# Patient Record
Sex: FEMALE | Race: WHITE | NOT HISPANIC OR LATINO | Employment: OTHER | URBAN - METROPOLITAN AREA
[De-identification: names, ages, dates, MRNs, and addresses within clinical notes are randomized per-mention and may not be internally consistent; named-entity substitution may affect disease eponyms.]

---

## 2017-01-05 PROCEDURE — 99284 EMERGENCY DEPT VISIT MOD MDM: CPT

## 2017-01-05 ASSESSMENT — PAIN SCALES - GENERAL: PAINLEVEL_OUTOF10: 8

## 2017-01-06 ENCOUNTER — HOSPITAL ENCOUNTER (EMERGENCY)
Facility: MEDICAL CENTER | Age: 47
End: 2017-01-06
Attending: EMERGENCY MEDICINE
Payer: MEDICAID

## 2017-01-06 VITALS
DIASTOLIC BLOOD PRESSURE: 75 MMHG | BODY MASS INDEX: 25.61 KG/M2 | HEART RATE: 102 BPM | HEIGHT: 64 IN | TEMPERATURE: 96.6 F | RESPIRATION RATE: 16 BRPM | OXYGEN SATURATION: 93 % | SYSTOLIC BLOOD PRESSURE: 135 MMHG | WEIGHT: 150 LBS

## 2017-01-06 DIAGNOSIS — R10.2 SUPRAPUBIC PAIN: ICD-10-CM

## 2017-01-06 DIAGNOSIS — G89.29 CHRONIC NONINTRACTABLE HEADACHE, UNSPECIFIED HEADACHE TYPE: ICD-10-CM

## 2017-01-06 DIAGNOSIS — R51.9 CHRONIC NONINTRACTABLE HEADACHE, UNSPECIFIED HEADACHE TYPE: ICD-10-CM

## 2017-01-06 LAB
ALBUMIN SERPL BCP-MCNC: 4 G/DL (ref 3.2–4.9)
ALBUMIN/GLOB SERPL: 1.3 G/DL
ALP SERPL-CCNC: 68 U/L (ref 30–99)
ALT SERPL-CCNC: 8 U/L (ref 2–50)
ANION GAP SERPL CALC-SCNC: 10 MMOL/L (ref 0–11.9)
APPEARANCE UR: CLEAR
AST SERPL-CCNC: 18 U/L (ref 12–45)
BASOPHILS # BLD AUTO: 0.5 % (ref 0–1.8)
BASOPHILS # BLD: 0.06 K/UL (ref 0–0.12)
BILIRUB SERPL-MCNC: 0.9 MG/DL (ref 0.1–1.5)
BILIRUB UR QL STRIP.AUTO: NEGATIVE
BUN SERPL-MCNC: 12 MG/DL (ref 8–22)
CALCIUM SERPL-MCNC: 9.6 MG/DL (ref 8.5–10.5)
CHLORIDE SERPL-SCNC: 108 MMOL/L (ref 96–112)
CO2 SERPL-SCNC: 21 MMOL/L (ref 20–33)
COLOR UR: YELLOW
COMMENT 1642: NORMAL
CREAT SERPL-MCNC: 0.61 MG/DL (ref 0.5–1.4)
CULTURE IF INDICATED INDCX: NO UA CULTURE
EOSINOPHIL # BLD AUTO: 0.14 K/UL (ref 0–0.51)
EOSINOPHIL NFR BLD: 1.1 % (ref 0–6.9)
ERYTHROCYTE [DISTWIDTH] IN BLOOD BY AUTOMATED COUNT: 42.3 FL (ref 35.9–50)
GFR SERPL CREATININE-BSD FRML MDRD: >60 ML/MIN/1.73 M 2
GLOBULIN SER CALC-MCNC: 3.1 G/DL (ref 1.9–3.5)
GLUCOSE SERPL-MCNC: 89 MG/DL (ref 65–99)
GLUCOSE UR STRIP.AUTO-MCNC: NEGATIVE MG/DL
HCG UR QL: NEGATIVE
HCT VFR BLD AUTO: 38.7 % (ref 37–47)
HGB BLD-MCNC: 13.2 G/DL (ref 12–16)
IMM GRANULOCYTES # BLD AUTO: 0.03 K/UL (ref 0–0.11)
IMM GRANULOCYTES NFR BLD AUTO: 0.2 % (ref 0–0.9)
KETONES UR STRIP.AUTO-MCNC: NEGATIVE MG/DL
LEUKOCYTE ESTERASE UR QL STRIP.AUTO: NEGATIVE
LIPASE SERPL-CCNC: 22 U/L (ref 11–82)
LYMPHOCYTES # BLD AUTO: 1.67 K/UL (ref 1–4.8)
LYMPHOCYTES NFR BLD: 13.4 % (ref 22–41)
MCH RBC QN AUTO: 31.2 PG (ref 27–33)
MCHC RBC AUTO-ENTMCNC: 34.1 G/DL (ref 33.6–35)
MCV RBC AUTO: 91.5 FL (ref 81.4–97.8)
MICRO URNS: NORMAL
MONOCYTES # BLD AUTO: 0.74 K/UL (ref 0–0.85)
MONOCYTES NFR BLD AUTO: 5.9 % (ref 0–13.4)
MORPHOLOGY BLD-IMP: NORMAL
NEUTROPHILS # BLD AUTO: 9.85 K/UL (ref 2–7.15)
NEUTROPHILS NFR BLD: 78.9 % (ref 44–72)
NITRITE UR QL STRIP.AUTO: NEGATIVE
NRBC # BLD AUTO: 0 K/UL
NRBC BLD AUTO-RTO: 0 /100 WBC
PH UR STRIP.AUTO: 6.5 [PH]
PLATELET # BLD AUTO: 279 K/UL (ref 164–446)
PMV BLD AUTO: 11.6 FL (ref 9–12.9)
POTASSIUM SERPL-SCNC: 3.7 MMOL/L (ref 3.6–5.5)
PROT SERPL-MCNC: 7.1 G/DL (ref 6–8.2)
PROT UR QL STRIP: NEGATIVE MG/DL
RBC # BLD AUTO: 4.23 M/UL (ref 4.2–5.4)
RBC UR QL AUTO: NEGATIVE
SODIUM SERPL-SCNC: 139 MMOL/L (ref 135–145)
SP GR UR REFRACTOMETRY: 1.02
SP GR UR STRIP.AUTO: 1.02
WBC # BLD AUTO: 12.5 K/UL (ref 4.8–10.8)

## 2017-01-06 PROCEDURE — 80053 COMPREHEN METABOLIC PANEL: CPT

## 2017-01-06 PROCEDURE — 96361 HYDRATE IV INFUSION ADD-ON: CPT

## 2017-01-06 PROCEDURE — 36415 COLL VENOUS BLD VENIPUNCTURE: CPT

## 2017-01-06 PROCEDURE — 51701 INSERT BLADDER CATHETER: CPT

## 2017-01-06 PROCEDURE — 81003 URINALYSIS AUTO W/O SCOPE: CPT

## 2017-01-06 PROCEDURE — 700111 HCHG RX REV CODE 636 W/ 250 OVERRIDE (IP): Performed by: EMERGENCY MEDICINE

## 2017-01-06 PROCEDURE — 85025 COMPLETE CBC W/AUTO DIFF WBC: CPT

## 2017-01-06 PROCEDURE — 96375 TX/PRO/DX INJ NEW DRUG ADDON: CPT

## 2017-01-06 PROCEDURE — 83690 ASSAY OF LIPASE: CPT

## 2017-01-06 PROCEDURE — 96374 THER/PROPH/DIAG INJ IV PUSH: CPT

## 2017-01-06 PROCEDURE — 700105 HCHG RX REV CODE 258: Performed by: EMERGENCY MEDICINE

## 2017-01-06 PROCEDURE — 81025 URINE PREGNANCY TEST: CPT

## 2017-01-06 RX ORDER — SODIUM CHLORIDE 9 MG/ML
1000 INJECTION, SOLUTION INTRAVENOUS ONCE
Status: COMPLETED | OUTPATIENT
Start: 2017-01-06 | End: 2017-01-06

## 2017-01-06 RX ORDER — DIPHENHYDRAMINE HYDROCHLORIDE 50 MG/ML
25 INJECTION INTRAMUSCULAR; INTRAVENOUS ONCE
Status: COMPLETED | OUTPATIENT
Start: 2017-01-06 | End: 2017-01-06

## 2017-01-06 RX ADMIN — SODIUM CHLORIDE 1000 ML: 9 INJECTION, SOLUTION INTRAVENOUS at 01:26

## 2017-01-06 RX ADMIN — PROCHLORPERAZINE EDISYLATE 10 MG: 5 INJECTION INTRAMUSCULAR; INTRAVENOUS at 01:27

## 2017-01-06 RX ADMIN — DIPHENHYDRAMINE HYDROCHLORIDE 25 MG: 50 INJECTION INTRAMUSCULAR; INTRAVENOUS at 01:26

## 2017-01-06 NOTE — ED PROVIDER NOTES
CHIEF COMPLAINT  Chief Complaint   Patient presents with   • Headache     Pt BIB EMS from home for HA/nausea/mild confusion. Pt reports noticing symptoms today, Pt reports HA to be dull and always there. PT with recent diagnosis of UTI. pt currently taking Cipro.    • Nausea   • Painful Urination       HPI  Rasheeda Manzano is a 46 y.o. female who presents with suprapubic discomfort for the past 2 or 3 days. Was recently started on ciprofloxacin for this. She is uncertain who prescribed this. No prior visits here within the [facility]. Denies any flank pain. Denies fevers. Denies vomiting. Has some slight nausea.    The patient is also reporting slight left-sided headache. Similar to prior episodes in the past. Has a history of chronic migraines. No neck stiffness or fevers. Multiple visits in the past for recurrent headaches. Does have a history of hydrocephalus without any complications recently.    The patient is reporting a moment of confusion earlier today however denies any confusion at this time. Speaking clearly and able to provide a clear history.    REVIEW OF SYSTEMS  See HPI for further details. All other systems are negative.     PAST MEDICAL HISTORY   has a past medical history of Hydrocephalus; Migraine without aura, without mention of intractable migraine without mention of status migrainosus; Blind; Chronic daily headache; Depression; Psychiatric problem; Hydrocephalus; C. difficile diarrhea (2013); Jaundice; Pain; Other specified symptom associated with female genital organs; and Fall.    SOCIAL HISTORY  Social History     Social History Main Topics   • Smoking status: Current Every Day Smoker -- 1.00 packs/day for 10 years     Types: Cigars     Start date: 05/01/2004   • Smokeless tobacco: Never Used      Comment:  CIGAR/day    • Alcohol Use: No      Comment: previous   • Drug Use: No   • Sexual Activity:     Partners: Male       SURGICAL HISTORY   has past surgical history that includes  "laparoscopy (8/8/08); lysis adhesions general (12/10/2013); cystoscopy (12/10/2013); exploratory laparotomy (12/10/2013); appendectomy (12/10/2013); abdominal hysterectomy total (12/10/2013); aakash by laparoscopy (N/A, 8/13/2015); cholecystectomy (N/A, 8/13/2015); and other.    CURRENT MEDICATIONS  Home Medications     **Home medications have not yet been reviewed for this encounter**          ALLERGIES  Allergies   Allergen Reactions   • Tape Rash     Medical tape. Per patient, paper tape ok.       PHYSICAL EXAM  VITAL SIGNS: /75 mmHg  Pulse 113  Temp(Src) 35.9 °C (96.6 °F)  Resp 16  Ht 1.626 m (5' 4.02\")  Wt 68.04 kg (150 lb)  BMI 25.73 kg/m2  SpO2 97%  LMP 11/27/2013  Pulse ox interpretation: I interpret this pulse ox as normal.  Constitutional: Alert in no apparent distress.  HENT: No signs of trauma, Bilateral external ears normal, Nose normal.   Eyes: Pupils are equal and reactive, Conjunctiva normal, Non-icteric.   Neck: Normal range of motion, No tenderness, Supple, No stridor.   Cardiovascular: Regular rate and rhythm, no murmurs.   Thorax & Lungs: Normal breath sounds, No respiratory distress, No wheezing, No chest tenderness.   Abdomen: Bowel sounds normal, Soft, mild suprapubic tenderness, No masses, No pulsatile masses. No peritoneal signs.  Skin: Warm, Dry, No erythema, No rash.   Back: No bony tenderness, No CVA tenderness.   Extremities: Intact distal pulses, No edema, No tenderness, No cyanosis  Musculoskeletal: Good range of motion in all major joints. No tenderness to palpation or major deformities noted.   Neurologic: Alert , Normal motor function and gait, Normal sensory function, No focal deficits noted.   Psychiatric: Affect normal, Judgment normal, Mood normal.       DIAGNOSTIC STUDIES / PROCEDURES      LABS  Labs Reviewed   CBC WITH DIFFERENTIAL - Abnormal; Notable for the following:     WBC 12.5 (*)     Neutrophils-Polys 78.90 (*)     Lymphocytes 13.40 (*)     Neutrophils " (Absolute) 9.85 (*)     All other components within normal limits   COMP METABOLIC PANEL   URINALYSIS,CULTURE IF INDICATED   HCG QUALITATIVE UR   LIPASE   REFRACTOMETER SG   ESTIMATED GFR   PERIPHERAL SMEAR REVIEW   DIFFERENTIAL COMMENT       RADIOLOGY  No orders to display         COURSE & MEDICAL DECISION MAKING  Pertinent Labs & Imaging studies reviewed. (See chart for details)  46-year-old female with a history of chronic headaches presenting with headache similar to prior headaches in the past. No focal neurologic deficits. No vomiting. No photophobia. Known neck stiffness or signs of meningismus. She was treated with typical migraine therapy including Compazine, Benadryl, IV fluids. Had improvement in her symptoms. The patient is also reporting suprapubic pain. Denies explicit dysuria and denies hematuria. No CVA tenderness or flank pain. No vomiting. Does have some slight nausea.    The patient's headache symptoms along with suprapubic pain may be related to a urinary tract infection. Urinalysis was clear of evidence of infection however. This may be due to partial treatment with ciprofloxacin which the patient is currently taking. She has a few more days left of treatment.    The patient was reevaluated at bedside and she reports feeling improved.    Given the patient's improvement in symptoms, I do not believe it is necessary to undergo further advanced imaging of her head for signs of shunt malfunction. The patient has had several visits here in the emergency department with multiple workups. Laboratory studies today were largely unremarkable except for slight leukocytosis of uncertain soon if can set this time. May be related to a stress response or her urinary tract infection for which she is being treated for.    All results were reviewed with the patient. She reports understanding. She reports feeling improved and is agreeable with discharge.    The patient will return for worsening symptoms or failure  of improvement and is stable at the time of discharge. The patient verbalizes understanding in their own words.    The patient was referred to primary care where they will receive further BP management.      CHANDLER Adams  [address] [place] [address] [address] [address]  [phone]    In 2 days      [facility], Emergency Dept  [address] [facility] [address] [phone]    As needed, If symptoms worsen      FINAL IMPRESSION  1. Suprapubic pain    2. Chronic nonintractable headache, unspecified headache type            Electronically signed by: Tone Cuello, 1/6/2017 12:41 AM

## 2017-01-06 NOTE — DISCHARGE INSTRUCTIONS
Migraine Headache  A migraine headache is an intense, throbbing pain on one or both sides of your head. A migraine can last for 30 minutes to several hours.  CAUSES   The exact cause of a migraine headache is not always known. However, a migraine may be caused when nerves in the brain become irritated and release chemicals that cause inflammation. This causes pain.  Certain things may also trigger migraines, such as:  · Alcohol.  · Smoking.  · Stress.  · Menstruation.  · Aged cheeses.  · Foods or drinks that contain nitrates, glutamate, aspartame, or tyramine.  · Lack of sleep.  · Chocolate.  · Caffeine.  · Hunger.  · Physical exertion.  · Fatigue.  · Medicines used to treat chest pain (nitroglycerine), birth control pills, estrogen, and some blood pressure medicines.  SIGNS AND SYMPTOMS  · Pain on one or both sides of your head.  · Pulsating or throbbing pain.  · Severe pain that prevents daily activities.  · Pain that is aggravated by any physical activity.  · Nausea, vomiting, or both.  · Dizziness.  · Pain with exposure to bright lights, loud noises, or activity.  · General sensitivity to bright lights, loud noises, or smells.  Before you get a migraine, you may get warning signs that a migraine is coming (aura). An aura may include:  · Seeing flashing lights.  · Seeing bright spots, halos, or zigzag lines.  · Having tunnel vision or blurred vision.  · Having feelings of numbness or tingling.  · Having trouble talking.  · Having muscle weakness.  DIAGNOSIS   A migraine headache is often diagnosed based on:  · Symptoms.  · Physical exam.  · A CT scan or MRI of your head. These imaging tests cannot diagnose migraines, but they can help rule out other causes of headaches.  TREATMENT  Medicines may be given for pain and nausea. Medicines can also be given to help prevent recurrent migraines.   HOME CARE INSTRUCTIONS  · Only take over-the-counter or prescription medicines for pain or discomfort as directed by your  health care provider. The use of long-term narcotics is not recommended.  · Lie down in a dark, quiet room when you have a migraine.  · Keep a journal to find out what may trigger your migraine headaches. For example, write down:  ¨ What you eat and drink.  ¨ How much sleep you get.  ¨ Any change to your diet or medicines.  · Limit alcohol consumption.  · Quit smoking if you smoke.  · Get 7-9 hours of sleep, or as recommended by your health care provider.  · Limit stress.  · Keep lights dim if bright lights bother you and make your migraines worse.  SEEK IMMEDIATE MEDICAL CARE IF:   · Your migraine becomes severe.  · You have a fever.  · You have a stiff neck.  · You have vision loss.  · You have muscular weakness or loss of muscle control.  · You start losing your balance or have trouble walking.  · You feel faint or pass out.  · You have severe symptoms that are different from your first symptoms.  MAKE SURE YOU:   · Understand these instructions.  · Will watch your condition.  · Will get help right away if you are not doing well or get worse.     This information is not intended to replace advice given to you by your health care provider. Make sure you discuss any questions you have with your health care provider.     Document Released: 12/18/2006 Document Revised: 01/08/2016 Document Reviewed: 08/25/2014  [employer] Interactive Patient Education ©2016 [employer]

## 2017-01-06 NOTE — ED AVS SNAPSHOT
Firm58 Access Code: [email or web address]  Expires: 1/30/2017 10:51 AM    Firm58  A secure, online tool to manage your health information     [employer]’s Firm58® is a secure, online tool that connects you to your personalized health information from the privacy of your home -- day or night - making it very easy for you to manage your healthcare. Once the activation process is completed, you can even access your medical information using the Firm58 deysi, which is available for free in the Apple Deysi store or Google Play store.     Firm58 provides the following levels of access (as shown below):   My Chart Features   [facility] Primary Care Doctor [facility]  Specialists [facility]  Urgent  Care Non-[facility]  Primary Care  Doctor   Email your healthcare team securely and privately 24/7 X X X X   Manage appointments: schedule your next appointment; view details of past/upcoming appointments X      Request prescription refills. X      View recent personal medical records, including lab and immunizations X X X X   View health record, including health history, allergies, medications X X X X   Read reports about your outpatient visits, procedures, consult and ER notes X X X X   See your discharge summary, which is a recap of your hospital and/or ER visit that includes your diagnosis, lab results, and care plan. X X       How to register for Firm58:  1. Go to  [email or web address].  2. Click on the Sign Up Now box, which takes you to the New Member Sign Up page. You will need to provide the following information:  a. Enter your Firm58 Access Code exactly as it appears at the top of this page. (You will not need to use this code after you’ve completed the sign-up process. If you do not sign up before the expiration date, you must request a new code.)   b. Enter your date of birth.   c. Enter your home email address.   d. Click Submit, and follow the next screen’s instructions.  3. Create a Firm58 ID. This will be your Firm58  login ID and cannot be changed, so think of one that is secure and easy to remember.  4. Create a [employer] password. You can change your password at any time.  5. Enter your Password Reset Question and Answer. This can be used at a later time if you forget your password.   6. Enter your e-mail address. This allows you to receive e-mail notifications when new information is available in [employer].  7. Click Sign Up. You can now view your health information.    For assistance activating your [employer] account, call [phone]

## 2017-01-06 NOTE — ED AVS SNAPSHOT
After Visit Summary                                                                                                                Rasheeda Manzano   MRN: [id number]    Department:  [facility], Emergency Dept   Date of Visit:  1/5/2017            [facility], Emergency Dept    [address] [place] [address]    Phone:  [phone]      You were seen by     Tone Cuello M.D.      Your Diagnosis Was     Suprapubic pain     R10.2       These are the medications you received during your hospitalization from 01/05/2017 1840 to 01/06/2017 0425     Date/Time Order Dose Route Action    01/06/2017 0126 NS infusion 1,000 mL 1,000 mL Intravenous New Bag    01/06/2017 0127 prochlorperazine (COMPAZINE) injection 10 mg 10 mg Intravenous Given    01/06/2017 0126 diphenhydrAMINE (BENADRYL) injection 25 mg 25 mg Intravenous Given      Follow-up Information     1. Follow up with CHANDLER Adams In 2 days.    Specialty:  Family Medicine    Contact information    [address] [facility]  [address] [address] [phone]          2. Follow up with [facility], Emergency Dept.    Specialty:  Emergency Medicine    Why:  As needed, If symptoms worsen    Contact information    [address] [place] [address] [phone]      Medication Information     Review all of your home medications and newly ordered medications with your primary doctor and/or pharmacist as soon as possible. Follow medication instructions as directed by your doctor and/or pharmacist.     Please keep your complete medication list with you and share with your physician. Update the information when medications are discontinued, doses are changed, or new medications (including over-the-counter products) are added; and carry medication information at all times in the event of emergency situations.               Medication List      ASK your doctor about these medications        Instructions    AMBIEN 10 MG Tabs   Generic drug:  zolpidem    Take 10 mg by mouth every bedtime. Indications: Trouble Sleeping   Dose:  10 mg       aripiprazole 2 MG tablet   Commonly known as:  ABILIFY    Take 2 mg by mouth every bedtime.   Dose:  2 mg       divalproex 250 MG Tbec   Commonly known as:  DEPAKOTE    Take 250 mg by mouth every evening.   Dose:  250 mg       ibuprofen 400 MG Tabs   Commonly known as:  MOTRIN    Take 1 Tab by mouth every 8 hours as needed for Moderate Pain.   Dose:  400 mg       lactobacillus granules Pack    Take 1 Packet by mouth 3 times a day, with meals.   Dose:  1 Packet       nicotine 14 MG/24HR Pt24   Commonly known as:  NICODERM    Apply 1 Patch to skin as directed every 24 hours.   Dose:  1 Patch       nicotine polacrilex 2 MG Gum   Commonly known as:  NICORETTE    Take 1 Each by mouth every 1 hour as needed (For nicotine urge).   Dose:  2 mg       ondansetron 4 MG Tbdp   Commonly known as:  ZOFRAN ODT    Take 1 Tab by mouth every 8 hours as needed for Nausea/Vomiting.   Dose:  4 mg       oxycodone immediate-release 5 MG Tabs   Commonly known as:  ROXICODONE    Take 1 Tab by mouth every four hours as needed.   Dose:  5 mg       oxycodone-acetaminophen 5-325 MG Tabs   Commonly known as:  PERCOCET    Take 1-2 Tabs by mouth every four hours as needed (FOR PAIN) for up to 11 doses.   Dose:  1-2 Tab       rizatriptan 10 MG tablet   Commonly known as:  MAXALT    Take 1 Tab by mouth Once PRN for Migraine for up to 1 dose.   Dose:  10 mg       sertraline 100 MG Tabs   Commonly known as:  ZOLOFT    Take 200 mg by mouth every bedtime.   Dose:  200 mg       SUMAtriptan 25 MG Tabs tablet   Commonly known as:  IMITREX    Take 1 Tab by mouth Once PRN for Migraine.   Dose:  25 mg               Procedures and tests performed during your visit     BETA-HCG QUALITATIVE URINE    CBC WITH DIFFERENTIAL    COMP METABOLIC PANEL    DIFFERENTIAL COMMENT    ESTIMATED GFR    IV Saline Lock    LIPASE    PERIPHERAL SMEAR  REVIEW    REFRACTOMETER SG    URINALYSIS CULTURE, IF INDICATED        Discharge Instructions       Migraine Headache  A migraine headache is an intense, throbbing pain on one or both sides of your head. A migraine can last for 30 minutes to several hours.  CAUSES   The exact cause of a migraine headache is not always known. However, a migraine may be caused when nerves in the brain become irritated and release chemicals that cause inflammation. This causes pain.  Certain things may also trigger migraines, such as:  · Alcohol.  · Smoking.  · Stress.  · Menstruation.  · Aged cheeses.  · Foods or drinks that contain nitrates, glutamate, aspartame, or tyramine.  · Lack of sleep.  · Chocolate.  · Caffeine.  · Hunger.  · Physical exertion.  · Fatigue.  · Medicines used to treat chest pain (nitroglycerine), birth control pills, estrogen, and some blood pressure medicines.  SIGNS AND SYMPTOMS  · Pain on one or both sides of your head.  · Pulsating or throbbing pain.  · Severe pain that prevents daily activities.  · Pain that is aggravated by any physical activity.  · Nausea, vomiting, or both.  · Dizziness.  · Pain with exposure to bright lights, loud noises, or activity.  · General sensitivity to bright lights, loud noises, or smells.  Before you get a migraine, you may get warning signs that a migraine is coming (aura). An aura may include:  · Seeing flashing lights.  · Seeing bright spots, halos, or zigzag lines.  · Having tunnel vision or blurred vision.  · Having feelings of numbness or tingling.  · Having trouble talking.  · Having muscle weakness.  DIAGNOSIS   A migraine headache is often diagnosed based on:  · Symptoms.  · Physical exam.  · A CT scan or MRI of your head. These imaging tests cannot diagnose migraines, but they can help rule out other causes of headaches.  TREATMENT  Medicines may be given for pain and nausea. Medicines can also be given to help prevent recurrent migraines.   HOME CARE  INSTRUCTIONS  · Only take over-the-counter or prescription medicines for pain or discomfort as directed by your health care provider. The use of long-term narcotics is not recommended.  · Lie down in a dark, quiet room when you have a migraine.  · Keep a journal to find out what may trigger your migraine headaches. For example, write down:  ¨ What you eat and drink.  ¨ How much sleep you get.  ¨ Any change to your diet or medicines.  · Limit alcohol consumption.  · Quit smoking if you smoke.  · Get 7-9 hours of sleep, or as recommended by your health care provider.  · Limit stress.  · Keep lights dim if bright lights bother you and make your migraines worse.  SEEK IMMEDIATE MEDICAL CARE IF:   · Your migraine becomes severe.  · You have a fever.  · You have a stiff neck.  · You have vision loss.  · You have muscular weakness or loss of muscle control.  · You start losing your balance or have trouble walking.  · You feel faint or pass out.  · You have severe symptoms that are different from your first symptoms.  MAKE SURE YOU:   · Understand these instructions.  · Will watch your condition.  · Will get help right away if you are not doing well or get worse.     This information is not intended to replace advice given to you by your health care provider. Make sure you discuss any questions you have with your health care provider.     Document Released: 12/18/2006 Document Revised: 01/08/2016 Document Reviewed: 08/25/2014  Elsevier Interactive Patient Education ©2016 [employer]            Patient Information     Patient Information    Following emergency treatment: all patient requiring follow-up care must return either to a private physician or a clinic if your condition worsens before you are able to obtain further medical attention, please return to the emergency room.     Billing Information    At [facility], we work to make the billing process streamlined for our patients.  Our Representatives are here to  answer any questions you may have regarding your hospital bill.  If you have insurance coverage and have supplied your insurance information to us, we will submit a claim to your insurer on your behalf.  Should you have any questions regarding your bill, we can be reached online or by phone as follows:  Online: You are able pay your bills online or live chat with our representatives about any billing questions you may have. We are here to help Monday - Friday from 8:00am to 7:30pm and 9:00am - 12:00pm on Saturdays.  Please visit [email or web address]  for more information.   Phone:  [phone] or [phone]    Please note that your emergency physician, surgeon, pathologist, radiologist, anesthesiologist, and other specialists are not employed by [facility] and will therefore bill separately for their services.  Please contact them directly for any questions concerning their bills at the numbers below:     Emergency Physician Services:  [phone]  [facility]:  [phone]  [facility]:  [phone]  [facility]:  [phone]    1. Your final bill may vary from the amount quoted upon discharge if all procedures are not complete at that time, or if your doctor has additional procedures of which we are not aware. You will receive an additional bill if you return to the Emergency Department at [facility] for suture removal regardless of the facility of which the sutures were placed.     2. Please arrange for settlement of this account at the emergency registration.    3. All self-pay accounts are due in full at the time of treatment.  If you are unable to meet this obligation then payment is expected within 4-5 days.     4. If you have had radiology studies (CT, X-ray, Ultrasound, MRI), you have received a preliminary result during your emergency department visit. Please contact the radiology department [phone] to receive  a copy of your final result. Please discuss the Final result with your primary physician or with the follow up physician provided.     Crisis Hotline:  [place] Crisis Hotline:  7-384-PLRHWPJ or 1-260.645.8646  [place] Crisis Hotline:    1-819.682.2237 or 070-348-0100         ED Discharge Follow Up Questions    1. In order to provide you with very good care, we would like to follow up with a phone call in the next few days.  May we have your permission to contact you?     YES /  NO    2. What is the best phone number to call you? (       )_____-__________    3. What is the best time to call you?      Morning  /  Afternoon  /  Evening                   Patient Signature:  ____________________________________________________________    Date:  ____________________________________________________________

## 2017-01-06 NOTE — ED AVS SNAPSHOT
1/6/2017          Rasheeda Manzano  [address]  [facility] [address]    Dear Rasheeda:    [facility] wants to ensure your discharge home is safe and you or your loved ones have had all your questions answered regarding your care after you leave the hospital.    You may receive a telephone call within two days of your discharge.  This call is to make certain you understand your discharge instructions as well as ensure we provided you with the best care possible during your stay with us.     The call will only last approximately 3-5 minutes and will be done by a nurse.    Once again, we want to ensure your discharge home is safe and that you have a clear understanding of any next steps in your care.  If you have any questions or concerns, please do not hesitate to contact us, we are here for you.  Thank you for choosing [facility] for your healthcare needs.    Sincerely,    Harrison Kilgore    [facility]

## 2017-01-09 ENCOUNTER — HOSPITAL ENCOUNTER (EMERGENCY)
Facility: MEDICAL CENTER | Age: 47
End: 2017-01-09
Attending: EMERGENCY MEDICINE
Payer: MEDICAID

## 2017-01-09 VITALS
TEMPERATURE: 98.1 F | DIASTOLIC BLOOD PRESSURE: 80 MMHG | HEART RATE: 105 BPM | RESPIRATION RATE: 16 BRPM | HEIGHT: 64 IN | WEIGHT: 150 LBS | BODY MASS INDEX: 25.61 KG/M2 | SYSTOLIC BLOOD PRESSURE: 150 MMHG | OXYGEN SATURATION: 97 %

## 2017-01-09 DIAGNOSIS — G43.809 OTHER MIGRAINE WITHOUT STATUS MIGRAINOSUS, NOT INTRACTABLE: ICD-10-CM

## 2017-01-09 PROCEDURE — 96376 TX/PRO/DX INJ SAME DRUG ADON: CPT

## 2017-01-09 PROCEDURE — 700105 HCHG RX REV CODE 258: Performed by: EMERGENCY MEDICINE

## 2017-01-09 PROCEDURE — 96374 THER/PROPH/DIAG INJ IV PUSH: CPT

## 2017-01-09 PROCEDURE — 700111 HCHG RX REV CODE 636 W/ 250 OVERRIDE (IP): Performed by: EMERGENCY MEDICINE

## 2017-01-09 PROCEDURE — 700102 HCHG RX REV CODE 250 W/ 637 OVERRIDE(OP): Performed by: GENERAL ACUTE CARE HOSPITAL

## 2017-01-09 PROCEDURE — 96375 TX/PRO/DX INJ NEW DRUG ADDON: CPT

## 2017-01-09 PROCEDURE — A9270 NON-COVERED ITEM OR SERVICE: HCPCS | Performed by: GENERAL ACUTE CARE HOSPITAL

## 2017-01-09 PROCEDURE — 99284 EMERGENCY DEPT VISIT MOD MDM: CPT

## 2017-01-09 PROCEDURE — 96361 HYDRATE IV INFUSION ADD-ON: CPT

## 2017-01-09 PROCEDURE — 700111 HCHG RX REV CODE 636 W/ 250 OVERRIDE (IP): Performed by: GENERAL ACUTE CARE HOSPITAL

## 2017-01-09 RX ORDER — DEXAMETHASONE SODIUM PHOSPHATE 10 MG/ML
10 INJECTION, SOLUTION INTRAMUSCULAR; INTRAVENOUS ONCE
Status: COMPLETED | OUTPATIENT
Start: 2017-01-09 | End: 2017-01-09

## 2017-01-09 RX ORDER — DIPHENHYDRAMINE HYDROCHLORIDE 50 MG/ML
25 INJECTION INTRAMUSCULAR; INTRAVENOUS ONCE
Status: COMPLETED | OUTPATIENT
Start: 2017-01-09 | End: 2017-01-09

## 2017-01-09 RX ORDER — ONDANSETRON 4 MG/1
4 TABLET, ORALLY DISINTEGRATING ORAL ONCE
Status: COMPLETED | OUTPATIENT
Start: 2017-01-09 | End: 2017-01-09

## 2017-01-09 RX ORDER — METOCLOPRAMIDE HYDROCHLORIDE 5 MG/ML
10 INJECTION INTRAMUSCULAR; INTRAVENOUS ONCE
Status: COMPLETED | OUTPATIENT
Start: 2017-01-09 | End: 2017-01-09

## 2017-01-09 RX ORDER — HYDROCODONE BITARTRATE AND ACETAMINOPHEN 5; 325 MG/1; MG/1
1-2 TABLET ORAL ONCE
Status: COMPLETED | OUTPATIENT
Start: 2017-01-09 | End: 2017-01-09

## 2017-01-09 RX ORDER — KETOROLAC TROMETHAMINE 30 MG/ML
30 INJECTION, SOLUTION INTRAMUSCULAR; INTRAVENOUS ONCE
Status: COMPLETED | OUTPATIENT
Start: 2017-01-09 | End: 2017-01-09

## 2017-01-09 RX ORDER — SODIUM CHLORIDE 9 MG/ML
1000 INJECTION, SOLUTION INTRAVENOUS ONCE
Status: COMPLETED | OUTPATIENT
Start: 2017-01-09 | End: 2017-01-09

## 2017-01-09 RX ADMIN — DIPHENHYDRAMINE HYDROCHLORIDE 25 MG: 50 INJECTION INTRAMUSCULAR; INTRAVENOUS at 15:55

## 2017-01-09 RX ADMIN — HYDROCODONE BITARTRATE AND ACETAMINOPHEN 2 TABLET: 5; 325 TABLET ORAL at 20:58

## 2017-01-09 RX ADMIN — SODIUM CHLORIDE 1000 ML: 9 INJECTION, SOLUTION INTRAVENOUS at 15:49

## 2017-01-09 RX ADMIN — HYDROMORPHONE HYDROCHLORIDE 1 MG: 1 INJECTION, SOLUTION INTRAMUSCULAR; INTRAVENOUS; SUBCUTANEOUS at 19:06

## 2017-01-09 RX ADMIN — METOCLOPRAMIDE 10 MG: 5 INJECTION, SOLUTION INTRAMUSCULAR; INTRAVENOUS at 15:53

## 2017-01-09 RX ADMIN — DEXAMETHASONE SODIUM PHOSPHATE 10 MG: 10 INJECTION, SOLUTION INTRAMUSCULAR; INTRAVENOUS at 16:06

## 2017-01-09 RX ADMIN — HYDROMORPHONE HYDROCHLORIDE 1 MG: 1 INJECTION, SOLUTION INTRAMUSCULAR; INTRAVENOUS; SUBCUTANEOUS at 15:56

## 2017-01-09 RX ADMIN — KETOROLAC TROMETHAMINE 30 MG: 30 INJECTION, SOLUTION INTRAMUSCULAR; INTRAVENOUS at 15:51

## 2017-01-09 RX ADMIN — ONDANSETRON 4 MG: 4 TABLET, ORALLY DISINTEGRATING ORAL at 20:58

## 2017-01-09 ASSESSMENT — PAIN SCALES - GENERAL
PAINLEVEL_OUTOF10: 7
PAINLEVEL_OUTOF10: 2
PAINLEVEL_OUTOF10: 8
PAINLEVEL_OUTOF10: 8
PAINLEVEL_OUTOF10: 6
PAINLEVEL_OUTOF10: 8
PAINLEVEL_OUTOF10: 7
PAINLEVEL_OUTOF10: 8
PAINLEVEL_OUTOF10: 7

## 2017-01-09 ASSESSMENT — LIFESTYLE VARIABLES
TOTAL SCORE: 0
EVER HAD A DRINK FIRST THING IN THE MORNING TO STEADY YOUR NERVES TO GET RID OF A HANGOVER: NO
HAVE YOU EVER FELT YOU SHOULD CUT DOWN ON YOUR DRINKING: NO
HAVE PEOPLE ANNOYED YOU BY CRITICIZING YOUR DRINKING: NO
HOW MANY TIMES IN THE PAST YEAR HAVE YOU HAD 5 OR MORE DRINKS IN A DAY: 0
ON A TYPICAL DAY WHEN YOU DRINK ALCOHOL HOW MANY DRINKS DO YOU HAVE: 2
DO YOU DRINK ALCOHOL: YES
AVERAGE NUMBER OF DAYS PER WEEK YOU HAVE A DRINK CONTAINING ALCOHOL: .5
TOTAL SCORE: 0
TOTAL SCORE: 0
CONSUMPTION TOTAL: NEGATIVE
EVER FELT BAD OR GUILTY ABOUT YOUR DRINKING: NO

## 2017-01-09 NOTE — ED AVS SNAPSHOT
1/9/2017          Rasheeda Manzano  [address]  [facility] [address]    Dear Rasheeda:    [facility] wants to ensure your discharge home is safe and you or your loved ones have had all your questions answered regarding your care after you leave the hospital.    You may receive a telephone call within two days of your discharge.  This call is to make certain you understand your discharge instructions as well as ensure we provided you with the best care possible during your stay with us.     The call will only last approximately 3-5 minutes and will be done by a nurse.    Once again, we want to ensure your discharge home is safe and that you have a clear understanding of any next steps in your care.  If you have any questions or concerns, please do not hesitate to contact us, we are here for you.  Thank you for choosing [facility] for your healthcare needs.    Sincerely,    Harrison Kilgore    [facility]

## 2017-01-09 NOTE — ED AVS SNAPSHOT
Tyba Access Code: [email or web address]  Expires: 1/30/2017 10:51 AM    Tyba  A secure, online tool to manage your health information     [employer] Tyba® is a secure, online tool that connects you to your personalized health information from the privacy of your home -- day or night - making it very easy for you to manage your healthcare. Once the activation process is completed, you can even access your medical information using the Tyba deysi, which is available for free in the Apple Deysi store or Google Play store.     Tyba provides the following levels of access (as shown below):   My Chart Features   [facility] Primary Care Doctor [facility]  Specialists [facility]  Urgent  Care Non-[facility]  Primary Care  Doctor   Email your healthcare team securely and privately 24/7 X X X X   Manage appointments: schedule your next appointment; view details of past/upcoming appointments X      Request prescription refills. X      View recent personal medical records, including lab and immunizations X X X X   View health record, including health history, allergies, medications X X X X   Read reports about your outpatient visits, procedures, consult and ER notes X X X X   See your discharge summary, which is a recap of your hospital and/or ER visit that includes your diagnosis, lab results, and care plan. X X       How to register for Tyba:  1. Go to  [email or web address].  2. Click on the Sign Up Now box, which takes you to the New Member Sign Up page. You will need to provide the following information:  a. Enter your Tyba Access Code exactly as it appears at the top of this page. (You will not need to use this code after you’ve completed the sign-up process. If you do not sign up before the expiration date, you must request a new code.)   b. Enter your date of birth.   c. Enter your home email address.   d. Click Submit, and follow the next screen’s instructions.  3. Create a Tyba ID. This will be your Tyba  login ID and cannot be changed, so think of one that is secure and easy to remember.  4. Create a [employer] password. You can change your password at any time.  5. Enter your Password Reset Question and Answer. This can be used at a later time if you forget your password.   6. Enter your e-mail address. This allows you to receive e-mail notifications when new information is available in ActiveGift.  7. Click Sign Up. You can now view your health information.    For assistance activating your ActiveGift account, call [phone]

## 2017-01-09 NOTE — ED NOTES
"Rasheeda Manzano 46 y.o. female bib EMS to ORTHO room for     Chief Complaint   Patient presents with   • Migraine     intermittent x 1 week.  Pt reports light sensitivity and nausea.  Migraine headache is right-sided behind her eye, around the back of her head and down R sided enck   • N/V     pt has had vomiting and diarrhea today   • Diarrhea     Pt is blind.  Pt has history of migraines.  PIV placed by EMS pta and pt was given 4mg zofran and 100mcg fentanyl.  Pt recently placed on Viibryd for depression, unsure if this is related to her migraine.  /106 mmHg  Pulse 103  Temp(Src) 36.7 °C (98.1 °F)  Resp 16  Ht 1.626 m (5' 4.02\")  Wt 68.04 kg (150 lb)  BMI 25.73 kg/m2  SpO2 98%  LMP 11/27/2013    "

## 2017-01-09 NOTE — ED PROVIDER NOTES
ED Provider Note    Scribed for Kory Snow D.O. by Megan Taveras. 1/9/2017  3:16 PM    Primary care provider: CHANDLER Adams  Means of arrival: Arabella  History obtained from: Patient  History limited by: None    CHIEF COMPLAINT  Chief Complaint   Patient presents with   • Migraine     intermittent x 1 week.  Pt reports light sensitivity and nausea.  Migraine headache is right-sided behind her eye, around the back of her head and down R sided enck   • N/V     pt has had vomiting and diarrhea today   • Diarrhea       HPI  Rasheeda Manzano is a 46 y.o. female who was brought into the ED by ambulance for a migraine that she localizes behind her right eye radiating into the back of her head and down her neck. She states this headache has been intermittent and worsening for 1 wk. She describes associated light sensitivity and nausea. Ms. Manzano tells me she has a history of similar symptoms for which she has been seen in the ED. She tells me her headaches are usually treated with Imitrex however it has not been working. She notes that morphine, dilaudid, and migraine cocktails have provided relief in the past. She reports a history of hydrocephalus with a shunt in place. Ms. Manzano reports a recent fever associated with a bladder infection last week. She reports no other pertinent medical information.     REVIEW OF SYSTEMS  Pertinent positives include migraine, light sensitivity, nausea. Pertinent negatives include no fever or chills on exam.  All other systems reviewed and negative.    PAST MEDICAL HISTORY  Past Medical History   Diagnosis Date   • Hydrocephalus    • Migraine without aura, without mention of intractable migraine without mention of status migrainosus    • Blind    • Chronic daily headache    • Depression    • Psychiatric problem      depression   • Hydrocephalus      shunt drains into pleural place of L lung   • C. difficile diarrhea 2013   • Jaundice      at birth   • Pain       gallbladder related   • Other specified symptom associated with female genital organs      excessive bleeding   • Fall        SURGICAL HISTORY  Past Surgical History   Procedure Laterality Date   • Laparoscopy  8/8/08     Performed by FERNANDO HENDERSON at SURGERY [facility]   • Lysis adhesions general  12/10/2013     Performed by Arie Bass M.D. at SURGERY [facility]   • Cystoscopy  12/10/2013     Performed by Joana Yeager M.D. at SURGERY [facility]   • Exploratory laparotomy  12/10/2013     Performed by Arie Bass M.D. at SURGERY [facility]   • Appendectomy  12/10/2013     Performed by Arie Bass M.D. at SURGERY [facility]   • Abdominal hysterectomy total  12/10/2013     Performed by Joana Yeager M.D. at SURGERY [facility]   • Ayala by laparoscopy N/A 8/13/2015     Procedure: AYALA BY LAPAROSCOPY;  Surgeon: Brice Johnson M.D.;  Location: SURGERY SAME DAY [facility];  Service:    • Cholecystectomy N/A 8/13/2015     Procedure: CHOLECYSTECTOMY;  Surgeon: Brice Johnson M.D.;  Location: SURGERY SAME DAY [facility];  Service:    • Other       PLEURAL SHUNT, numerous revisions        SOCIAL HISTORY  Social History   Substance Use Topics   • Smoking status: Current Every Day Smoker -- 1.00 packs/day for 10 years     Types: Cigars     Start date: 05/01/2004   • Smokeless tobacco: Never Used      Comment:  CIGAR/day    • Alcohol Use: No      Comment: socially      History   Drug Use No       FAMILY HISTORY  Family History   Problem Relation Age of Onset   • Hypertension Mother    • Hypertension Father    • Non-contributory Neg Hx      Migraine       CURRENT MEDICATIONS  No current facility-administered medications on file prior to encounter.     Current Outpatient Prescriptions on File Prior to Encounter   Medication Sig Dispense Refill   • oxycodone-acetaminophen (PERCOCET) 5-325 MG Tab Take 1-2 Tabs by mouth every four hours as needed (FOR PAIN) for up to 11 doses. 10  "Tab 0   • SUMAtriptan (IMITREX) 25 MG Tab tablet Take 1 Tab by mouth Once PRN for Migraine. 10 Tab 0   • oxycodone immediate-release (ROXICODONE) 5 MG Tab Take 1 Tab by mouth every four hours as needed. 30 Tab 0   • ibuprofen (MOTRIN) 400 MG Tab Take 1 Tab by mouth every 8 hours as needed for Moderate Pain. 30 Tab 0   • lactobacillus granules (LACTINEX/FLORANEX) Pack Take 1 Packet by mouth 3 times a day, with meals. 90 Packet 3   • nicotine (NICODERM) 14 MG/24HR PATCH 24 HR Apply 1 Patch to skin as directed every 24 hours. 30 Patch 3   • nicotine polacrilex (NICORETTE) 2 MG Gum Take 1 Each by mouth every 1 hour as needed (For nicotine urge). 90 Each 3   • zolpidem (AMBIEN) 10 MG Tab Take 10 mg by mouth every bedtime. Indications: Trouble Sleeping     • ondansetron (ZOFRAN ODT) 4 MG TABLET DISPERSIBLE Take 1 Tab by mouth every 8 hours as needed for Nausea/Vomiting. 10 Tab 0   • rizatriptan (MAXALT) 10 MG tablet Take 1 Tab by mouth Once PRN for Migraine for up to 1 dose. 10 Tab 3   • aripiprazole (ABILIFY) 2 MG tablet Take 2 mg by mouth every bedtime.     • divalproex (DEPAKOTE) 250 MG Tablet Delayed Response Take 250 mg by mouth every evening.     • sertraline (ZOLOFT) 100 MG Tab Take 200 mg by mouth every bedtime.        ALLERGIES  Allergies   Allergen Reactions   • Tape Rash     Medical tape. Per patient, paper tape ok.       PHYSICAL EXAM  VITAL SIGNS: /106 mmHg  Pulse 103  Temp(Src) 36.7 °C (98.1 °F)  Resp 16  Ht 1.626 m (5' 4.02\")  Wt 68.04 kg (150 lb)  BMI 25.73 kg/m2  SpO2 98%  LMP 11/27/2013    Nursing notes and vitals reviewed.  Constitutional: Well developed, Well nourished, No acute distress, Non-toxic appearance.   Eyes: Strabismus and is blind. PERRLA, Conjunctiva normal, No discharge.   Cardiovascular: Normal heart rate, Normal rhythm, No murmurs, No rubs, No gallops.   Thorax & Lungs: No respiratory distress, No rales, No rhonchi, No wheezing, No chest tenderness.   Abdomen: Bowel sounds " normal, Soft, No tenderness, No guarding, No rebound, No masses, No pulsatile masses.   Skin: Warm, Dry, No erythema, No rash.   Musculoskeletal: Intact distal pulses, No edema, No cyanosis, No clubbing. Good range of motion in all major joints. No tenderness to palpation or major deformities noted, no CVA tenderness, no midline back tenderness.   Neurologic:  Alert & oriented to month and age, Normal cognition, Cranial nerves II-XII are intact, No slurred speech, Negative finger to nose bilaterally, No pronator drift bilaterally,   strength 5/5 bilaterally, Leg raise strength 5/5 bilaterally, Plantarflexion strength 5/5 bilaterally, Dorsiflexion strength 5/5 bilaterally, Deep tendon reflexes 2/4 upper and lower extremities bilaterally, Sensation intact throughout.   Psychiatric: Affect normal for clinical presentation.      COURSE & MEDICAL DECISION MAKING  Pertinent Labs & Imaging studies reviewed. (See chart for details)    3:16 PM - Patient seen and examined at bedside. Patient will be treated with 1 l NS infusion, 1 mg dilaudid injection 10 mg morphine injection, 25 mg benadryl injection, 30 mg Toradol injection, and 10 mg Decadron injection. The differential diagnoses include but are not limited to: migraine, hydrocephalus.     This is a charming 46 y.o. female that presents with migraine headache. She has consistent presentation for migraine headaches over multiple years. Here in the emergency department, she states the same type of pain. She received the above medications including 1 mg of Dilaudid at 1850. The patient's headache abates she'll be going home a with strict return precautions. I do not believe she is experiencing significant abnormalities of her hydrocephalus that she states is the same type of pain she's had the past multiple times. For this reason CT scan of the brain was not completed. The patient has strict return precautions as increasing pain to return to the emergency department for  further evaluation and management.  The patient will return for new or worsening symptoms and is stable at the time of discharge.  The patient is asking for narcotic prescriptions Y did not prescribe her narcotics secondary to her multiple presentations for her condition. She is to follow-up with a primary care physician for further evaluation and management  The patient is referred to a primary physician for blood pressure management, diabetic screening, and for all other preventative health concerns.  On reevaluation, the patient states she feels much better, she has no focal neurological deficits that are new.  DISPOSITION:  Patient will be discharged home in stable condition.    FOLLOW UP:  [facility], Emergency Dept  [address] [facility] [id number] [phone]    If symptoms worsen    CHANDLER Adams  [address] [address]  [phone]    Schedule an appointment as soon as possible for a visit        OUTPATIENT MEDICATIONS:  New Prescriptions    No medications on file           FINAL IMPRESSION  1. Other migraine without status migrainosus, not intractable          Megan SHARMA (Dennis), am scribing for, and in the presence of, Kory Snow D.O    Electronically signed by: Megan Taveras (Dennis), 1/9/2017    IKory D.O. personally performed the services described in this documentation, as scribed by Megan Taveras in my presence, and it is both accurate and complete.    The note accurately reflects work and decisions made by me.  Kory Snow  1/9/2017  6:55 PM

## 2017-01-09 NOTE — ED AVS SNAPSHOT
After Visit Summary                                                                                                                Rasheeda Manzano   MRN: [id number]    Department:  [facility], Emergency Dept   Date of Visit:  1/9/2017            [facility], Emergency Dept    [address] [address]    Phone:  [phone]      You were seen by     1. Kory Snow D.O.    2. Mich Henry M.D.      Your Diagnosis Was     Other migraine without status migrainosus, not intractable     G43.809       These are the medications you received during your hospitalization from 01/09/2017 1444 to 01/09/2017 2130     Date/Time Order Dose Route Action    01/09/2017 1549 NS infusion 1,000 mL 1,000 mL Intravenous New Bag    01/09/2017 1556 HYDROmorphone (DILAUDID) injection 1 mg 1 mg Intravenous Given    01/09/2017 1553 metoclopramide (REGLAN) injection 10 mg 10 mg Intravenous Given    01/09/2017 1555 diphenhydrAMINE (BENADRYL) injection 25 mg 25 mg Intravenous Given    01/09/2017 1551 ketorolac (TORADOL) injection 30 mg 30 mg Intravenous Given    01/09/2017 1606 dexamethasone pf (DECADRON) injection 10 mg 10 mg Intravenous Given    01/09/2017 1906 HYDROmorphone (DILAUDID) injection 1 mg 1 mg Intravenous Given    01/09/2017 2058 hydrocodone-acetaminophen (NORCO) 5-325 MG per tablet 1-2 Tab 2 Tab Oral Given    01/09/2017 2058 ondansetron (ZOFRAN ODT) dispertab 4 mg 4 mg Oral Given      Follow-up Information     1. Follow up with [facility], Emergency Dept.    Specialty:  Emergency Medicine    Why:  If symptoms worsen    Contact information    [address] [address] [phone]        2. Schedule an appointment as soon as possible for a visit with CHANDLER Adams.    Specialty:  Family Medicine    Contact information    [address]  [address] [address] [phone]        Medication Information     Review all  of your home medications and newly ordered medications with your primary doctor and/or pharmacist as soon as possible. Follow medication instructions as directed by your doctor and/or pharmacist.     Please keep your complete medication list with you and share with your physician. Update the information when medications are discontinued, doses are changed, or new medications (including over-the-counter products) are added; and carry medication information at all times in the event of emergency situations.               Medication List      ASK your doctor about these medications        Instructions    AMBIEN 10 MG Tabs   Generic drug:  zolpidem    Take 10 mg by mouth every bedtime. Indications: Trouble Sleeping   Dose:  10 mg       aripiprazole 2 MG tablet   Commonly known as:  ABILIFY    Take 2 mg by mouth every bedtime.   Dose:  2 mg       divalproex 250 MG Tbec   Commonly known as:  DEPAKOTE    Take 250 mg by mouth every evening.   Dose:  250 mg       ibuprofen 400 MG Tabs   Commonly known as:  MOTRIN    Take 1 Tab by mouth every 8 hours as needed for Moderate Pain.   Dose:  400 mg       lactobacillus granules Pack    Take 1 Packet by mouth 3 times a day, with meals.   Dose:  1 Packet       nicotine 14 MG/24HR Pt24   Commonly known as:  NICODERM    Apply 1 Patch to skin as directed every 24 hours.   Dose:  1 Patch       nicotine polacrilex 2 MG Gum   Commonly known as:  NICORETTE    Take 1 Each by mouth every 1 hour as needed (For nicotine urge).   Dose:  2 mg       ondansetron 4 MG Tbdp   Commonly known as:  ZOFRAN ODT    Take 1 Tab by mouth every 8 hours as needed for Nausea/Vomiting.   Dose:  4 mg       oxycodone immediate-release 5 MG Tabs   Commonly known as:  ROXICODONE    Take 1 Tab by mouth every four hours as needed.   Dose:  5 mg       oxycodone-acetaminophen 5-325 MG Tabs   Commonly known as:  PERCOCET    Take 1-2 Tabs by mouth every four hours as needed (FOR PAIN) for up to 11 doses.   Dose:  1-2 Tab         rizatriptan 10 MG tablet   Commonly known as:  MAXALT    Take 1 Tab by mouth Once PRN for Migraine for up to 1 dose.   Dose:  10 mg       sertraline 100 MG Tabs   Commonly known as:  ZOLOFT    Take 200 mg by mouth every bedtime.   Dose:  200 mg       SUMAtriptan 25 MG Tabs tablet   Commonly known as:  IMITREX    Take 1 Tab by mouth Once PRN for Migraine.   Dose:  25 mg               Procedures and tests performed during your visit     IV Saline Lock        Discharge Instructions       Migraine Headache  A migraine headache is an intense, throbbing pain on one or both sides of your head. A migraine can last for 30 minutes to several hours.  CAUSES   The exact cause of a migraine headache is not always known. However, a migraine may be caused when nerves in the brain become irritated and release chemicals that cause inflammation. This causes pain.  Certain things may also trigger migraines, such as:  · Alcohol.  · Smoking.  · Stress.  · Menstruation.  · Aged cheeses.  · Foods or drinks that contain nitrates, glutamate, aspartame, or tyramine.  · Lack of sleep.  · Chocolate.  · Caffeine.  · Hunger.  · Physical exertion.  · Fatigue.  · Medicines used to treat chest pain (nitroglycerine), birth control pills, estrogen, and some blood pressure medicines.  SIGNS AND SYMPTOMS  · Pain on one or both sides of your head.  · Pulsating or throbbing pain.  · Severe pain that prevents daily activities.  · Pain that is aggravated by any physical activity.  · Nausea, vomiting, or both.  · Dizziness.  · Pain with exposure to bright lights, loud noises, or activity.  · General sensitivity to bright lights, loud noises, or smells.  Before you get a migraine, you may get warning signs that a migraine is coming (aura). An aura may include:  · Seeing flashing lights.  · Seeing bright spots, halos, or zigzag lines.  · Having tunnel vision or blurred vision.  · Having feelings of numbness or tingling.  · Having trouble  talking.  · Having muscle weakness.  DIAGNOSIS   A migraine headache is often diagnosed based on:  · Symptoms.  · Physical exam.  · A CT scan or MRI of your head. These imaging tests cannot diagnose migraines, but they can help rule out other causes of headaches.  TREATMENT  Medicines may be given for pain and nausea. Medicines can also be given to help prevent recurrent migraines.   HOME CARE INSTRUCTIONS  · Only take over-the-counter or prescription medicines for pain or discomfort as directed by your health care provider. The use of long-term narcotics is not recommended.  · Lie down in a dark, quiet room when you have a migraine.  · Keep a journal to find out what may trigger your migraine headaches. For example, write down:  ¨ What you eat and drink.  ¨ How much sleep you get.  ¨ Any change to your diet or medicines.  · Limit alcohol consumption.  · Quit smoking if you smoke.  · Get 7-9 hours of sleep, or as recommended by your health care provider.  · Limit stress.  · Keep lights dim if bright lights bother you and make your migraines worse.  SEEK IMMEDIATE MEDICAL CARE IF:   · Your migraine becomes severe.  · You have a fever.  · You have a stiff neck.  · You have vision loss.  · You have muscular weakness or loss of muscle control.  · You start losing your balance or have trouble walking.  · You feel faint or pass out.  · You have severe symptoms that are different from your first symptoms.  MAKE SURE YOU:   · Understand these instructions.  · Will watch your condition.  · Will get help right away if you are not doing well or get worse.     This information is not intended to replace advice given to you by your health care provider. Make sure you discuss any questions you have with your health care provider.     Document Released: 12/18/2006 Document Revised: 01/08/2016 Document Reviewed: 08/25/2014  [employer] Interactive Patient Education ©2016 [employer]            Patient Information     Patient  Information    Following emergency treatment: all patient requiring follow-up care must return either to a private physician or a clinic if your condition worsens before you are able to obtain further medical attention, please return to the emergency room.     Billing Information    At [facility], we work to make the billing process streamlined for our patients.  Our Representatives are here to answer any questions you may have regarding your hospital bill.  If you have insurance coverage and have supplied your insurance information to us, we will submit a claim to your insurer on your behalf.  Should you have any questions regarding your bill, we can be reached online or by phone as follows:  Online: You are able pay your bills online or live chat with our representatives about any billing questions you may have. We are here to help Monday - Friday from 8:00am to 7:30pm and 9:00am - 12:00pm on Saturdays.  Please visit [email or web address]  for more information.   Phone:  [phone] or [phone]    Please note that your emergency physician, surgeon, pathologist, radiologist, anesthesiologist, and other specialists are not employed by [facility] and will therefore bill separately for their services.  Please contact them directly for any questions concerning their bills at the numbers below:     Emergency Physician Services:  [phone]  [facility]:  [phone]  [facility]:  [phone]  [facility]:  [phone]    1. Your final bill may vary from the amount quoted upon discharge if all procedures are not complete at that time, or if your doctor has additional procedures of which we are not aware. You will receive an additional bill if you return to the Emergency Department at [facility] for suture removal regardless of the facility of which the sutures were placed.     2. Please arrange for settlement of this account  at the emergency registration.    3. All self-pay accounts are due in full at the time of treatment.  If you are unable to meet this obligation then payment is expected within 4-5 days.     4. If you have had radiology studies (CT, X-ray, Ultrasound, MRI), you have received a preliminary result during your emergency department visit. Please contact the radiology department [phone] to receive a copy of your final result. Please discuss the Final result with your primary physician or with the follow up physician provided.     Crisis Hotline:  [place] Crisis Hotline:  8-736-LBQRNFS or 1-505.996.9091  [place] Crisis Hotline:    1-828.283.2973 or 224-482-0410         ED Discharge Follow Up Questions    1. In order to provide you with very good care, we would like to follow up with a phone call in the next few days.  May we have your permission to contact you?     YES /  NO    2. What is the best phone number to call you? (       )_____-__________    3. What is the best time to call you?      Morning  /  Afternoon  /  Evening                   Patient Signature:  ____________________________________________________________    Date:  ____________________________________________________________

## 2017-01-10 NOTE — ED NOTES
Pt states that she is feeling better,  Worried migraine will get worse at home,  But agrees to discharge.

## 2017-01-10 NOTE — ED NOTES
VSS.  Discharge instructions given- verbalizes understanding.  Ambulatory to lobby with SBA.  Cab called for pt in lobby.

## 2017-01-10 NOTE — ED NOTES
Pt reports HA nearly relieved approx 30-45 minutes ago but is getting back up to when she came back in.

## 2017-01-10 NOTE — ED NOTES
Pt evaluated by ERP.  Pt medicated per MAR.  Pt on all monitoring equipment.  Will cont to monitor.

## 2017-01-10 NOTE — DISCHARGE INSTRUCTIONS
Migraine Headache  A migraine headache is an intense, throbbing pain on one or both sides of your head. A migraine can last for 30 minutes to several hours.  CAUSES   The exact cause of a migraine headache is not always known. However, a migraine may be caused when nerves in the brain become irritated and release chemicals that cause inflammation. This causes pain.  Certain things may also trigger migraines, such as:  · Alcohol.  · Smoking.  · Stress.  · Menstruation.  · Aged cheeses.  · Foods or drinks that contain nitrates, glutamate, aspartame, or tyramine.  · Lack of sleep.  · Chocolate.  · Caffeine.  · Hunger.  · Physical exertion.  · Fatigue.  · Medicines used to treat chest pain (nitroglycerine), birth control pills, estrogen, and some blood pressure medicines.  SIGNS AND SYMPTOMS  · Pain on one or both sides of your head.  · Pulsating or throbbing pain.  · Severe pain that prevents daily activities.  · Pain that is aggravated by any physical activity.  · Nausea, vomiting, or both.  · Dizziness.  · Pain with exposure to bright lights, loud noises, or activity.  · General sensitivity to bright lights, loud noises, or smells.  Before you get a migraine, you may get warning signs that a migraine is coming (aura). An aura may include:  · Seeing flashing lights.  · Seeing bright spots, halos, or zigzag lines.  · Having tunnel vision or blurred vision.  · Having feelings of numbness or tingling.  · Having trouble talking.  · Having muscle weakness.  DIAGNOSIS   A migraine headache is often diagnosed based on:  · Symptoms.  · Physical exam.  · A CT scan or MRI of your head. These imaging tests cannot diagnose migraines, but they can help rule out other causes of headaches.  TREATMENT  Medicines may be given for pain and nausea. Medicines can also be given to help prevent recurrent migraines.   HOME CARE INSTRUCTIONS  · Only take over-the-counter or prescription medicines for pain or discomfort as directed by your  health care provider. The use of long-term narcotics is not recommended.  · Lie down in a dark, quiet room when you have a migraine.  · Keep a journal to find out what may trigger your migraine headaches. For example, write down:  ¨ What you eat and drink.  ¨ How much sleep you get.  ¨ Any change to your diet or medicines.  · Limit alcohol consumption.  · Quit smoking if you smoke.  · Get 7-9 hours of sleep, or as recommended by your health care provider.  · Limit stress.  · Keep lights dim if bright lights bother you and make your migraines worse.  SEEK IMMEDIATE MEDICAL CARE IF:   · Your migraine becomes severe.  · You have a fever.  · You have a stiff neck.  · You have vision loss.  · You have muscular weakness or loss of muscle control.  · You start losing your balance or have trouble walking.  · You feel faint or pass out.  · You have severe symptoms that are different from your first symptoms.  MAKE SURE YOU:   · Understand these instructions.  · Will watch your condition.  · Will get help right away if you are not doing well or get worse.     This information is not intended to replace advice given to you by your health care provider. Make sure you discuss any questions you have with your health care provider.     Document Released: 12/18/2006 Document Revised: 01/08/2016 Document Reviewed: 08/25/2014  [employer] Interactive Patient Education ©2016 [employer]

## 2017-01-12 ENCOUNTER — HOSPITAL ENCOUNTER (EMERGENCY)
Facility: MEDICAL CENTER | Age: 47
End: 2017-01-12
Attending: EMERGENCY MEDICINE
Payer: MEDICAID

## 2017-01-12 VITALS
HEIGHT: 64 IN | TEMPERATURE: 98 F | BODY MASS INDEX: 25.61 KG/M2 | DIASTOLIC BLOOD PRESSURE: 100 MMHG | SYSTOLIC BLOOD PRESSURE: 150 MMHG | WEIGHT: 150 LBS | OXYGEN SATURATION: 98 % | RESPIRATION RATE: 16 BRPM | HEART RATE: 98 BPM

## 2017-01-12 DIAGNOSIS — G43.909 MIGRAINE WITHOUT STATUS MIGRAINOSUS, NOT INTRACTABLE, UNSPECIFIED MIGRAINE TYPE: ICD-10-CM

## 2017-01-12 PROCEDURE — 96361 HYDRATE IV INFUSION ADD-ON: CPT

## 2017-01-12 PROCEDURE — 700111 HCHG RX REV CODE 636 W/ 250 OVERRIDE (IP): Performed by: EMERGENCY MEDICINE

## 2017-01-12 PROCEDURE — 96372 THER/PROPH/DIAG INJ SC/IM: CPT

## 2017-01-12 PROCEDURE — 96374 THER/PROPH/DIAG INJ IV PUSH: CPT

## 2017-01-12 PROCEDURE — 700105 HCHG RX REV CODE 258: Performed by: EMERGENCY MEDICINE

## 2017-01-12 PROCEDURE — 96375 TX/PRO/DX INJ NEW DRUG ADDON: CPT

## 2017-01-12 PROCEDURE — 99284 EMERGENCY DEPT VISIT MOD MDM: CPT

## 2017-01-12 RX ORDER — METOCLOPRAMIDE HYDROCHLORIDE 5 MG/ML
10 INJECTION INTRAMUSCULAR; INTRAVENOUS ONCE
Status: COMPLETED | OUTPATIENT
Start: 2017-01-12 | End: 2017-01-12

## 2017-01-12 RX ORDER — SODIUM CHLORIDE 9 MG/ML
1000 INJECTION, SOLUTION INTRAVENOUS ONCE
Status: COMPLETED | OUTPATIENT
Start: 2017-01-12 | End: 2017-01-12

## 2017-01-12 RX ORDER — SUMATRIPTAN 6 MG/.5ML
6 INJECTION, SOLUTION SUBCUTANEOUS ONCE
Status: COMPLETED | OUTPATIENT
Start: 2017-01-12 | End: 2017-01-12

## 2017-01-12 RX ORDER — BUTALBITAL, ACETAMINOPHEN, CAFFEINE AND CODEINE PHOSPHATE 50; 325; 40; 30 MG/1; MG/1; MG/1; MG/1
1 CAPSULE ORAL EVERY 4 HOURS PRN
Qty: 16 CAP | Refills: 0 | Status: SHIPPED | OUTPATIENT
Start: 2017-01-12 | End: 2017-03-04

## 2017-01-12 RX ADMIN — SODIUM CHLORIDE 1000 ML: 9 INJECTION, SOLUTION INTRAVENOUS at 18:27

## 2017-01-12 RX ADMIN — HYDROMORPHONE HYDROCHLORIDE 1 MG: 1 INJECTION, SOLUTION INTRAMUSCULAR; INTRAVENOUS; SUBCUTANEOUS at 18:26

## 2017-01-12 RX ADMIN — SUMATRIPTAN SUCCINATE 6 MG: 6 INJECTION SUBCUTANEOUS at 18:30

## 2017-01-12 RX ADMIN — METOCLOPRAMIDE 10 MG: 5 INJECTION, SOLUTION INTRAMUSCULAR; INTRAVENOUS at 18:25

## 2017-01-12 ASSESSMENT — PAIN SCALES - GENERAL
PAINLEVEL_OUTOF10: 8
PAINLEVEL_OUTOF10: 2

## 2017-01-12 ASSESSMENT — LIFESTYLE VARIABLES: DO YOU DRINK ALCOHOL: NO

## 2017-01-12 NOTE — ED AVS SNAPSHOT
1/12/2017          Rasheeda Manzano  [address]  [facility] [address]    Dear Rasheeda:    Pending sale to [facility] wants to ensure your discharge home is safe and you or your loved ones have had all your questions answered regarding your care after you leave the hospital.    You may receive a telephone call within two days of your discharge.  This call is to make certain you understand your discharge instructions as well as ensure we provided you with the best care possible during your stay with us.     The call will only last approximately 3-5 minutes and will be done by a nurse.    Once again, we want to ensure your discharge home is safe and that you have a clear understanding of any next steps in your care.  If you have any questions or concerns, please do not hesitate to contact us, we are here for you.  Thank you for choosing [facility] for your healthcare needs.    Sincerely,    Harrison Kilgore    [facility]

## 2017-01-12 NOTE — ED AVS SNAPSHOT
Spotzer Access Code: [email or web address]  Expires: 1/30/2017 10:51 AM    Spotzer  A secure, online tool to manage your health information     [employer] Spotzer® is a secure, online tool that connects you to your personalized health information from the privacy of your home -- day or night - making it very easy for you to manage your healthcare. Once the activation process is completed, you can even access your medical information using the Spotzer deysi, which is available for free in the Apple Deysi store or Google Play store.     Spotzer provides the following levels of access (as shown below):   My Chart Features   [facility] Primary Care Doctor [facility]  Specialists [facility]  Urgent  Care Non-[facility]  Primary Care  Doctor   Email your healthcare team securely and privately 24/7 X X X X   Manage appointments: schedule your next appointment; view details of past/upcoming appointments X      Request prescription refills. X      View recent personal medical records, including lab and immunizations X X X X   View health record, including health history, allergies, medications X X X X   Read reports about your outpatient visits, procedures, consult and ER notes X X X X   See your discharge summary, which is a recap of your hospital and/or ER visit that includes your diagnosis, lab results, and care plan. X X       How to register for Spotzer:  1. Go to  [email or web address].  2. Click on the Sign Up Now box, which takes you to the New Member Sign Up page. You will need to provide the following information:  a. Enter your Spotzer Access Code exactly as it appears at the top of this page. (You will not need to use this code after you’ve completed the sign-up process. If you do not sign up before the expiration date, you must request a new code.)   b. Enter your date of birth.   c. Enter your home email address.   d. Click Submit, and follow the next screen’s instructions.  3. Create a Spotzer ID. This will be your Spotzer  login ID and cannot be changed, so think of one that is secure and easy to remember.  4. Create a [employer] password. You can change your password at any time.  5. Enter your Password Reset Question and Answer. This can be used at a later time if you forget your password.   6. Enter your e-mail address. This allows you to receive e-mail notifications when new information is available in [employer].  7. Click Sign Up. You can now view your health information.    For assistance activating your [employer] account, call [phone]

## 2017-01-12 NOTE — ED AVS SNAPSHOT
After Visit Summary                                                                                                                Rasheeda Manzano   MRN: [id number]    Department:  [facility], Emergency Dept   Date of Visit:  1/12/2017            [facility], Emergency Dept    [address]    [facility] [address]    Phone:  [phone]      You were seen by     Tone Cunningham M.D.      Your Diagnosis Was     Migraine without status migrainosus, not intractable, unspecified migraine type     G43.909       These are the medications you received during your hospitalization from 01/12/2017 1646 to 01/12/2017 1903     Date/Time Order Dose Route Action    01/12/2017 1826 HYDROmorphone (DILAUDID) injection 1 mg 1 mg Intravenous Given    01/12/2017 1825 metoclopramide (REGLAN) injection 10 mg 10 mg Intravenous Given    01/12/2017 1827 NS infusion 1,000 mL 1,000 mL Intravenous New Bag    01/12/2017 1830 SUMAtriptan Succinate (IMITREX) injection 6 mg 6 mg Subcutaneous Given      Follow-up Information     1. Follow up with CHANDLER Adams.    Specialty:  Family Medicine    Contact information    [address] [place] [id number]  [phone]        Medication Information     Review all of your home medications and newly ordered medications with your primary doctor and/or pharmacist as soon as possible. Follow medication instructions as directed by your doctor and/or pharmacist.     Please keep your complete medication list with you and share with your physician. Update the information when medications are discontinued, doses are changed, or new medications (including over-the-counter products) are added; and carry medication information at all times in the event of emergency situations.               Medication List      START taking these medications        Instructions    butalbital-acetaminophen-caffeine-codeine -52-30 MG per capsule   Commonly known as:   FIORICET W/CODEINE    Take 1 Cap by mouth every four hours as needed for Headache.   Dose:  1 Cap         ASK your doctor about these medications        Instructions    ibuprofen 400 MG Tabs   Commonly known as:  MOTRIN    Take 1 Tab by mouth every 8 hours as needed for Moderate Pain.   Dose:  400 mg       LUNESTA PO    Take  by mouth.       ondansetron 4 MG Tbdp   Commonly known as:  ZOFRAN ODT    Take 1 Tab by mouth every 8 hours as needed for Nausea/Vomiting.   Dose:  4 mg       SUMAtriptan 25 MG Tabs tablet   Commonly known as:  IMITREX    Take 1 Tab by mouth Once PRN for Migraine.   Dose:  25 mg                 Discharge Instructions       Migraine Headache  A migraine headache is an intense, throbbing pain on one or both sides of your head. A migraine can last for 30 minutes to several hours.  CAUSES   The exact cause of a migraine headache is not always known. However, a migraine may be caused when nerves in the brain become irritated and release chemicals that cause inflammation. This causes pain.  Certain things may also trigger migraines, such as:  · Alcohol.  · Smoking.  · Stress.  · Menstruation.  · Aged cheeses.  · Foods or drinks that contain nitrates, glutamate, aspartame, or tyramine.  · Lack of sleep.  · Chocolate.  · Caffeine.  · Hunger.  · Physical exertion.  · Fatigue.  · Medicines used to treat chest pain (nitroglycerine), birth control pills, estrogen, and some blood pressure medicines.  SIGNS AND SYMPTOMS  · Pain on one or both sides of your head.  · Pulsating or throbbing pain.  · Severe pain that prevents daily activities.  · Pain that is aggravated by any physical activity.  · Nausea, vomiting, or both.  · Dizziness.  · Pain with exposure to bright lights, loud noises, or activity.  · General sensitivity to bright lights, loud noises, or smells.  Before you get a migraine, you may get warning signs that a migraine is coming (aura). An aura may include:  · Seeing flashing lights.  · Seeing  bright spots, halos, or zigzag lines.  · Having tunnel vision or blurred vision.  · Having feelings of numbness or tingling.  · Having trouble talking.  · Having muscle weakness.  DIAGNOSIS   A migraine headache is often diagnosed based on:  · Symptoms.  · Physical exam.  · A CT scan or MRI of your head. These imaging tests cannot diagnose migraines, but they can help rule out other causes of headaches.  TREATMENT  Medicines may be given for pain and nausea. Medicines can also be given to help prevent recurrent migraines.   HOME CARE INSTRUCTIONS  · Only take over-the-counter or prescription medicines for pain or discomfort as directed by your health care provider. The use of long-term narcotics is not recommended.  · Lie down in a dark, quiet room when you have a migraine.  · Keep a journal to find out what may trigger your migraine headaches. For example, write down:  ¨ What you eat and drink.  ¨ How much sleep you get.  ¨ Any change to your diet or medicines.  · Limit alcohol consumption.  · Quit smoking if you smoke.  · Get 7-9 hours of sleep, or as recommended by your health care provider.  · Limit stress.  · Keep lights dim if bright lights bother you and make your migraines worse.  SEEK IMMEDIATE MEDICAL CARE IF:   · Your migraine becomes severe.  · You have a fever.  · You have a stiff neck.  · You have vision loss.  · You have muscular weakness or loss of muscle control.  · You start losing your balance or have trouble walking.  · You feel faint or pass out.  · You have severe symptoms that are different from your first symptoms.  MAKE SURE YOU:   · Understand these instructions.  · Will watch your condition.  · Will get help right away if you are not doing well or get worse.     This information is not intended to replace advice given to you by your health care provider. Make sure you discuss any questions you have with your health care provider.     Document Released: 12/18/2006 Document Revised:  01/08/2016 Document Reviewed: 08/25/2014  Elsevier Interactive Patient Education ©2016 [employer]            Patient Information     Patient Information    Following emergency treatment: all patient requiring follow-up care must return either to a private physician or a clinic if your condition worsens before you are able to obtain further medical attention, please return to the emergency room.     Billing Information    At [facility], we work to make the billing process streamlined for our patients.  Our Representatives are here to answer any questions you may have regarding your hospital bill.  If you have insurance coverage and have supplied your insurance information to us, we will submit a claim to your insurer on your behalf.  Should you have any questions regarding your bill, we can be reached online or by phone as follows:  Online: You are able pay your bills online or live chat with our representatives about any billing questions you may have. We are here to help Monday - Friday from 8:00am to 7:30pm and 9:00am - 12:00pm on Saturdays.  Please visit [email or web address]  for more information.   Phone:  [phone] or [phone]    Please note that your emergency physician, surgeon, pathologist, radiologist, anesthesiologist, and other specialists are not employed by [facility] and will therefore bill separately for their services.  Please contact them directly for any questions concerning their bills at the numbers below:     Emergency Physician Services:  [phone]  [facility]:  [phone]  [facility]:  [phone]  [facility]:  [phone]    1. Your final bill may vary from the amount quoted upon discharge if all procedures are not complete at that time, or if your doctor has additional procedures of which we are not aware. You will receive an additional bill if you return to the Emergency Department at  [facility] for suture removal regardless of the facility of which the sutures were placed.     2. Please arrange for settlement of this account at the emergency registration.    3. All self-pay accounts are due in full at the time of treatment.  If you are unable to meet this obligation then payment is expected within 4-5 days.     4. If you have had radiology studies (CT, X-ray, Ultrasound, MRI), you have received a preliminary result during your emergency department visit. Please contact the radiology department [phone] to receive a copy of your final result. Please discuss the Final result with your primary physician or with the follow up physician provided.     Crisis Hotline:  [place] Crisis Hotline:  6-865-RNUBQZA or 1-190.479.9937  [place] Crisis Hotline:    1-101.528.5758 or 381-912-8505         ED Discharge Follow Up Questions    1. In order to provide you with very good care, we would like to follow up with a phone call in the next few days.  May we have your permission to contact you?     YES /  NO    2. What is the best phone number to call you? (       )_____-__________    3. What is the best time to call you?      Morning  /  Afternoon  /  Evening                   Patient Signature:  ____________________________________________________________    Date:  ____________________________________________________________

## 2017-01-13 NOTE — DISCHARGE PLANNING
Medical Social Work    MSW called MTM to arrange for cab ride home for pt. MTM stated they contacted [employer] cab for ride and they stated it will be about 30 minutes till a cab can come.

## 2017-01-13 NOTE — ED NOTES
Reviewed discharge instructions, verbalized understanding of instructions and medication. States she will schedule follow-up appointment. No further questions at this time. Pt taken to lobby via w/c by tech. Aware of transportation services to come. Tech at triage notified.

## 2017-01-13 NOTE — ED PROVIDER NOTES
ED Provider Note    CHIEF COMPLAINT  Chief Complaint   Patient presents with   • Migraine     on and off x1 week; worse today   • N/V     since this am; 3 episodes emesis       HPI  Rasheeda Manzano is a 46 y.o. female who presents for the 3rd time this week, stating she has had a migraine over the past 1 week.  She states IV pain medication helps, she is requesting Dilaudid.  She also requested a nausea medication and Imitrex.  She states she has follow-up appointment with both primary doctor and neurologist.  She denies acute numbness or weakness.  Medical history significant for blindness at age 11 secondary to hydrocephalus.  She denies fever or neck stiffness.  She states pain in her head is sore to her migraines, does not feel similar to previous problems with hydrocephalus.  There was no trauma.  No vomiting.  Patient states she sees light and dark only with the left eye, that her right eye is blind.  Nausea but no vomiting.  No other complaints at this time.    REVIEW OF SYSTEMS  Neurologic: Headache  Eyes: Blindness, no acute vision change  Ear nose throat: No facial pain  Gastrointestinal: Nausea  Musculoskeletal: No neck pain or stiffness   Constitutional: No fever         PAST MEDICAL HISTORY  Past Medical History   Diagnosis Date   • Hydrocephalus    • Migraine without aura, without mention of intractable migraine without mention of status migrainosus    • Blind    • Chronic daily headache    • Depression    • Psychiatric problem      depression   • Hydrocephalus      shunt drains into pleural place of L lung   • C. difficile diarrhea 2013   • Jaundice      at birth   • Pain      gallbladder related   • Other specified symptom associated with female genital organs      excessive bleeding   • Fall        FAMILY HISTORY  Family History   Problem Relation Age of Onset   • Hypertension Mother    • Hypertension Father    • Non-contributory Neg Hx      Migraine       SOCIAL HISTORY  Social History      Social History   • Marital Status:      Spouse Name: N/A   • Number of Children: N/A   • Years of Education: N/A     Social History Main Topics   • Smoking status: Current Some Day Smoker -- 1.00 packs/day for 10 years     Types: Cigars     Start date: 05/01/2004   • Smokeless tobacco: Never Used      Comment:  CIGAR/day    • Alcohol Use: No      Comment: socially   • Drug Use: No   • Sexual Activity:     Partners: Male     Other Topics Concern   • None     Social History Narrative       SURGICAL HISTORY  Past Surgical History   Procedure Laterality Date   • Laparoscopy  8/8/08     Performed by FERNANDO HENDERSON at [facility]ERY [facility]   • Lysis adhesions general  12/10/2013     Performed by Arie Bass M.D. at SURGERY [facility]   • Cystoscopy  12/10/2013     Performed by Joana Yeager M.D. at [facility]   • Exploratory laparotomy  12/10/2013     Performed by Arie Bass M.D. at [facility]   • Appendectomy  12/10/2013     Performed by Arie Bass M.D. at SURGERY [facility]   • Abdominal hysterectomy total  12/10/2013     Performed by Joana Yeager M.D. at SUR[facility]ERY [facility]   • Ayala by laparoscopy N/A 8/13/2015     Procedure: AYALA BY LAPAROSCOPY;  Surgeon: Brcie Johnson M.D.;  Location: SURGERY SAME DAY [facility];  Service:    • Cholecystectomy N/A 8/13/2015     Procedure: CHOLECYSTECTOMY;  Surgeon: Brice Johnson M.D.;  Location: SURGERY SAME DAY [facility];  Service:    • Other       PLEURAL SHUNT, numerous revisions       CURRENT MEDICATIONS  No current facility-administered medications on file prior to encounter.     Current Outpatient Prescriptions on File Prior to Encounter   Medication Sig Dispense Refill   • SUMAtriptan (IMITREX) 25 MG Tab tablet Take 1 Tab by mouth Once PRN for Migraine. 10 Tab 0   • ibuprofen (MOTRIN) 400 MG Tab Take 1 Tab by mouth every 8 hours as needed for Moderate Pain. 30 Tab 0   • ondansetron (ZOFRAN ODT)  "4 MG TABLET DISPERSIBLE Take 1 Tab by mouth every 8 hours as needed for Nausea/Vomiting. 10 Tab 0       ALLERGIES  Allergies   Allergen Reactions   • Tape Rash     Medical tape. Per patient, paper tape ok.       PHYSICAL EXAM  VITAL SIGNS: /100 mmHg  Pulse 98  Temp(Src) 36.7 °C (98 °F)  Resp 16  Ht 1.626 m (5' 4.02\")  Wt 68.04 kg (150 lb)  BMI 25.73 kg/m2  SpO2 98%  LMP 11/27/2013  Constitutional:  No acute distress, Non-toxic appearance.   HENT: No facial trauma or swelling  Eyes: Left eye lateral deviation, no conjunctivitis  Musculoskeletal: No cervical neck tenderness, neck is supple.   Lymphatic: No lymphadenopathy.   Cardiovascular: Normal heart rate, Normal rhythm, No murmurs, No rubs, No gallops.   Pulmonary: Normal breath sounds, No respiratory distress, No wheezing  Skin: Warm, Dry, No erythema, No rash.    Neurologic: Sensation strength are intact to the extremities.  Patient has poor vision, blind which she states is chronic.  Psychiatric: Affect normal, Mood normal.     RADIOLOGY/PROCEDURES  None    COURSE & MEDICAL DECISION MAKING  Pertinent Labs & Imaging studies reviewed. (See chart for details)  Patient has recurrent migraine symptoms, differential including recurrent migraine, malingering.  Patient felt improved after Dilaudid, Reglan, Imitrex.  At her request she is prescribed Fioricet.  She is advised to follow up with primary doctor, she states she also has appointment with neurologist praneeth.  Patient denied being addicted to pain medication.  Neurologically appears intact.  She adamantly denied headache was different than her usual migraine stating she has had migraines which have lasted a week in the past as well.  Patient states she does not believe this to be related to her shunt or previous hydrocephalus stating the pain is different, more related to her migraine.  Patient is discharged in improved condition.    FINAL IMPRESSION  1. Migraine without status migrainosus, not " intractable, unspecified migraine type            Electronically signed by: Tone Cunningham, 1/13/2017 12:32 AM

## 2017-01-13 NOTE — DISCHARGE INSTRUCTIONS
Migraine Headache  A migraine headache is an intense, throbbing pain on one or both sides of your head. A migraine can last for 30 minutes to several hours.  CAUSES   The exact cause of a migraine headache is not always known. However, a migraine may be caused when nerves in the brain become irritated and release chemicals that cause inflammation. This causes pain.  Certain things may also trigger migraines, such as:  · Alcohol.  · Smoking.  · Stress.  · Menstruation.  · Aged cheeses.  · Foods or drinks that contain nitrates, glutamate, aspartame, or tyramine.  · Lack of sleep.  · Chocolate.  · Caffeine.  · Hunger.  · Physical exertion.  · Fatigue.  · Medicines used to treat chest pain (nitroglycerine), birth control pills, estrogen, and some blood pressure medicines.  SIGNS AND SYMPTOMS  · Pain on one or both sides of your head.  · Pulsating or throbbing pain.  · Severe pain that prevents daily activities.  · Pain that is aggravated by any physical activity.  · Nausea, vomiting, or both.  · Dizziness.  · Pain with exposure to bright lights, loud noises, or activity.  · General sensitivity to bright lights, loud noises, or smells.  Before you get a migraine, you may get warning signs that a migraine is coming (aura). An aura may include:  · Seeing flashing lights.  · Seeing bright spots, halos, or zigzag lines.  · Having tunnel vision or blurred vision.  · Having feelings of numbness or tingling.  · Having trouble talking.  · Having muscle weakness.  DIAGNOSIS   A migraine headache is often diagnosed based on:  · Symptoms.  · Physical exam.  · A CT scan or MRI of your head. These imaging tests cannot diagnose migraines, but they can help rule out other causes of headaches.  TREATMENT  Medicines may be given for pain and nausea. Medicines can also be given to help prevent recurrent migraines.   HOME CARE INSTRUCTIONS  · Only take over-the-counter or prescription medicines for pain or discomfort as directed by your  health care provider. The use of long-term narcotics is not recommended.  · Lie down in a dark, quiet room when you have a migraine.  · Keep a journal to find out what may trigger your migraine headaches. For example, write down:  ¨ What you eat and drink.  ¨ How much sleep you get.  ¨ Any change to your diet or medicines.  · Limit alcohol consumption.  · Quit smoking if you smoke.  · Get 7-9 hours of sleep, or as recommended by your health care provider.  · Limit stress.  · Keep lights dim if bright lights bother you and make your migraines worse.  SEEK IMMEDIATE MEDICAL CARE IF:   · Your migraine becomes severe.  · You have a fever.  · You have a stiff neck.  · You have vision loss.  · You have muscular weakness or loss of muscle control.  · You start losing your balance or have trouble walking.  · You feel faint or pass out.  · You have severe symptoms that are different from your first symptoms.  MAKE SURE YOU:   · Understand these instructions.  · Will watch your condition.  · Will get help right away if you are not doing well or get worse.     This information is not intended to replace advice given to you by your health care provider. Make sure you discuss any questions you have with your health care provider.     Document Released: 12/18/2006 Document Revised: 01/08/2016 Document Reviewed: 08/25/2014  [employer] Interactive Patient Education ©2016 [employer]

## 2017-01-13 NOTE — ED NOTES
BIBA for   Chief Complaint   Patient presents with   • Migraine     on and off x1 week; worse today   • N/V     since this am; 3 episodes emesis     Pt has has 20 hx of same. Was taking Imitrex; stopped one month ago because it stopped working. Pt started taking Viibrid (antidepressant) 9 days ago.   Pt also has hx of hydrocephalus and is blind in both eyes. Denies fevers, denies diarrhea.

## 2017-01-30 ENCOUNTER — HOSPITAL ENCOUNTER (EMERGENCY)
Facility: MEDICAL CENTER | Age: 47
End: 2017-01-30
Attending: EMERGENCY MEDICINE
Payer: MEDICAID

## 2017-01-30 VITALS
DIASTOLIC BLOOD PRESSURE: 7 MMHG | WEIGHT: 153.66 LBS | BODY MASS INDEX: 26.36 KG/M2 | OXYGEN SATURATION: 99 % | TEMPERATURE: 97.4 F | HEART RATE: 100 BPM | RESPIRATION RATE: 16 BRPM | SYSTOLIC BLOOD PRESSURE: 157 MMHG

## 2017-01-30 DIAGNOSIS — G43.809 OTHER TYPE OF MIGRAINE: ICD-10-CM

## 2017-01-30 PROCEDURE — 700101 HCHG RX REV CODE 250: Performed by: EMERGENCY MEDICINE

## 2017-01-30 PROCEDURE — 96374 THER/PROPH/DIAG INJ IV PUSH: CPT

## 2017-01-30 PROCEDURE — 99284 EMERGENCY DEPT VISIT MOD MDM: CPT

## 2017-01-30 PROCEDURE — 700105 HCHG RX REV CODE 258: Performed by: EMERGENCY MEDICINE

## 2017-01-30 PROCEDURE — 96375 TX/PRO/DX INJ NEW DRUG ADDON: CPT

## 2017-01-30 PROCEDURE — 96361 HYDRATE IV INFUSION ADD-ON: CPT

## 2017-01-30 PROCEDURE — 700111 HCHG RX REV CODE 636 W/ 250 OVERRIDE (IP): Performed by: EMERGENCY MEDICINE

## 2017-01-30 RX ORDER — MORPHINE SULFATE 4 MG/ML
4 INJECTION, SOLUTION INTRAMUSCULAR; INTRAVENOUS ONCE
Status: COMPLETED | OUTPATIENT
Start: 2017-01-30 | End: 2017-01-30

## 2017-01-30 RX ORDER — SODIUM CHLORIDE 9 MG/ML
INJECTION, SOLUTION INTRAVENOUS CONTINUOUS
Status: DISCONTINUED | OUTPATIENT
Start: 2017-01-30 | End: 2017-01-30 | Stop reason: HOSPADM

## 2017-01-30 RX ORDER — HYDROMORPHONE HYDROCHLORIDE 2 MG/1
2-4 TABLET ORAL EVERY 6 HOURS PRN
Qty: 20 TAB | Refills: 0 | Status: SHIPPED | OUTPATIENT
Start: 2017-01-30 | End: 2017-03-04

## 2017-01-30 RX ORDER — HYDROMORPHONE HYDROCHLORIDE 2 MG/1
2-4 TABLET ORAL EVERY 4 HOURS PRN
Qty: 11 TAB | Refills: 0 | Status: SHIPPED | OUTPATIENT
Start: 2017-01-30 | End: 2017-03-04

## 2017-01-30 RX ORDER — DIPHENHYDRAMINE HCL 12.5MG/5ML
12.5 LIQUID (ML) ORAL ONCE
Status: COMPLETED | OUTPATIENT
Start: 2017-01-30 | End: 2017-01-30

## 2017-01-30 RX ADMIN — HYDROMORPHONE HYDROCHLORIDE 1 MG: 1 INJECTION, SOLUTION INTRAMUSCULAR; INTRAVENOUS; SUBCUTANEOUS at 11:54

## 2017-01-30 RX ADMIN — DIPHENHYDRAMINE HYDROCHLORIDE 12.5 MG: 12.5 SOLUTION ORAL at 08:35

## 2017-01-30 RX ADMIN — MORPHINE SULFATE 4 MG: 4 INJECTION INTRAVENOUS at 08:35

## 2017-01-30 RX ADMIN — SODIUM CHLORIDE: 9 INJECTION, SOLUTION INTRAVENOUS at 08:35

## 2017-01-30 ASSESSMENT — PAIN SCALES - GENERAL
PAINLEVEL_OUTOF10: 7
PAINLEVEL_OUTOF10: 8
PAINLEVEL_OUTOF10: 2

## 2017-01-30 NOTE — ED AVS SNAPSHOT
1/30/2017          Rasheeda Manzano  [address]  [facility] [address]    Dear Rasheeda:    [facility] wants to ensure your discharge home is safe and you or your loved ones have had all your questions answered regarding your care after you leave the hospital.    You may receive a telephone call within two days of your discharge.  This call is to make certain you understand your discharge instructions as well as ensure we provided you with the best care possible during your stay with us.     The call will only last approximately 3-5 minutes and will be done by a nurse.    Once again, we want to ensure your discharge home is safe and that you have a clear understanding of any next steps in your care.  If you have any questions or concerns, please do not hesitate to contact us, we are here for you.  Thank you for choosing [facility] for your healthcare needs.    Sincerely,    Harrison Kilgore    [facility]

## 2017-01-30 NOTE — ED NOTES
"Rasheeda Manzano  46 y.o. female  Chief Complaint   Patient presents with   • Migraine     X 24 hours. 8/10 \"dull constant\" pain. Per pt, \"It's mainly in my right eye and vibrates to the right side of my head.   • N/V     Per pt, due to migraine. Pt has had two episodes of vomiting in the past 24 hours.     Pt BIB [facility] for above complaint. Pt has a hx of migraines. Per pt, pt has self medicated with \"4 pills\" of motrin PTA. Pt is A&OX4, speaking in full sentences, follows commands and responds appropriately to questions.   Pt placed in lobby. Pt educated on triage process. Pt encouraged to alert staff for any changes.    "

## 2017-01-30 NOTE — ED AVS SNAPSHOT
After Visit Summary                                                                                                                Rasheeda Manzano   MRN: [id number]    Department:  [facility], Emergency Dept   Date of Visit:  1/30/2017            [facility], Emergency Dept    [address]    [place] [address]    Phone:  [phone]      You were seen by     Antonio Acevedo M.D.      Your Diagnosis Was     Other type of migraine     G43.809       These are the medications you received during your hospitalization from 01/30/2017 0621 to 01/30/2017 1355     Date/Time Order Dose Route Action    01/30/2017 0835 NS infusion   Intravenous New Bag    01/30/2017 0835 morphine (pf) 4 mg/ml injection 4 mg 4 mg Intravenous Given    01/30/2017 0835 diphenhydramine (BENADRYL) 12.5 MG/5ML elixir 12.5 mg 12.5 mg Oral Given    01/30/2017 1153 HYDROmorphone (DILAUDID) injection 1 mg 1 mg Intravenous Given      Follow-up Information     1. Follow up with CHANDLER Adams. Schedule an appointment as soon as possible for a visit today.    Specialty:  Family Medicine    Contact information    [address]  [place] [address]  [phone]        Medication Information     Review all of your home medications and newly ordered medications with your primary doctor and/or pharmacist as soon as possible. Follow medication instructions as directed by your doctor and/or pharmacist.     Please keep your complete medication list with you and share with your physician. Update the information when medications are discontinued, doses are changed, or new medications (including over-the-counter products) are added; and carry medication information at all times in the event of emergency situations.               Medication List      START taking these medications        Instructions    HYDROmorphone 2 MG Tabs   Commonly known as:  DILAUDID    Take 1-2 Tabs by mouth every four hours as  needed (FOR PAIN) for up to 11 doses.   Dose:  2-4 mg         ASK your doctor about these medications        Instructions    butalbital-acetaminophen-caffeine-codeine -06-30 MG per capsule   Commonly known as:  FIORICET W/CODEINE    Take 1 Cap by mouth every four hours as needed for Headache.   Dose:  1 Cap       ibuprofen 400 MG Tabs   Commonly known as:  MOTRIN    Take 1 Tab by mouth every 8 hours as needed for Moderate Pain.   Dose:  400 mg       LUNESTA PO    Take  by mouth.       ondansetron 4 MG Tbdp   Commonly known as:  ZOFRAN ODT    Take 1 Tab by mouth every 8 hours as needed for Nausea/Vomiting.   Dose:  4 mg       SUMAtriptan 25 MG Tabs tablet   Commonly known as:  IMITREX    Take 1 Tab by mouth Once PRN for Migraine.   Dose:  25 mg               Patient Information     Patient Information    Following emergency treatment: all patient requiring follow-up care must return either to a private physician or a clinic if your condition worsens before you are able to obtain further medical attention, please return to the emergency room.     Billing Information    At [facility], we work to make the billing process streamlined for our patients.  Our Representatives are here to answer any questions you may have regarding your hospital bill.  If you have insurance coverage and have supplied your insurance information to us, we will submit a claim to your insurer on your behalf.  Should you have any questions regarding your bill, we can be reached online or by phone as follows:  Online: You are able pay your bills online or live chat with our representatives about any billing questions you may have. We are here to help Monday - Friday from 8:00am to 7:30pm and 9:00am - 12:00pm on Saturdays.  Please visit [email or web address]  for more information.   Phone:  [phone] or [phone]    Please note that your emergency physician, surgeon, pathologist, radiologist,  anesthesiologist, and other specialists are not employed by [facility] and will therefore bill separately for their services.  Please contact them directly for any questions concerning their bills at the numbers below:     Emergency Physician Services:  [phone]  [facility]:  [phone]  [facility]:  [phone]  [facility]:  [phone]    1. Your final bill may vary from the amount quoted upon discharge if all procedures are not complete at that time, or if your doctor has additional procedures of which we are not aware. You will receive an additional bill if you return to the Emergency Department at [facility] for suture removal regardless of the facility of which the sutures were placed.     2. Please arrange for settlement of this account at the emergency registration.    3. All self-pay accounts are due in full at the time of treatment.  If you are unable to meet this obligation then payment is expected within 4-5 days.     4. If you have had radiology studies (CT, X-ray, Ultrasound, MRI), you have received a preliminary result during your emergency department visit. Please contact the radiology department [phone] to receive a copy of your final result. Please discuss the Final result with your primary physician or with the follow up physician provided.     Crisis Hotline:  [place] Crisis Hotline:  9-687-TETTZPU or 1-225.651.6047  [place] Crisis Hotline:    1-579.685.3258 or 988-266-4720         ED Discharge Follow Up Questions    1. In order to provide you with very good care, we would like to follow up with a phone call in the next few days.  May we have your permission to contact you?     YES /  NO    2. What is the best phone number to call you? (       )_____-__________    3. What is the best time to call you?      Morning  /  Afternoon  /  Evening                   Patient Signature:   ____________________________________________________________    Date:  ____________________________________________________________

## 2017-01-30 NOTE — ED AVS SNAPSHOT
Doximity Access Code: [email or web address]  Expires: 3/1/2017  1:55 PM    Doximity  A secure, online tool to manage your health information     [employer]’s Doximity® is a secure, online tool that connects you to your personalized health information from the privacy of your home -- day or night - making it very easy for you to manage your healthcare. Once the activation process is completed, you can even access your medical information using the Doximity deysi, which is available for free in the Apple Deysi store or Google Play store.     Doximity provides the following levels of access (as shown below):   My Chart Features   [facility] Primary Care Doctor [facility]  Specialists [facility]  Urgent  Care Non-[facility]  Primary Care  Doctor   Email your healthcare team securely and privately 24/7 X X X X   Manage appointments: schedule your next appointment; view details of past/upcoming appointments X      Request prescription refills. X      View recent personal medical records, including lab and immunizations X X X X   View health record, including health history, allergies, medications X X X X   Read reports about your outpatient visits, procedures, consult and ER notes X X X X   See your discharge summary, which is a recap of your hospital and/or ER visit that includes your diagnosis, lab results, and care plan. X X       How to register for Doximity:  1. Go to  [email or web address].  2. Click on the Sign Up Now box, which takes you to the New Member Sign Up page. You will need to provide the following information:  a. Enter your Doximity Access Code exactly as it appears at the top of this page. (You will not need to use this code after you’ve completed the sign-up process. If you do not sign up before the expiration date, you must request a new code.)   b. Enter your date of birth.   c. Enter your home email address.   d. Click Submit, and follow the next screen’s instructions.  3. Create a Doximity ID. This will be your Doximity  login ID and cannot be changed, so think of one that is secure and easy to remember.  4. Create a DiObex password. You can change your password at any time.  5. Enter your Password Reset Question and Answer. This can be used at a later time if you forget your password.   6. Enter your e-mail address. This allows you to receive e-mail notifications when new information is available in DiObex.  7. Click Sign Up. You can now view your health information.    For assistance activating your DiObex account, call [phone]

## 2017-01-30 NOTE — ED PROVIDER NOTES
"ED Provider Note    CHIEF COMPLAINT  Chief Complaint   Patient presents with   • Migraine     X 24 hours. 8/10 \"dull constant\" pain. Per pt, \"It's mainly in my right eye and vibrates to the right side of my head.   • N/V     Per pt, due to migraine. Pt has had two episodes of vomiting in the past 24 hours.       HPI  Rasheeda Manzano is a 46 y.o. female who presents with headache, for the last 24 hours, severe headache, gradual onset, mostly behind right eye. Long history of migraine headaches and this headache today is identical to previous migraine headaches. No change, no gait problemsproblems. Does have blindness, however that is chronic. No fever no chills no stiff neck or chest pain no abdominal pain, vomiting began 12 hours ago, last vomiting 5 hours ago. No diarrhea. States she is not pregnant.    REVIEW OF SYSTEMS  See HPI for further details. History of hydrocephalus blindness migraine headaches jaundice Denies other G.I., G.U.. endrocine, cardiovascular, respriatory or neurological problems. All other systems are negative.     PAST MEDICAL HISTORY  Past Medical History   Diagnosis Date   • Hydrocephalus    • Migraine without aura, without mention of intractable migraine without mention of status migrainosus    • Blind    • Chronic daily headache    • Depression    • Psychiatric problem      depression   • Hydrocephalus      shunt drains into pleural place of L lung   • C. difficile diarrhea 2013   • Jaundice      at birth   • Pain      gallbladder related   • Other specified symptom associated with female genital organs      excessive bleeding   • Fall        FAMILY HISTORY  Family History   Problem Relation Age of Onset   • Hypertension Mother    • Hypertension Father    • Non-contributory Neg Hx      Migraine       SOCIAL HISTORY  Social History     Social History   • Marital Status:      Spouse Name: N/A   • Number of Children: N/A   • Years of Education: N/A     Social History Main Topics "   • Smoking status: Current Some Day Smoker -- 1.00 packs/day for 10 years     Types: Cigars     Start date: 05/01/2004   • Smokeless tobacco: Never Used      Comment:  CIGAR/day    • Alcohol Use: No      Comment: socially   • Drug Use: No   • Sexual Activity:     Partners: Male     Other Topics Concern   • Not on file     Social History Narrative       SURGICAL HISTORY  Past Surgical History   Procedure Laterality Date   • Laparoscopy  8/8/08     Performed by FERNANDO HENDERSON at SURGERY [facility]   • Lysis adhesions general  12/10/2013     Performed by Arie Bass M.D. at SURGERY [facility]   • Cystoscopy  12/10/2013     Performed by Joana Yeager M.D. at SURGERY [facility]   • Exploratory laparotomy  12/10/2013     Performed by Arie Bass M.D. at SURGERY [facility]   • Appendectomy  12/10/2013     Performed by Arie Bass M.D. at SURGERY [facility]   • Abdominal hysterectomy total  12/10/2013     Performed by Joana Yeager M.D. at SURGERY [facility]   • Ayala by laparoscopy N/A 8/13/2015     Procedure: AYALA BY LAPAROSCOPY;  Surgeon: Brice Johnson M.D.;  Location: SURGERY SAME DAY [facility];  Service:    • Cholecystectomy N/A 8/13/2015     Procedure: CHOLECYSTECTOMY;  Surgeon: Brice Johnson M.D.;  Location: SURGERY SAME DAY [facility];  Service:    • Other       PLEURAL SHUNT, numerous revisions       CURRENT MEDICATIONS  Home Medications     **Home medications have not yet been reviewed for this encounter**          ALLERGIES  Allergies   Allergen Reactions   • Tape Rash     Medical tape. Per patient, paper tape ok.       PHYSICAL EXAM  VITAL SIGNS: /76 mmHg  Pulse 88  Temp(Src) 36.4 °C (97.6 °F)  Resp 16  Wt 69.7 kg (153 lb 10.6 oz)  SpO2 99%  LMP 11/27/2013  Constitutional: Well developed, Well nourished, No acute distress, Non-toxic appearance.   HENT: Normocephalic, Atraumatic, Bilateral external ears normal, Oropharynx moist, No oral exudates,  Nose normal.   Eyes: Conjunctiva normal, No discharge. Nystagmus in her eyes, she is blind  Neck: Normal range of motion, No tenderness, Supple, No stridor.   Lymphatic: No lymphadenopathy noted.   Cardiovascular: Normal heart rate, Normal rhythm, No murmurs, No rubs, No gallops.   Thorax & Lungs: Normal breath sounds, No respiratory distress, No wheezing, No chest tenderness.   Abdomen:  No tenderness, no guarding no rigidity and the abdomen is soft.  No masses, No pulsatile masses.  Skin: Warm, Dry, No erythema, No rash.   Back: No tenderness, No CVA tenderness.   Extremities: Intact distal pulses, No edema, No tenderness, No cyanosis, No clubbing.   Musculoskeletal: Good range of motion in all major joints. No tenderness to palpation or major deformities noted.   Neurologic: Alert & oriented x 3, Normal motor function, Normal sensory function, No focal deficits noted. Speech is normal gait is normal  Psychiatric: Affect normal, Judgment normal, Mood normal.       RADIOLOGY/PROCEDURES      COURSE & MEDICAL DECISION MAKING  Pertinent Labs & Imaging studies reviewed. (See chart for details)    A long history of migraine headaches, this headache is typical migraine for her, is given IV normal saline, hydromorphone, Benadryl,    She says she does better with hydromorphone and Benadryl and now is feeling much better by 1 PM. We'll send her home with hydromorphone.Discharge instructions are understood. This patient is to return if fever vomiting or no better in 12 hours. Follow up with the [facility] clinic or private physician. Information sheets on migraine headaches.  FINAL IMPRESSION  1.   1. Other type of migraine        2. Vomiting  3.     Disposition  Discharge instructions are understood. This patient is to return if fever vomiting or no better in 12 hours. Follow up with the [facility] clinic or private physician. Information sheets on migraine headache and vomiting  Electronically signed by: Antonio Acevedo, 1/30/2017  7:34 AM

## 2017-01-30 NOTE — ED NOTES
PT ready for discharge.  PT discharge instructions provided, pt verbalizes understanding and signed.  PT ambulated off unit, steady gait.  All possessions with pt upon discharge.

## 2017-02-03 PROCEDURE — 99284 EMERGENCY DEPT VISIT MOD MDM: CPT

## 2017-02-03 ASSESSMENT — PAIN SCALES - GENERAL: PAINLEVEL_OUTOF10: 9

## 2017-02-04 ENCOUNTER — HOSPITAL ENCOUNTER (EMERGENCY)
Facility: MEDICAL CENTER | Age: 47
End: 2017-02-04
Attending: EMERGENCY MEDICINE
Payer: MEDICAID

## 2017-02-04 VITALS
WEIGHT: 154.76 LBS | OXYGEN SATURATION: 98 % | SYSTOLIC BLOOD PRESSURE: 102 MMHG | TEMPERATURE: 97.7 F | HEIGHT: 64 IN | BODY MASS INDEX: 26.42 KG/M2 | HEART RATE: 81 BPM | RESPIRATION RATE: 16 BRPM | DIASTOLIC BLOOD PRESSURE: 64 MMHG

## 2017-02-04 DIAGNOSIS — G89.29 CHRONIC NONINTRACTABLE HEADACHE, UNSPECIFIED HEADACHE TYPE: ICD-10-CM

## 2017-02-04 DIAGNOSIS — R51.9 CHRONIC NONINTRACTABLE HEADACHE, UNSPECIFIED HEADACHE TYPE: ICD-10-CM

## 2017-02-04 PROCEDURE — 700105 HCHG RX REV CODE 258: Performed by: EMERGENCY MEDICINE

## 2017-02-04 PROCEDURE — 96361 HYDRATE IV INFUSION ADD-ON: CPT

## 2017-02-04 PROCEDURE — 96375 TX/PRO/DX INJ NEW DRUG ADDON: CPT

## 2017-02-04 PROCEDURE — 700111 HCHG RX REV CODE 636 W/ 250 OVERRIDE (IP): Performed by: EMERGENCY MEDICINE

## 2017-02-04 PROCEDURE — 96374 THER/PROPH/DIAG INJ IV PUSH: CPT

## 2017-02-04 RX ORDER — DIPHENHYDRAMINE HYDROCHLORIDE 50 MG/ML
25 INJECTION INTRAMUSCULAR; INTRAVENOUS ONCE
Status: COMPLETED | OUTPATIENT
Start: 2017-02-04 | End: 2017-02-04

## 2017-02-04 RX ORDER — ALBUTEROL SULFATE 90 UG/1
2 AEROSOL, METERED RESPIRATORY (INHALATION) ONCE
Status: DISCONTINUED | OUTPATIENT
Start: 2017-02-04 | End: 2017-02-04

## 2017-02-04 RX ORDER — KETOROLAC TROMETHAMINE 30 MG/ML
30 INJECTION, SOLUTION INTRAMUSCULAR; INTRAVENOUS ONCE
Status: COMPLETED | OUTPATIENT
Start: 2017-02-04 | End: 2017-02-04

## 2017-02-04 RX ORDER — SODIUM CHLORIDE 9 MG/ML
1000 INJECTION, SOLUTION INTRAVENOUS ONCE
Status: COMPLETED | OUTPATIENT
Start: 2017-02-04 | End: 2017-02-04

## 2017-02-04 RX ADMIN — KETOROLAC TROMETHAMINE 30 MG: 30 INJECTION, SOLUTION INTRAMUSCULAR; INTRAVENOUS at 01:52

## 2017-02-04 RX ADMIN — DIPHENHYDRAMINE HYDROCHLORIDE 25 MG: 50 INJECTION, SOLUTION INTRAMUSCULAR; INTRAVENOUS at 01:53

## 2017-02-04 RX ADMIN — SODIUM CHLORIDE 1000 ML: 9 INJECTION, SOLUTION INTRAVENOUS at 01:52

## 2017-02-04 RX ADMIN — PROCHLORPERAZINE EDISYLATE 10 MG: 5 INJECTION INTRAMUSCULAR; INTRAVENOUS at 01:53

## 2017-02-04 ASSESSMENT — PAIN SCALES - GENERAL: PAINLEVEL_OUTOF10: 3

## 2017-02-04 NOTE — ED NOTES
Patient states headache since last Thursday, patient states she ran out of pain medication that was filled by another [facility] a few days ago. Patient states her PCP will not fill narcotic pain medicine for her anymore.

## 2017-02-04 NOTE — ED NOTES
".  Chief Complaint   Patient presents with   • Headache     since last Monday     .BP 99/66 mmHg  Pulse 86  Temp(Src) 36.5 °C (97.7 °F)  Resp 18  Ht 1.626 m (5' 4.02\")  Wt 70.2 kg (154 lb 12.2 oz)  BMI 26.55 kg/m2  SpO2 97%  LMP 11/27/2013    BIB EMS from home with above complaints, seen here 4 x last month with same complaint, patient confused, AAOx3, disoriented to time, at baseline per EMS report.  Hx hydrocephalus.  Legally blind.  Charge RN aware of patient.  Educated on triage process, placed in lobby, told to inform staff of any changes in condition.    "

## 2017-02-04 NOTE — ED AVS SNAPSHOT
2/4/2017          Rasheeda Manzano  [address]  [facility] [address]    Dear Rasheeda:    [facility] wants to ensure your discharge home is safe and you or your loved ones have had all your questions answered regarding your care after you leave the hospital.    You may receive a telephone call within two days of your discharge.  This call is to make certain you understand your discharge instructions as well as ensure we provided you with the best care possible during your stay with us.     The call will only last approximately 3-5 minutes and will be done by a nurse.    Once again, we want to ensure your discharge home is safe and that you have a clear understanding of any next steps in your care.  If you have any questions or concerns, please do not hesitate to contact us, we are here for you.  Thank you for choosing [facility] for your healthcare needs.    Sincerely,    Harrison Kilgore    [facility]

## 2017-02-04 NOTE — ED AVS SNAPSHOT
entegra technologies Access Code: [email or web address]  Expires: 3/1/2017  1:55 PM    entegra technologies  A secure, online tool to manage your health information     [employer]’s entegra technologies® is a secure, online tool that connects you to your personalized health information from the privacy of your home -- day or night - making it very easy for you to manage your healthcare. Once the activation process is completed, you can even access your medical information using the entegra technologies deysi, which is available for free in the Apple Deysi store or Google Play store.     entegra technologies provides the following levels of access (as shown below):   My Chart Features   [facility] Primary Care Doctor [facility]  Specialists [facility]  Urgent  Care Non-[facility]  Primary Care  Doctor   Email your healthcare team securely and privately 24/7 X X X X   Manage appointments: schedule your next appointment; view details of past/upcoming appointments X      Request prescription refills. X      View recent personal medical records, including lab and immunizations X X X X   View health record, including health history, allergies, medications X X X X   Read reports about your outpatient visits, procedures, consult and ER notes X X X X   See your discharge summary, which is a recap of your hospital and/or ER visit that includes your diagnosis, lab results, and care plan. X X       How to register for entegra technologies:  1. Go to  [email or web address].  2. Click on the Sign Up Now box, which takes you to the New Member Sign Up page. You will need to provide the following information:  a. Enter your entegra technologies Access Code exactly as it appears at the top of this page. (You will not need to use this code after you’ve completed the sign-up process. If you do not sign up before the expiration date, you must request a new code.)   b. Enter your date of birth.   c. Enter your home email address.   d. Click Submit, and follow the next screen’s instructions.  3. Create a entegra technologies ID. This will be your entegra technologies  login ID and cannot be changed, so think of one that is secure and easy to remember.  4. Create a [employer] password. You can change your password at any time.  5. Enter your Password Reset Question and Answer. This can be used at a later time if you forget your password.   6. Enter your e-mail address. This allows you to receive e-mail notifications when new information is available in [employer].  7. Click Sign Up. You can now view your health information.    For assistance activating your [employer] account, call [phone]

## 2017-02-04 NOTE — ED NOTES
Patient to follow up with PCP. Patient verbalizes understanding of discharge instructions. Patient ambulatory upon discharge. Patient to call for cab in waiting room for ride home.

## 2017-02-04 NOTE — ED AVS SNAPSHOT
After Visit Summary                                                                                                                Rasheeda Manzano   MRN: [id number]    Department:  [facility], Emergency Dept   Date of Visit:  2/3/2017            [facility], Emergency Dept    [address] [place] [id number]    Phone:  [phone]      You were seen by     Tone Cuello M.D.      Your Diagnosis Was     Chronic nonintractable headache, unspecified headache type     R51       These are the medications you received during your hospitalization from 02/03/2017 1836 to 02/04/2017 0315     Date/Time Order Dose Route Action    02/04/2017 0153 prochlorperazine (COMPAZINE) injection 10 mg 10 mg Intravenous Given    02/04/2017 0153 diphenhydrAMINE (BENADRYL) injection 25 mg 25 mg Intravenous Given    02/04/2017 0152 ketorolac (TORADOL) injection 30 mg 30 mg Intravenous Given    02/04/2017 0152 NS infusion 1,000 mL 1,000 mL Intravenous New Bag      Follow-up Information     1. Follow up with CHANDLER Adams In 2 days.    Specialty:  Family Medicine    Contact information    [address] [address]  [phone]          2. Follow up with [facility], Emergency Dept.    Specialty:  Emergency Medicine    Why:  As needed, If symptoms worsen    Contact information    [address] [place] [id number] [phone]      Medication Information     Review all of your home medications and newly ordered medications with your primary doctor and/or pharmacist as soon as possible. Follow medication instructions as directed by your doctor and/or pharmacist.     Please keep your complete medication list with you and share with your physician. Update the information when medications are discontinued, doses are changed, or new medications (including over-the-counter products) are added; and carry medication information at all times in the event of  emergency situations.               Medication List      ASK your doctor about these medications        Instructions    butalbital-acetaminophen-caffeine-codeine -61-30 MG per capsule   Commonly known as:  FIORICET W/CODEINE    Take 1 Cap by mouth every four hours as needed for Headache.   Dose:  1 Cap       * HYDROmorphone 2 MG Tabs   Commonly known as:  DILAUDID    Take 1-2 Tabs by mouth every four hours as needed (FOR PAIN) for up to 11 doses.   Dose:  2-4 mg       * HYDROmorphone 2 MG Tabs   Commonly known as:  DILAUDID    Take 1-2 Tabs by mouth every 6 hours as needed for Severe Pain.   Dose:  2-4 mg       ibuprofen 400 MG Tabs   Commonly known as:  MOTRIN    Take 1 Tab by mouth every 8 hours as needed for Moderate Pain.   Dose:  400 mg       LUNESTA PO    Take  by mouth.       ondansetron 4 MG Tbdp   Commonly known as:  ZOFRAN ODT    Take 1 Tab by mouth every 8 hours as needed for Nausea/Vomiting.   Dose:  4 mg       SUMAtriptan 25 MG Tabs tablet   Commonly known as:  IMITREX    Take 1 Tab by mouth Once PRN for Migraine.   Dose:  25 mg       * Notice:  This list has 2 medication(s) that are the same as other medications prescribed for you. Read the directions carefully, and ask your doctor or other care provider to review them with you.            Procedures and tests performed during your visit     INSERT IV        Discharge Instructions       General Headache Without Cause  A headache is pain or discomfort felt around the head or neck area. The specific cause of a headache may not be found. There are many causes and types of headaches. A few common ones are:  · Tension headaches.  · Migraine headaches.  · Cluster headaches.  · Chronic daily headaches.  HOME CARE INSTRUCTIONS   · Keep all follow-up appointments with your health care provider or any specialist referral.  · Only take over-the-counter or prescription medicines for pain or discomfort as directed by your health care provider.  · Lie down in  a dark, quiet room when you have a headache.  · Keep a headache journal to find out what may trigger your migraine headaches. For example, write down:  ¨ What you eat and drink.  ¨ How much sleep you get.  ¨ Any change to your diet or medicines.  · Try massage or other relaxation techniques.  · Put ice packs or heat on the head and neck. Use these 3 to 4 times per day for 15 to 20 minutes each time, or as needed.  · Limit stress.  · Sit up straight, and do not tense your muscles.  · Quit smoking if you smoke.  · Limit alcohol use.  · Decrease the amount of caffeine you drink, or stop drinking caffeine.  · Eat and sleep on a regular schedule.  · Get 7 to 9 hours of sleep, or as recommended by your health care provider.  · Keep lights dim if bright lights bother you and make your headaches worse.  SEEK MEDICAL CARE IF:   · You have problems with the medicines you were prescribed.  · Your medicines are not working.  · You have a change from the usual headache.  · You have nausea or vomiting.  SEEK IMMEDIATE MEDICAL CARE IF:   · Your headache becomes severe.  · You have a fever.  · You have a stiff neck.  · You have loss of vision.  · You have muscular weakness or loss of muscle control.  · You start losing your balance or have trouble walking.  · You feel faint or pass out.  · You have severe symptoms that are different from your first symptoms.     This information is not intended to replace advice given to you by your health care provider. Make sure you discuss any questions you have with your health care provider.     Document Released: 12/18/2006 Document Revised: 05/03/2016 Document Reviewed: 01/02/2013  Elsevier Interactive Patient Education ©2016 [employer]            Patient Information     Patient Information    Following emergency treatment: all patient requiring follow-up care must return either to a private physician or a clinic if your condition worsens before you are able to obtain further medical  attention, please return to the emergency room.     Billing Information    At [facility], we work to make the billing process streamlined for our patients.  Our Representatives are here to answer any questions you may have regarding your hospital bill.  If you have insurance coverage and have supplied your insurance information to us, we will submit a claim to your insurer on your behalf.  Should you have any questions regarding your bill, we can be reached online or by phone as follows:  Online: You are able pay your bills online or live chat with our representatives about any billing questions you may have. We are here to help Monday - Friday from 8:00am to 7:30pm and 9:00am - 12:00pm on Saturdays.  Please visit [email or web address]  for more information.   Phone:  [phone] or [phone]    Please note that your emergency physician, surgeon, pathologist, radiologist, anesthesiologist, and other specialists are not employed by [facility] and will therefore bill separately for their services.  Please contact them directly for any questions concerning their bills at the numbers below:     Emergency Physician Services:  [phone]  [facility]:  [phone]  [facility]:  [phone]  [facility]:  [phone]    1. Your final bill may vary from the amount quoted upon discharge if all procedures are not complete at that time, or if your doctor has additional procedures of which we are not aware. You will receive an additional bill if you return to the Emergency Department at [facility] for suture removal regardless of the facility of which the sutures were placed.     2. Please arrange for settlement of this account at the emergency registration.    3. All self-pay accounts are due in full at the time of treatment.  If you are unable to meet this obligation then payment is expected within 4-5 days.     4. If you have had  radiology studies (CT, X-ray, Ultrasound, MRI), you have received a preliminary result during your emergency department visit. Please contact the radiology department [phone] to receive a copy of your final result. Please discuss the Final result with your primary physician or with the follow up physician provided.     Crisis Hotline:  [place] Crisis Hotline:  7-214-EHYJCSM or 1-324.248.7467  [place] Crisis Hotline:    1-241.916.6693 or 802-611-9591         ED Discharge Follow Up Questions    1. In order to provide you with very good care, we would like to follow up with a phone call in the next few days.  May we have your permission to contact you?     YES /  NO    2. What is the best phone number to call you? (       )_____-__________    3. What is the best time to call you?      Morning  /  Afternoon  /  Evening                   Patient Signature:  ____________________________________________________________    Date:  ____________________________________________________________

## 2017-02-04 NOTE — DISCHARGE INSTRUCTIONS
General Headache Without Cause  A headache is pain or discomfort felt around the head or neck area. The specific cause of a headache may not be found. There are many causes and types of headaches. A few common ones are:  · Tension headaches.  · Migraine headaches.  · Cluster headaches.  · Chronic daily headaches.  HOME CARE INSTRUCTIONS   · Keep all follow-up appointments with your health care provider or any specialist referral.  · Only take over-the-counter or prescription medicines for pain or discomfort as directed by your health care provider.  · Lie down in a dark, quiet room when you have a headache.  · Keep a headache journal to find out what may trigger your migraine headaches. For example, write down:  ¨ What you eat and drink.  ¨ How much sleep you get.  ¨ Any change to your diet or medicines.  · Try massage or other relaxation techniques.  · Put ice packs or heat on the head and neck. Use these 3 to 4 times per day for 15 to 20 minutes each time, or as needed.  · Limit stress.  · Sit up straight, and do not tense your muscles.  · Quit smoking if you smoke.  · Limit alcohol use.  · Decrease the amount of caffeine you drink, or stop drinking caffeine.  · Eat and sleep on a regular schedule.  · Get 7 to 9 hours of sleep, or as recommended by your health care provider.  · Keep lights dim if bright lights bother you and make your headaches worse.  SEEK MEDICAL CARE IF:   · You have problems with the medicines you were prescribed.  · Your medicines are not working.  · You have a change from the usual headache.  · You have nausea or vomiting.  SEEK IMMEDIATE MEDICAL CARE IF:   · Your headache becomes severe.  · You have a fever.  · You have a stiff neck.  · You have loss of vision.  · You have muscular weakness or loss of muscle control.  · You start losing your balance or have trouble walking.  · You feel faint or pass out.  · You have severe symptoms that are different from your first symptoms.     This  information is not intended to replace advice given to you by your health care provider. Make sure you discuss any questions you have with your health care provider.     Document Released: 12/18/2006 Document Revised: 05/03/2016 Document Reviewed: 01/02/2013  [employer] Interactive Patient Education ©2016 [employer]

## 2017-02-06 ENCOUNTER — HOSPITAL ENCOUNTER (EMERGENCY)
Facility: MEDICAL CENTER | Age: 47
End: 2017-02-06
Attending: EMERGENCY MEDICINE
Payer: MEDICAID

## 2017-02-06 VITALS
SYSTOLIC BLOOD PRESSURE: 93 MMHG | OXYGEN SATURATION: 96 % | DIASTOLIC BLOOD PRESSURE: 68 MMHG | HEIGHT: 67 IN | TEMPERATURE: 97 F | HEART RATE: 83 BPM | RESPIRATION RATE: 16 BRPM | BODY MASS INDEX: 23.54 KG/M2 | WEIGHT: 150 LBS

## 2017-02-06 DIAGNOSIS — R51.9 CHRONIC NONINTRACTABLE HEADACHE, UNSPECIFIED HEADACHE TYPE: ICD-10-CM

## 2017-02-06 DIAGNOSIS — G89.29 CHRONIC NONINTRACTABLE HEADACHE, UNSPECIFIED HEADACHE TYPE: ICD-10-CM

## 2017-02-06 LAB — POC BREATHALIZER: 0.02 PERCENT (ref 0–0.01)

## 2017-02-06 PROCEDURE — 700105 HCHG RX REV CODE 258: Performed by: EMERGENCY MEDICINE

## 2017-02-06 PROCEDURE — 700111 HCHG RX REV CODE 636 W/ 250 OVERRIDE (IP): Performed by: EMERGENCY MEDICINE

## 2017-02-06 PROCEDURE — 96374 THER/PROPH/DIAG INJ IV PUSH: CPT

## 2017-02-06 PROCEDURE — 302970 POC BREATHALIZER

## 2017-02-06 PROCEDURE — 99284 EMERGENCY DEPT VISIT MOD MDM: CPT

## 2017-02-06 PROCEDURE — 96361 HYDRATE IV INFUSION ADD-ON: CPT

## 2017-02-06 RX ORDER — SODIUM CHLORIDE 9 MG/ML
1000 INJECTION, SOLUTION INTRAVENOUS ONCE
Status: COMPLETED | OUTPATIENT
Start: 2017-02-06 | End: 2017-02-06

## 2017-02-06 RX ORDER — METOCLOPRAMIDE HYDROCHLORIDE 5 MG/ML
10 INJECTION INTRAMUSCULAR; INTRAVENOUS ONCE
Status: COMPLETED | OUTPATIENT
Start: 2017-02-06 | End: 2017-02-06

## 2017-02-06 RX ADMIN — SODIUM CHLORIDE 1000 ML: 9 INJECTION, SOLUTION INTRAVENOUS at 08:45

## 2017-02-06 RX ADMIN — METOCLOPRAMIDE 10 MG: 5 INJECTION, SOLUTION INTRAMUSCULAR; INTRAVENOUS at 08:45

## 2017-02-06 ASSESSMENT — LIFESTYLE VARIABLES
EVER HAD A DRINK FIRST THING IN THE MORNING TO STEADY YOUR NERVES TO GET RID OF A HANGOVER: NO
CONSUMPTION TOTAL: NEGATIVE
HOW MANY TIMES IN THE PAST YEAR HAVE YOU HAD 5 OR MORE DRINKS IN A DAY: 0
HAVE PEOPLE ANNOYED YOU BY CRITICIZING YOUR DRINKING: NO
ON A TYPICAL DAY WHEN YOU DRINK ALCOHOL HOW MANY DRINKS DO YOU HAVE: 0
DO YOU DRINK ALCOHOL: YES
HAVE YOU EVER FELT YOU SHOULD CUT DOWN ON YOUR DRINKING: NO
TOTAL SCORE: 0
AVERAGE NUMBER OF DAYS PER WEEK YOU HAVE A DRINK CONTAINING ALCOHOL: 0
TOTAL SCORE: 0
EVER FELT BAD OR GUILTY ABOUT YOUR DRINKING: NO
TOTAL SCORE: 0

## 2017-02-06 ASSESSMENT — ENCOUNTER SYMPTOMS
HEADACHES: 1
NAUSEA: 1
VOMITING: 0

## 2017-02-06 ASSESSMENT — PAIN SCALES - GENERAL
PAINLEVEL_OUTOF10: 4
PAINLEVEL_OUTOF10: 8

## 2017-02-06 NOTE — ED PROVIDER NOTES
ED Provider Note    Scribed for Mg Sam M.D. by Noel Thibodeaxu. 2/6/2017, 8:20 AM.    Primary care provider: CHANDLER Adams  Means of arrival: EMS  History obtained from: patient  History limited by: none    CHIEF COMPLAINT  Chief Complaint   Patient presents with   • Migraine     Per EMS pt c/o Migraine x 48 hours took some motrin and wine; enroute stated she wanted to kill herself because of the pain       HPI  Rasheeda Manzano is a 46 y.o. female with chronic migraines who presents to the Emergency Department with migraine for the past 2 days. Patient describes her pain as sharp, right supraorbital pain that radiates to the back of her head. Patinet has associated nausea. She states that her last migraine was 1 week ago. She reports a history of hydrocephalus and blindness. Patient denies vomiting, cigarettes use, drug use, diabetes, suicidal ideation.     REVIEW OF SYSTEMS  Review of Systems   Gastrointestinal: Positive for nausea. Negative for vomiting.   Neurological: Positive for headaches.   Psychiatric/Behavioral: Negative for suicidal ideas.   Negative drug use, cigarette use.     PAST MEDICAL HISTORY   has a past medical history of Hydrocephalus; Migraine without aura, without mention of intractable migraine without mention of status migrainosus; Blind; Chronic daily headache; Depression; Psychiatric problem; Hydrocephalus; C. difficile diarrhea (2013); Jaundice; Pain; Other specified symptom associated with female genital organs; and Fall.    SURGICAL HISTORY   has past surgical history that includes laparoscopy (8/8/08); lysis adhesions general (12/10/2013); cystoscopy (12/10/2013); exploratory laparotomy (12/10/2013); appendectomy (12/10/2013); abdominal hysterectomy total (12/10/2013); aakash by laparoscopy (N/A, 8/13/2015); cholecystectomy (N/A, 8/13/2015); and other.    SOCIAL HISTORY  Social History   Substance Use Topics   • Smoking status: Current Some Day Smoker -- 1.00  "packs/day for 10 years     Types: Cigars     Start date: 05/01/2004   • Smokeless tobacco: Never Used      Comment:  CIGAR/day    • Alcohol Use: No      Comment: socially      History   Drug Use No       FAMILY HISTORY  Family History   Problem Relation Age of Onset   • Hypertension Mother    • Hypertension Father    • Non-contributory Neg Hx      Migraine       CURRENT MEDICATIONS  Home Medications     Reviewed by Claudia Aquino R.N. (Registered Nurse) on 02/06/17 at 0801  Med List Status: Not Addressed    Medication Last Dose Status    butalbital-acetaminophen-caffeine-codeine (FIORICET W/CODEINE) -75-30 MG per capsule  Active    Eszopiclone (LUNESTA PO) 1/5/2017 Active    HYDROmorphone (DILAUDID) 2 MG Tab  Active    HYDROmorphone (DILAUDID) 2 MG Tab  Active    ibuprofen (MOTRIN) 400 MG Tab  Active    ondansetron (ZOFRAN ODT) 4 MG TABLET DISPERSIBLE >month Active    SUMAtriptan (IMITREX) 25 MG Tab tablet 12/12/2016 Active                ALLERGIES  Allergies   Allergen Reactions   • Tape Rash     Medical tape. Per patient, paper tape ok.       PHYSICAL EXAM  VITAL SIGNS: BP 93/68 mmHg  Pulse 94  Temp(Src) 36.1 °C (97 °F)  Resp 16  Ht 1.702 m (5' 7.01\")  Wt 68.04 kg (150 lb)  BMI 23.49 kg/m2  SpO2 98%  LMP 11/27/2013    Constitutional:  No acute distress  HENT:  Moist mucous membranes, normal motor sensation.   Eyes: Blindness with disconjugate gaze.  No conjunctivitis or icterus  Neck: Normal range of motion. trachea is midline, no palpable thyroid  Lymphatic: No cervical lymphadenopathy  Cardiovascular: Regular rate and rhythm, no murmurs  Thorax & Lungs: Normal breath sounds, no rhonchi  Abdomen: Soft, Non-tender  Skin:. Warm pink, dry. no rash  Back: Non-tender, no CVA tenderness  Extremities:  Normal range of motion. no edema  Vascular: symmetric radial pulse  Neurologic: Normal gross motor    LABS  Labs Reviewed   POC BREATHALIZER - Abnormal; Notable for the following:     POC " Breathalizer 0.023 (*)     All other components within normal limits   All labs reviewed by me.    COURSE & MEDICAL DECISION MAKING  Pertinent Labs & Imaging studies reviewed. (See chart for details)    8:20 AM - Patient seen and examined at bedside. Patient will be treated with NS 1000 ml, Reglan 10 mg. Ordered POC breathalyzer to evaluate her symptoms. The differential diagnoses include but are not limited to: migraine       The patient will return for new or worsening symptoms and is stable at the time of discharge.      DISPOSITION:  Patient will be discharged home in stable condition.    FOLLOW UP:  [address] [address]  [phone]          OUTPATIENT MEDICATIONS:  New Prescriptions    No medications on file           FINAL IMPRESSION  1. Chronic nonintractable headache, unspecified headache type          I, Noel Thibodeaux (Scribe), am scribing for, and in the presence of, Mg Sam M.D..    Electronically signed by: Noel Thibodeaux (Scribe), 2/6/2017    IMg M.D. personally performed the services described in this documentation, as scribed by Noel Thibodeaux in my presence, and it is both accurate and complete.    The note accurately reflects work and decisions made by me.  Mg Sam  2/6/2017  5:01 PM

## 2017-02-06 NOTE — ED NOTES
Pt agrees comfortable with discharge and home plan of care, will follow up [facility], and return ER prn

## 2017-02-06 NOTE — ED AVS SNAPSHOT
2/6/2017          Rasheeda Manzano  [address]  [facility] [address]    Dear Rasheeda:    [facility] wants to ensure your discharge home is safe and you or your loved ones have had all your questions answered regarding your care after you leave the hospital.    You may receive a telephone call within two days of your discharge.  This call is to make certain you understand your discharge instructions as well as ensure we provided you with the best care possible during your stay with us.     The call will only last approximately 3-5 minutes and will be done by a nurse.    Once again, we want to ensure your discharge home is safe and that you have a clear understanding of any next steps in your care.  If you have any questions or concerns, please do not hesitate to contact us, we are here for you.  Thank you for choosing [facility] for your healthcare needs.    Sincerely,    Harrison Kilgore    [facility]

## 2017-02-06 NOTE — ED NOTES
Chief Complaint   Patient presents with   • Migraine     Per EMS pt c/o Migraine x 48 hours took some motrin and wine; laura stated she wanted to kill herself because of the pain     Per pt Migraine for few days had 2 glasses of wine last night with 4 motrins

## 2017-02-06 NOTE — ED NOTES
Patient is resting comfortably and IVF ongoing pt keeps moving hand to occlude PIV cath, re-educated on keeping hand open

## 2017-02-06 NOTE — ED NOTES
Chief Complaint   Patient presents with   • Migraine     Per EMS pt c/o Migraine x 48 hours took some motrin and wine; laura stated she wanted to kill herself because of the pain

## 2017-02-06 NOTE — ED AVS SNAPSHOT
Home Care Instructions                                                                                                                Rasheeda Manzano   MRN: [id number]    Department:  [facility], Emergency Dept   Date of Visit:  2/6/2017            [facility], Emergency Dept    [address] [facility] [address]    Phone:  [phone]      You were seen by     Mg Sam M.D.      Your Diagnosis Was     Chronic nonintractable headache, unspecified headache type     R51       These are the medications you received during your hospitalization from 02/06/2017 0749 to 02/06/2017 1053     Date/Time Order Dose Route Action    02/06/2017 0845 NS infusion 1,000 mL 1,000 mL Intravenous New Bag    02/06/2017 0845 metoclopramide (REGLAN) injection 10 mg 10 mg Intravenous Given      Follow-up Information     1. Follow up with [facility].    Contact information    [address] [place] [id number] [phone]      Medication Information     Review all of your home medications and newly ordered medications with your primary doctor and/or pharmacist as soon as possible. Follow medication instructions as directed by your doctor and/or pharmacist.     Please keep your complete medication list with you and share with your physician. Update the information when medications are discontinued, doses are changed, or new medications (including over-the-counter products) are added; and carry medication information at all times in the event of emergency situations.               Medication List      ASK your doctor about these medications        Instructions    butalbital-acetaminophen-caffeine-codeine -75-30 MG per capsule   Commonly known as:  FIORICET W/CODEINE    Take 1 Cap by mouth every four hours as needed for Headache.   Dose:  1 Cap       * HYDROmorphone 2 MG Tabs   Commonly known as:  DILAUDID    Take 1-2 Tabs by mouth every four hours as needed (FOR  PAIN) for up to 11 doses.   Dose:  2-4 mg       * HYDROmorphone 2 MG Tabs   Commonly known as:  DILAUDID    Take 1-2 Tabs by mouth every 6 hours as needed for Severe Pain.   Dose:  2-4 mg       ibuprofen 400 MG Tabs   Commonly known as:  MOTRIN    Take 1 Tab by mouth every 8 hours as needed for Moderate Pain.   Dose:  400 mg       LUNESTA PO    Take  by mouth.       ondansetron 4 MG Tbdp   Commonly known as:  ZOFRAN ODT    Take 1 Tab by mouth every 8 hours as needed for Nausea/Vomiting.   Dose:  4 mg       SUMAtriptan 25 MG Tabs tablet   Commonly known as:  IMITREX    Take 1 Tab by mouth Once PRN for Migraine.   Dose:  25 mg       * Notice:  This list has 2 medication(s) that are the same as other medications prescribed for you. Read the directions carefully, and ask your doctor or other care provider to review them with you.            Procedures and tests performed during your visit     POC BREATHALIZER        Discharge Instructions       Migraine Headache  A migraine headache is an intense, throbbing pain on one or both sides of your head. A migraine can last for 30 minutes to several hours.  CAUSES   The exact cause of a migraine headache is not always known. However, a migraine may be caused when nerves in the brain become irritated and release chemicals that cause inflammation. This causes pain.  Certain things may also trigger migraines, such as:  · Alcohol.  · Smoking.  · Stress.  · Menstruation.  · Aged cheeses.  · Foods or drinks that contain nitrates, glutamate, aspartame, or tyramine.  · Lack of sleep.  · Chocolate.  · Caffeine.  · Hunger.  · Physical exertion.  · Fatigue.  · Medicines used to treat chest pain (nitroglycerine), birth control pills, estrogen, and some blood pressure medicines.  SIGNS AND SYMPTOMS  · Pain on one or both sides of your head.  · Pulsating or throbbing pain.  · Severe pain that prevents daily activities.  · Pain that is aggravated by any physical activity.  · Nausea, vomiting,  or both.  · Dizziness.  · Pain with exposure to bright lights, loud noises, or activity.  · General sensitivity to bright lights, loud noises, or smells.  Before you get a migraine, you may get warning signs that a migraine is coming (aura). An aura may include:  · Seeing flashing lights.  · Seeing bright spots, halos, or zigzag lines.  · Having tunnel vision or blurred vision.  · Having feelings of numbness or tingling.  · Having trouble talking.  · Having muscle weakness.  DIAGNOSIS   A migraine headache is often diagnosed based on:  · Symptoms.  · Physical exam.  · A CT scan or MRI of your head. These imaging tests cannot diagnose migraines, but they can help rule out other causes of headaches.  TREATMENT  Medicines may be given for pain and nausea. Medicines can also be given to help prevent recurrent migraines.   HOME CARE INSTRUCTIONS  · Only take over-the-counter or prescription medicines for pain or discomfort as directed by your health care provider. The use of long-term narcotics is not recommended.  · Lie down in a dark, quiet room when you have a migraine.  · Keep a journal to find out what may trigger your migraine headaches. For example, write down:  ¨ What you eat and drink.  ¨ How much sleep you get.  ¨ Any change to your diet or medicines.  · Limit alcohol consumption.  · Quit smoking if you smoke.  · Get 7-9 hours of sleep, or as recommended by your health care provider.  · Limit stress.  · Keep lights dim if bright lights bother you and make your migraines worse.  SEEK IMMEDIATE MEDICAL CARE IF:   · Your migraine becomes severe.  · You have a fever.  · You have a stiff neck.  · You have vision loss.  · You have muscular weakness or loss of muscle control.  · You start losing your balance or have trouble walking.  · You feel faint or pass out.  · You have severe symptoms that are different from your first symptoms.  MAKE SURE YOU:   · Understand these instructions.  · Will watch your  condition.  · Will get help right away if you are not doing well or get worse.     This information is not intended to replace advice given to you by your health care provider. Make sure you discuss any questions you have with your health care provider.     Document Released: 12/18/2006 Document Revised: 01/08/2016 Document Reviewed: 08/25/2014  [employer] Interactive Patient Education ©2016 [employer]            Patient Information     Patient Information    Following emergency treatment: all patient requiring follow-up care must return either to a private physician or a clinic if your condition worsens before you are able to obtain further medical attention, please return to the emergency room.     Billing Information    At [facility], we work to make the billing process streamlined for our patients.  Our Representatives are here to answer any questions you may have regarding your hospital bill.  If you have insurance coverage and have supplied your insurance information to us, we will submit a claim to your insurer on your behalf.  Should you have any questions regarding your bill, we can be reached online or by phone as follows:  Online: You are able pay your bills online or live chat with our representatives about any billing questions you may have. We are here to help Monday - Friday from 8:00am to 7:30pm and 9:00am - 12:00pm on Saturdays.  Please visit [email or web address]  for more information.   Phone:  [phone] or [phone]    Please note that your emergency physician, surgeon, pathologist, radiologist, anesthesiologist, and other specialists are not employed by [facility] and will therefore bill separately for their services.  Please contact them directly for any questions concerning their bills at the numbers below:     Emergency Physician Services:  [phone]  [facility]:  [phone]  [facility]:  [phone]  [facility]:  [phone]    1. Your final bill may vary from the amount quoted upon discharge if all procedures are not complete at that time, or if your doctor has additional procedures of which we are not aware. You will receive an additional bill if you return to the Emergency Department at [facility] for suture removal regardless of the facility of which the sutures were placed.     2. Please arrange for settlement of this account at the emergency registration.    3. All self-pay accounts are due in full at the time of treatment.  If you are unable to meet this obligation then payment is expected within 4-5 days.     4. If you have had radiology studies (CT, X-ray, Ultrasound, MRI), you have received a preliminary result during your emergency department visit. Please contact the radiology department [phone] to receive a copy of your final result. Please discuss the Final result with your primary physician or with the follow up physician provided.     Crisis Hotline:  [place] Crisis Hotline:  5-459-ZLXGQBF or 1-861.602.9178  [place] Crisis Hotline:    1-352.854.7871 or 052-461-6833         ED Discharge Follow Up Questions    1. In order to provide you with very good care, we would like to follow up with a phone call in the next few days.  May we have your permission to contact you?     YES /  NO    2. What is the best phone number to call you? (       )_____-__________    3. What is the best time to call you?      Morning  /  Afternoon  /  Evening                   Patient Signature:  ____________________________________________________________    Date:  ____________________________________________________________

## 2017-02-06 NOTE — ED AVS SNAPSHOT
Mommy Nearest Access Code: [email or web address]  Expires: 3/1/2017  1:55 PM    Mommy Nearest  A secure, online tool to manage your health information     [employer]’s Mommy Nearest® is a secure, online tool that connects you to your personalized health information from the privacy of your home -- day or night - making it very easy for you to manage your healthcare. Once the activation process is completed, you can even access your medical information using the Mommy Nearest deysi, which is available for free in the Apple Deysi store or Google Play store.     Mommy Nearest provides the following levels of access (as shown below):   My Chart Features   [facility] Primary Care Doctor [facility]  Specialists [facility]  Urgent  Care Non-[facility]  Primary Care  Doctor   Email your healthcare team securely and privately 24/7 X X X X   Manage appointments: schedule your next appointment; view details of past/upcoming appointments X      Request prescription refills. X      View recent personal medical records, including lab and immunizations X X X X   View health record, including health history, allergies, medications X X X X   Read reports about your outpatient visits, procedures, consult and ER notes X X X X   See your discharge summary, which is a recap of your hospital and/or ER visit that includes your diagnosis, lab results, and care plan. X X       How to register for Mommy Nearest:  1. Go to  [email or web address].  2. Click on the Sign Up Now box, which takes you to the New Member Sign Up page. You will need to provide the following information:  a. Enter your Mommy Nearest Access Code exactly as it appears at the top of this page. (You will not need to use this code after you’ve completed the sign-up process. If you do not sign up before the expiration date, you must request a new code.)   b. Enter your date of birth.   c. Enter your home email address.   d. Click Submit, and follow the next screen’s instructions.  3. Create a Mommy Nearest ID. This will be your Mommy Nearest  login ID and cannot be changed, so think of one that is secure and easy to remember.  4. Create a [employer] password. You can change your password at any time.  5. Enter your Password Reset Question and Answer. This can be used at a later time if you forget your password.   6. Enter your e-mail address. This allows you to receive e-mail notifications when new information is available in [employer].  7. Click Sign Up. You can now view your health information.    For assistance activating your [employer] account, call [phone]

## 2017-02-06 NOTE — DISCHARGE INSTRUCTIONS
Migraine Headache  A migraine headache is an intense, throbbing pain on one or both sides of your head. A migraine can last for 30 minutes to several hours.  CAUSES   The exact cause of a migraine headache is not always known. However, a migraine may be caused when nerves in the brain become irritated and release chemicals that cause inflammation. This causes pain.  Certain things may also trigger migraines, such as:  · Alcohol.  · Smoking.  · Stress.  · Menstruation.  · Aged cheeses.  · Foods or drinks that contain nitrates, glutamate, aspartame, or tyramine.  · Lack of sleep.  · Chocolate.  · Caffeine.  · Hunger.  · Physical exertion.  · Fatigue.  · Medicines used to treat chest pain (nitroglycerine), birth control pills, estrogen, and some blood pressure medicines.  SIGNS AND SYMPTOMS  · Pain on one or both sides of your head.  · Pulsating or throbbing pain.  · Severe pain that prevents daily activities.  · Pain that is aggravated by any physical activity.  · Nausea, vomiting, or both.  · Dizziness.  · Pain with exposure to bright lights, loud noises, or activity.  · General sensitivity to bright lights, loud noises, or smells.  Before you get a migraine, you may get warning signs that a migraine is coming (aura). An aura may include:  · Seeing flashing lights.  · Seeing bright spots, halos, or zigzag lines.  · Having tunnel vision or blurred vision.  · Having feelings of numbness or tingling.  · Having trouble talking.  · Having muscle weakness.  DIAGNOSIS   A migraine headache is often diagnosed based on:  · Symptoms.  · Physical exam.  · A CT scan or MRI of your head. These imaging tests cannot diagnose migraines, but they can help rule out other causes of headaches.  TREATMENT  Medicines may be given for pain and nausea. Medicines can also be given to help prevent recurrent migraines.   HOME CARE INSTRUCTIONS  · Only take over-the-counter or prescription medicines for pain or discomfort as directed by your  health care provider. The use of long-term narcotics is not recommended.  · Lie down in a dark, quiet room when you have a migraine.  · Keep a journal to find out what may trigger your migraine headaches. For example, write down:  ¨ What you eat and drink.  ¨ How much sleep you get.  ¨ Any change to your diet or medicines.  · Limit alcohol consumption.  · Quit smoking if you smoke.  · Get 7-9 hours of sleep, or as recommended by your health care provider.  · Limit stress.  · Keep lights dim if bright lights bother you and make your migraines worse.  SEEK IMMEDIATE MEDICAL CARE IF:   · Your migraine becomes severe.  · You have a fever.  · You have a stiff neck.  · You have vision loss.  · You have muscular weakness or loss of muscle control.  · You start losing your balance or have trouble walking.  · You feel faint or pass out.  · You have severe symptoms that are different from your first symptoms.  MAKE SURE YOU:   · Understand these instructions.  · Will watch your condition.  · Will get help right away if you are not doing well or get worse.     This information is not intended to replace advice given to you by your health care provider. Make sure you discuss any questions you have with your health care provider.     Document Released: 12/18/2006 Document Revised: 01/08/2016 Document Reviewed: 08/25/2014  [employer] Interactive Patient Education ©2016 [employer]

## 2017-02-08 ENCOUNTER — HOSPITAL ENCOUNTER (EMERGENCY)
Facility: MEDICAL CENTER | Age: 47
End: 2017-02-08
Attending: EMERGENCY MEDICINE
Payer: MEDICAID

## 2017-02-08 VITALS
HEART RATE: 81 BPM | OXYGEN SATURATION: 98 % | RESPIRATION RATE: 16 BRPM | WEIGHT: 147.27 LBS | HEIGHT: 67 IN | TEMPERATURE: 96.8 F | SYSTOLIC BLOOD PRESSURE: 158 MMHG | BODY MASS INDEX: 23.11 KG/M2 | DIASTOLIC BLOOD PRESSURE: 79 MMHG

## 2017-02-08 DIAGNOSIS — G43.009 MIGRAINE WITHOUT AURA AND WITHOUT STATUS MIGRAINOSUS, NOT INTRACTABLE: ICD-10-CM

## 2017-02-08 PROCEDURE — 700111 HCHG RX REV CODE 636 W/ 250 OVERRIDE (IP): Performed by: EMERGENCY MEDICINE

## 2017-02-08 PROCEDURE — 96374 THER/PROPH/DIAG INJ IV PUSH: CPT

## 2017-02-08 PROCEDURE — 700105 HCHG RX REV CODE 258: Performed by: EMERGENCY MEDICINE

## 2017-02-08 PROCEDURE — 99284 EMERGENCY DEPT VISIT MOD MDM: CPT

## 2017-02-08 PROCEDURE — 96375 TX/PRO/DX INJ NEW DRUG ADDON: CPT

## 2017-02-08 PROCEDURE — 96361 HYDRATE IV INFUSION ADD-ON: CPT

## 2017-02-08 RX ORDER — SODIUM CHLORIDE 9 MG/ML
1000 INJECTION, SOLUTION INTRAVENOUS ONCE
Status: COMPLETED | OUTPATIENT
Start: 2017-02-08 | End: 2017-02-08

## 2017-02-08 RX ORDER — KETOROLAC TROMETHAMINE 30 MG/ML
30 INJECTION, SOLUTION INTRAMUSCULAR; INTRAVENOUS ONCE
Status: COMPLETED | OUTPATIENT
Start: 2017-02-08 | End: 2017-02-08

## 2017-02-08 RX ORDER — DIPHENHYDRAMINE HYDROCHLORIDE 50 MG/ML
25 INJECTION INTRAMUSCULAR; INTRAVENOUS ONCE
Status: COMPLETED | OUTPATIENT
Start: 2017-02-08 | End: 2017-02-08

## 2017-02-08 RX ORDER — METOCLOPRAMIDE HYDROCHLORIDE 5 MG/ML
10 INJECTION INTRAMUSCULAR; INTRAVENOUS ONCE
Status: COMPLETED | OUTPATIENT
Start: 2017-02-08 | End: 2017-02-08

## 2017-02-08 RX ADMIN — DIPHENHYDRAMINE HYDROCHLORIDE 25 MG: 50 INJECTION INTRAMUSCULAR; INTRAVENOUS at 02:50

## 2017-02-08 RX ADMIN — KETOROLAC TROMETHAMINE 30 MG: 30 INJECTION, SOLUTION INTRAMUSCULAR; INTRAVENOUS at 02:50

## 2017-02-08 RX ADMIN — SODIUM CHLORIDE 1000 ML: 9 INJECTION, SOLUTION INTRAVENOUS at 02:50

## 2017-02-08 RX ADMIN — METOCLOPRAMIDE 10 MG: 5 INJECTION, SOLUTION INTRAMUSCULAR; INTRAVENOUS at 02:50

## 2017-02-08 RX ADMIN — HYDROMORPHONE HYDROCHLORIDE 1 MG: 1 INJECTION, SOLUTION INTRAMUSCULAR; INTRAVENOUS; SUBCUTANEOUS at 03:44

## 2017-02-08 ASSESSMENT — PAIN SCALES - GENERAL: PAINLEVEL_OUTOF10: 0

## 2017-02-08 NOTE — ED PROVIDER NOTES
ER PROVIDER NOTE    Scribed for Tony Gates M.D.  by Dewey Arreaga. 2/8/2017 at 2:30 AM.    Primary Care Provider: CHANDLER Adams  Means of Arrival: Walk In   History obtained from: Patient   History limited by: None     CHIEF COMPLAINT  Chief Complaint   Patient presents with   • Migraine     began 0700.       HPI  Rasheeda Manzano is a 46 y.o. female who presents to the emergency department complaining of migraine. Patient, who has a previous history of migraines, had a gradual onset of migraine about 24 hours ago. States this feels exactly like past migraines Her migraine has been worsening, and has been associated with nausea. Patient medicated her migraine today with Ibuprofen today at 7 PM per nursing note with no relief. The patient rates her current headache as moderate in severity. 'The patient denies any new numbness or weakness. No fevers or chills. No neck pain    REVIEW OF SYSTEMS  Pertinent positives include headache. Pertinent negatives include no numbness, weakness. See HPI for details. All other systems reviewed and are negative.    PAST MEDICAL HISTORY   has a past medical history of Hydrocephalus; Migraine without aura, without mention of intractable migraine without mention of status migrainosus; Blind; Chronic daily headache; Depression; Psychiatric problem; Hydrocephalus; C. difficile diarrhea (2013); Jaundice; Pain; Other specified symptom associated with female genital organs; and Fall.    SURGICAL HISTORY   has past surgical history that includes laparoscopy (8/8/08); lysis adhesions general (12/10/2013); cystoscopy (12/10/2013); exploratory laparotomy (12/10/2013); appendectomy (12/10/2013); abdominal hysterectomy total (12/10/2013); aakash by laparoscopy (N/A, 8/13/2015); cholecystectomy (N/A, 8/13/2015); and other.    FAMILY HISTORY  Family History   Problem Relation Age of Onset   • Hypertension Mother    • Hypertension Father    • Non-contributory Neg Hx      Migraine  "    SOCIAL HISTORY  Social History     Social History   • Marital Status:      Spouse Name: N/A   • Number of Children: N/A   • Years of Education: N/A     Social History Main Topics   • Smoking status: Current Some Day Smoker -- 1.00 packs/day for 10 years     Types: Cigars     Start date: 05/01/2004   • Smokeless tobacco: Never Used      Comment:  CIGAR/day    • Alcohol Use: No      Comment: socially   • Drug Use: No   • Sexual Activity:     Partners: Male     Other Topics Concern   • Not on file     Social History Narrative      History   Drug Use No     CURRENT MEDICATIONS  Home Medications     **Home medications have not yet been reviewed for this encounter**        ALLERGIES  Allergies   Allergen Reactions   • Tape Rash     Medical tape. Per patient, paper tape ok.     PHYSICAL EXAM  VITAL SIGNS: /78 mmHg  Pulse 80  Temp(Src) 36 °C (96.8 °F)  Resp 16  Ht 1.702 m (5' 7.01\")  Wt 66.8 kg (147 lb 4.3 oz)  BMI 23.06 kg/m2  SpO2 99%  LMP 11/27/2013  Pulse ox interpretation: I interpret this pulse ox as normal.     Constitutional: Alert in no apparent distress.  HENT: No signs of trauma, Bilateral external ears normal, Nose normal. Temples and sinuses nontender  Neck: Normal range of motion, No tenderness, Supple, No stridor.   Lymphatic: No lymphadenopathy noted.   Cardiovascular: Regular rate and rhythm, no murmurs.   Thorax & Lungs: Normal breath sounds, No respiratory distress, No wheezing, No chest tenderness.   Abdomen: Bowel sounds normal, Soft, No tenderness, No masses, No pulsatile masses. No peritoneal signs.  Skin: Warm, Dry, No erythema, No rash.   Back: No bony tenderness, No CVA tenderness.   Extremities: Intact distal pulses, No edema, No tenderness, No cyanosis  Musculoskeletal: Good range of motion in all major joints. No tenderness to palpation or major deformities noted.    Neurologic: Alert ,  speech is appropriate or not slurred, upper extremities bilaterally exhibit no " drift, no dysmetria, 5 out of 5 strength with bilateral bicep/tricep/, sensation intact to light touch throughout upper extremities. Lower extremities strength 5 out of 5 thigh extension/flexion/abduction/adduction, knee extension/flexion, dorsiflexion plantar flexion. No clonus.  2+ patella reflexes.  sensation intact to light touch.  No focal deficits noted. A  Psychiatric: Affect normal, Judgment normal, Mood normal.     DIAGNOSTIC STUDIES / PROCEDURES        COURSE & MEDICAL DECISION MAKING  Nursing notes, VS, PMSFHx reviewed in chart.    2:30 AM Patient seen and examined at bedside. Patient will be treated with Reglan, Toradol, IV fluids, Benadryl.     345 AM patient's still with pain, additional medication ordered    4:05 AM  Reevaluated, feeling improved, headache resolved, plan for discharge    Decision Making:  This is a 46 y.o. female presented with headache. Likely migraine given exactly similarity to past as well as the lack of rapid onset and severity of the headache, in addition to the well appearance of the pt makes the dx of subarchnoid hemorrhage less likely. The normal vital signs, lack of a temperature and lack of meningeal signs (supple neck without pain with rom) makes the dx of meningitis less likely.   The patient has no neurologic signs, no fever, and is immunocompetent therefore the time course would be inconsistent with abscess. The absence of neurologic signs and the short duration of sx make neoplasm unlikely. There is no history or physical exam sign of trauma, and a normal neurologic exam making any traumatic head bleed (sdh/sah/iph/edh) unlikely. Toxic and metabolic causes of headache are also unlikely given no other signs or symptoms of such a disorder.  Other diagnoses I also considered but consider unlikely are temporal arteritis (no temple pain, no vision change), glaucoma (no vision change, perrla on exam), sinusitis (no sinus tenderness), pseudotumor cerebri, dural venous  thrombosis, and cluster headache (headache duration and character are inconsistent with this diagnosis).    The patient is improved with normal vitals, a normal neurologic exam, normal gait, and is discharged home with pcp follow-up and return precautions.    Patient is discharged in stable condition    \The patient is referred to a primary physician for blood pressure management, diabetic screening, and for all other preventative health concerns.             FINAL IMPRESSION  1. Migraine without aura and without status migrainosus, not intractable         Dewey SHARMA (Scribe), am scribing for, and in the presence of, Tony Gates M.D..    Electronically signed by: Dewey Arreaga (Carlosibe), 2/8/2017    ITony M.D. personally performed the services described in this documentation, as scribed by Dewey Arreaga in my presence, and it is both accurate and complete.     The note accurately reflects work and decisions made by me.  Tony Gates  2/8/2017  4:09 AM

## 2017-02-08 NOTE — ED AVS SNAPSHOT
2/8/2017          Rasheeda Manzano  [address]  [facility] [address]    Dear Rasheeda:    [facility] wants to ensure your discharge home is safe and you or your loved ones have had all your questions answered regarding your care after you leave the hospital.    You may receive a telephone call within two days of your discharge.  This call is to make certain you understand your discharge instructions as well as ensure we provided you with the best care possible during your stay with us.     The call will only last approximately 3-5 minutes and will be done by a nurse.    Once again, we want to ensure your discharge home is safe and that you have a clear understanding of any next steps in your care.  If you have any questions or concerns, please do not hesitate to contact us, we are here for you.  Thank you for choosing [facility] for your healthcare needs.    Sincerely,    Harrison Kilgore    [facility]

## 2017-02-08 NOTE — ED AVS SNAPSHOT
Aquacue Access Code: [email or web address]  Expires: 3/1/2017  1:55 PM    Aquacue  A secure, online tool to manage your health information     [employer]’s Aquacue® is a secure, online tool that connects you to your personalized health information from the privacy of your home -- day or night - making it very easy for you to manage your healthcare. Once the activation process is completed, you can even access your medical information using the Aquacue deysi, which is available for free in the Apple Deysi store or Google Play store.     Aquacue provides the following levels of access (as shown below):   My Chart Features   [facility] Primary Care Doctor [facility]  Specialists [facility]  Urgent  Care Non-[facility]  Primary Care  Doctor   Email your healthcare team securely and privately 24/7 X X X X   Manage appointments: schedule your next appointment; view details of past/upcoming appointments X      Request prescription refills. X      View recent personal medical records, including lab and immunizations X X X X   View health record, including health history, allergies, medications X X X X   Read reports about your outpatient visits, procedures, consult and ER notes X X X X   See your discharge summary, which is a recap of your hospital and/or ER visit that includes your diagnosis, lab results, and care plan. X X       How to register for Aquacue:  1. Go to  [email or web address].  2. Click on the Sign Up Now box, which takes you to the New Member Sign Up page. You will need to provide the following information:  a. Enter your Aquacue Access Code exactly as it appears at the top of this page. (You will not need to use this code after you’ve completed the sign-up process. If you do not sign up before the expiration date, you must request a new code.)   b. Enter your date of birth.   c. Enter your home email address.   d. Click Submit, and follow the next screen’s instructions.  3. Create a Aquacue ID. This will be your Aquacue  login ID and cannot be changed, so think of one that is secure and easy to remember.  4. Create a Reconnex password. You can change your password at any time.  5. Enter your Password Reset Question and Answer. This can be used at a later time if you forget your password.   6. Enter your e-mail address. This allows you to receive e-mail notifications when new information is available in Reconnex.  7. Click Sign Up. You can now view your health information.    For assistance activating your Reconnex account, call [phone]

## 2017-02-08 NOTE — ED NOTES
Chief Complaint   Patient presents with   • Migraine     began 0700.       Pt took ibuprofen at 1900 this evening with no relief.  Pt appears to be in minimal distress.  Triage process explained to patient.  Pt back to [facility].  Pt instructed to inform RN if any changes or questions arise.

## 2017-02-08 NOTE — ED NOTES
Pt medicated per ERP orders, pt reporting 8/10 HA pain. Call light within reach, pt provided water per ERP okay.

## 2017-02-08 NOTE — DISCHARGE INSTRUCTIONS
Migraine Headache  A migraine headache is very bad, throbbing pain on one or both sides of your head. Talk to your doctor about what things may bring on (trigger) your migraine headaches.  HOME CARE  · Only take medicines as told by your doctor.  · Lie down in a dark, quiet room when you have a migraine.  · Keep a journal to find out if certain things bring on migraine headaches. For example, write down:  ¨ What you eat and drink.  ¨ How much sleep you get.  ¨ Any change to your diet or medicines.  · Lessen how much alcohol you drink.  · Quit smoking if you smoke.  · Get enough sleep.  · Lessen any stress in your life.  · Keep lights dim if bright lights bother you or make your migraines worse.  GET HELP RIGHT AWAY IF:   · Your migraine becomes really bad.  · You have a fever.  · You have a stiff neck.  · You have trouble seeing.  · Your muscles are weak, or you lose muscle control.  · You lose your balance or have trouble walking.  · You feel like you will pass out (faint), or you pass out.  · You have really bad symptoms that are different than your first symptoms.  MAKE SURE YOU:   · Understand these instructions.  · Will watch your condition.  · Will get help right away if you are not doing well or get worse.     This information is not intended to replace advice given to you by your health care provider. Make sure you discuss any questions you have with your health care provider.     Document Released: 09/26/2009 Document Revised: 03/11/2013 Document Reviewed: 08/25/2014  Elsevier Interactive Patient Education ©2016 Elsevier Inc.

## 2017-02-08 NOTE — ED NOTES
Rasheeda Manzano D/C'd.  Discharge instructions including s/s to return to ED, follow up appointments, hydration importance and pain managment  provided to pt.    Pt verbalized understanding with no further questions and concerns.    Copy of discharge provided to pt.  Signed copy in chart.    Pt ambulates out of department; pt in NAD, awake, alert, interactive and age appropriate

## 2017-02-11 ENCOUNTER — HOSPITAL ENCOUNTER (EMERGENCY)
Facility: MEDICAL CENTER | Age: 47
End: 2017-02-11
Attending: EMERGENCY MEDICINE
Payer: MEDICAID

## 2017-02-11 VITALS
DIASTOLIC BLOOD PRESSURE: 84 MMHG | OXYGEN SATURATION: 92 % | BODY MASS INDEX: 24.75 KG/M2 | RESPIRATION RATE: 18 BRPM | SYSTOLIC BLOOD PRESSURE: 145 MMHG | TEMPERATURE: 97.6 F | HEIGHT: 64 IN | HEART RATE: 77 BPM | WEIGHT: 145 LBS

## 2017-02-11 DIAGNOSIS — G43.009 MIGRAINE WITHOUT AURA AND WITHOUT STATUS MIGRAINOSUS, NOT INTRACTABLE: ICD-10-CM

## 2017-02-11 PROCEDURE — 96374 THER/PROPH/DIAG INJ IV PUSH: CPT

## 2017-02-11 PROCEDURE — 99284 EMERGENCY DEPT VISIT MOD MDM: CPT

## 2017-02-11 PROCEDURE — 700105 HCHG RX REV CODE 258: Performed by: EMERGENCY MEDICINE

## 2017-02-11 PROCEDURE — 96361 HYDRATE IV INFUSION ADD-ON: CPT

## 2017-02-11 PROCEDURE — 96375 TX/PRO/DX INJ NEW DRUG ADDON: CPT

## 2017-02-11 PROCEDURE — 700111 HCHG RX REV CODE 636 W/ 250 OVERRIDE (IP): Performed by: EMERGENCY MEDICINE

## 2017-02-11 RX ORDER — DEXAMETHASONE SODIUM PHOSPHATE 4 MG/ML
10 INJECTION, SOLUTION INTRA-ARTICULAR; INTRALESIONAL; INTRAMUSCULAR; INTRAVENOUS; SOFT TISSUE ONCE
Status: COMPLETED | OUTPATIENT
Start: 2017-02-11 | End: 2017-02-11

## 2017-02-11 RX ORDER — DIPHENHYDRAMINE HYDROCHLORIDE 50 MG/ML
25 INJECTION INTRAMUSCULAR; INTRAVENOUS ONCE
Status: COMPLETED | OUTPATIENT
Start: 2017-02-11 | End: 2017-02-11

## 2017-02-11 RX ORDER — KETOROLAC TROMETHAMINE 30 MG/ML
30 INJECTION, SOLUTION INTRAMUSCULAR; INTRAVENOUS ONCE
Status: COMPLETED | OUTPATIENT
Start: 2017-02-11 | End: 2017-02-11

## 2017-02-11 RX ORDER — SODIUM CHLORIDE 9 MG/ML
INJECTION, SOLUTION INTRAVENOUS ONCE
Status: COMPLETED | OUTPATIENT
Start: 2017-02-11 | End: 2017-02-11

## 2017-02-11 RX ADMIN — KETOROLAC TROMETHAMINE 30 MG: 30 INJECTION, SOLUTION INTRAMUSCULAR; INTRAVENOUS at 05:22

## 2017-02-11 RX ADMIN — SODIUM CHLORIDE: 9 INJECTION, SOLUTION INTRAVENOUS at 04:28

## 2017-02-11 RX ADMIN — DEXAMETHASONE SODIUM PHOSPHATE 10 MG: 4 INJECTION, SOLUTION INTRAMUSCULAR; INTRAVENOUS at 04:27

## 2017-02-11 RX ADMIN — DIPHENHYDRAMINE HYDROCHLORIDE 25 MG: 50 INJECTION, SOLUTION INTRAMUSCULAR; INTRAVENOUS at 04:27

## 2017-02-11 RX ADMIN — PROCHLORPERAZINE EDISYLATE 10 MG: 5 INJECTION INTRAMUSCULAR; INTRAVENOUS at 04:27

## 2017-02-11 ASSESSMENT — PAIN SCALES - GENERAL: PAINLEVEL_OUTOF10: 8

## 2017-02-11 NOTE — ED NOTES
.  Chief Complaint   Patient presents with   • Migraine     pt with history of migraines this migraine began at 9pm last night states pain is getting worse, light sensitivity, states in past took imitrex but does not work anymore, took ibuprofen prior to coming in by [facility]   • Nausea     denies vomiting

## 2017-02-11 NOTE — ED NOTES
Pt medicated as ordered, has call light and given ice water to drink, denies any further needs at this time

## 2017-02-11 NOTE — ED NOTES
Pt requesting to notify erp that pain is getting worse, did as requested, erp will order medication

## 2017-02-11 NOTE — ED NOTES
Pt given written and verbal dc instructions no questions or needs voiced, pt to w/c and to lobby to wait for taxi with all belongings

## 2017-02-11 NOTE — ED AVS SNAPSHOT
2/11/2017          Rasheeda Manzano  [address]  [facility] [address]    Dear Rasheeda:    [facility] wants to ensure your discharge home is safe and you or your loved ones have had all your questions answered regarding your care after you leave the hospital.    You may receive a telephone call within two days of your discharge.  This call is to make certain you understand your discharge instructions as well as ensure we provided you with the best care possible during your stay with us.     The call will only last approximately 3-5 minutes and will be done by a nurse.    Once again, we want to ensure your discharge home is safe and that you have a clear understanding of any next steps in your care.  If you have any questions or concerns, please do not hesitate to contact us, we are here for you.  Thank you for choosing [facility] for your healthcare needs.    Sincerely,    Harrison Kilgore    [facility]

## 2017-02-11 NOTE — ED PROVIDER NOTES
"ED Provider Note    Scribed for Ozzy Yeager M.D. by Manjula Wright. 2/11/2017  3:35 AM    Primary care provider: CHANDLER Adams  Means of arrival: Ambulance  History obtained from: Patient  History limited by: None    CHIEF COMPLAINT  Chief Complaint   Patient presents with   • Migraine     pt with history of migraines this migraine began at 9pm last night states pain is getting worse, light sensitivity, states in past took imitrex but does not work anymore, took ibuprofen prior to coming in by [facility]   • Nausea     denies vomiting       HPI  Rasheeda Manzano is a 46 y.o. female with a history of migraines who was brought into the ED by ambulance for a headache that feels similar to her previous migraines. Her headache began around 2100 last night and has been worsening. It is located primarily in her right eye and \"vibrates\" to her right posterior scalp. The patient says she's been following up with her primary care provider and is in the process of establishing herself with a neurologist. Light exacerbates the patient's headache. She feels nauseated, but denies any vomiting, numbness or tingling sensations, or weakness.     REVIEW OF SYSTEMS  Pertinent positives include headache, photophobia, nausea. Pertinent negatives include no vomiting, numbness or tingling sensations, or weakness.  All other systems reviewed and negative.    PAST MEDICAL HISTORY   has a past medical history of Hydrocephalus; Migraine without aura, without mention of intractable migraine without mention of status migrainosus; Blind; Chronic daily headache; Depression; Psychiatric problem; Hydrocephalus; C. difficile diarrhea (2013); Jaundice; Pain; Other specified symptom associated with female genital organs; Fall; and Legally blind.    SURGICAL HISTORY   has past surgical history that includes laparoscopy (8/8/08); lysis adhesions general (12/10/2013); cystoscopy (12/10/2013); exploratory laparotomy (12/10/2013); " "appendectomy (12/10/2013); abdominal hysterectomy total (12/10/2013); aakash by laparoscopy (N/A, 8/13/2015); cholecystectomy (N/A, 8/13/2015); and other.    SOCIAL HISTORY  Social History   Substance Use Topics   • Smoking status: Former Smoker -- 1.00 packs/day for 10 years     Types: Cigars     Start date: 05/01/2004   • Smokeless tobacco: Never Used      Comment:  CIGAR/day    • Alcohol Use: Yes      Comment: socially      History   Drug Use No     FAMILY HISTORY  Family History   Problem Relation Age of Onset   • Hypertension Mother    • Hypertension Father    • Non-contributory Neg Hx      Migraine     CURRENT MEDICATIONS  Home Medications     Reviewed by Michelle Bowles R.N. (Registered Nurse) on 02/11/17 at 0321  Med List Status: Partial    Medication Last Dose Status    butalbital-acetaminophen-caffeine-codeine (FIORICET W/CODEINE) -70-30 MG per capsule  Active    Eszopiclone (LUNESTA PO) 1/5/2017 Active    HYDROmorphone (DILAUDID) 2 MG Tab  Active    HYDROmorphone (DILAUDID) 2 MG Tab  Active    ibuprofen (MOTRIN) 400 MG Tab  Active    ondansetron (ZOFRAN ODT) 4 MG TABLET DISPERSIBLE >month Active    SUMAtriptan (IMITREX) 25 MG Tab tablet 12/12/2016 Active              ALLERGIES  Allergies   Allergen Reactions   • Tape Rash     Medical tape. Per patient, paper tape ok.       PHYSICAL EXAM  VITAL SIGNS: /84 mmHg  Pulse 84  Temp(Src) 36.4 °C (97.6 °F)  Resp 18  Ht 1.626 m (5' 4\")  Wt 65.772 kg (145 lb)  BMI 24.88 kg/m2  LMP 11/27/2013    Constitutional: Well developed, Well nourished, Mild to moderate distress, Non-toxic appearance.   HENT: Normocephalic, Atraumatic, Bilateral external ears normal, Oropharynx moist, No oral exudates.   Eyes: PERRLA, EOMI, Conjunctiva normal, No discharge.   Neck: Tenderness to right paramuscular area, Supple, No stridor.   Lymphatic: No lymphadenopathy noted.   Cardiovascular: Normal heart rate, Normal rhythm.   Thorax & Lungs: Clear to auscultation " bilaterally, No respiratory distress, No wheezing, No crackles.   Abdomen: Soft, No tenderness, No masses, No pulsatile masses.   Skin: Warm, Dry, No erythema, No rash.   Extremities:, No edema No cyanosis.   Musculoskeletal: No tenderness to palpation or major deformities noted.  Intact distal pulses  Neurologic: Awake, alert. Cranial nerves 2-11 intact, muscle strength 5/5 in bilateral upper and lower extremities.   Psychiatric: Affect normal, Judgment normal, Mood normal.     COURSE & MEDICAL DECISION MAKING  Pertinent Labs & Imaging studies reviewed. (See chart for details)    I reviewed the patient's medical records which showed the patient was last seen in the ED 3 days ago. This month she was seen here on 02/04/2017. 02/06/2017, and 02/08/2017 for headaches. She's typically treated with Reglan and Compazine then discharged home.    3:55 AM - Patient seen and examined at bedside. Patient will be treated with IV fluids, Compazine 10 mg IV, Benadryl 25 mg IV, and Decadron 10 mg IV    5:30 - On reassessment, the patient reports feeling improved. She was instructed to follow up with her primary care provider and a neurologist. She is to return to the ED for any worsening symptoms. Patient understands and agrees.    Decision Making:  History of acute migraine headache, recurrent migraine, the patient is here several days this month, treated the patient with Compazine, IV fluids, Toradol, Benadryl, Decadron, patient is feeling improved, we'll discharge the patient home.    The patient is referred to a primary physician for blood pressure management, diabetic screening, and for all other preventative health concerns.    DISPOSITION:  Patient will be discharged home in stable condition.    FOLLOW UP:  [facility], Emergency Dept  [address] [facility] [id number] [phone]    If symptoms worsen      OUTPATIENT MEDICATIONS:  New Prescriptions    No medications on file     FINAL  IMPRESSION  1. Migraine without aura and without status migrainosus, not intractable         I, Manjula Wright (Scribe), am scribing for, and in the presence of, Ozzy Yeager M.D..    Electronically signed by: Manjula Wright (Scribe), 2/11/2017    IOzzy M.D. personally performed the services described in this documentation, as scribed by Manjula Wright in my presence, and it is both accurate and complete.    The note accurately reflects work and decisions made by me.  Ozzy Yeager  2/11/2017  5:27 AM

## 2017-02-11 NOTE — DISCHARGE INSTRUCTIONS
Please follow-up with your primary care provider for blood pressure management.      Recurrent Migraine Headache  A migraine headache is very bad, throbbing pain on one or both sides of your head. Recurrent migraines keep coming back. Talk to your doctor about what things may bring on (trigger) your migraine headaches.  HOME CARE  · Only take medicines as told by your doctor.  · Lie down in a dark, quiet room when you have a migraine.  · Keep a journal to find out if certain things bring on migraine headaches. For example, write down:  ¨ What you eat and drink.  ¨ How much sleep you get.  ¨ Any change to your diet or medicines.  · Lessen how much alcohol you drink.  · Quit smoking if you smoke.  · Get enough sleep.  · Lessen any stress in your life.  · Keep lights dim if bright lights bother you or make your migraines worse.  GET HELP IF:  · Medicine does not help your migraines.  · Your pain keeps coming back.  · You have a fever.  GET HELP RIGHT AWAY IF:   · Your migraine becomes really bad.  · You have a stiff neck.  · You have trouble seeing.  · Your muscles are weak, or you lose muscle control.  · You lose your balance or have trouble walking.  · You feel like you will pass out (faint), or you pass out.  · You have really bad symptoms that are different than your first symptoms.  MAKE SURE YOU:   · Understand these instructions.  · Will watch your condition.  · Will get help right away if you are not doing well or get worse.     This information is not intended to replace advice given to you by your health care provider. Make sure you discuss any questions you have with your health care provider.     Document Released: 09/26/2009 Document Revised: 12/23/2014 Document Reviewed: 08/25/2014  [employer] Interactive Patient Education ©2016 Elsevier Inc.

## 2017-02-11 NOTE — ED AVS SNAPSHOT
Compression Kinetics Access Code: [email or web address]  Expires: 3/1/2017  1:55 PM    Compression Kinetics  A secure, online tool to manage your health information     [employer]’s Compression Kinetics® is a secure, online tool that connects you to your personalized health information from the privacy of your home -- day or night - making it very easy for you to manage your healthcare. Once the activation process is completed, you can even access your medical information using the Compression Kinetics deysi, which is available for free in the Apple Deysi store or Google Play store.     Compression Kinetics provides the following levels of access (as shown below):   My Chart Features   [facility] Primary Care Doctor [facility]  Specialists [facility]  Urgent  Care Non-[facility]  Primary Care  Doctor   Email your healthcare team securely and privately 24/7 X X X X   Manage appointments: schedule your next appointment; view details of past/upcoming appointments X      Request prescription refills. X      View recent personal medical records, including lab and immunizations X X X X   View health record, including health history, allergies, medications X X X X   Read reports about your outpatient visits, procedures, consult and ER notes X X X X   See your discharge summary, which is a recap of your hospital and/or ER visit that includes your diagnosis, lab results, and care plan. X X       How to register for Compression Kinetics:  1. Go to  [email or web address].  2. Click on the Sign Up Now box, which takes you to the New Member Sign Up page. You will need to provide the following information:  a. Enter your Compression Kinetics Access Code exactly as it appears at the top of this page. (You will not need to use this code after you’ve completed the sign-up process. If you do not sign up before the expiration date, you must request a new code.)   b. Enter your date of birth.   c. Enter your home email address.   d. Click Submit, and follow the next screen’s instructions.  3. Create a Compression Kinetics ID. This will be your Compression Kinetics  login ID and cannot be changed, so think of one that is secure and easy to remember.  4. Create a NGI password. You can change your password at any time.  5. Enter your Password Reset Question and Answer. This can be used at a later time if you forget your password.   6. Enter your e-mail address. This allows you to receive e-mail notifications when new information is available in NGI.  7. Click Sign Up. You can now view your health information.    For assistance activating your NGI account, call [phone]

## 2017-02-11 NOTE — ED AVS SNAPSHOT
Home Care Instructions                                                                                                                Rasheeda Manzano   MRN: [id number]    Department:  [facility], Emergency Dept   Date of Visit:  2/11/2017            [facility], Emergency Dept    [address] [place] [address]    Phone:  [phone]      You were seen by     Ozzy Yeager M.D.      Your Diagnosis Was     Migraine without aura and without status migrainosus, not intractable     G43.009       These are the medications you received during your hospitalization from 02/11/2017 0313 to 02/11/2017 0539     Date/Time Order Dose Route Action    02/11/2017 0428 NS infusion   Intravenous New Bag    02/11/2017 0427 prochlorperazine (COMPAZINE) injection 10 mg 10 mg Intravenous Given    02/11/2017 0427 diphenhydrAMINE (BENADRYL) injection 25 mg 25 mg Intravenous Given    02/11/2017 0427 dexamethasone (DECADRON) injection 10 mg 10 mg Intravenous Given    02/11/2017 0522 ketorolac (TORADOL) injection 30 mg 30 mg Intravenous Given      Follow-up Information     1. Follow up with [facility], Emergency Dept.    Specialty:  Emergency Medicine    Why:  If symptoms worsen    Contact information    [address] [place] [id number] [phone]      Medication Information     Review all of your home medications and newly ordered medications with your primary doctor and/or pharmacist as soon as possible. Follow medication instructions as directed by your doctor and/or pharmacist.     Please keep your complete medication list with you and share with your physician. Update the information when medications are discontinued, doses are changed, or new medications (including over-the-counter products) are added; and carry medication information at all times in the event of emergency situations.               Medication List      ASK your doctor about these  medications        Instructions    butalbital-acetaminophen-caffeine-codeine -04-30 MG per capsule   Commonly known as:  FIORICET W/CODEINE    Take 1 Cap by mouth every four hours as needed for Headache.   Dose:  1 Cap       * HYDROmorphone 2 MG Tabs   Commonly known as:  DILAUDID    Take 1-2 Tabs by mouth every four hours as needed (FOR PAIN) for up to 11 doses.   Dose:  2-4 mg       * HYDROmorphone 2 MG Tabs   Commonly known as:  DILAUDID    Take 1-2 Tabs by mouth every 6 hours as needed for Severe Pain.   Dose:  2-4 mg       ibuprofen 400 MG Tabs   Commonly known as:  MOTRIN    Take 1 Tab by mouth every 8 hours as needed for Moderate Pain.   Dose:  400 mg       LUNESTA PO    Take  by mouth.       ondansetron 4 MG Tbdp   Commonly known as:  ZOFRAN ODT    Take 1 Tab by mouth every 8 hours as needed for Nausea/Vomiting.   Dose:  4 mg       SUMAtriptan 25 MG Tabs tablet   Commonly known as:  IMITREX    Take 1 Tab by mouth Once PRN for Migraine.   Dose:  25 mg       * Notice:  This list has 2 medication(s) that are the same as other medications prescribed for you. Read the directions carefully, and ask your doctor or other care provider to review them with you.              Discharge Instructions       Please follow-up with your primary care provider for blood pressure management.      Recurrent Migraine Headache  A migraine headache is very bad, throbbing pain on one or both sides of your head. Recurrent migraines keep coming back. Talk to your doctor about what things may bring on (trigger) your migraine headaches.  HOME CARE  · Only take medicines as told by your doctor.  · Lie down in a dark, quiet room when you have a migraine.  · Keep a journal to find out if certain things bring on migraine headaches. For example, write down:  ¨ What you eat and drink.  ¨ How much sleep you get.  ¨ Any change to your diet or medicines.  · Lessen how much alcohol you drink.  · Quit smoking if you smoke.  · Get enough  sleep.  · Lessen any stress in your life.  · Keep lights dim if bright lights bother you or make your migraines worse.  GET HELP IF:  · Medicine does not help your migraines.  · Your pain keeps coming back.  · You have a fever.  GET HELP RIGHT AWAY IF:   · Your migraine becomes really bad.  · You have a stiff neck.  · You have trouble seeing.  · Your muscles are weak, or you lose muscle control.  · You lose your balance or have trouble walking.  · You feel like you will pass out (faint), or you pass out.  · You have really bad symptoms that are different than your first symptoms.  MAKE SURE YOU:   · Understand these instructions.  · Will watch your condition.  · Will get help right away if you are not doing well or get worse.     This information is not intended to replace advice given to you by your health care provider. Make sure you discuss any questions you have with your health care provider.     Document Released: 09/26/2009 Document Revised: 12/23/2014 Document Reviewed: 08/25/2014  [employer] Interactive Patient Education ©2016 [employer]            Patient Information     Patient Information    Following emergency treatment: all patient requiring follow-up care must return either to a private physician or a clinic if your condition worsens before you are able to obtain further medical attention, please return to the emergency room.     Billing Information    At [facility], we work to make the billing process streamlined for our patients.  Our Representatives are here to answer any questions you may have regarding your hospital bill.  If you have insurance coverage and have supplied your insurance information to us, we will submit a claim to your insurer on your behalf.  Should you have any questions regarding your bill, we can be reached online or by phone as follows:  Online: You are able pay your bills online or live chat with our representatives about any billing questions you may have. We are here  to help Monday - Friday from 8:00am to 7:30pm and 9:00am - 12:00pm on Saturdays.  Please visit [email or web address]  for more information.   Phone:  [phone] or [phone]    Please note that your emergency physician, surgeon, pathologist, radiologist, anesthesiologist, and other specialists are not employed by [facility] and will therefore bill separately for their services.  Please contact them directly for any questions concerning their bills at the numbers below:     Emergency Physician Services:  [phone]  [facility]:  [phone]  [facility]:  [phone]  [facility]:  [phone]    1. Your final bill may vary from the amount quoted upon discharge if all procedures are not complete at that time, or if your doctor has additional procedures of which we are not aware. You will receive an additional bill if you return to the Emergency Department at [facility] for suture removal regardless of the facility of which the sutures were placed.     2. Please arrange for settlement of this account at the emergency registration.    3. All self-pay accounts are due in full at the time of treatment.  If you are unable to meet this obligation then payment is expected within 4-5 days.     4. If you have had radiology studies (CT, X-ray, Ultrasound, MRI), you have received a preliminary result during your emergency department visit. Please contact the radiology department [phone] to receive a copy of your final result. Please discuss the Final result with your primary physician or with the follow up physician provided.     Crisis Hotline:  [place] Crisis Hotline:  1-767-UDJMXHS or 1-921.640.4226  [place] Crisis Hotline:    1-253.980.9999 or 575-876-4494         ED Discharge Follow Up Questions    1. In order to provide you with very good care, we would like to follow up with a phone call in the next few days.  May we have  your permission to contact you?     YES /  NO    2. What is the best phone number to call you? (       )_____-__________    3. What is the best time to call you?      Morning  /  Afternoon  /  Evening                   Patient Signature:  ____________________________________________________________    Date:  ____________________________________________________________

## 2017-03-04 ENCOUNTER — RESOLUTE PROFESSIONAL BILLING HOSPITAL PROF FEE (OUTPATIENT)
Dept: HOSPITALIST | Facility: MEDICAL CENTER | Age: 47
End: 2017-03-04
Payer: MEDICAID

## 2017-03-04 ENCOUNTER — APPOINTMENT (OUTPATIENT)
Dept: RADIOLOGY | Facility: MEDICAL CENTER | Age: 47
DRG: 871 | End: 2017-03-04
Attending: EMERGENCY MEDICINE
Payer: MEDICAID

## 2017-03-04 ENCOUNTER — HOSPITAL ENCOUNTER (INPATIENT)
Facility: MEDICAL CENTER | Age: 47
LOS: 9 days | DRG: 871 | End: 2017-03-13
Attending: EMERGENCY MEDICINE | Admitting: HOSPITALIST
Payer: MEDICAID

## 2017-03-04 DIAGNOSIS — A41.9 SEPSIS, DUE TO UNSPECIFIED ORGANISM: ICD-10-CM

## 2017-03-04 PROBLEM — G93.40 ENCEPHALOPATHY: Status: ACTIVE | Noted: 2017-03-04

## 2017-03-04 LAB
ALBUMIN SERPL BCP-MCNC: 4.4 G/DL (ref 3.2–4.9)
ALBUMIN/GLOB SERPL: 1 G/DL
ALP SERPL-CCNC: 136 U/L (ref 30–99)
ALT SERPL-CCNC: 14 U/L (ref 2–50)
AMPHET UR QL SCN: NEGATIVE
ANION GAP SERPL CALC-SCNC: 13 MMOL/L (ref 0–11.9)
APPEARANCE UR: CLEAR
APTT PPP: 29.3 SEC (ref 24.7–36)
AST SERPL-CCNC: 29 U/L (ref 12–45)
BARBITURATES UR QL SCN: NEGATIVE
BASOPHILS # BLD AUTO: 0.5 % (ref 0–1.8)
BASOPHILS # BLD: 0.07 K/UL (ref 0–0.12)
BENZODIAZ UR QL SCN: NEGATIVE
BILIRUB SERPL-MCNC: 1.2 MG/DL (ref 0.1–1.5)
BILIRUB UR QL STRIP.AUTO: NEGATIVE
BUN SERPL-MCNC: 10 MG/DL (ref 8–22)
BZE UR QL SCN: NEGATIVE
CALCIUM SERPL-MCNC: 10.1 MG/DL (ref 8.5–10.5)
CANNABINOIDS UR QL SCN: NEGATIVE
CHLORIDE SERPL-SCNC: 101 MMOL/L (ref 96–112)
CK MB SERPL-MCNC: 9 NG/ML (ref 0–5)
CO2 SERPL-SCNC: 24 MMOL/L (ref 20–33)
COLOR UR: YELLOW
CREAT SERPL-MCNC: 0.89 MG/DL (ref 0.5–1.4)
CULTURE IF INDICATED INDCX: NO UA CULTURE
EOSINOPHIL # BLD AUTO: 0.01 K/UL (ref 0–0.51)
EOSINOPHIL NFR BLD: 0.1 % (ref 0–6.9)
ERYTHROCYTE [DISTWIDTH] IN BLOOD BY AUTOMATED COUNT: 46.9 FL (ref 35.9–50)
GFR SERPL CREATININE-BSD FRML MDRD: >60 ML/MIN/1.73 M 2
GLOBULIN SER CALC-MCNC: 4.3 G/DL (ref 1.9–3.5)
GLUCOSE SERPL-MCNC: 111 MG/DL (ref 65–99)
GLUCOSE UR STRIP.AUTO-MCNC: NEGATIVE MG/DL
HCG UR QL: NEGATIVE
HCT VFR BLD AUTO: 46.4 % (ref 37–47)
HGB BLD-MCNC: 15.4 G/DL (ref 12–16)
IMM GRANULOCYTES # BLD AUTO: 0.08 K/UL (ref 0–0.11)
IMM GRANULOCYTES NFR BLD AUTO: 0.5 % (ref 0–0.9)
INR PPP: 0.99 (ref 0.87–1.13)
KETONES UR STRIP.AUTO-MCNC: NEGATIVE MG/DL
LACTATE BLD-SCNC: 2.1 MMOL/L (ref 0.5–2)
LEUKOCYTE ESTERASE UR QL STRIP.AUTO: NEGATIVE
LIPASE SERPL-CCNC: 15 U/L (ref 11–82)
LYMPHOCYTES # BLD AUTO: 0.9 K/UL (ref 1–4.8)
LYMPHOCYTES NFR BLD: 5.8 % (ref 22–41)
MCH RBC QN AUTO: 30.1 PG (ref 27–33)
MCHC RBC AUTO-ENTMCNC: 33.2 G/DL (ref 33.6–35)
MCV RBC AUTO: 90.6 FL (ref 81.4–97.8)
MDMA UR QL SCN: NEGATIVE
METHADONE UR QL SCN: NEGATIVE
MICRO URNS: NORMAL
MONOCYTES # BLD AUTO: 0.55 K/UL (ref 0–0.85)
MONOCYTES NFR BLD AUTO: 3.6 % (ref 0–13.4)
NEUTROPHILS # BLD AUTO: 13.84 K/UL (ref 2–7.15)
NEUTROPHILS NFR BLD: 89.5 % (ref 44–72)
NITRITE UR QL STRIP.AUTO: NEGATIVE
NRBC # BLD AUTO: 0 K/UL
NRBC BLD AUTO-RTO: 0 /100 WBC
OPIATES UR QL SCN: NEGATIVE
OXYCODONE UR QL SCN: NEGATIVE
PCP UR QL SCN: NEGATIVE
PH UR STRIP.AUTO: 6.5 [PH]
PLATELET # BLD AUTO: 329 K/UL (ref 164–446)
PMV BLD AUTO: 11 FL (ref 9–12.9)
POTASSIUM SERPL-SCNC: 3.6 MMOL/L (ref 3.6–5.5)
PROPOXYPH UR QL SCN: NEGATIVE
PROT SERPL-MCNC: 8.7 G/DL (ref 6–8.2)
PROT UR QL STRIP: NEGATIVE MG/DL
PROTHROMBIN TIME: 13.4 SEC (ref 12–14.6)
RBC # BLD AUTO: 5.12 M/UL (ref 4.2–5.4)
RBC UR QL AUTO: NEGATIVE
SODIUM SERPL-SCNC: 138 MMOL/L (ref 135–145)
SP GR UR REFRACTOMETRY: 1.01
SP GR UR STRIP.AUTO: 1.01
WBC # BLD AUTO: 15.5 K/UL (ref 4.8–10.8)

## 2017-03-04 PROCEDURE — 85610 PROTHROMBIN TIME: CPT

## 2017-03-04 PROCEDURE — 99285 EMERGENCY DEPT VISIT HI MDM: CPT

## 2017-03-04 PROCEDURE — 700105 HCHG RX REV CODE 258: Performed by: EMERGENCY MEDICINE

## 2017-03-04 PROCEDURE — 87040 BLOOD CULTURE FOR BACTERIA: CPT

## 2017-03-04 PROCEDURE — 81025 URINE PREGNANCY TEST: CPT

## 2017-03-04 PROCEDURE — 83605 ASSAY OF LACTIC ACID: CPT

## 2017-03-04 PROCEDURE — 86777 TOXOPLASMA ANTIBODY: CPT

## 2017-03-04 PROCEDURE — 84484 ASSAY OF TROPONIN QUANT: CPT

## 2017-03-04 PROCEDURE — 81003 URINALYSIS AUTO W/O SCOPE: CPT

## 2017-03-04 PROCEDURE — 86778 TOXOPLASMA ANTIBODY IGM: CPT

## 2017-03-04 PROCEDURE — 85025 COMPLETE CBC W/AUTO DIFF WBC: CPT

## 2017-03-04 PROCEDURE — 93306 TTE W/DOPPLER COMPLETE: CPT | Mod: 26 | Performed by: INTERNAL MEDICINE

## 2017-03-04 PROCEDURE — 80053 COMPREHEN METABOLIC PANEL: CPT

## 2017-03-04 PROCEDURE — 83690 ASSAY OF LIPASE: CPT

## 2017-03-04 PROCEDURE — 85730 THROMBOPLASTIN TIME PARTIAL: CPT

## 2017-03-04 PROCEDURE — 99223 1ST HOSP IP/OBS HIGH 75: CPT | Performed by: HOSPITALIST

## 2017-03-04 PROCEDURE — 700111 HCHG RX REV CODE 636 W/ 250 OVERRIDE (IP): Performed by: EMERGENCY MEDICINE

## 2017-03-04 PROCEDURE — 70450 CT HEAD/BRAIN W/O DYE: CPT

## 2017-03-04 PROCEDURE — 96367 TX/PROPH/DG ADDL SEQ IV INF: CPT

## 2017-03-04 PROCEDURE — 36415 COLL VENOUS BLD VENIPUNCTURE: CPT

## 2017-03-04 PROCEDURE — 71010 DX-CHEST-PORTABLE (1 VIEW): CPT

## 2017-03-04 PROCEDURE — 94760 N-INVAS EAR/PLS OXIMETRY 1: CPT

## 2017-03-04 PROCEDURE — 770020 HCHG ROOM/CARE - TELE (206)

## 2017-03-04 PROCEDURE — 82553 CREATINE MB FRACTION: CPT

## 2017-03-04 PROCEDURE — 80307 DRUG TEST PRSMV CHEM ANLYZR: CPT

## 2017-03-04 PROCEDURE — 302128 INFUSION PUMP: Performed by: EMERGENCY MEDICINE

## 2017-03-04 PROCEDURE — 96361 HYDRATE IV INFUSION ADD-ON: CPT

## 2017-03-04 RX ORDER — PROMETHAZINE HYDROCHLORIDE 25 MG/1
12.5-25 TABLET ORAL EVERY 4 HOURS PRN
Status: DISCONTINUED | OUTPATIENT
Start: 2017-03-04 | End: 2017-03-13 | Stop reason: HOSPADM

## 2017-03-04 RX ORDER — ESZOPICLONE 3 MG/1
3 TABLET, FILM COATED ORAL
Status: DISCONTINUED | OUTPATIENT
Start: 2017-03-05 | End: 2017-03-04

## 2017-03-04 RX ORDER — POLYETHYLENE GLYCOL 3350 17 G/17G
1 POWDER, FOR SOLUTION ORAL
Status: DISCONTINUED | OUTPATIENT
Start: 2017-03-04 | End: 2017-03-05 | Stop reason: SINTOL

## 2017-03-04 RX ORDER — OXYCODONE HYDROCHLORIDE 5 MG/1
2.5 TABLET ORAL
Status: DISCONTINUED | OUTPATIENT
Start: 2017-03-04 | End: 2017-03-13 | Stop reason: HOSPADM

## 2017-03-04 RX ORDER — ONDANSETRON 2 MG/ML
4 INJECTION INTRAMUSCULAR; INTRAVENOUS EVERY 4 HOURS PRN
Status: DISCONTINUED | OUTPATIENT
Start: 2017-03-04 | End: 2017-03-13 | Stop reason: HOSPADM

## 2017-03-04 RX ORDER — ESZOPICLONE 3 MG/1
3 TABLET, FILM COATED ORAL
COMMUNITY
End: 2018-08-17

## 2017-03-04 RX ORDER — SODIUM CHLORIDE 9 MG/ML
1000 INJECTION, SOLUTION INTRAVENOUS ONCE
Status: COMPLETED | OUTPATIENT
Start: 2017-03-04 | End: 2017-03-05

## 2017-03-04 RX ORDER — DEXAMETHASONE SODIUM PHOSPHATE 4 MG/ML
4 INJECTION, SOLUTION INTRA-ARTICULAR; INTRALESIONAL; INTRAMUSCULAR; INTRAVENOUS; SOFT TISSUE EVERY 6 HOURS
Status: DISCONTINUED | OUTPATIENT
Start: 2017-03-05 | End: 2017-03-05

## 2017-03-04 RX ORDER — SODIUM CHLORIDE 9 MG/ML
500 INJECTION, SOLUTION INTRAVENOUS
Status: DISCONTINUED | OUTPATIENT
Start: 2017-03-04 | End: 2017-03-13 | Stop reason: HOSPADM

## 2017-03-04 RX ORDER — VILAZODONE HYDROCHLORIDE 40 MG/1
1 TABLET ORAL EVERY EVENING
COMMUNITY
End: 2018-08-17

## 2017-03-04 RX ORDER — VILAZODONE HYDROCHLORIDE 40 MG/1
1 TABLET ORAL EVERY EVENING
Status: DISCONTINUED | OUTPATIENT
Start: 2017-03-05 | End: 2017-03-10

## 2017-03-04 RX ORDER — ACETAMINOPHEN 325 MG/1
650 TABLET ORAL EVERY 6 HOURS PRN
Status: DISCONTINUED | OUTPATIENT
Start: 2017-03-04 | End: 2017-03-13 | Stop reason: HOSPADM

## 2017-03-04 RX ORDER — SODIUM CHLORIDE 9 MG/ML
INJECTION, SOLUTION INTRAVENOUS CONTINUOUS
Status: DISCONTINUED | OUTPATIENT
Start: 2017-03-05 | End: 2017-03-13 | Stop reason: HOSPADM

## 2017-03-04 RX ORDER — SODIUM CHLORIDE 9 MG/ML
30 INJECTION, SOLUTION INTRAVENOUS
Status: COMPLETED | OUTPATIENT
Start: 2017-03-04 | End: 2017-03-05

## 2017-03-04 RX ORDER — AMOXICILLIN 250 MG
2 CAPSULE ORAL 2 TIMES DAILY
Status: DISCONTINUED | OUTPATIENT
Start: 2017-03-05 | End: 2017-03-05 | Stop reason: SINTOL

## 2017-03-04 RX ORDER — PROMETHAZINE HYDROCHLORIDE 25 MG/1
12.5-25 SUPPOSITORY RECTAL EVERY 4 HOURS PRN
Status: DISCONTINUED | OUTPATIENT
Start: 2017-03-04 | End: 2017-03-13 | Stop reason: HOSPADM

## 2017-03-04 RX ORDER — OXYCODONE HYDROCHLORIDE 5 MG/1
5 TABLET ORAL
Status: DISCONTINUED | OUTPATIENT
Start: 2017-03-04 | End: 2017-03-13 | Stop reason: HOSPADM

## 2017-03-04 RX ORDER — MORPHINE SULFATE 4 MG/ML
2 INJECTION, SOLUTION INTRAMUSCULAR; INTRAVENOUS
Status: DISCONTINUED | OUTPATIENT
Start: 2017-03-04 | End: 2017-03-13 | Stop reason: HOSPADM

## 2017-03-04 RX ORDER — BISACODYL 10 MG
10 SUPPOSITORY, RECTAL RECTAL
Status: DISCONTINUED | OUTPATIENT
Start: 2017-03-04 | End: 2017-03-05 | Stop reason: SINTOL

## 2017-03-04 RX ORDER — FAMOTIDINE 20 MG/1
20 TABLET, FILM COATED ORAL 2 TIMES DAILY
Status: DISCONTINUED | OUTPATIENT
Start: 2017-03-05 | End: 2017-03-05

## 2017-03-04 RX ORDER — ONDANSETRON 4 MG/1
4 TABLET, ORALLY DISINTEGRATING ORAL EVERY 4 HOURS PRN
Status: DISCONTINUED | OUTPATIENT
Start: 2017-03-04 | End: 2017-03-13 | Stop reason: HOSPADM

## 2017-03-04 RX ORDER — ZOLPIDEM TARTRATE 5 MG/1
5 TABLET ORAL
Status: DISCONTINUED | OUTPATIENT
Start: 2017-03-04 | End: 2017-03-13 | Stop reason: HOSPADM

## 2017-03-04 RX ORDER — HEPARIN SODIUM 5000 [USP'U]/ML
5000 INJECTION, SOLUTION INTRAVENOUS; SUBCUTANEOUS EVERY 8 HOURS
Status: DISCONTINUED | OUTPATIENT
Start: 2017-03-05 | End: 2017-03-04

## 2017-03-04 RX ORDER — LABETALOL HYDROCHLORIDE 5 MG/ML
10 INJECTION, SOLUTION INTRAVENOUS EVERY 4 HOURS PRN
Status: DISCONTINUED | OUTPATIENT
Start: 2017-03-04 | End: 2017-03-13 | Stop reason: HOSPADM

## 2017-03-04 RX ADMIN — PIPERACILLIN AND TAZOBACTAM 4.5 G: 4; .5 INJECTION, POWDER, LYOPHILIZED, FOR SOLUTION INTRAVENOUS; PARENTERAL at 22:27

## 2017-03-04 RX ADMIN — SODIUM CHLORIDE 1000 ML: 9 INJECTION, SOLUTION INTRAVENOUS at 21:42

## 2017-03-04 ASSESSMENT — ENCOUNTER SYMPTOMS
SHORTNESS OF BREATH: 0
FEVER: 0
VOMITING: 1
DIARRHEA: 0
COUGH: 1
CHILLS: 0
NAUSEA: 1
HEADACHES: 1

## 2017-03-04 ASSESSMENT — PAIN SCALES - GENERAL: PAINLEVEL_OUTOF10: 3

## 2017-03-04 ASSESSMENT — LIFESTYLE VARIABLES: DO YOU DRINK ALCOHOL: NO

## 2017-03-04 NOTE — IP AVS SNAPSHOT
" Home Care Instructions                                                                                                                  Name:Rasheeda Manzano  Medical Record Number:[id number]  CSN: [id number]    YOB: 1970   Age: 46 y.o.  Sex: female  HT:1.626 m (5' 4\") WT: 77.9 kg (171 lb 11.8 oz)          Admit Date: 3/4/2017     Discharge Date:   Today's Date: 3/13/2017  Attending Doctor:  Brenda Tapia M.D.                  Allergies:  Tape            Discharge Instructions       Discharge Instructions    Discharged to home by car with friend. Discharged via wheelchair, hospital escort: Refused.  Special equipment needed: Not Applicable    Be sure to schedule a follow-up appointment with your primary care doctor or any specialists as instructed.     Discharge Plan:   Influenza Vaccine Indication: Not indicated: Previously immunized this influenza season and > 8 years of age    I understand that a diet low in cholesterol, fat, and sodium is recommended for good health. Unless I have been given specific instructions below for another diet, I accept this instruction as my diet prescription.   Other diet: Reg    Special Instructions: None    · Is patient discharged on Warfarin / Coumadin?   No     · Is patient Post Blood Transfusion?  No  Antibiotic Medication  Antibiotics are among the most frequently prescribed medicines. Antibiotics cure illness by assisting our body to injure or kill the bacteria that cause infection. While antibiotics are useful to treat a wide variety of infections they are useless against viruses. Antibiotics cannot cure colds, flu, or other viral infections.   There are many types of antibiotics available. Your caregiver will decide which antibiotic will be useful for an illness. Never take or give someone else's antibiotics or left over medicine.  Your caregiver may also take into account:  · Allergies.  · The cost of the medicine.  · Dosing schedules.  · Taste.  · Common " side effects when choosing an antibiotic for an infection.  Ask your caregiver if you have questions about why a certain medicine was chosen.  HOME CARE INSTRUCTIONS  Read all instructions and labels on medicine bottles carefully. Some antibiotics should be taken on an empty stomach while others should be taken with food. Taking antibiotics incorrectly may reduce how well they work. Some antibiotics need to be kept in the refrigerator. Others should be kept at room temperature. Ask your caregiver or pharmacist if you do not understand how to give the medicine.  Be sure to give the amount of medicine your caregiver has prescribed. Even if you feel better and your symptoms improve, bacteria may still remain alive in the body. Taking all of the medicine will prevent:  · The infection from returning and becoming harder to treat.  · Complications from partially treated infections.  If there is any medicine left over after you have taken the medicine as your caregiver has instructed, throw the medicine away.  Be sure to tell your caregiver if you:  · Are allergic to any medicines.  · Are pregnant or intend to become pregnant while using this medicine.  · Are breastfeeding.  · Are taking any other prescription, non-prescription medicine, or herbal remedies.  · Have any other medical conditions or problems you have not already discussed.  If you are taking birth control pills, they may not work while you are on antibiotics. To avoid unwanted pregnancy:  · Continue taking your birth control pills as usual.  · Use a second form of birth control (such as condoms) while you are taking antibiotic medicine.  · When you finish taking the antibiotic medicine, continue using the second form of birth control until you are finished with your current 1 month cycle of birth control pills.  Try not to miss any doses of medicine. If you miss a dose, take it as soon as possible. However, if it is almost time for the next dose and the  dosing schedule is:  · 2 doses a day, take the missed dose and the next dose 5 to 6 hours apart.  · 3 or more doses a day, take the missed dose and the next dose 2 to 4 hours apart, then go back to the normal schedule.  · If you are unable to make up a missed dose, take the next scheduled dose on time and complete the missed dose at the end of the prescribed time for your medicine.  SIDE EFFECTS TO TAKING ANTIBIOTICS  Common side effects to antibiotic use include:  · Soft stools or diarrhea.  · Mild stomach upset.  · Sun sensitivity.  SEEK MEDICAL CARE IF:   · If you get worse or do not improve within a few days of starting the medicine.  · Vomiting develops.  · Diaper rash or rash on the genitals appears.  · Vaginal itching occurs.  · White patches appear on the tongue or in the mouth.  · Severe watery diarrhea and abdominal cramps occur.  · Signs of an allergy develop (hives, unknown itchy rash appears). STOP TAKING THE ANTIBIOTIC.  SEEK IMMEDIATE MEDICAL CARE IF:   · Urine turns dark or blood colored.  · Skin turns yellow.  · Easy bruising or bleeding occurs.  · Joint pain or muscle aches occur.  · Fever returns.  · Severe headache occurs.  · Signs of an allergy develop (trouble breathing, wheezing, swelling of the lips, face or tongue, fainting, or blisters on the skin or in the mouth). STOP TAKING THE ANTIBIOTIC.     This information is not intended to replace advice given to you by your health care provider. Make sure you discuss any questions you have with your health care provider.      Depression / Suicide Risk    As you are discharged from this [facility] facility, it is important to learn how to keep safe from harming yourself.    Recognize the warning signs:  · Abrupt changes in personality, positive or negative- including increase in energy   · Giving away possessions  · Change in eating patterns- significant weight changes-  positive or negative  · Change in sleeping patterns- unable to sleep or  sleeping all the time   · Unwillingness or inability to communicate  · Depression  · Unusual sadness, discouragement and loneliness  · Talk of wanting to die  · Neglect of personal appearance   · Rebelliousness- reckless behavior  · Withdrawal from people/activities they love  · Confusion- inability to concentrate     If you or a loved one observes any of these behaviors or has concerns about self-harm, here's what you can do:  · Talk about it- your feelings and reasons for harming yourself  · Remove any means that you might use to hurt yourself (examples: pills, rope, extension cords, firearm)  · Get professional help from the community (Mental Health, Substance Abuse, psychological counseling)  · Do not be alone:Call your Safe Contact- someone whom you trust who will be there for you.  · Call your local CRISIS HOTLINE 600-2765 or 480-386-9364  · Call your local Children's Mobile Crisis Response Team [place] (931) 478-6473 or www.Fast FiBR  · Call the toll free National Suicide Prevention Hotlines   · National Suicide Prevention Lifeline 825-460-FFPF (1487)  · National Hope Line Network 800-SUICIDE (468-5973)        Follow-up Information     1. Follow up with CHANDLER Adams.    Specialty:  Family Medicine    Contact information    [address] [address]  [phone]          2. Follow up with CH[facility] Adams. Schedule an appointment as soon as possible for a visit in 1 week.    Specialty:  Family Medicine    Why:  discuss hospital stay, If symptoms worsen    Contact information    [address] [place] [address]  [phone]           Discharge Medication Instructions:    Below are the medications your physician expects you to take upon discharge:    Review all your home medications and newly ordered medications with your doctor and/or pharmacist. Follow medication instructions as directed by your doctor and/or pharmacist.    Please keep your medication list  with you and share with your physician.               Medication List      START taking these medications        Instructions    amlodipine 5 MG Tabs   Last time this was given:  5 mg on 3/13/2017  8:43 AM   Commonly known as:  NORVASC    Take 1 Tab by mouth every day.   Dose:  5 mg       ciprofloxacin 750 MG Tabs   Last time this was given:  750 mg on 3/13/2017  8:43 AM   Commonly known as:  CIPRO    Take 1 Tab by mouth every 12 hours for 5 days.   Dose:  750 mg       linezolid 600 MG Tabs   Last time this was given:  600 mg on 3/13/2017  8:43 AM   Commonly known as:  ZYVOX    Take 1 Tab by mouth every 12 hours for 5 days.   Dose:  600 mg       vancomycin 50 mg/mL 50 mg/mL Soln   Last time this was given:  125 mg on 3/10/2017  8:05 AM    Take 2.5 mL by mouth 4 times a day for 7 days.   Dose:  125 mg         CONTINUE taking these medications        Instructions    LUNESTA 3 MG Tabs   Generic drug:  Eszopiclone    Take 3 mg by mouth every bedtime.   Dose:  3 mg       VIIBRYD 40 MG Tabs   Generic drug:  Vilazodone HCl    Take 1 Tab by mouth every evening.   Dose:  1 Tab               Instructions           Diet / Nutrition:    Follow any diet instructions given to you by your doctor or the dietician, including how much salt (sodium) you are allowed each day.    If you are overweight, talk to your doctor about a weight reduction plan.    Activity:    Remain physically active following your doctor's instructions about exercise and activity.    Rest often.     Any time you become even a little tired or short of breath, SIT DOWN and rest.    Worsening Symptoms:    Report any of the following signs and symptoms to the doctor's office immediately:    *Pain of jaw, arm, or neck  *Chest pain not relieved by medication                               *Dizziness or loss of consciousness  *Difficulty breathing even when at rest   *More tired than usual                                       *Bleeding drainage or swelling of  surgical site  *Swelling of feet, ankles, legs or stomach                 *Fever (>100ºF)  *Pink or blood tinged sputum  *Weight gain (3lbs/day or 5lbs /week)           *Shock from internal defibrillator (if applicable)  *Palpitations or irregular heartbeats                *Cool and/or numb extremities    Stroke Awareness    Common Risk Factors for Stroke include:    Age  Atrial Fibrillation  Carotid Artery Stenosis  Diabetes Mellitus  Excessive alcohol consumption  High blood pressure  Overweight   Physical inactivity  Smoking    Warning signs and symptoms of a stroke include:    *Sudden numbness or weakness of the face, arm or leg (especially on one side of the body).  *Sudden confusion, trouble speaking or understanding.  *Sudden trouble seeing in one or both eyes.  *Sudden trouble walking, dizziness, loss of balance or coordination.Sudden severe headache with no known cause.    It is very important to get treatment quickly when a stroke occurs. If you experience any of the above warning signs, call 911 immediately.                   Disclaimer         Quit Smoking / Tobacco Use:    I understand the use of any tobacco products increases my chance of suffering from future heart disease or stroke and could cause other illnesses which may shorten my life. Quitting the use of tobacco products is the single most important thing I can do to improve my health. For further information on smoking / tobacco cessation call a Toll Free Quit Line at 1-956.293.6219 (*National Cancer [place]) or 1-667.341.3269 (American Lung Association) or you can access the web based program at www.lungusa.org.    [place] Tobacco Users Help Line:  (387) 423-5377       Toll Free: 1-325.922.9940  [facility] (305)694-8829    Crisis Hotline:    [place] Crisis Hotline:  9-838-JEOCGIH or 1-974.704.2200    [place] Crisis Hotline:    1-424.720.6458 or 285-448-0512    Discharge Survey:   Thank you for  choosing [facility] include 234 beds at the [facility]. We hope we did everything we could to make your hospital stay a pleasant one. You may be receiving a phone survey and we would appreciate your time and participation in answering the questions. Your input is very valuable to us in our efforts to improve our service to our patients and their families.        My signature on this form indicates that:    1. I have reviewed and understand the above information.  2. My questions regarding this information have been answered to my satisfaction.  3. I have formulated a plan with my discharge nurse to obtain my prescribed medications for home.                  Disclaimer         __________________________________                     __________       ________                       Patient Signature                                                 Date                    Time

## 2017-03-04 NOTE — IP AVS SNAPSHOT
" <p align=\"LEFT\"><IMG SRC=\"//[email or web address]$/[email or web address]\" alt=\"Image\" WIDTH=\"50%\" HEIGHT=\"200\" BORDER=\"\"></p>                   Name:Rasheeda Manzano  Medical Record Number:[id number]  CSN: [id number]    YOB: 1970   Age: 46 y.o.  Sex: female  HT:1.626 m (5' 4\") WT: 77.9 kg (171 lb 11.8 oz)          Admit Date: 3/4/2017     Discharge Date:   Today's Date: 3/13/2017  Attending Doctor:  Brenda Tapia M.D.                  Allergies:  Tape          Follow-up Information     1. Follow up with CHANDLER Adams.    Specialty:  Family Medicine    Contact information    [address] [address]  [phone]          2. Follow up with CHANDLER Adams. Schedule an appointment as soon as possible for a visit in 1 week.    Specialty:  Family Medicine    Why:  discuss hospital stay, If symptoms worsen    Contact information    [address] [place] [address]  [phone]           Medication List      Take these Medications        Instructions    amlodipine 5 MG Tabs   Commonly known as:  NORVASC    Take 1 Tab by mouth every day.   Dose:  5 mg       ciprofloxacin 750 MG Tabs   Commonly known as:  CIPRO    Take 1 Tab by mouth every 12 hours for 5 days.   Dose:  750 mg       linezolid 600 MG Tabs   Commonly known as:  ZYVOX    Take 1 Tab by mouth every 12 hours for 5 days.   Dose:  600 mg       LUNESTA 3 MG Tabs   Generic drug:  Eszopiclone    Take 3 mg by mouth every bedtime.   Dose:  3 mg       vancomycin 50 mg/mL 50 mg/mL Soln    Take 2.5 mL by mouth 4 times a day for 7 days.   Dose:  125 mg       VIIBRYD 40 MG Tabs   Generic drug:  Vilazodone HCl    Take 1 Tab by mouth every evening.   Dose:  1 Tab         "

## 2017-03-04 NOTE — IP AVS SNAPSHOT
3/13/2017          Rasheeda Manzano  [address]  [facility] [address]    Dear Rasheeda:    [facility] wants to ensure your discharge home is safe and you or your loved ones have had all your questions answered regarding your care after you leave the hospital.    You may receive a telephone call within two days of your discharge.  This call is to make certain you understand your discharge instructions as well as ensure we provided you with the best care possible during your stay with us.     The call will only last approximately 3-5 minutes and will be done by a nurse.    Once again, we want to ensure your discharge home is safe and that you have a clear understanding of any next steps in your care.  If you have any questions or concerns, please do not hesitate to contact us, we are here for you.  Thank you for choosing [facility] for your healthcare needs.    Sincerely,    Harrison Kilgore    [facility]

## 2017-03-05 ENCOUNTER — APPOINTMENT (OUTPATIENT)
Dept: RADIOLOGY | Facility: MEDICAL CENTER | Age: 47
DRG: 871 | End: 2017-03-05
Attending: HOSPITALIST
Payer: MEDICAID

## 2017-03-05 LAB
BASE EXCESS BLDV CALC-SCNC: -2 MMOL/L
BODY TEMPERATURE: ABNORMAL CENTIGRADE
CORTIS SERPL-MCNC: 12 UG/DL (ref 0–23)
HCO3 BLDV-SCNC: 22 MMOL/L (ref 24–28)
LACTATE BLD-SCNC: 1.2 MMOL/L (ref 0.5–2)
LACTATE BLD-SCNC: 1.2 MMOL/L (ref 0.5–2)
LV EJECT FRACT MOD 2C 99903: 71.39
LV EJECT FRACT MOD 4C 99902: 61.52
LV EJECT FRACT MOD BP 99901: 65.48
PCO2 BLDV: 37.5 MMHG (ref 41–51)
PH BLDV: 7.4 [PH] (ref 7.31–7.45)
PO2 BLDV: 29.8 MMHG (ref 25–40)
SAO2 % BLDV: 55.7 %
TROPONIN I SERPL-MCNC: <0.01 NG/ML (ref 0–0.04)
TROPONIN I SERPL-MCNC: <0.01 NG/ML (ref 0–0.04)
VANCOMYCIN SERPL-MCNC: 12.1 UG/ML

## 2017-03-05 PROCEDURE — 82533 TOTAL CORTISOL: CPT

## 2017-03-05 PROCEDURE — 84484 ASSAY OF TROPONIN QUANT: CPT

## 2017-03-05 PROCEDURE — 700102 HCHG RX REV CODE 250 W/ 637 OVERRIDE(OP): Performed by: HOSPITALIST

## 2017-03-05 PROCEDURE — 700105 HCHG RX REV CODE 258

## 2017-03-05 PROCEDURE — 700102 HCHG RX REV CODE 250 W/ 637 OVERRIDE(OP): Performed by: INTERNAL MEDICINE

## 2017-03-05 PROCEDURE — 700111 HCHG RX REV CODE 636 W/ 250 OVERRIDE (IP)

## 2017-03-05 PROCEDURE — 700111 HCHG RX REV CODE 636 W/ 250 OVERRIDE (IP): Performed by: HOSPITALIST

## 2017-03-05 PROCEDURE — A9270 NON-COVERED ITEM OR SERVICE: HCPCS | Performed by: HOSPITALIST

## 2017-03-05 PROCEDURE — 96361 HYDRATE IV INFUSION ADD-ON: CPT

## 2017-03-05 PROCEDURE — 700105 HCHG RX REV CODE 258: Performed by: HOSPITALIST

## 2017-03-05 PROCEDURE — 96366 THER/PROPH/DIAG IV INF ADDON: CPT

## 2017-03-05 PROCEDURE — 96367 TX/PROPH/DG ADDL SEQ IV INF: CPT

## 2017-03-05 PROCEDURE — 80202 ASSAY OF VANCOMYCIN: CPT

## 2017-03-05 PROCEDURE — 99232 SBSQ HOSP IP/OBS MODERATE 35: CPT | Performed by: INTERNAL MEDICINE

## 2017-03-05 PROCEDURE — 82803 BLOOD GASES ANY COMBINATION: CPT

## 2017-03-05 PROCEDURE — 96365 THER/PROPH/DIAG IV INF INIT: CPT

## 2017-03-05 PROCEDURE — 83605 ASSAY OF LACTIC ACID: CPT

## 2017-03-05 PROCEDURE — 36415 COLL VENOUS BLD VENIPUNCTURE: CPT

## 2017-03-05 PROCEDURE — A9270 NON-COVERED ITEM OR SERVICE: HCPCS | Performed by: INTERNAL MEDICINE

## 2017-03-05 PROCEDURE — 93306 TTE W/DOPPLER COMPLETE: CPT

## 2017-03-05 PROCEDURE — 770020 HCHG ROOM/CARE - TELE (206)

## 2017-03-05 RX ADMIN — VANCOMYCIN HYDROCHLORIDE 1600 MG: 10 INJECTION, POWDER, LYOPHILIZED, FOR SOLUTION INTRAVENOUS at 14:02

## 2017-03-05 RX ADMIN — SODIUM CHLORIDE: 9 INJECTION, SOLUTION INTRAVENOUS at 05:43

## 2017-03-05 RX ADMIN — VANCOMYCIN HYDROCHLORIDE 1600 MG: 10 INJECTION, POWDER, LYOPHILIZED, FOR SOLUTION INTRAVENOUS at 00:17

## 2017-03-05 RX ADMIN — SODIUM CHLORIDE 974 ML: 9 INJECTION, SOLUTION INTRAVENOUS at 02:43

## 2017-03-05 RX ADMIN — SODIUM CHLORIDE: 9 INJECTION, SOLUTION INTRAVENOUS at 14:09

## 2017-03-05 RX ADMIN — VANCOMYCIN HYDROCHLORIDE 125 MG: 10 INJECTION, POWDER, LYOPHILIZED, FOR SOLUTION INTRAVENOUS at 22:14

## 2017-03-05 RX ADMIN — CEFTRIAXONE 2 G: 2 INJECTION, POWDER, FOR SOLUTION INTRAMUSCULAR; INTRAVENOUS at 13:04

## 2017-03-05 RX ADMIN — ZOLPIDEM TARTRATE 5 MG: 5 TABLET, FILM COATED ORAL at 04:37

## 2017-03-05 RX ADMIN — CEFTRIAXONE 2 G: 2 INJECTION, POWDER, FOR SOLUTION INTRAMUSCULAR; INTRAVENOUS at 02:50

## 2017-03-05 RX ADMIN — CEFTRIAXONE 2 G: 2 INJECTION, POWDER, FOR SOLUTION INTRAMUSCULAR; INTRAVENOUS at 22:14

## 2017-03-05 RX ADMIN — VANCOMYCIN HYDROCHLORIDE 125 MG: 10 INJECTION, POWDER, LYOPHILIZED, FOR SOLUTION INTRAVENOUS at 13:04

## 2017-03-05 RX ADMIN — OXYCODONE HYDROCHLORIDE 5 MG: 5 TABLET ORAL at 04:37

## 2017-03-05 ASSESSMENT — ENCOUNTER SYMPTOMS
ABDOMINAL PAIN: 1
FEVER: 0
COUGH: 0
HEADACHES: 0
DIAPHORESIS: 0
NAUSEA: 0
DIARRHEA: 0

## 2017-03-05 ASSESSMENT — PAIN SCALES - GENERAL
PAINLEVEL_OUTOF10: 6
PAINLEVEL_OUTOF10: 2
PAINLEVEL_OUTOF10: 0
PAINLEVEL_OUTOF10: 4
PAINLEVEL_OUTOF10: 0
PAINLEVEL_OUTOF10: 0

## 2017-03-05 ASSESSMENT — LIFESTYLE VARIABLES
EVER_SMOKED: YES
ALCOHOL_USE: NO

## 2017-03-05 NOTE — PROGRESS NOTES
2 RN skin assessment performed by ELIZABETH Stoner and ELIZABETH Jean. Open blister on lateral R big toes. Bandaid applied. Feet dry, calloused bilat. No other skin breakdown present on admission.

## 2017-03-05 NOTE — ED NOTES
Pt. Resting comfortably in [place] at this time. Call light within reach. Pt. Educated on need to push call button before getting out of bed. Pt. States no additional needs at this time. Will continue to monitor.

## 2017-03-05 NOTE — PROGRESS NOTES
Received report from night RN. Assumed care of patient. Pt A&O to self and place. Pt has 20g in Right A/C running NS at 125 mL/hr. Pt on RA. Pt declines pain at this time. Bed locked and in lowest position with call light within reach.

## 2017-03-05 NOTE — ED NOTES
Pt transferred to [id number], Receiving RN aware of pt's pending arrival, all belongings accounted for.

## 2017-03-05 NOTE — PROGRESS NOTES
"Pharmacy Kinetics 46 y.o. female on vancomycin day #1 3/5/2017    Currently on Vancomycin loading dose x1, Vancomycin 1600mg q12 hours    Indication for Treatment: rule out CNS infection    Pertinent history per medical record: Admitted on 3/4/2017 for altered mental status. Patient was found on the bathroom floor by her neighbor with severe headache.  Additionally, patient has a history of  shunt.  Given leukocytosis and lactic acidosis in the ED, antibiotics initiated pending possible further workup of CNS infection.     Other antibiotics: ceftriaxone 2 g IV q12h, po vancomycin for c.diff ppx    Allergies: Tape     List concerns for renal function: lactic acidosis on admission    Pertinent cultures to date:    3/4 PBC x 2: NGTD    Recent Labs      17   2110   WBC  15.5*   NEUTSPOLYS  89.50*     Recent Labs      17   2110   BUN  10   CREATININE  0.89   ALBUMIN  4.4     Recent Labs      17   1332   VANCORANDOM  12.1     Intake/Output Summary (Last 24 hours) at 17 1430  Last data filed at 17 0600   Gross per 24 hour   Intake   1200 ml   Output      0 ml   Net   1200 ml      Blood pressure 145/98, pulse 120, temperature 36.7 °C (98.1 °F), resp. rate 18, height 1.626 m (5' 4\"), weight 82.3 kg (181 lb 7 oz), last menstrual period 2013, SpO2 100 %, not currently breastfeeding. Temp (24hrs), Av.5 °C (97.7 °F), Min:36 °C (96.8 °F), Max:37.1 °C (98.8 °F)      A/P   1. Vancomycin dose change: Decrease dose to 1400 mg q12 hours  2. Next vancomycin level: prior to the 4th total dose tomorrow @1130  3. Goal trough:  18-22 mcg/mL  4. Comments: R/O CNS infection. Pt with  shunt. ID is on the case. Review of chart records in , patient was on 1400 mg q12 hours, weight and renal function ~same as now. Pt had a trough of 21 on this dose. Will change to this dose. BMP tomorrow. Pharmacy will continue to follow. Narrow therapy as able.    (Random trough today was drawn today to r/o dose " preparation error, level indicates that previous dose was made correctly).  Avery Rios, PharmD, BCPS

## 2017-03-05 NOTE — PROGRESS NOTES
Pt arrived on unit via hospital staff escort. Ambulated 2X assist to bed. Tele monitor applied and monitor room notified. ST on monitor. Plan of care discussed with pt. Verbalizes understanding. Bed in lowest position, locked, and alarm activated. Call light within reach.

## 2017-03-05 NOTE — ED NOTES
Pt given warm blanket and call light. Pt remains alert but intermittently confused with flight of ideas.

## 2017-03-05 NOTE — ED NOTES
Pt. Medicated per MAR. Pt. Placed on bedpan. After 20 minutes, pt states she went and bedpan was removed with with no void in it. Upon checking in on the pt., pt. Had wet the bed after bed pan was removed. Pt. Sheets were removed and gurney was cleaned and new sheets placed. More blankets provided for comfort. Pt. States no additional needs at this time. Call light within reach. Will continue to monitor.

## 2017-03-05 NOTE — RESPIRATORY CARE
COPD EDUCATION by COPD CLINICAL EDUCATOR  3/5/2017 at 6:43 AM by Akanksha Contreras     Patient reviewed by COPD education team. Patient does not qualify for COPD program.

## 2017-03-05 NOTE — ED NOTES
"Pt out of bed standing at . Escorted pt back onto [facility]. Pt is A&O x4, states she is \"totally blind.\" Pt reports she lives alone. IV antibiotics infusing. Pt given sips of water. Monitors in place. , other VSS. Updated pt on POC. Awaiting room to be cleaned.   "

## 2017-03-05 NOTE — PROGRESS NOTES
"Pharmacy Kinetics 46 y.o. female on vancomycin day # 1 3/5/2017    Currently on Vancomycin new start    Indication for Treatment: rule out CNS infection    Pertinent history per medical record: Admitted on 3/4/2017 for altered mental status. Patient was found on the bathroom floor by her neighbor with severe headache.  Additionally, patient has a history of  shunt.  Given leukocytosis and lactic acidosis in the ED, antibiotics initiated pending possible further workup of CNS infection.     Other antibiotics: ceftriaxone 2 g IV q12h    Allergies: Tape     List concerns for renal function: lactic acidosis on admission    Pertinent cultures to date:   3/4 PBC x 2: in process    Recent Labs      17   2110   WBC  15.5*   NEUTSPOLYS  89.50*     Recent Labs      17   2110   BUN  10   CREATININE  0.89   ALBUMIN  4.4     Blood pressure 148/98, pulse 129, temperature 37.1 °C (98.8 °F), resp. rate 18, height 1.626 m (5' 4\"), weight 65.772 kg (145 lb), last menstrual period 2013, SpO2 92 %. Temp (24hrs), Av.1 °C (98.8 °F), Min:37.1 °C (98.8 °F), Max:37.1 °C (98.8 °F)      A/P   1. Vancomycin dose change: initiate 20 mg/kg q12h  2. Next vancomycin level: prior to the 4th total dose (not yet ordered)  3. Goal trough: 18-22 mcg/mL  4. Comments: new start vancomycin for rule out CNS infection. Per discussion with MD, no plans to tap CSF at this point given complex medical history.      Pam Donis, PHARMD      "

## 2017-03-05 NOTE — H&P
CHIEF COMPLAINT:  Altered level of consciousness, found next to her bed on the   floor.    PRIMARY CARE PHYSICIAN:  Piter.    [facility] Dr. Acuna.    DIAGNOSIS:  Sepsis of unknown origin.    HISTORY OF CURRENT ILLNESS:  The patient is a 46-year-old female who has a   history of hydrocephalus from birth.  She is legally blind from birth.  The   patient has a  shunt in place and today the patient apparently was found   next to her bed on the floor and the patient also has had a migraine over the   past 3 days.  The patient herself at this point is mentally with it.  She has   no diarrhea, no chills, no chest pain, no shortness of breath.  No abdominal   pain.  No real significant symptoms that would account for a sepsis of her   magnitude with leukocytosis, tachycardia and lactic acidosis.  The patient,   however, does have a  shunt, and it could have a neurological source of   infection.  At this point an LP will be requested through IR given her history   of multiple surgeries.  The patient at this point will need continued IV   antibiotics and will be admitted to the medical floor.    PAST MEDICAL HISTORY:  Includes hydrocephalus, migraines, legally blindness,   depression, C. diff diarrhea, history of jaundice, history of recurrent falls.    PAST SURGICAL HISTORY:  Includes appendectomy, cholecystectomy,  shunt   placement, hysterectomy, exploratory laparotomy, and lysis of adhesions.    ALLERGIES:  She is with ALLERGIES TO TAPE.    MEDICATIONS:  At the time of admission include Fioricet with codeine 1 tablet   every 4 hours p.r.n. for migraines, Lunesta 3 mg at bedtime for insomnia,   Dilaudid 2 mg every 4 hours p.r.n. for pain, ibuprofen 400 mg every 8 hours   p.r.n. for pain, Zofran 4 mg every 8 hours p.r.n. for nausea and vomiting,   Imitrex 25 mg daily p.r.n. for migraines.    SOCIAL HISTORY:  She is a former smoker, smoked 1 pack a day for 10 years,   quit in 2004.  Occasional  alcohol, no drugs.  She has no advanced directive.    She is DNR code status.    FAMILY HISTORY:  Unknown.    REVIEW OF SYSTEMS:  Right now she complains of headache, complains of nausea.    Denies any neck stiffness.  Denies any chest pain.  Denies any shortness of   breath, abdominal pain.  She does say she is blind since the age of 6 weeks.    Denies any leg pain, does have muscle aches all over.  No burning or frequency   of urination.  All other review systems were reviewed and are negative.    PHYSICAL EXAMINATION:  VITAL SIGNS:  Temperature 37.1 degrees Celsius, pulse 129, respirations 18,   blood pressure 148/98.  She is 65 kilograms in weight, 162 cm tall.  GENERAL:  She is currently alert, awake, oriented x3, pleasant, cooperative   female, appears her stated age.  HEENT:  Head is atraumatic.  Eyes at this point are legally blind.  She does   not follow her track.  Nose is midline without discharge.  Ears bilaterally   intact without discharge.  Oral cavity, moist mucous membranes.  No apparent   focus infection in the oral cavity.  NECK:  Soft and supple.  No JVD, carotid bruit, thyromegaly, or   lymphadenopathy appreciated.  CHEST:  Chest wall moves equally with inspiration and expiration.  No   paradoxical motion.  No reproducible chest wall tenderness.  HEART:  S1, S2 tachycardic.  No murmurs or gallops detected.  LUNGS:  Clear to auscultation.  No rales or rhonchi detected.  ABDOMEN:  She has multiple abdominal surgeries with scars and she has some   tenderness from the scars.  Otherwise, good bowel sounds.  No hepatomegaly, no   splenomegaly.  GENITAL:  Deferred.  RECTAL:  Deferred.  EXTREMITIES:  Upper and lower extremities, positive pulses, no edema, good   muscle strength, good muscle tone, positive deep tendon reflexes.  NEUROLOGIC:  Cranial nerves II-XII grossly within normal limits without any   focal deficits appreciated.  SKIN:  Normal turgor.  No rashes or abrasions identified.    LABORATORY  EVALUATION:  WBC count is 15.5, hemoglobin is 15.4, hematocrit   46.4, platelets are 329.  Sodium 138, potassium 3.6, chloride 101, CO2 of 24,   BUN 10, creatinine 0.89, calcium 10.1 with a glucose of 111.  Liver functions   showed alkaline phosphatase elevation 136 and total protein is elevated at   8.7.  Anion gap is 13.    CT of the head, which was done without contrast shows no evidence of acute   intracranial process, left-sided  shunt in place.  No hydrocephalus, stable   posterior parietal areas of encephalomalacia from previous strokes.  Chest   x-ray, which I reviewed myself shows no acute cardiopulmonary process   identified.    IMPRESSION AND PLAN:  At this point:  1.  Sepsis of unknown origin.  It is highly likely that the patient has a   central neurological infection with her  shunt and lack of follow ups.  The   patient's tachycardia as well as leukocytosis and lactic acid will be   monitored and we will at this point continue with vancomycin and Rocephin for   the patient.  2.  The patient has a history of legally blindness.  Continue at this point to   give her full support.  3.  She has chronic pain, continue with pain management, especially for her   migraines.  4.  Leukocytosis, monitor.  This should resolve as the sepsis resolves.  5.  Mild elevation in her blood sugar.  Continue to monitor.  6.  Elevated alkaline phosphatase.  This may be from her previous surgical   manipulations including lysis and she may benefit from monitoring of her   abdominal situation to see if there is an infection there that would be   causing the current situation.  The patient will be on deep venous thrombosis   and gastroesophageal reflux disease prophylaxis.  The patient at this point is   full admission to the telemetry unit.       ____________________________________     MD CORY DIAZ / DAVE    DD:  03/05/2017 00:07:26  DT:  03/05/2017 01:52:36    D#:  [id number]  Job#:  [id number]    cc: Piter ____

## 2017-03-05 NOTE — ED PROVIDER NOTES
ED Provider Note    Scribed for Rosendo Ryan D.O. by Toy Jolly. 3/4/2017  8:14 PM    Primary care provider: CHANDLER Adams  Means of arrival: EMS  History obtained from: Patient  History limited by: Altered Mental Status    CHIEF COMPLAINT  Chief Complaint   Patient presents with   • Altered Mental Status       HPI  Rasheeda Manzano is a 46 y.o. female who presents to the Emergency Department with an altered mental status.  The patient states that she has had a migraine over the past three days and has a history of migraines.  She has been coughing and had one episode of vomiting, but denies any associated fevers.  The patient lives alone at home as she notes that her significant other left her approximately one week ago.  Per nursing notes, the patient was found by her neighbor on the bathroom floor rambling incomprehensible words.  The patient has a history of hydrocephalus. Negative diarrhea, chills, chest pain, or shortness of breath.      Further HPI limited by patient's mental status    REVIEW OF SYSTEMS  Review of Systems   Constitutional: Negative for fever and chills.        + Altered Mental Status   Respiratory: Positive for cough. Negative for shortness of breath.    Cardiovascular: Negative for chest pain.   Gastrointestinal: Positive for nausea and vomiting. Negative for diarrhea.   Neurological: Positive for headaches.   All other systems reviewed and are negative.      Further ROS limited by patient's mental status    PAST MEDICAL HISTORY   has a past medical history of Hydrocephalus; Migraine without aura, without mention of intractable migraine without mention of status migrainosus; Blind; Chronic daily headache; Depression; Psychiatric problem; Hydrocephalus; C. difficile diarrhea (2013); Jaundice; Pain; Other specified symptom associated with female genital organs; Fall; and Legally blind.    SURGICAL HISTORY   has past surgical history that includes laparoscopy  "(8/8/08); lysis adhesions general (12/10/2013); cystoscopy (12/10/2013); exploratory laparotomy (12/10/2013); appendectomy (12/10/2013); abdominal hysterectomy total (12/10/2013); aakash by laparoscopy (N/A, 8/13/2015); cholecystectomy (N/A, 8/13/2015); and other.    SOCIAL HISTORY  Social History   Substance Use Topics   • Smoking status: Former Smoker -- 1.00 packs/day for 10 years     Types: Cigars     Start date: 05/01/2004   • Smokeless tobacco: Never Used      Comment:  CIGAR/day    • Alcohol Use: Yes      Comment: socially      History   Drug Use No       FAMILY HISTORY  Family History   Problem Relation Age of Onset   • Hypertension Mother    • Hypertension Father    • Non-contributory Neg Hx      Migraine       CURRENT MEDICATIONS  Home Medications     Reviewed by Lyssa Billy R.N. (Registered Nurse) on 03/04/17 at 2011  Med List Status: Partial    Medication Last Dose Status    butalbital-acetaminophen-caffeine-codeine (FIORICET W/CODEINE) -20-30 MG per capsule  Active    Eszopiclone (LUNESTA PO) 1/5/2017 Active    HYDROmorphone (DILAUDID) 2 MG Tab  Active    HYDROmorphone (DILAUDID) 2 MG Tab  Active    ibuprofen (MOTRIN) 400 MG Tab  Active    ondansetron (ZOFRAN ODT) 4 MG TABLET DISPERSIBLE >month Active    SUMAtriptan (IMITREX) 25 MG Tab tablet 12/12/2016 Active                ALLERGIES  Allergies   Allergen Reactions   • Tape Rash     Medical tape. Per patient, paper tape ok.       PHYSICAL EXAM  VITAL SIGNS: /98 mmHg  Pulse 132  Temp(Src) 37.1 °C (98.8 °F)  Resp 18  Ht 1.626 m (5' 4\")  Wt 65.772 kg (145 lb)  BMI 24.88 kg/m2  LMP 11/27/2013    Constitutional: Well developed, well nourished. Mild acute distress.  HEENT: atraumatic. Posterior pharynx clear and moist.  Eyes:  Disconjugate gaze, blindness.  Neck: Supple, Full range of motion, nontender.  Chest/Pulmonary: clear to ausculation. Symmetrical expansion.   Cardio: tachycardic rate and rhythm with no murmur.   Abdomen: " Soft, Mild epigastric tenderness. No peritoneal signs. No guarding. No palpable masses.  Back: No CVA tenderness, nontender midline, no step offs.  Musculoskeletal: No deformity, no edema, neurovascular intact.   Neuro: Confused,  cooperative,  maex 4.  Answers questions.   Psych:  Anxious     DIAGNOSTIC STUDIES / PROCEDURES    LABS  Results for orders placed or performed during the hospital encounter of 03/04/17   CBC WITH DIFFERENTIAL   Result Value Ref Range    WBC 15.5 (H) 4.8 - 10.8 K/uL    RBC 5.12 4.20 - 5.40 M/uL    Hemoglobin 15.4 12.0 - 16.0 g/dL    Hematocrit 46.4 37.0 - 47.0 %    MCV 90.6 81.4 - 97.8 fL    MCH 30.1 27.0 - 33.0 pg    MCHC 33.2 (L) 33.6 - 35.0 g/dL    RDW 46.9 35.9 - 50.0 fL    Platelet Count 329 164 - 446 K/uL    MPV 11.0 9.0 - 12.9 fL    Neutrophils-Polys 89.50 (H) 44.00 - 72.00 %    Lymphocytes 5.80 (L) 22.00 - 41.00 %    Monocytes 3.60 0.00 - 13.40 %    Eosinophils 0.10 0.00 - 6.90 %    Basophils 0.50 0.00 - 1.80 %    Immature Granulocytes 0.50 0.00 - 0.90 %    Nucleated RBC 0.00 /100 WBC    Neutrophils (Absolute) 13.84 (H) 2.00 - 7.15 K/uL    Lymphs (Absolute) 0.90 (L) 1.00 - 4.80 K/uL    Monos (Absolute) 0.55 0.00 - 0.85 K/uL    Eos (Absolute) 0.01 0.00 - 0.51 K/uL    Baso (Absolute) 0.07 0.00 - 0.12 K/uL    Immature Granulocytes (abs) 0.08 0.00 - 0.11 K/uL    NRBC (Absolute) 0.00 K/uL   COMP METABOLIC PANEL   Result Value Ref Range    Sodium 138 135 - 145 mmol/L    Potassium 3.6 3.6 - 5.5 mmol/L    Chloride 101 96 - 112 mmol/L    Co2 24 20 - 33 mmol/L    Anion Gap 13.0 (H) 0.0 - 11.9    Glucose 111 (H) 65 - 99 mg/dL    Bun 10 8 - 22 mg/dL    Creatinine 0.89 0.50 - 1.40 mg/dL    Calcium 10.1 8.5 - 10.5 mg/dL    AST(SGOT) 29 12 - 45 U/L    ALT(SGPT) 14 2 - 50 U/L    Alkaline Phosphatase 136 (H) 30 - 99 U/L    Total Bilirubin 1.2 0.1 - 1.5 mg/dL    Albumin 4.4 3.2 - 4.9 g/dL    Total Protein 8.7 (H) 6.0 - 8.2 g/dL    Globulin 4.3 (H) 1.9 - 3.5 g/dL    A-G Ratio 1.0 g/dL   LIPASE    Result Value Ref Range    Lipase 15 11 - 82 U/L   HCG QUALITATIVE UR   Result Value Ref Range    Beta-Hcg Urine Negative Negative   REFRACTOMETER SG   Result Value Ref Range    Specific Gravity 1.010    URINALYSIS,CULTURE IF INDICATED   Result Value Ref Range    Color Yellow     Character Clear     Specific Gravity 1.010 <1.035    Ph 6.5 5.0-8.0    Glucose Negative Negative mg/dL    Ketones Negative Negative mg/dL    Protein Negative Negative mg/dL    Bilirubin Negative Negative    Nitrite Negative Negative    Leukocyte Esterase Negative Negative    Occult Blood Negative Negative    Micro Urine Req see below     Culture Indicated No UA Culture   URINE DRUG SCREEN   Result Value Ref Range    Amphetamines Urine Negative Negative    Barbiturates Negative Negative    Benzodiazepines Negative Negative    Cocaine Metabolite Negative Negative    Methadone Negative Negative    Ecstasy Negative Negative    Opiates Negative Negative    Oxycodone Negative Negative    Phencyclidine -Pcp Negative Negative    Propoxyphene Negative Negative    Cannabinoid Metab Negative Negative   LACTIC ACID   Result Value Ref Range    Lactic Acid 2.1 (H) 0.5 - 2.0 mmol/L   CKMB   Result Value Ref Range    CK-Mb 9.0 (H) 0.0 - 5.0 ng/mL   ESTIMATED GFR   Result Value Ref Range    GFR If African American >60 >60 mL/min/1.73 m 2    GFR If Non African American >60 >60 mL/min/1.73 m 2       All labs reviewed by me.      RADIOLOGY  CT-HEAD W/O   Final Result      1.  No evidence of acute intracranial process.      2.  Left-sided ventricular shunt tube in place. No hydrocephalus.      3.  Stable posterior parietal areas of encephalomalacia.      DX-CHEST-PORTABLE (1 VIEW)   Final Result      No acute cardiac or pulmonary abnormality is noted.        The radiologist's interpretation of all radiological studies have been reviewed by me.    COURSE & MEDICAL DECISION MAKING  Pertinent Labs & Imaging studies reviewed. (See chart for details)    The patient  was seen 2/11/17 for a migraine.    CRITICAL CARE  The very real possibility of a deterioration of this patient's condition required the highest level of my preparedness for sudden, emergent intervention.  I provided critical care services, which included medication orders, frequent reevaluations of the patient's condition and response to treatment, ordering and reviewing test results, and discussing the case with various consultants.  The critical care time associated with the care of the patient was 35 minutes. Review chart for interventions. This time is exclusive of any other billable procedures.       8:14 PM - Patient seen and examined at bedside. Patient will be treated with NS 1000 mL. Ordered a chest xray, Lactic Acid, CT Head w/o, blood cultures, CBC with differential, CMP, Lipase, Urinalysis, Urine Drug Screen, Toxoplasma AB IGG/IGM Serum, CKMB, HCG Qualitative UR to evaluate her symptoms.     8:45 PM- the pts past medical history was reviewed.      9:53 PM - Ordered Zosyn 4.5 g in  mL, Vancomycin 1600 mg in  mL IVPB.    10:52 PM  At this time, the pt will be admitted to Dr. Acuna.      DISPOSITION:  Patient will be admitted to Dr. Acuna in guarded condition      FINAL IMPRESSION  1. Sepsis, due to unspecified organism (CMS-[facility])    2.      Critical care time 35 minutes.       Toy SHARMA (Dennis), am scribing for, and in the presence of, Rosendo Ryan D.O..    Electronically signed by: Toy Jolly (Dennis), 3/4/2017    Rosendo SHARMA D.O. personally performed the services described in this documentation, as scribed by Toy Jolly in my presence, and it is both accurate and complete.    The note accurately reflects work and decisions made by me.  Rosendo Ryan  3/4/2017  10:53 PM

## 2017-03-05 NOTE — CARE PLAN
Problem: Safety  Goal: Will remain free from injury  Intervention: Collaborate with Interdisciplinary Team for safe transfer and mobilization techniques  Discussed with patient the importance of not trying to get out of bed without assistance. Patient understanding of information and will call for assistance. All questions answered at this time.

## 2017-03-05 NOTE — PROGRESS NOTES
Received call from Jazlyn in X-ray, discussed the need for sedation for pt to do Lumbar puncture. Jazlyn stated that since patient needs sedation the procedure would need to be done tomorrow 3/6/17 because no RN available for sedation on the weekends.

## 2017-03-05 NOTE — CARE PLAN
Problem: Communication  Goal: The ability to communicate needs accurately and effectively will improve  Intervention: [place] patient and significant other/support system to call light to alert staff of needs  Discussed with patient that she is at [facility] in [place], [place]. Patient accepting of information. All questions answered at this time.

## 2017-03-05 NOTE — PROGRESS NOTES
Hospital Medicine Progress Note, Adult, Complex               Author: Anne Harris Date & Time created: 3/5/2017  11:18 AM     Interval History:  The patient was admitted with evidence of sepsis and today is afebrile but tachycardic      Review of Systems:  Review of Systems   Constitutional: Negative for fever and diaphoresis.   HENT: Negative for congestion.    Respiratory: Negative for cough.    Cardiovascular: Negative for chest pain.   Gastrointestinal: Positive for abdominal pain. Negative for nausea and diarrhea.        Chronic abdominal pain with eating as she had esophageal surgery as a child   Genitourinary: Negative for dysuria.   Skin: Negative for rash.   Neurological: Negative for headaches.       Physical Exam:  Physical Exam   Constitutional: No distress.   HENT:   Mouth/Throat: Oropharynx is clear and moist.   Eyes: Conjunctivae are normal. No scleral icterus.   Dysconjugate gaze   Neck: Neck supple.   Cardiovascular: Regular rhythm.  Tachycardia present.    No murmur heard.  Pulmonary/Chest: Effort normal and breath sounds normal.   Abdominal: Soft. Bowel sounds are normal.   Musculoskeletal: She exhibits no edema.   Skin: Skin is warm and dry. No rash noted.   Nursing note and vitals reviewed.      Labs:        Invalid input(s): [id number]  Recent Labs      03/04/17 2110 03/04/17   2357   CKMB  9.0*   --    TROPONINI   --   <0.01     Recent Labs      03/04/17 2110   SODIUM  138   POTASSIUM  3.6   CHLORIDE  101   CO2  24   BUN  10   CREATININE  0.89   CALCIUM  10.1     Recent Labs      03/04/17 2110   ALTSGPT  14   ASTSGOT  29   ALKPHOSPHAT  136*   TBILIRUBIN  1.2   LIPASE  15   GLUCOSE  111*     Recent Labs      03/04/17 2110   RBC  5.12   HEMOGLOBIN  15.4   HEMATOCRIT  46.4   PLATELETCT  329   PROTHROMBTM  13.4   APTT  29.3   INR  0.99     Recent Labs      03/04/17 2110   WBC  15.5*   NEUTSPOLYS  89.50*   LYMPHOCYTES  5.80*   MONOCYTES  3.60   EOSINOPHILS  0.10   BASOPHILS  0.50    ASTSGOT  29   ALTSGPT  14   ALKPHOSPHAT  136*   TBILIRUBIN  1.2           Hemodynamics:  Temp (24hrs), Av.4 °C (97.5 °F), Min:36 °C (96.8 °F), Max:37.1 °C (98.8 °F)  Temperature: 36 °C (96.8 °F)  Pulse  Av  Min: 107  Max: 134Heart Rate (Monitored): (!) 124  Blood Pressure: 128/91 mmHg, NIBP: 147/104 mmHg     Respiratory:    Respiration: 18, Pulse Oximetry: 94 %           Fluids:    Intake/Output Summary (Last 24 hours) at 17 1118  Last data filed at 17 0600   Gross per 24 hour   Intake   1200 ml   Output      0 ml   Net   1200 ml     Weight: 82.3 kg (181 lb 7 oz)  GI/Nutrition:  Orders Placed This Encounter   Procedures   • Diet Order     Standing Status: Standing      Number of Occurrences: 1      Standing Expiration Date:      Order Specific Question:  Diet:     Answer:  Regular [1]     Medical Decision Making, by Problem:  Active Hospital Problems    Diagnosis   • Sepsis (CMS-HCC) [A41.9] monitor leukocytosis  Supportive care to continue   shunt in place  Lumbar puncture per radiology  Will consult infectious disease for assistance  CT scan with no hydrocephalus   • Leukocytosis [D72.829] follow trend on labs   • Encephalopathy [G93.40] monitor mentation   DNR    Labs reviewed, Radiology images reviewed and Medications reviewed  Sharp catheter: No Sharp      DVT Prophylaxis: Contraindicated - High bleeding risk    Ulcer prophylaxis: Not indicated  Antibiotics: Treating active infection/contamination beyond 24 hours perioperative coverage  Assessed for rehab: Patient unable to tolerate rehabilitation therapeutic regimen

## 2017-03-05 NOTE — PROGRESS NOTES
Pt's has made 3 unsafe attempts at ambulation. Confused, alert, only orientated to self and place. Lap belt applied and pt has demonstrated 3X how to effectively remove the lap belt. Bed in lowest position, locked, and alarm activated. Call light within reach.

## 2017-03-06 ENCOUNTER — APPOINTMENT (OUTPATIENT)
Dept: RADIOLOGY | Facility: MEDICAL CENTER | Age: 47
DRG: 871 | End: 2017-03-06
Attending: HOSPITALIST
Payer: MEDICAID

## 2017-03-06 ENCOUNTER — APPOINTMENT (OUTPATIENT)
Dept: RADIOLOGY | Facility: MEDICAL CENTER | Age: 47
DRG: 871 | End: 2017-03-06
Attending: INTERNAL MEDICINE
Payer: MEDICAID

## 2017-03-06 LAB
ANION GAP SERPL CALC-SCNC: 11 MMOL/L (ref 0–11.9)
BUN SERPL-MCNC: 6 MG/DL (ref 8–22)
CALCIUM SERPL-MCNC: 9.1 MG/DL (ref 8.5–10.5)
CHLORIDE SERPL-SCNC: 107 MMOL/L (ref 96–112)
CO2 SERPL-SCNC: 22 MMOL/L (ref 20–33)
CREAT SERPL-MCNC: 0.7 MG/DL (ref 0.5–1.4)
ERYTHROCYTE [DISTWIDTH] IN BLOOD BY AUTOMATED COUNT: 49.2 FL (ref 35.9–50)
GFR SERPL CREATININE-BSD FRML MDRD: >60 ML/MIN/1.73 M 2
GLUCOSE SERPL-MCNC: 73 MG/DL (ref 65–99)
HCT VFR BLD AUTO: 38.5 % (ref 37–47)
HGB BLD-MCNC: 12.5 G/DL (ref 12–16)
MCH RBC QN AUTO: 30 PG (ref 27–33)
MCHC RBC AUTO-ENTMCNC: 32.5 G/DL (ref 33.6–35)
MCV RBC AUTO: 92.5 FL (ref 81.4–97.8)
PLATELET # BLD AUTO: 250 K/UL (ref 164–446)
PMV BLD AUTO: 10.7 FL (ref 9–12.9)
POTASSIUM SERPL-SCNC: 3.4 MMOL/L (ref 3.6–5.5)
RBC # BLD AUTO: 4.16 M/UL (ref 4.2–5.4)
SODIUM SERPL-SCNC: 140 MMOL/L (ref 135–145)
T GONDII IGG SER-ACNC: <3 IU/ML
T GONDII IGM SER-ACNC: <3 AU/ML
VANCOMYCIN TROUGH SERPL-MCNC: 24.6 UG/ML (ref 10–20)
WBC # BLD AUTO: 5.1 K/UL (ref 4.8–10.8)

## 2017-03-06 PROCEDURE — 770001 HCHG ROOM/CARE - MED/SURG/GYN PRIV*

## 2017-03-06 PROCEDURE — 80202 ASSAY OF VANCOMYCIN: CPT

## 2017-03-06 PROCEDURE — 700105 HCHG RX REV CODE 258: Performed by: HOSPITALIST

## 2017-03-06 PROCEDURE — 700105 HCHG RX REV CODE 258

## 2017-03-06 PROCEDURE — 700111 HCHG RX REV CODE 636 W/ 250 OVERRIDE (IP)

## 2017-03-06 PROCEDURE — A9270 NON-COVERED ITEM OR SERVICE: HCPCS | Performed by: HOSPITALIST

## 2017-03-06 PROCEDURE — 76937 US GUIDE VASCULAR ACCESS: CPT

## 2017-03-06 PROCEDURE — 700102 HCHG RX REV CODE 250 W/ 637 OVERRIDE(OP): Performed by: HOSPITALIST

## 2017-03-06 PROCEDURE — 700111 HCHG RX REV CODE 636 W/ 250 OVERRIDE (IP): Performed by: HOSPITALIST

## 2017-03-06 PROCEDURE — 99232 SBSQ HOSP IP/OBS MODERATE 35: CPT | Performed by: INTERNAL MEDICINE

## 2017-03-06 PROCEDURE — 80048 BASIC METABOLIC PNL TOTAL CA: CPT

## 2017-03-06 PROCEDURE — 85027 COMPLETE CBC AUTOMATED: CPT

## 2017-03-06 PROCEDURE — A9270 NON-COVERED ITEM OR SERVICE: HCPCS | Performed by: INTERNAL MEDICINE

## 2017-03-06 PROCEDURE — 36415 COLL VENOUS BLD VENIPUNCTURE: CPT

## 2017-03-06 PROCEDURE — 700102 HCHG RX REV CODE 250 W/ 637 OVERRIDE(OP): Performed by: INTERNAL MEDICINE

## 2017-03-06 RX ADMIN — OXYCODONE HYDROCHLORIDE 5 MG: 5 TABLET ORAL at 12:17

## 2017-03-06 RX ADMIN — VANCOMYCIN HYDROCHLORIDE 1100 MG: 10 INJECTION, POWDER, LYOPHILIZED, FOR SOLUTION INTRAVENOUS at 17:05

## 2017-03-06 RX ADMIN — OXYCODONE HYDROCHLORIDE 2.5 MG: 5 TABLET ORAL at 04:58

## 2017-03-06 RX ADMIN — OXYCODONE HYDROCHLORIDE 5 MG: 5 TABLET ORAL at 18:41

## 2017-03-06 RX ADMIN — OXYCODONE HYDROCHLORIDE 5 MG: 5 TABLET ORAL at 15:39

## 2017-03-06 RX ADMIN — VANCOMYCIN HYDROCHLORIDE 125 MG: 10 INJECTION, POWDER, LYOPHILIZED, FOR SOLUTION INTRAVENOUS at 10:15

## 2017-03-06 RX ADMIN — MORPHINE SULFATE 2 MG: 4 INJECTION INTRAVENOUS at 20:38

## 2017-03-06 RX ADMIN — CEFTRIAXONE 2 G: 2 INJECTION, POWDER, FOR SOLUTION INTRAMUSCULAR; INTRAVENOUS at 10:16

## 2017-03-06 RX ADMIN — OXYCODONE HYDROCHLORIDE 5 MG: 5 TABLET ORAL at 22:30

## 2017-03-06 RX ADMIN — VANCOMYCIN HYDROCHLORIDE 125 MG: 10 INJECTION, POWDER, LYOPHILIZED, FOR SOLUTION INTRAVENOUS at 20:37

## 2017-03-06 RX ADMIN — CEFTRIAXONE 2 G: 2 INJECTION, POWDER, FOR SOLUTION INTRAMUSCULAR; INTRAVENOUS at 20:37

## 2017-03-06 RX ADMIN — ZOLPIDEM TARTRATE 5 MG: 5 TABLET, FILM COATED ORAL at 22:50

## 2017-03-06 RX ADMIN — VANCOMYCIN HYDROCHLORIDE 1400 MG: 10 INJECTION, POWDER, LYOPHILIZED, FOR SOLUTION INTRAVENOUS at 02:35

## 2017-03-06 RX ADMIN — SODIUM CHLORIDE: 9 INJECTION, SOLUTION INTRAVENOUS at 22:30

## 2017-03-06 ASSESSMENT — ENCOUNTER SYMPTOMS
PALPITATIONS: 0
NAUSEA: 0
DIZZINESS: 1
HEADACHES: 1
HEADACHES: 0
COUGH: 0
SPEECH CHANGE: 0
ABDOMINAL PAIN: 1
NAUSEA: 1
VOMITING: 0
FEVER: 0
DIAPHORESIS: 0
SHORTNESS OF BREATH: 0
MYALGIAS: 0
VOMITING: 1
DIARRHEA: 0

## 2017-03-06 ASSESSMENT — PAIN SCALES - GENERAL
PAINLEVEL_OUTOF10: 2
PAINLEVEL_OUTOF10: 6
PAINLEVEL_OUTOF10: 5
PAINLEVEL_OUTOF10: 6
PAINLEVEL_OUTOF10: 7
PAINLEVEL_OUTOF10: 4
PAINLEVEL_OUTOF10: 6
PAINLEVEL_OUTOF10: 8
PAINLEVEL_OUTOF10: 7

## 2017-03-06 NOTE — PROGRESS NOTES
Infectious Disease Progress Note    Author: Sheeba Flores M.D.    DOS & Time created: 3/6/2017  2:53 PM        Chief Complaint   Patient presents with   • Altered Mental Status       Interval History:  46 year old female with hydrocephalus and  shunt admitted with ams  3/6- no fevers. Says she has some abdominal pain and felt dizzy  Labs Reviewed, Medications Reviewed and Radiology Reviewed.    Review of Systems:  Review of Systems   Constitutional: Positive for malaise/fatigue. Negative for fever.   Respiratory: Negative for cough.    Cardiovascular: Negative for chest pain.   Gastrointestinal: Positive for nausea, vomiting and abdominal pain.   Genitourinary: Negative for dysuria.   Musculoskeletal: Negative for myalgias.   Neurological: Positive for dizziness. Negative for speech change and headaches.       Hemodynamics:  Temp (24hrs), Av.5 °C (97.7 °F), Min:36.1 °C (96.9 °F), Max:36.9 °C (98.5 °F)  Temperature: 36.2 °C (97.1 °F)  Pulse  Av.8  Min: 99  Max: 134  Blood Pressure: 141/92 mmHg       Physical Exam:  Physical Exam   Constitutional: She is oriented to person, place, and time. No distress.   HENT:   Dysconjugate gaze   Eyes: No scleral icterus.   Neck: Neck supple.   Cardiovascular: Regular rhythm.  Tachycardia present.    No murmur heard.  Pulmonary/Chest: She has no wheezes. She has no rales.   Abdominal: Soft. There is no tenderness. There is no rebound.   Musculoskeletal: She exhibits no edema.   Neurological: She is alert and oriented to person, place, and time. A cranial nerve deficit is present.   Vitals reviewed.      Labs:  Recent Labs      17   2110  17   0251   WBC  15.5*  5.1   RBC  5.12  4.16*   HEMOGLOBIN  15.4  12.5   HEMATOCRIT  46.4  38.5   MCV  90.6  92.5   MCH  30.1  30.0   RDW  46.9  49.2   PLATELETCT  329  250   MPV  11.0  10.7   NEUTSPOLYS  89.50*   --    LYMPHOCYTES  5.80*   --    MONOCYTES  3.60   --    EOSINOPHILS  0.10   --    BASOPHILS  0.50   --       Recent Labs      03/04/17 2110 03/06/17   0251   SODIUM  138  140   POTASSIUM  3.6  3.4*   CHLORIDE  101  107   CO2  24  22   GLUCOSE  111*  73   BUN  10  6*     Recent Labs      03/04/17 2110  03/06/17   0251   ALBUMIN  4.4   --    TBILIRUBIN  1.2   --    ALKPHOSPHAT  136*   --    TOTPROTEIN  8.7*   --    ALTSGPT  14   --    ASTSGOT  29   --    CREATININE  0.89  0.70        Imaging:  Ct-head W/o    3/4/2017  3/4/2017 10:02 PM HISTORY/REASON FOR EXAM:  Altered Mental Status. TECHNIQUE/EXAM DESCRIPTION AND NUMBER OF VIEWS: CT of the head without contrast. Up to date radiation dose reduction adjustments have been utilized to meet ALARA standards for radiation dose reduction. COMPARISON:  10/2/2016 and 4/28/2016 FINDINGS: Left-sided ventricular shunt remains in place. There is decompression of the right lateral ventricle. Encephalomalacia adjacent to the posterior aspect of the left lateral ventricle is unchanged. There is no evidence of mass or mass effect. The ventricles and CSF spaces are normal. There is no periventricular chronic small vessel ischemic change present. There is no intracranial hemorrhage seen. Right and left posterior parietal prosper holes are present. Minimal encephalomalacia in the right posterior parietal region is stable. There is no scalp injury.     3/4/2017  1.  No evidence of acute intracranial process. 2.  Left-sided ventricular shunt tube in place. No hydrocephalus. 3.  Stable posterior parietal areas of encephalomalacia.    Dx-chest-portable (1 View)    3/4/2017  3/4/2017 8:48 PM HISTORY/REASON FOR EXAM:  Cough TECHNIQUE/EXAM DESCRIPTION AND NUMBER OF VIEWS: Single portable view of the chest. COMPARISON:  10/2/2016 FINDINGS: Upper thoracic scoliosis convex left is unchanged. A left ventriculoperitoneal shunt tube is present. The cardiac silhouette is within normal limits. No infiltrates or consolidations are noted. No pleural effusion is noted.     3/4/2017  No acute cardiac or  pulmonary abnormality is noted.    Echocardiogram Comp W/o Cont    3/5/2017  Transthoracic Echo Report Echocardiography Laboratory CONCLUSIONS Left ventricular ejection fraction is visually estimated to be 65%. Difficult to assess diastolic function due to tachycardia. Trace mitral regurgitation. Trace aortic insufficiency. Right ventricular systolic pressure is estimated to be 45 mmHg. Echo from 10/22/15 showed right ventricular systolic pressure of 35 mmHg and grade 1 diastolic dysfunction. NILAY MANN Exam Date:         2017                    16:51 Exam Location:     Inpatient Priority:          Routine Ordering Physician:        ALIYA PETERS Referring Physician:       [id number] Pollard Sonographer:               Hayes Saba RDCS Age:    46     Gender:    F MRN:    [id number] :    1970 BSA:    1.71   Ht (in):    64     Wt (lb):    145 Exam Type:     Complete Indications:     Murmur ICD Codes:       785.2 CPT Codes:       67745 BP:   148    /   98     HR:   122 Technical Quality:       Good MEASUREMENTS  (Male / Female) Normal Values 2D ECHO LV Diastolic Diameter PLAX        4.1 cm                4.2 - 5.9 / 3.9 - 5.3 cm LV Systolic Diameter PLAX         2.9 cm                2.1 - 4.0 cm LV Fractional Shortening PLAX     29.3 %                25 - 46  % IVS Diastolic Thickness           0.92 cm               LVPW Diastolic Thickness          0.82 cm               LVOT Diameter                     1.5 cm                LV Ejection Fraction MOD BP       65.5 %                >= 55  % LV Ejection Fraction MOD 4C       61.5 %                LV Ejection Fraction MOD 2C       71.4 %                M-MODE Aortic Root Diameter MM           3.2 cm                DOPPLER AV Peak Velocity                  1.3 m/s               AV Peak Gradient                  6.3 mmHg              AV Mean Gradient                  3.9 mmHg              LVOT Peak Velocity                 0.81 m/s              AV Area Cont Eq vti               1.1 cm²               Pulmonary Vein Systolic Velocity  0.35 m/s              Pulmonary Vein Diastolic Velocit  0.49 m/s              Pulmonary Vein S/D Ratio          0.71                  Pulmonary Vein A Velocity         0.48 m/s              Pulmonary Vein A Duration         106 ms                TV Peak E Velocity                0.69 m/s              TR Peak Velocity                  320 cm/s              TR Peak Gradient                  41 mmHg               RVOT Peak Velocity                0.59 m/s              * Indicates values subject to auto-interpretation LV EF:        % FINDINGS Left Ventricle Normal left ventricular size, wall thickness, and systolic function. Left ventricular ejection fraction is visually estimated to be 65%. Difficult to assess diastolic function due to tachycardia. Right Ventricle Normal right ventricular size and systolic function. Right Atrium Normal right atrial size. Left Atrium Normal left atrial size. Mitral Valve Structurally normal mitral valve. Trace mitral regurgitation. Aortic Valve Structurally normal aortic valve. Calcified aortic valve leaflets. Trace aortic insufficiency. Tricuspid Valve The tricuspid valve is not well visualized. Moderate tricuspid regurgitation. Right ventricular systolic pressure is estimated to be 45 mmHg. Pulmonic Valve The pulmonic valve is not well visualized. Trace pulmonic insufficiency. Pericardium No pericardial effusion seen. Aorta Normal aortic root for body surface area. Danielle Cole M.D. (Electronically Signed) Final Date:     05 March 2017                 20:57    Medications:  Current Facility-Administered Medications   Medication Dose Frequency Provider Last Rate Last Dose   • vancomycin 1,100 mg in  mL IVPB  1,100 mg Q12HR Avery Rios, PHARMD       • vancomycin 50 mg/mL oral soln 125 mg  125 mg BID Alee Jacobs M.D.   125 mg at 03/06/17 1015   •  Vilazodone HCl TABS 1 Tab  1 Tab Q EVENING Curt Acuna M.D.   Stopped at 03/05/17 2100   • NS infusion   Continuous Curt Acuna M.D. 125 mL/hr at 03/05/17 1409     • NS (BOLUS) infusion 500 mL  500 mL Once PRN Curt Acuna M.D.       • acetaminophen (TYLENOL) tablet 650 mg  650 mg Q6HRS PRN Curt Acuna M.D.       • cefTRIAXone (ROCEPHIN) 2 g in  mL IVPB  2 g BID Curt Acuna M.D. 200 mL/hr at 03/06/17 1016 2 g at 03/06/17 1016   • MD ALERT... vancomycin per pharmacy protocol   PRN Curt Acuna M.D.       • labetalol (NORMODYNE,TRANDATE) injection 10 mg  10 mg Q4HRS PRN Curt Acuna M.D.       • ondansetron (ZOFRAN) syringe/vial injection 4 mg  4 mg Q4HRS PRN Curt Acuna M.D.       • ondansetron (ZOFRAN ODT) dispertab 4 mg  4 mg Q4HRS PRN Curt Acuna M.D.       • promethazine (PHENERGAN) tablet 12.5-25 mg  12.5-25 mg Q4HRS PRN Curt Acuna M.D.       • promethazine (PHENERGAN) suppository 12.5-25 mg  12.5-25 mg Q4HRS PRN Curt Acuna M.D.       • prochlorperazine (COMPAZINE) injection 5-10 mg  5-10 mg Q4HRS PRN Curt Acuna M.D.       • zolpidem (AMBIEN) tablet 5 mg  5 mg HS PRN - MR X 1 Curt Acuna M.D.   5 mg at 03/05/17 0437   • Pharmacy Consult Request ...Pain Management Review   PRN Curt Acuna M.D.        And   • oxycodone immediate-release (ROXICODONE) tablet 2.5 mg  2.5 mg Q3HRS PRN Curt Acuna M.D.   2.5 mg at 03/06/17 0458    And   • oxycodone immediate-release (ROXICODONE) tablet 5 mg  5 mg Q3HRS PRN Curt Acuna M.D.   5 mg at 03/06/17 1217    And   • morphine (pf) 4 mg/ml injection 2 mg  2 mg Q3HRS PRN Curt Acuna M.D.         Last reviewed on 3/5/2017  4:17 AM by Georgiana Stinson R.N.    Micro:  Results     Procedure Component Value Units Date/Time    BLOOD CULTURE x2 [[id number]] Collected:  03/04/17 2226    Order Status:  Completed Specimen Information:  Blood from Peripheral Updated:  03/05/17 0735     Significant Indicator NEG      Source BLD      Site  "PERIPHERAL      Blood Culture --      Result:        No Growth    Note: Blood cultures are incubated for 5 days and  are monitored continuously.Positive blood cultures  are called to the RN and reported as soon as  they are identified.      Narrative:      Per Hospital Policy: Only change Specimen Src: to \"Line\" if  specified by physician order.    BLOOD CULTURE x2 [[id number]] Collected:  03/04/17 2140    Order Status:  Completed Specimen Information:  Blood from Peripheral Updated:  03/05/17 0735     Significant Indicator NEG      Source BLD      Site PERIPHERAL      Blood Culture --      Result:        No Growth    Note: Blood cultures are incubated for 5 days and  are monitored continuously.Positive blood cultures  are called to the RN and reported as soon as  they are identified.      Narrative:      Per Hospital Policy: Only change Specimen Src: to \"Line\" if  specified by physician order.    Urinalysis [[id number]]     Order Status:  Canceled Specimen Information:  Urine from Urine, Clean Catch     Blood Culture [[id number]]     Order Status:  Canceled Specimen Information:  Blood from Peripheral     Blood Culture [[id number]]     Order Status:  Canceled Specimen Information:  Blood from Peripheral     Culture Respiratory W/ GRM STN [[id number]]     Order Status:  Completed Specimen Information:  Respirate from Sputum     URINALYSIS,CULTURE IF INDICATED [[id number]] Collected:  03/04/17 2145    Order Status:  Completed Specimen Information:  Urine Updated:  03/04/17 2200     Color Yellow      Character Clear      Specific Gravity 1.010      Ph 6.5      Glucose Negative mg/dL      Ketones Negative mg/dL      Protein Negative mg/dL      Bilirubin Negative      Nitrite Negative      Leukocyte Esterase Negative      Occult Blood Negative      Micro Urine Req see below      Comment: Microscopic examination not performed when specimen is clear  and chemically negative for protein, blood, leukocyte esterase  and nitrite.   "        Culture Indicated No UA Culture           Assessment:  Active Hospital Problems    Diagnosis   • Sepsis (CMS-HCC) [A41.9]   • Leukocytosis [D72.829]   • Encephalopathy [G93.40]       Plan:  ALOC  Improving  ? Source  shunt- u/a and cxr neg  Continue vanco and rocephin  Check csf    Leucocytosis  Improving  Wbc 5    Prognosis   guarded    Discussed with internal medicine

## 2017-03-06 NOTE — PROGRESS NOTES
Assumed care at 0650 . Report received from night  RN. Patient's chart and MAR reviewed. Assessment complete. Pt is resting. Patient was updated on plan of care. Questions answered and concerns addressed. Reports pain of 0 on a 0-10 scale. Pt denies any additional needs at this time. White board updated. Call light, hospital room phone and personal belongings within reach.

## 2017-03-06 NOTE — CARE PLAN
Problem: Safety  Goal: Will remain free from injury  Outcome: PROGRESSING AS EXPECTED  Pt remains free from injury. Bed in lowest position, locked, and alarm activated. Nonskid footwear in place. Lap belt in place that patient has demonstrated that she can remove. Call light and personal belongings within reach. Hourly rounding.    Problem: Pain Management  Goal: Pain level will decrease to patient’s comfort goal  Outcome: PROGRESSING AS EXPECTED    03/06/17 0000   OTHER   Nurse Pain Scale 0 - 10  2   Comfort Goal Comfort at Rest

## 2017-03-06 NOTE — CONSULTS
DATE OF SERVICE:  2017    REASON FOR CONSULTATION:  Suspected  shunt infection.    CONSULTING PHYSICIAN:  Dr. Harris.    HISTORY OF PRESENT ILLNESS:  This is a very pleasant 46-year-old white female   who was initially admitted to the hospital on 2017 after being found   down next to her bed with complaints of headache for 3 days.  She has a known   history of hydrocephalus as well as blindness since birth.  She has had a    shunt in place for an unclear period of time.  She denies any antecedent   fever, chills, nausea, vomiting or diarrhea.  Obviously, no visual changes as   she is blind, but she was noted on admission to have significant tachycardia.    She started empirically on ceftriaxone and vancomycin with improvement in her   symptoms.  LP has been ordered.  Infectious disease is consulted for   antibiotic recommendations and management.  In addition to her tachycardia,   she had significant leukocytosis.    REVIEW OF SYSTEMS:  Negative except for stated in the HPI.    ALLERGIES:  SHE IS ALLERGIC TO TAPE.    PAST MEDICAL HISTORY:  Hydrocephalus, blindness, migraine, C. diff colitis in   10/2016, multiple falls.    FAMILY HISTORY:  Denies, not sure what her parents  of.    SOCIAL HISTORY:  She is a former smoker.  She quit in , rarely drinks,   does not use drugs.    PHYSICAL EXAMINATION:  GENERAL:  She is pale, but well-nourished appearing female in no acute   distress, pleasant, cooperative.  VITAL SIGNS:  She has been afebrile since admission.  Current temperature is   98.1, blood pressure 145/98, pulse 120, respiratory rate 18, oxygen saturation   100% on room air.  She weighs 82 kilos.  HEENT:  Normocephalic, atraumatic.  She does have a palpable shunt in her   lateral neck.  She has disconjugate gaze.  Oropharynx is clear.  NECK:  Otherwise supple.  CARDIOVASCULAR:  Tachycardic.  Regular rate and rhythm.  CHEST:  Clear to auscultation bilaterally, unlabored.  ABDOMEN:  Soft,  nontender except over her peritoneal area.  EXTREMITIES:  Show no cyanosis, clubbing, or edema.  NEUROLOGIC:  She is awake, answering questions appropriately and following   commands.    LABORATORY DATA:  Current labs, drugs of abuse on admission was negative.    White count 15.5, H and H of 15.4 and 46, platelets of 329.  She does have   significant left shift.  Lactic acid is normal.  Troponins were negative.    Urinalysis was negative.  Pregnancy was negative.  Blood cultures x2 are   pending, so far negative.  CT scan of the head showed the left-sided  shunt,   no hydrocephalus, stable encephalomalacia.  Chest x-ray, no acute disease.    ASSESSMENT AND PLAN:  A 46-year-old white female with known history of   hydrocephalus and  shunt in place, presents after being found down with   altered mental status and severe headache with marked leukocytosis and   tachycardia.  No other infectious source has been identified, so the obvious   concern is for  shunt infection.  She has LP ordered.  Agree with current   broad coverage with vancomycin and ceftriaxone to cover most common pathogens,   pending CSF results.  Continue to follow blood cultures.  Pain management per   primary care team.  Mild elevation in the alkaline phosphatase of unclear   significance.  Because of that, she did have mild elevation of her lactic acid   on admission, creatinine is 0.89.  Further recommendations per culture   results and clinical course.  Discussed with internal medicine.    Thank you and we will follow with you.       ____________________________________     MD SHAYLA JEAN / DAVE    DD:  03/05/2017 14:21:08  DT:  03/05/2017 17:38:15    D#:  [id number]  Job#:  [id number]

## 2017-03-06 NOTE — CARE PLAN
Problem: Safety  Goal: Will remain free from injury  Outcome: PROGRESSING AS EXPECTED  Patient remains free from accidental injury. Fall precautions in place. Bed alarm on.        Problem: Infection  Goal: Will remain free from infection  Outcome: PROGRESSING AS EXPECTED  Standard precautions in place. Clean hands safe hands. Patient and family educated on clean hands safe hands.

## 2017-03-06 NOTE — PROGRESS NOTES
Assumed pt care. Pt sitting up in bed eating meal. Alert, disorientated to event and time. Intermittent expressive aphasia. Plan of care discussed with pt. Does not verbalize understanding. Bed in lowest position, locked, and alarm activated. Call light within reach. Lab belt in place. Pt has demonstrated that she can remove the lap belt. Yellow non-skid footwear in place. IV pole on same side as bathroom. Bedrail closest to the bathroom down. All personal belongings within reach. Sign on door indicating 2X assist and history of falls. Pt close to nursing station. Education provided regarding necessity to call for assistance to get out of bed. ST on monitor.

## 2017-03-06 NOTE — PROGRESS NOTES
Hospital Medicine Progress Note, Adult, Complex               Author: Anne Harris Date & Time created: 3/6/2017  2:36 PM     Interval History:  The patient was admitted with evidence of sepsis and today is afebrile but tachycardic    Today leukocytosis is resolved, she remains afebrile  Her chief complaint is her abdominal pain worse with eating which is chronic  She states she is able to eat all of her meals      Review of Systems:  Review of Systems   Constitutional: Negative for fever and diaphoresis.   HENT: Negative for congestion.         Chronic low grade headache   Respiratory: Negative for shortness of breath.    Cardiovascular: Positive for leg swelling. Negative for chest pain and palpitations.   Gastrointestinal: Positive for abdominal pain. Negative for nausea, vomiting and diarrhea.        Chronic abdominal pain with eating as she had esophageal surgery as a child  Pain is at lower esophagus and chronic in nature   Genitourinary: Negative for dysuria.   Skin: Negative for rash.   Neurological: Positive for headaches.       Physical Exam:  Physical Exam   Constitutional: No distress.   HENT:   Mouth/Throat: Oropharynx is clear and moist.   Eyes: Conjunctivae are normal. No scleral icterus.   Dysconjugate gaze   Neck: Neck supple.   Cardiovascular: Regular rhythm.  Tachycardia present.    No murmur heard.  Pulmonary/Chest: Effort normal and breath sounds normal. She has no wheezes.   Abdominal: Soft. Bowel sounds are normal.   Musculoskeletal: She exhibits edema.   Neurological: She is alert.   Skin: Skin is warm. No rash noted. She is not diaphoretic. No erythema.   Psychiatric: Her behavior is normal.   Nursing note and vitals reviewed.      Labs:        Invalid input(s): [id number]  Recent Labs      03/04/17 2110 03/04/17   2357  03/05/17   1146   CKMB  9.0*   --    --    TROPONINI   --   <0.01  <0.01     Recent Labs      03/04/17 2110 03/06/17   0251   SODIUM  138  140   POTASSIUM  3.6   3.4*   CHLORIDE  101  107   CO2  24  22   BUN  10  6*   CREATININE  0.89  0.70   CALCIUM  10.1  9.1     Recent Labs      17   0251   ALTSGPT  14   --    ASTSGOT  29   --    ALKPHOSPHAT  136*   --    TBILIRUBIN  1.2   --    LIPASE  15   --    GLUCOSE  111*  73     Recent Labs      17   0251   RBC  5.12  4.16*   HEMOGLOBIN  15.4  12.5   HEMATOCRIT  46.4  38.5   PLATELETCT  329  250   PROTHROMBTM  13.4   --    APTT  29.3   --    INR  0.99   --      Recent Labs      17   0251   WBC  15.5*  5.1   NEUTSPOLYS  89.50*   --    LYMPHOCYTES  5.80*   --    MONOCYTES  3.60   --    EOSINOPHILS  0.10   --    BASOPHILS  0.50   --    ASTSGOT  29   --    ALTSGPT  14   --    ALKPHOSPHAT  136*   --    TBILIRUBIN  1.2   --            Hemodynamics:  Temp (24hrs), Av.5 °C (97.7 °F), Min:36.1 °C (96.9 °F), Max:36.9 °C (98.5 °F)  Temperature: 36.2 °C (97.1 °F)  Pulse  Av.8  Min: 99  Max: 134  Blood Pressure: 141/92 mmHg     Respiratory:    Respiration: 18, Pulse Oximetry: 97 %           Fluids:    Intake/Output Summary (Last 24 hours) at 17 1436  Last data filed at 17 0400   Gross per 24 hour   Intake   1080 ml   Output      0 ml   Net   1080 ml     Weight: 76.4 kg (168 lb 6.9 oz)  GI/Nutrition:  Orders Placed This Encounter   Procedures   • Diet Order     Standing Status: Standing      Number of Occurrences: 1      Standing Expiration Date:      Order Specific Question:  Diet:     Answer:  Regular [1]     Medical Decision Making, by Problem:  Active Hospital Problems    Diagnosis   • Sepsis (CMS-HCC) [A41.9] monitor leukocytosis  Supportive care to continue   shunt in place  Lumbar puncture per radiology  Will consult infectious disease for assistance  CT scan with no hydrocephalus  Likely can rule out sepsis if csf with no infection   • Leukocytosis [D72.829] resolved   • Encephalopathy [G93.40] resolved to her baseline  Odynophagia, outpatient  follow up for this chronic issue as she is eating well   DNR    Labs reviewed, Radiology images reviewed and Medications reviewed  Sharp catheter: No Sharp      DVT Prophylaxis: Contraindicated - High bleeding risk    Ulcer prophylaxis: Not indicated  Antibiotics: Treating active infection/contamination beyond 24 hours perioperative coverage  Assessed for rehab: Patient unable to tolerate rehabilitation therapeutic regimen

## 2017-03-06 NOTE — PROGRESS NOTES
"Pharmacy Kinetics 46 y.o. female on vancomycin day # 2 3/6/2017    Currently on Vancomycin 1600 mg q12h x2, then 1400 mg x1  (patient did not receive sufficient first dose load due to weight error)    Indication for Treatment: rule out CNS infection    Pertinent history per medical record: Admitted on 3/4/2017 for altered mental status. Patient was found on the bathroom floor by her neighbor with severe headache.  Additionally, patient has a history of  shunt.  Given leukocytosis and lactic acidosis in the ED, antibiotics initiated pending possible further workup of CNS infection.     Other antibiotics: ceftriaxone 2 g IV q12h, po vancomycin for c.diff ppx    Allergies: Tape     List concerns for renal function    Pertinent cultures to date:   3/4 PBC x 2: NGTD        Recent Labs      17   0251   WBC  15.5*  5.1   NEUTSPOLYS  89.50*   --      Recent Labs      17   21117   0251   BUN  10  6*   CREATININE  0.89  0.70   ALBUMIN  4.4   --      Recent Labs      17   1332  17   1137   VANCOTROUGH   --   24.6*   VANCORANDOM  12.1   --      Intake/Output Summary (Last 24 hours) at 17 1250  Last data filed at 17 0400   Gross per 24 hour   Intake   1080 ml   Output      0 ml   Net   1080 ml      Blood pressure 141/92, pulse 99, temperature 36.2 °C (97.1 °F), resp. rate 18, height 1.626 m (5' 4\"), weight 76.4 kg (168 lb 6.9 oz), last menstrual period 2013, SpO2 97 %, not currently breastfeeding. Temp (24hrs), Av.5 °C (97.7 °F), Min:36.1 °C (96.9 °F), Max:36.9 °C (98.5 °F)      A/P   1. Vancomycin dose change: Decrease dose to 1100 mg q12 hr  2. Next vancomycin level: 1-2 days or sooner if clinically indicated  3. Goal trough: 18-22 mcg/mL  4. Comments: R/O CNS infection. Pt with  shunt. Patient to have LP today. Afebrile, no leukocytosis, no longer tachycardic. Will target lower end of goal. Still confused per RN.  Trough today drawn prior to the 4th " total dose, drawn ~9 hours post dose, true trough is likely ~21. I expect further accumulation on 4th and 5th doses. Renal function is stable. Expect a trough of ~17-18 on new dosing above. ID is on the case. Pharmacy will continue to follow. Narrow therapy as able.  5. Pt is having line issues give next dose of vancomycin when able.    Avery Rios, PharmD, BCPS

## 2017-03-07 ENCOUNTER — APPOINTMENT (OUTPATIENT)
Dept: RADIOLOGY | Facility: MEDICAL CENTER | Age: 47
DRG: 871 | End: 2017-03-07
Attending: HOSPITALIST
Payer: MEDICAID

## 2017-03-07 LAB
ANION GAP SERPL CALC-SCNC: 8 MMOL/L (ref 0–11.9)
BASOPHILS # BLD AUTO: 0.5 % (ref 0–1.8)
BASOPHILS # BLD: 0.03 K/UL (ref 0–0.12)
BUN SERPL-MCNC: 8 MG/DL (ref 8–22)
CALCIUM SERPL-MCNC: 8.5 MG/DL (ref 8.5–10.5)
CHLORIDE SERPL-SCNC: 109 MMOL/L (ref 96–112)
CO2 SERPL-SCNC: 22 MMOL/L (ref 20–33)
CREAT SERPL-MCNC: 0.82 MG/DL (ref 0.5–1.4)
EOSINOPHIL # BLD AUTO: 0.25 K/UL (ref 0–0.51)
EOSINOPHIL NFR BLD: 4 % (ref 0–6.9)
ERYTHROCYTE [DISTWIDTH] IN BLOOD BY AUTOMATED COUNT: 47.4 FL (ref 35.9–50)
GFR SERPL CREATININE-BSD FRML MDRD: >60 ML/MIN/1.73 M 2
GLUCOSE CSF-MCNC: 41 MG/DL (ref 40–80)
GLUCOSE SERPL-MCNC: 85 MG/DL (ref 65–99)
GRAM STN SPEC: NORMAL
HCT VFR BLD AUTO: 37.3 % (ref 37–47)
HGB BLD-MCNC: 12.1 G/DL (ref 12–16)
IMM GRANULOCYTES # BLD AUTO: 0.02 K/UL (ref 0–0.11)
IMM GRANULOCYTES NFR BLD AUTO: 0.3 % (ref 0–0.9)
LYMPHOCYTES # BLD AUTO: 2.03 K/UL (ref 1–4.8)
LYMPHOCYTES NFR BLD: 32.8 % (ref 22–41)
MCH RBC QN AUTO: 29.6 PG (ref 27–33)
MCHC RBC AUTO-ENTMCNC: 32.4 G/DL (ref 33.6–35)
MCV RBC AUTO: 91.2 FL (ref 81.4–97.8)
MONOCYTES # BLD AUTO: 0.52 K/UL (ref 0–0.85)
MONOCYTES NFR BLD AUTO: 8.4 % (ref 0–13.4)
NEUTROPHILS # BLD AUTO: 3.34 K/UL (ref 2–7.15)
NEUTROPHILS NFR BLD: 54 % (ref 44–72)
NRBC # BLD AUTO: 0 K/UL
NRBC BLD AUTO-RTO: 0 /100 WBC
PLATELET # BLD AUTO: 260 K/UL (ref 164–446)
PMV BLD AUTO: 10.6 FL (ref 9–12.9)
POTASSIUM SERPL-SCNC: 3.4 MMOL/L (ref 3.6–5.5)
PROT CSF-MCNC: 695 MG/DL (ref 15–45)
RBC # BLD AUTO: 4.09 M/UL (ref 4.2–5.4)
SIGNIFICANT IND 70042: NORMAL
SITE SITE: NORMAL
SODIUM SERPL-SCNC: 139 MMOL/L (ref 135–145)
SOURCE SOURCE: NORMAL
WBC # BLD AUTO: 6.2 K/UL (ref 4.8–10.8)

## 2017-03-07 PROCEDURE — A9270 NON-COVERED ITEM OR SERVICE: HCPCS | Performed by: HOSPITALIST

## 2017-03-07 PROCEDURE — 85025 COMPLETE CBC W/AUTO DIFF WBC: CPT

## 2017-03-07 PROCEDURE — A9270 NON-COVERED ITEM OR SERVICE: HCPCS | Performed by: INTERNAL MEDICINE

## 2017-03-07 PROCEDURE — 700102 HCHG RX REV CODE 250 W/ 637 OVERRIDE(OP): Performed by: INTERNAL MEDICINE

## 2017-03-07 PROCEDURE — 80048 BASIC METABOLIC PNL TOTAL CA: CPT

## 2017-03-07 PROCEDURE — 700111 HCHG RX REV CODE 636 W/ 250 OVERRIDE (IP)

## 2017-03-07 PROCEDURE — 87070 CULTURE OTHR SPECIMN AEROBIC: CPT

## 2017-03-07 PROCEDURE — 87205 SMEAR GRAM STAIN: CPT

## 2017-03-07 PROCEDURE — 700105 HCHG RX REV CODE 258: Performed by: HOSPITALIST

## 2017-03-07 PROCEDURE — 86694 HERPES SIMPLEX NES ANTBDY: CPT

## 2017-03-07 PROCEDURE — 700102 HCHG RX REV CODE 250 W/ 637 OVERRIDE(OP): Performed by: HOSPITALIST

## 2017-03-07 PROCEDURE — 84157 ASSAY OF PROTEIN OTHER: CPT

## 2017-03-07 PROCEDURE — 700105 HCHG RX REV CODE 258

## 2017-03-07 PROCEDURE — B01B1ZZ FLUOROSCOPY OF SPINAL CORD USING LOW OSMOLAR CONTRAST: ICD-10-PCS | Performed by: RADIOLOGY

## 2017-03-07 PROCEDURE — 82945 GLUCOSE OTHER FLUID: CPT

## 2017-03-07 PROCEDURE — 700111 HCHG RX REV CODE 636 W/ 250 OVERRIDE (IP): Performed by: HOSPITALIST

## 2017-03-07 PROCEDURE — 009U3ZX DRAINAGE OF SPINAL CANAL, PERCUTANEOUS APPROACH, DIAGNOSTIC: ICD-10-PCS | Performed by: RADIOLOGY

## 2017-03-07 PROCEDURE — 36415 COLL VENOUS BLD VENIPUNCTURE: CPT

## 2017-03-07 PROCEDURE — 62270 DX LMBR SPI PNXR: CPT

## 2017-03-07 PROCEDURE — 770006 HCHG ROOM/CARE - MED/SURG/GYN SEMI*

## 2017-03-07 PROCEDURE — 99233 SBSQ HOSP IP/OBS HIGH 50: CPT | Performed by: INTERNAL MEDICINE

## 2017-03-07 RX ADMIN — ZOLPIDEM TARTRATE 5 MG: 5 TABLET, FILM COATED ORAL at 20:33

## 2017-03-07 RX ADMIN — CEFTRIAXONE 2 G: 2 INJECTION, POWDER, FOR SOLUTION INTRAMUSCULAR; INTRAVENOUS at 09:54

## 2017-03-07 RX ADMIN — VANCOMYCIN HYDROCHLORIDE 1100 MG: 10 INJECTION, POWDER, LYOPHILIZED, FOR SOLUTION INTRAVENOUS at 21:26

## 2017-03-07 RX ADMIN — MORPHINE SULFATE 2 MG: 4 INJECTION INTRAVENOUS at 23:56

## 2017-03-07 RX ADMIN — VANCOMYCIN HYDROCHLORIDE 1100 MG: 10 INJECTION, POWDER, LYOPHILIZED, FOR SOLUTION INTRAVENOUS at 03:09

## 2017-03-07 RX ADMIN — MORPHINE SULFATE 2 MG: 4 INJECTION INTRAVENOUS at 20:20

## 2017-03-07 RX ADMIN — MORPHINE SULFATE 2 MG: 4 INJECTION INTRAVENOUS at 15:38

## 2017-03-07 RX ADMIN — MORPHINE SULFATE 2 MG: 4 INJECTION INTRAVENOUS at 03:24

## 2017-03-07 RX ADMIN — OXYCODONE HYDROCHLORIDE 5 MG: 5 TABLET ORAL at 13:20

## 2017-03-07 RX ADMIN — CEFTRIAXONE 2 G: 2 INJECTION, POWDER, FOR SOLUTION INTRAMUSCULAR; INTRAVENOUS at 20:20

## 2017-03-07 RX ADMIN — SODIUM CHLORIDE: 9 INJECTION, SOLUTION INTRAVENOUS at 09:56

## 2017-03-07 RX ADMIN — MORPHINE SULFATE 2 MG: 4 INJECTION INTRAVENOUS at 10:03

## 2017-03-07 RX ADMIN — VANCOMYCIN HYDROCHLORIDE 125 MG: 10 INJECTION, POWDER, LYOPHILIZED, FOR SOLUTION INTRAVENOUS at 09:54

## 2017-03-07 ASSESSMENT — PAIN SCALES - GENERAL
PAINLEVEL_OUTOF10: 8
PAINLEVEL_OUTOF10: 1
PAINLEVEL_OUTOF10: 7
PAINLEVEL_OUTOF10: 3
PAINLEVEL_OUTOF10: 8
PAINLEVEL_OUTOF10: 7
PAINLEVEL_OUTOF10: 6
PAINLEVEL_OUTOF10: 10
PAINLEVEL_OUTOF10: 2
PAINLEVEL_OUTOF10: 4
PAINLEVEL_OUTOF10: 4
PAINLEVEL_OUTOF10: 7

## 2017-03-07 ASSESSMENT — ENCOUNTER SYMPTOMS
HEADACHES: 1
SHORTNESS OF BREATH: 0
MUSCULOSKELETAL NEGATIVE: 1
SPUTUM PRODUCTION: 0
COUGH: 0
FEVER: 0
PALPITATIONS: 0
DIARRHEA: 0
DIAPHORESIS: 0
VOMITING: 0
PSYCHIATRIC NEGATIVE: 1
CHILLS: 0
NEUROLOGICAL NEGATIVE: 1
ABDOMINAL PAIN: 1
DIZZINESS: 1
NAUSEA: 0
MYALGIAS: 0
SPEECH CHANGE: 0

## 2017-03-07 NOTE — PROGRESS NOTES
Assumed care for patient at approximately 1145. Skin assessment performed, assessment as charted. Plan of care discussed with patient. Plan for LP today. Called radiology to get time/details. Call light marked for ease of calling, patient to call with all needs. Bed alarm applied. Will continue to monitor.

## 2017-03-07 NOTE — PROGRESS NOTES
Report received from sEha JOHNSON. Patient lying in bed sleeping at this time. All safety measures in place. No immediate concerns for patient at this time.

## 2017-03-07 NOTE — PROGRESS NOTES
Bedside report received from ELIZABETH Ruiz. POC discussed with pt; all questions answered at this time. White board updated. Appropriate functioning of tele monitor confirmed. All needs met at this time.

## 2017-03-07 NOTE — PROGRESS NOTES
"Pharmacy Kinetics 46 y.o. female on vancomycin day # 3 3/7/2017    Currently on Vancomycin 1100 mg q12hr    Indication for Treatment: rule out CNS infection    Pertinent history per medical record: Admitted on 3/4/2017 for altered mental status. Patient was found on the bathroom floor by her neighbor with severe headache.  Additionally, patient has a history of  shunt.  Given leukocytosis and lactic acidosis in the ED, antibiotics initiated pending possible further workup of CNS infection.     Other antibiotics: ceftriaxone 2 g IV q12h, po vancomycin for c.diff ppx    Allergies: Tape     List concerns for renal function: none    Pertinent cultures to date:   3/4 PBC x 2: NGTD    Recent Labs      17   0251  17   0349   WBC  15.5*  5.1  6.2   NEUTSPOLYS  89.50*   --   54.00     Recent Labs      17   0251  17   0349   BUN  10  6*  8   CREATININE  0.89  0.70  0.82   ALBUMIN  4.4   --    --      Recent Labs      17   1332  17   1137   VANCOTROUGH   --   24.6*   VANCORANDOM  12.1   --    No intake or output data in the 24 hours ending 17 1338   Blood pressure 137/75, pulse 109, temperature 36.6 °C (97.8 °F), resp. rate 16, height 1.626 m (5' 4\"), weight 75.4 kg (166 lb 3.6 oz), last menstrual period 2013, SpO2 98 %, not currently breastfeeding. Temp (24hrs), Av.3 °C (97.3 °F), Min:35.8 °C (96.4 °F), Max:36.6 °C (97.8 °F)      A/P   1. Vancomycin dose change: n/a  2. Next vancomycin level: Tomorrow prior to the 4th total dose while on 1100mg q12 hr @0200  3. Goal trough: 18-22 mcg/mL - Will target lower end of goal  4. Comments: R/O CNS infection. Pt with  shunt. Patient to have LP today? Afebrile, no leukocytosis, tachycardic. Improving per notes. Dose adjusted based off of trough yesterday, vancomycin was given late yesterday due to line issues. Renal function is stable. ID is on the case. Pharmacy will continue to follow. Narrow " therapy as able, follow LP/culture results.    Avery Rios, PharmD, BCPS

## 2017-03-07 NOTE — PROGRESS NOTES
Patients IV infiltrated. US IV attempted by charge. Unable to obtain IV. Order received for EJ. ER has no available nurses today. PICC nurse called for US guided IV. PICC RN said they could come later in the afternoon

## 2017-03-07 NOTE — PROGRESS NOTES
Contacted previous RN to notify that patient is missing a black bag with one strap. Said he will check and notify this RN if found. Also discussed if patient had been seen by hospitalist this AM due to need for clarification of Lumbar Puncture orders needed from radiology. Morgan RN reported he did not believe anyone saw the patient this AM. Discussed with [place] [id number] hospitalist Dr. Tapia. Orders placed per MD for lumbar puncture cultures. Called radiology and notified.

## 2017-03-07 NOTE — PROGRESS NOTES
Report called to RN assuming care of patient, all questions answered. Patient notified of transfer to different room. Verbalized understanding.

## 2017-03-07 NOTE — CARE PLAN
Problem: Safety  Goal: Will remain free from falls  Pt reminded of importance of calling for assistance prior to ambulation. Pt verbalized understanding. Bed alarm on.     Problem: Pain Management  Goal: Pain level will decrease to patient’s comfort goal  Pt assessed for pain regularly and medicated PRN per MAR.

## 2017-03-07 NOTE — PROGRESS NOTES
Hospital Medicine Progress Note, Adult, Complex               Author: Sadi Anna Date & Time created: 3/7/2017  8:24 AM     CC: sepsis    Interval History:  47 y/o F with PMH of hydrocephalus post  shunt, depression, legally blind, recurrent fall: admitted for above.     No acute issue overnight. Answered questions appropriately. Denies n/v/d. To be transferred to medical floor today.      Review of Systems:  Review of Systems   Constitutional: Negative for fever, chills and diaphoresis.   HENT: Negative for congestion, hearing loss and tinnitus.    Eyes:        Legally blind   Respiratory: Negative for cough, sputum production and shortness of breath.    Cardiovascular: Positive for leg swelling. Negative for chest pain and palpitations.   Gastrointestinal: Positive for abdominal pain. Negative for nausea, vomiting and diarrhea.   Genitourinary: Negative for dysuria, urgency and frequency.   Musculoskeletal: Negative.    Skin: Negative for rash.   Neurological: Negative.    Psychiatric/Behavioral: Negative.        Physical Exam:  Physical Exam   Constitutional: No distress.   HENT:   Head: Atraumatic.   Mouth/Throat: Oropharynx is clear and moist.   Eyes: Conjunctivae and EOM are normal. No scleral icterus.   Dysconjugate gaze   Neck: Normal range of motion. Neck supple. No thyromegaly present.   Cardiovascular: Regular rhythm.  Tachycardia present.    No murmur heard.  Pulmonary/Chest: Effort normal and breath sounds normal. She has no wheezes. She has no rales.   Abdominal: Soft. Bowel sounds are normal.   Musculoskeletal: She exhibits edema.   Neurological: She is alert.   Skin: Skin is warm and dry. No rash noted. She is not diaphoretic. No erythema.   Psychiatric: She has a normal mood and affect. Her behavior is normal.   Nursing note and vitals reviewed.      Labs:        Invalid input(s): [id number]  Recent Labs      03/04/17   2110  03/04/17   2357  03/05/17   1146   CKMB  9.0*   --    --    TROPONINI    --   <0.01  <0.01     Recent Labs      17   SODIUM  138  140  139   POTASSIUM  3.6  3.4*  3.4*   CHLORIDE  101  107  109   CO2  24  22  22   BUN  10  6*  8   CREATININE  0.89  0.70  0.82   CALCIUM  10.1  9.1  8.5     Recent Labs      17   ALTSGPT  14   --    --    ASTSGOT  29   --    --    ALKPHOSPHAT  136*   --    --    TBILIRUBIN  1.2   --    --    LIPASE  15   --    --    GLUCOSE  111*  73  85     Recent Labs      17   RBC  5.12  4.16*  4.09*   HEMOGLOBIN  15.4  12.5  12.1   HEMATOCRIT  46.4  38.5  37.3   PLATELETCT  329  250  260   PROTHROMBTM  13.4   --    --    APTT  29.3   --    --    INR  0.99   --    --      Recent Labs      17   WBC  15.5*  5.1  6.2   NEUTSPOLYS  89.50*   --   54.00   LYMPHOCYTES  5.80*   --   32.80   MONOCYTES  3.60   --   8.40   EOSINOPHILS  0.10   --   4.00   BASOPHILS  0.50   --   0.50   ASTSGOT  29   --    --    ALTSGPT  14   --    --    ALKPHOSPHAT  136*   --    --    TBILIRUBIN  1.2   --    --            Hemodynamics:  Temp (24hrs), Av.3 °C (97.4 °F), Min:36.2 °C (97.1 °F), Max:36.6 °C (97.8 °F)  Temperature: 36.6 °C (97.8 °F)  Pulse  Av.1  Min: 99  Max: 134  Blood Pressure: 141/93 mmHg     Respiratory:    Respiration: 18, Pulse Oximetry: 95 %           Fluids:  No intake or output data in the 24 hours ending 17 0824  Weight: 75.4 kg (166 lb 3.6 oz)  GI/Nutrition:  Orders Placed This Encounter   Procedures   • Diet Order     Standing Status: Standing      Number of Occurrences: 1      Standing Expiration Date:      Order Specific Question:  Diet:     Answer:  Regular [1]     Medical Decision Making, by Problem:  Active Hospital Problems    Diagnosis   • Sepsis (CMS-HCC) [A41.9]   - source likely  shunt?  - on IV vancomycin and ceftriaxone  - ID following  - pending LP for CSF  analysis  - culture: negative to date     • Leukocytosis [D72.829] resolved     • Encephalopathy [G93.40] resolved to her baseline         *  Odynophagia, outpatient follow up for this chronic issue as she is eating well      *  Hypokalemia: replete as needed, worsening, follow cmp in am    DNR    Labs reviewed, Radiology images reviewed and Medications reviewed  Sharp catheter: No Sharp      DVT Prophylaxis: Contraindicated - High bleeding risk    Ulcer prophylaxis: Not indicated  Antibiotics: Treating active infection/contamination beyond 24 hours perioperative coverage  Assessed for rehab: Patient unable to tolerate rehabilitation therapeutic regimen

## 2017-03-07 NOTE — PROGRESS NOTES
Infectious Disease Progress Note    Author: Sheeba Flores M.D.    DOS & Time created: 3/7/2017  11:02 AM        Chief Complaint   Patient presents with   • Altered Mental Status       Interval History:  46 year old female with hydrocephalus and  shunt admitted with ams  3/6- no fevers. Says she has some abdominal pain and felt dizzy  3/7- stillhas some abdominal pain. C/o global headache. No fevers. Thinks she is confused.  Labs Reviewed, Medications Reviewed and Radiology Reviewed.    Review of Systems:  Review of Systems   Constitutional: Positive for malaise/fatigue. Negative for fever.   HENT:        Global   Respiratory: Negative for cough.    Cardiovascular: Negative for chest pain.   Gastrointestinal: Positive for abdominal pain. Negative for nausea and vomiting.   Genitourinary: Negative for dysuria.   Musculoskeletal: Negative for myalgias.   Neurological: Positive for dizziness and headaches. Negative for speech change.        Thinks she is confused       Hemodynamics:  Temp (24hrs), Av.2 °C (97.2 °F), Min:35.8 °C (96.4 °F), Max:36.6 °C (97.8 °F)  Temperature: (!) 35.8 °C (96.4 °F)  Pulse  Av.5  Min: 99  Max: 134  Blood Pressure: 122/75 mmHg       Physical Exam:  Physical Exam   Constitutional: She is oriented to person, place, and time. No distress.   HENT:   Dysconjugate gaze   Eyes: No scleral icterus.   Neck: Neck supple.   Cardiovascular: Regular rhythm.  Tachycardia present.    No murmur heard.  Pulmonary/Chest: She has no wheezes. She has no rales.   Abdominal: Soft. There is no tenderness. There is no rebound.   Musculoskeletal: She exhibits no edema.   Neurological: She is alert and oriented to person, place, and time. A cranial nerve deficit is present.   Vitals reviewed.      Labs:  Recent Labs      17   2110  17   0251  17   0349   WBC  15.5*  5.1  6.2   RBC  5.12  4.16*  4.09*   HEMOGLOBIN  15.4  12.5  12.1   HEMATOCRIT  46.4  38.5  37.3   MCV  90.6  92.5  91.2    MCH  30.1  30.0  29.6   RDW  46.9  49.2  47.4   PLATELETCT  329  250  260   MPV  11.0  10.7  10.6   NEUTSPOLYS  89.50*   --   54.00   LYMPHOCYTES  5.80*   --   32.80   MONOCYTES  3.60   --   8.40   EOSINOPHILS  0.10   --   4.00   BASOPHILS  0.50   --   0.50     Recent Labs      03/04/17 2110 03/06/17   0251  03/07/17   0349   SODIUM  138  140  139   POTASSIUM  3.6  3.4*  3.4*   CHLORIDE  101  107  109   CO2  24  22  22   GLUCOSE  111*  73  85   BUN  10  6*  8     Recent Labs      03/04/17 2110 03/06/17 0251  03/07/17   0349   ALBUMIN  4.4   --    --    TBILIRUBIN  1.2   --    --    ALKPHOSPHAT  136*   --    --    TOTPROTEIN  8.7*   --    --    ALTSGPT  14   --    --    ASTSGOT  29   --    --    CREATININE  0.89  0.70  0.82        Imaging:  Ct-head W/o    3/4/2017  3/4/2017 10:02 PM HISTORY/REASON FOR EXAM:  Altered Mental Status. TECHNIQUE/EXAM DESCRIPTION AND NUMBER OF VIEWS: CT of the head without contrast. Up to date radiation dose reduction adjustments have been utilized to meet ALARA standards for radiation dose reduction. COMPARISON:  10/2/2016 and 4/28/2016 FINDINGS: Left-sided ventricular shunt remains in place. There is decompression of the right lateral ventricle. Encephalomalacia adjacent to the posterior aspect of the left lateral ventricle is unchanged. There is no evidence of mass or mass effect. The ventricles and CSF spaces are normal. There is no periventricular chronic small vessel ischemic change present. There is no intracranial hemorrhage seen. Right and left posterior parietal prosper holes are present. Minimal encephalomalacia in the right posterior parietal region is stable. There is no scalp injury.     3/4/2017  1.  No evidence of acute intracranial process. 2.  Left-sided ventricular shunt tube in place. No hydrocephalus. 3.  Stable posterior parietal areas of encephalomalacia.    Dx-chest-portable (1 View)    3/4/2017  3/4/2017 8:48 PM HISTORY/REASON FOR EXAM:  Cough TECHNIQUE/EXAM  DESCRIPTION AND NUMBER OF VIEWS: Single portable view of the chest. COMPARISON:  10/2/2016 FINDINGS: Upper thoracic scoliosis convex left is unchanged. A left ventriculoperitoneal shunt tube is present. The cardiac silhouette is within normal limits. No infiltrates or consolidations are noted. No pleural effusion is noted.     3/4/2017  No acute cardiac or pulmonary abnormality is noted.    Echocardiogram Comp W/o Cont    3/5/2017  Transthoracic Echo Report Echocardiography Laboratory CONCLUSIONS Left ventricular ejection fraction is visually estimated to be 65%. Difficult to assess diastolic function due to tachycardia. Trace mitral regurgitation. Trace aortic insufficiency. Right ventricular systolic pressure is estimated to be 45 mmHg. Echo from 10/22/15 showed right ventricular systolic pressure of 35 mmHg and grade 1 diastolic dysfunction. TOMKENANLAURYIE Exam Date:         2017                    16:51 Exam Location:     Inpatient Priority:          Routine Ordering Physician:        ALIYA PETERS Referring Physician:       LORRAINE Cole Sonographer:               Hayes Saba RDCS Age:    46     Gender:    F MRN:    [id number] :    1970 BSA:    1.71   Ht (in):    64     Wt (lb):    145 Exam Type:     Complete Indications:     Murmur ICD Codes:       785.2 CPT Codes:       29063 BP:   148    /   98     HR:   122 Technical Quality:       Good MEASUREMENTS  (Male / Female) Normal Values 2D ECHO LV Diastolic Diameter PLAX        4.1 cm                4.2 - 5.9 / 3.9 - 5.3 cm LV Systolic Diameter PLAX         2.9 cm                2.1 - 4.0 cm LV Fractional Shortening PLAX     29.3 %                25 - 46  % IVS Diastolic Thickness           0.92 cm               LVPW Diastolic Thickness          0.82 cm               LVOT Diameter                     1.5 cm                LV Ejection Fraction MOD BP       65.5 %                >= 55  % LV Ejection Fraction MOD 4C        61.5 %                LV Ejection Fraction MOD 2C       71.4 %                M-MODE Aortic Root Diameter MM           3.2 cm                DOPPLER AV Peak Velocity                  1.3 m/s               AV Peak Gradient                  6.3 mmHg              AV Mean Gradient                  3.9 mmHg              LVOT Peak Velocity                0.81 m/s              AV Area Cont Eq vti               1.1 cm²               Pulmonary Vein Systolic Velocity  0.35 m/s              Pulmonary Vein Diastolic Velocit  0.49 m/s              Pulmonary Vein S/D Ratio          0.71                  Pulmonary Vein A Velocity         0.48 m/s              Pulmonary Vein A Duration         106 ms                TV Peak E Velocity                0.69 m/s              TR Peak Velocity                  320 cm/s              TR Peak Gradient                  41 mmHg               RVOT Peak Velocity                0.59 m/s              * Indicates values subject to auto-interpretation LV EF:        % FINDINGS Left Ventricle Normal left ventricular size, wall thickness, and systolic function. Left ventricular ejection fraction is visually estimated to be 65%. Difficult to assess diastolic function due to tachycardia. Right Ventricle Normal right ventricular size and systolic function. Right Atrium Normal right atrial size. Left Atrium Normal left atrial size. Mitral Valve Structurally normal mitral valve. Trace mitral regurgitation. Aortic Valve Structurally normal aortic valve. Calcified aortic valve leaflets. Trace aortic insufficiency. Tricuspid Valve The tricuspid valve is not well visualized. Moderate tricuspid regurgitation. Right ventricular systolic pressure is estimated to be 45 mmHg. Pulmonic Valve The pulmonic valve is not well visualized. Trace pulmonic insufficiency. Pericardium No pericardial effusion seen. Aorta Normal aortic root for body surface area. Danielle Cole M.D. (Electronically Signed) Final Date:      05 March 2017                 20:57    Medications:  Current Facility-Administered Medications   Medication Dose Frequency Provider Last Rate Last Dose   • vancomycin 1,100 mg in  mL IVPB  1,100 mg Q12HR Avery Rios, PHARMD   Stopped at 03/07/17 0439   • vancomycin 50 mg/mL oral soln 125 mg  125 mg BID Alee Jacobs M.D.   125 mg at 03/07/17 0954   • Vilazodone HCl TABS 1 Tab  1 Tab Q EVENING Curt Acuna M.D.   Stopped at 03/05/17 2100   • NS infusion   Continuous Curt Acuna M.D. 125 mL/hr at 03/07/17 0956     • NS (BOLUS) infusion 500 mL  500 mL Once PRN Curt Acuna M.D.       • acetaminophen (TYLENOL) tablet 650 mg  650 mg Q6HRS PRN Curt Acuna M.D.       • cefTRIAXone (ROCEPHIN) 2 g in  mL IVPB  2 g BID Curt Acuna M.D.   Stopped at 03/07/17 1024   • MD ALERT... vancomycin per pharmacy protocol   PRN Curt Acuna M.D.       • labetalol (NORMODYNE,TRANDATE) injection 10 mg  10 mg Q4HRS PRN Curt Acuna M.D.       • ondansetron (ZOFRAN) syringe/vial injection 4 mg  4 mg Q4HRS PRN Curt Acuna M.D.       • ondansetron (ZOFRAN ODT) dispertab 4 mg  4 mg Q4HRS PRN Curt Acuna M.D.       • promethazine (PHENERGAN) tablet 12.5-25 mg  12.5-25 mg Q4HRS PRN Curt Acuna M.D.       • promethazine (PHENERGAN) suppository 12.5-25 mg  12.5-25 mg Q4HRS PRN Curt Acuna M.D.       • prochlorperazine (COMPAZINE) injection 5-10 mg  5-10 mg Q4HRS PRN Curt Acuna M.D.       • zolpidem (AMBIEN) tablet 5 mg  5 mg HS PRN - MR X 1 Curt Acuna M.D.   5 mg at 03/06/17 2250   • Pharmacy Consult Request ...Pain Management Review   PRN Curt Acuna M.D.        And   • oxycodone immediate-release (ROXICODONE) tablet 2.5 mg  2.5 mg Q3HRS PRN Curt Acuna M.D.   2.5 mg at 03/06/17 8755    And   • oxycodone immediate-release (ROXICODONE) tablet 5 mg  5 mg Q3HRS PRN Curt Acuna M.D.   5 mg at 03/06/17 3360    And   • morphine (pf) 4 mg/ml injection 2 mg  2 mg Q3HRS PRN Curt  "CLAUDIA Acuna   2 mg at 03/07/17 1003     Last reviewed on 3/5/2017  4:17 AM by Georgiana Stinson R.N.    Micro:  Results     Procedure Component Value Units Date/Time    BLOOD CULTURE x2 [[id number]] Collected:  03/04/17 2226    Order Status:  Completed Specimen Information:  Blood from Peripheral Updated:  03/05/17 0735     Significant Indicator NEG      Source BLD      Site PERIPHERAL      Blood Culture --      Result:        No Growth    Note: Blood cultures are incubated for 5 days and  are monitored continuously.Positive blood cultures  are called to the RN and reported as soon as  they are identified.      Narrative:      Per Hospital Policy: Only change Specimen Src: to \"Line\" if  specified by physician order.    BLOOD CULTURE x2 [[id number]] Collected:  03/04/17 2140    Order Status:  Completed Specimen Information:  Blood from Peripheral Updated:  03/05/17 0735     Significant Indicator NEG      Source BLD      Site PERIPHERAL      Blood Culture --      Result:        No Growth    Note: Blood cultures are incubated for 5 days and  are monitored continuously.Positive blood cultures  are called to the RN and reported as soon as  they are identified.      Narrative:      Per Hospital Policy: Only change Specimen Src: to \"Line\" if  specified by physician order.    Urinalysis [[id number]]     Order Status:  Canceled Specimen Information:  Urine from Urine, Clean Catch     Blood Culture [[id number]]     Order Status:  Canceled Specimen Information:  Blood from Peripheral     Blood Culture [[id number]]     Order Status:  Canceled Specimen Information:  Blood from Peripheral     Culture Respiratory W/ GRM STN [[id number]]     Order Status:  Completed Specimen Information:  Respirate from Sputum     URINALYSIS,CULTURE IF INDICATED [[id number]] Collected:  03/04/17 2145    Order Status:  Completed Specimen Information:  Urine Updated:  03/04/17 2200     Color Yellow      Character Clear      Specific Gravity 1.010      Ph 6.5  "     Glucose Negative mg/dL      Ketones Negative mg/dL      Protein Negative mg/dL      Bilirubin Negative      Nitrite Negative      Leukocyte Esterase Negative      Occult Blood Negative      Micro Urine Req see below      Comment: Microscopic examination not performed when specimen is clear  and chemically negative for protein, blood, leukocyte esterase  and nitrite.          Culture Indicated No UA Culture           Assessment:  Active Hospital Problems    Diagnosis   • Sepsis (CMS-HCC) [A41.9]   • Leukocytosis [D72.829]   • Encephalopathy [G93.40]       Plan:  ALOC  Improving  ? Source  shunt- u/a and cxr neg  Continue vanco and rocephin  For LP today- follow the results    Leucocytosis  Improving  Wbc 5    Prognosis   guarded    Discussed with internal medicine

## 2017-03-07 NOTE — PROGRESS NOTES
[facility] Radiology tech called Rn and notified of patient pain of 10. Requesting IV pain medication. RN administered per documentation in radiology.

## 2017-03-07 NOTE — CARE PLAN
Problem: Venous Thromboembolism (VTW)/Deep Vein Thrombosis (DVT) Prevention:  Goal: Patient will participate in Venous Thrombosis (VTE)/Deep Vein Thrombosis (DVT)Prevention Measures  Outcome: PROGRESSING AS EXPECTED    03/07/17 0945   OTHER   Risk Assessment Score 1   VTE RISK Moderate   Mechanical Prophylaxis SCDs, Sequentials (Intermittent Pneumatic Compression Devices)         Problem: Pain Management  Goal: Pain level will decrease to patient’s comfort goal  Outcome: PROGRESSING AS EXPECTED  Patient educated on pain management, pain medication given. Pain to be reassesed

## 2017-03-07 NOTE — DIETARY
Nutrition: Day 2 of admit.  47 yo female admitted with sepsis.  She has a history of  shunt since birth. She is noted to have had poor po intake PTA without any associated weight change.  She is currently on a regular diet taking from 25 - 75% of meals.  Boost plus supplements have been added to meals to better meet nutritional needs until appetite resumes.  Nutrition representative to see pt daily for meal and snack preferences.      Recommendations:  1) encourage intake of meals and supplements  2) document intake of all meals and supplements as % taken in ADL's to provide interdisciplinary communication across all shifts   3) monitor daily weights

## 2017-03-08 LAB
ALBUMIN SERPL BCP-MCNC: 3.1 G/DL (ref 3.2–4.9)
ALBUMIN/GLOB SERPL: 1.1 G/DL
ALP SERPL-CCNC: 82 U/L (ref 30–99)
ALT SERPL-CCNC: 22 U/L (ref 2–50)
ANION GAP SERPL CALC-SCNC: 7 MMOL/L (ref 0–11.9)
AST SERPL-CCNC: 29 U/L (ref 12–45)
BILIRUB SERPL-MCNC: 0.2 MG/DL (ref 0.1–1.5)
BUN SERPL-MCNC: 8 MG/DL (ref 8–22)
CALCIUM SERPL-MCNC: 8.5 MG/DL (ref 8.5–10.5)
CHLORIDE SERPL-SCNC: 107 MMOL/L (ref 96–112)
CO2 SERPL-SCNC: 23 MMOL/L (ref 20–33)
CREAT SERPL-MCNC: 0.73 MG/DL (ref 0.5–1.4)
GFR SERPL CREATININE-BSD FRML MDRD: >60 ML/MIN/1.73 M 2
GLOBULIN SER CALC-MCNC: 2.8 G/DL (ref 1.9–3.5)
GLUCOSE SERPL-MCNC: 91 MG/DL (ref 65–99)
POTASSIUM SERPL-SCNC: 3.5 MMOL/L (ref 3.6–5.5)
PROT SERPL-MCNC: 5.9 G/DL (ref 6–8.2)
SODIUM SERPL-SCNC: 137 MMOL/L (ref 135–145)
VANCOMYCIN TROUGH SERPL-MCNC: 15.3 UG/ML (ref 10–20)

## 2017-03-08 PROCEDURE — 700102 HCHG RX REV CODE 250 W/ 637 OVERRIDE(OP): Performed by: INTERNAL MEDICINE

## 2017-03-08 PROCEDURE — 36415 COLL VENOUS BLD VENIPUNCTURE: CPT

## 2017-03-08 PROCEDURE — A9270 NON-COVERED ITEM OR SERVICE: HCPCS | Performed by: INTERNAL MEDICINE

## 2017-03-08 PROCEDURE — 700102 HCHG RX REV CODE 250 W/ 637 OVERRIDE(OP): Performed by: HOSPITALIST

## 2017-03-08 PROCEDURE — 770006 HCHG ROOM/CARE - MED/SURG/GYN SEMI*

## 2017-03-08 PROCEDURE — 700105 HCHG RX REV CODE 258

## 2017-03-08 PROCEDURE — 99232 SBSQ HOSP IP/OBS MODERATE 35: CPT | Performed by: HOSPITALIST

## 2017-03-08 PROCEDURE — 700102 HCHG RX REV CODE 250 W/ 637 OVERRIDE(OP): Performed by: NURSE PRACTITIONER

## 2017-03-08 PROCEDURE — 80202 ASSAY OF VANCOMYCIN: CPT

## 2017-03-08 PROCEDURE — A9270 NON-COVERED ITEM OR SERVICE: HCPCS | Performed by: HOSPITALIST

## 2017-03-08 PROCEDURE — 80053 COMPREHEN METABOLIC PANEL: CPT

## 2017-03-08 PROCEDURE — 700111 HCHG RX REV CODE 636 W/ 250 OVERRIDE (IP): Performed by: HOSPITALIST

## 2017-03-08 PROCEDURE — 700111 HCHG RX REV CODE 636 W/ 250 OVERRIDE (IP)

## 2017-03-08 PROCEDURE — A9270 NON-COVERED ITEM OR SERVICE: HCPCS | Performed by: NURSE PRACTITIONER

## 2017-03-08 PROCEDURE — 700105 HCHG RX REV CODE 258: Performed by: HOSPITALIST

## 2017-03-08 RX ORDER — BUTALBITAL, ACETAMINOPHEN AND CAFFEINE 50; 325; 40 MG/1; MG/1; MG/1
1 TABLET ORAL EVERY 6 HOURS PRN
Status: DISCONTINUED | OUTPATIENT
Start: 2017-03-08 | End: 2017-03-13 | Stop reason: HOSPADM

## 2017-03-08 RX ADMIN — OXYCODONE HYDROCHLORIDE 5 MG: 5 TABLET ORAL at 20:17

## 2017-03-08 RX ADMIN — VANCOMYCIN HYDROCHLORIDE 1300 MG: 10 INJECTION, POWDER, LYOPHILIZED, FOR SOLUTION INTRAVENOUS at 13:00

## 2017-03-08 RX ADMIN — OXYCODONE HYDROCHLORIDE 5 MG: 5 TABLET ORAL at 08:49

## 2017-03-08 RX ADMIN — SODIUM CHLORIDE: 9 INJECTION, SOLUTION INTRAVENOUS at 05:03

## 2017-03-08 RX ADMIN — VANCOMYCIN HYDROCHLORIDE 125 MG: 10 INJECTION, POWDER, LYOPHILIZED, FOR SOLUTION INTRAVENOUS at 20:17

## 2017-03-08 RX ADMIN — PROMETHAZINE HYDROCHLORIDE 25 MG: 25 TABLET ORAL at 23:24

## 2017-03-08 RX ADMIN — MORPHINE SULFATE 2 MG: 4 INJECTION INTRAVENOUS at 11:23

## 2017-03-08 RX ADMIN — OXYCODONE HYDROCHLORIDE 5 MG: 5 TABLET ORAL at 15:37

## 2017-03-08 RX ADMIN — ONDANSETRON 4 MG: 4 TABLET, ORALLY DISINTEGRATING ORAL at 23:21

## 2017-03-08 RX ADMIN — VANCOMYCIN HYDROCHLORIDE 125 MG: 10 INJECTION, POWDER, LYOPHILIZED, FOR SOLUTION INTRAVENOUS at 05:13

## 2017-03-08 RX ADMIN — ONDANSETRON 4 MG: 4 TABLET, ORALLY DISINTEGRATING ORAL at 11:14

## 2017-03-08 RX ADMIN — ACETAMINOPHEN 650 MG: 325 TABLET, FILM COATED ORAL at 22:09

## 2017-03-08 RX ADMIN — BUTALBITAL, ACETAMINOPHEN, AND CAFFEINE 1 TABLET: 50; 325; 40 TABLET ORAL at 23:48

## 2017-03-08 RX ADMIN — ACETAMINOPHEN 650 MG: 325 TABLET, FILM COATED ORAL at 11:17

## 2017-03-08 RX ADMIN — CEFTRIAXONE 2 G: 2 INJECTION, POWDER, FOR SOLUTION INTRAMUSCULAR; INTRAVENOUS at 08:50

## 2017-03-08 RX ADMIN — MORPHINE SULFATE 2 MG: 4 INJECTION INTRAVENOUS at 05:12

## 2017-03-08 RX ADMIN — ZOLPIDEM TARTRATE 5 MG: 5 TABLET, FILM COATED ORAL at 20:21

## 2017-03-08 ASSESSMENT — ENCOUNTER SYMPTOMS
VOMITING: 0
TINGLING: 0
CLAUDICATION: 0
COUGH: 0
DIZZINESS: 0
ROS GI COMMENTS: DECREASED
DIARRHEA: 0
NECK PAIN: 0
NEUROLOGICAL NEGATIVE: 1
FEVER: 0
HEMOPTYSIS: 0
SPUTUM PRODUCTION: 0
NAUSEA: 0
CONSTIPATION: 0
ORTHOPNEA: 0
DIAPHORESIS: 0
WEIGHT LOSS: 0
DEPRESSION: 0
CHILLS: 0
NERVOUS/ANXIOUS: 1
SPEECH CHANGE: 0
SENSORY CHANGE: 0
INSOMNIA: 0
HEADACHES: 1
ABDOMINAL PAIN: 1
SHORTNESS OF BREATH: 0
MUSCULOSKELETAL NEGATIVE: 1
HEARTBURN: 0
BLOOD IN STOOL: 0
PALPITATIONS: 0
MYALGIAS: 0
BACK PAIN: 0
MEMORY LOSS: 0
TREMORS: 0

## 2017-03-08 ASSESSMENT — PAIN SCALES - GENERAL
PAINLEVEL_OUTOF10: 8
PAINLEVEL_OUTOF10: 9
PAINLEVEL_OUTOF10: 3
PAINLEVEL_OUTOF10: 7
PAINLEVEL_OUTOF10: 4
PAINLEVEL_OUTOF10: 7
PAINLEVEL_OUTOF10: 7
PAINLEVEL_OUTOF10: 5
PAINLEVEL_OUTOF10: 8

## 2017-03-08 NOTE — PROGRESS NOTES
"Pharmacy Kinetics 46 y.o. female on vancomycin day # 4 3/8/2017    Currently Dose: Vancomycin 1100 mg iv q12hr  Received Load Dose: Yes    Indication for Treatment: CNS infection rule-out  ID Service Following: Yes    Pertinent history per medical record: Admitted on 3/4/2017 for AMS. Patient found down by neighbor per admission H&P chart review. PSH significant for  shunt. Leukocytosis and lactic acidosis noted on admission. ID consulted. Currently admitted to medical floor.    Other antibiotics: ceftriaxone 2 gm iv q12h, vancomycin 125 mg po BID    Allergies: Tape     List concerns for accumulation of vancomycin: Hx  shunt, BMI ~28, Hx CDiff    Pertinent cultures to date:   3/7/17 CSF culture (NTD)  3/4/17 blood culture x2 (NEG)    3/7/17 CSF Tap Report   Ref. Range 3/7/2017 16:00   Glucose CSF Latest Ref Range: 40-80 mg/dL 41   Total Protein, CSF Latest Ref Range: 15-45 mg/dL 695 (H)     Recent Labs      17   0251  17   0349   WBC  5.1  6.2   NEUTSPOLYS   --   54.00     Recent Labs      17   0251  17   0349  17   0201   BUN  6*  8  8   CREATININE  0.70  0.82  0.73   ALBUMIN   --    --   3.1*     Recent Labs      17   1332  17   1137  17   0837   VANCOTROUGH   --   24.6*  15.3   VANCORANDOM  12.1   --    --      Intake/Output Summary (Last 24 hours) at 17 1119  Last data filed at 17 0900   Gross per 24 hour   Intake   1865 ml   Output      0 ml   Net   1865 ml      Blood pressure 133/91, pulse 109, temperature 36.1 °C (97 °F), resp. rate 18, height 1.626 m (5' 4\"), weight 75.4 kg (166 lb 3.6 oz), last menstrual period 2013, SpO2 92 %, not currently breastfeeding. Temp (24hrs), Av.2 °C (97.2 °F), Min:36.1 °C (97 °F), Max:36.6 °C (97.8 °F)    Estimated Creatinine Clearance: 96.8 mL/min (by C-G formula based on Cr of 0.73).    A/P   1. Vancomycin dose change: 1300 mg iv q12h   2. Next vancomycin level: ~2-3 days  3. Goal trough: 18-22 " mcg/mL  4. Comments: VS stable and patient afebrile overnight. Microbiology negative to date. WBC unremarkable. CrCl ~97 mL/min and stable. Factors for accumulation of vancomycin identified. ID consulted. CSF tap performed 3/7/17. Most recent trough below goal and drawn appropriately. Given indication will modestly adjust dose for target trough ~18 mcg/mL. Pharmacy will continue to follow and adjust as appropriate.     Ezra Liz, PHARMD

## 2017-03-08 NOTE — PROGRESS NOTES
Assumed care at 0700. Assessed and plan of care discussed. Plan is for 2 weeks of antibiotics. Patient calls appropriately, call light in reach. Will continue to monitor.

## 2017-03-08 NOTE — OR SURGEON
EXAMINATION:  3/7/2017 2:10 PM    HISTORY/REASON FOR EXAM:  Hydrocephalus  shunt and numerous surgeries.         TECHNIQUE/EXAM DESCRIPTION AND NUMBER OF VIEWS:  Fluoroscopic-guided lumbar puncture.    2 fluoroscopic images obtained.    Fluoroscopy time: 3.9 minutes    PROCEDURE:     Informed consent was obtained. A timeout was taken. With the patient in prone position, the lumbar region was prepped and draped in a sterile manner. Following local anesthesia with 1% lidocaine, a 20-gauge spinal needle was advanced into the subarachnoid space at the L 3 level.  Approximately 5 cc of slightly bloody CSF was collected and the specimens sent to the lab for the studies requested. The patient tolerated the procedure well with no evidence of complication. 5 mL specimen was sent to the laboratory for analysis. No blood loss.    IMPRESSION:  Fluoroscopic-guided lumbar puncture as described above.

## 2017-03-08 NOTE — PROGRESS NOTES
Infectious Disease Progress Note    Author: Alee Jacobs M.D.    DOS & Time created: 3/8/2017  8:25 AM      Chief Complaint   Patient presents with   • Altered Mental Status   FU  shunt infection    Interval History:  46 year old female with hydrocephalus and  shunt admitted with ams  3/6- no fevers. Says she has some abdominal pain and felt dizzy  3/- stillhas some abdominal pain. C/o global headache. No fevers. Thinks she is confused.  3/8 AF WBC 6.2 pain abd decreased. Appetite improved  Labs Reviewed, Medications Reviewed and Radiology Reviewed.    Review of Systems:  Review of Systems   Constitutional: Positive for malaise/fatigue. Negative for fever.   HENT:        Global   Respiratory: Negative for cough.    Cardiovascular: Negative for chest pain.   Gastrointestinal: Positive for abdominal pain. Negative for nausea and vomiting.        Decreased   Genitourinary: Negative for dysuria.   Musculoskeletal: Negative for myalgias.   Neurological: Positive for headaches. Negative for speech change.       Hemodynamics:  Temp (24hrs), Av.2 °C (97.2 °F), Min:36.1 °C (97 °F), Max:36.6 °C (97.8 °F)  Temperature: 36.1 °C (97 °F)  Pulse  Av.7  Min: 99  Max: 134  Blood Pressure: 133/91 mmHg       Physical Exam:  Physical Exam   Constitutional: She is oriented to person, place, and time. No distress.   HENT:   Dysconjugate gaze   Eyes: No scleral icterus.   Neck: Neck supple.   Cardiovascular: Regular rhythm.  Tachycardia present.    No murmur heard.  Pulmonary/Chest: She has no wheezes. She has no rales.   Abdominal: Soft. There is no tenderness. There is no rebound.   Musculoskeletal: She exhibits no edema.   Neurological: She is alert and oriented to person, place, and time. A cranial nerve deficit is present.   Vitals reviewed.      Labs:  Recent Labs      17   0251  17   0349   WBC  5.1  6.2   RBC  4.16*  4.09*   HEMOGLOBIN  12.5  12.1   HEMATOCRIT  38.5  37.3   MCV  92.5  91.2   MCH   30.0  29.6   RDW  49.2  47.4   PLATELETCT  250  260   MPV  10.7  10.6   NEUTSPOLYS   --   54.00   LYMPHOCYTES   --   32.80   MONOCYTES   --   8.40   EOSINOPHILS   --   4.00   BASOPHILS   --   0.50     Recent Labs      03/06/17   0251  03/07/17 0349  03/08/17   0201   SODIUM  140  139  137   POTASSIUM  3.4*  3.4*  3.5*   CHLORIDE  107  109  107   CO2  22  22  23   GLUCOSE  73  85  91   BUN  6*  8  8     Recent Labs      03/06/17   0251  03/07/17 0349 03/08/17   0201   ALBUMIN   --    --   3.1*   TBILIRUBIN   --    --   0.2   ALKPHOSPHAT   --    --   82   TOTPROTEIN   --    --   5.9*   ALTSGPT   --    --   22   ASTSGOT   --    --   29   CREATININE  0.70  0.82  0.73        Imaging:     CT-HEAD W/O (Final result) Result time: 03/04/17 22:24:02     Final result by Clarence Tenorio M.D. (03/04/17 22:24:02)     Impression:     1.  No evidence of acute intracranial process.  2.  Left-sided ventricular shunt tube in place. No hydrocephalus.  3.  Stable posterior parietal areas of encephalomalacia     Final result by Rad Intf (03/05/17 20:57:53)     Narrative:     TransthoracicEcho Report  CONCLUSIONS  Left ventricular ejection fraction is visually estimated to be 65%.   Difficult to assess diastolic function due to tachycardia.  Trace mitral regurgitation.  Trace aortic insufficiency.  Right ventricular systolic pressure is estimated to be 45 mmHg.  Echo from 10/22/15 showed right ventricular systolic pressure of 35   mmHg and grade 1 diastolic dysfunction       Medications:  Current Facility-Administered Medications   Medication Dose Frequency Provider Last Rate Last Dose   • vancomycin 1,100 mg in  mL IVPB  1,100 mg Q12HR Avery Rios, PHARMD   Stopped at 03/07/17 2746   • vancomycin 50 mg/mL oral soln 125 mg  125 mg BID Alee Jacobs M.D.   125 mg at 03/08/17 0513   • Vilazodone HCl TABS 1 Tab  1 Tab Q EVENING Curt Acuna M.D.   Stopped at 03/05/17 2100   • NS infusion   Continuous Levente  CLAUDIA Acuna 125 mL/hr at 03/08/17 0503     • NS (BOLUS) infusion 500 mL  500 mL Once PRN Curt Acuna M.D.       • acetaminophen (TYLENOL) tablet 650 mg  650 mg Q6HRS PRN Curt Acuna M.D.       • cefTRIAXone (ROCEPHIN) 2 g in  mL IVPB  2 g BID Curt Acuna M.D.   Stopped at 03/07/17 2050   • MD ALERT... vancomycin per pharmacy protocol   PRN Curt Acuna M.D.       • labetalol (NORMODYNE,TRANDATE) injection 10 mg  10 mg Q4HRS PRN Curt Acuna M.D.       • ondansetron (ZOFRAN) syringe/vial injection 4 mg  4 mg Q4HRS PRN Curt Acuna M.D.       • ondansetron (ZOFRAN ODT) dispertab 4 mg  4 mg Q4HRS PRN Curt Acuna M.D.       • promethazine (PHENERGAN) tablet 12.5-25 mg  12.5-25 mg Q4HRS PRN Curt Acuna M.D.       • promethazine (PHENERGAN) suppository 12.5-25 mg  12.5-25 mg Q4HRS PRN Curt Acuna M.D.       • prochlorperazine (COMPAZINE) injection 5-10 mg  5-10 mg Q4HRS PRN Curt Acuna M.D.       • zolpidem (AMBIEN) tablet 5 mg  5 mg HS PRN - MR X 1 Curt Acuna M.D.   5 mg at 03/07/17 2033   • Pharmacy Consult Request ...Pain Management Review   PRN Curt Acuna M.D.        And   • oxycodone immediate-release (ROXICODONE) tablet 2.5 mg  2.5 mg Q3HRS PRN Curt Acuna M.D.   2.5 mg at 03/06/17 0458    And   • oxycodone immediate-release (ROXICODONE) tablet 5 mg  5 mg Q3HRS PRN Curt Acuna M.D.   5 mg at 03/07/17 1320    And   • morphine (pf) 4 mg/ml injection 2 mg  2 mg Q3HRS PRN Curt Acuna M.D.   2 mg at 03/08/17 0512     Last reviewed on 3/5/2017  4:17 AM by Georgiana Stinson R.N.    Micro:  Results     Procedure Component Value Units Date/Time    GRAM STAIN [[id number]] Collected:  03/07/17 1600    Order Status:  Completed Specimen Information:  CSF Updated:  03/07/17 1703     Significant Indicator .      Source CSF      Site Tap      Gram Stain Result No organisms seen.     Narrative:      per Dr. Tapia via Umesh Jacobs no cell count    CSF CULTURE [[id number]] Collected:   "03/07/17 1600    Order Status:  Completed Updated:  03/07/17 1622    CSF Culture [[id number]]     Order Status:  No result Specimen Information:  CSF from Tap     BLOOD CULTURE x2 [[id number]] Collected:  03/04/17 2226    Order Status:  Completed Specimen Information:  Blood from Peripheral Updated:  03/05/17 0735     Significant Indicator NEG      Source BLD      Site PERIPHERAL      Blood Culture --      Result:        No Growth    Note: Blood cultures are incubated for 5 days and  are monitored continuously.Positive blood cultures  are called to the RN and reported as soon as  they are identified.      Narrative:      Per Hospital Policy: Only change Specimen Src: to \"Line\" if  specified by physician order.    BLOOD CULTURE x2 [[id number]] Collected:  03/04/17 2140    Order Status:  Completed Specimen Information:  Blood from Peripheral Updated:  03/05/17 0735     Significant Indicator NEG      Source BLD      Site PERIPHERAL      Blood Culture --      Result:        No Growth    Note: Blood cultures are incubated for 5 days and  are monitored continuously.Positive blood cultures  are called to the RN and reported as soon as  they are identified.      Narrative:      Per Hospital Policy: Only change Specimen Src: to \"Line\" if  specified by physician order.    Urinalysis [[id number]]     Order Status:  Canceled Specimen Information:  Urine from Urine, Clean Catch     Blood Culture [[id number]]     Order Status:  Canceled Specimen Information:  Blood from Peripheral     Blood Culture [[id number]]     Order Status:  Canceled Specimen Information:  Blood from Peripheral     URINALYSIS,CULTURE IF INDICATED [[id number]] Collected:  03/04/17 2145    Order Status:  Completed Specimen Information:  Urine Updated:  03/04/17 2200     Color Yellow      Character Clear      Specific Gravity 1.010      Ph 6.5      Glucose Negative mg/dL      Ketones Negative mg/dL      Protein Negative mg/dL      Bilirubin Negative      Nitrite " Negative      Leukocyte Esterase Negative      Occult Blood Negative      Micro Urine Req see below      Comment: Microscopic examination not performed when specimen is clear  and chemically negative for protein, blood, leukocyte esterase  and nitrite.          Culture Indicated No UA Culture     Culture Respiratory W/ GRM STN [[id number]] Collected:  03/04/17 0000    Order Status:  Canceled Specimen Information:  Sputum from Sputum     Narrative:      TEST Respiratory Culture w/ GS WAS CANCELLED, 03/08/17 01:05 Test  autocancelled, not collected or received  Sputum cultures, induced if needed. If not done within the  last 24 hours          Assessment:  Active Hospital Problems    Diagnosis   • Sepsis (CMS-HCC) [A41.9]   • Leukocytosis [D72.829]   • Encephalopathy [G93.40]       Plan:  Concern for  shunt infection  Improving. Encephalopathy improved  LP done-but on abx  CSF with very high protein, neg gram stain (done on abx)  Continue vanco and rocephin  Anticipate at least 2 week course    Leukocytosis  resolved    H/O Cdiff colitis  Continue oral vanco prophylaxis while on abx      Discussed with internal medicine

## 2017-03-08 NOTE — CARE PLAN
Problem: Safety  Goal: Will remain free from injury  Outcome: PROGRESSING AS EXPECTED  Patient alert and oriented x4. Calls with needs. Fall alarm in bed.   Goal: Will remain free from falls  Outcome: PROGRESSING AS EXPECTED    Problem: Pain Management  Goal: Pain level will decrease to patient’s comfort goal  Outcome: PROGRESSING SLOWER THAN EXPECTED  Patient had lumbar puncture this afternoon. Due to position for LP, currently having increased pain in abdomen. Pain medication given per MAR> Will continue to assess.

## 2017-03-08 NOTE — CARE PLAN
Problem: Safety  Goal: Will remain free from injury  Outcome: PROGRESSING AS EXPECTED  Patient calls out appropriately, uses RN or CNA as guide to bathroom.     Problem: Infection  Goal: Will remain free from infection  Outcome: PROGRESSING AS EXPECTED  IV antibiotics given, patient afebrile. Will continue to monitor.

## 2017-03-08 NOTE — PROGRESS NOTES
Loss of current IV access. IV vancomycin infiltrated. Pharmacist called to seek antidote. Dr. Tapia notified. Orders received for antidote if indicated and ultrasound guided IV.

## 2017-03-08 NOTE — CARE PLAN
Problem: Safety  Goal: Will remain free from injury  Intervention: Provide assistance with mobility  Due to her blindness and history of fall, assisted going to the rest room.      Problem: Knowledge Deficit  Goal: Knowledge of disease process/condition, treatment plan, diagnostic tests, and medications will improve  Intervention: Assess knowledge level of disease process/condition, treatment plan, diagnostic tests, and medications  Continued iv and po abx, pt verbalized understanding.

## 2017-03-09 ENCOUNTER — APPOINTMENT (OUTPATIENT)
Dept: RADIOLOGY | Facility: MEDICAL CENTER | Age: 47
DRG: 871 | End: 2017-03-09
Attending: HOSPITALIST
Payer: MEDICAID

## 2017-03-09 LAB
ALBUMIN SERPL BCP-MCNC: 3.7 G/DL (ref 3.2–4.9)
ALBUMIN/GLOB SERPL: 1.1 G/DL
ALP SERPL-CCNC: 98 U/L (ref 30–99)
ALT SERPL-CCNC: 24 U/L (ref 2–50)
ANION GAP SERPL CALC-SCNC: 10 MMOL/L (ref 0–11.9)
AST SERPL-CCNC: 33 U/L (ref 12–45)
BACTERIA BLD CULT: NORMAL
BACTERIA BLD CULT: NORMAL
BASOPHILS # BLD AUTO: 0.5 % (ref 0–1.8)
BASOPHILS # BLD: 0.04 K/UL (ref 0–0.12)
BILIRUB SERPL-MCNC: 0.4 MG/DL (ref 0.1–1.5)
BUN SERPL-MCNC: 5 MG/DL (ref 8–22)
CALCIUM SERPL-MCNC: 9.3 MG/DL (ref 8.5–10.5)
CHLORIDE SERPL-SCNC: 103 MMOL/L (ref 96–112)
CO2 SERPL-SCNC: 24 MMOL/L (ref 20–33)
CREAT SERPL-MCNC: 0.65 MG/DL (ref 0.5–1.4)
EOSINOPHIL # BLD AUTO: 0.34 K/UL (ref 0–0.51)
EOSINOPHIL NFR BLD: 4.1 % (ref 0–6.9)
ERYTHROCYTE [DISTWIDTH] IN BLOOD BY AUTOMATED COUNT: 48 FL (ref 35.9–50)
GFR SERPL CREATININE-BSD FRML MDRD: >60 ML/MIN/1.73 M 2
GLOBULIN SER CALC-MCNC: 3.5 G/DL (ref 1.9–3.5)
GLUCOSE SERPL-MCNC: 112 MG/DL (ref 65–99)
HCT VFR BLD AUTO: 44.3 % (ref 37–47)
HGB BLD-MCNC: 14.5 G/DL (ref 12–16)
IMM GRANULOCYTES # BLD AUTO: 0.03 K/UL (ref 0–0.11)
IMM GRANULOCYTES NFR BLD AUTO: 0.4 % (ref 0–0.9)
LYMPHOCYTES # BLD AUTO: 1.24 K/UL (ref 1–4.8)
LYMPHOCYTES NFR BLD: 15 % (ref 22–41)
MAGNESIUM SERPL-MCNC: 2 MG/DL (ref 1.5–2.5)
MCH RBC QN AUTO: 30.5 PG (ref 27–33)
MCHC RBC AUTO-ENTMCNC: 33.6 G/DL (ref 33.6–35)
MCV RBC AUTO: 91 FL (ref 81.4–97.8)
MONOCYTES # BLD AUTO: 0.51 K/UL (ref 0–0.85)
MONOCYTES NFR BLD AUTO: 6.2 % (ref 0–13.4)
NEUTROPHILS # BLD AUTO: 6.13 K/UL (ref 2–7.15)
NEUTROPHILS NFR BLD: 73.8 % (ref 44–72)
NRBC # BLD AUTO: 0 K/UL
NRBC BLD AUTO-RTO: 0 /100 WBC
PLATELET # BLD AUTO: 301 K/UL (ref 164–446)
PMV BLD AUTO: 11.3 FL (ref 9–12.9)
POTASSIUM SERPL-SCNC: 4 MMOL/L (ref 3.6–5.5)
PROT SERPL-MCNC: 7.2 G/DL (ref 6–8.2)
RBC # BLD AUTO: 4.78 M/UL (ref 4.2–5.4)
SIGNIFICANT IND 70042: NORMAL
SIGNIFICANT IND 70042: NORMAL
SITE SITE: NORMAL
SITE SITE: NORMAL
SODIUM SERPL-SCNC: 137 MMOL/L (ref 135–145)
SOURCE SOURCE: NORMAL
SOURCE SOURCE: NORMAL
WBC # BLD AUTO: 8.3 K/UL (ref 4.8–10.8)

## 2017-03-09 PROCEDURE — 700111 HCHG RX REV CODE 636 W/ 250 OVERRIDE (IP): Performed by: HOSPITALIST

## 2017-03-09 PROCEDURE — 36415 COLL VENOUS BLD VENIPUNCTURE: CPT

## 2017-03-09 PROCEDURE — 700105 HCHG RX REV CODE 258: Performed by: HOSPITALIST

## 2017-03-09 PROCEDURE — 770006 HCHG ROOM/CARE - MED/SURG/GYN SEMI*

## 2017-03-09 PROCEDURE — 700102 HCHG RX REV CODE 250 W/ 637 OVERRIDE(OP): Performed by: INTERNAL MEDICINE

## 2017-03-09 PROCEDURE — 80053 COMPREHEN METABOLIC PANEL: CPT

## 2017-03-09 PROCEDURE — 700102 HCHG RX REV CODE 250 W/ 637 OVERRIDE(OP): Performed by: HOSPITALIST

## 2017-03-09 PROCEDURE — A9270 NON-COVERED ITEM OR SERVICE: HCPCS | Performed by: HOSPITALIST

## 2017-03-09 PROCEDURE — 83735 ASSAY OF MAGNESIUM: CPT

## 2017-03-09 PROCEDURE — 85025 COMPLETE CBC W/AUTO DIFF WBC: CPT

## 2017-03-09 PROCEDURE — 700111 HCHG RX REV CODE 636 W/ 250 OVERRIDE (IP)

## 2017-03-09 PROCEDURE — 99232 SBSQ HOSP IP/OBS MODERATE 35: CPT | Performed by: HOSPITALIST

## 2017-03-09 PROCEDURE — 700105 HCHG RX REV CODE 258

## 2017-03-09 PROCEDURE — A9270 NON-COVERED ITEM OR SERVICE: HCPCS | Performed by: INTERNAL MEDICINE

## 2017-03-09 RX ADMIN — PROMETHAZINE HYDROCHLORIDE 25 MG: 25 TABLET ORAL at 18:01

## 2017-03-09 RX ADMIN — MORPHINE SULFATE 2 MG: 4 INJECTION INTRAVENOUS at 09:23

## 2017-03-09 RX ADMIN — CEFTRIAXONE 2 G: 2 INJECTION, POWDER, FOR SOLUTION INTRAMUSCULAR; INTRAVENOUS at 15:12

## 2017-03-09 RX ADMIN — OXYCODONE HYDROCHLORIDE 5 MG: 5 TABLET ORAL at 18:01

## 2017-03-09 RX ADMIN — SODIUM CHLORIDE: 9 INJECTION, SOLUTION INTRAVENOUS at 15:11

## 2017-03-09 RX ADMIN — MORPHINE SULFATE 2 MG: 4 INJECTION INTRAVENOUS at 03:10

## 2017-03-09 RX ADMIN — OXYCODONE HYDROCHLORIDE 5 MG: 5 TABLET ORAL at 21:11

## 2017-03-09 RX ADMIN — VANCOMYCIN HYDROCHLORIDE 125 MG: 10 INJECTION, POWDER, LYOPHILIZED, FOR SOLUTION INTRAVENOUS at 21:11

## 2017-03-09 RX ADMIN — VANCOMYCIN HYDROCHLORIDE 125 MG: 10 INJECTION, POWDER, LYOPHILIZED, FOR SOLUTION INTRAVENOUS at 09:05

## 2017-03-09 RX ADMIN — VANCOMYCIN HYDROCHLORIDE 1300 MG: 10 INJECTION, POWDER, LYOPHILIZED, FOR SOLUTION INTRAVENOUS at 16:28

## 2017-03-09 RX ADMIN — ONDANSETRON 4 MG: 2 INJECTION, SOLUTION INTRAMUSCULAR; INTRAVENOUS at 09:35

## 2017-03-09 RX ADMIN — ZOLPIDEM TARTRATE 5 MG: 5 TABLET, FILM COATED ORAL at 21:11

## 2017-03-09 RX ADMIN — OXYCODONE HYDROCHLORIDE 5 MG: 5 TABLET ORAL at 06:02

## 2017-03-09 RX ADMIN — PROMETHAZINE HYDROCHLORIDE 25 MG: 25 TABLET ORAL at 13:57

## 2017-03-09 RX ADMIN — ONDANSETRON 4 MG: 4 TABLET, ORALLY DISINTEGRATING ORAL at 16:33

## 2017-03-09 RX ADMIN — CEFTRIAXONE 2 G: 2 INJECTION, POWDER, FOR SOLUTION INTRAMUSCULAR; INTRAVENOUS at 03:10

## 2017-03-09 RX ADMIN — MORPHINE SULFATE 2 MG: 4 INJECTION INTRAVENOUS at 12:50

## 2017-03-09 RX ADMIN — VANCOMYCIN HYDROCHLORIDE 1300 MG: 10 INJECTION, POWDER, LYOPHILIZED, FOR SOLUTION INTRAVENOUS at 04:16

## 2017-03-09 RX ADMIN — MORPHINE SULFATE 2 MG: 4 INJECTION INTRAVENOUS at 16:10

## 2017-03-09 ASSESSMENT — ENCOUNTER SYMPTOMS
BACK PAIN: 0
CLAUDICATION: 0
NERVOUS/ANXIOUS: 1
SENSORY CHANGE: 0
VOMITING: 0
PALPITATIONS: 0
DEPRESSION: 0
SPEECH CHANGE: 0
DIARRHEA: 0
SHORTNESS OF BREATH: 0
TREMORS: 0
DIZZINESS: 0
MUSCULOSKELETAL NEGATIVE: 1
INSOMNIA: 0
ORTHOPNEA: 0
BLOOD IN STOOL: 0
NECK PAIN: 0
ROS GI COMMENTS: DECREASED
NEUROLOGICAL NEGATIVE: 1
FEVER: 0
ABDOMINAL PAIN: 0
MYALGIAS: 0
HEADACHES: 1
TINGLING: 0
WEIGHT LOSS: 0
MEMORY LOSS: 0
ABDOMINAL PAIN: 1
CHILLS: 0
CONSTIPATION: 0
NAUSEA: 0
DIAPHORESIS: 0
HEMOPTYSIS: 0
HEARTBURN: 0
SPUTUM PRODUCTION: 0
COUGH: 0

## 2017-03-09 ASSESSMENT — PAIN SCALES - GENERAL
PAINLEVEL_OUTOF10: 9
PAINLEVEL_OUTOF10: 6
PAINLEVEL_OUTOF10: 7
PAINLEVEL_OUTOF10: 5
PAINLEVEL_OUTOF10: 4
PAINLEVEL_OUTOF10: 8
PAINLEVEL_OUTOF10: 9
PAINLEVEL_OUTOF10: 5
PAINLEVEL_OUTOF10: 9
PAINLEVEL_OUTOF10: 9
PAINLEVEL_OUTOF10: 8

## 2017-03-09 NOTE — PROGRESS NOTES
ER RN unable to place EJ. Hospitalist notified and orders received for IV foot access. ER RN able to place a 20g in right foot. Pharmacist called as both Rocephin and Edwina were missed previously. Pharmacist okay to hang both as soon as possible and he will retime abx appropriately.

## 2017-03-09 NOTE — PROGRESS NOTES
"Pharmacy Kinetics 46 y.o. female on vancomycin day # 5 3/9/2017    Currently on Vancomycin 1300 mg iv q12hr (04, 16)    Indication for Treatment:  shunt infection    Pertinent history per medical record: Admitted on 3/4/2017 for AMS and sepsis. Patient with history of hydrocephalus and  shunt in place. Concern for  shunt infection, thus initiated on empiric antibiotics. ID consulting with plan for at least 2 weeks of antibiotics.    Other antibiotics: ceftriaxone 2 gm IV q12hrs, vanco 125 mg PO BID    Allergies: Tape     No major concerns for renal function identified.     Pertinent cultures to date:   3/7: CSF - No organisms seen.  3/4: peripheral blood cx X2 - NGTD    CSF Results:   Ref. Range 3/7/2017 16:00   Glucose CSF Latest Ref Range: 40-80 mg/dL 41   Total Protein, CSF Latest Ref Range: 15-45 mg/dL 695 (H)     Imaging:  3/4: CT head - No evidence of acute intracranial process. Left-sided ventricular shunt tube in place. No hydrocephalus. Stable posterior parietal areas of encephalomalacia.    Recent Labs      17   0349  17   0253   WBC  6.2  8.3   NEUTSPOLYS  54.00  73.80*     Recent Labs      17   0349  17   0201  17   0252   BUN  8  8  5*   CREATININE  0.82  0.73  0.65   ALBUMIN   --   3.1*  3.7     Recent Labs      17   1137  17   0837   VANCOTROUGH  24.6*  15.3     Intake/Output Summary (Last 24 hours) at 17 0954  Last data filed at 17 0400   Gross per 24 hour   Intake   1585 ml   Output      0 ml   Net   1585 ml      Blood pressure 140/91, pulse 110, temperature 37 °C (98.6 °F), resp. rate 17, height 1.626 m (5' 4\"), weight 75.4 kg (166 lb 3.6 oz), last menstrual period 2013, SpO2 94 %, not currently breastfeeding. Temp (24hrs), Av.6 °C (97.8 °F), Min:36.1 °C (96.9 °F), Max:37 °C (98.6 °F)      A/P   1. Vancomycin dose change: Continue current regimen  2. Next vancomycin level: ~2 days (not currently ordered)  3. Goal trough: 18-22 " mcg/mL  4. Comments: Patient remains stable on antibiotics for suspected  shunt infection, two weeks antibiotics anticipated per ID. Patient's renal indices stable, vanco dose adjusted yesterday for subtherapeutic trough. Will continue current vanco regimen and repeat trough in ~2 days to evaluate and adjust as necessary.     Pharmacy will continue to follow.     Arpita Rodriguez, ChrisD

## 2017-03-09 NOTE — PROGRESS NOTES
Assumed patient care at 0700. Assessment completed. POC discussed with pt, verbalized understanding, all questions answered. Pt A&Ox4. On room air. Pt states pain 8/10 in head and abdomin, medicated per MAR. Pt is blind in both eyes, stand by assist. Pt educated on fall risk, continues to refuse bed alarm.

## 2017-03-09 NOTE — PROGRESS NOTES
Assumed patient care at 1900. POC discussed w/ day nurse and pt, pt in agreement w/ goals. Pt AOx4. Pt states headache and abdominal pain, given oxy 5. Tylenol also given for headache with no result. Hospitalist paged and received orders for Fioricet. Pt given Zofran and phenergan for nausea. Pt up standby assist, steady gait to the bathroom. Blind in both eyes, aware of surroundings. In need of EJ. ER charge called at 1930 and called again at 0100 to see if anyone could come. At change of shift, PICC team was in patient's room and was unsuccessful starting a PIV. Patient needing IV abx.  Patient educated on use of call light, hourly rounding, and pain scale. Personal possession and call light within reach. Bed alarm refused. Patient calls appropriately.

## 2017-03-09 NOTE — PROGRESS NOTES
Hospital Medicine Progress Note, Adult, Complex               Author: Brenda Curlymichael Date & Time created: 3/8/2017  6:39 PM     Interval History:  47 y/o Female with PMHx of Depression, legal blindness, hx of hydrocephalus with  shunt, admitted for altered mentation and sepsis.    No acute issues overnight, patient is AA0x3, no complaints of fevers/chills, chest pain, shortness of breath or nausea/vommiting.    Review of Systems:  Review of Systems   Constitutional: Negative for fever, chills, weight loss, malaise/fatigue and diaphoresis.   HENT: Negative for congestion, hearing loss and tinnitus.    Eyes:        Legally blind   Respiratory: Negative for cough, hemoptysis, sputum production and shortness of breath.    Cardiovascular: Positive for leg swelling (minimal ). Negative for chest pain, palpitations, orthopnea and claudication.   Gastrointestinal: Positive for abdominal pain (improved). Negative for heartburn, nausea, vomiting, diarrhea, constipation and blood in stool.   Genitourinary: Negative for dysuria, urgency and frequency.   Musculoskeletal: Negative.  Negative for myalgias, back pain and neck pain.   Skin: Negative for rash.   Neurological: Negative.  Negative for dizziness, tingling, tremors and sensory change.   Psychiatric/Behavioral: Negative for depression, suicidal ideas and memory loss. The patient is nervous/anxious. The patient does not have insomnia.        Physical Exam:  Physical Exam   Constitutional: She is oriented to person, place, and time. She appears well-developed and well-nourished. No distress.   HENT:   Head: Normocephalic and atraumatic.   Eyes: Conjunctivae are normal. Pupils are equal, round, and reactive to light. Right eye exhibits no discharge. Left eye exhibits no discharge. No scleral icterus.   dyscongugate eye movement    Neck: Normal range of motion. Neck supple. No JVD present. No thyromegaly present.   Cardiovascular: Regular rhythm and intact distal pulses.   Tachycardia present.    No murmur heard.  Pulmonary/Chest: Effort normal and breath sounds normal. No stridor. She has no wheezes. She has no rales.   Abdominal: Soft. Bowel sounds are normal.   Musculoskeletal: She exhibits edema (trace b/l l/e edema).   Lymphadenopathy:     She has no cervical adenopathy.   Neurological: She is alert and oriented to person, place, and time.   Skin: Skin is warm and dry. No rash noted. She is not diaphoretic. No erythema.   Psychiatric: She has a normal mood and affect. Her behavior is normal. Thought content normal.   Nursing note and vitals reviewed.      Labs:        Invalid input(s): [id number]      Recent Labs      17   SODIUM  140  139  137   POTASSIUM  3.4*  3.4*  3.5*   CHLORIDE  107  109  107   CO2  22  22  23   BUN  6*  8  8   CREATININE  0.70  0.82  0.73   CALCIUM  9.1  8.5  8.5     Recent Labs      17   ALTSGPT   --    --   22   ASTSGOT   --    --   29   ALKPHOSPHAT   --    --   82   TBILIRUBIN   --    --   0.2   GLUCOSE  73  85  91     Recent Labs      17   RBC  4.16*  4.09*   HEMOGLOBIN  12.5  12.1   HEMATOCRIT  38.5  37.3   PLATELETCT  250  260     Recent Labs      17   WBC  5.1  6.2   --    NEUTSPOLYS   --   54.00   --    LYMPHOCYTES   --   32.80   --    MONOCYTES   --   8.40   --    EOSINOPHILS   --   4.00   --    BASOPHILS   --   0.50   --    ASTSGOT   --    --   29   ALTSGPT   --    --   22   ALKPHOSPHAT   --    --   82   TBILIRUBIN   --    --   0.2           Hemodynamics:  Temp (24hrs), Av.1 °C (97 °F), Min:36.1 °C (96.9 °F), Max:36.1 °C (97 °F)  Temperature: 36.1 °C (96.9 °F)  Pulse  Av.6  Min: 99  Max: 134  Blood Pressure: 140/83 mmHg     Respiratory:    Respiration: 18, Pulse Oximetry: 90 %           Fluids:    Intake/Output Summary (Last 24 hours) at 17 5660  Last data  filed at 03/08/17 1300   Gross per 24 hour   Intake   3100 ml   Output      0 ml   Net   3100 ml        GI/Nutrition:  Orders Placed This Encounter   Procedures   • Diet Order     Standing Status: Standing      Number of Occurrences: 1      Standing Expiration Date:      Order Specific Question:  Diet:     Answer:  Regular [1]     Medical Decision Making, by Problem:  Active Hospital Problems    Diagnosis   • Sepsis (CMS-HCC) [A41.9]   - at this point we are concerned for a  shunt infection.   - LP done, shows very high protein but neg stain, of note she was placed on antibiotics before LP.  - Continue Vancomycin and Rocephin,   - ID following.       • Leukocytosis [D72.829]   - resolved   • Encephalopathy [G93.40]   - resolved       Hypokalemia  - replenish lytes, check bmp in the am.    Patient plan of care discussed at multidisplinary team rounds and with patient and R.N at beside.      Labs reviewed, Radiology images reviewed and Medications reviewed  Sharp catheter: No Sharp      DVT Prophylaxis: Contraindicated - High bleeding risk    Ulcer prophylaxis: Not indicated  Antibiotics: Treating active infection/contamination beyond 24 hours perioperative coverage  Assessed for rehab: Patient unable to tolerate rehabilitation therapeutic regimen

## 2017-03-10 LAB
BACTERIA CSF CULT: NORMAL
GRAM STN SPEC: NORMAL
SIGNIFICANT IND 70042: NORMAL
SITE SITE: NORMAL
SOURCE SOURCE: NORMAL
VANCOMYCIN TROUGH SERPL-MCNC: 32.8 UG/ML (ref 10–20)

## 2017-03-10 PROCEDURE — 700105 HCHG RX REV CODE 258: Performed by: HOSPITALIST

## 2017-03-10 PROCEDURE — 700111 HCHG RX REV CODE 636 W/ 250 OVERRIDE (IP): Performed by: HOSPITALIST

## 2017-03-10 PROCEDURE — 99232 SBSQ HOSP IP/OBS MODERATE 35: CPT | Performed by: HOSPITALIST

## 2017-03-10 PROCEDURE — A9270 NON-COVERED ITEM OR SERVICE: HCPCS | Performed by: NURSE PRACTITIONER

## 2017-03-10 PROCEDURE — 700102 HCHG RX REV CODE 250 W/ 637 OVERRIDE(OP): Performed by: HOSPITALIST

## 2017-03-10 PROCEDURE — 700102 HCHG RX REV CODE 250 W/ 637 OVERRIDE(OP): Performed by: NURSE PRACTITIONER

## 2017-03-10 PROCEDURE — 36415 COLL VENOUS BLD VENIPUNCTURE: CPT

## 2017-03-10 PROCEDURE — A9270 NON-COVERED ITEM OR SERVICE: HCPCS | Performed by: HOSPITALIST

## 2017-03-10 PROCEDURE — 700102 HCHG RX REV CODE 250 W/ 637 OVERRIDE(OP): Performed by: INTERNAL MEDICINE

## 2017-03-10 PROCEDURE — 700105 HCHG RX REV CODE 258

## 2017-03-10 PROCEDURE — 80202 ASSAY OF VANCOMYCIN: CPT

## 2017-03-10 PROCEDURE — 700101 HCHG RX REV CODE 250: Performed by: HOSPITALIST

## 2017-03-10 PROCEDURE — 700111 HCHG RX REV CODE 636 W/ 250 OVERRIDE (IP)

## 2017-03-10 PROCEDURE — A9270 NON-COVERED ITEM OR SERVICE: HCPCS | Performed by: INTERNAL MEDICINE

## 2017-03-10 PROCEDURE — 770006 HCHG ROOM/CARE - MED/SURG/GYN SEMI*

## 2017-03-10 RX ORDER — LINEZOLID 600 MG/1
600 TABLET, FILM COATED ORAL EVERY 12 HOURS
Status: DISCONTINUED | OUTPATIENT
Start: 2017-03-10 | End: 2017-03-13 | Stop reason: HOSPADM

## 2017-03-10 RX ORDER — CIPROFLOXACIN 500 MG/1
750 TABLET, FILM COATED ORAL EVERY 12 HOURS
Status: DISCONTINUED | OUTPATIENT
Start: 2017-03-10 | End: 2017-03-13 | Stop reason: HOSPADM

## 2017-03-10 RX ORDER — AMLODIPINE BESYLATE 5 MG/1
5 TABLET ORAL
Status: DISCONTINUED | OUTPATIENT
Start: 2017-03-10 | End: 2017-03-13 | Stop reason: HOSPADM

## 2017-03-10 RX ADMIN — VANCOMYCIN HYDROCHLORIDE 1300 MG: 10 INJECTION, POWDER, LYOPHILIZED, FOR SOLUTION INTRAVENOUS at 04:03

## 2017-03-10 RX ADMIN — OXYCODONE HYDROCHLORIDE 5 MG: 5 TABLET ORAL at 00:51

## 2017-03-10 RX ADMIN — BUTALBITAL, ACETAMINOPHEN, AND CAFFEINE 1 TABLET: 50; 325; 40 TABLET ORAL at 06:19

## 2017-03-10 RX ADMIN — ZOLPIDEM TARTRATE 5 MG: 5 TABLET, FILM COATED ORAL at 20:51

## 2017-03-10 RX ADMIN — LABETALOL HYDROCHLORIDE 10 MG: 5 INJECTION, SOLUTION INTRAVENOUS at 08:05

## 2017-03-10 RX ADMIN — ONDANSETRON 4 MG: 4 TABLET, ORALLY DISINTEGRATING ORAL at 15:06

## 2017-03-10 RX ADMIN — CEFTRIAXONE 2 G: 2 INJECTION, POWDER, FOR SOLUTION INTRAMUSCULAR; INTRAVENOUS at 03:00

## 2017-03-10 RX ADMIN — AMLODIPINE BESYLATE 5 MG: 5 TABLET ORAL at 17:42

## 2017-03-10 RX ADMIN — CIPROFLOXACIN HYDROCHLORIDE 750 MG: 500 TABLET, FILM COATED ORAL at 20:51

## 2017-03-10 RX ADMIN — VANCOMYCIN HYDROCHLORIDE 125 MG: 10 INJECTION, POWDER, LYOPHILIZED, FOR SOLUTION INTRAVENOUS at 08:05

## 2017-03-10 RX ADMIN — LINEZOLID 600 MG: 600 TABLET, FILM COATED ORAL at 20:50

## 2017-03-10 RX ADMIN — MORPHINE SULFATE 2 MG: 4 INJECTION INTRAVENOUS at 09:42

## 2017-03-10 RX ADMIN — OXYCODONE HYDROCHLORIDE 5 MG: 5 TABLET ORAL at 04:03

## 2017-03-10 RX ADMIN — MORPHINE SULFATE 2 MG: 4 INJECTION INTRAVENOUS at 15:06

## 2017-03-10 RX ADMIN — SODIUM CHLORIDE: 9 INJECTION, SOLUTION INTRAVENOUS at 18:13

## 2017-03-10 RX ADMIN — OXYCODONE HYDROCHLORIDE 5 MG: 5 TABLET ORAL at 20:51

## 2017-03-10 RX ADMIN — OXYCODONE HYDROCHLORIDE 5 MG: 5 TABLET ORAL at 23:48

## 2017-03-10 RX ADMIN — LABETALOL HYDROCHLORIDE 10 MG: 5 INJECTION, SOLUTION INTRAVENOUS at 16:43

## 2017-03-10 RX ADMIN — OXYCODONE HYDROCHLORIDE 5 MG: 5 TABLET ORAL at 08:36

## 2017-03-10 RX ADMIN — PROMETHAZINE HYDROCHLORIDE 25 MG: 25 TABLET ORAL at 11:29

## 2017-03-10 ASSESSMENT — ENCOUNTER SYMPTOMS
FEVER: 0
ABDOMINAL PAIN: 1
SHORTNESS OF BREATH: 0
SPEECH CHANGE: 0
BLOOD IN STOOL: 0
MEMORY LOSS: 0
BACK PAIN: 0
HEADACHES: 1
CHILLS: 0
SENSORY CHANGE: 0
COUGH: 0
ABDOMINAL PAIN: 0
NERVOUS/ANXIOUS: 1
HEMOPTYSIS: 0
DIZZINESS: 0
MYALGIAS: 0
NEUROLOGICAL NEGATIVE: 1
TREMORS: 0
VOMITING: 0
DIAPHORESIS: 0
CLAUDICATION: 0
ROS GI COMMENTS: DECREASED
NECK PAIN: 0
MUSCULOSKELETAL NEGATIVE: 1
CONSTIPATION: 0
DEPRESSION: 0
NAUSEA: 0
DIARRHEA: 0
SPUTUM PRODUCTION: 0
TINGLING: 0
PALPITATIONS: 0
WEIGHT LOSS: 0
INSOMNIA: 0
HEARTBURN: 0
ORTHOPNEA: 0

## 2017-03-10 ASSESSMENT — PAIN SCALES - GENERAL
PAINLEVEL_OUTOF10: 3
PAINLEVEL_OUTOF10: 6
PAINLEVEL_OUTOF10: 5
PAINLEVEL_OUTOF10: 7
PAINLEVEL_OUTOF10: 6
PAINLEVEL_OUTOF10: 4
PAINLEVEL_OUTOF10: 5
PAINLEVEL_OUTOF10: 7
PAINLEVEL_OUTOF10: 4
PAINLEVEL_OUTOF10: 4
PAINLEVEL_OUTOF10: 5
PAINLEVEL_OUTOF10: 10
PAINLEVEL_OUTOF10: 8
PAINLEVEL_OUTOF10: 8
PAINLEVEL_OUTOF10: 6

## 2017-03-10 NOTE — CARE PLAN
Problem: Communication  Goal: The ability to communicate needs accurately and effectively will improve  Intervention: [place] patient and significant other/support system to call light to alert staff of needs  Pt blind in both eyes. Aware of surroundings, stand by assist. Call light and personal belongings within reach. Hourly rounding in place.       Problem: Pain Management  Goal: Pain level will decrease to patient’s comfort goal  Intervention: Follow pain managment plan developed in collaboration with patient and Interdisciplinary Team  Pain managed with PRN medications and heat packs. Pt at comfort goal. Will continue to assess.

## 2017-03-10 NOTE — PROGRESS NOTES
Hospital Medicine Progress Note, Adult, Complex               Author: Brenda Tapia Date & Time created: 3/9/2017  6:41 PM     Interval History:  47 y/o Female with PMHx of Depression, legal blindness, hx of hydrocephalus with  shunt, admitted for altered mentation and sepsis.      Patient doing well clinically, is AAOx3, except for mild headaches. Patient denies fevers/chills, chest pain, shortness of breath or nausea/vommiting.   ID following,cont vancomycin and rocephin.     Review of Systems:grossly unchanged  Review of Systems   Constitutional: Negative for fever, chills, weight loss, malaise/fatigue and diaphoresis.   HENT: Negative for congestion, hearing loss and tinnitus.    Eyes:        Legally blind   Respiratory: Negative for cough, hemoptysis, sputum production and shortness of breath.    Cardiovascular: Positive for leg swelling (minimal ). Negative for chest pain, palpitations, orthopnea and claudication.   Gastrointestinal: Negative for heartburn, nausea, vomiting, abdominal pain, diarrhea, constipation and blood in stool.   Genitourinary: Negative for dysuria, urgency and frequency.   Musculoskeletal: Negative.  Negative for myalgias, back pain and neck pain.   Skin: Negative for rash.   Neurological: Negative.  Negative for dizziness, tingling, tremors and sensory change.   Psychiatric/Behavioral: Negative for depression, suicidal ideas and memory loss. The patient is nervous/anxious. The patient does not have insomnia.        Physical Exam: grossly unchanged  Physical Exam   Constitutional: She is oriented to person, place, and time. She appears well-developed and well-nourished. No distress.   HENT:   Head: Normocephalic and atraumatic.   Eyes: Conjunctivae are normal. Pupils are equal, round, and reactive to light. Right eye exhibits no discharge. Left eye exhibits no discharge. No scleral icterus.   dyscongugate eye movement    Neck: Normal range of motion. Neck supple. No JVD present. No  thyromegaly present.   Cardiovascular: Regular rhythm and intact distal pulses.  Tachycardia present.    No murmur heard.  Pulmonary/Chest: Effort normal and breath sounds normal. No stridor. She has no wheezes. She has no rales.   Abdominal: Soft. Bowel sounds are normal.   Musculoskeletal: She exhibits edema (trace b/l l/e edema).   Lymphadenopathy:     She has no cervical adenopathy.   Neurological: She is alert and oriented to person, place, and time.   Skin: Skin is warm and dry. No rash noted. She is not diaphoretic. No erythema.   Psychiatric: She has a normal mood and affect. Her behavior is normal. Thought content normal.   Nursing note and vitals reviewed.      Labs:        Invalid input(s): [id number]      Recent Labs      17   SODIUM  139  137  137   POTASSIUM  3.4*  3.5*  4.0   CHLORIDE  109  107  103   CO2  22  23  24   BUN  8  8  5*   CREATININE  0.82  0.73  0.65   MAGNESIUM   --    --   2.0   CALCIUM  8.5  8.5  9.3     Recent Labs      17   025   ALTSGPT   --   22  24   ASTSGOT   --   29  33   ALKPHOSPHAT   --   82  98   TBILIRUBIN   --   0.2  0.4   GLUCOSE  85  91  112*     Recent Labs      17   RBC  4.09*  4.78   HEMOGLOBIN  12.1  14.5   HEMATOCRIT  37.3  44.3   PLATELETCT  260  301     Recent Labs      17   WBC  6.2   --    --   8.3   NEUTSPOLYS  54.00   --    --   73.80*   LYMPHOCYTES  32.80   --    --   15.00*   MONOCYTES  8.40   --    --   6.20   EOSINOPHILS  4.00   --    --   4.10   BASOPHILS  0.50   --    --   0.50   ASTSGOT   --   29  33   --    ALTSGPT   --   22  24   --    ALKPHOSPHAT   --   82  98   --    TBILIRUBIN   --   0.2  0.4   --            Hemodynamics:  Temp (24hrs), Av.7 °C (98 °F), Min:36.4 °C (97.5 °F), Max:37 °C (98.6 °F)  Temperature: 36.7 °C (98 °F)  Pulse  Av.3  Min: 84  Max:  134  Blood Pressure: 142/107 mmHg (RN notified)     Respiratory:    Respiration: 18, Pulse Oximetry: 99 %           Fluids:    Intake/Output Summary (Last 24 hours) at 03/09/17 1841  Last data filed at 03/09/17 1712   Gross per 24 hour   Intake    940 ml   Output      0 ml   Net    940 ml        GI/Nutrition:  Orders Placed This Encounter   Procedures   • Diet Order     Standing Status: Standing      Number of Occurrences: 1      Standing Expiration Date:      Order Specific Question:  Diet:     Answer:  Regular [1]     Medical Decision Making, by Problem:  Active Hospital Problems    Diagnosis   • Sepsis (CMS-HCC) [A41.9]   - at this point we are concerned for a  shunt infection.   - LP done, shows very high protein but neg stain, of note she was placed on antibiotics before LP.  - Continue Vancomycin and Rocephin, so far. No changes  - ID following.       • Leukocytosis [D72.829]   - resolved   • Encephalopathy [G93.40]   - resolved       Hypokalemia  - resolved    Patient plan of care discussed at multidisplinary team rounds and with patient and R.N at [facility].      Labs reviewed, Radiology images reviewed and Medications reviewed  Sharp catheter: No Sharp      DVT Prophylaxis: Contraindicated - High bleeding risk    Ulcer prophylaxis: Not indicated  Antibiotics: Treating active infection/contamination beyond 24 hours perioperative coverage  Assessed for rehab: Patient unable to tolerate rehabilitation therapeutic regimen

## 2017-03-10 NOTE — PROGRESS NOTES
"Pharmacy Kinetics 46 y.o. female on vancomycin day # 6 3/10/2017    Currently on Vancomycin 1300 mg iv q12hr (04, 16)    Indication for Treatment:  shunt infection    Pertinent history per medical record: Admitted on 3/4/2017 for AMS and sepsis. Patient with history of hydrocephalus and  shunt in place. Concern for  shunt infection, thus initiated on empiric antibiotics. ID consulting with plan for at least 2 weeks of antibiotics.    Other antibiotics: ceftriaxone 2 gm IV q12hrs, vanco 125 mg PO BID    Allergies: Tape     No major concerns for renal function identified.     Pertinent cultures to date:   3/7: CSF - No organisms seen.  3/4: peripheral blood cx X2 - NGTD    CSF Results:   Ref. Range 3/7/2017 16:00   Glucose CSF Latest Ref Range: 40-80 mg/dL 41   Total Protein, CSF Latest Ref Range: 15-45 mg/dL 695 (H)     Imaging:  3/4: CT head - No evidence of acute intracranial process. Left-sided ventricular shunt tube in place. No hydrocephalus. Stable posterior parietal areas of encephalomalacia.    Recent Labs      17   0253   WBC  8.3   NEUTSPOLYS  73.80*     Recent Labs      17   0201  17   0252   BUN  8  5*   CREATININE  0.73  0.65   ALBUMIN  3.1*  3.7     Recent Labs      17   0837  03/10/17   0913   VANCOTROUGH  15.3  32.8*     Intake/Output Summary (Last 24 hours) at 03/10/17 1037  Last data filed at 03/10/17 0600   Gross per 24 hour   Intake   2204 ml   Output      0 ml   Net   2204 ml      Blood pressure 155/102, pulse 115, temperature 36.8 °C (98.3 °F), resp. rate 18, height 1.626 m (5' 4\"), weight 75.4 kg (166 lb 3.6 oz), last menstrual period 2013, SpO2 95 %, not currently breastfeeding. Temp (24hrs), Av.5 °C (97.7 °F), Min:36.1 °C (97 °F), Max:36.8 °C (98.3 °F)      A/P   1. Vancomycin dose change: Continue current regimen  2. Next vancomycin level: 3/11 at 0330  3. Goal trough: 18-22 mcg/mL  4. Comments: Patient remains stable on antibiotics for suspected  " shunt infection, two weeks antibiotics anticipated per ID with possible transition to PO therapy soon. No new renal indices for review, previously stable. Vanco level erroneously drawn today ~ 5 hours post-morning vanco dose. Level semi-representative of vanco peak, however, unknown utility d/t timing of level. Will continue current vanco dose and repeat trough in AM to evaluate and adjust as necessary.     Pharmacy will continue to follow.     Arpita Rodriguez, ChrisD

## 2017-03-10 NOTE — PROGRESS NOTES
Assumed care of pt, received report from day shift RN, pt assessed.  Pt complaining of 8/10 head and abdominal pain, pt medicated per MAR.  Pt is A&O x4 and legally blind.  Pt fall risk, wearing treaded socks, bed locked and in lowest position.  Pt refusing bed alarm, pt educated on need but still refusing.  Pt instructed to call for assistance prior to getting OOB, pt verbalized understanding.  Call light, phone, and personal belongings within reach.

## 2017-03-10 NOTE — CARE PLAN
Problem: Communication  Goal: The ability to communicate needs accurately and effectively will improve  Outcome: PROGRESSING AS EXPECTED  Pt capable of verbalizing all needs and utilizes call light system approprietly.    Problem: Safety  Goal: Will remain free from falls  Outcome: PROGRESSING AS EXPECTED  Pt fall risk, pt wearing treaded socks, bed locked and in lowest position.  Pt refusing bed alarm, educated on need but still refusing.  Pt call light and phone within reach.

## 2017-03-10 NOTE — PROGRESS NOTES
Infectious Disease Progress Note    Author: Sheeba Flores M.D.    DOS & Time created: 3/9/2017  5:24 PM      Chief Complaint   Patient presents with   • Altered Mental Status   FU  shunt infection    Interval History:  46 year old female with hydrocephalus and  shunt admitted with ams  3/6- no fevers. Says she has some abdominal pain and felt dizzy  3/- stillhas some abdominal pain. C/o global headache. No fevers. Thinks she is confused.  3/8 AF WBC 6.2 pain abd decreased. Appetite improved  3/9- no fevers. Headaches.   Labs Reviewed, Medications Reviewed and Radiology Reviewed.    Review of Systems:  Review of Systems   Constitutional: Positive for malaise/fatigue. Negative for fever.   HENT:        Global   Respiratory: Negative for cough.    Cardiovascular: Negative for chest pain.   Gastrointestinal: Positive for abdominal pain. Negative for nausea and vomiting.        Decreased   Genitourinary: Negative for dysuria.   Musculoskeletal: Negative for myalgias.   Neurological: Positive for headaches. Negative for speech change.       Hemodynamics:  Temp (24hrs), Av.7 °C (98 °F), Min:36.4 °C (97.5 °F), Max:37 °C (98.6 °F)  Temperature: 36.7 °C (98 °F)  Pulse  Av.3  Min: 84  Max: 134  Blood Pressure: 142/107 mmHg (RN notified)       Physical Exam:  Physical Exam   Constitutional: She is oriented to person, place, and time. No distress.   HENT:   Dysconjugate gaze   Eyes: No scleral icterus.   Neck: Neck supple.   Cardiovascular: Regular rhythm.  Tachycardia present.    No murmur heard.  Pulmonary/Chest: She has no wheezes. She has no rales.   Abdominal: Soft. There is no tenderness. There is no rebound.   Musculoskeletal: She exhibits no edema.   Neurological: She is alert and oriented to person, place, and time. A cranial nerve deficit is present.   Vitals reviewed.      Labs:  Recent Labs      17   0349  17   0253   WBC  6.2  8.3   RBC  4.09*  4.78   HEMOGLOBIN  12.1  14.5   HEMATOCRIT   37.3  44.3   MCV  91.2  91.0   MCH  29.6  30.5   RDW  47.4  48.0   PLATELETCT  260  301   MPV  10.6  11.3   NEUTSPOLYS  54.00  73.80*   LYMPHOCYTES  32.80  15.00*   MONOCYTES  8.40  6.20   EOSINOPHILS  4.00  4.10   BASOPHILS  0.50  0.50     Recent Labs      03/07/17   0349  03/08/17   0201  03/09/17   0252   SODIUM  139  137  137   POTASSIUM  3.4*  3.5*  4.0   CHLORIDE  109  107  103   CO2  22  23  24   GLUCOSE  85  91  112*   BUN  8  8  5*     Recent Labs      03/07/17   0349  03/08/17   0201  03/09/17   0252   ALBUMIN   --   3.1*  3.7   TBILIRUBIN   --   0.2  0.4   ALKPHOSPHAT   --   82  98   TOTPROTEIN   --   5.9*  7.2   ALTSGPT   --   22  24   ASTSGOT   --   29  33   CREATININE  0.82  0.73  0.65        Imaging:     CT-HEAD W/O (Final result) Result time: 03/04/17 22:24:02     Final result by Clarence Tenorio M.D. (03/04/17 22:24:02)     Impression:     1.  No evidence of acute intracranial process.  2.  Left-sided ventricular shunt tube in place. No hydrocephalus.  3.  Stable posterior parietal areas of encephalomalacia     Final result by Rad Intf (03/05/17 20:57:53)     Narrative:     TransthoracicEcho Report  CONCLUSIONS  Left ventricular ejection fraction is visually estimated to be 65%.   Difficult to assess diastolic function due to tachycardia.  Trace mitral regurgitation.  Trace aortic insufficiency.  Right ventricular systolic pressure is estimated to be 45 mmHg.  Echo from 10/22/15 showed right ventricular systolic pressure of 35   mmHg and grade 1 diastolic dysfunction       Medications:  Current Facility-Administered Medications   Medication Dose Frequency Provider Last Rate Last Dose   • cefTRIAXone (ROCEPHIN) 2 g in  mL IVPB  2 g Q12HRS Brenda Tapia M.D.   Stopped at 03/09/17 1542   • vancomycin 1,300 mg in  mL IVPB  17 mg/kg Q12HR Dhiraj Guerrero PHARMD 166.7 mL/hr at 03/09/17 1628 1,300 mg at 03/09/17 1628   • acetaminophen/caffeine/butalbital 325-40-50 mg (FIORICET)  -40 MG per tablet 1 Tab  1 Tab Q6HRS PRN JULIET ReddyP.R.N.   1 Tab at 03/08/17 2348   • vancomycin 50 mg/mL oral soln 125 mg  125 mg BID Alee Jacobs M.D.   125 mg at 03/09/17 0905   • Vilazodone HCl TABS 1 Tab  1 Tab Q EVENING Curt Acuna M.D.   Stopped at 03/05/17 2100   • NS infusion   Continuous Curt Acuna M.D. 125 mL/hr at 03/09/17 1511     • NS (BOLUS) infusion 500 mL  500 mL Once PRN Curt Acuna M.D.       • acetaminophen (TYLENOL) tablet 650 mg  650 mg Q6HRS PRN Curt Acuna M.D.   650 mg at 03/08/17 2209   • MD ALERT... vancomycin per pharmacy protocol   PRN Curt Acuna M.D.       • labetalol (NORMODYNE,TRANDATE) injection 10 mg  10 mg Q4HRS PRN Curt Acuna M.D.       • ondansetron (ZOFRAN) syringe/vial injection 4 mg  4 mg Q4HRS PRN SHAWNA BlancoDSachin   4 mg at 03/09/17 0935   • ondansetron (ZOFRAN ODT) dispertab 4 mg  4 mg Q4HRS PRN SHAWNA BlancoDSachin   4 mg at 03/09/17 1633   • promethazine (PHENERGAN) tablet 12.5-25 mg  12.5-25 mg Q4HRS PRN Curt Acuna M.D.   25 mg at 03/09/17 1357   • promethazine (PHENERGAN) suppository 12.5-25 mg  12.5-25 mg Q4HRS PRN Curt Acuna M.D.       • prochlorperazine (COMPAZINE) injection 5-10 mg  5-10 mg Q4HRS PRN Curt Acuna M.D.       • zolpidem (AMBIEN) tablet 5 mg  5 mg HS PRN - MR X 1 Curt Acuna M.D.   5 mg at 03/08/17 2021   • oxycodone immediate-release (ROXICODONE) tablet 2.5 mg  2.5 mg Q3HRS PRN Curt Acuna M.D.   2.5 mg at 03/06/17 0458    And   • oxycodone immediate-release (ROXICODONE) tablet 5 mg  5 mg Q3HRS PRN Curt Acuna M.D.   5 mg at 03/09/17 0602    And   • morphine (pf) 4 mg/ml injection 2 mg  2 mg Q3HRS PRN Curt Acuna M.D.   2 mg at 03/09/17 1610     Last reviewed on 3/5/2017  4:17 AM by Georgiana Stinson R.N.    Micro:  Results     Procedure Component Value Units Date/Time    CSF CULTURE [[id number]] Collected:  03/07/17 1600    Order Status:  Completed Specimen Information:  CSF Updated:   "03/09/17 1043     Significant Indicator NEG      Source CSF      Site Tap      CSF Culture No growth at 48 hours.      Gram Stain Result No organisms seen.     Narrative:      per Dr. Tapia via Umesh Jacobs no cell count    GRAM STAIN [[id number]] Collected:  03/07/17 1600    Order Status:  Completed Specimen Information:  CSF Updated:  03/07/17 1703     Significant Indicator .      Source CSF      Site Tap      Gram Stain Result No organisms seen.     Narrative:      per Dr. Tapia via Umesh Jacobs no cell count    CSF Culture [[id number]]     Order Status:  No result Specimen Information:  CSF from Tap     BLOOD CULTURE x2 [[id number]] Collected:  03/04/17 2226    Order Status:  Completed Specimen Information:  Blood from Peripheral Updated:  03/05/17 0735     Significant Indicator NEG      Source BLD      Site PERIPHERAL      Blood Culture --      Result:        No Growth    Note: Blood cultures are incubated for 5 days and  are monitored continuously.Positive blood cultures  are called to the RN and reported as soon as  they are identified.      Narrative:      Per Hospital Policy: Only change Specimen Src: to \"Line\" if  specified by physician order.    BLOOD CULTURE x2 [[id number]] Collected:  03/04/17 2140    Order Status:  Completed Specimen Information:  Blood from Peripheral Updated:  03/05/17 0735     Significant Indicator NEG      Source BLD      Site PERIPHERAL      Blood Culture --      Result:        No Growth    Note: Blood cultures are incubated for 5 days and  are monitored continuously.Positive blood cultures  are called to the RN and reported as soon as  they are identified.      Narrative:      Per Hospital Policy: Only change Specimen Src: to \"Line\" if  specified by physician order.    Urinalysis [[id number]]     Order Status:  Canceled Specimen Information:  Urine from Urine, Clean Catch     Blood Culture [[id number]]     Order Status:  Canceled Specimen Information:  Blood from Peripheral     " Blood Culture [[id number]]     Order Status:  Canceled Specimen Information:  Blood from Peripheral     URINALYSIS,CULTURE IF INDICATED [[id number]] Collected:  03/04/17 2145    Order Status:  Completed Specimen Information:  Urine Updated:  03/04/17 2200     Color Yellow      Character Clear      Specific Gravity 1.010      Ph 6.5      Glucose Negative mg/dL      Ketones Negative mg/dL      Protein Negative mg/dL      Bilirubin Negative      Nitrite Negative      Leukocyte Esterase Negative      Occult Blood Negative      Micro Urine Req see below      Comment: Microscopic examination not performed when specimen is clear  and chemically negative for protein, blood, leukocyte esterase  and nitrite.          Culture Indicated No UA Culture     Culture Respiratory W/ GRM STN [[id number]] Collected:  03/04/17 0000    Order Status:  Canceled Specimen Information:  Sputum from Sputum     Narrative:      TEST Respiratory Culture w/ GS WAS CANCELLED, 03/08/17 01:05 Test  autocancelled, not collected or received  Sputum cultures, induced if needed. If not done within the  last 24 hours          Assessment:  Active Hospital Problems    Diagnosis   • Sepsis (CMS-HCC) [A41.9]   • Leukocytosis [D72.829]   • Encephalopathy [G93.40]       Plan:  Concern for  shunt infection  Improving. Encephalopathy improved  LP done-but on abx  CSF with very high protein, neg gram stain (done on abx)  Continue vanco and rocephin  Anticipate at least 2 week course  Will try to change her to oral    Leukocytosis  resolved    H/O Cdiff colitis  Continue oral vanco prophylaxis while on abx      Discussed with internal medicine

## 2017-03-10 NOTE — PROGRESS NOTES
Infectious Disease Progress Note    Author: Sheeba Flores M.D.    DOS & Time created: 3/10/2017  11:20 AM      Chief Complaint   Patient presents with   • Altered Mental Status   FU  shunt infection    Interval History:  46 year old female with hydrocephalus and  shunt admitted with ams  3/- no fevers. Says she has some abdominal pain and felt dizzy  3/7- stillhas some abdominal pain. C/o global headache. No fevers. Thinks she is confused.  3/8 AF WBC 6.2 pain abd decreased. Appetite improved  3/9- no fevers. Headaches.   3/10- no fevers. No new issues  Labs Reviewed, Medications Reviewed and Radiology Reviewed.    Review of Systems:  Review of Systems   Constitutional: Positive for malaise/fatigue. Negative for fever.   HENT:        Global   Respiratory: Negative for cough.    Cardiovascular: Negative for chest pain.   Gastrointestinal: Positive for abdominal pain. Negative for nausea and vomiting.        Decreased   Genitourinary: Negative for dysuria.   Musculoskeletal: Negative for myalgias.   Neurological: Positive for headaches. Negative for speech change.       Hemodynamics:  Temp (24hrs), Av.5 °C (97.7 °F), Min:36.1 °C (97 °F), Max:36.8 °C (98.3 °F)  Temperature: 36.8 °C (98.3 °F)  Pulse  Av.2  Min: 84  Max: 134  Blood Pressure: 155/102 mmHg       Physical Exam:  Physical Exam   Constitutional: She is oriented to person, place, and time. No distress.   HENT:   Dysconjugate gaze   Eyes: No scleral icterus.   Neck: Neck supple.   Cardiovascular: Regular rhythm.  Tachycardia present.    No murmur heard.  Pulmonary/Chest: She has no wheezes. She has no rales.   Abdominal: Soft. There is no tenderness. There is no rebound.   Musculoskeletal: She exhibits no edema.   Neurological: She is alert and oriented to person, place, and time. A cranial nerve deficit is present.   Vitals reviewed.      Labs:  Recent Labs      17   0253   WBC  8.3   RBC  4.78   HEMOGLOBIN  14.5   HEMATOCRIT  44.3    MCV  91.0   MCH  30.5   RDW  48.0   PLATELETCT  301   MPV  11.3   NEUTSPOLYS  73.80*   LYMPHOCYTES  15.00*   MONOCYTES  6.20   EOSINOPHILS  4.10   BASOPHILS  0.50     Recent Labs      03/08/17   0201  03/09/17   0252   SODIUM  137  137   POTASSIUM  3.5*  4.0   CHLORIDE  107  103   CO2  23  24   GLUCOSE  91  112*   BUN  8  5*     Recent Labs      03/08/17   0201  03/09/17   0252   ALBUMIN  3.1*  3.7   TBILIRUBIN  0.2  0.4   ALKPHOSPHAT  82  98   TOTPROTEIN  5.9*  7.2   ALTSGPT  22  24   ASTSGOT  29  33   CREATININE  0.73  0.65        Imaging:     CT-HEAD W/O (Final result) Result time: 03/04/17 22:24:02     Final result by Clarence Tenorio M.D. (03/04/17 22:24:02)     Impression:     1.  No evidence of acute intracranial process.  2.  Left-sided ventricular shunt tube in place. No hydrocephalus.  3.  Stable posterior parietal areas of encephalomalacia     Final result by Rad Intf (03/05/17 20:57:53)     Narrative:     TransthoracicEcho Report  CONCLUSIONS  Left ventricular ejection fraction is visually estimated to be 65%.   Difficult to assess diastolic function due to tachycardia.  Trace mitral regurgitation.  Trace aortic insufficiency.  Right ventricular systolic pressure is estimated to be 45 mmHg.  Echo from 10/22/15 showed right ventricular systolic pressure of 35   mmHg and grade 1 diastolic dysfunction       Medications:  Current Facility-Administered Medications   Medication Dose Frequency Provider Last Rate Last Dose   • cefTRIAXone (ROCEPHIN) 2 g in  mL IVPB  2 g Q12HRS Brenda Tapia M.D.   Stopped at 03/10/17 0330   • vancomycin 1,300 mg in  mL IVPB  17 mg/kg Q12HR Dhiraj Guerrero, RUCHID   Stopped at 03/10/17 3407   • acetaminophen/caffeine/butalbital 325-40-50 mg (FIORICET) -40 MG per tablet 1 Tab  1 Tab Q6HRS PRN JULEIT ReddyP.R.N.   1 Tab at 03/10/17 0619   • vancomycin 50 mg/mL oral soln 125 mg  125 mg BID Alee Jacobs M.D.   125 mg at 03/10/17 0805   •  Vilazodone HCl TABS 1 Tab  1 Tab Q EVENING Curt Acuna M.D.   Stopped at 03/05/17 2100   • NS infusion   Continuous Curt Acuna M.D. 125 mL/hr at 03/09/17 1511     • NS (BOLUS) infusion 500 mL  500 mL Once PRN Curt Acuna M.D.       • acetaminophen (TYLENOL) tablet 650 mg  650 mg Q6HRS PRN Curt Acuna M.D.   650 mg at 03/08/17 2209   • MD ALERT... vancomycin per pharmacy protocol   PRN Curt Acuna M.D.       • labetalol (NORMODYNE,TRANDATE) injection 10 mg  10 mg Q4HRS PRN SHAWNA BlancoDSachin   10 mg at 03/10/17 0805   • ondansetron (ZOFRAN) syringe/vial injection 4 mg  4 mg Q4HRS PRN SHAWNA BlancoDSachin   4 mg at 03/09/17 0935   • ondansetron (ZOFRAN ODT) dispertab 4 mg  4 mg Q4HRS PRN SHAWNA BlancoDSachin   4 mg at 03/09/17 1633   • promethazine (PHENERGAN) tablet 12.5-25 mg  12.5-25 mg Q4HRS PRN Curt Acuna M.D.   25 mg at 03/09/17 1801   • promethazine (PHENERGAN) suppository 12.5-25 mg  12.5-25 mg Q4HRS PRN Curt Acuna M.D.       • prochlorperazine (COMPAZINE) injection 5-10 mg  5-10 mg Q4HRS PRN Curt Acuna M.D.       • zolpidem (AMBIEN) tablet 5 mg  5 mg HS PRN - MR X 1 Curt Acuna M.D.   5 mg at 03/09/17 2111   • oxycodone immediate-release (ROXICODONE) tablet 2.5 mg  2.5 mg Q3HRS PRN Curt Acuna M.D.   2.5 mg at 03/06/17 0458    And   • oxycodone immediate-release (ROXICODONE) tablet 5 mg  5 mg Q3HRS PRN Curt Acuna M.D.   5 mg at 03/10/17 0836    And   • morphine (pf) 4 mg/ml injection 2 mg  2 mg Q3HRS PRN Curt Acuna M.D.   2 mg at 03/10/17 0942     Last reviewed on 3/5/2017  4:17 AM by Georgiana Stinson R.N.    Micro:  Results     Procedure Component Value Units Date/Time    CSF CULTURE [[id number]] Collected:  03/07/17 1600    Order Status:  Completed Specimen Information:  CSF Updated:  03/10/17 0845     Gram Stain Result No organisms seen.      Significant Indicator NEG      Source CSF      Site Tap      CSF Culture No growth at 72 hours.     Narrative:      per   "Regina Jacobs no cell count    BLOOD CULTURE x2 [[id number]] Collected:  03/04/17 2140    Order Status:  Completed Specimen Information:  Blood from Peripheral Updated:  03/09/17 2300     Significant Indicator NEG      Source BLD      Site PERIPHERAL      Blood Culture No growth after 5 days of incubation.     Narrative:      Per Hospital Policy: Only change Specimen Src: to \"Line\" if  specified by physician order.    BLOOD CULTURE x2 [[id number]] Collected:  03/04/17 2226    Order Status:  Completed Specimen Information:  Blood from Peripheral Updated:  03/09/17 2300     Significant Indicator NEG      Source BLD      Site PERIPHERAL      Blood Culture No growth after 5 days of incubation.     Narrative:      Per Hospital Policy: Only change Specimen Src: to \"Line\" if  specified by physician order.    GRAM STAIN [[id number]] Collected:  03/07/17 1600    Order Status:  Completed Specimen Information:  CSF Updated:  03/07/17 1703     Significant Indicator .      Source CSF      Site Tap      Gram Stain Result No organisms seen.     Narrative:      per Dr. Regina Jacobs no cell count    CSF Culture [[id number]]     Order Status:  No result Specimen Information:  CSF from Tap     Urinalysis [[id number]]     Order Status:  Canceled Specimen Information:  Urine from Urine, Clean Catch     Blood Culture [[id number]]     Order Status:  Canceled Specimen Information:  Blood from Peripheral     Blood Culture [[id number]]     Order Status:  Canceled Specimen Information:  Blood from Peripheral     URINALYSIS,CULTURE IF INDICATED [[id number]] Collected:  03/04/17 2145    Order Status:  Completed Specimen Information:  Urine Updated:  03/04/17 2200     Color Yellow      Character Clear      Specific Gravity 1.010      Ph 6.5      Glucose Negative mg/dL      Ketones Negative mg/dL      Protein Negative mg/dL      Bilirubin Negative      Nitrite Negative      Leukocyte Esterase Negative      Occult Blood Negative     "  Micro Urine Req see below      Comment: Microscopic examination not performed when specimen is clear  and chemically negative for protein, blood, leukocyte esterase  and nitrite.          Culture Indicated No UA Culture     Culture Respiratory W/ GRM STN [[id number]] Collected:  03/04/17 0000    Order Status:  Canceled Specimen Information:  Sputum from Sputum     Narrative:      TEST Respiratory Culture w/ GS WAS CANCELLED, 03/08/17 01:05 Test  autocancelled, not collected or received  Sputum cultures, induced if needed. If not done within the  last 24 hours          Assessment:  Active Hospital Problems    Diagnosis   • Sepsis (CMS-HCC) [A41.9]   • Leukocytosis [D72.829]   • Encephalopathy [G93.40]       Plan:  Concern for  shunt infection  Improving. Encephalopathy improved  LP done-but on abx  CSF with very high protein, neg gram stain (done on abx)  Change vanco and rocephin to zyvox and cipro for csf empiric coverage  Anticipate at least 2 week course thro 3/18  Watch for drug interaction with ssri  Reassess after abx done  May need another lp if symptoms recur and ns f/u      Leukocytosis  resolved    H/O Cdiff colitis  Continue oral vanco prophylaxis while on abx      Discussed with internal medicine

## 2017-03-10 NOTE — PROGRESS NOTES
Assumed patient care at 0700. Assessment completed. POC discussed with pt, verbalized understanding, all questions answered. Pt A&Ox4. On room air. Pt denies pain and nausea at this time. Pt is blind in both eyes, stand by assist. Pt educated on fall risk, continues to refuse bed alarm. Personal belongings and call light within reach. Pt calls appropriately.

## 2017-03-10 NOTE — CARE PLAN
Problem: Safety  Goal: Will remain free from injury  Outcome: PROGRESSING AS EXPECTED  Educated patient on fall risk and calling before getting out of bed, verbalized understanding, call light within reach.     Problem: Infection  Goal: Will remain free from infection  Intervention: Give CDC handouts for infection prevention (infection prevention/hand washing, disease specific, and device specific) and document in Education  Standard precautions in place. Hand washing performed before and after patient care. Pt free of signs/symptoms of infection.

## 2017-03-11 PROCEDURE — 700105 HCHG RX REV CODE 258: Performed by: HOSPITALIST

## 2017-03-11 PROCEDURE — 700102 HCHG RX REV CODE 250 W/ 637 OVERRIDE(OP): Performed by: HOSPITALIST

## 2017-03-11 PROCEDURE — 700102 HCHG RX REV CODE 250 W/ 637 OVERRIDE(OP): Performed by: INTERNAL MEDICINE

## 2017-03-11 PROCEDURE — A9270 NON-COVERED ITEM OR SERVICE: HCPCS | Performed by: INTERNAL MEDICINE

## 2017-03-11 PROCEDURE — A9270 NON-COVERED ITEM OR SERVICE: HCPCS | Performed by: HOSPITALIST

## 2017-03-11 PROCEDURE — 700102 HCHG RX REV CODE 250 W/ 637 OVERRIDE(OP): Performed by: NURSE PRACTITIONER

## 2017-03-11 PROCEDURE — A9270 NON-COVERED ITEM OR SERVICE: HCPCS | Performed by: NURSE PRACTITIONER

## 2017-03-11 PROCEDURE — 770006 HCHG ROOM/CARE - MED/SURG/GYN SEMI*

## 2017-03-11 PROCEDURE — 99232 SBSQ HOSP IP/OBS MODERATE 35: CPT | Performed by: HOSPITALIST

## 2017-03-11 PROCEDURE — 700111 HCHG RX REV CODE 636 W/ 250 OVERRIDE (IP): Performed by: HOSPITALIST

## 2017-03-11 RX ADMIN — OXYCODONE HYDROCHLORIDE 5 MG: 5 TABLET ORAL at 21:04

## 2017-03-11 RX ADMIN — AMLODIPINE BESYLATE 5 MG: 5 TABLET ORAL at 08:00

## 2017-03-11 RX ADMIN — OXYCODONE HYDROCHLORIDE 5 MG: 5 TABLET ORAL at 11:27

## 2017-03-11 RX ADMIN — LINEZOLID 600 MG: 600 TABLET, FILM COATED ORAL at 21:04

## 2017-03-11 RX ADMIN — CIPROFLOXACIN HYDROCHLORIDE 750 MG: 500 TABLET, FILM COATED ORAL at 21:04

## 2017-03-11 RX ADMIN — LINEZOLID 600 MG: 600 TABLET, FILM COATED ORAL at 08:01

## 2017-03-11 RX ADMIN — MORPHINE SULFATE 2 MG: 4 INJECTION INTRAVENOUS at 08:08

## 2017-03-11 RX ADMIN — CIPROFLOXACIN HYDROCHLORIDE 750 MG: 500 TABLET, FILM COATED ORAL at 08:00

## 2017-03-11 RX ADMIN — PROMETHAZINE HYDROCHLORIDE 25 MG: 25 TABLET ORAL at 17:57

## 2017-03-11 RX ADMIN — PROMETHAZINE HYDROCHLORIDE 25 MG: 25 TABLET ORAL at 08:58

## 2017-03-11 RX ADMIN — BUTALBITAL, ACETAMINOPHEN, AND CAFFEINE 1 TABLET: 50; 325; 40 TABLET ORAL at 05:55

## 2017-03-11 RX ADMIN — OXYCODONE HYDROCHLORIDE 5 MG: 5 TABLET ORAL at 03:45

## 2017-03-11 RX ADMIN — SODIUM CHLORIDE: 9 INJECTION, SOLUTION INTRAVENOUS at 01:56

## 2017-03-11 RX ADMIN — ZOLPIDEM TARTRATE 5 MG: 5 TABLET, FILM COATED ORAL at 21:04

## 2017-03-11 RX ADMIN — MORPHINE SULFATE 2 MG: 4 INJECTION INTRAVENOUS at 17:05

## 2017-03-11 RX ADMIN — SODIUM CHLORIDE: 9 INJECTION, SOLUTION INTRAVENOUS at 17:57

## 2017-03-11 RX ADMIN — MORPHINE SULFATE 2 MG: 4 INJECTION INTRAVENOUS at 13:49

## 2017-03-11 ASSESSMENT — ENCOUNTER SYMPTOMS
CLAUDICATION: 0
DIARRHEA: 0
SPUTUM PRODUCTION: 0
TINGLING: 0
DIZZINESS: 0
BACK PAIN: 0
COUGH: 0
ORTHOPNEA: 0
MYALGIAS: 0
TREMORS: 0
CONSTIPATION: 0
CHILLS: 0
DIAPHORESIS: 0
HEADACHES: 1
HEMOPTYSIS: 0
NEUROLOGICAL NEGATIVE: 1
WEIGHT LOSS: 0
MUSCULOSKELETAL NEGATIVE: 1
DEPRESSION: 0
HEARTBURN: 0
NECK PAIN: 0
VOMITING: 0
SENSORY CHANGE: 0
SHORTNESS OF BREATH: 0
ABDOMINAL PAIN: 0
PALPITATIONS: 0
FEVER: 0
INSOMNIA: 0
MEMORY LOSS: 0
NERVOUS/ANXIOUS: 1
NAUSEA: 0
BLOOD IN STOOL: 0
SPEECH CHANGE: 0

## 2017-03-11 ASSESSMENT — PAIN SCALES - GENERAL
PAINLEVEL_OUTOF10: 5
PAINLEVEL_OUTOF10: 8
PAINLEVEL_OUTOF10: 9
PAINLEVEL_OUTOF10: 9
PAINLEVEL_OUTOF10: 8
PAINLEVEL_OUTOF10: 9
PAINLEVEL_OUTOF10: 9
PAINLEVEL_OUTOF10: 8
PAINLEVEL_OUTOF10: 5

## 2017-03-11 NOTE — PROGRESS NOTES
Received report from [facility] nurse. Assessment complete. Patient is A&Ox4, states pain 9/10 in head, prn medication administered, up with SBA, calls appropriately, no family present, PIV patent in foot and IVF infusing.Patient is resting now. Call light within reach, bed in lowest position, treaded socks in place.

## 2017-03-11 NOTE — PROGRESS NOTES
Hospital Medicine Progress Note, Adult, Complex               Author: Brenda Tapia Date & Time created: 3/10/2017  4:31 PM     Interval History:  47 y/o Female with PMHx of Depression, legal blindness, hx of hydrocephalus with  shunt, admitted for altered mentation and sepsis.    Patient doing well clinically, is AAOx3, except for mild headaches. Patient denies fevers/chills, chest pain, shortness of breath or nausea/vommiting.   ID following,cont vancomycin and rocephin.     3/10  No issues overnight patient doing well clinically. Patient denies fevers/chills, chest pain, shortness of breath or nausea/vommiting.   Change IV antibiotics to PO Zyvox and cipro. 2 weeks 3.18.    Review of Systems:grossly unchanged  Review of Systems   Constitutional: Negative for fever, chills, weight loss, malaise/fatigue and diaphoresis.   HENT: Negative for congestion, hearing loss and tinnitus.    Eyes:        Legally blind   Respiratory: Negative for cough, hemoptysis, sputum production and shortness of breath.    Cardiovascular: Positive for leg swelling (minimal ). Negative for chest pain, palpitations, orthopnea and claudication.   Gastrointestinal: Negative for heartburn, nausea, vomiting, abdominal pain, diarrhea, constipation and blood in stool.   Genitourinary: Negative for dysuria, urgency and frequency.   Musculoskeletal: Negative.  Negative for myalgias, back pain and neck pain.   Skin: Negative for rash.   Neurological: Negative.  Negative for dizziness, tingling, tremors and sensory change.   Psychiatric/Behavioral: Negative for depression, suicidal ideas and memory loss. The patient is nervous/anxious. The patient does not have insomnia.        Physical Exam: grossly unchanged  Physical Exam   Constitutional: She is oriented to person, place, and time. She appears well-developed and well-nourished. No distress.   HENT:   Head: Normocephalic and atraumatic.   Eyes: Conjunctivae are normal. Pupils are equal,  round, and reactive to light. Right eye exhibits no discharge. Left eye exhibits no discharge. No scleral icterus.   dyscongugate eye movement    Neck: Normal range of motion. Neck supple. No JVD present. No thyromegaly present.   Cardiovascular: Regular rhythm and intact distal pulses.  Tachycardia present.    No murmur heard.  Pulmonary/Chest: Effort normal and breath sounds normal. No stridor. She has no wheezes. She has no rales.   Abdominal: Soft. Bowel sounds are normal.   Musculoskeletal: She exhibits edema (trace b/l l/e edema).   Lymphadenopathy:     She has no cervical adenopathy.   Neurological: She is alert and oriented to person, place, and time.   Skin: Skin is warm and dry. No rash noted. She is not diaphoretic. No erythema.   Psychiatric: She has a normal mood and affect. Her behavior is normal. Thought content normal.   Nursing note and vitals reviewed.      Labs:        Invalid input(s): [id number]      Recent Labs      17   SODIUM  137  137   POTASSIUM  3.5*  4.0   CHLORIDE  107  103   CO2  23  24   BUN  8  5*   CREATININE  0.73  0.65   MAGNESIUM   --   2.0   CALCIUM  8.5  9.3     Recent Labs      17   02017   025   ALTSGPT  22  24   ASTSGOT  29  33   ALKPHOSPHAT  82  98   TBILIRUBIN  0.2  0.4   GLUCOSE  91  112*     Recent Labs      17   RBC  4.78   HEMOGLOBIN  14.5   HEMATOCRIT  44.3   PLATELETCT  301     Recent Labs      17   WBC   --    --   8.3   NEUTSPOLYS   --    --   73.80*   LYMPHOCYTES   --    --   15.00*   MONOCYTES   --    --   6.20   EOSINOPHILS   --    --   4.10   BASOPHILS   --    --   0.50   ASTSGOT  29  33   --    ALTSGPT  22  24   --    ALKPHOSPHAT  82  98   --    TBILIRUBIN  0.2  0.4   --            Hemodynamics:  Temp (24hrs), Av.5 °C (97.7 °F), Min:36.1 °C (97 °F), Max:36.8 °C (98.3 °F)  Temperature: 36.6 °C (97.9 °F)  Pulse  Av.8  Min: 84  Max: 134  Blood  Pressure: 149/104 mmHg     Respiratory:    Respiration: 18, Pulse Oximetry: 95 %           Fluids:    Intake/Output Summary (Last 24 hours) at 03/10/17 1631  Last data filed at 03/10/17 1300   Gross per 24 hour   Intake   2542 ml   Output      0 ml   Net   2542 ml        GI/Nutrition:  Orders Placed This Encounter   Procedures   • Diet Order     Standing Status: Standing      Number of Occurrences: 1      Standing Expiration Date:      Order Specific Question:  Diet:     Answer:  Regular [1]     Medical Decision Making, by Problem:  Active Hospital Problems    Diagnosis   • Sepsis (CMS-HCC) [A41.9]   - at this point we are concerned for a  shunt infection.   - LP done, shows very high protein but neg stain, of note she was placed on antibiotics before LP.  - ID following.  - Change IV antibiotics to PO Zyvox and cipro. 2 weeks Stop Date: 3/18     • Leukocytosis [D72.829]   - resolved   • Encephalopathy [G93.40]   - resolved       Hypokalemia  - resolved    Patient plan of care discussed at multidisplinary team rounds and with patient and R.N at [facility].      Labs reviewed, Radiology images reviewed and Medications reviewed  Sharp catheter: No Sharp      DVT Prophylaxis: Contraindicated - High bleeding risk    Ulcer prophylaxis: Not indicated  Antibiotics: Treating active infection/contamination beyond 24 hours perioperative coverage  Assessed for rehab: Patient unable to tolerate rehabilitation therapeutic regimen

## 2017-03-11 NOTE — CARE PLAN
Problem: Bowel/Gastric:  Goal: Normal bowel function is maintained or improved  Intervention: Collaborate with Interdisciplinary Team for optimal positioning for bowel evacuation  Pt having regular BM.       Problem: Pain Management  Goal: Pain level will decrease to patient’s comfort goal  Intervention: Follow pain managment plan developed in collaboration with patient and Interdisciplinary Team  Prn pain medication administered for pain control.

## 2017-03-11 NOTE — PROGRESS NOTES
Assumed care of pt, received report from day shift RN, pt assessed.  Pt complaining of 7/10 head and abdominal pain, pt medicated per MAR.  Pt is A&O x4 and legally blind.  Pt fall risk, wearing treaded socks, bed locked and in lowest position.  Pt refusing bed alarm, pt educated on need but still refusing.  Pt instructed to call for assistance prior to getting OOB, pt verbalized understanding.  Call light, phone, and personal belongings within reach.

## 2017-03-11 NOTE — CARE PLAN
Problem: Infection  Goal: Will remain free from infection  Outcome: PROGRESSING AS EXPECTED  Pt transitioned to PO abx and is showing no signs of new or worsening infection.    Problem: Pain Management  Goal: Pain level will decrease to patient’s comfort goal  Outcome: PROGRESSING AS EXPECTED  Pt complaining of head and abdominal pain.  Pt has prn PO and IV pain medications ordered.

## 2017-03-11 NOTE — PROGRESS NOTES
Infectious Disease Progress Note    Author: Sheeba Flores M.D.    DOS & Time created: 3/11/2017  3:12 PM      Chief Complaint   Patient presents with   • Altered Mental Status   FU  shunt infection    Interval History:  46 year old female with hydrocephalus and  shunt admitted with ams  3/6- no fevers. Says she has some abdominal pain and felt dizzy  3/7- stillhas some abdominal pain. C/o global headache. No fevers. Thinks she is confused.  3/8 AF WBC 6.2 pain abd decreased. Appetite improved  3/9- no fevers. Headaches.   3/10- no fevers. No new issues. Says has migraine  Labs Reviewed, Medications Reviewed and Radiology Reviewed.    Review of Systems:  Review of Systems   Constitutional: Positive for malaise/fatigue. Negative for fever.   HENT:        Global   Respiratory: Negative for cough.    Cardiovascular: Negative for chest pain.   Gastrointestinal: Negative for nausea, vomiting and abdominal pain.   Genitourinary: Negative for dysuria.   Musculoskeletal: Negative for myalgias.   Neurological: Positive for headaches. Negative for speech change.       Hemodynamics:  Temp (24hrs), Av.4 °C (97.6 °F), Min:36.1 °C (96.9 °F), Max:36.8 °C (98.3 °F)  Temperature: 36.6 °C (97.9 °F)  Pulse  Av.2  Min: 84  Max: 134  Blood Pressure: 143/93 mmHg       Physical Exam:  Physical Exam   Constitutional: She is oriented to person, place, and time. No distress.   HENT:   Dysconjugate gaze   Eyes: No scleral icterus.   Neck: Neck supple.   Cardiovascular: Regular rhythm.  Tachycardia present.    No murmur heard.  Pulmonary/Chest: She has no wheezes. She has no rales.   Abdominal: Soft. There is no tenderness. There is no rebound.   Musculoskeletal: She exhibits no edema.   Neurological: She is alert and oriented to person, place, and time. A cranial nerve deficit is present.   Vitals reviewed.      Labs:  Recent Labs      17   0253   WBC  8.3   RBC  4.78   HEMOGLOBIN  14.5   HEMATOCRIT  44.3   MCV  91.0    MCH  30.5   RDW  48.0   PLATELETCT  301   MPV  11.3   NEUTSPOLYS  73.80*   LYMPHOCYTES  15.00*   MONOCYTES  6.20   EOSINOPHILS  4.10   BASOPHILS  0.50     Recent Labs      03/09/17   0252   SODIUM  137   POTASSIUM  4.0   CHLORIDE  103   CO2  24   GLUCOSE  112*   BUN  5*     Recent Labs      03/09/17   0252   ALBUMIN  3.7   TBILIRUBIN  0.4   ALKPHOSPHAT  98   TOTPROTEIN  7.2   ALTSGPT  24   ASTSGOT  33   CREATININE  0.65        Imaging:     CT-HEAD W/O (Final result) Result time: 03/04/17 22:24:02     Final result by Clarence Tenorio M.D. (03/04/17 22:24:02)     Impression:     1.  No evidence of acute intracranial process.  2.  Left-sided ventricular shunt tube in place. No hydrocephalus.  3.  Stable posterior parietal areas of encephalomalacia     Final result by Rad Intf (03/05/17 20:57:53)     Narrative:     TransthoracicEcho Report  CONCLUSIONS  Left ventricular ejection fraction is visually estimated to be 65%.   Difficult to assess diastolic function due to tachycardia.  Trace mitral regurgitation.  Trace aortic insufficiency.  Right ventricular systolic pressure is estimated to be 45 mmHg.  Echo from 10/22/15 showed right ventricular systolic pressure of 35   mmHg and grade 1 diastolic dysfunction       Medications:  Current Facility-Administered Medications   Medication Dose Frequency Provider Last Rate Last Dose   • linezolid (ZYVOX) tablet 600 mg  600 mg Q12HRS Sheeba Flores M.D.   600 mg at 03/11/17 0801   • ciprofloxacin (CIPRO) tablet 750 mg  750 mg Q12HRS Sheeba Flores M.D.   750 mg at 03/11/17 0800   • amlodipine (NORVASC) tablet 5 mg  5 mg Q DAY Brenda Tapia M.D.   5 mg at 03/11/17 0800   • acetaminophen/caffeine/butalbital 325-40-50 mg (FIORICET) -40 MG per tablet 1 Tab  1 Tab Q6HRS PRN DAINA Reddy.P.R.NSachin   1 Tab at 03/11/17 0555   • NS infusion   Continuous Curt Acuna M.D. 125 mL/hr at 03/11/17 0156     • NS (BOLUS) infusion 500 mL  500 mL Once PRN Curt Acuna M.D.        • acetaminophen (TYLENOL) tablet 650 mg  650 mg Q6HRS PRN Levente Levjúnior, M.D.   650 mg at 03/08/17 2209   • labetalol (NORMODYNE,TRANDATE) injection 10 mg  10 mg Q4HRS PRN Levente Levai, M.D.   10 mg at 03/10/17 1643   • ondansetron (ZOFRAN) syringe/vial injection 4 mg  4 mg Q4HRS PRN Levente Levjúnior, M.D.   4 mg at 03/09/17 0935   • ondansetron (ZOFRAN ODT) dispertab 4 mg  4 mg Q4HRS PRN Levente Levai, M.D.   4 mg at 03/10/17 1506   • promethazine (PHENERGAN) tablet 12.5-25 mg  12.5-25 mg Q4HRS PRN Levosiris Levjúnior, M.D.   25 mg at 03/11/17 0858   • promethazine (PHENERGAN) suppository 12.5-25 mg  12.5-25 mg Q4HRS PRN Levosiris Acuna, M.D.       • prochlorperazine (COMPAZINE) injection 5-10 mg  5-10 mg Q4HRS PRN Levosiris Acuna M.D.       • zolpidem (AMBIEN) tablet 5 mg  5 mg HS PRN - MR X 1 Levosiris Levjúnior, M.D.   5 mg at 03/10/17 2051   • oxycodone immediate-release (ROXICODONE) tablet 2.5 mg  2.5 mg Q3HRS PRN Levosiris Acuna, M.D.   2.5 mg at 03/06/17 0458    And   • oxycodone immediate-release (ROXICODONE) tablet 5 mg  5 mg Q3HRS PRN Levosiris Levjúnior, M.D.   5 mg at 03/11/17 1127    And   • morphine (pf) 4 mg/ml injection 2 mg  2 mg Q3HRS PRN Levosiris Levjúnior, M.D.   2 mg at 03/11/17 1349     Last reviewed on 3/5/2017  4:17 AM by Georgiana Stinson R.N.    Micro:  Results     Procedure Component Value Units Date/Time    CSF CULTURE [[id number]] Collected:  03/07/17 1600    Order Status:  Completed Specimen Information:  CSF Updated:  03/10/17 0845     Gram Stain Result No organisms seen.      Significant Indicator NEG      Source CSF      Site Tap      CSF Culture No growth at 72 hours.     Narrative:      per Dr. Regina Jacobs no cell count    BLOOD CULTURE x2 [[id number]] Collected:  03/04/17 2140    Order Status:  Completed Specimen Information:  Blood from Peripheral Updated:  03/09/17 2300     Significant Indicator NEG      Source BLD      Site PERIPHERAL      Blood Culture No growth after 5 days of incubation.   "   Narrative:      Per Hospital Policy: Only change Specimen Src: to \"Line\" if  specified by physician order.    BLOOD CULTURE x2 [[id number]] Collected:  03/04/17 2226    Order Status:  Completed Specimen Information:  Blood from Peripheral Updated:  03/09/17 2300     Significant Indicator NEG      Source BLD      Site PERIPHERAL      Blood Culture No growth after 5 days of incubation.     Narrative:      Per Hospital Policy: Only change Specimen Src: to \"Line\" if  specified by physician order.    GRAM STAIN [[id number]] Collected:  03/07/17 1600    Order Status:  Completed Specimen Information:  CSF Updated:  03/07/17 1703     Significant Indicator .      Source CSF      Site Tap      Gram Stain Result No organisms seen.     Narrative:      per Dr. Tapia via Umesh Jacobs no cell count    CSF Culture [[id number]]     Order Status:  No result Specimen Information:  CSF from Tap     Urinalysis [[id number]]     Order Status:  Canceled Specimen Information:  Urine from Urine, Clean Catch     Blood Culture [[id number]]     Order Status:  Canceled Specimen Information:  Blood from Peripheral     Blood Culture [[id number]]     Order Status:  Canceled Specimen Information:  Blood from Peripheral     URINALYSIS,CULTURE IF INDICATED [[id number]] Collected:  03/04/17 2145    Order Status:  Completed Specimen Information:  Urine Updated:  03/04/17 2200     Color Yellow      Character Clear      Specific Gravity 1.010      Ph 6.5      Glucose Negative mg/dL      Ketones Negative mg/dL      Protein Negative mg/dL      Bilirubin Negative      Nitrite Negative      Leukocyte Esterase Negative      Occult Blood Negative      Micro Urine Req see below      Comment: Microscopic examination not performed when specimen is clear  and chemically negative for protein, blood, leukocyte esterase  and nitrite.          Culture Indicated No UA Culture           Assessment:  Active Hospital Problems    Diagnosis   • Sepsis (CMS-HCC) [A41.9]   • " Leukocytosis [D72.829]   • Encephalopathy [G93.40]       Plan:  Concern for  shunt infection  Improving. Encephalopathy improved  LP done-but on abx  CSF with very high protein, neg gram stain (done on abx)  Change vanco and rocephin to zyvox and cipro for csf empiric coverage  Anticipate at least 2 week course thro 3/18  Watch for drug interaction with ssri  Reassess after abx done  May need another lp if symptoms recur and ns f/u  Will see her as an outpt      Leukocytosis  resolved    H/O Cdiff colitis  Continue oral vanco prophylaxis while on abx    ID will sign off. Please call as needed.  Discussed with internal medicine

## 2017-03-11 NOTE — PROGRESS NOTES
Hospital Medicine Progress Note, Adult, Complex               Author: Brenda Tapia Date & Time created: 3/11/2017  11:28 AM     Interval History:  47 y/o Female with PMHx of Depression, legal blindness, hx of hydrocephalus with  shunt, admitted for altered mentation and sepsis.    Patient doing well clinically, is AAOx3, except for mild headaches. Patient denies fevers/chills, chest pain, shortness of breath or nausea/vommiting.   ID following,cont vancomycin and rocephin.     3/10  No issues overnight patient doing well clinically. Patient denies fevers/chills, chest pain, shortness of breath or nausea/vommiting.   Change IV antibiotics to PO Zyvox and cipro. 2 weeks 3.18.    3/11  No issues overnight, doing better clinically AAOx3.  Continue PO zyvox and cipro  PT/OT pending      Review of Systems:grossly unchanged  Review of Systems   Constitutional: Negative for fever, chills, weight loss, malaise/fatigue and diaphoresis.   HENT: Negative for congestion, hearing loss and tinnitus.    Eyes:        Legally blind   Respiratory: Negative for cough, hemoptysis, sputum production and shortness of breath.    Cardiovascular: Positive for leg swelling (minimal ). Negative for chest pain, palpitations, orthopnea and claudication.   Gastrointestinal: Negative for heartburn, nausea, vomiting, abdominal pain, diarrhea, constipation and blood in stool.   Genitourinary: Negative for dysuria, urgency and frequency.   Musculoskeletal: Negative.  Negative for myalgias, back pain and neck pain.   Skin: Negative for rash.   Neurological: Negative.  Negative for dizziness, tingling, tremors and sensory change.   Psychiatric/Behavioral: Negative for depression, suicidal ideas and memory loss. The patient is nervous/anxious. The patient does not have insomnia.        Physical Exam: grossly unchanged  Physical Exam   Constitutional: She is oriented to person, place, and time. She appears well-developed and well-nourished. No  distress.   HENT:   Head: Normocephalic and atraumatic.   Eyes: Conjunctivae are normal. Pupils are equal, round, and reactive to light. Right eye exhibits no discharge. Left eye exhibits no discharge. No scleral icterus.   dyscongugate eye movement    Neck: Normal range of motion. Neck supple. No JVD present. No thyromegaly present.   Cardiovascular: Regular rhythm and intact distal pulses.  Tachycardia present.    No murmur heard.  Pulmonary/Chest: Effort normal and breath sounds normal. No stridor. She has no wheezes. She has no rales.   Abdominal: Soft. Bowel sounds are normal.   Musculoskeletal: She exhibits edema (trace b/l l/e edema).   Lymphadenopathy:     She has no cervical adenopathy.   Neurological: She is alert and oriented to person, place, and time.   Skin: Skin is warm and dry. No rash noted. She is not diaphoretic. No erythema.   Psychiatric: She has a normal mood and affect. Her behavior is normal. Thought content normal.   Nursing note and vitals reviewed.      Labs:        Invalid input(s): [id number]      Recent Labs      17   025   SODIUM  137   POTASSIUM  4.0   CHLORIDE  103   CO2  24   BUN  5*   CREATININE  0.65   MAGNESIUM  2.0   CALCIUM  9.3     Recent Labs      17   0252   ALTSGPT  24   ASTSGOT  33   ALKPHOSPHAT  98   TBILIRUBIN  0.4   GLUCOSE  112*     Recent Labs      17   0253   RBC  4.78   HEMOGLOBIN  14.5   HEMATOCRIT  44.3   PLATELETCT  301     Recent Labs      17   0252  17   0253   WBC   --   8.3   NEUTSPOLYS   --   73.80*   LYMPHOCYTES   --   15.00*   MONOCYTES   --   6.20   EOSINOPHILS   --   4.10   BASOPHILS   --   0.50   ASTSGOT  33   --    ALTSGPT  24   --    ALKPHOSPHAT  98   --    TBILIRUBIN  0.4   --            Hemodynamics:  Temp (24hrs), Av.4 °C (97.6 °F), Min:36.1 °C (96.9 °F), Max:36.8 °C (98.3 °F)  Temperature: 36.8 °C (98.3 °F)  Pulse  Av.3  Min: 84  Max: 134  Blood Pressure: 141/97 mmHg     Respiratory:    Respiration: 18,  Pulse Oximetry: 92 %           Fluids:    Intake/Output Summary (Last 24 hours) at 03/11/17 1128  Last data filed at 03/10/17 1700   Gross per 24 hour   Intake    909 ml   Output      0 ml   Net    909 ml     Weight: 77.9 kg (171 lb 11.8 oz)  GI/Nutrition:  Orders Placed This Encounter   Procedures   • Diet Order     Standing Status: Standing      Number of Occurrences: 1      Standing Expiration Date:      Order Specific Question:  Diet:     Answer:  Regular [1]     Medical Decision Making, by Problem:  Active Hospital Problems    Diagnosis   • Sepsis (CMS-HCC) [A41.9]   - at this point we are concerned for a  shunt infection.   - LP done, shows very high protein but neg stain, of note she was placed on antibiotics before LP.  - ID following.  - Change IV antibiotics to PO Zyvox and cipro. 2 weeks Stop Date: 3/18     • Leukocytosis [D72.829]   - resolved   • Encephalopathy [G93.40]   - resolved       Hypokalemia  - resolved    DISPO: PT/OT pending    Patient plan of care discussed at multidisplinary team rounds and with patient and R.N at beside.      Labs reviewed, Radiology images reviewed and Medications reviewed  Sharp catheter: No Sharp      DVT Prophylaxis: Contraindicated - High bleeding risk    Ulcer prophylaxis: Not indicated  Antibiotics: Treating active infection/contamination beyond 24 hours perioperative coverage  Assessed for rehab: Patient unable to tolerate rehabilitation therapeutic regimen

## 2017-03-12 PROCEDURE — 700102 HCHG RX REV CODE 250 W/ 637 OVERRIDE(OP): Performed by: HOSPITALIST

## 2017-03-12 PROCEDURE — 700102 HCHG RX REV CODE 250 W/ 637 OVERRIDE(OP): Performed by: INTERNAL MEDICINE

## 2017-03-12 PROCEDURE — A9270 NON-COVERED ITEM OR SERVICE: HCPCS | Performed by: HOSPITALIST

## 2017-03-12 PROCEDURE — 700111 HCHG RX REV CODE 636 W/ 250 OVERRIDE (IP): Performed by: HOSPITALIST

## 2017-03-12 PROCEDURE — 99232 SBSQ HOSP IP/OBS MODERATE 35: CPT | Performed by: HOSPITALIST

## 2017-03-12 PROCEDURE — A9270 NON-COVERED ITEM OR SERVICE: HCPCS | Performed by: INTERNAL MEDICINE

## 2017-03-12 PROCEDURE — 770006 HCHG ROOM/CARE - MED/SURG/GYN SEMI*

## 2017-03-12 PROCEDURE — 700105 HCHG RX REV CODE 258: Performed by: HOSPITALIST

## 2017-03-12 RX ADMIN — MORPHINE SULFATE 2 MG: 4 INJECTION INTRAVENOUS at 12:35

## 2017-03-12 RX ADMIN — SODIUM CHLORIDE: 9 INJECTION, SOLUTION INTRAVENOUS at 01:42

## 2017-03-12 RX ADMIN — MORPHINE SULFATE 2 MG: 4 INJECTION INTRAVENOUS at 07:50

## 2017-03-12 RX ADMIN — ZOLPIDEM TARTRATE 5 MG: 5 TABLET, FILM COATED ORAL at 22:22

## 2017-03-12 RX ADMIN — CIPROFLOXACIN HYDROCHLORIDE 750 MG: 500 TABLET, FILM COATED ORAL at 07:45

## 2017-03-12 RX ADMIN — LINEZOLID 600 MG: 600 TABLET, FILM COATED ORAL at 20:13

## 2017-03-12 RX ADMIN — ONDANSETRON 4 MG: 2 INJECTION, SOLUTION INTRAMUSCULAR; INTRAVENOUS at 09:28

## 2017-03-12 RX ADMIN — CIPROFLOXACIN HYDROCHLORIDE 750 MG: 500 TABLET, FILM COATED ORAL at 20:13

## 2017-03-12 RX ADMIN — OXYCODONE HYDROCHLORIDE 5 MG: 5 TABLET ORAL at 14:13

## 2017-03-12 RX ADMIN — LINEZOLID 600 MG: 600 TABLET, FILM COATED ORAL at 07:45

## 2017-03-12 RX ADMIN — OXYCODONE HYDROCHLORIDE 5 MG: 5 TABLET ORAL at 04:37

## 2017-03-12 RX ADMIN — AMLODIPINE BESYLATE 5 MG: 5 TABLET ORAL at 07:46

## 2017-03-12 RX ADMIN — OXYCODONE HYDROCHLORIDE 5 MG: 5 TABLET ORAL at 18:40

## 2017-03-12 RX ADMIN — MORPHINE SULFATE 2 MG: 4 INJECTION INTRAVENOUS at 15:48

## 2017-03-12 RX ADMIN — OXYCODONE HYDROCHLORIDE 5 MG: 5 TABLET ORAL at 22:22

## 2017-03-12 ASSESSMENT — ENCOUNTER SYMPTOMS
HEARTBURN: 0
NECK PAIN: 0
SENSORY CHANGE: 0
MYALGIAS: 0
ABDOMINAL PAIN: 0
NERVOUS/ANXIOUS: 1
DIZZINESS: 0
PALPITATIONS: 0
ORTHOPNEA: 0
BLOOD IN STOOL: 0
DIARRHEA: 0
DIAPHORESIS: 0
NAUSEA: 0
DEPRESSION: 0
FEVER: 0
BACK PAIN: 0
TINGLING: 0
TREMORS: 0
CLAUDICATION: 0
MEMORY LOSS: 0
SPUTUM PRODUCTION: 0
NEUROLOGICAL NEGATIVE: 1
HEMOPTYSIS: 0
WEIGHT LOSS: 0
CHILLS: 0
CONSTIPATION: 0
SHORTNESS OF BREATH: 0
INSOMNIA: 0
VOMITING: 0
MUSCULOSKELETAL NEGATIVE: 1
COUGH: 0

## 2017-03-12 ASSESSMENT — PAIN SCALES - GENERAL
PAINLEVEL_OUTOF10: 8
PAINLEVEL_OUTOF10: 7
PAINLEVEL_OUTOF10: 5
PAINLEVEL_OUTOF10: 9
PAINLEVEL_OUTOF10: 6
PAINLEVEL_OUTOF10: 9
PAINLEVEL_OUTOF10: 7
PAINLEVEL_OUTOF10: 5
PAINLEVEL_OUTOF10: 8

## 2017-03-12 NOTE — PROGRESS NOTES
Hospital Medicine Progress Note, Adult, Complex               Author: Brenda Tapia Date & Time created: 3/12/2017  12:09 PM     Interval History:  47 y/o Female with PMHx of Depression, legal blindness, hx of hydrocephalus with  shunt, admitted for altered mentation and sepsis.    Patient doing well clinically, is AAOx3, except for mild headaches. Patient denies fevers/chills, chest pain, shortness of breath or nausea/vommiting.   ID following,cont vancomycin and rocephin.     3/10  No issues overnight patient doing well clinically. Patient denies fevers/chills, chest pain, shortness of breath or nausea/vommiting.   Change IV antibiotics to PO Zyvox and cipro. 2 weeks 3.18.    3/11  No issues overnight, doing better clinically AAOx3.  Continue PO zyvox and cipro  PT/OT pending    3/12  AAOx3, says her headache is getting better, and abdominal discomfort subsiding, she is not ambulating and needs to ambulate for strength  Continue PO zyvox, watch closely on liver function.    Review of Systems:grossly unchanged  Review of Systems   Constitutional: Negative for fever, chills, weight loss, malaise/fatigue and diaphoresis.   HENT: Negative for congestion, hearing loss and tinnitus.    Eyes:        Legally blind   Respiratory: Negative for cough, hemoptysis, sputum production and shortness of breath.    Cardiovascular: Positive for leg swelling (minimal ). Negative for chest pain, palpitations, orthopnea and claudication.   Gastrointestinal: Negative for heartburn, nausea, vomiting, abdominal pain, diarrhea, constipation and blood in stool.   Genitourinary: Negative for dysuria, urgency and frequency.   Musculoskeletal: Negative.  Negative for myalgias, back pain and neck pain.   Skin: Negative for rash.   Neurological: Negative.  Negative for dizziness, tingling, tremors and sensory change.   Psychiatric/Behavioral: Negative for depression, suicidal ideas and memory loss. The patient is nervous/anxious. The  patient does not have insomnia.        Physical Exam: grossly unchanged  Physical Exam   Constitutional: She is oriented to person, place, and time. She appears well-developed and well-nourished. No distress.   HENT:   Head: Normocephalic and atraumatic.   Eyes: Conjunctivae are normal. Pupils are equal, round, and reactive to light. Right eye exhibits no discharge. Left eye exhibits no discharge. No scleral icterus.   dyscongugate eye movement    Neck: Normal range of motion. Neck supple. No JVD present. No thyromegaly present.   Cardiovascular: Regular rhythm and intact distal pulses.  Tachycardia present.    No murmur heard.  Pulmonary/Chest: Effort normal and breath sounds normal. No stridor. She has no wheezes. She has no rales.   Abdominal: Soft. Bowel sounds are normal.   Musculoskeletal: She exhibits edema (trace b/l l/e edema).   Lymphadenopathy:     She has no cervical adenopathy.   Neurological: She is alert and oriented to person, place, and time.   Skin: Skin is warm and dry. No rash noted. She is not diaphoretic. No erythema.   Psychiatric: She has a normal mood and affect. Her behavior is normal. Thought content normal.   Nursing note and vitals reviewed.      Labs:        Invalid input(s): [id number]      No results for input(s): SODIUM, POTASSIUM, CHLORIDE, CO2, BUN, CREATININE, MAGNESIUM, PHOSPHORUS, CALCIUM in the last 72 hours.  No results for input(s): ALTSGPT, ASTSGOT, ALKPHOSPHAT, TBILIRUBIN, DBILIRUBIN, GAMMAGT, AMYLASE, LIPASE, ALB, PREALBUMIN, GLUCOSE in the last 72 hours.  No results for input(s): RBC, HEMOGLOBIN, HEMATOCRIT, PLATELETCT, PROTHROMBTM, APTT, INR, IRON, FERRITIN, TOTIRONBC in the last 72 hours.            Hemodynamics:  Temp (24hrs), Av.4 °C (97.6 °F), Min:36.3 °C (97.3 °F), Max:36.6 °C (97.9 °F)  Temperature: 36.5 °C (97.7 °F)  Pulse  Av.2  Min: 84  Max: 134  Blood Pressure: 129/86 mmHg     Respiratory:    Respiration: 18, Pulse Oximetry: 92 %            Fluids:    Intake/Output Summary (Last 24 hours) at 03/12/17 1209  Last data filed at 03/12/17 0900   Gross per 24 hour   Intake   2136 ml   Output      0 ml   Net   2136 ml        GI/Nutrition:  Orders Placed This Encounter   Procedures   • Diet Order     Standing Status: Standing      Number of Occurrences: 1      Standing Expiration Date:      Order Specific Question:  Diet:     Answer:  Regular [1]     Medical Decision Making, by Problem:  Active Hospital Problems    Diagnosis   • Sepsis (CMS-HCC) [A41.9]   - at this point we are concerned for a  shunt infection.   - LP done, shows very high protein but neg stain, of note she was placed on antibiotics before LP.  - ID following.  - Continue PO Zyvox and cipro. 2 weeks Stop Date: 3/18  - check cmp in the am.     • Leukocytosis [D72.829]   - resolved   • Encephalopathy [G93.40]   - resolved       Hypokalemia  - resolved    DISPO: PT/OT pending    Patient plan of care discussed at multidisplinary team rounds and with patient and R.N at [facility].      Labs reviewed, Radiology images reviewed and Medications reviewed  Sharp catheter: No Sharp      DVT Prophylaxis: Contraindicated - High bleeding risk    Ulcer prophylaxis: Not indicated  Antibiotics: Treating active infection/contamination beyond 24 hours perioperative coverage  Assessed for rehab: Patient unable to tolerate rehabilitation therapeutic regimen

## 2017-03-12 NOTE — PROGRESS NOTES
"Pt states: \" I had lumbar puncture few days ago and now I feel like my muscle is giving up.\" MD notified.   "

## 2017-03-12 NOTE — PROGRESS NOTES
Assumed care of pt @0700. Bedside report received. Pt AOX 4. Pt complains about pain( head and abdomen) and nausea. Morphine and zofran given. Pt legally blind. PIV running NS at 125 ml/hr. Good oral intake. Pt up with standby assist. Fall precaution in place. POC discussed with pt, all questions answered at this time. Pt makes needs known, call light within reach, hourly rounding in place.

## 2017-03-12 NOTE — PROGRESS NOTES
Assumed care of pt, received report from day shift RN, pt assessed.  Pt complaining of 9/10 head and abdominal pain, pt medicated per MAR.  Pt is A&O x4 and legally blind.  Pt fall risk, wearing treaded socks, bed locked and in lowest position.  Pt refusing bed alarm, pt educated on need but still refusing.  Pt instructed to call for assistance prior to getting OOB, pt verbalized understanding.  Call light, phone, and personal belongings within reach.

## 2017-03-12 NOTE — CARE PLAN
Problem: Bowel/Gastric:  Goal: Normal bowel function is maintained or improved  Outcome: PROGRESSING AS EXPECTED  Pt complains about nausea. Zofran given.     Problem: Pain Management  Goal: Pain level will decrease to patient’s comfort goal  Outcome: PROGRESSING AS EXPECTED  Pt complains about pain( head, abdomen). Morphine given.

## 2017-03-13 VITALS
OXYGEN SATURATION: 98 % | TEMPERATURE: 97.9 F | DIASTOLIC BLOOD PRESSURE: 98 MMHG | WEIGHT: 171.74 LBS | BODY MASS INDEX: 29.32 KG/M2 | HEART RATE: 100 BPM | HEIGHT: 64 IN | SYSTOLIC BLOOD PRESSURE: 150 MMHG | RESPIRATION RATE: 17 BRPM

## 2017-03-13 PROBLEM — G93.40 ENCEPHALOPATHY: Status: RESOLVED | Noted: 2017-03-04 | Resolved: 2017-03-13

## 2017-03-13 LAB
ALBUMIN SERPL BCP-MCNC: 3.6 G/DL (ref 3.2–4.9)
ALBUMIN/GLOB SERPL: 1.2 G/DL
ALP SERPL-CCNC: 82 U/L (ref 30–99)
ALT SERPL-CCNC: 12 U/L (ref 2–50)
ANION GAP SERPL CALC-SCNC: 9 MMOL/L (ref 0–11.9)
AST SERPL-CCNC: 13 U/L (ref 12–45)
BASOPHILS # BLD AUTO: 1 % (ref 0–1.8)
BASOPHILS # BLD: 0.07 K/UL (ref 0–0.12)
BILIRUB SERPL-MCNC: 0.3 MG/DL (ref 0.1–1.5)
BUN SERPL-MCNC: 6 MG/DL (ref 8–22)
CALCIUM SERPL-MCNC: 8.9 MG/DL (ref 8.5–10.5)
CHLORIDE SERPL-SCNC: 102 MMOL/L (ref 96–112)
CO2 SERPL-SCNC: 24 MMOL/L (ref 20–33)
CREAT SERPL-MCNC: 0.65 MG/DL (ref 0.5–1.4)
EOSINOPHIL # BLD AUTO: 0.36 K/UL (ref 0–0.51)
EOSINOPHIL NFR BLD: 5 % (ref 0–6.9)
ERYTHROCYTE [DISTWIDTH] IN BLOOD BY AUTOMATED COUNT: 49 FL (ref 35.9–50)
GFR SERPL CREATININE-BSD FRML MDRD: >60 ML/MIN/1.73 M 2
GLOBULIN SER CALC-MCNC: 2.9 G/DL (ref 1.9–3.5)
GLUCOSE SERPL-MCNC: 105 MG/DL (ref 65–99)
HCT VFR BLD AUTO: 42.1 % (ref 37–47)
HGB BLD-MCNC: 13.2 G/DL (ref 12–16)
IMM GRANULOCYTES # BLD AUTO: 0.01 K/UL (ref 0–0.11)
IMM GRANULOCYTES NFR BLD AUTO: 0.1 % (ref 0–0.9)
LYMPHOCYTES # BLD AUTO: 1.71 K/UL (ref 1–4.8)
LYMPHOCYTES NFR BLD: 23.7 % (ref 22–41)
MAGNESIUM SERPL-MCNC: 1.7 MG/DL (ref 1.5–2.5)
MCH RBC QN AUTO: 29.2 PG (ref 27–33)
MCHC RBC AUTO-ENTMCNC: 31.4 G/DL (ref 33.6–35)
MCV RBC AUTO: 93.1 FL (ref 81.4–97.8)
MONOCYTES # BLD AUTO: 0.69 K/UL (ref 0–0.85)
MONOCYTES NFR BLD AUTO: 9.5 % (ref 0–13.4)
NEUTROPHILS # BLD AUTO: 4.39 K/UL (ref 2–7.15)
NEUTROPHILS NFR BLD: 60.7 % (ref 44–72)
NRBC # BLD AUTO: 0 K/UL
NRBC BLD AUTO-RTO: 0 /100 WBC
PLATELET # BLD AUTO: 274 K/UL (ref 164–446)
PMV BLD AUTO: 10.1 FL (ref 9–12.9)
POTASSIUM SERPL-SCNC: 3.4 MMOL/L (ref 3.6–5.5)
PROT SERPL-MCNC: 6.5 G/DL (ref 6–8.2)
RBC # BLD AUTO: 4.52 M/UL (ref 4.2–5.4)
SODIUM SERPL-SCNC: 135 MMOL/L (ref 135–145)
WBC # BLD AUTO: 7.2 K/UL (ref 4.8–10.8)

## 2017-03-13 PROCEDURE — 85025 COMPLETE CBC W/AUTO DIFF WBC: CPT

## 2017-03-13 PROCEDURE — A9270 NON-COVERED ITEM OR SERVICE: HCPCS | Performed by: HOSPITALIST

## 2017-03-13 PROCEDURE — G8978 MOBILITY CURRENT STATUS: HCPCS | Mod: CI

## 2017-03-13 PROCEDURE — G8979 MOBILITY GOAL STATUS: HCPCS | Mod: CI

## 2017-03-13 PROCEDURE — 700111 HCHG RX REV CODE 636 W/ 250 OVERRIDE (IP): Performed by: HOSPITALIST

## 2017-03-13 PROCEDURE — 700102 HCHG RX REV CODE 250 W/ 637 OVERRIDE(OP): Performed by: HOSPITALIST

## 2017-03-13 PROCEDURE — 700102 HCHG RX REV CODE 250 W/ 637 OVERRIDE(OP): Performed by: INTERNAL MEDICINE

## 2017-03-13 PROCEDURE — 97161 PT EVAL LOW COMPLEX 20 MIN: CPT

## 2017-03-13 PROCEDURE — 80053 COMPREHEN METABOLIC PANEL: CPT

## 2017-03-13 PROCEDURE — 99239 HOSP IP/OBS DSCHRG MGMT >30: CPT | Performed by: HOSPITALIST

## 2017-03-13 PROCEDURE — G8980 MOBILITY D/C STATUS: HCPCS | Mod: CI

## 2017-03-13 PROCEDURE — 36415 COLL VENOUS BLD VENIPUNCTURE: CPT

## 2017-03-13 PROCEDURE — A9270 NON-COVERED ITEM OR SERVICE: HCPCS | Performed by: INTERNAL MEDICINE

## 2017-03-13 PROCEDURE — 83735 ASSAY OF MAGNESIUM: CPT

## 2017-03-13 RX ORDER — CIPROFLOXACIN 750 MG/1
750 TABLET, FILM COATED ORAL EVERY 12 HOURS
Qty: 10 TAB | Refills: 0 | Status: SHIPPED | OUTPATIENT
Start: 2017-03-13 | End: 2017-03-18

## 2017-03-13 RX ORDER — AMLODIPINE BESYLATE 5 MG/1
5 TABLET ORAL DAILY
Qty: 30 TAB | Refills: 2 | Status: SHIPPED | OUTPATIENT
Start: 2017-03-13 | End: 2017-07-20

## 2017-03-13 RX ORDER — POTASSIUM CHLORIDE 20 MEQ/1
40 TABLET, EXTENDED RELEASE ORAL ONCE
Status: COMPLETED | OUTPATIENT
Start: 2017-03-13 | End: 2017-03-13

## 2017-03-13 RX ORDER — LINEZOLID 600 MG/1
600 TABLET, FILM COATED ORAL EVERY 12 HOURS
Qty: 10 TAB | Refills: 0 | Status: SHIPPED | OUTPATIENT
Start: 2017-03-13 | End: 2017-03-18

## 2017-03-13 RX ADMIN — LINEZOLID 600 MG: 600 TABLET, FILM COATED ORAL at 08:43

## 2017-03-13 RX ADMIN — CIPROFLOXACIN HYDROCHLORIDE 750 MG: 500 TABLET, FILM COATED ORAL at 08:43

## 2017-03-13 RX ADMIN — POTASSIUM CHLORIDE 40 MEQ: 1500 TABLET, EXTENDED RELEASE ORAL at 08:43

## 2017-03-13 RX ADMIN — AMLODIPINE BESYLATE 5 MG: 5 TABLET ORAL at 08:43

## 2017-03-13 RX ADMIN — MORPHINE SULFATE 2 MG: 4 INJECTION INTRAVENOUS at 08:52

## 2017-03-13 RX ADMIN — MORPHINE SULFATE 2 MG: 4 INJECTION INTRAVENOUS at 01:44

## 2017-03-13 ASSESSMENT — GAIT ASSESSMENTS
GAIT LEVEL OF ASSIST: STAND BY ASSIST
ASSISTIVE DEVICE: OTHER (COMMENTS)
DISTANCE (FEET): 150

## 2017-03-13 ASSESSMENT — PAIN SCALES - GENERAL
PAINLEVEL_OUTOF10: 4
PAINLEVEL_OUTOF10: 3
PAINLEVEL_OUTOF10: 8
PAINLEVEL_OUTOF10: 6

## 2017-03-13 NOTE — CARE PLAN
Problem: Knowledge Deficit  Goal: Knowledge of the prescribed therapeutic regimen will improve  Pt displayed understanding and knowledge about disease process and treatment plan    Problem: Discharge Barriers/Planning  Goal: Patient’s continuum of care needs will be met  D/c instructions including scheduling a f/u appointment was provided and pt demonstrated understanding.

## 2017-03-13 NOTE — PROGRESS NOTES
Pt d/c'd home with friend. Pt given prescriptions and home meds and instructions on antibiotic use. D/c instructions including scheduling a f/u appointment was provided and pt demonstrated understanding.

## 2017-03-13 NOTE — DISCHARGE INSTRUCTIONS
Discharge Instructions    Discharged to home by car with friend. Discharged via wheelchair, hospital escort: Refused.  Special equipment needed: Not Applicable    Be sure to schedule a follow-up appointment with your primary care doctor or any specialists as instructed.     Discharge Plan:   Influenza Vaccine Indication: Not indicated: Previously immunized this influenza season and > 8 years of age    I understand that a diet low in cholesterol, fat, and sodium is recommended for good health. Unless I have been given specific instructions below for another diet, I accept this instruction as my diet prescription.   Other diet: Reg    Special Instructions: None    · Is patient discharged on Warfarin / Coumadin?   No     · Is patient Post Blood Transfusion?  No  Antibiotic Medication  Antibiotics are among the most frequently prescribed medicines. Antibiotics cure illness by assisting our body to injure or kill the bacteria that cause infection. While antibiotics are useful to treat a wide variety of infections they are useless against viruses. Antibiotics cannot cure colds, flu, or other viral infections.   There are many types of antibiotics available. Your caregiver will decide which antibiotic will be useful for an illness. Never take or give someone else's antibiotics or left over medicine.  Your caregiver may also take into account:  · Allergies.  · The cost of the medicine.  · Dosing schedules.  · Taste.  · Common side effects when choosing an antibiotic for an infection.  Ask your caregiver if you have questions about why a certain medicine was chosen.  HOME CARE INSTRUCTIONS  Read all instructions and labels on medicine bottles carefully. Some antibiotics should be taken on an empty stomach while others should be taken with food. Taking antibiotics incorrectly may reduce how well they work. Some antibiotics need to be kept in the refrigerator. Others should be kept at room temperature. Ask your caregiver or  pharmacist if you do not understand how to give the medicine.  Be sure to give the amount of medicine your caregiver has prescribed. Even if you feel better and your symptoms improve, bacteria may still remain alive in the body. Taking all of the medicine will prevent:  · The infection from returning and becoming harder to treat.  · Complications from partially treated infections.  If there is any medicine left over after you have taken the medicine as your caregiver has instructed, throw the medicine away.  Be sure to tell your caregiver if you:  · Are allergic to any medicines.  · Are pregnant or intend to become pregnant while using this medicine.  · Are breastfeeding.  · Are taking any other prescription, non-prescription medicine, or herbal remedies.  · Have any other medical conditions or problems you have not already discussed.  If you are taking birth control pills, they may not work while you are on antibiotics. To avoid unwanted pregnancy:  · Continue taking your birth control pills as usual.  · Use a second form of birth control (such as condoms) while you are taking antibiotic medicine.  · When you finish taking the antibiotic medicine, continue using the second form of birth control until you are finished with your current 1 month cycle of birth control pills.  Try not to miss any doses of medicine. If you miss a dose, take it as soon as possible. However, if it is almost time for the next dose and the dosing schedule is:  · 2 doses a day, take the missed dose and the next dose 5 to 6 hours apart.  · 3 or more doses a day, take the missed dose and the next dose 2 to 4 hours apart, then go back to the normal schedule.  · If you are unable to make up a missed dose, take the next scheduled dose on time and complete the missed dose at the end of the prescribed time for your medicine.  SIDE EFFECTS TO TAKING ANTIBIOTICS  Common side effects to antibiotic use include:  · Soft stools or diarrhea.  · Mild  stomach upset.  · Sun sensitivity.  SEEK MEDICAL CARE IF:   · If you get worse or do not improve within a few days of starting the medicine.  · Vomiting develops.  · Diaper rash or rash on the genitals appears.  · Vaginal itching occurs.  · White patches appear on the tongue or in the mouth.  · Severe watery diarrhea and abdominal cramps occur.  · Signs of an allergy develop (hives, unknown itchy rash appears). STOP TAKING THE ANTIBIOTIC.  SEEK IMMEDIATE MEDICAL CARE IF:   · Urine turns dark or blood colored.  · Skin turns yellow.  · Easy bruising or bleeding occurs.  · Joint pain or muscle aches occur.  · Fever returns.  · Severe headache occurs.  · Signs of an allergy develop (trouble breathing, wheezing, swelling of the lips, face or tongue, fainting, or blisters on the skin or in the mouth). STOP TAKING THE ANTIBIOTIC.     This information is not intended to replace advice given to you by your health care provider. Make sure you discuss any questions you have with your health care provider.      Depression / Suicide Risk    As you are discharged from this [facility] facility, it is important to learn how to keep safe from harming yourself.    Recognize the warning signs:  · Abrupt changes in personality, positive or negative- including increase in energy   · Giving away possessions  · Change in eating patterns- significant weight changes-  positive or negative  · Change in sleeping patterns- unable to sleep or sleeping all the time   · Unwillingness or inability to communicate  · Depression  · Unusual sadness, discouragement and loneliness  · Talk of wanting to die  · Neglect of personal appearance   · Rebelliousness- reckless behavior  · Withdrawal from people/activities they love  · Confusion- inability to concentrate     If you or a loved one observes any of these behaviors or has concerns about self-harm, here's what you can do:  · Talk about it- your feelings and reasons for harming yourself  · Remove  any means that you might use to hurt yourself (examples: pills, rope, extension cords, firearm)  · Get professional help from the community (Mental Health, Substance Abuse, psychological counseling)  · Do not be alone:Call your Safe Contact- someone whom you trust who will be there for you.  · Call your local CRISIS HOTLINE 465-0170 or 132-547-4580  · Call your local Children's Mobile Crisis Response Team [place] (101) 781-8042 or www.Audiolife  · Call the toll free National Suicide Prevention Hotlines   · National Suicide Prevention Lifeline 421-917-FPKE (6638)  · National Hope Line Network 800-SUICIDE (434-9171)

## 2017-03-13 NOTE — CARE PLAN
Problem: Venous Thromboembolism (VTW)/Deep Vein Thrombosis (DVT) Prevention:  Goal: Patient will participate in Venous Thrombosis (VTE)/Deep Vein Thrombosis (DVT)Prevention Measures  Outcome: PROGRESSING AS EXPECTED  Pt ambulates daily and has no need for SCDs while in bed.    Problem: Bowel/Gastric:  Goal: Normal bowel function is maintained or improved  Outcome: PROGRESSING AS EXPECTED  Pt having daily bowel movements with no complaints of loose stools or constipation.

## 2017-03-13 NOTE — PROGRESS NOTES
Assumed care, assessment complete. Pt is A & O x 4. Pt medicated for pain per MAR. Fall precautions and appropriate signs in place. Pt oriented to unit routine, call light/phone system and RN extension number provided. Pt educated regarding fall precautions. Bed alarm in use. Pt denies any additional needs at this time. Call light within reach.

## 2017-03-13 NOTE — DISCHARGE SUMMARY
Hospital Medicine Discharge Note     Admit Date:  3/4/2017       Discharge Date:   3/13/2017  LOS: 9 days     Primary Care Provider:    CHANDLER Adams    Attending Physician:  Brenda Tapia     Discharge Diagnoses:   Altered mentation - resolved  Sepsis- unknown source- resolved  Leukocytosis- resolved  Hypokalemia    Chronic Medical Problems:  Legal blindness  History of CDIFF colitis.  History of recurrent falls.     Consultants:      None    Studies:    Imaging/ Testing:      Echocardiogram Comp W/O Cont   Left ventricular ejection fraction is visually estimated to be 65%.   Difficult to assess diastolic function due to tachycardia.  Trace mitral regurgitation.  Trace aortic insufficiency.  Right ventricular systolic pressure is estimated to be 45 mmHg.  Echo from 10/22/15 showed right ventricular systolic pressure of 35   mmHg and grade 1 diastolic dysfunction.      CT-HEAD W/O   1.  No evidence of acute intracranial process.      2.  Left-sided ventricular shunt tube in place. No hydrocephalus.      3.  Stable posterior parietal areas of encephalomalacia.      DX-CHEST-PORTABLE (1 VIEW)   No acute cardiac or pulmonary abnormality is noted.            Procedures:        Lumbar Puncture     Hospital Summary (Brief Narrative):         In brief, Rasheeda Manzano is a very pleasant 46 y.o. female who was admitted 3/4/2017 for sepsis and altered level of consciousness. Because patient has a history of hydrocephalus with  shunt placement an LP was done to r/p  shunt infection. She was started on vancomycin and ceftriaxone. ID was consulted, after a single day patient mentation improved. At this point we are unsure if indeed she had any CNS infection to begin with but nevertheless, we are taking precautions and will treat patient with continued oral zyvox and cipro till 3.18. At this point patient is doing fairly well clinically. Patient denies fevers/chills, chest pain, shortness of breath or  nausea/vommiting. Denies having headaches.       For H and P on admission, please refer to full H and P dictated by Dr. Armand MULLINS        Disposition:   Discharge home    Condition:  Stable    Activity:   As tolerated     Diet:   Heart Healthy Diet  Discharge Medications:           Medication List      START taking these medications       Instructions    amlodipine 5 MG Tabs   Last time this was given:  5 mg on 3/12/2017  7:46 AM   Commonly known as:  NORVASC    Take 1 Tab by mouth every day.   Dose:  5 mg       ciprofloxacin 750 MG Tabs   Last time this was given:  750 mg on 3/12/2017  8:13 PM   Commonly known as:  CIPRO    Take 1 Tab by mouth every 12 hours for 5 days.   Dose:  750 mg       linezolid 600 MG Tabs   Last time this was given:  600 mg on 3/12/2017  8:13 PM   Commonly known as:  ZYVOX    Take 1 Tab by mouth every 12 hours for 5 days.   Dose:  600 mg       vancomycin 50 mg/mL 50 mg/mL Soln   Last time this was given:  125 mg on 3/10/2017  8:05 AM    Take 2.5 mL by mouth 4 times a day for 7 days.   Dose:  125 mg         CONTINUE taking these medications       Instructions    LUNESTA 3 MG Tabs   Generic drug:  Eszopiclone    Take 3 mg by mouth every bedtime.   Dose:  3 mg       VIIBRYD 40 MG Tabs   Generic drug:  Vilazodone HCl    Take 1 Tab by mouth every evening.   Dose:  1 Tab               Follow up appointment details :      I encouraged her to call his PCP to confirm follow up after discharge.    No future appointments.      Instructions:      The were given instructions to return to the ER if patient's condition worsens      Time Spent on Discharge:     Discharge instructions were discussed with the patient at bedside. Patient  expressed understanding and agreed to comply with all discharge instructions.    35  minutes were spent in the discharge planning and management of this  patient, including more than 50% of the time spent face to face in   Counseling.

## 2017-03-13 NOTE — PROGRESS NOTES
Assumed care of pt, received report from day shift RN, pt assessed.  Pt complaining of 6/10 head and abdominal pain, pt medicated per MAR.  Pt is A&O x4 and legally blind.  Pt fall risk, wearing treaded socks, bed locked and in lowest position.  Pt refusing bed alarm, pt educated on need but still refusing.  Pt instructed to call for assistance prior to getting OOB, pt verbalized understanding.  Call light, phone, and personal belongings within reach.

## 2017-03-20 NOTE — DOCUMENTATION QUERY
DOCUMENTATION QUERY    PROVIDERS: Please select “Cosign w/ note”to reply to query.    To better represent the severity of illness of your patient, please review the following information and exercise your independent professional judgment in responding to this query.     Sepsis and encephalopathy are  documented in the History and Physical and Progress Notes.     Based upon the clinical findings, risk factors, and treatment, can the relationship between these two conditions be further specified?    • Encephalopathy  is related to sepsis  • Encephalopathy is not related to sepsis.   • Other explanation of clinical findings  • Unable to determine      The medical record reflects the following:   Clinical Findings Alternation of mental status/encephalopathy;    Treatment  Close monitoring, lab studies   Risk Factors  Deterioration   Location within medical record  Progress Notes     Thank you,   Soledad York

## 2017-05-01 ENCOUNTER — HOME HEALTH ADMISSION (OUTPATIENT)
Dept: HOME HEALTH SERVICES | Facility: HOME HEALTHCARE | Age: 47
End: 2017-05-01
Payer: MEDICAID

## 2017-05-03 ENCOUNTER — HOME CARE VISIT (OUTPATIENT)
Dept: HOME HEALTH SERVICES | Facility: HOME HEALTHCARE | Age: 47
End: 2017-05-03

## 2017-05-04 ENCOUNTER — HOME CARE VISIT (OUTPATIENT)
Dept: HOME HEALTH SERVICES | Facility: HOME HEALTHCARE | Age: 47
End: 2017-05-04

## 2017-05-08 ENCOUNTER — HOME CARE VISIT (OUTPATIENT)
Dept: HOME HEALTH SERVICES | Facility: HOME HEALTHCARE | Age: 47
End: 2017-05-08
Payer: MEDICAID

## 2017-05-08 PROCEDURE — 665001 SOC-HOME HEALTH

## 2017-05-08 PROCEDURE — G0162 HHC RN E&M PLAN SVS, 15 MIN: HCPCS

## 2017-05-08 ASSESSMENT — ENCOUNTER SYMPTOMS: DEPRESSED MOOD: 1

## 2017-05-09 SDOH — ECONOMIC STABILITY: HOUSING INSECURITY: HOME SAFETY: PATIENT IS BLIND,CARRIES HER PHONE ALL THE TIME

## 2017-05-09 SDOH — ECONOMIC STABILITY: HOUSING INSECURITY: UNSAFE COOKING RANGE AREA: 0

## 2017-05-09 SDOH — ECONOMIC STABILITY: HOUSING INSECURITY: UNSAFE APPLIANCES: 1

## 2017-05-09 ASSESSMENT — ENCOUNTER SYMPTOMS
NAUSEA: DENIES ANY
VOMITING: DENIES ANY

## 2017-05-09 ASSESSMENT — PATIENT HEALTH QUESTIONNAIRE - PHQ9
1. LITTLE INTEREST OR PLEASURE IN DOING THINGS: 00
2. FEELING DOWN, DEPRESSED, IRRITABLE, OR HOPELESS: 01

## 2017-05-09 ASSESSMENT — ACTIVITIES OF DAILY LIVING (ADL): HOME_HEALTH_OASIS: 04

## 2017-05-10 ENCOUNTER — HOME CARE VISIT (OUTPATIENT)
Dept: HOME HEALTH SERVICES | Facility: HOME HEALTHCARE | Age: 47
End: 2017-05-10
Payer: MEDICAID

## 2017-05-10 VITALS
HEIGHT: 63 IN | HEART RATE: 64 BPM | SYSTOLIC BLOOD PRESSURE: 90 MMHG | DIASTOLIC BLOOD PRESSURE: 58 MMHG | RESPIRATION RATE: 18 BRPM | TEMPERATURE: 98.5 F

## 2017-05-11 ENCOUNTER — HOME CARE VISIT (OUTPATIENT)
Dept: HOME HEALTH SERVICES | Facility: HOME HEALTHCARE | Age: 47
End: 2017-05-11
Payer: MEDICAID

## 2017-05-11 ENCOUNTER — HOSPITAL ENCOUNTER (EMERGENCY)
Facility: MEDICAL CENTER | Age: 47
End: 2017-05-11
Attending: EMERGENCY MEDICINE
Payer: MEDICAID

## 2017-05-11 VITALS
SYSTOLIC BLOOD PRESSURE: 102 MMHG | BODY MASS INDEX: 24.95 KG/M2 | WEIGHT: 146.16 LBS | HEART RATE: 104 BPM | DIASTOLIC BLOOD PRESSURE: 56 MMHG | TEMPERATURE: 97.2 F | HEIGHT: 64 IN | OXYGEN SATURATION: 100 % | RESPIRATION RATE: 16 BRPM

## 2017-05-11 VITALS
HEART RATE: 95 BPM | SYSTOLIC BLOOD PRESSURE: 115 MMHG | DIASTOLIC BLOOD PRESSURE: 58 MMHG | TEMPERATURE: 97.5 F | RESPIRATION RATE: 18 BRPM

## 2017-05-11 DIAGNOSIS — G43.009 MIGRAINE WITHOUT AURA AND WITHOUT STATUS MIGRAINOSUS, NOT INTRACTABLE: ICD-10-CM

## 2017-05-11 PROCEDURE — 700105 HCHG RX REV CODE 258: Performed by: EMERGENCY MEDICINE

## 2017-05-11 PROCEDURE — 700111 HCHG RX REV CODE 636 W/ 250 OVERRIDE (IP): Performed by: EMERGENCY MEDICINE

## 2017-05-11 PROCEDURE — G0496 LPN CARE TRAIN/EDU IN HH: HCPCS

## 2017-05-11 PROCEDURE — 96374 THER/PROPH/DIAG INJ IV PUSH: CPT

## 2017-05-11 PROCEDURE — G0151 HHCP-SERV OF PT,EA 15 MIN: HCPCS

## 2017-05-11 PROCEDURE — 99284 EMERGENCY DEPT VISIT MOD MDM: CPT

## 2017-05-11 RX ORDER — METOCLOPRAMIDE HYDROCHLORIDE 5 MG/ML
10 INJECTION INTRAMUSCULAR; INTRAVENOUS ONCE
Status: COMPLETED | OUTPATIENT
Start: 2017-05-11 | End: 2017-05-11

## 2017-05-11 RX ORDER — SODIUM CHLORIDE 9 MG/ML
1000 INJECTION, SOLUTION INTRAVENOUS ONCE
Status: COMPLETED | OUTPATIENT
Start: 2017-05-11 | End: 2017-05-11

## 2017-05-11 RX ORDER — METOCLOPRAMIDE 10 MG/1
10 TABLET ORAL 4 TIMES DAILY PRN
Qty: 20 TAB | Refills: 0 | Status: SHIPPED | OUTPATIENT
Start: 2017-05-11 | End: 2017-07-20

## 2017-05-11 RX ADMIN — METOCLOPRAMIDE 10 MG: 5 INJECTION, SOLUTION INTRAMUSCULAR; INTRAVENOUS at 18:44

## 2017-05-11 RX ADMIN — SODIUM CHLORIDE 1000 ML: 9 INJECTION, SOLUTION INTRAVENOUS at 18:44

## 2017-05-11 ASSESSMENT — ENCOUNTER SYMPTOMS
NAUSEA: 1
VOMITING: 1
HEADACHES: 1
TINGLING: 0

## 2017-05-11 ASSESSMENT — PAIN SCALES - GENERAL: PAINLEVEL_OUTOF10: 8

## 2017-05-11 ASSESSMENT — ACTIVITIES OF DAILY LIVING (ADL)
IADLS_COMMENTS: <!--EPICS-->SEE OT EVALUATION<!--EPICE-->
ADLS_COMMENTS: <!--EPICS-->SEE OT EVALUATION<!--EPICE-->

## 2017-05-11 NOTE — ED AVS SNAPSHOT
Affle Access Code: [email or web address]  Expires: 5/18/2017 12:52 PM    Affle  A secure, online tool to manage your health information     [employer] Affle® is a secure, online tool that connects you to your personalized health information from the privacy of your home -- day or night - making it very easy for you to manage your healthcare. Once the activation process is completed, you can even access your medical information using the Affle deysi, which is available for free in the Apple Deysi store or Google Play store.     Affle provides the following levels of access (as shown below):   My Chart Features   [facility] Primary Care Doctor [facility]  Specialists [facility]  Urgent  Care Non-[facility]  Primary Care  Doctor   Email your healthcare team securely and privately 24/7 X X X X   Manage appointments: schedule your next appointment; view details of past/upcoming appointments X      Request prescription refills. X      View recent personal medical records, including lab and immunizations X X X X   View health record, including health history, allergies, medications X X X X   Read reports about your outpatient visits, procedures, consult and ER notes X X X X   See your discharge summary, which is a recap of your hospital and/or ER visit that includes your diagnosis, lab results, and care plan. X X       How to register for Affle:  1. Go to  [email or web address].  2. Click on the Sign Up Now box, which takes you to the New Member Sign Up page. You will need to provide the following information:  a. Enter your Affle Access Code exactly as it appears at the top of this page. (You will not need to use this code after you’ve completed the sign-up process. If you do not sign up before the expiration date, you must request a new code.)   b. Enter your date of birth.   c. Enter your home email address.   d. Click Submit, and follow the next screen’s instructions.  3. Create a Affle ID. This will be your Affle  login ID and cannot be changed, so think of one that is secure and easy to remember.  4. Create a [employer] password. You can change your password at any time.  5. Enter your Password Reset Question and Answer. This can be used at a later time if you forget your password.   6. Enter your e-mail address. This allows you to receive e-mail notifications when new information is available in [employer].  7. Click Sign Up. You can now view your health information.    For assistance activating your [employer] account, call [phone]

## 2017-05-11 NOTE — ED AVS SNAPSHOT
Home Care Instructions                                                                                                                Rasheeda Manzano   MRN: [id number]    Department:  [facility], Emergency Dept   Date of Visit:  5/11/2017            [facility], Emergency Dept    [address]    [place] [address]    Phone:  [phone]      You were seen by     Mg Sam M.D.      Your Diagnosis Was     Migraine without aura and without status migrainosus, not intractable     G43.009       These are the medications you received during your hospitalization from 05/11/2017 1726 to 05/11/2017 2005     Date/Time Order Dose Route Action    05/11/2017 1844 NS infusion 1,000 mL 1,000 mL Intravenous New Bag    05/11/2017 1844 metoclopramide (REGLAN) injection 10 mg 10 mg Intravenous Given      Follow-up Information     1. Follow up with CHANDLER Adams.    Specialty:  Family Medicine    Contact information    [address] [place] [address]  [place] [address]  [phone]        Medication Information     Review all of your home medications and newly ordered medications with your primary doctor and/or pharmacist as soon as possible. Follow medication instructions as directed by your doctor and/or pharmacist.     Please keep your complete medication list with you and share with your physician. Update the information when medications are discontinued, doses are changed, or new medications (including over-the-counter products) are added; and carry medication information at all times in the event of emergency situations.               Medication List      START taking these medications        Instructions    Morning Afternoon Evening Bedtime    metoclopramide 10 MG Tabs   Commonly known as:  REGLAN        Take 1 Tab by mouth 4 times a day as needed (headache or nausea).   Dose:  10 mg                          ASK your doctor about these medications        Instructions    Morning Afternoon Evening Bedtime    amitriptyline 100 MG Tabs   Commonly known as:  ELAVIL        Take 100 mg by mouth at bedtime as needed for Sleep.   Dose:  100 mg                        amlodipine 5 MG Tabs   Commonly known as:  NORVASC        Take 1 Tab by mouth every day.   Dose:  5 mg                        INDERAL  MG Cp24   Generic drug:  propranolol CR        Take 120 mg by mouth every day.   Dose:  120 mg                        LUNESTA 3 MG Tabs   Generic drug:  Eszopiclone        Take 3 mg by mouth every bedtime.   Dose:  3 mg                        VIIBRYD 40 MG Tabs   Generic drug:  Vilazodone HCl        Take 1 Tab by mouth every evening.   Dose:  1 Tab                             Where to Get Your Medications      These medications were sent to [facility] #[id number] - DEVORAH ERVIN - [address]  [address] [place] [address]     Phone:  [phone]    - metoclopramide 10 MG Tabs            Procedures and tests performed during your visit     IV SALINE LOCK        Discharge Instructions       Recurrent Migraine Headache  A migraine headache is very bad, throbbing pain on one or both sides of your head. Recurrent migraines keep coming back. Talk to your doctor about what things may bring on (trigger) your migraine headaches.  HOME CARE  · Only take medicines as told by your doctor.  · Lie down in a dark, quiet room when you have a migraine.  · Keep a journal to find out if certain things bring on migraine headaches. For example, write down:  ¨ What you eat and drink.  ¨ How much sleep you get.  ¨ Any change to your diet or medicines.  · Lessen how much alcohol you drink.  · Quit smoking if you smoke.  · Get enough sleep.  · Lessen any stress in your life.  · Keep lights dim if bright lights bother you or make your migraines worse.  GET HELP IF:  · Medicine does not help your migraines.  · Your pain keeps coming back.  · You have a fever.  GET HELP RIGHT AWAY IF:   · Your migraine becomes  really bad.  · You have a stiff neck.  · You have trouble seeing.  · Your muscles are weak, or you lose muscle control.  · You lose your balance or have trouble walking.  · You feel like you will pass out (faint), or you pass out.  · You have really bad symptoms that are different than your first symptoms.  MAKE SURE YOU:   · Understand these instructions.  · Will watch your condition.  · Will get help right away if you are not doing well or get worse.     This information is not intended to replace advice given to you by your health care provider. Make sure you discuss any questions you have with your health care provider.     Document Released: 09/26/2009 Document Revised: 12/23/2014 Document Reviewed: 08/25/2014  [employer] Interactive Patient Education ©2016 [employer]            Patient Information     Patient Information    Following emergency treatment: all patient requiring follow-up care must return either to a private physician or a clinic if your condition worsens before you are able to obtain further medical attention, please return to the emergency room.     Billing Information    At [facility], we work to make the billing process streamlined for our patients.  Our Representatives are here to answer any questions you may have regarding your hospital bill.  If you have insurance coverage and have supplied your insurance information to us, we will submit a claim to your insurer on your behalf.  Should you have any questions regarding your bill, we can be reached online or by phone as follows:  Online: You are able pay your bills online or live chat with our representatives about any billing questions you may have. We are here to help Monday - Friday from 8:00am to 7:30pm and 9:00am - 12:00pm on Saturdays.  Please visit [email or web address]  for more information.   Phone:  [phone] or [phone]    Please note that your emergency physician, surgeon, pathologist,  radiologist, anesthesiologist, and other specialists are not employed by [facility] and will therefore bill separately for their services.  Please contact them directly for any questions concerning their bills at the numbers below:     Emergency Physician Services:  [phone]  [facility]:  [phone]  [facility]:  [phone]  [facility]:  [phone]    1. Your final bill may vary from the amount quoted upon discharge if all procedures are not complete at that time, or if your doctor has additional procedures of which we are not aware. You will receive an additional bill if you return to the Emergency Department at [facility] for suture removal regardless of the facility of which the sutures were placed.     2. Please arrange for settlement of this account at the emergency registration.    3. All self-pay accounts are due in full at the time of treatment.  If you are unable to meet this obligation then payment is expected within 4-5 days.     4. If you have had radiology studies (CT, X-ray, Ultrasound, MRI), you have received a preliminary result during your emergency department visit. Please contact the radiology department [phone] to receive a copy of your final result. Please discuss the Final result with your primary physician or with the follow up physician provided.     Crisis Hotline:  [place] Crisis Hotline:  6-256-GAKBPCU or 1-895.502.5178  [place] Crisis Hotline:    1-907.615.5253 or 760-715-5542         ED Discharge Follow Up Questions    1. In order to provide you with very good care, we would like to follow up with a phone call in the next few days.  May we have your permission to contact you?     YES /  NO    2. What is the best phone number to call you? (       )_____-__________    3. What is the best time to call you?      Morning  /  Afternoon  /  Evening                   Patient Signature:   ____________________________________________________________    Date:  ____________________________________________________________      Your appointments     May 12, 2017 To Be Determined   OT-HH-INITIAL EVALUATION with Mikaela Bhatt O.T.   [facility] (--)    [address]  [place] [address]   [phone]            May 15, 2017 To Be Determined   SN-HH-HOME VISIT with Alivia Morel R.N.   [facility] (--)    [address]  [place] [address]   [phone]            May 18, 2017 To Be Determined   SN-HH-HOME VISIT with Alivia Morel R.N.   [facility] (--)    [address]  [place] [address]   [phone]            May 22, 2017 To Be Determined   SN-HH-HOME VISIT with Alivia Morel R.N.   [facility] (--)    [address]  [place] [address]   [phone]            May 29, 2017 To Be Determined   SN-HH-HOME VISIT with Alivia Morel R.N.   [facility] (--)    [address]  [place] [address]   [phone]            Jun 05, 2017 To Be Determined   SN-HH-HOME VISIT with Alivia Morel R.N.   [facility] (--)    [address]  [place] [address]   [phone]            Jun 12, 2017 To Be Determined   SN-HH-HOME VISIT with Alivia Morel R.N.   [facility] (--)    [address]  [facility] [address]   [phone]            Jun 19, 2017 To Be Determined   [facility]    [facility]  [facility] [address]   [phone]            Jun 26, 2017 To Be Determined   SN-HH-HOME VISIT with IAN Ramesh [facility] (--)    [facility].  [facility] [address]   [phone]            Jul 03, 2017 To Be Determined   SN-HH-HOME VISIT with Emily Sage R.N.   [facility] (--)    [facility]  [facility] [address]   [phone]

## 2017-05-11 NOTE — ED AVS SNAPSHOT
5/11/2017    Rasheeda Manzano  [address]  [place] [address]    Dear Rasheeda:    [facility] wants to ensure your discharge home is safe and you or your loved ones have had all of your questions answered regarding your care after you leave the hospital.    Below is a list of resources and contact information should you have any questions regarding your hospital stay, follow-up instructions, or active medical symptoms.    Questions or Concerns Regarding… Contact   Medical Questions Related to Your Discharge  (7 days a week, 8am-5pm) Contact a Nurse Care Coordinator   [phone]   Medical Questions Not Related to Your Discharge  (24 hours a day / 7 days a week)  Contact the Nurse Health Line   [phone]    Medications or Discharge Instructions Refer to your discharge packet   or contact your [facility] Primary Care Provider   [phone]   Follow-up Appointment(s) Schedule your appointment via [employer]   or contact Scheduling [phone]   Billing Review your statement via [employer]  or contact Billing [phone]   Medical Records Review your records via [employer]   or contact Medical Records [phone]     You may receive a telephone call within two days of discharge. This call is to make certain you understand your discharge instructions and have the opportunity to have any questions answered. You can also easily access your medical information, test results and upcoming appointments via the [employer] free online health management tool. You can learn more and sign up at [email or web address]. For assistance setting up your [employer] account, please call [phone].    Once again, we want to ensure your discharge home is safe and that you have a clear understanding of any next steps in your care. If you have any questions or concerns, please do not hesitate to contact us, we are here for you. Thank you for choosing [facility] for your healthcare needs.    Sincerely,    Your [facility] Healthcare Team

## 2017-05-12 VITALS
DIASTOLIC BLOOD PRESSURE: 68 MMHG | RESPIRATION RATE: 16 BRPM | TEMPERATURE: 98.5 F | HEART RATE: 97 BPM | SYSTOLIC BLOOD PRESSURE: 102 MMHG

## 2017-05-12 SDOH — ECONOMIC STABILITY: HOUSING INSECURITY: UNSAFE APPLIANCES: 0

## 2017-05-12 SDOH — ECONOMIC STABILITY: HOUSING INSECURITY: UNSAFE COOKING RANGE AREA: 0

## 2017-05-12 ASSESSMENT — ENCOUNTER SYMPTOMS: RESPIRATORY SYMPTOMS COMMENTS: PT LUNGS CLEAR IN ALL FIELDS, NO ADVANTAGEOUS SOUND NOTED.

## 2017-05-12 NOTE — ED NOTES
Verbalizes understanding of discharge and followup instructions. PIV removed, VSS. Rx sent electronically to patient's pharmacy. Ambulates with steady gait to discharge where she will take cab home.

## 2017-05-12 NOTE — DISCHARGE INSTRUCTIONS
Recurrent Migraine Headache  A migraine headache is very bad, throbbing pain on one or both sides of your head. Recurrent migraines keep coming back. Talk to your doctor about what things may bring on (trigger) your migraine headaches.  HOME CARE  · Only take medicines as told by your doctor.  · Lie down in a dark, quiet room when you have a migraine.  · Keep a journal to find out if certain things bring on migraine headaches. For example, write down:  ¨ What you eat and drink.  ¨ How much sleep you get.  ¨ Any change to your diet or medicines.  · Lessen how much alcohol you drink.  · Quit smoking if you smoke.  · Get enough sleep.  · Lessen any stress in your life.  · Keep lights dim if bright lights bother you or make your migraines worse.  GET HELP IF:  · Medicine does not help your migraines.  · Your pain keeps coming back.  · You have a fever.  GET HELP RIGHT AWAY IF:   · Your migraine becomes really bad.  · You have a stiff neck.  · You have trouble seeing.  · Your muscles are weak, or you lose muscle control.  · You lose your balance or have trouble walking.  · You feel like you will pass out (faint), or you pass out.  · You have really bad symptoms that are different than your first symptoms.  MAKE SURE YOU:   · Understand these instructions.  · Will watch your condition.  · Will get help right away if you are not doing well or get worse.     This information is not intended to replace advice given to you by your health care provider. Make sure you discuss any questions you have with your health care provider.     Document Released: 09/26/2009 Document Revised: 12/23/2014 Document Reviewed: 08/25/2014  [employer] Interactive Patient Education ©2016 [employer]

## 2017-05-12 NOTE — ED NOTES
"Chief Complaint   Patient presents with   • Head Ache     pt reports migraine HA x12 hours. Hx same. + nausea.     Pt reports that she is completely blind. Assisted to stand on scale. Resting in wheelchair.   Blood pressure 102/56, pulse 115, temperature 36.2 °C (97.2 °F), resp. rate 16, height 1.626 m (5' 4\"), weight 66.3 kg (146 lb 2.6 oz), last menstrual period 11/27/2013, SpO2 99 %.  Pt informed of wait times. Educated on triage process.  Asked to return to triage RN for any new or worsening of symptoms. Thanked for patience.        "

## 2017-05-12 NOTE — ED PROVIDER NOTES
ED Provider Note    Scribed for Mg Sam M.D. by Shawn Flowers. 5/11/2017, 6:20 PM.    Primary care provider: CHANDLER Adams  Means of arrival: Walk in  History obtained from: Patient  History limited by: None    CHIEF COMPLAINT  Chief Complaint   Patient presents with   • Head Ache     pt reports migraine HA x12 hours. Hx same. + nausea.       HPI  Rasheeda Manzano is a 46 y.o. female who presents to the Emergency Department complaining of a headache onset at 5 AM this morning. Patient states the headache is behind her right eye and to the right of her head. She notes having a history of headaches but states her current headache is even worse. She reports associated nausea and vomiting. She denies any numbness, tingling or weakness. Patient notes taking migraine medications including amitriptyline and propranolol. She states she has not had a migraine in approximately 6 weeks when taking these medications. She notes being blind.    REVIEW OF SYSTEMS  Review of Systems   Gastrointestinal: Positive for nausea and vomiting.   Neurological: Positive for headaches. Negative for tingling.        Negative numbness or weakness   All other systems reviewed and are negative.  C    PAST MEDICAL HISTORY   has a past medical history of Hydrocephalus; Migraine without aura, without mention of intractable migraine without mention of status migrainosus; Blind; Chronic daily headache; Depression; Psychiatric problem; Hydrocephalus; C. difficile diarrhea (2013); Jaundice; Pain; Other specified symptom associated with female genital organs; Fall; and Legally blind.    SURGICAL HISTORY   has past surgical history that includes laparoscopy (8/8/08); lysis adhesions general (12/10/2013); cystoscopy (12/10/2013); exploratory laparotomy (12/10/2013); appendectomy (12/10/2013); abdominal hysterectomy total (12/10/2013); aakash by laparoscopy (N/A, 8/13/2015); cholecystectomy (N/A, 8/13/2015); and  "other.    SOCIAL HISTORY  Social History   Substance Use Topics   • Smoking status: Current Some Day Smoker -- 1.00 packs/day for 10 years     Types: Cigars     Start date: 05/01/2004   • Smokeless tobacco: Never Used      Comment:  CIGAR/day    • Alcohol Use: Yes      Comment: socially      History   Drug Use No       FAMILY HISTORY  Family History   Problem Relation Age of Onset   • Hypertension Mother    • Hypertension Father    • Non-contributory Neg Hx      Migraine       CURRENT MEDICATIONS  Home Medications     Reviewed by Michelle Bowles R.N. (Registered Nurse) on 05/11/17 at 1809  Med List Status: Partial    Medication Last Dose Status    amitriptyline (ELAVIL) 100 MG Tab  Active    amlodipine (NORVASC) 5 MG Tab  Active    Eszopiclone (LUNESTA) 3 MG Tab 3/3/2017 Active    propranolol CR (INDERAL LA) 120 MG CAPSULE SR 24 HR  Active    Vilazodone HCl (VIIBRYD) 40 MG Tab 3/3/2017 Active                ALLERGIES  Allergies   Allergen Reactions   • Tape Rash     Medical tape. Per patient, paper tape ok.       PHYSICAL EXAM  VITAL SIGNS: /56 mmHg  Pulse 115  Temp(Src) 36.2 °C (97.2 °F)  Resp 16  Ht 1.626 m (5' 4\")  Wt 66.3 kg (146 lb 2.6 oz)  BMI 25.08 kg/m2  SpO2 99%  LMP 11/27/2013  Constitutional:   No acute distress  HENT:  Moist mucous membranes. Disconjugate gaze chronic from blindness.  Eyes:  No conjunctivitis or icterus  Neck:  trachea is midline, no palpable thyroid  Lymphatic:  No cervical lymphadenopathy  Cardiovascular:  Regular rate and rhythm, no murmurs  Thorax & Lungs:  Normal breath sounds, no rhonchi  Abdomen:  Soft, Non-tender  Skin:.  no rash  Back:  Non-tender, no CVA tenderness  Extremities:   no edema  Vascular:  symmetric radial pulse  Neurologic:  Normal gross motor    COURSE & MEDICAL DECISION MAKING  Pertinent Labs & Imaging studies reviewed. (See chart for details)    6:20 PM - Patient seen and examined at bedside. The patient will be resuscitated with 1L NS IV " due to use of Reglan. Patient will be treated with Reglan 10 mg. The differential diagnoses include but are not limited to: migraine     7:29 PM Patient reevaluated at bedside. Patient notes some improvement to her symptoms. The patient will be discharged and should return if symptoms worsen or if new symptoms arise. The patient understands and agrees to plan.  Advised patient to follow up with PCP.     The patient will return for new or worsening symptoms and is stable at the time of discharge.    DISPOSITION:  Patient will be discharged home in stable condition.    FOLLOW UP:  CHANDLER Adams  [address] [place] [address]  [phone]            OUTPATIENT MEDICATIONS:  New Prescriptions    METOCLOPRAMIDE (REGLAN) 10 MG TAB    Take 1 Tab by mouth 4 times a day as needed (headache or nausea).          FINAL IMPRESSION  1. Migraine without aura and without status migrainosus, not intractable          Shawn SHARMA (Scribe), am scribing for, and in the presence of, Mg Sam M.D..    Electronically signed by: Shawn Flowers (Scribe), 5/11/2017    Mg SHARMA M.D. personally performed the services described in this documentation, as scribed by Shawn Flowers in my presence, and it is both accurate and complete.    The note accurately reflects work and decisions made by me.  Mg Sam  5/11/2017  8:51 PM

## 2017-05-15 ENCOUNTER — HOME CARE VISIT (OUTPATIENT)
Dept: HOME HEALTH SERVICES | Facility: HOME HEALTHCARE | Age: 47
End: 2017-05-15
Payer: MEDICAID

## 2017-05-15 PROCEDURE — G0495 RN CARE TRAIN/EDU IN HH: HCPCS

## 2017-05-16 ASSESSMENT — ACTIVITIES OF DAILY LIVING (ADL): OASIS_M1830: 03

## 2017-05-18 ENCOUNTER — HOME CARE VISIT (OUTPATIENT)
Dept: HOME HEALTH SERVICES | Facility: HOME HEALTHCARE | Age: 47
End: 2017-05-18
Payer: MEDICAID

## 2017-05-18 PROCEDURE — G0162 HHC RN E&M PLAN SVS, 15 MIN: HCPCS

## 2017-05-21 VITALS
TEMPERATURE: 96.6 F | SYSTOLIC BLOOD PRESSURE: 90 MMHG | DIASTOLIC BLOOD PRESSURE: 60 MMHG | RESPIRATION RATE: 20 BRPM | HEART RATE: 67 BPM

## 2017-05-21 VITALS
DIASTOLIC BLOOD PRESSURE: 60 MMHG | RESPIRATION RATE: 18 BRPM | HEART RATE: 66 BPM | TEMPERATURE: 98.3 F | SYSTOLIC BLOOD PRESSURE: 98 MMHG

## 2017-05-21 SDOH — ECONOMIC STABILITY: HOUSING INSECURITY: UNSAFE APPLIANCES: 0

## 2017-05-21 SDOH — ECONOMIC STABILITY: HOUSING INSECURITY: UNSAFE COOKING RANGE AREA: 0

## 2017-05-21 SDOH — ECONOMIC STABILITY: HOUSING INSECURITY: UNSAFE COOKING RANGE AREA: 1

## 2017-05-21 ASSESSMENT — ACTIVITIES OF DAILY LIVING (ADL)
HOME_HEALTH_OASIS: 00
OASIS_M1830: 00

## 2017-05-21 ASSESSMENT — ENCOUNTER SYMPTOMS: DEPRESSED MOOD: 1

## 2017-07-20 ENCOUNTER — APPOINTMENT (OUTPATIENT)
Dept: RADIOLOGY | Facility: MEDICAL CENTER | Age: 47
End: 2017-07-20
Attending: EMERGENCY MEDICINE
Payer: MEDICAID

## 2017-07-20 ENCOUNTER — HOSPITAL ENCOUNTER (EMERGENCY)
Facility: MEDICAL CENTER | Age: 47
End: 2017-07-21
Attending: EMERGENCY MEDICINE
Payer: MEDICAID

## 2017-07-20 DIAGNOSIS — T42.6X1A: ICD-10-CM

## 2017-07-20 DIAGNOSIS — R40.4 TRANSIENT ALTERATION OF AWARENESS: ICD-10-CM

## 2017-07-20 LAB
ALBUMIN SERPL BCP-MCNC: 3.7 G/DL (ref 3.2–4.9)
ALBUMIN/GLOB SERPL: 1 G/DL
ALP SERPL-CCNC: 128 U/L (ref 30–99)
ALT SERPL-CCNC: 9 U/L (ref 2–50)
AMPHET UR QL SCN: NEGATIVE
ANION GAP SERPL CALC-SCNC: 8 MMOL/L (ref 0–11.9)
APAP SERPL-MCNC: <10 UG/ML (ref 10–30)
AST SERPL-CCNC: 14 U/L (ref 12–45)
BARBITURATES UR QL SCN: NEGATIVE
BASOPHILS # BLD AUTO: 0.5 % (ref 0–1.8)
BASOPHILS # BLD: 0.05 K/UL (ref 0–0.12)
BENZODIAZ UR QL SCN: NEGATIVE
BILIRUB SERPL-MCNC: 0.5 MG/DL (ref 0.1–1.5)
BUN SERPL-MCNC: 11 MG/DL (ref 8–22)
BZE UR QL SCN: NEGATIVE
CALCIUM SERPL-MCNC: 9.4 MG/DL (ref 8.5–10.5)
CANNABINOIDS UR QL SCN: NEGATIVE
CHLORIDE SERPL-SCNC: 105 MMOL/L (ref 96–112)
CO2 SERPL-SCNC: 25 MMOL/L (ref 20–33)
CREAT SERPL-MCNC: 0.8 MG/DL (ref 0.5–1.4)
EKG IMPRESSION: NORMAL
EOSINOPHIL # BLD AUTO: 0.11 K/UL (ref 0–0.51)
EOSINOPHIL NFR BLD: 1.2 % (ref 0–6.9)
ERYTHROCYTE [DISTWIDTH] IN BLOOD BY AUTOMATED COUNT: 54.6 FL (ref 35.9–50)
ETHANOL BLD-MCNC: 0.01 G/DL
GFR SERPL CREATININE-BSD FRML MDRD: >60 ML/MIN/1.73 M 2
GLOBULIN SER CALC-MCNC: 3.6 G/DL (ref 1.9–3.5)
GLUCOSE SERPL-MCNC: 93 MG/DL (ref 65–99)
HCT VFR BLD AUTO: 41.1 % (ref 37–47)
HGB BLD-MCNC: 13.8 G/DL (ref 12–16)
IMM GRANULOCYTES # BLD AUTO: 0.03 K/UL (ref 0–0.11)
IMM GRANULOCYTES NFR BLD AUTO: 0.3 % (ref 0–0.9)
LYMPHOCYTES # BLD AUTO: 1.33 K/UL (ref 1–4.8)
LYMPHOCYTES NFR BLD: 14.3 % (ref 22–41)
MAGNESIUM SERPL-MCNC: 2 MG/DL (ref 1.5–2.5)
MCH RBC QN AUTO: 31.4 PG (ref 27–33)
MCHC RBC AUTO-ENTMCNC: 33.6 G/DL (ref 33.6–35)
MCV RBC AUTO: 93.4 FL (ref 81.4–97.8)
METHADONE UR QL SCN: NEGATIVE
MONOCYTES # BLD AUTO: 0.42 K/UL (ref 0–0.85)
MONOCYTES NFR BLD AUTO: 4.5 % (ref 0–13.4)
NEUTROPHILS # BLD AUTO: 7.39 K/UL (ref 2–7.15)
NEUTROPHILS NFR BLD: 79.2 % (ref 44–72)
NRBC # BLD AUTO: 0 K/UL
NRBC BLD AUTO-RTO: 0 /100 WBC
OPIATES UR QL SCN: NEGATIVE
OXYCODONE UR QL SCN: NEGATIVE
PCP UR QL SCN: NEGATIVE
PLATELET # BLD AUTO: 312 K/UL (ref 164–446)
PMV BLD AUTO: 10.3 FL (ref 9–12.9)
POC BREATHALIZER: 0 PERCENT (ref 0–0.01)
POTASSIUM SERPL-SCNC: 4.1 MMOL/L (ref 3.6–5.5)
PROPOXYPH UR QL SCN: NEGATIVE
PROT SERPL-MCNC: 7.3 G/DL (ref 6–8.2)
RBC # BLD AUTO: 4.4 M/UL (ref 4.2–5.4)
SALICYLATES SERPL-MCNC: 0 MG/DL (ref 15–25)
SODIUM SERPL-SCNC: 138 MMOL/L (ref 135–145)
WBC # BLD AUTO: 9.3 K/UL (ref 4.8–10.8)

## 2017-07-20 PROCEDURE — 85025 COMPLETE CBC W/AUTO DIFF WBC: CPT

## 2017-07-20 PROCEDURE — 80307 DRUG TEST PRSMV CHEM ANLYZR: CPT

## 2017-07-20 PROCEDURE — 700105 HCHG RX REV CODE 258: Performed by: EMERGENCY MEDICINE

## 2017-07-20 PROCEDURE — 72170 X-RAY EXAM OF PELVIS: CPT

## 2017-07-20 PROCEDURE — 302970 POC BREATHALIZER

## 2017-07-20 PROCEDURE — 80053 COMPREHEN METABOLIC PANEL: CPT

## 2017-07-20 PROCEDURE — 99285 EMERGENCY DEPT VISIT HI MDM: CPT

## 2017-07-20 PROCEDURE — 93005 ELECTROCARDIOGRAM TRACING: CPT

## 2017-07-20 PROCEDURE — 83735 ASSAY OF MAGNESIUM: CPT

## 2017-07-20 RX ORDER — ZOLPIDEM TARTRATE 5 MG/1
5 TABLET ORAL NIGHTLY PRN
COMMUNITY
End: 2018-08-17

## 2017-07-20 RX ORDER — SODIUM CHLORIDE 9 MG/ML
1000 INJECTION, SOLUTION INTRAVENOUS ONCE
Status: COMPLETED | OUTPATIENT
Start: 2017-07-20 | End: 2017-07-21

## 2017-07-20 RX ORDER — PROPRANOLOL HYDROCHLORIDE 80 MG/1
80 CAPSULE, EXTENDED RELEASE ORAL EVERY EVENING
COMMUNITY
End: 2018-09-01

## 2017-07-20 RX ORDER — AMLODIPINE BESYLATE 5 MG/1
5 TABLET ORAL EVERY EVENING
COMMUNITY
End: 2018-09-01

## 2017-07-20 RX ORDER — AMITRIPTYLINE HYDROCHLORIDE 75 MG/1
75 TABLET ORAL NIGHTLY
COMMUNITY
End: 2018-09-01

## 2017-07-20 RX ADMIN — SODIUM CHLORIDE 1000 ML: 9 INJECTION, SOLUTION INTRAVENOUS at 21:06

## 2017-07-20 ASSESSMENT — LIFESTYLE VARIABLES: DO YOU DRINK ALCOHOL: NO

## 2017-07-20 ASSESSMENT — PAIN SCALES - GENERAL: PAINLEVEL_OUTOF10: 0

## 2017-07-20 NOTE — ED AVS SNAPSHOT
Home Care Instructions                                                                                                                Rasheeda Manzano   MRN: [id number]    Department:  [facility], Emergency Dept   Date of Visit:  7/20/2017            [facility], Emergency Dept    [address]    [place] [address]    Phone:  [phone]      You were seen by     1. Silvino Saucedo M.D.    2. Harrison Tran M.D.      Your Diagnosis Was     Transient alteration of awareness     R40.4       These are the medications you received during your hospitalization from 07/20/2017 1817 to 07/21/2017 0824     Date/Time Order Dose Route Action    07/20/2017 2106 NS infusion 1,000 mL 1,000 mL Intravenous New Bag    07/21/2017 0059 ketorolac (TORADOL) injection 30 mg 30 mg Intravenous Given      Follow-up Information     1. Schedule an appointment as soon as possible for a visit with CHANDLER Adams.    Specialty:  Family Medicine    Why:  Friday or Monday if needed    Contact information    [address] [place] [address]  [place] [address]  [phone]        Medication Information     Review all of your home medications and newly ordered medications with your primary doctor and/or pharmacist as soon as possible. Follow medication instructions as directed by your doctor and/or pharmacist.     Please keep your complete medication list with you and share with your physician. Update the information when medications are discontinued, doses are changed, or new medications (including over-the-counter products) are added; and carry medication information at all times in the event of emergency situations.               Medication List      ASK your doctor about these medications        Instructions    Morning Afternoon Evening Bedtime    amitriptyline 75 MG Tabs   Commonly known as:  ELAVIL        Take 75 mg by mouth every evening. Last filled 7/18/17 #30   Dose:  75 mg                        amlodipine 5 MG Tabs   Commonly known as:  NORVASC        Take 5 mg by mouth every day. Lat filled 6/5/17 #30   Dose:  5 mg                        LUNESTA 3 MG Tabs   Generic drug:  Eszopiclone        Take 3 mg by mouth every bedtime. Last filled 6/3/17 #30   Dose:  3 mg                        propranolol CR 80 MG Cp24   Commonly known as:  INDERAL LA        Take 80 mg by mouth every day. Last filled 7/18/17 #30   Dose:  80 mg                        VIIBRYD 40 MG Tabs   Generic drug:  Vilazodone HCl        Take 1 Tab by mouth every evening. Last filled 6/27/17 #30   Dose:  1 Tab                        zolpidem 5 MG Tabs   Commonly known as:  AMBIEN        Take 5 mg by mouth at bedtime as needed for Sleep. Last filled 7/18/17 #30   Dose:  5 mg                                Procedures and tests performed during your visit     ACETAMINOPHEN    CBC WITH DIFFERENTIAL    COMP METABOLIC PANEL    DIAGNOSTIC ALCOHOL    DX-PELVIS-1 OR 2 VIEWS    ED CONSULT TO BEHAVIORAL HEALTH    EKG (ER)    ESTIMATED GFR    IP CONSULT TO     [employer] consult (when BA is below .08)    MAGNESIUM    NOTIFY ADMITTING  OF SUICIDE RISK    POC BREATHALIZER    SALICYLATE    URINE DRUG SCREEN (TRIAGE)        Discharge Instructions       Accidental Overdose  Organized your daily medications in a pill organizer so that you do not accidentally overdosed on sedating medications like Ambien. Return for worsening headache, repeated vomiting, new confusion, seizure or other potential signs of head injury.    A drug overdose occurs when a chemical substance (drug or medication) is used in amounts large enough to overcome a person. This may result in severe illness or death. This is a type of poisoning. Accidental overdoses of medications or other substances come from a variety of reasons. When this happens accidentally, it is often because the person taking the substance does not know enough about what they have taken. Drugs which  commonly cause overdose deaths are alcohol, psychotropic medications (medications which affect the mind), pain medications, illegal drugs (street drugs) such as cocaine and heroin, and multiple drugs taken at the same time. It may result from careless behavior (such as over-indulging at a party). Other causes of overdose may include multiple drug use, a lapse in memory, or drug use after a period of no drug use.   Sometimes overdosing occurs because a person cannot remember if they have taken their medication.   A common unintentional overdose in young children involves multi-vitamins containing iron. Iron is a part of the hemoglobin molecule in blood. It is used to transport oxygen to living cells. When taken in small amounts, iron allows the body to restock hemoglobin. In large amounts, it causes problems in the body. If this overdose is not treated, it can lead to death.  Never take medicines that show signs of tampering or do not seem quite right. Never take medicines in the dark or in poor lighting. Read the label and check each dose of medicine before you take it. When adults are poisoned, it happens most often through carelessness or lack of information. Taking medicines in the dark or taking medicine prescribed for someone else to treat the same type of problem is a dangerous practice.  SYMPTOMS   Symptoms of overdose depend on the medication and amount taken. They can vary from over-activity with stimulant over-dosage, to sleepiness from depressants such as alcohol, narcotics and tranquilizers. Confusion, dizziness, nausea and vomiting may be present. If problems are severe enough coma and death may result.  DIAGNOSIS   Diagnosis and management are generally straightforward if the drug is known. Otherwise it is more difficult. At times, certain symptoms and signs exhibited by the patient, or blood tests, can reveal the drug in question.   TREATMENT   In an emergency department, most patients can be treated  "with supportive measures. Antidotes may be available if there has been an overdose of opioids or benzodiazepines. A rapid improvement will often occur if this is the cause of overdose.  At home or away from medical care:  · There may be no immediate problems or warning signs in children.  · Not everything works well in all cases of poisoning.  · Take immediate action. Poisons may act quickly.  · If you think someone has swallowed medicine or a household product, and the person is unconscious, having seizures (convulsions), or is not breathing, immediately call for an ambulance.  IF a person is conscious and appears to be doing OK but has swallowed a poison:  · Do not wait to see what effect the poison will have. Immediately call a poison control center (listed in the white pages of your telephone book under \"Poison Control\" or inside the front cover with other emergency numbers). Some poison control centers have TTY capability for the deaf. Check with your local center if you or someone in your family requires this service.  · Keep the container so you can read the label on the product for ingredients.  · Describe what, when, and how much was taken and the age and condition of the person poisoned. Inform them if the person is vomiting, choking, drowsy, shows a change in color or temperature of skin, is conscious or unconscious, or is convulsing.  · Do not cause vomiting unless instructed by medical personnel. Do not induce vomiting or force liquids into a person who is convulsing, unconscious, or very drowsy.  Stay calm and in control.   · Activated charcoal also is sometimes used in certain types of poisoning and you may wish to add a supply to your emergency medicines. It is available without a prescription. Call a poison control center before using this medication.  PREVENTION   Thousands of children die every year from unintentional poisoning. This may be from household chemicals, poisoning from carbon monoxide " in a car, taking their parent's medications, or simply taking a few iron pills or vitamins with iron. Poisoning comes from unexpected sources.  · Store medicines out of the sight and reach of children, preferably in a locked cabinet. Do not keep medications in a food cabinet. Always store your medicines in a secure place. Get rid of  medications.  · If you have children living with you or have them as occasional guests, you should have child-resistant caps on your medicine containers. Keep everything out of reach. Child proof your home.  · If you are called to the telephone or to answer the door while you are taking a medicine, take the container with you or put the medicine out of the reach of small children.  · Do not take your medication in front of children. Do not tell your child how good a medication is and how good it is for them. They may get the idea it is more of a treat.  · If you are an adult and have accidentally taken an overdose, you need to consider how this happened and what can be done to prevent it from happening again. If this was from a street drug or alcohol, determine if there is a problem that needs addressing. If you are not sure a problems exists, it is easy to talk to a professional and ask them if they think you have a problem. It is better to handle this problem in this way before it happens again and has a much worse consequence.     This information is not intended to replace advice given to you by your health care provider. Make sure you discuss any questions you have with your health care provider.     Document Released: 2006 Document Revised: 2016 Document Reviewed: 2010  Elsevier Interactive Patient Education ©6 Elsevier Inc[employer]            Patient Information     Patient Information    Following emergency treatment: all patient requiring follow-up care must return either to a private physician or a clinic if your condition worsens before you are able to  obtain further medical attention, please return to the emergency room.     Billing Information    At [facility], we work to make the billing process streamlined for our patients.  Our Representatives are here to answer any questions you may have regarding your hospital bill.  If you have insurance coverage and have supplied your insurance information to us, we will submit a claim to your insurer on your behalf.  Should you have any questions regarding your bill, we can be reached online or by phone as follows:  Online: You are able pay your bills online or live chat with our representatives about any billing questions you may have. We are here to help Monday - Friday from 8:00am to 7:30pm and 9:00am - 12:00pm on Saturdays.  Please visit [email or web address]  for more information.   Phone:  [phone] or [phone]    Please note that your emergency physician, surgeon, pathologist, radiologist, anesthesiologist, and other specialists are not employed by [facility] and will therefore bill separately for their services.  Please contact them directly for any questions concerning their bills at the numbers below:     Emergency Physician Services:  [phone]  [facility]:  [phone]  [facility]:  [phone]  [facility]:  [phone]    1. Your final bill may vary from the amount quoted upon discharge if all procedures are not complete at that time, or if your doctor has additional procedures of which we are not aware. You will receive an additional bill if you return to the Emergency Department at [facility] for suture removal regardless of the facility of which the sutures were placed.     2. Please arrange for settlement of this account at the emergency registration.    3. All self-pay accounts are due in full at the time of treatment.  If you are unable to meet this obligation then payment is expected within 4-5 days.      4. If you have had radiology studies (CT, X-ray, Ultrasound, MRI), you have received a preliminary result during your emergency department visit. Please contact the radiology department [phone] to receive a copy of your final result. Please discuss the Final result with your primary physician or with the follow up physician provided.     Crisis Hotline:  [facility] Hotline:  4-831-SZGUDII or 1-377.561.6446  [place] Crisis Hotline:    1-569.227.2323 or 150-916-6411         ED Discharge Follow Up Questions    1. In order to provide you with very good care, we would like to follow up with a phone call in the next few days.  May we have your permission to contact you?     YES /  NO    2. What is the best phone number to call you? (       )_____-__________    3. What is the best time to call you?      Morning  /  Afternoon  /  Evening                   Patient Signature:  ____________________________________________________________    Date:  ____________________________________________________________

## 2017-07-20 NOTE — ED AVS SNAPSHOT
7/21/2017    Rasheeda Manzano  [address]  [place] [address]    Dear Rasheeda:    [facility] wants to ensure your discharge home is safe and you or your loved ones have had all of your questions answered regarding your care after you leave the hospital.    Below is a list of resources and contact information should you have any questions regarding your hospital stay, follow-up instructions, or active medical symptoms.    Questions or Concerns Regarding… Contact   Medical Questions Related to Your Discharge  (7 days a week, 8am-5pm) Contact a Nurse Care Coordinator   [phone]   Medical Questions Not Related to Your Discharge  (24 hours a day / 7 days a week)  Contact the Nurse Health Line   [phone]    Medications or Discharge Instructions Refer to your discharge packet   or contact your [facility] Primary Care Provider   [phone]   Follow-up Appointment(s) Schedule your appointment via [employer]   or contact Scheduling [phone]   Billing Review your statement via [employer]  or contact Billing [phone]   Medical Records Review your records via [employer]   or contact Medical Records [phone]     You may receive a telephone call within two days of discharge. This call is to make certain you understand your discharge instructions and have the opportunity to have any questions answered. You can also easily access your medical information, test results and upcoming appointments via the [employer] free online health management tool. You can learn more and sign up at [email or web address]. For assistance setting up your [employer] account, please call [phone].    Once again, we want to ensure your discharge home is safe and that you have a clear understanding of any next steps in your care. If you have any questions or concerns, please do not hesitate to contact us, we are here for you. Thank you for choosing [facility] for your healthcare needs.    Sincerely,    Your [facility] Healthcare Team

## 2017-07-20 NOTE — ED AVS SNAPSHOT
Photodigm Access Code: [email or web address]  Expires: 8/20/2017  8:23 AM    Photodigm  A secure, online tool to manage your health information     [employer]’s Photodigm® is a secure, online tool that connects you to your personalized health information from the privacy of your home -- day or night - making it very easy for you to manage your healthcare. Once the activation process is completed, you can even access your medical information using the Photodigm deysi, which is available for free in the Apple Deysi store or Google Play store.     Photodigm provides the following levels of access (as shown below):   My Chart Features   [facility] Primary Care Doctor [facility]  Specialists [facility]  Urgent  Care Non-[facility]  Primary Care  Doctor   Email your healthcare team securely and privately 24/7 X X X X   Manage appointments: schedule your next appointment; view details of past/upcoming appointments X      Request prescription refills. X      View recent personal medical records, including lab and immunizations X X X X   View health record, including health history, allergies, medications X X X X   Read reports about your outpatient visits, procedures, consult and ER notes X X X X   See your discharge summary, which is a recap of your hospital and/or ER visit that includes your diagnosis, lab results, and care plan. X X       How to register for Photodigm:  1. Go to  [email or web address].  2. Click on the Sign Up Now box, which takes you to the New Member Sign Up page. You will need to provide the following information:  a. Enter your Photodigm Access Code exactly as it appears at the top of this page. (You will not need to use this code after you’ve completed the sign-up process. If you do not sign up before the expiration date, you must request a new code.)   b. Enter your date of birth.   c. Enter your home email address.   d. Click Submit, and follow the next screen’s instructions.  3. Create a Photodigm ID. This will be your Photodigm  login ID and cannot be changed, so think of one that is secure and easy to remember.  4. Create a [employer] password. You can change your password at any time.  5. Enter your Password Reset Question and Answer. This can be used at a later time if you forget your password.   6. Enter your e-mail address. This allows you to receive e-mail notifications when new information is available in [employer].  7. Click Sign Up. You can now view your health information.    For assistance activating your [employer] account, call [phone]

## 2017-07-21 VITALS
BODY MASS INDEX: 26.66 KG/M2 | TEMPERATURE: 97.7 F | HEIGHT: 65 IN | OXYGEN SATURATION: 89 % | WEIGHT: 160 LBS | DIASTOLIC BLOOD PRESSURE: 74 MMHG | SYSTOLIC BLOOD PRESSURE: 113 MMHG | RESPIRATION RATE: 23 BRPM | HEART RATE: 72 BPM

## 2017-07-21 PROCEDURE — 90791 PSYCH DIAGNOSTIC EVALUATION: CPT

## 2017-07-21 PROCEDURE — 96374 THER/PROPH/DIAG INJ IV PUSH: CPT

## 2017-07-21 PROCEDURE — 700111 HCHG RX REV CODE 636 W/ 250 OVERRIDE (IP): Performed by: EMERGENCY MEDICINE

## 2017-07-21 RX ORDER — KETOROLAC TROMETHAMINE 30 MG/ML
30 INJECTION, SOLUTION INTRAMUSCULAR; INTRAVENOUS ONCE
Status: COMPLETED | OUTPATIENT
Start: 2017-07-21 | End: 2017-07-21

## 2017-07-21 RX ORDER — KETOROLAC TROMETHAMINE 30 MG/ML
60 INJECTION, SOLUTION INTRAMUSCULAR; INTRAVENOUS ONCE
Status: DISCONTINUED | OUTPATIENT
Start: 2017-07-21 | End: 2017-07-21 | Stop reason: HOSPADM

## 2017-07-21 RX ADMIN — KETOROLAC TROMETHAMINE 30 MG: 30 INJECTION, SOLUTION INTRAMUSCULAR at 00:59

## 2017-07-21 NOTE — ED NOTES
Paramedic from [facility], called concerned because he misplaced pt's amlodipine. Candido stated that he was going to contact adult mental health services because he did not believe that the pt was capable of living alone. He state that the pt was found by her neighbor and she called EMS. He does not know the neighbors name, but provided her phone number incase of necessity. Phone number for neighbor is [phone]

## 2017-07-21 NOTE — DISCHARGE INSTRUCTIONS
Accidental Overdose  Organized your daily medications in a pill organizer so that you do not accidentally overdosed on sedating medications like Ambien. Return for worsening headache, repeated vomiting, new confusion, seizure or other potential signs of head injury.    A drug overdose occurs when a chemical substance (drug or medication) is used in amounts large enough to overcome a person. This may result in severe illness or death. This is a type of poisoning. Accidental overdoses of medications or other substances come from a variety of reasons. When this happens accidentally, it is often because the person taking the substance does not know enough about what they have taken. Drugs which commonly cause overdose deaths are alcohol, psychotropic medications (medications which affect the mind), pain medications, illegal drugs (street drugs) such as cocaine and heroin, and multiple drugs taken at the same time. It may result from careless behavior (such as over-indulging at a party). Other causes of overdose may include multiple drug use, a lapse in memory, or drug use after a period of no drug use.   Sometimes overdosing occurs because a person cannot remember if they have taken their medication.   A common unintentional overdose in young children involves multi-vitamins containing iron. Iron is a part of the hemoglobin molecule in blood. It is used to transport oxygen to living cells. When taken in small amounts, iron allows the body to restock hemoglobin. In large amounts, it causes problems in the body. If this overdose is not treated, it can lead to death.  Never take medicines that show signs of tampering or do not seem quite right. Never take medicines in the dark or in poor lighting. Read the label and check each dose of medicine before you take it. When adults are poisoned, it happens most often through carelessness or lack of information. Taking medicines in the dark or taking medicine prescribed for  "someone else to treat the same type of problem is a dangerous practice.  SYMPTOMS   Symptoms of overdose depend on the medication and amount taken. They can vary from over-activity with stimulant over-dosage, to sleepiness from depressants such as alcohol, narcotics and tranquilizers. Confusion, dizziness, nausea and vomiting may be present. If problems are severe enough coma and death may result.  DIAGNOSIS   Diagnosis and management are generally straightforward if the drug is known. Otherwise it is more difficult. At times, certain symptoms and signs exhibited by the patient, or blood tests, can reveal the drug in question.   TREATMENT   In an emergency department, most patients can be treated with supportive measures. Antidotes may be available if there has been an overdose of opioids or benzodiazepines. A rapid improvement will often occur if this is the cause of overdose.  At home or away from medical care:  · There may be no immediate problems or warning signs in children.  · Not everything works well in all cases of poisoning.  · Take immediate action. Poisons may act quickly.  · If you think someone has swallowed medicine or a household product, and the person is unconscious, having seizures (convulsions), or is not breathing, immediately call for an ambulance.  IF a person is conscious and appears to be doing OK but has swallowed a poison:  · Do not wait to see what effect the poison will have. Immediately call a poison control center (listed in the white pages of your telephone book under \"Poison Control\" or inside the front cover with other emergency numbers). Some poison control centers have TTY capability for the deaf. Check with your local center if you or someone in your family requires this service.  · Keep the container so you can read the label on the product for ingredients.  · Describe what, when, and how much was taken and the age and condition of the person poisoned. Inform them if the person " is vomiting, choking, drowsy, shows a change in color or temperature of skin, is conscious or unconscious, or is convulsing.  · Do not cause vomiting unless instructed by medical personnel. Do not induce vomiting or force liquids into a person who is convulsing, unconscious, or very drowsy.  Stay calm and in control.   · Activated charcoal also is sometimes used in certain types of poisoning and you may wish to add a supply to your emergency medicines. It is available without a prescription. Call a poison control center before using this medication.  PREVENTION   Thousands of children die every year from unintentional poisoning. This may be from household chemicals, poisoning from carbon monoxide in a car, taking their parent's medications, or simply taking a few iron pills or vitamins with iron. Poisoning comes from unexpected sources.  · Store medicines out of the sight and reach of children, preferably in a locked cabinet. Do not keep medications in a food cabinet. Always store your medicines in a secure place. Get rid of  medications.  · If you have children living with you or have them as occasional guests, you should have child-resistant caps on your medicine containers. Keep everything out of reach. Child proof your home.  · If you are called to the telephone or to answer the door while you are taking a medicine, take the container with you or put the medicine out of the reach of small children.  · Do not take your medication in front of children. Do not tell your child how good a medication is and how good it is for them. They may get the idea it is more of a treat.  · If you are an adult and have accidentally taken an overdose, you need to consider how this happened and what can be done to prevent it from happening again. If this was from a street drug or alcohol, determine if there is a problem that needs addressing. If you are not sure a problems exists, it is easy to talk to a professional and ask  them if they think you have a problem. It is better to handle this problem in this way before it happens again and has a much worse consequence.     This information is not intended to replace advice given to you by your health care provider. Make sure you discuss any questions you have with your health care provider.     Document Released: 03/03/2006 Document Revised: 01/08/2016 Document Reviewed: 08/09/2010  Elsevier Interactive Patient Education ©2016 [employer]

## 2017-07-21 NOTE — ED NOTES
Pt sleeping comfortably.  Monitors in place, VSS, call bell within reach will continue to monitor.

## 2017-07-21 NOTE — ED NOTES
Pt resting comfortably in bed. Monitors in place VSS. No s/s of distress noted. Call bell within reach, will continue to monitor.

## 2017-07-21 NOTE — ED NOTES
Pt resting comfortably in bed, pt denies any needs at this time, call bell within reach, will continue to monitor.

## 2017-07-21 NOTE — ED NOTES
Pt sleeping comfortably in bed, no s/s of distress noted. Call bell within reach, will continue to monitor

## 2017-07-21 NOTE — ED PROVIDER NOTES
ED Provider Note    Scribed for Silvino Saucedo M.D. by Idalia Berman. 7/20/2017  6:50 PM    Primary care provider: CHANDLER Adams  Means of arrival: Ambulance   History obtained from: Patient, EMS  History limited by: Poor historian    CHIEF COMPLAINT  Chief Complaint   Patient presents with   • Suicidal Ideation   • Drug Overdose     Ambien       HPI  Rasheeda Manzano is a 46 y.o. female who presents for possible overdose on Ambien and amlodipine in a suicide attmept. Per report, the patient had 30, 5 mg Ambien filled 2 days ago. EMS found empty Ambien and amlodipine bottles on the ground. The patient denies taking any pills or drinking alcohol today. She denies suicidal ideations. She states that she is here for frequent falls. She does not know why she has been falling. Per patient, she has right hip pain currently. Patient denies fevers, chills, nausea, vomiting, diarrhea, abdominal pain, neck pain, back pain, headache, chest pain, shortness of breath.     Further details of the HPI are limited secondary to the patient being a poor historian.     REVIEW OF SYSTEMS  Pertinent positives include: possible drug ingestion, possible suicide attempt, falls, hip pain.  Pertinent negatives include: fevers, chills, nausea, vomiting, diarrhea, abdominal pain, neck pain, back pain, headache, chest pain, shortness of breath.    C    Further details of the ROS are limited secondary to the patient being a poor historian.     PAST MEDICAL HISTORY  Past Medical History   Diagnosis Date   • Hydrocephalus    • Migraine without aura, without mention of intractable migraine without mention of status migrainosus    • Blind    • Chronic daily headache    • Depression    • Psychiatric problem      depression   • Hydrocephalus      shunt drains into pleural place of L lung   • C. difficile diarrhea 2013   • Jaundice      at birth   • Pain      gallbladder related   • Other specified symptom associated with female  genital organs      excessive bleeding   • Fall    • Legally blind        FAMILY HISTORY  Family History   Problem Relation Age of Onset   • Hypertension Mother    • Hypertension Father    • Non-contributory Neg Hx      Migraine       SOCIAL HISTORY  Social History   Substance Use Topics   • Smoking status: Current Some Day Smoker -- 1.00 packs/day for 10 years     Types: Cigars     Start date: 05/01/2004   • Smokeless tobacco: Never Used      Comment:  CIGAR/day    • Alcohol Use: Yes      Comment: socially     History   Drug Use No       SURGICAL HISTORY  Past Surgical History   Procedure Laterality Date   • Laparoscopy  8/8/08     Performed by FERNANDO HENDERSON at SURGERY [facility] [place]   • Lysis adhesions general  12/10/2013     Performed by Arie Bass M.D. at [facility]   • Cystoscopy  12/10/2013     Performed by Joana Yeager M.D. at [facility]   • Exploratory laparotomy  12/10/2013     Performed by Arie Bass M.D. at [facility]   • Appendectomy  12/10/2013     Performed by Arie Bass M.D. at SURGERY [facility] [place]   • Abdominal hysterectomy total  12/10/2013     Performed by Joana Yeager M.D. at [facility]   • Ayala by laparoscopy N/A 8/13/2015     Procedure: AYALA BY LAPAROSCOPY;  Surgeon: Brice Johnson M.D.;  Location: SURGERY SAME DAY [facility];  Service:    • Cholecystectomy N/A 8/13/2015     Procedure: CHOLECYSTECTOMY;  Surgeon: Brice Johnson M.D.;  Location: SURGERY SAME DAY [facility];  Service:    • Other       PLEURAL SHUNT, numerous revisions       CURRENT MEDICATIONS  Current Facility-Administered Medications   Medication Dose Route Frequency Provider Last Rate Last Dose   • ketorolac (TORADOL) injection 60 mg  60 mg Intramuscular Once Silvino Saucedo M.D.         Current Outpatient Prescriptions   Medication Sig Dispense Refill   • amitriptyline (ELAVIL) 75 MG Tab Take 75 mg by mouth every evening. Last filled 7/18/17 #30   "   • propranolol CR (INDERAL LA) 80 MG CAPSULE SR 24 HR Take 80 mg by mouth every day. Last filled 7/18/17 #30     • amlodipine (NORVASC) 5 MG Tab Take 5 mg by mouth every day. Lat filled 6/5/17 #30     • zolpidem (AMBIEN) 5 MG Tab Take 5 mg by mouth at bedtime as needed for Sleep. Last filled 7/18/17 #30     • Eszopiclone (LUNESTA) 3 MG Tab Take 3 mg by mouth every bedtime. Last filled 6/3/17 #30     • Vilazodone HCl (VIIBRYD) 40 MG Tab Take 1 Tab by mouth every evening. Last filled 6/27/17 #30         ALLERGIES  Allergies   Allergen Reactions   • Tape Rash     Medical tape. Per patient, paper tape ok.       PHYSICAL EXAM  VITAL SIGNS: /74 mmHg  Pulse 92  Temp(Src) 36.5 °C (97.7 °F)  Resp 18  Ht 1.651 m (5' 5\")  Wt 72.576 kg (160 lb)  BMI 26.63 kg/m2  SpO2 97%  LMP 11/27/2013  Reviewed and normal.     Constitutional: Well developed, Well nourished, Appears tired.   HENT: Normocephalic, atraumatic, bilateral external ears normal, oropharynx moist but dry lips, No exudates or erythema.   Eyes: PERRLA at 5 mm, conjunctiva pink, no scleral icterus. Dysconjugate gaze.   Cardiovascular: Regular rate and rhythm. No murmurs, rubs or gallops.   Respiratory: Lungs clear to auscultation bilaterally. No wheezes, rales, or rhonchi.   Gastrointestinal:  Abdomen soft, non-tender, non distended.   Skin: No erythema, no rash. Several bruises to the right lateral thigh.   Genitourinary: No costovertebral angle tenderness.   Neurologic: Alert & oriented, cranial nerves 2-12 intact by passive exam.  No focal deficit noted.  Psychiatric: Affect normal, Judgment normal, Mood normal.     DIFFERENTIAL DIAGNOSIS:  Drug overdose, suicide attempt, alcohol intoxication, depression.    EKG  EKG Interpretation:  Interpreted by me    Rhythm: Normal sinus rhythm   Rate: 86  Axis: 90  Ectopy: none  Conduction: normal  ST Segments: no acute change  T Waves: no acute change  Q Waves: none  Clinical Impression: Normal EKG. "     RADIOLOGY/PROCEDURES  DX-PELVIS-1 OR 2 VIEWS   Final Result      1.  Unremarkable pelvis.      The radiologist's interpretations of all radiological studies have been reviewed by me.          LABORATORY:  Results for orders placed or performed during the hospital encounter of 07/20/17   URINE DRUG SCREEN (TRIAGE)   Result Value Ref Range    Amphetamines Urine Negative Negative    Barbiturates Negative Negative    Benzodiazepines Negative Negative    Cocaine Metabolite Negative Negative    Methadone Negative Negative    Opiates Negative Negative    Oxycodone Negative Negative    Phencyclidine -Pcp Negative Negative    Propoxyphene Negative Negative    Cannabinoid Metab Negative Negative   CBC WITH DIFFERENTIAL   Result Value Ref Range    WBC 9.3 4.8 - 10.8 K/uL    RBC 4.40 4.20 - 5.40 M/uL    Hemoglobin 13.8 12.0 - 16.0 g/dL    Hematocrit 41.1 37.0 - 47.0 %    MCV 93.4 81.4 - 97.8 fL    MCH 31.4 27.0 - 33.0 pg    MCHC 33.6 33.6 - 35.0 g/dL    RDW 54.6 (H) 35.9 - 50.0 fL    Platelet Count 312 164 - 446 K/uL    MPV 10.3 9.0 - 12.9 fL    Neutrophils-Polys 79.20 (H) 44.00 - 72.00 %    Lymphocytes 14.30 (L) 22.00 - 41.00 %    Monocytes 4.50 0.00 - 13.40 %    Eosinophils 1.20 0.00 - 6.90 %    Basophils 0.50 0.00 - 1.80 %    Immature Granulocytes 0.30 0.00 - 0.90 %    Nucleated RBC 0.00 /100 WBC    Neutrophils (Absolute) 7.39 (H) 2.00 - 7.15 K/uL    Lymphs (Absolute) 1.33 1.00 - 4.80 K/uL    Monos (Absolute) 0.42 0.00 - 0.85 K/uL    Eos (Absolute) 0.11 0.00 - 0.51 K/uL    Baso (Absolute) 0.05 0.00 - 0.12 K/uL    Immature Granulocytes (abs) 0.03 0.00 - 0.11 K/uL    NRBC (Absolute) 0.00 K/uL   COMP METABOLIC PANEL   Result Value Ref Range    Sodium 138 135 - 145 mmol/L    Potassium 4.1 3.6 - 5.5 mmol/L    Chloride 105 96 - 112 mmol/L    Co2 25 20 - 33 mmol/L    Anion Gap 8.0 0.0 - 11.9    Glucose 93 65 - 99 mg/dL    Bun 11 8 - 22 mg/dL    Creatinine 0.80 0.50 - 1.40 mg/dL    Calcium 9.4 8.5 - 10.5 mg/dL    AST(SGOT) 14 12  - 45 U/L    ALT(SGPT) 9 2 - 50 U/L    Alkaline Phosphatase 128 (H) 30 - 99 U/L    Total Bilirubin 0.5 0.1 - 1.5 mg/dL    Albumin 3.7 3.2 - 4.9 g/dL    Total Protein 7.3 6.0 - 8.2 g/dL    Globulin 3.6 (H) 1.9 - 3.5 g/dL    A-G Ratio 1.0 g/dL   ACETAMINOPHEN   Result Value Ref Range    Acetaminophen -Tylenol <10 10 - 30 ug/mL   SALICYLATE   Result Value Ref Range    Salicylates, Quant. 0 (L) 15 - 25 mg/dL   DIAGNOSTIC ALCOHOL   Result Value Ref Range    Diagnostic Alcohol 0.01 (H) 0.00 g/dL   MAGNESIUM   Result Value Ref Range    Magnesium 2.0 1.5 - 2.5 mg/dL   POC BREATHALIZER   Result Value Ref Range    POC Breathalizer 0.00 0.00 - 0.01 Percent   All labs reviewed by me.     INTERVENTIONS:  Medications   ketorolac (TORADOL) injection 60 mg (not administered)   NS infusion 1,000 mL (1,000 mL Intravenous New Bag 7/20/17 2181)     Response: Headache improved.    COURSE & MEDICAL DECISION MAKING    6:50 PM - Patient seen and examined at bedside. Patient will be treated with 1L NS IV for her symptoms. Ordered for a pelvis XR, EKG, and labs to evaluate.    10:13 PM Patient reevaluated at bedside. She is much more awake. She reports continued right hip pain. I informed the patient that her hip is npt broken. She also reports a headache following her fall today. This is not her typical headache. Per patient, she only fell once today because she fainted. The patient did hit her head on the bathtub when she fell. She denies nausea or vomiting. She states that she took more Ambien than she was supposed to yesterday but not because she was trying to hurt herself, she was just trying to sleep. The patient reports that she used to drink a lot of alcohol but has decreased her alcohol intake in the past 3 weeks.    Review of past medical records shows the patient had a head CT on 3/4/17 that is unchanged.     10:23 PM I attempted to contact the patient's neighbor/family at this time to get more information on why she came to the ED  today. I was unable to hold of any of the patient's family/friends.     10:36 PM Spoke with [facility]. They will evaluate the patient.     11:45 PM Spoke with Silvino haynes who has evaluated the patient. He reports that the patient is adamant about not being suicidal. She is very confused. The patient has a good support system at home.     12:03 AM Patient reevaluated at bedside. She reports a continued headache. The patient will be treated with 60 mg Toradol IM. After discussion of risks and benefits, she states that she does not want a head CT as she has had many in the past. The patient wants to go home. I recommended the patient get a pill counter so she does not accidentally take the wrong amounts of medicine. She states that her family should be able to help her with this.  Discussed plan for discharge; I advised the patient to return to the [facility] ED with any new or worsening symptoms. Patient was given the opportunity for questions. I addressed all questions or concerns at this time and they verbalize agreement to the plan of care.    This patient presents with an accidental overdose of Ambien. There is no evidence of overdose of antihypertensive. There is no evidence of other overdoses. Patient repeatedly denies any depression or suicide attempt. She has a headache after a fall in the bathtub today but no external signs of head injury, he has improving mental status, no nausea or vomiting, and she's had multiple CT scans, more than 30, in the past. Risk and benefit of CT imaging discussed with the patient and she prefers no imaging today. She has a history of hydrocephalus and a  shunt but no clinical findings suggestive of acute hydrocephalus..    PLAN:  Accidental overdose handout given  Pill organizer to limit risk of accidental overdose  Well care referral    CHANDLER Adams  [address] [place] [address] [place] [address]  [phone]    Schedule an appointment as soon as possible for a  visit  Friday or Monday if needed    CONDITION: Good.    FINAL IMPRESSION  1. Transient alteration of awareness    2. Ambien accidental overdose, initial encounter      I, Idalia Berman (Scribe), am scribing for, and in the presence of, Silvino Saucedo M.D..    Electronically signed by: Idalia Berman (Scribe), 7/20/2017    ISilvino M.D. personally performed the services described in this documentation, as scribed by Idalia Berman in my presence, and it is both accurate and complete.     The note accurately reflects work and decisions made by me.  Silvino Saucedo  7/21/2017  1:41 AM

## 2017-07-21 NOTE — ED NOTES
Med rec updated per [facility] (central search) @ [phone]  Pt unable to interview at this time  Pt last filled at pharmacy on 7/18/17:  Ambien 5mg #30  Propranolol ER 80mg #30  Amitriptyline 75mg #30  Also filled Viibryd 40mg #30 on 6/27/17,  Lunesta 3mg #30 on 6/3/17 and amlodipine 5mg #30 on 6/5/17

## 2017-07-21 NOTE — ED NOTES
Pt bib EMS with reports of SI and drug overdose with Ambien. Pt had 30 ambien filled on 7/18. EMS found empty ambien and amlodipine bottle at scene. Pt states it was an accident. Pt is legally blind with previous attempts. Pre arrival VS: 90/72, 87, 16, 96%RA, BG-97.

## 2017-07-21 NOTE — DISCHARGE PLANNING
[facility]  Crisis/Safety Plan    Name:  Rasheeda Manzano  MRN:  [id number]  Date:  2017    Warning signs that a crisis may be developing for me or I may be at risk:  1) increasing depression  2) feeling much more anxious  3)thoughts of harming yourself    Coping strategies I can use on my own (relaxation, physical activity, etc):  1) exercise everyday  2) listen to music  3) play with your cat  Ways I can make my environment safe:  1)no weapons in the house  2)use a [employer] pill box to load pills needed for that day, once pills are gone, don't take any more. This way you will not forget you took them, particularly with the ambien  3) keep sharps secure and have you neighbor or well care staff help with medication administration.  Things I want to tell myself when I feel a crisis developin) try to stay calm  2) remember there is help out there  3) seek help before a crisis develops    People I can contact for support or distraction (and their phone numbers):  1) Nathalia [phone]  2) Mona [phone]  3) use numbers below  If I’m not able to reach my support people, or the above strategies don’t help, I can contact the following professionals, agencies, or hotlines:  1) Crisis Call Center ():  1-186.688.5898 -OR- (106) 569-9981  2) Crisis Text Line ():  Text START to 945094  3)   4)     Silvino Willson R.N.

## 2017-07-21 NOTE — ED NOTES
Received report from Alec Calvo. Pt care responsibilities assumed. Pt resting in bed, Monitors in place, VSS, will continue to monitor.
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
## 2017-07-21 NOTE — ED NOTES
Pt resting comfortable in bed, denies any needs at this time, Pt has call bell within reach and she demonstrated that she was able to press the call button if necessary. Will continue to monitor.

## 2017-07-21 NOTE — ED NOTES
Pt was assisted to get dressed, Pt needed the assistance two tech. Pt in bed, monitors in place, VSS, call bell within reach, will continue to monitor.

## 2017-07-21 NOTE — ED NOTES
Pt lying in bed on her side, monitors in place, vss, call bell within reach, will continue to monitor.

## 2017-08-23 ENCOUNTER — APPOINTMENT (OUTPATIENT)
Dept: RADIOLOGY | Facility: MEDICAL CENTER | Age: 47
DRG: 377 | End: 2017-08-23
Attending: EMERGENCY MEDICINE
Payer: MEDICAID

## 2017-08-23 ENCOUNTER — APPOINTMENT (OUTPATIENT)
Dept: RADIOLOGY | Facility: MEDICAL CENTER | Age: 47
DRG: 377 | End: 2017-08-23
Attending: HOSPITALIST
Payer: MEDICAID

## 2017-08-23 ENCOUNTER — RESOLUTE PROFESSIONAL BILLING HOSPITAL PROF FEE (OUTPATIENT)
Dept: HOSPITALIST | Facility: MEDICAL CENTER | Age: 47
End: 2017-08-23
Payer: MEDICAID

## 2017-08-23 ENCOUNTER — HOSPITAL ENCOUNTER (INPATIENT)
Facility: MEDICAL CENTER | Age: 47
LOS: 2 days | DRG: 377 | End: 2017-08-25
Attending: EMERGENCY MEDICINE | Admitting: HOSPITALIST
Payer: MEDICAID

## 2017-08-23 PROBLEM — K92.2 GIB (GASTROINTESTINAL BLEEDING): Status: ACTIVE | Noted: 2017-08-23

## 2017-08-23 PROBLEM — R57.9 SHOCK (HCC): Status: ACTIVE | Noted: 2017-08-23

## 2017-08-23 LAB
ABO GROUP BLD: NORMAL
ALBUMIN SERPL BCP-MCNC: 3 G/DL (ref 3.2–4.9)
ALBUMIN/GLOB SERPL: 1 G/DL
ALP SERPL-CCNC: 103 U/L (ref 30–99)
ALT SERPL-CCNC: 9 U/L (ref 2–50)
AMPHET UR QL SCN: NEGATIVE
ANION GAP SERPL CALC-SCNC: 17 MMOL/L (ref 0–11.9)
APAP SERPL-MCNC: <10 UG/ML (ref 10–30)
APTT PPP: 24.3 SEC (ref 24.7–36)
AST SERPL-CCNC: 19 U/L (ref 12–45)
B-OH-BUTYR SERPL-MCNC: 0.24 MMOL/L (ref 0.02–0.27)
BARBITURATES UR QL SCN: NEGATIVE
BARCODED ABORH UBTYP: 6200
BARCODED PRD CODE UBPRD: NORMAL
BARCODED UNIT NUM UBUNT: NORMAL
BASOPHILS # BLD AUTO: 0.6 % (ref 0–1.8)
BASOPHILS # BLD: 0.08 K/UL (ref 0–0.12)
BENZODIAZ UR QL SCN: NEGATIVE
BILIRUB SERPL-MCNC: 0.7 MG/DL (ref 0.1–1.5)
BLD GP AB SCN SERPL QL: NORMAL
BUN SERPL-MCNC: 39 MG/DL (ref 8–22)
BZE UR QL SCN: NEGATIVE
CALCIUM SERPL-MCNC: 8.4 MG/DL (ref 8.5–10.5)
CANNABINOIDS UR QL SCN: NEGATIVE
CFT BLD TEG: 4.3 MIN (ref 5–10)
CHLORIDE SERPL-SCNC: 107 MMOL/L (ref 96–112)
CLOT ANGLE BLD TEG: 73.8 DEGREES (ref 53–72)
CLOT LYSIS 30M P MA LENFR BLD TEG: 0 % (ref 0–8)
CO2 SERPL-SCNC: 14 MMOL/L (ref 20–33)
COMPONENT R 8504R: NORMAL
CREAT SERPL-MCNC: 1.34 MG/DL (ref 0.5–1.4)
CT.EXTRINSIC BLD ROTEM: 1.1 MIN (ref 1–3)
EOSINOPHIL # BLD AUTO: 0.05 K/UL (ref 0–0.51)
EOSINOPHIL NFR BLD: 0.4 % (ref 0–6.9)
ERYTHROCYTE [DISTWIDTH] IN BLOOD BY AUTOMATED COUNT: 50.9 FL (ref 35.9–50)
ERYTHROCYTE [DISTWIDTH] IN BLOOD BY AUTOMATED COUNT: 53.4 FL (ref 35.9–50)
EST. AVERAGE GLUCOSE BLD GHB EST-MCNC: 105 MG/DL
ETHANOL BLD-MCNC: 0 G/DL
GFR SERPL CREATININE-BSD FRML MDRD: 43 ML/MIN/1.73 M 2
GLOBULIN SER CALC-MCNC: 2.9 G/DL (ref 1.9–3.5)
GLUCOSE BLD-MCNC: 125 MG/DL (ref 65–99)
GLUCOSE BLD-MCNC: 94 MG/DL (ref 65–99)
GLUCOSE SERPL-MCNC: 219 MG/DL (ref 65–99)
HBA1C MFR BLD: 5.3 % (ref 0–5.6)
HCT VFR BLD AUTO: 25.1 % (ref 37–47)
HCT VFR BLD AUTO: 28.9 % (ref 37–47)
HGB BLD-MCNC: 7 G/DL (ref 12–16)
HGB BLD-MCNC: 7.5 G/DL (ref 12–16)
HGB BLD-MCNC: 8.4 G/DL (ref 12–16)
HGB BLD-MCNC: 9.4 G/DL (ref 12–16)
IMM GRANULOCYTES # BLD AUTO: 0.21 K/UL (ref 0–0.11)
IMM GRANULOCYTES NFR BLD AUTO: 1.5 % (ref 0–0.9)
INR PPP: 1.15 (ref 0.87–1.13)
LACTATE BLD-SCNC: 2.1 MMOL/L (ref 0.5–2)
LIPASE SERPL-CCNC: 13 U/L (ref 11–82)
LYMPHOCYTES # BLD AUTO: 2.67 K/UL (ref 1–4.8)
LYMPHOCYTES NFR BLD: 19 % (ref 22–41)
MCF BLD TEG: 69.9 MM (ref 50–70)
MCH RBC QN AUTO: 32.1 PG (ref 27–33)
MCH RBC QN AUTO: 32.9 PG (ref 27–33)
MCHC RBC AUTO-ENTMCNC: 32.5 G/DL (ref 33.6–35)
MCHC RBC AUTO-ENTMCNC: 33.5 G/DL (ref 33.6–35)
MCV RBC AUTO: 101 FL (ref 81.4–97.8)
MCV RBC AUTO: 95.8 FL (ref 81.4–97.8)
METHADONE UR QL SCN: NEGATIVE
MONOCYTES # BLD AUTO: 0.67 K/UL (ref 0–0.85)
MONOCYTES NFR BLD AUTO: 4.8 % (ref 0–13.4)
NEUTROPHILS # BLD AUTO: 10.35 K/UL (ref 2–7.15)
NEUTROPHILS NFR BLD: 73.7 % (ref 44–72)
NRBC # BLD AUTO: 0.07 K/UL
NRBC BLD AUTO-RTO: 0.5 /100 WBC
OPIATES UR QL SCN: POSITIVE
OXYCODONE UR QL SCN: NEGATIVE
PA AA BLD-ACNC: 30.8 %
PA ADP BLD-ACNC: 18.5 %
PCP UR QL SCN: NEGATIVE
PLATELET # BLD AUTO: 249 K/UL (ref 164–446)
PLATELET # BLD AUTO: 358 K/UL (ref 164–446)
PMV BLD AUTO: 10.3 FL (ref 9–12.9)
PMV BLD AUTO: 10.5 FL (ref 9–12.9)
POTASSIUM SERPL-SCNC: 4.1 MMOL/L (ref 3.6–5.5)
PRODUCT TYPE UPROD: NORMAL
PROPOXYPH UR QL SCN: NEGATIVE
PROT SERPL-MCNC: 5.9 G/DL (ref 6–8.2)
PROTHROMBIN TIME: 15.1 SEC (ref 12–14.6)
RBC # BLD AUTO: 2.62 M/UL (ref 4.2–5.4)
RBC # BLD AUTO: 2.86 M/UL (ref 4.2–5.4)
RH BLD: NORMAL
SALICYLATES SERPL-MCNC: 0 MG/DL (ref 15–25)
SODIUM SERPL-SCNC: 138 MMOL/L (ref 135–145)
TEG ALGORITHM TGALG: ABNORMAL
UNIT STATUS USTAT: NORMAL
WBC # BLD AUTO: 14 K/UL (ref 4.8–10.8)
WBC # BLD AUTO: 14.1 K/UL (ref 4.8–10.8)

## 2017-08-23 PROCEDURE — 85025 COMPLETE CBC W/AUTO DIFF WBC: CPT

## 2017-08-23 PROCEDURE — 700102 HCHG RX REV CODE 250 W/ 637 OVERRIDE(OP): Performed by: HOSPITALIST

## 2017-08-23 PROCEDURE — P9016 RBC LEUKOCYTES REDUCED: HCPCS

## 2017-08-23 PROCEDURE — 99291 CRITICAL CARE FIRST HOUR: CPT | Performed by: HOSPITALIST

## 2017-08-23 PROCEDURE — 86850 RBC ANTIBODY SCREEN: CPT

## 2017-08-23 PROCEDURE — 71010 DX-CHEST-PORTABLE (1 VIEW): CPT

## 2017-08-23 PROCEDURE — 83036 HEMOGLOBIN GLYCOSYLATED A1C: CPT

## 2017-08-23 PROCEDURE — 700111 HCHG RX REV CODE 636 W/ 250 OVERRIDE (IP): Performed by: HOSPITALIST

## 2017-08-23 PROCEDURE — 99291 CRITICAL CARE FIRST HOUR: CPT

## 2017-08-23 PROCEDURE — 36415 COLL VENOUS BLD VENIPUNCTURE: CPT

## 2017-08-23 PROCEDURE — 82962 GLUCOSE BLOOD TEST: CPT

## 2017-08-23 PROCEDURE — 30233N1 TRANSFUSION OF NONAUTOLOGOUS RED BLOOD CELLS INTO PERIPHERAL VEIN, PERCUTANEOUS APPROACH: ICD-10-PCS | Performed by: EMERGENCY MEDICINE

## 2017-08-23 PROCEDURE — 51702 INSERT TEMP BLADDER CATH: CPT

## 2017-08-23 PROCEDURE — 82010 KETONE BODYS QUAN: CPT

## 2017-08-23 PROCEDURE — 85018 HEMOGLOBIN: CPT

## 2017-08-23 PROCEDURE — 83690 ASSAY OF LIPASE: CPT

## 2017-08-23 PROCEDURE — 96365 THER/PROPH/DIAG IV INF INIT: CPT

## 2017-08-23 PROCEDURE — 700101 HCHG RX REV CODE 250: Performed by: HOSPITALIST

## 2017-08-23 PROCEDURE — 74000 DX-ABDOMEN-1 VIEW: CPT

## 2017-08-23 PROCEDURE — 700105 HCHG RX REV CODE 258

## 2017-08-23 PROCEDURE — 96361 HYDRATE IV INFUSION ADD-ON: CPT

## 2017-08-23 PROCEDURE — 36556 INSERT NON-TUNNEL CV CATH: CPT

## 2017-08-23 PROCEDURE — 85384 FIBRINOGEN ACTIVITY: CPT

## 2017-08-23 PROCEDURE — 304538 HCHG NG TUBE

## 2017-08-23 PROCEDURE — 85610 PROTHROMBIN TIME: CPT

## 2017-08-23 PROCEDURE — 86901 BLOOD TYPING SEROLOGIC RH(D): CPT

## 2017-08-23 PROCEDURE — 80053 COMPREHEN METABOLIC PANEL: CPT

## 2017-08-23 PROCEDURE — 51798 US URINE CAPACITY MEASURE: CPT

## 2017-08-23 PROCEDURE — 02HV33Z INSERTION OF INFUSION DEVICE INTO SUPERIOR VENA CAVA, PERCUTANEOUS APPROACH: ICD-10-PCS | Performed by: EMERGENCY MEDICINE

## 2017-08-23 PROCEDURE — 85347 COAGULATION TIME ACTIVATED: CPT

## 2017-08-23 PROCEDURE — 86923 COMPATIBILITY TEST ELECTRIC: CPT

## 2017-08-23 PROCEDURE — A9270 NON-COVERED ITEM OR SERVICE: HCPCS | Performed by: HOSPITALIST

## 2017-08-23 PROCEDURE — 700105 HCHG RX REV CODE 258: Performed by: HOSPITALIST

## 2017-08-23 PROCEDURE — 83605 ASSAY OF LACTIC ACID: CPT

## 2017-08-23 PROCEDURE — 85027 COMPLETE CBC AUTOMATED: CPT

## 2017-08-23 PROCEDURE — 85576 BLOOD PLATELET AGGREGATION: CPT | Mod: 91

## 2017-08-23 PROCEDURE — 86900 BLOOD TYPING SEROLOGIC ABO: CPT

## 2017-08-23 PROCEDURE — C1751 CATH, INF, PER/CENT/MIDLINE: HCPCS

## 2017-08-23 PROCEDURE — C9113 INJ PANTOPRAZOLE SODIUM, VIA: HCPCS | Performed by: HOSPITALIST

## 2017-08-23 PROCEDURE — 303105 HCHG CATHETER EXTRA

## 2017-08-23 PROCEDURE — 770022 HCHG ROOM/CARE - ICU (200)

## 2017-08-23 PROCEDURE — 80307 DRUG TEST PRSMV CHEM ANLYZR: CPT

## 2017-08-23 PROCEDURE — 85730 THROMBOPLASTIN TIME PARTIAL: CPT

## 2017-08-23 PROCEDURE — 700111 HCHG RX REV CODE 636 W/ 250 OVERRIDE (IP): Performed by: EMERGENCY MEDICINE

## 2017-08-23 PROCEDURE — 700105 HCHG RX REV CODE 258: Performed by: EMERGENCY MEDICINE

## 2017-08-23 PROCEDURE — 96375 TX/PRO/DX INJ NEW DRUG ADDON: CPT

## 2017-08-23 PROCEDURE — 36430 TRANSFUSION BLD/BLD COMPNT: CPT

## 2017-08-23 RX ORDER — MORPHINE SULFATE 4 MG/ML
4 INJECTION, SOLUTION INTRAMUSCULAR; INTRAVENOUS ONCE
Status: COMPLETED | OUTPATIENT
Start: 2017-08-23 | End: 2017-08-23

## 2017-08-23 RX ORDER — DEXTROSE MONOHYDRATE 25 G/50ML
25 INJECTION, SOLUTION INTRAVENOUS
Status: DISCONTINUED | OUTPATIENT
Start: 2017-08-23 | End: 2017-08-25 | Stop reason: HOSPADM

## 2017-08-23 RX ORDER — ZOLPIDEM TARTRATE 5 MG/1
5 TABLET ORAL NIGHTLY PRN
Status: DISCONTINUED | OUTPATIENT
Start: 2017-08-23 | End: 2017-08-25 | Stop reason: HOSPADM

## 2017-08-23 RX ORDER — ONDANSETRON 2 MG/ML
4 INJECTION INTRAMUSCULAR; INTRAVENOUS ONCE
Status: COMPLETED | OUTPATIENT
Start: 2017-08-23 | End: 2017-08-23

## 2017-08-23 RX ORDER — LABETALOL HYDROCHLORIDE 5 MG/ML
10 INJECTION, SOLUTION INTRAVENOUS ONCE
Status: COMPLETED | OUTPATIENT
Start: 2017-08-24 | End: 2017-08-23

## 2017-08-23 RX ORDER — SODIUM CHLORIDE 9 MG/ML
1000 INJECTION, SOLUTION INTRAVENOUS ONCE
Status: COMPLETED | OUTPATIENT
Start: 2017-08-23 | End: 2017-08-23

## 2017-08-23 RX ORDER — OXYCODONE HYDROCHLORIDE 5 MG/1
5 TABLET ORAL EVERY 6 HOURS PRN
Status: DISCONTINUED | OUTPATIENT
Start: 2017-08-23 | End: 2017-08-25 | Stop reason: HOSPADM

## 2017-08-23 RX ORDER — SODIUM CHLORIDE 9 MG/ML
2000 INJECTION, SOLUTION INTRAVENOUS ONCE
Status: COMPLETED | OUTPATIENT
Start: 2017-08-23 | End: 2017-08-23

## 2017-08-23 RX ORDER — ONDANSETRON 2 MG/ML
4 INJECTION INTRAMUSCULAR; INTRAVENOUS EVERY 4 HOURS PRN
Status: DISCONTINUED | OUTPATIENT
Start: 2017-08-23 | End: 2017-08-25 | Stop reason: HOSPADM

## 2017-08-23 RX ORDER — SODIUM CHLORIDE 9 MG/ML
INJECTION, SOLUTION INTRAVENOUS
Status: ACTIVE
Start: 2017-08-23 | End: 2017-08-24

## 2017-08-23 RX ORDER — ONDANSETRON 4 MG/1
4 TABLET, ORALLY DISINTEGRATING ORAL EVERY 4 HOURS PRN
Status: DISCONTINUED | OUTPATIENT
Start: 2017-08-23 | End: 2017-08-25 | Stop reason: HOSPADM

## 2017-08-23 RX ORDER — PROMETHAZINE HYDROCHLORIDE 25 MG/1
12.5-25 SUPPOSITORY RECTAL EVERY 4 HOURS PRN
Status: DISCONTINUED | OUTPATIENT
Start: 2017-08-23 | End: 2017-08-25 | Stop reason: HOSPADM

## 2017-08-23 RX ORDER — PROMETHAZINE HYDROCHLORIDE 25 MG/1
12.5-25 TABLET ORAL EVERY 4 HOURS PRN
Status: DISCONTINUED | OUTPATIENT
Start: 2017-08-23 | End: 2017-08-25 | Stop reason: HOSPADM

## 2017-08-23 RX ORDER — SODIUM CHLORIDE 9 MG/ML
INJECTION, SOLUTION INTRAVENOUS
Status: COMPLETED
Start: 2017-08-23 | End: 2017-08-23

## 2017-08-23 RX ORDER — SODIUM CHLORIDE 9 MG/ML
INJECTION, SOLUTION INTRAVENOUS CONTINUOUS
Status: DISCONTINUED | OUTPATIENT
Start: 2017-08-23 | End: 2017-08-24

## 2017-08-23 RX ADMIN — ONDANSETRON 4 MG: 2 INJECTION INTRAMUSCULAR; INTRAVENOUS at 08:23

## 2017-08-23 RX ADMIN — OXYCODONE HYDROCHLORIDE 5 MG: 5 TABLET ORAL at 18:39

## 2017-08-23 RX ADMIN — LABETALOL HYDROCHLORIDE 10 MG: 5 INJECTION INTRAVENOUS at 23:53

## 2017-08-23 RX ADMIN — SODIUM CHLORIDE 500 ML: 9 INJECTION, SOLUTION INTRAVENOUS at 13:15

## 2017-08-23 RX ADMIN — SODIUM CHLORIDE 1000 ML: 9 INJECTION, SOLUTION INTRAVENOUS at 11:47

## 2017-08-23 RX ADMIN — SODIUM CHLORIDE 8 MG/HR: 9 INJECTION, SOLUTION INTRAVENOUS at 22:01

## 2017-08-23 RX ADMIN — SODIUM CHLORIDE 2000 ML: 9 INJECTION, SOLUTION INTRAVENOUS at 08:00

## 2017-08-23 RX ADMIN — MORPHINE SULFATE 4 MG: 4 INJECTION INTRAVENOUS at 08:22

## 2017-08-23 RX ADMIN — SODIUM CHLORIDE 8 MG/HR: 9 INJECTION, SOLUTION INTRAVENOUS at 11:06

## 2017-08-23 RX ADMIN — SODIUM CHLORIDE 80 MG: 9 INJECTION, SOLUTION INTRAVENOUS at 11:48

## 2017-08-23 RX ADMIN — ZOLPIDEM TARTRATE 5 MG: 5 TABLET, FILM COATED ORAL at 19:30

## 2017-08-23 RX ADMIN — SODIUM CHLORIDE: 9 INJECTION, SOLUTION INTRAVENOUS at 20:32

## 2017-08-23 RX ADMIN — SODIUM CHLORIDE: 9 INJECTION, SOLUTION INTRAVENOUS at 11:57

## 2017-08-23 RX ADMIN — SODIUM CHLORIDE 1000 ML: 9 INJECTION, SOLUTION INTRAVENOUS at 13:14

## 2017-08-23 ASSESSMENT — LIFESTYLE VARIABLES
DO YOU DRINK ALCOHOL: YES
HAVE PEOPLE ANNOYED YOU BY CRITICIZING YOUR DRINKING: NO
TOTAL SCORE: 0
HAVE YOU EVER FELT YOU SHOULD CUT DOWN ON YOUR DRINKING: NO
TOTAL SCORE: 0
EVER HAD A DRINK FIRST THING IN THE MORNING TO STEADY YOUR NERVES TO GET RID OF A HANGOVER: NO
TOTAL SCORE: 0
CONSUMPTION TOTAL: INCOMPLETE
EVER FELT BAD OR GUILTY ABOUT YOUR DRINKING: NO

## 2017-08-23 ASSESSMENT — PAIN SCALES - GENERAL
PAINLEVEL_OUTOF10: 8
PAINLEVEL_OUTOF10: 8
PAINLEVEL_OUTOF10: 10
PAINLEVEL_OUTOF10: 10
PAINLEVEL_OUTOF10: 8

## 2017-08-23 NOTE — PROGRESS NOTES
Late Entry:  MD made aware of patient on floor.  Review of VS and status.  Order received for additional 1L bolus

## 2017-08-23 NOTE — PROGRESS NOTES
Notified MD of lab results, urine output, and overall status.  Verbal orders received for recheck HgB in 2 hours (1700), if < 7 transfuse 1 unit PRBC.  Continue to monitor patient closely.

## 2017-08-23 NOTE — IP AVS SNAPSHOT
" Home Care Instructions                                                                                                                  Name:Rasheeda Manzano  Medical Record Number:[id number]  CSN: [id number]    YOB: 1970   Age: 46 y.o.  Sex: female  HT:1.626 m (5' 4\") WT: 73.1 kg (161 lb 2.5 oz)          Admit Date: 8/23/2017     Discharge Date:   Today's Date: 8/25/2017  Attending Doctor:  João Aleman M.D.                  Allergies:  Tape            Discharge Instructions       Discharge Instructions    Discharged to home by taxi with self. Discharged via wheelchair, hospital escort: Yes.  Special equipment needed: Not Applicable    Be sure to schedule a follow-up appointment with your primary care doctor or any specialists as instructed.     Discharge Plan:   Diet Plan: Discussed  Activity Level: Discussed  Confirmed Follow up Appointment: Patient to Call and Schedule Appointment  Confirmed Symptoms Management: Discussed  Medication Reconciliation Updated: Yes  Influenza Vaccine Indication: Patient Refuses    I understand that a diet low in cholesterol, fat, and sodium is recommended for good health. Unless I have been given specific instructions below for another diet, I accept this instruction as my diet prescription.   Other diet: GI soft, no coffee, no alcohol, no NSAIDS    Special Instructions: None    · Is patient discharged on Warfarin / Coumadin?   No     · Is patient Post Blood Transfusion?  Yes  POST BLOOD TRANSFUSION INFORMATION (ADULT)    The purpose of blood transfusion is to correct anemia, low levels of blood clotting factors or to correct acute blood loss.   Blood transfusion is very safe but occasionally unexpected adverse reactions do occur. Most adverse reactions occur during transfusion, within one to two days following transfusion or one to two weeks following transfusion. Some adverse reactions can occur one to six months after transfusion and some even years later.  "             If any of the symptoms listed below happen to you during your transfusion,                                 please notify your nurse immediately.   · Fever or Chills  · Flushing of the Face  · Hives, rash or itching  · Difficulty in breathing or shortness of breath  · Pain or oozing of blood from the IV needle site  · Low back pain  · Nausea or vomiting  · Weakness or fainting  · Chest pain  · Blood in the urine  · Decreased frequency of urination    The above symptoms may also occur within 24 to 48 after transfusion; if so, notify your physician.     · Yellowing of the skin    This can happen one to six months after transfusion; if so, notify your physician    Depression / Suicide Risk    As you are discharged from this [facility] facility, it is important to learn how to keep safe from harming yourself.    Recognize the warning signs:  · Abrupt changes in personality, positive or negative- including increase in energy   · Giving away possessions  · Change in eating patterns- significant weight changes-  positive or negative  · Change in sleeping patterns- unable to sleep or sleeping all the time   · Unwillingness or inability to communicate  · Depression  · Unusual sadness, discouragement and loneliness  · Talk of wanting to die  · Neglect of personal appearance   · Rebelliousness- reckless behavior  · Withdrawal from people/activities they love  · Confusion- inability to concentrate     If you or a loved one observes any of these behaviors or has concerns about self-harm, here's what you can do:  · Talk about it- your feelings and reasons for harming yourself  · Remove any means that you might use to hurt yourself (examples: pills, rope, extension cords, firearm)  · Get professional help from the community (Mental Health, Substance Abuse, psychological counseling)  · Do not be alone:Call your Safe Contact- someone whom you trust who will be there for you.  · Call your local CRISIS HOTLINE 691-3691  or 501-816-8179  · Call your local Children's Mobile Crisis Response Team [place] (531) 477-5677 or www.[employer]ave[employer]  · Call the toll free National Suicide Prevention Hotlines   · National Suicide Prevention Lifeline 798-442-RXHK (6149)  · National Hope Line Network 800-SUICIDE (757-5823)        Follow-up Information     1. Follow up with AUREA Estrada. Go on 9/5/2017.    Why:  PLEASE ARRIVE AT 11:10AM FOR YOUR APPOINTMENT. THANK YOU    Contact information    [address] ([address]ND FLOOR)  [place] [address]  [phone]        2. Schedule an appointment as soon as possible for a visit with Matias Fernando M.D..    Specialty:  Gastroenterology    Why:  [facility]  LEFT A MESSAGE WITH YOUR PROVIDER REGARDING NEED FOR FOLLOW UP APPOINTMENT. PLEASE CONTACT THEIR OFFICE DIRECTLY IF YOU DO NOT HEAR FROM THEM WITH IN 1 DAY. THANK YOU     Contact information    [address] [place] [address]  [place] [address]  [phone]           Discharge Medication Instructions:    Below are the medications your physician expects you to take upon discharge:    Review all your home medications and newly ordered medications with your doctor and/or pharmacist. Follow medication instructions as directed by your doctor and/or pharmacist.    Please keep your medication list with you and share with your physician.               Medication List      START taking these medications        Instructions    Morning Afternoon Evening Bedtime    esomeprazole 20 MG capsule   Commonly known as:  NEXIUM 24HR        Take 1 Cap by mouth 2 Times a Day.   Dose:  20 mg                        ondansetron 4 MG Tbdp   Commonly known as:  ZOFRAN ODT        Take 1 Tab by mouth every 8 hours as needed for Nausea/Vomiting.   Dose:  4 mg                          CONTINUE taking these medications        Instructions    Morning Afternoon Evening Bedtime    amitriptyline 75 MG Tabs   Last time this was given:  75 mg on 8/24/2017  9:24 PM   Commonly known as:   ELAVIL        Take 75 mg by mouth every evening. Last filled 7/18/17 #30   Dose:  75 mg                        amlodipine 5 MG Tabs   Commonly known as:  NORVASC        Take 5 mg by mouth every evening. Lat filled 6/5/17 #30   Dose:  5 mg                        LUNESTA 3 MG Tabs   Generic drug:  Eszopiclone        Take 3 mg by mouth every bedtime. Last filled 6/3/17 #30   Dose:  3 mg                        propranolol CR 80 MG Cp24   Commonly known as:  INDERAL LA        Take 80 mg by mouth every evening. Last filled 7/18/17 #30   Dose:  80 mg                        VIIBRYD 40 MG Tabs   Generic drug:  Vilazodone HCl        Take 1 Tab by mouth every evening. Last filled 6/27/17 #30   Dose:  1 Tab                        zolpidem 5 MG Tabs   Last time this was given:  5 mg on 8/24/2017  9:25 PM   Commonly known as:  AMBIEN        Take 5 mg by mouth at bedtime as needed for Sleep. Last filled 7/18/17 #30   Dose:  5 mg                             Where to Get Your Medications      These medications were sent to [id number] - [place] - [address]  [address] [place] [address]     Phone:  [phone]    - esomeprazole 20 MG capsule  - ondansetron 4 MG Tbdp            Instructions           Diet / Nutrition:    Follow any diet instructions given to you by your doctor or the dietician, including how much salt (sodium) you are allowed each day.    If you are overweight, talk to your doctor about a weight reduction plan.    Activity:    Remain physically active following your doctor's instructions about exercise and activity.    Rest often.     Any time you become even a little tired or short of breath, SIT DOWN and rest.    Worsening Symptoms:    Report any of the following signs and symptoms to the doctor's office immediately:    *Pain of jaw, arm, or neck  *Chest pain not relieved by medication                               *Dizziness or loss of consciousness  *Difficulty breathing even when at rest    *More tired than usual                                       *Bleeding drainage or swelling of surgical site  *Swelling of feet, ankles, legs or stomach                 *Fever (>100ºF)  *Pink or blood tinged sputum  *Weight gain (3lbs/day or 5lbs /week)           *Shock from internal defibrillator (if applicable)  *Palpitations or irregular heartbeats                *Cool and/or numb extremities    Stroke Awareness    Common Risk Factors for Stroke include:    Age  Atrial Fibrillation  Carotid Artery Stenosis  Diabetes Mellitus  Excessive alcohol consumption  High blood pressure  Overweight   Physical inactivity  Smoking    Warning signs and symptoms of a stroke include:    *Sudden numbness or weakness of the face, arm or leg (especially on one side of the body).  *Sudden confusion, trouble speaking or understanding.  *Sudden trouble seeing in one or both eyes.  *Sudden trouble walking, dizziness, loss of balance or coordination.Sudden severe headache with no known cause.    It is very important to get treatment quickly when a stroke occurs. If you experience any of the above warning signs, call 911 immediately.                   Disclaimer         Quit Smoking / Tobacco Use:    I understand the use of any tobacco products increases my chance of suffering from future heart disease or stroke and could cause other illnesses which may shorten my life. Quitting the use of tobacco products is the single most important thing I can do to improve my health. For further information on smoking / tobacco cessation call a Toll Free Quit Line at 1-503.506.9858 (*National Cancer [place]) or 1-843.814.3294 (American Lung Association) or you can access the web based program at www.lungusa.org.    [place] Tobacco Users Help Line:  (153) 653-4897       Toll Free: 1-243.687.6603  Quit Tobacco Program [facility] (058)145-7080    Crisis Hotline:    [place] Crisis Hotline:  6-430-IVYOALW or  1-167.556.5306    [place] Crisis Hotline:    1-195.113.9394 or 344-029-7278    Discharge Survey:   Thank you for choosing [facility]. We hope we did everything we could to make your hospital stay a pleasant one. You may be receiving a phone survey and we would appreciate your time and participation in answering the questions. Your input is very valuable to us in our efforts to improve our service to our patients and their families.        My signature on this form indicates that:    1. I have reviewed and understand the above information.  2. My questions regarding this information have been answered to my satisfaction.  3. I have formulated a plan with my discharge nurse to obtain my prescribed medications for home.                  Disclaimer         __________________________________                     __________       ________                       Patient Signature                                                 Date                    Time

## 2017-08-23 NOTE — ED PROVIDER NOTES
ED Provider Note    Scribed for Kory Snow D.O. by Leighann Simpson. 8/23/2017  7:39 AM    Primary care provider: CHANDLER Adams  Means of arrival: EMS  History obtained from: Patient   History limited by: acute medical condition     CHIEF COMPLAINT  Chief Complaint   Patient presents with   • Lower GI Bleed   • Hypotension       HPI  Rasheeda Manzano is a 46 y.o. female who presents to the Emergency Department for blood in stool and vomit, just prior to arrival in the ED. Per EMS, the patient has history of migraines and takes Elavil. She gave her neighbor her medications to prevent her from overdosing, but she took two this morning. Upon arrival of EMS she complained of generalized weakness. She now complains of shortness of breath and diffuse pain. Upon arrival of EMS her blood pressure was 60/80 and after oxygen supplementation was 80/40. Patient denies history of GI bleed. She took Tylenol yesterday morning and denies any alcohol consumption. Patient has no allergies to medications. She has history of multiple abdominal surgeries.     Further history of present illness cannot be obtained due to the patient's acute medical condition      REVIEW OF SYSTEMS  Pertinent positives include shortness of breath, weakness, blood in stool, hematemesis, diffuse pain.   See HPI for further details. Further review of systems is unobtainable as noted above.  C.    PAST MEDICAL HISTORY  Past Medical History   Diagnosis Date   • Hydrocephalus    • Migraine without aura, without mention of intractable migraine without mention of status migrainosus    • Blind    • Chronic daily headache    • Depression    • Psychiatric problem      depression   • Hydrocephalus      shunt drains into pleural place of L lung   • C. difficile diarrhea 2013   • Jaundice      at birth   • Pain      gallbladder related   • Other specified symptom associated with female genital organs      excessive bleeding   • Fall    •  Legally blind        SURGICAL HISTORY  Past Surgical History   Procedure Laterality Date   • Laparoscopy  8/8/08     Performed by FERNANDO HENDERSON at SURGERY [facility]   • Lysis adhesions general  12/10/2013     Performed by Arie Bass M.D. at SURGERY [facility]   • Cystoscopy  12/10/2013     Performed by Joana Yeager M.D. at SURGERY [facility]   • Exploratory laparotomy  12/10/2013     Performed by Arie Bass M.D. at SURGERY [facility]   • Appendectomy  12/10/2013     Performed by Arie Bass M.D. at SURGERY [facility]   • Abdominal hysterectomy total  12/10/2013     Performed by Joana Yeager M.D. at SURGERY [facility]   • Ayala by laparoscopy N/A 8/13/2015     Procedure: AYALA BY LAPAROSCOPY;  Surgeon: Brice Johnson M.D.;  Location: SURGERY SAME DAY [facility];  Service:    • Cholecystectomy N/A 8/13/2015     Procedure: CHOLECYSTECTOMY;  Surgeon: Brice Johnson M.D.;  Location: SURGERY SAME DAY [facility];  Service:    • Other       PLEURAL SHUNT, numerous revisions        SOCIAL HISTORY  Social History   Substance Use Topics   • Smoking status: Current Some Day Smoker -- 1.00 packs/day for 10 years     Types: Cigars     Start date: 05/01/2004   • Smokeless tobacco: Never Used      Comment:  CIGAR/day    • Alcohol Use: Yes      Comment: socially      History   Drug Use No       FAMILY HISTORY  Family History   Problem Relation Age of Onset   • Hypertension Mother    • Hypertension Father    • Non-contributory Neg Hx      Migraine       CURRENT MEDICATIONS  No current facility-administered medications on file prior to encounter.     Current Outpatient Prescriptions on File Prior to Encounter   Medication Sig Dispense Refill   • amitriptyline (ELAVIL) 75 MG Tab Take 75 mg by mouth every evening. Last filled 7/18/17 #30     • propranolol CR (INDERAL LA) 80 MG CAPSULE SR 24 HR Take 80 mg by mouth every evening. Last filled 7/18/17 #30     • amlodipine (NORVASC) 5  "MG Tab Take 5 mg by mouth every evening. Lat filled 6/5/17 #30     • zolpidem (AMBIEN) 5 MG Tab Take 5 mg by mouth at bedtime as needed for Sleep. Last filled 7/18/17 #30     • Eszopiclone (LUNESTA) 3 MG Tab Take 3 mg by mouth every bedtime. Last filled 6/3/17 #30     • Vilazodone HCl (VIIBRYD) 40 MG Tab Take 1 Tab by mouth every evening. Last filled 6/27/17 #30         ALLERGIES  Allergies   Allergen Reactions   • Tape Rash     Medical tape. Per patient, paper tape ok.       PHYSICAL EXAM  VITAL SIGNS: BP 60/38 mmHg  Temp(Src) 36.4 °C (97.6 °F)  Ht 1.626 m (5' 4.02\")  Wt 68.04 kg (150 lb)  BMI 25.73 kg/m2  LMP 11/27/2013    Nursing notes and vitals reviewed.  Constitutional: Well developed, Well nourished,Severe distress, writhing on the program.  Eyes: Unable to see, Conjunctiva pallor No discharge.   Cardiovascular: Tachycardic Normal rhythm, No murmurs, No rubs, No gallops.   Thorax & Lungs: No respiratory distress, No rales, No rhonchi, No wheezing, No chest tenderness.   Abdomen: Multiple abdominal scars, diffuse abdominal tenderness, guarding, rebound  Skin: Warm, pallor  Musculoskeletal: Intact distal pulses, No edema, No cyanosis, No clubbing. Good range of motion in all major joints. No tenderness to palpation or major deformities noted, no CVA tenderness, no midline back tenderness.   Neurologic: Alert & oriented x 3, Normal motor function, Normal sensory function, No focal deficits noted.  Psychiatric: Anxious affect    DIAGNOSTIC STUDIES/PROCEDURES    LABS  Results for orders placed or performed during the hospital encounter of 08/23/17   COD (ADULT)   Result Value Ref Range    ABO Grouping Only A     Rh Grouping Only POS     Antibody Screen-Cod NEG     Component R       R3                  Red Blood Cells3    [id number]   issued       08/23/17   12:42      Product Type Red Blood Cells LR Pheresis     Dispense Status Issued     Unit Number (Barcoded) [id number]     Product Code (Barcoded) " [id number]     Blood Type (Barcoded) 4620    CBC WITH DIFFERENTIAL   Result Value Ref Range    WBC 14.0 (H) 4.8 - 10.8 K/uL    RBC 2.86 (L) 4.20 - 5.40 M/uL    Hemoglobin 9.4 (L) 12.0 - 16.0 g/dL    Hematocrit 28.9 (L) 37.0 - 47.0 %    .0 (H) 81.4 - 97.8 fL    MCH 32.9 27.0 - 33.0 pg    MCHC 32.5 (L) 33.6 - 35.0 g/dL    RDW 53.4 (H) 35.9 - 50.0 fL    Platelet Count 358 164 - 446 K/uL    MPV 10.5 9.0 - 12.9 fL    Neutrophils-Polys 73.70 (H) 44.00 - 72.00 %    Lymphocytes 19.00 (L) 22.00 - 41.00 %    Monocytes 4.80 0.00 - 13.40 %    Eosinophils 0.40 0.00 - 6.90 %    Basophils 0.60 0.00 - 1.80 %    Immature Granulocytes 1.50 (H) 0.00 - 0.90 %    Nucleated RBC 0.50 /100 WBC    Neutrophils (Absolute) 10.35 (H) 2.00 - 7.15 K/uL    Lymphs (Absolute) 2.67 1.00 - 4.80 K/uL    Monos (Absolute) 0.67 0.00 - 0.85 K/uL    Eos (Absolute) 0.05 0.00 - 0.51 K/uL    Baso (Absolute) 0.08 0.00 - 0.12 K/uL    Immature Granulocytes (abs) 0.21 (H) 0.00 - 0.11 K/uL    NRBC (Absolute) 0.07 K/uL   COMP METABOLIC PANEL   Result Value Ref Range    Sodium 138 135 - 145 mmol/L    Potassium 4.1 3.6 - 5.5 mmol/L    Chloride 107 96 - 112 mmol/L    Co2 14 (L) 20 - 33 mmol/L    Anion Gap 17.0 (H) 0.0 - 11.9    Glucose 219 (H) 65 - 99 mg/dL    Bun 39 (H) 8 - 22 mg/dL    Creatinine 1.34 0.50 - 1.40 mg/dL    Calcium 8.4 (L) 8.5 - 10.5 mg/dL    AST(SGOT) 19 12 - 45 U/L    ALT(SGPT) 9 2 - 50 U/L    Alkaline Phosphatase 103 (H) 30 - 99 U/L    Total Bilirubin 0.7 0.1 - 1.5 mg/dL    Albumin 3.0 (L) 3.2 - 4.9 g/dL    Total Protein 5.9 (L) 6.0 - 8.2 g/dL    Globulin 2.9 1.9 - 3.5 g/dL    A-G Ratio 1.0 g/dL   LIPASE   Result Value Ref Range    Lipase 13 11 - 82 U/L   PROTHROMBIN TIME   Result Value Ref Range    PT 15.1 (H) 12.0 - 14.6 sec    INR 1.15 (H) 0.87 - 1.13   APTT   Result Value Ref Range    APTT 24.3 (L) 24.7 - 36.0 sec   ACETAMINOPHEN   Result Value Ref Range    Acetaminophen -Tylenol <10 10 - 30 ug/mL   SALICYLATE   Result Value Ref Range     Salicylates, Quant. 0 (L) 15 - 25 mg/dL   ESTIMATED GFR   Result Value Ref Range    GFR If  52 (A) >60 mL/min/1.73 m 2    GFR If Non  43 (A) >60 mL/min/1.73 m 2   HGB   Result Value Ref Range    Hemoglobin 7.0 (L) 12.0 - 16.0 g/dL   LACTIC ACID   Result Value Ref Range    Lactic Acid 2.1 (H) 0.5 - 2.0 mmol/L   DIAGNOSTIC ALCOHOL   Result Value Ref Range    Diagnostic Alcohol 0.00 0.00 g/dL   HEMOGLOBIN A1C   Result Value Ref Range    Glycohemoglobin 5.3 0.0 - 5.6 %    Est Avg Glucose 105 mg/dL   BETA-HYDROXYBUTYRIC ACID   Result Value Ref Range    beta-Hydroxybutyric Acid 0.24 0.02 - 0.27 mmol/L   PLATELET MAPPING WITH BASIC TEG   Result Value Ref Range    Reaction Time Initial-R 4.3 (L) 5.0 - 10.0 min    Clot Kinetics-K 1.1 1.0 - 3.0 min    Clot Angle-Angle 73.8 (H) 53.0 - 72.0 degrees    Maximum Clot Strength-MA 69.9 50.0 - 70.0 mm    Lysis 30 minutes-LY30 0.0 0.0 - 8.0 %    % Inhibition ADP 18.5 %    % Inhibition AA 30.8 %    TEG Algorithm Link Algorithm    ACCU-CHEK GLUCOSE   Result Value Ref Range    Glucose - Accu-Ck 125 (H) 65 - 99 mg/dL       All labs reviewed by me.      RADIOLOGY  DX-CHEST-PORTABLE (1 VIEW)   Final Result      1.  Interval placement of a right IJ central line without evidence of complication.      2.  Stable borderline cardiomegaly.      KD-EOGAVUZ-2 VIEW   Final Result      No acute findings. Pneumoperitoneum is not definitively identified on single AP image.      DX-CHEST-PORTABLE (1 VIEW)   Final Result      No acute cardiopulmonary findings.        The radiologist's interpretation of all radiological studies have been reviewed by me.    Central Line Placement Procedure Note  Indication: severe bleeding    Consent: The patient provided verbal consent for this procedure.    Procedure: The patient was positioned appropriately and the skin over the right internal jugular vein was prepped with betadine and draped in a sterile fashion. Local anesthesia was  obtained by infiltration using 1% Lidocaine without epinephrine.  A large bore needle was used to identify the vein.  A guide wire was then inserted into the vein through the needle. A triple lumen catheter was then inserted into the vessel over the guide wire using the Seldinger technique.  All ports showed good, free flowing blood return and were flushed with saline solution.  The catheter was then securely fastened to the skin with sutures and covered with a sterile dressing.  A post procedure X-ray was ordered and showed good line position.    The patient tolerated the procedure well.    Complications: None      COURSE & MEDICAL DECISION MAKING  Pertinent Labs & Imaging studies reviewed. (See chart for details)    7:39 AM - Patient seen and examined at bedside. Ordered DX_chest, DX-abdomen, salicylate, urine drug screen, acetaminophen, COD, CBC with differential, CMP, Lipase, Prothrombin Time, APTT to evaluate her symptoms.     8:14 AM Recheck: Patient re-evaluated at beside. Patient's blood pressure is now 126 systolic, she is stable for intubation at this time.     8:16 AM - Treated with IV fluids hypotension, 4mg morphine, and 4mg zofran.     8:36 AM - I discussed the patient's case and the above findings with Dr. Tsai (Hospitalist) who agrees to admit the patient.     8:58 AM Paged GI.    9:02 AM - I discussed the patient's case and the above findings with Dr. Hardin (GI) who agrees to consult.      CRITICAL CARE  The very real possibility of a deterioration of this patient's condition required the highest level of my preparedness for sudden, emergent intervention.  I provided critical care services, which included medication orders, frequent reevaluations of the patient's condition and response to treatment, ordering and reviewing test results, and discussing the case with various consultants.  The critical care time associated with the care of the patient was 35 minutes. Review chart for interventions.  This time is exclusive of any other billable procedures.     This is a [facility] 46 y.o. female that presents with significant GI bleed. The patient initially presents with hypotension, profound hematochezia and melena. IV access ×2 was established, received 2 L normal saline IV bolus. Following this, central venous access is obtained. The patient an x-ray of the abdomen upright chest reveals no evidence of pneumo peritoneum. The patient's initial hemoglobin was 9.4 and regular 2 L of fluid. The fluid in the near future. The patient was discussed with GI as well as with our hospitalist for admission to the hospital. The patient initially presents critically ill but after adequate fluid resuscitation her blood pressure increased and stabilized. She is complaining of shortness of breath throughout her stay here and required all to mask the x-rays negative for pneumonia. She is too unstable to get a CT scan at this point her cardiac activity is too great. I will not give the patient anticoagulation secondary to profound GI bleed..    DISPOSITION:  Patient will be admitted to Dr. Tsai (Hospitalist) in critcal condition.      FINAL IMPRESSION   GI bleed  Hypotension  Anemia  Central venous access  Critical care time 35 minutes   Leighann SHARMA (Scribe), am scribing for, and in the presence of, Kory Snow D.O    Electronically signed by: Leighann Simpson (Scribe), 8/23/2017    Kory SHARMA D.O. personally performed the services described in this documentation, as scribed by Leighann Simpson in my presence, and it is both accurate and complete.    The note accurately reflects work and decisions made by me.  Kory Snow  8/23/2017  3:20 PM

## 2017-08-23 NOTE — IP AVS SNAPSHOT
Mailpile Access Code: [email or web address]  Expires: 9/24/2017  1:04 PM    Mailpile  A secure, online tool to manage your health information     [employer]’s Mailpile® is a secure, online tool that connects you to your personalized health information from the privacy of your home -- day or night - making it very easy for you to manage your healthcare. Once the activation process is completed, you can even access your medical information using the Mailpile deysi, which is available for free in the Apple Deysi store or Google Play store.     Mailpile provides the following levels of access (as shown below):   My Chart Features   [facility] Primary Care Doctor [facility]  Specialists [facility]  Urgent  Care Non-[facility]  Primary Care  Doctor   Email your healthcare team securely and privately 24/7 X X X X   Manage appointments: schedule your next appointment; view details of past/upcoming appointments X      Request prescription refills. X      View recent personal medical records, including lab and immunizations X X X X   View health record, including health history, allergies, medications X X X X   Read reports about your outpatient visits, procedures, consult and ER notes X X X X   See your discharge summary, which is a recap of your hospital and/or ER visit that includes your diagnosis, lab results, and care plan. X X       How to register for Mailpile:  1. Go to  [email or web address].  2. Click on the Sign Up Now box, which takes you to the New Member Sign Up page. You will need to provide the following information:  a. Enter your Mailpile Access Code exactly as it appears at the top of this page. (You will not need to use this code after you’ve completed the sign-up process. If you do not sign up before the expiration date, you must request a new code.)   b. Enter your date of birth.   c. Enter your home email address.   d. Click Submit, and follow the next screen’s instructions.  3. Create a Mailpile ID. This will be your Mailpile  login ID and cannot be changed, so think of one that is secure and easy to remember.  4. Create a [employer] password. You can change your password at any time.  5. Enter your Password Reset Question and Answer. This can be used at a later time if you forget your password.   6. Enter your e-mail address. This allows you to receive e-mail notifications when new information is available in Lingu[employer]at.  7. Click Sign Up. You can now view your health information.    For assistance activating your [employer] account, call [phone]

## 2017-08-23 NOTE — H&P
PRIMARY CARE PHYSICIAN:  FELI Adams    CHIEF COMPLAINT:  Fatigue, transferred to [facility] with bloody stools,   hypotension.    HISTORY OF PRESENT ILLNESS:  Patient is a 46-year-old female, poor historian,   history of hydrocephalus, chronic headache, migraines, blind, transferred to   [facility] after she reportedly had somehow called 911.  She reports feeling   fatigued and generalized weakness of late.  She took a sleeping pill and   slipped to the floor.  She reports nausea without vomiting.  When paramedics   arrived, she was noted to have bloody stool.  She has had diarrhea over the   past day without abdominal pain, chest pain or shortness of breath.  Does have   headaches, she reports chronic.  She uses ibuprofen daily for her headaches.    She has chronic debility, unsteady, uses a cane.    In ER, patient finding systolic blood pressure of 60/38 to 86/57 and   tachycardic in the 130s.  With findings of acute blood loss anemia and GI   bleed, patient will be given fluid resuscitation, started on IV Protonix drip,   transfused packed RBCs.  Dr. Raza, GI, who I have discussed case with,   plan endoscopy when hemodynamically stabilized.  Patient to be admitted acute   to ICU for continued resuscitation.    REVIEW OF SYSTEMS:  As above, otherwise negative according to AMA and CMS   criteria.    PAST MEDICAL HISTORY:  Includes history of hydrocephalus with   ventriculoperitoneal shunt, chronic headaches, history of migraines, blind,   per records clostridium difficile, history of depression, recurrent falls,   history of jaundice.    PAST SURGICAL HISTORY:  Includes ventriculoperitoneal shunt placement,   exploratory laparotomy with lysis of adhesions per records, hysterectomy,   appendectomy, and cholecystectomy.    HOME MEDICATIONS:  Include Elavil 75 q. evening, Norvasc 5 daily, Lunesta 3 mg   at bedtime, propranolol CR 80 daily, Viibryd 40 mg evening, Ambien 5 mg at   bedtime.    ALLERGIES:   TAPE.    SOCIAL HISTORY:  Reports occasional cigar, recently none.  Social amounts of   alcohol.  She lives independently.  No illicit drugs.    FAMILY HISTORY:  Hypertension.    PHYSICAL EXAMINATION:  CURRENT VITAL SIGNS:  Temperature of 36.4, pulse 130s, blood pressure 60/38 to   86/57, respiratory rate 20s-30s, 68 kilograms.  GENERAL:  Patient is chronically ill-appearing, disheveled, oriented x3,   difficulty recalling events, cooperative.  She is blind.  Increased   respiratory rate.  HEENT:  Anicteric, pale sclerae, disconjugate gaze, left eye deviated.  There   is dry residual brown blood in mouth.  Mucous members were dry.  NECK:  No cervical or supraclavicular adenopathy.  Trachea midline.  CARDIOVASCULAR:  Tachycardic, regular, without murmurs.  LUNGS:  Clear to auscultation bilaterally, increased respiratory rate.  No   chest wall tenderness.  ABDOMEN:  Bowel sounds present, soft, nontender, nondistended.  No   hepatosplenomegaly.  No pulsatile masses.  BACK:  No CVA or paraspinal tenderness.  EXTREMITIES:  Her left shin with small ulceration.  There is no lower   extremity edema.  Negative Homans sign.  Generalized 4/5 strength, 2+   symmetric radial pulses.  No peripheral cyanosis.  Capillary refill normal.  SKIN:  Pale, warm.  PSYCHIATRIC:  Fairly calm without depressed affect.    LABORATORY DATA:  White count 14, hemoglobin 9.4, platelet count 358,000, MCV   of 101.    BUN and creatinine of 39/1.38, blood sugar of 219, anion gap 17, bicarbonate   14.  Tylenol less than 10, salicylate 0.  Lipase 13.    INR 1.15, PTT 24.    IMAGING:  An abdominal x-ray revealed no acute findings.  Chest x-ray per my   interpretation revealed no acute cardiopulmonary process.    IMPRESSION AND PLAN:  1.  Hemorrhagic shock.  Patient will be admitted acute to ICU.  We will   provide IV fluid bolus, continue fluid resuscitation, institution IV Protonix   drip for suspected upper gastrointestinal bleed.  Follow serial  hemoglobins.    We planned packed RBC transfusions in the emergency room.  A central line will   be placed.  2.  Acute gastrointestinal bleed, suspect upper with findings of azotemia.    She also has findings of small amount dry residual blood in mouth, routine   non-steroidal antiinflammatory drugs use, possible peptic ulcer disease,   bleeding vessel or gastritis associated.  We will give IV Protonix bolus   followed by drip.  Discussed with Dr. Raza.  He planned hemodynamic   stabilization before he can proceed with EGD.  We will also check TEG scan   with platelet mapping with history of non-steroidal antiinflammatory drugs   use.  3.  Acute blood loss anemia attributed to above.  Plan packed RBC transfusions   in the ER, follow serial hemoglobin levels.  May need additional transfusions   if hemoglobin less than 7 or signs of active bleed, hemodynamic instability.  4.  Anion gap metabolic acidosis attributed to hypoperfusion, shock.  Check   lactic acid level.  She does have elevated blood sugar.  Differential includes   diabetic ketoacidosis.  Check serum acetone.  Provide fluid resuscitation,   stabilize blood pressure.  We will monitor blood sugars with serial   Accu-Cheks, may need sliding scale insulin coverage if persistently elevated.  5.  Hyperglycemia without reported history of diabetes.  Treatment, workup as above.  6.  Leukocytosis, possible stress reaction, associated with gastrointestinal   bleed.  She has no acute symptoms of infection, although does have a history   of clostridium difficile.  We will follow clinically.  Low threshold to start   empiric antibiotics.  7.  Acute kidney injury with azotemia, attributed to hemorrhagic shock,   suspect upper gastrointestinal bleed, hypovolemia.  We will follow up renal   function, electrolytes, urine output with fluid resuscitation.  8.  History of hypertension.  Holding antihypertensives with unstable blood   pressure.  9.  History of  depression.  Holding medications with n.p.o. status.  Resume   when cleared.  10.  History of hydrocephalus with ventriculoperitoneal shunt, neurologically   stable.  She does have chronic headaches.  11.  Legally blind.     Admit acute inpatient, greater than 2 midnights stay anticipated.    Total critical care time approximately 40 minutes, discussing with HANY Gamino, not including procedures.       ____________________________________     KANU LAZAR MD    [facility] / NTS    DD:  08/23/2017 09:50:35  DT:  08/23/2017 10:51:20    D#:  [id number]  Job#:  [id number]

## 2017-08-23 NOTE — PROGRESS NOTES
Patient transported from ED to T627.  All belongings and chart accounted for.  POC discussed, no questions at this time.

## 2017-08-23 NOTE — ED NOTES
Bib remsa, report w/ hypotension 60/40 onscene, bloody stools, sob. Upon arrival pt c/o sob, on mask at 4 L 90%. erp at bedside

## 2017-08-23 NOTE — CONSULTS
DATE OF SERVICE:  08/23/2017    REFERRING PHYSICIAN:  Kory Snow DO.    CHIEF COMPLAINT:  We were asked to evaluate the patient by Dr. Snow for   further evaluation of GI bleed.    HISTORY OF PRESENT ILLNESS:  This patient is a 46-year-old female with history   of migraine headaches and hydrocephalus and depression as well as previous   medication overdose that presented for evaluation of coffee-ground hematemesis   and melena.    She is blind and has difficulty seeing the color of her stool.  She does have   a history of migraine headaches and hydrocephalus.  She also has prior history   of medication overdose.    She notes that she was feeling well yesterday morning, but last night   developed the onset of nausea, vomiting and diarrhea.  Again, she had several   episodes.  She was unable to tell the color of her stool.  She denies   significant abdominal pain.  Denies fevers or chills.  She did take an extra   Ambien to try and help her sleep and headache.  Then, this morning, she awoke   and had near syncopal episode and was dizzy.  She called 911.  Upon arrival of   EMS, she was noted to be covered in a black stool as well as black emesis.    No neil blood or red blood.  She was brought to the ER for further   evaluation.    In the ER, she is tachycardic and hypotensive.  She has required IV fluid   boluses.  Hemoglobin was 9.4.  She is a little bit tachypneic and short of   breath.  Labs do show again a slight leukocytosis and anemia.  BUN elevated at   39 with a creatinine of 1.3,  bicarbonate is 14 indicating some degree of   acidosis.  Blood sugar is elevated.  Lipase was normal.  INR was normal.    Tylenol and salicylate normal.    She does note taking ibuprofen 2-4 per day for headaches.  Denies any chronic   heartburn symptoms and did have prior fundoplication in the past.    PAST MEDICAL HISTORY:  1.  Blindness.  2.  Migraine headaches.  3.  Hydrocephalus.  4.  Depression.    PAST SURGICAL  HISTORY:  1.  Fundoplication.  2.  Previous  shunt surgery with numerous revisions.    HOME MEDICATIONS:  1.  Elavil.  2.  Propranolol.  3.  Norvasc.  5.  Ambien.  6.  Lunesta.  7.  Vilazodone.  8.  Ibuprofen.    ALLERGIES:  TO TAPE.    FAMILY HISTORY:  Denies any GI malignancies.    SOCIAL HISTORY:  She previously smoked a cigar daily, but quit because she   cannot afford it.  Notes occasional alcohol.  Denies other drugs.    REVIEW OF SYSTEMS:  A complete 14-point review of systems was obtained and   found to be significant for nausea, vomiting, hematemesis, melena, weakness,   dizziness, but was otherwise unremarkable.  She also had some dyspnea and   shortness of breath.    PHYSICAL EXAMINATION:  VITAL SIGNS:  Afebrile, heart rate tachycardic, blood pressures 70s-80s/50,   satting 100% on face mask.  GENERAL:  She is alert and oriented.  She is pale.  She is slightly   tachypneic.  HEENT:  Eyes show pale conjunctive.  Mouth shows dry mucous membranes and she   does have a little bit of coffee-ground material around her lips.  NECK:  Shows lymphadenopathy.  Thyroid shows no thyromegaly.  LUNGS:  Clear to auscultation bilaterally.  CARDIOVASCULAR:  Revealed tachycardia, but regular rhythm.  ABDOMEN:  Soft, nontender, nondistended with good bowel sounds.  She dose have   numerous abdominal scars from prior surgeries.  No obvious masses.  No   appreciable hepatosplenomegaly.  EXTREMITIES:  Do reveal trace edema.  RECTAL:  Revealed melena, but no neil blood.  NEUROLOGIC:  No focal deficits.  SKIN:  Revealed no rashes.    LABORATORY DATA:  Showed a CBC with a white blood cell count 14.0, hemoglobin   9.4, and a platelet count of 358.  Chemistry showed sodium 138, potassium 4.1,   bicarbonate 14 with an anion gap.  BUN 39, creatinine 1.3, glucose 219.  LFTs   were normal with an albumin of 3.0.  INR 1.1.  Lipase normal.  Tylenol level   negative.  Salicylate level negative.  Chest x-ray was normal.  Abdominal    x-ray was unremarkable.  EKG with sinus tachycardia.    IMPRESSION AND PLAN:  1.  The patient is a 46-year-old female with history of migraine headaches,   hydrocephalus, depression, who presented for evaluation of nausea,   coffee-ground emesis and melena.  She does take ibuprofen daily.  She also has   associated anion gap metabolic acidosis with associated tachycardia and   hypotension.  I suspect she does have an upper gastrointestinal bleed at this   time and this could explain all of her symptoms, although would also be   concerned about diabetic ketoacidosis or some form of drug overdose.  In terms   of upper gastrointestinal bleed, consider peptic ulcer disease given NSAID   use versus other etiology.  Currently, she is unstable for upper endoscopy due   to significant tachycardia, tachypnea and hypotension.  She needs aggressive   resuscitation with fluids prior to considering endoscopic evaluation.  For the   meantime, agree with Protonix drip.  Ongoing resuscitation with IV fluids and   better IV access.  2.  Check thromboelastography with platelet mapping given NSAID use to see a   possible need for platelet transfusion in the setting of active bleeding.    Need further evaluation of metabolic acidosis and would check lactate, serum   ketones, tox screen and alcohol level.  Monitor serial H and H every 6 hours.    Keep n.p.o.  We will reassess later on today for further stability from a   clinical standpoint and possible endoscopy at that time.    PROBLEMS:  1. Hematemesis  2. Melena  3. Acute blood loss anemia  4. Hypotension  5. Acute renal failure  6. Hyperglycemia  7. Hydrocephalus  8. Chronic pain  9. Multiple prior abdominal surgeries  5. Aciodsis    PLAN:  1.  Ongoing fluid resuscitation.  2.  Protonix drip.  3.  Serial H and H.  4.  Serum lactate.  5.  Serum acetone.  6.  Tox screen.  7.  Alcohol level.  8.  Keep n.p.o.  9.  We will assess later for stability for possible upper  endoscopy.    Thank you for allowing me to take part in the care of your patient.  Please   feel free to call if any questions.       ____________________________________     MD BEN HODGES / DAVE    DD:  08/23/2017 09:42:33  DT:  08/23/2017 10:13:17    D#:  [id number]  Job#:  [id number]

## 2017-08-23 NOTE — IP AVS SNAPSHOT
8/25/2017    Rasheeda Manzano  [address]  [facility] [address]    Dear Rasheeda:    Critical access hospital wants to ensure your discharge home is safe and you or your loved ones have had all of your questions answered regarding your care after you leave the hospital.    Below is a list of resources and contact information should you have any questions regarding your hospital stay, follow-up instructions, or active medical symptoms.    Questions or Concerns Regarding… Contact   Medical Questions Related to Your Discharge  (7 days a week, 8am-5pm) Contact a Nurse Care Coordinator   [phone]   Medical Questions Not Related to Your Discharge  (24 hours a day / 7 days a week)  Contact the Nurse Health Line   [phone]    Medications or Discharge Instructions Refer to your discharge packet   or contact your [facility] Primary Care Provider   [phone]   Follow-up Appointment(s) Schedule your appointment via [employer]   or contact Scheduling [phone]   Billing Review your statement via [employer]  or contact Billing [phone]   Medical Records Review your records via [employer]   or contact Medical Records [phone]     You may receive a telephone call within two days of discharge. This call is to make certain you understand your discharge instructions and have the opportunity to have any questions answered. You can also easily access your medical information, test results and upcoming appointments via the [employer] free online health management tool. You can learn more and sign up at [employer]. For assistance setting up your [employer] account, please call [phone].    Once again, we want to ensure your discharge home is safe and that you have a clear understanding of any next steps in your care. If you have any questions or concerns, please do not hesitate to contact us, we are here for you. Thank you for choosing [facility] for your healthcare needs.    Sincerely,    Your [facility] Healthcare Team

## 2017-08-23 NOTE — IP AVS SNAPSHOT
" <p align=\"LEFT\"><IMG SRC=\"//EMRWB/blob$/[email or web address]\" alt=\"Image\" WIDTH=\"50%\" HEIGHT=\"200\" BORDER=\"\"></p>                   Name:Rasheeda Manzano  Medical Record Number:[id number]  CSN: [id number]    YOB: 1970   Age: 46 y.o.  Sex: female  HT:1.626 m (5' 4\") WT: 73.1 kg (161 lb 2.5 oz)          Admit Date: 8/23/2017     Discharge Date:   Today's Date: 8/25/2017  Attending Doctor:  João Aleman M.D.                  Allergies:  Tape          Follow-up Information     1. Follow up with AUREA Estrada. Go on 9/5/2017.    Why:  PLEASE ARRIVE AT 11:10AM FOR YOUR APPOINTMENT. THANK YOU    Contact information    [address] [place] [address] (2ND FLOOR)  [place], [place] [address]  [phone]        2. Schedule an appointment as soon as possible for a visit with Matias Fernando M.D..    Specialty:  Gastroenterology    Why:  [facility] [place] [facility]  LEFT A MESSAGE WITH YOUR PROVIDER REGARDING NEED FOR FOLLOW UP APPOINTMENT. PLEASE CONTACT THEIR OFFICE DIRECTLY IF YOU DO NOT HEAR FROM THEM WITH IN 1 DAY. THANK YOU     Contact information    [address] [place] [address]  [phone]           Medication List      Take these Medications        Instructions    amitriptyline 75 MG Tabs   Commonly known as:  ELAVIL    Take 75 mg by mouth every evening. Last filled 7/18/17 #30   Dose:  75 mg       amlodipine 5 MG Tabs   Commonly known as:  NORVASC    Take 5 mg by mouth every evening. Lat filled 6/5/17 #30   Dose:  5 mg       esomeprazole 20 MG capsule   Commonly known as:  NEXIUM 24HR    Take 1 Cap by mouth 2 Times a Day.   Dose:  20 mg       LUNESTA 3 MG Tabs   Generic drug:  Eszopiclone    Take 3 mg by mouth every bedtime. Last filled 6/3/17 #30   Dose:  3 mg       ondansetron 4 MG Tbdp   Commonly known as:  ZOFRAN ODT    Take 1 Tab by mouth every 8 hours as needed for Nausea/Vomiting.   Dose:  4 mg       propranolol CR 80 MG Cp24   Commonly known as:  INDERAL LA    Take 80 mg by mouth every evening. Last filled " 7/18/17 #30   Dose:  80 mg       VIIBRYD 40 MG Tabs   Generic drug:  Vilazodone HCl    Take 1 Tab by mouth every evening. Last filled 6/27/17 #30   Dose:  1 Tab       zolpidem 5 MG Tabs   Commonly known as:  AMBIEN    Take 5 mg by mouth at bedtime as needed for Sleep. Last filled 7/18/17 #30   Dose:  5 mg

## 2017-08-24 PROBLEM — I10 HTN (HYPERTENSION): Status: ACTIVE | Noted: 2017-08-24

## 2017-08-24 PROBLEM — D62 ANEMIA DUE TO BLOOD LOSS, ACUTE: Status: ACTIVE | Noted: 2017-08-24

## 2017-08-24 LAB
GLUCOSE BLD-MCNC: 93 MG/DL (ref 65–99)
GLUCOSE BLD-MCNC: 95 MG/DL (ref 65–99)
HGB BLD-MCNC: 7.4 G/DL (ref 12–16)
HGB BLD-MCNC: 8 G/DL (ref 12–16)
HGB BLD-MCNC: 8.1 G/DL (ref 12–16)

## 2017-08-24 PROCEDURE — 700105 HCHG RX REV CODE 258: Performed by: HOSPITALIST

## 2017-08-24 PROCEDURE — C9113 INJ PANTOPRAZOLE SODIUM, VIA: HCPCS | Performed by: HOSPITALIST

## 2017-08-24 PROCEDURE — 160035 HCHG PACU - 1ST 60 MINS PHASE I: Performed by: INTERNAL MEDICINE

## 2017-08-24 PROCEDURE — 85018 HEMOGLOBIN: CPT | Mod: 91

## 2017-08-24 PROCEDURE — 88305 TISSUE EXAM BY PATHOLOGIST: CPT

## 2017-08-24 PROCEDURE — 160048 HCHG OR STATISTICAL LEVEL 1-5: Performed by: INTERNAL MEDICINE

## 2017-08-24 PROCEDURE — 700102 HCHG RX REV CODE 250 W/ 637 OVERRIDE(OP): Performed by: INTERNAL MEDICINE

## 2017-08-24 PROCEDURE — 71010 DX-CHEST-PORTABLE (1 VIEW): CPT

## 2017-08-24 PROCEDURE — 770022 HCHG ROOM/CARE - ICU (200)

## 2017-08-24 PROCEDURE — 99152 MOD SED SAME PHYS/QHP 5/>YRS: CPT | Performed by: INTERNAL MEDICINE

## 2017-08-24 PROCEDURE — 500066 HCHG BITE BLOCK, ECT: Performed by: INTERNAL MEDICINE

## 2017-08-24 PROCEDURE — 82962 GLUCOSE BLOOD TEST: CPT

## 2017-08-24 PROCEDURE — 700102 HCHG RX REV CODE 250 W/ 637 OVERRIDE(OP): Performed by: HOSPITALIST

## 2017-08-24 PROCEDURE — 700111 HCHG RX REV CODE 636 W/ 250 OVERRIDE (IP): Performed by: HOSPITALIST

## 2017-08-24 PROCEDURE — A9270 NON-COVERED ITEM OR SERVICE: HCPCS | Performed by: INTERNAL MEDICINE

## 2017-08-24 PROCEDURE — A9270 NON-COVERED ITEM OR SERVICE: HCPCS | Performed by: HOSPITALIST

## 2017-08-24 PROCEDURE — 160002 HCHG RECOVERY MINUTES (STAT): Performed by: INTERNAL MEDICINE

## 2017-08-24 PROCEDURE — 502240 HCHG MISC OR SUPPLY RC 0272: Performed by: INTERNAL MEDICINE

## 2017-08-24 PROCEDURE — 0DB38ZX EXCISION OF LOWER ESOPHAGUS, VIA NATURAL OR ARTIFICIAL OPENING ENDOSCOPIC, DIAGNOSTIC: ICD-10-PCS | Performed by: INTERNAL MEDICINE

## 2017-08-24 PROCEDURE — 700111 HCHG RX REV CODE 636 W/ 250 OVERRIDE (IP)

## 2017-08-24 PROCEDURE — 99233 SBSQ HOSP IP/OBS HIGH 50: CPT | Performed by: HOSPITALIST

## 2017-08-24 PROCEDURE — 160203 HCHG ENDO MINUTES - 1ST 30 MINS LEVEL 4: Performed by: INTERNAL MEDICINE

## 2017-08-24 RX ORDER — AMITRIPTYLINE HYDROCHLORIDE 75 MG/1
75 TABLET ORAL NIGHTLY
Status: DISCONTINUED | OUTPATIENT
Start: 2017-08-24 | End: 2017-08-25 | Stop reason: HOSPADM

## 2017-08-24 RX ORDER — MIDAZOLAM HYDROCHLORIDE 1 MG/ML
.5-2 INJECTION INTRAMUSCULAR; INTRAVENOUS PRN
Status: ACTIVE | OUTPATIENT
Start: 2017-08-24 | End: 2017-08-24

## 2017-08-24 RX ORDER — VILAZODONE HYDROCHLORIDE 40 MG/1
1 TABLET ORAL EVERY EVENING
Status: DISCONTINUED | OUTPATIENT
Start: 2017-08-24 | End: 2017-08-25 | Stop reason: HOSPADM

## 2017-08-24 RX ORDER — SODIUM CHLORIDE 9 MG/ML
500 INJECTION, SOLUTION INTRAVENOUS
Status: ACTIVE | OUTPATIENT
Start: 2017-08-24 | End: 2017-08-24

## 2017-08-24 RX ORDER — MIDAZOLAM HYDROCHLORIDE 1 MG/ML
INJECTION INTRAMUSCULAR; INTRAVENOUS
Status: DISCONTINUED | OUTPATIENT
Start: 2017-08-24 | End: 2017-08-24 | Stop reason: HOSPADM

## 2017-08-24 RX ORDER — OMEPRAZOLE 20 MG/1
20 CAPSULE, DELAYED RELEASE ORAL 2 TIMES DAILY
Status: DISCONTINUED | OUTPATIENT
Start: 2017-08-24 | End: 2017-08-25 | Stop reason: HOSPADM

## 2017-08-24 RX ORDER — SODIUM CHLORIDE 9 MG/ML
INJECTION, SOLUTION INTRAVENOUS CONTINUOUS
Status: DISCONTINUED | OUTPATIENT
Start: 2017-08-24 | End: 2017-08-24

## 2017-08-24 RX ADMIN — SODIUM CHLORIDE 8 MG/HR: 9 INJECTION, SOLUTION INTRAVENOUS at 08:51

## 2017-08-24 RX ADMIN — AMITRIPTYLINE HYDROCHLORIDE 75 MG: 75 TABLET, FILM COATED ORAL at 21:24

## 2017-08-24 RX ADMIN — OMEPRAZOLE 20 MG: 20 CAPSULE, DELAYED RELEASE ORAL at 21:25

## 2017-08-24 RX ADMIN — OXYCODONE HYDROCHLORIDE 5 MG: 5 TABLET ORAL at 17:44

## 2017-08-24 RX ADMIN — OXYCODONE HYDROCHLORIDE 5 MG: 5 TABLET ORAL at 07:54

## 2017-08-24 RX ADMIN — ZOLPIDEM TARTRATE 5 MG: 5 TABLET, FILM COATED ORAL at 21:25

## 2017-08-24 ASSESSMENT — ENCOUNTER SYMPTOMS
NAUSEA: 1
VOMITING: 0
ABDOMINAL PAIN: 0
STRIDOR: 0
COUGH: 0
HEMOPTYSIS: 0
SPUTUM PRODUCTION: 0
WEAKNESS: 0
SPEECH CHANGE: 0
BLOOD IN STOOL: 0
ORTHOPNEA: 0
DIARRHEA: 0
SHORTNESS OF BREATH: 0
EYE REDNESS: 0
FALLS: 0
FOCAL WEAKNESS: 0
NERVOUS/ANXIOUS: 1
HEADACHES: 1
FEVER: 0
PALPITATIONS: 0
FLANK PAIN: 0
MEMORY LOSS: 0
LOSS OF CONSCIOUSNESS: 0
BACK PAIN: 1
NECK PAIN: 0
SEIZURES: 0
EYE DISCHARGE: 0

## 2017-08-24 ASSESSMENT — PATIENT HEALTH QUESTIONNAIRE - PHQ9
2. FEELING DOWN, DEPRESSED, IRRITABLE, OR HOPELESS: NOT AT ALL
6. FEELING BAD ABOUT YOURSELF - OR THAT YOU ARE A FAILURE OR HAVE LET YOURSELF OR YOUR FAMILY DOWN: SEVERAL DAYS
7. TROUBLE CONCENTRATING ON THINGS, SUCH AS READING THE NEWSPAPER OR WATCHING TELEVISION: MORE THAN HALF THE DAYS
3. TROUBLE FALLING OR STAYING ASLEEP OR SLEEPING TOO MUCH: SEVERAL DAYS
4. FEELING TIRED OR HAVING LITTLE ENERGY: MORE THAN HALF THE DAYS
5. POOR APPETITE OR OVEREATING: NOT AT ALL
SUM OF ALL RESPONSES TO PHQ9 QUESTIONS 1 AND 2: 1
8. MOVING OR SPEAKING SO SLOWLY THAT OTHER PEOPLE COULD HAVE NOTICED. OR THE OPPOSITE, BEING SO FIGETY OR RESTLESS THAT YOU HAVE BEEN MOVING AROUND A LOT MORE THAN USUAL: NOT AT ALL
SUM OF ALL RESPONSES TO PHQ QUESTIONS 1-9: 7
1. LITTLE INTEREST OR PLEASURE IN DOING THINGS: SEVERAL DAYS
9. THOUGHTS THAT YOU WOULD BE BETTER OFF DEAD, OR OF HURTING YOURSELF: NOT AT ALL

## 2017-08-24 ASSESSMENT — PAIN SCALES - GENERAL
PAINLEVEL_OUTOF10: 4
PAINLEVEL_OUTOF10: 5
PAINLEVEL_OUTOF10: 6
PAINLEVEL_OUTOF10: 7
PAINLEVEL_OUTOF10: 4
PAINLEVEL_OUTOF10: 7

## 2017-08-24 ASSESSMENT — LIFESTYLE VARIABLES
SUBSTANCE_ABUSE: 0
EVER_SMOKED: YES

## 2017-08-24 NOTE — WOUND TEAM
Wound Team consulted for wounds to right arm and right foot. There are 2 small superficial scabs on right arm that are not infected and do not need advanced wound care. On the plantar aspect of the right foot, over the 1st metatarsal head and heel, there is red skin. The patient stated that she peeled her callus off. These areas are not open any more, but have a thin layer of new epithelium over them and are therefore red. Patient has no advanced wound care needs at this time.

## 2017-08-24 NOTE — PROGRESS NOTES
Attempt to reposition ng and flush unsuccessful.  Ng replaced, 670 mL emptied within 5 minutes.  Will continue to monitor

## 2017-08-24 NOTE — PROGRESS NOTES
Pt had medications. Pt educated meds will be stored in our pharmacy. Ambien brought to pharmacy. Receipt located in pt's chart.

## 2017-08-24 NOTE — PROGRESS NOTES
[facility] Hospitalist Progress Note    Date of Service: 2017    Chief Complaint  46 y.o. female admitted 2017 with GI bleed coffee-ground emesis and hypotension    Interval Problem Update  No further vomiting or signs of active bleeding overnight    Consultants/Specialty  GI    Disposition  To be determined        Review of Systems   Constitutional: Negative for fever and malaise/fatigue.   HENT: Negative for congestion, ear discharge and nosebleeds.    Eyes: Negative for discharge and redness.        Legally blind   Respiratory: Negative for cough, hemoptysis, sputum production, shortness of breath and stridor.    Cardiovascular: Negative for chest pain, palpitations, orthopnea and leg swelling.   Gastrointestinal: Positive for nausea. Negative for vomiting, abdominal pain, diarrhea, blood in stool and melena.   Genitourinary: Negative for dysuria, hematuria and flank pain.   Musculoskeletal: Positive for back pain. Negative for joint pain, falls and neck pain.   Skin: Negative.    Neurological: Positive for headaches (chronic). Negative for speech change, focal weakness, seizures, loss of consciousness and weakness.   Psychiatric/Behavioral: Negative for memory loss and substance abuse. The patient is nervous/anxious.    All other systems reviewed and are negative.     Physical Exam  Laboratory/Imaging   Hemodynamics  Temp (24hrs), Av.2 °C (97.2 °F), Min:36.1 °C (96.9 °F), Max:36.4 °C (97.6 °F)   Temperature: 36.1 °C (97 °F)  Pulse  Av.6  Min: 60  Max: 149 Heart Rate (Monitored): (!) 122  NIBP: 125/85 mmHg      Respiratory      Respiration: (!) 21, Pulse Oximetry: 100 %     Work Of Breathing / Effort: Moderate  RUL Breath Sounds: Clear, RML Breath Sounds: Diminished, RLL Breath Sounds: Diminished, ABIMBOLA Breath Sounds: Clear, LLL Breath Sounds: Diminished    Fluids    Intake/Output Summary (Last 24 hours) at 17 1449  Last data filed at 17 1400   Gross per 24 hour   Intake   3275 ml    Output   4325 ml   Net  -1050 ml       Nutrition  Orders Placed This Encounter   Procedures   • DIET ORDER     Standing Status: Standing      Number of Occurrences: 1      Standing Expiration Date:      Order Specific Question:  Diet:     Answer:  Full Liquid [11]      Comments:  Advance to soft as tolerated.  - Keep head of bed elevated > 15 degrees.     Physical Exam   Constitutional: She is oriented to person, place, and time. She appears well-developed and well-nourished.   HENT:   Head: Normocephalic and atraumatic.   Right Ear: External ear normal.   Left Ear: External ear normal.   Mouth/Throat: No oropharyngeal exudate.   Eyes: Right eye exhibits no discharge. Left eye exhibits no discharge. No scleral icterus.   Neck: Neck supple. No JVD present. No tracheal deviation present.   Cardiovascular: Normal rate and regular rhythm.  Exam reveals no gallop and no friction rub.    No murmur heard.  Pulmonary/Chest: Effort normal and breath sounds normal. No stridor. No respiratory distress. She has no wheezes. She has no rales. She exhibits no tenderness.   Abdominal: Soft. Bowel sounds are normal. She exhibits no distension and no mass. There is no tenderness. There is no rebound and no guarding.   Musculoskeletal: She exhibits no edema or tenderness.   Neurological: She is alert and oriented to person, place, and time. No cranial nerve deficit. She exhibits normal muscle tone.   Skin: Skin is warm and dry. She is not diaphoretic. No cyanosis. Nails show no clubbing.   Psychiatric: She has a normal mood and affect. Her behavior is normal. Thought content normal.   Nursing note and vitals reviewed.      Recent Labs      08/23/17   0737   08/23/17   1730  08/23/17   2300  08/24/17   0315  08/24/17   1100   WBC  14.0*   --   14.1*   --    --    --    RBC  2.86*   --   2.62*   --    --    --    HEMOGLOBIN  9.4*   < >  8.4*  7.5*  8.1*  8.0*   HEMATOCRIT  28.9*   --   25.1*   --    --    --    MCV  101.0*   --   95.8    --    --    --    MCH  32.9   --   32.1   --    --    --    MCHC  32.5*   --   33.5*   --    --    --    RDW  53.4*   --   50.9*   --    --    --    PLATELETCT  358   --   249   --    --    --    MPV  10.5   --   10.3   --    --    --     < > = values in this interval not displayed.     Recent Labs      08/23/17   0737   SODIUM  138   POTASSIUM  4.1   CHLORIDE  107   CO2  14*   GLUCOSE  219*   BUN  39*   CREATININE  1.34   CALCIUM  8.4*     Recent Labs      08/23/17   0737   APTT  24.3*   INR  1.15*                  Assessment/Plan     * GIB (gastrointestinal bleeding)  Assessment & Plan  Continue protonix  GI following with plans for EGD    Chronic daily headache (present on admission)  Assessment & Plan  Resume home meds    Hyperglycemia (present on admission)  Assessment & Plan  Hba1c 5.3 monitor    Narcotic dependence (CMS-[facility]) (present on admission)  Assessment & Plan  Avoid IV narcotics    Shock (CMS-[facility])  Assessment & Plan  Hemorrhagic  resolved    Anemia due to blood loss, acute  Assessment & Plan  Improved post transfusion  Monitor H/H and transfuse if Hg<7    HTN (hypertension)  Assessment & Plan  Resume norvasc if BP     Labs reviewed, Medications reviewed and Radiology images reviewed    Central line in place: Shock    DVT Prophylaxis: Contraindicated - High bleeding risk  DVT prophylaxis - mechanical: SCDs

## 2017-08-24 NOTE — CARE PLAN
Problem: Skin Integrity  Goal: Risk for impaired skin integrity will decrease  Outcome: PROGRESSING AS EXPECTED  Fall precaution in place, provide hourly rounding, and pt utilizes call light     Problem: Risk for Impaired Mobility--Activity Intolerance  Goal: Mobilize and/or Transfer Safely with [facility] [place]  Outcome: PROGRESSING AS EXPECTED  Pt ambulated to bathroom with one person assist

## 2017-08-24 NOTE — OR SURGEON
Operative Report    PreOp Diagnosis: Melena, anemia    PostOp Diagnosis:   1. Esophagitis with marked scarring and luminal narrowing distal 5 cm.  2. Residual 2-3 cm hiatal hernia.  3. Loose fundoplication.    Procedure(s):  GASTROSCOPY-ENDO - Wound Class: Clean Contaminated with biopsies    Surgeon(s):  Matias Fernando M.D.    Anesthesiologist/Type of Anesthesia:  No anesthesia staff entered./Sedation    Surgical Staff:  Endoscopy Technician: Carmen Saba R.N.  Sedation/Monitoring Nurse: Alee Zarate R.N.    Specimens:  Esophagus    Estimated Blood Loss: None    Findings:   Esophagus- Normal proximal 2/3s and markedly abnormal appearing distal 1/3 (5 cm) with stenosis, slight ring formation, scattered pseudodiverticula, and areas of punctate hemorrhage and friability.  There is poor motility in the scarred region, but normal motility proximal to this segment.  Stomach- Residual 2-3 cm hiatal hernia above a loose fundoplication.  Duodenum- Normal 1st-2nd portion.    Complications: None    Recommendations:  - Oral PPI twice daily.  - Soft diet.  - Keep head of bed elevated > 15 degrees.  - Encourage upright position for 2-3 hours after each meal.  - No meals or fluid intake for 3 hours before bedtime.  - No caffeine, alcohol, NSAIDs.        8/24/2017 2:28 PM Matias Fernando

## 2017-08-24 NOTE — PROGRESS NOTES
Rocco GONZALEZ in regards to patient pain and wishes for Ambien for tonight.  Vital signs reviewed.  Orders received, GI MD gave okay for medication as well.  Orders placed

## 2017-08-24 NOTE — PROGRESS NOTES
HR sustaining in 140s-150s. Blood pressure: systolic 150s-160s; diastolic . MIVF 175ml/hr. Paged hospitialist received orders to stop IV maintenance fluids stopped, 10mg IV labetalol, stat CXR. Will continue to monitor.

## 2017-08-25 ENCOUNTER — PATIENT OUTREACH (OUTPATIENT)
Dept: HEALTH INFORMATION MANAGEMENT | Facility: OTHER | Age: 47
End: 2017-08-25

## 2017-08-25 VITALS
TEMPERATURE: 97.3 F | WEIGHT: 161.16 LBS | RESPIRATION RATE: 20 BRPM | BODY MASS INDEX: 27.51 KG/M2 | DIASTOLIC BLOOD PRESSURE: 100 MMHG | HEIGHT: 64 IN | OXYGEN SATURATION: 97 % | SYSTOLIC BLOOD PRESSURE: 140 MMHG | HEART RATE: 117 BPM

## 2017-08-25 PROBLEM — R57.9 SHOCK (HCC): Status: RESOLVED | Noted: 2017-08-23 | Resolved: 2017-08-25

## 2017-08-25 PROBLEM — K92.2 GIB (GASTROINTESTINAL BLEEDING): Status: RESOLVED | Noted: 2017-08-23 | Resolved: 2017-08-25

## 2017-08-25 PROBLEM — K20.90 ESOPHAGITIS: Status: ACTIVE | Noted: 2017-08-25

## 2017-08-25 PROBLEM — K21.00 GASTROESOPHAGEAL REFLUX DISEASE WITH ESOPHAGITIS: Status: ACTIVE | Noted: 2017-08-25

## 2017-08-25 LAB
ANION GAP SERPL CALC-SCNC: 6 MMOL/L (ref 0–11.9)
BASOPHILS # BLD AUTO: 0.6 % (ref 0–1.8)
BASOPHILS # BLD: 0.05 K/UL (ref 0–0.12)
BUN SERPL-MCNC: 10 MG/DL (ref 8–22)
CALCIUM SERPL-MCNC: 8.4 MG/DL (ref 8.5–10.5)
CHLORIDE SERPL-SCNC: 110 MMOL/L (ref 96–112)
CO2 SERPL-SCNC: 24 MMOL/L (ref 20–33)
CREAT SERPL-MCNC: 0.62 MG/DL (ref 0.5–1.4)
EOSINOPHIL # BLD AUTO: 0.14 K/UL (ref 0–0.51)
EOSINOPHIL NFR BLD: 1.7 % (ref 0–6.9)
ERYTHROCYTE [DISTWIDTH] IN BLOOD BY AUTOMATED COUNT: 53.4 FL (ref 35.9–50)
GFR SERPL CREATININE-BSD FRML MDRD: >60 ML/MIN/1.73 M 2
GLUCOSE SERPL-MCNC: 89 MG/DL (ref 65–99)
HCT VFR BLD AUTO: 22.4 % (ref 37–47)
HGB BLD-MCNC: 7.5 G/DL (ref 12–16)
HGB BLD-MCNC: 7.7 G/DL (ref 12–16)
IMM GRANULOCYTES # BLD AUTO: 0.03 K/UL (ref 0–0.11)
IMM GRANULOCYTES NFR BLD AUTO: 0.4 % (ref 0–0.9)
LYMPHOCYTES # BLD AUTO: 2.59 K/UL (ref 1–4.8)
LYMPHOCYTES NFR BLD: 30.7 % (ref 22–41)
MCH RBC QN AUTO: 33.6 PG (ref 27–33)
MCHC RBC AUTO-ENTMCNC: 34.4 G/DL (ref 33.6–35)
MCV RBC AUTO: 97.8 FL (ref 81.4–97.8)
MONOCYTES # BLD AUTO: 0.46 K/UL (ref 0–0.85)
MONOCYTES NFR BLD AUTO: 5.5 % (ref 0–13.4)
NEUTROPHILS # BLD AUTO: 5.17 K/UL (ref 2–7.15)
NEUTROPHILS NFR BLD: 61.1 % (ref 44–72)
NRBC # BLD AUTO: 0.03 K/UL
NRBC BLD AUTO-RTO: 0.4 /100 WBC
PLATELET # BLD AUTO: 218 K/UL (ref 164–446)
PMV BLD AUTO: 9.2 FL (ref 9–12.9)
POTASSIUM SERPL-SCNC: 3.5 MMOL/L (ref 3.6–5.5)
RBC # BLD AUTO: 2.29 M/UL (ref 4.2–5.4)
SODIUM SERPL-SCNC: 140 MMOL/L (ref 135–145)
WBC # BLD AUTO: 8.4 K/UL (ref 4.8–10.8)

## 2017-08-25 PROCEDURE — 99239 HOSP IP/OBS DSCHRG MGMT >30: CPT | Performed by: HOSPITALIST

## 2017-08-25 PROCEDURE — 700102 HCHG RX REV CODE 250 W/ 637 OVERRIDE(OP): Performed by: HOSPITALIST

## 2017-08-25 PROCEDURE — 80048 BASIC METABOLIC PNL TOTAL CA: CPT

## 2017-08-25 PROCEDURE — 700102 HCHG RX REV CODE 250 W/ 637 OVERRIDE(OP): Performed by: INTERNAL MEDICINE

## 2017-08-25 PROCEDURE — 85025 COMPLETE CBC W/AUTO DIFF WBC: CPT

## 2017-08-25 PROCEDURE — A9270 NON-COVERED ITEM OR SERVICE: HCPCS | Performed by: INTERNAL MEDICINE

## 2017-08-25 PROCEDURE — A9270 NON-COVERED ITEM OR SERVICE: HCPCS | Performed by: HOSPITALIST

## 2017-08-25 PROCEDURE — 85018 HEMOGLOBIN: CPT

## 2017-08-25 RX ORDER — PROPRANOLOL HYDROCHLORIDE 20 MG/1
20 TABLET ORAL 3 TIMES DAILY
Status: DISCONTINUED | OUTPATIENT
Start: 2017-08-25 | End: 2017-08-25 | Stop reason: HOSPADM

## 2017-08-25 RX ORDER — ONDANSETRON 4 MG/1
4 TABLET, ORALLY DISINTEGRATING ORAL EVERY 8 HOURS PRN
Qty: 10 TAB | Refills: 0 | Status: SHIPPED | OUTPATIENT
Start: 2017-08-25 | End: 2017-11-13

## 2017-08-25 RX ORDER — POTASSIUM CHLORIDE 20 MEQ/1
20 TABLET, EXTENDED RELEASE ORAL ONCE
Status: COMPLETED | OUTPATIENT
Start: 2017-08-25 | End: 2017-08-25

## 2017-08-25 RX ADMIN — PROPRANOLOL HYDROCHLORIDE 20 MG: 20 TABLET ORAL at 11:43

## 2017-08-25 RX ADMIN — OXYCODONE HYDROCHLORIDE 5 MG: 5 TABLET ORAL at 00:42

## 2017-08-25 RX ADMIN — OXYCODONE HYDROCHLORIDE 5 MG: 5 TABLET ORAL at 06:18

## 2017-08-25 RX ADMIN — OMEPRAZOLE 20 MG: 20 CAPSULE, DELAYED RELEASE ORAL at 07:41

## 2017-08-25 RX ADMIN — POTASSIUM CHLORIDE 20 MEQ: 1500 TABLET, EXTENDED RELEASE ORAL at 11:43

## 2017-08-25 ASSESSMENT — PAIN SCALES - GENERAL
PAINLEVEL_OUTOF10: 7
PAINLEVEL_OUTOF10: 8
PAINLEVEL_OUTOF10: 0
PAINLEVEL_OUTOF10: 0

## 2017-08-25 NOTE — DISCHARGE INSTRUCTIONS
Discharge Instructions    Discharged to home by taxi with self. Discharged via wheelchair, hospital escort: Yes.  Special equipment needed: Not Applicable    Be sure to schedule a follow-up appointment with your primary care doctor or any specialists as instructed.     Discharge Plan:   Diet Plan: Discussed  Activity Level: Discussed  Confirmed Follow up Appointment: Patient to Call and Schedule Appointment  Confirmed Symptoms Management: Discussed  Medication Reconciliation Updated: Yes  Influenza Vaccine Indication: Patient Refuses    I understand that a diet low in cholesterol, fat, and sodium is recommended for good health. Unless I have been given specific instructions below for another diet, I accept this instruction as my diet prescription.   Other diet: GI soft, no coffee, no alcohol, no NSAIDS    Special Instructions: None    · Is patient discharged on Warfarin / Coumadin?   No     · Is patient Post Blood Transfusion?  Yes  POST BLOOD TRANSFUSION INFORMATION (ADULT)    The purpose of blood transfusion is to correct anemia, low levels of blood clotting factors or to correct acute blood loss.   Blood transfusion is very safe but occasionally unexpected adverse reactions do occur. Most adverse reactions occur during transfusion, within one to two days following transfusion or one to two weeks following transfusion. Some adverse reactions can occur one to six months after transfusion and some even years later.             If any of the symptoms listed below happen to you during your transfusion,                                 please notify your nurse immediately.   · Fever or Chills  · Flushing of the Face  · Hives, rash or itching  · Difficulty in breathing or shortness of breath  · Pain or oozing of blood from the IV needle site  · Low back pain  · Nausea or vomiting  · Weakness or fainting  · Chest pain  · Blood in the urine  · Decreased frequency of urination    The above symptoms may also occur within 24  to 48 after transfusion; if so, notify your physician.     · Yellowing of the skin    This can happen one to six months after transfusion; if so, notify your physician    Depression / Suicide Risk    As you are discharged from this [facility] facility, it is important to learn how to keep safe from harming yourself.    Recognize the warning signs:  · Abrupt changes in personality, positive or negative- including increase in energy   · Giving away possessions  · Change in eating patterns- significant weight changes-  positive or negative  · Change in sleeping patterns- unable to sleep or sleeping all the time   · Unwillingness or inability to communicate  · Depression  · Unusual sadness, discouragement and loneliness  · Talk of wanting to die  · Neglect of personal appearance   · Rebelliousness- reckless behavior  · Withdrawal from people/activities they love  · Confusion- inability to concentrate     If you or a loved one observes any of these behaviors or has concerns about self-harm, here's what you can do:  · Talk about it- your feelings and reasons for harming yourself  · Remove any means that you might use to hurt yourself (examples: pills, rope, extension cords, firearm)  · Get professional help from the community (Mental Health, Substance Abuse, psychological counseling)  · Do not be alone:Call your Safe Contact- someone whom you trust who will be there for you.  · Call your local CRISIS HOTLINE 227-0734 or 929-999-0398  · Call your local Children's Mobile Crisis Response Team [place] (972) 903-1836 or www.[employer]  · Call the toll free National Suicide Prevention Hotlines   · National Suicide Prevention Lifeline 479-814-NYST (5931)  · National Hope Line Network 800-SUICIDE (295-9062)

## 2017-08-25 NOTE — DISCHARGE PLANNING
Cab voucher provider for patient in support of safe DC.  Legally blind.   Family not able to provided assistance.   Mother in [place] and sister runs day care and unable to pickup.  Voucher #[id number].  Sent to  manager for approval.  Patient will fill scripts when home.

## 2017-08-25 NOTE — CARE PLAN
Problem: Safety  Goal: Will remain free from injury  Safety ambulated to the bathroom and back with assistance of 1. Call light within reach, bed in lowest position. Will continue to monitor.

## 2017-08-25 NOTE — RESPIRATORY CARE
COPD EDUCATION by COPD CLINICAL EDUCATOR  8/24/2017 at 5:51 PM by Khushboo Martinez     Patient reviewed by COPD education team. Patient does not qualify for COPD program.

## 2017-08-25 NOTE — PROCEDURES
DATE OF SERVICE:  08/24/2017    PREPROCEDURE DIAGNOSES:  Melena and anemia.    POSTPROCEDURE DIAGNOSES:  1.  Esophagitis with marked scarring and luminal narrowing in the distal 5 cm   of the esophagus.  2.  Residual 2-3 cm hiatal hernia.  3.  Loose fundoplication.    PROCEDURE:  Esophagogastroduodenoscopy with biopsies.    SEDATION:  Monitored anesthesia care, which was monitored and administered by   me.  The procedure's start time 14:16, stop time 14:25.    DESCRIPTION OF PROCEDURE:  The risks, benefits, and alternatives were   explained to the patient and consent obtained.  A timeout was performed.  She   was placed in the left lateral decubitus position and sedated with a total of   fentanyl 100 mcg and Versed 4 mg IV.  The Olympus flexible video endoscope was   introduced into the mouth and gently advanced into the esophagus under direct   visualization.  The proximal two-thirds of the esophagus appeared normal, but   the distal third of the esophagus was abnormal with marked luminal narrowing,   slight ring formation, scattered small pseudodiverticula and areas of   punctate hemorrhage and friability.  The inflammation and scarring was   segmental and extended from the GE junction approximately 5 cm.  Beyond this   was a small residual approximately 2 to at most 3 cm sliding hiatal hernia.    Below that was the indentation due to fundoplication.  Once in the stomach,   the scope was retroflexed, revealed that the fundoplication was loose.  No   bleeding sources were found throughout the stomach.  The stomach was somewhat   J-shaped.  The pylorus was normal.  The duodenal bulb and second portion of   the duodenum appeared normal.  I brought the scope back into the esophagus and   took multiple biopsies of the segment of abnormal mucosa (specimen A).  There   was good hemostasis.  Air was evacuated and scope withdrawn.    DISCUSSION:  This finding of a segmental tubular narrowing scarring with    pseudodiverticula and inflammation is rather unusual for typical acid reflux   patients.  I have seen it in scleroderma, but more diffusely throughout the   esophagus.  I am wondering if this may have been pill-induced esophagitis   combined with chronic acid reflux disease causing this narrowing.  It will be   interesting to see what the biopsies suggest.  For now, I recommend switching   from IV pantoprazole to oral omeprazole twice daily and start her on a full   liquid diet, advancing to a mechanical soft diet as tolerated.  Also, because   of the poor motility in the distal esophagus, I would recommend that she   remain upright for at least 2 hours after every meal and avoid any food or   fluid within 3 hours of bedtime.  As much as possible, I would keep the bed   above 15 degrees to reduce risk of reflux.       ____________________________________     ANOOP ONEAL MD CMS / DAVE    DD:  08/24/2017 14:40:22  DT:  08/24/2017 19:21:48    D#:  [id number]  Job#:  [id number]

## 2017-08-26 NOTE — DISCHARGE SUMMARY
DATE OF ADMISSION:  08/23/2017.    DATE OF DISCHARGE:  08/25/2017.    PRINCIPAL DIAGNOSES:  1.  Upper gastrointestinal bleed secondary to esophagitis.  2.  Anemia secondary to acute blood loss.  3.  Hemorrhagic shock.  4.  History of chronic headaches.  5.  History of hypertension.  6.  Hypokalemia.    PRESENTATION:  Please refer to the dictated H and P.    CONSULTANTS:  Dr. Raza and Dr. Fernando from GI.    PROCEDURES DONE:  Upper endoscopy by Dr. Fernando on August 24 revealing   esophagitis with marked scarring and luminal narrowing in the distal 5 cm of   the esophagus, residual 2-3 cm hiatal hernia, loose fundoplication.    PERTINENT TEST RESULTS ON THIS HOSPITALIZATION:  Hemoglobin 7.5.  Sodium 140,   potassium 3.5, chloride 110, bicarbonate 24, glucose 89, BUN 10, creatinine   0.62.  Urine drug screen was positive for opiates and beta hydroxybutyric acid   was 0.24.  Lactic acid was 2.1, hemoglobin A1c was 5.3.  Pathology is pending   at the time of this dictation.  Chest x-ray reveals no significant interval   change.    HOSPITAL COURSE:  The patient is a 46-year-old female who was admitted with   coffee-ground emesis, shock and anemia.  She was transfused and started on IV   fluid resuscitation and closely monitored in the intensive care unit.  She was   also started on IV Protonix.  She was evaluated by GI and underwent the upper   endoscopy with results as noted above.  The pathology from her upper   endoscopy and esophageal biopsies are pending at the time of this dictation.    This morning, she is tolerating her diet.  She has not had any clinical signs   of bleeding.  She is hemodynamically stable.  She is clinically stable for   discharge on b.i.d. PPI and dietary modifications and outpatient followup on   her pathology results.  She will be discharged with the following   instructions:    ACTIVITY:  As tolerated.    DIET:  GI soft.  Discussed with the patient remaining upright for at least 2   hours  after every meal and avoid eating within 3 hours of bedtime.    DISCHARGE MEDICATIONS:  Esomeprazole 20 mg b.i.d., Zofran 4 mg every 8 hours   as needed, amitriptyline 75 mg every evening, amlodipine 5 mg daily, Lunesta 3   mg at bedtime, propranolol 80 mg once a day, Viibryd 40 mg every evening,   Ambien 5 mg at bedtime as needed.    FOLLOWUP:  The patient will follow up with her primary care provider, Ness Dumas.  She will also follow up with Dr. Fernando from .    Total time spent preparing for this discharge was 35 minutes.       ____________________________________     MD ANDRES WOODSON / NTS    DD:  08/25/2017 11:39:11  DT:  08/25/2017 22:36:45    D#:  [id number]  Job#:  [id number]    cc: Ness Dumas

## 2017-11-13 ENCOUNTER — OFFICE VISIT (OUTPATIENT)
Dept: CARDIOLOGY | Facility: MEDICAL CENTER | Age: 47
End: 2017-11-13
Payer: MEDICAID

## 2017-11-13 VITALS
BODY MASS INDEX: 27.66 KG/M2 | WEIGHT: 162 LBS | DIASTOLIC BLOOD PRESSURE: 80 MMHG | SYSTOLIC BLOOD PRESSURE: 120 MMHG | HEIGHT: 64 IN | HEART RATE: 94 BPM | OXYGEN SATURATION: 97 %

## 2017-11-13 DIAGNOSIS — G47.33 OBSTRUCTIVE SLEEP APNEA SYNDROME: ICD-10-CM

## 2017-11-13 DIAGNOSIS — Z01.810 PRE-OPERATIVE CARDIOVASCULAR EXAMINATION: Primary | ICD-10-CM

## 2017-11-13 DIAGNOSIS — R00.0 SINUS TACHYCARDIA: ICD-10-CM

## 2017-11-13 DIAGNOSIS — I27.20 PULMONARY HYPERTENSION (HCC): ICD-10-CM

## 2017-11-13 LAB — EKG IMPRESSION: NORMAL

## 2017-11-13 PROCEDURE — 99204 OFFICE O/P NEW MOD 45 MIN: CPT | Performed by: INTERNAL MEDICINE

## 2017-11-13 PROCEDURE — 93000 ELECTROCARDIOGRAM COMPLETE: CPT | Performed by: INTERNAL MEDICINE

## 2017-11-13 RX ORDER — PROPRANOLOL HYDROCHLORIDE 120 MG/1
120 CAPSULE, EXTENDED RELEASE ORAL DAILY
COMMUNITY
End: 2018-11-15 | Stop reason: CLARIF

## 2017-11-13 ASSESSMENT — ENCOUNTER SYMPTOMS
BRUISES/BLEEDS EASILY: 1
DEPRESSION: 1
INSOMNIA: 1
NAUSEA: 1
NERVOUS/ANXIOUS: 1
DIARRHEA: 1
FALLS: 1
SORE THROAT: 1
HEARTBURN: 1
HEADACHES: 1

## 2017-11-13 NOTE — LETTER
Name:          Rasheeda Manzano   YOB: 1970  Date:     11/13/2017      CHANDLER Adams  [address]  [place] [address]     Solis Gates MD  [address], [address]  [place] [address]  Phone: [phone]  Back Line: [phone]  Fax: [phone]  E-mail: [email or web address]   Dear Dr. Dumas,    We had the pleasure of seeing your patient, Rasheeda Manzano, in Cardiology Clinic at [facility] today.    As you know, she is a 46-year-old woman with a history of hydrocephalus with  shunt in place, chronic sinus tachycardia, mildly elevated right-sided pressures on echocardiogram, blindness, and recent upper GI bleed treated in the hospital in August 2017 referred for evaluation of tachycardia and preoperatively prior to EGD.    In reviewing her chart and examining her, she has no major risk factor for cardiovascular complication during EGD or other surgery. I would recommend proceeding without further evaluation.    Regarding her sinus tachycardia, she may have inappropriate sinus tachycardia. She has mostly structurally normal heart by echocardiogram from March, and she is asymptomatic. I do not think that she has sinus tachycardia as an underlying indicator of other structural heart disease. I did order a 24-hour Holter monitor to evaluate. She should definitely continue propranolol as she has for quite some time.    Her mildly elevated pulmonary pressures are likely the result of sleep apnea. Apparently, she has been attempting to get a sleep study with limitation from the standpoint of her insurance. She tells me that authorization for her sleep study is presently under review. I agree of course with treatment of sleep apnea in the setting of her mild pulmonary hypertension.    Return in about 1 year (around 11/13/2018).    Thank you for the referral and please do not hesitate to contact me at any time. My contact information is listed above.    This note was  dictated using Dragon speech recognition software.     A full note including my physical examination and a full list of rectified medications is available in our medical record, and can be faxed as well.    Solis Gates MD  Cardiologist  [facility]    cc: Matias Fernando MD

## 2017-11-13 NOTE — PROGRESS NOTES
Subjective:   Rasheeda Manzano is a 46 -year-old woman with a history of hydrocephalus with  shunt in place, chronic sinus tachycardia, mildly elevated right-sided pressures on echocardiogram, blindness, and recent upper GI bleed treated in the hospital in August 2017 referred for evaluation of tachycardia and preoperatively prior to EGD.    She has no cardiovascular complaints today, and tells me that she previously worked out on a treadmill though that was 4 years ago. She also does nature hikes apparently for exercise though she has done no significantly long nature hikes recently.    She was referred by gastroenterology out of concern for sinus tachycardia with a rate of 120 bpm prior to her follow-up EGD after her recent GI bleed.    She tells me that she has been on propranolol and amlodipine for about a couple of years for high blood pressure.    Past Medical History:   Diagnosis Date   • Blind    • C. difficile diarrhea 2013   • Chronic daily headache    • Depression    • Fall    • Hydrocephalus    • Hydrocephalus     shunt drains into pleural place of L lung   • Jaundice     at birth   • Legally blind    • Migraine without aura, without mention of intractable migraine without mention of status migrainosus    • Other specified symptom associated with female genital organs     excessive bleeding   • Pain     gallbladder related   • Psychiatric problem     depression     Past Surgical History:   Procedure Laterality Date   • GASTROSCOPY-ENDO N/A 8/24/2017    Procedure: GASTROSCOPY-ENDO;  Surgeon: Matias Fernando M.D.;  Location: ENDOSCOPY [facility];  Service:    • AYALA BY LAPAROSCOPY N/A 8/13/2015    Procedure: AYALA BY LAPAROSCOPY;  Surgeon: Brice Johnson M.D.;  Location: SURGERY SAME DAY [facility] ORS;  Service:    • CHOLECYSTECTOMY N/A 8/13/2015    Procedure: CHOLECYSTECTOMY;  Surgeon: Brice Johnson M.D.;  Location: SURGERY SAME DAY [facility] ORS;  Service:    • LYSIS ADHESIONS GENERAL   12/10/2013    Performed by Arie Bass M.D. at SURGERY [facility]   • CYSTOSCOPY  12/10/2013    Performed by Joana Yeager M.D. at SURGERY [facility]   • EXPLORATORY LAPAROTOMY  12/10/2013    Performed by Arie Bass M.D. at SURGERY [facility]   • APPENDECTOMY  12/10/2013    Performed by Arie Bass M.D. at SURGERY [facility]   • ABDOMINAL HYSTERECTOMY TOTAL  12/10/2013    Performed by Joana Yeager M.D. at SURGERY [facility]   • LAPAROSCOPY  8/8/08    Performed by FERNANDO HENDERSON at SURGERY [facility]   • OTHER      PLEURAL SHUNT, numerous revisions     Family History   Problem Relation Age of Onset   • Hypertension Mother    • Hypertension Father    • Non-contributory Neg Hx      Migraine     History   Smoking Status   • Current Some Day Smoker   • Packs/day: 1.00   • Years: 10.00   • Types: Cigars   • Start date: 5/1/2004   Smokeless Tobacco   • Never Used     Comment:  CIGAR/day      Allergies   Allergen Reactions   • Tape Rash     Medical tape. Per patient, paper tape ok.     Outpatient Encounter Prescriptions as of 11/13/2017   Medication Sig Dispense Refill   • propranolol CR (INDERAL LA) 120 MG CAPSULE SR 24 HR Take 120 mg by mouth every day.     • amitriptyline (ELAVIL) 75 MG Tab Take 75 mg by mouth every evening. Last filled 7/18/17 #30     • zolpidem (AMBIEN) 5 MG Tab Take 5 mg by mouth at bedtime as needed for Sleep. Last filled 7/18/17 #30     • Eszopiclone (LUNESTA) 3 MG Tab Take 3 mg by mouth every bedtime. Last filled 6/3/17 #30     • Vilazodone HCl (VIIBRYD) 40 MG Tab Take 1 Tab by mouth every evening. Last filled 6/27/17 #30     • [DISCONTINUED] esomeprazole (NEXIUM 24HR) 20 MG capsule Take 1 Cap by mouth 2 Times a Day. (Patient not taking: Reported on 11/13/2017) 60 Cap 0   • [DISCONTINUED] ondansetron (ZOFRAN ODT) 4 MG TABLET DISPERSIBLE Take 1 Tab by mouth every 8 hours as needed for Nausea/Vomiting. (Patient not taking: Reported on 11/13/2017) 10 Tab  "0   • propranolol CR (INDERAL LA) 80 MG CAPSULE SR 24 HR Take 80 mg by mouth every evening. Last filled 7/18/17 #30     • amlodipine (NORVASC) 5 MG Tab Take 5 mg by mouth every evening. Lat filled 6/5/17 #30       No facility-administered encounter medications on file as of 11/13/2017.      Review of Systems   Constitutional: Positive for malaise/fatigue.   HENT: Positive for sore throat and tinnitus.    Gastrointestinal: Positive for diarrhea, heartburn and nausea.   Musculoskeletal: Positive for falls (4/2017, was in [facility] for some time).   Neurological: Positive for headaches.   Endo/Heme/Allergies: Bruises/bleeds easily.   Psychiatric/Behavioral: Positive for depression. Negative for suicidal ideas. The patient is nervous/anxious and has insomnia.    All other systems reviewed and are negative.       Objective:   /80   Pulse 94   Ht 1.626 m (5' 4\")   Wt 73.5 kg (162 lb)   LMP 11/27/2013   SpO2 97%   BMI 27.81 kg/m²     Physical Exam   Constitutional: She is oriented to person, place, and time. She appears well-developed and well-nourished. No distress.   Pleasant, legally blind, younger-appearing woman with quite a bit of cat hair on her sweater in no distress   HENT:   Head: Normocephalic and atraumatic.   Strabismus noted, pupils equal round and reactive to light   Eyes: Conjunctivae and EOM are normal. Pupils are equal, round, and reactive to light. No scleral icterus.   Neck: Neck supple. No JVD present. No tracheal deviation present.   Cardiovascular: Normal rate, regular rhythm, normal heart sounds and intact distal pulses.  Exam reveals no gallop and no friction rub.    No murmur heard.  Pulses:       Dorsalis pedis pulses are 2+ on the right side, and 2+ on the left side.   No carotid bruits   Pulmonary/Chest: Effort normal and breath sounds normal. No stridor. No respiratory distress. She has no wheezes. She has no rales.   Abdominal: Soft. Bowel sounds are normal. She exhibits no " "distension.   Musculoskeletal: She exhibits no edema.   Neurological: She is alert and oriented to person, place, and time.   Skin: Skin is warm and dry. No rash noted. She is not diaphoretic. No erythema. No pallor.   Psychiatric: She has a normal mood and affect. Judgment and thought content normal.   Vitals reviewed.    Lab Results   Component Value Date/Time    WBC 8.4 08/25/2017 12:22 AM    RBC 2.29 (L) 08/25/2017 12:22 AM    HEMOGLOBIN 7.5 (L) 08/25/2017 05:23 AM    HEMATOCRIT 22.4 (L) 08/25/2017 12:22 AM    MCV 97.8 08/25/2017 12:22 AM    MCH 33.6 (H) 08/25/2017 12:22 AM    MCHC 34.4 08/25/2017 12:22 AM    MPV 9.2 08/25/2017 12:22 AM        Lab Results   Component Value Date/Time    SODIUM 140 08/25/2017 05:23 AM    POTASSIUM 3.5 (L) 08/25/2017 05:23 AM    CHLORIDE 110 08/25/2017 05:23 AM    CO2 24 08/25/2017 05:23 AM    GLUCOSE 89 08/25/2017 05:23 AM    BUN 10 08/25/2017 05:23 AM    CREATININE 0.62 08/25/2017 05:23 AM    CREATININE 0.6 08/12/2008 05:45 AM        Lab Results   Component Value Date/Time    ASTSGOT 19 08/23/2017 07:37 AM    ALTSGPT 9 08/23/2017 07:37 AM        Lab Results   Component Value Date/Time    TRIGLYCERIDE 100 10/17/2015 03:25 PM       Echocardiogram, 3/5/2017:  \"CONCLUSIONS  Left ventricular ejection fraction is visually estimated to be 65%.   Difficult to assess diastolic function due to tachycardia.  Trace mitral regurgitation.  Trace aortic insufficiency.  Right ventricular systolic pressure is estimated to be 45 mmHg.  Echo from 10/22/15 showed right ventricular systolic pressure of 35   mmHg and grade 1 diastolic dysfunction\"    Assessment:     1. Pre-operative cardiovascular examination  EKG   2. Sinus tachycardia  HOLTER MONITOR STUDY   3. Pulmonary hypertension     4. Obstructive sleep apnea syndrome         Medical Decision Making:  Today's Assessment / Status / Plan:     In reviewing her chart and examining her, she has no major risk factor for cardiovascular complication " during EGD or other surgery. I would recommend proceeding without further evaluation.    Regarding her sinus tachycardia, she may have inappropriate sinus tachycardia. She has mostly structurally normal heart by echocardiogram from March, and she is asymptomatic. I do not think that she has sinus tachycardia as an underlying indicator of other structural heart disease. I did order a 24-hour Holter monitor to evaluate. She should definitely continue propranolol as she has for quite some time.    Her mildly elevated pulmonary pressures are likely the result of sleep apnea. Apparently, she has been attempting to get a sleep study with limitation from the standpoint of her insurance. She tells me that authorization for her sleep study is presently under review. I agree of course with treatment of sleep apnea in the setting of her mild pulmonary hypertension.    Solis Gates MD  Cardiologist, [facility] [place]     Return in about 1 year (around 11/13/2018).

## 2017-11-14 ENCOUNTER — TELEPHONE (OUTPATIENT)
Dept: CARDIOLOGY | Facility: MEDICAL CENTER | Age: 47
End: 2017-11-14

## 2017-11-14 NOTE — LETTER
PROCEDURE/SURGERY CLEARANCE FORM      Encounter Date: 11/14/2017    Patient: Rasheeda Manzano  YOB: 1970    CARDIOLOGIST:  Solis Gates M.D.   REFERRING DOCTOR:  Matias Fernando M.D.    Patient does not have AICD or PPM.    The above patient is cleared from cardiac standpoint  to have the following procedure/surgery: Esophagogastroduodenoscopy                                                             MD Signature        Solis Gates MD

## 2017-11-14 NOTE — TELEPHONE ENCOUNTER
Pt seen in clinic yesterday, Clearance letter created for pt's EGD w/ Dr Perales recommendations.    Letter faxed to Dr Matias Fernando's office, F#[phone]    Called pt to notify, verbalizes understanding

## 2018-08-17 ENCOUNTER — APPOINTMENT (OUTPATIENT)
Dept: RADIOLOGY | Facility: MEDICAL CENTER | Age: 48
End: 2018-08-17
Attending: EMERGENCY MEDICINE
Payer: MEDICAID

## 2018-08-17 ENCOUNTER — HOSPITAL ENCOUNTER (EMERGENCY)
Facility: MEDICAL CENTER | Age: 48
End: 2018-08-17
Attending: EMERGENCY MEDICINE
Payer: MEDICAID

## 2018-08-17 VITALS
SYSTOLIC BLOOD PRESSURE: 118 MMHG | TEMPERATURE: 97.7 F | RESPIRATION RATE: 20 BRPM | DIASTOLIC BLOOD PRESSURE: 78 MMHG | HEART RATE: 87 BPM | HEIGHT: 64 IN | WEIGHT: 165 LBS | OXYGEN SATURATION: 98 % | BODY MASS INDEX: 28.17 KG/M2

## 2018-08-17 DIAGNOSIS — Z98.2 S/P VP SHUNT: ICD-10-CM

## 2018-08-17 DIAGNOSIS — G44.59 OTHER COMPLICATED HEADACHE SYNDROME: ICD-10-CM

## 2018-08-17 DIAGNOSIS — R11.0 NAUSEA: ICD-10-CM

## 2018-08-17 LAB
ALBUMIN SERPL BCP-MCNC: 4.1 G/DL (ref 3.2–4.9)
ALBUMIN/GLOB SERPL: 1.2 G/DL
ALP SERPL-CCNC: 122 U/L (ref 30–99)
ALT SERPL-CCNC: 15 U/L (ref 2–50)
ANION GAP SERPL CALC-SCNC: 11 MMOL/L (ref 0–11.9)
AST SERPL-CCNC: 16 U/L (ref 12–45)
BASOPHILS # BLD AUTO: 1.1 % (ref 0–1.8)
BASOPHILS # BLD: 0.09 K/UL (ref 0–0.12)
BILIRUB SERPL-MCNC: 0.5 MG/DL (ref 0.1–1.5)
BUN SERPL-MCNC: 12 MG/DL (ref 8–22)
CALCIUM SERPL-MCNC: 9.5 MG/DL (ref 8.5–10.5)
CHLORIDE SERPL-SCNC: 110 MMOL/L (ref 96–112)
CO2 SERPL-SCNC: 19 MMOL/L (ref 20–33)
CREAT SERPL-MCNC: 0.85 MG/DL (ref 0.5–1.4)
EOSINOPHIL # BLD AUTO: 0.17 K/UL (ref 0–0.51)
EOSINOPHIL NFR BLD: 2 % (ref 0–6.9)
ERYTHROCYTE [DISTWIDTH] IN BLOOD BY AUTOMATED COUNT: 45.1 FL (ref 35.9–50)
GLOBULIN SER CALC-MCNC: 3.4 G/DL (ref 1.9–3.5)
GLUCOSE SERPL-MCNC: 120 MG/DL (ref 65–99)
HCG SERPL QL: NEGATIVE
HCT VFR BLD AUTO: 46.2 % (ref 37–47)
HGB BLD-MCNC: 15.6 G/DL (ref 12–16)
IMM GRANULOCYTES # BLD AUTO: 0.03 K/UL (ref 0–0.11)
IMM GRANULOCYTES NFR BLD AUTO: 0.4 % (ref 0–0.9)
LACTATE BLD-SCNC: 1.8 MMOL/L (ref 0.5–2)
LYMPHOCYTES # BLD AUTO: 2.25 K/UL (ref 1–4.8)
LYMPHOCYTES NFR BLD: 26.7 % (ref 22–41)
MCH RBC QN AUTO: 30 PG (ref 27–33)
MCHC RBC AUTO-ENTMCNC: 33.8 G/DL (ref 33.6–35)
MCV RBC AUTO: 88.8 FL (ref 81.4–97.8)
MONOCYTES # BLD AUTO: 0.59 K/UL (ref 0–0.85)
MONOCYTES NFR BLD AUTO: 7 % (ref 0–13.4)
NEUTROPHILS # BLD AUTO: 5.3 K/UL (ref 2–7.15)
NEUTROPHILS NFR BLD: 62.8 % (ref 44–72)
NRBC # BLD AUTO: 0 K/UL
NRBC BLD-RTO: 0 /100 WBC
PLATELET # BLD AUTO: 366 K/UL (ref 164–446)
PMV BLD AUTO: 11.2 FL (ref 9–12.9)
POTASSIUM SERPL-SCNC: 3.5 MMOL/L (ref 3.6–5.5)
PROT SERPL-MCNC: 7.5 G/DL (ref 6–8.2)
RBC # BLD AUTO: 5.2 M/UL (ref 4.2–5.4)
SODIUM SERPL-SCNC: 140 MMOL/L (ref 135–145)
WBC # BLD AUTO: 8.4 K/UL (ref 4.8–10.8)

## 2018-08-17 PROCEDURE — 99285 EMERGENCY DEPT VISIT HI MDM: CPT

## 2018-08-17 PROCEDURE — 85025 COMPLETE CBC W/AUTO DIFF WBC: CPT

## 2018-08-17 PROCEDURE — 94760 N-INVAS EAR/PLS OXIMETRY 1: CPT

## 2018-08-17 PROCEDURE — 83605 ASSAY OF LACTIC ACID: CPT

## 2018-08-17 PROCEDURE — 96365 THER/PROPH/DIAG IV INF INIT: CPT

## 2018-08-17 PROCEDURE — 96376 TX/PRO/DX INJ SAME DRUG ADON: CPT

## 2018-08-17 PROCEDURE — 71045 X-RAY EXAM CHEST 1 VIEW: CPT

## 2018-08-17 PROCEDURE — 700111 HCHG RX REV CODE 636 W/ 250 OVERRIDE (IP): Performed by: EMERGENCY MEDICINE

## 2018-08-17 PROCEDURE — 72040 X-RAY EXAM NECK SPINE 2-3 VW: CPT

## 2018-08-17 PROCEDURE — 96375 TX/PRO/DX INJ NEW DRUG ADDON: CPT

## 2018-08-17 PROCEDURE — 70250 X-RAY EXAM OF SKULL: CPT

## 2018-08-17 PROCEDURE — 84703 CHORIONIC GONADOTROPIN ASSAY: CPT

## 2018-08-17 PROCEDURE — 80053 COMPREHEN METABOLIC PANEL: CPT

## 2018-08-17 PROCEDURE — 70450 CT HEAD/BRAIN W/O DYE: CPT

## 2018-08-17 RX ORDER — METOCLOPRAMIDE HYDROCHLORIDE 5 MG/ML
10 INJECTION INTRAMUSCULAR; INTRAVENOUS ONCE
Status: COMPLETED | OUTPATIENT
Start: 2018-08-17 | End: 2018-08-17

## 2018-08-17 RX ORDER — MAGNESIUM SULFATE 1 G/100ML
1 INJECTION INTRAVENOUS ONCE
Status: COMPLETED | OUTPATIENT
Start: 2018-08-17 | End: 2018-08-17

## 2018-08-17 RX ORDER — MORPHINE SULFATE 10 MG/ML
6 INJECTION, SOLUTION INTRAMUSCULAR; INTRAVENOUS ONCE
Status: COMPLETED | OUTPATIENT
Start: 2018-08-17 | End: 2018-08-17

## 2018-08-17 RX ORDER — METOCLOPRAMIDE 10 MG/1
10 TABLET ORAL 3 TIMES DAILY PRN
Qty: 10 TAB | Refills: 0 | Status: SHIPPED | OUTPATIENT
Start: 2018-08-17 | End: 2018-09-01

## 2018-08-17 RX ORDER — DIPHENHYDRAMINE HYDROCHLORIDE 50 MG/ML
25 INJECTION INTRAMUSCULAR; INTRAVENOUS ONCE
Status: COMPLETED | OUTPATIENT
Start: 2018-08-17 | End: 2018-08-17

## 2018-08-17 RX ADMIN — METOCLOPRAMIDE 10 MG: 5 INJECTION, SOLUTION INTRAMUSCULAR; INTRAVENOUS at 19:21

## 2018-08-17 RX ADMIN — MORPHINE SULFATE 6 MG: 10 INJECTION INTRAVENOUS at 20:37

## 2018-08-17 RX ADMIN — MAGNESIUM SULFATE HEPTAHYDRATE 1 G: 1 INJECTION, SOLUTION INTRAVENOUS at 21:38

## 2018-08-17 RX ADMIN — DIPHENHYDRAMINE HYDROCHLORIDE 25 MG: 50 INJECTION, SOLUTION INTRAMUSCULAR; INTRAVENOUS at 19:21

## 2018-08-17 RX ADMIN — MORPHINE SULFATE 6 MG: 10 INJECTION INTRAVENOUS at 19:20

## 2018-08-17 ASSESSMENT — PAIN SCALES - GENERAL
PAINLEVEL_OUTOF10: 8
PAINLEVEL_OUTOF10: 8

## 2018-08-17 ASSESSMENT — ENCOUNTER SYMPTOMS
DIARRHEA: 1
HEADACHES: 1
NUMBNESS: 0
VOMITING: 0
NAUSEA: 1

## 2018-08-17 ASSESSMENT — LIFESTYLE VARIABLES: DO YOU DRINK ALCOHOL: NO

## 2018-08-18 NOTE — ED TRIAGE NOTES
C/o Ha x 14 hrs,nausea and diarrhea, beginning today, has a  shunt and this headache feels like previous ha's when  shunt was malfunctioning

## 2018-08-18 NOTE — ED PROVIDER NOTES
ED Provider Note    Scribed for Brenna Oneill M.D. by Jeanne Ac. 8/17/2018, 6:40 PM.    Primary care provider: CHANDLER Adams  Means of arrival: wheel chair  History obtained from: Patient  History limited by: none    CHIEF COMPLAINT  Chief Complaint   Patient presents with   • Headache     Pt BIB EMS, report of pt with headache for 24 hrs/ pt states that she thinks her pulmonary shunt is malfunctioning and doesn't have appointment with neurology for 3 wks   • Nausea   • Diarrhea     today       HPI  Rasheeda Manzano is a 47 y.o. Female with a history of hydrocephalus who presents to the Emergency Department for evaluation of headache onset one day ago. She describes the headache as a constant pressure that is all over her head. Patient is additionally complaining of associated nausea. She denies any episodes of vomiting. Patient additionally had an episode of diarrhea a few hours ago. She denies any numbness or tingling at this time but she had some tingling in her hands and fingers earlier today that resolved on its own. Patient had a pulmonary shunt placed 6 years ago and believes that it is malfunctioning at this time. She explains that she is unable to get an appointment with a neurologist until the beginning of September. She normally takes dilaudid for her headaches. Patient explains that she has had numerous CT scans in the past and her current provider advised against having other CT scans performed if possible. Patient is not currently taking any blood thinners.    REVIEW OF SYSTEMS  Review of Systems   Gastrointestinal: Positive for diarrhea and nausea. Negative for vomiting.   Neurological: Positive for headaches. Negative for numbness.   All other systems reviewed and are negative.  C    PAST MEDICAL HISTORY   has a past medical history of Blind; C. difficile diarrhea (2013); Chronic daily headache; Depression; Fall; Hydrocephalus; Hydrocephalus; Jaundice; Legally blind;  "Migraine without aura, without mention of intractable migraine without mention of status migrainosus; Other specified symptom associated with female genital organs; Pain; and Psychiatric problem.    SURGICAL HISTORY   has a past surgical history that includes laparoscopy (8/8/08); lysis adhesions general (12/10/2013); cystoscopy (12/10/2013); exploratory laparotomy (12/10/2013); appendectomy (12/10/2013); abdominal hysterectomy total (12/10/2013); aakash by laparoscopy (N/A, 8/13/2015); cholecystectomy (N/A, 8/13/2015); other; and gastroscopy-endo (N/A, 8/24/2017).    SOCIAL HISTORY  Social History   Substance Use Topics   • Smoking status: Current Some Day Smoker     Packs/day: 1.00     Years: 10.00     Types: Cigars     Start date: 5/1/2004   • Smokeless tobacco: Never Used      Comment:  CIGAR/day    • Alcohol use Yes      Comment: socially      History   Drug Use No       FAMILY HISTORY  Family History   Problem Relation Age of Onset   • Hypertension Mother    • Hypertension Father    • Non-contributory Neg Hx         Migraine       CURRENT MEDICATIONS  Reviewed.  See Encounter Summary.     ALLERGIES  Allergies   Allergen Reactions   • Tape Rash     Medical tape. Per patient, paper tape ok.       PHYSICAL EXAM  VITAL SIGNS: /89   Pulse 82   Temp 36.5 °C (97.7 °F)   Resp 16   Ht 1.626 m (5' 4\")   Wt 74.8 kg (165 lb)   LMP 11/27/2013   SpO2 97%   BMI 28.32 kg/m²   Constitutional: Alert in no apparent distress. Patient is blind.  HENT: No signs of trauma, Bilateral external ears normal, Nose normal.   Eyes: Pupils are equal, Conjunctiva normal, Non-icteric. Disconjugate gaze secondary to blindness.  Neck: Normal range of motion, No tenderness, Supple, No stridor.   Lymphatic: No lymphadenopathy noted.   Cardiovascular: Regular rate and rhythm, no murmurs.   Thorax & Lungs: Normal breath sounds, No respiratory distress, No wheezing, No chest tenderness.   Abdomen: Soft, No significant tenderness, No " masses, No pulsatile masses. No peritoneal signs.  Skin: Warm, Dry, No erythema, No rash.   Back: No bony tenderness, No CVA tenderness.   Extremities: Intact distal pulses, No edema, No tenderness, No cyanosis  Musculoskeletal: Good range of motion in all major joints. No tenderness to palpation or major deformities noted.   Neurologic: Alert , Normal motor function, Sensation intact to light touch, No focal deficits noted. Clear speech, 5/5 strength to bilateral upper and lower extremities.  Psychiatric: Affect normal, Judgment normal, Mood normal.     DIAGNOSTIC STUDIES / PROCEDURES     LABS  Results for orders placed or performed during the hospital encounter of 08/17/18   CBC WITH DIFFERENTIAL   Result Value Ref Range    WBC 8.4 4.8 - 10.8 K/uL    RBC 5.20 4.20 - 5.40 M/uL    Hemoglobin 15.6 12.0 - 16.0 g/dL    Hematocrit 46.2 37.0 - 47.0 %    MCV 88.8 81.4 - 97.8 fL    MCH 30.0 27.0 - 33.0 pg    MCHC 33.8 33.6 - 35.0 g/dL    RDW 45.1 35.9 - 50.0 fL    Platelet Count 366 164 - 446 K/uL    MPV 11.2 9.0 - 12.9 fL    Neutrophils-Polys 62.80 44.00 - 72.00 %    Lymphocytes 26.70 22.00 - 41.00 %    Monocytes 7.00 0.00 - 13.40 %    Eosinophils 2.00 0.00 - 6.90 %    Basophils 1.10 0.00 - 1.80 %    Immature Granulocytes 0.40 0.00 - 0.90 %    Nucleated RBC 0.00 /100 WBC    Neutrophils (Absolute) 5.30 2.00 - 7.15 K/uL    Lymphs (Absolute) 2.25 1.00 - 4.80 K/uL    Monos (Absolute) 0.59 0.00 - 0.85 K/uL    Eos (Absolute) 0.17 0.00 - 0.51 K/uL    Baso (Absolute) 0.09 0.00 - 0.12 K/uL    Immature Granulocytes (abs) 0.03 0.00 - 0.11 K/uL    NRBC (Absolute) 0.00 K/uL   COMP METABOLIC PANEL   Result Value Ref Range    Sodium 140 135 - 145 mmol/L    Potassium 3.5 (L) 3.6 - 5.5 mmol/L    Chloride 110 96 - 112 mmol/L    Co2 19 (L) 20 - 33 mmol/L    Anion Gap 11.0 0.0 - 11.9    Glucose 120 (H) 65 - 99 mg/dL    Bun 12 8 - 22 mg/dL    Creatinine 0.85 0.50 - 1.40 mg/dL    Calcium 9.5 8.5 - 10.5 mg/dL    AST(SGOT) 16 12 - 45 U/L     ALT(SGPT) 15 2 - 50 U/L    Alkaline Phosphatase 122 (H) 30 - 99 U/L    Total Bilirubin 0.5 0.1 - 1.5 mg/dL    Albumin 4.1 3.2 - 4.9 g/dL    Total Protein 7.5 6.0 - 8.2 g/dL    Globulin 3.4 1.9 - 3.5 g/dL    A-G Ratio 1.2 g/dL   HCG Qual Serum   Result Value Ref Range    Beta-Hcg Qualitative Serum Negative Negative   LACTIC ACID   Result Value Ref Range    Lactic Acid 1.8 0.5 - 2.0 mmol/L   ESTIMATED GFR   Result Value Ref Range    GFR If African American >60 >60 mL/min/1.73 m 2    GFR If Non African American >60 >60 mL/min/1.73 m 2     All labs were reviewed by me.    RADIOLOGY  CT-HEAD W/O   Final Result      Left ventriculostomy tube unchanged in location.   Ventricular size is unchanged.   No acute intracranial hemorrhage.      DX-SKULL-LIMITED 3-   Final Result      Stable shunt series with lucency of the left occipital approach tubing at the level of the prosper hole that presumably is from a lucent connector. Chronic shunt fracture here is not excluded      DX-CERVICAL SPINE-2 OR 3 VIEWS   Final Result      Stable shunt series with lucency of the left occipital approach tubing at the level of the prosper hole that presumably is from a lucent connector. Chronic shunt fracture here is not excluded      DX-CHEST-LIMITED (1 VIEW)   Final Result      Stable shunt series with lucency of the left occipital approach tubing at the level of the prosper hole that presumably is from a lucent connector. Chronic shunt fracture here is not excluded        The radiologist's interpretation of all radiological studies have been reviewed by me.    COURSE & MEDICAL DECISION MAKING  Pertinent Labs & Imaging studies reviewed. (See chart for details)    6:40 PM - Patient seen and examined at bedside. Patient will be treated with 4 mg morphine, 10 mg Reglan, and 25 mg benadryl. Ordered CT-head, Dx-skull, Dx-cervical spine, Dx-abdomen. Dx-chest, CBC, CMP, HCG-qualitative, lactic acid to evaluate her symptoms. Differential diagnoses include but  are not limited to hydrocephalus,  shunt malfunction, migraine, tension headache, gastroenteritis, opiate withdrawal, dehydration.    8:31 PM Reevaluated patient at bedside. She is resting in bed at this time. She reports that her headache is currently 7/10 in severity. Patient requests another dose of morphine at this time.    10:25 PM Patient reevaluated at bedside. She reports improved pain at this time. Informed that she will not need to go to the OR and will be discharged at this time. Advised to follow up with a neurologist. Patient understands and agrees to be discharged at this time.    Decision Making:  This is a 47 y.o. year old female who presents with headache in the setting of  shunt and chronic headaches.  On my initial evaluation, she was well-appearing with normal vital signs.  She is notably blind, and has a disconjugate gaze, but otherwise had a nonfocal neurologic exam with intact strength in her bilateral upper and lower extremities.  Patient was reporting headache which was different from her usual headaches, and stated that this felt more like she had a shunt malfunction.  I discussed with her the risks and benefits of obtaining imaging studies, given that the patient has had multiple CT scans in the past, and patient wished to proceed with CT scan at this time.  Patient's last scan in this facility was more than 1 year ago.    IV access was obtained and basic laboratory studies were drawn.  Patient was given a normal saline fluid bolus, Reglan, and Benadryl.  She initially requested hydromorphone for her pain, but as we are currently on shortage, agreed to morphine for treatment.    Labs are largely unremarkable without significant leukocytosis, anemia, or electrolyte disturbance requiring correction.  Lactic acid was 1.8 and pregnancy testing was negative.  CT showed stable appearance of the patient's  shunt and ventricles.  Shunt series showed no evidence of kinking or  malfunction.    On my repeat evaluation, the patient stated that she is starting to feel better, but still had 7 out of 10 pain.  She requested a second dose of morphine, and was also given a dose of magnesium.  Explained to her the above results, and recommended that she follow-up with her neurologist and neurosurgeon for further care.  Patient does not appear to have an acute shunt malfunction at this visit today, and her headache was drastically improved throughout her stay.    On my repeat evaluation after receiving the second dose of morphine and the magnesium, patient reported that her pain was almost completely resolved.  She was comfortable with discharge home and stated that she would follow-up with her neurologist as an outpatient.  Patient will be discharged home in stable condition and return for any new or worsening symptoms.    HYDRATION: Based on the patient's presentation of Acute Diarrhea, Dehydration and Inability to take oral fluids the patient was given IV fluids.  Upon recheck following hydration, the patient was improved and hemodynamically stable.     DISPOSITION:  Patient will be discharged home in stable condition.    FOLLOW UP:  Clarence Hogan M.D.  [address] [facility] [address]  [phone]      neurosurgery, As needed    CHANDLER Adams  [address]  [address] [address]  [phone]    Schedule an appointment as soon as possible for a visit      OUTPATIENT MEDICATIONS:  New Prescriptions    No medications on file       FINAL IMPRESSION  1. Other complicated headache syndrome    2. S/P  shunt    3. Nausea          Jeanne SHARMA (Carlosibe), am scribing for, and in the presence of, Brenna Oneill M.D..    Electronically signed by: Jeanne Ac (Dennis), 8/17/2018    Brenna SHARMA M.D. personally performed the services described in this documentation, as scribed by Jeanne Ac in my presence, and it is both accurate and complete.    The note  accurately reflects work and decisions made by me.  Brenna Oneill  8/18/2018  12:51 AM

## 2018-08-18 NOTE — ED NOTES
All lines and monitors discontinued. Discharge instructions given, questions answered.    Ambulated out of ER, escorted by ED tech.  Instructed not to drive after taking pain medication and pt verbalizes understanding.  Rx x 1 given.

## 2018-08-18 NOTE — DISCHARGE PLANNING
Medical Social Work     Referral: Transportation    SW received a call from the RN requesting assistance with transportation home.  SW called [employer] and set up taxi transportation for the pt to get home to [address] [place] [id number]. SW update the RN of transportation being set up.     Plan: SW will remain available for pt and family support.

## 2018-08-18 NOTE — ED TRIAGE NOTES
Chief Complaint   Patient presents with   • Headache     Pt BIB EMS, report of pt with headache for 24 hrs/ pt states that she thinks her pulmonary shunt is malfunctioning and doesn't have appointment with neurology for 3 wks   • Nausea   • Diarrhea     today

## 2018-09-01 ENCOUNTER — HOSPITAL ENCOUNTER (EMERGENCY)
Facility: MEDICAL CENTER | Age: 48
End: 2018-09-01
Attending: EMERGENCY MEDICINE
Payer: MEDICAID

## 2018-09-01 ENCOUNTER — APPOINTMENT (OUTPATIENT)
Dept: RADIOLOGY | Facility: MEDICAL CENTER | Age: 48
End: 2018-09-01
Attending: EMERGENCY MEDICINE
Payer: MEDICAID

## 2018-09-01 VITALS
DIASTOLIC BLOOD PRESSURE: 78 MMHG | BODY MASS INDEX: 26.8 KG/M2 | HEART RATE: 72 BPM | SYSTOLIC BLOOD PRESSURE: 123 MMHG | HEIGHT: 64 IN | WEIGHT: 157 LBS | TEMPERATURE: 98 F | RESPIRATION RATE: 16 BRPM | OXYGEN SATURATION: 99 %

## 2018-09-01 DIAGNOSIS — R51.9 ACUTE NONINTRACTABLE HEADACHE, UNSPECIFIED HEADACHE TYPE: ICD-10-CM

## 2018-09-01 PROCEDURE — 99284 EMERGENCY DEPT VISIT MOD MDM: CPT

## 2018-09-01 PROCEDURE — 96375 TX/PRO/DX INJ NEW DRUG ADDON: CPT

## 2018-09-01 PROCEDURE — 70450 CT HEAD/BRAIN W/O DYE: CPT

## 2018-09-01 PROCEDURE — 36415 COLL VENOUS BLD VENIPUNCTURE: CPT

## 2018-09-01 PROCEDURE — 96374 THER/PROPH/DIAG INJ IV PUSH: CPT

## 2018-09-01 PROCEDURE — 700111 HCHG RX REV CODE 636 W/ 250 OVERRIDE (IP): Performed by: EMERGENCY MEDICINE

## 2018-09-01 PROCEDURE — 700102 HCHG RX REV CODE 250 W/ 637 OVERRIDE(OP): Performed by: EMERGENCY MEDICINE

## 2018-09-01 PROCEDURE — A9270 NON-COVERED ITEM OR SERVICE: HCPCS | Performed by: EMERGENCY MEDICINE

## 2018-09-01 RX ORDER — TOPIRAMATE 100 MG/1
200 TABLET, FILM COATED ORAL
COMMUNITY
End: 2018-11-15 | Stop reason: CLARIF

## 2018-09-01 RX ORDER — OXYCODONE HYDROCHLORIDE AND ACETAMINOPHEN 5; 325 MG/1; MG/1
1 TABLET ORAL ONCE
Status: COMPLETED | OUTPATIENT
Start: 2018-09-01 | End: 2018-09-01

## 2018-09-01 RX ORDER — ESOMEPRAZOLE MAGNESIUM 40 MG/1
40 GRANULE, DELAYED RELEASE ORAL
COMMUNITY
End: 2018-11-15

## 2018-09-01 RX ORDER — HYDROCODONE BITARTRATE AND ACETAMINOPHEN 5; 325 MG/1; MG/1
1-2 TABLET ORAL EVERY 4 HOURS PRN
Qty: 20 TAB | Refills: 0 | Status: SHIPPED | OUTPATIENT
Start: 2018-09-01 | End: 2018-09-04

## 2018-09-01 RX ORDER — MORPHINE SULFATE 4 MG/ML
4 INJECTION, SOLUTION INTRAMUSCULAR; INTRAVENOUS ONCE
Status: COMPLETED | OUTPATIENT
Start: 2018-09-01 | End: 2018-09-01

## 2018-09-01 RX ORDER — ONDANSETRON 2 MG/ML
4 INJECTION INTRAMUSCULAR; INTRAVENOUS ONCE
Status: COMPLETED | OUTPATIENT
Start: 2018-09-01 | End: 2018-09-01

## 2018-09-01 RX ADMIN — OXYCODONE HYDROCHLORIDE AND ACETAMINOPHEN 1 TABLET: 5; 325 TABLET ORAL at 20:21

## 2018-09-01 RX ADMIN — MORPHINE SULFATE 4 MG: 4 INJECTION INTRAVENOUS at 19:29

## 2018-09-01 RX ADMIN — ONDANSETRON 4 MG: 2 INJECTION INTRAMUSCULAR; INTRAVENOUS at 19:29

## 2018-09-01 ASSESSMENT — PAIN SCALES - GENERAL
PAINLEVEL_OUTOF10: 8
PAINLEVEL_OUTOF10: 3
PAINLEVEL_OUTOF10: 8
PAINLEVEL_OUTOF10: 8

## 2018-09-02 NOTE — ED TRIAGE NOTES
"Rasheeda Manzano  47 y.o.  Chief Complaint   Patient presents with   • Headache      awoke with headache at 0700, gradually worsening, patient reports this is a \"shunt problem\".     Patient bib EMS from home with above complaints.  PTA patient given tylenol, 1000 mg with no relief, reports pain as 7/10.  Patient reports hx of migraines, reports this does not feel like migraine, feels like a shunt problem - hx of hydrocephalus, shunt initially placed when 6 weeks old, most recent shunt placement 6 years ago.      Patient changed into gown, chart up for ERP.    "

## 2018-09-02 NOTE — ED NOTES
Patient medicated for headache per ERP order, see MAR.      Patient transported to imaging via gurney with [facility].

## 2018-09-02 NOTE — DISCHARGE INSTRUCTIONS
"Headaches, Frequently Asked Questions  MIGRAINE HEADACHES  Q: What is migraine? What causes it? How can I treat it?  A: Generally, migraine headaches begin as a dull ache. Then they develop into a constant, throbbing, and pulsating pain. You may experience pain at the temples. You may experience pain at the front or back of one or both sides of the head. The pain is usually accompanied by a combination of:  · Nausea.   · Vomiting.   · Sensitivity to light and noise.   Some people (about 15%) experience an aura (see below) before an attack. The cause of migraine is believed to be chemical reactions in the brain. Treatment for migraine may include over-the-counter or prescription medications. It may also include self-help techniques. These include relaxation training and biofeedback.   Q: What is an aura?  A: About 15% of people with migraine get an \"aura\". This is a sign of neurological symptoms that occur before a migraine headache. You may see wavy or jagged lines, dots, or flashing lights. You might experience tunnel vision or blind spots in one or both eyes. The aura can include visual or auditory hallucinations (something imagined). It may include disruptions in smell (such as strange odors), taste or touch. Other symptoms include:  · Numbness.   · A \"pins and needles\" sensation.   · Difficulty in recalling or speaking the correct word.   These neurological events may last as long as 60 minutes. These symptoms will fade as the headache begins.  Q: What is a trigger?  A: Certain physical or environmental factors can lead to or \"trigger\" a migraine. These include:  · Foods.   · Hormonal changes.   · Weather.   · Stress.   It is important to remember that triggers are different for everyone. To help prevent migraine attacks, you need to figure out which triggers affect you. Keep a headache diary. This is a good way to track triggers. The diary will help you talk to your healthcare professional about your " "condition.  Q: Does weather affect migraines?  A: Bright sunshine, hot, humid conditions, and drastic changes in barometric pressure may lead to, or \"trigger,\" a migraine attack in some people. But studies have shown that weather does not act as a trigger for everyone with migraines.  Q: What is the link between migraine and hormones?  A: Hormones start and regulate many of your body's functions. Hormones keep your body in balance within a constantly changing environment. The levels of hormones in your body are unbalanced at times. Examples are during menstruation, pregnancy, or menopause. That can lead to a migraine attack. In fact, about three quarters of all women with migraine report that their attacks are related to the menstrual cycle.   Q: Is there an increased risk of stroke for migraine sufferers?  A: The likelihood of a migraine attack causing a stroke is very remote. That is not to say that migraine sufferers cannot have a stroke associated with their migraines. In persons under age 40, the most common associated factor for stroke is migraine headache. But over the course of a person's normal life span, the occurrence of migraine headache may actually be associated with a reduced risk of dying from cerebrovascular disease due to stroke.   Q: What are acute medications for migraine?  A: Acute medications are used to treat the pain of the headache after it has started. Examples over-the-counter medications, NSAIDs, ergots, and triptans.   Q: What are the triptans?  A: Triptans are the newest class of abortive medications. They are specifically targeted to treat migraine. Triptans are vasoconstrictors. They moderate some chemical reactions in the brain. The triptans work on receptors in your brain. Triptans help to restore the balance of a neurotransmitter called serotonin. Fluctuations in levels of serotonin are thought to be a main cause of migraine.   Q: Are over-the-counter medications for migraine " "effective?  A: Over-the-counter, or \"OTC,\" medications may be effective in relieving mild to moderate pain and associated symptoms of migraine. But you should see your caregiver before beginning any treatment regimen for migraine.   Q: What are preventive medications for migraine?  A: Preventive medications for migraine are sometimes referred to as \"prophylactic\" treatments. They are used to reduce the frequency, severity, and length of migraine attacks. Examples of preventive medications include antiepileptic medications, antidepressants, beta-blockers, calcium channel blockers, and NSAIDs (nonsteroidal anti-inflammatory drugs).  Q: Why are anticonvulsants used to treat migraine?  A: During the past few years, there has been an increased interest in antiepileptic drugs for the prevention of migraine. They are sometimes referred to as \"anticonvulsants\". Both epilepsy and migraine may be caused by similar reactions in the brain.   Q: Why are antidepressants used to treat migraine?  A: Antidepressants are typically used to treat people with depression. They may reduce migraine frequency by regulating chemical levels, such as serotonin, in the brain.   Q: What alternative therapies are used to treat migraine?  A: The term \"alternative therapies\" is often used to describe treatments considered outside the scope of conventional Western medicine. Examples of alternative therapy include acupuncture, acupressure, and yoga. Another common alternative treatment is herbal therapy. Some herbs are believed to relieve headache pain. Always discuss alternative therapies with your caregiver before proceeding. Some herbal products contain arsenic and other toxins.  TENSION HEADACHES  Q: What is a tension-type headache? What causes it? How can I treat it?  A: Tension-type headaches occur randomly. They are often the result of temporary stress, anxiety, fatigue, or anger. Symptoms include soreness in your temples, a tightening " "band-like sensation around your head (a \"vice-like\" ache). Symptoms can also include a pulling feeling, pressure sensations, and sandi head and neck muscles. The headache begins in your forehead, temples, or the back of your head and neck. Treatment for tension-type headache may include over-the-counter or prescription medications. Treatment may also include self-help techniques such as relaxation training and biofeedback.  CLUSTER HEADACHES  Q: What is a cluster headache? What causes it? How can I treat it?  A: Cluster headache gets its name because the attacks come in groups. The pain arrives with little, if any, warning. It is usually on one side of the head. A tearing or bloodshot eye and a runny nose on the same side of the headache may also accompany the pain. Cluster headaches are believed to be caused by chemical reactions in the brain. They have been described as the most severe and intense of any headache type. Treatment for cluster headache includes prescription medication and oxygen.  SINUS HEADACHES  Q: What is a sinus headache? What causes it? How can I treat it?  A: When a cavity in the bones of the face and skull (a sinus) becomes inflamed, the inflammation will cause localized pain. This condition is usually the result of an allergic reaction, a tumor, or an infection. If your headache is caused by a sinus blockage, such as an infection, you will probably have a fever. An x-ray will confirm a sinus blockage. Your caregiver's treatment might include antibiotics for the infection, as well as antihistamines or decongestants.   REBOUND HEADACHES  Q: What is a rebound headache? What causes it? How can I treat it?  A: A pattern of taking acute headache medications too often can lead to a condition known as \"rebound headache.\" A pattern of taking too much headache medication includes taking it more than 2 days per week or in excessive amounts. That means more than the label or a caregiver advises. " With rebound headaches, your medications not only stop relieving pain, they actually begin to cause headaches. Doctors treat rebound headache by tapering the medication that is being overused. Sometimes your caregiver will gradually substitute a different type of treatment or medication. Stopping may be a challenge. Regularly overusing a medication increases the potential for serious side effects. Consult a caregiver if you regularly use headache medications more than 2 days per week or more than the label advises.  ADDITIONAL QUESTIONS AND ANSWERS  Q: What is biofeedback?  A: Biofeedback is a self-help treatment. Biofeedback uses special equipment to monitor your body's involuntary physical responses. Biofeedback monitors:  · Breathing.   · Pulse.   · Heart rate.   · Temperature.   · Muscle tension.   · Brain activity.   Biofeedback helps you refine and perfect your relaxation exercises. You learn to control the physical responses that are related to stress. Once the technique has been mastered, you do not need the equipment any more.  Q: Are headaches hereditary?  A: Four out of five (80%) of people that suffer report a family history of migraine. Scientists are not sure if this is genetic or a family predisposition. Despite the uncertainty, a child has a 50% chance of having migraine if one parent suffers. The child has a 75% chance if both parents suffer.   Q: Can children get headaches?  A: By the time they reach high school, most young people have experienced some type of headache. Many safe and effective approaches or medications can prevent a headache from occurring or stop it after it has begun.   Q: What type of doctor should I see to diagnose and treat my headache?  A: Start with your primary caregiver. Discuss his or her experience and approach to headaches. Discuss methods of classification, diagnosis, and treatment. Your caregiver may decide to recommend you to a headache specialist, depending upon  your symptoms or other physical conditions. Having diabetes, allergies, etc., may require a more comprehensive and inclusive approach to your headache. The National Head[employer] Foundation will provide, upon request, a list of NHF physician members in your state.  Document Released: 03/09/2005 Document Revised: 03/11/2013 Document Reviewed: 08/17/2009  [employer]® Patient Information ©2013 [employer], [employer].

## 2018-09-02 NOTE — ED NOTES
Pt discharge home. Pt given discharge instructions and prescription. Pt verbalized understanding, all questions answered ,vss upon d/c. Pt steady on feet upon discharge  Pt's friend will be driving her home, assisted pt to lobby.

## 2018-09-02 NOTE — ED PROVIDER NOTES
"ED Provider Note    CHIEF COMPLAINT  Chief Complaint   Patient presents with   • Headache      awoke with headache at 0700, gradually worsening, patient reports this is a \"shunt problem\".       HPI  Rasheeda Manzano is a 47 y.o. female who presents for evaluation of a headache.  The patient has history of hydrocephalus and a  shunt since the age of 6 weeks old.  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  She has been having a headache since 7:00 this morning.  It is generalized.  She thinks it may be a shunt malfunction.  She was here 2 weeks ago for similar symptoms and had a CT scan of her head and a shunt series performed which were unremarkable.  She has had no fevers.  She denies any head trauma.  She has had no vomiting.  She has an appointment with neurosurgery in approximately 10 days and an appointment with her primary care provider next week.  She does not take chronic pain medications.  She denies any numbness or motor weakness.    REVIEW OF SYSTEMS  See HPI for further details. All other systems negative.    PAST MEDICAL HISTORY  Past Medical History:   Diagnosis Date   • Blind    • C. difficile diarrhea 2013   • Chronic daily headache    • Depression    • Fall    • Hydrocephalus    • Hydrocephalus     shunt drains into pleural place of L lung   • Jaundice     at birth   • Legally blind    • Migraine without aura, without mention of intractable migraine without mention of status migrainosus    • Other specified symptom associated with female genital organs     excessive bleeding   • Pain     gallbladder related   • Psychiatric problem     depression       FAMILY HISTORY  Family History   Problem Relation Age of Onset   • Hypertension Mother    • Hypertension Father    • Non-contributory Neg Hx         Migraine       SOCIAL HISTORY  Social History     Social History   • Marital status:      " Spouse name: N/A   • Number of children: N/A   • Years of education: N/A     Social History Main Topics   • Smoking status: Former Smoker     Packs/day: 1.00     Years: 10.00     Types: Cigars     Start date: 5/1/2004   • Smokeless tobacco: Never Used      Comment:  CIGAR/day    • Alcohol use Yes      Comment: socially   • Drug use: No   • Sexual activity: Yes     Partners: Male     Other Topics Concern   • Not on file     Social History Narrative   • No narrative on file       SURGICAL HISTORY  Past Surgical History:   Procedure Laterality Date   • GASTROSCOPY-ENDO N/A 8/24/2017    Procedure: GASTROSCOPY-ENDO;  Surgeon: Matias Fernando M.D.;  Location: ENDOSCOPY [facility];  Service:    • AYALA BY LAPAROSCOPY N/A 8/13/2015    Procedure: AYALA BY LAPAROSCOPY;  Surgeon: Brice Johnson M.D.;  Location: SURGERY SAME DAY [facility];  Service:    • CHOLECYSTECTOMY N/A 8/13/2015    Procedure: CHOLECYSTECTOMY;  Surgeon: Brice Johnson M.D.;  Location: SURGERY SAME DAY [facility];  Service:    • LYSIS ADHESIONS GENERAL  12/10/2013    Performed by Arie Bass M.D. at SURGERY [facility]   • CYSTOSCOPY  12/10/2013    Performed by Joana Yeager M.D. at [facility]   • EXPLORATORY LAPAROTOMY  12/10/2013    Performed by Arie Bass M.D. at [facility]   • APPENDECTOMY  12/10/2013    Performed by Arie Bass M.D. at [facility]   • ABDOMINAL HYSTERECTOMY TOTAL  12/10/2013    Performed by Joana Yeager M.D. at [facility]   • LAPAROSCOPY  8/8/08    Performed by FERNANDO HENDERSON at [facility]   • OTHER      PLEURAL SHUNT, numerous revisions       CURRENT MEDICATIONS  Home Medications     Reviewed by Jesenia Piña R.N. (Registered Nurse) on 09/01/18 at 1832  Med List Status: Complete   Medication Last Dose Status   esomeprazole magnesium (NEXIUM) 40 MG Pack 9/1/2018 Active   propranolol CR (INDERAL LA) 120 MG CAPSULE SR 24 HR 8/31/2018  "Active   topiramate (TOPAMAX) 100 MG Tab 8/31/2018 Active                ALLERGIES  Allergies   Allergen Reactions   • Tape Rash     Medical tape. Per patient, paper tape ok.       PHYSICAL EXAM  VITAL SIGNS: /78   Pulse 71   Temp 36.7 °C (98 °F)   Resp 16   Ht 1.626 m (5' 4\")   Wt 71.2 kg (157 lb)   LMP 11/27/2013   SpO2 99%   BMI 26.95 kg/m²   Constitutional: Well developed, Well nourished, No acute distress, Non-toxic appearance.   HENT: Normocephalic, Atraumatic.  Eyes: Disconjugate and wandering gaze due to her blindness.  Cardiovascular: Normal heart rate, Normal rhythm, No murmurs, No rubs, No gallops.   Thorax & Lungs: Clear to auscultation without wheezes, rales, or rhonchi. No chest tenderness.   Abdomen: Soft and nontender.   Skin: Warm, Dry.  Musculoskeletal: Good range of motion in all major joints. No tenderness to palpation or major deformities noted.   Neurologic: Awake and alert with no focal deficits noted.      RADIOLOGY/PROCEDURES  CT-HEAD W/O   Final Result      1.  No evidence of acute intracranial process.      2.  Again seen left posterior frontal ventriculoperitoneal shunt catheter. The ventricles are decompressed.      3.  Again seen is encephalomalacia involving the left posterior parietal region.            COURSE & MEDICAL DECISION MAKING  Pertinent Labs & Imaging studies reviewed. (See chart for details)  This is a 47-year-old here for evaluation of a headache.  She has a history of hydrocephalus since the age of 6 weeks when she had a  shunt placed.  She comes in today complaining of a headache which started this morning.  This is not a thunderclap headache.  She is concerned that she may have a malfunction of her shunt.  She was here 2 weeks ago with the same complaints and had a CT scan and shunt series that were unremarkable.  She is neurologically intact.  CT scan today shows no evidence of a malfunctioning shunt.  Study is unchanged from previous studies.  I " discussed this with the patient.  She has been treated with morphine with improvement.  At this point I do not think she requires acute hospitalization or further evaluation in the emergency department.  I will provide her a prescription for Norco.  I have attempted to review her prescription monitoring report to the system is down.  She states that she does not take routine narcotics.  She has an appointment with neurosurgery and I requested that she keep her appointment.  She will also follow-up with her primary care provider.  She is given a discharge instruction sheet on headaches.    FINAL IMPRESSION  1.  Headache  2.   3.         Electronically signed by: Michael Schmidt, 9/1/2018 7:24 PM

## 2018-09-04 ENCOUNTER — APPOINTMENT (OUTPATIENT)
Dept: RADIOLOGY | Facility: MEDICAL CENTER | Age: 48
End: 2018-09-04
Attending: EMERGENCY MEDICINE
Payer: MEDICAID

## 2018-09-04 ENCOUNTER — HOSPITAL ENCOUNTER (EMERGENCY)
Facility: MEDICAL CENTER | Age: 48
End: 2018-09-04
Attending: EMERGENCY MEDICINE
Payer: MEDICAID

## 2018-09-04 VITALS
TEMPERATURE: 97.9 F | RESPIRATION RATE: 16 BRPM | BODY MASS INDEX: 28.94 KG/M2 | HEART RATE: 86 BPM | SYSTOLIC BLOOD PRESSURE: 119 MMHG | DIASTOLIC BLOOD PRESSURE: 84 MMHG | HEIGHT: 64 IN | OXYGEN SATURATION: 94 % | WEIGHT: 169.53 LBS

## 2018-09-04 DIAGNOSIS — G44.59 OTHER COMPLICATED HEADACHE SYNDROME: ICD-10-CM

## 2018-09-04 DIAGNOSIS — R51.9 ACUTE NONINTRACTABLE HEADACHE, UNSPECIFIED HEADACHE TYPE: ICD-10-CM

## 2018-09-04 LAB
ALBUMIN SERPL BCP-MCNC: 4.3 G/DL (ref 3.2–4.9)
ALBUMIN/GLOB SERPL: 1.5 G/DL
ALP SERPL-CCNC: 112 U/L (ref 30–99)
ALT SERPL-CCNC: 14 U/L (ref 2–50)
ANION GAP SERPL CALC-SCNC: 10 MMOL/L (ref 0–11.9)
AST SERPL-CCNC: 16 U/L (ref 12–45)
BASOPHILS # BLD AUTO: 0.7 % (ref 0–1.8)
BASOPHILS # BLD: 0.07 K/UL (ref 0–0.12)
BILIRUB SERPL-MCNC: 0.4 MG/DL (ref 0.1–1.5)
BUN SERPL-MCNC: 16 MG/DL (ref 8–22)
CALCIUM SERPL-MCNC: 9.3 MG/DL (ref 8.5–10.5)
CHLORIDE SERPL-SCNC: 111 MMOL/L (ref 96–112)
CO2 SERPL-SCNC: 17 MMOL/L (ref 20–33)
CREAT SERPL-MCNC: 0.82 MG/DL (ref 0.5–1.4)
EOSINOPHIL # BLD AUTO: 0.13 K/UL (ref 0–0.51)
EOSINOPHIL NFR BLD: 1.3 % (ref 0–6.9)
ERYTHROCYTE [DISTWIDTH] IN BLOOD BY AUTOMATED COUNT: 42.3 FL (ref 35.9–50)
GLOBULIN SER CALC-MCNC: 2.8 G/DL (ref 1.9–3.5)
GLUCOSE SERPL-MCNC: 110 MG/DL (ref 65–99)
HCT VFR BLD AUTO: 40.1 % (ref 37–47)
HGB BLD-MCNC: 14.2 G/DL (ref 12–16)
IMM GRANULOCYTES # BLD AUTO: 0.02 K/UL (ref 0–0.11)
IMM GRANULOCYTES NFR BLD AUTO: 0.2 % (ref 0–0.9)
LYMPHOCYTES # BLD AUTO: 2.58 K/UL (ref 1–4.8)
LYMPHOCYTES NFR BLD: 26 % (ref 22–41)
MCH RBC QN AUTO: 30.8 PG (ref 27–33)
MCHC RBC AUTO-ENTMCNC: 35.4 G/DL (ref 33.6–35)
MCV RBC AUTO: 87 FL (ref 81.4–97.8)
MONOCYTES # BLD AUTO: 0.93 K/UL (ref 0–0.85)
MONOCYTES NFR BLD AUTO: 9.4 % (ref 0–13.4)
NEUTROPHILS # BLD AUTO: 6.2 K/UL (ref 2–7.15)
NEUTROPHILS NFR BLD: 62.4 % (ref 44–72)
NRBC # BLD AUTO: 0 K/UL
NRBC BLD-RTO: 0 /100 WBC
PLATELET # BLD AUTO: 276 K/UL (ref 164–446)
PMV BLD AUTO: 11.8 FL (ref 9–12.9)
POTASSIUM SERPL-SCNC: 3.7 MMOL/L (ref 3.6–5.5)
PROT SERPL-MCNC: 7.1 G/DL (ref 6–8.2)
RBC # BLD AUTO: 4.61 M/UL (ref 4.2–5.4)
SODIUM SERPL-SCNC: 138 MMOL/L (ref 135–145)
WBC # BLD AUTO: 9.9 K/UL (ref 4.8–10.8)

## 2018-09-04 PROCEDURE — 85025 COMPLETE CBC W/AUTO DIFF WBC: CPT

## 2018-09-04 PROCEDURE — 99285 EMERGENCY DEPT VISIT HI MDM: CPT

## 2018-09-04 PROCEDURE — 700111 HCHG RX REV CODE 636 W/ 250 OVERRIDE (IP): Performed by: EMERGENCY MEDICINE

## 2018-09-04 PROCEDURE — 70450 CT HEAD/BRAIN W/O DYE: CPT

## 2018-09-04 PROCEDURE — 70250 X-RAY EXAM OF SKULL: CPT

## 2018-09-04 PROCEDURE — 96375 TX/PRO/DX INJ NEW DRUG ADDON: CPT

## 2018-09-04 PROCEDURE — 96374 THER/PROPH/DIAG INJ IV PUSH: CPT

## 2018-09-04 PROCEDURE — 80053 COMPREHEN METABOLIC PANEL: CPT

## 2018-09-04 PROCEDURE — 72020 X-RAY EXAM OF SPINE 1 VIEW: CPT

## 2018-09-04 PROCEDURE — 71045 X-RAY EXAM CHEST 1 VIEW: CPT

## 2018-09-04 RX ORDER — MORPHINE SULFATE 4 MG/ML
4 INJECTION, SOLUTION INTRAMUSCULAR; INTRAVENOUS ONCE
Status: COMPLETED | OUTPATIENT
Start: 2018-09-04 | End: 2018-09-04

## 2018-09-04 RX ORDER — DIPHENHYDRAMINE HYDROCHLORIDE 50 MG/ML
25 INJECTION INTRAMUSCULAR; INTRAVENOUS ONCE
Status: COMPLETED | OUTPATIENT
Start: 2018-09-04 | End: 2018-09-04

## 2018-09-04 RX ORDER — HYDROCODONE BITARTRATE AND ACETAMINOPHEN 5; 325 MG/1; MG/1
1 TABLET ORAL EVERY 4 HOURS PRN
Qty: 12 TAB | Refills: 0 | Status: SHIPPED | OUTPATIENT
Start: 2018-09-04 | End: 2018-09-07

## 2018-09-04 RX ORDER — METOCLOPRAMIDE HYDROCHLORIDE 5 MG/ML
10 INJECTION INTRAMUSCULAR; INTRAVENOUS ONCE
Status: COMPLETED | OUTPATIENT
Start: 2018-09-04 | End: 2018-09-04

## 2018-09-04 RX ADMIN — MORPHINE SULFATE 4 MG: 4 INJECTION INTRAVENOUS at 06:45

## 2018-09-04 RX ADMIN — FENTANYL CITRATE 50 MCG: 50 INJECTION, SOLUTION INTRAMUSCULAR; INTRAVENOUS at 05:30

## 2018-09-04 RX ADMIN — METOCLOPRAMIDE 10 MG: 5 INJECTION, SOLUTION INTRAMUSCULAR; INTRAVENOUS at 05:30

## 2018-09-04 RX ADMIN — DIPHENHYDRAMINE HYDROCHLORIDE 25 MG: 50 INJECTION INTRAMUSCULAR; INTRAVENOUS at 05:30

## 2018-09-04 ASSESSMENT — PAIN SCALES - GENERAL: PAINLEVEL_OUTOF10: 6

## 2018-09-04 NOTE — ED PROVIDER NOTES
ED Provider Note    CHIEF COMPLAINT  Chief Complaint   Patient presents with   • Anxiety     past few days and unable to sleep.    • Shunt Problem     HA 10/10 at times, feeling faint with episodes of dizzyness, last few days hot flashes with chills       HPI  Rasheeda Manzano is a 47 y.o. female who presents with chief complaint of headache.  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  Patient reports her last revision was alone 7 years ago.  She reports that she has had headaches for the last 8 months.  These headaches are progressively worsening.  She has had multiple imaging throughout this time.  She reports her headaches are similar today.  She reports the headaches are considerably worse upon standing up and better when lying down.  She denies any fevers.  Patient denies any neck pain or neck stiffness.    REVIEW OF SYSTEMS  Review of Systems   All other systems reviewed and are negative.      See HPI for further details. All other systems are negative.     PAST MEDICAL HISTORY   has a past medical history of Blind; C. difficile diarrhea (2013); Chronic daily headache; Depression; Fall; Hydrocephalus; Hydrocephalus; Jaundice; Legally blind; Migraine without aura, without mention of intractable migraine without mention of status migrainosus; Other specified symptom associated with female genital organs; Pain; and Psychiatric problem.    SOCIAL HISTORY  Social History     Social History Main Topics   • Smoking status: Former Smoker     Packs/day: 1.00     Years: 10.00     Types: Cigars     Start date: 5/1/2004   • Smokeless tobacco: Never Used      Comment:  CIGAR/day    • Alcohol use Yes      Comment: socially   • Drug use: No   • Sexual activity: Yes     Partners: Male       SURGICAL HISTORY   has a past surgical history that includes laparoscopy (8/8/08); lysis adhesions general (12/10/2013); cystoscopy (12/10/2013); exploratory laparotomy (12/10/2013); appendectomy (12/10/2013);  abdominal hysterectomy total (12/10/2013); aakash by laparoscopy (N/A, 8/13/2015); cholecystectomy (N/A, 8/13/2015); other; and gastroscopy-endo (N/A, 8/24/2017).    CURRENT MEDICATIONS  Home Medications    **Home medications have not yet been reviewed for this encounter**         ALLERGIES  Allergies   Allergen Reactions   • Tape Rash     Medical tape. Per patient, paper tape ok.       PHYSICAL EXAM  Physical Exam   Constitutional: She is oriented to person, place, and time. She appears well-developed and well-nourished.   Eyes: Conjunctivae are normal.   Cardiovascular: Normal rate and regular rhythm.    Pulmonary/Chest: Effort normal and breath sounds normal.   Abdominal: Soft. Bowel sounds are normal. She exhibits no distension. There is no tenderness. There is no rebound.   Neurological: She is alert and oriented to person, place, and time.   Patient with 5 out of 5 strength in bilateral upper and lower extremities distal proximal musculature, sensation intact throughout.  Patient is legally blind, nystagmus.   Skin: Skin is warm and dry. No rash noted.         DIAGNOSTIC STUDIES / PROCEDURES      LABS  Results for orders placed or performed during the hospital encounter of 09/04/18   CBC WITH DIFFERENTIAL   Result Value Ref Range    WBC 9.9 4.8 - 10.8 K/uL    RBC 4.61 4.20 - 5.40 M/uL    Hemoglobin 14.2 12.0 - 16.0 g/dL    Hematocrit 40.1 37.0 - 47.0 %    MCV 87.0 81.4 - 97.8 fL    MCH 30.8 27.0 - 33.0 pg    MCHC 35.4 (H) 33.6 - 35.0 g/dL    RDW 42.3 35.9 - 50.0 fL    Platelet Count 276 164 - 446 K/uL    MPV 11.8 9.0 - 12.9 fL    Neutrophils-Polys 62.40 44.00 - 72.00 %    Lymphocytes 26.00 22.00 - 41.00 %    Monocytes 9.40 0.00 - 13.40 %    Eosinophils 1.30 0.00 - 6.90 %    Basophils 0.70 0.00 - 1.80 %    Immature Granulocytes 0.20 0.00 - 0.90 %    Nucleated RBC 0.00 /100 WBC    Neutrophils (Absolute) 6.20 2.00 - 7.15 K/uL    Lymphs (Absolute) 2.58 1.00 - 4.80 K/uL    Monos (Absolute) 0.93 (H) 0.00 - 0.85 K/uL     Eos (Absolute) 0.13 0.00 - 0.51 K/uL    Baso (Absolute) 0.07 0.00 - 0.12 K/uL    Immature Granulocytes (abs) 0.02 0.00 - 0.11 K/uL    NRBC (Absolute) 0.00 K/uL   CMP   Result Value Ref Range    Sodium 138 135 - 145 mmol/L    Potassium 3.7 3.6 - 5.5 mmol/L    Chloride 111 96 - 112 mmol/L    Co2 17 (L) 20 - 33 mmol/L    Anion Gap 10.0 0.0 - 11.9    Glucose 110 (H) 65 - 99 mg/dL    Bun 16 8 - 22 mg/dL    Creatinine 0.82 0.50 - 1.40 mg/dL    Calcium 9.3 8.5 - 10.5 mg/dL    AST(SGOT) 16 12 - 45 U/L    ALT(SGPT) 14 2 - 50 U/L    Alkaline Phosphatase 112 (H) 30 - 99 U/L    Total Bilirubin 0.4 0.1 - 1.5 mg/dL    Albumin 4.3 3.2 - 4.9 g/dL    Total Protein 7.1 6.0 - 8.2 g/dL    Globulin 2.8 1.9 - 3.5 g/dL    A-G Ratio 1.5 g/dL   ESTIMATED GFR   Result Value Ref Range    GFR If African American >60 >60 mL/min/1.73 m 2    GFR If Non African American >60 >60 mL/min/1.73 m 2         RADIOLOGY  DX-SKULL-LIMITED 3-   Final Result      No evidence of left-sided ventriculopleural shunt tube discontinuity.      DX-SPINE-ANY ONE VIEW   Final Result      No evidence of left-sided ventriculopleural shunt tube discontinuity.      DX-CHEST-LIMITED (1 VIEW)   Final Result      1.  Clear lung parenchyma.   2.  Left-sided ventriculopleural shunt appears intact.      CT-HEAD W/O    (Results Pending)           COURSE & MEDICAL DECISION MAKING  Pertinent Labs & Imaging studies reviewed. (See chart for details)  Patient here with headache in the setting of shunt.  It appears that patient's symptoms are likely secondary to patient being over shunted given that her pain is worse upon standing up similar to an LP headache.  Patient will function is otherwise less likely but given patient's history she will moved a CT of her head and a shunt series.  Patient without any neurologic sequelae to suggest CVA as a cause of etiology of patient's symptoms.  Patient's shunt series is unremarkable.  She is feeling improved following migraine cocktail with  fentanyl but is requesting more pain medications.  Giving morphine, I am also giving IV fluids for patient's pain and possible over shunting.  Following IV fluids patient is feeling improved.  I have discussed the case with neurosurgery who does not have any other recommendations, as long as the CT feels reveal any acute pathology patient is safe for discharge home with outpatient follow-up.  Patient has a follow-up with neurosurgery within the next week.  CT is pending, patient will be signed out to oncoming emergency physician if CT fails to reveal any acute abnormality patient to be discharged.  I have sent patient home with a prescription of Norco which I believe is appropriate given patient's history.  I discussed the risks of narcotic analgesics including dependence and overdose.  The patient will not drink alcohol nor drive with prescribed medications. The patient will return for worsening symptoms and is stable at the time of discharge. The patient verbalizes understanding and will comply.    FINAL IMPRESSION  1.  Cephalgia, possitional headache      Electronically signed by: Tony Acevedo, 9/4/2018 5:02 AM

## 2018-09-04 NOTE — DISCHARGE INSTRUCTIONS
General Headache  Introduction  A headache is pain or discomfort felt around the head or neck area. There are many causes and types of headaches. In some cases, the cause may not be found.  Follow these instructions at home:  Managing pain  · Take over-the-counter and prescription medicines only as told by your doctor.  · Lie down in a dark, quiet room when you have a headache.  · If directed, apply ice to the head and neck area:  ¨ Put ice in a plastic bag.  ¨ Place a towel between your skin and the bag.  ¨ Leave the ice on for 20 minutes, 2-3 times per day.  · Use a heating pad or hot shower to apply heat to the head and neck area as told by your doctor.  · Keep lights dim if bright lights bother you or make your headaches worse.  Eating and drinking  · Eat meals on a regular schedule.  · Lessen how much alcohol you drink.  · Lessen how much caffeine you drink, or stop drinking caffeine.  General instructions  · Keep all follow-up visits as told by your doctor. This is important.  · Keep a journal to find out if certain things bring on headaches. For example, write down:  ¨ What you eat and drink.  ¨ How much sleep you get.  ¨ Any change to your diet or medicines.  · Relax by getting a massage or doing other relaxing activities.  · Lessen stress.  · Sit up straight. Do not tighten (tense) your muscles.  · Do not use tobacco products. This includes cigarettes, chewing tobacco, or e-cigarettes. If you need help quitting, ask your doctor.  · Exercise regularly as told by your doctor.  · Get enough sleep. This often means 7-9 hours of sleep.  Contact a doctor if:  · Your symptoms are not helped by medicine.  · You have a headache that feels different than the other headaches.  · You feel sick to your stomach (nauseous) or you throw up (vomit).  · You have a fever.  Get help right away if:  · Your headache becomes really bad.  · You keep throwing up.  · You have a stiff neck.  · You have trouble seeing.  · You have  trouble speaking.  · You have pain in the eye or ear.  · Your muscles are weak or you lose muscle control.  · You lose your balance or have trouble walking.  · You feel like you will pass out (faint) or you pass out.  · You have confusion.  This information is not intended to replace advice given to you by your health care provider. Make sure you discuss any questions you have with your health care provider.  Document Released: 09/26/2009 Document Revised: 05/25/2017 Document Reviewed: 04/11/2016  © 2017 [employer]

## 2018-09-04 NOTE — ED NOTES
Pt resting comfortably at this time. Respirations even and unlabored. NAD noted. Denies any additional needs.     Pt to CT

## 2018-09-04 NOTE — ED NOTES
Pt prepared for discharged. Reviewed all discharge instructions/perscription and verbalized agreement with plan. No further questions.

## 2018-09-04 NOTE — ED TRIAGE NOTES
"Rasheeda Manzano  47 y.o.  /84   Pulse 80   Temp 36.6 °C (97.9 °F) (Temporal)   Resp 16   Ht 1.626 m (5' 4\")   Wt 76.9 kg (169 lb 8.5 oz)   LMP 11/27/2013   SpO2 99%   BMI 29.10 kg/m²   Chief Complaint   Patient presents with   • Anxiety     past few days and unable to sleep.    • Shunt Problem     HA 10/10 at times, feeling faint with episodes of dizzyness, last few days hot flashes with chills     Triage Rn:   Pt ambulates to triage with seeing guide stick, with assistance for above complaint, pt is tremulous, pt no distress noted. A&Ox4 GCS 15, Pt safe to be returned to [facility], educated on triage process and wait times,  instructed to notify any staff of worsening/changing of symptoms.       "

## 2018-09-05 ENCOUNTER — OFFICE VISIT (OUTPATIENT)
Dept: NEUROLOGY | Facility: MEDICAL CENTER | Age: 48
End: 2018-09-05
Payer: MEDICAID

## 2018-09-05 VITALS
BODY MASS INDEX: 28.54 KG/M2 | HEART RATE: 78 BPM | WEIGHT: 167.2 LBS | TEMPERATURE: 96.8 F | DIASTOLIC BLOOD PRESSURE: 76 MMHG | RESPIRATION RATE: 16 BRPM | HEIGHT: 64 IN | OXYGEN SATURATION: 95 % | SYSTOLIC BLOOD PRESSURE: 120 MMHG

## 2018-09-05 DIAGNOSIS — Z98.2 S/P VP SHUNT: ICD-10-CM

## 2018-09-05 PROCEDURE — 99204 OFFICE O/P NEW MOD 45 MIN: CPT | Performed by: NURSE PRACTITIONER

## 2018-09-05 RX ORDER — KETOROLAC TROMETHAMINE 10 MG/1
10 TABLET, FILM COATED ORAL EVERY 6 HOURS PRN
Qty: 30 TAB | Refills: 1 | Status: SHIPPED | OUTPATIENT
Start: 2018-09-05 | End: 2018-10-08

## 2018-09-05 ASSESSMENT — PAIN SCALES - GENERAL: PAINLEVEL: 6=MODERATE PAIN

## 2018-09-05 ASSESSMENT — PATIENT HEALTH QUESTIONNAIRE - PHQ9
SUM OF ALL RESPONSES TO PHQ QUESTIONS 1-9: 16
5. POOR APPETITE OR OVEREATING: 0 - NOT AT ALL
CLINICAL INTERPRETATION OF PHQ2 SCORE: 6

## 2018-09-05 NOTE — PROGRESS NOTES
Subjective:      Rasheeda Manzano is a 47 y.o. female who presents with New Patient (Migraine)            HPI      Has been told that she needs to see a neurologist.    Has been trying to establish with neuro-surgery here in [place].  Last seen per neurosurgery in [place].    History of  shunt with blindness.    Impression       1.  No evidence of acute intracranial process.    2.  Again seen left posterior frontal ventriculoperitoneal shunt catheter. The ventricles are decompressed.    3.  Again seen is encephalomalacia involving the left posterior parietal region.     Cardiac: rapid heart beat    Depression:  High depression rate.  Established with psychiatrist.    She is , blind.  Limited resources.    Plans to restart the amitriptyline 75mg qhs.  Abortive: Norco    Insomnia: terrible sleep pattern.  Has used ambien in the past.  She was then transitioned onto ativan.    Current Outpatient Prescriptions   Medication Sig Dispense Refill   • HYDROcodone-acetaminophen (NORCO) 5-325 MG Tab per tablet Take 1 Tab by mouth every four hours as needed for up to 3 days. 12 Tab 0   • topiramate (TOPAMAX) 100 MG Tab Take 100 mg by mouth 2 times a day.     • esomeprazole magnesium (NEXIUM) 40 MG Pack Take 40 mg by mouth every morning before breakfast.     • propranolol CR (INDERAL LA) 120 MG CAPSULE SR 24 HR Take 120 mg by mouth every day.       No current facility-administered medications for this visit.        Review of Systems   HENT: Negative for hearing loss, nosebleeds and sore throat.         No recent head injury.   Eyes: Negative for double vision.        No new loss of vision.   Respiratory: Negative for cough.         No recent lung infections.   Cardiovascular: Negative for chest pain.   Gastrointestinal: Negative for abdominal pain, diarrhea, nausea and vomiting.   Genitourinary: Negative.    Musculoskeletal: Negative.    Skin: Negative.    Neurological: Negative for headaches.  "  Endo/Heme/Allergies:        No history of endocrine dysfunction.  No new problems.   Psychiatric/Behavioral: Negative for depression. The patient has insomnia. The patient is not nervous/anxious.         No recent mood changes.          Objective:     /76   Pulse 78   Temp 36 °C (96.8 °F)   Resp 16   Ht 1.626 m (5' 4\")   Wt 75.8 kg (167 lb 3.2 oz)   LMP 11/27/2013   SpO2 95%   BMI 28.70 kg/m²      Physical Exam   Constitutional: She is oriented to person, place, and time. She appears well-developed and well-nourished. No distress.   HENT:   Head: Normocephalic and atraumatic.   Nose: Nose normal.   Eyes: Pupils are equal, round, and reactive to light.   Blind     Neck: Normal range of motion.   Cardiovascular: Normal rate and regular rhythm.  Exam reveals no gallop and no friction rub.    No murmur heard.  Pulmonary/Chest: Effort normal and breath sounds normal. No respiratory distress.   Musculoskeletal: Normal range of motion.   Lymphadenopathy:     She has no cervical adenopathy.   Neurological: She is alert and oriented to person, place, and time. She has normal strength and normal reflexes. No cranial nerve deficit. She exhibits normal muscle tone. Coordination normal.   CN II: Fundi normal, visual fields full to confrontation.  CN III, IV, VI: Pupils equal, round, and reactive to light.  Extraocular movements full and intact in horizontal and vertical gaze.  CN V: Normal in motor and sensory modalities.  CN VII: No evidence of facial asymmetry.  CN VIII: Hearing grossly intact.  CN IX, X: Palate elevates symmetrically and in the midline.  CN XI: Normal sternocleidomastoid strength.  CN XII: Tongue is in the midline.    Motor: Normal muscle bulk and tone, with full and symmetric strength.  Sensory: Intact to light touch, pinprick, vibration, proprioception, and graphesthesia.  DTR's: 1+ throughout with flexor plantar responses.  Cerebellar/Coordination: Normal finger to finger, finger-tapping, " rapid alternating movements, and foot tapping.  Gait: Normal casual gait.  Walks well on heels and toes, as well as in tandem gait.   Skin: Skin is warm and dry.   Psychiatric: She has a normal mood and affect.               Assessment/Plan:     Strongly convinced that her shunt is malfunctioned and asking to see a neuro-surgeon.  She has an appointment next week for such.    New Rx for Toradol 10mg.    Will continue Topamax 100mg BID.    Following appointment with neuro-surgery, she will call with an update or have that consult note faxed.

## 2018-09-12 ASSESSMENT — ENCOUNTER SYMPTOMS
NERVOUS/ANXIOUS: 0
DIARRHEA: 0
DOUBLE VISION: 0
SORE THROAT: 0
COUGH: 0
ABDOMINAL PAIN: 0
INSOMNIA: 1
VOMITING: 0
NAUSEA: 0
HEADACHES: 0
DEPRESSION: 0
MUSCULOSKELETAL NEGATIVE: 1

## 2018-09-21 ENCOUNTER — APPOINTMENT (OUTPATIENT)
Dept: RADIOLOGY | Facility: MEDICAL CENTER | Age: 48
End: 2018-09-21
Attending: EMERGENCY MEDICINE
Payer: MEDICAID

## 2018-09-21 ENCOUNTER — HOSPITAL ENCOUNTER (EMERGENCY)
Facility: MEDICAL CENTER | Age: 48
End: 2018-09-21
Attending: EMERGENCY MEDICINE
Payer: MEDICAID

## 2018-09-21 VITALS
RESPIRATION RATE: 16 BRPM | DIASTOLIC BLOOD PRESSURE: 60 MMHG | OXYGEN SATURATION: 97 % | HEART RATE: 71 BPM | WEIGHT: 152.56 LBS | BODY MASS INDEX: 26.19 KG/M2 | TEMPERATURE: 97.3 F | SYSTOLIC BLOOD PRESSURE: 104 MMHG

## 2018-09-21 DIAGNOSIS — R51.9 CHRONIC NONINTRACTABLE HEADACHE, UNSPECIFIED HEADACHE TYPE: ICD-10-CM

## 2018-09-21 DIAGNOSIS — G89.29 CHRONIC NONINTRACTABLE HEADACHE, UNSPECIFIED HEADACHE TYPE: ICD-10-CM

## 2018-09-21 PROCEDURE — 36415 COLL VENOUS BLD VENIPUNCTURE: CPT

## 2018-09-21 PROCEDURE — 99284 EMERGENCY DEPT VISIT MOD MDM: CPT

## 2018-09-21 PROCEDURE — 700111 HCHG RX REV CODE 636 W/ 250 OVERRIDE (IP): Performed by: EMERGENCY MEDICINE

## 2018-09-21 PROCEDURE — 96374 THER/PROPH/DIAG INJ IV PUSH: CPT

## 2018-09-21 PROCEDURE — 70450 CT HEAD/BRAIN W/O DYE: CPT

## 2018-09-21 RX ORDER — MORPHINE SULFATE 10 MG/ML
8 INJECTION, SOLUTION INTRAMUSCULAR; INTRAVENOUS ONCE
Status: COMPLETED | OUTPATIENT
Start: 2018-09-21 | End: 2018-09-21

## 2018-09-21 RX ADMIN — MORPHINE SULFATE 8 MG: 10 INJECTION INTRAVENOUS at 22:22

## 2018-09-21 ASSESSMENT — PAIN SCALES - GENERAL
PAINLEVEL_OUTOF10: 7
PAINLEVEL_OUTOF10: 8

## 2018-09-22 NOTE — ED PROVIDER NOTES
ED Provider Note    ED Provider Note    Primary care provider: AUREA Estrada  Means of arrival: Walk-in  History obtained from: Patient    CHIEF COMPLAINT  Chief Complaint   Patient presents with   • Headache     Seen at 9:37 PM.   HPI  Rasheeda Manzano is a 47 y.o. female with history of hydrocephalus who presents to the Emergency Department with diffuse sharp throbbing headache that was gradual in onset this morning.  She reports daily headaches that been present for months to years since her initial diagnosis of hydrocephalus.  She normally goes to [facility] for her headaches but has been here more frequently, this is her fourth visit for such headache in the past month.  She has seen neurology.  She has a follow-up with neurosurgery next week.  She denies any recent fevers, chills, numbness, weakness, neck pain, chest pain, shortness of breath, bleeding diathesis, nausea, vomiting, impaired immunity, head trauma.  She has not taken any analgesics prior to presentation tonight.    REVIEW OF SYSTEMS  See HPI,   Remainder of ROS negative.     PAST MEDICAL HISTORY   has a past medical history of Blind; C. difficile diarrhea (2013); Chronic daily headache; Depression; Fall; Hydrocephalus; Hydrocephalus; Jaundice; Legally blind; Migraine without aura, without mention of intractable migraine without mention of status migrainosus; Other specified symptom associated with female genital organs; Pain; and Psychiatric problem.    SURGICAL HISTORY   has a past surgical history that includes laparoscopy (8/8/08); lysis adhesions general (12/10/2013); cystoscopy (12/10/2013); exploratory laparotomy (12/10/2013); appendectomy (12/10/2013); abdominal hysterectomy total (12/10/2013); aakash by laparoscopy (N/A, 8/13/2015); cholecystectomy (N/A, 8/13/2015); other; and gastroscopy-endo (N/A, 8/24/2017).    SOCIAL HISTORY  Social History   Substance Use Topics   • Smoking status: Former Smoker     Packs/day: 1.00      Years: 10.00     Types: Cigars     Start date: 5/1/2004     Quit date: 8/9/2017   • Smokeless tobacco: Never Used      Comment:  CIGAR/day    • Alcohol use Yes      Comment: socially      History   Drug Use No       FAMILY HISTORY  Family History   Problem Relation Age of Onset   • Hypertension Mother    • Hypertension Father    • Non-contributory Neg Hx         Migraine       CURRENT MEDICATIONS  Reviewed.  See Encounter Summary.     ALLERGIES  Allergies   Allergen Reactions   • Tape Rash     Medical tape. Per patient, paper tape ok.       PHYSICAL EXAM  VITAL SIGNS: /60   Pulse 71   Temp 36.3 °C (97.3 °F) (Temporal)   Resp 16   Wt 69.2 kg (152 lb 8.9 oz)   LMP 11/27/2013   SpO2 97%   BMI 26.19 kg/m²   Constitutional: Awake, alert in no apparent distress.  HENT: Normocephalic, Bilateral external ears normal. Nose normal.  Neck is supple.  A shunt is palpable on the left side of the scalp extending onto the neck.  There is no area of tenderness, fluctuance, warmth overlying redness.  Eyes: Conjunctiva normal, non-icteric,  disconjugate gaze at baseline.  Thorax & Lungs: Easy unlabored respirations, Clear to ascultation bilaterally.  Cardiovascular: Regular rate, Regular rhythm, No murmurs, rubs or gallops.  Abdomen:  Soft, nontender, nondistended, normal active bowel sounds.   :    Skin: Visualized skin is  Dry, No erythema, No rash.   Musculoskeletal:   No cyanosis, clubbing or edema.  Neurologic: Alert, Grossly non-focal.  Full strength all 4 extremities, sensation intact to light touch.  Psychiatric: Normal affect, Normal mood  Lymphatic:  No cervical LAD    RADIOLOGY  CT-HEAD W/O   Final Result         1. No significant interval change No evidence of acute intracranial hemorrhage or mass lesion.      2. Redemonstration of left ventricular shunt catheter. No hydrocephalus.               COURSE & MEDICAL DECISION MAKING  Pertinent Labs & Imaging studies reviewed. (See chart for details)        9:37  PM - Medical record reviewed, patient seen and examined at bedside.  This is the patient's fourth visit in the past 2 months for headaches.  She has had 3 shunt series and CT head that were unremarkable.    11:30 PM -I discussed the unremarkable head CT and plan for discharge.  The patient is in agreement.    Decision Making:  This is a 47 y.o. year old female who presents with a daily chronic headache.  She feels that her shunt is malfunctioning.  She underwent CT today that is unremarkable, she does not have any signs of hydrocephalus.  Her shunt appears to be functioning normally.  She is otherwise normal in appearance and does not appear in any acute distress.  I certainly do not suspect acute bacterial meningitis.  I do not feel that LP is indicated.  This is the patient's fourth visit to the emergency department for a similar type of headache in the last month and a half.  Apparently she frequents [place] [facility] as well.  There may be an aspect of drug-seeking behavior.    I recommend she follow-up with her neurosurgeon, neurologist or primary care physician for additional management of her chronic daily headaches.    Discharge Medications:  Discharge Medication List as of 9/21/2018 11:07 PM          The patient was discharged home (see d/c instructions) and parent was told to return immediately for any signs or symptoms listed, or any worsening at all.  The patient's parent verbally agreed to the discharge precautions and follow-up plan which is documented in EPIC.        FINAL IMPRESSION  1. Chronic nonintractable headache, unspecified headache type Active

## 2018-09-22 NOTE — ED NOTES
All lines and monitors discontinued. Discharge instructions given, questions answered.    Ambulated out of ER, escorted by RN.  Instructed not to drive after taking pain medication and pt verbalizes understanding.  Rx x 0 given.

## 2018-09-22 NOTE — ED TRIAGE NOTES
"Pt ambulatory to triage with ID cane. Pt is legally blind. Pt c/o constant \"sharp\" headache to right side of head since 0800 today. + nausea, denies vomiting. Hx of hydrocephalus, last revision 6 yrs ago. VSS. Educated on triage process, encouraged to inform staff of any changes.     "

## 2018-10-08 ENCOUNTER — APPOINTMENT (OUTPATIENT)
Dept: RADIOLOGY | Facility: MEDICAL CENTER | Age: 48
DRG: 871 | End: 2018-10-08
Attending: EMERGENCY MEDICINE
Payer: MEDICAID

## 2018-10-08 ENCOUNTER — HOSPITAL ENCOUNTER (INPATIENT)
Facility: MEDICAL CENTER | Age: 48
LOS: 4 days | DRG: 871 | End: 2018-10-12
Attending: EMERGENCY MEDICINE | Admitting: INTERNAL MEDICINE
Payer: MEDICAID

## 2018-10-08 DIAGNOSIS — R29.898 RIGHT LEG WEAKNESS: ICD-10-CM

## 2018-10-08 DIAGNOSIS — M25.551 RIGHT HIP PAIN: ICD-10-CM
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
## 2018-10-08 DIAGNOSIS — R26.81 UNSTEADY GAIT: ICD-10-CM

## 2018-10-08 PROBLEM — I10 ESSENTIAL HYPERTENSION: Status: ACTIVE | Noted: 2018-10-08
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
## 2018-10-08 PROBLEM — T79.6XXA TRAUMATIC RHABDOMYOLYSIS (HCC): Status: ACTIVE | Noted: 2018-10-08

## 2018-10-08 PROBLEM — J18.9 CAP (COMMUNITY ACQUIRED PNEUMONIA): Status: ACTIVE | Noted: 2018-10-08

## 2018-10-08 PROBLEM — A41.9 SEPSIS (HCC): Status: ACTIVE | Noted: 2018-10-08

## 2018-10-08 LAB
ALBUMIN SERPL BCP-MCNC: 4 G/DL (ref 3.2–4.9)
ALBUMIN/GLOB SERPL: 1.1 G/DL
ALP SERPL-CCNC: 107 U/L (ref 30–99)
ALT SERPL-CCNC: 40 U/L (ref 2–50)
ANION GAP SERPL CALC-SCNC: 9 MMOL/L (ref 0–11.9)
APPEARANCE UR: CLEAR
AST SERPL-CCNC: 67 U/L (ref 12–45)
BACTERIA #/AREA URNS HPF: ABNORMAL /HPF
BASOPHILS # BLD AUTO: 0.3 % (ref 0–1.8)
BASOPHILS # BLD: 0.04 K/UL (ref 0–0.12)
BILIRUB SERPL-MCNC: 0.7 MG/DL (ref 0.1–1.5)
BILIRUB UR QL STRIP.AUTO: NEGATIVE
BUN SERPL-MCNC: 20 MG/DL (ref 8–22)
CALCIUM SERPL-MCNC: 10.1 MG/DL (ref 8.5–10.5)
CHLORIDE SERPL-SCNC: 108 MMOL/L (ref 96–112)
CK SERPL-CCNC: 3106 U/L (ref 0–154)
CO2 SERPL-SCNC: 24 MMOL/L (ref 20–33)
COLOR UR: YELLOW
CREAT SERPL-MCNC: 0.8 MG/DL (ref 0.5–1.4)
EKG IMPRESSION: NORMAL
EOSINOPHIL # BLD AUTO: 0.12 K/UL (ref 0–0.51)
EOSINOPHIL NFR BLD: 1 % (ref 0–6.9)
EPI CELLS #/AREA URNS HPF: ABNORMAL /HPF
ERYTHROCYTE [DISTWIDTH] IN BLOOD BY AUTOMATED COUNT: 43.4 FL (ref 35.9–50)
FLUAV RNA SPEC QL NAA+PROBE: NEGATIVE
FLUBV RNA SPEC QL NAA+PROBE: NEGATIVE
GLOBULIN SER CALC-MCNC: 3.7 G/DL (ref 1.9–3.5)
GLUCOSE SERPL-MCNC: 117 MG/DL (ref 65–99)
GLUCOSE UR STRIP.AUTO-MCNC: NEGATIVE MG/DL
HCT VFR BLD AUTO: 41.9 % (ref 37–47)
HGB BLD-MCNC: 14.1 G/DL (ref 12–16)
HYALINE CASTS #/AREA URNS LPF: ABNORMAL /LPF
IMM GRANULOCYTES # BLD AUTO: 0.04 K/UL (ref 0–0.11)
IMM GRANULOCYTES NFR BLD AUTO: 0.3 % (ref 0–0.9)
KETONES UR STRIP.AUTO-MCNC: NEGATIVE MG/DL
LACTATE BLD-SCNC: 1.9 MMOL/L (ref 0.5–2)
LACTATE BLD-SCNC: 1.9 MMOL/L (ref 0.5–2)
LEUKOCYTE ESTERASE UR QL STRIP.AUTO: ABNORMAL
LYMPHOCYTES # BLD AUTO: 0.82 K/UL (ref 1–4.8)
LYMPHOCYTES NFR BLD: 6.6 % (ref 22–41)
MAGNESIUM SERPL-MCNC: 2 MG/DL (ref 1.5–2.5)
MCH RBC QN AUTO: 29.8 PG (ref 27–33)
MCHC RBC AUTO-ENTMCNC: 33.7 G/DL (ref 33.6–35)
MCV RBC AUTO: 88.6 FL (ref 81.4–97.8)
MICRO URNS: ABNORMAL
MONOCYTES # BLD AUTO: 1.33 K/UL (ref 0–0.85)
MONOCYTES NFR BLD AUTO: 10.7 % (ref 0–13.4)
NEUTROPHILS # BLD AUTO: 10.12 K/UL (ref 2–7.15)
NEUTROPHILS NFR BLD: 81.1 % (ref 44–72)
NITRITE UR QL STRIP.AUTO: NEGATIVE
NRBC # BLD AUTO: 0 K/UL
NRBC BLD-RTO: 0 /100 WBC
PH UR STRIP.AUTO: 6 [PH]
PLATELET # BLD AUTO: 215 K/UL (ref 164–446)
PMV BLD AUTO: 11 FL (ref 9–12.9)
POTASSIUM SERPL-SCNC: 3.5 MMOL/L (ref 3.6–5.5)
PROT SERPL-MCNC: 7.7 G/DL (ref 6–8.2)
PROT UR QL STRIP: NEGATIVE MG/DL
RBC # BLD AUTO: 4.73 M/UL (ref 4.2–5.4)
RBC # URNS HPF: ABNORMAL /HPF
RBC UR QL AUTO: NEGATIVE
SODIUM SERPL-SCNC: 141 MMOL/L (ref 135–145)
SP GR UR STRIP.AUTO: 1.02
UROBILINOGEN UR STRIP.AUTO-MCNC: 1 MG/DL
WBC # BLD AUTO: 12.5 K/UL (ref 4.8–10.8)
WBC #/AREA URNS HPF: ABNORMAL /HPF

## 2018-10-08 PROCEDURE — 94760 N-INVAS EAR/PLS OXIMETRY 1: CPT

## 2018-10-08 PROCEDURE — C1751 CATH, INF, PER/CENT/MIDLINE: HCPCS | Performed by: EMERGENCY MEDICINE

## 2018-10-08 PROCEDURE — 85025 COMPLETE CBC W/AUTO DIFF WBC: CPT

## 2018-10-08 PROCEDURE — 700101 HCHG RX REV CODE 250: Performed by: INTERNAL MEDICINE

## 2018-10-08 PROCEDURE — B548ZZA ULTRASONOGRAPHY OF SUPERIOR VENA CAVA, GUIDANCE: ICD-10-PCS | Performed by: EMERGENCY MEDICINE

## 2018-10-08 PROCEDURE — 700111 HCHG RX REV CODE 636 W/ 250 OVERRIDE (IP): Performed by: EMERGENCY MEDICINE

## 2018-10-08 PROCEDURE — 02HV33Z INSERTION OF INFUSION DEVICE INTO SUPERIOR VENA CAVA, PERCUTANEOUS APPROACH: ICD-10-PCS | Performed by: EMERGENCY MEDICINE

## 2018-10-08 PROCEDURE — G8979 MOBILITY GOAL STATUS: HCPCS | Mod: CI

## 2018-10-08 PROCEDURE — 71045 X-RAY EXAM CHEST 1 VIEW: CPT

## 2018-10-08 PROCEDURE — 700105 HCHG RX REV CODE 258: Performed by: EMERGENCY MEDICINE

## 2018-10-08 PROCEDURE — G8988 SELF CARE GOAL STATUS: HCPCS | Mod: CI

## 2018-10-08 PROCEDURE — 51798 US URINE CAPACITY MEASURE: CPT

## 2018-10-08 PROCEDURE — 82550 ASSAY OF CK (CPK): CPT

## 2018-10-08 PROCEDURE — 81001 URINALYSIS AUTO W/SCOPE: CPT

## 2018-10-08 PROCEDURE — 70450 CT HEAD/BRAIN W/O DYE: CPT

## 2018-10-08 PROCEDURE — 93005 ELECTROCARDIOGRAM TRACING: CPT | Performed by: EMERGENCY MEDICINE

## 2018-10-08 PROCEDURE — 87086 URINE CULTURE/COLONY COUNT: CPT

## 2018-10-08 PROCEDURE — 97161 PT EVAL LOW COMPLEX 20 MIN: CPT

## 2018-10-08 PROCEDURE — 96365 THER/PROPH/DIAG IV INF INIT: CPT

## 2018-10-08 PROCEDURE — 99285 EMERGENCY DEPT VISIT HI MDM: CPT

## 2018-10-08 PROCEDURE — 83605 ASSAY OF LACTIC ACID: CPT

## 2018-10-08 PROCEDURE — 87040 BLOOD CULTURE FOR BACTERIA: CPT | Mod: 91

## 2018-10-08 PROCEDURE — 770020 HCHG ROOM/CARE - TELE (206)

## 2018-10-08 PROCEDURE — 72170 X-RAY EXAM OF PELVIS: CPT

## 2018-10-08 PROCEDURE — 97166 OT EVAL MOD COMPLEX 45 MIN: CPT

## 2018-10-08 PROCEDURE — 73552 X-RAY EXAM OF FEMUR 2/>: CPT | Mod: RT

## 2018-10-08 PROCEDURE — 36556 INSERT NON-TUNNEL CV CATH: CPT

## 2018-10-08 PROCEDURE — G8987 SELF CARE CURRENT STATUS: HCPCS | Mod: CK

## 2018-10-08 PROCEDURE — 700102 HCHG RX REV CODE 250 W/ 637 OVERRIDE(OP): Performed by: INTERNAL MEDICINE

## 2018-10-08 PROCEDURE — 80053 COMPREHEN METABOLIC PANEL: CPT

## 2018-10-08 PROCEDURE — 83735 ASSAY OF MAGNESIUM: CPT

## 2018-10-08 PROCEDURE — 36415 COLL VENOUS BLD VENIPUNCTURE: CPT

## 2018-10-08 PROCEDURE — 99223 1ST HOSP IP/OBS HIGH 75: CPT | Performed by: INTERNAL MEDICINE

## 2018-10-08 PROCEDURE — 87502 INFLUENZA DNA AMP PROBE: CPT

## 2018-10-08 PROCEDURE — G8978 MOBILITY CURRENT STATUS: HCPCS | Mod: CJ

## 2018-10-08 PROCEDURE — 700101 HCHG RX REV CODE 250

## 2018-10-08 PROCEDURE — C1751 CATH, INF, PER/CENT/MIDLINE: HCPCS

## 2018-10-08 PROCEDURE — A9270 NON-COVERED ITEM OR SERVICE: HCPCS | Performed by: INTERNAL MEDICINE

## 2018-10-08 RX ORDER — POLYETHYLENE GLYCOL 3350 17 G/17G
1 POWDER, FOR SOLUTION ORAL
Status: DISCONTINUED | OUTPATIENT
Start: 2018-10-08 | End: 2018-10-12 | Stop reason: HOSPADM

## 2018-10-08 RX ORDER — SODIUM CHLORIDE 9 MG/ML
1000 INJECTION, SOLUTION INTRAVENOUS
Status: DISCONTINUED | OUTPATIENT
Start: 2018-10-08 | End: 2018-10-12 | Stop reason: HOSPADM

## 2018-10-08 RX ORDER — LIDOCAINE HYDROCHLORIDE AND EPINEPHRINE 10; 10 MG/ML; UG/ML
INJECTION, SOLUTION INFILTRATION; PERINEURAL
Status: COMPLETED
Start: 2018-10-08 | End: 2018-10-08

## 2018-10-08 RX ORDER — PROMETHAZINE HYDROCHLORIDE 25 MG/1
12.5-25 TABLET ORAL EVERY 4 HOURS PRN
Status: DISCONTINUED | OUTPATIENT
Start: 2018-10-08 | End: 2018-10-12 | Stop reason: HOSPADM

## 2018-10-08 RX ORDER — SODIUM CHLORIDE AND POTASSIUM CHLORIDE 150; 900 MG/100ML; MG/100ML
INJECTION, SOLUTION INTRAVENOUS CONTINUOUS
Status: DISCONTINUED | OUTPATIENT
Start: 2018-10-08 | End: 2018-10-10

## 2018-10-08 RX ORDER — PROMETHAZINE HYDROCHLORIDE 25 MG/1
12.5-25 SUPPOSITORY RECTAL EVERY 4 HOURS PRN
Status: DISCONTINUED | OUTPATIENT
Start: 2018-10-08 | End: 2018-10-12 | Stop reason: HOSPADM

## 2018-10-08 RX ORDER — AMOXICILLIN 250 MG
2 CAPSULE ORAL 2 TIMES DAILY
Status: DISCONTINUED | OUTPATIENT
Start: 2018-10-08 | End: 2018-10-12 | Stop reason: HOSPADM

## 2018-10-08 RX ORDER — SODIUM CHLORIDE, SODIUM LACTATE, POTASSIUM CHLORIDE, CALCIUM CHLORIDE 600; 310; 30; 20 MG/100ML; MG/100ML; MG/100ML; MG/100ML
30 INJECTION, SOLUTION INTRAVENOUS ONCE
Status: COMPLETED | OUTPATIENT
Start: 2018-10-08 | End: 2018-10-08

## 2018-10-08 RX ORDER — OMEPRAZOLE 20 MG/1
20 CAPSULE, DELAYED RELEASE ORAL DAILY
Status: DISCONTINUED | OUTPATIENT
Start: 2018-10-08 | End: 2018-10-12 | Stop reason: HOSPADM

## 2018-10-08 RX ORDER — LABETALOL HYDROCHLORIDE 5 MG/ML
10 INJECTION, SOLUTION INTRAVENOUS EVERY 4 HOURS PRN
Status: DISCONTINUED | OUTPATIENT
Start: 2018-10-08 | End: 2018-10-12 | Stop reason: HOSPADM

## 2018-10-08 RX ORDER — ONDANSETRON 4 MG/1
4 TABLET, ORALLY DISINTEGRATING ORAL EVERY 4 HOURS PRN
Status: DISCONTINUED | OUTPATIENT
Start: 2018-10-08 | End: 2018-10-12 | Stop reason: HOSPADM

## 2018-10-08 RX ORDER — ONDANSETRON 2 MG/ML
4 INJECTION INTRAMUSCULAR; INTRAVENOUS EVERY 4 HOURS PRN
Status: DISCONTINUED | OUTPATIENT
Start: 2018-10-08 | End: 2018-10-12 | Stop reason: HOSPADM

## 2018-10-08 RX ORDER — ZOLPIDEM TARTRATE 10 MG/1
10 TABLET ORAL NIGHTLY PRN
COMMUNITY
End: 2018-10-20

## 2018-10-08 RX ORDER — ACETAMINOPHEN 325 MG/1
650 TABLET ORAL EVERY 6 HOURS PRN
Status: DISCONTINUED | OUTPATIENT
Start: 2018-10-08 | End: 2018-10-12 | Stop reason: HOSPADM

## 2018-10-08 RX ORDER — AZITHROMYCIN 250 MG/1
250 TABLET, FILM COATED ORAL DAILY
Status: COMPLETED | OUTPATIENT
Start: 2018-10-09 | End: 2018-10-12

## 2018-10-08 RX ORDER — PROPRANOLOL HCL 60 MG
120 CAPSULE, EXTENDED RELEASE 24HR ORAL DAILY
Status: DISCONTINUED | OUTPATIENT
Start: 2018-10-08 | End: 2018-10-12 | Stop reason: HOSPADM

## 2018-10-08 RX ORDER — TOPIRAMATE 25 MG/1
100 TABLET ORAL 2 TIMES DAILY
Status: DISCONTINUED | OUTPATIENT
Start: 2018-10-08 | End: 2018-10-12 | Stop reason: HOSPADM

## 2018-10-08 RX ORDER — SODIUM CHLORIDE 9 MG/ML
30 INJECTION, SOLUTION INTRAVENOUS
Status: DISCONTINUED | OUTPATIENT
Start: 2018-10-08 | End: 2018-10-12 | Stop reason: HOSPADM

## 2018-10-08 RX ORDER — AZITHROMYCIN 250 MG/1
500 TABLET, FILM COATED ORAL ONCE
Status: COMPLETED | OUTPATIENT
Start: 2018-10-08 | End: 2018-10-08

## 2018-10-08 RX ORDER — BISACODYL 10 MG
10 SUPPOSITORY, RECTAL RECTAL
Status: DISCONTINUED | OUTPATIENT
Start: 2018-10-08 | End: 2018-10-12 | Stop reason: HOSPADM

## 2018-10-08 RX ORDER — ESOMEPRAZOLE MAGNESIUM 40 MG/1
40 GRANULE, DELAYED RELEASE ORAL
Status: DISCONTINUED | OUTPATIENT
Start: 2018-10-08 | End: 2018-10-08

## 2018-10-08 RX ADMIN — AZITHROMYCIN 500 MG: 250 TABLET, FILM COATED ORAL at 09:56

## 2018-10-08 RX ADMIN — ACETAMINOPHEN 650 MG: 325 TABLET, FILM COATED ORAL at 23:32

## 2018-10-08 RX ADMIN — SODIUM CHLORIDE, POTASSIUM CHLORIDE, SODIUM LACTATE AND CALCIUM CHLORIDE 2109 ML: 600; 310; 30; 20 INJECTION, SOLUTION INTRAVENOUS at 03:45

## 2018-10-08 RX ADMIN — LIDOCAINE HYDROCHLORIDE AND EPINEPHRINE: 10; 10 INJECTION, SOLUTION INFILTRATION; PERINEURAL at 03:00

## 2018-10-08 RX ADMIN — PROPRANOLOL HYDROCHLORIDE 120 MG: 60 CAPSULE, EXTENDED RELEASE ORAL at 14:41

## 2018-10-08 RX ADMIN — TOPIRAMATE 100 MG: 100 TABLET, FILM COATED ORAL at 09:55

## 2018-10-08 RX ADMIN — OMEPRAZOLE 20 MG: 20 CAPSULE, DELAYED RELEASE ORAL at 09:57

## 2018-10-08 RX ADMIN — TOPIRAMATE 100 MG: 100 TABLET, FILM COATED ORAL at 17:17

## 2018-10-08 RX ADMIN — CEFTRIAXONE 2 G: 2 INJECTION, POWDER, FOR SOLUTION INTRAMUSCULAR; INTRAVENOUS at 03:45

## 2018-10-08 RX ADMIN — POTASSIUM CHLORIDE AND SODIUM CHLORIDE: 900; 150 INJECTION, SOLUTION INTRAVENOUS at 07:49

## 2018-10-08 RX ADMIN — POTASSIUM CHLORIDE AND SODIUM CHLORIDE 125 ML: 900; 150 INJECTION, SOLUTION INTRAVENOUS at 17:18

## 2018-10-08 ASSESSMENT — COGNITIVE AND FUNCTIONAL STATUS - GENERAL
SUGGESTED CMS G CODE MODIFIER MOBILITY: CH
TURNING FROM BACK TO SIDE WHILE IN FLAT BAD: A LITTLE
EATING MEALS: A LITTLE
MOBILITY SCORE: 24
SUGGESTED CMS G CODE MODIFIER DAILY ACTIVITY: CK
MOVING FROM LYING ON BACK TO SITTING ON SIDE OF FLAT BED: A LITTLE
MOBILITY SCORE: 17
PERSONAL GROOMING: A LITTLE
DRESSING REGULAR UPPER BODY CLOTHING: A LITTLE
STANDING UP FROM CHAIR USING ARMS: A LITTLE
SUGGESTED CMS G CODE MODIFIER DAILY ACTIVITY: CH
CLIMB 3 TO 5 STEPS WITH RAILING: A LOT
MOVING TO AND FROM BED TO CHAIR: A LITTLE
WALKING IN HOSPITAL ROOM: A LITTLE
DAILY ACTIVITIY SCORE: 17
TOILETING: A LITTLE
HELP NEEDED FOR BATHING: A LITTLE
DAILY ACTIVITIY SCORE: 24
DRESSING REGULAR LOWER BODY CLOTHING: A LOT
SUGGESTED CMS G CODE MODIFIER MOBILITY: CK

## 2018-10-08 ASSESSMENT — ENCOUNTER SYMPTOMS
DIZZINESS: 0
BACK PAIN: 0
FLANK PAIN: 0
NECK PAIN: 0
DIAPHORESIS: 0
FEVER: 0
MYALGIAS: 1
HEADACHES: 1
PALPITATIONS: 0
SORE THROAT: 0
DIARRHEA: 0
ABDOMINAL PAIN: 0
NAUSEA: 0
SHORTNESS OF BREATH: 1
BLOOD IN STOOL: 0
SPUTUM PRODUCTION: 1
BLURRED VISION: 0
COUGH: 1
BRUISES/BLEEDS EASILY: 0
SEIZURES: 0
VOMITING: 0
CHILLS: 1
WHEEZING: 0
FALLS: 1
FOCAL WEAKNESS: 0

## 2018-10-08 ASSESSMENT — COPD QUESTIONNAIRES
DURING THE PAST 4 WEEKS HOW MUCH DID YOU FEEL SHORT OF BREATH: NONE/LITTLE OF THE TIME
IN THE PAST 12 MONTHS DO YOU DO LESS THAN YOU USED TO BECAUSE OF YOUR BREATHING PROBLEMS: DISAGREE/UNSURE
DO YOU EVER COUGH UP ANY MUCUS OR PHLEGM?: NO/ONLY WITH OCCASIONAL COLDS OR INFECTIONS
COPD SCREENING SCORE: 0
DURING THE PAST 4 WEEKS HOW MUCH DID YOU FEEL SHORT OF BREATH: NONE/LITTLE OF THE TIME
HAVE YOU SMOKED AT LEAST 100 CIGARETTES IN YOUR ENTIRE LIFE: NO/DON'T KNOW
DO YOU EVER COUGH UP ANY MUCUS OR PHLEGM?: NO/ONLY WITH OCCASIONAL COLDS OR INFECTIONS

## 2018-10-08 ASSESSMENT — GAIT ASSESSMENTS
DISTANCE (FEET): 100
GAIT LEVEL OF ASSIST: MINIMAL ASSIST
ASSISTIVE DEVICE: FRONT WHEEL WALKER

## 2018-10-08 ASSESSMENT — LIFESTYLE VARIABLES
ON A TYPICAL DAY WHEN YOU DRINK ALCOHOL HOW MANY DRINKS DO YOU HAVE: 1
TOTAL SCORE: 0
EVER HAD A DRINK FIRST THING IN THE MORNING TO STEADY YOUR NERVES TO GET RID OF A HANGOVER: NO
HOW MANY TIMES IN THE PAST YEAR HAVE YOU HAD 5 OR MORE DRINKS IN A DAY: 0
EVER FELT BAD OR GUILTY ABOUT YOUR DRINKING: NO
TOTAL SCORE: 0
TOTAL SCORE: 0
HAVE YOU EVER FELT YOU SHOULD CUT DOWN ON YOUR DRINKING: NO
EVER_SMOKED: NEVER
ALCOHOL_USE: YES
AVERAGE NUMBER OF DAYS PER WEEK YOU HAVE A DRINK CONTAINING ALCOHOL: 1
CONSUMPTION TOTAL: NEGATIVE
HAVE PEOPLE ANNOYED YOU BY CRITICIZING YOUR DRINKING: NO
EVER_SMOKED: NEVER

## 2018-10-08 ASSESSMENT — PAIN SCALES - GENERAL
PAINLEVEL_OUTOF10: 5
PAINLEVEL_OUTOF10: 7
PAINLEVEL_OUTOF10: 8
PAINLEVEL_OUTOF10: 8
PAINLEVEL_OUTOF10: 0

## 2018-10-08 ASSESSMENT — PATIENT HEALTH QUESTIONNAIRE - PHQ9
2. FEELING DOWN, DEPRESSED, IRRITABLE, OR HOPELESS: NOT AT ALL
SUM OF ALL RESPONSES TO PHQ9 QUESTIONS 1 AND 2: 0
1. LITTLE INTEREST OR PLEASURE IN DOING THINGS: NOT AT ALL

## 2018-10-08 ASSESSMENT — ACTIVITIES OF DAILY LIVING (ADL): TOILETING: INDEPENDENT

## 2018-10-08 NOTE — ASSESSMENT & PLAN NOTE
CK level continue to trending downward with hydration.   Given the patient is eating and drinking without difficulty.  Discontinued IV fluid.

## 2018-10-08 NOTE — ASSESSMENT & PLAN NOTE
This is sepsis (without associated acute organ dysfunction).   Likely source is pneumonia  Continue IV fluids per septic protocol  Lactate is within normal limits  Continue on IV ceftriaxone and azithromycin  Follow blood cultures

## 2018-10-08 NOTE — THERAPY
"Occupational Therapy Evaluation completed.   Functional Status:  Min A supine to sit.  Max A to don socks.  CGA sit to stand.  Pt walked out doorway using FWW.  Pt easily fatigued and c/o R hip pain with movement.  Min A to return to supine.  Plan of Care: Will benefit from Occupational Therapy 3 times per week  Discharge Recommendations:  Equipment: Will Continue to Assess for Equipment Needs. At this time, pt appears to have a need for further therapy at rehab or SNF prior to DC home.    See \"Rehab Therapy-Acute\" Patient Summary Report for complete documentation.    "

## 2018-10-08 NOTE — CARE PLAN
Problem: Safety  Goal: Will remain free from injury  Outcome: PROGRESSING AS EXPECTED  Provide assistance with mobility    Problem: Infection  Goal: Will remain free from infection  Outcome: PROGRESSING AS EXPECTED  Patient on wide spectrum antibiotics

## 2018-10-08 NOTE — PROGRESS NOTES
Patient requesting to have central line and ng remain in place.  Will continue to discuss risk factors with her.

## 2018-10-08 NOTE — PROGRESS NOTES
Patient arrived to the unit to Rm [id number].  Patient is tachycardic  , /103.  Call bell in reach.  Oriented to room.  All needs met

## 2018-10-08 NOTE — ED NOTES
Lab called with critical result of CPK 3,106 at 0325. Critical lab result read back to lab.   Dr. Franz notified of critical lab result at 0326.

## 2018-10-08 NOTE — ASSESSMENT & PLAN NOTE
continue oral antibiotics to complete a 7 days course.  procalcitonin is low  Flu negative.  RT protocol   Negative blood cultures  Repeat CXR and nursing staff to wean off oxygen.

## 2018-10-08 NOTE — ASSESSMENT & PLAN NOTE
PT and OT evaluation.  SNF referral  Right hip pain, no acute fracture on X-ray.  CT hip is negative for acute fracture.

## 2018-10-08 NOTE — PROGRESS NOTES
Pt seen and examined.    46yo F with PMHx of blindness, hydrocephalus s/p -shunt and seizure disorder was admitted for multiple GLFs and sepsis due to CAP.    Sepsis (HCC)  This is sepsis (without associated acute organ dysfunction).   Likely source is pneumonia  Cont IV fluids per septic protocol  Lactate is within normal limits  Patient is started on IV ceftriaxone and azithromycin  Follow blood cultures    Traumatic rhabdomyolysis (HCC)  Started on IV fluid hydration with normal saline  Monitor CPK    Fall from ground level  PTOT    Seizure (HCC)  Continue Topamax    Essential hypertension  Uncontrolled  Started on IV labetalol as needed for SBP greater than 160  Continue propanolol  mg daily    CAP (community acquired pneumonia)  Patient has been started on IV Ceftriaxone and azithromycin  Check pro calcitonin, if within normal limits we'll consider de-escalating antibiotics  Flu pending  RT protocol   Follow blood cultures

## 2018-10-08 NOTE — ED PROVIDER NOTES
ED Provider Note    ED Provider Note      Primary care provider: AUREA Estrada    CHIEF COMPLAINT  Chief Complaint   Patient presents with   • Syncope     pt found on floor at residence   • Leg Pain     right upper       HPI  Rasheeda Manzano is a 47 y.o. female who presents to the Emergency Department with chief complaint of syncope/right thigh pain.  Patient legally blind.  She was at home reports that she has been having some cramping type pain in her left thigh this evening she attempted to get out of bed had acute onset of pain in the right thigh and collapsed to the ground.  She did hit her head she denies loss of consciousness.  She reports that she felt as though she was on the floor for approximately 45 minutes.  She had no chest pain prior to the event no palpitations she has had moderate ongoing headache frontal over the last few days which is her usual headache.  She has had chills without measured fever she is a minor nausea without emesis she denies urinary or stooling complaints.  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  No other acute symptoms or concerns.    REVIEW OF SYSTEMS  10 systems reviewed and otherwise negative, pertinent positives and negatives listed in the history of present illness.    PAST MEDICAL HISTORY   has a past medical history of Blind; C. difficile diarrhea (2013); Chronic daily headache; Depression; Fall; Hydrocephalus; Hydrocephalus; Jaundice; Legally blind; Migraine without aura, without mention of intractable migraine without mention of status migrainosus; Other specified symptom associated with female genital organs; Pain; and Psychiatric problem.    SURGICAL HISTORY   has a past surgical history that includes laparoscopy (8/8/08); lysis adhesions general (12/10/2013); cystoscopy (12/10/2013); exploratory laparotomy (12/10/2013);  "appendectomy (12/10/2013); abdominal hysterectomy total (12/10/2013); aakash by laparoscopy (N/A, 8/13/2015); cholecystectomy (N/A, 8/13/2015); other; and gastroscopy-endo (N/A, 8/24/2017).    SOCIAL HISTORY  Social History   Substance Use Topics   • Smoking status: Former Smoker     Packs/day: 1.00     Years: 10.00     Types: Cigars     Start date: 5/1/2004     Quit date: 8/9/2017   • Smokeless tobacco: Never Used      Comment:  CIGAR/day    • Alcohol use Yes      Comment: socially      History   Drug Use No       FAMILY HISTORY  Non-Contributory    CURRENT MEDICATIONS  Home Medications    **Home medications have not yet been reviewed for this encounter**         ALLERGIES  Allergies   Allergen Reactions   • Tape Rash     Medical tape. Per patient, paper tape ok.       PHYSICAL EXAM  VITAL SIGNS: /81   Pulse (!) 124   Temp 36.3 °C (97.3 °F)   Resp 20   Ht 1.626 m (5' 4\")   Wt 70.3 kg (155 lb)   LMP 11/27/2013   SpO2 96%   BMI 26.61 kg/m²   Pulse ox interpretation: I interpret this pulse ox as normal.  Constitutional: Alert and oriented x 3, moderate distress  HEENT: Patient has dried blood in bilateral nares, minimal dried blood over the mucosal aspects of her lips and teeth.  No identifiable laceration, disconjugate gaze. The nares is clear, external ears are normal, mouth shows dry mucous membranes  Neck: Supple, no JVD no tracheal deviation  Cardiovascular: Profound tachycardia no murmur rub or gallop thready pulses in periphery x4  Thorax & Lungs: Tachypnea, no wheezes rales or rhonchi, No chest tenderness.   GI: Soft nontender nondistended positive bowel sounds, no peritoneal signs  Skin: Pale cool decreased skin turgor  Musculoskeletal: Moving all extremities with full range and 5 of 5 strength, no acute  deformity  Neurologic: Cranial nerves III through XII are grossly intact, no sensory deficit, no cerebellar dysfunction   Psychiatric: Appropriate affect for situation at this " time      DIAGNOSTIC STUDIES / PROCEDURES      Results for orders placed or performed during the hospital encounter of 10/08/18   LACTIC ACID   Result Value Ref Range    Lactic Acid 1.9 0.5 - 2.0 mmol/L   CBC WITH DIFFERENTIAL   Result Value Ref Range    WBC 12.5 (H) 4.8 - 10.8 K/uL    RBC 4.73 4.20 - 5.40 M/uL    Hemoglobin 14.1 12.0 - 16.0 g/dL    Hematocrit 41.9 37.0 - 47.0 %    MCV 88.6 81.4 - 97.8 fL    MCH 29.8 27.0 - 33.0 pg    MCHC 33.7 33.6 - 35.0 g/dL    RDW 43.4 35.9 - 50.0 fL    Platelet Count 215 164 - 446 K/uL    MPV 11.0 9.0 - 12.9 fL    Neutrophils-Polys 81.10 (H) 44.00 - 72.00 %    Lymphocytes 6.60 (L) 22.00 - 41.00 %    Monocytes 10.70 0.00 - 13.40 %    Eosinophils 1.00 0.00 - 6.90 %    Basophils 0.30 0.00 - 1.80 %    Immature Granulocytes 0.30 0.00 - 0.90 %    Nucleated RBC 0.00 /100 WBC    Neutrophils (Absolute) 10.12 (H) 2.00 - 7.15 K/uL    Lymphs (Absolute) 0.82 (L) 1.00 - 4.80 K/uL    Monos (Absolute) 1.33 (H) 0.00 - 0.85 K/uL    Eos (Absolute) 0.12 0.00 - 0.51 K/uL    Baso (Absolute) 0.04 0.00 - 0.12 K/uL    Immature Granulocytes (abs) 0.04 0.00 - 0.11 K/uL    NRBC (Absolute) 0.00 K/uL   COMP METABOLIC PANEL   Result Value Ref Range    Sodium 141 135 - 145 mmol/L    Potassium 3.5 (L) 3.6 - 5.5 mmol/L    Chloride 108 96 - 112 mmol/L    Co2 24 20 - 33 mmol/L    Anion Gap 9.0 0.0 - 11.9    Glucose 117 (H) 65 - 99 mg/dL    Bun 20 8 - 22 mg/dL    Creatinine 0.80 0.50 - 1.40 mg/dL    Calcium 10.1 8.5 - 10.5 mg/dL    AST(SGOT) 67 (H) 12 - 45 U/L    ALT(SGPT) 40 2 - 50 U/L    Alkaline Phosphatase 107 (H) 30 - 99 U/L    Total Bilirubin 0.7 0.1 - 1.5 mg/dL    Albumin 4.0 3.2 - 4.9 g/dL    Total Protein 7.7 6.0 - 8.2 g/dL    Globulin 3.7 (H) 1.9 - 3.5 g/dL    A-G Ratio 1.1 g/dL   URINALYSIS   Result Value Ref Range    Color Yellow     Character Clear     Specific Gravity 1.023 <1.035    Ph 6.0 5.0 - 8.0    Glucose Negative Negative mg/dL    Ketones Negative Negative mg/dL    Protein Negative Negative  mg/dL    Bilirubin Negative Negative    Urobilinogen, Urine 1.0 Negative    Nitrite Negative Negative    Leukocyte Esterase Trace (A) Negative    Occult Blood Negative Negative    Micro Urine Req Microscopic    MAGNESIUM   Result Value Ref Range    Magnesium 2.0 1.5 - 2.5 mg/dL   CREATINE KINASE   Result Value Ref Range    CPK Total 3106 (HH) 0 - 154 U/L   ESTIMATED GFR   Result Value Ref Range    GFR If African American >60 >60 mL/min/1.73 m 2    GFR If Non African American >60 >60 mL/min/1.73 m 2   URINE MICROSCOPIC (W/UA)   Result Value Ref Range    WBC 2-5 /hpf    RBC 0-2 /hpf    Bacteria Few (A) None /hpf    Epithelial Cells Few /hpf    Hyaline Cast 0-2 /lpf   EKG   Result Value Ref Range    Report       [facility] Emergency Dept.    Test Date:  2018-10-08  Pt Name:    NILAY MANN               Department: ER  MRN:        [id number]                      Room:       [facility]  Gender:     Female                       Technician: [id number]  :        1970                   Requested By:DAWOOD WATTS  Order #:    [id number]                    Reading MD: DAWOOD WATTS MD    Measurements  Intervals                                Axis  Rate:       136                          P:          53  NV:         148                          QRS:        79  QRSD:       84                           T:          244  QT:         260  QTc:        392    Interpretive Statements  SINUS TACHYCARDIA  NONSPECIFIC REPOL ABNORMALITY, DIFFUSE LEADS  Compared to ECG 2017 13:28:28  Early repolarization now present  Sinus rhythm no longer present    Electronically Signed On 10-8-2018 5:14:31 PDT by DAWOOD WATTS MD         All labs reviewed by me.      RADIOLOGY  DX-FEMUR-2+ RIGHT   Final Result      There is no fracture or dislocation.      DX-CHEST-PORTABLE (1 VIEW)   Final Result      1.  The tip of the central line projects over the superior vena cava.   2.  Patchy airspace densities in the  bilateral midlung fields concerning for infection/pneumonia. This is new since the previous study.      DX-PELVIS-1 OR 2 VIEWS   Final Result      There is no fracture or dislocation.      CT-HEAD W/O   Final Result      1.  There is no hydrocephalus.   2.  Ventriculoperitoneal shunt catheter.   3.  Left parietal encephalomalacia.   4.  There has been no significant interval change.        The radiologist's interpretation of all radiological studies have been reviewed by me.    COURSE & MEDICAL DECISION MAKING  Pertinent Labs & Imaging studies reviewed. (See chart for details)    2:02 AM - Patient seen and examined at bedside.  Patient appears profoundly dehydrated she is tachycardic concern for sepsis.  Possible lung downtime concern for rhabdomyolysis.  Orders placed for full sepsis protocol CPK CT head chest x-ray pelvis right hip femur.      Difficult IV access.  I placed a right upper extremity basilic 18-gauge.  But patient obviously is to require more reliable access verbally consented for central line placement.    Central Line Placement Procedure Note  Indication: vascular access, poor peripheral access, hypovolemia, long term access, central venous monitoring and need for frequent blood draws    Consent: The patient provided verbal consent for this procedure.    Procedure: The patient was positioned appropriately and the skin over the patient initially prepped over the left subclavian vein due to concerns of calcification along the right IJ.  Attempt was made at cannulation of the left subclavian vein under dynamic ultrasound guidance however patient had compression of the vasculature with each attempt that was approaching the pleural space.  The procedure was aborted from that location patient was reprepped and redraped and then the right sided internal jugular was accessed by large bore needle under dynamic ultrasound guidance.  A guide wire was then inserted into the vein through the needle. A triple  "lumen catheter was then inserted into the vessel over the guide wire using the Seldinger technique.  All ports showed good, free flowing blood return and were flushed with saline solution.  The catheter was then securely fastened to the skin with sutures and covered with a sterile dressing.  A post procedure X-ray was ordered and showed good line position.    The patient tolerated the procedure well.    Complications: None            Patient noted to have slightly elevated blood pressure likely circumstantial secondary to presenting complaint. Referred to primary care physician for further evaluation.     Patient was given IV fluids based on tachycardia dry mucous membranes, oral hydration was not attempted due to inability to tolerate p.o. and insufficiency for hydration, after fluids had decreased pulse.        CT head shows no acute changes chest x-ray shows line in place probable pneumonia patient previously given Rocephin we will add a azithromycin coverage.  No other acute fractures or concerns.  Discussed management with the patient should be admitted for ongoing hydration and antibiotic administration.  I discussed the case with hospitalist Dr. Sargent.  His help with this patient's greatly appreciated admitted in critical condition.    Critical care:  45 minutes of critical care time was spent with this patient including initial evaluation initial resuscitation the ordering of labs EKGs images and rhythm strip interpretation of such. Interventions based on such. Discussing the case with the patient consulting physicians. Does not include separately billable procedures.    /81   Pulse (!) 124   Temp 36.3 °C (97.3 °F)   Resp 20   Ht 1.626 m (5' 4\")   Wt 70.3 kg (155 lb)   LMP 11/27/2013   SpO2 96%   BMI 26.61 kg/m²             FINAL IMPRESSION  1.  Syncope  2.  Right lower extremity pain  3.  Profound dehydration  4.  Rhabdomyolysis  5.  Pneumonia  6.  Central line placed by ERP  7.  Critical " care 45 minutes      This dictation has been created using voice recognition software and/or scribes. The accuracy of the dictation is limited by the abilities of the software and the expertise of the scribes. I expect there may be some errors of grammar and possibly content. I made every attempt to manually correct the errors within my dictation. However, errors related to voice recognition software and/or scribes may still exist and should be interpreted within the appropriate context.

## 2018-10-08 NOTE — ASSESSMENT & PLAN NOTE
Uncontrolled on arrival.  Better controlled now.  continue on IV labetalol as needed for SBP greater than 160  Continue propanolol  mg daily

## 2018-10-08 NOTE — H&P
Hospital Medicine History & Physical Note    Date of Service  10/8/2018    Primary Care Physician  AUREA Estrada    Consultants  None    Code Status  Full Code    Chief Complaint  GLF    History of Presenting Illness  47 y.o. female with a past medical history of hydrocephalus, seizure disorderwho presented 10/8/2018 with ground-level fall that occurred yesterday. Patient is legally blind.  Patient states that she got out of bed when she felt her legs giving out and fell to the floor.  She hit her head but denied any loss of consciousness.  She attempted to get back up however she kept having multiple falls.  She noticed pain in her right thigh.  She reports a mild headache.  She also reports a productive cough with some shortness of breath and chills.  She denies any chest pain, lightheadedness, nausea, vomiting, focal muscle weakness or numbness or tingling.    Chest x-ray interpreted by me reveals bilateral airspace opacities in mid lung fields.  EKG interpreted by me reveals sinus tachycardia with mild ST depression in V4 and V5    Review of Systems  Review of Systems   Constitutional: Positive for chills. Negative for diaphoresis and fever.   HENT: Negative for hearing loss and sore throat.    Eyes: Negative for blurred vision.   Respiratory: Positive for cough, sputum production and shortness of breath. Negative for wheezing.    Cardiovascular: Negative for chest pain, palpitations and leg swelling.   Gastrointestinal: Negative for abdominal pain, blood in stool, diarrhea, nausea and vomiting.   Genitourinary: Negative for dysuria, flank pain and urgency.   Musculoskeletal: Positive for falls and myalgias. Negative for back pain, joint pain and neck pain.   Skin: Negative for rash.   Neurological: Positive for headaches. Negative for dizziness, focal weakness and seizures.   Endo/Heme/Allergies: Does not bruise/bleed easily.   Psychiatric/Behavioral: Negative for suicidal ideas.   All other systems  reviewed and are negative.      Past Medical History   has a past medical history of Blind; C. difficile diarrhea (2013); Chronic daily headache; Depression; Fall; Hydrocephalus; Hydrocephalus; Jaundice; Legally blind; Migraine without aura, without mention of intractable migraine without mention of status migrainosus; Other specified symptom associated with female genital organs; Pain; and Psychiatric problem.    Surgical History   has a past surgical history that includes laparoscopy (8/8/08); lysis adhesions general (12/10/2013); cystoscopy (12/10/2013); exploratory laparotomy (12/10/2013); appendectomy (12/10/2013); abdominal hysterectomy total (12/10/2013); aakash by laparoscopy (N/A, 8/13/2015); cholecystectomy (N/A, 8/13/2015); other; and gastroscopy-endo (N/A, 8/24/2017).     Family History  family history includes Hypertension in her father and mother.     Social History   reports that she quit smoking about 13 months ago. Her smoking use included Cigars. She started smoking about 14 years ago. She has a 10.00 pack-year smoking history. She has never used smokeless tobacco. She reports that she drinks alcohol. She reports that she does not use drugs.    Allergies  Allergies   Allergen Reactions   • Tape Rash     Medical tape. Per patient, paper tape ok.       Medications  Prior to Admission Medications   Prescriptions Last Dose Informant Patient Reported? Taking?   esomeprazole magnesium (NEXIUM) 40 MG Pack 9/20/2018 at Unknown time  Yes No   Sig: Take 40 mg by mouth every morning before breakfast.   ketorolac (TORADOL) 10 MG Tab 9/20/2018 at Unknown time  No No   Sig: Take 1 Tab by mouth every 6 hours as needed for Moderate Pain.   propranolol CR (INDERAL LA) 120 MG CAPSULE SR 24 HR 9/20/2018 at Unknown time  Yes No   Sig: Take 120 mg by mouth every day.   topiramate (TOPAMAX) 100 MG Tab not taking at Unknown time  Yes No   Sig: Take 100 mg by mouth 2 times a day.      Facility-Administered Medications:  None       Physical Exam  Temp:  [36.3 °C (97.3 °F)] 36.3 °C (97.3 °F)  Pulse:  [124] 124  Resp:  [20-56] 56  BP: (124)/(81) 124/81    Physical Exam   Constitutional: She is oriented to person, place, and time. She appears well-developed and well-nourished. No distress.   HENT:   Head: Normocephalic and atraumatic.   Oral mucous members are dry   Eyes: Conjunctivae are normal.   Neck: Neck supple. No thyromegaly present.   Cardiovascular: Regular rhythm and normal heart sounds.    Tachycardic   Pulmonary/Chest: She is in respiratory distress. She has no wheezes. She has no rales.   Diminished breath sounds in the bases  Scattered rhonchi   Abdominal: Soft. Bowel sounds are normal. She exhibits no distension. There is no tenderness. There is no rebound.   Musculoskeletal: Normal range of motion. She exhibits no edema or tenderness.   Neurological: She is alert and oriented to person, place, and time. No cranial nerve deficit. Coordination normal.   Strength and sensation intact bilaterally   Skin: Skin is warm and dry.   Psychiatric: She has a normal mood and affect. Her behavior is normal.   Nursing note and vitals reviewed.      Laboratory:  Recent Labs      10/08/18   0243   WBC  12.5*   RBC  4.73   HEMOGLOBIN  14.1   HEMATOCRIT  41.9   MCV  88.6   MCH  29.8   MCHC  33.7   RDW  43.4   PLATELETCT  215   MPV  11.0     Recent Labs      10/08/18   0243   SODIUM  141   POTASSIUM  3.5*   CHLORIDE  108   CO2  24   GLUCOSE  117*   BUN  20   CREATININE  0.80   CALCIUM  10.1     Recent Labs      10/08/18   0243   ALTSGPT  40   ASTSGOT  67*   ALKPHOSPHAT  107*   TBILIRUBIN  0.7   GLUCOSE  117*                 No results for input(s): TROPONINI in the last 72 hours.    Urinalysis:    Recent Labs      10/08/18   0347   SPECGRAVITY  1.023   GLUCOSEUR  Negative   KETONES  Negative   NITRITE  Negative   LEUKESTERAS  Trace*   WBCURINE  2-5   RBCURINE  0-2   BACTERIA  Few*   EPITHELCELL  Few        Imaging:  DX-FEMUR-2+ RIGHT    Final Result      There is no fracture or dislocation.      DX-CHEST-PORTABLE (1 VIEW)   Final Result      1.  The tip of the central line projects over the superior vena cava.   2.  Patchy airspace densities in the bilateral midlung fields concerning for infection/pneumonia. This is new since the previous study.      DX-PELVIS-1 OR 2 VIEWS   Final Result      There is no fracture or dislocation.      CT-HEAD W/O   Final Result      1.  There is no hydrocephalus.   2.  Ventriculoperitoneal shunt catheter.   3.  Left parietal encephalomalacia.   4.  There has been no significant interval change.            Assessment/Plan:  I anticipate this patient will require at least two midnights for appropriate medical management, necessitating inpatient admission.    Sepsis (HCC)- (present on admission)   Assessment & Plan    This is sepsis (without associated acute organ dysfunction).   Likely source is pneumonia  Patient has been started on IV fluids per septic protocol  Lactate is within normal limits  Patient is started on IV ceftriaxone and azithromycin  Follow blood cultures          Seizure (HCC)- (present on admission)   Assessment & Plan    Continue Topamax        CAP (community acquired pneumonia)- (present on admission)   Assessment & Plan    Patient has been started on IV Ceftriaxone and azithromycin  Check pro calcitonin, if within normal limits we'll consider de-escalating antibiotics  RT protocol   Follow blood cultures          Essential hypertension   Assessment & Plan    Uncontrolled  Started on IV labetalol as needed for SBP greater than 160  Continue propanolol  mg daily        Fall from ground level- (present on admission)   Assessment & Plan    PTOT        Traumatic rhabdomyolysis (HCC)- (present on admission)   Assessment & Plan    Started on IV fluid hydration with normal saline  Monitor CPK            VTE prophylaxis: SCD

## 2018-10-08 NOTE — THERAPY
"Physical Therapy Evaluation completed.   Bed Mobility:  Supine to Sit: Minimal Assist  Transfers: Sit to Stand: Contact Guard Assist  Gait: Level Of Assist: Minimal Assist with Front-Wheel Walker       Plan of Care: Will benefit from Physical Therapy 3 times per week  Discharge Recommendations: Equipment: Will Continue to Assess for Equipment Needs.  Pt presents s/p falls at home, rhabdomyolysis, pneumonia, sepsis. Pt with h/o R LE buckling 5 x on day of admit. Today pt tolerated ambulation using FWW, unsteady at R LE at first, better with ambulation. Pt lives alone,may need a post acute transitional care stay before home to get stronger. Pt will be seen by inpt PT to address problems and assist with d/c plan.      See \"Rehab Therapy-Acute\" Patient Summary Report for complete documentation.     "

## 2018-10-08 NOTE — ED TRIAGE NOTES
"Pt to rm 17 per ems, pt called due to fall at home onto floor, pt blind/lives by herself, c/o mod pain to right upper thigh, aox3, resp even/unlabored, pt appears to have been down on floor \"for longer than an hour like she thinks\" d/t dried secretions and dirty clothing  "

## 2018-10-09 ENCOUNTER — APPOINTMENT (OUTPATIENT)
Dept: RADIOLOGY | Facility: MEDICAL CENTER | Age: 48
DRG: 871 | End: 2018-10-09
Attending: INTERNAL MEDICINE
Payer: MEDICAID

## 2018-10-09 LAB
ANION GAP SERPL CALC-SCNC: 9 MMOL/L (ref 0–11.9)
BASOPHILS # BLD AUTO: 0.3 % (ref 0–1.8)
BASOPHILS # BLD: 0.03 K/UL (ref 0–0.12)
BUN SERPL-MCNC: 8 MG/DL (ref 8–22)
CALCIUM SERPL-MCNC: 9 MG/DL (ref 8.5–10.5)
CHLORIDE SERPL-SCNC: 114 MMOL/L (ref 96–112)
CK SERPL-CCNC: 889 U/L (ref 0–154)
CO2 SERPL-SCNC: 18 MMOL/L (ref 20–33)
CREAT SERPL-MCNC: 0.54 MG/DL (ref 0.5–1.4)
EOSINOPHIL # BLD AUTO: 0.25 K/UL (ref 0–0.51)
EOSINOPHIL NFR BLD: 2.7 % (ref 0–6.9)
ERYTHROCYTE [DISTWIDTH] IN BLOOD BY AUTOMATED COUNT: 44 FL (ref 35.9–50)
GLUCOSE SERPL-MCNC: 90 MG/DL (ref 65–99)
HCT VFR BLD AUTO: 34.9 % (ref 37–47)
HGB BLD-MCNC: 11.6 G/DL (ref 12–16)
IMM GRANULOCYTES # BLD AUTO: 0.08 K/UL (ref 0–0.11)
IMM GRANULOCYTES NFR BLD AUTO: 0.9 % (ref 0–0.9)
LYMPHOCYTES # BLD AUTO: 1.6 K/UL (ref 1–4.8)
LYMPHOCYTES NFR BLD: 17.5 % (ref 22–41)
MCH RBC QN AUTO: 30.1 PG (ref 27–33)
MCHC RBC AUTO-ENTMCNC: 33.2 G/DL (ref 33.6–35)
MCV RBC AUTO: 90.4 FL (ref 81.4–97.8)
MONOCYTES # BLD AUTO: 0.84 K/UL (ref 0–0.85)
MONOCYTES NFR BLD AUTO: 9.2 % (ref 0–13.4)
NEUTROPHILS # BLD AUTO: 6.35 K/UL (ref 2–7.15)
NEUTROPHILS NFR BLD: 69.4 % (ref 44–72)
NRBC # BLD AUTO: 0 K/UL
NRBC BLD-RTO: 0 /100 WBC
PLATELET # BLD AUTO: 199 K/UL (ref 164–446)
PMV BLD AUTO: 11.4 FL (ref 9–12.9)
POTASSIUM SERPL-SCNC: 3.6 MMOL/L (ref 3.6–5.5)
RBC # BLD AUTO: 3.86 M/UL (ref 4.2–5.4)
SODIUM SERPL-SCNC: 141 MMOL/L (ref 135–145)
WBC # BLD AUTO: 9.2 K/UL (ref 4.8–10.8)

## 2018-10-09 PROCEDURE — 36415 COLL VENOUS BLD VENIPUNCTURE: CPT

## 2018-10-09 PROCEDURE — 85025 COMPLETE CBC W/AUTO DIFF WBC: CPT

## 2018-10-09 PROCEDURE — 700111 HCHG RX REV CODE 636 W/ 250 OVERRIDE (IP): Performed by: INTERNAL MEDICINE

## 2018-10-09 PROCEDURE — 700105 HCHG RX REV CODE 258: Performed by: INTERNAL MEDICINE

## 2018-10-09 PROCEDURE — 82550 ASSAY OF CK (CPK): CPT

## 2018-10-09 PROCEDURE — 700101 HCHG RX REV CODE 250: Performed by: INTERNAL MEDICINE

## 2018-10-09 PROCEDURE — 80048 BASIC METABOLIC PNL TOTAL CA: CPT

## 2018-10-09 PROCEDURE — 99232 SBSQ HOSP IP/OBS MODERATE 35: CPT | Performed by: INTERNAL MEDICINE

## 2018-10-09 PROCEDURE — A9270 NON-COVERED ITEM OR SERVICE: HCPCS | Performed by: INTERNAL MEDICINE

## 2018-10-09 PROCEDURE — 94668 MNPJ CHEST WALL SBSQ: CPT

## 2018-10-09 PROCEDURE — 94760 N-INVAS EAR/PLS OXIMETRY 1: CPT

## 2018-10-09 PROCEDURE — 700102 HCHG RX REV CODE 250 W/ 637 OVERRIDE(OP): Performed by: INTERNAL MEDICINE

## 2018-10-09 PROCEDURE — 94667 MNPJ CHEST WALL 1ST: CPT

## 2018-10-09 PROCEDURE — 73700 CT LOWER EXTREMITY W/O DYE: CPT | Mod: RT

## 2018-10-09 PROCEDURE — 770020 HCHG ROOM/CARE - TELE (206)

## 2018-10-09 RX ORDER — HYDROCODONE BITARTRATE AND ACETAMINOPHEN 10; 325 MG/1; MG/1
1-2 TABLET ORAL EVERY 6 HOURS PRN
Status: DISCONTINUED | OUTPATIENT
Start: 2018-10-09 | End: 2018-10-12 | Stop reason: HOSPADM

## 2018-10-09 RX ADMIN — CEFTRIAXONE SODIUM 1 G: 1 INJECTION, POWDER, FOR SOLUTION INTRAMUSCULAR; INTRAVENOUS at 04:26

## 2018-10-09 RX ADMIN — HYDROCODONE BITARTRATE AND ACETAMINOPHEN 2 TABLET: 10; 325 TABLET ORAL at 23:50

## 2018-10-09 RX ADMIN — OMEPRAZOLE 20 MG: 20 CAPSULE, DELAYED RELEASE ORAL at 04:27

## 2018-10-09 RX ADMIN — AZITHROMYCIN MONOHYDRATE 250 MG: 250 TABLET ORAL at 04:27

## 2018-10-09 RX ADMIN — POTASSIUM CHLORIDE AND SODIUM CHLORIDE: 900; 150 INJECTION, SOLUTION INTRAVENOUS at 09:41

## 2018-10-09 RX ADMIN — POTASSIUM CHLORIDE AND SODIUM CHLORIDE: 900; 150 INJECTION, SOLUTION INTRAVENOUS at 00:51

## 2018-10-09 RX ADMIN — HYDROCODONE BITARTRATE AND ACETAMINOPHEN 2 TABLET: 10; 325 TABLET ORAL at 17:31

## 2018-10-09 RX ADMIN — PROPRANOLOL HYDROCHLORIDE 120 MG: 60 CAPSULE, EXTENDED RELEASE ORAL at 04:27

## 2018-10-09 RX ADMIN — TOPIRAMATE 100 MG: 100 TABLET, FILM COATED ORAL at 17:31

## 2018-10-09 RX ADMIN — ACETAMINOPHEN 650 MG: 325 TABLET, FILM COATED ORAL at 05:48

## 2018-10-09 RX ADMIN — HYDROCODONE BITARTRATE AND ACETAMINOPHEN 1 TABLET: 10; 325 TABLET ORAL at 11:24

## 2018-10-09 RX ADMIN — SENNOSIDES AND DOCUSATE SODIUM 2 TABLET: 8.6; 5 TABLET ORAL at 04:27

## 2018-10-09 RX ADMIN — TOPIRAMATE 100 MG: 100 TABLET, FILM COATED ORAL at 04:27

## 2018-10-09 ASSESSMENT — PAIN SCALES - GENERAL
PAINLEVEL_OUTOF10: 7
PAINLEVEL_OUTOF10: 6
PAINLEVEL_OUTOF10: 3
PAINLEVEL_OUTOF10: 8
PAINLEVEL_OUTOF10: 8
PAINLEVEL_OUTOF10: 3
PAINLEVEL_OUTOF10: 7
PAINLEVEL_OUTOF10: 8

## 2018-10-09 ASSESSMENT — PATIENT HEALTH QUESTIONNAIRE - PHQ9
1. LITTLE INTEREST OR PLEASURE IN DOING THINGS: NOT AT ALL
2. FEELING DOWN, DEPRESSED, IRRITABLE, OR HOPELESS: NOT AT ALL
SUM OF ALL RESPONSES TO PHQ9 QUESTIONS 1 AND 2: 0

## 2018-10-09 NOTE — PROGRESS NOTES
Patient resting in bed for majority of morning. RN was able to assist patient with ambulation to the bathroom a few times early on. Patient was able to ambulate with the help of her front wheel walker. Patient gait assessed as steady but did require direction from the RN due to lack of vision. Patient did have some complaints of pain. RN was able to provide intervention per MAR. Patient declines any additional needs at this time. Call light within reach. Patient refusing bed alarm at this time but is calling appropriately. Call light within reach. Additional fall precautions in place. Will continue with hourly rounding.

## 2018-10-09 NOTE — PROGRESS NOTES
Bedside report received. Patient laying down in bed. RN assessed pain; patient stated pain present in head and neck. RN provided education regarding pain medications per MAR. Patient educated to call for assistance before ambulating; call light within reach. Need for bed alarm assessed; patient is at moderate risk for falls. Precautions in place as appropriate.

## 2018-10-09 NOTE — CARE PLAN
Problem: Safety  Goal: Will remain free from falls    Intervention: Implement fall precautions   10/09/18 0926   OTHER   Environmental Precautions Treaded Slipper Socks on Patient;Personal Belongings, Wastebasket, Call Bell etc. in Easy Reach;Transferred to Stronger Side;Communication Sign for Patients & Families;Report Given to Other Health Care Providers Regarding Fall Risk;Bed in Low Position;Mobility Assessed & Appropriate Sign Placed   Bedrails Bedrails Closest to Bathroom Down   Chair/Bed Strip Alarm Patient Educated Regarding Fall Risk and Need for Bed Alarm, Understands and Continues to Refuse   Bed Alarm Patient Educated Regarding Fall Risk and Need for Bed Alarm, Understands and Continues to Refuse         Problem: Knowledge Deficit  Goal: Knowledge of disease process/condition, treatment plan, diagnostic tests, and medications will improve    Intervention: Assess knowledge level of disease process/condition, treatment plan, diagnostic tests, and medications  Rn will provided education to patient regarding disease process, treatment plan, diagnostic tests and medications pertinent to the patient's condition. RN will answer questions that the patient has related to their condition.

## 2018-10-09 NOTE — RESPIRATORY CARE
COPD EDUCATION by COPD CLINICAL EDUCATOR  10/9/2018 at 5:38 AM by Khushboo Martinez     Patient reviewed by COPD education team. Patient does not qualify for COPD program.

## 2018-10-10 LAB
ALBUMIN SERPL BCP-MCNC: 3.3 G/DL (ref 3.2–4.9)
BACTERIA UR CULT: NORMAL
BASOPHILS # BLD AUTO: 0.6 % (ref 0–1.8)
BASOPHILS # BLD: 0.05 K/UL (ref 0–0.12)
BUN SERPL-MCNC: 10 MG/DL (ref 8–22)
CALCIUM SERPL-MCNC: 9 MG/DL (ref 8.5–10.5)
CHLORIDE SERPL-SCNC: 112 MMOL/L (ref 96–112)
CK SERPL-CCNC: 595 U/L (ref 0–154)
CO2 SERPL-SCNC: 18 MMOL/L (ref 20–33)
CREAT SERPL-MCNC: 0.74 MG/DL (ref 0.5–1.4)
EOSINOPHIL # BLD AUTO: 0.3 K/UL (ref 0–0.51)
EOSINOPHIL NFR BLD: 3.7 % (ref 0–6.9)
ERYTHROCYTE [DISTWIDTH] IN BLOOD BY AUTOMATED COUNT: 43.6 FL (ref 35.9–50)
GLUCOSE SERPL-MCNC: 97 MG/DL (ref 65–99)
HCT VFR BLD AUTO: 36 % (ref 37–47)
HGB BLD-MCNC: 11.9 G/DL (ref 12–16)
IMM GRANULOCYTES # BLD AUTO: 0.06 K/UL (ref 0–0.11)
IMM GRANULOCYTES NFR BLD AUTO: 0.7 % (ref 0–0.9)
LYMPHOCYTES # BLD AUTO: 1.72 K/UL (ref 1–4.8)
LYMPHOCYTES NFR BLD: 21.4 % (ref 22–41)
MCH RBC QN AUTO: 29.5 PG (ref 27–33)
MCHC RBC AUTO-ENTMCNC: 33.1 G/DL (ref 33.6–35)
MCV RBC AUTO: 89.3 FL (ref 81.4–97.8)
MONOCYTES # BLD AUTO: 1 K/UL (ref 0–0.85)
MONOCYTES NFR BLD AUTO: 12.5 % (ref 0–13.4)
NEUTROPHILS # BLD AUTO: 4.9 K/UL (ref 2–7.15)
NEUTROPHILS NFR BLD: 61.1 % (ref 44–72)
NRBC # BLD AUTO: 0 K/UL
NRBC BLD-RTO: 0 /100 WBC
PHOSPHATE SERPL-MCNC: 4.6 MG/DL (ref 2.5–4.5)
PLATELET # BLD AUTO: 236 K/UL (ref 164–446)
PMV BLD AUTO: 11.5 FL (ref 9–12.9)
POTASSIUM SERPL-SCNC: 3.6 MMOL/L (ref 3.6–5.5)
RBC # BLD AUTO: 4.03 M/UL (ref 4.2–5.4)
SIGNIFICANT IND 70042: NORMAL
SITE SITE: NORMAL
SODIUM SERPL-SCNC: 139 MMOL/L (ref 135–145)
SOURCE SOURCE: NORMAL
WBC # BLD AUTO: 8 K/UL (ref 4.8–10.8)

## 2018-10-10 PROCEDURE — 700101 HCHG RX REV CODE 250: Performed by: INTERNAL MEDICINE

## 2018-10-10 PROCEDURE — 80069 RENAL FUNCTION PANEL: CPT

## 2018-10-10 PROCEDURE — 700105 HCHG RX REV CODE 258: Performed by: INTERNAL MEDICINE

## 2018-10-10 PROCEDURE — 700102 HCHG RX REV CODE 250 W/ 637 OVERRIDE(OP): Performed by: INTERNAL MEDICINE

## 2018-10-10 PROCEDURE — A9270 NON-COVERED ITEM OR SERVICE: HCPCS | Performed by: INTERNAL MEDICINE

## 2018-10-10 PROCEDURE — 82550 ASSAY OF CK (CPK): CPT

## 2018-10-10 PROCEDURE — 94668 MNPJ CHEST WALL SBSQ: CPT

## 2018-10-10 PROCEDURE — 94760 N-INVAS EAR/PLS OXIMETRY 1: CPT

## 2018-10-10 PROCEDURE — 99232 SBSQ HOSP IP/OBS MODERATE 35: CPT | Performed by: INTERNAL MEDICINE

## 2018-10-10 PROCEDURE — 770020 HCHG ROOM/CARE - TELE (206)

## 2018-10-10 PROCEDURE — 700111 HCHG RX REV CODE 636 W/ 250 OVERRIDE (IP): Performed by: INTERNAL MEDICINE

## 2018-10-10 PROCEDURE — 85025 COMPLETE CBC W/AUTO DIFF WBC: CPT

## 2018-10-10 PROCEDURE — 36415 COLL VENOUS BLD VENIPUNCTURE: CPT

## 2018-10-10 RX ORDER — CEFDINIR 300 MG/1
300 CAPSULE ORAL EVERY 12 HOURS
Status: DISCONTINUED | OUTPATIENT
Start: 2018-10-11 | End: 2018-10-12 | Stop reason: HOSPADM

## 2018-10-10 RX ADMIN — OMEPRAZOLE 20 MG: 20 CAPSULE, DELAYED RELEASE ORAL at 04:59

## 2018-10-10 RX ADMIN — POTASSIUM CHLORIDE AND SODIUM CHLORIDE: 900; 150 INJECTION, SOLUTION INTRAVENOUS at 03:07

## 2018-10-10 RX ADMIN — TOPIRAMATE 100 MG: 100 TABLET, FILM COATED ORAL at 17:17

## 2018-10-10 RX ADMIN — HYDROCODONE BITARTRATE AND ACETAMINOPHEN 2 TABLET: 10; 325 TABLET ORAL at 21:04

## 2018-10-10 RX ADMIN — HYDROCODONE BITARTRATE AND ACETAMINOPHEN 2 TABLET: 10; 325 TABLET ORAL at 14:55

## 2018-10-10 RX ADMIN — PROPRANOLOL HYDROCHLORIDE 120 MG: 60 CAPSULE, EXTENDED RELEASE ORAL at 05:05

## 2018-10-10 RX ADMIN — AZITHROMYCIN MONOHYDRATE 250 MG: 250 TABLET ORAL at 04:58

## 2018-10-10 RX ADMIN — HYDROCODONE BITARTRATE AND ACETAMINOPHEN 2 TABLET: 10; 325 TABLET ORAL at 08:04

## 2018-10-10 RX ADMIN — TOPIRAMATE 100 MG: 100 TABLET, FILM COATED ORAL at 04:58

## 2018-10-10 RX ADMIN — CEFTRIAXONE SODIUM 1 G: 1 INJECTION, POWDER, FOR SOLUTION INTRAMUSCULAR; INTRAVENOUS at 04:59

## 2018-10-10 RX ADMIN — SENNOSIDES AND DOCUSATE SODIUM 2 TABLET: 8.6; 5 TABLET ORAL at 04:58

## 2018-10-10 RX ADMIN — SENNOSIDES AND DOCUSATE SODIUM 2 TABLET: 8.6; 5 TABLET ORAL at 17:17

## 2018-10-10 ASSESSMENT — ENCOUNTER SYMPTOMS
FEVER: 0
SHORTNESS OF BREATH: 0
ABDOMINAL PAIN: 0

## 2018-10-10 ASSESSMENT — PAIN SCALES - GENERAL
PAINLEVEL_OUTOF10: 0
PAINLEVEL_OUTOF10: 0
PAINLEVEL_OUTOF10: 7
PAINLEVEL_OUTOF10: 5
PAINLEVEL_OUTOF10: 7
PAINLEVEL_OUTOF10: 0
PAINLEVEL_OUTOF10: 6
PAINLEVEL_OUTOF10: 7
PAINLEVEL_OUTOF10: 5
PAINLEVEL_OUTOF10: 6
PAINLEVEL_OUTOF10: 8

## 2018-10-10 NOTE — DISCHARGE PLANNING
Anticipated Discharge Disposition: SNF     Action: LSW received message from  [phone] regarding discharge planning and SNF placement (requested updated PT/OT notes)      Barriers to Discharge: PT/OT, SNF placement     Plan: PT/OT re-eval to be ordered

## 2018-10-10 NOTE — PROGRESS NOTES
Patient resting in bed throughout morning. MD was able to round with patient early on to discuss plan of care. Patient has had some complaints of pain throughout day. Patient requesting pain intervention at this time. RN educated patient regarding next due dose of pain medication per MAR. Patient declines any additional needs at this time. Call light within reach. Fall precautions in place. Will continue with hourly rounding.

## 2018-10-10 NOTE — PROGRESS NOTES
[facility] Hospitalist Progress Note    Date of Service: 10/9/2018    Chief Complaint  47 y.o. female admitted 10/8/2018 with ground level fall and she has history of hydrocephalus and seizure disorder    Interval Problem Update  10/9:  Patient complains of right hip pain. Otherwise, she states that she is feeling better since arrival    Consultants/Specialty  none    Disposition  Likely post-acute facility        Review of Systems   Constitutional: Negative for fever.   Respiratory: Negative for shortness of breath.    Cardiovascular: Negative for chest pain.   Gastrointestinal: Negative for abdominal pain.   Musculoskeletal: Positive for joint pain.      Physical Exam  Laboratory/Imaging   Hemodynamics  Temp (24hrs), Av.5 °C (97.7 °F), Min:36.3 °C (97.3 °F), Max:36.9 °C (98.4 °F)   Temperature: 36.3 °C (97.3 °F)  Pulse  Av.9  Min: 73  Max: 133   Blood Pressure: 105/65      Respiratory      Respiration: 18, Pulse Oximetry: 90 %, O2 Daily Delivery Respiratory : Silicone Nasal Cannula     PEP/CPT Method: Positive Airway Pressure Device, Work Of Breathing / Effort: Mild  RUL Breath Sounds: Clear;Diminished, RML Breath Sounds: Diminished;Crackles, RLL Breath Sounds: Clear;Diminished, ABIMBOLA Breath Sounds: Clear;Diminished, LLL Breath Sounds: Clear;Diminished    Fluids    Intake/Output Summary (Last 24 hours) at 10/09/18 1924  Last data filed at 10/09/18 1652   Gross per 24 hour   Intake           3067.5 ml   Output             1400 ml   Net           1667.5 ml       Nutrition  Orders Placed This Encounter   Procedures   • Diet Order Cardiac     Standing Status:   Standing     Number of Occurrences:   1     Order Specific Question:   Diet:     Answer:   Cardiac [6]     Physical Exam   Constitutional: She is oriented to person, place, and time. She appears well-developed. No distress.   HENT:   Head: Normocephalic and atraumatic.   Mouth/Throat: No oropharyngeal exudate.   Eyes: Right eye exhibits no discharge. Left  eye exhibits no discharge.   Neck: Neck supple. No JVD present.   Cardiovascular: Normal rate and regular rhythm.    Pulmonary/Chest: Effort normal and breath sounds normal. No respiratory distress. She has no wheezes.   Abdominal: Soft. Bowel sounds are normal. She exhibits no distension. There is no tenderness.   Musculoskeletal: She exhibits no edema.   Right hip pain with movement.   Neurological: She is alert and oriented to person, place, and time.   Follows commands   Skin: Skin is warm and dry. She is not diaphoretic. No erythema.   Psychiatric: She has a normal mood and affect.       Recent Labs      10/08/18   0243  10/09/18   0603   WBC  12.5*  9.2   RBC  4.73  3.86*   HEMOGLOBIN  14.1  11.6*   HEMATOCRIT  41.9  34.9*   MCV  88.6  90.4   MCH  29.8  30.1   MCHC  33.7  33.2*   RDW  43.4  44.0   PLATELETCT  215  199   MPV  11.0  11.4     Recent Labs      10/08/18   0243  10/09/18   0603   SODIUM  141  141   POTASSIUM  3.5*  3.6   CHLORIDE  108  114*   CO2  24  18*   GLUCOSE  117*  90   BUN  20  8   CREATININE  0.80  0.54   CALCIUM  10.1  9.0                      Assessment/Plan     Sepsis (HCC)- (present on admission)   Assessment & Plan    This is sepsis (without associated acute organ dysfunction).   Likely source is pneumonia  Continue IV fluids per septic protocol  Lactate is within normal limits  Continue on IV ceftriaxone and azithromycin  Follow blood cultures        Seizure (HCC)- (present on admission)   Assessment & Plan    Continue Topamax        CAP (community acquired pneumonia)- (present on admission)   Assessment & Plan    Patient has been started on IV Ceftriaxone and azithromycin  Check pro calcitonin, if within normal limits we'll consider de-escalating antibiotics  Flu pending  RT protocol   Follow blood cultures        Essential hypertension- (present on admission)   Assessment & Plan    Uncontrolled on arrival.  Better controlled now.  continue on IV labetalol as needed for SBP greater  than 160  Continue propanolol  mg daily        Fall from ground level- (present on admission)   Assessment & Plan    PT and OT evaluation.  SNF referral  Right hip pain, no acute fracture on X-ray.  Check CT hip due to persistent pain.        Traumatic rhabdomyolysis (HCC)- (present on admission)   Assessment & Plan    Continue with IV fluid hydration with normal saline  Monitor CPK          Quality-Core Measures   Reviewed items::  Labs reviewed, Medications reviewed and Radiology images reviewed  DVT prophylaxis - mechanical:  SCDs

## 2018-10-10 NOTE — CARE PLAN
Problem: Safety  Goal: Will remain free from falls    Intervention: Implement fall precautions   10/10/18 0955   OTHER   Environmental Precautions Treaded Slipper Socks on Patient;Personal Belongings, Wastebasket, Call Bell etc. in Easy Reach;Transferred to Stronger Side;Communication Sign for Patients & Families;Bed in Low Position;Report Given to Other Health Care Providers Regarding Fall Risk;Mobility Assessed & Appropriate Sign Placed   Bedrails Bedrails Closest to Bathroom Down         Problem: Knowledge Deficit  Goal: Knowledge of disease process/condition, treatment plan, diagnostic tests, and medications will improve    Intervention: Assess knowledge level of disease process/condition, treatment plan, diagnostic tests, and medications  Rn will provided education to patient regarding disease process, treatment plan, diagnostic tests and medications pertinent to the patient's condition. RN will answer questions that the patient has related to their condition.         Problem: Pain Management  Goal: Pain level will decrease to patient's comfort goal    Intervention: Follow pain managment plan developed in collaboration with patient and Interdisciplinary Team  RN will assess patient's pain level and implement pain strategies according to MAR. Rn will also educate patient regarding both nonpharmacologic and pharmacologic pain strategies.

## 2018-10-10 NOTE — PROGRESS NOTES
Bedside report received. Patient laying down in bed. RN assessed pain; patient stated pain present at 7/10. RN provided education regarding pain intervention per MAR. Patient educated to call for assistance before ambulating; call light within reach. Need for bed alarm assessed; fall precautions in place as appropriate. Patient calls appropriately for assistance.

## 2018-10-11 ENCOUNTER — PATIENT OUTREACH (OUTPATIENT)
Dept: HEALTH INFORMATION MANAGEMENT | Facility: OTHER | Age: 48
End: 2018-10-11

## 2018-10-11 ENCOUNTER — APPOINTMENT (OUTPATIENT)
Dept: RADIOLOGY | Facility: MEDICAL CENTER | Age: 48
DRG: 871 | End: 2018-10-11
Attending: INTERNAL MEDICINE
Payer: MEDICAID

## 2018-10-11 ENCOUNTER — HOME HEALTH ADMISSION (OUTPATIENT)
Dept: HOME HEALTH SERVICES | Facility: HOME HEALTHCARE | Age: 48
End: 2018-10-11
Payer: MEDICAID

## 2018-10-11 LAB
ALBUMIN SERPL BCP-MCNC: 3.5 G/DL (ref 3.2–4.9)
BUN SERPL-MCNC: 7 MG/DL (ref 8–22)
CALCIUM SERPL-MCNC: 9.3 MG/DL (ref 8.5–10.5)
CHLORIDE SERPL-SCNC: 109 MMOL/L (ref 96–112)
CO2 SERPL-SCNC: 16 MMOL/L (ref 20–33)
CREAT SERPL-MCNC: 0.63 MG/DL (ref 0.5–1.4)
ERYTHROCYTE [DISTWIDTH] IN BLOOD BY AUTOMATED COUNT: 42.4 FL (ref 35.9–50)
GLUCOSE SERPL-MCNC: 118 MG/DL (ref 65–99)
HCT VFR BLD AUTO: 37.3 % (ref 37–47)
HGB BLD-MCNC: 12.5 G/DL (ref 12–16)
MCH RBC QN AUTO: 29.5 PG (ref 27–33)
MCHC RBC AUTO-ENTMCNC: 33.5 G/DL (ref 33.6–35)
MCV RBC AUTO: 88 FL (ref 81.4–97.8)
NRBC # BLD AUTO: 0 K/UL
NRBC BLD-RTO: 0 /100 WBC
PHOSPHATE SERPL-MCNC: 4.2 MG/DL (ref 2.5–4.5)
PLATELET # BLD AUTO: 209 K/UL (ref 164–446)
PMV BLD AUTO: 10.9 FL (ref 9–12.9)
POTASSIUM SERPL-SCNC: 3.7 MMOL/L (ref 3.6–5.5)
PROCALCITONIN SERPL-MCNC: 0.09 NG/ML
RBC # BLD AUTO: 4.24 M/UL (ref 4.2–5.4)
SODIUM SERPL-SCNC: 136 MMOL/L (ref 135–145)
WBC # BLD AUTO: 8.8 K/UL (ref 4.8–10.8)

## 2018-10-11 PROCEDURE — 770020 HCHG ROOM/CARE - TELE (206)

## 2018-10-11 PROCEDURE — G8980 MOBILITY D/C STATUS: HCPCS | Mod: CI

## 2018-10-11 PROCEDURE — 84145 PROCALCITONIN (PCT): CPT

## 2018-10-11 PROCEDURE — A9270 NON-COVERED ITEM OR SERVICE: HCPCS | Performed by: INTERNAL MEDICINE

## 2018-10-11 PROCEDURE — 71045 X-RAY EXAM CHEST 1 VIEW: CPT

## 2018-10-11 PROCEDURE — 700102 HCHG RX REV CODE 250 W/ 637 OVERRIDE(OP): Performed by: INTERNAL MEDICINE

## 2018-10-11 PROCEDURE — 97530 THERAPEUTIC ACTIVITIES: CPT

## 2018-10-11 PROCEDURE — G8988 SELF CARE GOAL STATUS: HCPCS | Mod: CI

## 2018-10-11 PROCEDURE — G8989 SELF CARE D/C STATUS: HCPCS | Mod: CI

## 2018-10-11 PROCEDURE — 85025 COMPLETE CBC W/AUTO DIFF WBC: CPT

## 2018-10-11 PROCEDURE — 99232 SBSQ HOSP IP/OBS MODERATE 35: CPT | Performed by: INTERNAL MEDICINE

## 2018-10-11 PROCEDURE — G8979 MOBILITY GOAL STATUS: HCPCS | Mod: CI

## 2018-10-11 PROCEDURE — 97110 THERAPEUTIC EXERCISES: CPT

## 2018-10-11 PROCEDURE — 36415 COLL VENOUS BLD VENIPUNCTURE: CPT

## 2018-10-11 PROCEDURE — 85027 COMPLETE CBC AUTOMATED: CPT

## 2018-10-11 PROCEDURE — 80069 RENAL FUNCTION PANEL: CPT

## 2018-10-11 RX ADMIN — SENNOSIDES AND DOCUSATE SODIUM 2 TABLET: 8.6; 5 TABLET ORAL at 17:13

## 2018-10-11 RX ADMIN — SENNOSIDES AND DOCUSATE SODIUM 2 TABLET: 8.6; 5 TABLET ORAL at 05:06

## 2018-10-11 RX ADMIN — HYDROCODONE BITARTRATE AND ACETAMINOPHEN 2 TABLET: 10; 325 TABLET ORAL at 17:13

## 2018-10-11 RX ADMIN — HYDROCODONE BITARTRATE AND ACETAMINOPHEN 2 TABLET: 10; 325 TABLET ORAL at 03:40

## 2018-10-11 RX ADMIN — TOPIRAMATE 100 MG: 100 TABLET, FILM COATED ORAL at 05:05

## 2018-10-11 RX ADMIN — PROPRANOLOL HYDROCHLORIDE 120 MG: 60 CAPSULE, EXTENDED RELEASE ORAL at 05:05

## 2018-10-11 RX ADMIN — OMEPRAZOLE 20 MG: 20 CAPSULE, DELAYED RELEASE ORAL at 05:06

## 2018-10-11 RX ADMIN — CEFDINIR 300 MG: 300 CAPSULE ORAL at 05:06

## 2018-10-11 RX ADMIN — AZITHROMYCIN MONOHYDRATE 250 MG: 250 TABLET ORAL at 05:06

## 2018-10-11 RX ADMIN — HYDROCODONE BITARTRATE AND ACETAMINOPHEN 2 TABLET: 10; 325 TABLET ORAL at 10:37

## 2018-10-11 RX ADMIN — CEFDINIR 300 MG: 300 CAPSULE ORAL at 17:13

## 2018-10-11 RX ADMIN — TOPIRAMATE 100 MG: 100 TABLET, FILM COATED ORAL at 17:13

## 2018-10-11 ASSESSMENT — COGNITIVE AND FUNCTIONAL STATUS - GENERAL
WALKING IN HOSPITAL ROOM: A LITTLE
MOBILITY SCORE: 23
SUGGESTED CMS G CODE MODIFIER MOBILITY: CI
DAILY ACTIVITIY SCORE: 24
SUGGESTED CMS G CODE MODIFIER DAILY ACTIVITY: CH

## 2018-10-11 ASSESSMENT — ENCOUNTER SYMPTOMS
ABDOMINAL PAIN: 0
SHORTNESS OF BREATH: 0
FEVER: 0

## 2018-10-11 ASSESSMENT — PAIN SCALES - GENERAL
PAINLEVEL_OUTOF10: 8
PAINLEVEL_OUTOF10: 7
PAINLEVEL_OUTOF10: 6
PAINLEVEL_OUTOF10: 5
PAINLEVEL_OUTOF10: 6
PAINLEVEL_OUTOF10: 5
PAINLEVEL_OUTOF10: 4

## 2018-10-11 ASSESSMENT — GAIT ASSESSMENTS
DISTANCE (FEET): 250
GAIT LEVEL OF ASSIST: CONTACT GUARD ASSIST

## 2018-10-11 NOTE — DISCHARGE PLANNING
Received Choice form at 2703 from [facility]   Agency/Facility Name: [facility]  Referral sent per Choice form @ 1336

## 2018-10-11 NOTE — PROGRESS NOTES
Bedside report received. Patient sitting up in bed. RN assessed pain; patient pain level stated at 6/10, RN provided education regarding next pain intervention per MAR. Patient educated to call for assistance before ambulating; call light within reach. Need for bed alarm assessed; patient refusing bed alarm at this time. Fall precautions in place as appropriate.

## 2018-10-11 NOTE — THERAPY
"Physical Therapy Treatment completed.   Bed Mobility:  Supine to Sit: Modified Independent  Transfers: Sit to Stand: Modified Independent  Gait: Level Of Assist: Contact Guard Assist with Single Point Cane       Plan of Care: Patient with no further skilled PT needs in the acute care setting at this time  Discharge Recommendations: Equipment: No Equipment Needed.   Pt is much improved, now able to ambulate x 250 feet with cg needed only for vision assist. Goals met. D/c PT. Mom is staying for a week to assist. Pt will benefit from home health to educate pt on HEP to address feeling of R thigh feeling weaker. Therex introduced today, but needs reinforcement.   See \"Rehab Therapy-Acute\" Patient Summary Report for complete documentation.       "

## 2018-10-11 NOTE — DISCHARGE PLANNING
Anticipated Discharge Disposition: Home with     Action: LSW informed Pt has been cleared to return home with HH and possible need for home O2. LSW met with Pt at bedside, LSW provided choice for HH and Home O2 (if needed). Pt verbally confirmed choice for [facility] and [facility]. LSW faxed to [facility].     Barriers to Discharge: none     Plan: HH order to be entered, Home O2 test to be completed     Care Transition Team Assessment    Information Source  Orientation : Oriented x 4  Information Given By: Patient         Elopement Risk  Legal Hold: No  Ambulatory or Self Mobile in Wheelchair: Yes  Disoriented: No  Psychiatric Symptoms: None  History of Wandering: No  Elopement this Admit: No  Vocalizing Wanting to Leave: No  Displays Behaviors, Body Language Wanting to Leave: No-Not at Risk for Elopement  Elopement Risk: Not at Risk for Elopement    Interdisciplinary Discharge Planning  Does Admitting Nurse Feel This Could be a Complex Discharge?: No  Primary Care Physician: herbie Stone  Lives with - Patient's Self Care Capacity: Alone and Able to Care For Self  Patient or legal guardian wants to designate a caregiver (see row info): No  Support Systems: Friends / Neighbors  Housing / Facility: 1 Story Apartment / Condo  Do You Take your Prescribed Medications Regularly: Yes  Able to Return to Previous ADL's: Yes  Mobility Issues: No  Prior Services: None  Patient Expects to be Discharged to:: home  Durable Medical Equipment: Other - Specify (cane)                   Vision / Hearing Impairment  Vision Impairment : Yes  Right Eye Vision: Blind  Left Eye Vision: Blind  Hearing Impairment : No    Values / Beliefs / Concerns  Values / Beliefs Concerns : No         Domestic Abuse  Have you ever been the victim of abuse or violence?: No  Physical Abuse or Sexual Abuse: No  Possible Abuse Reported to:: Not Applicable

## 2018-10-11 NOTE — CARE PLAN
Problem: Safety  Goal: Will remain free from falls    Intervention: Implement fall precautions   10/11/18 8785   OTHER   Environmental Precautions Treaded Slipper Socks on Patient;Personal Belongings, Wastebasket, Call Bell etc. in Easy Reach;Transferred to Stronger Side;Communication Sign for Patients & Families;Bed in Low Position;Report Given to Other Health Care Providers Regarding Fall Risk;Mobility Assessed & Appropriate Sign Placed   Bedrails Bedrails Closest to Bathroom Down         Problem: Pain Management  Goal: Pain level will decrease to patient's comfort goal    Intervention: Follow pain managment plan developed in collaboration with patient and Interdisciplinary Team  RN will assess patient's pain level and implement pain strategies according to MAR. Rn will also educate patient regarding both nonpharmacologic and pharmacologic pain strategies.

## 2018-10-11 NOTE — DISCHARGE PLANNING
Patient has not been seen her PCP since 3/2018. Waiting on CB from MA to see if MD will sign. Patient has an appointment set up for 10/12 @ 2:20. Per JACKI Dominique patient will not DC until 10/12. CM will get patient an appointment scheduled.     Thank you

## 2018-10-11 NOTE — PROGRESS NOTES
Received report and assumed care of patient. Patient is alert and oriented x4. Patient is in no signs of distress sitting up eating dinner, patient is legally blind very pleasant. Patient was updated on the plan of care for the day. Call light within reach, bed in low position, 2 side rails up. Fall precautions in place, refusing bed alarm, verbalizes understanding and importance of calling appropriately. Will continue to monitor.

## 2018-10-11 NOTE — PROGRESS NOTES
[facility]ist Progress Note    Date of Service: 10/10/2018    Chief Complaint  47 y.o. female admitted 10/8/2018 with ground level fall and she has history of hydrocephalus and seizure disorder    Interval Problem Update  10/9:  Patient complains of right hip pain. Otherwise, she states that she is feeling better since arrival    10/10: my pain is radiate from the right hip down to the knee.  Patient states she breaths okay.     Consultants/Specialty  none    Disposition  Likely post-acute facility.  Discussed discharge planning with the patient. Ordered PT/OT.  Referral to SNF        Review of Systems   Constitutional: Negative for fever.   Respiratory: Negative for shortness of breath.    Cardiovascular: Negative for chest pain.   Gastrointestinal: Negative for abdominal pain.   Musculoskeletal: Positive for joint pain.      Physical Exam  Laboratory/Imaging   Hemodynamics  Temp (24hrs), Av.4 °C (97.5 °F), Min:35.8 °C (96.5 °F), Max:36.8 °C (98.2 °F)   Temperature: 36.8 °C (98.2 °F)  Pulse  Av.2  Min: 66  Max: 133 Heart Rate (Monitored): (!) 103  Blood Pressure: 116/80      Respiratory      Respiration: 18, Pulse Oximetry: 92 %, O2 Daily Delivery Respiratory : Silicone Nasal Cannula     PEP/CPT Method: Positive Airway Pressure Device, Work Of Breathing / Effort: Mild  RUL Breath Sounds: Clear, RML Breath Sounds: Clear, RLL Breath Sounds: Diminished, ABIMBOLA Breath Sounds: Clear, LLL Breath Sounds: Diminished    Fluids    Intake/Output Summary (Last 24 hours) at 10/10/18 1858  Last data filed at 10/10/18 1746   Gross per 24 hour   Intake             2435 ml   Output             1200 ml   Net             1235 ml       Nutrition  Orders Placed This Encounter   Procedures   • Diet Order Cardiac     Standing Status:   Standing     Number of Occurrences:   1     Order Specific Question:   Diet:     Answer:   Cardiac [6]     Physical Exam   Constitutional: She is oriented to person, place, and time. She appears  well-developed. No distress.   HENT:   Head: Normocephalic and atraumatic.   Mouth/Throat: No oropharyngeal exudate.   Eyes: Right eye exhibits no discharge. Left eye exhibits no discharge.   Neck: Neck supple. No JVD present.   Cardiovascular: Normal rate and regular rhythm.    Pulmonary/Chest: Effort normal and breath sounds normal. No respiratory distress. She has no wheezes.   Abdominal: Soft. Bowel sounds are normal. She exhibits no distension. There is no tenderness.   Musculoskeletal: She exhibits no edema.   Right hip pain with movement.   Neurological: She is alert and oriented to person, place, and time.   Follows commands   Skin: Skin is warm and dry. She is not diaphoretic. No erythema.   Psychiatric: She has a normal mood and affect.       Recent Labs      10/08/18   0243  10/09/18   0603  10/10/18   0319   WBC  12.5*  9.2  8.0   RBC  4.73  3.86*  4.03*   HEMOGLOBIN  14.1  11.6*  11.9*   HEMATOCRIT  41.9  34.9*  36.0*   MCV  88.6  90.4  89.3   MCH  29.8  30.1  29.5   MCHC  33.7  33.2*  33.1*   RDW  43.4  44.0  43.6   PLATELETCT  215  199  236   MPV  11.0  11.4  11.5     Recent Labs      10/08/18   0243  10/09/18   0603  10/10/18   0319   SODIUM  141  141  139   POTASSIUM  3.5*  3.6  3.6   CHLORIDE  108  114*  112   CO2  24  18*  18*   GLUCOSE  117*  90  97   BUN  20  8  10   CREATININE  0.80  0.54  0.74   CALCIUM  10.1  9.0  9.0                      Assessment/Plan     Sepsis (HCC)- (present on admission)   Assessment & Plan    This is sepsis (without associated acute organ dysfunction).   Likely source is pneumonia  Continue IV fluids per septic protocol  Lactate is within normal limits  Continue on IV ceftriaxone and azithromycin  Follow blood cultures        Seizure (HCC)- (present on admission)   Assessment & Plan    Continue Topamax        CAP (community acquired pneumonia)- (present on admission)   Assessment & Plan     On IV Ceftriaxone and azithromycin, transition to oral antibiotic.  Check pro  calcitonin.  Flu negative.  RT protocol   Negative blood cultures        Essential hypertension- (present on admission)   Assessment & Plan    Uncontrolled on arrival.  Better controlled now.  continue on IV labetalol as needed for SBP greater than 160  Continue propanolol  mg daily        Fall from ground level- (present on admission)   Assessment & Plan    PT and OT evaluation.  SNF referral  Right hip pain, no acute fracture on X-ray.  CT hip is negative for acute fracture.        Traumatic rhabdomyolysis (HCC)- (present on admission)   Assessment & Plan    CK level continue to trending downward with hydration.   Given the patient is eating and drinking without difficulty.  Discontinue IV fluid.          Quality-Core Measures   Reviewed items::  Labs reviewed, Medications reviewed and Radiology images reviewed  DVT prophylaxis - mechanical:  SCDs

## 2018-10-11 NOTE — PROGRESS NOTES
Patient with family at bedside for majority of morning. Patient did have some complaints of pain early on. RN was able to provide intervention. MD rounded with patient to discuss plan of care. RN/CNA to complete o2 study before D/C potentially in the am. Patient declines additional needs at this time. Call light within reach. Will continue with hourly rounding.

## 2018-10-11 NOTE — CARE PLAN
Problem: Knowledge Deficit  Goal: Knowledge of disease process/condition, treatment plan, diagnostic tests, and medications will improve  Patient updated on plan of care for the day, patient verbalized understanding. All questions and concerns addressed at this time.    Problem: Pain Management  Goal: Pain level will decrease to patient's comfort goal  Patient assessed per unit policy. Medicated per MAR. Patient verbalized understanding to call when needing pain relief.

## 2018-10-11 NOTE — FACE TO FACE
Face to Face Supporting Documentation - Home Health    The encounter with this patient was in whole or in part the primary reason for home health admission.    Date of encounter:   Patient:                    MRN:                       YOB: 2017  Rasheeda Manzano  [id number]  1970     Home health to see patient for:  Skilled Nursing care for assessment, interventions & education and Physical Therapy evaluation and treatment    Skilled need for:  Recent Deterioration of Health Status blindness and frequent fall at home.    Skilled nursing interventions to include:  Comment: medication management    Homebound status evidenced by:  Needs the assistance of another person in order to leave the home. Leaving home requires a considerable and taxing effort. There is a normal inability to leave the home.    Community Physician to provide follow up care: AUREA Estrada     Optional Interventions? No      I certify the face to face encounter for this home health care referral meets the CMS requirements and the encounter/clinical assessment with the patient was, in whole, or in part, for the medical condition(s) listed above, which is the primary reason for home health care. Based on my clinical findings: the service(s) are medically necessary, support the need for home health care, and the homebound criteria are met.  I certify that this patient has had a face to face encounter by myself.  Richmond Danielle D.O. - HANGI: [phone]

## 2018-10-12 VITALS
HEIGHT: 64 IN | HEART RATE: 82 BPM | SYSTOLIC BLOOD PRESSURE: 111 MMHG | BODY MASS INDEX: 26.57 KG/M2 | TEMPERATURE: 97.5 F | DIASTOLIC BLOOD PRESSURE: 72 MMHG | OXYGEN SATURATION: 91 % | RESPIRATION RATE: 18 BRPM | WEIGHT: 155.65 LBS

## 2018-10-12 PROBLEM — A41.9 SEPSIS (HCC): Status: RESOLVED | Noted: 2018-10-08 | Resolved: 2018-10-12

## 2018-10-12 PROBLEM — T79.6XXA TRAUMATIC RHABDOMYOLYSIS (HCC): Status: RESOLVED | Noted: 2018-10-08 | Resolved: 2018-10-12

## 2018-10-12 LAB
BASOPHILS # BLD AUTO: 0.6 % (ref 0–1.8)
BASOPHILS # BLD: 0.06 K/UL (ref 0–0.12)
EOSINOPHIL # BLD AUTO: 0.43 K/UL (ref 0–0.51)
EOSINOPHIL NFR BLD: 4.6 % (ref 0–6.9)
ERYTHROCYTE [DISTWIDTH] IN BLOOD BY AUTOMATED COUNT: 42.7 FL (ref 35.9–50)
HCT VFR BLD AUTO: 38.4 % (ref 37–47)
HGB BLD-MCNC: 12.7 G/DL (ref 12–16)
IMM GRANULOCYTES # BLD AUTO: 0.1 K/UL (ref 0–0.11)
IMM GRANULOCYTES NFR BLD AUTO: 1.1 % (ref 0–0.9)
LYMPHOCYTES # BLD AUTO: 2.2 K/UL (ref 1–4.8)
LYMPHOCYTES NFR BLD: 23.6 % (ref 22–41)
MCH RBC QN AUTO: 29.6 PG (ref 27–33)
MCHC RBC AUTO-ENTMCNC: 33.1 G/DL (ref 33.6–35)
MCV RBC AUTO: 89.5 FL (ref 81.4–97.8)
MONOCYTES # BLD AUTO: 1.1 K/UL (ref 0–0.85)
MONOCYTES NFR BLD AUTO: 11.8 % (ref 0–13.4)
NEUTROPHILS # BLD AUTO: 5.43 K/UL (ref 2–7.15)
NEUTROPHILS NFR BLD: 58.3 % (ref 44–72)
NRBC # BLD AUTO: 0 K/UL
NRBC BLD-RTO: 0 /100 WBC
PLATELET # BLD AUTO: 264 K/UL (ref 164–446)
PMV BLD AUTO: 10.6 FL (ref 9–12.9)
RBC # BLD AUTO: 4.29 M/UL (ref 4.2–5.4)
WBC # BLD AUTO: 9.3 K/UL (ref 4.8–10.8)

## 2018-10-12 PROCEDURE — A9270 NON-COVERED ITEM OR SERVICE: HCPCS | Performed by: INTERNAL MEDICINE

## 2018-10-12 PROCEDURE — 85025 COMPLETE CBC W/AUTO DIFF WBC: CPT

## 2018-10-12 PROCEDURE — 36415 COLL VENOUS BLD VENIPUNCTURE: CPT

## 2018-10-12 PROCEDURE — 99239 HOSP IP/OBS DSCHRG MGMT >30: CPT | Performed by: INTERNAL MEDICINE

## 2018-10-12 PROCEDURE — 700111 HCHG RX REV CODE 636 W/ 250 OVERRIDE (IP): Performed by: INTERNAL MEDICINE

## 2018-10-12 PROCEDURE — 700102 HCHG RX REV CODE 250 W/ 637 OVERRIDE(OP): Performed by: INTERNAL MEDICINE

## 2018-10-12 RX ORDER — FUROSEMIDE 10 MG/ML
20 INJECTION INTRAMUSCULAR; INTRAVENOUS ONCE
Status: COMPLETED | OUTPATIENT
Start: 2018-10-12 | End: 2018-10-12

## 2018-10-12 RX ORDER — HYDROCODONE BITARTRATE AND ACETAMINOPHEN 10; 325 MG/1; MG/1
1 TABLET ORAL EVERY 8 HOURS PRN
Qty: 20 TAB | Refills: 0 | Status: SHIPPED | OUTPATIENT
Start: 2018-10-12 | End: 2018-10-19

## 2018-10-12 RX ORDER — LEVOFLOXACIN 500 MG/1
500 TABLET, FILM COATED ORAL DAILY
Qty: 5 TAB | Refills: 0 | Status: SHIPPED | OUTPATIENT
Start: 2018-10-12 | End: 2018-10-23

## 2018-10-12 RX ADMIN — AZITHROMYCIN MONOHYDRATE 250 MG: 250 TABLET ORAL at 05:24

## 2018-10-12 RX ADMIN — CEFDINIR 300 MG: 300 CAPSULE ORAL at 05:24

## 2018-10-12 RX ADMIN — HYDROCODONE BITARTRATE AND ACETAMINOPHEN 1 TABLET: 10; 325 TABLET ORAL at 00:00

## 2018-10-12 RX ADMIN — PROPRANOLOL HYDROCHLORIDE 120 MG: 60 CAPSULE, EXTENDED RELEASE ORAL at 05:25

## 2018-10-12 RX ADMIN — HYDROCODONE BITARTRATE AND ACETAMINOPHEN 2 TABLET: 10; 325 TABLET ORAL at 06:35

## 2018-10-12 RX ADMIN — SENNOSIDES AND DOCUSATE SODIUM 2 TABLET: 8.6; 5 TABLET ORAL at 05:24

## 2018-10-12 RX ADMIN — TOPIRAMATE 100 MG: 100 TABLET, FILM COATED ORAL at 05:24

## 2018-10-12 RX ADMIN — OMEPRAZOLE 20 MG: 20 CAPSULE, DELAYED RELEASE ORAL at 05:24

## 2018-10-12 RX ADMIN — FUROSEMIDE 20 MG: 10 INJECTION, SOLUTION INTRAMUSCULAR; INTRAVENOUS at 12:09

## 2018-10-12 ASSESSMENT — PAIN SCALES - GENERAL
PAINLEVEL_OUTOF10: 7
PAINLEVEL_OUTOF10: 8
PAINLEVEL_OUTOF10: 7
PAINLEVEL_OUTOF10: 7

## 2018-10-12 ASSESSMENT — ENCOUNTER SYMPTOMS
ABDOMINAL PAIN: 0
SHORTNESS OF BREATH: 0
FEVER: 0

## 2018-10-12 NOTE — DISCHARGE PLANNING
Agency/Facility Name: [facility]  Spoke To: Blanca  Outcome: Followed up with Blanca regarding referral [facility]. Per Blanca, still awaiting a call back from PCP office.     GIL Ramírez notified.

## 2018-10-12 NOTE — PROGRESS NOTES
Patient sitting with mother at bedside, provided pt the IS and performed effectively. No additional needs at this time.

## 2018-10-12 NOTE — DISCHARGE INSTRUCTIONS
Discharge Instructions    Discharged to home by car with relative. Discharged via wheelchair, hospital escort: Yes.  Special equipment needed: Not Applicable    Be sure to schedule a follow-up appointment with your primary care doctor or any specialists as instructed.     Discharge Plan:   Diet Plan: Discussed  Activity Level: Discussed  Confirmed Follow up Appointment: Appointment Scheduled  Confirmed Symptoms Management: Discussed  Medication Reconciliation Updated: Yes  Pneumococcal Vaccine Administered/Refused: Not given - Patient refused pneumococcal vaccine  Influenza Vaccine Indication: Not indicated: Previously immunized this influenza season and > 8 years of age    I understand that a diet low in cholesterol, fat, and sodium is recommended for good health. Unless I have been given specific instructions below for another diet, I accept this instruction as my diet prescription.   Other diet: heart healthy    Special Instructions: Sepsis, Adult  Sepsis is a serious infection of your blood or tissues that affects your whole body. The infection that causes sepsis may be bacterial, viral, fungal, or parasitic. Sepsis may be life threatening. Sepsis can cause your blood pressure to drop. This may result in shock. Shock causes your central nervous system and your organs to stop working correctly.  What increases the risk?  Sepsis can happen in anyone, but it is more likely to happen in people who have weakened immune systems.  What are the signs or symptoms?  Symptoms of sepsis can include:  · Fever or low body temperature (hypothermia).  · Rapid breathing (hyperventilation).  · Chills.  · Rapid heartbeat (tachycardia).  · Confusion or light-headedness.  · Trouble breathing.  · Urinating much less than usual.  · Cool, clammy skin or red, flushed skin.  · Other problems with the heart, kidneys, or brain.  How is this diagnosed?  Your health care provider will likely do tests to look for an infection, to see if the  infection has spread to your blood, and to see how serious your condition is. Tests can include:  · Blood tests, including cultures of your blood.  · Cultures of other fluids from your body, such as:  ¨ Urine.  ¨ Pus from wounds.  ¨ Mucus coughed up from your lungs.  · Urine tests other than cultures.  · X-ray exams or other imaging tests.  How is this treated?  Treatment will begin with elimination of the source of infection. If your sepsis is likely caused by a bacterial or fungal infection, you will be given antibiotic or antifungal medicines.  You may also receive:  · Oxygen.  · Fluids through an IV tube.  · Medicines to increase your blood pressure.  · A machine to clean your blood (dialysis) if your kidneys fail.  · A machine to help you breathe if your lungs fail.  Get help right away if:  You get an infection or develop any of the signs and symptoms of sepsis after surgery or a hospitalization.  This information is not intended to replace advice given to you by your health care provider. Make sure you discuss any questions you have with your health care provider.  Document Released: 09/15/2004 Document Revised: 05/25/2017 Document Reviewed: 08/25/2014  [employer] Interactive Patient Education © 2017 [employer]      · Is patient discharged on Warfarin / Coumadin?   No     Depression / Suicide Risk    As you are discharged from this [facility] facility, it is important to learn how to keep safe from harming yourself.    Recognize the warning signs:  · Abrupt changes in personality, positive or negative- including increase in energy   · Giving away possessions  · Change in eating patterns- significant weight changes-  positive or negative  · Change in sleeping patterns- unable to sleep or sleeping all the time   · Unwillingness or inability to communicate  · Depression  · Unusual sadness, discouragement and loneliness  · Talk of wanting to die  · Neglect of personal appearance   · Rebelliousness- reckless  behavior  · Withdrawal from people/activities they love  · Confusion- inability to concentrate     If you or a loved one observes any of these behaviors or has concerns about self-harm, here's what you can do:  · Talk about it- your feelings and reasons for harming yourself  · Remove any means that you might use to hurt yourself (examples: pills, rope, extension cords, firearm)  · Get professional help from the community (Mental Health, Substance Abuse, psychological counseling)  · Do not be alone:Call your Safe Contact- someone whom you trust who will be there for you.  · Call your local CRISIS HOTLINE 369-6822 or 388-635-4983  · Call your local Children's Mobile Crisis Response Team [place] (337) 714-1636 or www.[employer]  · Call the toll free National Suicide Prevention Hotlines   · National Suicide Prevention Lifeline 209-060-VALI (5720)  · [employer] Line Network 800-SUICIDE (005-8424)      Community-Acquired Pneumonia, Adult  Introduction  Pneumonia is an infection of the lungs. One type of pneumonia can happen while a person is in a hospital. A different type can happen when a person is not in a hospital (community-acquired pneumonia). It is easy for this kind to spread from person to person. It can spread to you if you breathe near an infected person who coughs or sneezes. Some symptoms include:  · A dry cough.  · A wet (productive) cough.  · Fever.  · Sweating.  · Chest pain.  Follow these instructions at home:  · Take over-the-counter and prescription medicines only as told by your doctor.  ¨ Only take cough medicine if you are losing sleep.  ¨ If you were prescribed an antibiotic medicine, take it as told by your doctor. Do not stop taking the antibiotic even if you start to feel better.  · Sleep with your head and neck raised (elevated). You can do this by putting a few pillows under your head, or you can sleep in a recliner.  · Do not use tobacco products. These include cigarettes,  chewing tobacco, and e-cigarettes. If you need help quitting, ask your doctor.  · Drink enough water to keep your pee (urine) clear or pale yellow.  A shot (vaccine) can help prevent pneumonia. Shots are often suggested for:  · People older than 65 years of age.  · People older than 19 years of age:  ¨ Who are having cancer treatment.  ¨ Who have long-term (chronic) lung disease.  ¨ Who have problems with their body's defense system (immune system).  You may also prevent pneumonia if you take these actions:  · Get the flu (influenza) shot every year.  · Go to the dentist as often as told.  · Wash your hands often. If soap and water are not available, use hand .  Contact a doctor if:  · You have a fever.  · You lose sleep because your cough medicine does not help.  Get help right away if:  · You are short of breath and it gets worse.  · You have more chest pain.  · Your sickness gets worse. This is very serious if:  ¨ You are an older adult.  ¨ Your body's defense system is weak.  · You cough up blood.  This information is not intended to replace advice given to you by your health care provider. Make sure you discuss any questions you have with your health care provider.  Document Released: 06/05/2009 Document Revised: 05/25/2017 Document Reviewed: 04/13/2016  © 2017 [employer]

## 2018-10-12 NOTE — PROGRESS NOTES
Patient escorted via wheelchair to car to go home with her mom. IV and tele box removed, all belongings gathered and sent home with patient. Patient a&ox4, educated on her medications and where to pick them up, completing the antibiotic regimen and the risk of constipation with her pain medication. Educated on the signs and symptoms of stroke and heart attack, and when to seek emergency help and call 911. Verbalized understanding and denied any questions or additional needs at this time.

## 2018-10-12 NOTE — CARE PLAN
Problem: Safety  Goal: Will remain free from injury    Intervention: Provide assistance with mobility   10/11/18 2000   OTHER   Assistance / Tolerance Tolerates Well;Assistance of One     Guided patient to bathroom. Will continue to guide/assist to ensure safe transfers.      Problem: Pain Management  Goal: Pain level will decrease to patient's comfort goal    Intervention: Follow pain managment plan developed in collaboration with patient and Interdisciplinary Team  Will continue to administer medications as appropriate per order in MAR and provide distraction.

## 2018-10-12 NOTE — PROGRESS NOTES
[facility] Hospitalist Progress Note    Date of Service: 10/12/2018    Chief Complaint  47 y.o. female admitted 10/8/2018 with ground level fall and she has history of hydrocephalus and seizure disorder    Interval Problem Update  10/9:  Patient complains of right hip pain. Otherwise, she states that she is feeling better since arrival    10/10: my pain is radiate from the right hip down to the knee.  Patient states she breaths okay.  10/11:  Patient reports some productive coughs.  Nursing staff reports low o2sat overnight.       Consultants/Specialty  none    Disposition  Home with home health.  Discussed discharge planning with the patient. Referral to home health      Review of Systems   Constitutional: Negative for fever.   Respiratory: Negative for shortness of breath.    Cardiovascular: Negative for chest pain.   Gastrointestinal: Negative for abdominal pain.   Musculoskeletal: Positive for joint pain.      Physical Exam  Laboratory/Imaging   Hemodynamics  Temp (24hrs), Av.6 °C (97.8 °F), Min:36.1 °C (96.9 °F), Max:37.5 °C (99.5 °F)   Temperature: 36.8 °C (98.2 °F)  Pulse  Av.9  Min: 66  Max: 133   Blood Pressure: 106/72      Respiratory      Respiration: 18, Pulse Oximetry: 96 %     Work Of Breathing / Effort: Mild  RUL Breath Sounds: Clear, RML Breath Sounds: Clear, RLL Breath Sounds: Diminished, ABIMBOLA Breath Sounds: Clear, LLL Breath Sounds: Diminished    Fluids    Intake/Output Summary (Last 24 hours) at 10/12/18 0931  Last data filed at 10/12/18 0500   Gross per 24 hour   Intake               20 ml   Output              550 ml   Net             -530 ml       Nutrition  Orders Placed This Encounter   Procedures   • Diet Order Cardiac     Standing Status:   Standing     Number of Occurrences:   1     Order Specific Question:   Diet:     Answer:   Cardiac [6]     Physical Exam   Constitutional: She is oriented to person, place, and time. She appears well-developed. No distress.   HENT:   Head:  Normocephalic and atraumatic.   Mouth/Throat: No oropharyngeal exudate.   Eyes: Right eye exhibits no discharge. Left eye exhibits no discharge.   Neck: Neck supple. No JVD present.   Cardiovascular: Normal rate and regular rhythm.    Pulmonary/Chest: Effort normal and breath sounds normal. No respiratory distress. She has no wheezes.   Abdominal: Soft. Bowel sounds are normal. She exhibits no distension. There is no tenderness.   Musculoskeletal: She exhibits no edema.   Right hip pain with movement.   Neurological: She is alert and oriented to person, place, and time.   Follows commands   Skin: Skin is warm and dry. She is not diaphoretic. No erythema.   Psychiatric: She has a normal mood and affect.       Recent Labs      10/10/18   0319  10/11/18   0320  10/12/18   0304   WBC  8.0  8.8  9.3   RBC  4.03*  4.24  4.29   HEMOGLOBIN  11.9*  12.5  12.7   HEMATOCRIT  36.0*  37.3  38.4   MCV  89.3  88.0  89.5   MCH  29.5  29.5  29.6   MCHC  33.1*  33.5*  33.1*   RDW  43.6  42.4  42.7   PLATELETCT  236  209  264   MPV  11.5  10.9  10.6     Recent Labs      10/10/18   0319  10/11/18   0519   SODIUM  139  136   POTASSIUM  3.6  3.7   CHLORIDE  112  109   CO2  18*  16*   GLUCOSE  97  118*   BUN  10  7*   CREATININE  0.74  0.63   CALCIUM  9.0  9.3                      Assessment/Plan     Sepsis (HCC)- (present on admission)   Assessment & Plan    This is sepsis (without associated acute organ dysfunction).   Likely source is pneumonia  Continue IV fluids per septic protocol  Lactate is within normal limits  Continue on IV ceftriaxone and azithromycin  Follow blood cultures        Seizure (HCC)- (present on admission)   Assessment & Plan    Continue Topamax        CAP (community acquired pneumonia)- (present on admission)   Assessment & Plan    continue oral antibiotics to complete a 7 days course.  procalcitonin is low  Flu negative.  RT protocol   Negative blood cultures  Repeat CXR and nursing staff to wean off oxygen.         Essential hypertension- (present on admission)   Assessment & Plan    Uncontrolled on arrival.  Better controlled now.  continue on IV labetalol as needed for SBP greater than 160  Continue propanolol  mg daily        Fall from ground level- (present on admission)   Assessment & Plan    PT and OT evaluation.  SNF referral  Right hip pain, no acute fracture on X-ray.  CT hip is negative for acute fracture.        Traumatic rhabdomyolysis (HCC)- (present on admission)   Assessment & Plan    CK level continue to trending downward with hydration.   Given the patient is eating and drinking without difficulty.  Discontinued IV fluid.          Quality-Core Measures   Reviewed items::  Labs reviewed, Medications reviewed and Radiology images reviewed  DVT prophylaxis - mechanical:  SCDs

## 2018-10-12 NOTE — PROGRESS NOTES
[facility] Hospitalist Progress Note    Date of Service: 10/11/2018    Chief Complaint  47 y.o. female admitted 10/8/2018 with ground level fall and she has history of hydrocephalus and seizure disorder    Interval Problem Update  10/9:  Patient complains of right hip pain. Otherwise, she states that she is feeling better since arrival    10/10: my pain is radiate from the right hip down to the knee.  Patient states she breaths okay.  10/11:  Patient reports some productive coughs.  Nursing staff reports low o2sat overnight.       Consultants/Specialty  none    Disposition  Home with home health.  Discussed discharge planning with the patient. Referral to home health      Review of Systems   Constitutional: Negative for fever.   Respiratory: Negative for shortness of breath.    Cardiovascular: Negative for chest pain.   Gastrointestinal: Negative for abdominal pain.   Musculoskeletal: Positive for joint pain.      Physical Exam  Laboratory/Imaging   Hemodynamics  Temp (24hrs), Av.6 °C (97.8 °F), Min:36.1 °C (96.9 °F), Max:37.5 °C (99.5 °F)   Temperature: 36.1 °C (96.9 °F)  Pulse  Av.4  Min: 66  Max: 133   Blood Pressure: 102/70      Respiratory      Respiration: 20, Pulse Oximetry: 95 %, O2 Daily Delivery Respiratory : Silicone Nasal Cannula     Work Of Breathing / Effort: Mild  RUL Breath Sounds: Clear, RML Breath Sounds: Diminished, RLL Breath Sounds: Diminished, ABIMBOLA Breath Sounds: Clear, LLL Breath Sounds: Diminished    Fluids    Intake/Output Summary (Last 24 hours) at 10/11/18 2018  Last data filed at 10/11/18 0900   Gross per 24 hour   Intake              240 ml   Output             2100 ml   Net            -1860 ml       Nutrition  Orders Placed This Encounter   Procedures   • Diet Order Cardiac     Standing Status:   Standing     Number of Occurrences:   1     Order Specific Question:   Diet:     Answer:   Cardiac [6]     Physical Exam   Constitutional: She is oriented to person, place, and time. She  appears well-developed. No distress.   HENT:   Head: Normocephalic and atraumatic.   Mouth/Throat: No oropharyngeal exudate.   Eyes: Right eye exhibits no discharge. Left eye exhibits no discharge.   Neck: Neck supple. No JVD present.   Cardiovascular: Normal rate and regular rhythm.    Pulmonary/Chest: Effort normal and breath sounds normal. No respiratory distress. She has no wheezes.   Abdominal: Soft. Bowel sounds are normal. She exhibits no distension. There is no tenderness.   Musculoskeletal: She exhibits no edema.   Right hip pain with movement.   Neurological: She is alert and oriented to person, place, and time.   Follows commands   Skin: Skin is warm and dry. She is not diaphoretic. No erythema.   Psychiatric: She has a normal mood and affect.       Recent Labs      10/09/18   0603  10/10/18   0319  10/11/18   0320   WBC  9.2  8.0  8.8   RBC  3.86*  4.03*  4.24   HEMOGLOBIN  11.6*  11.9*  12.5   HEMATOCRIT  34.9*  36.0*  37.3   MCV  90.4  89.3  88.0   MCH  30.1  29.5  29.5   MCHC  33.2*  33.1*  33.5*   RDW  44.0  43.6  42.4   PLATELETCT  199  236  209   MPV  11.4  11.5  10.9     Recent Labs      10/09/18   0603  10/10/18   0319  10/11/18   0519   SODIUM  141  139  136   POTASSIUM  3.6  3.6  3.7   CHLORIDE  114*  112  109   CO2  18*  18*  16*   GLUCOSE  90  97  118*   BUN  8  10  7*   CREATININE  0.54  0.74  0.63   CALCIUM  9.0  9.0  9.3                      Assessment/Plan     Sepsis (HCC)- (present on admission)   Assessment & Plan    This is sepsis (without associated acute organ dysfunction).   Likely source is pneumonia  Continue IV fluids per septic protocol  Lactate is within normal limits  Continue on IV ceftriaxone and azithromycin  Follow blood cultures        Seizure (HCC)- (present on admission)   Assessment & Plan    Continue Topamax        CAP (community acquired pneumonia)- (present on admission)   Assessment & Plan    continue oral antibiotics to complete a 7 days course.  procalcitonin is  low  Flu negative.  RT protocol   Negative blood cultures  Repeat CXR and nursing staff to wean off oxygen.        Essential hypertension- (present on admission)   Assessment & Plan    Uncontrolled on arrival.  Better controlled now.  continue on IV labetalol as needed for SBP greater than 160  Continue propanolol  mg daily        Fall from ground level- (present on admission)   Assessment & Plan    PT and OT evaluation.  SNF referral  Right hip pain, no acute fracture on X-ray.  CT hip is negative for acute fracture.        Traumatic rhabdomyolysis (HCC)- (present on admission)   Assessment & Plan    CK level continue to trending downward with hydration.   Given the patient is eating and drinking without difficulty.  Discontinued IV fluid.          Quality-Core Measures   Reviewed items::  Labs reviewed, Medications reviewed and Radiology images reviewed  DVT prophylaxis - mechanical:  SCDs

## 2018-10-12 NOTE — PROGRESS NOTES
Assumed care of patient. Pt is A+O x2. No chest pain or SOB. No active bleeding noted. Informed of safety and call system. rhythm is SR. Believes she is about to leave. Reinforced she will be discharged tomorrow. Unaware of location or situation.

## 2018-10-12 NOTE — PROGRESS NOTES
Pt is 95% on RA, and lowest was 90% when walking. Asymptomatic. O2 sats recovered quickly after slowing down. Dr. Danielle notified, orders to continue with discharge, do IS and give lasix (was already ordered and was just administered).

## 2018-10-12 NOTE — PROGRESS NOTES
Assumed care of patient, report received at bedside from night shift RN, tele box on. Pt aaox4, no signs of distress noted at this time, on 1L of O2 via NC. Patient sitting up in bed. POC discussed with pt and verbalizes no questions. Pt c/o 7/10 pain in her head, intervention provided, requests pain medication when available. Pt assisted to bathroom and back to bed, steady. Pt denies any additional needs at this time. Bed in lowest position, pt educated on fall risk and verbalized understanding, call light within reach, will continue to monitor.

## 2018-10-13 LAB
BACTERIA BLD CULT: NORMAL
BACTERIA BLD CULT: NORMAL
SIGNIFICANT IND 70042: NORMAL
SIGNIFICANT IND 70042: NORMAL
SITE SITE: NORMAL
SITE SITE: NORMAL
SOURCE SOURCE: NORMAL
SOURCE SOURCE: NORMAL

## 2018-10-15 ENCOUNTER — HOME CARE VISIT (OUTPATIENT)
Dept: HOME HEALTH SERVICES | Facility: HOME HEALTHCARE | Age: 48
End: 2018-10-15

## 2018-10-20 ENCOUNTER — HOME CARE VISIT (OUTPATIENT)
Dept: HOME HEALTH SERVICES | Facility: HOME HEALTHCARE | Age: 48
End: 2018-10-20

## 2018-10-20 ENCOUNTER — HOME CARE VISIT (OUTPATIENT)
Dept: HOME HEALTH SERVICES | Facility: HOME HEALTHCARE | Age: 48
End: 2018-10-20
Payer: MEDICAID

## 2018-10-20 VITALS
RESPIRATION RATE: 18 BRPM | HEIGHT: 64 IN | WEIGHT: 148.4 LBS | OXYGEN SATURATION: 98 % | HEART RATE: 118 BPM | BODY MASS INDEX: 25.33 KG/M2 | TEMPERATURE: 97.7 F | SYSTOLIC BLOOD PRESSURE: 122 MMHG | DIASTOLIC BLOOD PRESSURE: 78 MMHG

## 2018-10-20 PROCEDURE — G0493 RN CARE EA 15 MIN HH/HOSPICE: HCPCS

## 2018-10-20 PROCEDURE — 665001 SOC-HOME HEALTH

## 2018-10-20 SDOH — ECONOMIC STABILITY: HOUSING INSECURITY: UNSAFE APPLIANCES: 0

## 2018-10-20 SDOH — ECONOMIC STABILITY: HOUSING INSECURITY: UNSAFE COOKING RANGE AREA: 0

## 2018-10-20 SDOH — ECONOMIC STABILITY: HOUSING INSECURITY: HOME SAFETY: PT HAS 2 CATS WITH TOYS THAT CREATES A POTENTIAL RISK AS A TRIP/SLIP HAZARD DUE TO PT BLINDNESS.

## 2018-10-20 ASSESSMENT — ENCOUNTER SYMPTOMS
NAUSEA: DENIES
SHORTNESS OF BREATH: T
VOMITING: DENIES
DEPRESSED MOOD: 1

## 2018-10-20 ASSESSMENT — PATIENT HEALTH QUESTIONNAIRE - PHQ9
2. FEELING DOWN, DEPRESSED, IRRITABLE, OR HOPELESS: 03
1. LITTLE INTEREST OR PLEASURE IN DOING THINGS: 00

## 2018-10-20 ASSESSMENT — ACTIVITIES OF DAILY LIVING (ADL)
OASIS_M1830: 00
HOME_HEALTH_OASIS: 00
HOME_HEALTH_OASIS: 01

## 2018-10-21 ENCOUNTER — APPOINTMENT (OUTPATIENT)
Dept: RADIOLOGY | Facility: MEDICAL CENTER | Age: 48
End: 2018-10-21
Attending: EMERGENCY MEDICINE
Payer: MEDICAID

## 2018-10-21 ENCOUNTER — HOSPITAL ENCOUNTER (EMERGENCY)
Facility: MEDICAL CENTER | Age: 48
End: 2018-10-21
Attending: EMERGENCY MEDICINE
Payer: MEDICAID

## 2018-10-21 VITALS
SYSTOLIC BLOOD PRESSURE: 117 MMHG | HEIGHT: 64 IN | WEIGHT: 147.27 LBS | RESPIRATION RATE: 16 BRPM | BODY MASS INDEX: 25.14 KG/M2 | HEART RATE: 87 BPM | DIASTOLIC BLOOD PRESSURE: 81 MMHG | OXYGEN SATURATION: 98 % | TEMPERATURE: 97.1 F

## 2018-10-21 DIAGNOSIS — R51.9 NONINTRACTABLE HEADACHE, UNSPECIFIED CHRONICITY PATTERN, UNSPECIFIED HEADACHE TYPE: ICD-10-CM

## 2018-10-21 LAB
ALBUMIN SERPL BCP-MCNC: 4.1 G/DL (ref 3.2–4.9)
ALBUMIN/GLOB SERPL: 1 G/DL
ALP SERPL-CCNC: 110 U/L (ref 30–99)
ALT SERPL-CCNC: 14 U/L (ref 2–50)
ANION GAP SERPL CALC-SCNC: 11 MMOL/L (ref 0–11.9)
APTT PPP: 34.6 SEC (ref 24.7–36)
AST SERPL-CCNC: 14 U/L (ref 12–45)
BASOPHILS # BLD AUTO: 1.4 % (ref 0–1.8)
BASOPHILS # BLD: 0.11 K/UL (ref 0–0.12)
BILIRUB SERPL-MCNC: 0.4 MG/DL (ref 0.1–1.5)
BUN SERPL-MCNC: 5 MG/DL (ref 8–22)
CALCIUM SERPL-MCNC: 10.2 MG/DL (ref 8.5–10.5)
CHLORIDE SERPL-SCNC: 108 MMOL/L (ref 96–112)
CO2 SERPL-SCNC: 21 MMOL/L (ref 20–33)
CREAT SERPL-MCNC: 0.84 MG/DL (ref 0.5–1.4)
EOSINOPHIL # BLD AUTO: 0.21 K/UL (ref 0–0.51)
EOSINOPHIL NFR BLD: 2.6 % (ref 0–6.9)
ERYTHROCYTE [DISTWIDTH] IN BLOOD BY AUTOMATED COUNT: 43.9 FL (ref 35.9–50)
GLOBULIN SER CALC-MCNC: 4.1 G/DL (ref 1.9–3.5)
GLUCOSE SERPL-MCNC: 83 MG/DL (ref 65–99)
HCT VFR BLD AUTO: 42.5 % (ref 37–47)
HGB BLD-MCNC: 14.2 G/DL (ref 12–16)
IMM GRANULOCYTES # BLD AUTO: 0.02 K/UL (ref 0–0.11)
IMM GRANULOCYTES NFR BLD AUTO: 0.2 % (ref 0–0.9)
INR PPP: 1.13 (ref 0.87–1.13)
LYMPHOCYTES # BLD AUTO: 1.89 K/UL (ref 1–4.8)
LYMPHOCYTES NFR BLD: 23.5 % (ref 22–41)
MCH RBC QN AUTO: 29.1 PG (ref 27–33)
MCHC RBC AUTO-ENTMCNC: 33.4 G/DL (ref 33.6–35)
MCV RBC AUTO: 87.1 FL (ref 81.4–97.8)
MONOCYTES # BLD AUTO: 0.66 K/UL (ref 0–0.85)
MONOCYTES NFR BLD AUTO: 8.2 % (ref 0–13.4)
NEUTROPHILS # BLD AUTO: 5.16 K/UL (ref 2–7.15)
NEUTROPHILS NFR BLD: 64.1 % (ref 44–72)
NRBC # BLD AUTO: 0 K/UL
NRBC BLD-RTO: 0 /100 WBC
PLATELET # BLD AUTO: 565 K/UL (ref 164–446)
PMV BLD AUTO: 9.9 FL (ref 9–12.9)
POTASSIUM SERPL-SCNC: 3.4 MMOL/L (ref 3.6–5.5)
PROT SERPL-MCNC: 8.2 G/DL (ref 6–8.2)
PROTHROMBIN TIME: 14.6 SEC (ref 12–14.6)
RBC # BLD AUTO: 4.88 M/UL (ref 4.2–5.4)
SODIUM SERPL-SCNC: 140 MMOL/L (ref 135–145)
WBC # BLD AUTO: 8.1 K/UL (ref 4.8–10.8)

## 2018-10-21 PROCEDURE — 700111 HCHG RX REV CODE 636 W/ 250 OVERRIDE (IP): Performed by: EMERGENCY MEDICINE

## 2018-10-21 PROCEDURE — 71045 X-RAY EXAM CHEST 1 VIEW: CPT

## 2018-10-21 PROCEDURE — 74018 RADEX ABDOMEN 1 VIEW: CPT

## 2018-10-21 PROCEDURE — 80053 COMPREHEN METABOLIC PANEL: CPT

## 2018-10-21 PROCEDURE — 96374 THER/PROPH/DIAG INJ IV PUSH: CPT

## 2018-10-21 PROCEDURE — 72040 X-RAY EXAM NECK SPINE 2-3 VW: CPT

## 2018-10-21 PROCEDURE — 99285 EMERGENCY DEPT VISIT HI MDM: CPT

## 2018-10-21 PROCEDURE — 70450 CT HEAD/BRAIN W/O DYE: CPT

## 2018-10-21 PROCEDURE — 85025 COMPLETE CBC W/AUTO DIFF WBC: CPT

## 2018-10-21 PROCEDURE — 70250 X-RAY EXAM OF SKULL: CPT

## 2018-10-21 PROCEDURE — 85610 PROTHROMBIN TIME: CPT

## 2018-10-21 PROCEDURE — 85730 THROMBOPLASTIN TIME PARTIAL: CPT

## 2018-10-21 RX ORDER — MORPHINE SULFATE 4 MG/ML
4 INJECTION, SOLUTION INTRAMUSCULAR; INTRAVENOUS
Status: DISCONTINUED | OUTPATIENT
Start: 2018-10-21 | End: 2018-10-21 | Stop reason: HOSPADM

## 2018-10-21 RX ADMIN — MORPHINE SULFATE 4 MG: 4 INJECTION INTRAVENOUS at 06:04

## 2018-10-21 ASSESSMENT — ENCOUNTER SYMPTOMS
LOSS OF CONSCIOUSNESS: 0
DEPRESSION: 0
SEIZURES: 0
HEARTBURN: 0
SPUTUM PRODUCTION: 0
NAUSEA: 1
EYE PAIN: 0
SHORTNESS OF BREATH: 0
CHILLS: 0
BRUISES/BLEEDS EASILY: 0
FOCAL WEAKNESS: 0
FEVER: 0
HEMOPTYSIS: 0
COUGH: 0
DIZZINESS: 1
HEADACHES: 1

## 2018-10-21 ASSESSMENT — PAIN SCALES - GENERAL
PAINLEVEL_OUTOF10: 7

## 2018-10-21 ASSESSMENT — LIFESTYLE VARIABLES: SUBSTANCE_ABUSE: 0

## 2018-10-21 NOTE — ED TRIAGE NOTES
Chief Complaint   Patient presents with   • Migraine     Pt amb with cane to yellow 65. Pt c/o migraine x 2days. Pt states hx of migraines which occur about 1 x per month. ERP currently at bedside.

## 2018-10-21 NOTE — ED PROVIDER NOTES
ED Provider Note    CHIEF COMPLAINT  Chief Complaint   Patient presents with   • Migraine       HPI  Rasheeda Manzano is a 47 y.o. female who presents with headache.  Patient has a history of recurrent headaches.  She has hydrocephalus and has had shunt since she was 6 months old.  She believes that she has current shunt malfunction.  Headache is located diffusely over her head.  It is worse when she sits up and moves around.  Seems to go away when she lays down.  Denies any new neurologic symptoms.  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  No focal weakness numbness slurred speech or confusion.  This headache is different from her regular migraines in that her migraines are generally located behind her left eye.  She has not had any trauma.  Has felt slightly nauseous.  Has not vomited.  She has not had a fever or chills.  No neck stiffness.  Patient was recently admitted to the hospital with pneumonia.  Reports that her cough is improving.  She is not short of breath.  No abdominal pain, dysuria, hematuria.    Medical record is reviewed.  Patient had 4 visits to the emergency department last month with headache and concern for shunt problem.  As mentioned above she was admitted to this hospital on 10/8 with pneumonia.  Discharged with home care on 10/12.    REVIEW OF SYSTEMS  Review of Systems   Constitutional: Negative for chills and fever.   Eyes: Negative for pain.   Respiratory: Negative for cough, hemoptysis, sputum production and shortness of breath.    Cardiovascular: Negative for chest pain.   Gastrointestinal: Positive for nausea. Negative for heartburn.   Genitourinary: Negative for dysuria.   Musculoskeletal: Positive for joint pain.   Skin: Negative for rash.   Neurological: Positive for dizziness and headaches. Negative for focal weakness, seizures and loss of consciousness.   Endo/Heme/Allergies: Does not bruise/bleed easily.   Psychiatric/Behavioral: Negative for depression, substance abuse and suicidal  ideas.   All other systems reviewed and are negative.        PAST MEDICAL HISTORY  Past Medical History:   Diagnosis Date   • Blind    • C. difficile diarrhea 2013   • Chronic daily headache    • Depression    • Fall    • Hydrocephalus    • Hydrocephalus     shunt drains into pleural place of L lung   • Jaundice     at birth   • Legally blind    • Migraine without aura, without mention of intractable migraine without mention of status migrainosus    • Other specified symptom associated with female genital organs     excessive bleeding   • Pain     gallbladder related   • Psychiatric problem     depression       FAMILY HISTORY  Family History   Problem Relation Age of Onset   • Hypertension Mother    • Hypertension Father    • Non-contributory Neg Hx         Migraine       SOCIAL HISTORY  Social History   Substance Use Topics   • Smoking status: Former Smoker     Packs/day: 1.00     Years: 10.00     Types: Cigars     Start date: 5/1/2004     Quit date: 8/9/2017   • Smokeless tobacco: Never Used      Comment:  CIGAR/day    • Alcohol use Yes      Comment: socially       SURGICAL HISTORY  Past Surgical History:   Procedure Laterality Date   • GASTROSCOPY-ENDO N/A 8/24/2017    Procedure: GASTROSCOPY-ENDO;  Surgeon: Matias Fernando M.D.;  Location: [facility];  Service:    • AYALA BY LAPAROSCOPY N/A 8/13/2015    Procedure: AYALA BY LAPAROSCOPY;  Surgeon: Brice Johnson M.D.;  Location: SURGERY SAME DAY [facility];  Service:    • CHOLECYSTECTOMY N/A 8/13/2015    Procedure: CHOLECYSTECTOMY;  Surgeon: Brice Johnson M.D.;  Location: SURGERY SAME DAY [facility];  Service:    • LYSIS ADHESIONS GENERAL  12/10/2013    Performed by Arie Bass M.D. at [facility]   • CYSTOSCOPY  12/10/2013    Performed by Joana Yeager M.D. at [facility]   • EXPLORATORY LAPAROTOMY  12/10/2013    Performed by Arie Bass M.D. at [facility]   • APPENDECTOMY  12/10/2013     "Performed by Arie Bass M.D. at [facility]   • ABDOMINAL HYSTERECTOMY TOTAL  12/10/2013    Performed by Joana Yeager M.D. at [facility]   • LAPAROSCOPY  8/8/08    Performed by FERNANDO HENDERSON at [facility]   • OTHER      PLEURAL SHUNT, numerous revisions       CURRENT MEDICATIONS  Home Medications     Reviewed by Zheng Sun R.N. (Registered Nurse) on 10/21/18 at 0502  Med List Status: Partial   Medication Last Dose Status   amitriptyline (ELAVIL) 100 MG Tab  Active   esomeprazole magnesium (NEXIUM) 40 MG Pack 10/7/2018 Active   famotidine (PEPCID) 40 MG Tab  Active   hydrOXYzine HCl (ATARAX) 50 MG Tab  Active   levoFLOXacin (LEVAQUIN) 500 MG tablet  Active   propranolol CR (INDERAL LA) 120 MG CAPSULE SR 24 HR 10/7/2018 Active   topiramate (TOPAMAX) 100 MG Tab 10/7/2018 Active                ALLERGIES  Allergies   Allergen Reactions   • Tape Rash     Medical tape. Per patient, paper tape ok.       PHYSICAL EXAM  VITAL SIGNS: /84   Pulse 82   Temp 36 °C (96.8 °F)   Resp 16   Ht 1.626 m (5' 4\")   Wt 66.8 kg (147 lb 4.3 oz)   LMP 11/27/2013   SpO2 99%   BMI 25.28 kg/m²   Constitutional:  Well developed, Well nourished, No acute distress, Non-toxic appearance.   HENT: Head atraumatic, TMs are clear, pharynx normal  Eyes: PERRL, EOMI, funduscopic exam is normal without papilledema  Neck: Normal range of motion, No tenderness, Supple, No stridor.   Lymphatic: No lymphadenopathy noted.   Cardiovascular: Normal heart rate, Normal rhythm, No murmurs, No rubs, No gallops.   Thorax & Lungs: Normal breath sounds, No respiratory distress, No wheezing, No chest tenderness.   Skin: Warm, Dry, No erythema, No rash.   Extremities: Intact distal pulses, No edema, No tenderness, No cyanosis, No clubbing.   Neurologic: Alert & oriented x 3, Normal motor function, Normal sensory function, No focal deficits noted, normal gait  Psychiatric: Affect normal, Judgment normal, " Mood normal.     RADIOLOGY/PROCEDURES  DX-CERVICAL SPINE-2 OR 3 VIEWS   Final Result         1.  No visualized shunt catheter disruption      [id number] VIEW   Final Result         1.  No disruption of ventriculostomy shunt catheter.      CT-HEAD W/O   Final Result         1.  No acute intracranial abnormality.   2.  Atherosclerosis.      DX-CHEST-LIMITED (1 VIEW)   Final Result         1.  No acute cardiopulmonary disease.   2.  Ventriculoperitoneal shunt catheter grossly intact      DX-SKULL-LIMITED 3-   Final Result         1.  Unremarkable exam.   2.  Ventriculoperitoneal shunt catheter appears grossly intact.          Imaging is interpreted by radiologist     Labs  Results for orders placed or performed during the hospital encounter of 10/21/18   CBC WITH DIFFERENTIAL   Result Value Ref Range    WBC 8.1 4.8 - 10.8 K/uL    RBC 4.88 4.20 - 5.40 M/uL    Hemoglobin 14.2 12.0 - 16.0 g/dL    Hematocrit 42.5 37.0 - 47.0 %    MCV 87.1 81.4 - 97.8 fL    MCH 29.1 27.0 - 33.0 pg    MCHC 33.4 (L) 33.6 - 35.0 g/dL    RDW 43.9 35.9 - 50.0 fL    Platelet Count 565 (H) 164 - 446 K/uL    MPV 9.9 9.0 - 12.9 fL    Neutrophils-Polys 64.10 44.00 - 72.00 %    Lymphocytes 23.50 22.00 - 41.00 %    Monocytes 8.20 0.00 - 13.40 %    Eosinophils 2.60 0.00 - 6.90 %    Basophils 1.40 0.00 - 1.80 %    Immature Granulocytes 0.20 0.00 - 0.90 %    Nucleated RBC 0.00 /100 WBC    Neutrophils (Absolute) 5.16 2.00 - 7.15 K/uL    Lymphs (Absolute) 1.89 1.00 - 4.80 K/uL    Monos (Absolute) 0.66 0.00 - 0.85 K/uL    Eos (Absolute) 0.21 0.00 - 0.51 K/uL    Baso (Absolute) 0.11 0.00 - 0.12 K/uL    Immature Granulocytes (abs) 0.02 0.00 - 0.11 K/uL    NRBC (Absolute) 0.00 K/uL   COMP METABOLIC PANEL   Result Value Ref Range    Sodium 140 135 - 145 mmol/L    Potassium 3.4 (L) 3.6 - 5.5 mmol/L    Chloride 108 96 - 112 mmol/L    Co2 21 20 - 33 mmol/L    Anion Gap 11.0 0.0 - 11.9    Glucose 83 65 - 99 mg/dL    Bun 5 (L) 8 - 22 mg/dL    Creatinine 0.84 0.50 -  1.40 mg/dL    Calcium 10.2 8.5 - 10.5 mg/dL    AST(SGOT) 14 12 - 45 U/L    ALT(SGPT) 14 2 - 50 U/L    Alkaline Phosphatase 110 (H) 30 - 99 U/L    Total Bilirubin 0.4 0.1 - 1.5 mg/dL    Albumin 4.1 3.2 - 4.9 g/dL    Total Protein 8.2 6.0 - 8.2 g/dL    Globulin 4.1 (H) 1.9 - 3.5 g/dL    A-G Ratio 1.0 g/dL   PROTHROMBIN TIME   Result Value Ref Range    PT 14.6 12.0 - 14.6 sec    INR 1.13 0.87 - 1.13   APTT   Result Value Ref Range    APTT 34.6 24.7 - 36.0 sec   ESTIMATED GFR   Result Value Ref Range    GFR If African American >60 >60 mL/min/1.73 m 2    GFR If Non African American >60 >60 mL/min/1.73 m 2        COURSE & MEDICAL DECISION MAKING  Patient presents with recurrent headache that she attributes to possible shunt failure.  She is neurologically intact and at her baseline.  She was given morphine and Zofran for pain.  Workup was obtained.  Shunt appears to be intact without other remarkable findings.  Her symptoms were improved.  She does not have suggestion of subarachnoid hemorrhage, CvT, stroke, infectious process.  She has an appointment with neurosurgery, Dr. Hogan, next week.  I encouraged her to keep this appointment.  We discussed her multiple visits to the ER for the same.  Certainly the number of CT scans she is having is concerning; however, with her reports of shunt failure it is difficult not to proceed with these tests.  She voiced understanding.  I have advised her to return if she has fever, neurologic symptoms or concern.    FINAL IMPRESSION  1.  Acute cephalgia  2.  History of  shunt    This dictation was created using voice recognition software. The accuracy of the dictation is limited to the abilities of the software. I expect there may be some errors of grammar and possibly content. The nursing notes were reviewed and certain aspects of this information were incorporated into this note.      Electronically signed by: Richmond Cowan, 10/21/2018 5:19 AM

## 2018-10-21 NOTE — ED TRIAGE NOTES
"Chief Complaint   Patient presents with   • Migraine     Patient ambulatory with assistance to triage. Patient states that she has had a migraine x 2 days. Patient has been taking ibuprofen without relief. Patient has no other complaints at this time. /84   Pulse 82   Temp 36 °C (96.8 °F)   Resp 16   Ht 1.626 m (5' 4\")   Wt 66.8 kg (147 lb 4.3 oz)   LMP 11/27/2013   SpO2 99%   BMI 25.28 kg/m²     "

## 2018-10-22 ENCOUNTER — HOME CARE VISIT (OUTPATIENT)
Dept: HOME HEALTH SERVICES | Facility: HOME HEALTHCARE | Age: 48
End: 2018-10-22
Payer: MEDICAID

## 2018-10-23 ENCOUNTER — TELEPHONE (OUTPATIENT)
Dept: HEALTH INFORMATION MANAGEMENT | Facility: OTHER | Age: 48
End: 2018-10-23

## 2018-10-23 ENCOUNTER — HOME CARE VISIT (OUTPATIENT)
Dept: HOME HEALTH SERVICES | Facility: HOME HEALTHCARE | Age: 48
End: 2018-10-23
Payer: MEDICAID

## 2018-10-23 VITALS
HEART RATE: 102 BPM | OXYGEN SATURATION: 98 % | SYSTOLIC BLOOD PRESSURE: 118 MMHG | DIASTOLIC BLOOD PRESSURE: 78 MMHG | TEMPERATURE: 98.1 F | RESPIRATION RATE: 18 BRPM

## 2018-10-23 PROCEDURE — G0299 HHS/HOSPICE OF RN EA 15 MIN: HCPCS

## 2018-10-23 SDOH — ECONOMIC STABILITY: HOUSING INSECURITY: UNSAFE APPLIANCES: 0

## 2018-10-23 SDOH — ECONOMIC STABILITY: HOUSING INSECURITY: UNSAFE COOKING RANGE AREA: 0

## 2018-10-23 ASSESSMENT — ENCOUNTER SYMPTOMS
NAUSEA: DENIES
VOMITING: DENIES

## 2018-10-23 NOTE — TELEPHONE ENCOUNTER
Referral from [facility]. Med review completed. No clinically significant medication related issues noted.

## 2018-10-25 ENCOUNTER — HOME CARE VISIT (OUTPATIENT)
Dept: HOME HEALTH SERVICES | Facility: HOME HEALTHCARE | Age: 48
End: 2018-10-25
Payer: MEDICAID

## 2018-10-29 ENCOUNTER — HOME CARE VISIT (OUTPATIENT)
Dept: HOME HEALTH SERVICES | Facility: HOME HEALTHCARE | Age: 48
End: 2018-10-29
Payer: MEDICAID

## 2018-10-29 PROCEDURE — G0495 RN CARE TRAIN/EDU IN HH: HCPCS

## 2018-10-30 ENCOUNTER — HOME CARE VISIT (OUTPATIENT)
Dept: HOME HEALTH SERVICES | Facility: HOME HEALTHCARE | Age: 48
End: 2018-10-30
Payer: MEDICAID

## 2018-10-30 VITALS
TEMPERATURE: 97.7 F | HEART RATE: 116 BPM | SYSTOLIC BLOOD PRESSURE: 120 MMHG | DIASTOLIC BLOOD PRESSURE: 80 MMHG | OXYGEN SATURATION: 98 % | RESPIRATION RATE: 16 BRPM

## 2018-10-30 PROCEDURE — G0151 HHCP-SERV OF PT,EA 15 MIN: HCPCS

## 2018-10-30 SDOH — ECONOMIC STABILITY: HOUSING INSECURITY: UNSAFE APPLIANCES: 0

## 2018-10-30 SDOH — ECONOMIC STABILITY: HOUSING INSECURITY: UNSAFE COOKING RANGE AREA: 0

## 2018-10-30 NOTE — DISCHARGE SUMMARY
Discharge Summary    CHIEF COMPLAINT ON ADMISSION  Chief Complaint   Patient presents with   • Syncope     pt found on floor at residence   • Leg Pain     right upper       Reason for Admission  Failure to thrive     Admission Date  10/8/2018    CODE STATUS  Full Code    HPI & HOSPITAL COURSE  This is a 47 y.o. female here with ground level fall and she has history of hydrocephalus and seizure disorder.   The patient complains of right hip pain and found to have rhabdomyolysis.  On arrival, the patient was thought to have sepsis due to community acquired pneumonia as chest xray found bilateral groundglass and airspace opacities throughout both lungs, most in the right upper lobe.  Patient was started on ceftriaxone and azithromycin and blood cultures collected.   In regard to the the rhabdomyolysis, the patient was treated with iv fluid infusion.   Blood culture returns negative and procalcitonin level is within normal range.  Therefore, the patient's antibiotic therapy was de-escalated. Patient's room air oxygen saturation is 95% at rest and 90% during walking.   CT of right hip found no evidence of acute displaced fracture       Therefore, she is discharged in fair and stable condition to home with close outpatient follow-up.    The patient met 2-midnight criteria for an inpatient stay at the time of discharge.    Discharge Date  10/12/2018    FOLLOW UP ITEMS POST DISCHARGE  None.    DISCHARGE DIAGNOSES  Active Problems:    Seizure (HCC) POA: Yes    CAP (community acquired pneumonia) POA: Yes    Fall from ground level POA: Yes    Essential hypertension POA: Yes  Resolved Problems:    Sepsis (HCC) POA: Yes    Traumatic rhabdomyolysis (HCC) POA: Yes      FOLLOW UP  Future Appointments  Date Time Provider Department Center   10/30/2018 12:00 PM Joan K Ormonde, PT [facility] None   11/2/2018 To Be Determined Alivia Morel R.N. [facility] None   11/7/2018 To Be Determined Madisyn Pinto R.N. [facility] None   11/14/2018 To Be  Determined Madisyn SHAWNA Pinto R.N. [facility] None   11/21/2018 To Be Determined Madisyn YOUNGSachin Pinto R.N. [facility] None   11/28/2018 To Be Determined Madisyn SHAWNA Pinto R.N. [facility] None   12/5/2018 To Be Determined Madisyn SHAWNA Pinto R.N. [facility] None   12/12/2018 To Be Determined Madisyn SHAWNA Pinot R.N. [facility] None     Lucy Trevino M.D.  [address] [facility] [address]  [phone]    Go on 10/17/2018  Please arrive at 2:45pm for your 3:00pm appointment. Thank you      MEDICATIONS ON DISCHARGE     Medication List      CONTINUE taking these medications      Instructions   esomeprazole magnesium 40 MG Pack  Commonly known as:  NEXIUM   Take 40 mg by mouth every morning before breakfast.  Dose:  40 mg     propranolol  MG Cp24  Commonly known as:  INDERAL LA   Take 120 mg by mouth every day.  Dose:  120 mg     topiramate 100 MG Tabs  Commonly known as:  TOPAMAX   Take 200 mg by mouth every bedtime.  Dose:  200 mg        ASK your doctor about these medications      Instructions   HYDROcodone/acetaminophen  MG Tabs  Commonly known as:  NORCO  Ask about: Should I take this medication?   Take 1 Tab by mouth every 8 hours as needed for Severe Pain for up to 7 days.  Dose:  1 Tab            Allergies  Allergies   Allergen Reactions   • Tape Rash     Medical tape. Per patient, paper tape ok.       DIET  Regular diet    ACTIVITY  As tolerated.      CONSULTATIONS  None.    PROCEDURES  None.    LABORATORY  Lab Results   Component Value Date    SODIUM 140 10/21/2018    POTASSIUM 3.4 (L) 10/21/2018    CHLORIDE 108 10/21/2018    CO2 21 10/21/2018    GLUCOSE 83 10/21/2018    BUN 5 (L) 10/21/2018    CREATININE 0.84 10/21/2018    CREATININE 0.6 08/12/2008        Lab Results   Component Value Date    WBC 8.1 10/21/2018    HEMOGLOBIN 14.2 10/21/2018    HEMATOCRIT 42.5 10/21/2018    PLATELETCT 565 (H) 10/21/2018      Physical Exam   Constitutional: She is oriented to person, place, and time. She appears well-developed. No distress.   HENT:   Head:  Normocephalic and atraumatic.   Mouth/Throat: No oropharyngeal exudate.   Eyes: Right eye exhibits no discharge. Left eye exhibits no discharge.   Neck: Neck supple. No JVD present.   Cardiovascular: Normal rate and regular rhythm.    Pulmonary/Chest: Effort normal and breath sounds normal. No respiratory distress. She has no wheezes.   Abdominal: Soft. Bowel sounds are normal. She exhibits no distension. There is no tenderness.   Musculoskeletal: She exhibits no edema.   Right hip pain with movement.   Neurological: She is alert and oriented to person, place, and time.   Follows commands   Skin: Skin is warm and dry. She is not diaphoretic. No erythema.   Psychiatric: She has a normal mood and affect.      Total time of the discharge process exceeds 32 minutes.

## 2018-10-31 VITALS
OXYGEN SATURATION: 98 % | TEMPERATURE: 97.7 F | HEART RATE: 102 BPM | DIASTOLIC BLOOD PRESSURE: 80 MMHG | SYSTOLIC BLOOD PRESSURE: 120 MMHG

## 2018-10-31 ASSESSMENT — ACTIVITIES OF DAILY LIVING (ADL)
DRESSING_UB_ASSISTANCE: 0
BATHING_ASSISTANCE: 0
GROOMING_ASSISTANCE: 0
EATING_ASSISTANCE: 0
ORAL_CARE_ASSISTANCE: 0
DRESSING_LB_ASSISTANCE: 0
TOILETING_ASSISTANCE: 0

## 2018-11-02 ENCOUNTER — HOME CARE VISIT (OUTPATIENT)
Dept: HOME HEALTH SERVICES | Facility: HOME HEALTHCARE | Age: 48
End: 2018-11-02
Payer: MEDICAID

## 2018-11-02 PROCEDURE — G0493 RN CARE EA 15 MIN HH/HOSPICE: HCPCS

## 2018-11-03 VITALS
OXYGEN SATURATION: 96 % | SYSTOLIC BLOOD PRESSURE: 120 MMHG | HEART RATE: 115 BPM | RESPIRATION RATE: 18 BRPM | TEMPERATURE: 97.6 F | DIASTOLIC BLOOD PRESSURE: 90 MMHG

## 2018-11-03 SDOH — ECONOMIC STABILITY: HOUSING INSECURITY: UNSAFE COOKING RANGE AREA: 0

## 2018-11-03 SDOH — ECONOMIC STABILITY: HOUSING INSECURITY: UNSAFE APPLIANCES: 0

## 2018-11-03 ASSESSMENT — ACTIVITIES OF DAILY LIVING (ADL)
OASIS_M1830: 00
HOME_HEALTH_OASIS: 00

## 2018-11-08 ENCOUNTER — APPOINTMENT (OUTPATIENT)
Dept: RADIOLOGY | Facility: MEDICAL CENTER | Age: 48
End: 2018-11-08
Attending: EMERGENCY MEDICINE
Payer: MEDICAID

## 2018-11-08 ENCOUNTER — HOSPITAL ENCOUNTER (EMERGENCY)
Facility: MEDICAL CENTER | Age: 48
End: 2018-11-08
Attending: EMERGENCY MEDICINE
Payer: MEDICAID

## 2018-11-08 VITALS
HEIGHT: 64 IN | RESPIRATION RATE: 14 BRPM | OXYGEN SATURATION: 98 % | SYSTOLIC BLOOD PRESSURE: 134 MMHG | WEIGHT: 151.9 LBS | BODY MASS INDEX: 25.93 KG/M2 | DIASTOLIC BLOOD PRESSURE: 87 MMHG | TEMPERATURE: 98.2 F | HEART RATE: 108 BPM

## 2018-11-08 DIAGNOSIS — M54.31 SCIATICA OF RIGHT SIDE: ICD-10-CM

## 2018-11-08 PROCEDURE — 96372 THER/PROPH/DIAG INJ SC/IM: CPT

## 2018-11-08 PROCEDURE — A9270 NON-COVERED ITEM OR SERVICE: HCPCS | Performed by: EMERGENCY MEDICINE

## 2018-11-08 PROCEDURE — 99284 EMERGENCY DEPT VISIT MOD MDM: CPT

## 2018-11-08 PROCEDURE — 700111 HCHG RX REV CODE 636 W/ 250 OVERRIDE (IP): Performed by: EMERGENCY MEDICINE

## 2018-11-08 PROCEDURE — 73501 X-RAY EXAM HIP UNI 1 VIEW: CPT | Mod: RT

## 2018-11-08 PROCEDURE — 700102 HCHG RX REV CODE 250 W/ 637 OVERRIDE(OP): Performed by: EMERGENCY MEDICINE

## 2018-11-08 RX ORDER — GABAPENTIN 100 MG/1
100 CAPSULE ORAL 3 TIMES DAILY
Qty: 30 CAP | Refills: 0 | Status: SHIPPED | OUTPATIENT
Start: 2018-11-08 | End: 2018-11-15

## 2018-11-08 RX ORDER — GABAPENTIN 100 MG/1
100 CAPSULE ORAL ONCE
Status: COMPLETED | OUTPATIENT
Start: 2018-11-08 | End: 2018-11-08

## 2018-11-08 RX ORDER — METHYLPREDNISOLONE 4 MG/1
TABLET ORAL
Qty: 1 KIT | Refills: 0 | Status: SHIPPED | OUTPATIENT
Start: 2018-11-08 | End: 2018-11-15 | Stop reason: CLARIF

## 2018-11-08 RX ORDER — DEXAMETHASONE SODIUM PHOSPHATE 4 MG/ML
4 INJECTION, SOLUTION INTRA-ARTICULAR; INTRALESIONAL; INTRAMUSCULAR; INTRAVENOUS; SOFT TISSUE ONCE
Status: COMPLETED | OUTPATIENT
Start: 2018-11-08 | End: 2018-11-08

## 2018-11-08 RX ADMIN — DEXAMETHASONE SODIUM PHOSPHATE 4 MG: 4 INJECTION, SOLUTION INTRAMUSCULAR; INTRAVENOUS at 14:53

## 2018-11-08 RX ADMIN — GABAPENTIN 100 MG: 100 CAPSULE ORAL at 14:57

## 2018-11-08 NOTE — ED TRIAGE NOTES
Pt ambulatory to triage. Pt states that she had a GLF a few weeks ago. She had an xray at the time which was normal but states the pain has not improved. Pain in right leg from knee to hip.     Chief Complaint   Patient presents with   • Hip Pain     Pt placed in lobby, updated on triage process. Pt educated to notified RN or triage tech if changes in condition.

## 2018-11-08 NOTE — ED PROVIDER NOTES
ED Provider Note    Scribed for Bladimir Goodman M.D. by Alex Mcnamara. 11/8/2018  2:13 PM    Primary care provider: Lucy Trevino M.D.  Means of arrival: Private vehicle  History obtained from: Patient  History limited by: None    CHIEF COMPLAINT  Chief Complaint   Patient presents with   • Hip Pain       HPI  Rasheeda Manzano is a 47 y.o. female who presents to the Emergency Department with right hip pain. The patient reports she had a ground level fall a two weeks ago and had an imaging workup done at the time which was negative for fracture. She states she has still been having right sided pain from her right hip which radiates down the front of her thigh and in her lower right back. She describes her pain as constant with intermittent sharp episodes. She also states she has been getting weird popping sensations in the right hip from time to time. She reports she is able to ambulate normally. She denies any numbness, tingling, gate abnormalities or other injuries from the fall.     REVIEW OF SYSTEMS  Pertinent negatives include no numbness, tingling, gate abnormalities.      PAST MEDICAL HISTORY   has a past medical history of Blind; C. difficile diarrhea (2013); Chronic daily headache; Depression; Fall; Hydrocephalus; Hydrocephalus; Jaundice; Legally blind; Migraine without aura, without mention of intractable migraine without mention of status migrainosus; Other specified symptom associated with female genital organs; Pain; and Psychiatric problem.    SURGICAL HISTORY   has a past surgical history that includes laparoscopy (8/8/08); lysis adhesions general (12/10/2013); cystoscopy (12/10/2013); exploratory laparotomy (12/10/2013); appendectomy (12/10/2013); abdominal hysterectomy total (12/10/2013); aakash by laparoscopy (N/A, 8/13/2015); cholecystectomy (N/A, 8/13/2015); other; and gastroscopy-endo (N/A, 8/24/2017).    SOCIAL HISTORY  Social History   Substance Use Topics   • Smoking status: Former Smoker  "    Packs/day: 1.00     Years: 10.00     Types: Cigars     Start date: 5/1/2004     Quit date: 8/9/2017   • Smokeless tobacco: Never Used      Comment:  CIGAR/day    • Alcohol use Yes      Comment: socially      History   Drug Use No       FAMILY HISTORY  Family History   Problem Relation Age of Onset   • Hypertension Mother    • Hypertension Father    • Non-contributory Neg Hx         Migraine       CURRENT MEDICATIONS  Home Medications     Reviewed by Neal Anderson R.N. (Registered Nurse) on 11/08/18 at 1354  Med List Status: Not Addressed   Medication Last Dose Status   amitriptyline (ELAVIL) 100 MG Tab  Active   esomeprazole magnesium (NEXIUM) 40 MG Pack 10/7/2018 Active   famotidine (PEPCID) 40 MG Tab  Active   hydrOXYzine HCl (ATARAX) 50 MG Tab  Active   propranolol CR (INDERAL LA) 120 MG CAPSULE SR 24 HR 10/7/2018 Active   topiramate (TOPAMAX) 100 MG Tab 10/7/2018 Active                ALLERGIES  Allergies   Allergen Reactions   • Tape Rash     Medical tape. Per patient, paper tape ok.       PHYSICAL EXAM  VITAL SIGNS: /98   Pulse (!) 115   Temp 36.8 °C (98.2 °F)   Resp 17   Ht 1.626 m (5' 4\")   Wt 68.9 kg (151 lb 14.4 oz)   LMP 11/27/2013   SpO2 96%   BMI 26.07 kg/m²     Constitutional: Well developed, Well nourished, No acute distress, Non-toxic appearance.   HENT: Atraumatic  Eyes: PERRL  Cardiovascular: Regular rate and rhythm   Thorax & Lungs: No respiratory distress  Abdomen: Soft, Non-tender  Skin: Warm, Dry, No rashes  Back: Tenderness in the right lower para lumbar spine extending into the upper sciatic notch region.   Extremities: Positive tenderness with external rotation of the right hip  Neurologic: Intact   Psychiatric: Normal affect.     RADIOLOGY  DX-HIP-UNILATERAL-WITH PELVIS-1 VIEW RIGHT   Final Result      No fracture or dislocation is seen.        The radiologist's interpretation of all radiological studies have been reviewed by me.    COURSE & MEDICAL DECISION " MAKING  Nursing notes, VS, PMSFHx reviewed in chart.    2:13 PM Patient seen and examined at bedside. Ordered for right hip x-ray to evaluate. Patient was treated with Decadron 4 mg and gabapentin 100 mg for her symptoms. She was made aware of her plan of care and was agreeable at this time.       I had a high suspicion for sciatica and low suspicion for fracture but she kept complaining that the hip has a new click and pop to it and therefore he wanted to also make sure she did not have any severe arthritic changes of the hip that would warrant orthopedic follow-up.    3:58 PM - I informed the patient of her results of a normal x-ray with no signs of fracture. I spoke to her about trying physical therapy and chiropractic, and that I would write her a prescription for gabapentin and medrol Dosepak. She was encouraged to follow up with primary care. She was completely agreeable with her care and discharge home at this time.     The patient will return for new or worsening symptoms and is stable at the time of discharge.     Medications   dexamethasone (DECADRON) injection 4 mg (4 mg Intramuscular Given 11/8/18 2683)   gabapentin (NEURONTIN) capsule 100 mg (100 mg Oral Given 11/8/18 0097)       DISPOSITION:  Patient will be discharged home in stable condition.    FINAL IMPRESSION  1. Sciatica of right side          Alex SHARMA (Carlosibjohn), am scribing for, and in the presence of, Bladimir Goodman M.D..    Electronically signed by: Alex Mcnamara (Dennis), 11/8/2018    Bladimir SHARMA M.D. personally performed the services described in this documentation, as scribed by Alex Mcnamara in my presence, and it is both accurate and complete. E.     The note accurately reflects work and decisions made by me.  Bladimir Goodman  11/8/2018  4:01 PM

## 2018-11-09 NOTE — ED NOTES
Pt assisted to dress by RN.  Pt given d/c instructions, f/u info and RX x 2 with verbal understanding.  VSS at discharge.  Pt taken from the ED via w/c by ED tech.  Escorted to the lobby to await arrival of friend/neighbor.

## 2018-11-15 ENCOUNTER — APPOINTMENT (OUTPATIENT)
Dept: RADIOLOGY | Facility: MEDICAL CENTER | Age: 48
End: 2018-11-15
Attending: HOSPITALIST
Payer: MEDICAID

## 2018-11-15 ENCOUNTER — HOSPITAL ENCOUNTER (OUTPATIENT)
Facility: MEDICAL CENTER | Age: 48
End: 2018-11-17
Attending: EMERGENCY MEDICINE | Admitting: HOSPITALIST
Payer: MEDICAID

## 2018-11-15 DIAGNOSIS — G43.009 MIGRAINE WITHOUT AURA AND WITHOUT STATUS MIGRAINOSUS, NOT INTRACTABLE: ICD-10-CM

## 2018-11-15 DIAGNOSIS — R26.81 UNSTEADY GAIT: ICD-10-CM

## 2018-11-15 DIAGNOSIS — E86.0 DEHYDRATION: ICD-10-CM

## 2018-11-15 DIAGNOSIS — Q03.9 CONGENITAL HYDROCEPHALUS (HCC): ICD-10-CM

## 2018-11-15 DIAGNOSIS — G43.001 MIGRAINE WITHOUT AURA AND WITH STATUS MIGRAINOSUS, NOT INTRACTABLE: ICD-10-CM

## 2018-11-15 PROBLEM — M25.551 PAIN OF RIGHT HIP JOINT: Status: ACTIVE | Noted: 2018-11-15

## 2018-11-15 PROBLEM — D72.829 LEUKOCYTOSIS: Status: ACTIVE | Noted: 2018-11-15

## 2018-11-15 PROBLEM — E87.20 METABOLIC ACIDOSIS: Status: ACTIVE | Noted: 2018-11-15

## 2018-11-15 LAB
ALBUMIN SERPL BCP-MCNC: 3.9 G/DL (ref 3.2–4.9)
ALBUMIN/GLOB SERPL: 1.3 G/DL
ALP SERPL-CCNC: 101 U/L (ref 30–99)
ALT SERPL-CCNC: 14 U/L (ref 2–50)
ANION GAP SERPL CALC-SCNC: 12 MMOL/L (ref 0–11.9)
APPEARANCE UR: CLEAR
AST SERPL-CCNC: 16 U/L (ref 12–45)
BASOPHILS # BLD AUTO: 0.6 % (ref 0–1.8)
BASOPHILS # BLD: 0.07 K/UL (ref 0–0.12)
BILIRUB SERPL-MCNC: 0.5 MG/DL (ref 0.1–1.5)
BILIRUB UR QL STRIP.AUTO: NEGATIVE
BUN SERPL-MCNC: 11 MG/DL (ref 8–22)
CALCIUM SERPL-MCNC: 8.9 MG/DL (ref 8.5–10.5)
CHLORIDE SERPL-SCNC: 109 MMOL/L (ref 96–112)
CO2 SERPL-SCNC: 16 MMOL/L (ref 20–33)
COLOR UR: YELLOW
CREAT SERPL-MCNC: 0.51 MG/DL (ref 0.5–1.4)
EOSINOPHIL # BLD AUTO: 0.01 K/UL (ref 0–0.51)
EOSINOPHIL NFR BLD: 0.1 % (ref 0–6.9)
ERYTHROCYTE [DISTWIDTH] IN BLOOD BY AUTOMATED COUNT: 44.6 FL (ref 35.9–50)
GLOBULIN SER CALC-MCNC: 2.9 G/DL (ref 1.9–3.5)
GLUCOSE SERPL-MCNC: 117 MG/DL (ref 65–99)
GLUCOSE UR STRIP.AUTO-MCNC: NEGATIVE MG/DL
HCG SERPL QL: NEGATIVE
HCT VFR BLD AUTO: 41.3 % (ref 37–47)
HGB BLD-MCNC: 13.9 G/DL (ref 12–16)
IMM GRANULOCYTES # BLD AUTO: 0.06 K/UL (ref 0–0.11)
IMM GRANULOCYTES NFR BLD AUTO: 0.5 % (ref 0–0.9)
KETONES UR STRIP.AUTO-MCNC: 15 MG/DL
LACTATE BLD-SCNC: 1 MMOL/L (ref 0.5–2)
LACTATE BLD-SCNC: 1.5 MMOL/L (ref 0.5–2)
LACTATE BLD-SCNC: 2.7 MMOL/L (ref 0.5–2)
LEUKOCYTE ESTERASE UR QL STRIP.AUTO: NEGATIVE
LYMPHOCYTES # BLD AUTO: 1.4 K/UL (ref 1–4.8)
LYMPHOCYTES NFR BLD: 12 % (ref 22–41)
MCH RBC QN AUTO: 30 PG (ref 27–33)
MCHC RBC AUTO-ENTMCNC: 33.7 G/DL (ref 33.6–35)
MCV RBC AUTO: 89 FL (ref 81.4–97.8)
MICRO URNS: ABNORMAL
MONOCYTES # BLD AUTO: 0.54 K/UL (ref 0–0.85)
MONOCYTES NFR BLD AUTO: 4.6 % (ref 0–13.4)
NEUTROPHILS # BLD AUTO: 9.59 K/UL (ref 2–7.15)
NEUTROPHILS NFR BLD: 82.2 % (ref 44–72)
NITRITE UR QL STRIP.AUTO: NEGATIVE
NRBC # BLD AUTO: 0 K/UL
NRBC BLD-RTO: 0 /100 WBC
PH UR STRIP.AUTO: 7 [PH]
PLATELET # BLD AUTO: 304 K/UL (ref 164–446)
PMV BLD AUTO: 11.1 FL (ref 9–12.9)
POTASSIUM SERPL-SCNC: 3.6 MMOL/L (ref 3.6–5.5)
PROT SERPL-MCNC: 6.8 G/DL (ref 6–8.2)
PROT UR QL STRIP: NEGATIVE MG/DL
RBC # BLD AUTO: 4.64 M/UL (ref 4.2–5.4)
RBC UR QL AUTO: NEGATIVE
SODIUM SERPL-SCNC: 137 MMOL/L (ref 135–145)
SP GR UR STRIP.AUTO: 1.01
UROBILINOGEN UR STRIP.AUTO-MCNC: 0.2 MG/DL
WBC # BLD AUTO: 11.7 K/UL (ref 4.8–10.8)

## 2018-11-15 PROCEDURE — 96375 TX/PRO/DX INJ NEW DRUG ADDON: CPT

## 2018-11-15 PROCEDURE — 700111 HCHG RX REV CODE 636 W/ 250 OVERRIDE (IP): Performed by: EMERGENCY MEDICINE

## 2018-11-15 PROCEDURE — 96374 THER/PROPH/DIAG INJ IV PUSH: CPT

## 2018-11-15 PROCEDURE — 96376 TX/PRO/DX INJ SAME DRUG ADON: CPT

## 2018-11-15 PROCEDURE — 700102 HCHG RX REV CODE 250 W/ 637 OVERRIDE(OP): Performed by: HOSPITALIST

## 2018-11-15 PROCEDURE — 99285 EMERGENCY DEPT VISIT HI MDM: CPT

## 2018-11-15 PROCEDURE — 84703 CHORIONIC GONADOTROPIN ASSAY: CPT

## 2018-11-15 PROCEDURE — G8979 MOBILITY GOAL STATUS: HCPCS | Mod: CI

## 2018-11-15 PROCEDURE — A9270 NON-COVERED ITEM OR SERVICE: HCPCS | Performed by: HOSPITALIST

## 2018-11-15 PROCEDURE — G0378 HOSPITAL OBSERVATION PER HR: HCPCS

## 2018-11-15 PROCEDURE — 83605 ASSAY OF LACTIC ACID: CPT

## 2018-11-15 PROCEDURE — 81003 URINALYSIS AUTO W/O SCOPE: CPT

## 2018-11-15 PROCEDURE — G8978 MOBILITY CURRENT STATUS: HCPCS | Mod: CI

## 2018-11-15 PROCEDURE — 700111 HCHG RX REV CODE 636 W/ 250 OVERRIDE (IP): Performed by: HOSPITALIST

## 2018-11-15 PROCEDURE — 85025 COMPLETE CBC W/AUTO DIFF WBC: CPT

## 2018-11-15 PROCEDURE — 700105 HCHG RX REV CODE 258: Performed by: EMERGENCY MEDICINE

## 2018-11-15 PROCEDURE — 97162 PT EVAL MOD COMPLEX 30 MIN: CPT

## 2018-11-15 PROCEDURE — 700105 HCHG RX REV CODE 258: Performed by: HOSPITALIST

## 2018-11-15 PROCEDURE — G8980 MOBILITY D/C STATUS: HCPCS | Mod: CI

## 2018-11-15 PROCEDURE — 36415 COLL VENOUS BLD VENIPUNCTURE: CPT

## 2018-11-15 PROCEDURE — 99220 PR INITIAL OBSERVATION CARE,LEVL III: CPT | Performed by: HOSPITALIST

## 2018-11-15 PROCEDURE — 80053 COMPREHEN METABOLIC PANEL: CPT

## 2018-11-15 PROCEDURE — 72100 X-RAY EXAM L-S SPINE 2/3 VWS: CPT

## 2018-11-15 PROCEDURE — 94760 N-INVAS EAR/PLS OXIMETRY 1: CPT

## 2018-11-15 RX ORDER — HYDROXYZINE HYDROCHLORIDE 25 MG/1
50 TABLET, FILM COATED ORAL DAILY
Status: DISCONTINUED | OUTPATIENT
Start: 2018-11-15 | End: 2018-11-15

## 2018-11-15 RX ORDER — FAMOTIDINE 20 MG/1
40 TABLET, FILM COATED ORAL DAILY
Status: DISCONTINUED | OUTPATIENT
Start: 2018-11-15 | End: 2018-11-15

## 2018-11-15 RX ORDER — LORAZEPAM 1 MG/1
1 TABLET ORAL
Status: DISCONTINUED | OUTPATIENT
Start: 2018-11-15 | End: 2018-11-17

## 2018-11-15 RX ORDER — POLYETHYLENE GLYCOL 3350 17 G/17G
1 POWDER, FOR SOLUTION ORAL
Status: DISCONTINUED | OUTPATIENT
Start: 2018-11-15 | End: 2018-11-17

## 2018-11-15 RX ORDER — MORPHINE SULFATE 10 MG/ML
4 INJECTION, SOLUTION INTRAMUSCULAR; INTRAVENOUS ONCE
Status: COMPLETED | OUTPATIENT
Start: 2018-11-15 | End: 2018-11-15

## 2018-11-15 RX ORDER — SODIUM CHLORIDE 9 MG/ML
1000 INJECTION, SOLUTION INTRAVENOUS ONCE
Status: COMPLETED | OUTPATIENT
Start: 2018-11-15 | End: 2018-11-15

## 2018-11-15 RX ORDER — DIPHENHYDRAMINE HYDROCHLORIDE 50 MG/ML
25 INJECTION INTRAMUSCULAR; INTRAVENOUS ONCE
Status: COMPLETED | OUTPATIENT
Start: 2018-11-15 | End: 2018-11-15

## 2018-11-15 RX ORDER — AMOXICILLIN 250 MG
2 CAPSULE ORAL 2 TIMES DAILY
Status: DISCONTINUED | OUTPATIENT
Start: 2018-11-15 | End: 2018-11-17

## 2018-11-15 RX ORDER — METOCLOPRAMIDE HYDROCHLORIDE 5 MG/ML
10 INJECTION INTRAMUSCULAR; INTRAVENOUS ONCE
Status: COMPLETED | OUTPATIENT
Start: 2018-11-15 | End: 2018-11-15

## 2018-11-15 RX ORDER — ACETAMINOPHEN 325 MG/1
650 TABLET ORAL EVERY 6 HOURS PRN
Status: DISCONTINUED | OUTPATIENT
Start: 2018-11-15 | End: 2018-11-17

## 2018-11-15 RX ORDER — PROMETHAZINE HYDROCHLORIDE 25 MG/1
12.5-25 TABLET ORAL EVERY 4 HOURS PRN
Status: DISCONTINUED | OUTPATIENT
Start: 2018-11-15 | End: 2018-11-17

## 2018-11-15 RX ORDER — OXYCODONE HYDROCHLORIDE 5 MG/1
5 TABLET ORAL
Status: DISCONTINUED | OUTPATIENT
Start: 2018-11-15 | End: 2018-11-16

## 2018-11-15 RX ORDER — BISACODYL 10 MG
10 SUPPOSITORY, RECTAL RECTAL
Status: DISCONTINUED | OUTPATIENT
Start: 2018-11-15 | End: 2018-11-17

## 2018-11-15 RX ORDER — PROPRANOLOL HYDROCHLORIDE 10 MG/1
10 TABLET ORAL EVERY 8 HOURS
Status: DISCONTINUED | OUTPATIENT
Start: 2018-11-15 | End: 2018-11-17 | Stop reason: HOSPADM

## 2018-11-15 RX ORDER — SODIUM CHLORIDE 9 MG/ML
500 INJECTION, SOLUTION INTRAVENOUS
Status: DISCONTINUED | OUTPATIENT
Start: 2018-11-15 | End: 2018-11-16

## 2018-11-15 RX ORDER — ONDANSETRON 2 MG/ML
4 INJECTION INTRAMUSCULAR; INTRAVENOUS EVERY 4 HOURS PRN
Status: DISCONTINUED | OUTPATIENT
Start: 2018-11-15 | End: 2018-11-17

## 2018-11-15 RX ORDER — HYDROMORPHONE HYDROCHLORIDE 1 MG/ML
1 INJECTION, SOLUTION INTRAMUSCULAR; INTRAVENOUS; SUBCUTANEOUS ONCE
Status: COMPLETED | OUTPATIENT
Start: 2018-11-15 | End: 2018-11-15

## 2018-11-15 RX ORDER — OXYCODONE HYDROCHLORIDE 5 MG/1
5 TABLET ORAL
Status: DISCONTINUED | OUTPATIENT
Start: 2018-11-15 | End: 2018-11-15

## 2018-11-15 RX ORDER — ONDANSETRON 4 MG/1
4 TABLET, ORALLY DISINTEGRATING ORAL EVERY 4 HOURS PRN
Status: DISCONTINUED | OUTPATIENT
Start: 2018-11-15 | End: 2018-11-17 | Stop reason: HOSPADM

## 2018-11-15 RX ORDER — HYDROMORPHONE HYDROCHLORIDE 1 MG/ML
0.25 INJECTION, SOLUTION INTRAMUSCULAR; INTRAVENOUS; SUBCUTANEOUS
Status: DISCONTINUED | OUTPATIENT
Start: 2018-11-15 | End: 2018-11-15

## 2018-11-15 RX ORDER — IBUPROFEN 200 MG
800 TABLET ORAL EVERY 8 HOURS PRN
Status: ON HOLD | COMMUNITY
End: 2018-12-07

## 2018-11-15 RX ORDER — ESOMEPRAZOLE MAGNESIUM 40 MG/1
40 GRANULE, DELAYED RELEASE ORAL
Status: DISCONTINUED | OUTPATIENT
Start: 2018-11-15 | End: 2018-11-15

## 2018-11-15 RX ORDER — HYDROMORPHONE HYDROCHLORIDE 2 MG/ML
0.25 INJECTION, SOLUTION INTRAMUSCULAR; INTRAVENOUS; SUBCUTANEOUS
Status: DISCONTINUED | OUTPATIENT
Start: 2018-11-15 | End: 2018-11-16

## 2018-11-15 RX ORDER — KETOROLAC TROMETHAMINE 30 MG/ML
30 INJECTION, SOLUTION INTRAMUSCULAR; INTRAVENOUS EVERY 6 HOURS PRN
Status: DISCONTINUED | OUTPATIENT
Start: 2018-11-15 | End: 2018-11-17

## 2018-11-15 RX ORDER — TOPIRAMATE 100 MG/1
200 TABLET, FILM COATED ORAL
Status: DISCONTINUED | OUTPATIENT
Start: 2018-11-15 | End: 2018-11-15

## 2018-11-15 RX ORDER — GABAPENTIN 100 MG/1
100 CAPSULE ORAL 3 TIMES DAILY
Status: DISCONTINUED | OUTPATIENT
Start: 2018-11-15 | End: 2018-11-15

## 2018-11-15 RX ORDER — SODIUM CHLORIDE 9 MG/ML
30 INJECTION, SOLUTION INTRAVENOUS
Status: DISCONTINUED | OUTPATIENT
Start: 2018-11-15 | End: 2018-11-16

## 2018-11-15 RX ORDER — OXYCODONE HYDROCHLORIDE 5 MG/1
2.5 TABLET ORAL
Status: DISCONTINUED | OUTPATIENT
Start: 2018-11-15 | End: 2018-11-16

## 2018-11-15 RX ORDER — ONDANSETRON 2 MG/ML
4 INJECTION INTRAMUSCULAR; INTRAVENOUS ONCE
Status: COMPLETED | OUTPATIENT
Start: 2018-11-15 | End: 2018-11-15

## 2018-11-15 RX ORDER — PROPRANOLOL HCL 60 MG
120 CAPSULE, EXTENDED RELEASE 24HR ORAL DAILY
Status: DISCONTINUED | OUTPATIENT
Start: 2018-11-15 | End: 2018-11-15

## 2018-11-15 RX ORDER — SODIUM CHLORIDE 9 MG/ML
INJECTION, SOLUTION INTRAVENOUS CONTINUOUS
Status: DISCONTINUED | OUTPATIENT
Start: 2018-11-15 | End: 2018-11-16

## 2018-11-15 RX ORDER — AMITRIPTYLINE HYDROCHLORIDE 100 MG/1
100 TABLET ORAL EVERY EVENING
Status: DISCONTINUED | OUTPATIENT
Start: 2018-11-15 | End: 2018-11-15

## 2018-11-15 RX ORDER — PROMETHAZINE HYDROCHLORIDE 12.5 MG/1
12.5-25 SUPPOSITORY RECTAL EVERY 4 HOURS PRN
Status: DISCONTINUED | OUTPATIENT
Start: 2018-11-15 | End: 2018-11-17

## 2018-11-15 RX ORDER — LORAZEPAM 1 MG/1
1 TABLET ORAL
Status: ON HOLD | COMMUNITY
End: 2019-03-26

## 2018-11-15 RX ORDER — OXYCODONE HYDROCHLORIDE 5 MG/1
2.5 TABLET ORAL
Status: DISCONTINUED | OUTPATIENT
Start: 2018-11-15 | End: 2018-11-15

## 2018-11-15 RX ADMIN — LORAZEPAM 1 MG: 1 TABLET ORAL at 22:24

## 2018-11-15 RX ADMIN — MORPHINE SULFATE 4 MG: 10 INJECTION INTRAVENOUS at 11:02

## 2018-11-15 RX ADMIN — DIPHENHYDRAMINE HYDROCHLORIDE 25 MG: 50 INJECTION INTRAMUSCULAR; INTRAVENOUS at 12:17

## 2018-11-15 RX ADMIN — PROPRANOLOL HYDROCHLORIDE 10 MG: 10 TABLET ORAL at 18:24

## 2018-11-15 RX ADMIN — SODIUM CHLORIDE: 9 INJECTION, SOLUTION INTRAVENOUS at 16:54

## 2018-11-15 RX ADMIN — HYDROMORPHONE HYDROCHLORIDE 0.25 MG: 2 INJECTION INTRAMUSCULAR; INTRAVENOUS; SUBCUTANEOUS at 22:24

## 2018-11-15 RX ADMIN — OXYCODONE HYDROCHLORIDE 5 MG: 5 TABLET ORAL at 14:58

## 2018-11-15 RX ADMIN — ONDANSETRON 4 MG: 2 INJECTION INTRAMUSCULAR; INTRAVENOUS at 22:24

## 2018-11-15 RX ADMIN — KETOROLAC TROMETHAMINE 30 MG: 30 INJECTION, SOLUTION INTRAMUSCULAR at 17:01

## 2018-11-15 RX ADMIN — HYDROMORPHONE HYDROCHLORIDE 1 MG: 1 INJECTION, SOLUTION INTRAMUSCULAR; INTRAVENOUS; SUBCUTANEOUS at 12:17

## 2018-11-15 RX ADMIN — HYDROMORPHONE HYDROCHLORIDE 0.25 MG: 2 INJECTION INTRAMUSCULAR; INTRAVENOUS; SUBCUTANEOUS at 18:25

## 2018-11-15 RX ADMIN — SODIUM CHLORIDE 1000 ML: 9 INJECTION, SOLUTION INTRAVENOUS at 10:08

## 2018-11-15 RX ADMIN — METOCLOPRAMIDE 10 MG: 5 INJECTION, SOLUTION INTRAMUSCULAR; INTRAVENOUS at 12:17

## 2018-11-15 RX ADMIN — ONDANSETRON 4 MG: 2 INJECTION INTRAMUSCULAR; INTRAVENOUS at 10:04

## 2018-11-15 RX ADMIN — SODIUM CHLORIDE 1000 ML: 9 INJECTION, SOLUTION INTRAVENOUS at 12:18

## 2018-11-15 RX ADMIN — DIPHENHYDRAMINE HYDROCHLORIDE 25 MG: 50 INJECTION INTRAMUSCULAR; INTRAVENOUS at 10:05

## 2018-11-15 RX ADMIN — MORPHINE SULFATE 4 MG: 10 INJECTION INTRAVENOUS at 10:04

## 2018-11-15 RX ADMIN — ONDANSETRON 4 MG: 2 INJECTION INTRAMUSCULAR; INTRAVENOUS at 12:05

## 2018-11-15 RX ADMIN — OXYCODONE HYDROCHLORIDE 5 MG: 5 TABLET ORAL at 20:21

## 2018-11-15 RX ADMIN — STANDARDIZED SENNA CONCENTRATE AND DOCUSATE SODIUM 2 TABLET: 8.6; 5 TABLET, FILM COATED ORAL at 17:02

## 2018-11-15 ASSESSMENT — COGNITIVE AND FUNCTIONAL STATUS - GENERAL
CLIMB 3 TO 5 STEPS WITH RAILING: A LITTLE
SUGGESTED CMS G CODE MODIFIER MOBILITY: CI
MOBILITY SCORE: 23

## 2018-11-15 ASSESSMENT — GAIT ASSESSMENTS
DEVIATION: OTHER (COMMENT)
DISTANCE (FEET): 80
GAIT LEVEL OF ASSIST: STAND BY ASSIST

## 2018-11-15 ASSESSMENT — LIFESTYLE VARIABLES
CONSUMPTION TOTAL: NEGATIVE
TOTAL SCORE: 0
HAVE YOU EVER FELT YOU SHOULD CUT DOWN ON YOUR DRINKING: NO
AVERAGE NUMBER OF DAYS PER WEEK YOU HAVE A DRINK CONTAINING ALCOHOL: 1
EVER_SMOKED: YES
HAVE PEOPLE ANNOYED YOU BY CRITICIZING YOUR DRINKING: NO
TOTAL SCORE: 0
TOTAL SCORE: 0
HOW MANY TIMES IN THE PAST YEAR HAVE YOU HAD 5 OR MORE DRINKS IN A DAY: 0
EVER HAD A DRINK FIRST THING IN THE MORNING TO STEADY YOUR NERVES TO GET RID OF A HANGOVER: NO
ON A TYPICAL DAY WHEN YOU DRINK ALCOHOL HOW MANY DRINKS DO YOU HAVE: 1
EVER FELT BAD OR GUILTY ABOUT YOUR DRINKING: NO
ALCOHOL_USE: YES

## 2018-11-15 ASSESSMENT — PAIN SCALES - GENERAL
PAINLEVEL_OUTOF10: 4
PAINLEVEL_OUTOF10: 3
PAINLEVEL_OUTOF10: 7
PAINLEVEL_OUTOF10: 7
PAINLEVEL_OUTOF10: 9
PAINLEVEL_OUTOF10: 8

## 2018-11-15 NOTE — ED TRIAGE NOTES
"Pt has a history of migraines.  States, \"Whenever I get a migraine I come to the hospital for my narcotic medications.\"  "

## 2018-11-15 NOTE — PROGRESS NOTES
Request for admission by Dr Dee  Headache, intractable with vomiting - history of shunt in place  Patient refused imaging per Dr Dee   Upon evaluation, tachycardia, leukocytosis and lactic acidosis  Dr Mireille Aleman will evaluate the patient, do admitting orders and H&P  Okay to admit to CDU based on Dr Mireille Aleman's evaluation     Jose G Agarwal M.D.  11/15/18  2:15 PM

## 2018-11-15 NOTE — ED PROVIDER NOTES
ED Provider Note    Scribed for Keron Dee M.D. by Tayo Mcneal. 11/15/2018  9:22 AM    Primary Care Provider: Lucy Trevino M.D.  Means of arrival: [place]  History limited by: None    CHIEF COMPLAINT  Chief Complaint   Patient presents with   • Headache       HPI  Rasheeda Manzano is a 47 y.o. female who presents to the ED complaining of a severe headache onset three hours ago after she woke up. Patient describes the headache pain as sharp in quality and right-sided. Associated symptoms include right eye pain (patient has been blind since age 11), nausea, vomiting, rapid heart rate, and weakness and numbness to right side.  States this is the pain that she has had many times before and was more recently seen in the emergency department with a shunt series as well.  Again this is not the worst headache of her life.  Is not different than her previous headaches.  She also has a history of chronic right hip pain and stated that her hip pain was the only thing bothering her yesterday and she spent most of the day in bed yesterday with her chronic hip and back pain did not eat or drink much yesterday at all.. Patient denies any dysuria, abdominal pain, diarrhea or skin rashes. Patient has a past history of migraines but she states that this is the most severe episode she has ever had. She has an appointment to see her new PCP, Dr. Tabitha Bautista at 12:30 PM today. She states that when she was seen in the ER in the past for her migraines, only morphine provided any relief for her pain. She tried taking some OTC pain medications this morning but they provided no relief.   She does states she feels thirsty  REVIEW OF SYSTEMS    CONSTITUTIONAL:  Denies fever, chills, weight gain/loss.   EYES:  Denies discharge. Blind since age 11. Positive for right eye pain.   ENT:  Denies sore throat.  Positive dry throat and lips.  No, nose, or ear pain.  CARDIOVASCULAR:  Denies chest pain or swelling. Positive for rapid heat rate.    RESPIRATORY:  Denies cough, shortness of breath, difficulty breathing.  GI:  Denies abdominal pain, dysuria or diarrhea. Positive for nausea and vomiting.  MUSCULOSKELETAL:  Denies joint swelling or back pain.   SKIN:  No rash or bruising.  NEUROLOGIC:  Positive for headache and weakness and numbness to her right side.     PAST MEDICAL HISTORY  Past Medical History:   Diagnosis Date   • Blind    • C. difficile diarrhea 2013   • Chronic daily headache    • Depression    • Fall    • Hydrocephalus    • Hydrocephalus     shunt drains into pleural place of L lung   • Jaundice     at birth   • Legally blind    • Migraine without aura, without mention of intractable migraine without mention of status migrainosus    • Other specified symptom associated with female genital organs     excessive bleeding   • Pain     gallbladder related   • Psychiatric problem     depression       FAMILY HISTORY  Family History   Problem Relation Age of Onset   • Hypertension Mother    • Hypertension Father    • Non-contributory Neg Hx         Migraine       SOCIAL HISTORY   reports that she quit smoking about 15 months ago. Her smoking use included Cigars. She started smoking about 14 years ago. She has a 10.00 pack-year smoking history. She has never used smokeless tobacco. She reports that she drinks alcohol. She reports that she does not use drugs.    SURGICAL HISTORY  Past Surgical History:   Procedure Laterality Date   • GASTROSCOPY-ENDO N/A 8/24/2017    Procedure: GASTROSCOPY-ENDO;  Surgeon: Matias Fernando M.D.;  Location: ENDOSCOPY [facility];  Service:    • AYALA BY LAPAROSCOPY N/A 8/13/2015    Procedure: AYALA BY LAPAROSCOPY;  Surgeon: Brice Johnson M.D.;  Location: SURGERY SAME DAY [facility];  Service:    • CHOLECYSTECTOMY N/A 8/13/2015    Procedure: CHOLECYSTECTOMY;  Surgeon: Brice Johnson M.D.;  Location: SURGERY SAME DAY [facility];  Service:    • LYSIS ADHESIONS GENERAL  12/10/2013    Performed by Arie Bass  M.D. at SURGERY [facility]   • CYSTOSCOPY  12/10/2013    Performed by Joana Yeager M.D. at SURGERY [facility]   • EXPLORATORY LAPAROTOMY  12/10/2013    Performed by Arie Bass M.D. at SURGERY [facility]   • APPENDECTOMY  12/10/2013    Performed by Arie Bass M.D. at SURGERY [facility]   • ABDOMINAL HYSTERECTOMY TOTAL  12/10/2013    Performed by Joana Yeager M.D. at SURGERY [facility]   • LAPAROSCOPY  8/8/08    Performed by FERNANDO HENDERSON at [facility]   • OTHER      PLEURAL SHUNT, numerous revisions       CURRENT MEDICATIONS    Current Facility-Administered Medications:   •  NS infusion 1,000 mL, 1,000 mL, Intravenous, Once, Keron Dee M.D.  •  morphine (pf) 10 mg/ml 10 MG/ML injection 4 mg, 4 mg, Intravenous, Once, Keron Dee M.D.  •  ondansetron (ZOFRAN) syringe/vial injection 4 mg, 4 mg, Intravenous, Once, Keron Dee M.D.  •  diphenhydrAMINE (BENADRYL) injection 25 mg, 25 mg, Intravenous, Once, Keron Dee M.D.    Current Outpatient Prescriptions:   •  gabapentin (NEURONTIN) 100 MG Cap, Take 1 Cap by mouth 3 times a day., Disp: 30 Cap, Rfl: 0  •  MethylPREDNISolone (MEDROL DOSEPAK) 4 MG Tablet Therapy Pack, Use as directed, Disp: 1 Kit, Rfl: 0  •  famotidine (PEPCID) 40 MG Tab, Take 40 mg by mouth every day at 6 PM., Disp: , Rfl:   •  amitriptyline (ELAVIL) 100 MG Tab, Take 100 mg by mouth every evening., Disp: , Rfl:   •  hydrOXYzine HCl (ATARAX) 50 MG Tab, Take 50 mg by mouth every day at 6 PM., Disp: , Rfl:   •  topiramate (TOPAMAX) 100 MG Tab, Take 200 mg by mouth every bedtime., Disp: , Rfl:   •  esomeprazole magnesium (NEXIUM) 40 MG Pack, Take 40 mg by mouth every morning before breakfast., Disp: , Rfl:   •  propranolol CR (INDERAL LA) 120 MG CAPSULE SR 24 HR, Take 120 mg by mouth every day., Disp: , Rfl:       ALLERGIES  Allergies   Allergen Reactions   • Tape Rash     Medical tape. Per patient, paper tape ok.       PHYSICAL  EXAM  VITAL SIGNS: Pulse (!) 113   Temp 36.8 °C (98.2 °F)   Resp 20   Wt 65.8 kg (145 lb)   LMP 11/27/2013   SpO2 100%   BMI 24.89 kg/m²      Constitutional: Patient is awake, uncomfortable, and hyperventilating. Patient is throwing up into vomit bag (brownish spit). No acute respiratory distress.   HENT: Normocephalic, Atraumatic, Bilateral external ears normal, Oropharynx pink very dry with no exudates, Nose patent.  Eyes: PERRLA, EOMI, Sclera and conjunctiva clear, No discharge. Pupils are 4 mm. Patient has been blind since age 11.   Neck:  Supple no nuchal rigidity, no thyromegaly or mass. Non-tender  Lymphatic: No supraclavicular  lymph nodes.   Cardiovascular: Heart is tachycardic with tachycardic rhythm no murmur, rub or thrill.   Thorax & Lungs: Chest is symmetrical, with good breath sounds. No wheezing or crackles. No respiratory distress, No chest tenderness.   Skin: Warm, Dry, no petechia, purpura, or rash.   Back: Non tender with palpation.    Extremities: No edema. Non tender.   Musculoskeletal: Good range of motion to wrists, elbows, shoulders,  knees, and ankles. Pulses 2+ radially and femorally.  Positive pain with range of motion of the right hip she says is chronic in nature.    COURSE & MEDICAL DECISION MAKING  Pertinent Labs & Imaging studies reviewed. (See chart for details)    Differential diagnoses include but are not limited to meningitis, chronic migraine headaches, dehydration, sinus infections, urinary tract infection, pneumonia, medication side effects, electrolyte imbalances, thrombosed venous sinus.    9:20 AM - Reviewed patient's past medical history which indicated she has a     9:22 AM - Patient seen and examined at bedside. Patient will be medicated with morphine, Zofran, and Benadryl for her symptoms. Patient will be treated with IV fluids secondary to her tachycardia. She is agreeable with this treatment plan.     10:31 AM - Patient was reevaluated at bedside. At this time,  "patient states that her headache is much improved. She will continue to rest with the lights off in the room.     10:54 AM - Patient was reevaluated at bedside. She states that she is feeling much better, and that this is \"exactly where her headaches are.\" She no longer feels nauseated and can start drinking so we will orally hydrate her.     12:08 PM - Patient was reevaluated at bedside. Patient's headache has worsened after she used the restroom and she is agreeable with having labs drawn. She will be treated with Reglan and Benadryl. At this time, patient does not wish to go home but also does not want to be admitted. She does not wish for any imaging and continues to be nausea with vomiting. She will be treated with IV fluids secondary to her continued tachycardia. Ordered CBC< CMP, UA, and Lactic Acid to further evaluate her symptoms.     1:34 PM - Patient was reevaluated at bedside. At this time patient still has a headache and still is tachycardic. She doesn't have a PCP so she will be admitted and she is agreeable with this treatment plan.     1:48 PM - Called Dr. Agarwal, CVU. Discussed my findings with him.     2:01 PM - Called Dr. Mireille Aleman, hospitalist. Discussed my findings with him and he has agreed to admit the patient.     Decision Making  Patient states that she is having her usual severe migraine headache but is more dehydrated than usual vomiting cannot really hold anything down.  Also her chronic pain to her hip where she had x-rays and CAT scans done before.  She did not want any new x-ray stating this is a headache that similar as before.  She just recently had a shunt series done just a couple of weeks ago.  Here in the emergency room we have given her IV fluids wide open.  We gave her IV morphine and Dilaudid for her pain.  She is received Benadryl Zofran morphine and Dilaudid Reglan and Benadryl for headaches.  Headache seems to get better but then she gets up and goes to the bathroom to " urinate she her headache comes back and her tachycardia comes back.  Eventually I had a long discussion with her about admitting her to the hospital for continued hydration and evaluation of her headaches and dehydration.  She consented to this.  Although she still refuses any kind of x-ray or radiology workup of her brain and shunt stating she has had plenty of x-rays does not think there is a shunt problem.    DISPOSITION:  Patient will be admitted to Dr. Mireille Aleman, hospitalist, in guarded condition.    FINAL IMPRESSION  1. Migraine without aura and with status migrainosus, not intractable    2. Dehydration    3.  Blindness      PLAN  1.  Admission [facility]  2.  Continue workup and evaluation treatment of her cephalgia dehydration and vomiting       ITayo (Scribe), am scribing for, and in the presence of, Keron Dee M.D..    Electronically signed by: Tayo Mcneal (Scribe), 11/15/2018    Keron SHARMA M.D. personally performed the services described in this documentation, as scribed by Tayo Mcneal in my presence, and it is both accurate and complete. C.     The note accurately reflects work and decisions made by me.  Keron Dee  11/15/2018  4:11 PM

## 2018-11-16 ENCOUNTER — APPOINTMENT (OUTPATIENT)
Dept: RADIOLOGY | Facility: MEDICAL CENTER | Age: 48
End: 2018-11-16
Attending: NURSE PRACTITIONER
Payer: MEDICAID

## 2018-11-16 ENCOUNTER — PATIENT OUTREACH (OUTPATIENT)
Dept: HEALTH INFORMATION MANAGEMENT | Facility: OTHER | Age: 48
End: 2018-11-16

## 2018-11-16 LAB
ANION GAP SERPL CALC-SCNC: 7 MMOL/L (ref 0–11.9)
BASOPHILS # BLD AUTO: 0.8 % (ref 0–1.8)
BASOPHILS # BLD: 0.09 K/UL (ref 0–0.12)
BUN SERPL-MCNC: 10 MG/DL (ref 8–22)
CALCIUM SERPL-MCNC: 9.2 MG/DL (ref 8.5–10.5)
CHLORIDE SERPL-SCNC: 108 MMOL/L (ref 96–112)
CO2 SERPL-SCNC: 23 MMOL/L (ref 20–33)
CREAT SERPL-MCNC: 0.61 MG/DL (ref 0.5–1.4)
EOSINOPHIL # BLD AUTO: 0.19 K/UL (ref 0–0.51)
EOSINOPHIL NFR BLD: 1.7 % (ref 0–6.9)
ERYTHROCYTE [DISTWIDTH] IN BLOOD BY AUTOMATED COUNT: 46.4 FL (ref 35.9–50)
GLUCOSE SERPL-MCNC: 101 MG/DL (ref 65–99)
HCT VFR BLD AUTO: 40 % (ref 37–47)
HGB BLD-MCNC: 13 G/DL (ref 12–16)
IMM GRANULOCYTES # BLD AUTO: 0.03 K/UL (ref 0–0.11)
IMM GRANULOCYTES NFR BLD AUTO: 0.3 % (ref 0–0.9)
LACTATE BLD-SCNC: 0.6 MMOL/L (ref 0.5–2)
LYMPHOCYTES # BLD AUTO: 3.12 K/UL (ref 1–4.8)
LYMPHOCYTES NFR BLD: 28.3 % (ref 22–41)
MCH RBC QN AUTO: 29 PG (ref 27–33)
MCHC RBC AUTO-ENTMCNC: 32.5 G/DL (ref 33.6–35)
MCV RBC AUTO: 89.3 FL (ref 81.4–97.8)
MONOCYTES # BLD AUTO: 0.82 K/UL (ref 0–0.85)
MONOCYTES NFR BLD AUTO: 7.4 % (ref 0–13.4)
NEUTROPHILS # BLD AUTO: 6.76 K/UL (ref 2–7.15)
NEUTROPHILS NFR BLD: 61.5 % (ref 44–72)
NRBC # BLD AUTO: 0 K/UL
NRBC BLD-RTO: 0 /100 WBC
PLATELET # BLD AUTO: 321 K/UL (ref 164–446)
PMV BLD AUTO: 10.7 FL (ref 9–12.9)
POTASSIUM SERPL-SCNC: 3.9 MMOL/L (ref 3.6–5.5)
RBC # BLD AUTO: 4.48 M/UL (ref 4.2–5.4)
SODIUM SERPL-SCNC: 138 MMOL/L (ref 135–145)
WBC # BLD AUTO: 11 K/UL (ref 4.8–10.8)

## 2018-11-16 PROCEDURE — 700102 HCHG RX REV CODE 250 W/ 637 OVERRIDE(OP): Performed by: NURSE PRACTITIONER

## 2018-11-16 PROCEDURE — 96376 TX/PRO/DX INJ SAME DRUG ADON: CPT

## 2018-11-16 PROCEDURE — 700105 HCHG RX REV CODE 258: Performed by: HOSPITALIST

## 2018-11-16 PROCEDURE — 700111 HCHG RX REV CODE 636 W/ 250 OVERRIDE (IP): Performed by: HOSPITALIST

## 2018-11-16 PROCEDURE — 97165 OT EVAL LOW COMPLEX 30 MIN: CPT

## 2018-11-16 PROCEDURE — 700111 HCHG RX REV CODE 636 W/ 250 OVERRIDE (IP): Performed by: NURSE PRACTITIONER

## 2018-11-16 PROCEDURE — 700105 HCHG RX REV CODE 258: Performed by: INTERNAL MEDICINE

## 2018-11-16 PROCEDURE — 96372 THER/PROPH/DIAG INJ SC/IM: CPT

## 2018-11-16 PROCEDURE — 70450 CT HEAD/BRAIN W/O DYE: CPT

## 2018-11-16 PROCEDURE — G8989 SELF CARE D/C STATUS: HCPCS | Mod: CI

## 2018-11-16 PROCEDURE — G8988 SELF CARE GOAL STATUS: HCPCS | Mod: CI

## 2018-11-16 PROCEDURE — A9270 NON-COVERED ITEM OR SERVICE: HCPCS | Performed by: HOSPITALIST

## 2018-11-16 PROCEDURE — 700102 HCHG RX REV CODE 250 W/ 637 OVERRIDE(OP): Performed by: HOSPITALIST

## 2018-11-16 PROCEDURE — 85025 COMPLETE CBC W/AUTO DIFF WBC: CPT

## 2018-11-16 PROCEDURE — A9270 NON-COVERED ITEM OR SERVICE: HCPCS | Performed by: NURSE PRACTITIONER

## 2018-11-16 PROCEDURE — 99226 PR SUBSEQUENT OBSERVATION CARE,LEVEL III: CPT | Performed by: INTERNAL MEDICINE

## 2018-11-16 PROCEDURE — G0378 HOSPITAL OBSERVATION PER HR: HCPCS

## 2018-11-16 PROCEDURE — G8987 SELF CARE CURRENT STATUS: HCPCS | Mod: CI

## 2018-11-16 PROCEDURE — 80048 BASIC METABOLIC PNL TOTAL CA: CPT

## 2018-11-16 PROCEDURE — 83605 ASSAY OF LACTIC ACID: CPT

## 2018-11-16 RX ORDER — BUTALBITAL, ACETAMINOPHEN AND CAFFEINE 50; 325; 40 MG/1; MG/1; MG/1
1 TABLET ORAL EVERY 6 HOURS PRN
Status: DISCONTINUED | OUTPATIENT
Start: 2018-11-16 | End: 2018-11-17 | Stop reason: HOSPADM

## 2018-11-16 RX ORDER — SODIUM CHLORIDE, SODIUM LACTATE, POTASSIUM CHLORIDE, CALCIUM CHLORIDE 600; 310; 30; 20 MG/100ML; MG/100ML; MG/100ML; MG/100ML
INJECTION, SOLUTION INTRAVENOUS CONTINUOUS
Status: DISCONTINUED | OUTPATIENT
Start: 2018-11-16 | End: 2018-11-17

## 2018-11-16 RX ORDER — HYDROMORPHONE HYDROCHLORIDE 2 MG/ML
0.75 INJECTION, SOLUTION INTRAMUSCULAR; INTRAVENOUS; SUBCUTANEOUS EVERY 4 HOURS PRN
Status: DISCONTINUED | OUTPATIENT
Start: 2018-11-16 | End: 2018-11-17

## 2018-11-16 RX ORDER — OXYCODONE HYDROCHLORIDE 5 MG/1
5 TABLET ORAL
Status: DISCONTINUED | OUTPATIENT
Start: 2018-11-16 | End: 2018-11-17

## 2018-11-16 RX ORDER — OXYCODONE HYDROCHLORIDE 5 MG/1
2.5 TABLET ORAL
Status: DISCONTINUED | OUTPATIENT
Start: 2018-11-16 | End: 2018-11-17

## 2018-11-16 RX ADMIN — ENOXAPARIN SODIUM 40 MG: 100 INJECTION SUBCUTANEOUS at 05:01

## 2018-11-16 RX ADMIN — OXYCODONE HYDROCHLORIDE 5 MG: 5 TABLET ORAL at 04:56

## 2018-11-16 RX ADMIN — HYDROMORPHONE HYDROCHLORIDE 0.75 MG: 2 INJECTION INTRAMUSCULAR; INTRAVENOUS; SUBCUTANEOUS at 23:08

## 2018-11-16 RX ADMIN — LORAZEPAM 1 MG: 1 TABLET ORAL at 21:40

## 2018-11-16 RX ADMIN — STANDARDIZED SENNA CONCENTRATE AND DOCUSATE SODIUM 2 TABLET: 8.6; 5 TABLET, FILM COATED ORAL at 04:56

## 2018-11-16 RX ADMIN — PROPRANOLOL HYDROCHLORIDE 10 MG: 10 TABLET ORAL at 05:00

## 2018-11-16 RX ADMIN — PROPRANOLOL HYDROCHLORIDE 10 MG: 10 TABLET ORAL at 16:30

## 2018-11-16 RX ADMIN — BUTALBITAL, ACETAMINOPHEN, AND CAFFEINE 1 TABLET: 50; 325; 40 TABLET ORAL at 16:28

## 2018-11-16 RX ADMIN — ONDANSETRON 4 MG: 4 TABLET, ORALLY DISINTEGRATING ORAL at 10:46

## 2018-11-16 RX ADMIN — OXYCODONE HYDROCHLORIDE 5 MG: 5 TABLET ORAL at 12:32

## 2018-11-16 RX ADMIN — HYDROMORPHONE HYDROCHLORIDE 0.25 MG: 2 INJECTION INTRAMUSCULAR; INTRAVENOUS; SUBCUTANEOUS at 10:02

## 2018-11-16 RX ADMIN — OXYCODONE HYDROCHLORIDE 5 MG: 5 TABLET ORAL at 08:03

## 2018-11-16 RX ADMIN — PROPRANOLOL HYDROCHLORIDE 10 MG: 10 TABLET ORAL at 21:27

## 2018-11-16 RX ADMIN — HYDROMORPHONE HYDROCHLORIDE 0.75 MG: 2 INJECTION INTRAMUSCULAR; INTRAVENOUS; SUBCUTANEOUS at 18:32

## 2018-11-16 RX ADMIN — SODIUM CHLORIDE, POTASSIUM CHLORIDE, SODIUM LACTATE AND CALCIUM CHLORIDE: 600; 310; 30; 20 INJECTION, SOLUTION INTRAVENOUS at 16:30

## 2018-11-16 RX ADMIN — SODIUM CHLORIDE: 9 INJECTION, SOLUTION INTRAVENOUS at 01:49

## 2018-11-16 RX ADMIN — BUTALBITAL, ACETAMINOPHEN, AND CAFFEINE 1 TABLET: 50; 325; 40 TABLET ORAL at 23:07

## 2018-11-16 ASSESSMENT — PAIN SCALES - GENERAL
PAINLEVEL_OUTOF10: 8
PAINLEVEL_OUTOF10: 0
PAINLEVEL_OUTOF10: 8

## 2018-11-16 ASSESSMENT — ENCOUNTER SYMPTOMS
COUGH: 0
VOMITING: 0
FOCAL WEAKNESS: 0
PALPITATIONS: 0
CHILLS: 0
HEADACHES: 1
SENSORY CHANGE: 0
SHORTNESS OF BREATH: 0
PSYCHIATRIC NEGATIVE: 1
HEARTBURN: 0
FEVER: 0
NAUSEA: 1
ABDOMINAL PAIN: 0

## 2018-11-16 ASSESSMENT — COGNITIVE AND FUNCTIONAL STATUS - GENERAL
DRESSING REGULAR UPPER BODY CLOTHING: A LITTLE
TOILETING: A LITTLE
DRESSING REGULAR LOWER BODY CLOTHING: A LITTLE
DAILY ACTIVITIY SCORE: 18
EATING MEALS: A LITTLE
HELP NEEDED FOR BATHING: A LITTLE
SUGGESTED CMS G CODE MODIFIER DAILY ACTIVITY: CK
PERSONAL GROOMING: A LITTLE

## 2018-11-16 ASSESSMENT — ACTIVITIES OF DAILY LIVING (ADL): TOILETING: INDEPENDENT

## 2018-11-16 NOTE — PROGRESS NOTES
I have personally seen and examined / evaluated the patient, discussed the patient's evaluation & management plan with RADHA Hankins and I have reviewed the note below.     I agree with the findings, history, examination and assessment / plan as listed below with changes/addendum as listed below by me in my addendum / attestation separetely.     Presenting with headache.   Refused imaging.   Reports chronic migraine for her.     Symptoms have improved   Still some N/V  Headache has improved but still some     Reactive leukocytosis without signs of acute infection, improving   Lactic acidosis has resolved     Negative Lumbar plain film     Recent negative hip imaging    Seen by PT/OT. No further acute PT/OT needs. Outpatient PT/OT recommended.     Referral for Outpatient PT / OT recommended on discharge    She is agreeable to CT evaluation at this time     Proceed with CT, compare interval change to Prior  If any change in ventricle concerning for  malfunction - NS consult     Can use dilaudid 0.75 mg q 4 hourly PRN for now     Outpatient headache neurology follow up recommended    Close f/u PCP     Jose G Agarwal M.D.  11/16/18  3:57 PM

## 2018-11-16 NOTE — PROGRESS NOTES
A/o, respirations are even and unlabored on room air, assessment completed, pt still complaining of headache but improved per pt, denies any nausea,  vital signs stable, IV fluids running per orders, updated communication board,  poc discussed and understood, verbalized understanding, box meal given, all questions answered at this time , fall precautions in place, call button within reach, will continue to monitor

## 2018-11-16 NOTE — PROGRESS NOTES
Received report from evening RN.  Pt is A/Ox4.  Respirations are even and unlabored on RA.  Pt states 7/10 head ache.  Pt medicated (see MAR).

## 2018-11-16 NOTE — CARE PLAN
Problem: Pain  Goal: Alleviation of Pain or a reduction in pain to the patient's comfort goal  Outcome: PROGRESSING AS EXPECTED

## 2018-11-16 NOTE — RESPIRATORY CARE
COPD EDUCATION by COPD CLINICAL EDUCATOR  11/16/2018 at 7:59 AM by Khushboo Martinez     Patient reviewed by COPD education team. Patient does not qualify for COPD program.

## 2018-11-16 NOTE — DISCHARGE PLANNING
Care Transition Team Assessment    Spoke with patient at bedside. Friend Gonzalo will be ride home. Anticipate no needs @ present time.    Information Source  Orientation : Oriented x 4  Information Given By: Patient    Readmission Evaluation  Is this a readmission?: No    Interdisciplinary Discharge Planning  Does Admitting Nurse Feel This Could be a Complex Discharge?: No  Primary Care Physician: [facility]  Lives with - Patient's Self Care Capacity: Alone and Able to Care For Self  Patient or legal guardian wants to designate a caregiver (see row info): No  Support Systems: Family Member(s), Friends / Neighbors  Housing / Facility: [address]  Do You Take your Prescribed Medications Regularly: Yes  Able to Return to Previous ADL's: Yes  Mobility Issues: No  Prior Services: None  Patient Expects to be Discharged to:: home  Assistance Needed: No  Durable Medical Equipment: Not Applicable              Finances  Prescription Coverage: Yes    Anticipated Discharge Information  Anticipated discharge disposition: Home  Discharge Address: [address] [place] [address]  Discharge Contact Phone Number: [phone]

## 2018-11-16 NOTE — THERAPY
"Occupational Therapy Evaluation completed.   Plan of Care: Patient with no further skilled OT needs in the acute care setting at this time  Discharge Recommendations:  Equipment: No Equipment Needed. Post-acute therapy Discharge to home with outpatient or home health for additional skilled therapy services.    Patient presents and reports at / near functional baseline necessitating S with ADLs and mobility with no AE other than therapist's elbow or personal cane for blind. Patient has no further skilled OT needs in this setting at this time. Eval only. DC OT. May benefit from OP OT to promote body mechanics and mobilization paired with self care if lumbar irritation persists.     See \"Rehab Therapy-Acute\" Patient Summary Report for complete documentation.    "

## 2018-11-16 NOTE — ASSESSMENT & PLAN NOTE
-With  shunt in place  -She was recently seen by neurosurgery, Dr. Castillo, who reported patent shunt.  -ongoing nausea and headaches.  Initially she refused imaging, however now is agreeable to head CT.

## 2018-11-16 NOTE — THERAPY
"Physical Therapy Evaluation completed.   Bed Mobility:  Supine to Sit: Stand by Assist  Transfers: Sit to Stand: Stand by Assist  Gait: Level Of Assist: Stand by Assist with No Equipment Needed; assist for environment as pt is blind      Plan of Care: Patient with no further skilled PT needs in the acute care setting at this time  Discharge Recommendations: Equipment: No Equipment Needed. See below    After initial evaluation and pt education pt has no further acute PT needs. She is able to demonstrate ambulation with no AD with SBA with no neil LOB. 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 This PT was able to reproduce symptoms/pain with RLE SLR special test and pt's subjective hx coincides with likely radicular symptoms from lumbar region. Pt was given education re: positioning to assist with reduction of acute inflammation, self monitoring recommendations and recs for continued outpatient PT for lumbar mobilization and therex to assist with likely connective tissue dysfunction and reduction of symptoms. Pt reports no concerns with functional ability to dc to home once medically cleared and spoke with nurse regarding recs and likely need for assist to set up outpatient PT given baseline co-morbidities.     See \"Rehab Therapy-Acute\" Patient Summary Report for complete documentation.     "

## 2018-11-16 NOTE — H&P
Hospital Medicine History & Physical Note    Date of Service  11/15/2018    Primary Care Physician  Lucy Trevino M.D.    Consultants  None    Code Status  Full    Chief Complaint  Headache    History of Presenting Illness  47 y.o. female who presented 11/15/2018 with history of migraine headaches who presented to the emergency room for evaluation of headache associated with nausea and vomiting.  She reports that her headache is typical of her migraine headaches, it is severe in nature, it is throbbing, it is mainly over her right hemicranium, it is associated with nausea and vomiting.  Patient is blind.  The patient has a history of hydrocephalus for which she had a  shunt as an infant.  She had recent evaluation with Dr. Castillo and her shunt is functioning well.  She denies any fever or chills.  Her headache and vomiting persisted despite initial treatment in the emergency room.  The patient reports that she fell about 3 weeks ago and landed on her right hip.  She continues to have lower back and right hip pain radiating to her right buttock.  Pain is moderate worse with weightbearing activity.  No changes in urine or bowel habits    Review of Systems  Review of Systems   All other systems reviewed and are negative.      Past Medical History   has a past medical history of Blind; C. difficile diarrhea (2013); Chronic daily headache; Depression; Fall; Hydrocephalus; Hydrocephalus; Jaundice; Legally blind; Migraine without aura, without mention of intractable migraine without mention of status migrainosus; Other specified symptom associated with female genital organs; Pain; and Psychiatric problem.    Surgical History   has a past surgical history that includes laparoscopy (8/8/08); lysis adhesions general (12/10/2013); cystoscopy (12/10/2013); exploratory laparotomy (12/10/2013); appendectomy (12/10/2013); abdominal hysterectomy total (12/10/2013); aakash by laparoscopy (N/A, 8/13/2015); cholecystectomy (N/A,  8/13/2015); other; and gastroscopy-endo (N/A, 8/24/2017).     Family History  family history includes Hypertension in her father and mother.     Social History   reports that she quit smoking about 15 months ago. Her smoking use included Cigars. She started smoking about 14 years ago. She has a 10.00 pack-year smoking history. She has never used smokeless tobacco. She reports that she drinks alcohol. She reports that she does not use drugs.    Allergies  Allergies   Allergen Reactions   • Tape Rash     Medical tape. Per patient, paper tape ok.       Medications  Prior to Admission Medications   Prescriptions Last Dose Informant Patient Reported? Taking?   LORazepam (ATIVAN) 1 MG Tab 11/13/2018 at HS Patient Yes Yes   Sig: Take 1 mg by mouth 1 time daily as needed (sleep).   ibuprofen (MOTRIN) 200 MG Tab 11/15/2018 at 0700 Patient Yes Yes   Sig: Take 800 mg by mouth every 8 hours as needed.      Facility-Administered Medications: None       Physical Exam  Temp:  [36.3 °C (97.4 °F)-36.8 °C (98.2 °F)] 36.3 °C (97.4 °F)  Pulse:  [] 122  Resp:  [18-20] 18  BP: (141)/(91) 141/91    Physical Exam   Constitutional: She is oriented to person, place, and time. She appears well-developed and well-nourished.   HENT:   Head: Normocephalic and atraumatic.   Right Ear: External ear normal.   Left Ear: External ear normal.   Mouth/Throat: No oropharyngeal exudate.   Eyes: Right eye exhibits no discharge. Left eye exhibits no discharge. No scleral icterus.   Patient is blind   Neck: Neck supple. No JVD present. No tracheal deviation present.   Cardiovascular: Normal rate and regular rhythm.  Exam reveals no gallop and no friction rub.    No murmur heard.  Pulmonary/Chest: Effort normal and breath sounds normal. No stridor. No respiratory distress. She has no wheezes. She has no rales. She exhibits no tenderness.   Abdominal: Soft. Bowel sounds are normal. She exhibits no distension. There is no tenderness. There is no rebound.    Musculoskeletal: She exhibits tenderness (Right hip). She exhibits no edema.   Neurological: She is alert and oriented to person, place, and time. No cranial nerve deficit. She exhibits normal muscle tone.   Skin: Skin is warm and dry. She is not diaphoretic. No cyanosis or erythema. Nails show no clubbing.   Psychiatric: She has a normal mood and affect. Her behavior is normal. Thought content normal.   Nursing note and vitals reviewed.      Laboratory:  Recent Labs      11/15/18   1303   WBC  11.7*   RBC  4.64   HEMOGLOBIN  13.9   HEMATOCRIT  41.3   MCV  89.0   MCH  30.0   MCHC  33.7   RDW  44.6   PLATELETCT  304   MPV  11.1     Recent Labs      11/15/18   1303   SODIUM  137   POTASSIUM  3.6   CHLORIDE  109   CO2  16*   GLUCOSE  117*   BUN  11   CREATININE  0.51   CALCIUM  8.9     Recent Labs      11/15/18   1303   ALTSGPT  14   ASTSGOT  16   ALKPHOSPHAT  101*   TBILIRUBIN  0.5   GLUCOSE  117*                 No results for input(s): TROPONINI in the last 72 hours.    Urinalysis:    Recent Labs      11/15/18   1310   SPECGRAVITY  1.012   GLUCOSEUR  Negative   KETONES  15*   NITRITE  Negative   LEUKESTERAS  Negative        Imaging:  No orders to display         Assessment/Plan:  I anticipate this patient is appropriate for observation status at this time.    Congenital hydrocephalus (HCC)- (present on admission)   Assessment & Plan    She has a  shunt in place  Recent CT and  shunt series negative  She has no meningeal signs on exam  We will monitor clinically     Leukocytosis   Assessment & Plan    Likely reactive  She has no clear source of infection at this time and will hold off on antibiotics  We will monitor her clinically and recheck CBC     Metabolic acidosis   Assessment & Plan    Likely secondary to dehydration  We will trend her lactic acid and consider further infectious workup if does not normalize with hydration     Pain of right hip joint   Assessment & Plan    X-ray is negative for fracture  We  will check x-ray of  lumbar spine given low back pain and recent fall  Previous x-ray of her pelvis was negative for fracture  Pain management  PT/OT     Migraines- (present on admission)   Assessment & Plan    With intractable headache and nausea with vomiting    Patient will be admitted and started on IV fluids for hydration  We will start her on antiemetics  She will be started on as needed Toradol  We started on propranolol for prophylaxis             VTE prophylaxis: lovenox

## 2018-11-17 VITALS
SYSTOLIC BLOOD PRESSURE: 115 MMHG | OXYGEN SATURATION: 91 % | HEIGHT: 64 IN | DIASTOLIC BLOOD PRESSURE: 62 MMHG | BODY MASS INDEX: 25.82 KG/M2 | TEMPERATURE: 96.9 F | WEIGHT: 151.24 LBS | RESPIRATION RATE: 18 BRPM | HEART RATE: 89 BPM

## 2018-11-17 LAB
ERYTHROCYTE [DISTWIDTH] IN BLOOD BY AUTOMATED COUNT: 45.1 FL (ref 35.9–50)
HCT VFR BLD AUTO: 40.6 % (ref 37–47)
HGB BLD-MCNC: 13.3 G/DL (ref 12–16)
MCH RBC QN AUTO: 29.2 PG (ref 27–33)
MCHC RBC AUTO-ENTMCNC: 32.8 G/DL (ref 33.6–35)
MCV RBC AUTO: 89.2 FL (ref 81.4–97.8)
PLATELET # BLD AUTO: 347 K/UL (ref 164–446)
PMV BLD AUTO: 11 FL (ref 9–12.9)
RBC # BLD AUTO: 4.55 M/UL (ref 4.2–5.4)
WBC # BLD AUTO: 10.5 K/UL (ref 4.8–10.8)

## 2018-11-17 PROCEDURE — 99217 PR OBSERVATION CARE DISCHARGE: CPT | Performed by: INTERNAL MEDICINE

## 2018-11-17 PROCEDURE — 96376 TX/PRO/DX INJ SAME DRUG ADON: CPT

## 2018-11-17 PROCEDURE — 85027 COMPLETE CBC AUTOMATED: CPT

## 2018-11-17 PROCEDURE — 700111 HCHG RX REV CODE 636 W/ 250 OVERRIDE (IP): Performed by: NURSE PRACTITIONER

## 2018-11-17 PROCEDURE — G0378 HOSPITAL OBSERVATION PER HR: HCPCS

## 2018-11-17 PROCEDURE — A9270 NON-COVERED ITEM OR SERVICE: HCPCS | Performed by: HOSPITALIST

## 2018-11-17 PROCEDURE — 700102 HCHG RX REV CODE 250 W/ 637 OVERRIDE(OP): Performed by: HOSPITALIST

## 2018-11-17 PROCEDURE — 700111 HCHG RX REV CODE 636 W/ 250 OVERRIDE (IP): Performed by: HOSPITALIST

## 2018-11-17 PROCEDURE — 96372 THER/PROPH/DIAG INJ SC/IM: CPT

## 2018-11-17 RX ORDER — ONDANSETRON 4 MG/1
4 TABLET, ORALLY DISINTEGRATING ORAL EVERY 4 HOURS PRN
Qty: 20 TAB | Refills: 0 | Status: SHIPPED | OUTPATIENT
Start: 2018-11-17 | End: 2018-12-04 | Stop reason: CLARIF

## 2018-11-17 RX ORDER — PROPRANOLOL HYDROCHLORIDE 10 MG/1
10 TABLET ORAL 2 TIMES DAILY
Qty: 60 TAB | Refills: 0 | Status: SHIPPED | OUTPATIENT
Start: 2018-11-17 | End: 2018-12-04

## 2018-11-17 RX ORDER — BUTALBITAL, ACETAMINOPHEN AND CAFFEINE 50; 325; 40 MG/1; MG/1; MG/1
1 TABLET ORAL EVERY 6 HOURS PRN
Qty: 30 TAB | Refills: 0 | Status: ON HOLD | OUTPATIENT
Start: 2018-11-17 | End: 2018-12-07

## 2018-11-17 RX ADMIN — ENOXAPARIN SODIUM 40 MG: 100 INJECTION SUBCUTANEOUS at 06:46

## 2018-11-17 RX ADMIN — HYDROMORPHONE HYDROCHLORIDE 0.75 MG: 2 INJECTION INTRAMUSCULAR; INTRAVENOUS; SUBCUTANEOUS at 06:47

## 2018-11-17 RX ADMIN — PROPRANOLOL HYDROCHLORIDE 10 MG: 10 TABLET ORAL at 06:47

## 2018-11-17 ASSESSMENT — PAIN SCALES - GENERAL
PAINLEVEL_OUTOF10: 3
PAINLEVEL_OUTOF10: 8

## 2018-11-17 NOTE — PROGRESS NOTES
[facility] Medicine Daily Progress Note    Date of Service  11/16/2018    Chief Complaint  47 y.o. female with PMH of migraines, hydrocephalus with shunt placement admitted 11/15/2018 with headaches, nausea and vomiting.    Hospital Course   Initial labs showed metabolic acidosis.  Lactic acid 1.5 down to 0.6 after IV fluid hydration.        Interval Problem Update  -Ongoing headaches.  IV fluid hydration, Toradol, Fioricet  -Analgesics for hip pain  -CT head pending    Consultants/Specialty  NA    Code Status  FULL    Disposition  Plan for DC home tomorrow if CT scan unremarkable.    Review of Systems  Review of Systems   Constitutional: Positive for malaise/fatigue. Negative for chills and fever.   HENT: Negative.    Eyes:        Blind   Respiratory: Negative for cough and shortness of breath.    Cardiovascular: Negative for chest pain, palpitations and leg swelling.   Gastrointestinal: Positive for nausea. Negative for abdominal pain, heartburn and vomiting.   Genitourinary: Negative for dysuria and urgency.   Musculoskeletal: Positive for joint pain.   Skin: Negative.    Neurological: Positive for headaches. Negative for sensory change and focal weakness.   Psychiatric/Behavioral: Negative.         Physical Exam  Temp:  [36.4 °C (97.5 °F)-36.9 °C (98.5 °F)] 36.4 °C (97.5 °F)  Pulse:  [] 90  Resp:  [17-18] 18  BP: (130-153)/(59-88) 147/84    Physical Exam   Constitutional: She is oriented to person, place, and time. Vital signs are normal. She appears well-developed. She is cooperative.  Non-toxic appearance. No distress.   HENT:   Head: Normocephalic.   Right Ear: Hearing normal.   Left Ear: Hearing normal.   Nose: Nose normal.   Mouth/Throat: Oropharynx is clear and moist and mucous membranes are normal.   Eyes: Conjunctivae are normal.   Blind   Neck: Phonation normal. No JVD present.   Cardiovascular: Normal rate and intact distal pulses.    No edema  Cap refill WNL     Pulmonary/Chest: Effort normal. She  has no wheezes.   Abdominal: Soft. Normal appearance and bowel sounds are normal. There is no tenderness. There is no rebound.   Neurological: She is alert and oriented to person, place, and time.   Skin: Skin is warm and dry.   Psychiatric: She has a normal mood and affect. Judgment normal. Cognition and memory are normal.   Nursing note and vitals reviewed.      Fluids  No intake or output data in the 24 hours ending 11/16/18 1728    Laboratory  Recent Labs      11/15/18   1303  11/16/18   0236   WBC  11.7*  11.0*   RBC  4.64  4.48   HEMOGLOBIN  13.9  13.0   HEMATOCRIT  41.3  40.0   MCV  89.0  89.3   MCH  30.0  29.0   MCHC  33.7  32.5*   RDW  44.6  46.4   PLATELETCT  304  321   MPV  11.1  10.7     Recent Labs      11/15/18   1303  11/16/18   0236   SODIUM  137  138   POTASSIUM  3.6  3.9   CHLORIDE  109  108   CO2  16*  23   GLUCOSE  117*  101*   BUN  11  10   CREATININE  0.51  0.61   CALCIUM  8.9  9.2                   Imaging  DX-LUMBAR SPINE-2 OR 3 VIEWS   Final Result      Unremarkable limited lumbar spine series.      CT-HEAD W/O    (Results Pending)        Assessment/Plan  Congenital hydrocephalus (HCC)- (present on admission)   Assessment & Plan    -With  shunt in place  -She was recently seen by neurosurgery, Dr. Castillo, who reported patent shunt.  -ongoing nausea and headaches.  Initially she refused imaging, however now is agreeable to head CT.         Leukocytosis- (present on admission)   Assessment & Plan    -Likely reactive.  Improving  -No indications of infection     Metabolic acidosis- (present on admission)   Assessment & Plan    -Resolved     Pain of right hip joint- (present on admission)   Assessment & Plan    -Following fall  -X-rays negative for fracture  -Pain management  -PT/OT       Migraines- (present on admission)   Assessment & Plan    -Chronic  -IV fluid hydration, Toradol and Fioricet  -Antiemetics  -Propranolol         Blind- (present on admission)   Assessment & Plan    .           VTE prophylaxis: Enoxaparin    SHAGGY Booth

## 2018-11-17 NOTE — DISCHARGE INSTRUCTIONS
Discharge Instructions    Discharged to home by car with relative. Discharged via wheelchair, hospital escort: Yes.  Special equipment needed: Not Applicable    Be sure to schedule a follow-up appointment with your primary care doctor or any specialists as instructed.     Discharge Plan:   Diet Plan: Discussed  Activity Level: Discussed  Smoking Cessation Offered: Patient Refused  Confirmed Follow up Appointment: Patient to Call and Schedule Appointment  Confirmed Symptoms Management: Discussed  Medication Reconciliation Updated: Yes  Influenza Vaccine Indication: Not indicated: Previously immunized this influenza season and > 8 years of age    I understand that a diet low in cholesterol, fat, and sodium is recommended for good health. Unless I have been given specific instructions below for another diet, I accept this instruction as my diet prescription.   Other diet:     Special Instructions: None    · Is patient discharged on Warfarin / Coumadin?   No   Migraine Headache  A migraine headache is an intense, throbbing pain on one side or both sides of the head. Migraines may also cause other symptoms, such as nausea, vomiting, and sensitivity to light and noise.  What are the causes?  Doing or taking certain things may also trigger migraines, such as:  · Alcohol.  · Smoking.  · Medicines, such as:  ¨ Medicine used to treat chest pain (nitroglycerine).  ¨ Birth control pills.  ¨ Estrogen pills.  ¨ Certain blood pressure medicines.  · Aged cheeses, chocolate, or caffeine.  · Foods or drinks that contain nitrates, glutamate, aspartame, or tyramine.  · Physical activity.  Other things that may trigger a migraine include:  · Menstruation.  · Pregnancy.  · Hunger.  · Stress, lack of sleep, too much sleep, or fatigue.  · Weather changes.  What increases the risk?  The following factors may make you more likely to experience migraine headaches:  · Age. Risk increases with age.  · Family history of migraine  headaches.  · Being .  · Depression and anxiety.  · Obesity.  · Being a woman.  · Having a hole in the heart (patent foramen ovale) or other heart problems.  What are the signs or symptoms?  The main symptom of this condition is pulsating or throbbing pain. Pain may:  · Happen in any area of the head, such as on one side or both sides.  · Interfere with daily activities.  · Get worse with physical activity.  · Get worse with exposure to bright lights or loud noises.  Other symptoms may include:  · Nausea.  · Vomiting.  · Dizziness.  · General sensitivity to bright lights, loud noises, or smells.  Before you get a migraine, you may get warning signs that a migraine is developing (aura). An aura may include:  · Seeing flashing lights or having blind spots.  · Seeing bright spots, halos, or zigzag lines.  · Having tunnel vision or blurred vision.  · Having numbness or a tingling feeling.  · Having trouble talking.  · Having muscle weakness.  How is this diagnosed?  A migraine headache can be diagnosed based on:  · Your symptoms.  · A physical exam.  · Tests, such as CT scan or MRI of the head. These imaging tests can help rule out other causes of headaches.  · Taking fluid from the spine (lumbar puncture) and analyzing it (cerebrospinal fluid analysis, or CSF analysis).  How is this treated?  A migraine headache is usually treated with medicines that:  · Relieve pain.  · Relieve nausea.  · Prevent migraines from coming back.  Treatment may also include:  · Acupuncture.  · Lifestyle changes like avoiding foods that trigger migraines.  Follow these instructions at home:  Medicines  · Take over-the-counter and prescription medicines only as told by your health care provider.  · Do not drive or use heavy machinery while taking prescription pain medicine.  · To prevent or treat constipation while you are taking prescription pain medicine, your health care provider may recommend that you:  ¨ Drink enough fluid to  keep your urine clear or pale yellow.  ¨ Take over-the-counter or prescription medicines.  ¨ Eat foods that are high in fiber, such as fresh fruits and vegetables, whole grains, and beans.  ¨ Limit foods that are high in fat and processed sugars, such as fried and sweet foods.  Lifestyle  · Avoid alcohol use.  · Do not use any products that contain nicotine or tobacco, such as cigarettes and e-cigarettes. If you need help quitting, ask your health care provider.  · Get at least 8 hours of sleep every night.  · Limit your stress.  General instructions  · Keep a journal to find out what may trigger your migraine headaches. For example, write down:  ¨ What you eat and drink.  ¨ How much sleep you get.  ¨ Any change to your diet or medicines.  · If you have a migraine:  ¨ Avoid things that make your symptoms worse, such as bright lights.  ¨ It may help to lie down in a dark, quiet room.  ¨ Do not drive or use heavy machinery.  ¨ Ask your health care provider what activities are safe for you while you are experiencing symptoms.  · Keep all follow-up visits as told by your health care provider. This is important.  Contact a health care provider if:  · You develop symptoms that are different or more severe than your usual migraine symptoms.  Get help right away if:  · Your migraine becomes severe.  · You have a fever.  · You have a stiff neck.  · You have vision loss.  · Your muscles feel weak or like you cannot control them.  · You start to lose your balance often.  · You develop trouble walking.  · You faint.  This information is not intended to replace advice given to you by your health care provider. Make sure you discuss any questions you have with your health care provider.  Document Released: 12/18/2006 Document Revised: 07/07/2017 Document Reviewed: 06/05/2017  [employer] Interactive Patient Education © 2017 [employer]      Depression / Suicide Risk    As you are discharged from this [facility] facility, it is  important to learn how to keep safe from harming yourself.    Recognize the warning signs:  · Abrupt changes in personality, positive or negative- including increase in energy   · Giving away possessions  · Change in eating patterns- significant weight changes-  positive or negative  · Change in sleeping patterns- unable to sleep or sleeping all the time   · Unwillingness or inability to communicate  · Depression  · Unusual sadness, discouragement and loneliness  · Talk of wanting to die  · Neglect of personal appearance   · Rebelliousness- reckless behavior  · Withdrawal from people/activities they love  · Confusion- inability to concentrate     If you or a loved one observes any of these behaviors or has concerns about self-harm, here's what you can do:  · Talk about it- your feelings and reasons for harming yourself  · Remove any means that you might use to hurt yourself (examples: pills, rope, extension cords, firearm)  · Get professional help from the community (Mental Health, Substance Abuse, psychological counseling)  · Do not be alone:Call your Safe Contact- someone whom you trust who will be there for you.  · Call your local CRISIS HOTLINE 832-4851 or 338-044-3381  · Call your local Children's Mobile Crisis Response Team [place] (336) 960-9155 or www.SheZoom  · Call the toll free National Suicide Prevention Hotlines   · National Suicide Prevention Lifeline 041-171-RQBX (0249)  · National Hope Line Network 800-SUICIDE (384-6603)

## 2018-11-17 NOTE — PROGRESS NOTES
Late entry-pt in bed awake,a&ox4,blind but able to communicate well,poc explained to pt,on ivf ,oral intake good,still look weak and tired,medicated for pain as needed.

## 2018-11-17 NOTE — DISCHARGE SUMMARY
Discharge Summary    CHIEF COMPLAINT ON ADMISSION  Chief Complaint   Patient presents with   • Headache       Reason for Admission  EMS     Admission Date  11/15/2018    CODE STATUS  Full Code    HPI & HOSPITAL COURSE  This is a 47 y.o. female here with headache.     She has underlying history of congenital hydrocephalus with  shunt in place.  She has underlying blindness.  She was admitted on November 15, 2018 for headaches/recurrent migraines with nausea and vomiting.  CT of the head was obtained during the hospital stay which is stable with respect to prior.  During hospitalization, patient received IV fluid hydration and management of her underlying migraine with complete resolution of her symptoms on November 17, 2018 and patient is eager to discharge home at this point in time.  For migraine prophylaxis, she started on Inderal.  Further up titration per primary care physician in the outpatient setting.  She is been given a prescription of Zofran and Fioricet on discharge.  Advise close outpatient follow-up with PCP.  She recommended outpatient follow-up with neurosurgery for shunt management.  Discharge planning discussed with the patient, she discharged home in stable condition.  She will maintain outpatient physical therapy and occupational therapy.          Therefore, she is discharged in good and stable condition to home with close outpatient follow-up.    Discharge Date  11/17/18    FOLLOW UP ITEMS POST DISCHARGE  F/U PCP and neurosurgery     DISCHARGE DIAGNOSES  Active Problems:    Congenital hydrocephalus (HCC) (Chronic) POA: Yes    Blind (Chronic) POA: Yes    Migraines POA: Yes    Pain of right hip joint POA: Yes    Metabolic acidosis POA: Yes    Leukocytosis POA: Yes  Resolved Problems:    * No resolved hospital problems. *      FOLLOW UP  No future appointments.  Lucy Treivno M.D.  [address] [facility] [place] [address]  [phone]      [facility]  left a message for your provider regarding need  to schedule a follow up appointment. Please call their office directly if you do not hear from them with in 2 days. Thank you      MEDICATIONS ON DISCHARGE     Medication List      START taking these medications      Instructions   acetaminophen/caffeine/butalbital 325-40-50 mg -40 MG Tabs  Commonly known as:  FIORICET   Take 1 Tab by mouth every 6 hours as needed for Headache for up to 30 days.  Dose:  1 Tab     ondansetron 4 MG Tbdp  Commonly known as:  ZOFRAN ODT   Take 1 Tab by mouth every four hours as needed for Nausea (give PO if no IV route available).  Dose:  4 mg     propranolol 10 MG Tabs  Commonly known as:  INDERAL   Take 1 Tab by mouth 2 times a day.  Dose:  10 mg        CONTINUE taking these medications      Instructions   ibuprofen 200 MG Tabs  Commonly known as:  MOTRIN   Take 800 mg by mouth every 8 hours as needed.  Dose:  800 mg     LORazepam 1 MG Tabs  Commonly known as:  ATIVAN   Take 1 mg by mouth 1 time daily as needed (sleep).  Dose:  1 mg            Allergies  Allergies   Allergen Reactions   • Tape Rash     Medical tape. Per patient, paper tape ok.       DIET  Orders Placed This Encounter   Procedures   • Diet Order Regular     Standing Status:   Standing     Number of Occurrences:   1     Order Specific Question:   Diet:     Answer:   Regular [1]       ACTIVITY  As tolerated.  Weight bearing as tolerated    CONSULTATIONS  None    PROCEDURES  None    LABORATORY  Lab Results   Component Value Date    SODIUM 138 11/16/2018    POTASSIUM 3.9 11/16/2018    CHLORIDE 108 11/16/2018    CO2 23 11/16/2018    GLUCOSE 101 (H) 11/16/2018    BUN 10 11/16/2018    CREATININE 0.61 11/16/2018    CREATININE 0.6 08/12/2008        Lab Results   Component Value Date    WBC 10.5 11/17/2018    HEMOGLOBIN 13.3 11/17/2018    HEMATOCRIT 40.6 11/17/2018    PLATELETCT 347 11/17/2018

## 2018-12-03 PROCEDURE — 99285 EMERGENCY DEPT VISIT HI MDM: CPT

## 2018-12-03 ASSESSMENT — PAIN SCALES - GENERAL: PAINLEVEL_OUTOF10: 8

## 2018-12-04 ENCOUNTER — APPOINTMENT (OUTPATIENT)
Dept: RADIOLOGY | Facility: MEDICAL CENTER | Age: 48
End: 2018-12-04
Attending: EMERGENCY MEDICINE
Payer: MEDICAID

## 2018-12-04 ENCOUNTER — HOSPITAL ENCOUNTER (OUTPATIENT)
Facility: MEDICAL CENTER | Age: 48
End: 2018-12-07
Attending: EMERGENCY MEDICINE | Admitting: INTERNAL MEDICINE
Payer: MEDICAID

## 2018-12-04 ENCOUNTER — PATIENT OUTREACH (OUTPATIENT)
Dept: HEALTH INFORMATION MANAGEMENT | Facility: OTHER | Age: 48
End: 2018-12-04

## 2018-12-04 DIAGNOSIS — R51.9 ACUTE INTRACTABLE HEADACHE, UNSPECIFIED HEADACHE TYPE: ICD-10-CM

## 2018-12-04 DIAGNOSIS — G43.011 INTRACTABLE MIGRAINE WITHOUT AURA AND WITH STATUS MIGRAINOSUS: ICD-10-CM

## 2018-12-04 PROBLEM — R11.2 NAUSEA AND VOMITING: Status: ACTIVE | Noted: 2018-12-04

## 2018-12-04 PROBLEM — G43.919 INTRACTABLE MIGRAINE: Status: ACTIVE | Noted: 2018-12-04

## 2018-12-04 LAB
ANION GAP SERPL CALC-SCNC: 12 MMOL/L (ref 0–11.9)
BASOPHILS # BLD AUTO: 0.9 % (ref 0–1.8)
BASOPHILS # BLD: 0.07 K/UL (ref 0–0.12)
BUN SERPL-MCNC: 8 MG/DL (ref 8–22)
CALCIUM SERPL-MCNC: 8.4 MG/DL (ref 8.5–10.5)
CHLORIDE SERPL-SCNC: 108 MMOL/L (ref 96–112)
CO2 SERPL-SCNC: 20 MMOL/L (ref 20–33)
CREAT SERPL-MCNC: 0.68 MG/DL (ref 0.5–1.4)
EOSINOPHIL # BLD AUTO: 0.08 K/UL (ref 0–0.51)
EOSINOPHIL NFR BLD: 1 % (ref 0–6.9)
ERYTHROCYTE [DISTWIDTH] IN BLOOD BY AUTOMATED COUNT: 45.1 FL (ref 35.9–50)
GLUCOSE SERPL-MCNC: 100 MG/DL (ref 65–99)
HCG UR QL: NEGATIVE
HCT VFR BLD AUTO: 43.3 % (ref 37–47)
HGB BLD-MCNC: 14.3 G/DL (ref 12–16)
IMM GRANULOCYTES # BLD AUTO: 0.01 K/UL (ref 0–0.11)
IMM GRANULOCYTES NFR BLD AUTO: 0.1 % (ref 0–0.9)
LYMPHOCYTES # BLD AUTO: 3.03 K/UL (ref 1–4.8)
LYMPHOCYTES NFR BLD: 37.6 % (ref 22–41)
MCH RBC QN AUTO: 29.4 PG (ref 27–33)
MCHC RBC AUTO-ENTMCNC: 33 G/DL (ref 33.6–35)
MCV RBC AUTO: 88.9 FL (ref 81.4–97.8)
MONOCYTES # BLD AUTO: 0.73 K/UL (ref 0–0.85)
MONOCYTES NFR BLD AUTO: 9.1 % (ref 0–13.4)
NEUTROPHILS # BLD AUTO: 4.13 K/UL (ref 2–7.15)
NEUTROPHILS NFR BLD: 51.3 % (ref 44–72)
NRBC # BLD AUTO: 0 K/UL
NRBC BLD-RTO: 0 /100 WBC
PLATELET # BLD AUTO: 305 K/UL (ref 164–446)
PMV BLD AUTO: 10.6 FL (ref 9–12.9)
POTASSIUM SERPL-SCNC: 3.7 MMOL/L (ref 3.6–5.5)
RBC # BLD AUTO: 4.87 M/UL (ref 4.2–5.4)
SODIUM SERPL-SCNC: 140 MMOL/L (ref 135–145)
SP GR UR REFRACTOMETRY: 1.02
WBC # BLD AUTO: 8.1 K/UL (ref 4.8–10.8)

## 2018-12-04 PROCEDURE — 96375 TX/PRO/DX INJ NEW DRUG ADDON: CPT

## 2018-12-04 PROCEDURE — 93005 ELECTROCARDIOGRAM TRACING: CPT | Performed by: EMERGENCY MEDICINE

## 2018-12-04 PROCEDURE — 81025 URINE PREGNANCY TEST: CPT

## 2018-12-04 PROCEDURE — G0378 HOSPITAL OBSERVATION PER HR: HCPCS

## 2018-12-04 PROCEDURE — 99220 PR INITIAL OBSERVATION CARE,LEVL III: CPT | Performed by: INTERNAL MEDICINE

## 2018-12-04 PROCEDURE — 80048 BASIC METABOLIC PNL TOTAL CA: CPT

## 2018-12-04 PROCEDURE — 700105 HCHG RX REV CODE 258: Performed by: EMERGENCY MEDICINE

## 2018-12-04 PROCEDURE — 96376 TX/PRO/DX INJ SAME DRUG ADON: CPT

## 2018-12-04 PROCEDURE — 74018 RADEX ABDOMEN 1 VIEW: CPT

## 2018-12-04 PROCEDURE — A9270 NON-COVERED ITEM OR SERVICE: HCPCS | Performed by: EMERGENCY MEDICINE

## 2018-12-04 PROCEDURE — 72020 X-RAY EXAM OF SPINE 1 VIEW: CPT

## 2018-12-04 PROCEDURE — 700111 HCHG RX REV CODE 636 W/ 250 OVERRIDE (IP)

## 2018-12-04 PROCEDURE — 70450 CT HEAD/BRAIN W/O DYE: CPT

## 2018-12-04 PROCEDURE — 700111 HCHG RX REV CODE 636 W/ 250 OVERRIDE (IP): Performed by: EMERGENCY MEDICINE

## 2018-12-04 PROCEDURE — A9270 NON-COVERED ITEM OR SERVICE: HCPCS | Performed by: INTERNAL MEDICINE

## 2018-12-04 PROCEDURE — 94760 N-INVAS EAR/PLS OXIMETRY 1: CPT

## 2018-12-04 PROCEDURE — 85025 COMPLETE CBC W/AUTO DIFF WBC: CPT

## 2018-12-04 PROCEDURE — 700102 HCHG RX REV CODE 250 W/ 637 OVERRIDE(OP): Performed by: EMERGENCY MEDICINE

## 2018-12-04 PROCEDURE — 71045 X-RAY EXAM CHEST 1 VIEW: CPT

## 2018-12-04 PROCEDURE — 70250 X-RAY EXAM OF SKULL: CPT

## 2018-12-04 PROCEDURE — 700102 HCHG RX REV CODE 250 W/ 637 OVERRIDE(OP): Performed by: INTERNAL MEDICINE

## 2018-12-04 PROCEDURE — 96374 THER/PROPH/DIAG INJ IV PUSH: CPT

## 2018-12-04 PROCEDURE — 700111 HCHG RX REV CODE 636 W/ 250 OVERRIDE (IP): Performed by: INTERNAL MEDICINE

## 2018-12-04 RX ORDER — OXYCODONE HYDROCHLORIDE 10 MG/1
10 TABLET ORAL
Status: DISCONTINUED | OUTPATIENT
Start: 2018-12-04 | End: 2018-12-05

## 2018-12-04 RX ORDER — OXYCODONE HYDROCHLORIDE 5 MG/1
5 TABLET ORAL
Status: DISCONTINUED | OUTPATIENT
Start: 2018-12-04 | End: 2018-12-05

## 2018-12-04 RX ORDER — BISACODYL 10 MG
10 SUPPOSITORY, RECTAL RECTAL
Status: DISCONTINUED | OUTPATIENT
Start: 2018-12-04 | End: 2018-12-07 | Stop reason: HOSPADM

## 2018-12-04 RX ORDER — PROMETHAZINE HYDROCHLORIDE 25 MG/1
12.5-25 SUPPOSITORY RECTAL EVERY 4 HOURS PRN
Status: DISCONTINUED | OUTPATIENT
Start: 2018-12-04 | End: 2018-12-07 | Stop reason: HOSPADM

## 2018-12-04 RX ORDER — ESOMEPRAZOLE MAGNESIUM 40 MG/1
40 CAPSULE, DELAYED RELEASE ORAL
Status: ON HOLD | COMMUNITY
End: 2019-01-16

## 2018-12-04 RX ORDER — ONDANSETRON 2 MG/ML
4 INJECTION INTRAMUSCULAR; INTRAVENOUS EVERY 4 HOURS PRN
Status: DISCONTINUED | OUTPATIENT
Start: 2018-12-04 | End: 2018-12-07 | Stop reason: HOSPADM

## 2018-12-04 RX ORDER — ACETAMINOPHEN 325 MG/1
650 TABLET ORAL EVERY 6 HOURS PRN
Status: DISCONTINUED | OUTPATIENT
Start: 2018-12-04 | End: 2018-12-07 | Stop reason: HOSPADM

## 2018-12-04 RX ORDER — SUMATRIPTAN 25 MG/1
25 TABLET, FILM COATED ORAL
Status: DISCONTINUED | OUTPATIENT
Start: 2018-12-04 | End: 2018-12-07 | Stop reason: HOSPADM

## 2018-12-04 RX ORDER — METOCLOPRAMIDE HYDROCHLORIDE 5 MG/ML
10 INJECTION INTRAMUSCULAR; INTRAVENOUS ONCE
Status: COMPLETED | OUTPATIENT
Start: 2018-12-04 | End: 2018-12-04

## 2018-12-04 RX ORDER — AMITRIPTYLINE HYDROCHLORIDE 100 MG/1
100 TABLET ORAL NIGHTLY
COMMUNITY
End: 2018-12-31

## 2018-12-04 RX ORDER — SODIUM CHLORIDE 9 MG/ML
1000 INJECTION, SOLUTION INTRAVENOUS ONCE
Status: COMPLETED | OUTPATIENT
Start: 2018-12-04 | End: 2018-12-04

## 2018-12-04 RX ORDER — MORPHINE SULFATE 10 MG/ML
4 INJECTION, SOLUTION INTRAMUSCULAR; INTRAVENOUS ONCE
Status: COMPLETED | OUTPATIENT
Start: 2018-12-04 | End: 2018-12-04

## 2018-12-04 RX ORDER — KETOROLAC TROMETHAMINE 30 MG/ML
30 INJECTION, SOLUTION INTRAMUSCULAR; INTRAVENOUS EVERY 6 HOURS
Status: DISCONTINUED | OUTPATIENT
Start: 2018-12-04 | End: 2018-12-05

## 2018-12-04 RX ORDER — FAMOTIDINE 40 MG/1
40 TABLET, FILM COATED ORAL EVERY EVENING
COMMUNITY
End: 2019-06-06

## 2018-12-04 RX ORDER — BUTALBITAL, ACETAMINOPHEN AND CAFFEINE 50; 325; 40 MG/1; MG/1; MG/1
1 TABLET ORAL ONCE
Status: COMPLETED | OUTPATIENT
Start: 2018-12-04 | End: 2018-12-04

## 2018-12-04 RX ORDER — ONDANSETRON 4 MG/1
4 TABLET, ORALLY DISINTEGRATING ORAL EVERY 4 HOURS PRN
Status: DISCONTINUED | OUTPATIENT
Start: 2018-12-04 | End: 2018-12-07 | Stop reason: HOSPADM

## 2018-12-04 RX ORDER — PROPRANOLOL HYDROCHLORIDE 10 MG/1
10 TABLET ORAL 2 TIMES DAILY
Status: DISCONTINUED | OUTPATIENT
Start: 2018-12-04 | End: 2018-12-07 | Stop reason: HOSPADM

## 2018-12-04 RX ORDER — BUTALBITAL, ACETAMINOPHEN AND CAFFEINE 50; 325; 40 MG/1; MG/1; MG/1
1 TABLET ORAL EVERY 6 HOURS PRN
Status: DISCONTINUED | OUTPATIENT
Start: 2018-12-04 | End: 2018-12-07 | Stop reason: HOSPADM

## 2018-12-04 RX ORDER — AMOXICILLIN 250 MG
2 CAPSULE ORAL 2 TIMES DAILY
Status: DISCONTINUED | OUTPATIENT
Start: 2018-12-04 | End: 2018-12-07 | Stop reason: HOSPADM

## 2018-12-04 RX ORDER — PROPRANOLOL HYDROCHLORIDE 120 MG/1
120 CAPSULE, EXTENDED RELEASE ORAL DAILY
Status: ON HOLD | COMMUNITY
End: 2019-01-04

## 2018-12-04 RX ORDER — PROMETHAZINE HYDROCHLORIDE 25 MG/1
12.5-25 TABLET ORAL EVERY 4 HOURS PRN
Status: DISCONTINUED | OUTPATIENT
Start: 2018-12-04 | End: 2018-12-07 | Stop reason: HOSPADM

## 2018-12-04 RX ORDER — TOPIRAMATE 100 MG/1
100 TABLET, FILM COATED ORAL 2 TIMES DAILY
COMMUNITY
End: 2018-12-31

## 2018-12-04 RX ORDER — KETOROLAC TROMETHAMINE 30 MG/ML
30 INJECTION, SOLUTION INTRAMUSCULAR; INTRAVENOUS ONCE
Status: COMPLETED | OUTPATIENT
Start: 2018-12-04 | End: 2018-12-04

## 2018-12-04 RX ORDER — KETOROLAC TROMETHAMINE 30 MG/ML
INJECTION, SOLUTION INTRAMUSCULAR; INTRAVENOUS
Status: COMPLETED
Start: 2018-12-04 | End: 2018-12-04

## 2018-12-04 RX ORDER — POLYETHYLENE GLYCOL 3350 17 G/17G
1 POWDER, FOR SOLUTION ORAL
Status: DISCONTINUED | OUTPATIENT
Start: 2018-12-04 | End: 2018-12-07 | Stop reason: HOSPADM

## 2018-12-04 RX ORDER — LORAZEPAM 2 MG/ML
0.5 INJECTION INTRAMUSCULAR ONCE
Status: COMPLETED | OUTPATIENT
Start: 2018-12-04 | End: 2018-12-04

## 2018-12-04 RX ORDER — LORAZEPAM 1 MG/1
1 TABLET ORAL
Status: DISCONTINUED | OUTPATIENT
Start: 2018-12-04 | End: 2018-12-06

## 2018-12-04 RX ORDER — MORPHINE SULFATE 10 MG/ML
4 INJECTION, SOLUTION INTRAMUSCULAR; INTRAVENOUS
Status: DISCONTINUED | OUTPATIENT
Start: 2018-12-04 | End: 2018-12-05

## 2018-12-04 RX ADMIN — OXYCODONE HYDROCHLORIDE 5 MG: 5 TABLET ORAL at 18:57

## 2018-12-04 RX ADMIN — PROPRANOLOL HYDROCHLORIDE 10 MG: 10 TABLET ORAL at 19:02

## 2018-12-04 RX ADMIN — KETOROLAC TROMETHAMINE 30 MG: 30 INJECTION, SOLUTION INTRAMUSCULAR at 00:58

## 2018-12-04 RX ADMIN — STANDARDIZED SENNA CONCENTRATE AND DOCUSATE SODIUM 2 TABLET: 8.6; 5 TABLET, FILM COATED ORAL at 07:11

## 2018-12-04 RX ADMIN — KETOROLAC TROMETHAMINE 30 MG: 30 INJECTION, SOLUTION INTRAMUSCULAR at 07:00

## 2018-12-04 RX ADMIN — BUTALBITAL, ACETAMINOPHEN, AND CAFFEINE 1 TABLET: 50; 325; 40 TABLET ORAL at 04:15

## 2018-12-04 RX ADMIN — SUMATRIPTAN SUCCINATE 25 MG: 25 TABLET ORAL at 15:01

## 2018-12-04 RX ADMIN — KETOROLAC TROMETHAMINE 30 MG: 30 INJECTION, SOLUTION INTRAMUSCULAR at 11:59

## 2018-12-04 RX ADMIN — PROPRANOLOL HYDROCHLORIDE 10 MG: 10 TABLET ORAL at 07:11

## 2018-12-04 RX ADMIN — OXYCODONE HYDROCHLORIDE 10 MG: 10 TABLET ORAL at 16:57

## 2018-12-04 RX ADMIN — BUTALBITAL, ACETAMINOPHEN, AND CAFFEINE 1 TABLET: 50; 325; 40 TABLET ORAL at 11:55

## 2018-12-04 RX ADMIN — ONDANSETRON 4 MG: 4 TABLET, ORALLY DISINTEGRATING ORAL at 18:57

## 2018-12-04 RX ADMIN — MORPHINE SULFATE 4 MG: 10 INJECTION INTRAVENOUS at 03:16

## 2018-12-04 RX ADMIN — LORAZEPAM 0.5 MG: 2 INJECTION INTRAMUSCULAR; INTRAVENOUS at 00:58

## 2018-12-04 RX ADMIN — OXYCODONE HYDROCHLORIDE 5 MG: 5 TABLET ORAL at 22:41

## 2018-12-04 RX ADMIN — OXYCODONE HYDROCHLORIDE 10 MG: 10 TABLET ORAL at 12:44

## 2018-12-04 RX ADMIN — SODIUM CHLORIDE 1000 ML: 9 INJECTION, SOLUTION INTRAVENOUS at 00:58

## 2018-12-04 RX ADMIN — OXYCODONE HYDROCHLORIDE 10 MG: 10 TABLET ORAL at 07:57

## 2018-12-04 RX ADMIN — METOCLOPRAMIDE 10 MG: 5 INJECTION, SOLUTION INTRAMUSCULAR; INTRAVENOUS at 00:58

## 2018-12-04 RX ADMIN — ONDANSETRON 4 MG: 2 INJECTION INTRAMUSCULAR; INTRAVENOUS at 22:42

## 2018-12-04 ASSESSMENT — ENCOUNTER SYMPTOMS
DIZZINESS: 0
SEIZURES: 0
BLOOD IN STOOL: 0
CHILLS: 0
COUGH: 0
FEVER: 0
SPUTUM PRODUCTION: 0
FOCAL WEAKNESS: 0
SORE THROAT: 0
VOMITING: 1
ABDOMINAL PAIN: 0
MYALGIAS: 0
PHOTOPHOBIA: 1
DIAPHORESIS: 0
DIARRHEA: 0
PALPITATIONS: 0
FLANK PAIN: 0
BACK PAIN: 0
HEADACHES: 1
NAUSEA: 1
BRUISES/BLEEDS EASILY: 0
SHORTNESS OF BREATH: 0
WHEEZING: 0
BLURRED VISION: 0
NECK PAIN: 0

## 2018-12-04 ASSESSMENT — PATIENT HEALTH QUESTIONNAIRE - PHQ9
SUM OF ALL RESPONSES TO PHQ9 QUESTIONS 1 AND 2: 4
9. THOUGHTS THAT YOU WOULD BE BETTER OFF DEAD, OR OF HURTING YOURSELF: NOT AT ALL
5. POOR APPETITE OR OVEREATING: NOT AT ALL
SUM OF ALL RESPONSES TO PHQ QUESTIONS 1-9: 13
6. FEELING BAD ABOUT YOURSELF - OR THAT YOU ARE A FAILURE OR HAVE LET YOURSELF OR YOUR FAMILY DOWN: NOT AL ALL
1. LITTLE INTEREST OR PLEASURE IN DOING THINGS: NEARLY EVERY DAY
7. TROUBLE CONCENTRATING ON THINGS, SUCH AS READING THE NEWSPAPER OR WATCHING TELEVISION: NEARLY EVERY DAY
2. FEELING DOWN, DEPRESSED, IRRITABLE, OR HOPELESS: SEVERAL DAYS
4. FEELING TIRED OR HAVING LITTLE ENERGY: NEARLY EVERY DAY
3. TROUBLE FALLING OR STAYING ASLEEP OR SLEEPING TOO MUCH: NEARLY EVERY DAY
8. MOVING OR SPEAKING SO SLOWLY THAT OTHER PEOPLE COULD HAVE NOTICED. OR THE OPPOSITE, BEING SO FIGETY OR RESTLESS THAT YOU HAVE BEEN MOVING AROUND A LOT MORE THAN USUAL: NOT AT ALL

## 2018-12-04 ASSESSMENT — LIFESTYLE VARIABLES
TOTAL SCORE: 0
HAVE YOU EVER FELT YOU SHOULD CUT DOWN ON YOUR DRINKING: NO
TOTAL SCORE: 0
HAVE PEOPLE ANNOYED YOU BY CRITICIZING YOUR DRINKING: NO
EVER HAD A DRINK FIRST THING IN THE MORNING TO STEADY YOUR NERVES TO GET RID OF A HANGOVER: NO
EVER_SMOKED: NEVER
TOTAL SCORE: 0
ALCOHOL_USE: YES
AVERAGE NUMBER OF DAYS PER WEEK YOU HAVE A DRINK CONTAINING ALCOHOL: 1
EVER_SMOKED: NEVER
HOW MANY TIMES IN THE PAST YEAR HAVE YOU HAD 5 OR MORE DRINKS IN A DAY: 0
CONSUMPTION TOTAL: NEGATIVE
EVER FELT BAD OR GUILTY ABOUT YOUR DRINKING: NO
ON A TYPICAL DAY WHEN YOU DRINK ALCOHOL HOW MANY DRINKS DO YOU HAVE: 1

## 2018-12-04 ASSESSMENT — PAIN SCALES - GENERAL
PAINLEVEL_OUTOF10: 2
PAINLEVEL_OUTOF10: 4
PAINLEVEL_OUTOF10: 8
PAINLEVEL_OUTOF10: 6
PAINLEVEL_OUTOF10: 2
PAINLEVEL_OUTOF10: 4
PAINLEVEL_OUTOF10: 4

## 2018-12-04 ASSESSMENT — COPD QUESTIONNAIRES
COPD SCREENING SCORE: 1
DURING THE PAST 4 WEEKS HOW MUCH DID YOU FEEL SHORT OF BREATH: NONE/LITTLE OF THE TIME
HAVE YOU SMOKED AT LEAST 100 CIGARETTES IN YOUR ENTIRE LIFE: NO/DON'T KNOW
DO YOU EVER COUGH UP ANY MUCUS OR PHLEGM?: NO/ONLY WITH OCCASIONAL COLDS OR INFECTIONS

## 2018-12-04 NOTE — ED PROVIDER NOTES
ED Provider Note    Scribed for Sanjeev Avendano M.D. by Dewey Wayne. 12/4/2018, 12:35 AM.    Primary care provider: Lucy Trevino M.D.  Means of arrival: Walk In  History obtained from: Patient  History limited by: None    CHIEF COMPLAINT  Chief Complaint   Patient presents with   • Headache       HPI  Rasheeda Manzano is a 48 y.o. female with a history of congenital hydrocephalus with  shunt in place, blindness, and migraine who presents to the Emergency Department for evaluation of a headache onset two days.  The pain started slowly and took approximately 1 day to recheck his maximum.  She has a history of migraines and states this pain feels the same as her past migraines.  She endorses photophobia despite being blind as well as phonophobia.  Her symptoms are slightly alleviated by motrin. She denies any radiation of her pain, and states it is not the worst headache she has ever had.  Patient reports associated nausea, but denies any vomiting, abdominal pain, drug use, alcohol use, chest pain.    REVIEW OF SYSTEMS  Pertinent positives include headache, nausea. Pertinent negatives include no vomiting, abdominal pain, drug use alcohol use, chest pain.     As above, all other systems reviewed and are negative.   See HPI for further details.     PAST MEDICAL HISTORY   has a past medical history of Blind; C. difficile diarrhea (2013); Chronic daily headache; Depression; Fall; Hydrocephalus; Hydrocephalus; Jaundice; Legally blind; Migraine without aura, without mention of intractable migraine without mention of status migrainosus; Other specified symptom associated with female genital organs; Pain; and Psychiatric problem.    SURGICAL HISTORY   has a past surgical history that includes laparoscopy (8/8/08); lysis adhesions general (12/10/2013); cystoscopy (12/10/2013); exploratory laparotomy (12/10/2013); appendectomy (12/10/2013); abdominal hysterectomy total (12/10/2013); aakash by laparoscopy (N/A,  "8/13/2015); cholecystectomy (N/A, 8/13/2015); other; and gastroscopy-endo (N/A, 8/24/2017).    SOCIAL HISTORY  Social History   Substance Use Topics   • Smoking status: Former Smoker     Packs/day: 1.00     Years: 10.00     Types: Cigars     Start date: 5/1/2004     Quit date: 8/9/2017   • Smokeless tobacco: Never Used      Comment:  CIGAR/day    • Alcohol use Yes      Comment: socially      History   Drug Use No       FAMILY HISTORY  Family History   Problem Relation Age of Onset   • Hypertension Mother    • Hypertension Father    • Non-contributory Neg Hx         Migraine       CURRENT MEDICATIONS  No current facility-administered medications on file prior to encounter.      Current Outpatient Prescriptions on File Prior to Encounter   Medication Sig Dispense Refill   • propranolol (INDERAL) 10 MG Tab Take 1 Tab by mouth 2 times a day. 60 Tab 0   • ondansetron (ZOFRAN ODT) 4 MG TABLET DISPERSIBLE Take 1 Tab by mouth every four hours as needed for Nausea (give PO if no IV route available). 20 Tab 0   • acetaminophen/caffeine/butalbital 325-40-50 mg (FIORICET) -40 MG Tab Take 1 Tab by mouth every 6 hours as needed for Headache for up to 30 days. 30 Tab 0   • ibuprofen (MOTRIN) 200 MG Tab Take 800 mg by mouth every 8 hours as needed.     • LORazepam (ATIVAN) 1 MG Tab Take 1 mg by mouth 1 time daily as needed (sleep).         ALLERGIES  Allergies   Allergen Reactions   • Tape Rash     Medical tape. Per patient, paper tape ok.       PHYSICAL EXAM  VITAL SIGNS: /84   Pulse (!) 135   Temp 36.2 °C (97.2 °F) (Temporal)   Resp 16   Ht 1.626 m (5' 4\")   Wt 68 kg (150 lb)   LMP 11/27/2013   SpO2 99%   BMI 25.75 kg/m²   Vitals reviewed.  Constitutional: Alert in no apparent distress.  HENT: No signs of trauma, Bilateral external ears normal, Nose normal. moist mucous membranes.  Eyes: Pupils are equal and reactive, Conjunctiva normal, Non-icteric.   Neck: Normal range of motion, No tenderness, Supple, No " stridor.   Lymphatic: No lymphadenopathy noted.   Cardiovascular: Regular rate and rhythm, no murmurs.   Thorax & Lungs: Normal breath sounds, No respiratory distress, No wheezing, No chest tenderness.   Abdomen: Bowel sounds normal, Soft, No tenderness, No peritoneal signs, No masses, No pulsatile masses.   Skin: Warm, Dry, No erythema, No rash.   Back: Normal alignment.  Extremities: Intact distal pulses, No edema, No tenderness, No cyanosis  Musculoskeletal: Good range of motion in all major joints. No major deformities noted.   Neurologic: Alert and oriented.  Full strength and sensation in all extremities, no obvious cranial deficits beyond chronic blindness and chronic disconjugate gaze, unable to perform rapid alternating hand movements and finger to nose test, gait deferred.   Psychiatric: Affect normal, Judgment normal, Mood normal.     DIAGNOSTIC STUDIES / PROCEDURES    LABS  Labs Reviewed   CBC WITH DIFFERENTIAL - Abnormal; Notable for the following:        Result Value    MCHC 33.0 (*)     All other components within normal limits   HCG QUALITATIVE UR   REFRACTOMETER SG   BASIC METABOLIC PANEL      All labs reviewed by me.    EKG Interpretation:  Interpreted by me    12 Lead EKG interpreted by me to show:  Sinus tachycardia  Rate 126  Axis: Normal  EKG machine reports severely prolonged GA interval but to my read GA is in normal limits, remainder of intervals are normal  Normal T waves  Mild ST depressions in leads v4 and v5,  My impression of this EKG: Does not indicate ischemia.  Unchanged from most recent EKG from 20/08/2018    RADIOLOGY  DX-SKULL-LIMITED 3-   Final Result      Unremarkable exam.      DX-SPINE-ANY ONE VIEW   Final Result      Intact cervical portion of  shunt.      DX-CHEST-LIMITED (1 VIEW)   Final Result         No acute cardiac or pulmonary abnormality is identified.      Intact thoracic portion of shunt      [id number] VIEW   Final Result      Stable intact appearing distal  portion of apparent ventricular pleural shunt catheter.      CT-HEAD W/O   Final Result      Stable head CT findings without evidence of hydrocephalus or hemorrhage.        The radiologist's interpretation of all radiological studies have been reviewed by me.    COURSE & MEDICAL DECISION MAKING  Nursing notes, VS, PMSFHx reviewed in chart.    Differential diagnoses include but not limited to: Migraine vs Shunt failure VS Subarachnoid hemorrhage, Sepsis, Venous sinus thrombosis, Neoplasm     12:35 AM Patient seen and examined at bedside. Patient arrives tachycardic but afebrile with otherwise normal vital signs. Patient appears well hydrated and non-toxic. The physical exam is remarkable for disconjugate gaze which is chronic secondary to patient being legally blind. Ordered for CT-Head W/o, DX-Skull limited 3, DX-Chest 1 view, DX-Spine any one view, DX-Abdomen 1 view, Beta-HCG Qualitative Urine, Refractometer SG, EKG to evaluate. Patient will be treated with Ativan 0.5 mg, Reglan 10 mg, Toradol 30 mg, and 1 L of IV fluids for her symptoms.    Per chart review, patient was admitted to the hospital in November of this year approximately 3 weeks ago for similar symptoms.  At that time, a CT of the head was performed which was stable compared to prior.  She was treated with Dilaudid and her symptoms ultimately improved.  She was also started on Inderal, given a prescription for Zofran, and Fioricet and discharged with PCP and neurology follow-up.  She was also recommended to follow-up with neurosurgery for her  shunt.    CBC is without leukocytosis or anemia. Metabolic panel with mildly elevated anion gap to 12 and blood glucose to 100. CT head unchanged from prior and  shunt series is unremarkable.    3:10 AM - Patient will be treated with morphine 4 mg for continued headache.    3:55 AM - Patient will be treated with Fioricet -40 mg per tab; 1 tab for continued headache.     Upon reevaluation, patient  remains tachycardic and her headache is not significantly improved. Will admit for pain control.    4:47 AM - I spoke with Dr. Sargent, Hospitalist, who agrees to admit the patient.    HYDRATION: Based on the patient's presentation of Tachycardia the patient was given IV fluids. IV Hydration was used because oral hydration was not adequate alone. Upon recheck following hydration, the patient was exhiiting a decreased heart rate however still tachycardic.    DISPOSITION:  Patient will be admitted to Dr. Sargent, Hospitalist, in guarded condition.    FINAL IMPRESSION  1. Acute intractable headache, unspecified headache type          I, Dewey Wayne (Scribe), am scribing for, and in the presence of, Sanjeev Avendano M.D..    Electronically signed by: Dewey Wayne (Scribe), 12/4/2018    ISanjeev M.D. personally performed the services described in this documentation, as scribed by Dewey Wayne in my presence, and it is both accurate and complete. C.    The note accurately reflects work and decisions made by me.  Sanjeev Avendano  12/4/2018  8:28 AM

## 2018-12-04 NOTE — ED NOTES
Assist RN: pt states her pain is improving and rates it at 5/10, ERP notified, orders received, pt medicated per MAR.  Pt given ice water per ERP.

## 2018-12-04 NOTE — PROGRESS NOTES
Patient admitted after midnight by my colleague. Patient was seen and evaluated. Currently patient continues to complain of intractable migraine with headache to right frontal area that is radiating to her right eye.Patient does have history of migraines however it had been several years since previous according to her.  Patient has prn medication to help with pain. CT head negative for acute findings, does show  shunt in place. VSS at this time. Will continue to monitor overnight for pain control, and gentle IV fluid hydration.

## 2018-12-04 NOTE — PROGRESS NOTES
Assumed pt care; No changes from previous nurses assessment; Pt denies needs at this time. Will continue to monitor.

## 2018-12-04 NOTE — ASSESSMENT & PLAN NOTE
CT head negative for acute process.   shunt appears intact  Patient has been started on scheduled Toradol. Fioricet, Imitrex breakthrough pain  Continue Inderal  IV Zofran, promethazine and Compazine as needed for nausea  DHE, Prednisone- with some response   Neurology recommended DHE q 8 hour for 24 hours, this has shown improvement in pain

## 2018-12-04 NOTE — ED TRIAGE NOTES
Rasheeda Manzano  48 y.o.  female  Chief Complaint   Patient presents with   • Headache     Present to triage c/o right sided headache x 2 days. + nausea. Denies vomiting. Patient is blind. Described pain as throbbing at the back of her right eye.

## 2018-12-04 NOTE — H&P
Hospital Medicine History & Physical Note    Date of Service  12/4/2018    Primary Care Physician  Lucy Trevino M.D.    Consultants  None    Code Status  Full code    Chief Complaint  Intractable headache    History of Presenting Illness  48 y.o. female with a past medical history of congenital hydrocephalus status post  shunt placement, blindness, migraines who presented 12/4/2018 with headache that started yesterday.  The patient reports right-sided throbbing headache which is typical for her migraines, associated with nausea and vomiting.  She also endorses photo and phonophobia.  She has tried taking Motrin at home without any relief.  Focal muscle weakness, numbness, tingling, change in speech or incontinence.  For similar migraine attack and was discharged on Inderal and Fioricet.      CT head without contrast revealed stable head CT findings without evidence of hydrocephalus or hemorrhage  X-ray skull: left-sided  shunt is present and appears intact and unchanged  EKG interpreted by me reveals sinus tachycardia with no acute ischemic changes    Review of Systems  Review of Systems   Constitutional: Positive for malaise/fatigue. Negative for chills, diaphoresis and fever.   HENT: Negative for hearing loss and sore throat.    Eyes: Positive for photophobia. Negative for blurred vision.   Respiratory: Negative for cough, sputum production, shortness of breath and wheezing.    Cardiovascular: Negative for chest pain, palpitations and leg swelling.   Gastrointestinal: Positive for nausea and vomiting. Negative for abdominal pain, blood in stool and diarrhea.   Genitourinary: Negative for dysuria, flank pain and urgency.   Musculoskeletal: Negative for back pain, joint pain, myalgias and neck pain.   Skin: Negative for rash.   Neurological: Positive for headaches. Negative for dizziness, focal weakness and seizures.   Endo/Heme/Allergies: Does not bruise/bleed easily.   Psychiatric/Behavioral: Negative for  suicidal ideas.   All other systems reviewed and are negative.      Past Medical History   has a past medical history of Blind; C. difficile diarrhea (2013); Chronic daily headache; Depression; Fall; Hydrocephalus; Hydrocephalus; Jaundice; Legally blind; Migraine without aura, without mention of intractable migraine without mention of status migrainosus; Other specified symptom associated with female genital organs; Pain; and Psychiatric problem.    Surgical History   has a past surgical history that includes laparoscopy (8/8/08); lysis adhesions general (12/10/2013); cystoscopy (12/10/2013); exploratory laparotomy (12/10/2013); appendectomy (12/10/2013); abdominal hysterectomy total (12/10/2013); aakash by laparoscopy (N/A, 8/13/2015); cholecystectomy (N/A, 8/13/2015); other; and gastroscopy-endo (N/A, 8/24/2017).     Family History  family history includes Hypertension in her father and mother.     Social History   reports that she quit smoking about 15 months ago. Her smoking use included Cigars. She started smoking about 14 years ago. She has a 10.00 pack-year smoking history. She has never used smokeless tobacco. She reports that she drinks alcohol. She reports that she does not use drugs.    Allergies  Allergies   Allergen Reactions   • Tape Rash     Medical tape. Per patient, paper tape ok.       Medications  Prior to Admission Medications   Prescriptions Last Dose Informant Patient Reported? Taking?   LORazepam (ATIVAN) 1 MG Tab 11/13/2018 at HS Patient Yes No   Sig: Take 1 mg by mouth 1 time daily as needed (sleep).   acetaminophen/caffeine/butalbital 325-40-50 mg (FIORICET) -40 MG Tab   No No   Sig: Take 1 Tab by mouth every 6 hours as needed for Headache for up to 30 days.   ibuprofen (MOTRIN) 200 MG Tab 11/15/2018 at 0700 Patient Yes No   Sig: Take 800 mg by mouth every 8 hours as needed.   ondansetron (ZOFRAN ODT) 4 MG TABLET DISPERSIBLE   No No   Sig: Take 1 Tab by mouth every four hours as needed  for Nausea (give PO if no IV route available).   propranolol (INDERAL) 10 MG Tab   No No   Sig: Take 1 Tab by mouth 2 times a day.      Facility-Administered Medications: None       Physical Exam  Temp:  [36.2 °C (97.2 °F)] 36.2 °C (97.2 °F)  Pulse:  [109-135] 109  Resp:  [16] 16  BP: (115-134)/(78-84) 134/78    Physical Exam   Constitutional: She is oriented to person, place, and time. She appears well-developed and well-nourished. She appears distressed.   HENT:   Head: Normocephalic and atraumatic.   Mouth/Throat: Oropharynx is clear and moist.   Eyes: Conjunctivae are normal.   Neck: Neck supple. No thyromegaly present.   Cardiovascular: Normal rate, regular rhythm and normal heart sounds.    Pulmonary/Chest: Effort normal and breath sounds normal. No respiratory distress. She has no wheezes. She has no rales.   Abdominal: Soft. Bowel sounds are normal. She exhibits no distension. There is no tenderness. There is no rebound.   Musculoskeletal: Normal range of motion. She exhibits no edema or tenderness.   Neurological: She is alert and oriented to person, place, and time. No cranial nerve deficit. Coordination normal.   Strength and sensation intact bilaterally   Skin: Skin is warm and dry.   Psychiatric: She has a normal mood and affect. Her behavior is normal.   Nursing note and vitals reviewed.      Laboratory:  Recent Labs      12/04/18   0422   WBC  8.1   RBC  4.87   HEMOGLOBIN  14.3   HEMATOCRIT  43.3   MCV  88.9   MCH  29.4   MCHC  33.0*   RDW  45.1   PLATELETCT  305   MPV  10.6     Recent Labs      12/04/18   0422   SODIUM  140   POTASSIUM  3.7   CHLORIDE  108     No results for input(s): ALTSGPT, ASTSGOT, ALKPHOSPHAT, TBILIRUBIN, DBILIRUBIN, GAMMAGT, AMYLASE, LIPASE, ALB, PREALBUMIN, GLUCOSE in the last 72 hours.              No results for input(s): TROPONINI in the last 72 hours.    Urinalysis:    Recent Labs      12/04/18   0200   SPECGRAVITY  1.020        Imaging:  DX-SKULL-LIMITED 3-   Final Result       Unremarkable exam.      DX-SPINE-ANY ONE VIEW   Final Result      Intact cervical portion of  shunt.      DX-CHEST-LIMITED (1 VIEW)   Final Result         No acute cardiac or pulmonary abnormality is identified.      Intact thoracic portion of shunt      BS-[id number]-1 VIEW   Final Result      Stable intact appearing distal portion of apparent ventricular pleural shunt catheter.      CT-HEAD W/O   Final Result      Stable head CT findings without evidence of hydrocephalus or hemorrhage.            Assessment/Plan:  I anticipate this patient is appropriate for observation status at this time.    Intractable migraine- (present on admission)   Assessment & Plan    CT head negative for acute process.   shunt appears intact  Patient has been started on scheduled Toradol. Fioricet, Imitrex and oxycodone as needed.  IV morphine for breakthrough pain, monitor respiratory status closely  Continue Inderal  IV Zofran, promethazine and Compazine as needed for nausea     Congenital hydrocephalus (HCC)- (present on admission)   Assessment & Plan    Status post  shunt placement which is intact     Nausea and vomiting- (present on admission)   Assessment & Plan    IV Zofran as needed  IV fluid hydration with normal saline         VTE prophylaxis: SCD

## 2018-12-04 NOTE — ED NOTES
Med med rec complete per patient with medication bottles at bedside (returned)  Allergies reviewed  No PO antibiotics in last 30 days    Verifed Lorazepam through [facility]

## 2018-12-04 NOTE — DISCHARGE PLANNING
Care Transition Team Assessment    Spoke with patient at bedside. Anticipate no needs @ present time. Friend will be ride home when medically cleared.    Information Source  Orientation : Oriented x 4  Information Given By: Patient  Informant's Name: patient     Readmission Evaluation  Is this a readmission?: No    Interdisciplinary Discharge Planning  Does Admitting Nurse Feel This Could be a Complex Discharge?: No  Primary Care Physician: Belinda   Lives with - Patient's Self Care Capacity: Alone and Able to Care For Self  Patient or legal guardian wants to designate a caregiver (see row info): No  Support Systems: Friends / Neighbors, Family Member(s)  Do You Take your Prescribed Medications Regularly: Yes  Able to Return to Previous ADL's: Yes  Mobility Issues: No  Prior Services: None  Patient Expects to be Discharged to:: home  Assistance Needed: No  Durable Medical Equipment: Not Applicable              Finances  Prescription Coverage: Yes    Anticipated Discharge Information  Anticipated discharge disposition: Home  Discharge Address: [address] [place] [address]  Discharge Contact Phone Number: [phone]

## 2018-12-04 NOTE — ED NOTES
Report taken from Sheela JOHNSON; assumed care of pt at this time. Pt resting comfortably. CDU made aware of pt.

## 2018-12-04 NOTE — PROGRESS NOTES
ASSIST RN:  Admit profile completed.  Depression screening score 13, denies SI, primary RN informed.  Moderate score for fall risk, primary RN informed.  Pt's home meds taken to pharmacy, receipt placed in chart.

## 2018-12-05 LAB
ANION GAP SERPL CALC-SCNC: 10 MMOL/L (ref 0–11.9)
BUN SERPL-MCNC: 13 MG/DL (ref 8–22)
CALCIUM SERPL-MCNC: 9.5 MG/DL (ref 8.5–10.5)
CHLORIDE SERPL-SCNC: 105 MMOL/L (ref 96–112)
CO2 SERPL-SCNC: 23 MMOL/L (ref 20–33)
CREAT SERPL-MCNC: 0.87 MG/DL (ref 0.5–1.4)
ERYTHROCYTE [DISTWIDTH] IN BLOOD BY AUTOMATED COUNT: 43.9 FL (ref 35.9–50)
GLUCOSE SERPL-MCNC: 113 MG/DL (ref 65–99)
HCT VFR BLD AUTO: 44.9 % (ref 37–47)
HGB BLD-MCNC: 15.2 G/DL (ref 12–16)
MCH RBC QN AUTO: 30.3 PG (ref 27–33)
MCHC RBC AUTO-ENTMCNC: 33.9 G/DL (ref 33.6–35)
MCV RBC AUTO: 89.6 FL (ref 81.4–97.8)
PLATELET # BLD AUTO: 298 K/UL (ref 164–446)
PMV BLD AUTO: 10.7 FL (ref 9–12.9)
POTASSIUM SERPL-SCNC: 4 MMOL/L (ref 3.6–5.5)
RBC # BLD AUTO: 5.01 M/UL (ref 4.2–5.4)
SODIUM SERPL-SCNC: 138 MMOL/L (ref 135–145)
WBC # BLD AUTO: 10.6 K/UL (ref 4.8–10.8)

## 2018-12-05 PROCEDURE — 700102 HCHG RX REV CODE 250 W/ 637 OVERRIDE(OP): Performed by: PSYCHIATRY & NEUROLOGY

## 2018-12-05 PROCEDURE — 700111 HCHG RX REV CODE 636 W/ 250 OVERRIDE (IP): Performed by: INTERNAL MEDICINE

## 2018-12-05 PROCEDURE — 700102 HCHG RX REV CODE 250 W/ 637 OVERRIDE(OP): Performed by: INTERNAL MEDICINE

## 2018-12-05 PROCEDURE — 80048 BASIC METABOLIC PNL TOTAL CA: CPT

## 2018-12-05 PROCEDURE — A9270 NON-COVERED ITEM OR SERVICE: HCPCS | Performed by: PSYCHIATRY & NEUROLOGY

## 2018-12-05 PROCEDURE — G0378 HOSPITAL OBSERVATION PER HR: HCPCS

## 2018-12-05 PROCEDURE — A9270 NON-COVERED ITEM OR SERVICE: HCPCS | Performed by: INTERNAL MEDICINE

## 2018-12-05 PROCEDURE — 85027 COMPLETE CBC AUTOMATED: CPT

## 2018-12-05 PROCEDURE — 700111 HCHG RX REV CODE 636 W/ 250 OVERRIDE (IP): Performed by: NURSE PRACTITIONER

## 2018-12-05 PROCEDURE — 99213 OFFICE O/P EST LOW 20 MIN: CPT | Mod: GC | Performed by: PSYCHIATRY & NEUROLOGY

## 2018-12-05 PROCEDURE — 700111 HCHG RX REV CODE 636 W/ 250 OVERRIDE (IP): Performed by: PSYCHIATRY & NEUROLOGY

## 2018-12-05 PROCEDURE — 96376 TX/PRO/DX INJ SAME DRUG ADON: CPT

## 2018-12-05 PROCEDURE — 99226 PR SUBSEQUENT OBSERVATION CARE,LEVEL III: CPT | Performed by: HOSPITALIST

## 2018-12-05 PROCEDURE — 96375 TX/PRO/DX INJ NEW DRUG ADDON: CPT

## 2018-12-05 RX ORDER — PREDNISONE 20 MG/1
50 TABLET ORAL ONCE
Status: COMPLETED | OUTPATIENT
Start: 2018-12-05 | End: 2018-12-05

## 2018-12-05 RX ORDER — INDOMETHACIN 75 MG/1
75 CAPSULE, EXTENDED RELEASE ORAL 2 TIMES DAILY WITH MEALS
Status: DISCONTINUED | OUTPATIENT
Start: 2018-12-05 | End: 2018-12-07 | Stop reason: HOSPADM

## 2018-12-05 RX ORDER — DIHYDROERGOTAMINE MESYLATE 1 MG/ML
1 INJECTION, SOLUTION INTRAMUSCULAR; INTRAVENOUS; SUBCUTANEOUS EVERY 8 HOURS
Status: DISCONTINUED | OUTPATIENT
Start: 2018-12-05 | End: 2018-12-07 | Stop reason: HOSPADM

## 2018-12-05 RX ORDER — DIPHENHYDRAMINE HYDROCHLORIDE 50 MG/ML
12.5 INJECTION INTRAMUSCULAR; INTRAVENOUS EVERY 8 HOURS
Status: DISCONTINUED | OUTPATIENT
Start: 2018-12-05 | End: 2018-12-05

## 2018-12-05 RX ORDER — DIPHENHYDRAMINE HCL 25 MG
12.5 TABLET ORAL EVERY 8 HOURS
Status: DISCONTINUED | OUTPATIENT
Start: 2018-12-05 | End: 2018-12-07 | Stop reason: HOSPADM

## 2018-12-05 RX ORDER — DIHYDROERGOTAMINE MESYLATE 1 MG/ML
1 INJECTION, SOLUTION INTRAMUSCULAR; INTRAVENOUS; SUBCUTANEOUS ONCE
Status: COMPLETED | OUTPATIENT
Start: 2018-12-05 | End: 2018-12-05

## 2018-12-05 RX ADMIN — ONDANSETRON 4 MG: 2 INJECTION INTRAMUSCULAR; INTRAVENOUS at 22:49

## 2018-12-05 RX ADMIN — DIHYDROERGOTAMINE MESYLATE 1 MG: 1 INJECTION, SOLUTION INTRAMUSCULAR; INTRAVENOUS; SUBCUTANEOUS at 10:39

## 2018-12-05 RX ADMIN — INDOMETHACIN 75 MG: 75 CAPSULE, EXTENDED RELEASE ORAL at 20:10

## 2018-12-05 RX ADMIN — PROPRANOLOL HYDROCHLORIDE 10 MG: 10 TABLET ORAL at 06:35

## 2018-12-05 RX ADMIN — SUMATRIPTAN SUCCINATE 25 MG: 25 TABLET ORAL at 14:17

## 2018-12-05 RX ADMIN — KETOROLAC TROMETHAMINE 30 MG: 30 INJECTION, SOLUTION INTRAMUSCULAR at 06:27

## 2018-12-05 RX ADMIN — KETOROLAC TROMETHAMINE 30 MG: 30 INJECTION, SOLUTION INTRAMUSCULAR at 13:13

## 2018-12-05 RX ADMIN — PROMETHAZINE HYDROCHLORIDE 25 MG: 25 TABLET ORAL at 10:13

## 2018-12-05 RX ADMIN — LORAZEPAM 1 MG: 1 TABLET ORAL at 00:55

## 2018-12-05 RX ADMIN — PROPRANOLOL HYDROCHLORIDE 10 MG: 10 TABLET ORAL at 20:08

## 2018-12-05 RX ADMIN — LORAZEPAM 1 MG: 1 TABLET ORAL at 18:28

## 2018-12-05 RX ADMIN — DIHYDROERGOTAMINE MESYLATE 1 MG: 1 INJECTION, SOLUTION INTRAMUSCULAR; INTRAVENOUS; SUBCUTANEOUS at 18:15

## 2018-12-05 RX ADMIN — BUTALBITAL, ACETAMINOPHEN, AND CAFFEINE 1 TABLET: 50; 325; 40 TABLET ORAL at 15:00

## 2018-12-05 RX ADMIN — DIPHENHYDRAMINE HCL 12.5 MG: 25 TABLET ORAL at 18:28

## 2018-12-05 RX ADMIN — ONDANSETRON 4 MG: 2 INJECTION INTRAMUSCULAR; INTRAVENOUS at 06:33

## 2018-12-05 RX ADMIN — SUMATRIPTAN SUCCINATE 25 MG: 25 TABLET ORAL at 20:12

## 2018-12-05 RX ADMIN — PREDNISONE 50 MG: 20 TABLET ORAL at 10:14

## 2018-12-05 RX ADMIN — KETOROLAC TROMETHAMINE 30 MG: 30 INJECTION, SOLUTION INTRAMUSCULAR at 00:55

## 2018-12-05 RX ADMIN — SUMATRIPTAN SUCCINATE 25 MG: 25 TABLET ORAL at 22:49

## 2018-12-05 RX ADMIN — PROCHLORPERAZINE EDISYLATE 10 MG: 5 INJECTION INTRAMUSCULAR; INTRAVENOUS at 00:55

## 2018-12-05 RX ADMIN — OXYCODONE HYDROCHLORIDE 10 MG: 10 TABLET ORAL at 10:14

## 2018-12-05 ASSESSMENT — ENCOUNTER SYMPTOMS
DEPRESSION: 0
MYALGIAS: 1
VOMITING: 0
MEMORY LOSS: 0
FOCAL WEAKNESS: 0
COUGH: 0
HEADACHES: 1
SORE THROAT: 0
FEVER: 0
ABDOMINAL PAIN: 0
CHILLS: 0
SHORTNESS OF BREATH: 0
SPEECH CHANGE: 0
NAUSEA: 1

## 2018-12-05 ASSESSMENT — PAIN SCALES - GENERAL
PAINLEVEL_OUTOF10: 6
PAINLEVEL_OUTOF10: 2
PAINLEVEL_OUTOF10: 7
PAINLEVEL_OUTOF10: 6
PAINLEVEL_OUTOF10: 0

## 2018-12-05 NOTE — CARE PLAN
"Problem: Pain Management  Goal: Pain level will decrease to patient's comfort goal  Outcome: PROGRESSING AS EXPECTED  Oxycodone given for headache PRN. Will give scheduled Toradol. Pt reports still having constant headache but \"better\" pain management.     Problem: Mobility  Goal: Risk for activity intolerance will decrease  Outcome: PROGRESSING AS EXPECTED  Ambulated on the hallway with one person sba. Pt is blind. Instructed to use call light prior to getting oob to prevent falls. Calls appropriately.       "

## 2018-12-05 NOTE — RESPIRATORY CARE
COPD EDUCATION by COPD CLINICAL EDUCATOR  12/5/2018 at 8:44 AM by Khushboo Martinez     Patient reviewed by COPD education team. Patient does not qualify for COPD program.

## 2018-12-05 NOTE — PROGRESS NOTES
[facility]ist Progress Note    Date of Service: 2018    Chief Complaint  48 y.o. female admitted 2018 with intractable migraine. Patient has history of migraines as well as  shunt for hydrocephalous. Several admissions for similar.     Interval Problem Update  - Patient continues to have intractable migraine despite several different medication regimens including Toradol, prednisone, DHE, Fioricet, and Imitrex. CT negative for acute findings,  shunt appears to be functioning. Neurology consulted for recommendations on migraine management.      Consultants/Specialty  Neurology     Disposition  Likely home when headache pain controlled         Review of Systems   Constitutional: Positive for malaise/fatigue. Negative for chills and fever.   HENT: Negative for congestion and sore throat.    Eyes:        Patient blind   Respiratory: Negative for cough and shortness of breath.    Cardiovascular: Negative for chest pain and leg swelling.   Gastrointestinal: Positive for nausea. Negative for abdominal pain and vomiting.   Genitourinary: Negative for dysuria, frequency and urgency.   Musculoskeletal: Positive for myalgias.   Skin: Negative for rash.   Neurological: Positive for headaches. Negative for speech change and focal weakness.   Psychiatric/Behavioral: Negative for depression and memory loss.      Physical Exam  Laboratory/Imaging   Hemodynamics  Temp (24hrs), Av.3 °C (97.4 °F), Min:35.8 °C (96.5 °F), Max:37.1 °C (98.8 °F)   Temperature: 35.8 °C (96.5 °F)  Pulse  Av.3  Min: 73  Max: 135    Blood Pressure: 132/77      Respiratory      Respiration: 16, Pulse Oximetry: 95 %        RUL Breath Sounds: Clear, RML Breath Sounds: Clear, RLL Breath Sounds: Clear, ABIMBOLA Breath Sounds: Clear, LLL Breath Sounds: Clear    Fluids    Intake/Output Summary (Last 24 hours) at 18 1446  Last data filed at 18   Gross per 24 hour   Intake              100 ml   Output                0 ml    Net              100 ml       Nutrition  Orders Placed This Encounter   Procedures   • Diet Order Regular     Standing Status:   Standing     Number of Occurrences:   1     Order Specific Question:   Diet:     Answer:   Regular [1]     Physical Exam   Constitutional: She is oriented to person, place, and time. Vital signs are normal. She appears well-developed. She is cooperative. No distress.   Pleasant blind female, in no acute distress    HENT:   Head: Atraumatic.   Nose: Nose normal.   Mouth/Throat: Oropharynx is clear and moist.   Eyes: Lids are normal.   Patient blind    Neck: Neck supple. No tracheal tenderness present.   Cardiovascular: Normal rate, regular rhythm and normal heart sounds.  Exam reveals no gallop.    Pulmonary/Chest: Effort normal and breath sounds normal. No respiratory distress. She has no decreased breath sounds. She has no wheezes.   Abdominal: Soft. Bowel sounds are normal. She exhibits no distension. There is no tenderness.   Musculoskeletal:   Equal strength    Neurological: She is alert and oriented to person, place, and time. She has normal strength. Gait normal.   Skin: Skin is warm and dry. She is not diaphoretic.   Psychiatric: She has a normal mood and affect. Her speech is normal and behavior is normal.   Nursing note and vitals reviewed.      Recent Labs      12/04/18   0422  12/05/18   0126   WBC  8.1  10.6   RBC  4.87  5.01   HEMOGLOBIN  14.3  15.2   HEMATOCRIT  43.3  44.9   MCV  88.9  89.6   MCH  29.4  30.3   MCHC  33.0*  33.9   RDW  45.1  43.9   PLATELETCT  305  298   MPV  10.6  10.7     Recent Labs      12/04/18   0422  12/05/18   0126   SODIUM  140  138   POTASSIUM  3.7  4.0   CHLORIDE  108  105   CO2  20  23   GLUCOSE  100*  113*   BUN  8  13   CREATININE  0.68  0.87   CALCIUM  8.4*  9.5                      Assessment/Plan     Intractable migraine- (present on admission)   Assessment & Plan    CT head negative for acute process.   shunt appears intact  Patient has  been started on scheduled Toradol. Fioricet, Imitrex breakthrough pain  Continue Inderal  IV Zofran, promethazine and Compazine as needed for nausea  DHE, Prednisone- with some response   Neurology consulted for reccomendations     Congenital hydrocephalus (HCC)- (present on admission)   Assessment & Plan    Status post  shunt placement which is intact     Nausea and vomiting- (present on admission)   Assessment & Plan    IV Zofran as needed  IV fluid hydration with normal saline       Quality-Core Measures   Reviewed items::  Labs reviewed and Medications reviewed  Sharp catheter::  No Sharp  DVT prophylaxis pharmacological::  Not indicated at this time, ambulatory  Ulcer Prophylaxis::  Not indicated

## 2018-12-05 NOTE — PROGRESS NOTES
Followed up with charge RN regarding EJ IV for patient. They say they will send a trained staff later.

## 2018-12-05 NOTE — PROGRESS NOTES
Bedside report received 0710. POC discussed with pt; Pt c/o mild HA but has improved; No overnight events; all questions answered at this time.

## 2018-12-05 NOTE — PROGRESS NOTES
Pt a&o x4. Pleasant. Complaints of headache, nausea better. Eating pudding right now and tolerating thus far. Ambulating with one person sba. On ra. Respirations equal and unlabored. No sob.

## 2018-12-05 NOTE — PROGRESS NOTES
Attempted to flush IV for medication admin. Infiltrated, Removed. X 3 RN attempted IV placement with use of US, Unsuccessful. ER charge RN notified of IJ placement. To send staff.

## 2018-12-05 NOTE — CONSULTS
Neurology consult note    Requesting Physician: RADHA Luna    Reason for consultation: Headache    Chief complaint: Migraines    History of present illness: Kuldeep is a pleasant 48-year-old female with past medical history significant for hydrocephalus status post stable  shunt, migraines, depression, blindness secondary to hydrocephalus during childhood was admitted for management of uncontrolled headaches.    She reports a nearly 15-year history of chronic headaches, occurring almost daily and has previously seen neurology for the same complaint.  Her most recent neurology visit was 2 months ago with Keira Dueñas.  Previously she was seen by Dr. Wong last visit date was in 2014.  She notes daily headaches at about 4/10 intensity, and when they become bad they range from 7-9/10.  Currently she has a 7/10 pain.  She describes the pain behind her right eye, radiating down her temporal side and up to her back.  She reports she has a hole in her head.  She has no nausea or vomiting currently but she did have nausea yesterday.  Of note she did receive antiemetics as part of the migraine treatment regimen.    So far she received Toradol, Fioricet, oxycodone, Compazine, Imitrex, prochlorperazine, promethazine, dihydroergotamine injection, Reglan, morphine.  She reports that each of these therapies have caused some cessation of her headache but this is short-lived and eventually returns.    Past medical history:  #1 Hydrocephalus status post  shunt  #2 migraine  #3 blindness  #4 depression  #5 history of C. difficile colitis  #6 history of GI bleed    Past surgical history:  #1 placement of  shunt  #2 cholecystectomy  #3 hysterectomy   #4 appendectomy  #5 exploratory laparotomy    Family history: Maternal grandfather had diabetes mellitus type II, kidney disease and heart disease.  No family history of stroke or migraine.  Father and mother  have hypertension    Social History: Former smoker, occasional alcohol use, no recreational drug use.    Medications [outpatient]:  #1 Esomeprazole 40 mg daily  #2 propanolol  mg daily  #3 famotidine 40 mg every evening  #4 amitriptyline 100 mg every evening  #5 topiramate 100 mg twice daily  #6 ibuprofen 800 mg every 8 hours as needed  #7 Ativan 1 mg every day as needed for sleep  #8 Fioricet every 6 hours as needed    Allergies: No known drug allergies.  Allergy to tape.    Review of systems:  Constitutional: Denies fevers, chills  Eyes: Reports to be blind, denies pain  HEENT: Denies congestion, sore throat  Respiratory: Denies cough, shortness of breath  Cardiovascular: Denies chest pain, palpitations  Abdomen: Denies abdominal pain, reports nausea, denies vomiting  Extremities: Denies swelling, cyanosis  Neuro: Denies focal deficits, reports headaches    Vitals:  Vitals:    12/05/18 1246   BP: 132/77   Pulse: 98   Resp: 16   Temp: 35.8 °C (96.5 °F)   SpO2: 95%         Physical exam:  Constitutional: Appears comfortable, not in distress  Eyes: No papilledema, optic atrophy  HEENT: Normocephalic, atraumatic  Neck: No palpable nodes, No JVD palpation  Respiratory: Cleat to auscultation bilaterally, no wheezes  Cardiovascular: Regular rate and rhythm, no murmurs, rubs or gallops  Abdomen: Soft, non-tender, no organomegaly  Neuro: Alert and oriented x 4, no focal deficits  Extremities: No pedal edema, no cyanosis    Laboratory Data:  Lab Results   Component Value Date/Time    WBC 10.6 12/05/2018 01:26 AM    RBC 5.01 12/05/2018 01:26 AM    HEMOGLOBIN 15.2 12/05/2018 01:26 AM    HEMATOCRIT 44.9 12/05/2018 01:26 AM    MCV 89.6 12/05/2018 01:26 AM    MCH 30.3 12/05/2018 01:26 AM    MCHC 33.9 12/05/2018 01:26 AM    MPV 10.7 12/05/2018 01:26 AM    NEUTSPOLYS 51.30 12/04/2018 04:22 AM    LYMPHOCYTES 37.60 12/04/2018 04:22 AM    MONOCYTES 9.10 12/04/2018 04:22 AM    EOSINOPHILS 1.00 12/04/2018 04:22 AM    EOSINOPHILS  62 08/11/2008 03:35 PM    BASOPHILS 0.90 12/04/2018 04:22 AM    HYPOCHROMIA 1 05/30/2014 06:55 PM    ANISOCYTOSIS 1 05/20/2014 09:30 PM      Lab Results   Component Value Date/Time    SODIUM 138 12/05/2018 01:26 AM    POTASSIUM 4.0 12/05/2018 01:26 AM    CHLORIDE 105 12/05/2018 01:26 AM    CO2 23 12/05/2018 01:26 AM    GLUCOSE 113 (H) 12/05/2018 01:26 AM    BUN 13 12/05/2018 01:26 AM    CREATININE 0.87 12/05/2018 01:26 AM    CREATININE 0.6 08/12/2008 05:45 AM      Lab Results   Component Value Date/Time    PROTHROMBTM 14.6 10/21/2018 06:02 AM    INR 1.13 10/21/2018 06:02 AM      Imaging:  DX-SKULL-LIMITED 3-   Final Result      Unremarkable exam.      DX-SPINE-ANY ONE VIEW   Final Result      Intact cervical portion of  shunt.      DX-CHEST-LIMITED (1 VIEW)   Final Result         No acute cardiac or pulmonary abnormality is identified.      Intact thoracic portion of shunt      [id number]-0 VIEW   Final Result      Stable intact appearing distal portion of apparent ventricular pleural shunt catheter.      CT-HEAD W/O   Final Result      Stable head CT findings without evidence of hydrocephalus or hemorrhage.        Impression: This is a 48 year old female presenting with chronic headaches. She is being seen by Dr. Mcmullen, whom she last saw about 4 years ago. Now more recently she is following Keira Dueñas. She was recently evaluated by neurosurgery about a month and a half ago who readjusted her  shunt. It doesn't appear that this is the cause of her headaches and more likely just an acute manifestation of her chronic headaches. Numerous medications have been attempted but none have been successful in termination.    Assessment:  1) Acute worsening of chronic headache.  2) History of migraine  3) Hydrocephalus s/p  shunt  4) Depression    Plan:  1) We will put her on ergotamine scheduled doses for 24 hrs.  2) If this doesn't work, then gabapentin is the next step.  3) She will need close outpatient followup  with neurology.      Thank you for this interesting consult. We will continue to follow along with you.

## 2018-12-06 PROCEDURE — 96376 TX/PRO/DX INJ SAME DRUG ADON: CPT

## 2018-12-06 PROCEDURE — 700102 HCHG RX REV CODE 250 W/ 637 OVERRIDE(OP): Performed by: NURSE PRACTITIONER

## 2018-12-06 PROCEDURE — 700111 HCHG RX REV CODE 636 W/ 250 OVERRIDE (IP): Performed by: PSYCHIATRY & NEUROLOGY

## 2018-12-06 PROCEDURE — 99225 PR SUBSEQUENT OBSERVATION CARE,LEVEL II: CPT | Performed by: HOSPITALIST

## 2018-12-06 PROCEDURE — 99213 OFFICE O/P EST LOW 20 MIN: CPT | Mod: GC | Performed by: PSYCHIATRY & NEUROLOGY

## 2018-12-06 PROCEDURE — A9270 NON-COVERED ITEM OR SERVICE: HCPCS | Performed by: PSYCHIATRY & NEUROLOGY

## 2018-12-06 PROCEDURE — A9270 NON-COVERED ITEM OR SERVICE: HCPCS | Performed by: INTERNAL MEDICINE

## 2018-12-06 PROCEDURE — A9270 NON-COVERED ITEM OR SERVICE: HCPCS | Performed by: NURSE PRACTITIONER

## 2018-12-06 PROCEDURE — 700102 HCHG RX REV CODE 250 W/ 637 OVERRIDE(OP): Performed by: INTERNAL MEDICINE

## 2018-12-06 PROCEDURE — 700102 HCHG RX REV CODE 250 W/ 637 OVERRIDE(OP): Performed by: PSYCHIATRY & NEUROLOGY

## 2018-12-06 PROCEDURE — G0378 HOSPITAL OBSERVATION PER HR: HCPCS

## 2018-12-06 RX ORDER — ZOLPIDEM TARTRATE 5 MG/1
5 TABLET ORAL ONCE
Status: COMPLETED | OUTPATIENT
Start: 2018-12-06 | End: 2018-12-06

## 2018-12-06 RX ORDER — GABAPENTIN 400 MG/1
400 CAPSULE ORAL EVERY EVENING
Status: DISCONTINUED | OUTPATIENT
Start: 2018-12-06 | End: 2018-12-07 | Stop reason: HOSPADM

## 2018-12-06 RX ORDER — GABAPENTIN 300 MG/1
300 CAPSULE ORAL EVERY EVENING
Status: DISCONTINUED | OUTPATIENT
Start: 2018-12-06 | End: 2018-12-06

## 2018-12-06 RX ORDER — LORAZEPAM 1 MG/1
0.5 TABLET ORAL EVERY 12 HOURS PRN
Status: DISCONTINUED | OUTPATIENT
Start: 2018-12-06 | End: 2018-12-07 | Stop reason: HOSPADM

## 2018-12-06 RX ADMIN — INDOMETHACIN 75 MG: 75 CAPSULE, EXTENDED RELEASE ORAL at 20:42

## 2018-12-06 RX ADMIN — GABAPENTIN 400 MG: 400 CAPSULE ORAL at 18:25

## 2018-12-06 RX ADMIN — PROPRANOLOL HYDROCHLORIDE 10 MG: 10 TABLET ORAL at 18:25

## 2018-12-06 RX ADMIN — LORAZEPAM 0.5 MG: 1 TABLET ORAL at 10:13

## 2018-12-06 RX ADMIN — DIHYDROERGOTAMINE MESYLATE 1 MG: 1 INJECTION, SOLUTION INTRAMUSCULAR; INTRAVENOUS; SUBCUTANEOUS at 14:13

## 2018-12-06 RX ADMIN — LORAZEPAM 0.5 MG: 1 TABLET ORAL at 22:16

## 2018-12-06 RX ADMIN — DIPHENHYDRAMINE HCL 12.5 MG: 25 TABLET ORAL at 22:04

## 2018-12-06 RX ADMIN — PROPRANOLOL HYDROCHLORIDE 10 MG: 10 TABLET ORAL at 05:25

## 2018-12-06 RX ADMIN — ZOLPIDEM TARTRATE 5 MG: 5 TABLET ORAL at 00:18

## 2018-12-06 RX ADMIN — SUMATRIPTAN SUCCINATE 25 MG: 25 TABLET ORAL at 23:53

## 2018-12-06 RX ADMIN — DIPHENHYDRAMINE HCL 12.5 MG: 25 TABLET ORAL at 13:57

## 2018-12-06 RX ADMIN — DIHYDROERGOTAMINE MESYLATE 1 MG: 1 INJECTION, SOLUTION INTRAMUSCULAR; INTRAVENOUS; SUBCUTANEOUS at 05:42

## 2018-12-06 RX ADMIN — DIPHENHYDRAMINE HCL 12.5 MG: 25 TABLET ORAL at 05:25

## 2018-12-06 RX ADMIN — DIHYDROERGOTAMINE MESYLATE 1 MG: 1 INJECTION, SOLUTION INTRAMUSCULAR; INTRAVENOUS; SUBCUTANEOUS at 22:18

## 2018-12-06 RX ADMIN — STANDARDIZED SENNA CONCENTRATE AND DOCUSATE SODIUM 2 TABLET: 8.6; 5 TABLET, FILM COATED ORAL at 18:25

## 2018-12-06 RX ADMIN — INDOMETHACIN 75 MG: 75 CAPSULE, EXTENDED RELEASE ORAL at 09:58

## 2018-12-06 ASSESSMENT — ENCOUNTER SYMPTOMS
NAUSEA: 0
FEVER: 0
SHORTNESS OF BREATH: 0
COUGH: 0
DIZZINESS: 0
HEADACHES: 1
DEPRESSION: 0
PALPITATIONS: 0
SPEECH CHANGE: 0
ABDOMINAL PAIN: 0
FOCAL WEAKNESS: 0
SORE THROAT: 0
MYALGIAS: 1
VOMITING: 0
MEMORY LOSS: 0
CHILLS: 0
EYE PAIN: 0

## 2018-12-06 ASSESSMENT — PAIN SCALES - GENERAL
PAINLEVEL_OUTOF10: 7
PAINLEVEL_OUTOF10: 4
PAINLEVEL_OUTOF10: 6

## 2018-12-06 NOTE — PROGRESS NOTES
[facility] Hospitalist Progress Note    Date of Service: 2018    Chief Complaint  48 y.o. female admitted 2018 with intractable migraine. Patient has history of migraines as well as  shunt for hydrocephalous. Several admissions for similar.     Interval Problem Update  - Patient continues to have intractable migraine despite several different medication regimens including Toradol, prednisone, DHE, Fioricet, and Imitrex. CT negative for acute findings,  shunt appears to be functioning. Neurology consulted for recommendations on migraine management.    - Patient reports some improvement in her migraine pain, states it is now a 4/10, in same location. Will continue with current regimen as suggested by Neurology until tomorrow then plan for discharge with close Neurology follow up and adjustments in home meds.     Consultants/Specialty  Neurology     Disposition  Likely home tomorrow with close follow up with Neurology         Review of Systems   Constitutional: Positive for malaise/fatigue. Negative for chills and fever.   HENT: Negative for congestion and sore throat.    Eyes:        Patient blind   Respiratory: Negative for cough and shortness of breath.    Cardiovascular: Negative for chest pain and leg swelling.   Gastrointestinal: Negative for abdominal pain, nausea and vomiting.   Genitourinary: Negative for dysuria, frequency and urgency.   Musculoskeletal: Positive for myalgias.   Skin: Negative for rash.   Neurological: Positive for headaches (improved ). Negative for speech change and focal weakness.   Psychiatric/Behavioral: Negative for depression and memory loss.      Physical Exam  Laboratory/Imaging   Hemodynamics  Temp (24hrs), Av.1 °C (97 °F), Min:35.8 °C (96.5 °F), Max:36.4 °C (97.5 °F)   Temperature: 36.4 °C (97.5 °F)  Pulse  Av.1  Min: 73  Max: 135    Blood Pressure: 100/55      Respiratory      Respiration: 16, Pulse Oximetry: 96 %        RUL Breath Sounds: Clear, RML  Breath Sounds: Clear, RLL Breath Sounds: Clear, ABIMBOLA Breath Sounds: Clear, LLL Breath Sounds: Clear    Fluids  No intake or output data in the 24 hours ending 12/06/18 1312    Nutrition  Orders Placed This Encounter   Procedures   • Diet Order Regular     Standing Status:   Standing     Number of Occurrences:   1     Order Specific Question:   Diet:     Answer:   Regular [1]     Physical Exam   Constitutional: She is oriented to person, place, and time. Vital signs are normal. She appears well-developed. She is cooperative. No distress.   Pleasant blind female, in no acute distress, resting in bed    HENT:   Head: Atraumatic.   Nose: Nose normal.   Mouth/Throat: Oropharynx is clear and moist.   Eyes: Lids are normal.   Patient blind    Neck: Neck supple. No tracheal tenderness present.   Cardiovascular: Normal rate, regular rhythm and normal heart sounds.  Exam reveals no gallop.    Pulmonary/Chest: Effort normal and breath sounds normal. No respiratory distress. She has no decreased breath sounds.   Abdominal: Soft. Bowel sounds are normal. She exhibits no distension. There is no tenderness.   Musculoskeletal:   Equal strength    Neurological: She is alert and oriented to person, place, and time. She has normal strength.   Skin: Skin is warm and dry. She is not diaphoretic.   Psychiatric: She has a normal mood and affect. Her speech is normal and behavior is normal.   Nursing note and vitals reviewed.      Recent Labs      12/04/18   0422  12/05/18   0126   WBC  8.1  10.6   RBC  4.87  5.01   HEMOGLOBIN  14.3  15.2   HEMATOCRIT  43.3  44.9   MCV  88.9  89.6   MCH  29.4  30.3   MCHC  33.0*  33.9   RDW  45.1  43.9   PLATELETCT  305  298   MPV  10.6  10.7     Recent Labs      12/04/18   0422  12/05/18   0126   SODIUM  140  138   POTASSIUM  3.7  4.0   CHLORIDE  108  105   CO2  20  23   GLUCOSE  100*  113*   BUN  8  13   CREATININE  0.68  0.87   CALCIUM  8.4*  9.5                      Assessment/Plan     Intractable  migraine- (present on admission)   Assessment & Plan    CT head negative for acute process.   shunt appears intact  Patient has been started on scheduled Toradol. Fioricet, Imitrex breakthrough pain  Continue Inderal  IV Zofran, promethazine and Compazine as needed for nausea  DHE, Prednisone- with some response   Neurology recommended DHE q 8 hour for 24 hours, this has shown improvement in pain     Congenital hydrocephalus (HCC)- (present on admission)   Assessment & Plan    Status post  shunt placement which is intact     Nausea and vomiting- (present on admission)   Assessment & Plan    Resolved   IV fluid hydration with normal saline  Antiemetics as needed        Quality-Core Measures   Reviewed items::  Labs reviewed and Medications reviewed  Sharp catheter::  No Sharp  DVT prophylaxis pharmacological::  Not indicated at this time, ambulatory  Ulcer Prophylaxis::  Not indicated

## 2018-12-06 NOTE — PROGRESS NOTES
Pt resting in bed.  Pain 6/10.  Gave prn medication.  Call light in reach.  Assessment complete.  Bed in low position.  No further needs at this time.  Will monitor.

## 2018-12-06 NOTE — PROGRESS NOTES
Internal Medicine Interval Note  Note Author: Fran Murphy M.D.     Name Rasheeda Manzano     1970   Age/Sex 48 y.o. female   MRN [id number]   Code Status Full code       Reason for interval visit  (Principal Problem)   Headache      Interval Problem Daily Status Update  (24 hours, problem oriented, brief subjective history, new lab/imaging data pertinent to that problem)   Patient's headache improved. Currently 4/10 but still bothering her.  She is on the dihydroergotamine protocol.     Review of Systems   Constitutional: Negative for chills and fever.   HENT: Negative for congestion and sore throat.    Eyes: Negative for pain.   Respiratory: Negative for cough and shortness of breath.    Cardiovascular: Negative for chest pain and palpitations.   Gastrointestinal: Negative for abdominal pain, nausea and vomiting.   Neurological: Positive for headaches. Negative for dizziness.         PCP: Tabitha Bautista M.D.      Quality Measures  Quality-Core Measures        Physical Exam       Vitals:    18 0010 18 0508 18 0855   BP: 121/88 134/82 125/60 122/59   Pulse: 98 100 91 86   Resp:  16 16 18   Temp:  36.2 °C (97.2 °F) 36.1 °C (97 °F) 35.8 °C (96.5 °F)   TempSrc:  Temporal Temporal Temporal   SpO2:  96% 94% 96%   Weight:       Height:         Body mass index is 25.51 kg/m².    Oxygen Therapy:  Pulse Oximetry: 96 %, O2 (LPM): 0, O2 Delivery: None (Room Air)    Physical Exam   Constitutional: She is oriented to person, place, and time and well-developed, well-nourished, and in no distress.   HENT:   Head: Normocephalic and atraumatic.   Neck: Normal range of motion. Neck supple.   Cardiovascular: Normal rate, regular rhythm and normal heart sounds.    Pulmonary/Chest: Effort normal.   Abdominal: Soft. Bowel sounds are normal. There is no tenderness.   Musculoskeletal: She exhibits no edema.   Neurological: She is alert and oriented to person, place, and time. She displays  normal reflexes.   Skin: Skin is warm and dry.   Psychiatric: Affect and judgment normal.             Assessment/Plan     Impression: This is a 48 year old female with acute exacerbation of chronic headaches. The headaches are improving but not quite controlled yet.    Assessment:  1) Acute worsening of chronic headache.  2) History of migraine  3) Hydrocephalus s/p  shunt  4) Depression     Plan:  1) Continue ergotamine protocol for 24 more hours.  2) We will start gabapentin as her headaches still remain uncontrolled.  3) Follow up with outpatient neurology.    We will sign off. Please do not hesitate to call if any questions.

## 2018-12-07 VITALS
TEMPERATURE: 97.5 F | BODY MASS INDEX: 25.37 KG/M2 | RESPIRATION RATE: 16 BRPM | HEART RATE: 90 BPM | OXYGEN SATURATION: 97 % | SYSTOLIC BLOOD PRESSURE: 101 MMHG | WEIGHT: 148.59 LBS | HEIGHT: 64 IN | DIASTOLIC BLOOD PRESSURE: 55 MMHG

## 2018-12-07 PROBLEM — R11.2 NAUSEA AND VOMITING: Status: RESOLVED | Noted: 2018-12-04 | Resolved: 2018-12-07

## 2018-12-07 PROCEDURE — 700102 HCHG RX REV CODE 250 W/ 637 OVERRIDE(OP): Performed by: PSYCHIATRY & NEUROLOGY

## 2018-12-07 PROCEDURE — 700102 HCHG RX REV CODE 250 W/ 637 OVERRIDE(OP): Performed by: NURSE PRACTITIONER

## 2018-12-07 PROCEDURE — 700102 HCHG RX REV CODE 250 W/ 637 OVERRIDE(OP): Performed by: INTERNAL MEDICINE

## 2018-12-07 PROCEDURE — A9270 NON-COVERED ITEM OR SERVICE: HCPCS | Performed by: PSYCHIATRY & NEUROLOGY

## 2018-12-07 PROCEDURE — 99217 PR OBSERVATION CARE DISCHARGE: CPT | Performed by: HOSPITALIST

## 2018-12-07 PROCEDURE — G0378 HOSPITAL OBSERVATION PER HR: HCPCS

## 2018-12-07 PROCEDURE — A9270 NON-COVERED ITEM OR SERVICE: HCPCS | Performed by: INTERNAL MEDICINE

## 2018-12-07 PROCEDURE — A9270 NON-COVERED ITEM OR SERVICE: HCPCS | Performed by: NURSE PRACTITIONER

## 2018-12-07 PROCEDURE — 700111 HCHG RX REV CODE 636 W/ 250 OVERRIDE (IP): Performed by: PSYCHIATRY & NEUROLOGY

## 2018-12-07 PROCEDURE — 96376 TX/PRO/DX INJ SAME DRUG ADON: CPT

## 2018-12-07 RX ORDER — ZOLPIDEM TARTRATE 5 MG/1
5 TABLET ORAL NIGHTLY PRN
Status: DISCONTINUED | OUTPATIENT
Start: 2018-12-07 | End: 2018-12-07 | Stop reason: HOSPADM

## 2018-12-07 RX ORDER — INDOMETHACIN 75 MG/1
75 CAPSULE, EXTENDED RELEASE ORAL
Qty: 60 CAP | Refills: 1 | Status: ON HOLD | OUTPATIENT
Start: 2018-12-07 | End: 2018-12-31 | Stop reason: CLARIF

## 2018-12-07 RX ORDER — ACETAMINOPHEN 325 MG/1
650 TABLET ORAL EVERY 6 HOURS PRN
Qty: 30 TAB | Refills: 0 | COMMUNITY
Start: 2018-12-07 | End: 2019-06-06

## 2018-12-07 RX ORDER — GABAPENTIN 400 MG/1
CAPSULE ORAL
Qty: 60 CAP | Refills: 2 | Status: SHIPPED | OUTPATIENT
Start: 2018-12-07 | End: 2019-06-06 | Stop reason: CLARIF

## 2018-12-07 RX ADMIN — LORAZEPAM 0.5 MG: 1 TABLET ORAL at 10:52

## 2018-12-07 RX ADMIN — DIHYDROERGOTAMINE MESYLATE 1 MG: 1 INJECTION, SOLUTION INTRAMUSCULAR; INTRAVENOUS; SUBCUTANEOUS at 06:03

## 2018-12-07 RX ADMIN — INDOMETHACIN 75 MG: 75 CAPSULE, EXTENDED RELEASE ORAL at 10:52

## 2018-12-07 RX ADMIN — ZOLPIDEM TARTRATE 5 MG: 5 TABLET ORAL at 01:01

## 2018-12-07 RX ADMIN — DIPHENHYDRAMINE HCL 12.5 MG: 25 TABLET ORAL at 05:48

## 2018-12-07 ASSESSMENT — PATIENT HEALTH QUESTIONNAIRE - PHQ9
2. FEELING DOWN, DEPRESSED, IRRITABLE, OR HOPELESS: SEVERAL DAYS
SUM OF ALL RESPONSES TO PHQ QUESTIONS 1-9: 13
7. TROUBLE CONCENTRATING ON THINGS, SUCH AS READING THE NEWSPAPER OR WATCHING TELEVISION: NEARLY EVERY DAY
6. FEELING BAD ABOUT YOURSELF - OR THAT YOU ARE A FAILURE OR HAVE LET YOURSELF OR YOUR FAMILY DOWN: NOT AL ALL
3. TROUBLE FALLING OR STAYING ASLEEP OR SLEEPING TOO MUCH: NEARLY EVERY DAY
4. FEELING TIRED OR HAVING LITTLE ENERGY: NEARLY EVERY DAY
9. THOUGHTS THAT YOU WOULD BE BETTER OFF DEAD, OR OF HURTING YOURSELF: NOT AT ALL
1. LITTLE INTEREST OR PLEASURE IN DOING THINGS: NEARLY EVERY DAY
SUM OF ALL RESPONSES TO PHQ9 QUESTIONS 1 AND 2: 4
8. MOVING OR SPEAKING SO SLOWLY THAT OTHER PEOPLE COULD HAVE NOTICED. OR THE OPPOSITE, BEING SO FIGETY OR RESTLESS THAT YOU HAVE BEEN MOVING AROUND A LOT MORE THAN USUAL: NOT AT ALL
5. POOR APPETITE OR OVEREATING: NOT AT ALL

## 2018-12-07 ASSESSMENT — PAIN SCALES - GENERAL
PAINLEVEL_OUTOF10: 6
PAINLEVEL_OUTOF10: 7

## 2018-12-07 ASSESSMENT — LIFESTYLE VARIABLES: REASON UNABLE TO ASSESS: N

## 2018-12-07 NOTE — CARE PLAN
Problem: Communication  Goal: The ability to communicate needs accurately and effectively will improve  Outcome: PROGRESSING AS EXPECTED      Problem: Pain Management  Goal: Pain level will decrease to patient's comfort goal  Outcome: PROGRESSING SLOWER THAN EXPECTED

## 2018-12-07 NOTE — PROGRESS NOTES
Pt's SI assessment shows high risk,pt refuses any suicidal plan or ideation,pt states depression going on for years,refused any need now as pt has a ,she said she is going through counseling.

## 2018-12-07 NOTE — DISCHARGE INSTRUCTIONS
Discharge Instructions    Discharged to home by car with relative. Discharged via wheelchair, hospital escort: Yes.  Special equipment needed: Not Applicable    Be sure to schedule a follow-up appointment with your primary care doctor or any specialists as instructed.     Discharge Plan:   Diet Plan: Discussed  Activity Level: Discussed  Confirmed Follow up Appointment: Patient to Call and Schedule Appointment  Confirmed Symptoms Management: Discussed  Medication Reconciliation Updated: Yes  Influenza Vaccine Indication: Not indicated: Previously immunized this influenza season and > 8 years of age    I understand that a diet low in cholesterol, fat, and sodium is recommended for good health. Unless I have been given specific instructions below for another diet, I accept this instruction as my diet prescription.   Other diet:     Special Instructions: None    · Is patient discharged on Warfarin / Coumadin?   No     Followup: With PCP and Neurology   Instructions:   -Please take medications as prescribed   -Given instructions to return to the ER if any new or worsening symptoms, worsening condition, or failure to improve   -Call PCP for followup   -No smoking, no alcohol, no caffeine   -Encourage risk factor reduction with tobacco and alcohol abstinence, diet changes, weight loss, and exercise    Depression / Suicide Risk    As you are discharged from this [facility] facility, it is important to learn how to keep safe from harming yourself.    Recognize the warning signs:  · Abrupt changes in personality, positive or negative- including increase in energy   · Giving away possessions  · Change in eating patterns- significant weight changes-  positive or negative  · Change in sleeping patterns- unable to sleep or sleeping all the time   · Unwillingness or inability to communicate  · Depression  · Unusual sadness, discouragement and loneliness  · Talk of wanting to die  · Neglect of personal  appearance   · Rebelliousness- reckless behavior  · Withdrawal from people/activities they love  · Confusion- inability to concentrate     If you or a loved one observes any of these behaviors or has concerns about self-harm, here's what you can do:  · Talk about it- your feelings and reasons for harming yourself  · Remove any means that you might use to hurt yourself (examples: pills, rope, extension cords, firearm)  · Get professional help from the community (Mental Health, Substance Abuse, psychological counseling)  · Do not be alone:Call your Safe Contact- someone whom you trust who will be there for you.  · Call your local CRISIS HOTLINE 458-0851 or 071-959-6614  · Call your local Children's Mobile Crisis Response Team [place] (746) 840-5363 or www.[employer]  · Call the toll free National Suicide Prevention Hotlines   · National Suicide Prevention Lifeline 189-456-KTHM (9463)  · National Hope Line Network 800-SUICIDE (215-3697)

## 2018-12-07 NOTE — PROGRESS NOTES
Assumed care of patient at 1900. Bedside report received from ELIZABETH Bob. Initial assessment completed. Patient is A&Ox4 and able to make needs known; neuro intact. Patient c/o HA 7/10; will wait for medication. Ambulated steadily to BR with hand held assist. POC discussed with pt: pain control,  consult. No further questions at this time. Fall precautions in place. Bed locked and in the lowest position, call light instructions provided, call light within reach.

## 2018-12-07 NOTE — PROGRESS NOTES
Pt in bed awake,a&ox4,still with HA,pt pleasant cooperative,up with assist,poc explained,pt not in distress.

## 2018-12-07 NOTE — DISCHARGE SUMMARY
Hospital Medicine Discharge Note     Patient ID:  Rasheeda Manzano  [id number]  48 y.o.female  1970    Admit Date:  12/4/2018       Discharge Date:   12/7/2018    Primary Care Provider: Tabitha Bautista M.D.    Admitting Physician: Marbin Sargent M.D.  Discharging Physician: Hector Haney M.D.    Chief Complaint: Intractable migraine     Discharge Diagnoses:   Active Problems:    Intractable migraine- Improved with 24 hour DHE, patient to follow up with Neurology     Congenital hydrocephalus (HCC)-  shunt appears to be stable and functional on imaging    Blind- Many years    Nausea and vomiting- Resolved       Chronic Medical Problems:  Past Medical History:   Diagnosis Date   • Blind    • C. difficile diarrhea 2013   • Chronic daily headache    • Depression    • Fall    • H/O total hysterectomy    • Hydrocephalus    • Hydrocephalus     shunt drains into pleural place of L lung   • Jaundice     at birth   • Legally blind    • Migraine without aura, without mention of intractable migraine without mention of status migrainosus    • Other specified symptom associated with female genital organs     excessive bleeding   • Pain     gallbladder related   • Psychiatric problem     depression       Code Status: Full Code    Hospital Summary:       Please refer to H&P by Marbin Sargent M.D. on 12/4/2018 for full details.      In brief, Rasheeda Manzano is a 48 y.o. female who was admitted 12/4/2018 for intractable headache.  Patient has significant history including previous C. difficile, depression, fall, history of total hysterectomy, hydrocephalus with  shunt, legally blind, chronic migraines.  Patient presented to the emergency room with headache that was uncontrolled at home that was associated with nausea and vomiting.  Patient attempted to take her medications however it was unable to be controlled presented to the emergency room for further assistance.  Patient was admitted to CDU for further workup  and monitoring.  Patient had CT head with contrast that revealed stable evidence of hydrocephalus with shunt placement.  Skull imaging shows no acute findings.  Chest x-ray shows no acute cardiac or pulmonary abnormality.  CBC on admission is essentially benign and noncontributory.  BMP on admission is essentially benign with the exception of slightly elevated glucose.  Attempted to control patient's headache with breakthrough medications such as Fioricet, Imitrex, prednisone, Toradol, DHE.  Unsuccessful therefore neurology was consulted for further recommendations.  Neurology evaluated patient and recommended 24-hour DHE dosing.  This did alleviate patient's headache for short time and diminished it significantly.  Adjustments made to patient's home medications.  Patient will follow up with neurology outpatient closely.  Patient states she feels she is able to discharge at this time.  Patient does still endorse slight headache to both sides of her head but is significantly improved from admit.  Patient verbalized understanding of change of medications, and need to follow-up with neurology as outpatient.  Patient is able to ambulate independently with the assist of one other person as she is blind.  States no chest pain, shortness of breath, dizziness, nausea or vomiting.  Patient's vital signs are stable.  Patient's lungs are clear bilaterally on auscultation.  Patient states she is ready for discharge at this time.     Therefore, she is discharged in good and stable condition with close outpatient follow-up.    Consultants:      Neurology     Studies:    Imaging/ Testing:      DX-SKULL-LIMITED 3-   Final Result      Unremarkable exam.      DX-SPINE-ANY ONE VIEW   Final Result      Intact cervical portion of  shunt.      DX-CHEST-LIMITED (1 VIEW)   Final Result         No acute cardiac or pulmonary abnormality is identified.      Intact thoracic portion of shunt      [id number] VIEW   Final Result      Stable  intact appearing distal portion of apparent ventricular pleural shunt catheter.      CT-HEAD W/O   Final Result      Stable head CT findings without evidence of hydrocephalus or hemorrhage.            Laboratory:   Recent Labs      12/05/18   0126   WBC  10.6   RBC  5.01   HEMOGLOBIN  15.2   HEMATOCRIT  44.9   MCV  89.6   MCH  30.3   MCHC  33.9   RDW  43.9   PLATELETCT  298   MPV  10.7       Recent Labs      12/05/18   0126   SODIUM  138   POTASSIUM  4.0   CHLORIDE  105   CO2  23   GLUCOSE  113*   BUN  13   CREATININE  0.87   CALCIUM  9.5       Recent Labs      12/05/18   0126   GLUCOSE  113*          Procedures/Surgeries:        None     Disposition:    Discharge home    Condition:  Stable    Instructions:   Activity: As tolerated.  Diet: Diabetic   Followup: With PCP and Neurology   Instructions:  -Please take medications as prescribed   -Given instructions to return to the ER if any new or worsening symptoms, worsening condition, or failure to improve  -Call PCP for followup  -No smoking, no alcohol, no caffeine  -Encourage risk factor reduction with tobacco and alcohol abstinence, diet changes, weight loss, and exercise.     Follow-Up  Tabitha Bautista M.D.  [address] [place] [id number]  [phone]      As needed    Tabitha Bautista M.D.  [address] [place] [id number]  [phone]      If symptoms worsen,please come to emergency.    Future Appointments  Date Time Provider Department Center   5/29/2019 2:00 PM Aldo Mcmullen M.D. [facility] None       Discharge Medications:             Medication List      START taking these medications      Instructions   acetaminophen 325 MG Tabs  Commonly known as:  TYLENOL   Take 2 Tabs by mouth every 6 hours as needed (Mild Pain; (Pain scale 1-3); Temp greater than 100.5 F).  Dose:  650 mg     gabapentin 400 MG Caps  Commonly known as:  NEURONTIN   Take 400mg PO at 1900, then take 400mg PO before bed     indomethacin SR 75 MG Cpcr  Commonly known as:  INDOCIN SR    Take 1 Cap by mouth 2 times daily with meals as needed (if experiencing migraine).  Dose:  75 mg        CONTINUE taking these medications      Instructions   amitriptyline 100 MG Tabs  Commonly known as:  ELAVIL   Take 100 mg by mouth every evening.  Dose:  100 mg     famotidine 40 MG Tabs  Commonly known as:  PEPCID   Take 40 mg by mouth every evening.  Dose:  40 mg     LORazepam 1 MG Tabs  Commonly known as:  ATIVAN   Take 1 mg by mouth 1 time daily as needed (sleep).  Dose:  1 mg     NEXIUM 40 MG delayed-release capsule  Generic drug:  esomeprazole   Take 40 mg by mouth every morning before breakfast.  Dose:  40 mg     propranolol  MG Cp24  Commonly known as:  INDERAL LA   Take 120 mg by mouth every day.  Dose:  120 mg     topiramate 100 MG Tabs  Commonly known as:  TOPAMAX   Take 100 mg by mouth 2 times a day.  Dose:  100 mg        STOP taking these medications    acetaminophen/caffeine/butalbital 325-40-50 mg -40 MG Tabs  Commonly known as:  FIORICET     ibuprofen 200 MG Tabs  Commonly known as:  MOTRIN             Please CC the above physicians    Alee Tanner, ASachinPSachinRSachinN.  12/7/2018  9:11 AM

## 2018-12-07 NOTE — PROGRESS NOTES
Pt has had 3 bowel movements tonight. Per tech, pt has been incontinent of stool and unable to make it to toilet 2 times.

## 2018-12-07 NOTE — PROGRESS NOTES
Pt c/o abd cramping. Ambulated to BR with tech. Tech reported small amount of blood in toilet and when pt wiped. Pt has hx of total hysterectomy. Visual assessment indicated possible open skin tag near rectum.

## 2018-12-13 ENCOUNTER — HOSPITAL ENCOUNTER (EMERGENCY)
Facility: MEDICAL CENTER | Age: 48
End: 2018-12-13
Attending: EMERGENCY MEDICINE
Payer: MEDICAID

## 2018-12-13 VITALS
HEART RATE: 91 BPM | WEIGHT: 147.05 LBS | TEMPERATURE: 97 F | OXYGEN SATURATION: 97 % | RESPIRATION RATE: 18 BRPM | DIASTOLIC BLOOD PRESSURE: 89 MMHG | BODY MASS INDEX: 25.1 KG/M2 | SYSTOLIC BLOOD PRESSURE: 139 MMHG | HEIGHT: 64 IN

## 2018-12-13 DIAGNOSIS — G44.59 OTHER COMPLICATED HEADACHE SYNDROME: ICD-10-CM

## 2018-12-13 PROCEDURE — 96374 THER/PROPH/DIAG INJ IV PUSH: CPT

## 2018-12-13 PROCEDURE — 96372 THER/PROPH/DIAG INJ SC/IM: CPT | Mod: XU

## 2018-12-13 PROCEDURE — 700111 HCHG RX REV CODE 636 W/ 250 OVERRIDE (IP): Performed by: EMERGENCY MEDICINE

## 2018-12-13 PROCEDURE — 99284 EMERGENCY DEPT VISIT MOD MDM: CPT

## 2018-12-13 RX ORDER — HYDROMORPHONE HYDROCHLORIDE 1 MG/ML
1 INJECTION, SOLUTION INTRAMUSCULAR; INTRAVENOUS; SUBCUTANEOUS ONCE
Status: COMPLETED | OUTPATIENT
Start: 2018-12-13 | End: 2018-12-13

## 2018-12-13 RX ADMIN — HYDROMORPHONE HYDROCHLORIDE 1 MG: 1 INJECTION, SOLUTION INTRAMUSCULAR; INTRAVENOUS; SUBCUTANEOUS at 02:02

## 2018-12-13 RX ADMIN — HYDROMORPHONE HYDROCHLORIDE 1 MG: 1 INJECTION, SOLUTION INTRAMUSCULAR; INTRAVENOUS; SUBCUTANEOUS at 01:03

## 2018-12-13 ASSESSMENT — PAIN SCALES - GENERAL: PAINLEVEL_OUTOF10: 9

## 2018-12-13 NOTE — ED PROVIDER NOTES
"ER Provider Note     Scribed for Zheng Zamarripa M.D. by Nadeem Prieto. 12/13/2018, 12:44 AM.    Primary Care Provider: Tabitha Bautista M.D.  Means of Arrival: Walk in   History obtained from: Patient  History limited by: None     CHIEF COMPLAINT  Chief Complaint   Patient presents with   • Headache       HPI  Rasheeda Manzano is a 48 y.o. female with a history of chronic migraines and blindness who presents to the Emergency Department for evaluation of a diffuse, intractable, headache onset 4 days ago that has continued to persist. The patient describes the headache as \"pressure\" and \"throbbing\" in quality. She states she was previously admitted to the hospital last week on 12/04 for a similar headache, but was discharged home on 12/07 after that headache had decreased in severity. The patient was given prescriptions for gabapentin and indomethacin upon discharge which helped eventually resolve her initial headache, but states her current headache has not been alleviated by the medications. She additionally endorses mild cold symptoms of cough and rhinorrhea onset after discharge from the hospital last week. The patient is blind, but reports this is secondary to her history of hydrocephalus with  shunt in place. She denies chest pain or shortness of breath. No alleviating or exacerbating factors are identified at this time.     REVIEW OF SYSTEMS  See HPI for further details.     PAST MEDICAL HISTORY   has a past medical history of Blind; C. difficile diarrhea (2013); Chronic daily headache; Depression; Fall; H/O total hysterectomy; Hydrocephalus; Hydrocephalus; Jaundice; Legally blind; Migraine without aura, without mention of intractable migraine without mention of status migrainosus; Other specified symptom associated with female genital organs; Pain; and Psychiatric problem.    SURGICAL HISTORY   has a past surgical history that includes laparoscopy (8/8/08); lysis adhesions general (12/10/2013); " "cystoscopy (12/10/2013); exploratory laparotomy (12/10/2013); appendectomy (12/10/2013); abdominal hysterectomy total (12/10/2013); aakash by laparoscopy (N/A, 8/13/2015); cholecystectomy (N/A, 8/13/2015); other; and gastroscopy-endo (N/A, 8/24/2017).    SOCIAL HISTORY  Social History   Substance Use Topics   • Smoking status: Former Smoker     Packs/day: 1.00     Years: 10.00     Types: Cigars     Start date: 5/1/2004     Quit date: 8/9/2017   • Smokeless tobacco: Never Used      Comment:  CIGAR/day    • Alcohol use Yes      Comment: socially      History   Drug Use No       FAMILY HISTORY  Family History   Problem Relation Age of Onset   • Hypertension Mother    • Hypertension Father    • Non-contributory Neg Hx         Migraine       CURRENT MEDICATIONS  Home Medications    **Home medications have not yet been reviewed for this encounter**         ALLERGIES  Allergies   Allergen Reactions   • Tape Rash     Medical tape. Per patient, paper tape ok.       PHYSICAL EXAM  VITAL SIGNS: /102   Pulse (!) 125   Temp 36.1 °C (97 °F) (Temporal)   Resp 17   Ht 1.626 m (5' 4\")   Wt 66.7 kg (147 lb 0.8 oz)   LMP 11/27/2013   SpO2 97%   BMI 25.24 kg/m²      Constitutional: Alert in no apparent distress.  HENT: No signs of trauma, Bilateral external ears normal, Nose normal.   Eyes: Conjunctiva normal. See neuro exam.   Neck: Normal range of motion, No tenderness, Supple, No stridor.   Lymphatic: No lymphadenopathy noted.   Cardiovascular: Regular rate and rhythm, no murmurs.   Thorax & Lungs: Normal breath sounds, No respiratory distress, No wheezing, No chest tenderness.   Abdomen: Bowel sounds normal, Soft, No tenderness, No masses, No pulsatile masses. No peritoneal signs.  Skin: Warm, Dry, No erythema, No rash.   Back: No bony tenderness, No CVA tenderness.   Extremities: Intact distal pulses, No edema, No tenderness, No cyanosis.  Musculoskeletal: Good range of motion in all major joints. No tenderness to " palpation or major deformities noted.   Neurologic: Alert , Follows all commands, Moving all extremities. Inability to see and inability to track visually, right eye extra occular movements in tact, left eye deviated to left at baseline, otherwise normal cranial nerves. Normal motor function, Normal sensory function, No focal deficits noted.   Psychiatric: Affect normal, Judgment normal, Mood normal.     COURSE & MEDICAL DECISION MAKING  Pertinent Labs & Imaging studies reviewed. (See chart for details)    This is a 48 y.o. female that presents  With a typical headache for her.  She has no acute neurologic findings at this time.  My plan is to treat the patient's pain and then reassess.  If not believe she needs neuro imaging given there is no focality to neurologic exam..     12:44 AM - Patient seen and examined at bedside. Patient will be medicated with Dilaudid 1 mg for her symptoms. She was informed imaging is not indicated at this time as she has recently had multiple, stable CT scans of her head. Patient is agreeable to plan of care thus far.     1:47 AM - Patient reevaluated at bedside. She reports headache is improving after Dilaudid. The patient will receive additional dose of Dilaudid 1 mg for further manage headache before discharge. She is instructed to return to ED for worsening headache or any other concerning symptoms. Patient is understanding and agreeable to discharge.     The patient will return for new or worsening symptoms and is stable at the time of discharge.    The patient is referred to a primary physician for blood pressure management, diabetic screening, and for all other preventative health concerns.    The patient was found to be headache free prior to discharge.    DISPOSITION:  Patient will be discharged home in stable condition.    FOLLOW UP:  Tabitha Bautista M.D.  [address] [place] [id number] [phone]    In 2 days        OUTPATIENT MEDICATIONS:  Discharge Medication List as  of 12/13/2018  2:18 AM          FINAL IMPRESSION  1. Other complicated headache syndrome          Nadeem SHARMA (Scribe), am scribing for, and in the presence of, Zheng Zamarripa M.D..    Electronically signed by: Nadeem Prieto (Dennis), 12/13/2018    Zheng SHARMA M.D. personally performed the services described in this documentation, as scribed by Nadeem Prieto in my presence, and it is both accurate and complete.     E.    The note accurately reflects work and decisions made by me.  Zheng Zamarripa  12/13/2018  3:54 AM

## 2018-12-13 NOTE — ED TRIAGE NOTES
Rasheeda Manzano  48 y.o.  female  Chief Complaint   Patient presents with   • Headache     Present to triage c/o headache since Monday off and on pressure / throbbing. Patient was seen here for the same. Cough and runny nose noted.

## 2018-12-13 NOTE — ED NOTES
Pt ambulated from waiting room with assistance, pt is legally blind. Pt reports that the headache is still constant. Chart up for ERP eval

## 2018-12-20 ENCOUNTER — HOSPITAL ENCOUNTER (EMERGENCY)
Facility: MEDICAL CENTER | Age: 48
End: 2018-12-20
Attending: EMERGENCY MEDICINE
Payer: MEDICAID

## 2018-12-20 VITALS
BODY MASS INDEX: 25.1 KG/M2 | SYSTOLIC BLOOD PRESSURE: 149 MMHG | RESPIRATION RATE: 18 BRPM | DIASTOLIC BLOOD PRESSURE: 83 MMHG | HEIGHT: 64 IN | TEMPERATURE: 97.1 F | HEART RATE: 92 BPM | OXYGEN SATURATION: 97 % | WEIGHT: 147 LBS

## 2018-12-20 DIAGNOSIS — G89.29 CHRONIC NONINTRACTABLE HEADACHE, UNSPECIFIED HEADACHE TYPE: ICD-10-CM

## 2018-12-20 DIAGNOSIS — R51.9 CHRONIC NONINTRACTABLE HEADACHE, UNSPECIFIED HEADACHE TYPE: ICD-10-CM

## 2018-12-20 LAB
ANION GAP SERPL CALC-SCNC: 9 MMOL/L (ref 0–11.9)
BASOPHILS # BLD AUTO: 0.4 % (ref 0–1.8)
BASOPHILS # BLD: 0.02 K/UL (ref 0–0.12)
BUN SERPL-MCNC: 6 MG/DL (ref 8–22)
CALCIUM SERPL-MCNC: 8.9 MG/DL (ref 8.5–10.5)
CHLORIDE SERPL-SCNC: 107 MMOL/L (ref 96–112)
CO2 SERPL-SCNC: 22 MMOL/L (ref 20–33)
CREAT SERPL-MCNC: 0.63 MG/DL (ref 0.5–1.4)
EOSINOPHIL # BLD AUTO: 0.07 K/UL (ref 0–0.51)
EOSINOPHIL NFR BLD: 1.4 % (ref 0–6.9)
ERYTHROCYTE [DISTWIDTH] IN BLOOD BY AUTOMATED COUNT: 45.8 FL (ref 35.9–50)
GLUCOSE SERPL-MCNC: 123 MG/DL (ref 65–99)
HCT VFR BLD AUTO: 37.6 % (ref 37–47)
HGB BLD-MCNC: 12.8 G/DL (ref 12–16)
IMM GRANULOCYTES # BLD AUTO: 0.01 K/UL (ref 0–0.11)
IMM GRANULOCYTES NFR BLD AUTO: 0.2 % (ref 0–0.9)
LACTATE BLD-SCNC: 1 MMOL/L (ref 0.5–2)
LYMPHOCYTES # BLD AUTO: 2.19 K/UL (ref 1–4.8)
LYMPHOCYTES NFR BLD: 42.3 % (ref 22–41)
MCH RBC QN AUTO: 30 PG (ref 27–33)
MCHC RBC AUTO-ENTMCNC: 34 G/DL (ref 33.6–35)
MCV RBC AUTO: 88.1 FL (ref 81.4–97.8)
MONOCYTES # BLD AUTO: 0.4 K/UL (ref 0–0.85)
MONOCYTES NFR BLD AUTO: 7.7 % (ref 0–13.4)
NEUTROPHILS # BLD AUTO: 2.49 K/UL (ref 2–7.15)
NEUTROPHILS NFR BLD: 48 % (ref 44–72)
NRBC # BLD AUTO: 0 K/UL
NRBC BLD-RTO: 0 /100 WBC
PLATELET # BLD AUTO: 260 K/UL (ref 164–446)
PMV BLD AUTO: 10.6 FL (ref 9–12.9)
POTASSIUM SERPL-SCNC: 2.9 MMOL/L (ref 3.6–5.5)
RBC # BLD AUTO: 4.27 M/UL (ref 4.2–5.4)
SODIUM SERPL-SCNC: 138 MMOL/L (ref 135–145)
WBC # BLD AUTO: 5.2 K/UL (ref 4.8–10.8)

## 2018-12-20 PROCEDURE — 700111 HCHG RX REV CODE 636 W/ 250 OVERRIDE (IP): Performed by: EMERGENCY MEDICINE

## 2018-12-20 PROCEDURE — 83605 ASSAY OF LACTIC ACID: CPT

## 2018-12-20 PROCEDURE — 700105 HCHG RX REV CODE 258: Performed by: EMERGENCY MEDICINE

## 2018-12-20 PROCEDURE — 96374 THER/PROPH/DIAG INJ IV PUSH: CPT

## 2018-12-20 PROCEDURE — 99284 EMERGENCY DEPT VISIT MOD MDM: CPT

## 2018-12-20 PROCEDURE — 96375 TX/PRO/DX INJ NEW DRUG ADDON: CPT

## 2018-12-20 PROCEDURE — 85025 COMPLETE CBC W/AUTO DIFF WBC: CPT

## 2018-12-20 PROCEDURE — 80048 BASIC METABOLIC PNL TOTAL CA: CPT

## 2018-12-20 PROCEDURE — A9270 NON-COVERED ITEM OR SERVICE: HCPCS | Performed by: EMERGENCY MEDICINE

## 2018-12-20 PROCEDURE — 700102 HCHG RX REV CODE 250 W/ 637 OVERRIDE(OP): Performed by: EMERGENCY MEDICINE

## 2018-12-20 RX ORDER — DIPHENHYDRAMINE HYDROCHLORIDE 50 MG/ML
25 INJECTION INTRAMUSCULAR; INTRAVENOUS ONCE
Status: COMPLETED | OUTPATIENT
Start: 2018-12-20 | End: 2018-12-20

## 2018-12-20 RX ORDER — POTASSIUM CHLORIDE 20 MEQ/1
20 TABLET, EXTENDED RELEASE ORAL 2 TIMES DAILY
Qty: 10 TAB | Refills: 0 | Status: SHIPPED | OUTPATIENT
Start: 2018-12-20 | End: 2018-12-25

## 2018-12-20 RX ORDER — MORPHINE SULFATE 10 MG/ML
4 INJECTION, SOLUTION INTRAMUSCULAR; INTRAVENOUS ONCE
Status: COMPLETED | OUTPATIENT
Start: 2018-12-20 | End: 2018-12-20

## 2018-12-20 RX ORDER — POTASSIUM CHLORIDE 20 MEQ/1
20 TABLET, EXTENDED RELEASE ORAL ONCE
Status: COMPLETED | OUTPATIENT
Start: 2018-12-20 | End: 2018-12-20

## 2018-12-20 RX ORDER — SODIUM CHLORIDE 9 MG/ML
1000 INJECTION, SOLUTION INTRAVENOUS ONCE
Status: COMPLETED | OUTPATIENT
Start: 2018-12-20 | End: 2018-12-20

## 2018-12-20 RX ADMIN — SODIUM CHLORIDE 1000 ML: 9 INJECTION, SOLUTION INTRAVENOUS at 18:39

## 2018-12-20 RX ADMIN — DIPHENHYDRAMINE HYDROCHLORIDE 25 MG: 50 INJECTION INTRAMUSCULAR; INTRAVENOUS at 18:39

## 2018-12-20 RX ADMIN — PROCHLORPERAZINE EDISYLATE 10 MG: 5 INJECTION INTRAMUSCULAR; INTRAVENOUS at 18:38

## 2018-12-20 RX ADMIN — MORPHINE SULFATE 4 MG: 10 INJECTION INTRAVENOUS at 18:39

## 2018-12-20 RX ADMIN — POTASSIUM CHLORIDE 20 MEQ: 1500 TABLET, EXTENDED RELEASE ORAL at 20:30

## 2018-12-20 ASSESSMENT — PAIN SCALES - GENERAL: PAINLEVEL_OUTOF10: 6

## 2018-12-20 NOTE — ED TRIAGE NOTES
"Chief Complaint   Patient presents with   • Headache     To triage via wheelchair.   Reports headache \"worse\" than normal, here frequently for the same. Hx  shunt.  States pain started at 0300, has been taking medications with no relief. Legally blind.   Pt tearful, appears uncomfortable. Reports nausea.     /99   Pulse (!) 106   Temp 36.2 °C (97.1 °F) (Temporal)   Resp (!) 21   Ht 1.626 m (5' 4\")   Wt 66.7 kg (147 lb)   LMP 11/27/2013   SpO2 96%   BMI 25.23 kg/m²     Pt Informed regarding triage process and verbalized understanding to inform triage tech or RN for any changes in condition.  Placed in lobby.    "

## 2018-12-21 NOTE — ED PROVIDER NOTES
ED Provider Note    CHIEF COMPLAINT  Chief Complaint   Patient presents with   • Headache       HPI  Rasheeda Manzano is a 48 y.o. female with a history of hydrocephalus, status post  shunt placement, chronic headaches, who presents with complaints of a headache, nausea, vomiting since 3 AM this morning.  Patient states she was awakened from sleep with a severe headache.  She says the pain is throbbing in nature, behind her right eye and to the right side of her head.  She says is similar to her previous headaches, though this one is somewhat worse than usual.  She has had nausea and vomiting.  She denies any numbness or tingling to extremities or any focal weakness.  The patient is legally blind in both eyes, says she can barely perceive light.  She denies fever, shaking chills, sore throat, cough, congestion, or any difficulty breathing.  She has had no numbness or tingling to the extremities.  She has had no loss of bowel or bladder function.  The patient has been seen multiple times emergency department.  This is approximately her 10th visit since September of this year.  She has had multiple CAT scans and shunt series in the last 6 months which have been negative.    REVIEW OF SYSTEMS  See HPI for further details. All other systems are negative.     PAST MEDICAL HISTORY  Past Medical History:   Diagnosis Date   • Blind    • C. difficile diarrhea 2013   • Chronic daily headache    • Depression    • Fall    • H/O total hysterectomy    • Hydrocephalus    • Hydrocephalus     shunt drains into pleural place of L lung   • Jaundice     at birth   • Legally blind    • Migraine without aura, without mention of intractable migraine without mention of status migrainosus    • Other specified symptom associated with female genital organs     excessive bleeding   • Pain     gallbladder related   • Psychiatric problem     depression       FAMILY HISTORY  Family History   Problem Relation Age of Onset   • Hypertension  Mother    • Hypertension Father    • Non-contributory Neg Hx         Migraine       SOCIAL HISTORY  Social History     Social History   • Marital status:      Spouse name: N/A   • Number of children: N/A   • Years of education: N/A     Social History Main Topics   • Smoking status: Former Smoker     Packs/day: 1.00     Years: 10.00     Types: Cigars     Start date: 5/1/2004     Quit date: 8/9/2017   • Smokeless tobacco: Never Used      Comment:  CIGAR/day    • Alcohol use Yes      Comment: socially   • Drug use: No   • Sexual activity: Yes     Partners: Male     Other Topics Concern   • Not on file     Social History Narrative   • No narrative on file       SURGICAL HISTORY  Past Surgical History:   Procedure Laterality Date   • GASTROSCOPY-ENDO N/A 8/24/2017    Procedure: GASTROSCOPY-ENDO;  Surgeon: Matias Fernando M.D.;  Location: ENDOSCOPY [facility];  Service:    • AYALA BY LAPAROSCOPY N/A 8/13/2015    Procedure: AYALA BY LAPAROSCOPY;  Surgeon: Brice Johnson M.D.;  Location: SURGERY SAME DAY [facility];  Service:    • CHOLECYSTECTOMY N/A 8/13/2015    Procedure: CHOLECYSTECTOMY;  Surgeon: Brice Johnson M.D.;  Location: SURGERY SAME DAY [facility];  Service:    • LYSIS ADHESIONS GENERAL  12/10/2013    Performed by Arie Bass M.D. at [facility]   • CYSTOSCOPY  12/10/2013    Performed by Joana Yeager M.D. at [facility]   • EXPLORATORY LAPAROTOMY  12/10/2013    Performed by Arie Bass M.D. at [facility]   • APPENDECTOMY  12/10/2013    Performed by Arie Bass M.D. at [facility]   • ABDOMINAL HYSTERECTOMY TOTAL  12/10/2013    Performed by Joana Yeager M.D. at [facility]   • LAPAROSCOPY  8/8/08    Performed by FERNANDO HENDERSON at [facility]   • OTHER      PLEURAL SHUNT, numerous revisions       CURRENT MEDICATIONS  Home Medications     Reviewed by Elmira Meredith R.N. (Registered Nurse) on 12/20/18 at  "1559  Med List Status: <None>   Medication Last Dose Status   acetaminophen (TYLENOL) 325 MG Tab  Active   amitriptyline (ELAVIL) 100 MG Tab  Active   esomeprazole (NEXIUM) 40 MG delayed-release capsule  Active   famotidine (PEPCID) 40 MG Tab  Active   gabapentin (NEURONTIN) 400 MG Cap  Active   indomethacin SR (INDOCIN SR) 75 MG Cap CR  Active   LORazepam (ATIVAN) 1 MG Tab  Active   propranolol CR (INDERAL LA) 120 MG CAPSULE SR 24 HR  Active   topiramate (TOPAMAX) 100 MG Tab  Active                ALLERGIES  Allergies   Allergen Reactions   • Tape Rash     Medical tape. Per patient, paper tape ok.       PHYSICAL EXAM  VITAL SIGNS: /99   Pulse (!) 106   Temp 36.2 °C (97.1 °F) (Temporal)   Resp (!) 21   Ht 1.626 m (5' 4\")   Wt 66.7 kg (147 lb)   LMP 11/27/2013   SpO2 96%   BMI 25.23 kg/m²   Constitutional: Awake, alert, in no acute distress, Non-toxic appearance.   HENT: Atraumatic. Bilateral external ears normal, mucous membranes mildly dry, throat nonerythematous without exudates, nose is normal.  Eyes: Pupils are about 3 mm, minimally reactive, extra movements are intact, patient does not appear to be able to see movement or a hand-held light, and says she is legally blind.  Neck: Nontender, Normal range of motion, No nuchal rigidity, No stridor.   Lymphatic: No lymphadenopathy noted.   Cardiovascular: Regular rate and rhythm, no murmurs, rubs, gallops.  Thorax & Lungs:  Good breath sounds bilaterally, no wheezes, rales, or retractions.  No chest tenderness.  Abdomen: Bowel sounds normal, Soft, nontender, nondistended, no rebound, guarding, masses.  Back: No CVA or spinal tenderness.  Extremities: Intact distal pulses, No edema, No tenderness.   Skin: Warm, Dry, No rashes.   Musculoskeletal: No joint swelling or tenderness.  Neurologic: Alert & oriented x 3, cranial nerves II through XII shows near total loss of vision, no facial asymmetry, speech is fluent, tongue protrudes midline, sensory and " motor function normal. No focal deficits.   Psychiatric: Affect normal, Judgment normal, Mood normal.         COURSE & MEDICAL DECISION MAKING  Pertinent Labs & Imaging studies reviewed. (See chart for details)  The patient presents with a recurrent headache.  She is awake, alert, neurologically intact except for chronic loss of vision.  She is afebrile, has no meningismus signs.  The patient previously had been treated with nonnarcotics, and morphine when necessary.  However it appears her treatments have been escalating over the past several months, and on this visit she is requesting Dilaudid.  I told her I would not give her Dilaudid, and was given a dose of morphine, Compazine, and Benadryl.     HYDRATION: Based on the patient's presentation of Acute Vomiting, Inability to take oral fluids and Tachycardia the patient was given IV fluids. IV Hydration was used because oral hydration was not adequate alone. Upon recheck following hydration, the patient was feeling much improved, has moist mucous membranes.  She is able to tolerate oral fluids.    On recheck, patient was resting comfortably.  She was awakened from her sleep, reported feeling much improved.  She was able to tolerate oral fluids.  CBC shows white count 5200, right shift, hemoglobin 12.8, patient metabolic panel shows a potassium of 2.9, glucose 123, otherwise normal, lactic acid normal.  She was given a dose of potassium chloride for her hypokalemia.  The patient will be continued  potassium chloride 20 mg twice a day for 5 days.  She has had problems with low potassium in the past.  Patient is instructed to follow-up with her PCP, return to ER for any recurrent vomiting, worsening headache, fever, vomiting, or any other problems.         FINAL IMPRESSION  1.  Acute on chronic migraine headache  2.  Hypokalemia  3.  Nausea, vomiting         Electronically signed by: Pedrito Ocasio, 12/20/2018 6:20 PM

## 2018-12-21 NOTE — ED NOTES
Pt reports migraine headache since 0300, +nausea, +photosensitivity. Pt is blind as well. Pt is very cooperative and pleasant. She reports hx of migraines, but says in the last few months they have been more often and more severe.

## 2018-12-21 NOTE — ED NOTES
Patient cleared for discharge. All instructions provided- verbalizes understanding. IV access removed. Patient calling friend for ride home a this time.

## 2018-12-24 ENCOUNTER — HOSPITAL ENCOUNTER (EMERGENCY)
Facility: MEDICAL CENTER | Age: 48
End: 2018-12-24
Attending: EMERGENCY MEDICINE
Payer: MEDICAID

## 2018-12-24 VITALS
SYSTOLIC BLOOD PRESSURE: 138 MMHG | TEMPERATURE: 98 F | HEIGHT: 64 IN | BODY MASS INDEX: 24.54 KG/M2 | WEIGHT: 143.74 LBS | DIASTOLIC BLOOD PRESSURE: 90 MMHG | RESPIRATION RATE: 16 BRPM | OXYGEN SATURATION: 99 % | HEART RATE: 88 BPM

## 2018-12-24 DIAGNOSIS — B02.9 HERPES ZOSTER WITHOUT COMPLICATION: ICD-10-CM

## 2018-12-24 PROCEDURE — 99283 EMERGENCY DEPT VISIT LOW MDM: CPT

## 2018-12-24 RX ORDER — LIDOCAINE 50 MG/G
1 PATCH TOPICAL EVERY 24 HOURS
Qty: 10 PATCH | Refills: 1 | Status: ON HOLD | OUTPATIENT
Start: 2018-12-24 | End: 2018-12-31 | Stop reason: CLARIF

## 2018-12-24 RX ORDER — ACYCLOVIR 800 MG/1
800 TABLET ORAL
Qty: 35 TAB | Refills: 0 | Status: ON HOLD | OUTPATIENT
Start: 2018-12-24 | End: 2018-12-31 | Stop reason: CLARIF

## 2018-12-24 NOTE — ED TRIAGE NOTES
"Chief Complaint   Patient presents with   • Rash     x 4/5 days on her left hip. Pt reports itching and pain to rash. +body aches     /92   Pulse (!) 108   Temp 36.4 °C (97.6 °F) (Temporal)   Resp 17   Ht 1.626 m (5' 4\")   Wt 65.2 kg (143 lb 11.8 oz)   LMP 11/27/2013   SpO2 98%   BMI 24.67 kg/m²   Pt placed back in lobby, educated on triage process, and told to inform staff of any change in condition.     "

## 2018-12-24 NOTE — ED NOTES
Discharge orders received from ERP. New prescriptions x 2 sent to pt's personal pharmacy. Provided education and patient demonstrated understanding. Pt ambulatory off unit. All personal belonging with patient.

## 2018-12-30 ENCOUNTER — HOSPITAL ENCOUNTER (INPATIENT)
Facility: MEDICAL CENTER | Age: 48
LOS: 4 days | DRG: 381 | End: 2019-01-04
Attending: EMERGENCY MEDICINE | Admitting: HOSPITALIST
Payer: MEDICAID

## 2018-12-30 ENCOUNTER — APPOINTMENT (OUTPATIENT)
Dept: RADIOLOGY | Facility: MEDICAL CENTER | Age: 48
DRG: 381 | End: 2018-12-30
Attending: EMERGENCY MEDICINE
Payer: MEDICAID

## 2018-12-30 DIAGNOSIS — I95.9 HYPOTENSION, UNSPECIFIED HYPOTENSION TYPE: ICD-10-CM

## 2018-12-30 DIAGNOSIS — N30.00 ACUTE CYSTITIS WITHOUT HEMATURIA: ICD-10-CM

## 2018-12-30 DIAGNOSIS — G43.009 MIGRAINE WITHOUT AURA AND WITHOUT STATUS MIGRAINOSUS, NOT INTRACTABLE: ICD-10-CM

## 2018-12-30 LAB
ALBUMIN SERPL BCP-MCNC: 3.9 G/DL (ref 3.2–4.9)
ALBUMIN/GLOB SERPL: 1.5 G/DL
ALP SERPL-CCNC: 106 U/L (ref 30–99)
ALT SERPL-CCNC: 20 U/L (ref 2–50)
ANION GAP SERPL CALC-SCNC: 8 MMOL/L (ref 0–11.9)
APPEARANCE UR: ABNORMAL
APPEARANCE UR: ABNORMAL
AST SERPL-CCNC: 18 U/L (ref 12–45)
BACTERIA #/AREA URNS HPF: ABNORMAL /HPF
BASOPHILS # BLD AUTO: 0.8 % (ref 0–1.8)
BASOPHILS # BLD: 0.13 K/UL (ref 0–0.12)
BILIRUB SERPL-MCNC: 0.7 MG/DL (ref 0.1–1.5)
BILIRUB UR QL STRIP.AUTO: NEGATIVE
BUN SERPL-MCNC: 49 MG/DL (ref 8–22)
CALCIUM SERPL-MCNC: 9.3 MG/DL (ref 8.5–10.5)
CHLORIDE SERPL-SCNC: 110 MMOL/L (ref 96–112)
CO2 SERPL-SCNC: 20 MMOL/L (ref 20–33)
COLOR UR AUTO: ABNORMAL
COLOR UR: YELLOW
CREAT SERPL-MCNC: 0.78 MG/DL (ref 0.5–1.4)
EOSINOPHIL # BLD AUTO: 0.13 K/UL (ref 0–0.51)
EOSINOPHIL NFR BLD: 0.8 % (ref 0–6.9)
EPI CELLS #/AREA URNS HPF: ABNORMAL /HPF
ERYTHROCYTE [DISTWIDTH] IN BLOOD BY AUTOMATED COUNT: 49.9 FL (ref 35.9–50)
GLOBULIN SER CALC-MCNC: 2.6 G/DL (ref 1.9–3.5)
GLUCOSE SERPL-MCNC: 92 MG/DL (ref 65–99)
GLUCOSE UR QL STRIP.AUTO: NEGATIVE MG/DL
GLUCOSE UR STRIP.AUTO-MCNC: NEGATIVE MG/DL
HCG UR QL: NEGATIVE
HCT VFR BLD AUTO: 32.1 % (ref 37–47)
HGB BLD-MCNC: 10.4 G/DL (ref 12–16)
HYALINE CASTS #/AREA URNS LPF: ABNORMAL /LPF
IMM GRANULOCYTES # BLD AUTO: 0.06 K/UL (ref 0–0.11)
IMM GRANULOCYTES NFR BLD AUTO: 0.4 % (ref 0–0.9)
KETONES UR QL STRIP.AUTO: 40 MG/DL
KETONES UR STRIP.AUTO-MCNC: ABNORMAL MG/DL
LEUKOCYTE ESTERASE UR QL STRIP.AUTO: ABNORMAL
LEUKOCYTE ESTERASE UR QL STRIP.AUTO: ABNORMAL
LIPASE SERPL-CCNC: 27 U/L (ref 11–82)
LYMPHOCYTES # BLD AUTO: 3.55 K/UL (ref 1–4.8)
LYMPHOCYTES NFR BLD: 21.2 % (ref 22–41)
MCH RBC QN AUTO: 30.9 PG (ref 27–33)
MCHC RBC AUTO-ENTMCNC: 32.4 G/DL (ref 33.6–35)
MCV RBC AUTO: 95.3 FL (ref 81.4–97.8)
MICRO URNS: ABNORMAL
MONOCYTES # BLD AUTO: 0.73 K/UL (ref 0–0.85)
MONOCYTES NFR BLD AUTO: 4.4 % (ref 0–13.4)
NEUTROPHILS # BLD AUTO: 12.13 K/UL (ref 2–7.15)
NEUTROPHILS NFR BLD: 72.4 % (ref 44–72)
NITRITE UR QL STRIP.AUTO: NEGATIVE
NITRITE UR QL STRIP.AUTO: NEGATIVE
NRBC # BLD AUTO: 0 K/UL
NRBC BLD-RTO: 0 /100 WBC
PH UR STRIP.AUTO: 5.5 [PH]
PH UR STRIP.AUTO: 5.5 [PH]
PLATELET # BLD AUTO: 433 K/UL (ref 164–446)
PMV BLD AUTO: 11 FL (ref 9–12.9)
POTASSIUM SERPL-SCNC: 4.7 MMOL/L (ref 3.6–5.5)
PROT SERPL-MCNC: 6.5 G/DL (ref 6–8.2)
PROT UR QL STRIP: NEGATIVE MG/DL
PROT UR QL STRIP: NEGATIVE MG/DL
RBC # BLD AUTO: 3.37 M/UL (ref 4.2–5.4)
RBC # URNS HPF: ABNORMAL /HPF
RBC UR QL AUTO: ABNORMAL
RBC UR QL AUTO: NEGATIVE
SODIUM SERPL-SCNC: 138 MMOL/L (ref 135–145)
SP GR UR STRIP.AUTO: 1.02
SP GR UR: 1.01
TROPONIN I SERPL-MCNC: <0.01 NG/ML (ref 0–0.04)
UROBILINOGEN UR STRIP.AUTO-MCNC: 0.2 MG/DL
WBC # BLD AUTO: 16.7 K/UL (ref 4.8–10.8)
WBC #/AREA URNS HPF: ABNORMAL /HPF

## 2018-12-30 PROCEDURE — 87086 URINE CULTURE/COLONY COUNT: CPT

## 2018-12-30 PROCEDURE — 96375 TX/PRO/DX INJ NEW DRUG ADDON: CPT

## 2018-12-30 PROCEDURE — 81025 URINE PREGNANCY TEST: CPT

## 2018-12-30 PROCEDURE — 99285 EMERGENCY DEPT VISIT HI MDM: CPT

## 2018-12-30 PROCEDURE — 81001 URINALYSIS AUTO W/SCOPE: CPT

## 2018-12-30 PROCEDURE — 93005 ELECTROCARDIOGRAM TRACING: CPT | Performed by: EMERGENCY MEDICINE

## 2018-12-30 PROCEDURE — 83690 ASSAY OF LIPASE: CPT

## 2018-12-30 PROCEDURE — 96365 THER/PROPH/DIAG IV INF INIT: CPT

## 2018-12-30 PROCEDURE — 700111 HCHG RX REV CODE 636 W/ 250 OVERRIDE (IP): Performed by: EMERGENCY MEDICINE

## 2018-12-30 PROCEDURE — A9270 NON-COVERED ITEM OR SERVICE: HCPCS | Performed by: EMERGENCY MEDICINE

## 2018-12-30 PROCEDURE — 71045 X-RAY EXAM CHEST 1 VIEW: CPT

## 2018-12-30 PROCEDURE — 84484 ASSAY OF TROPONIN QUANT: CPT

## 2018-12-30 PROCEDURE — 700102 HCHG RX REV CODE 250 W/ 637 OVERRIDE(OP): Performed by: EMERGENCY MEDICINE

## 2018-12-30 PROCEDURE — 85025 COMPLETE CBC W/AUTO DIFF WBC: CPT

## 2018-12-30 PROCEDURE — 80053 COMPREHEN METABOLIC PANEL: CPT

## 2018-12-30 PROCEDURE — 700105 HCHG RX REV CODE 258: Performed by: EMERGENCY MEDICINE

## 2018-12-30 PROCEDURE — 93005 ELECTROCARDIOGRAM TRACING: CPT

## 2018-12-30 PROCEDURE — 81002 URINALYSIS NONAUTO W/O SCOPE: CPT

## 2018-12-30 RX ORDER — SODIUM CHLORIDE 9 MG/ML
1000 INJECTION, SOLUTION INTRAVENOUS ONCE
Status: COMPLETED | OUTPATIENT
Start: 2018-12-30 | End: 2018-12-31

## 2018-12-30 RX ORDER — SODIUM CHLORIDE 9 MG/ML
2000 INJECTION, SOLUTION INTRAVENOUS ONCE
Status: COMPLETED | OUTPATIENT
Start: 2018-12-30 | End: 2018-12-31

## 2018-12-30 RX ORDER — ACETAMINOPHEN 325 MG/1
650 TABLET ORAL ONCE
Status: COMPLETED | OUTPATIENT
Start: 2018-12-30 | End: 2018-12-30

## 2018-12-30 RX ORDER — KETOROLAC TROMETHAMINE 30 MG/ML
15 INJECTION, SOLUTION INTRAMUSCULAR; INTRAVENOUS ONCE
Status: COMPLETED | OUTPATIENT
Start: 2018-12-30 | End: 2018-12-30

## 2018-12-30 RX ADMIN — CEFTRIAXONE SODIUM 1 G: 1 INJECTION, POWDER, FOR SOLUTION INTRAMUSCULAR; INTRAVENOUS at 22:50

## 2018-12-30 RX ADMIN — ACETAMINOPHEN 650 MG: 325 TABLET, FILM COATED ORAL at 22:02

## 2018-12-30 RX ADMIN — SODIUM CHLORIDE 2000 ML: 9 INJECTION, SOLUTION INTRAVENOUS at 20:59

## 2018-12-30 RX ADMIN — ANHYDROUS CITRIC ACID, SODIUM BICARBONATE, AND ASPIRIN 325 MG: 1000; 1985; 500 GRANULE, EFFERVESCENT ORAL at 20:58

## 2018-12-30 RX ADMIN — KETOROLAC TROMETHAMINE 15 MG: 30 INJECTION, SOLUTION INTRAMUSCULAR at 22:02

## 2018-12-30 ASSESSMENT — PAIN SCALES - GENERAL: PAINLEVEL_OUTOF10: 1

## 2018-12-31 PROBLEM — A41.9 SEPSIS (HCC): Status: ACTIVE | Noted: 2018-12-31

## 2018-12-31 PROBLEM — D64.9 NORMOCYTIC ANEMIA: Status: ACTIVE | Noted: 2018-12-31

## 2018-12-31 LAB
LACTATE BLD-SCNC: 0.6 MMOL/L (ref 0.5–2)
LACTATE BLD-SCNC: 0.7 MMOL/L (ref 0.5–2)
LACTATE BLD-SCNC: 1 MMOL/L (ref 0.5–2)

## 2018-12-31 PROCEDURE — 87040 BLOOD CULTURE FOR BACTERIA: CPT

## 2018-12-31 PROCEDURE — 770020 HCHG ROOM/CARE - TELE (206)

## 2018-12-31 PROCEDURE — 99223 1ST HOSP IP/OBS HIGH 75: CPT | Performed by: HOSPITALIST

## 2018-12-31 PROCEDURE — 36415 COLL VENOUS BLD VENIPUNCTURE: CPT

## 2018-12-31 PROCEDURE — 700102 HCHG RX REV CODE 250 W/ 637 OVERRIDE(OP): Performed by: HOSPITALIST

## 2018-12-31 PROCEDURE — 83605 ASSAY OF LACTIC ACID: CPT

## 2018-12-31 PROCEDURE — 700105 HCHG RX REV CODE 258: Performed by: EMERGENCY MEDICINE

## 2018-12-31 PROCEDURE — 700105 HCHG RX REV CODE 258: Performed by: HOSPITALIST

## 2018-12-31 PROCEDURE — 700111 HCHG RX REV CODE 636 W/ 250 OVERRIDE (IP): Performed by: HOSPITALIST

## 2018-12-31 PROCEDURE — A9270 NON-COVERED ITEM OR SERVICE: HCPCS | Performed by: HOSPITALIST

## 2018-12-31 RX ORDER — ONDANSETRON 2 MG/ML
4 INJECTION INTRAMUSCULAR; INTRAVENOUS EVERY 4 HOURS PRN
Status: DISCONTINUED | OUTPATIENT
Start: 2018-12-31 | End: 2019-01-04 | Stop reason: HOSPADM

## 2018-12-31 RX ORDER — ONDANSETRON 4 MG/1
4 TABLET, ORALLY DISINTEGRATING ORAL EVERY 4 HOURS PRN
Status: DISCONTINUED | OUTPATIENT
Start: 2018-12-31 | End: 2019-01-04 | Stop reason: HOSPADM

## 2018-12-31 RX ORDER — SODIUM CHLORIDE 9 MG/ML
INJECTION, SOLUTION INTRAVENOUS CONTINUOUS
Status: DISCONTINUED | OUTPATIENT
Start: 2018-12-31 | End: 2019-01-03

## 2018-12-31 RX ORDER — POTASSIUM CHLORIDE 20 MEQ/1
20 TABLET, EXTENDED RELEASE ORAL DAILY
COMMUNITY
End: 2019-01-15

## 2018-12-31 RX ORDER — POLYETHYLENE GLYCOL 3350 17 G/17G
1 POWDER, FOR SOLUTION ORAL
Status: DISCONTINUED | OUTPATIENT
Start: 2018-12-31 | End: 2019-01-04 | Stop reason: HOSPADM

## 2018-12-31 RX ORDER — TOPIRAMATE 100 MG/1
100 TABLET, FILM COATED ORAL 2 TIMES DAILY
Status: DISCONTINUED | OUTPATIENT
Start: 2018-12-31 | End: 2018-12-31

## 2018-12-31 RX ORDER — GABAPENTIN 400 MG/1
400 CAPSULE ORAL 2 TIMES DAILY
Status: DISCONTINUED | OUTPATIENT
Start: 2018-12-31 | End: 2019-01-04 | Stop reason: HOSPADM

## 2018-12-31 RX ORDER — SODIUM CHLORIDE 9 MG/ML
250 INJECTION, SOLUTION INTRAVENOUS ONCE
Status: COMPLETED | OUTPATIENT
Start: 2019-01-01 | End: 2019-01-01

## 2018-12-31 RX ORDER — BISACODYL 10 MG
10 SUPPOSITORY, RECTAL RECTAL
Status: DISCONTINUED | OUTPATIENT
Start: 2018-12-31 | End: 2019-01-04 | Stop reason: HOSPADM

## 2018-12-31 RX ORDER — LORAZEPAM 1 MG/1
1 TABLET ORAL
Status: DISCONTINUED | OUTPATIENT
Start: 2018-12-31 | End: 2019-01-04 | Stop reason: HOSPADM

## 2018-12-31 RX ORDER — OMEPRAZOLE 20 MG/1
20 CAPSULE, DELAYED RELEASE ORAL
Status: DISCONTINUED | OUTPATIENT
Start: 2018-12-31 | End: 2019-01-01

## 2018-12-31 RX ORDER — PROMETHAZINE HYDROCHLORIDE 25 MG/1
12.5-25 TABLET ORAL EVERY 4 HOURS PRN
Status: DISCONTINUED | OUTPATIENT
Start: 2018-12-31 | End: 2019-01-04 | Stop reason: HOSPADM

## 2018-12-31 RX ORDER — AMOXICILLIN 250 MG
2 CAPSULE ORAL 2 TIMES DAILY
Status: DISCONTINUED | OUTPATIENT
Start: 2018-12-31 | End: 2019-01-04 | Stop reason: HOSPADM

## 2018-12-31 RX ORDER — CLONIDINE HYDROCHLORIDE 0.1 MG/1
0.1 TABLET ORAL EVERY 6 HOURS PRN
Status: DISCONTINUED | OUTPATIENT
Start: 2018-12-31 | End: 2019-01-04 | Stop reason: HOSPADM

## 2018-12-31 RX ORDER — AMITRIPTYLINE HYDROCHLORIDE 100 MG/1
100 TABLET ORAL NIGHTLY
Status: DISCONTINUED | OUTPATIENT
Start: 2018-12-31 | End: 2018-12-31

## 2018-12-31 RX ORDER — ACETAMINOPHEN 325 MG/1
650 TABLET ORAL EVERY 6 HOURS PRN
Status: DISCONTINUED | OUTPATIENT
Start: 2018-12-31 | End: 2019-01-04 | Stop reason: HOSPADM

## 2018-12-31 RX ORDER — SODIUM CHLORIDE 9 MG/ML
500 INJECTION, SOLUTION INTRAVENOUS
Status: COMPLETED | OUTPATIENT
Start: 2018-12-31 | End: 2019-01-01

## 2018-12-31 RX ORDER — PROMETHAZINE HYDROCHLORIDE 25 MG/1
12.5-25 SUPPOSITORY RECTAL EVERY 4 HOURS PRN
Status: DISCONTINUED | OUTPATIENT
Start: 2018-12-31 | End: 2019-01-04 | Stop reason: HOSPADM

## 2018-12-31 RX ADMIN — GABAPENTIN 400 MG: 400 CAPSULE ORAL at 06:14

## 2018-12-31 RX ADMIN — SODIUM CHLORIDE: 900 INJECTION INTRAVENOUS at 06:12

## 2018-12-31 RX ADMIN — LORAZEPAM 1 MG: 1 TABLET ORAL at 03:54

## 2018-12-31 RX ADMIN — GABAPENTIN 400 MG: 400 CAPSULE ORAL at 16:12

## 2018-12-31 RX ADMIN — ENOXAPARIN SODIUM 40 MG: 100 INJECTION SUBCUTANEOUS at 06:16

## 2018-12-31 RX ADMIN — CEFTRIAXONE SODIUM 2 G: 2 INJECTION, POWDER, FOR SOLUTION INTRAMUSCULAR; INTRAVENOUS at 06:16

## 2018-12-31 RX ADMIN — LORAZEPAM 1 MG: 1 TABLET ORAL at 20:45

## 2018-12-31 RX ADMIN — OMEPRAZOLE 20 MG: 20 CAPSULE, DELAYED RELEASE ORAL at 06:14

## 2018-12-31 RX ADMIN — SODIUM CHLORIDE 1000 ML: 9 INJECTION, SOLUTION INTRAVENOUS at 00:42

## 2018-12-31 ASSESSMENT — LIFESTYLE VARIABLES
TOTAL SCORE: 0
ON A TYPICAL DAY WHEN YOU DRINK ALCOHOL HOW MANY DRINKS DO YOU HAVE: 1
CONSUMPTION TOTAL: NEGATIVE
EVER HAD A DRINK FIRST THING IN THE MORNING TO STEADY YOUR NERVES TO GET RID OF A HANGOVER: NO
AVERAGE NUMBER OF DAYS PER WEEK YOU HAVE A DRINK CONTAINING ALCOHOL: 0
ALCOHOL_USE: YES
HAVE YOU EVER FELT YOU SHOULD CUT DOWN ON YOUR DRINKING: NO
EVER_SMOKED: NEVER
HOW MANY TIMES IN THE PAST YEAR HAVE YOU HAD 5 OR MORE DRINKS IN A DAY: 0
HAVE PEOPLE ANNOYED YOU BY CRITICIZING YOUR DRINKING: NO
EVER FELT BAD OR GUILTY ABOUT YOUR DRINKING: NO
TOTAL SCORE: 0
TOTAL SCORE: 0

## 2018-12-31 ASSESSMENT — COPD QUESTIONNAIRES
COPD SCREENING SCORE: 0
DO YOU EVER COUGH UP ANY MUCUS OR PHLEGM?: NO/ONLY WITH OCCASIONAL COLDS OR INFECTIONS
DURING THE PAST 4 WEEKS HOW MUCH DID YOU FEEL SHORT OF BREATH: NONE/LITTLE OF THE TIME
HAVE YOU SMOKED AT LEAST 100 CIGARETTES IN YOUR ENTIRE LIFE: NO/DON'T KNOW
IN THE PAST 12 MONTHS DO YOU DO LESS THAN YOU USED TO BECAUSE OF YOUR BREATHING PROBLEMS: DISAGREE/UNSURE

## 2018-12-31 ASSESSMENT — ENCOUNTER SYMPTOMS
EYES NEGATIVE: 1
MUSCULOSKELETAL NEGATIVE: 1
RESPIRATORY NEGATIVE: 1
CONSTITUTIONAL NEGATIVE: 1
PSYCHIATRIC NEGATIVE: 1
DIZZINESS: 1
CARDIOVASCULAR NEGATIVE: 1
GASTROINTESTINAL NEGATIVE: 1
HEADACHES: 1

## 2018-12-31 ASSESSMENT — PATIENT HEALTH QUESTIONNAIRE - PHQ9
2. FEELING DOWN, DEPRESSED, IRRITABLE, OR HOPELESS: NEARLY EVERY DAY
SUM OF ALL RESPONSES TO PHQ9 QUESTIONS 1 AND 2: 6
1. LITTLE INTEREST OR PLEASURE IN DOING THINGS: NEARLY EVERY DAY
1. LITTLE INTEREST OR PLEASURE IN DOING THINGS: NOT AT ALL
2. FEELING DOWN, DEPRESSED, IRRITABLE, OR HOPELESS: NOT AT ALL
4. FEELING TIRED OR HAVING LITTLE ENERGY: MORE THAN HALF THE DAYS
SUM OF ALL RESPONSES TO PHQ QUESTIONS 1-9: 15
9. THOUGHTS THAT YOU WOULD BE BETTER OFF DEAD, OR OF HURTING YOURSELF: NOT AT ALL
8. MOVING OR SPEAKING SO SLOWLY THAT OTHER PEOPLE COULD HAVE NOTICED. OR THE OPPOSITE, BEING SO FIGETY OR RESTLESS THAT YOU HAVE BEEN MOVING AROUND A LOT MORE THAN USUAL: NOT AT ALL
3. TROUBLE FALLING OR STAYING ASLEEP OR SLEEPING TOO MUCH: MORE THAN HALF THE DAYS
SUM OF ALL RESPONSES TO PHQ9 QUESTIONS 1 AND 2: 0
6. FEELING BAD ABOUT YOURSELF - OR THAT YOU ARE A FAILURE OR HAVE LET YOURSELF OR YOUR FAMILY DOWN: SEVERAL DAYS
5. POOR APPETITE OR OVEREATING: SEVERAL DAYS
7. TROUBLE CONCENTRATING ON THINGS, SUCH AS READING THE NEWSPAPER OR WATCHING TELEVISION: NEARLY EVERY DAY

## 2018-12-31 ASSESSMENT — COGNITIVE AND FUNCTIONAL STATUS - GENERAL
DAILY ACTIVITIY SCORE: 24
SUGGESTED CMS G CODE MODIFIER DAILY ACTIVITY: CH
SUGGESTED CMS G CODE MODIFIER MOBILITY: CH
MOBILITY SCORE: 24

## 2018-12-31 ASSESSMENT — PAIN SCALES - GENERAL: PAINLEVEL_OUTOF10: 0

## 2018-12-31 NOTE — PROGRESS NOTES
Transferred pt to room from ED with zoll. Pt oriented to room and use of call bell. VS and assessment to be completed. Tele monitor placed. Skin check to be done. Fall precautions in placed.

## 2018-12-31 NOTE — ASSESSMENT & PLAN NOTE
This is sepsis (without associated acute organ dysfunction).  Initially attributed to UTI and based on leukocytosis and tachycardia .  Further workup suggest due to SIRS from GI bleed  DC antibiotics, Hep-Lock IV fluids

## 2018-12-31 NOTE — ED NOTES
Assist RN: Patient complaining of neck, chest, and head pain. ERP updated on patient's status and new orders placed.

## 2018-12-31 NOTE — H&P
Hospital Medicine History & Physical Note    Date of Service  12/31/2018    Primary Care Physician  Tabitha Bautista M.D.    Consultants  none    Code Status  Full code     Chief Complaint  Abdominal pain, low blood pressure    History of Presenting Illness  48 y.o. 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  She at some point felt that her blood pressure was high.  She subsequently took her propranolol tablet.  She then began to feel lightheaded, and dizzy and rechecked her blood pressure, which she found to be below 80 systolic.  She subsequently presented to the emergency department.  She does report some abdominal pain, although this is nonspecific, and not associated with fever, chills, dysuria or other symptoms.    Review of Systems  Review of Systems   Constitutional: Negative.    HENT: Negative.    Eyes: Negative.    Respiratory: Negative.    Cardiovascular: Negative.    Gastrointestinal: Negative.    Genitourinary: Negative.    Musculoskeletal: Negative.    Skin: Negative.    Neurological: Positive for dizziness and headaches.   Endo/Heme/Allergies: Negative.    Psychiatric/Behavioral: Negative.        Past Medical History   has a past medical history of Blind; C. difficile diarrhea (2013); Chronic daily headache; Depression; Fall; H/O total hysterectomy; Hydrocephalus; Hydrocephalus; Jaundice; Legally blind; Migraine without aura, without mention of intractable migraine without mention of status migrainosus; Other specified symptom associated with female genital organs; Pain; and Psychiatric problem.    Surgical History   has a past surgical history that includes laparoscopy (8/8/08); lysis adhesions general (12/10/2013); cystoscopy (12/10/2013); exploratory laparotomy (12/10/2013);  appendectomy (12/10/2013); abdominal hysterectomy total (12/10/2013); aakash by laparoscopy (N/A, 8/13/2015); cholecystectomy (N/A, 8/13/2015); other; and gastroscopy-endo (N/A, 8/24/2017).     Family History  family history includes Hypertension in her father and mother.     Social History   reports that she quit smoking about 16 months ago. Her smoking use included Cigars. She started smoking about 14 years ago. She has a 10.00 pack-year smoking history. She has never used smokeless tobacco. She reports that she drinks alcohol. She reports that she does not use drugs.    Allergies  Allergies   Allergen Reactions   • Tape Rash     Medical tape. Per patient, paper tape ok.       Medications  Prior to Admission Medications   Prescriptions Last Dose Informant Patient Reported? Taking?   LORazepam (ATIVAN) 1 MG Tab  Patient Yes No   Sig: Take 1 mg by mouth 1 time daily as needed (sleep).   acetaminophen (TYLENOL) 325 MG Tab   Yes No   Sig: Take 2 Tabs by mouth every 6 hours as needed (Mild Pain; (Pain scale 1-3); Temp greater than 100.5 F).   acyclovir (ZOVIRAX) 800 MG Tab   No No   Sig: Take 1 Tab by mouth 5 Times a Day for 7 days.   acyclovir (ZOVIRAX) 800 MG Tab   No No   Sig: Take 1 Tab by mouth 5 Times a Day.   amitriptyline (ELAVIL) 100 MG Tab   Yes No   Sig: Take 100 mg by mouth every evening.   esomeprazole (NEXIUM) 40 MG delayed-release capsule   Yes No   Sig: Take 40 mg by mouth every morning before breakfast.   famotidine (PEPCID) 40 MG Tab   Yes No   Sig: Take 40 mg by mouth every evening.   gabapentin (NEURONTIN) 400 MG Cap   No No   Sig: Take 400mg PO at 1900, then take 400mg PO before bed   indomethacin SR (INDOCIN SR) 75 MG Cap CR   No No   Sig: Take 1 Cap by mouth 2 times daily with meals as needed (if experiencing migraine).   lidocaine (LIDODERM) 5 % Patch   No No   Sig: Apply 1 Patch to skin as directed every 24 hours.   lidocaine (LIDODERM) 5 % Patch   No No   Sig: Apply 1 Patch to skin as directed  every 24 hours.   propranolol CR (INDERAL LA) 120 MG CAPSULE SR 24 HR   Yes No   Sig: Take 120 mg by mouth every day.   topiramate (TOPAMAX) 100 MG Tab   Yes No   Sig: Take 100 mg by mouth 2 times a day.      Facility-Administered Medications: None       Physical Exam  Temp:  [36.1 °C (96.9 °F)] 36.1 °C (96.9 °F)  Pulse:  [] 99  Resp:  [14-20] 18  BP: (86)/(53) 86/53    Physical Exam   Constitutional: She is oriented to person, place, and time. She appears well-developed and well-nourished. No distress.   HENT:   Head: Normocephalic and atraumatic.   Eyes: Pupils are equal, round, and reactive to light. Conjunctivae are normal.   Neck: Normal range of motion. Neck supple. No tracheal deviation present. No thyromegaly present.   Cardiovascular: Normal rate, regular rhythm and normal heart sounds.  Exam reveals no gallop and no friction rub.    No murmur heard.  Pulmonary/Chest: Effort normal and breath sounds normal. No respiratory distress. She has no wheezes. She has no rales.   Abdominal: Soft. Bowel sounds are normal. She exhibits no distension. There is no tenderness. There is no rebound.   Musculoskeletal: Normal range of motion. She exhibits no edema.   Lymphadenopathy:     She has no cervical adenopathy.   Neurological: She is alert and oriented to person, place, and time. No cranial nerve deficit.   Skin: Skin is warm and dry. She is not diaphoretic.   Psychiatric: She has a normal mood and affect.   Nursing note and vitals reviewed.      Laboratory:  Recent Labs      12/30/18 2037   WBC  16.7*   RBC  3.37*   HEMOGLOBIN  10.4*   HEMATOCRIT  32.1*   MCV  95.3   MCH  30.9   MCHC  32.4*   RDW  49.9   PLATELETCT  433   MPV  11.0     Recent Labs      12/30/18 2037   SODIUM  138   POTASSIUM  4.7   CHLORIDE  110   CO2  20   GLUCOSE  92   BUN  49*   CREATININE  0.78   CALCIUM  9.3     Recent Labs      12/30/18 2037   ALTSGPT  20   ASTSGOT  18   ALKPHOSPHAT  106*   TBILIRUBIN  0.7   LIPASE  27   GLUCOSE   92                 Recent Labs      12/30/18   2037   TROPONINI  <0.01       Urinalysis:    Recent Labs      12/30/18   2208   SPECGRAVITY  1.023   GLUCOSEUR  Negative   KETONES  Trace*   NITRITE  Negative   LEUKESTERAS  Moderate*   WBCURINE  20-50*   RBCURINE  2-5*   BACTERIA  Few*   EPITHELCELL  Moderate*        Imaging:  DX-CHEST-PORTABLE (1 VIEW)   Final Result         No acute cardiac or pulmonary abnormality is identified.            Assessment/Plan:  I anticipate this patient will require at least two midnights for appropriate medical management, necessitating inpatient admission.    * UTI (urinary tract infection)- (present on admission)   Assessment & Plan    Start rocephin, monitor culture results.  Supportive care.       Congenital hydrocephalus (HCC)- (present on admission)   Assessment & Plan    Stable     Blindness- (present on admission)   Assessment & Plan    Occurred at age 11 due to optic nerve ischemia from underlying hydrocephalus     Sepsis (HCC)   Assessment & Plan    This is sepsis (without associated acute organ dysfunction).  Due to UTI and based on leukocytosis and tachycardia      Normocytic anemia   Assessment & Plan    Chronic disease without evidence of bleed.  Transfuse as needed for hemoglobin less than 7 g/dL     Essential hypertension- (present on admission)   Assessment & Plan    Variable control, reportedly out of control prior to arrival.  We will monitor on current medication regimen as currently controlled         VTE prophylaxis: SCD, lovenox

## 2018-12-31 NOTE — ASSESSMENT & PLAN NOTE
Due to acute GI bleed.  EGD findings of esophageal ulcer--hemoglobin stabilized post packed RBC transfusion.  Follow him hemoglobin  Transfuse if hypo-tensive or hemoglobin less than 7

## 2018-12-31 NOTE — ASSESSMENT & PLAN NOTE
Initial hypotension due to GI bleed resolved.  Persistent tachycardia likely rebound versus chronic - has been off of Inderal.  Blood pressure now stabilized, restart Inderal lower dose  Monitor telemetry, blood pressure

## 2018-12-31 NOTE — PROGRESS NOTES
Received bedside report from RN, pt care assumed, VSS, pt assessment complete. Pt AAOx4, no c/o pain at this time. No signs of acute distress noted at this time. POC discussed with pt and verbalizes no questions. Pt denies any additional needs at this time. Bed in lowest position, bed alarm on, pt educated on fall risk and verbalized understanding, call light within reach, hourly rounding initiated. In a sinus rhythm. Patient is legally blind so she needs assistance for ADL's

## 2018-12-31 NOTE — ED TRIAGE NOTES
Rasheeda Spearise Natacha  48 y.o. female  Chief Complaint   Patient presents with   • Hypotension         Pt is alert and oriented, speaking in full sentences, follows commands and responds appropriately to questions. Resp are even and unlabored. No behavioral indicators of pain.     Pt did take her prescribed propranolol at home for her BP was 140/70, pt then had a drop in her BP to 70/40 at home when EMS arrived. Upon arrival BP now 86/53  .

## 2018-12-31 NOTE — PROGRESS NOTES
Accepted patient  Orthostatics mildly positive  Hold inderal  PT/OT  Continue IVF;s  COnsider repeat scanning of head

## 2018-12-31 NOTE — ED PROVIDER NOTES
ED Provider Note    Scribed for Corbin Gates M.D. by Alex Mcnamara. 12/30/2018  8:19 PM    Means of arrival: EMS  History obtained from: Patient   History limited by: None     CHIEF COMPLAINT  Chief Complaint   Patient presents with   • Hypotension       HPI    Rasheeda Manzano is a 48 y.o. female presenting with hypotension. The patients states that she was having elevated blood pressure at 170 systolic and so took her prescribed propranolol at home and then her blood pressure dropped as low as 70/40 at home. Her blood pressure now in the ER is 86/53. She states she was very dizzy and felt as if she was going to pass out and so called EMS. The patient states she has a history of hydrocephalus at age 11 which caused total blindness. She additionally states she is feeling some chest pressure and a mild headache. Denies nausea, vomiting, diarrhea, shortness of breath, weakness, numbness, tingling, confusion.     REVIEW OF SYSTEMS  See HPI for further details.   Pertinent positives include: hypotension, chest pressure, dizziness   Pertinent negative include: nausea, vomiting, diarrhea, shortness of breath, weakness, numbness, tingling, confusion.  10 + review of systems otherwise negative     PAST MEDICAL HISTORY   has a past medical history of Blind; C. difficile diarrhea (2013); Chronic daily headache; Depression; Fall; H/O total hysterectomy; Hydrocephalus; Hydrocephalus; Jaundice; Legally blind; Migraine without aura, without mention of intractable migraine without mention of status migrainosus; Other specified symptom associated with female genital organs; Pain; and Psychiatric problem.    SOCIAL HISTORY  Social History     Social History Main Topics   • Smoking status: Former Smoker     Packs/day: 1.00     Years: 10.00     Types: Cigars     Start date: 5/1/2004     Quit date: 8/9/2017   • Smokeless tobacco: Never Used      Comment:  CIGAR/day    • Alcohol use Yes      Comment: socially   • Drug use:  "No   • Sexual activity: Yes     Partners: Male       SURGICAL HISTORY   has a past surgical history that includes laparoscopy (8/8/08); lysis adhesions general (12/10/2013); cystoscopy (12/10/2013); exploratory laparotomy (12/10/2013); appendectomy (12/10/2013); abdominal hysterectomy total (12/10/2013); aakash by laparoscopy (N/A, 8/13/2015); cholecystectomy (N/A, 8/13/2015); other; and gastroscopy-endo (N/A, 8/24/2017).    CURRENT MEDICATIONS  Home Medications     Reviewed by Curtis Ding R.N. (Registered Nurse) on 12/30/18 at 2009  Med List Status: Partial   Medication Last Dose Status   acetaminophen (TYLENOL) 325 MG Tab  Active   acyclovir (ZOVIRAX) 800 MG Tab  Active   acyclovir (ZOVIRAX) 800 MG Tab  Active   amitriptyline (ELAVIL) 100 MG Tab  Active   esomeprazole (NEXIUM) 40 MG delayed-release capsule  Active   famotidine (PEPCID) 40 MG Tab  Active   gabapentin (NEURONTIN) 400 MG Cap  Active   indomethacin SR (INDOCIN SR) 75 MG Cap CR  Active   lidocaine (LIDODERM) 5 % Patch  Active   lidocaine (LIDODERM) 5 % Patch  Active   LORazepam (ATIVAN) 1 MG Tab  Active   propranolol CR (INDERAL LA) 120 MG CAPSULE SR 24 HR  Active   topiramate (TOPAMAX) 100 MG Tab  Active                ALLERGIES  Allergies   Allergen Reactions   • Tape Rash     Medical tape. Per patient, paper tape ok.       PHYSICAL EXAM  VITAL SIGNS: BP (!) 86/53   Pulse 99   Temp 36.1 °C (96.9 °F) (Temporal)   Resp 15   Ht 1.626 m (5' 4\")   Wt 65 kg (143 lb 4.8 oz)   LMP 11/27/2013   BMI 24.60 kg/m²    SpO2: I interpret this pulse oximetry as normal  Constitutional: Well developed, Well nourished, Mild distress, Non-toxic appearance.   HENT: Normocephalic, Atraumatic, Bilateral external ears normal, Oropharynx moist, No oral exudates.   Eyes: Sluggishly reactive ut equal, Conjunctiva normal, No discharge.   Neck: No tenderness, Supple, No stridor.   Lymphatic: No lymphadenopathy noted.   Cardiovascular: Normal heart rate, Normal " rhythm.   Thorax & Lungs: Clear to auscultation bilaterally, No respiratory distress, No wheezing, No crackles.   Abdomen: Soft, No tenderness, No masses, No pulsatile masses.   Skin: Warm, Dry, No erythema, No rash.   Extremities:, No edema No cyanosis.   Musculoskeletal: No tenderness to palpation or major deformities noted.  Intact distal pulses  Neurologic: Awake, alert. Moves all extremities spontaneously.  Psychiatric: Anxious, Affect normal, Judgment normal.    DIAGNOSTIC STUDIES / PROCEDURES    EKG Interpretation:  EKG (my interpretation): Normal sinus rhythm, rate 98, axis normal, no ST or T-wave abnormalities,  normal EKG, is similar to prior.    LABORATORY  Results for orders placed or performed during the hospital encounter of 12/30/18   CBC WITH DIFFERENTIAL   Result Value Ref Range    WBC 16.7 (H) 4.8 - 10.8 K/uL    RBC 3.37 (L) 4.20 - 5.40 M/uL    Hemoglobin 10.4 (L) 12.0 - 16.0 g/dL    Hematocrit 32.1 (L) 37.0 - 47.0 %    MCV 95.3 81.4 - 97.8 fL    MCH 30.9 27.0 - 33.0 pg    MCHC 32.4 (L) 33.6 - 35.0 g/dL    RDW 49.9 35.9 - 50.0 fL    Platelet Count 433 164 - 446 K/uL    MPV 11.0 9.0 - 12.9 fL    Neutrophils-Polys 72.40 (H) 44.00 - 72.00 %    Lymphocytes 21.20 (L) 22.00 - 41.00 %    Monocytes 4.40 0.00 - 13.40 %    Eosinophils 0.80 0.00 - 6.90 %    Basophils 0.80 0.00 - 1.80 %    Immature Granulocytes 0.40 0.00 - 0.90 %    Nucleated RBC 0.00 /100 WBC    Neutrophils (Absolute) 12.13 (H) 2.00 - 7.15 K/uL    Lymphs (Absolute) 3.55 1.00 - 4.80 K/uL    Monos (Absolute) 0.73 0.00 - 0.85 K/uL    Eos (Absolute) 0.13 0.00 - 0.51 K/uL    Baso (Absolute) 0.13 (H) 0.00 - 0.12 K/uL    Immature Granulocytes (abs) 0.06 0.00 - 0.11 K/uL    NRBC (Absolute) 0.00 K/uL   COMP METABOLIC PANEL   Result Value Ref Range    Sodium 138 135 - 145 mmol/L    Potassium 4.7 3.6 - 5.5 mmol/L    Chloride 110 96 - 112 mmol/L    Co2 20 20 - 33 mmol/L    Anion Gap 8.0 0.0 - 11.9    Glucose 92 65 - 99 mg/dL    Bun 49 (H) 8 - 22 mg/dL     Creatinine 0.78 0.50 - 1.40 mg/dL    Calcium 9.3 8.5 - 10.5 mg/dL    AST(SGOT) 18 12 - 45 U/L    ALT(SGPT) 20 2 - 50 U/L    Alkaline Phosphatase 106 (H) 30 - 99 U/L    Total Bilirubin 0.7 0.1 - 1.5 mg/dL    Albumin 3.9 3.2 - 4.9 g/dL    Total Protein 6.5 6.0 - 8.2 g/dL    Globulin 2.6 1.9 - 3.5 g/dL    A-G Ratio 1.5 g/dL   LIPASE   Result Value Ref Range    Lipase 27 11 - 82 U/L   TROPONIN   Result Value Ref Range    Troponin I <0.01 0.00 - 0.04 ng/mL   URINALYSIS CULTURE, IF INDICATED   Result Value Ref Range    Color Yellow     Character Cloudy (A)     Specific Gravity 1.023 <1.035    Ph 5.5 5.0 - 8.0    Glucose Negative Negative mg/dL    Ketones Trace (A) Negative mg/dL    Protein Negative Negative mg/dL    Bilirubin Negative Negative    Urobilinogen, Urine 0.2 Negative    Nitrite Negative Negative    Leukocyte Esterase Moderate (A) Negative    Occult Blood Negative Negative    Micro Urine Req Microscopic    ESTIMATED GFR   Result Value Ref Range    GFR If African American >60 >60 mL/min/1.73 m 2    GFR If Non African American >60 >60 mL/min/1.73 m 2   URINE MICROSCOPIC (W/UA)   Result Value Ref Range    WBC 20-50 (A) /hpf    RBC 2-5 (A) /hpf    Bacteria Few (A) None /hpf    Epithelial Cells Moderate (A) /hpf    Hyaline Cast 0-2 /lpf   POC UA   Result Value Ref Range    POC Color Sue     POC Appearance Cloudy (A)     POC Glucose Negative Negative mg/dL    POC Ketones 40 (A) Negative mg/dL    POC Specific Gravity 1.010 1.005 - 1.030    POC Blood Trace-intact (A) Negative    POC Urine PH 5.5 5.0 - 8.0    POC Protein Negative Negative mg/dL    POC Nitrites Negative Negative    POC Leukocyte Esterase Small (A) Negative   POC URINE PREGNANCY   Result Value Ref Range    POC Urine Pregnancy Test Negative Negative   EKG   Result Value Ref Range    Report       [facility] Emergency Dept.    Test Date:  2018-12-30  Pt Name:    NILAY MANN               Department: ER  MRN:        [id number]                       Room:        04  Gender:     Female                       Technician: [id number]  :        1970                   Requested By:ER TRIAGE PROTOCOL  Order #:    [id number]                    Reading MD:    Measurements  Intervals                                Axis  Rate:       98                           P:          38  MS:         116                          QRS:        65  QRSD:       70                           T:          86  QT:         360  QTc:        460    Interpretive Statements  SINUS RHYTHM  BORDERLINE SHORT MS INTERVAL  Compared to ECG 2018 01:02:31  Sinus tachycardia no longer present  First degree AV block no longer present           RADIOLOGY    DX-CHEST-PORTABLE (1 VIEW)   Final Result         No acute cardiac or pulmonary abnormality is identified.          Chest XR, 1 view (my read): No focal infiltrates or large effusion.  Normal mediastinum. No prior films were immediately available for comparison.  Impression: Normal chest x-ray      COURSE & MEDICAL DECISION MAKING  Pertinent Labs & Imaging studies reviewed. (See chart for details)    Differential diagnoses include but not limited to: Medication side effect, overdose, dehydration, sepsis, UTI, pyelonephritis    8:19 PM - Patient seen and examined at bedside. Discussed plan of care, including labs, imaging, and fluids. Patient agrees to the plan of care. The patient will be resuscitated with NS IV for hypotension and medicated with Ecotrin 325 mg. Ordered for chest x-ray, UA, urine preg, CBC, CMP, Lipase, Troponin, and EKG to evaluate her symptoms. IV with assistance of US placed by me at this time.     HYDRATION: Based on the patient's presentation of Hypotension the patient was given IV fluids. IV Hydration was used because oral hydration was not adequate alone. Upon recheck following hydration, the patient was mildly improved. She however states she is still not feeling completely well and would like to be admitted  because she is concerned her blood pressure will drop again. Patient has received 3 L of fluids thus far I am agreeable.     12:34 AM I discussed the patient's case and the above findings with Swathi (Hospitalist) who agreed to admit the patient. Patient update and agreeable.       Vitals:    12/30/18 2345 12/31/18 0000 12/31/18 0015 12/31/18 0030   BP:       Pulse: 89 97 97 (!) 106   Resp: 20 16 19 18   Temp:       TempSrc:       SpO2: 100% 99% 99% 98%   Weight:       Height:           Medications   NS infusion 1,000 mL (not administered)   NS infusion 2,000 mL (2,000 mL Intravenous New Bag 12/30/18 2059)   aspirin EC (ECOTRIN) tablet 325 mg (325 mg Oral Given 12/30/18 2058)   acetaminophen (TYLENOL) tablet 650 mg (650 mg Oral Given 12/30/18 2202)   ketorolac (TORADOL) injection 15 mg (15 mg Intravenous Given 12/30/18 2202)   cefTRIAXone (ROCEPHIN) 1 g in  mL IVPB (0 g Intravenous Stopped 12/30/18 2320)     48-year-old female with history of blindness related to distant hydrocephalus, hypertension, presenting for hypotension and dizziness.  States was hypertensive this afternoon and took propanolol, then recheck blood pressure and it was low in the 80s.  Called EMS.  No other acute symptoms.  Patient given fluids, persistently hypotensive however minimally symptomatic.  Labs are unremarkable, no significant anemia, patient does not endorse any bleeding.  Urinalysis positive for infection, given antibiotics.  No evidence of sepsis.  Patient persistently slightly hypotensive despite fluids, patient feeling safe to go home as she is still having some orthostatic symptoms.  With current blindness and the fact that the patient lives alone, I do not feel safe for discharge at this time.  Likely will just need more fluids and time for propranolol to wear off.  Consult the medicine for admission for further evaluation and management.  Patient hemodynamically stable and comfortable at time of  admission.    DISPOSITION:  Patient will be admitted to Dr. Echevarria in stable condition.    FINAL IMPRESSION  Visit Diagnoses     ICD-10-CM   1. Hypotension, unspecified hypotension type I95.9   2. Acute cystitis without hematuria N30.00        Alex SHARMA (Scribe), am scribing for, and in the presence of, Corbin Gates M.D..    Electronically signed by: Alex Mcnamara (Scribe), 12/30/2018    Corbin SHARMA M.D. personally performed the services described in this documentation, as scribed by Alex Mcnamara in my presence, and it is both accurate and complete. C.     The note accurately reflects work and decisions made by me.  Corbin Gates  12/31/2018  2:12 AM

## 2018-12-31 NOTE — ASSESSMENT & PLAN NOTE
Chronic headaches-exacerbated by hypotension, severe anemia-  neuro exam nonfocal/stable.  Patient has follow-up with Dr. Hogan ,  her neurosurgeon next week.  Follow clinically

## 2019-01-01 LAB
ABO GROUP BLD: NORMAL
ANION GAP SERPL CALC-SCNC: 8 MMOL/L (ref 0–11.9)
BACTERIA UR CULT: NORMAL
BARCODED ABORH UBTYP: 6200
BARCODED PRD CODE UBPRD: NORMAL
BARCODED UNIT NUM UBUNT: NORMAL
BASOPHILS # BLD AUTO: 0.5 % (ref 0–1.8)
BASOPHILS # BLD AUTO: 0.6 % (ref 0–1.8)
BASOPHILS # BLD: 0.04 K/UL (ref 0–0.12)
BASOPHILS # BLD: 0.04 K/UL (ref 0–0.12)
BLD GP AB SCN SERPL QL: NORMAL
BUN SERPL-MCNC: 19 MG/DL (ref 8–22)
CALCIUM SERPL-MCNC: 8.1 MG/DL (ref 8.5–10.5)
CHLORIDE SERPL-SCNC: 114 MMOL/L (ref 96–112)
CO2 SERPL-SCNC: 18 MMOL/L (ref 20–33)
COMPONENT R 8504R: NORMAL
CREAT SERPL-MCNC: 0.61 MG/DL (ref 0.5–1.4)
EOSINOPHIL # BLD AUTO: 0.1 K/UL (ref 0–0.51)
EOSINOPHIL # BLD AUTO: 0.1 K/UL (ref 0–0.51)
EOSINOPHIL NFR BLD: 1.3 % (ref 0–6.9)
EOSINOPHIL NFR BLD: 1.5 % (ref 0–6.9)
ERYTHROCYTE [DISTWIDTH] IN BLOOD BY AUTOMATED COUNT: 46.3 FL (ref 35.9–50)
ERYTHROCYTE [DISTWIDTH] IN BLOOD BY AUTOMATED COUNT: 50.8 FL (ref 35.9–50)
FERRITIN SERPL-MCNC: 36 NG/ML (ref 10–291)
GLUCOSE SERPL-MCNC: 104 MG/DL (ref 65–99)
HCT VFR BLD AUTO: 16.3 % (ref 37–47)
HCT VFR BLD AUTO: 23.9 % (ref 37–47)
HGB BLD-MCNC: 5.3 G/DL (ref 12–16)
HGB BLD-MCNC: 5.4 G/DL (ref 12–16)
HGB BLD-MCNC: 8 G/DL (ref 12–16)
HGB BLD-MCNC: 8.2 G/DL (ref 12–16)
IMM GRANULOCYTES # BLD AUTO: 0.02 K/UL (ref 0–0.11)
IMM GRANULOCYTES # BLD AUTO: 0.03 K/UL (ref 0–0.11)
IMM GRANULOCYTES NFR BLD AUTO: 0.3 % (ref 0–0.9)
IMM GRANULOCYTES NFR BLD AUTO: 0.4 % (ref 0–0.9)
IRON SATN MFR SERPL: 34 % (ref 15–55)
IRON SERPL-MCNC: 102 UG/DL (ref 40–170)
LYMPHOCYTES # BLD AUTO: 2.34 K/UL (ref 1–4.8)
LYMPHOCYTES # BLD AUTO: 2.59 K/UL (ref 1–4.8)
LYMPHOCYTES NFR BLD: 31 % (ref 22–41)
LYMPHOCYTES NFR BLD: 40 % (ref 22–41)
MCH RBC QN AUTO: 30.6 PG (ref 27–33)
MCH RBC QN AUTO: 31.2 PG (ref 27–33)
MCHC RBC AUTO-ENTMCNC: 32.3 G/DL (ref 33.6–35)
MCHC RBC AUTO-ENTMCNC: 34 G/DL (ref 33.6–35)
MCV RBC AUTO: 91.6 FL (ref 81.4–97.8)
MCV RBC AUTO: 94.8 FL (ref 81.4–97.8)
MONOCYTES # BLD AUTO: 0.44 K/UL (ref 0–0.85)
MONOCYTES # BLD AUTO: 0.46 K/UL (ref 0–0.85)
MONOCYTES NFR BLD AUTO: 5.8 % (ref 0–13.4)
MONOCYTES NFR BLD AUTO: 7.1 % (ref 0–13.4)
NEUTROPHILS # BLD AUTO: 3.26 K/UL (ref 2–7.15)
NEUTROPHILS # BLD AUTO: 4.61 K/UL (ref 2–7.15)
NEUTROPHILS NFR BLD: 50.5 % (ref 44–72)
NEUTROPHILS NFR BLD: 61 % (ref 44–72)
NRBC # BLD AUTO: 0 K/UL
NRBC # BLD AUTO: 0 K/UL
NRBC BLD-RTO: 0 /100 WBC
NRBC BLD-RTO: 0 /100 WBC
PLATELET # BLD AUTO: 208 K/UL (ref 164–446)
PLATELET # BLD AUTO: 234 K/UL (ref 164–446)
PMV BLD AUTO: 11 FL (ref 9–12.9)
PMV BLD AUTO: 11.4 FL (ref 9–12.9)
POTASSIUM SERPL-SCNC: 3.7 MMOL/L (ref 3.6–5.5)
PRODUCT TYPE UPROD: NORMAL
RBC # BLD AUTO: 1.73 M/UL (ref 4.2–5.4)
RBC # BLD AUTO: 2.63 M/UL (ref 4.2–5.4)
RH BLD: NORMAL
SIGNIFICANT IND 70042: NORMAL
SITE SITE: NORMAL
SODIUM SERPL-SCNC: 140 MMOL/L (ref 135–145)
SOURCE SOURCE: NORMAL
TIBC SERPL-MCNC: 302 UG/DL (ref 250–450)
UNIT STATUS USTAT: NORMAL
WBC # BLD AUTO: 6.5 K/UL (ref 4.8–10.8)
WBC # BLD AUTO: 7.6 K/UL (ref 4.8–10.8)

## 2019-01-01 PROCEDURE — 700105 HCHG RX REV CODE 258: Performed by: HOSPITALIST

## 2019-01-01 PROCEDURE — 700111 HCHG RX REV CODE 636 W/ 250 OVERRIDE (IP): Performed by: HOSPITALIST

## 2019-01-01 PROCEDURE — 80048 BASIC METABOLIC PNL TOTAL CA: CPT

## 2019-01-01 PROCEDURE — 770020 HCHG ROOM/CARE - TELE (206)

## 2019-01-01 PROCEDURE — 85018 HEMOGLOBIN: CPT | Mod: 91

## 2019-01-01 PROCEDURE — 700102 HCHG RX REV CODE 250 W/ 637 OVERRIDE(OP): Performed by: HOSPITALIST

## 2019-01-01 PROCEDURE — 99233 SBSQ HOSP IP/OBS HIGH 50: CPT | Performed by: HOSPITALIST

## 2019-01-01 PROCEDURE — A9270 NON-COVERED ITEM OR SERVICE: HCPCS | Performed by: HOSPITALIST

## 2019-01-01 PROCEDURE — 700105 HCHG RX REV CODE 258: Performed by: SPECIALIST

## 2019-01-01 PROCEDURE — 86901 BLOOD TYPING SEROLOGIC RH(D): CPT

## 2019-01-01 PROCEDURE — 86850 RBC ANTIBODY SCREEN: CPT

## 2019-01-01 PROCEDURE — 700105 HCHG RX REV CODE 258

## 2019-01-01 PROCEDURE — 86900 BLOOD TYPING SEROLOGIC ABO: CPT

## 2019-01-01 PROCEDURE — C9113 INJ PANTOPRAZOLE SODIUM, VIA: HCPCS | Performed by: HOSPITALIST

## 2019-01-01 PROCEDURE — 86923 COMPATIBILITY TEST ELECTRIC: CPT

## 2019-01-01 PROCEDURE — 36430 TRANSFUSION BLD/BLD COMPNT: CPT

## 2019-01-01 PROCEDURE — 83540 ASSAY OF IRON: CPT

## 2019-01-01 PROCEDURE — P9016 RBC LEUKOCYTES REDUCED: HCPCS

## 2019-01-01 PROCEDURE — 83550 IRON BINDING TEST: CPT

## 2019-01-01 PROCEDURE — 85025 COMPLETE CBC W/AUTO DIFF WBC: CPT | Mod: 91

## 2019-01-01 PROCEDURE — 36415 COLL VENOUS BLD VENIPUNCTURE: CPT

## 2019-01-01 PROCEDURE — 82728 ASSAY OF FERRITIN: CPT

## 2019-01-01 PROCEDURE — 700111 HCHG RX REV CODE 636 W/ 250 OVERRIDE (IP): Performed by: SPECIALIST

## 2019-01-01 RX ORDER — KETOROLAC TROMETHAMINE 30 MG/ML
30 INJECTION, SOLUTION INTRAMUSCULAR; INTRAVENOUS ONCE
Status: COMPLETED | OUTPATIENT
Start: 2019-01-01 | End: 2019-01-01

## 2019-01-01 RX ORDER — SODIUM CHLORIDE 9 MG/ML
500 INJECTION, SOLUTION INTRAVENOUS ONCE
Status: COMPLETED | OUTPATIENT
Start: 2019-01-01 | End: 2019-01-01

## 2019-01-01 RX ORDER — PANTOPRAZOLE SODIUM 40 MG/10ML
40 INJECTION, POWDER, LYOPHILIZED, FOR SOLUTION INTRAVENOUS 2 TIMES DAILY
Status: DISCONTINUED | OUTPATIENT
Start: 2019-01-01 | End: 2019-01-01

## 2019-01-01 RX ORDER — ACYCLOVIR 800 MG/1
800 TABLET ORAL
Status: DISCONTINUED | OUTPATIENT
Start: 2019-01-01 | End: 2019-01-04 | Stop reason: HOSPADM

## 2019-01-01 RX ORDER — MORPHINE SULFATE 4 MG/ML
1-4 INJECTION, SOLUTION INTRAMUSCULAR; INTRAVENOUS
Status: DISCONTINUED | OUTPATIENT
Start: 2019-01-01 | End: 2019-01-04 | Stop reason: HOSPADM

## 2019-01-01 RX ORDER — SODIUM CHLORIDE 9 MG/ML
INJECTION, SOLUTION INTRAVENOUS
Status: COMPLETED
Start: 2019-01-01 | End: 2019-01-01

## 2019-01-01 RX ADMIN — SODIUM CHLORIDE 250 ML: 9 INJECTION, SOLUTION INTRAVENOUS at 00:20

## 2019-01-01 RX ADMIN — STANDARDIZED SENNA CONCENTRATE AND DOCUSATE SODIUM 2 TABLET: 8.6; 5 TABLET, FILM COATED ORAL at 17:42

## 2019-01-01 RX ADMIN — SODIUM CHLORIDE 500 ML: 9 INJECTION, SOLUTION INTRAVENOUS at 11:25

## 2019-01-01 RX ADMIN — KETOROLAC TROMETHAMINE 30 MG: 30 INJECTION, SOLUTION INTRAMUSCULAR at 07:32

## 2019-01-01 RX ADMIN — ACYCLOVIR 800 MG: 800 TABLET ORAL at 23:34

## 2019-01-01 RX ADMIN — MORPHINE SULFATE 4 MG: 4 INJECTION INTRAVENOUS at 11:20

## 2019-01-01 RX ADMIN — OMEPRAZOLE 20 MG: 20 CAPSULE, DELAYED RELEASE ORAL at 07:32

## 2019-01-01 RX ADMIN — MORPHINE SULFATE 4 MG: 4 INJECTION INTRAVENOUS at 14:52

## 2019-01-01 RX ADMIN — SODIUM CHLORIDE: 900 INJECTION INTRAVENOUS at 00:22

## 2019-01-01 RX ADMIN — GABAPENTIN 400 MG: 400 CAPSULE ORAL at 05:47

## 2019-01-01 RX ADMIN — SODIUM CHLORIDE 80 MG: 9 INJECTION, SOLUTION INTRAVENOUS at 12:17

## 2019-01-01 RX ADMIN — SODIUM CHLORIDE 500 ML: 9 INJECTION, SOLUTION INTRAVENOUS at 12:27

## 2019-01-01 RX ADMIN — GABAPENTIN 400 MG: 400 CAPSULE ORAL at 17:42

## 2019-01-01 RX ADMIN — ENOXAPARIN SODIUM 40 MG: 100 INJECTION SUBCUTANEOUS at 05:48

## 2019-01-01 RX ADMIN — ACETAMINOPHEN 650 MG: 325 TABLET, FILM COATED ORAL at 06:03

## 2019-01-01 RX ADMIN — LORAZEPAM 1 MG: 1 TABLET ORAL at 22:19

## 2019-01-01 RX ADMIN — SODIUM CHLORIDE 8 MG/HR: 9 INJECTION, SOLUTION INTRAVENOUS at 23:35

## 2019-01-01 RX ADMIN — CEFTRIAXONE SODIUM 2 G: 2 INJECTION, POWDER, FOR SOLUTION INTRAMUSCULAR; INTRAVENOUS at 05:49

## 2019-01-01 RX ADMIN — SODIUM CHLORIDE: 9 INJECTION, SOLUTION INTRAVENOUS at 10:30

## 2019-01-01 RX ADMIN — MORPHINE SULFATE 4 MG: 4 INJECTION INTRAVENOUS at 23:34

## 2019-01-01 RX ADMIN — MORPHINE SULFATE 4 MG: 4 INJECTION INTRAVENOUS at 19:21

## 2019-01-01 RX ADMIN — SODIUM CHLORIDE 8 MG/HR: 9 INJECTION, SOLUTION INTRAVENOUS at 12:17

## 2019-01-01 RX ADMIN — ACYCLOVIR 800 MG: 800 TABLET ORAL at 19:32

## 2019-01-01 ASSESSMENT — PAIN SCALES - GENERAL
PAINLEVEL_OUTOF10: 8
PAINLEVEL_OUTOF10: 9
PAINLEVEL_OUTOF10: 0

## 2019-01-01 ASSESSMENT — ENCOUNTER SYMPTOMS
CHILLS: 0
FOCAL WEAKNESS: 0
FLANK PAIN: 0
PALPITATIONS: 0
FEVER: 0
WEAKNESS: 0
HALLUCINATIONS: 0
VOMITING: 0
ABDOMINAL PAIN: 1
DIZZINESS: 1
COUGH: 0
WHEEZING: 0
DIARRHEA: 0
NECK PAIN: 0
HEADACHES: 1
SHORTNESS OF BREATH: 0
TREMORS: 0
STRIDOR: 0
SPEECH CHANGE: 0
BACK PAIN: 0
SENSORY CHANGE: 0

## 2019-01-01 ASSESSMENT — PATIENT HEALTH QUESTIONNAIRE - PHQ9
2. FEELING DOWN, DEPRESSED, IRRITABLE, OR HOPELESS: NOT AT ALL
1. LITTLE INTEREST OR PLEASURE IN DOING THINGS: NOT AT ALL
SUM OF ALL RESPONSES TO PHQ9 QUESTIONS 1 AND 2: 0

## 2019-01-01 ASSESSMENT — LIFESTYLE VARIABLES: SUBSTANCE_ABUSE: 0

## 2019-01-01 NOTE — PROGRESS NOTES
Repeat Hgb 5.3 .  Rectal exam : heme + black stool.  Will give IVF bolus, trx Prbc.  Start IV PPI . Fu serial Hgb. Neuro stable. HA, dizziness likely from symptomatic anemia and low BP. Hold CT head and re assess.  tx to ICU if clinically worsens  Discussed with GI , Dr. Fernando to consult.

## 2019-01-01 NOTE — PROGRESS NOTES
REC'D BEDSIDE REPORT FROM JUAN RN*. PT AOX4*. PT DENIES PAIN, SHORTNESS OF BREATH. PLAN OF CARE AND FALL PRECAUTIONS REVIEWED WITH PT. PT DID VERBALIZE UNDERSTANDING, BED ALARM ENGAGED, CALL ROWAN LEFT IN REACH

## 2019-01-01 NOTE — CONSULTS
Gastroenterology Consult Note:    Matias Fernando  Date & Time note created:    1/1/2019   11:11 AM     Referring MD:  Dr. Link Tsai    Patient ID:   Name:             Rasheeda Manzano   YOB: 1970  Age:                 48 y.o.  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  She is unable to describe the stool color due to blindness.  She checked her blood pressure and found it up, therefore took a dose of propanalol.  She progressively felt worse and then noted blood pressure was low, so she called 911 and was brought here.  Initial blood pressures were in the 80's, but came up0 with fluid resuscitation.  Her Hgb fell from 10.1 on admission (previously 12.8 on December 20 and 15.2 on December 5) down to 5.3 now.  She is presently receiving a blood transfusion.  Rectal examination by Dr. Tsai confirmed melenic stool c/w GI bleed.    Review of Systems:      Constitutional: Denies fevers, Denies weight changes.  Eyes: Blind.  Ears/Nose/Throat/Mouth: Denies nasal congestion or sore throat   Cardiovascular: No longer experiencing chest pressure/pain.  Respiratory: Resolved dyspnea..  Gastrointestinal/Hepatic: As per HPI.  Genitourinary: Denies dysuria or frequency  Musculoskeletal/Rheum: Denies  joint pain and swelling, No edema  Skin: Denies rash  Neurological: Having a moderately severe diffuse headache without new neurological symptoms.  Psychiatric: Denies mood disorder    Endocrine: Denies thyroid problems  Heme/Oncology/Lymph Nodes: Denies enlarged lymph nodes, denies brusing or known bleeding disorder  All other systems were reviewed and are negative (AMA/CMS criteria)                Past Medical History:   Past Medical History:   Diagnosis Date   • Blind    • C. 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   • Fall    • GERD (gastroesophageal reflux disease)    • H/O total hysterectomy    • Hydrocephalus    • Hydrocephalus     shunt drains into pleural place of L lung   • Jaundice     at birth   • Legally blind    • Migraine without aura, without mention of intractable migraine without mention of status migrainosus    • Other specified symptom associated with female genital organs     excessive bleeding   • Pain     gallbladder related   • Psychiatric problem     depression         Past Surgical History:  Past Surgical History:   Procedure Laterality Date   • GASTROSCOPY-ENDO N/A 8/24/2017    Procedure: GASTROSCOPY-ENDO;  Surgeon: Matias Fernando M.D.;  Location: [facility];  Service:    • AYALA BY LAPAROSCOPY N/A 8/13/2015    Procedure: AYALA BY LAPAROSCOPY;  Surgeon: Brice Johnson M.D.;  Location: SURGERY SAME DAY [facility];  Service:    • CHOLECYSTECTOMY N/A 8/13/2015    Procedure: CHOLECYSTECTOMY;  Surgeon: Brice Johnson M.D.;  Location: SURGERY SAME DAY [facility];  Service:    • LYSIS ADHESIONS GENERAL  12/10/2013    Performed by Arie Bass M.D. at [facility]   • CYSTOSCOPY  12/10/2013    Performed by Joana Yeager M.D. at [facility]   • EXPLORATORY LAPAROTOMY  12/10/2013    Performed by Arie Bass M.D. at [facility]   • APPENDECTOMY  12/10/2013    Performed by Arie Bass M.D. at [facility]   • ABDOMINAL HYSTERECTOMY TOTAL  12/10/2013    Performed by Joana Yeager M.D. at [facility]  ORS   • LAPAROSCOPY  8/8/08    Performed by FERNANDO HENDERSON at SURGERY [facility] ORS   • NISSEN FUNDOPLICATION LAPAROSCOPIC     • OTHER      PLEURAL SHUNT, numerous revisions       Hospital Medications:    Current Facility-Administered Medications:   •  SODIUM CHLORIDE 0.9 % IV SOLN, , , ,   •  NS (BOLUS) infusion 500 mL, 500 mL, Intravenous, Once, Link Tsai M.D.  •  pantoprazole (PROTONIX) 80 mg in  mL IVPB, 80 mg, Intravenous, Once, Link Tsai M.D.  •  pantoprazole (PROTONIX) 80 mg in  mL Infusion, 8 mg/hr, Intravenous, Continuous, Link Tsai M.D.  •  morphine (pf) 4 mg/ml injection 1-4 mg, 1-4 mg, Intravenous, Q3HRS PRN, Link Tsai M.D.  •  gabapentin (NEURONTIN) capsule 400 mg, 400 mg, Oral, BID, Rj Echevarria M.D., 400 mg at 01/01/19 0547  •  LORazepam (ATIVAN) tablet 1 mg, 1 mg, Oral, QDAY PRN, Rj Echevarria M.D., 1 mg at 12/31/18 2045  •  NS infusion, , Intravenous, Continuous, Link Tsai M.D., Last Rate: 83 mL/hr at 01/01/19 0022  •  acetaminophen (TYLENOL) tablet 650 mg, 650 mg, Oral, Q6HRS PRN, Rj Echevarria M.D., 650 mg at 01/01/19 0603  •  cloNIDine (CATAPRES) tablet 0.1 mg, 0.1 mg, Oral, Q6HRS PRN, Rj Echevarria M.D.  •  ondansetron (ZOFRAN) syringe/vial injection 4 mg, 4 mg, Intravenous, Q4HRS PRN, Rj Echevarria M.D.  •  ondansetron (ZOFRAN ODT) dispertab 4 mg, 4 mg, Oral, Q4HRS PRN, Rj Echevarria M.D.  •  promethazine (PHENERGAN) tablet 12.5-25 mg, 12.5-25 mg, Oral, Q4HRS PRN, Rj Echevarria M.D.  •  promethazine (PHENERGAN) suppository 12.5-25 mg, 12.5-25 mg, Rectal, Q4HRS PRN, Rj Echevarria M.D.  •  prochlorperazine (COMPAZINE) injection 5-10 mg, 5-10 mg, Intravenous, Q4HRS PRN, Rj Echevarria M.D.  •  senna-docusate (PERICOLACE or SENOKOT S) 8.6-50 MG per tablet 2 Tab, 2 Tab, Oral, BID, Stopped at 12/31/18 0600 **AND** polyethylene glycol/lytes (MIRALAX) PACKET 1 Packet, 1 Packet, Oral, QDAY PRN **AND** magnesium hydroxide (MILK OF MAGNESIA) suspension 30  mL, 30 mL, Oral, QDAY PRN **AND** bisacodyl (DULCOLAX) suppository 10 mg, 10 mg, Rectal, QDAY PRN, Rj Echevarria M.D.  •  NS (BOLUS) infusion 500 mL, 500 mL, Intravenous, Once PRN, Rj Echevarria M.D.  •  cefTRIAXone (ROCEPHIN) 2 g in  mL IVPB, 2 g, Intravenous, Q24HRS, Rj Echevarria M.D., Stopped at 01/01/19 0619    Current Outpatient Medications:  Current Facility-Administered Medications   Medication Dose Route Frequency Provider Last Rate Last Dose   • SODIUM CHLORIDE 0.9 % IV SOLN            • NS (BOLUS) infusion 500 mL  500 mL Intravenous Once Link Tsai M.D.       • pantoprazole (PROTONIX) 80 mg in  mL IVPB  80 mg Intravenous Once Link Tsai M.D.       • pantoprazole (PROTONIX) 80 mg in  mL Infusion  8 mg/hr Intravenous Continuous Link Tsai M.D.       • morphine (pf) 4 mg/ml injection 1-4 mg  1-4 mg Intravenous Q3HRS PRN Link Tsai M.D.       • gabapentin (NEURONTIN) capsule 400 mg  400 mg Oral BID Rj Echevarria M.D.   400 mg at 01/01/19 0547   • LORazepam (ATIVAN) tablet 1 mg  1 mg Oral QDAY PRN Rj Echevarria M.D.   1 mg at 12/31/18 2045   • NS infusion   Intravenous Continuous Link Tsai M.D. 83 mL/hr at 01/01/19 0022     • acetaminophen (TYLENOL) tablet 650 mg  650 mg Oral Q6HRS PRN Rj Echevarria M.D.   650 mg at 01/01/19 0603   • cloNIDine (CATAPRES) tablet 0.1 mg  0.1 mg Oral Q6HRS PRN Rj Echevarria M.D.       • ondansetron (ZOFRAN) syringe/vial injection 4 mg  4 mg Intravenous Q4HRS PRN Rj Echevarria M.D.       • ondansetron (ZOFRAN ODT) dispertab 4 mg  4 mg Oral Q4HRS PRN Rj Echevarria M.D.       • promethazine (PHENERGAN) tablet 12.5-25 mg  12.5-25 mg Oral Q4HRS PRN Rj Echevarria M.D.       • promethazine (PHENERGAN) suppository 12.5-25 mg  12.5-25 mg Rectal Q4HRS PRMICK Echevarria M.D.       • prochlorperazine (COMPAZINE) injection 5-10 mg  5-10 mg Intravenous Q4HRS PRN Rj Echevarria M.D.       • senna-docusate (PERICOLACE or SENOKOT S) 8.6-50  "MG per tablet 2 Tab  2 Tab Oral BID Rj Echevarria M.D.   Stopped at 12/31/18 0600    And   • polyethylene glycol/lytes (MIRALAX) PACKET 1 Packet  1 Packet Oral QDAY PRN Rj Echevarria M.D.        And   • magnesium hydroxide (MILK OF MAGNESIA) suspension 30 mL  30 mL Oral QDAY PRN Rj Echevarria M.D.        And   • bisacodyl (DULCOLAX) suppository 10 mg  10 mg Rectal QDAY PRN Rj Echevarria M.D.       • NS (BOLUS) infusion 500 mL  500 mL Intravenous Once PRN Rj Echevarria M.D.       • cefTRIAXone (ROCEPHIN) 2 g in  mL IVPB  2 g Intravenous Q24HRS Rj Echevarria M.D.   Stopped at 01/01/19 0619       Medication Allergy:  Allergies   Allergen Reactions   • Tape Rash     Medical tape. Per patient, paper tape ok.       Family History:  Family History   Problem Relation Age of Onset   • Hypertension Mother    • Hypertension Father    • Non-contributory Neg Hx         Migraine       Social History:  Social History     Social History   • Marital status:      Spouse name: N/A   • Number of children: N/A   • Years of education: N/A     Occupational History   • Not on file.     Social History Main Topics   • Smoking status: Former Smoker     Packs/day: 1.00     Years: 10.00     Types: Cigars     Start date: 5/1/2004     Quit date: 8/9/2017   • Smokeless tobacco: Never Used      Comment:  CIGAR/day    • Alcohol use Yes      Comment: socially   • Drug use: No   • Sexual activity: Yes     Partners: Male     Other Topics Concern   • Not on file     Social History Narrative   • No narrative on file         Physical Exam:  Vitals/ General Appearance:   Weight/BMI: Body mass index is 26.53 kg/m².  Blood pressure 112/59, pulse (!) 111, temperature 36.8 °C (98.2 °F), temperature source Temporal, resp. rate 14, height 1.626 m (5' 4\"), weight 70.1 kg (154 lb 8.7 oz), last menstrual period 11/27/2013, SpO2 93 %, not currently breastfeeding.  Vitals:    01/01/19 0800 01/01/19 1030 01/01/19 1045 01/01/19 1100   BP: (!) " 87/53 124/69 124/69 112/59   Pulse: (!) 103 (!) 103 (!) 103 (!) 111   Resp: 18 16 16 14   Temp: 36.3 °C (97.4 °F) 36.9 °C (98.5 °F) 36.9 °C (98.5 °F) 36.8 °C (98.2 °F)   TempSrc: Temporal Temporal  Temporal   SpO2: 99% 92% 92% 93%   Weight:       Height:         Oxygen Therapy:  Pulse Oximetry: 93 %, O2 (LPM): 0, O2 Delivery: None (Room Air)    Constitutional:   Well developed, Well nourished, No acute distress  HENMT:  Normocephalic, Atraumatic, Oropharynx moist mucous membranes, No oral exudates, Nose normal.  No thyromegaly.  Eyes: Disconjugate eyes, blind.  Neck:  Normal range of motion, No cervical tenderness,  no JVD.  Cardiovascular:  Normal heart rate, Normal rhythm, No murmurs, No rubs, No gallops.   Extremities with intact distal pulses, no cyanosis, or edema.  Lungs:  Normal breath sounds, breath sounds clear to auscultation bilaterally,  no crackles, no wheezing.   Abdomen: Multiple scars, Bowel sounds normal, Soft, No tenderness, No guarding, No rebound, No masses, No hepatosplenomegaly.  Skin: Warm, Dry, No erythema, No rash, no induration.  Neurologic: Alert & oriented x 3.  Psychiatric: Affect normal, Judgment normal, Mood normal.      MDM (Data Review):     Records reviewed and summarized in current documentation    Lab Data Review:  Recent Results (from the past 24 hour(s))   Basic Metabolic Panel (BMP)    Collection Time: 01/01/19  6:18 AM   Result Value Ref Range    Sodium 140 135 - 145 mmol/L    Potassium 3.7 3.6 - 5.5 mmol/L    Chloride 114 (H) 96 - 112 mmol/L    Co2 18 (L) 20 - 33 mmol/L    Glucose 104 (H) 65 - 99 mg/dL    Bun 19 8 - 22 mg/dL    Creatinine 0.61 0.50 - 1.40 mg/dL    Calcium 8.1 (L) 8.5 - 10.5 mg/dL    Anion Gap 8.0 0.0 - 11.9   ESTIMATED GFR    Collection Time: 01/01/19  6:18 AM   Result Value Ref Range    GFR If African American >60 >60 mL/min/1.73 m 2    GFR If Non African American >60 >60 mL/min/1.73 m 2   COD - Adult (Type and Screen)    Collection Time: 01/01/19  8:23 AM    Result Value Ref Range    ABO Grouping Only A     Rh Grouping Only POS     Antibody Screen-Cod NEG    COMPONENT CELLULAR    Collection Time: 01/01/19  8:23 AM   Result Value Ref Range    Component R       R3                  Red Blood Cells3    [id number]   issued       01/01/19   10:41      Product Type Red Blood Cells LR Pheresis     Dispense Status Issued     Unit Number (Barcoded) [id number]     Product Code (Barcoded) G2756O61     Blood Type (Barcoded) 6200    CBC WITH DIFFERENTIAL    Collection Time: 01/01/19  8:32 AM   Result Value Ref Range    WBC 7.6 4.8 - 10.8 K/uL    RBC 1.73 (L) 4.20 - 5.40 M/uL    Hemoglobin 5.4 (LL) 12.0 - 16.0 g/dL    Hematocrit 16.3 (LL) 37.0 - 47.0 %    MCV 94.8 81.4 - 97.8 fL    MCH 30.6 27.0 - 33.0 pg    MCHC 32.3 (L) 33.6 - 35.0 g/dL    RDW 50.8 (H) 35.9 - 50.0 fL    Platelet Count 208 164 - 446 K/uL    MPV 11.0 9.0 - 12.9 fL    Neutrophils-Polys 61.00 44.00 - 72.00 %    Lymphocytes 31.00 22.00 - 41.00 %    Monocytes 5.80 0.00 - 13.40 %    Eosinophils 1.30 0.00 - 6.90 %    Basophils 0.50 0.00 - 1.80 %    Immature Granulocytes 0.40 0.00 - 0.90 %    Nucleated RBC 0.00 /100 WBC    Neutrophils (Absolute) 4.61 2.00 - 7.15 K/uL    Lymphs (Absolute) 2.34 1.00 - 4.80 K/uL    Monos (Absolute) 0.44 0.00 - 0.85 K/uL    Eos (Absolute) 0.10 0.00 - 0.51 K/uL    Baso (Absolute) 0.04 0.00 - 0.12 K/uL    Immature Granulocytes (abs) 0.03 0.00 - 0.11 K/uL    NRBC (Absolute) 0.00 K/uL   HGB    Collection Time: 01/01/19 10:08 AM   Result Value Ref Range    Hemoglobin 5.3 (LL) 12.0 - 16.0 g/dL   IRON/TOTAL IRON BIND    Collection Time: 01/01/19 10:08 AM   Result Value Ref Range    Iron 102 40 - 170 ug/dL    Total Iron Binding 302 250 - 450 ug/dL    % Saturation 34 15 - 55 %   FERRITIN    Collection Time: 01/01/19 10:08 AM   Result Value Ref Range    Ferritin 36.0 10.0 - 291.0 ng/mL       IMPRESSION:  #  Acute upper GI bleed manifest by hypotension, melena, and pre-renal azotemia.  Rule out  NSAID-ulcer.  #  Anemia due to acute blood loss and dilution.  #  Esophagitis and stenosis in distal esophagus (area of prior esophageal atresia repair).  #  Chronic headaches.  #  Blind.  #  Hydrocephalus with  shunt.  #  NSAID use.    RECOMMENDATIONS:  - Transfuse to Hgb 7.0.  - Serial H/H.  - IV pantoprazole drip.  - EGD with monitored anesthesia care tomorrow.  Risks, benefits and alternatives discussed with patient.    Thank your for the opportunity to assist in the care of your patient.  Please call for any questions or concerns.    Matias Fernando M.D.

## 2019-01-01 NOTE — PROGRESS NOTES
[facility] Medicine Daily Progress Note    Date of Service  1/1/2019    Chief Complaint  48 y.o. female admitted 12/30/2018 with abdominal pain.  low BP, light headedness and dizzy.  Hospital Course      Hstory of blindness as a result of congenital hydrocephalus, essential hypertension for which she takes long-acting propanolol, and gastroesophageal reflux disease .  Suspected Sepsis associated UTI.     Interval Problem Update    Anemic - hgb dropped to 5.3 ( per lab verbal to RN)  . Repeat hgb pending. Hypotensive- Sbp 80-90s. . Home inderal stopped. Has been tachycardic.  Received IVF boluses overnight. Persistent HAs.     Consultants/Specialty  None     Code Status  Full     Disposition  Dc home when clinically improved.     Review of Systems  Review of Systems   Constitutional: Negative for chills and fever.   HENT: Negative for congestion.    Eyes:        Chronic blind.   No pain .    Respiratory: Negative for cough, shortness of breath, wheezing and stridor.    Cardiovascular: Negative for chest pain and palpitations.   Gastrointestinal: Positive for abdominal pain. Negative for diarrhea and vomiting.   Genitourinary: Negative for flank pain and hematuria.   Musculoskeletal: Negative for back pain, joint pain and neck pain.   Neurological: Positive for dizziness and headaches. Negative for tremors, sensory change, speech change, focal weakness and weakness.   Psychiatric/Behavioral: Negative for hallucinations and substance abuse.        Physical Exam  Temp:  [36.3 °C (97.4 °F)-37.8 °C (100 °F)] 36.3 °C (97.4 °F)  Pulse:  [] 103  Resp:  [16-18] 18  BP: ()/(45-68) 87/53    Physical Exam   Constitutional: She is oriented to person, place, and time.   Blind.    HENT:   Head: Normocephalic and atraumatic.   Eyes: EOM are normal. Right eye exhibits no discharge. Left eye exhibits no discharge. No scleral icterus.   Neck: Neck supple.   Cardiovascular:   No murmur heard.  Tachycardic, regular.     Pulmonary/Chest: No stridor. She has no wheezes. She has no rales.   Abdominal: Soft. She exhibits no distension. There is no tenderness.   Musculoskeletal: She exhibits no edema or tenderness.   Neurological: She is alert and oriented to person, place, and time.   Disconjugate gaze, deviated to left. Vision loss.    Skin: Skin is warm and dry. She is not diaphoretic. There is pallor.   Psychiatric: She has a normal mood and affect. Her behavior is normal.   Vitals reviewed.      Fluids    Intake/Output Summary (Last 24 hours) at 01/01/19 0859  Last data filed at 01/01/19 0022   Gross per 24 hour   Intake                0 ml   Output              450 ml   Net             -450 ml       Laboratory  Recent Labs      12/30/18 2037   WBC  16.7*   RBC  3.37*   HEMOGLOBIN  10.4*   HEMATOCRIT  32.1*   MCV  95.3   MCH  30.9   MCHC  32.4*   RDW  49.9   PLATELETCT  433   MPV  11.0     Recent Labs      12/30/18 2037 01/01/19   0618   SODIUM  138  140   POTASSIUM  4.7  3.7   CHLORIDE  110  114*   CO2  20  18*   GLUCOSE  92  104*   BUN  49*  19   CREATININE  0.78  0.61   CALCIUM  9.3  8.1*                   Imaging  DX-CHEST-PORTABLE (1 VIEW)   Final Result         No acute cardiac or pulmonary abnormality is identified.           Assessment/Plan  * Sepsis (HCC)   Assessment & Plan    This is sepsis (without associated acute organ dysfunction).  Due to UTI and based on leukocytosis and tachycardia   Continue IVFs  IV antibiotics for urinary source infection, follow up cxs   Fu CBC and check pro calcitonin level.        Normocytic anemia   Assessment & Plan    Fu Hgb reported 5.3 . No symptoms of bleeding.  ? Source, draw error.   Will IVF bolus  Fu stat repeat Hgb and Serial Hgb , tx Prbc and further workup of source if confirmed low   Check stool obt and monitor for symptoms of bleeding.   Dc lovenox for now.      Essential hypertension- (present on admission)   Assessment & Plan    Blood pressure running low with sbp  80-90s.   Holding home anti hypertensives- give additional IVF boluses. Plan additional workup as above.   Monitor rebound tachycardia . She may also have Baseline tachycardia with reported hx of palpitation. Continue tele monitoring.   Prn Clonidine  Resume Inderal at lower dose when BP stabilizes.        UTI (urinary tract infection)- (present on admission)   Assessment & Plan    Continue IV rocephin  IVFs  Fu cultures.      Congenital hydrocephalus (HCC)- (present on admission)   Assessment & Plan    Persistent mod - severe HAs  Check CT head to re eval hydrocephalus.      Blindness- (present on admission)   Assessment & Plan    Occurred at age 11 due to optic nerve ischemia from underlying hydrocephalus          VTE prophylaxis: Lovenox

## 2019-01-02 LAB
HGB BLD-MCNC: 8 G/DL (ref 12–16)
HGB BLD-MCNC: 8.2 G/DL (ref 12–16)
HGB BLD-MCNC: 8.2 G/DL (ref 12–16)
HGB BLD-MCNC: 8.7 G/DL (ref 12–16)

## 2019-01-02 PROCEDURE — 160203 HCHG ENDO MINUTES - 1ST 30 MINS LEVEL 4: Performed by: INTERNAL MEDICINE

## 2019-01-02 PROCEDURE — 160035 HCHG PACU - 1ST 60 MINS PHASE I: Performed by: INTERNAL MEDICINE

## 2019-01-02 PROCEDURE — 700111 HCHG RX REV CODE 636 W/ 250 OVERRIDE (IP): Performed by: HOSPITALIST

## 2019-01-02 PROCEDURE — A9270 NON-COVERED ITEM OR SERVICE: HCPCS | Performed by: HOSPITALIST

## 2019-01-02 PROCEDURE — C9113 INJ PANTOPRAZOLE SODIUM, VIA: HCPCS | Performed by: HOSPITALIST

## 2019-01-02 PROCEDURE — 160002 HCHG RECOVERY MINUTES (STAT): Performed by: INTERNAL MEDICINE

## 2019-01-02 PROCEDURE — 700102 HCHG RX REV CODE 250 W/ 637 OVERRIDE(OP): Performed by: HOSPITALIST

## 2019-01-02 PROCEDURE — 700105 HCHG RX REV CODE 258: Performed by: HOSPITALIST

## 2019-01-02 PROCEDURE — 85018 HEMOGLOBIN: CPT

## 2019-01-02 PROCEDURE — 160009 HCHG ANES TIME/MIN: Performed by: INTERNAL MEDICINE

## 2019-01-02 PROCEDURE — 99232 SBSQ HOSP IP/OBS MODERATE 35: CPT | Performed by: HOSPITALIST

## 2019-01-02 PROCEDURE — 160048 HCHG OR STATISTICAL LEVEL 1-5: Performed by: INTERNAL MEDICINE

## 2019-01-02 PROCEDURE — 30233N1 TRANSFUSION OF NONAUTOLOGOUS RED BLOOD CELLS INTO PERIPHERAL VEIN, PERCUTANEOUS APPROACH: ICD-10-PCS | Performed by: INTERNAL MEDICINE

## 2019-01-02 PROCEDURE — A9270 NON-COVERED ITEM OR SERVICE: HCPCS | Performed by: STUDENT IN AN ORGANIZED HEALTH CARE EDUCATION/TRAINING PROGRAM

## 2019-01-02 PROCEDURE — 700102 HCHG RX REV CODE 250 W/ 637 OVERRIDE(OP): Performed by: STUDENT IN AN ORGANIZED HEALTH CARE EDUCATION/TRAINING PROGRAM

## 2019-01-02 PROCEDURE — 770020 HCHG ROOM/CARE - TELE (206)

## 2019-01-02 PROCEDURE — 700111 HCHG RX REV CODE 636 W/ 250 OVERRIDE (IP)

## 2019-01-02 PROCEDURE — 36415 COLL VENOUS BLD VENIPUNCTURE: CPT

## 2019-01-02 PROCEDURE — 700101 HCHG RX REV CODE 250

## 2019-01-02 RX ORDER — HALOPERIDOL 5 MG/ML
1 INJECTION INTRAMUSCULAR
Status: DISCONTINUED | OUTPATIENT
Start: 2019-01-02 | End: 2019-01-02 | Stop reason: HOSPADM

## 2019-01-02 RX ORDER — ONDANSETRON 2 MG/ML
4 INJECTION INTRAMUSCULAR; INTRAVENOUS
Status: DISCONTINUED | OUTPATIENT
Start: 2019-01-02 | End: 2019-01-02 | Stop reason: HOSPADM

## 2019-01-02 RX ORDER — OMEPRAZOLE 20 MG/1
20 CAPSULE, DELAYED RELEASE ORAL EVERY 12 HOURS
Status: DISCONTINUED | OUTPATIENT
Start: 2019-01-02 | End: 2019-01-04 | Stop reason: HOSPADM

## 2019-01-02 RX ORDER — OXYCODONE HCL 5 MG/5 ML
10 SOLUTION, ORAL ORAL
Status: COMPLETED | OUTPATIENT
Start: 2019-01-02 | End: 2019-01-02

## 2019-01-02 RX ORDER — HYDROMORPHONE HYDROCHLORIDE 1 MG/ML
0.1 INJECTION, SOLUTION INTRAMUSCULAR; INTRAVENOUS; SUBCUTANEOUS
Status: DISCONTINUED | OUTPATIENT
Start: 2019-01-02 | End: 2019-01-02 | Stop reason: HOSPADM

## 2019-01-02 RX ORDER — HYDROMORPHONE HYDROCHLORIDE 1 MG/ML
0.2 INJECTION, SOLUTION INTRAMUSCULAR; INTRAVENOUS; SUBCUTANEOUS
Status: DISCONTINUED | OUTPATIENT
Start: 2019-01-02 | End: 2019-01-02 | Stop reason: HOSPADM

## 2019-01-02 RX ORDER — OXYCODONE HCL 5 MG/5 ML
5 SOLUTION, ORAL ORAL
Status: COMPLETED | OUTPATIENT
Start: 2019-01-02 | End: 2019-01-02

## 2019-01-02 RX ORDER — DIPHENHYDRAMINE HYDROCHLORIDE 50 MG/ML
12.5 INJECTION INTRAMUSCULAR; INTRAVENOUS
Status: DISCONTINUED | OUTPATIENT
Start: 2019-01-02 | End: 2019-01-02 | Stop reason: HOSPADM

## 2019-01-02 RX ORDER — MEPERIDINE HYDROCHLORIDE 25 MG/ML
12.5 INJECTION INTRAMUSCULAR; INTRAVENOUS; SUBCUTANEOUS
Status: DISCONTINUED | OUTPATIENT
Start: 2019-01-02 | End: 2019-01-02 | Stop reason: HOSPADM

## 2019-01-02 RX ORDER — SODIUM CHLORIDE, SODIUM LACTATE, POTASSIUM CHLORIDE, CALCIUM CHLORIDE 600; 310; 30; 20 MG/100ML; MG/100ML; MG/100ML; MG/100ML
INJECTION, SOLUTION INTRAVENOUS CONTINUOUS
Status: DISCONTINUED | OUTPATIENT
Start: 2019-01-02 | End: 2019-01-02 | Stop reason: HOSPADM

## 2019-01-02 RX ORDER — HYDROMORPHONE HYDROCHLORIDE 1 MG/ML
0.4 INJECTION, SOLUTION INTRAMUSCULAR; INTRAVENOUS; SUBCUTANEOUS
Status: DISCONTINUED | OUTPATIENT
Start: 2019-01-02 | End: 2019-01-02 | Stop reason: HOSPADM

## 2019-01-02 RX ADMIN — ACYCLOVIR 800 MG: 800 TABLET ORAL at 20:30

## 2019-01-02 RX ADMIN — SODIUM CHLORIDE: 900 INJECTION INTRAVENOUS at 12:53

## 2019-01-02 RX ADMIN — MORPHINE SULFATE 4 MG: 4 INJECTION INTRAVENOUS at 16:38

## 2019-01-02 RX ADMIN — MORPHINE SULFATE 4 MG: 4 INJECTION INTRAVENOUS at 20:30

## 2019-01-02 RX ADMIN — OXYCODONE HYDROCHLORIDE 5 MG: 5 SOLUTION ORAL at 11:52

## 2019-01-02 RX ADMIN — MORPHINE SULFATE 4 MG: 4 INJECTION INTRAVENOUS at 23:42

## 2019-01-02 RX ADMIN — SODIUM CHLORIDE 8 MG/HR: 9 INJECTION, SOLUTION INTRAVENOUS at 13:05

## 2019-01-02 RX ADMIN — LORAZEPAM 1 MG: 1 TABLET ORAL at 22:34

## 2019-01-02 RX ADMIN — ACYCLOVIR 800 MG: 800 TABLET ORAL at 14:53

## 2019-01-02 RX ADMIN — ACYCLOVIR 800 MG: 800 TABLET ORAL at 06:18

## 2019-01-02 RX ADMIN — ONDANSETRON 4 MG: 2 INJECTION INTRAMUSCULAR; INTRAVENOUS at 09:46

## 2019-01-02 RX ADMIN — MORPHINE SULFATE 4 MG: 4 INJECTION INTRAVENOUS at 04:26

## 2019-01-02 RX ADMIN — SODIUM CHLORIDE: 900 INJECTION INTRAVENOUS at 20:51

## 2019-01-02 RX ADMIN — ACYCLOVIR 800 MG: 800 TABLET ORAL at 23:42

## 2019-01-02 RX ADMIN — MORPHINE SULFATE 4 MG: 4 INJECTION INTRAVENOUS at 12:37

## 2019-01-02 RX ADMIN — MORPHINE SULFATE 4 MG: 4 INJECTION INTRAVENOUS at 08:31

## 2019-01-02 RX ADMIN — GABAPENTIN 400 MG: 400 CAPSULE ORAL at 06:18

## 2019-01-02 RX ADMIN — OMEPRAZOLE 20 MG: 20 CAPSULE, DELAYED RELEASE ORAL at 17:32

## 2019-01-02 RX ADMIN — GABAPENTIN 400 MG: 400 CAPSULE ORAL at 17:32

## 2019-01-02 ASSESSMENT — ENCOUNTER SYMPTOMS
SENSORY CHANGE: 0
VOMITING: 0
STRIDOR: 0
PALPITATIONS: 0
ABDOMINAL PAIN: 0
WEAKNESS: 0
DIZZINESS: 0
WHEEZING: 0
BACK PAIN: 0
DIARRHEA: 0
SHORTNESS OF BREATH: 0
TREMORS: 0
FEVER: 0
HEADACHES: 1
NECK PAIN: 0
CHILLS: 0
COUGH: 0
FLANK PAIN: 0
SPEECH CHANGE: 0
FOCAL WEAKNESS: 0

## 2019-01-02 ASSESSMENT — PAIN SCALES - GENERAL
PAINLEVEL_OUTOF10: 8
PAINLEVEL_OUTOF10: 2
PAINLEVEL_OUTOF10: 9
PAINLEVEL_OUTOF10: 5
PAINLEVEL_OUTOF10: 9
PAINLEVEL_OUTOF10: 8
PAINLEVEL_OUTOF10: 5
PAINLEVEL_OUTOF10: 8
PAINLEVEL_OUTOF10: 9
PAINLEVEL_OUTOF10: 7
PAINLEVEL_OUTOF10: 0
PAINLEVEL_OUTOF10: 9
PAINLEVEL_OUTOF10: 4
PAINLEVEL_OUTOF10: 6

## 2019-01-02 NOTE — PROGRESS NOTES
Transport arrived on floor to take pt down for procedure. Pt alert and oriented. No signs of distress.    ELIZABETH Ku went down with transport to take pt for procedure. Monitor room notified

## 2019-01-02 NOTE — PROGRESS NOTES
REC'D BEDSIDE REPORT FROM JUAN RN*. PT AOX4*. , SHORTNESS OF BREATH. PLAN OF CARE AND FALL PRECAUTIONS REVIEWED WITH PT. PT DID VERBALIZE UNDERSTANDING, BED ALARM ENGAGED, CALL ROWAN LEFT IN REACH

## 2019-01-02 NOTE — OR SURGEON
Immediate Post OP Note    PreOp Diagnosis: GI bleed    PostOp Diagnosis:   1. Esophageal diminutive ulcer with whitish protuberance at 33 cm.  2. Chronic esophagitis from 28-35 cm.  3.  Evidence of gastric pull-up and fundoplication.  4.  No esophageal motility seen.    Procedure(s):  GASTROSCOPY - Wound Class: Clean Contaminated    Surgeon(s):  Matias Fernando M.D.    Anesthesiologist/Type of Anesthesia:  Anesthesiologist: Marino Keith M.D./General    Surgical Staff:  Circulator: Jim Haque R.N.  Endoscopy Technician: Misty Patten    Specimens removed if any:  * No specimens in log *    Estimated Blood Loss: 0    Findings:   Esophagus:  From 28 cm to squamocolumnar junction at 35 cm there are areas of scarring, pitting, and patchy erythema.  At 33 cm, 4 o'clock position, there is a small depression/ulceration with a protruding whitish plug which may represent a fibrin plug.  The area was quite scarred and I was reluctant to get aggressive and attempt cauterization, injection or clipping for concern that I would induce significant hard-to-control hemorrhage.  Therefore, I left it alone.  Stomach:  It appears that she has has a gastric pull-up (35-39 cm) and a fundoplication.  Otherwise normal.  Duodenum: Normal.    Complications: None    Recommendations:  - PPI BID.  - No NSAIDs.  - Full liquid diet.  - Dr. Manning will see her tomorrow and advise further.        1/2/2019 11:15 AM Matias Fernando M.D.

## 2019-01-02 NOTE — PROGRESS NOTES
Reported headache,more severe then normal. Still with variable blood pressures. Informed Dr. Tsai during early rounds.

## 2019-01-02 NOTE — PROCEDURES
DATE OF SERVICE:  01/02/2019    PREPROCEDURE DIAGNOSIS:  Gastrointestinal bleed.    POSTPROCEDURE DIAGNOSES:  1.  Diminutive esophageal ulcer with whitish protuberance at 33 cm, 4 o'clock   position.  2.  Chronic esophagitis from 28-35 cm.  3.  Evidence of gastric pull-up and fundoplication.  4.  No esophageal motility visualized during exam.    PROCEDURE PERFORMED:  Esophagogastroduodenoscopy.    SEDATION:  Monitored anesthesia care.    ANESTHESIOLOGIST:  Marino Keith MD    DESCRIPTION OF PROCEDURE:  The risks, benefits, and alternatives were   explained to the patient and consent obtained.  She was brought to the   operating room suite where a timeout was performed.  She was then placed on   her left side and sedated by Dr. Keith.  The Olympus flexible video endoscope   was introduced in the mouth and gently advanced into the esophagus.  The   esophagus had normal mucosa in the proximal third.  From about 28 cm to the   squamocolumnar junction at 35 cm, there were areas of scarring, pitting and   patchy erythema consistent with chronic esophagitis.  At 33 cm in the 4   o'clock position, there was a small depression/ulceration with protruding   whitish plug, which may represent a fibrin plug.  The area was quite scarred   and I was reluctant to get aggressive and attempt cauterization, injection or   clipping out of concern that it might induce significant hard to control   hemorrhage.  Therefore, I chose to leave it alone.  It appeared that the   patient had had a prior gastric pull-up (probably done at birth by history)   and there were linear folds of gastric mucosa from 35 down to 39 cm.  At this   point, there was evidence of fundoplication.  The remainder of the stomach was   unremarkable.  The duodenal bulb and second portion of the duodenum were   normal.  I retroflexed the scope to view the proximal stomach.  No bleeding   sites were found.  Air was evacuated and scope withdrawn.  On withdrawal, the    esophageal ulcer region showed no signs of bleeding.    RECOMMENDATIONS:  1.  PPI therapy twice daily.  2.  No nonsteroidal anti-inflammatory drugs in the future.  3.  Full liquid diet, advance as tolerated.  4.  Dr. Manning will see her tomorrow and advice further.       ____________________________________     ANOOP ONEAL MD CMS / DAVE    DD:  01/02/2019 11:26:41  DT:  01/02/2019 11:49:27    D#:  [id number]  Job#:  [id number]

## 2019-01-02 NOTE — PROGRESS NOTES
One unit of blood transfusing as per order. Consent signed by MD and RN after explaining procedure to patient who agreed with transfusion.

## 2019-01-02 NOTE — PROGRESS NOTES
[facility] Medicine Daily Progress Note    Date of Service  1/2/2019    Chief Complaint  48 y.o. female admitted 12/30/2018 with abdominal pain.  low BP, light headedness and dizzy.  Hospital Course      Hstory of blindness as a result of congenital hydrocephalus, essential hypertension for which she takes long-acting propanolol, and gastroesophageal reflux disease .  Suspected Sepsis associated UTI.     Interval Problem Update    Blood pressure, tachycardia stabilized post packed RBC transfusion.  EGD findings of small esophageal ulcer and esophagitis.  Reports headache improved.     Consultants/Specialty  None     Code Status  Full     Disposition  Dc home when clinically improved.     Review of Systems  Review of Systems   Constitutional: Negative for chills and fever.   HENT: Negative for congestion.    Eyes:        Chronic blind.      Respiratory: Negative for cough, shortness of breath, wheezing and stridor.    Cardiovascular: Negative for chest pain and palpitations.   Gastrointestinal: Negative for abdominal pain, diarrhea and vomiting.   Genitourinary: Negative for flank pain and hematuria.   Musculoskeletal: Negative for back pain, joint pain and neck pain.   Neurological: Positive for headaches. Negative for dizziness, tremors, sensory change, speech change, focal weakness and weakness.        Physical Exam  Temp:  [36.2 °C (97.1 °F)-37.1 °C (98.8 °F)] 36.4 °C (97.6 °F)  Pulse:  [] 111  Resp:  [16-26] 16  BP: ()/(70-90) 106/71    Physical Exam   Constitutional: She is oriented to person, place, and time. No distress.   Blind.    HENT:   Head: Normocephalic and atraumatic.   Right Ear: External ear normal.   Left Ear: External ear normal.   Nose: Nose normal.   Eyes: EOM are normal. Right eye exhibits no discharge. Left eye exhibits no discharge. No scleral icterus.   Neck: Neck supple.   Cardiovascular:   No murmur heard.  Tachycardic, regular.    Pulmonary/Chest: No stridor. She has no wheezes.  She has no rales.   Abdominal: Soft. Bowel sounds are normal. She exhibits no distension. There is no tenderness.   Musculoskeletal: She exhibits no edema or tenderness.   Neurological: She is alert and oriented to person, place, and time.   Disconjugate gaze, deviated to left. Vision loss.    Skin: Skin is warm and dry. She is not diaphoretic. There is pallor.   Psychiatric: She has a normal mood and affect. Her behavior is normal.   Vitals reviewed.      Fluids    Intake/Output Summary (Last 24 hours) at 01/02/19 1520  Last data filed at 01/02/19 1119   Gross per 24 hour   Intake              100 ml   Output              400 ml   Net             -300 ml       Laboratory  Recent Labs      12/30/18 2037 01/01/19   0832   01/01/19   1451  01/01/19   2110  01/02/19   0348  01/02/19   0916   WBC  16.7*  7.6   --   6.5   --    --    --    RBC  3.37*  1.73*   --   2.63*   --    --    --    HEMOGLOBIN  10.4*  5.4*   < >  8.2*  8.0*  8.0*  8.2*   HEMATOCRIT  32.1*  16.3*   --   23.9*   --    --    --    MCV  95.3  94.8   --   91.6   --    --    --    MCH  30.9  30.6   --   31.2   --    --    --    MCHC  32.4*  32.3*   --   34.0   --    --    --    RDW  49.9  50.8*   --   46.3   --    --    --    PLATELETCT  433  208   --   234   --    --    --    MPV  11.0  11.0   --   11.4   --    --    --     < > = values in this interval not displayed.     Recent Labs      12/30/18 2037 01/01/19   0618   SODIUM  138  140   POTASSIUM  4.7  3.7   CHLORIDE  110  114*   CO2  20  18*   GLUCOSE  92  104*   BUN  49*  19   CREATININE  0.78  0.61   CALCIUM  9.3  8.1*                   Imaging  DX-CHEST-PORTABLE (1 VIEW)   Final Result         No acute cardiac or pulmonary abnormality is identified.           Assessment/Plan  * GIB (gastrointestinal bleeding)- (present on admission)   Assessment & Plan    Acute.  EGD with findings of esophageal ulcer and esophagitis.  Transition off IV PPI to Oral BID  IV fluids  Follow up serial  hemoglobin  GI input  Start Full liquid diet.        Sepsis (HCC)   Assessment & Plan    This is sepsis (without associated acute organ dysfunction).  Initially attributed to UTI and based on leukocytosis and tachycardia .  Further workup suggest due to SIRS from GI bleed  Continue IVFs until consistent oral intake  DC antibiotics         Normocytic anemia   Assessment & Plan    Due to acute GI bleed.  EGD findings of esophageal ulcer.   Follow serial hemoglobin  Transfuse if hypo-tensive or hemoglobin less than 7     Essential hypertension- (present on admission)   Assessment & Plan    Blood pressure running low with sbp 80-90s--due to GI bleed, Inderal  Holding home anti hypertensives.   Improved post packed RBC transfusion.,  Continue IV fluids until consistent intake.  Monitor telemetry.  Follow serial hemoglobin       UTI (urinary tract infection)- (present on admission)   Assessment & Plan    Urine culture negative.  DC IV Rocephin.       Congenital hydrocephalus (HCC)- (present on admission)   Assessment & Plan    Persistent mod - severe HAs.  Chronic.  Neuro exam nonfocal.  Patient has follow-up with Dr. Hogan ,  her neurosurgeon next week.  

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
## 2019-01-02 NOTE — ASSESSMENT & PLAN NOTE
Acute.  EGD with findings of esophageal ulcer and esophagitis.  Continue oral PPI  Plan to advance diet  Hep-Lock IV fluids  Follow up serial hemoglobin  GI input appreciated

## 2019-01-02 NOTE — PROGRESS NOTES
Bedside report received from night shift RN, assumed pt care. Pt assessment complete. Pt alert and oriented. Reviewed plan of care with pt. Tele box on and rhythm verified.  Chart and labs reviewed.  Bed in lowest position, call light within reach. Hourly rounding in place. Educated about calling for assistance.

## 2019-01-02 NOTE — RESPIRATORY CARE
COPD EDUCATION by COPD CLINICAL EDUCATOR  1/2/2019 at 6:33 AM by Khushboo Martinez     Patient reviewed by COPD education team. Patient does not qualify for COPD program.

## 2019-01-03 LAB
ANION GAP SERPL CALC-SCNC: 5 MMOL/L (ref 0–11.9)
BUN SERPL-MCNC: 4 MG/DL (ref 8–22)
CALCIUM SERPL-MCNC: 8.3 MG/DL (ref 8.5–10.5)
CHLORIDE SERPL-SCNC: 113 MMOL/L (ref 96–112)
CO2 SERPL-SCNC: 25 MMOL/L (ref 20–33)
CREAT SERPL-MCNC: 0.56 MG/DL (ref 0.5–1.4)
GLUCOSE SERPL-MCNC: 98 MG/DL (ref 65–99)
HGB BLD-MCNC: 7.8 G/DL (ref 12–16)
HGB BLD-MCNC: 8.2 G/DL (ref 12–16)
POTASSIUM SERPL-SCNC: 3.7 MMOL/L (ref 3.6–5.5)
SODIUM SERPL-SCNC: 143 MMOL/L (ref 135–145)

## 2019-01-03 PROCEDURE — A9270 NON-COVERED ITEM OR SERVICE: HCPCS | Performed by: HOSPITALIST

## 2019-01-03 PROCEDURE — 700111 HCHG RX REV CODE 636 W/ 250 OVERRIDE (IP): Performed by: HOSPITALIST

## 2019-01-03 PROCEDURE — 770020 HCHG ROOM/CARE - TELE (206)

## 2019-01-03 PROCEDURE — 700105 HCHG RX REV CODE 258: Performed by: HOSPITALIST

## 2019-01-03 PROCEDURE — 700102 HCHG RX REV CODE 250 W/ 637 OVERRIDE(OP): Performed by: HOSPITALIST

## 2019-01-03 PROCEDURE — 80048 BASIC METABOLIC PNL TOTAL CA: CPT

## 2019-01-03 PROCEDURE — 36415 COLL VENOUS BLD VENIPUNCTURE: CPT

## 2019-01-03 PROCEDURE — 99232 SBSQ HOSP IP/OBS MODERATE 35: CPT | Performed by: HOSPITALIST

## 2019-01-03 PROCEDURE — 85018 HEMOGLOBIN: CPT

## 2019-01-03 RX ORDER — SODIUM CHLORIDE 450 MG/100ML
INJECTION, SOLUTION INTRAVENOUS CONTINUOUS
Status: DISCONTINUED | OUTPATIENT
Start: 2019-01-03 | End: 2019-01-04 | Stop reason: HOSPADM

## 2019-01-03 RX ORDER — PROPRANOLOL HYDROCHLORIDE 80 MG/1
80 CAPSULE, EXTENDED RELEASE ORAL
Status: DISCONTINUED | OUTPATIENT
Start: 2019-01-03 | End: 2019-01-04 | Stop reason: HOSPADM

## 2019-01-03 RX ADMIN — STANDARDIZED SENNA CONCENTRATE AND DOCUSATE SODIUM 2 TABLET: 8.6; 5 TABLET, FILM COATED ORAL at 04:45

## 2019-01-03 RX ADMIN — MORPHINE SULFATE 4 MG: 4 INJECTION INTRAVENOUS at 21:33

## 2019-01-03 RX ADMIN — MORPHINE SULFATE 4 MG: 4 INJECTION INTRAVENOUS at 04:44

## 2019-01-03 RX ADMIN — MORPHINE SULFATE 4 MG: 4 INJECTION INTRAVENOUS at 17:21

## 2019-01-03 RX ADMIN — ACYCLOVIR 800 MG: 800 TABLET ORAL at 18:12

## 2019-01-03 RX ADMIN — OMEPRAZOLE 20 MG: 20 CAPSULE, DELAYED RELEASE ORAL at 17:20

## 2019-01-03 RX ADMIN — ONDANSETRON 4 MG: 2 INJECTION INTRAMUSCULAR; INTRAVENOUS at 07:22

## 2019-01-03 RX ADMIN — MORPHINE SULFATE 4 MG: 4 INJECTION INTRAVENOUS at 12:54

## 2019-01-03 RX ADMIN — GABAPENTIN 400 MG: 400 CAPSULE ORAL at 17:20

## 2019-01-03 RX ADMIN — ACYCLOVIR 800 MG: 800 TABLET ORAL at 07:22

## 2019-01-03 RX ADMIN — MORPHINE SULFATE 4 MG: 4 INJECTION INTRAVENOUS at 08:45

## 2019-01-03 RX ADMIN — ACYCLOVIR 800 MG: 800 TABLET ORAL at 10:37

## 2019-01-03 RX ADMIN — PROPRANOLOL HYDROCHLORIDE 80 MG: 80 CAPSULE, EXTENDED RELEASE ORAL at 08:37

## 2019-01-03 RX ADMIN — SODIUM CHLORIDE: 4.5 INJECTION, SOLUTION INTRAVENOUS at 08:40

## 2019-01-03 RX ADMIN — GABAPENTIN 400 MG: 400 CAPSULE ORAL at 04:44

## 2019-01-03 RX ADMIN — OMEPRAZOLE 20 MG: 20 CAPSULE, DELAYED RELEASE ORAL at 04:45

## 2019-01-03 RX ADMIN — ACYCLOVIR 800 MG: 800 TABLET ORAL at 23:25

## 2019-01-03 RX ADMIN — SODIUM CHLORIDE: 900 INJECTION INTRAVENOUS at 04:57

## 2019-01-03 RX ADMIN — LORAZEPAM 1 MG: 1 TABLET ORAL at 23:25

## 2019-01-03 RX ADMIN — ACYCLOVIR 800 MG: 800 TABLET ORAL at 14:33

## 2019-01-03 ASSESSMENT — ENCOUNTER SYMPTOMS
COUGH: 0
SENSORY CHANGE: 0
FEVER: 0
PALPITATIONS: 0
FOCAL WEAKNESS: 0
WEAKNESS: 0
SPEECH CHANGE: 0
FLANK PAIN: 0
BACK PAIN: 0
SHORTNESS OF BREATH: 0
WHEEZING: 0
GASTROINTESTINAL NEGATIVE: 1
VOMITING: 0
HEADACHES: 1
DIARRHEA: 0
DIZZINESS: 0
CONSTITUTIONAL NEGATIVE: 1
STRIDOR: 0
ABDOMINAL PAIN: 0
CHILLS: 0
CARDIOVASCULAR NEGATIVE: 1
RESPIRATORY NEGATIVE: 1
NECK PAIN: 0

## 2019-01-03 ASSESSMENT — PAIN SCALES - GENERAL
PAINLEVEL_OUTOF10: 8
PAINLEVEL_OUTOF10: 0
PAINLEVEL_OUTOF10: 8
PAINLEVEL_OUTOF10: 6
PAINLEVEL_OUTOF10: 5
PAINLEVEL_OUTOF10: 9
PAINLEVEL_OUTOF10: 6
PAINLEVEL_OUTOF10: 7
PAINLEVEL_OUTOF10: 8
PAINLEVEL_OUTOF10: 7
PAINLEVEL_OUTOF10: 8
PAINLEVEL_OUTOF10: 8
PAINLEVEL_OUTOF10: 6

## 2019-01-03 NOTE — PROGRESS NOTES
[facility] Medicine Daily Progress Note    Date of Service  1/3/2019    Chief Complaint  48 y.o. female admitted 12/30/2018 with abdominal pain.  low BP, light headedness and dizzy.  Hospital Course      Hstory of blindness as a result of congenital hydrocephalus, essential hypertension for which she takes long-acting propanolol, and gastroesophageal reflux disease .  Suspected Sepsis associated UTI.     Interval Problem Update    Blood pressure stable, intermittent tachycardia.  Plan to advance diet.    Consultants/Specialty  None     Code Status  Full     Disposition  Dc home when clinically improved.     Review of Systems  Review of Systems   Constitutional: Negative for chills and fever.   HENT: Negative for congestion.    Eyes:        Chronic blind.      Respiratory: Negative for cough, shortness of breath, wheezing and stridor.    Cardiovascular: Negative for chest pain and palpitations.   Gastrointestinal: Negative for abdominal pain, diarrhea and vomiting.   Genitourinary: Negative for flank pain and hematuria.   Musculoskeletal: Negative for back pain, joint pain and neck pain.   Neurological: Positive for headaches (Chronic). Negative for dizziness, sensory change, speech change, focal weakness and weakness.        Physical Exam  Temp:  [36.2 °C (97.1 °F)-37.1 °C (98.8 °F)] 36.9 °C (98.4 °F)  Pulse:  [] 109  Resp:  [16-26] 16  BP: ()/(62-95) 120/89    Physical Exam   Constitutional: She is oriented to person, place, and time. No distress.   Blind.    HENT:   Head: Normocephalic and atraumatic.   Eyes: Conjunctivae are normal. Right eye exhibits no discharge. Left eye exhibits no discharge. No scleral icterus.   Neck: Neck supple.   Cardiovascular:   No murmur heard.  Tachycardic, regular.    Pulmonary/Chest: No stridor. She has no wheezes. She has no rales.   Abdominal: Soft. She exhibits no distension. There is no tenderness.   Musculoskeletal: She exhibits no edema or tenderness.    Neurological: She is alert and oriented to person, place, and time.   Disconjugate gaze, deviated to left. Vision loss.    Skin: Skin is warm and dry. She is not diaphoretic. There is pallor.   Vitals reviewed.      Fluids    Intake/Output Summary (Last 24 hours) at 01/03/19 0743  Last data filed at 01/03/19 0000   Gross per 24 hour   Intake           7392.3 ml   Output              400 ml   Net           6992.3 ml       Laboratory  Recent Labs      01/01/19   0832   01/01/19   1451   01/02/19   1524  01/02/19   2122  01/03/19   0327   WBC  7.6   --   6.5   --    --    --    --    RBC  1.73*   --   2.63*   --    --    --    --    HEMOGLOBIN  5.4*   < >  8.2*   < >  8.2*  8.7*  8.2*   HEMATOCRIT  16.3*   --   23.9*   --    --    --    --    MCV  94.8   --   91.6   --    --    --    --    MCH  30.6   --   31.2   --    --    --    --    MCHC  32.3*   --   34.0   --    --    --    --    RDW  50.8*   --   46.3   --    --    --    --    PLATELETCT  208   --   234   --    --    --    --    MPV  11.0   --   11.4   --    --    --    --     < > = values in this interval not displayed.     Recent Labs      01/01/19   0618  01/03/19   0327   SODIUM  140  143   POTASSIUM  3.7  3.7   CHLORIDE  114*  113*   CO2  18*  25   GLUCOSE  104*  98   BUN  19  4*   CREATININE  0.61  0.56   CALCIUM  8.1*  8.3*                   Imaging  DX-CHEST-PORTABLE (1 VIEW)   Final Result         No acute cardiac or pulmonary abnormality is identified.           Assessment/Plan  * GIB (gastrointestinal bleeding)- (present on admission)   Assessment & Plan    Acute.  EGD with findings of esophageal ulcer and esophagitis.  Continue oral PPI  Plan to advance diet  Hep-Lock IV fluids  Follow up serial hemoglobin  GI input appreciated         Sepsis (HCC)   Assessment & Plan    This is sepsis (without associated acute organ dysfunction).  Initially attributed to UTI and based on leukocytosis and tachycardia .  Further workup suggest due to SIRS from GI  bleed  DC antibiotics, Hep-Lock IV fluids         Normocytic anemia   Assessment & Plan    Due to acute GI bleed.  EGD findings of esophageal ulcer--hemoglobin stabilized post packed RBC transfusion.  Follow him hemoglobin  Transfuse if hypo-tensive or hemoglobin less than 7     Essential hypertension- (present on admission)   Assessment & Plan    Initial hypotension due to GI bleed resolved.  Persistent tachycardia likely rebound versus chronic - has been off of Inderal.  Blood pressure now stabilized, restart Inderal lower dose  Monitor telemetry, blood pressure         UTI (urinary tract infection)- (present on admission)   Assessment & Plan    Urine culture negative.  IV Rocephin stopped       Congenital hydrocephalus (HCC)- (present on admission)   Assessment & Plan    Chronic headaches-exacerbated by hypotension, severe anemia-  neuro exam nonfocal/stable.  Patient has follow-up with Dr. Hogan ,  her neurosurgeon next week.  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

## 2019-01-03 NOTE — PROGRESS NOTES
Gastroenterology Progress Note     Author: Zachary LAMA Nigel   Date & Time Created: 1/3/2019 8:51 AM    Chief Complaint:  Melena. Esophageal ulcer bleed.     Interval History:  EGD yesterday showed esophageal ulceration despite her being on Nexium although she had been taking ibuprofen daily. Advancing diet now. No sx.     Review of Systems:  Review of Systems   Constitutional: Negative.    Respiratory: Negative.    Cardiovascular: Negative.    Gastrointestinal: Negative.        Physical Exam:  Physical Exam   Constitutional: She is oriented to person, place, and time. She appears well-developed and well-nourished.   Cardiovascular: Regular rhythm and normal heart sounds.    Tachy.    Pulmonary/Chest: Effort normal and breath sounds normal.   Abdominal: Soft. Bowel sounds are normal.   Musculoskeletal: Normal range of motion. She exhibits no edema.   Neurological: She is alert and oriented to person, place, and time.   Skin: Skin is warm and dry.   Psychiatric: She has a normal mood and affect.       Labs:          Recent Labs      01/01/19   0618  01/03/19   0327   SODIUM  140  143   POTASSIUM  3.7  3.7   CHLORIDE  114*  113*   CO2  18*  25   BUN  19  4*   CREATININE  0.61  0.56   CALCIUM  8.1*  8.3*     Recent Labs      01/01/19   0618  01/03/19   0327   GLUCOSE  104*  98     Recent Labs      01/01/19   0832  01/01/19   1008  01/01/19   1451   01/02/19   1524  01/02/19   2122  01/03/19   0327   RBC  1.73*   --   2.63*   --    --    --    --    HEMOGLOBIN  5.4*  5.3*  8.2*   < >  8.2*  8.7*  8.2*   HEMATOCRIT  16.3*   --   23.9*   --    --    --    --    PLATELETCT  208   --   234   --    --    --    --    IRON   --   102   --    --    --    --    --    FERRITIN   --   36.0   --    --    --    --    --    TOTIRONBC   --   302   --    --    --    --    --     < > = values in this interval not displayed.     Recent Labs      01/01/19   0832  01/01/19   1451   WBC  7.6  6.5   NEUTSPOLYS  61.00  50.50   LYMPHOCYTES   31.00  40.00   MONOCYTES  5.80  7.10   EOSINOPHILS  1.30  1.50   BASOPHILS  0.50  0.60     Hemodynamics:  Temp (24hrs), Av.7 °C (98.1 °F), Min:36.2 °C (97.1 °F), Max:37.1 °C (98.8 °F)  Temperature: 36.7 °C (98 °F)  Pulse  Av.1  Min: 89  Max: 120Heart Rate (Monitored): 96  Blood Pressure: 122/89, NIBP: 110/65     Respiratory:    Respiration: 18, Pulse Oximetry: 95 %           Fluids:    Intake/Output Summary (Last 24 hours) at 19 0851  Last data filed at 19 0000   Gross per 24 hour   Intake           7392.3 ml   Output              400 ml   Net           6992.3 ml     Weight: 69.2 kg (152 lb 8.9 oz)  GI/Nutrition:  Orders Placed This Encounter   Procedures   • Diet Order Regular     Standing Status:   Standing     Number of Occurrences:   1     Order Specific Question:   Diet:     Answer:   Regular [1]     Medical Decision Making, by Problem:  Active Hospital Problems    Diagnosis   • *GIB (gastrointestinal bleeding) [K92.2]   • Normocytic anemia [D64.9]   • Sepsis (HCC) [A41.9]   • Essential hypertension [I10]   • UTI (urinary tract infection) [N39.0]   • Congenital hydrocephalus (HCC) [Q03.9]   • Blindness [H54.7]   1. Esophageal ulcer bleed.  2. Anemia of acute bleed.  3. Recent ibuprofen for headaches.     Plan:  1. She'll avoid NSAIDs in future.   2. Continue BID  Omeprazole  3. Can resume Nexium at DC hopefully in am.     Quality-Core Measures

## 2019-01-04 VITALS
WEIGHT: 152.12 LBS | RESPIRATION RATE: 18 BRPM | OXYGEN SATURATION: 95 % | HEIGHT: 64 IN | SYSTOLIC BLOOD PRESSURE: 99 MMHG | DIASTOLIC BLOOD PRESSURE: 64 MMHG | TEMPERATURE: 97.1 F | HEART RATE: 97 BPM | BODY MASS INDEX: 25.97 KG/M2

## 2019-01-04 LAB — HGB BLD-MCNC: 8.8 G/DL (ref 12–16)

## 2019-01-04 PROCEDURE — A9270 NON-COVERED ITEM OR SERVICE: HCPCS | Performed by: HOSPITALIST

## 2019-01-04 PROCEDURE — 700111 HCHG RX REV CODE 636 W/ 250 OVERRIDE (IP): Performed by: HOSPITALIST

## 2019-01-04 PROCEDURE — 700102 HCHG RX REV CODE 250 W/ 637 OVERRIDE(OP): Performed by: HOSPITALIST

## 2019-01-04 PROCEDURE — 99239 HOSP IP/OBS DSCHRG MGMT >30: CPT | Performed by: HOSPITALIST

## 2019-01-04 PROCEDURE — 85018 HEMOGLOBIN: CPT

## 2019-01-04 PROCEDURE — 36415 COLL VENOUS BLD VENIPUNCTURE: CPT

## 2019-01-04 RX ORDER — BUTALBITAL, ACETAMINOPHEN AND CAFFEINE 50; 325; 40 MG/1; MG/1; MG/1
1 TABLET ORAL EVERY 6 HOURS PRN
Qty: 30 TAB | Refills: 0 | Status: SHIPPED | OUTPATIENT
Start: 2019-01-04 | End: 2019-01-24

## 2019-01-04 RX ORDER — PROPRANOLOL HYDROCHLORIDE 80 MG/1
80 CAPSULE, EXTENDED RELEASE ORAL DAILY
Qty: 30 CAP | Refills: 2 | Status: SHIPPED | OUTPATIENT
Start: 2019-01-04 | End: 2019-06-06 | Stop reason: CLARIF

## 2019-01-04 RX ORDER — ACYCLOVIR 800 MG/1
800 TABLET ORAL
Qty: 25 TAB | Refills: 0
Start: 2019-01-04 | End: 2019-01-09

## 2019-01-04 RX ADMIN — GABAPENTIN 400 MG: 400 CAPSULE ORAL at 05:34

## 2019-01-04 RX ADMIN — STANDARDIZED SENNA CONCENTRATE AND DOCUSATE SODIUM 2 TABLET: 8.6; 5 TABLET, FILM COATED ORAL at 05:34

## 2019-01-04 RX ADMIN — MORPHINE SULFATE 4 MG: 4 INJECTION INTRAVENOUS at 08:05

## 2019-01-04 RX ADMIN — OMEPRAZOLE 20 MG: 20 CAPSULE, DELAYED RELEASE ORAL at 05:34

## 2019-01-04 RX ADMIN — ACYCLOVIR 800 MG: 800 TABLET ORAL at 05:33

## 2019-01-04 RX ADMIN — MORPHINE SULFATE 4 MG: 4 INJECTION INTRAVENOUS at 04:18

## 2019-01-04 ASSESSMENT — PAIN SCALES - GENERAL
PAINLEVEL_OUTOF10: 8
PAINLEVEL_OUTOF10: 7

## 2019-01-04 ASSESSMENT — ENCOUNTER SYMPTOMS
RESPIRATORY NEGATIVE: 1
CONSTITUTIONAL NEGATIVE: 1
CARDIOVASCULAR NEGATIVE: 1
GASTROINTESTINAL NEGATIVE: 1

## 2019-01-04 NOTE — PROGRESS NOTES
Pt discharged to home with friend. Discharge instructions reviewed, and signed. Pt escorted out with KIM Spear.

## 2019-01-04 NOTE — DISCHARGE INSTRUCTIONS
Discharge Instructions    Discharged to home by car with relative. Discharged via wheelchair, hospital escort: Yes.  Special equipment needed: Not Applicable    Be sure to schedule a follow-up appointment with your primary care doctor or any specialists as instructed.     Discharge Plan:   Diet Plan: Discussed  Activity Level: Discussed  Confirmed Follow up Appointment: Patient to Call and Schedule Appointment  Confirmed Symptoms Management: Discussed  Medication Reconciliation Updated: Yes  Influenza Vaccine Indication: Not indicated: Previously immunized this influenza season and > 8 years of age    I understand that a diet low in cholesterol, fat, and sodium is recommended for good health. Unless I have been given specific instructions below for another diet, I accept this instruction as my diet prescription.   Other diet:     Special Instructions: Sepsis, Adult  Sepsis is a serious infection of your blood or tissues that affects your whole body. The infection that causes sepsis may be bacterial, viral, fungal, or parasitic. Sepsis may be life threatening. Sepsis can cause your blood pressure to drop. This may result in shock. Shock causes your central nervous system and your organs to stop working correctly.  What increases the risk?  Sepsis can happen in anyone, but it is more likely to happen in people who have weakened immune systems.  What are the signs or symptoms?  Symptoms of sepsis can include:  · Fever or low body temperature (hypothermia).  · Rapid breathing (hyperventilation).  · Chills.  · Rapid heartbeat (tachycardia).  · Confusion or light-headedness.  · Trouble breathing.  · Urinating much less than usual.  · Cool, clammy skin or red, flushed skin.  · Other problems with the heart, kidneys, or brain.  How is this diagnosed?  Your health care provider will likely do tests to look for an infection, to see if the infection has spread to your blood, and to see how serious your condition is. Tests can  include:  · Blood tests, including cultures of your blood.  · Cultures of other fluids from your body, such as:  ¨ Urine.  ¨ Pus from wounds.  ¨ Mucus coughed up from your lungs.  · Urine tests other than cultures.  · X-ray exams or other imaging tests.  How is this treated?  Treatment will begin with elimination of the source of infection. If your sepsis is likely caused by a bacterial or fungal infection, you will be given antibiotic or antifungal medicines.  You may also receive:  · Oxygen.  · Fluids through an IV tube.  · Medicines to increase your blood pressure.  · A machine to clean your blood (dialysis) if your kidneys fail.  · A machine to help you breathe if your lungs fail.  Get help right away if:  You get an infection or develop any of the signs and symptoms of sepsis after surgery or a hospitalization.  This information is not intended to replace advice given to you by your health care provider. Make sure you discuss any questions you have with your health care provider.  Document Released: 09/15/2004 Document Revised: 05/25/2017 Document Reviewed: 08/25/2014  [employer] Interactive Patient Education © 2017 [employer]      · Is patient discharged on Warfarin / Coumadin?   No     Depression / Suicide Risk    As you are discharged from this [facility] facility, it is important to learn how to keep safe from harming yourself.    Recognize the warning signs:  · Abrupt changes in personality, positive or negative- including increase in energy   · Giving away possessions  · Change in eating patterns- significant weight changes-  positive or negative  · Change in sleeping patterns- unable to sleep or sleeping all the time   · Unwillingness or inability to communicate  · Depression  · Unusual sadness, discouragement and loneliness  · Talk of wanting to die  · Neglect of personal appearance   · Rebelliousness- reckless behavior  · Withdrawal from people/activities they love  · Confusion- inability to  concentrate     If you or a loved one observes any of these behaviors or has concerns about self-harm, here's what you can do:  · Talk about it- your feelings and reasons for harming yourself  · Remove any means that you might use to hurt yourself (examples: pills, rope, extension cords, firearm)  · Get professional help from the community (Mental Health, Substance Abuse, psychological counseling)  · Do not be alone:Call your Safe Contact- someone whom you trust who will be there for you.  · Call your local CRISIS HOTLINE 237-2710 or 003-867-5427  · Call your local Children's Mobile Crisis Response Team [place] (340) 493-5588 or www.1000jobboersen.de  · Call the toll free National Suicide Prevention Hotlines   · National Suicide Prevention Lifeline 248-355-BCYR (6427)  · [employer] 800-SUICIDE (386-2941)      Gastrointestinal Bleeding  Introduction  Gastrointestinal bleeding is bleeding somewhere along the path food travels through the body (digestive tract). This path is anywhere between the mouth and the opening of the butt (anus). You may have blood in your poop (stools) or have black poop. If you throw up (vomit), there may be blood in it.  This condition can be mild, serious, or even life-threatening. If you have a lot of bleeding, you may need to stay in the hospital.  Follow these instructions at home:  · Take over-the-counter and prescription medicines only as told by your doctor.  · Eat foods that have a lot of fiber in them. These foods include whole grains, fruits, and vegetables. You can also try eating 1-3 prunes each day.  · Drink enough fluid to keep your pee (urine) clear or pale yellow.  · Keep all follow-up visits as told by your doctor. This is important.  Contact a doctor if:  · Your symptoms do not get better.  Get help right away if:  · Your bleeding gets worse.  · You feel dizzy or you pass out (faint).  · You feel weak.  · You have very bad cramps in your back or belly  (abdomen).  · You pass large clumps of blood (clots) in your poop.  · Your symptoms are getting worse.  This information is not intended to replace advice given to you by your health care provider. Make sure you discuss any questions you have with your health care provider.  Document Released: 09/26/2009 Document Revised: 05/25/2017 Document Reviewed: 06/06/2016  © 2017 Elsevier    Acetaminophen; Butalbital; Caffeine tablets or capsules  What is this medicine?  ACETAMINOPHEN; BUTALBITAL; CAFFEINE (a set a MARS orlando fen; byoo EDMUND bi edmund; KAF een) is a pain reliever. It is used to treat tension headaches.  This medicine may be used for other purposes; ask your health care provider or pharmacist if you have questions.  COMMON BRAND NAME(S): Alagesic, Americet, Anolor-300, Arcet, HAO, CAPACET, Dolgic Plus, Esgic, Esgic Plus, Ezol, Fioricet, Geone, Margesic, Medigesic, Orbivan, Pacaps, Repan, Tenake, Triad, Zebutal  What should I tell my health care provider before I take this medicine?  They need to know if you have any of these conditions:  -drug abuse or addiction  -heart or circulation problems  -if you often drink alcohol  -kidney disease or problems going to the bathroom  -liver disease  -lung disease, asthma, or breathing problems  -porphyria  -an unusual or allergic reaction to acetaminophen, butalbital or other barbiturates, caffeine, other medicines, foods, dyes, or preservatives  -pregnant or trying to get pregnant  -breast-feeding  How should I use this medicine?  Take this medicine by mouth with a full glass of water. Follow the directions on the prescription label. If the medicine upsets your stomach, take the medicine with food or milk. Do not take more than you are told to take.  Talk to your pediatrician regarding the use of this medicine in children. Special care may be needed.  Overdosage: If you think you have taken too much of this medicine contact a poison control center or emergency room at  once.  NOTE: This medicine is only for you. Do not share this medicine with others.  What if I miss a dose?  If you miss a dose, take it as soon as you can. If it is almost time for your next dose, take only that dose. Do not take double or extra doses.  What may interact with this medicine?  -alcohol or medicines that contain alcohol  -antidepressants, especially MAOIs like isocarboxazid, phenelzine, tranylcypromine, and selegiline  -antihistamines  -benzodiazepines  -carbamazepine  -isoniazid  -medicines for pain like pentazocine, buprenorphine, butorphanol, nalbuphine, tramadol, and propoxyphene  -muscle relaxants  -naltrexone  -phenobarbital, phenytoin, and fosphenytoin  -phenothiazines like perphenazine, thioridazine, chlorpromazine, mesoridazine, fluphenazine, prochlorperazine, promazine, and trifluoperazine  -voriconazole  This list may not describe all possible interactions. Give your health care provider a list of all the medicines, herbs, non-prescription drugs, or dietary supplements you use. Also tell them if you smoke, drink alcohol, or use illegal drugs. Some items may interact with your medicine.  What should I watch for while using this medicine?  Tell your doctor or health care professional if your pain does not go away, if it gets worse, or if you have new or a different type of pain. You may develop tolerance to the medicine. Tolerance means that you will need a higher dose of the medicine for pain relief. Tolerance is normal and is expected if you take the medicine for a long time.  Do not suddenly stop taking your medicine because you may develop a severe reaction. Your body becomes used to the medicine. This does NOT mean you are addicted. Addiction is a behavior related to getting and using a drug for a non-medical reason. If you have pain, you have a medical reason to take pain medicine. Your doctor will tell you how much medicine to take. If your doctor wants you to stop the medicine, the  dose will be slowly lowered over time to avoid any side effects.  You may get drowsy or dizzy when you first start taking the medicine or change doses. Do not drive, use machinery, or do anything that may be dangerous until you know how the medicine affects you. Stand or sit up slowly.  Do not take other medicines that contain acetaminophen with this medicine. Always read labels carefully. If you have questions, ask your doctor or pharmacist.  If you take too much acetaminophen get medical help right away. Too much acetaminophen can be very dangerous and cause liver damage. Even if you do not have symptoms, it is important to get help right away.  What side effects may I notice from receiving this medicine?  Side effects that you should report to your doctor or health care professional as soon as possible:  -allergic reactions like skin rash, itching or hives, swelling of the face, lips, or tongue  -breathing problems  -confusion  -feeling faint or lightheaded, falls  -redness, blistering, peeling or loosening of the skin, including inside the mouth  -seizure  -stomach pain  -yellowing of the eyes or skin  Side effects that usually do not require medical attention (report to your doctor or health care professional if they continue or are bothersome):  -constipation  -nausea, vomiting  This list may not describe all possible side effects. Call your doctor for medical advice about side effects. You may report side effects to FDA at 3-524-FDA-8093.  Where should I keep my medicine?  Keep out of the reach of children. This medicine can be abused. Keep your medicine in a safe place to protect it from theft. Do not share this medicine with anyone. Selling or giving away this medicine is dangerous and against the law.  This medicine may cause accidental overdose and death if it taken by other adults, children, or pets. Mix any unused medicine with a substance like cat litter or coffee grounds. Then throw the medicine away  in a sealed container like a sealed bag or a coffee can with a lid. Do not use the medicine after the expiration date.  Store at room temperature between 15 and 30 degrees C (59 and 86 degrees F).  NOTE: This sheet is a summary. It may not cover all possible information. If you have questions about this medicine, talk to your doctor, pharmacist, or health care provider.  © 2018 Elsevier/Gold Standard (2015-02-13 15:00:25)

## 2019-01-04 NOTE — PROGRESS NOTES
Gastroenterology Progress Note     Author: Zachary Manning   Date & Time Created: 2019 8:53 AM    Chief Complaint:  GI bleed. NSAID esophageal ulceration.     Interval History:  S: Denies heartburn. Hgb stable. Anticipates going home today. Takes Nexium daily and uses Pepcid for breath through heartburn. Has been having headaches and taking ibuprofen. Sees NSGY about her shunt on .     Review of Systems:  Review of Systems   Constitutional: Negative.    Respiratory: Negative.    Cardiovascular: Negative.    Gastrointestinal: Negative.        Physical Exam:  Physical Exam   Constitutional: She appears well-developed and well-nourished.   Cardiovascular: Normal rate and regular rhythm.    Pulmonary/Chest: Effort normal and breath sounds normal.   Abdominal: Soft. Bowel sounds are normal.   Musculoskeletal: She exhibits no edema.   Neurological: She is alert.   Skin: Skin is warm and dry.   Psychiatric: She has a normal mood and affect.       Labs:          Recent Labs      19   0327   SODIUM  143   POTASSIUM  3.7   CHLORIDE  113*   CO2  25   BUN  4*   CREATININE  0.56   CALCIUM  8.3*     Recent Labs      19   0327   GLUCOSE  98     Recent Labs      19   1008  19   1451   19   0327  19   0938  19   0302   RBC   --   2.63*   --    --    --    --    HEMOGLOBIN  5.3*  8.2*   < >  8.2*  7.8*  8.8*   HEMATOCRIT   --   23.9*   --    --    --    --    PLATELETCT   --   234   --    --    --    --    IRON  102   --    --    --    --    --    FERRITIN  36.0   --    --    --    --    --    TOTIRONBC  302   --    --    --    --    --     < > = values in this interval not displayed.     Recent Labs      19   1451   WBC  6.5   NEUTSPOLYS  50.50   LYMPHOCYTES  40.00   MONOCYTES  7.10   EOSINOPHILS  1.50   BASOPHILS  0.60     Hemodynamics:  Temp (24hrs), Av.7 °C (98 °F), Min:36.2 °C (97.1 °F), Max:37.3 °C (99.2 °F)  Temperature: 36.2 °C (97.1 °F)  Pulse  Av.5  Min:  86  Max: 120   Blood Pressure: (!) 99/64 (before my shift )     Respiratory:    Respiration: 18, Pulse Oximetry: 95 %           Fluids:    Intake/Output Summary (Last 24 hours) at 01/04/19 0853  Last data filed at 01/04/19 0800   Gross per 24 hour   Intake              240 ml   Output                0 ml   Net              240 ml     Weight: 69 kg (152 lb 1.9 oz)  GI/Nutrition:  Orders Placed This Encounter   Procedures   • Diet Order Regular     Standing Status:   Standing     Number of Occurrences:   1     Order Specific Question:   Diet:     Answer:   Regular [1]     Medical Decision Making, by Problem:  Active Hospital Problems    Diagnosis   • *GIB (gastrointestinal bleeding) [K92.2]   • Normocytic anemia [D64.9]   • Sepsis (HCC) [A41.9]   • Essential hypertension [I10]   • UTI (urinary tract infection) [N39.0]   • Congenital hydrocephalus (HCC) [Q03.9]   • Blindness [H54.7]   Impression:  1. Recent GI bleed. Esoph ulceration due to ibuprofen.  2. Hx esopha atresia and surgery.     Plan:  OK to DC home on Nexium and prn Pepcid. Will have her follow up with Dr. Fernando.     Quality-Core Measures

## 2019-01-04 NOTE — PROGRESS NOTES
Bedside report received. Pt a/o, resting in bed. Tele box on. Call light within reach, bed locked, low position.

## 2019-01-05 NOTE — DISCHARGE SUMMARY
Hospital Medicine Discharge Note     Admit Date:  12/30/2018       Discharge Date:   1/4/2019    Attending Physician:  Link Tsai M.D.      Diagnoses (includes active and resolved):     Principal Problem:    GIB (gastrointestinal bleeding) POA: Yes  Active Problems:    UTI (urinary tract infection) POA: Yes    Essential hypertension POA: Yes    Normocytic anemia, acute blood loss POA: Unknown    Sepsis (HCC) POA: Unknown    Blindness POA: Yes    Congenital hydrocephalus (HCC) (Chronic) POA: Yes    Chronic headache  Resolved:  UTI  Sepsis ruled out      Hospital Summary (Brief Narrative):         48-year-old female hstory of blindness as a result of congenital hydrocephalus, essential hypertension for which she takes long-acting propanolol, and gastroesophageal reflux disease   admitted 1/3/2019 with lightheadedness and dizziness low blood pressure abdominal pain suspected Sepsis associated UTI.  Following admission she had tachycardia and hypotension her Inderal was stopped.  She had no observed bloody stools.  Surprisingly her follow-up hemoglobin was low at 5.4.  Rectal exam revealed dark heme positive stool.  She had been taking ibuprofen 1-2 times daily for chronic headaches.  Patient had no change in neuro status and exam was nonfocal other than chronic vision loss.  Patient underwent EGD which revealed esophageal ulcer and chronic esophagitis evidence of gastric pull-up fundoplication.  Patient was transfused packed RBCs with hemoglobin stabilized 8 range.  Her hypotension tachycardia resolved and she was resumed on lower dose Inderal.  Recommend to continue with PPI and Pepcid as needed by GI .  She was advised to stop NSAID use.  Sepsis was ruled out and urine culture negative.  Patient has chronic headaches and has been recommended to have follow-up with her neurosurgeon Dr. Hogan as previously scheduled next week regarding her chronic hydrocephalus.  Patient diet advanced and tolerated on reevaluation  patient clinically stable was discharged home.  Advised to follow-up with outpatient GI.      Consultants:        Dr. Fernando, GI.     Imaging/ Testing:      DX-CHEST-PORTABLE (1 VIEW)   Final Result         No acute cardiac or pulmonary abnormality is identified.            Procedures:           PreOp Diagnosis: GI bleed     PostOp Diagnosis:   1. Esophageal diminutive ulcer with whitish protuberance at 33 cm.  2. Chronic esophagitis from 28-35 cm.  3.  Evidence of gastric pull-up and fundoplication.  4.  No esophageal motility seen.     Procedure(s):  GASTROSCOPY - Wound Class: Clean Contaminated     Surgeon(s):  1/2/2019 11:15 AM Matias Fernando M.D.      Discharge Medications:             Medication List      START taking these medications      Instructions   acetaminophen/caffeine/butalbital 325-40-50 mg -40 MG Tabs  Commonly known as:  FIORICET   Take 1 Tab by mouth every 6 hours as needed for Headache or Migraine for up to 20 days.  Dose:  1 Tab     acyclovir 800 MG Tabs  Commonly known as:  ZOVIRAX   Take 1 Tab by mouth 5 Times a Day for 5 days.  Dose:  800 mg        CHANGE how you take these medications      Instructions   propranolol CR 80 MG Cp24  What changed:  · medication strength  · how much to take  Commonly known as:  INDERAL LA   Take 1 Cap by mouth every day.  Dose:  80 mg        CONTINUE taking these medications      Instructions   acetaminophen 325 MG Tabs  Commonly known as:  TYLENOL   Take 2 Tabs by mouth every 6 hours as needed (Mild Pain; (Pain scale 1-3); Temp greater than 100.5 F).  Dose:  650 mg     famotidine 40 MG Tabs  Commonly known as:  PEPCID   Take 40 mg by mouth every evening.  Dose:  40 mg     gabapentin 400 MG Caps  Commonly known as:  NEURONTIN   Take 400mg PO at 1900, then take 400mg PO before bed     LORazepam 1 MG Tabs  Commonly known as:  ATIVAN   Take 1 mg by mouth 1 time daily as needed (sleep).  Dose:  1 mg     NEXIUM 40 MG delayed-release capsule  Generic drug:   esomeprazole   Take 40 mg by mouth every morning before breakfast.  Dose:  40 mg     potassium chloride SA 20 MEQ Tbcr  Commonly known as:  Kdur   Take 20 mEq by mouth every day.  Dose:  20 mEq               Diet:       DIET ORDERS (Through next 24h)    As tolerated            Activity:   As tolerated.      Code status:   Full code    Primary Care Provider:    Tabitha Bautista M.D.    Follow up appointment details :      .  Tabitha Bautista M.D.  [address] [place] [id number] [phone]    In 2 weeks      Matias Fernando M.D.  [address] [id number] [phone]    In 2 weeks      Clarence Hogan M.D.  [address] [facility] [id number] [phone]    In 1 week              Time spent on discharge day patient visit: 40 minutes    #################################################

## 2019-01-05 NOTE — DOCUMENTATION QUERY
"                                                                         [address] [place] [address]                                                                         Query Response Note      PATIENT:               NILAY MANN  ACCT #:                  [id number]  MRN:                       [id number]  :                       1970  ADMIT DATE:       2018 8:05 PM  DISCH DATE:        2019 10:26 AM  RESPONDING  PROVIDER #:        [id number]           CDI RESPONSE TEXT:    Sepsis is ruled out    CDI QUERY TEXT:    Rule Out Condition Clarification [id number]    \"Sepsis, initially attributed to UTI.  Further workup suggests due SIRS from GI bleed\" is documented in the Progress Notes   Please clarify status of sepsis:    NOTE:  If an appropriate response is not listed below, please respond with a new note.       The patient's Clinical Indicators include:  Sepsis, initially attributed to UTI and based on leukocytosis and tachycardia.  Further workup suggests due to SIRS and GI bleed. UTI, urine culture negative    WBC 16.7, Admit Vitals: 86/53, 99, 15, 96.9F, 98%  Query created by: Enedelia Mosqueda on 2019 1:58 PM        Electronically signed by:  KANU LAZAR MD 2019 7:55 AM         "

## 2019-01-06 LAB — EKG IMPRESSION: NORMAL

## 2019-01-06 NOTE — ED PROVIDER NOTES
ED Provider Note    CHIEF COMPLAINT   Chief Complaint   Patient presents with   • Rash     x 4/5 days on her left hip. Pt reports itching and pain to rash. +body aches       HPI   Rasheeda Manzano is a 48 y.o. female who presents to emergency room today with rash to her left hip for 4-5 days.  Patient has had a history of chickenpox in the past she has burning and itching to the area no nausea no vomiting no chest pain or shortness of breath no fever chills.    REVIEW OF SYSTEMS   See HPI for further details. All other systems are negative.     PAST MEDICAL HISTORY   Past Medical History:   Diagnosis Date   • C. 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   • Blind    • Chronic daily headache    • Depression    • Fall    • GERD (gastroesophageal reflux disease)    • H/O total hysterectomy    • Hydrocephalus    • Hydrocephalus     shunt drains into pleural place of L lung   • Jaundice     at birth   • Legally blind    • Migraine without aura, without mention of intractable migraine without mention of status migrainosus    • Other specified symptom associated with female genital organs     excessive bleeding   • Pain     gallbladder related   • Psychiatric problem     depression       FAMILY HISTORY  Family History   Problem Relation Age of Onset   • Hypertension Mother    • Hypertension Father    • Non-contributory Neg Hx         Migraine       SOCIAL HISTORY  Social History     Social History   • Marital status:      Spouse name: N/A   • Number of children: N/A   • Years of education: N/A     Social History Main Topics   • Smoking status: Former Smoker     Packs/day: 1.00     Years: 10.00     Types: Cigars     Start date: 5/1/2004     Quit date: 8/9/2017   • Smokeless tobacco: Never Used      Comment:  CIGAR/day    • Alcohol use Yes      Comment: socially   • Drug use: No   • Sexual activity: Yes     Partners: Male     Other Topics Concern   • Not on file      Social History Narrative   • No narrative on file        SURGICAL HISTORY  Past Surgical History:   Procedure Laterality Date   • GASTROSCOPY N/A 1/2/2019    Procedure: GASTROSCOPY;  Surgeon: Matias Fernando M.D.;  Location: SURGERY [facility];  Service: Gastroenterology   • GASTROSCOPY-ENDO N/A 8/24/2017    Procedure: GASTROSCOPY-ENDO;  Surgeon: Matias Fernando M.D.;  Location: ENDOSCOPY [facility];  Service:    • AYALA BY LAPAROSCOPY N/A 8/13/2015    Procedure: AYALA BY LAPAROSCOPY;  Surgeon: Brice Johnson M.D.;  Location: SURGERY SAME DAY [facility];  Service:    • CHOLECYSTECTOMY N/A 8/13/2015    Procedure: CHOLECYSTECTOMY;  Surgeon: Brice Johnson M.D.;  Location: SURGERY SAME DAY [facility];  Service:    • LYSIS ADHESIONS GENERAL  12/10/2013    Performed by Arie Bass M.D. at [facility]   • CYSTOSCOPY  12/10/2013    Performed by Joana Yeager M.D. at [facility]   • EXPLORATORY LAPAROTOMY  12/10/2013    Performed by Arie Bass M.D. at [facility]   • APPENDECTOMY  12/10/2013    Performed by Arie Bass M.D. at [facility]   • ABDOMINAL HYSTERECTOMY TOTAL  12/10/2013    Performed by Joana Yeager M.D. at [facility]   • LAPAROSCOPY  8/8/08    Performed by FERNANDO HENDERSON at [facility]   • NISSEN FUNDOPLICATION LAPAROSCOPIC     • OTHER      PLEURAL SHUNT, numerous revisions       CURRENT MEDICATIONS   Home Medications     Reviewed by Susan Mensah R.N. (Registered Nurse) on 12/24/18 at 0839  Med List Status: Complete   Medication Last Dose Status   acetaminophen (TYLENOL) 325 MG Tab  Active   amitriptyline (ELAVIL) 100 MG Tab  Active   esomeprazole (NEXIUM) 40 MG delayed-release capsule  Active   famotidine (PEPCID) 40 MG Tab  Active   gabapentin (NEURONTIN) 400 MG Cap  Active   indomethacin SR (INDOCIN SR) 75 MG Cap CR  Active   LORazepam (ATIVAN) 1 MG Tab  Active   potassium chloride SA (KDUR) 20 MEQ  "Tab CR  Active   propranolol CR (INDERAL LA) 120 MG CAPSULE SR 24 HR  Active   topiramate (TOPAMAX) 100 MG Tab  Active                ALLERGIES   Allergies   Allergen Reactions   • Tape Rash     Medical tape. Per patient, paper tape ok.       PHYSICAL EXAM  VITAL SIGNS: /90   Pulse 88   Temp 36.7 °C (98 °F) (Temporal)   Resp 16   Ht 1.626 m (5' 4\")   Wt 65.2 kg (143 lb 11.8 oz)   LMP 11/27/2013   SpO2 99%   BMI 24.67 kg/m²       Constitutional: Well developed, Well nourished, No acute distress, Non-toxic appearance.   Cardiovascular: Normal heart rate, Normal rhythm, No murmurs, No rubs, No gallops.   Thorax & Lungs: Normal breath sounds, No respiratory distress, No wheezing, No chest tenderness.   Skin: Zoster type rash to the left hip consistent with shingles infection.  Extremities: Intact distal pulses, No edema, No tenderness, No cyanosis, No clubbing.       COURSE & MEDICAL DECISION MAKING  Pertinent Labs & Imaging studies reviewed. (See chart for details)  Patient has herpetic zoster left hip was placed on acyclovir, also lidocaine patches for the pain will follow up with primary care physician return to persistent worsening symptoms discharge as above the home in stable condition.    FINAL IMPRESSION  1.  Acute herpes zoster, left hip  2.   3.      Electronically signed by: Clarence Encarnacion, 1/6/2019 11:33 AM    "

## 2019-01-15 ENCOUNTER — HOSPITAL ENCOUNTER (OUTPATIENT)
Facility: MEDICAL CENTER | Age: 49
End: 2019-01-16
Attending: EMERGENCY MEDICINE | Admitting: HOSPITALIST
Payer: MEDICAID

## 2019-01-15 ENCOUNTER — APPOINTMENT (OUTPATIENT)
Dept: RADIOLOGY | Facility: MEDICAL CENTER | Age: 49
End: 2019-01-15
Attending: EMERGENCY MEDICINE
Payer: MEDICAID

## 2019-01-15 DIAGNOSIS — R19.7 DIARRHEA, UNSPECIFIED TYPE: ICD-10-CM

## 2019-01-15 DIAGNOSIS — E86.1 HYPOTENSION DUE TO HYPOVOLEMIA: ICD-10-CM

## 2019-01-15 DIAGNOSIS — E86.0 DEHYDRATION: ICD-10-CM

## 2019-01-15 DIAGNOSIS — E87.6 HYPOKALEMIA: ICD-10-CM

## 2019-01-15 DIAGNOSIS — I95.89 HYPOTENSION DUE TO HYPOVOLEMIA: ICD-10-CM

## 2019-01-15 LAB
ABO GROUP BLD: NORMAL
ALBUMIN SERPL BCP-MCNC: 4.2 G/DL (ref 3.2–4.9)
ALBUMIN/GLOB SERPL: 1.4 G/DL
ALP SERPL-CCNC: 120 U/L (ref 30–99)
ALT SERPL-CCNC: 12 U/L (ref 2–50)
ANION GAP SERPL CALC-SCNC: 9 MMOL/L (ref 0–11.9)
APTT PPP: 32.9 SEC (ref 24.7–36)
AST SERPL-CCNC: 13 U/L (ref 12–45)
BASOPHILS # BLD AUTO: 0.9 % (ref 0–1.8)
BASOPHILS # BLD: 0.05 K/UL (ref 0–0.12)
BILIRUB SERPL-MCNC: 0.2 MG/DL (ref 0.1–1.5)
BLD GP AB SCN SERPL QL: NORMAL
BUN SERPL-MCNC: 19 MG/DL (ref 8–22)
CALCIUM SERPL-MCNC: 9.1 MG/DL (ref 8.5–10.5)
CHLORIDE SERPL-SCNC: 108 MMOL/L (ref 96–112)
CO2 SERPL-SCNC: 23 MMOL/L (ref 20–33)
CREAT SERPL-MCNC: 0.98 MG/DL (ref 0.5–1.4)
EKG IMPRESSION: NORMAL
EOSINOPHIL # BLD AUTO: 0.08 K/UL (ref 0–0.51)
EOSINOPHIL NFR BLD: 1.4 % (ref 0–6.9)
ERYTHROCYTE [DISTWIDTH] IN BLOOD BY AUTOMATED COUNT: 50.8 FL (ref 35.9–50)
GLOBULIN SER CALC-MCNC: 3.1 G/DL (ref 1.9–3.5)
GLUCOSE SERPL-MCNC: 100 MG/DL (ref 65–99)
HCT VFR BLD AUTO: 33.2 % (ref 37–47)
HGB BLD-MCNC: 10.4 G/DL (ref 12–16)
IMM GRANULOCYTES # BLD AUTO: 0.02 K/UL (ref 0–0.11)
IMM GRANULOCYTES NFR BLD AUTO: 0.4 % (ref 0–0.9)
INR PPP: 1.08 (ref 0.87–1.13)
LACTATE BLD-SCNC: 2 MMOL/L (ref 0.5–2)
LIPASE SERPL-CCNC: 26 U/L (ref 11–82)
LYMPHOCYTES # BLD AUTO: 0.64 K/UL (ref 1–4.8)
LYMPHOCYTES NFR BLD: 11.5 % (ref 22–41)
MCH RBC QN AUTO: 28.8 PG (ref 27–33)
MCHC RBC AUTO-ENTMCNC: 31.3 G/DL (ref 33.6–35)
MCV RBC AUTO: 92 FL (ref 81.4–97.8)
MONOCYTES # BLD AUTO: 0.6 K/UL (ref 0–0.85)
MONOCYTES NFR BLD AUTO: 10.8 % (ref 0–13.4)
NEUTROPHILS # BLD AUTO: 4.19 K/UL (ref 2–7.15)
NEUTROPHILS NFR BLD: 75 % (ref 44–72)
NRBC # BLD AUTO: 0 K/UL
NRBC BLD-RTO: 0 /100 WBC
PLATELET # BLD AUTO: 390 K/UL (ref 164–446)
PMV BLD AUTO: 10.6 FL (ref 9–12.9)
POTASSIUM SERPL-SCNC: 2.9 MMOL/L (ref 3.6–5.5)
PROT SERPL-MCNC: 7.3 G/DL (ref 6–8.2)
PROTHROMBIN TIME: 14.1 SEC (ref 12–14.6)
RBC # BLD AUTO: 3.61 M/UL (ref 4.2–5.4)
RH BLD: NORMAL
SODIUM SERPL-SCNC: 140 MMOL/L (ref 135–145)
WBC # BLD AUTO: 5.6 K/UL (ref 4.8–10.8)

## 2019-01-15 PROCEDURE — 83605 ASSAY OF LACTIC ACID: CPT

## 2019-01-15 PROCEDURE — 93005 ELECTROCARDIOGRAM TRACING: CPT | Performed by: EMERGENCY MEDICINE

## 2019-01-15 PROCEDURE — 86900 BLOOD TYPING SEROLOGIC ABO: CPT

## 2019-01-15 PROCEDURE — 700102 HCHG RX REV CODE 250 W/ 637 OVERRIDE(OP): Performed by: HOSPITALIST

## 2019-01-15 PROCEDURE — 700102 HCHG RX REV CODE 250 W/ 637 OVERRIDE(OP): Performed by: EMERGENCY MEDICINE

## 2019-01-15 PROCEDURE — 80053 COMPREHEN METABOLIC PANEL: CPT

## 2019-01-15 PROCEDURE — 86850 RBC ANTIBODY SCREEN: CPT

## 2019-01-15 PROCEDURE — 83690 ASSAY OF LIPASE: CPT

## 2019-01-15 PROCEDURE — 85730 THROMBOPLASTIN TIME PARTIAL: CPT

## 2019-01-15 PROCEDURE — A9270 NON-COVERED ITEM OR SERVICE: HCPCS | Performed by: HOSPITALIST

## 2019-01-15 PROCEDURE — 700105 HCHG RX REV CODE 258: Performed by: EMERGENCY MEDICINE

## 2019-01-15 PROCEDURE — 99285 EMERGENCY DEPT VISIT HI MDM: CPT

## 2019-01-15 PROCEDURE — G0378 HOSPITAL OBSERVATION PER HR: HCPCS

## 2019-01-15 PROCEDURE — 85610 PROTHROMBIN TIME: CPT

## 2019-01-15 PROCEDURE — 99220 PR INITIAL OBSERVATION CARE,LEVL III: CPT | Performed by: HOSPITALIST

## 2019-01-15 PROCEDURE — A9270 NON-COVERED ITEM OR SERVICE: HCPCS | Performed by: EMERGENCY MEDICINE

## 2019-01-15 PROCEDURE — 85025 COMPLETE CBC W/AUTO DIFF WBC: CPT

## 2019-01-15 PROCEDURE — 82272 OCCULT BLD FECES 1-3 TESTS: CPT

## 2019-01-15 PROCEDURE — 36415 COLL VENOUS BLD VENIPUNCTURE: CPT

## 2019-01-15 PROCEDURE — 700101 HCHG RX REV CODE 250: Performed by: HOSPITALIST

## 2019-01-15 PROCEDURE — 96372 THER/PROPH/DIAG INJ SC/IM: CPT

## 2019-01-15 PROCEDURE — 71045 X-RAY EXAM CHEST 1 VIEW: CPT

## 2019-01-15 PROCEDURE — 700111 HCHG RX REV CODE 636 W/ 250 OVERRIDE (IP): Performed by: HOSPITALIST

## 2019-01-15 PROCEDURE — 86901 BLOOD TYPING SEROLOGIC RH(D): CPT

## 2019-01-15 RX ORDER — PROMETHAZINE HYDROCHLORIDE 25 MG/1
12.5-25 SUPPOSITORY RECTAL EVERY 4 HOURS PRN
Status: DISCONTINUED | OUTPATIENT
Start: 2019-01-15 | End: 2019-01-16 | Stop reason: HOSPADM

## 2019-01-15 RX ORDER — POLYETHYLENE GLYCOL 3350 17 G/17G
1 POWDER, FOR SOLUTION ORAL
Status: DISCONTINUED | OUTPATIENT
Start: 2019-01-15 | End: 2019-01-16 | Stop reason: HOSPADM

## 2019-01-15 RX ORDER — GABAPENTIN 400 MG/1
400 CAPSULE ORAL
Status: DISCONTINUED | OUTPATIENT
Start: 2019-01-15 | End: 2019-01-16 | Stop reason: HOSPADM

## 2019-01-15 RX ORDER — PROPRANOLOL HYDROCHLORIDE 80 MG/1
80 CAPSULE, EXTENDED RELEASE ORAL NIGHTLY
Status: DISCONTINUED | OUTPATIENT
Start: 2019-01-15 | End: 2019-01-16 | Stop reason: HOSPADM

## 2019-01-15 RX ORDER — BISACODYL 10 MG
10 SUPPOSITORY, RECTAL RECTAL
Status: DISCONTINUED | OUTPATIENT
Start: 2019-01-15 | End: 2019-01-16 | Stop reason: HOSPADM

## 2019-01-15 RX ORDER — POTASSIUM CHLORIDE 20 MEQ/1
40 TABLET, EXTENDED RELEASE ORAL ONCE
Status: COMPLETED | OUTPATIENT
Start: 2019-01-15 | End: 2019-01-15

## 2019-01-15 RX ORDER — SODIUM CHLORIDE AND POTASSIUM CHLORIDE 150; 900 MG/100ML; MG/100ML
INJECTION, SOLUTION INTRAVENOUS CONTINUOUS
Status: DISCONTINUED | OUTPATIENT
Start: 2019-01-15 | End: 2019-01-16

## 2019-01-15 RX ORDER — HYDROCODONE BITARTRATE AND ACETAMINOPHEN 5; 325 MG/1; MG/1
1 TABLET ORAL ONCE
Status: COMPLETED | OUTPATIENT
Start: 2019-01-15 | End: 2019-01-15

## 2019-01-15 RX ORDER — LORAZEPAM 1 MG/1
1 TABLET ORAL NIGHTLY PRN
Status: DISCONTINUED | OUTPATIENT
Start: 2019-01-15 | End: 2019-01-16 | Stop reason: HOSPADM

## 2019-01-15 RX ORDER — AMOXICILLIN 250 MG
2 CAPSULE ORAL 2 TIMES DAILY
Status: DISCONTINUED | OUTPATIENT
Start: 2019-01-15 | End: 2019-01-16 | Stop reason: HOSPADM

## 2019-01-15 RX ORDER — BUTALBITAL, ACETAMINOPHEN AND CAFFEINE 50; 325; 40 MG/1; MG/1; MG/1
1 TABLET ORAL EVERY 6 HOURS PRN
Status: DISCONTINUED | OUTPATIENT
Start: 2019-01-15 | End: 2019-01-16 | Stop reason: HOSPADM

## 2019-01-15 RX ORDER — PROMETHAZINE HYDROCHLORIDE 25 MG/1
12.5-25 TABLET ORAL EVERY 4 HOURS PRN
Status: DISCONTINUED | OUTPATIENT
Start: 2019-01-15 | End: 2019-01-16 | Stop reason: HOSPADM

## 2019-01-15 RX ORDER — ONDANSETRON 2 MG/ML
4 INJECTION INTRAMUSCULAR; INTRAVENOUS EVERY 4 HOURS PRN
Status: DISCONTINUED | OUTPATIENT
Start: 2019-01-15 | End: 2019-01-16 | Stop reason: HOSPADM

## 2019-01-15 RX ORDER — ONDANSETRON 4 MG/1
4 TABLET, ORALLY DISINTEGRATING ORAL EVERY 4 HOURS PRN
Status: DISCONTINUED | OUTPATIENT
Start: 2019-01-15 | End: 2019-01-16 | Stop reason: HOSPADM

## 2019-01-15 RX ORDER — ACETAMINOPHEN 325 MG/1
650 TABLET ORAL EVERY 6 HOURS PRN
Status: DISCONTINUED | OUTPATIENT
Start: 2019-01-15 | End: 2019-01-16 | Stop reason: HOSPADM

## 2019-01-15 RX ORDER — SODIUM CHLORIDE 9 MG/ML
1000 INJECTION, SOLUTION INTRAVENOUS ONCE
Status: COMPLETED | OUTPATIENT
Start: 2019-01-15 | End: 2019-01-15

## 2019-01-15 RX ADMIN — BUTALBITAL, ACETAMINOPHEN, AND CAFFEINE 1 TABLET: 50; 325; 40 TABLET ORAL at 18:55

## 2019-01-15 RX ADMIN — GABAPENTIN 400 MG: 400 CAPSULE ORAL at 20:42

## 2019-01-15 RX ADMIN — LORAZEPAM 1 MG: 1 TABLET ORAL at 20:42

## 2019-01-15 RX ADMIN — GABAPENTIN 400 MG: 400 CAPSULE ORAL at 16:48

## 2019-01-15 RX ADMIN — POTASSIUM CHLORIDE 40 MEQ: 1500 TABLET, EXTENDED RELEASE ORAL at 12:39

## 2019-01-15 RX ADMIN — HYDROCODONE BITARTRATE AND ACETAMINOPHEN 1 TABLET: 5; 325 TABLET ORAL at 12:39

## 2019-01-15 RX ADMIN — POTASSIUM CHLORIDE AND SODIUM CHLORIDE: 900; 150 INJECTION, SOLUTION INTRAVENOUS at 16:49

## 2019-01-15 RX ADMIN — SODIUM CHLORIDE 1000 ML: 9 INJECTION, SOLUTION INTRAVENOUS at 11:25

## 2019-01-15 RX ADMIN — ENOXAPARIN SODIUM 40 MG: 100 INJECTION SUBCUTANEOUS at 16:48

## 2019-01-15 ASSESSMENT — ENCOUNTER SYMPTOMS
DIZZINESS: 1
CHILLS: 0
FEVER: 0
VOMITING: 1
DIARRHEA: 1

## 2019-01-15 ASSESSMENT — PAIN SCALES - GENERAL
PAINLEVEL_OUTOF10: 4
PAINLEVEL_OUTOF10: 7
PAINLEVEL_OUTOF10: 6
PAINLEVEL_OUTOF10: 7

## 2019-01-15 ASSESSMENT — LIFESTYLE VARIABLES
EVER_SMOKED: NEVER
ALCOHOL_USE: NO
DO YOU DRINK ALCOHOL: NO

## 2019-01-15 NOTE — ED PROVIDER NOTES
ED Provider Note    CHIEF COMPLAINT  Chief Complaint   Patient presents with   • Dizziness     x 40 minutes.    • Hypotension       HPI  Rasheeda Manzano is a 48 y.o. female who presents complaining of 40 minutes of dizziness and a low blood pressure when the ambulance arrived to come pick her up.  The patient was recently admitted to the hospital at the end of December with an upper GI bleed.  She was found to have an ulcer of her esophagus with chronic esophagitis.  Her hemoglobin went down to 5 during that admission required 5 units of blood.  The patient states that even during that admission and over the last few days she has had diarrhea.  She states that she had 2 very bad episodes of diarrhea this morning.  She vomited once this morning as well.  The patient is legally blind and cannot tell if she has had any blood.  She is on chronic omeprazole.  She has not taken any NSAIDs since her last admission.  She does have some chest pains.  She denies any shortness of breath.  She does have chronic headaches for which she was taking the Naprosyn and is now is taking Tylenol for this.  She denies any fevers or chills that she can tell.  She does have some diffuse abdominal pain.    REVIEW OF SYSTEMS  HEENT:  No ear pain, congestion or sore throat   EYES: no discharge redness or vision changes  CARDIAC: Positive chest pain, no palpitations    PULMONARY: no dyspnea, cough or congestion   GI: no vomiting positive diarrhea positive abdominal pain   : no dysuria, back pain or hematuria   Neuro: no weakness, numbness aphasia or headache positive dizziness with ambulation feeling like she is going to pass out  Musculoskeletal: no swelling deformity or pain no joint swelling  Endocrine: no fevers, sweating, weight loss   SKIN: no rash, erythema or contusions     See history of present illness all other systems are negative    PAST MEDICAL HISTORY  Past Medical History:   Diagnosis Date   • Blind    • C. difficile  diarrhea 2013   • Chronic daily headache    • Depression    • Esophageal atresia 1971    Treated surgically at birth.   • Fall    • GERD (gastroesophageal reflux disease)    • H/O total hysterectomy    • Hydrocephalus    • Hydrocephalus     shunt drains into pleural place of L lung   • Jaundice     at birth   • Legally blind    • Migraine without aura, without mention of intractable migraine without mention of status migrainosus    • Other specified symptom associated with female genital organs     excessive bleeding   • Pain     gallbladder related   • Psychiatric problem     depression       FAMILY HISTORY  Family History   Problem Relation Age of Onset   • Hypertension Mother    • Hypertension Father    • Non-contributory Neg Hx         Migraine       SOCIAL HISTORY  Social History     Social History   • Marital status:      Spouse name: N/A   • Number of children: N/A   • Years of education: N/A     Social History Main Topics   • Smoking status: Former Smoker     Packs/day: 1.00     Years: 10.00     Types: Cigars     Start date: 5/1/2004     Quit date: 8/9/2017   • Smokeless tobacco: Never Used      Comment:  CIGAR/day    • Alcohol use Yes      Comment: socially   • Drug use: No   • Sexual activity: Yes     Partners: Male     Other Topics Concern   • Not on file     Social History Narrative   • No narrative on file       SURGICAL HISTORY  Past Surgical History:   Procedure Laterality Date   • GASTROSCOPY N/A 1/2/2019    Procedure: GASTROSCOPY;  Surgeon: Matias Fernando M.D.;  Location: SURGERY [facility];  Service: Gastroenterology   • GASTROSCOPY-ENDO N/A 8/24/2017    Procedure: GASTROSCOPY-ENDO;  Surgeon: Matias Fernando M.D.;  Location: ENDOSCOPY [facility];  Service:    • AYALA BY LAPAROSCOPY N/A 8/13/2015    Procedure: AYALA BY LAPAROSCOPY;  Surgeon: Brice Johnson M.D.;  Location: SURGERY SAME DAY [facility];  Service:    • CHOLECYSTECTOMY N/A 8/13/2015    Procedure:  CHOLECYSTECTOMY;  Surgeon: Brice Johnson M.D.;  Location: SURGERY SAME DAY [facility];  Service:    • LYSIS ADHESIONS GENERAL  12/10/2013    Performed by Arie Bass M.D. at SURGERY [facility]   • CYSTOSCOPY  12/10/2013    Performed by Joana Yeager M.D. at SURGERY [facility]   • EXPLORATORY LAPAROTOMY  12/10/2013    Performed by Arie Bass M.D. at SURGERY [facility]   • APPENDECTOMY  12/10/2013    Performed by Arie Bass M.D. at SURGERY [facility]   • ABDOMINAL HYSTERECTOMY TOTAL  12/10/2013    Performed by Joana Yeager M.D. at SURGERY [facility]   • LAPAROSCOPY  8/8/08    Performed by FERNANDO HENDERSON at SURGERY [facility]   • NISSEN FUNDOPLICATION LAPAROSCOPIC     • OTHER      PLEURAL SHUNT, numerous revisions       CURRENT MEDICATIONS  Home Medications     Reviewed by Lorenzo Dueñas R.N. (Registered Nurse) on 01/15/19 at 1044  Med List Status: Not Addressed   Medication Last Dose Status   acetaminophen (TYLENOL) 325 MG Tab  Active   acetaminophen/caffeine/butalbital 325-40-50 mg (FIORICET) -40 MG Tab  Active   esomeprazole (NEXIUM) 40 MG delayed-release capsule  Active   famotidine (PEPCID) 40 MG Tab  Active   gabapentin (NEURONTIN) 400 MG Cap  Active   LORazepam (ATIVAN) 1 MG Tab  Active   potassium chloride SA (KDUR) 20 MEQ Tab CR  Active   propranolol CR (INDERAL LA) 80 MG CAPSULE SR 24 HR  Active                ALLERGIES  Allergies   Allergen Reactions   • Tape Rash     Medical tape. Per patient, paper tape ok.       PHYSICAL EXAM  VITAL SIGNS: BP (!) 83/46   Pulse 88   Temp (!) 35.6 °C (96.1 °F) (Temporal)   Resp 18   Wt 56 kg (123 lb 7.3 oz)   LMP 11/27/2013   SpO2 98%   BMI 21.19 kg/m²   Constitutional: Well developed, Well nourished, No acute distress, Non-toxic appearance.  Pale  HEENT: Normocephalic, Atraumatic,  external ears normal, pharynx pink,  Mucous  Membranes moist, No rhinorrhea or mucosal edema  Eyes: PERRL, EOMI, Conjunctiva  pale, No discharge.   Neck: Normal range of motion, No tenderness, Supple, No stridor.   Lymphatic: No lymphadenopathy    Cardiovascular: Regular Rate and Rhythm, No murmurs,  rubs, or gallops.   Thorax & Lungs: Lungs clear to auscultation bilaterally, No respiratory distress, No wheezes, rhales or rhonchi, No chest wall tenderness.   Abdomen: Bowel sounds normal, Soft, mild diffusely tender, non distended,  No pulsatile masses., no rebound guarding or peritoneal signs.   Skin: Warm, Dry, No erythema, No rash,   Back:  No CVA tenderness,  No spinal tenderness, bony crepitance step offs or instability.   Rectal: Brown Hemoccult-negative runny stool  Extremities: Equal, intact distal pulses, No cyanosis, clubbing or edema,  No tenderness.   Musculoskeletal: Good range of motion in all major joints. No tenderness to palpation or major deformities noted.   Neurologic: Alert & oriented x 3, No focal deficits noted.          RADIOLOGY/PROCEDURES  DX-CHEST-PORTABLE (1 VIEW)   Final Result      1.  Chronic LEFT pleural fluid and/or thickening, unchanged.   2.  No pneumonia or pneumothorax.   3.  LEFT-sided shunt catheter again noted.            COURSE & MEDICAL DECISION MAKING  Pertinent Labs & Imaging studies reviewed. (See chart for details)  Differential diagnosis: Recurrent GI bleed with anemia, dehydration from chronic diarrhea, sepsis, cardiac ischemia      An IV was started and labs were drawn.  IV fluids were given please see note below to help improve her blood pressure.  The patient is requesting pain medication for her chronic headache however her blood pressure is too low to give her anything at this point.        Results for orders placed or performed during the hospital encounter of 01/15/19   CBC WITH DIFFERENTIAL   Result Value Ref Range    WBC 5.6 4.8 - 10.8 K/uL    RBC 3.61 (L) 4.20 - 5.40 M/uL    Hemoglobin 10.4 (L) 12.0 - 16.0 g/dL    Hematocrit 33.2 (L) 37.0 - 47.0 %    MCV 92.0 81.4 - 97.8 fL    MCH  28.8 27.0 - 33.0 pg    MCHC 31.3 (L) 33.6 - 35.0 g/dL    RDW 50.8 (H) 35.9 - 50.0 fL    Platelet Count 390 164 - 446 K/uL    MPV 10.6 9.0 - 12.9 fL    Neutrophils-Polys 75.00 (H) 44.00 - 72.00 %    Lymphocytes 11.50 (L) 22.00 - 41.00 %    Monocytes 10.80 0.00 - 13.40 %    Eosinophils 1.40 0.00 - 6.90 %    Basophils 0.90 0.00 - 1.80 %    Immature Granulocytes 0.40 0.00 - 0.90 %    Nucleated RBC 0.00 /100 WBC    Neutrophils (Absolute) 4.19 2.00 - 7.15 K/uL    Lymphs (Absolute) 0.64 (L) 1.00 - 4.80 K/uL    Monos (Absolute) 0.60 0.00 - 0.85 K/uL    Eos (Absolute) 0.08 0.00 - 0.51 K/uL    Baso (Absolute) 0.05 0.00 - 0.12 K/uL    Immature Granulocytes (abs) 0.02 0.00 - 0.11 K/uL    NRBC (Absolute) 0.00 K/uL   LIPASE   Result Value Ref Range    Lipase 26 11 - 82 U/L   COMP METABOLIC PANEL   Result Value Ref Range    Sodium 140 135 - 145 mmol/L    Potassium 2.9 (L) 3.6 - 5.5 mmol/L    Chloride 108 96 - 112 mmol/L    Co2 23 20 - 33 mmol/L    Anion Gap 9.0 0.0 - 11.9    Glucose 100 (H) 65 - 99 mg/dL    Bun 19 8 - 22 mg/dL    Creatinine 0.98 0.50 - 1.40 mg/dL    Calcium 9.1 8.5 - 10.5 mg/dL    AST(SGOT) 13 12 - 45 U/L    ALT(SGPT) 12 2 - 50 U/L    Alkaline Phosphatase 120 (H) 30 - 99 U/L    Total Bilirubin 0.2 0.1 - 1.5 mg/dL    Albumin 4.2 3.2 - 4.9 g/dL    Total Protein 7.3 6.0 - 8.2 g/dL    Globulin 3.1 1.9 - 3.5 g/dL    A-G Ratio 1.4 g/dL   PROTHROMBIN TIME (INR)   Result Value Ref Range    PT 14.1 12.0 - 14.6 sec    INR 1.08 0.87 - 1.13   APTT   Result Value Ref Range    APTT 32.9 24.7 - 36.0 sec   LACTIC ACID   Result Value Ref Range    Lactic Acid 2.0 0.5 - 2.0 mmol/L   ESTIMATED GFR   Result Value Ref Range    GFR If African American >60 >60 mL/min/1.73 m 2    GFR If Non African American >60 >60 mL/min/1.73 m 2   EKG (NOW)   Result Value Ref Range    Report       [facility] Emergency Dept.    Test Date:  2019-01-15  Pt Name:    NILAY MANN               Department: ER  MRN:        [id number]                       Room:       [facility]  Gender:     Female                       Technician: [id number]  :        1970                   Requested By:RONNIE KOROMA  Order #:    [id number]                    Reading MD: RONNIE KOROMA MD    Measurements  Intervals                                Axis  Rate:       78                           P:          40  AR:         132                          QRS:        66  QRSD:       84                           T:          62  QT:         376  QTc:        429    Interpretive Statements  SINUS RHYTHM  BORDERLINE T ABNORMALITIES, ANT-LAT LEADS  Compared to ECG 2018 20:10:03  T-wave abnormality now present    Electronically Signed On 1- 11:06:51 PST by RONNIE KOROMA MD          HYDRATION: Based on the patient's presentation of Acute Diarrhea the patient was given IV fluids. IV Hydration was used because oral hydration was not adequate alone. Upon recheck following hydration, the patient was Improved.              12:40 PM  I spoke with the hospitalist Dr. Huddleston who has accepted the patient for admission.  Her bp is improved after iv fluid. I ordered her oral norco and oral potassium.       FINAL IMPRESSION  1. Hypotension due to hypovolemia    2. Dehydration    3. Diarrhea, unspecified type    4. Hypokalemia           PLAN/DISPOSITION  Admitted in guarded condition          Electronically signed by: Ronnie Koroma, 1/15/2019 10:58 AM

## 2019-01-15 NOTE — ED TRIAGE NOTES
.Rasheeda Manzano  .  Chief Complaint   Patient presents with   • Dizziness     x 40 minutes.    • Hypotension     Patient BIB [facility] from home with above complaint. Patient was discharged from [facility] on 1/11/19 for bleeding ulcer. .BP (!) 83/46   Pulse 75   Temp (!) 35.6 °C (96.1 °F) (Temporal)   Resp 16   Wt 56 kg (123 lb 7.3 oz)   LMP 11/27/2013   SpO2 99%   BMI 21.19 kg/m²     ERP to see.

## 2019-01-16 ENCOUNTER — PATIENT OUTREACH (OUTPATIENT)
Dept: HEALTH INFORMATION MANAGEMENT | Facility: OTHER | Age: 49
End: 2019-01-16

## 2019-01-16 VITALS
BODY MASS INDEX: 24.65 KG/M2 | HEIGHT: 64 IN | TEMPERATURE: 98.1 F | DIASTOLIC BLOOD PRESSURE: 69 MMHG | SYSTOLIC BLOOD PRESSURE: 115 MMHG | RESPIRATION RATE: 20 BRPM | HEART RATE: 108 BPM | WEIGHT: 144.4 LBS | OXYGEN SATURATION: 100 %

## 2019-01-16 PROBLEM — I95.89 HYPOTENSION DUE TO HYPOVOLEMIA: Status: RESOLVED | Noted: 2019-01-15 | Resolved: 2019-01-16

## 2019-01-16 PROBLEM — E86.1 HYPOTENSION DUE TO HYPOVOLEMIA: Status: RESOLVED | Noted: 2019-01-15 | Resolved: 2019-01-16

## 2019-01-16 LAB
ANION GAP SERPL CALC-SCNC: 11 MMOL/L (ref 0–11.9)
BUN SERPL-MCNC: 10 MG/DL (ref 8–22)
CALCIUM SERPL-MCNC: 8.8 MG/DL (ref 8.5–10.5)
CHLORIDE SERPL-SCNC: 114 MMOL/L (ref 96–112)
CO2 SERPL-SCNC: 17 MMOL/L (ref 20–33)
CREAT SERPL-MCNC: 0.76 MG/DL (ref 0.5–1.4)
ERYTHROCYTE [DISTWIDTH] IN BLOOD BY AUTOMATED COUNT: 55.4 FL (ref 35.9–50)
GLUCOSE SERPL-MCNC: 80 MG/DL (ref 65–99)
HCT VFR BLD AUTO: 32.9 % (ref 37–47)
HGB BLD-MCNC: 9.9 G/DL (ref 12–16)
MAGNESIUM SERPL-MCNC: 2 MG/DL (ref 1.5–2.5)
MCH RBC QN AUTO: 28.9 PG (ref 27–33)
MCHC RBC AUTO-ENTMCNC: 30.1 G/DL (ref 33.6–35)
MCV RBC AUTO: 96.2 FL (ref 81.4–97.8)
PLATELET # BLD AUTO: 342 K/UL (ref 164–446)
PMV BLD AUTO: 11.1 FL (ref 9–12.9)
POTASSIUM SERPL-SCNC: 3.8 MMOL/L (ref 3.6–5.5)
RBC # BLD AUTO: 3.42 M/UL (ref 4.2–5.4)
SODIUM SERPL-SCNC: 142 MMOL/L (ref 135–145)
WBC # BLD AUTO: 4.2 K/UL (ref 4.8–10.8)

## 2019-01-16 PROCEDURE — A9270 NON-COVERED ITEM OR SERVICE: HCPCS | Performed by: INTERNAL MEDICINE

## 2019-01-16 PROCEDURE — 99217 PR OBSERVATION CARE DISCHARGE: CPT | Performed by: HOSPITALIST

## 2019-01-16 PROCEDURE — 83735 ASSAY OF MAGNESIUM: CPT

## 2019-01-16 PROCEDURE — 700111 HCHG RX REV CODE 636 W/ 250 OVERRIDE (IP): Performed by: HOSPITALIST

## 2019-01-16 PROCEDURE — 700102 HCHG RX REV CODE 250 W/ 637 OVERRIDE(OP): Performed by: INTERNAL MEDICINE

## 2019-01-16 PROCEDURE — 85027 COMPLETE CBC AUTOMATED: CPT

## 2019-01-16 PROCEDURE — 700101 HCHG RX REV CODE 250: Performed by: HOSPITALIST

## 2019-01-16 PROCEDURE — 96372 THER/PROPH/DIAG INJ SC/IM: CPT

## 2019-01-16 PROCEDURE — G0378 HOSPITAL OBSERVATION PER HR: HCPCS

## 2019-01-16 PROCEDURE — 80048 BASIC METABOLIC PNL TOTAL CA: CPT

## 2019-01-16 RX ORDER — OXYCODONE HYDROCHLORIDE 5 MG/1
5 TABLET ORAL EVERY 4 HOURS PRN
Status: DISCONTINUED | OUTPATIENT
Start: 2019-01-16 | End: 2019-01-16 | Stop reason: HOSPADM

## 2019-01-16 RX ORDER — GUAIFENESIN AND DEXTROMETHORPHAN HYDROBROMIDE 100; 10 MG/5ML; MG/5ML
10 SOLUTION ORAL EVERY 6 HOURS PRN
Qty: 840 ML | Refills: 0 | Status: SHIPPED | OUTPATIENT
Start: 2019-01-16 | End: 2019-06-06

## 2019-01-16 RX ADMIN — OXYCODONE HYDROCHLORIDE 5 MG: 5 TABLET ORAL at 02:12

## 2019-01-16 RX ADMIN — ENOXAPARIN SODIUM 40 MG: 100 INJECTION SUBCUTANEOUS at 06:56

## 2019-01-16 RX ADMIN — POTASSIUM CHLORIDE AND SODIUM CHLORIDE: 900; 150 INJECTION, SOLUTION INTRAVENOUS at 02:14

## 2019-01-16 ASSESSMENT — PATIENT HEALTH QUESTIONNAIRE - PHQ9
SUM OF ALL RESPONSES TO PHQ9 QUESTIONS 1 AND 2: 0
2. FEELING DOWN, DEPRESSED, IRRITABLE, OR HOPELESS: NOT AT ALL
1. LITTLE INTEREST OR PLEASURE IN DOING THINGS: NOT AT ALL

## 2019-01-16 ASSESSMENT — PAIN SCALES - GENERAL
PAINLEVEL_OUTOF10: 0
PAINLEVEL_OUTOF10: 8
PAINLEVEL_OUTOF10: 2
PAINLEVEL_OUTOF10: 0

## 2019-01-16 NOTE — ASSESSMENT & PLAN NOTE
She has a history of 3 prior episodes of C. difficile in the past with last being about 3 and half years ago.  C. difficile is pending  White blood cell count is normal  She will be under isolation precautions until the C. difficile comes back

## 2019-01-16 NOTE — ASSESSMENT & PLAN NOTE
Potassium is markedly low at 2.9 this will be replenished intravenously and will recheck her levels in the morning  She is at risk of arrhythmias therefore will be on continuous telemetry monitoring overnight  A magnesium level is been ordered as well  This is likely due to vomiting and diarrhea

## 2019-01-16 NOTE — H&P
Hospital Medicine History & Physical Note    Date of Service  1/15/2019    Primary Care Physician  Tabitha Bautista M.D.    Code Status  full    Chief Complaint  Diarrhea and vomiting.    History of Presenting Illness  48 y.o. female who presented 1/15/2019 with symptomatic hypotension secondary to dehydration from diarrhea and vomiting.  Ms. Manzano has a past medical history of hydrocephalus with resultant blindness is been experiencing diarrhea for the past 2 weeks.  She is most recently hospitalized from December 30 through January 4, 2019 with a GI bleed.  She is blind and cannot tell if she has any black or blood in her stools.  She then began vomiting last night.  This morning she checked her blood pressure at home and it was 70/40 and she is too dizzy to get up to go to the bathroom so she called 911.  She is brought here and found to have markedly low potassium at 2.9.  She will be admitted for IV fluids, potassium supplementation, and rule out C. difficile.  Notably, as she has had 3 episodes of C. difficile in the past the last was 3 and half years ago.  In the emergency room, a guaiac was negative.     Review of Systems  Review of Systems   Constitutional: Negative for chills and fever.   Cardiovascular: Negative for chest pain.   Gastrointestinal: Positive for diarrhea and vomiting.   Neurological: Positive for dizziness.   All other systems reviewed and are negative.      Past Medical History   has a past medical history of Blind; C. difficile diarrhea (2013); Chronic daily headache; Depression; Esophageal atresia (1971); Fall; GERD (gastroesophageal reflux disease); H/O total hysterectomy; Hydrocephalus; Hydrocephalus; Jaundice; Legally blind; Migraine without aura, without mention of intractable migraine without mention of status migrainosus; Other specified symptom associated with female genital organs; Pain; and Psychiatric problem.    Surgical History   has a past surgical history that  includes laparoscopy (8/8/08); lysis adhesions general (12/10/2013); cystoscopy (12/10/2013); exploratory laparotomy (12/10/2013); appendectomy (12/10/2013); abdominal hysterectomy total (12/10/2013); aakash by laparoscopy (N/A, 8/13/2015); cholecystectomy (N/A, 8/13/2015); other; gastroscopy-endo (N/A, 8/24/2017); nissen fundoplication laparoscopic; and gastroscopy (N/A, 1/2/2019).     Family History  family history includes Hypertension in her father and mother.     Social History   reports that she quit smoking about 17 months ago. Her smoking use included Cigars. She started smoking about 14 years ago. She has a 10.00 pack-year smoking history. She has never used smokeless tobacco. She reports that she drinks alcohol. She reports that she does not use drugs.  She lives alone    Allergies  Allergies   Allergen Reactions   • Tape Rash     RXN= ongoing  Adhesive Medical tape. Per patient, paper tape ok.       Medications  Prior to Admission Medications   Prescriptions Last Dose Informant Patient Reported? Taking?   LORazepam (ATIVAN) 1 MG Tab 1/14/2019 at 2300 Patient Yes No   Sig: Take 1 mg by mouth 1 time daily as needed (sleep).   Pseudoeph-Doxylamine-DM-APAP (NYQUIL PO) 1/8/2019 at pm Patient Yes Yes   Sig: Take 1 Cap by mouth every evening as needed.   acetaminophen (TYLENOL) 325 MG Tab 1/14/2019 at 2200 Patient Yes No   Sig: Take 2 Tabs by mouth every 6 hours as needed (Mild Pain; (Pain scale 1-3); Temp greater than 100.5 F).   acetaminophen/caffeine/butalbital 325-40-50 mg (FIORICET) -40 MG Tab 1/11/2019 at 0900 Patient No No   Sig: Take 1 Tab by mouth every 6 hours as needed for Headache or Migraine for up to 20 days.   esomeprazole (NEXIUM) 40 MG delayed-release capsule 1/11/2019 at 0900 Patient Yes No   Sig: Take 40 mg by mouth every morning before breakfast.   famotidine (PEPCID) 40 MG Tab 1/14/2019 at 2100 Patient Yes No   Sig: Take 40 mg by mouth every evening.   gabapentin (NEURONTIN) 400 MG Cap  1/14/2019 at 2200 Patient No No   Sig: Take 400mg PO at 1900, then take 400mg PO before bed   propranolol CR (INDERAL LA) 80 MG CAPSULE SR 24 HR 1/14/2019 at 2200 Patient No No   Sig: Take 1 Cap by mouth every day.      Facility-Administered Medications: None       Physical Exam  Temp:  [35.6 °C (96.1 °F)-36.4 °C (97.5 °F)] 36.4 °C (97.5 °F)  Pulse:  [75-95] 91  Resp:  [16-20] 20  BP: ()/(46-69) 100/59  SpO2:  [94 %-100 %] 96 %    Physical Exam   Constitutional: She is oriented to person, place, and time. No distress.   Neck: Neck supple.   Cardiovascular: Normal rate and regular rhythm.    No murmur heard.  Pulmonary/Chest: Effort normal. No respiratory distress. She has no rales.   Abdominal: Soft. She exhibits no distension. There is no tenderness.   Musculoskeletal: She exhibits no edema or tenderness.   Neurological: She is alert and oriented to person, place, and time.   Skin: Skin is warm and dry. She is not diaphoretic. There is pallor.   Psychiatric: She has a normal mood and affect. Her behavior is normal.   Nursing note and vitals reviewed.      Laboratory:  Recent Labs      01/15/19   1104   WBC  5.6   RBC  3.61*   HEMOGLOBIN  10.4*   HEMATOCRIT  33.2*   MCV  92.0   MCH  28.8   MCHC  31.3*   RDW  50.8*   PLATELETCT  390   MPV  10.6     Recent Labs      01/15/19   1104   SODIUM  140   POTASSIUM  2.9*   CHLORIDE  108   CO2  23   GLUCOSE  100*   BUN  19   CREATININE  0.98   CALCIUM  9.1     Recent Labs      01/15/19   1104   ALTSGPT  12   ASTSGOT  13   ALKPHOSPHAT  120*   TBILIRUBIN  0.2   LIPASE  26   GLUCOSE  100*     Recent Labs      01/15/19   1104   APTT  32.9   INR  1.08             No results for input(s): TROPONINI in the last 72 hours.    Urinalysis:    No results found     Imaging:  DX-CHEST-PORTABLE (1 VIEW)   Final Result      1.  Chronic LEFT pleural fluid and/or thickening, unchanged.   2.  No pneumonia or pneumothorax.   3.  LEFT-sided shunt catheter again noted.             Assessment/Plan:  I anticipate this patient is appropriate for observation status at this time.    * Hypotension due to hypovolemia- (present on admission)   Assessment & Plan    Her blood pressure prior to arrival was 70/40 she was quite symptomatic  This is secondary to hypovolemia in the setting of vomiting and diarrhea  she will be admitted for IV fluids and further resuscitation and monitor on telemetry monitor  she does not appear to have a component of infection  Labs will be followed every checked in the morning     Seizure (HCC)- (present on admission)   Assessment & Plan    Continue Neurontin     Diarrhea- (present on admission)   Assessment & Plan    She has a history of 3 prior episodes of C. difficile in the past with last being about 3 and half years ago.  C. difficile is pending  White blood cell count is normal  She will be under isolation precautions until the C. difficile comes back     Congenital hydrocephalus (HCC)- (present on admission)   Assessment & Plan    She is followed by Dr. 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          VTE prophylaxis: lovenox

## 2019-01-16 NOTE — RESPIRATORY CARE
COPD EDUCATION by COPD CLINICAL EDUCATOR  1/16/2019 at 8:13 AM by Khushboo Martinez     Patient reviewed by COPD education team. Patient does not qualify for COPD program.

## 2019-01-16 NOTE — DISCHARGE INSTRUCTIONS
Discharge Instructions    Discharged to home by car with relative. Discharged via wheelchair, hospital escort: Yes.  Special equipment needed: Not Applicable    Be sure to schedule a follow-up appointment with your primary care doctor or any specialists as instructed.     Discharge Plan:   Diet Plan: Discussed  Activity Level: Discussed  Confirmed Follow up Appointment: Patient to Call and Schedule Appointment  Confirmed Symptoms Management: Discussed  Medication Reconciliation Updated: Yes  Influenza Vaccine Indication: Patient Refuses    I understand that a diet low in cholesterol, fat, and sodium is recommended for good health. Unless I have been given specific instructions below for another diet, I accept this instruction as my diet prescription.   Other diet: regular    Special Instructions: None    · Is patient discharged on Warfarin / Coumadin?   No     Depression / Suicide Risk    As you are [facility], it is important to learn how to keep safe from harming yourself.    Recognize the warning signs:  · Abrupt changes in personality, positive or negative- including increase in energy   · Giving away possessions  · Change in eating patterns- significant weight changes-  positive or negative  · Change in sleeping patterns- unable to sleep or sleeping all the time   · Unwillingness or inability to communicate  · Depression  · Unusual sadness, discouragement and loneliness  · Talk of wanting to die  · Neglect of personal appearance   · Rebelliousness- reckless behavior  · Withdrawal from people/activities they love  · Confusion- inability to concentrate     If you or a loved one observes any of these behaviors or has concerns about self-harm, here's what you can do:  · Talk about it- your feelings and reasons for harming yourself  · Remove any means that you might use to hurt yourself (examples: pills, rope, extension cords, firearm)  · Get professional help from the community  (Mental Health, Substance Abuse, psychological counseling)  · Do not be alone:Call your Safe Contact- someone whom you trust who will be there for you.  · Call your local CRISIS HOTLINE 140-8715 or 221-058-3708  · Call your local Children's Mobile Crisis Response Team [place] (019) 641-4055 or www.[employer]  · Call the toll free National Suicide Prevention Hotlines   · National Suicide Prevention Lifeline 606-672-ZNFB (0020)  · National Hope Line Network 800-SUICIDE (747-8227)

## 2019-01-16 NOTE — ASSESSMENT & PLAN NOTE
Her blood pressure prior to arrival was 70/40 she was quite symptomatic  This is secondary to hypovolemia in the setting of vomiting and diarrhea  she will be admitted for IV fluids and further resuscitation and monitor on telemetry monitor  she does not appear to have a component of infection  Labs will be followed every checked in the morning

## 2019-01-16 NOTE — PROGRESS NOTES
Handoff assessment: Pt a&o x4. On ra. Respirations equal and unlabored. Complaints of headache. Fioricet was given earlier by previous RN with only some relief.   Ambulatory with one person sba. Repositions self in bed. SR-ST  on tele monitor. Good po intake without any s/sx of aspiration noted. DIEZ.  Plan of care reviewed including: fall precautions, labs, and rule out cdiff. Verbalized understanding and agrees. Instructed to use call light prior to getting oob to prevent falls. Able to make needs known.  Call light within reach.

## 2019-01-16 NOTE — CARE PLAN
Problem: Communication  Goal: The ability to communicate needs accurately and effectively will improve  Outcome: PROGRESSING AS EXPECTED  Pt is blind. Able to walk with sba. A&o x4.     Problem: Pain Management  Goal: Pain level will decrease to patient's comfort goal  Outcome: PROGRESSING AS EXPECTED  Complaints of headache tonight. Fioricet and oxycodone given as ordered. Reports relief. Resting and calm.

## 2019-01-16 NOTE — PROGRESS NOTES
.Patient d/c per MD order.  Pt states understanding of d.c instructions.  Pt given copies of instructions.  Belongings with pt.  VSS.

## 2019-01-16 NOTE — CARE PLAN
Problem: Communication  Goal: The ability to communicate needs accurately and effectively will improve  Outcome: PROGRESSING AS EXPECTED  Patient verbally updated on plan of care and educated patient on surroundings and room.    Problem: Safety  Goal: Will remain free from falls  Outcome: PROGRESSING AS EXPECTED  Call light education provide, pt completed return demonstration successfully. Re-enforced use of call light to ensure pt safety and decrease risk of fall. Marker placed on call light button for easy location.

## 2019-01-16 NOTE — DISCHARGE SUMMARY
Discharge Summary    CHIEF COMPLAINT ON ADMISSION  Chief Complaint   Patient presents with   • Dizziness     x 40 minutes.    • Hypotension       Reason for Admission  EMS     Admission Date  1/15/2019    CODE STATUS  Full Code    HPI & HOSPITAL COURSE  48 y.o. female who presented 1/15/2019 with symptomatic hypotension secondary to dehydration from diarrhea and vomiting.  Ms. Manzano has a past medical history of hydrocephalus with resultant blindness is been experiencing diarrhea for the past 2 weeks.  She is most recently hospitalized from December 30 through January 4, 2019 with a GI bleed.  She is blind and cannot tell if she has any black or blood in her stools.  She then began vomiting last night.  This morning she checked her blood pressure at home and it was 70/40 and she is too dizzy to get up to go to the bathroom so she called 911.  She is brought here and found to have markedly low potassium at 2.9.  She will be admitted for IV fluids, potassium supplementation, and rule out C. difficile.  Notably, as she has had 3 episodes of C. difficile in the past the last was 3 and half years ago.  In the emergency room, a guaiac was negative.     During her hospital stay, she had no further episodes of diarrhea hence stool for C. difficile was not sent and C. difficile colitis is highly unlikely.  She had a nonproductive cough for which we will prescribe Robitussin.  X-ray chest did not show any evidence of new infiltrate or infection.  She was hydrated during her hospital stay       Therefore, she is discharged in good and stable condition to home with close outpatient follow-up.    The patient recovered much more quickly than anticipated on admission.    Discharge Date  1/16    FOLLOW UP ITEMS POST DISCHARGE      DISCHARGE DIAGNOSES  Principal Problem (Resolved):    Hypotension due to hypovolemia POA: Yes  Active Problems:    Seizure (HCC) POA: Yes    Anemia, unspecified POA: Yes    Congenital hydrocephalus (HCC)  (Chronic) POA: Yes    Blind (Chronic) POA: Yes    Insomnia POA: Yes  Resolved Problems:    Diarrhea POA: Yes    Hypokalemia POA: Yes      FOLLOW UP  No future appointments.  Tabitha Bautista M.D.  [address] [facility] [address]  [phone]      [facility]  LEFT A MESSAGE WITH YOUR PROVIDER REGARDING NEED FOR FOLLOW UP APPOINTMENT. PLEASE CALL THEIR OFFICE DIRECTLY IF YOU DO NOT HEAR FROM THEM WITH IN 2 DAYS. THANK YOU      MEDICATIONS ON DISCHARGE     Medication List      START taking these medications      Instructions   Dextromethorphan-Guaifenesin  MG/5ML Syrp  Commonly known as:  ROBITUSSIN DM   Take 10 mL by mouth every 6 hours as needed.  Dose:  10 mL        CONTINUE taking these medications      Instructions   acetaminophen 325 MG Tabs  Commonly known as:  TYLENOL   Take 2 Tabs by mouth every 6 hours as needed (Mild Pain; (Pain scale 1-3); Temp greater than 100.5 F).  Dose:  650 mg     acetaminophen/caffeine/butalbital 325-40-50 mg -40 MG Tabs  Commonly known as:  FIORICET   Take 1 Tab by mouth every 6 hours as needed for Headache or Migraine for up to 20 days.  Dose:  1 Tab     famotidine 40 MG Tabs  Commonly known as:  PEPCID   Take 40 mg by mouth every evening.  Dose:  40 mg     gabapentin 400 MG Caps  Commonly known as:  NEURONTIN   Take 400mg PO at 1900, then take 400mg PO before bed     LORazepam 1 MG Tabs  Commonly known as:  ATIVAN   Take 1 mg by mouth 1 time daily as needed (sleep).  Dose:  1 mg     propranolol CR 80 MG Cp24  Commonly known as:  INDERAL LA   Take 1 Cap by mouth every day.  Dose:  80 mg        STOP taking these medications    NEXIUM 40 MG delayed-release capsule  Generic drug:  esomeprazole     NYQUIL PO            Allergies  Allergies   Allergen Reactions   • Tape Rash     RXN= ongoing  Adhesive Medical tape. Per patient, paper tape ok.       DIET  Orders Placed This Encounter   Procedures   • Diet Order Regular     Standing Status:   Standing     Number of  Occurrences:   1     Order Specific Question:   Diet:     Answer:   Regular [1]       ACTIVITY  As tolerated.  Weight bearing as tolerated    CONSULTATIONS      PROCEDURES  *  CLINICAL DECISION UNIT Rasheeda Manznao  MRN: [id number], : 1970, Sex: F  Adm: 1/15/2019, D/C: --   Order   DX-CHEST-PORTABLE (1 VIEW) [[id number]] (Order [id number])   Patient Information     Patient Name  Rasheeda Manzano ([id number]) Sex  Female   1970   Room Bed Code Status Isolation Current Location   T216 00 FULL SPECIAL CONT 00   Reprint Order Requisition     DX-CHEST-PORTABLE (1 VIEW) (Order #[id number]) on 1/15/19   Last Resulted Time   Tue Yfn 15, 2019 12:00 PM   Images     Show images for DX-CHEST-PORTABLE (1 VIEW)   Imaging Result Status     Status: Final result (Exam End: 1/15/2019 11:56 AM)   Imaging Previous Results     Open Hard Copy Result Report (Order #[id number] - DX-CHEST-PORTABLE (1 VIEW))   Narrative       1/15/2019 11:16 AM    HISTORY/REASON FOR EXAM:  Chest Pain.  Dizziness, hypotension.    TECHNIQUE/EXAM DESCRIPTION AND NUMBER OF VIEWS:  Single portable view of the chest.    COMPARISON: 2018    FINDINGS:  Cardiomediastinal contour is within normal limits.  LEFT-sided shunt catheter again noted.  Mild blunting of LEFT costophrenic angle again seen.  No pneumothorax.  S-shaped curvature of thoracic spine.   Impression       1.  Chronic LEFT pleural fluid and/or thickening, unchanged.  2.  No pneumonia or pneumothorax.  3.  LEFT-sided shunt catheter again noted.         LABORATORY  Lab Results   Component Value Date    SODIUM 142 2019    POTASSIUM 3.8 2019    CHLORIDE 114 (H) 2019    CO2 17 (L) 2019    GLUCOSE 80 2019    BUN 10 2019    CREATININE 0.76 2019    CREATININE 0.6 2008        Lab Results   Component Value Date    WBC 4.2 (L) 2019    HEMOGLOBIN 9.9 (L) 2019    HEMATOCRIT 32.9 (L) 2019    PLATELETCT 342 2019         Total time of the discharge process exceeds 38 minutes.

## 2019-01-26 NOTE — ED NOTES
All results back; chart up for reeval.   
Chief Complaint   Patient presents with   • Headache     Pt BIB EMS from home for HA/nausea/mild confusion. Pt reports noticing symptoms today, Pt reports HA to be dull and always there. PT with recent diagnosis of UTI. pt currently taking Cipro.    • Nausea   • Painful Urination     Explained to pt triage process, made pt aware to tell this RN of any changes/concerns, pt verbalized understanding of process and instructions given. Pt to ER lobby.    
ERP at bedside.   
Mini cath performed. Urine collected and sent to lab.   
PIV established, blood drawn and sent to lab. Medicated per ERP's orders. Provided with more warm blankets. VSS, call light within reach, will continue to monitor.     
Pt ambulated up to bathroom; unable to provide urine sample at this time.   
Pt given discharge instructions. Pt verbalized understanding. RN to answer any questions pt had. Pt instructed on follow up care. VSS. Pt wheeled out to front lobby and helped to call her a cab.     
Pt wheeled back to room red 1, chart up for ERP.   
Clear

## 2019-02-16 ENCOUNTER — HOSPITAL ENCOUNTER (EMERGENCY)
Facility: MEDICAL CENTER | Age: 49
End: 2019-02-16
Attending: EMERGENCY MEDICINE
Payer: MEDICAID

## 2019-02-16 VITALS
RESPIRATION RATE: 16 BRPM | OXYGEN SATURATION: 100 % | BODY MASS INDEX: 24.59 KG/M2 | DIASTOLIC BLOOD PRESSURE: 80 MMHG | SYSTOLIC BLOOD PRESSURE: 128 MMHG | TEMPERATURE: 96.6 F | HEART RATE: 80 BPM | WEIGHT: 144 LBS | HEIGHT: 64 IN

## 2019-02-16 DIAGNOSIS — G91.9 HYDROCEPHALUS (HCC): ICD-10-CM

## 2019-02-16 DIAGNOSIS — G89.29 CHRONIC NONINTRACTABLE HEADACHE, UNSPECIFIED HEADACHE TYPE: ICD-10-CM

## 2019-02-16 DIAGNOSIS — R51.9 CHRONIC NONINTRACTABLE HEADACHE, UNSPECIFIED HEADACHE TYPE: ICD-10-CM

## 2019-02-16 PROCEDURE — A9270 NON-COVERED ITEM OR SERVICE: HCPCS | Performed by: EMERGENCY MEDICINE

## 2019-02-16 PROCEDURE — 700102 HCHG RX REV CODE 250 W/ 637 OVERRIDE(OP): Performed by: EMERGENCY MEDICINE

## 2019-02-16 PROCEDURE — 99284 EMERGENCY DEPT VISIT MOD MDM: CPT

## 2019-02-16 RX ORDER — HYDROCODONE BITARTRATE AND ACETAMINOPHEN 5; 325 MG/1; MG/1
1 TABLET ORAL ONCE
Status: COMPLETED | OUTPATIENT
Start: 2019-02-16 | End: 2019-02-16

## 2019-02-16 RX ORDER — HYDROCODONE BITARTRATE AND ACETAMINOPHEN 5; 325 MG/1; MG/1
1-2 TABLET ORAL EVERY 4 HOURS PRN
Qty: 12 TAB | Refills: 0 | Status: SHIPPED | OUTPATIENT
Start: 2019-02-16 | End: 2019-02-19

## 2019-02-16 RX ADMIN — HYDROCODONE BITARTRATE AND ACETAMINOPHEN 1 TABLET: 5; 325 TABLET ORAL at 22:21

## 2019-02-16 ASSESSMENT — LIFESTYLE VARIABLES: DO YOU DRINK ALCOHOL: NO

## 2019-02-17 NOTE — ED NOTES
Discharge instructions given to pt. Prescriptions handed to pt at bedside. Pt educated, verbalizes understanding. All belongings accounted for. Pt wheeled out of ED with staff to go home.

## 2019-02-17 NOTE — ED TRIAGE NOTES
"SEEING IMPAIRED       Rasheeda Manzano  48 y.o. female  Chief Complaint   Patient presents with   • Headache     started 11am top and temporal areas of head constant pressure 7/10. Taken OTC Excedrine Migraine, Extra strength tylenol with no relief. Normally takes Rx Oxicodone 5/325 however out and PCP thursday. Would like Short term Rx until then.    /81   Pulse 83   Temp 35.9 °C (96.6 °F) (Temporal)   Resp 16   Ht 1.626 m (5' 4\")   Wt 65.3 kg (144 lb)   LMP 11/27/2013   SpO2 100%   BMI 24.72 kg/m²     Pt amb to triage with steady gait for above complaint. Pt has no other medical complaints. States she always has these headaches. Regularly sees PCP. Has good follow up.   Pt is alert and oriented, speaking in full sentences, follows commands and responds appropriately to questions. NAD. Resp are even and unlabored. NO neuro deficits.   Pt placed in lobby. Pt educated on triage process. Pt encouraged to alert staff for any changes.    "

## 2019-02-17 NOTE — ED PROVIDER NOTES
ED Provider Note    CHIEF COMPLAINT  Chief Complaint   Patient presents with   • Headache     started 11am top and temporal areas of head constant pressure 7/10. Taken OTC Excedrine Migraine, Extra strength tylenol with no relief. Normally takes Rx Oxicodone 5/325 however out and PCP thursday. Would like Short term Rx until then.        HPI  Rasheeda Manzano is a 48 y.o. female who presents requesting pain medication for chronic headache, she has a history of hydrocephalus and chronic headaches that are unchanged, states she has been evaluated many times for these headaches with CAT scans and other imaging modalities and has had no changes in her symptoms, no new focal weakness or numbness, no other specific complaints, no vomiting and no fever.    REVIEW OF SYSTEMS  Negative for fever, vomiting.    PAST MEDICAL HISTORY   has a past medical history of Blind; C. difficile diarrhea (2013); Chronic daily headache; Depression; Esophageal atresia (1971); Fall; GERD (gastroesophageal reflux disease); H/O total hysterectomy; Hydrocephalus; Hydrocephalus; Jaundice; Legally blind; Migraine without aura, without mention of intractable migraine without mention of status migrainosus; Other specified symptom associated with female genital organs; Pain; and Psychiatric problem.    SOCIAL HISTORY  Social History     Social History Main Topics   • Smoking status: Former Smoker     Packs/day: 1.00     Years: 10.00     Types: Cigars     Start date: 5/1/2004     Quit date: 8/9/2017   • Smokeless tobacco: Never Used      Comment:  CIGAR/day    • Alcohol use Yes      Comment: socially   • Drug use: No   • Sexual activity: Yes     Partners: Male       SURGICAL HISTORY   has a past surgical history that includes laparoscopy (8/8/08); lysis adhesions general (12/10/2013); cystoscopy (12/10/2013); exploratory laparotomy (12/10/2013); appendectomy (12/10/2013); abdominal hysterectomy total (12/10/2013); aakash by laparoscopy (N/A,  "8/13/2015); cholecystectomy (N/A, 8/13/2015); other; gastroscopy-endo (N/A, 8/24/2017); nissen fundoplication laparoscopic; and gastroscopy (N/A, 1/2/2019).    CURRENT MEDICATIONS  I personally reviewed the medication list in the charting documentation.     ALLERGIES  Allergies   Allergen Reactions   • Tape Rash     RXN= ongoing  Adhesive Medical tape. Per patient, paper tape ok.       PHYSICAL EXAM  VITAL SIGNS: /81   Pulse 83   Temp 35.9 °C (96.6 °F) (Temporal)   Resp 16   Ht 1.626 m (5' 4\")   Wt 65.3 kg (144 lb)   LMP 11/27/2013   SpO2 100%   BMI 24.72 kg/m²   Constitutional: Alert in no apparent distress.  HENT: No signs of acute trauma.   Chest: Normal nonlabored respirations  Skin: No erythema, No rash.   Musculoskeletal: Good range of motion in all major joints.   Neurologic: Alert, No focal deficits noted.   Psychiatric: Affect normal, Judgment normal.    COURSE & MEDICAL DECISION MAKING  Pertinent Labs & Imaging studies reviewed. (See chart for details)    Encounter Summary: This is a 48 y.o. female with request for hydrocodone/acetaminophen until she is able to see her primary care physician, chronic headache secondary to hydrocephalus, no change in her normal chronic symptoms, will prescribe a short course of hydrocodone/acetaminophen, she will follow-up with her primary care physician.    In prescribing controlled substances to this patient, I certify that I have obtained and reviewed the medical history of Rasheeda Manzano. I have also made a good poly effort to obtain applicable records from other providers who have treated the patient.    I have conducted a physical exam and documented it. I have reviewed Ms. Manzano’s prescription history as maintained by the [place] Prescription Monitoring Program.     I have assessed the patient’s risk for abuse, dependency, and addiction using the validated Opioid Risk Tool    Given the above, I believe the benefits of controlled substance " therapy outweigh the risks. The reasons for prescribing controlled substances include my professional opinion that controlled substances are a reasonable choice for this patient in the event other methods are ineffective inadequately controlling pain. Accordingly, I have discussed the risk and benefits, treatment plan, and alternative therapies with the patient.         DISPOSITION: Discharge Home      FINAL IMPRESSION  1. Chronic nonintractable headache, unspecified headache type    2. Hydrocephalus        This dictation was created using voice recognition software. The accuracy of the dictation is limited to the abilities of the software. I expect there may be some errors of grammar and possibly content. The nursing notes were reviewed and certain aspects of this information were incorporated into this note.    Electronically signed by: Donato Vera, 2/16/2019 10:14 PM

## 2019-03-20 ENCOUNTER — HOSPITAL ENCOUNTER (OUTPATIENT)
Dept: RADIOLOGY | Facility: MEDICAL CENTER | Age: 49
End: 2019-03-20
Attending: PHYSICIAN ASSISTANT
Payer: MEDICAID

## 2019-03-20 DIAGNOSIS — G91.1 OBSTRUCTIVE HYDROCEPHALUS (HCC): ICD-10-CM

## 2019-03-20 PROCEDURE — 70450 CT HEAD/BRAIN W/O DYE: CPT

## 2019-03-24 ENCOUNTER — APPOINTMENT (OUTPATIENT)
Dept: RADIOLOGY | Facility: MEDICAL CENTER | Age: 49
DRG: 070 | End: 2019-03-24
Attending: HOSPITALIST
Payer: MEDICAID

## 2019-03-24 ENCOUNTER — HOSPITAL ENCOUNTER (INPATIENT)
Facility: MEDICAL CENTER | Age: 49
LOS: 2 days | DRG: 070 | End: 2019-03-26
Attending: EMERGENCY MEDICINE
Payer: MEDICAID

## 2019-03-24 ENCOUNTER — APPOINTMENT (OUTPATIENT)
Dept: RADIOLOGY | Facility: MEDICAL CENTER | Age: 49
DRG: 070 | End: 2019-03-24
Attending: EMERGENCY MEDICINE
Payer: MEDICAID

## 2019-03-24 DIAGNOSIS — N17.9 ACUTE RENAL FAILURE, UNSPECIFIED ACUTE RENAL FAILURE TYPE (HCC): ICD-10-CM

## 2019-03-24 DIAGNOSIS — R40.2432 GLASGOW COMA SCALE TOTAL SCORE 3-8, AT ARRIVAL TO EMERGENCY DEPARTMENT (HCC): ICD-10-CM

## 2019-03-24 DIAGNOSIS — J96.90 RESPIRATORY FAILURE, UNSPECIFIED CHRONICITY, UNSPECIFIED WHETHER WITH HYPOXIA OR HYPERCAPNIA (HCC): ICD-10-CM

## 2019-03-24 PROBLEM — R40.1 OBTUNDATION: Status: RESOLVED | Noted: 2019-03-24 | Resolved: 2019-03-24

## 2019-03-24 PROBLEM — Q39.0 ESOPHAGEAL ATRESIA: Status: RESOLVED | Noted: 2019-03-24 | Resolved: 2019-03-24

## 2019-03-24 PROBLEM — Z98.2 S/P VP SHUNT: Status: RESOLVED | Noted: 2019-03-24 | Resolved: 2019-03-24

## 2019-03-24 PROBLEM — G93.40 ACUTE ENCEPHALOPATHY: Status: ACTIVE | Noted: 2019-03-24

## 2019-03-24 PROBLEM — K75.9 HEPATITIS: Status: ACTIVE | Noted: 2019-03-24

## 2019-03-24 PROBLEM — Z98.2 S/P VP SHUNT: Status: ACTIVE | Noted: 2019-03-24

## 2019-03-24 PROBLEM — Q39.0 ESOPHAGEAL ATRESIA: Status: ACTIVE | Noted: 2019-03-24

## 2019-03-24 PROBLEM — R40.1 OBTUNDATION: Status: ACTIVE | Noted: 2019-03-24

## 2019-03-24 LAB
ABO GROUP BLD: NORMAL
ACTION RANGE TRIGGERED IACRT: NO
ALBUMIN SERPL BCP-MCNC: 3.7 G/DL (ref 3.2–4.9)
ALBUMIN/GLOB SERPL: 1.5 G/DL
ALP SERPL-CCNC: 146 U/L (ref 30–99)
ALT SERPL-CCNC: 463 U/L (ref 2–50)
AMMONIA PLAS-SCNC: 54 UMOL/L (ref 11–45)
AMPHET UR QL SCN: NEGATIVE
ANION GAP SERPL CALC-SCNC: 7 MMOL/L (ref 0–11.9)
APAP SERPL-MCNC: <10 UG/ML (ref 10–30)
APPEARANCE UR: CLEAR
AST SERPL-CCNC: 254 U/L (ref 12–45)
BACTERIA #/AREA URNS HPF: NEGATIVE /HPF
BARBITURATES UR QL SCN: POSITIVE
BASE EXCESS BLDA CALC-SCNC: -7 MMOL/L (ref -4–3)
BASOPHILS # BLD AUTO: 0.9 % (ref 0–1.8)
BASOPHILS # BLD: 0.14 K/UL (ref 0–0.12)
BENZODIAZ UR QL SCN: NEGATIVE
BILIRUB SERPL-MCNC: 0.4 MG/DL (ref 0.1–1.5)
BILIRUB UR QL STRIP.AUTO: ABNORMAL
BLD GP AB SCN SERPL QL: NORMAL
BODY TEMPERATURE: ABNORMAL DEGREES
BUN SERPL-MCNC: 34 MG/DL (ref 8–22)
BZE UR QL SCN: NEGATIVE
CALCIUM SERPL-MCNC: 8.5 MG/DL (ref 8.5–10.5)
CANNABINOIDS UR QL SCN: NEGATIVE
CHLORIDE SERPL-SCNC: 107 MMOL/L (ref 96–112)
CO2 BLDA-SCNC: 20 MMOL/L (ref 20–33)
CO2 SERPL-SCNC: 20 MMOL/L (ref 20–33)
COLOR UR: ABNORMAL
CREAT SERPL-MCNC: 1.77 MG/DL (ref 0.5–1.4)
CREAT UR-MCNC: 103.2 MG/DL
EKG IMPRESSION: NORMAL
EOSINOPHIL # BLD AUTO: 0.43 K/UL (ref 0–0.51)
EOSINOPHIL NFR BLD: 2.6 % (ref 0–6.9)
EPI CELLS #/AREA URNS HPF: ABNORMAL /HPF
ERYTHROCYTE [DISTWIDTH] IN BLOOD BY AUTOMATED COUNT: 50.3 FL (ref 35.9–50)
ETHANOL BLD-MCNC: 0 G/DL
GLOBULIN SER CALC-MCNC: 2.4 G/DL (ref 1.9–3.5)
GLUCOSE SERPL-MCNC: 140 MG/DL (ref 65–99)
GLUCOSE UR STRIP.AUTO-MCNC: NEGATIVE MG/DL
HCO3 BLDA-SCNC: 18.5 MMOL/L (ref 17–25)
HCT VFR BLD AUTO: 37.5 % (ref 37–47)
HGB BLD-MCNC: 11.8 G/DL (ref 12–16)
HOROWITZ INDEX BLDA+IHG-RTO: 481 MM[HG]
HYALINE CASTS #/AREA URNS LPF: ABNORMAL /LPF
IMM GRANULOCYTES # BLD AUTO: 0.06 K/UL (ref 0–0.11)
IMM GRANULOCYTES NFR BLD AUTO: 0.4 % (ref 0–0.9)
INST. QUALIFIED PATIENT IIQPT: YES
KETONES UR STRIP.AUTO-MCNC: ABNORMAL MG/DL
LACTATE BLD-SCNC: 1.6 MMOL/L (ref 0.5–2)
LEUKOCYTE ESTERASE UR QL STRIP.AUTO: ABNORMAL
LYMPHOCYTES # BLD AUTO: 1.42 K/UL (ref 1–4.8)
LYMPHOCYTES NFR BLD: 8.7 % (ref 22–41)
MCH RBC QN AUTO: 26.6 PG (ref 27–33)
MCHC RBC AUTO-ENTMCNC: 31.5 G/DL (ref 33.6–35)
MCV RBC AUTO: 84.7 FL (ref 81.4–97.8)
METHADONE UR QL SCN: NEGATIVE
MICRO URNS: ABNORMAL
MONOCYTES # BLD AUTO: 1.43 K/UL (ref 0–0.85)
MONOCYTES NFR BLD AUTO: 8.7 % (ref 0–13.4)
NEUTROPHILS # BLD AUTO: 12.87 K/UL (ref 2–7.15)
NEUTROPHILS NFR BLD: 78.7 % (ref 44–72)
NITRITE UR QL STRIP.AUTO: NEGATIVE
NRBC # BLD AUTO: 0 K/UL
NRBC BLD-RTO: 0 /100 WBC
O2/TOTAL GAS SETTING VFR VENT: 80 %
OPIATES UR QL SCN: POSITIVE
OXYCODONE UR QL SCN: NEGATIVE
PCO2 BLDA: 35.1 MMHG (ref 26–37)
PCP UR QL SCN: NEGATIVE
PH BLDA: 7.33 [PH] (ref 7.4–7.5)
PH UR STRIP.AUTO: 5 [PH]
PLATELET # BLD AUTO: 316 K/UL (ref 164–446)
PMV BLD AUTO: 11.7 FL (ref 9–12.9)
PO2 BLDA: 385 MMHG (ref 64–87)
POTASSIUM SERPL-SCNC: 4 MMOL/L (ref 3.6–5.5)
PROPOXYPH UR QL SCN: NEGATIVE
PROT SERPL-MCNC: 6.1 G/DL (ref 6–8.2)
PROT UR QL STRIP: NEGATIVE MG/DL
RBC # BLD AUTO: 4.43 M/UL (ref 4.2–5.4)
RBC # URNS HPF: ABNORMAL /HPF
RBC UR QL AUTO: NEGATIVE
RH BLD: NORMAL
SALICYLATES SERPL-MCNC: 0 MG/DL (ref 15–25)
SAO2 % BLDA: 100 % (ref 93–99)
SODIUM SERPL-SCNC: 134 MMOL/L (ref 135–145)
SODIUM UR-SCNC: 18 MMOL/L
SP GR UR STRIP.AUTO: 1.03
SPECIMEN DRAWN FROM PATIENT: ABNORMAL
TROPONIN I SERPL-MCNC: <0.01 NG/ML (ref 0–0.04)
UROBILINOGEN UR STRIP.AUTO-MCNC: 1 MG/DL
WBC # BLD AUTO: 16.4 K/UL (ref 4.8–10.8)
WBC #/AREA URNS HPF: ABNORMAL /HPF

## 2019-03-24 PROCEDURE — 700111 HCHG RX REV CODE 636 W/ 250 OVERRIDE (IP): Performed by: INTERNAL MEDICINE

## 2019-03-24 PROCEDURE — 70450 CT HEAD/BRAIN W/O DYE: CPT

## 2019-03-24 PROCEDURE — 86901 BLOOD TYPING SEROLOGIC RH(D): CPT

## 2019-03-24 PROCEDURE — 700105 HCHG RX REV CODE 258: Performed by: HOSPITALIST

## 2019-03-24 PROCEDURE — 302214 INTUBATION BOX: Performed by: EMERGENCY MEDICINE

## 2019-03-24 PROCEDURE — 700101 HCHG RX REV CODE 250: Performed by: EMERGENCY MEDICINE

## 2019-03-24 PROCEDURE — 82570 ASSAY OF URINE CREATININE: CPT

## 2019-03-24 PROCEDURE — 82140 ASSAY OF AMMONIA: CPT

## 2019-03-24 PROCEDURE — 76705 ECHO EXAM OF ABDOMEN: CPT

## 2019-03-24 PROCEDURE — 36600 WITHDRAWAL OF ARTERIAL BLOOD: CPT

## 2019-03-24 PROCEDURE — 86850 RBC ANTIBODY SCREEN: CPT

## 2019-03-24 PROCEDURE — 71045 X-RAY EXAM CHEST 1 VIEW: CPT

## 2019-03-24 PROCEDURE — 80307 DRUG TEST PRSMV CHEM ANLYZR: CPT

## 2019-03-24 PROCEDURE — 700111 HCHG RX REV CODE 636 W/ 250 OVERRIDE (IP): Performed by: EMERGENCY MEDICINE

## 2019-03-24 PROCEDURE — 99291 CRITICAL CARE FIRST HOUR: CPT | Performed by: INTERNAL MEDICINE

## 2019-03-24 PROCEDURE — 93005 ELECTROCARDIOGRAM TRACING: CPT | Performed by: EMERGENCY MEDICINE

## 2019-03-24 PROCEDURE — 99292 CRITICAL CARE ADDL 30 MIN: CPT | Performed by: INTERNAL MEDICINE

## 2019-03-24 PROCEDURE — 94002 VENT MGMT INPAT INIT DAY: CPT

## 2019-03-24 PROCEDURE — 700105 HCHG RX REV CODE 258: Performed by: INTERNAL MEDICINE

## 2019-03-24 PROCEDURE — 99223 1ST HOSP IP/OBS HIGH 75: CPT | Performed by: HOSPITALIST

## 2019-03-24 PROCEDURE — 0BH17EZ INSERTION OF ENDOTRACHEAL AIRWAY INTO TRACHEA, VIA NATURAL OR ARTIFICIAL OPENING: ICD-10-PCS | Performed by: EMERGENCY MEDICINE

## 2019-03-24 PROCEDURE — 86900 BLOOD TYPING SEROLOGIC ABO: CPT

## 2019-03-24 PROCEDURE — 5A1935Z RESPIRATORY VENTILATION, LESS THAN 24 CONSECUTIVE HOURS: ICD-10-PCS | Performed by: EMERGENCY MEDICINE

## 2019-03-24 PROCEDURE — 87086 URINE CULTURE/COLONY COUNT: CPT

## 2019-03-24 PROCEDURE — 700111 HCHG RX REV CODE 636 W/ 250 OVERRIDE (IP): Performed by: HOSPITALIST

## 2019-03-24 PROCEDURE — 770022 HCHG ROOM/CARE - ICU (200)

## 2019-03-24 PROCEDURE — 84484 ASSAY OF TROPONIN QUANT: CPT

## 2019-03-24 PROCEDURE — 83605 ASSAY OF LACTIC ACID: CPT

## 2019-03-24 PROCEDURE — 700105 HCHG RX REV CODE 258: Performed by: EMERGENCY MEDICINE

## 2019-03-24 PROCEDURE — 82803 BLOOD GASES ANY COMBINATION: CPT

## 2019-03-24 PROCEDURE — 80053 COMPREHEN METABOLIC PANEL: CPT

## 2019-03-24 PROCEDURE — 81001 URINALYSIS AUTO W/SCOPE: CPT

## 2019-03-24 PROCEDURE — 84300 ASSAY OF URINE SODIUM: CPT

## 2019-03-24 PROCEDURE — 85025 COMPLETE CBC W/AUTO DIFF WBC: CPT

## 2019-03-24 PROCEDURE — 87040 BLOOD CULTURE FOR BACTERIA: CPT

## 2019-03-24 RX ORDER — POLYETHYLENE GLYCOL 3350 17 G/17G
1 POWDER, FOR SOLUTION ORAL
Status: DISCONTINUED | OUTPATIENT
Start: 2019-03-24 | End: 2019-03-24

## 2019-03-24 RX ORDER — FAMOTIDINE 20 MG/1
20 TABLET, FILM COATED ORAL EVERY 12 HOURS
Status: DISCONTINUED | OUTPATIENT
Start: 2019-03-24 | End: 2019-03-24

## 2019-03-24 RX ORDER — BISACODYL 10 MG
10 SUPPOSITORY, RECTAL RECTAL
Status: DISCONTINUED | OUTPATIENT
Start: 2019-03-24 | End: 2019-03-26

## 2019-03-24 RX ORDER — ROCURONIUM BROMIDE 10 MG/ML
80 INJECTION, SOLUTION INTRAVENOUS ONCE
Status: COMPLETED | OUTPATIENT
Start: 2019-03-24 | End: 2019-03-24

## 2019-03-24 RX ORDER — NALOXONE HYDROCHLORIDE 0.4 MG/ML
0.4 INJECTION, SOLUTION INTRAMUSCULAR; INTRAVENOUS; SUBCUTANEOUS ONCE
Status: COMPLETED | OUTPATIENT
Start: 2019-03-24 | End: 2019-03-24

## 2019-03-24 RX ORDER — CLONIDINE HYDROCHLORIDE 0.1 MG/1
0.1 TABLET ORAL EVERY 6 HOURS PRN
Status: DISCONTINUED | OUTPATIENT
Start: 2019-03-24 | End: 2019-03-24

## 2019-03-24 RX ORDER — ONDANSETRON 4 MG/1
4 TABLET, ORALLY DISINTEGRATING ORAL EVERY 4 HOURS PRN
Status: DISCONTINUED | OUTPATIENT
Start: 2019-03-24 | End: 2019-03-24

## 2019-03-24 RX ORDER — PROMETHAZINE HYDROCHLORIDE 25 MG/1
12.5-25 SUPPOSITORY RECTAL EVERY 4 HOURS PRN
Status: DISCONTINUED | OUTPATIENT
Start: 2019-03-24 | End: 2019-03-26 | Stop reason: HOSPADM

## 2019-03-24 RX ORDER — SODIUM CHLORIDE 9 MG/ML
500 INJECTION, SOLUTION INTRAVENOUS ONCE
Status: COMPLETED | OUTPATIENT
Start: 2019-03-24 | End: 2019-03-24

## 2019-03-24 RX ORDER — AMOXICILLIN 250 MG
2 CAPSULE ORAL 2 TIMES DAILY
Status: DISCONTINUED | OUTPATIENT
Start: 2019-03-24 | End: 2019-03-26

## 2019-03-24 RX ORDER — ONDANSETRON 2 MG/ML
4 INJECTION INTRAMUSCULAR; INTRAVENOUS EVERY 4 HOURS PRN
Status: DISCONTINUED | OUTPATIENT
Start: 2019-03-24 | End: 2019-03-26 | Stop reason: HOSPADM

## 2019-03-24 RX ORDER — PROMETHAZINE HYDROCHLORIDE 25 MG/1
12.5-25 TABLET ORAL EVERY 4 HOURS PRN
Status: DISCONTINUED | OUTPATIENT
Start: 2019-03-24 | End: 2019-03-26 | Stop reason: HOSPADM

## 2019-03-24 RX ORDER — POLYETHYLENE GLYCOL 3350 17 G/17G
1 POWDER, FOR SOLUTION ORAL
Status: DISCONTINUED | OUTPATIENT
Start: 2019-03-24 | End: 2019-03-26

## 2019-03-24 RX ORDER — ETOMIDATE 2 MG/ML
5 INJECTION INTRAVENOUS ONCE
Status: COMPLETED | OUTPATIENT
Start: 2019-03-24 | End: 2019-03-24

## 2019-03-24 RX ORDER — BISACODYL 10 MG
10 SUPPOSITORY, RECTAL RECTAL
Status: DISCONTINUED | OUTPATIENT
Start: 2019-03-24 | End: 2019-03-24

## 2019-03-24 RX ORDER — ACETAMINOPHEN 325 MG/1
650 TABLET ORAL EVERY 6 HOURS PRN
Status: DISCONTINUED | OUTPATIENT
Start: 2019-03-24 | End: 2019-03-24

## 2019-03-24 RX ORDER — FAMOTIDINE 20 MG/1
20 TABLET, FILM COATED ORAL EVERY EVENING
Status: DISCONTINUED | OUTPATIENT
Start: 2019-03-25 | End: 2019-03-25

## 2019-03-24 RX ORDER — ONDANSETRON 4 MG/1
4 TABLET, ORALLY DISINTEGRATING ORAL EVERY 4 HOURS PRN
Status: DISCONTINUED | OUTPATIENT
Start: 2019-03-24 | End: 2019-03-26

## 2019-03-24 RX ORDER — AMOXICILLIN 250 MG
2 CAPSULE ORAL 2 TIMES DAILY
Status: DISCONTINUED | OUTPATIENT
Start: 2019-03-24 | End: 2019-03-24

## 2019-03-24 RX ORDER — SODIUM CHLORIDE 9 MG/ML
2000 INJECTION, SOLUTION INTRAVENOUS ONCE
Status: COMPLETED | OUTPATIENT
Start: 2019-03-24 | End: 2019-03-24

## 2019-03-24 RX ORDER — PROMETHAZINE HYDROCHLORIDE 25 MG/1
12.5-25 TABLET ORAL EVERY 4 HOURS PRN
Status: DISCONTINUED | OUTPATIENT
Start: 2019-03-24 | End: 2019-03-24

## 2019-03-24 RX ORDER — CLONIDINE HYDROCHLORIDE 0.1 MG/1
0.1 TABLET ORAL EVERY 6 HOURS PRN
Status: DISCONTINUED | OUTPATIENT
Start: 2019-03-24 | End: 2019-03-26

## 2019-03-24 RX ORDER — ACETAMINOPHEN 325 MG/1
650 TABLET ORAL EVERY 6 HOURS PRN
Status: DISCONTINUED | OUTPATIENT
Start: 2019-03-24 | End: 2019-03-26

## 2019-03-24 RX ORDER — FLUMAZENIL 0.1 MG/ML
0.5 INJECTION INTRAVENOUS ONCE
Status: DISCONTINUED | OUTPATIENT
Start: 2019-03-24 | End: 2019-03-24

## 2019-03-24 RX ORDER — MIDAZOLAM HYDROCHLORIDE 1 MG/ML
1-5 INJECTION INTRAMUSCULAR; INTRAVENOUS
Status: DISCONTINUED | OUTPATIENT
Start: 2019-03-24 | End: 2019-03-24

## 2019-03-24 RX ORDER — SODIUM CHLORIDE 9 MG/ML
INJECTION, SOLUTION INTRAVENOUS CONTINUOUS
Status: DISCONTINUED | OUTPATIENT
Start: 2019-03-24 | End: 2019-03-25

## 2019-03-24 RX ADMIN — PROPOFOL 51.05 MCG/KG/MIN: 10 INJECTION, EMULSION INTRAVENOUS at 11:51

## 2019-03-24 RX ADMIN — ETOMIDATE 5 MG: 2 INJECTION INTRAVENOUS at 02:20

## 2019-03-24 RX ADMIN — NALOXONE HYDROCHLORIDE 0.4 MG: 0.4 INJECTION, SOLUTION INTRAMUSCULAR; INTRAVENOUS; SUBCUTANEOUS at 05:45

## 2019-03-24 RX ADMIN — FENTANYL CITRATE 100 MCG: 50 INJECTION INTRAMUSCULAR; INTRAVENOUS at 12:06

## 2019-03-24 RX ADMIN — SODIUM CHLORIDE: 9 INJECTION, SOLUTION INTRAVENOUS at 11:45

## 2019-03-24 RX ADMIN — FAMOTIDINE 20 MG: 10 INJECTION INTRAVENOUS at 17:17

## 2019-03-24 RX ADMIN — ENOXAPARIN SODIUM 40 MG: 100 INJECTION SUBCUTANEOUS at 12:05

## 2019-03-24 RX ADMIN — FENTANYL CITRATE 100 MCG: 50 INJECTION, SOLUTION INTRAMUSCULAR; INTRAVENOUS at 03:00

## 2019-03-24 RX ADMIN — FENTANYL CITRATE 100 MCG: 50 INJECTION INTRAMUSCULAR; INTRAVENOUS at 05:45

## 2019-03-24 RX ADMIN — CEFTRIAXONE 2 G: 2 INJECTION, POWDER, FOR SOLUTION INTRAMUSCULAR; INTRAVENOUS at 03:15

## 2019-03-24 RX ADMIN — ROCURONIUM BROMIDE 80 MG: 10 INJECTION, SOLUTION INTRAVENOUS at 02:21

## 2019-03-24 RX ADMIN — FENTANYL CITRATE 100 MCG: 50 INJECTION INTRAMUSCULAR; INTRAVENOUS at 06:57

## 2019-03-24 RX ADMIN — SODIUM CHLORIDE 500 ML: 9 INJECTION, SOLUTION INTRAVENOUS at 18:46

## 2019-03-24 RX ADMIN — PROPOFOL 5 MCG/KG/MIN: 10 INJECTION, EMULSION INTRAVENOUS at 03:00

## 2019-03-24 RX ADMIN — SODIUM CHLORIDE 2000 ML: 9 INJECTION, SOLUTION INTRAVENOUS at 03:00

## 2019-03-24 RX ADMIN — FENTANYL CITRATE 100 MCG: 50 INJECTION INTRAMUSCULAR; INTRAVENOUS at 04:45

## 2019-03-24 ASSESSMENT — COGNITIVE AND FUNCTIONAL STATUS - GENERAL
CLIMB 3 TO 5 STEPS WITH RAILING: A LITTLE
STANDING UP FROM CHAIR USING ARMS: A LITTLE
SUGGESTED CMS G CODE MODIFIER DAILY ACTIVITY: CK
SUGGESTED CMS G CODE MODIFIER MOBILITY: CJ
WALKING IN HOSPITAL ROOM: A LITTLE
DRESSING REGULAR UPPER BODY CLOTHING: A LITTLE
PERSONAL GROOMING: A LITTLE
DAILY ACTIVITIY SCORE: 19
MOBILITY SCORE: 21
HELP NEEDED FOR BATHING: A LITTLE
EATING MEALS: A LITTLE
TOILETING: A LITTLE

## 2019-03-24 ASSESSMENT — LIFESTYLE VARIABLES
REASON UNABLE TO ASSESS: PT INTUBATED AND SEDATED
EVER_SMOKED: YES

## 2019-03-24 NOTE — ASSESSMENT & PLAN NOTE
Unclear etiology, tox screen positive for barbiturates, but is on Fioricet   seizure remains on the differential, history of seizure  EEGThis is abnormal routine video EEG recording in the awake and drowsy/sleep state(s).     This scalp EEG denotes                                1. Focal cortical irritability / dysfunction more over left hemisphere --this patten could be seen in interictal - advise clinical correlation regarding further management.      There are no epileptiform discharges in this record 3/25:

## 2019-03-24 NOTE — ED PROVIDER NOTES
ED Provider Note    Scribed for Vic Garvin M.D. by Nadeem Prieto. 3/24/2019, 2:29 AM.    Primary care provider: Tabitha Bautista M.D.  Means of arrival: Wheel Chair  History obtained from: Patient  History limited by: Patient's altered level of consciousness    CHIEF COMPLAINT  ALOC    HPI  Rasheeda Manzano is a 48 y.o. female with history of esophageal varices who presents to the Emergency Department for evaluation of an altered level of consciousness onset prior to arrival.  reports earlier in the day, the patient had a systolic pressure in the 70's, which she has experienced in the past. They decided to drive back from [facility], but in [place], approximately 30 minutes away, he began to notice the patient make snoring sounds. Her friend initiallly thought she was sleeping at that time, but upon arriving home to [place], she was unable to be aroused. The  additionally believes the patient may have vomited a few times today, but is unsure. He does not believe she overdosed on any medications. The patient has no known seizure disorder.    HPI limited secondary to patient's altered level of consciousness.    REVIEW OF SYSTEMS  Pertinent positives include altered level of consciousness and vomiting.  ROS limited secondary to patient's altered level of consciousness.    PAST MEDICAL HISTORY   has a past medical history of Blind; C. difficile diarrhea (2013); Chronic daily headache; Depression; Esophageal atresia (1971); Fall; GERD (gastroesophageal reflux disease); H/O total hysterectomy; Hydrocephalus; Hydrocephalus; Jaundice; Legally blind; Migraine without aura, without mention of intractable migraine without mention of status migrainosus; Other specified symptom associated with female genital organs; Pain; and Psychiatric problem.    SURGICAL HISTORY   has a past surgical history that includes laparoscopy (8/8/08); lysis adhesions general (12/10/2013); cystoscopy (12/10/2013);  exploratory laparotomy (12/10/2013); appendectomy (12/10/2013); abdominal hysterectomy total (12/10/2013); aakash by laparoscopy (N/A, 8/13/2015); cholecystectomy (N/A, 8/13/2015); other; gastroscopy-endo (N/A, 8/24/2017); nissen fundoplication laparoscopic; and gastroscopy (N/A, 1/2/2019).    SOCIAL HISTORY  Social History   Substance Use Topics   • Smoking status: Former Smoker     Packs/day: 1.00     Years: 10.00     Types: Cigars     Start date: 5/1/2004     Quit date: 8/9/2017   • Smokeless tobacco: Never Used      Comment:  CIGAR/day    • Alcohol use Yes      Comment: socially      History   Drug Use No       FAMILY HISTORY  Family History   Problem Relation Age of Onset   • Hypertension Mother    • Hypertension Father    • Non-contributory Neg Hx         Migraine       CURRENT MEDICATIONS  Home Medications    **Home medications have not yet been reviewed for this encounter**         ALLERGIES  Allergies   Allergen Reactions   • Tape Rash     RXN= ongoing  Adhesive Medical tape. Per patient, paper tape ok.       PHYSICAL EXAM  VITAL SIGNS: BP (!) 95/59   Pulse 86   Temp 36.4 °C (97.5 °F)   Resp 15   Wt 65.3 kg (144 lb)   LMP 11/27/2013   SpO2 90%   BMI 24.72 kg/m²     Constitutional: Well developed, Well nourished.   HENT: Normocephalic, Atraumatic, Oropharynx moist.   Eyes: Pupils 3 mm. Conjunctiva normal, No discharge.   Neck: Supple, No stridor.  Cardiovascular: Normal heart rate, Normal rhythm, No murmurs, equal pulses.   Pulmonary: Breathing shallow. Clear to ausculation bilaterally.  Chest: No chest wall deformity.   Abdomen:Soft, large abdominal scar, No masses.  Rectal: Brown stool in vault. GUAIAC positive.  Musculoskeletal: No major deformities noted.  Skin: Warm, Dry, No erythema, No rash.   Neurologic: GCS of 4, Negative gag. Moaned to painful stimulus, but no purposeful movement.      LABS  Results for orders placed or performed during the hospital encounter of 03/24/19   Lactic acid  (lactate)   Result Value Ref Range    Lactic Acid 1.6 0.5 - 2.0 mmol/L   CBC WITH DIFFERENTIAL   Result Value Ref Range    WBC 16.4 (H) 4.8 - 10.8 K/uL    RBC 4.43 4.20 - 5.40 M/uL    Hemoglobin 11.8 (L) 12.0 - 16.0 g/dL    Hematocrit 37.5 37.0 - 47.0 %    MCV 84.7 81.4 - 97.8 fL    MCH 26.6 (L) 27.0 - 33.0 pg    MCHC 31.5 (L) 33.6 - 35.0 g/dL    RDW 50.3 (H) 35.9 - 50.0 fL    Platelet Count 316 164 - 446 K/uL    MPV 11.7 9.0 - 12.9 fL    Neutrophils-Polys 78.70 (H) 44.00 - 72.00 %    Lymphocytes 8.70 (L) 22.00 - 41.00 %    Monocytes 8.70 0.00 - 13.40 %    Eosinophils 2.60 0.00 - 6.90 %    Basophils 0.90 0.00 - 1.80 %    Immature Granulocytes 0.40 0.00 - 0.90 %    Nucleated RBC 0.00 /100 WBC    Neutrophils (Absolute) 12.87 (H) 2.00 - 7.15 K/uL    Lymphs (Absolute) 1.42 1.00 - 4.80 K/uL    Monos (Absolute) 1.43 (H) 0.00 - 0.85 K/uL    Eos (Absolute) 0.43 0.00 - 0.51 K/uL    Baso (Absolute) 0.14 (H) 0.00 - 0.12 K/uL    Immature Granulocytes (abs) 0.06 0.00 - 0.11 K/uL    NRBC (Absolute) 0.00 K/uL   COMP METABOLIC PANEL   Result Value Ref Range    Sodium 134 (L) 135 - 145 mmol/L    Potassium 4.0 3.6 - 5.5 mmol/L    Chloride 107 96 - 112 mmol/L    Co2 20 20 - 33 mmol/L    Anion Gap 7.0 0.0 - 11.9    Glucose 140 (H) 65 - 99 mg/dL    Bun 34 (H) 8 - 22 mg/dL    Creatinine 1.77 (H) 0.50 - 1.40 mg/dL    Calcium 8.5 8.5 - 10.5 mg/dL    AST(SGOT) 254 (H) 12 - 45 U/L    ALT(SGPT) 463 (H) 2 - 50 U/L    Alkaline Phosphatase 146 (H) 30 - 99 U/L    Total Bilirubin 0.4 0.1 - 1.5 mg/dL    Albumin 3.7 3.2 - 4.9 g/dL    Total Protein 6.1 6.0 - 8.2 g/dL    Globulin 2.4 1.9 - 3.5 g/dL    A-G Ratio 1.5 g/dL   URINALYSIS   Result Value Ref Range    Color DK Yellow     Character Clear     Specific Gravity 1.035 <1.035    Ph 5.0 5.0 - 8.0    Glucose Negative Negative mg/dL    Ketones Trace (A) Negative mg/dL    Protein Negative Negative mg/dL    Bilirubin Large (A) Negative    Urobilinogen, Urine 1.0 Negative    Nitrite Negative Negative     Leukocyte Esterase Trace (A) Negative    Occult Blood Negative Negative    Micro Urine Req Microscopic    TROPONIN   Result Value Ref Range    Troponin I <0.01 0.00 - 0.04 ng/mL   COD (ADULT)   Result Value Ref Range    ABO Grouping Only A     Rh Grouping Only POS     Antibody Screen-Cod NEG    URINE MICROSCOPIC (W/UA)   Result Value Ref Range    WBC 0-2 /hpf    RBC 0-2 /hpf    Bacteria Negative None /hpf    Epithelial Cells Few /hpf    Hyaline Cast 6-10 (A) /lpf   ESTIMATED GFR   Result Value Ref Range    GFR If  37 (A) >60 mL/min/1.73 m 2    GFR If Non  31 (A) >60 mL/min/1.73 m 2   EKG (NOW)   Result Value Ref Range    Report       [facility] Emergency Dept.    Test Date:  2019  Pt Name:    NILAY MANN               Department: ER  MRN:        [id number]                      Room:        10  Gender:     Female                       Technician: [id number]  :        1970                   Requested By:LIDIA MUNOZ  Order #:    [id number]                    Reading MD: LIDIA MUNOZ MD    Measurements  Intervals                                Axis  Rate:       101                          P:          49  OR:         132                          QRS:        74  QRSD:       82                           T:          14  QT:         384  QTc:        498    Interpretive Statements  SINUS TACHYCARDIA  BORDERLINE PROLONGED QT INTERVAL  BASELINE WANDER IN LEAD(S) I,III,aVR,aVL,aVF,V1,V2,V3,V4,V5,V6  Compared to ECG 01/15/2019 10:59:09  Sinus rhythm no longer present  T-wave abnormality no longer present    Electronically Signed On 3- 4:38:37 PDT by HANNAH PICKETT     ISTAT ARTERIAL BLOOD GAS   Result Value Ref Range    Ph 7.330 (L) 7.400 - 7.500    Pco2 35.1 26.0 - 37.0 mmHg    Po2 385 (H) 64 - 87 mmHg    Tco2 20 20 - 33 mmol/L    S02 100 (H) 93 - 99 %    Hco3 18.5 17.0 - 25.0 mmol/L    BE -7 (L) -4 - 3 mmol/L    Body Temp see  below degrees    O2 Therapy 80 %    iPF Ratio 481     Specimen Arterial     Action Range Triggered NO     Inst. Qualified Patient YES      All labs reviewed by me.    EKG  12 Lead EKG interpreted by me shows a sinus tachycardia at a rate of 101. Axis normal. No ST elevations. No T wave inversions. Borderline QTc prolongation of 498. Final impression: Sinus Tachycardia with borderline QTc prolongation.    RADIOLOGY  US-RUQ   Final Result      Unremarkable findings, status post cholecystectomy.      CT-HEAD W/O   Final Result      Stable findings as noted above. No new intracranial abnormality.               INTERPRETING LOCATION:  [address], [place], [address]      DX-CHEST-PORTABLE (1 VIEW)   Final Result      Probable small left pleural effusion. Unremarkable chest findings otherwise.        The radiologist's interpretation of all radiological studies have been reviewed by me.    Intubation Procedure Note    Indication: airway protection    Consent: Unable to be obtained due to the emergent nature of this procedure.    Medications Used: etomidate 5 mg intravenously and rocuronium 80 mg intravenously    Procedure: The patient was placed in the appropriate position.  Cricoid pressure was not required.  Intubation was performed video laryngoscopy using a glidescope an 8.0 cuffed endotracheal tube.  The cuff was then inflated and the tube was secured appropriately at a distance of 24 cm to the dental ridge.  Initial confirmation of placement included bilateral breath sounds.  A chest x-ray to verify correct placement of the tube showed appropriate tube position.    The patient tolerated the procedure well.     Complications: None      COURSE & MEDICAL DECISION MAKING  Pertinent Labs & Imaging studies reviewed. (See chart for details)    2:13 AM - Patient seen and examined at bedside. Patient will be treated with Versed 1-5 mg and Propofol drip. The patient will receive IV fluids for hypotension. Ordered DX-Chest,  CT-Head w/o, Lactic Acid, Troponin, COD, Urine Microscopic, CBC with differential, CMP, Urinalysis, Urine Culture, Blood Culture x2, and EKG to evaluate her symptoms. The differential diagnoses include but are not limited to: sepsis, anemia, intracranial hemorrhage, seizure.     2:20 AM - Intubation performed at this time as patient is not protecting her airway.     There was a patient's blood pressure improved response to IV fluids after patient reevaluation.    CRITICAL CARE  I provided critical care services, which included medication orders, frequent reevaluations of the patient's condition and response to treatment, ordering and reviewing test results, and discussing the case with various consultants.  The critical care time associated with the care of the patient was 36 minutes. Review chart for interventions. This time is exclusive of any other billable procedures.       Medical Decision Making: At this point time I do not have a clear explanation for the patient's being comatose to she came in severely altered with no gag and GCS of 4.  Therefore she was intubated for respiratory protection.  CT of the head does not show any or intracranial hemorrhage.  She does have a mildly elevated white blood cell count and slightly low blood pressure which is responded with IV fluids.  She has been given empiric antibiotics.  At this point time she will be admitted to the ICU for careful monitoring.  It is possible patient has had subtherapeutic seizures.  Tox screen is still pending.  But does not sound like the patient had access or any suicidal ideation according to the friend per    DISPOSITION:  Patient will be admitted to Dr. Echevarria in critical condition.     FINAL IMPRESSION  1. Hernán coma scale total score 3-8, at arrival to emergency department ([facility])    2. Respiratory failure, unspecified chronicity, unspecified whether with hypoxia or hypercapnia ([facility])    3. Acute renal failure, unspecified acute renal failure  type (HCC)         The critical care time associated with the care of the patient was 36 minutes. Review chart for interventions. This time is exclusive of any other billable procedures.      Nadeem SHARMA (Dennis), am scribing for, and in the presence of, Vic Garvin M.D.    Electronically signed by: Nadeem Prieto (Dennis), 3/24/2019    IVic M.D. personally performed the services described in this documentation, as scribed by Nadeem Prieto in my presence, and it is both accurate and complete.    C.    The note accurately reflects work and decisions made by me.  Vic Garvin  3/24/2019  5:29 AM

## 2019-03-24 NOTE — ASSESSMENT & PLAN NOTE
Since childhood due to his hydrocephalus.    Did have a shunt placed, apparently is working, as appears stable from prior imaging.  No change noted

## 2019-03-24 NOTE — CONSULTS
"Critical Care Consultation        Referring Physician  HANNAH Gauthier.*    Reason for Consultation  obtundation    History of Presenting Illness  48 y.o. Female, who's blind since childhood, hx of  shunt for congenital hydrocephalus during childhood, esophageal atresia s/p surgery in childhood who was brought to ED by her friend after noted to have worsening mentla status throughout the day and pt eventually became unarousable. At ED, GCS 3, afebrile, HD stable, pt was subsequently intubated. CT head did not show any hydrocephalus.  Urine tox, tylenol, aspirin was pending. Per friend, pt should not do any recreational drug use. Pt is not on any prescribed opioid. No known hx of seizures. Pt has hx of esophageal varices and on propranalol. LFT was elevated. Ammonia pending. CXR normal. Glucose 140.     Interval events:  She arrived to the intensive care unit intubated.  Mental status improved, now nodding to questions appropriately able to lift her head up off the pillow.  Extubated this afternoon.    She reports taking Norco as prescribed.  No new medication  No recent alcohol use or discontinuation of use    She states her normal systolic blood pressure is in the 120's  Last night she was not feeling well, she took her blood pressure which was 70/40 shortly before she was taken to the emergency department.    She has had hypotension in relation to \"low blood counts\" in the past  She is not aware of any recent bleeding  She does not have menses, she has had a hysterectomy         Review of Systems  Review of Systems   Unable to perform ROS: Critical illness (unable to obtain due to mental status)         Vital Signs last 24 hours  Temp:  [36.4 °C (97.5 °F)] 36.4 °C (97.5 °F)  Pulse:  [] 116  Resp:  [15-33] 18  BP: (95)/(59) 95/59  SpO2:  [90 %-100 %] 100 %    Physical Exam  Physical Exam   Constitutional: No distress.   Recently extubated in no acute distress   HENT:   Head: Normocephalic and " atraumatic.   Mouth/Throat: Oropharynx is clear and moist.   Eyes: Conjunctivae are normal. No scleral icterus.   Neck: Neck supple. No tracheal deviation present. No thyromegaly present.   Cardiovascular: Normal rate, regular rhythm, normal heart sounds and intact distal pulses.    No murmur heard.  Pulmonary/Chest: Effort normal and breath sounds normal. No respiratory distress. She has no wheezes. She has no rales.   Abdominal: Soft. Bowel sounds are normal. She exhibits no distension. There is no tenderness. There is no rebound and no guarding.   Musculoskeletal: She exhibits no edema.   Neurological:   Awake alert answering questions appropriately fluent speech.  She is blind her eyes are disconjugate at baseline.  She has no focal neurologic deficits other than her blindness.   Skin: Skin is warm. No rash noted. No erythema.   Nursing note and vitals reviewed.      Acute respiratory failure, resolved  Extubated this afternoon.  Post extubation observation, incentive spirometry, early mobilization    Acute encephalopathy  Etiology unclear.  Her tox screen is positive for barbiturates, she denies recreational drug use.  Seizure with postictal state possibly due to hypotension remains on the differential  Serial neurologic exams    Acute kidney injury  Monitor volume status, electrolytes, urine output, avoid nephrotoxic agents.              Discussed patient condition and risk of morbidity and/or mortality with Family, RN, RT and Charge nurse / hot rounds.    The patient remains critically ill, I examined her both before and after directly supervising extubation this afternoon obtaining further relevant history.  She remains at high risk for significant clinical decompensation. Critical care time 35 minutes in directly providing and coordinating critical care and extensive data review.  No time overlap and excludes procedures.    Clarence Montgomery MD  [facility]

## 2019-03-24 NOTE — FLOWSHEET NOTE
Extubation    Cuff leak noted Yes    Stridor present NO         O2 (LPM): 4   O2 Daily Delivery Respiratory : Silicone Nasal Cannula   Patient toleration PT tolerated well.  RCP Complete? NO patient cant talk at this time.    Events/Summary/Plan: Pt extubated.

## 2019-03-24 NOTE — CONSULTS
Critical Care Consultation    Date of consult: 3/24/2019    Referring Physician  SHAWNA Gauthier*    Reason for Consultation  obtundation    History of Presenting Illness  48 y.o. Female, who's blind since childhood, hx of  shunt for congenital hydrocephalus during childhood, esophageal atresia s/p surgery in childhood who was brought to ED by her friend after noted to have worsening mentla status throughout the day and pt eventually became unarousable. At ED, GCS 3, afebrile, HD stable, pt was subsequently intubated. CT head did not show any hydrocephalus.  Urine tox, tylenol, aspirin was pending. Per friend, pt should not do any recreational drug use. Pt is not on any prescribed opioid. No known hx of seizures. Pt has hx of esophageal varices and on propranalol. LFT was elevated. Ammonia pending. CXR normal. Glucose 140.      HPI limited secondary to patient's altered level of consciousness.    Code Status  Prior    Review of Systems  Review of Systems   Unable to perform ROS: Critical illness (unable to obtain due to mental status)       Past Medical History   has a past medical history of Blind; C. difficile diarrhea (2013); Chronic daily headache; Depression; Esophageal atresia (1971); Fall; GERD (gastroesophageal reflux disease); H/O total hysterectomy; Hydrocephalus; Hydrocephalus; Jaundice; Legally blind; Migraine without aura, without mention of intractable migraine without mention of status migrainosus; Other specified symptom associated with female genital organs; Pain; and Psychiatric problem.    Surgical History   has a past surgical history that includes laparoscopy (8/8/08); lysis adhesions general (12/10/2013); cystoscopy (12/10/2013); exploratory laparotomy (12/10/2013); appendectomy (12/10/2013); abdominal hysterectomy total (12/10/2013); aakash by laparoscopy (N/A, 8/13/2015); cholecystectomy (N/A, 8/13/2015); other; gastroscopy-endo (N/A, 8/24/2017); nissen fundoplication  laparoscopic; and gastroscopy (N/A, 1/2/2019).    Family History  family history includes Hypertension in her father and mother.    Social History   reports that she quit smoking about 19 months ago. Her smoking use included Cigars. She started smoking about 14 years ago. She has a 10.00 pack-year smoking history. She has never used smokeless tobacco. She reports that she drinks alcohol. She reports that she does not use drugs.    Medications  Home Medications    **Home medications have not yet been reviewed for this encounter**       Current Facility-Administered Medications   Medication Dose Route Frequency Provider Last Rate Last Dose   • propofol (DIPRIVAN) infusion  0-80 mcg/kg/min Intravenous Continuous Vic Garvin M.D. 2 mL/hr at 03/24/19 0300 5 mcg/kg/min at 03/24/19 0300   • fentaNYL (SUBLIMAZE) injection 100 mcg  100 mcg Intravenous Q HOUR PRN Vic Garvin M.D.       • midazolam (VERSED) injection 1-5 mg  1-5 mg Intravenous Q HOUR PRN Vic Garvin M.D.         Current Outpatient Prescriptions   Medication Sig Dispense Refill   • Dextromethorphan-Guaifenesin (ROBITUSSIN DM)  MG/5ML Syrup Take 10 mL by mouth every 6 hours as needed. 840 mL 0   • propranolol CR (INDERAL LA) 80 MG CAPSULE SR 24 HR Take 1 Cap by mouth every day. 30 Cap 2   • acetaminophen (TYLENOL) 325 MG Tab Take 2 Tabs by mouth every 6 hours as needed (Mild Pain; (Pain scale 1-3); Temp greater than 100.5 F). 30 Tab 0   • gabapentin (NEURONTIN) 400 MG Cap Take 400mg PO at 1900, then take 400mg PO before bed 60 Cap 2   • famotidine (PEPCID) 40 MG Tab Take 40 mg by mouth every evening.     • LORazepam (ATIVAN) 1 MG Tab Take 1 mg by mouth 1 time daily as needed (sleep).         Allergies  Allergies   Allergen Reactions   • Tape Rash     RXN= ongoing  Adhesive Medical tape. Per patient, paper tape ok.       Vital Signs last 24 hours  Temp:  [36.4 °C (97.5 °F)] 36.4 °C (97.5 °F)  Pulse:  [] 104  Resp:   [15-24] 20  BP: (95)/(59) 95/59  SpO2:  [90 %-100 %] 100 %    Physical Exam  Physical Exam   Constitutional: No distress.   Intbated, sedated on propofol 10mcg/kg/min   HENT:   Head: Normocephalic and atraumatic.   Mouth/Throat: Oropharynx is clear and moist.   ET tube in place   Eyes: Conjunctivae are normal. No scleral icterus.   Neck: Neck supple. No tracheal deviation present. No thyromegaly present.   Cardiovascular: Normal rate, regular rhythm, normal heart sounds and intact distal pulses.    No murmur heard.  Pulmonary/Chest: Effort normal and breath sounds normal. No respiratory distress. She has no wheezes. She has no rales.   Abdominal: Soft. Bowel sounds are normal. She exhibits no distension. There is no tenderness. There is no rebound and no guarding.   Musculoskeletal: She exhibits no edema.   Neurological:   Intubated, sedated   Skin: Skin is warm. No rash noted. No erythema.   Nursing note and vitals reviewed.      Fluids  No intake or output data in the 24 hours ending 03/24/19 0436    Laboratory  Recent Results (from the past 48 hour(s))   Lactic acid (lactate)    Collection Time: 03/24/19  2:20 AM   Result Value Ref Range    Lactic Acid 1.6 0.5 - 2.0 mmol/L   CBC WITH DIFFERENTIAL    Collection Time: 03/24/19  2:20 AM   Result Value Ref Range    WBC 16.4 (H) 4.8 - 10.8 K/uL    RBC 4.43 4.20 - 5.40 M/uL    Hemoglobin 11.8 (L) 12.0 - 16.0 g/dL    Hematocrit 37.5 37.0 - 47.0 %    MCV 84.7 81.4 - 97.8 fL    MCH 26.6 (L) 27.0 - 33.0 pg    MCHC 31.5 (L) 33.6 - 35.0 g/dL    RDW 50.3 (H) 35.9 - 50.0 fL    Platelet Count 316 164 - 446 K/uL    MPV 11.7 9.0 - 12.9 fL    Neutrophils-Polys 78.70 (H) 44.00 - 72.00 %    Lymphocytes 8.70 (L) 22.00 - 41.00 %    Monocytes 8.70 0.00 - 13.40 %    Eosinophils 2.60 0.00 - 6.90 %    Basophils 0.90 0.00 - 1.80 %    Immature Granulocytes 0.40 0.00 - 0.90 %    Nucleated RBC 0.00 /100 WBC    Neutrophils (Absolute) 12.87 (H) 2.00 - 7.15 K/uL    Lymphs (Absolute) 1.42 1.00 -  4.80 K/uL    Monos (Absolute) 1.43 (H) 0.00 - 0.85 K/uL    Eos (Absolute) 0.43 0.00 - 0.51 K/uL    Baso (Absolute) 0.14 (H) 0.00 - 0.12 K/uL    Immature Granulocytes (abs) 0.06 0.00 - 0.11 K/uL    NRBC (Absolute) 0.00 K/uL   COMP METABOLIC PANEL    Collection Time: 19  2:20 AM   Result Value Ref Range    Sodium 134 (L) 135 - 145 mmol/L    Potassium 4.0 3.6 - 5.5 mmol/L    Chloride 107 96 - 112 mmol/L    Co2 20 20 - 33 mmol/L    Anion Gap 7.0 0.0 - 11.9    Glucose 140 (H) 65 - 99 mg/dL    Bun 34 (H) 8 - 22 mg/dL    Creatinine 1.77 (H) 0.50 - 1.40 mg/dL    Calcium 8.5 8.5 - 10.5 mg/dL    AST(SGOT) 254 (H) 12 - 45 U/L    ALT(SGPT) 463 (H) 2 - 50 U/L    Alkaline Phosphatase 146 (H) 30 - 99 U/L    Total Bilirubin 0.4 0.1 - 1.5 mg/dL    Albumin 3.7 3.2 - 4.9 g/dL    Total Protein 6.1 6.0 - 8.2 g/dL    Globulin 2.4 1.9 - 3.5 g/dL    A-G Ratio 1.5 g/dL   TROPONIN    Collection Time: 19  2:20 AM   Result Value Ref Range    Troponin I <0.01 0.00 - 0.04 ng/mL   COD (ADULT)    Collection Time: 19  2:20 AM   Result Value Ref Range    ABO Grouping Only A     Rh Grouping Only POS     Antibody Screen-Cod NEG    ESTIMATED GFR    Collection Time: 19  2:20 AM   Result Value Ref Range    GFR If  37 (A) >60 mL/min/1.73 m 2    GFR If Non  31 (A) >60 mL/min/1.73 m 2   EKG (NOW)    Collection Time: 19  2:22 AM   Result Value Ref Range    Report       [facility]    Test Date:  2019  Pt Name:    NILAY MANN               Department: ER  MRN:        [id number]                      Room:        10  Gender:     Female                       Technician: [id number]  :        1970                   Requested By:LIDIA MUNOZ  Order #:    [id number]                    Reading MD:    Measurements  Intervals                                Axis  Rate:       101                          P:          49  ME:         132                           QRS:        74  QRSD:       82                           T:          14  QT:         384  QTc:        498    Interpretive Statements  SINUS TACHYCARDIA  BORDERLINE PROLONGED QT INTERVAL  BASELINE WANDER IN LEAD(S) I,III,aVR,aVL,aVF,V1,V2,V3,V4,V5,V6  Compared to ECG 01/15/2019 10:59:09  Sinus rhythm no longer present  T-wave abnormality no longer present     URINALYSIS    Collection Time: 03/24/19  2:30 AM   Result Value Ref Range    Color DK Yellow     Character Clear     Specific Gravity 1.035 <1.035    Ph 5.0 5.0 - 8.0    Glucose Negative Negative mg/dL    Ketones Trace (A) Negative mg/dL    Protein Negative Negative mg/dL    Bilirubin Large (A) Negative    Urobilinogen, Urine 1.0 Negative    Nitrite Negative Negative    Leukocyte Esterase Trace (A) Negative    Occult Blood Negative Negative    Micro Urine Req Microscopic    URINE MICROSCOPIC (W/UA)    Collection Time: 03/24/19  2:30 AM   Result Value Ref Range    WBC 0-2 /hpf    RBC 0-2 /hpf    Bacteria Negative None /hpf    Epithelial Cells Few /hpf    Hyaline Cast 6-10 (A) /lpf   ISTAT ARTERIAL BLOOD GAS    Collection Time: 03/24/19  2:52 AM   Result Value Ref Range    Ph 7.330 (L) 7.400 - 7.500    Pco2 35.1 26.0 - 37.0 mmHg    Po2 385 (H) 64 - 87 mmHg    Tco2 20 20 - 33 mmol/L    S02 100 (H) 93 - 99 %    Hco3 18.5 17.0 - 25.0 mmol/L    BE -7 (L) -4 - 3 mmol/L    Body Temp see below degrees    O2 Therapy 80 %    iPF Ratio 481     Specimen Arterial     Action Range Triggered NO     Inst. Qualified Patient YES        Imaging  US-RUQ   Final Result      Unremarkable findings, status post cholecystectomy.      CT-HEAD W/O   Final Result      Stable findings as noted above. No new intracranial abnormality.               INTERPRETING LOCATION:  [address] [facility] [place], [address]      DX-CHEST-PORTABLE (1 VIEW)   Final Result      Probable small left pleural effusion. Unremarkable chest findings otherwise.          Assessment/Plan  * Obtundation   Assessment & Plan     Unclear reason why pt is obtunded at this point  Will give narcan 0.4mg  Send Utox, acetaminophen, alcohol.   EEG in am.   May need MRI brain if pt remains obtunded.   Avoid sedation as much as possible.    Doubt  shunt is an issue  Doubt meningitis based on clinical exam     Acute respiratory failure with hypoxia (HCC)- (present on admission)   Assessment & Plan    On full vent support  Will keep off sedation.   Low tidal volume strategy. Keep sat >90%  Depending on her mental status, will not        S/P  shunt   Assessment & Plan    Congenital hydrocephalus s/p  shunt since childhood  CT head showed no hydrocephalus. Doubt  shunt is an issue     Blindness- (present on admission)   Assessment & Plan    Since 11 yo     Esophageal atresia   Assessment & Plan    S/p surgery during childfhood         Discussed patient condition and risk of morbidity and/or mortality with Family, RN, RT and Charge nurse / hot rounds.  ED MD  The patient remains critically ill.  Critical care time = 45 minutes in directly providing and coordinating critical care and extensive data review.  No time overlap and excludes procedures.    Addendum  Narcan 0.4mg IV x1 was given.   I came to bedside and pt appears to wake up, though drowsy and somewhat agitated but following commands appropriately.   I suspect there's some contribution of opioid to her obtundation. I did speak with ICU/ED pharmacist who did confirm that she has prescribed chronic opiod.   Pt will likely be able to be extubated soon if passes weaning trial.   This can be done in ICU.   Can sedate patient with propofol gtt meanwhile. Avoid any fentanyl  Discussed plan of care with RN, ED MD    Additional critical care time: 40 mins    Kushal Hoyt DO

## 2019-03-24 NOTE — PROGRESS NOTES
Patient extubated to 4L humidified nasal cannula. RN monitored patient continuous for 15 minutes, O2 saturations at 100%, patient breathing at 20 breaths a minutes. Will continue to monitor.

## 2019-03-24 NOTE — RESPIRATORY CARE
Intubation Assist    Intubation assist performed yes  Reason for intubatiion respiratory failure  Positive Color Change on EZCap? yes  Difficult Intubation/Number of attempts no, one attempt  Evidence of aspiration on    Airway ETT Oral 8.0-Secured At  (cm): 24 (03/24/19 0225)    Anaya Vent Mode: (S)CMV (03/24/19 0220)  Rate (breaths/min): 24 (03/24/19 0220)  Vt Target (mL): 400 (03/24/19 0220)  FiO2: 80 (03/24/19 0220)  PEEP/CPAP: 8 (03/24/19 0220)

## 2019-03-24 NOTE — ASSESSMENT & PLAN NOTE
She was intubated on 3/24 due to obtundation and was successfully extubated on 3/24  Critical care has consulted.

## 2019-03-24 NOTE — ASSESSMENT & PLAN NOTE
Unclear reason why pt is obtunded at this point  Will give narcan 0.4mg  Send Utox, acetaminophen, alcohol.   EEG in am.   May need MRI brain if pt remains obtunded.   Avoid sedation as much as possible.    Doubt  shunt is an issue  Doubt meningitis based on clinical exam

## 2019-03-24 NOTE — ED TRIAGE NOTES
Pt riding from [facility], 30 min outside of [place]ke pt friend who was driving notice pt was not sleeping or responding to voice touch.  Pt was assisted out of car in triage placed in red 12, at unresponsive, not protecting airway, GCS3 and was intubated.        0220 5mg etomidate  0221 80 mg Rocc  0222 1l bolus  ET tube placed 0223  indra ey placed 0234

## 2019-03-24 NOTE — ASSESSMENT & PLAN NOTE
Congenital hydrocephalus s/p  shunt since childhood  CT head showed no hydrocephalus. Doubt  shunt is an issue

## 2019-03-24 NOTE — ASSESSMENT & PLAN NOTE
Monitor volume status, electrolytes, urine output.  Avoid nephrotoxic agents.  Improved after hydration

## 2019-03-24 NOTE — ASSESSMENT & PLAN NOTE
Ultrasound abdomen normal  Question of episode of hypoperfusion possibly  Liver enzymes have trended down

## 2019-03-25 ENCOUNTER — APPOINTMENT (OUTPATIENT)
Dept: RADIOLOGY | Facility: MEDICAL CENTER | Age: 49
DRG: 070 | End: 2019-03-25
Attending: HOSPITALIST
Payer: MEDICAID

## 2019-03-25 LAB
ALBUMIN SERPL BCP-MCNC: 2.7 G/DL (ref 3.2–4.9)
ALBUMIN/GLOB SERPL: 1.4 G/DL
ALP SERPL-CCNC: 97 U/L (ref 30–99)
ALT SERPL-CCNC: 206 U/L (ref 2–50)
ANION GAP SERPL CALC-SCNC: 2 MMOL/L (ref 0–11.9)
AST SERPL-CCNC: 57 U/L (ref 12–45)
BASOPHILS # BLD AUTO: 0.9 % (ref 0–1.8)
BASOPHILS # BLD: 0.08 K/UL (ref 0–0.12)
BILIRUB SERPL-MCNC: 0.3 MG/DL (ref 0.1–1.5)
BUN SERPL-MCNC: 10 MG/DL (ref 8–22)
CALCIUM SERPL-MCNC: 7.8 MG/DL (ref 8.5–10.5)
CHLORIDE SERPL-SCNC: 116 MMOL/L (ref 96–112)
CO2 SERPL-SCNC: 22 MMOL/L (ref 20–33)
CREAT SERPL-MCNC: 0.59 MG/DL (ref 0.5–1.4)
EOSINOPHIL # BLD AUTO: 0.39 K/UL (ref 0–0.51)
EOSINOPHIL NFR BLD: 4.4 % (ref 0–6.9)
ERYTHROCYTE [DISTWIDTH] IN BLOOD BY AUTOMATED COUNT: 55.1 FL (ref 35.9–50)
GLOBULIN SER CALC-MCNC: 1.9 G/DL (ref 1.9–3.5)
GLUCOSE SERPL-MCNC: 88 MG/DL (ref 65–99)
HCT VFR BLD AUTO: 28.8 % (ref 37–47)
HGB BLD-MCNC: 8.9 G/DL (ref 12–16)
IMM GRANULOCYTES # BLD AUTO: 0.06 K/UL (ref 0–0.11)
IMM GRANULOCYTES NFR BLD AUTO: 0.7 % (ref 0–0.9)
LYMPHOCYTES # BLD AUTO: 1.62 K/UL (ref 1–4.8)
LYMPHOCYTES NFR BLD: 18.5 % (ref 22–41)
MAGNESIUM SERPL-MCNC: 1.6 MG/DL (ref 1.5–2.5)
MCH RBC QN AUTO: 26.3 PG (ref 27–33)
MCHC RBC AUTO-ENTMCNC: 30.6 G/DL (ref 33.6–35)
MCV RBC AUTO: 86 FL (ref 81.4–97.8)
MONOCYTES # BLD AUTO: 0.87 K/UL (ref 0–0.85)
MONOCYTES NFR BLD AUTO: 9.9 % (ref 0–13.4)
NEUTROPHILS # BLD AUTO: 5.75 K/UL (ref 2–7.15)
NEUTROPHILS NFR BLD: 65.6 % (ref 44–72)
NRBC # BLD AUTO: 0 K/UL
NRBC BLD-RTO: 0 /100 WBC
PHOSPHATE SERPL-MCNC: 2.1 MG/DL (ref 2.5–4.5)
PLATELET # BLD AUTO: 249 K/UL (ref 164–446)
PMV BLD AUTO: 11.9 FL (ref 9–12.9)
POTASSIUM SERPL-SCNC: 3.4 MMOL/L (ref 3.6–5.5)
PROT SERPL-MCNC: 4.6 G/DL (ref 6–8.2)
RBC # BLD AUTO: 3.35 M/UL (ref 4.2–5.4)
SODIUM SERPL-SCNC: 140 MMOL/L (ref 135–145)
WBC # BLD AUTO: 8.8 K/UL (ref 4.8–10.8)

## 2019-03-25 PROCEDURE — 96372 THER/PROPH/DIAG INJ SC/IM: CPT

## 2019-03-25 PROCEDURE — 95951 EEG: CPT | Mod: 52 | Performed by: PSYCHIATRY & NEUROLOGY

## 2019-03-25 PROCEDURE — 700105 HCHG RX REV CODE 258: Performed by: INTERNAL MEDICINE

## 2019-03-25 PROCEDURE — 96376 TX/PRO/DX INJ SAME DRUG ADON: CPT

## 2019-03-25 PROCEDURE — 96368 THER/DIAG CONCURRENT INF: CPT

## 2019-03-25 PROCEDURE — 4A10X4Z MONITORING OF CENTRAL NERVOUS ELECTRICAL ACTIVITY, EXTERNAL APPROACH: ICD-10-PCS | Performed by: PSYCHIATRY & NEUROLOGY

## 2019-03-25 PROCEDURE — 85025 COMPLETE CBC W/AUTO DIFF WBC: CPT

## 2019-03-25 PROCEDURE — A9270 NON-COVERED ITEM OR SERVICE: HCPCS | Performed by: HOSPITALIST

## 2019-03-25 PROCEDURE — 99233 SBSQ HOSP IP/OBS HIGH 50: CPT | Performed by: HOSPITALIST

## 2019-03-25 PROCEDURE — 770006 HCHG ROOM/CARE - MED/SURG/GYN SEMI*

## 2019-03-25 PROCEDURE — 51702 INSERT TEMP BLADDER CATH: CPT

## 2019-03-25 PROCEDURE — 700105 HCHG RX REV CODE 258: Performed by: HOSPITALIST

## 2019-03-25 PROCEDURE — 99291 CRITICAL CARE FIRST HOUR: CPT

## 2019-03-25 PROCEDURE — A9270 NON-COVERED ITEM OR SERVICE: HCPCS | Performed by: INTERNAL MEDICINE

## 2019-03-25 PROCEDURE — 96375 TX/PRO/DX INJ NEW DRUG ADDON: CPT

## 2019-03-25 PROCEDURE — 700111 HCHG RX REV CODE 636 W/ 250 OVERRIDE (IP): Performed by: INTERNAL MEDICINE

## 2019-03-25 PROCEDURE — 700111 HCHG RX REV CODE 636 W/ 250 OVERRIDE (IP): Performed by: HOSPITALIST

## 2019-03-25 PROCEDURE — 99233 SBSQ HOSP IP/OBS HIGH 50: CPT | Performed by: INTERNAL MEDICINE

## 2019-03-25 PROCEDURE — 700102 HCHG RX REV CODE 250 W/ 637 OVERRIDE(OP): Performed by: HOSPITALIST

## 2019-03-25 PROCEDURE — 96366 THER/PROPH/DIAG IV INF ADDON: CPT

## 2019-03-25 PROCEDURE — 71045 X-RAY EXAM CHEST 1 VIEW: CPT

## 2019-03-25 PROCEDURE — 31500 INSERT EMERGENCY AIRWAY: CPT

## 2019-03-25 PROCEDURE — 700102 HCHG RX REV CODE 250 W/ 637 OVERRIDE(OP): Performed by: INTERNAL MEDICINE

## 2019-03-25 PROCEDURE — 303105 HCHG CATHETER EXTRA

## 2019-03-25 PROCEDURE — 83735 ASSAY OF MAGNESIUM: CPT

## 2019-03-25 PROCEDURE — 80053 COMPREHEN METABOLIC PANEL: CPT

## 2019-03-25 PROCEDURE — 96365 THER/PROPH/DIAG IV INF INIT: CPT

## 2019-03-25 PROCEDURE — 84100 ASSAY OF PHOSPHORUS: CPT

## 2019-03-25 PROCEDURE — 95951 EEG: CPT | Mod: 26,52 | Performed by: PSYCHIATRY & NEUROLOGY

## 2019-03-25 PROCEDURE — 700101 HCHG RX REV CODE 250: Performed by: INTERNAL MEDICINE

## 2019-03-25 RX ORDER — FAMOTIDINE 20 MG/1
40 TABLET, FILM COATED ORAL EVERY EVENING
Status: DISCONTINUED | OUTPATIENT
Start: 2019-03-25 | End: 2019-03-26 | Stop reason: HOSPADM

## 2019-03-25 RX ORDER — HYDROCODONE BITARTRATE AND ACETAMINOPHEN 5; 325 MG/1; MG/1
1-2 TABLET ORAL EVERY 6 HOURS PRN
Status: DISCONTINUED | OUTPATIENT
Start: 2019-03-25 | End: 2019-03-26 | Stop reason: HOSPADM

## 2019-03-25 RX ORDER — POTASSIUM CHLORIDE 20 MEQ/1
20 TABLET, EXTENDED RELEASE ORAL ONCE
Status: COMPLETED | OUTPATIENT
Start: 2019-03-25 | End: 2019-03-25

## 2019-03-25 RX ORDER — GABAPENTIN 400 MG/1
400 CAPSULE ORAL 2 TIMES DAILY
Status: DISCONTINUED | OUTPATIENT
Start: 2019-03-25 | End: 2019-03-26 | Stop reason: HOSPADM

## 2019-03-25 RX ORDER — MAGNESIUM SULFATE HEPTAHYDRATE 40 MG/ML
2 INJECTION, SOLUTION INTRAVENOUS ONCE
Status: COMPLETED | OUTPATIENT
Start: 2019-03-25 | End: 2019-03-25

## 2019-03-25 RX ADMIN — HYDROCODONE BITARTRATE AND ACETAMINOPHEN 1 TABLET: 5; 325 TABLET ORAL at 18:02

## 2019-03-25 RX ADMIN — CLONIDINE HYDROCHLORIDE 0.1 MG: 0.1 TABLET ORAL at 23:30

## 2019-03-25 RX ADMIN — GABAPENTIN 400 MG: 400 CAPSULE ORAL at 18:03

## 2019-03-25 RX ADMIN — ACETAMINOPHEN 650 MG: 325 TABLET, FILM COATED ORAL at 15:34

## 2019-03-25 RX ADMIN — ENOXAPARIN SODIUM 40 MG: 100 INJECTION SUBCUTANEOUS at 05:27

## 2019-03-25 RX ADMIN — MAGNESIUM SULFATE IN WATER 2 G: 40 INJECTION, SOLUTION INTRAVENOUS at 08:30

## 2019-03-25 RX ADMIN — FAMOTIDINE 40 MG: 20 TABLET ORAL at 18:03

## 2019-03-25 RX ADMIN — POTASSIUM CHLORIDE 20 MEQ: 1500 TABLET, EXTENDED RELEASE ORAL at 08:30

## 2019-03-25 RX ADMIN — POTASSIUM PHOSPHATE, MONOBASIC AND POTASSIUM PHOSPHATE, DIBASIC 15 MMOL: 224; 236 INJECTION, SOLUTION INTRAVENOUS at 08:29

## 2019-03-25 RX ADMIN — SODIUM CHLORIDE: 9 INJECTION, SOLUTION INTRAVENOUS at 05:33

## 2019-03-25 RX ADMIN — GABAPENTIN 400 MG: 400 CAPSULE ORAL at 20:08

## 2019-03-25 ASSESSMENT — ENCOUNTER SYMPTOMS
SORE THROAT: 1
HEADACHES: 1
GASTROINTESTINAL NEGATIVE: 1
COUGH: 1
NAUSEA: 0
WEAKNESS: 0
BACK PAIN: 0
DIZZINESS: 0
FOCAL WEAKNESS: 0
BLURRED VISION: 1
MUSCULOSKELETAL NEGATIVE: 1
PALPITATIONS: 0
CHILLS: 0
HEARTBURN: 0
CONSTITUTIONAL NEGATIVE: 1
CARDIOVASCULAR NEGATIVE: 1
DEPRESSION: 0
POLYDIPSIA: 0
FEVER: 0
BRUISES/BLEEDS EASILY: 0
MEMORY LOSS: 1
VOMITING: 0
NECK PAIN: 0

## 2019-03-25 ASSESSMENT — COPD QUESTIONNAIRES
COPD SCREENING SCORE: 0
HAVE YOU SMOKED AT LEAST 100 CIGARETTES IN YOUR ENTIRE LIFE: NO/DON'T KNOW
DURING THE PAST 4 WEEKS HOW MUCH DID YOU FEEL SHORT OF BREATH: NONE/LITTLE OF THE TIME
DO YOU EVER COUGH UP ANY MUCUS OR PHLEGM?: NO/ONLY WITH OCCASIONAL COLDS OR INFECTIONS

## 2019-03-25 ASSESSMENT — LIFESTYLE VARIABLES: EVER_SMOKED: YES

## 2019-03-25 NOTE — PROGRESS NOTES
Multidisciplinary rounds completed, notified re BP, HR, U/O, Temp, pain, neuro and resp status. Reviewed responses to medications and treatments.    Plan to tx to shea, CHRISSIE ng.

## 2019-03-25 NOTE — CARE PLAN
Problem: Safety  Goal: Will remain free from injury  Outcome: PROGRESSING AS EXPECTED  Pt understands need to ring for assistance in unit as hazards for tripping are prominent    Problem: Psychosocial Needs:  Goal: Level of anxiety will decrease  Outcome: PROGRESSING AS EXPECTED  Pt comfortable, eager to go home

## 2019-03-25 NOTE — PROGRESS NOTES
MD (Dr. Montgomery) at the bedside. Per MD, ok to do bedside swallow, if patient passes, can advance diet to regular.

## 2019-03-25 NOTE — PROGRESS NOTES
· 2 RN skin check complete with ELIZABETH Lee.  · Devices in place ET tube holister, OG tube, tele leads, bp cuff, pulse ox, ng, scds.  · Skin assessed under devices intact.  · Confirmed pressure ulcers found on N/A.  · New potential pressure ulcers noted on N/A. Wound consult placed and wound reported.  · The following interventions in place turn every 2 hours, pillows used for support, heels floated off bed, moisturizer and barrier wipes as needed, skin assessment every shift.

## 2019-03-25 NOTE — PROCEDURES
EEG 03/25/19 5:30 PM    VIDEO ELECTROENCEPHALOGRAM / EPILEPSY MONITORING UNIT REPORT      Referring provider: Rj Echevarria M.D.    DOS:   3/25/2019      INDICATION:  Rasheeda Manzano 48 y.o. female presenting with 48yof having EEG today to R/O sz due to hypotension.     Pt has hx of blindness since childhood,  shunt (location noted above) for congenital hydrocephalus, Espophageal atresia s/p in childhood.    CT head - Did not show any hydrocephalus.    No AED's    CURRENT ANTIEPILEPTIC REGIMEN: NONE     DURATION: 33 minutes      TECHNIQUE: A 30-channel video electroencephalogram (VEEG) was performed in accordance with the international 10-20 system. This digital study was reviewed in bipolar and referential montages. The recording examined the patient during wakeful, drowsy and sleep states.         DESCRIPTION OF THE RECORD:  During the awake state, background shows symmetrical 9-10 Hz alpha activity posteriorly with amplitude of 70 mV.  There was reactivity with eye opening/closure.  There are monomorphic and polymorphic delta bursts over both, much more over the left , some sharp waves over left temporal     During the sleep state, background shows diffuse high-amplitude 4-5 Hz delta activity.  Symmetrical high-amplitude sleep spindles and vertex sharp activities were seen in the central leads.    ACTIVATION PROCEDURES:   Hyperventilation was performed by the patient. The technician performing the test noted good effort. This technique produced electroencephalographic changes in keeping with the expected bilaterally synchronous, frontally predominant, high amplitude slow waves build up.     Intermittent Photic stimulation was performed in a stepwise fashion from 1 to 30 Hz and elicited a normal response (photic driving), most noticeable in the posterior leads.      ICTAL AND/OR INTERICTAL FINDINGS:   There are monomorphic and polymorphic delta bursts over both, much more over the left , some sharp waves  over left temporal   No seizures were recorded during the study.     EVENT(S):  NONE    EKG: sampling review of EKG recording demonstrated regular rhythm      INTERPRETATION:       ________________________________________________________________________    This is abnormal routine video EEG recording in the awake and drowsy/sleep state(s).    This scalp EEG denotes         1. Focal cortical irritability / dysfunction more over left hemisphere --this patten could be seen in interictal - advise clinical correlation regarding further management.     There are no epileptiform discharges in this record    If clinical suspicion of seizure remains high.  Prolonged EEG   monitoring may be of help.    EEG 03/25/19 5:37 PM    ________________________________________________________________________  ________________________________________________________________________      REFERENCE    EEG is described as 'abnormal' (that is 'not normal' brain activity) does not 'prove' that the person has epilepsy and does not mean the patient needs treatment. ... Also, many people who do have epilepsy will only have 'abnormal' activity on the EEG if they have a seizure at the time the test is happening.     ________________________________________________________________________

## 2019-03-25 NOTE — PROGRESS NOTES
RN called pharmacy after receiving warning from [employer] saying Pepcid dose is twice recommended daily dose. Dino stated he will change to once a day. RN reviewed MAR and saw that earlier dose of Pepcid was held by ER team. Dose given this evening since new order by Dino is to start tomorrow.

## 2019-03-25 NOTE — PROGRESS NOTES
"Critical Care Progress Note    Date of admission  3/24/2019    Chief Complaint  48 y.o. female admitted 3/24/2019 with obtundation    Hospital Course    \"48 y.o. Female, who's blind since childhood, hx of  shunt for congenital hydrocephalus during childhood, esophageal atresia s/p surgery in childhood who was brought to ED by her friend after noted to have worsening mentla status throughout the day and pt eventually became unarousable. At ED, GCS 3, afebrile, HD stable, pt was subsequently intubated. CT head did not show any hydrocephalus.  Urine tox, tylenol, aspirin was pending. Per friend, pt should not do any recreational drug use. Pt is not on any prescribed opioid. No known hx of seizures. Pt has hx of esophageal varices and on propranalol. LFT was elevated. Ammonia pending. CXR normal. Glucose 140.\"      Interval Problem Update  Reviewed last 24 hour events:   - extubated yesterday afternoon   - Neuro: AOx4   - HR: ST   - SBP: 80s-110s   - GI: tolerating diet, BM PTA   - UOP: 2.3L/24 hrs   - Sharp: yes -> out today   - Tm: afeb   - Lines: PIV   - PPx: GI not indicated, DVT lovenox   - RA   - CXR (personally reviewed and compared to prior): left effusion   -Patient complains of headache and states she is prescribed Norco as an outpatient for these headaches, reviewed that this is an atypical medication for chronic headaches and she should reevaluate its use with her primary care provider following discharge.    Yesterday  She arrived to the intensive care unit intubated.  Mental status improved, now nodding to questions appropriately able to lift her head up off the pillow.  Extubated this afternoon.    She reports taking Norco as prescribed.  No new medication  No recent alcohol use or discontinuation of use    She states her normal systolic blood pressure is in the 120's  Last night she was not feeling well, she took her blood pressure which was 70/40 shortly before she was taken to the emergency " "department.    She has had hypotension in relation to \"low blood counts\" in the past  She is not aware of any recent bleeding  She does not have menses, she has had a hysterectomy    Review of Systems  Review of Systems   Constitutional: Negative for chills and fever.   HENT: Positive for sore throat.    Respiratory: Positive for cough. Negative for sputum production, shortness of breath and stridor.    Cardiovascular: Negative for chest pain.   Gastrointestinal: Negative for abdominal pain, nausea and vomiting.   Genitourinary: Negative for dysuria.   Musculoskeletal: Negative for myalgias.   Skin: Negative for rash.   Neurological: Positive for headaches. Negative for dizziness, sensory change and focal weakness.        Vital Signs for last 24 hours   Pulse:  [] 118  Resp:  [15-37] 15  SpO2:  [94 %-100 %] 95 %    Hemodynamic parameters for last 24 hours       Respiratory Information for the last 24 hours  Anaya Vent Mode: APVCMV  Rate (breaths/min): 20  Vt Target (mL): 400  PEEP/CPAP: 8  FiO2: 40  P MEAN: 12  Control VTE (exp VT): 30    Physical Exam   Physical Exam   Constitutional: She is oriented to person, place, and time. She appears well-developed and well-nourished.   HENT:   Head: Normocephalic and atraumatic.   Right Ear: External ear normal.   Left Ear: External ear normal.   Nose: Nose normal.   Eyes: Conjunctivae are normal.   Disconjugate gaze   Neck: Neck supple. No JVD present. No tracheal deviation present.   Scar to right neck from prior  shunt removal   Cardiovascular: Normal rate, regular rhythm and intact distal pulses.    Pulmonary/Chest: Breath sounds normal. No accessory muscle usage. No respiratory distress.   Abdominal: Soft. Bowel sounds are normal. She exhibits no distension. There is no tenderness.   Musculoskeletal: Normal range of motion. She exhibits no tenderness or deformity.   Neurological: She is alert and oriented to person, place, and time. She exhibits normal muscle " tone. Coordination normal.   Skin: Skin is warm and dry. No rash noted.   Psychiatric: She has a normal mood and affect. Her behavior is normal.   Nursing note and vitals reviewed.      Medications  Current Facility-Administered Medications   Medication Dose Route Frequency Provider Last Rate Last Dose   • magnesium sulfate IVPB premix 2 g  2 g Intravenous Once Kushal Hoyt D.O.       • potassium phosphates 15 mmol in  mL ivpb  15 mmol Intravenous Once Kushal Hoyt D.O.       • potassium chloride SA (Kdur) tablet 20 mEq  20 mEq Oral Once Kushal Hoyt D.O.       • fentaNYL (SUBLIMAZE) injection 100 mcg  100 mcg Intravenous Q HOUR PRN Vic Garvin M.D.   100 mcg at 03/24/19 1206   • NS infusion   Intravenous Continuous Rj Echevarria M.D. 83 mL/hr at 03/25/19 0533     • ondansetron (ZOFRAN) syringe/vial injection 4 mg  4 mg Intravenous Q4HRS PRN Rj Echevarria M.D.       • promethazine (PHENERGAN) suppository 12.5-25 mg  12.5-25 mg Rectal Q4HRS PRN Rj Echevarria M.D.       • prochlorperazine (COMPAZINE) injection 5-10 mg  5-10 mg Intravenous Q4HRS PRN Rj Echevarria M.D.       • Respiratory Care per Protocol   Nebulization Continuous RT Rj Echevarria M.D.       • MD Alert...ICU Electrolyte Replacement per Pharmacy   Other PHARMACY TO DOSE Rj Echevarria M.D.       • Pharmacy Consult: Enteral tube insertion - review meds/change route/product selection   Other PHARMACY TO DOSE Rj Echevarria M.D.       • enoxaparin (LOVENOX) inj 40 mg  40 mg Subcutaneous DAILY Rj Echevarria M.D.   40 mg at 03/25/19 0527   • senna-docusate (PERICOLACE or SENOKOT S) 8.6-50 MG per tablet 2 Tab  2 Tab Enteral Tube BID Rj Echevarria M.D.   Stopped at 03/24/19 1700    And   • polyethylene glycol/lytes (MIRALAX) PACKET 1 Packet  1 Packet Enteral Tube QDAY PRN Rj Echevarria M.D.        And   • magnesium hydroxide (MILK OF MAGNESIA) suspension 30 mL  30 mL Enteral Tube QDAY PRMICK Echevarria M.D.        And   •  bisacodyl (DULCOLAX) suppository 10 mg  10 mg Rectal QDAY PRN Rj Echevarria M.D.       • acetaminophen (TYLENOL) tablet 650 mg  650 mg Enteral Tube Q6HRS PRN Rj Echevarria M.D.       • cloNIDine (CATAPRES) tablet 0.1 mg  0.1 mg Enteral Tube Q6HRS PRN Rj Echevarria M.D.       • ondansetron (ZOFRAN ODT) dispertab 4 mg  4 mg Enteral Tube Q4HRS PRN Rj Echevarria M.D.       • promethazine (PHENERGAN) tablet 12.5-25 mg  12.5-25 mg Enteral Tube Q4HRS PRN Rj Echevarria M.D.           Fluids    Intake/Output Summary (Last 24 hours) at 03/25/19 0747  Last data filed at 03/25/19 0600   Gross per 24 hour   Intake          2846.94 ml   Output             2250 ml   Net           596.94 ml       Laboratory  Recent Labs      03/24/19   0252   ISTATAPH  7.330*   ISTATAPCO2  35.1   ISTATAPO2  385*   ISTATATCO2  20   DNVSNLO8PKF  100*   ISTATARTHCO3  18.5   ISTATARTBE  -7*   ISTATTEMP  see below   ISTATFIO2  80   ISTATSPEC  Arterial     Recent Labs      03/24/19 0220   TROPONINI  <0.01     Recent Labs      03/24/19 0220 03/25/19   0530   SODIUM  134*  140   POTASSIUM  4.0  3.4*   CHLORIDE  107  116*   CO2  20  22   BUN  34*  10   CREATININE  1.77*  0.59   MAGNESIUM   --   1.6   PHOSPHORUS   --   2.1*   CALCIUM  8.5  7.8*     Recent Labs      03/24/19 0220 03/25/19   0530   ALTSGPT  463*  206*   ASTSGOT  254*  57*   ALKPHOSPHAT  146*  97   TBILIRUBIN  0.4  0.3   GLUCOSE  140*  88     Recent Labs      03/24/19 0220 03/25/19   0530   WBC  16.4*  8.8   NEUTSPOLYS  78.70*  65.60   LYMPHOCYTES  8.70*  18.50*   MONOCYTES  8.70  9.90   EOSINOPHILS  2.60  4.40   BASOPHILS  0.90  0.90   ASTSGOT  254*  57*   ALTSGPT  463*  206*   ALKPHOSPHAT  146*  97   TBILIRUBIN  0.4  0.3     Recent Labs      03/24/19   0220  03/25/19   0530   RBC  4.43  3.35*   HEMOGLOBIN  11.8*  8.9*   HEMATOCRIT  37.5  28.8*   PLATELETCT  316  249       Imaging  X-Ray:  I have personally reviewed the images and compared with prior images.  CT:     Reviewed    Assessment/Plan  Acute encephalopathy   Assessment & Plan    Improving  Query etiology: meds vs seizure  Seizure precautions     Acute respiratory failure with hypoxia (HCC)- (present on admission)   Assessment & Plan    Extubated 3/24/19  RT/O2 Protocols  Titrate supplemental FiO2 to maintain SpO2 >88%       Migraine headache- (present on admission)   Assessment & Plan    Apparently the patient is prescribed Norco for these headaches by her primary care provider  I reviewed the fact that opiates are not indicated for chronic headaches and she should review their use with her primary care provider following discharge.  Due to her opiate dependence she will be started on low-dose Norco during this hospitalization.     Blindness- (present on admission)   Assessment & Plan    Since 11 yo     Acute kidney injury (HCC)   Assessment & Plan    Renal dose meds, avoid nephrotoxins  Strict I/Os  Follow renal function          VTE:  Lovenox  Ulcer: Not Indicated  Lines: Sharp Catheter  to be removed today    I have performed a physical exam and reviewed and updated ROS and Plan today (3/25/2019). In review of yesterday's note (3/24/2019), there are no changes except as documented above.     Discussed patient condition and risk of morbidity and/or mortality with Hospitalist, RN, RT, Pharmacy, Dietary, , Charge nurse / hot rounds and Patient

## 2019-03-26 ENCOUNTER — APPOINTMENT (OUTPATIENT)
Dept: RADIOLOGY | Facility: MEDICAL CENTER | Age: 49
DRG: 070 | End: 2019-03-26
Attending: HOSPITALIST
Payer: MEDICAID

## 2019-03-26 VITALS
HEIGHT: 64 IN | HEART RATE: 107 BPM | WEIGHT: 149.25 LBS | BODY MASS INDEX: 25.48 KG/M2 | RESPIRATION RATE: 16 BRPM | DIASTOLIC BLOOD PRESSURE: 96 MMHG | SYSTOLIC BLOOD PRESSURE: 143 MMHG | TEMPERATURE: 97 F | OXYGEN SATURATION: 95 %

## 2019-03-26 PROBLEM — N17.9 ACUTE KIDNEY INJURY (HCC): Status: RESOLVED | Noted: 2019-03-24 | Resolved: 2019-03-26

## 2019-03-26 PROBLEM — G93.40 ACUTE ENCEPHALOPATHY: Status: RESOLVED | Noted: 2019-03-24 | Resolved: 2019-03-26

## 2019-03-26 LAB
BACTERIA UR CULT: NORMAL
BASOPHILS # BLD AUTO: 1.1 % (ref 0–1.8)
BASOPHILS # BLD: 0.08 K/UL (ref 0–0.12)
EKG IMPRESSION: NORMAL
EOSINOPHIL # BLD AUTO: 0.35 K/UL (ref 0–0.51)
EOSINOPHIL NFR BLD: 4.9 % (ref 0–6.9)
ERYTHROCYTE [DISTWIDTH] IN BLOOD BY AUTOMATED COUNT: 53.8 FL (ref 35.9–50)
HCT VFR BLD AUTO: 32.3 % (ref 37–47)
HGB BLD-MCNC: 10.2 G/DL (ref 12–16)
IMM GRANULOCYTES # BLD AUTO: 0.03 K/UL (ref 0–0.11)
IMM GRANULOCYTES NFR BLD AUTO: 0.4 % (ref 0–0.9)
LYMPHOCYTES # BLD AUTO: 2.18 K/UL (ref 1–4.8)
LYMPHOCYTES NFR BLD: 30.6 % (ref 22–41)
MCH RBC QN AUTO: 26.9 PG (ref 27–33)
MCHC RBC AUTO-ENTMCNC: 31.6 G/DL (ref 33.6–35)
MCV RBC AUTO: 85.2 FL (ref 81.4–97.8)
MONOCYTES # BLD AUTO: 0.61 K/UL (ref 0–0.85)
MONOCYTES NFR BLD AUTO: 8.6 % (ref 0–13.4)
NEUTROPHILS # BLD AUTO: 3.87 K/UL (ref 2–7.15)
NEUTROPHILS NFR BLD: 54.4 % (ref 44–72)
NRBC # BLD AUTO: 0 K/UL
NRBC BLD-RTO: 0 /100 WBC
PLATELET # BLD AUTO: 272 K/UL (ref 164–446)
PMV BLD AUTO: 11.8 FL (ref 9–12.9)
RBC # BLD AUTO: 3.79 M/UL (ref 4.2–5.4)
SIGNIFICANT IND 70042: NORMAL
SITE SITE: NORMAL
SOURCE SOURCE: NORMAL
WBC # BLD AUTO: 7.1 K/UL (ref 4.8–10.8)

## 2019-03-26 PROCEDURE — 99239 HOSP IP/OBS DSCHRG MGMT >30: CPT | Performed by: HOSPITALIST

## 2019-03-26 PROCEDURE — 700102 HCHG RX REV CODE 250 W/ 637 OVERRIDE(OP): Performed by: INTERNAL MEDICINE

## 2019-03-26 PROCEDURE — 85025 COMPLETE CBC W/AUTO DIFF WBC: CPT

## 2019-03-26 PROCEDURE — 700111 HCHG RX REV CODE 636 W/ 250 OVERRIDE (IP): Performed by: HOSPITALIST

## 2019-03-26 PROCEDURE — A9270 NON-COVERED ITEM OR SERVICE: HCPCS | Performed by: HOSPITALIST

## 2019-03-26 PROCEDURE — 700102 HCHG RX REV CODE 250 W/ 637 OVERRIDE(OP): Performed by: HOSPITALIST

## 2019-03-26 PROCEDURE — 93010 ELECTROCARDIOGRAM REPORT: CPT | Performed by: INTERNAL MEDICINE

## 2019-03-26 PROCEDURE — 93005 ELECTROCARDIOGRAM TRACING: CPT | Performed by: HOSPITALIST

## 2019-03-26 PROCEDURE — 36415 COLL VENOUS BLD VENIPUNCTURE: CPT

## 2019-03-26 PROCEDURE — A9270 NON-COVERED ITEM OR SERVICE: HCPCS | Performed by: INTERNAL MEDICINE

## 2019-03-26 PROCEDURE — 99222 1ST HOSP IP/OBS MODERATE 55: CPT | Performed by: PSYCHIATRY & NEUROLOGY

## 2019-03-26 RX ORDER — CLONIDINE HYDROCHLORIDE 0.1 MG/1
0.1 TABLET ORAL EVERY 6 HOURS PRN
Status: DISCONTINUED | OUTPATIENT
Start: 2019-03-26 | End: 2019-03-26 | Stop reason: HOSPADM

## 2019-03-26 RX ORDER — POLYETHYLENE GLYCOL 3350 17 G/17G
1 POWDER, FOR SOLUTION ORAL
Status: DISCONTINUED | OUTPATIENT
Start: 2019-03-26 | End: 2019-03-26 | Stop reason: HOSPADM

## 2019-03-26 RX ORDER — LEVETIRACETAM 500 MG/1
500 TABLET ORAL 2 TIMES DAILY
Qty: 60 TAB | Refills: 0 | Status: SHIPPED | OUTPATIENT
Start: 2019-03-26 | End: 2019-12-11

## 2019-03-26 RX ORDER — AMOXICILLIN 250 MG
2 CAPSULE ORAL 2 TIMES DAILY
Status: DISCONTINUED | OUTPATIENT
Start: 2019-03-26 | End: 2019-03-26 | Stop reason: HOSPADM

## 2019-03-26 RX ORDER — ONDANSETRON 4 MG/1
4 TABLET, ORALLY DISINTEGRATING ORAL EVERY 4 HOURS PRN
Status: DISCONTINUED | OUTPATIENT
Start: 2019-03-26 | End: 2019-03-26 | Stop reason: HOSPADM

## 2019-03-26 RX ORDER — LEVETIRACETAM 500 MG/1
500 TABLET ORAL 2 TIMES DAILY
Status: DISCONTINUED | OUTPATIENT
Start: 2019-03-26 | End: 2019-03-26 | Stop reason: HOSPADM

## 2019-03-26 RX ORDER — ACETAMINOPHEN 325 MG/1
650 TABLET ORAL EVERY 6 HOURS PRN
Status: DISCONTINUED | OUTPATIENT
Start: 2019-03-26 | End: 2019-03-26 | Stop reason: HOSPADM

## 2019-03-26 RX ORDER — BISACODYL 10 MG
10 SUPPOSITORY, RECTAL RECTAL
Status: DISCONTINUED | OUTPATIENT
Start: 2019-03-26 | End: 2019-03-26 | Stop reason: HOSPADM

## 2019-03-26 RX ADMIN — HYDROCODONE BITARTRATE AND ACETAMINOPHEN 2 TABLET: 5; 325 TABLET ORAL at 12:56

## 2019-03-26 RX ADMIN — LEVETIRACETAM 500 MG: 500 TABLET ORAL at 17:24

## 2019-03-26 RX ADMIN — LEVETIRACETAM 500 MG: 500 TABLET ORAL at 09:57

## 2019-03-26 RX ADMIN — SENNOSIDES, DOCUSATE SODIUM 2 TABLET: 50; 8.6 TABLET, FILM COATED ORAL at 17:24

## 2019-03-26 RX ADMIN — ENOXAPARIN SODIUM 40 MG: 100 INJECTION SUBCUTANEOUS at 06:23

## 2019-03-26 RX ADMIN — HYDROCODONE BITARTRATE AND ACETAMINOPHEN 2 TABLET: 5; 325 TABLET ORAL at 06:23

## 2019-03-26 RX ADMIN — FAMOTIDINE 40 MG: 20 TABLET ORAL at 17:24

## 2019-03-26 RX ADMIN — HYDROCODONE BITARTRATE AND ACETAMINOPHEN 2 TABLET: 5; 325 TABLET ORAL at 00:16

## 2019-03-26 ASSESSMENT — ENCOUNTER SYMPTOMS
SORE THROAT: 1
MYALGIAS: 0
SHORTNESS OF BREATH: 0
ABDOMINAL PAIN: 0
SENSORY CHANGE: 0
VOMITING: 0
NAUSEA: 0
CHILLS: 0
HEADACHES: 1
STRIDOR: 0
DIZZINESS: 0
FEVER: 0
COUGH: 1
SPUTUM PRODUCTION: 0
FOCAL WEAKNESS: 0

## 2019-03-26 NOTE — PROGRESS NOTES
[facility] Medicine Daily Progress Note    Date of Service  3/25/2019    Chief Complaint  48 y.o. female admitted 3/24/2019 with history of hydrocephalus, chronically blind, brought to the hospital after obtundation while driving with her significant other, apparently was found in the emergency room and a GCS of 3, the patient intubated and transferred to ICU    Hospital Course    Admitted to ICU with mental status changes      Interval Problem Update  Patient seen and examined today. ICU Care  Care and plan discussed in IDT/Hot rounds.  Lines and assistive devices reviewed.    Patient tolerating treatment and therapies.  All Data, Medication data reviewed.  Case discussed with nursing as available.  Plan of Care reviewed with patient and notified of changes.  3/25 the patient is much improved, extubated, fully awake and alert and conversant, hemodynamically stable, removing Sharp catheter, diet as ordered, complains of a slightly sore throat and a headache which appears to be chronic.  The patient reports that she had seizures several years ago but is not chronically on antiepileptic therapy  Consultants/Specialty  Critical care    Code Status  Full code    Disposition  ICU with transition to neurology    Review of Systems  Review of Systems   Constitutional: Negative.  Negative for chills and fever.   HENT: Positive for sore throat.    Eyes: Positive for blurred vision.   Respiratory: Positive for cough.    Cardiovascular: Negative.  Negative for chest pain and palpitations.   Gastrointestinal: Negative.  Negative for heartburn, nausea and vomiting.   Genitourinary: Negative.  Negative for dysuria and frequency.   Musculoskeletal: Negative.  Negative for back pain and neck pain.   Skin: Negative.  Negative for itching and rash.   Neurological: Positive for headaches. Negative for dizziness, focal weakness and weakness.   Endo/Heme/Allergies: Negative.  Negative for polydipsia. Does not bruise/bleed easily.    Psychiatric/Behavioral: Positive for memory loss. Negative for depression.        Physical Exam  Temp:  [36.6 °C (97.9 °F)] 36.6 °C (97.9 °F)  Pulse:  [110-125] 119  Resp:  [15-39] 26  SpO2:  [94 %-100 %] 99 %    Physical Exam   Constitutional: She is oriented to person, place, and time. She appears well-developed and well-nourished.   HENT:   Head: Normocephalic and atraumatic.   Nose: Nose normal.   Mouth/Throat: Oropharynx is clear and moist.   Eyes: Pupils are equal, round, and reactive to light. Conjunctivae and EOM are normal.   Chronically blind  Disconjugate gaze   Neck: Normal range of motion. Neck supple. No JVD present. No thyromegaly present.   Cardiovascular: Normal rate, regular rhythm, normal heart sounds and intact distal pulses.  Exam reveals no gallop and no friction rub.    Capillary refill <3 secs   Pulmonary/Chest: Effort normal and breath sounds normal. She has no wheezes. She has no rales.   Abdominal: Soft. Bowel sounds are normal. She exhibits no distension and no mass. There is no tenderness. There is no rebound and no guarding.   Musculoskeletal: Normal range of motion. She exhibits no edema or tenderness.   Lymphadenopathy:     She has no cervical adenopathy.   Neurological: She is alert and oriented to person, place, and time. No cranial nerve deficit.   Skin: Skin is warm and dry. She is not diaphoretic. No cyanosis.   Psychiatric: She has a normal mood and affect. Her behavior is normal.   Nursing note and vitals reviewed.      Fluids    Intake/Output Summary (Last 24 hours) at 03/25/19 1713  Last data filed at 03/25/19 1600   Gross per 24 hour   Intake           3966.1 ml   Output             2620 ml   Net           1346.1 ml       Laboratory  Recent Labs      03/24/19   0220  03/25/19   0530   WBC  16.4*  8.8   RBC  4.43  3.35*   HEMOGLOBIN  11.8*  8.9*   HEMATOCRIT  37.5  28.8*   MCV  84.7  86.0   MCH  26.6*  26.3*   MCHC  31.5*  30.6*   RDW  50.3*  55.1*   PLATELETCT  316  249    MPV  11.7  11.9     Recent Labs      03/24/19   0220  03/25/19   0530   SODIUM  134*  140   POTASSIUM  4.0  3.4*   CHLORIDE  107  116*   CO2  20  22   GLUCOSE  140*  88   BUN  34*  10   CREATININE  1.77*  0.59   CALCIUM  8.5  7.8*                   Imaging  DX-CHEST-PORTABLE (1 VIEW)   Final Result      Small left pleural effusion, increased from previous. No change otherwise.      US-RUQ   Final Result      Unremarkable findings, status post cholecystectomy.      CT-HEAD W/O   Final Result      Stable findings as noted above. No new intracranial abnormality.               INTERPRETING LOCATION:  [address], [place], [address]      DX-CHEST-PORTABLE (1 VIEW)   Final Result      Probable small left pleural effusion. Unremarkable chest findings otherwise.           Assessment/Plan  Acute encephalopathy   Assessment & Plan    Unclear etiology, tox screen positive for barbiturates, but is on Fioricet   seizure remains on the differential, history of seizure  EEG done and pending  Unclear trigger     Acute respiratory failure with hypoxia (HCC)- (present on admission)   Assessment & Plan    Status post extubation.  Tolerated well  Postextubation observation, incentive spirometry, early mobilization     Migraine headache- (present on admission)   Assessment & Plan    On chronic headache medication  Apparently history of narcotic dependence     Blindness- (present on admission)   Assessment & Plan    Since childhood due to his hydrocephalus.    Did have a shunt placed, apparently is working, as appears stable from prior imaging.  No change noted     Acute kidney injury (HCC)   Assessment & Plan    Monitor volume status, electrolytes, urine output.  Avoid nephrotoxic agents.  Improved after hydration     Hepatitis   Assessment & Plan    Ultrasound abdomen normal  Question of episode of hypoperfusion possibly  Currently improving  We will get hepatitis panel       Plan  Await EEG  Most likely represented a seizure  episode  Continue with supportive care  Jay to neurology  Per report the patient has an outpatient plan for a neurology eval  MRI brain if compatible with her  shunt  Advance diet  Fluids  See orders    VTE prophylaxis: Lovenox    I have performed a physical exam and reviewed and updated ROS and Plan today . In review of yesterday's note , there are no changes except as documented above.

## 2019-03-26 NOTE — PROGRESS NOTES
Pt transferred to room 176-2 via wheelchair. All belongings (socks and pt's cell phone with ) with pt. ELIZABETH Kapoor at bedside upon arrival. Pt transferred to bed without complication. Care released.

## 2019-03-26 NOTE — DISCHARGE SUMMARY
Discharge Summary    CHIEF COMPLAINT ON ADMISSION  Chief Complaint   Patient presents with   • Unresponsive       Reason for Admission  Other     Admission Date  3/24/2019    CODE STATUS  Full Code    HPI & HOSPITAL COURSE    48 y.o. Female, who's blind since childhood, hx of  shunt for congenital hydrocephalus during childhood, esophageal atresia s/p surgery in childhood and blindness who was brought to ED by her friend after noted to have worsening mental status throughout the day and pt eventually became unarousable.   At ED, GCS 3, afebrile, HD stable, pt was subsequently intubated.  She was seen by pulmonary critical care team.   she was admitted to the ICU for closer monitoring.  CT head did not show any hydrocephalus.Patient does have history of seizures many years ago however not on any medications at this point.  Patient was successfully extubated and transferred out of the ICU to the neuro floor.  She did EEG done which was abnormal and neurology was consulted.  Patient was started on Keppra 500 mg twice daily.  Upon review of her chart seems like patient is chronically tachycardic however asymptomatic.  Twelve-lead EKG was obtained which showed sinus tachycardia.  Patient to follow-up with cardiology and continue her propranolol.  Blood cultures and urine cultures were obtained which were negative.    Patient's altered mental status resolved.  Labs were monitored.  Electrolytes were replaced as needed.  Her acute kidney injury resolved prior to discharge.  This time patient is medically stable for DC and follow-up with primary care doctor, cardiology and neurology.  Patient already has appointment set up.  She will continue her Keppra 500 mg twice daily.  ER precautions given.  Patient understood and agreed with above plan    therefore, she is discharged in fair and stable condition to home with close outpatient follow-up.    The patient met 2-midnight criteria for an inpatient stay at the time of  discharge.    Discharge Date  3/26/19    FOLLOW UP ITEMS POST DISCHARGE  pcp  Neurology    DISCHARGE DIAGNOSES  Principal Problem (Resolved):    Obtundation POA: Unknown  Active Problems:    Blindness POA: Yes    Hepatitis POA: Unknown  Resolved Problems:    Acute respiratory failure with hypoxia (HCC) POA: Yes    Acute encephalopathy POA: Unknown    Migraine headache POA: Yes    S/P  shunt POA: Unknown    Esophageal atresia POA: Unknown    Acute kidney injury (HCC) POA: Unknown      FOLLOW UP  No future appointments.  No follow-up provider specified.    MEDICATIONS ON DISCHARGE     Medication List      START taking these medications      Instructions   levETIRAcetam 500 MG Tabs  Commonly known as:  KEPPRA   Take 1 Tab by mouth 2 Times a Day.  Dose:  500 mg        CONTINUE taking these medications      Instructions   acetaminophen 325 MG Tabs  Commonly known as:  TYLENOL   Take 2 Tabs by mouth every 6 hours as needed (Mild Pain; (Pain scale 1-3); Temp greater than 100.5 F).  Dose:  650 mg     Dextromethorphan-Guaifenesin  MG/5ML Syrp  Commonly known as:  ROBITUSSIN DM   Take 10 mL by mouth every 6 hours as needed.  Dose:  10 mL     famotidine 40 MG Tabs  Commonly known as:  PEPCID   Take 40 mg by mouth every evening.  Dose:  40 mg     gabapentin 400 MG Caps  Commonly known as:  NEURONTIN   Take 400mg PO at 1900, then take 400mg PO before bed     propranolol CR 80 MG Cp24  Commonly known as:  INDERAL LA   Take 1 Cap by mouth every day.  Dose:  80 mg        STOP taking these medications    LORazepam 1 MG Tabs  Commonly known as:  ATIVAN            Allergies  Allergies   Allergen Reactions   • Tape Rash     RXN= ongoing  Adhesive Medical tape. Per patient, paper tape ok.       DIET  Orders Placed This Encounter   Procedures   • Diet Order Regular     Standing Status:   Standing     Number of Occurrences:   1     Order Specific Question:   Diet:     Answer:   Regular [1]       ACTIVITY  As tolerated.  Weight  bearing as tolerated    CONSULTATIONS  Neurology    PROCEDURES  EEG    LABORATORY  Lab Results   Component Value Date    SODIUM 140 03/25/2019    POTASSIUM 3.4 (L) 03/25/2019    CHLORIDE 116 (H) 03/25/2019    CO2 22 03/25/2019    GLUCOSE 88 03/25/2019    BUN 10 03/25/2019    CREATININE 0.59 03/25/2019    CREATININE 0.6 08/12/2008        Lab Results   Component Value Date    WBC 7.1 03/26/2019    HEMOGLOBIN 10.2 (L) 03/26/2019    HEMATOCRIT 32.3 (L) 03/26/2019    PLATELETCT 272 03/26/2019        Total time of the discharge process exceeds 45 minutes.

## 2019-03-26 NOTE — DISCHARGE INSTRUCTIONS
Discharge Instructions  Patient shall not climb heights  Patient shall not swim alone or bathed alone  Patient understood, and acknowledge.    Discharged to home by car with relative. Discharged via wheelchair, hospital escort: Yes.  Special equipment needed: Not Applicable    Be sure to schedule a follow-up appointment with your primary care doctor or any specialists as instructed.     Discharge Plan:   Diet Plan: Discussed  Activity Level: Discussed  Confirmed Follow up Appointment: Patient to Call and Schedule Appointment  Confirmed Symptoms Management: Discussed  Medication Reconciliation Updated: Yes  Influenza Vaccine Indication: Not indicated: Previously immunized this influenza season and > 8 years of age    I understand that a diet low in cholesterol, fat, and sodium is recommended for good health. Unless I have been given specific instructions below for another diet, I accept this instruction as my diet prescription.   Other diet: Regular Diet    Special Instructions: None    · Is patient discharged on Warfarin / Coumadin?   No     Seizure, Adult  A seizure is a sudden burst of abnormal electrical activity in the brain. The abnormal activity temporarily interrupts normal brain function, causing a person to experience any of the following:  · Involuntary movements.  · Changes in awareness or consciousness.  · Uncontrollable shaking (convulsions).  Seizures usually last from 30 seconds to 2 minutes. They usually do not cause permanent brain damage unless they are prolonged.  What can cause a seizure to happen?  Seizures can happen for many reasons including:  · A fever.  · Low blood sugar.  · A medicine.  · An illnesses.  · A brain injury.  Some people who have a seizure never have another one. People who have repeated seizures have a condition called epilepsy.  What are the symptoms of a seizure?  Symptoms of a seizure vary greatly from person to person. They include:  · Convulsions.  · Stiffening of the  body.  · Involuntary movements of the arms or legs.  · Loss of consciousness.  · Breathing problems.  · Falling suddenly.  · Confusion.  · Head nodding.  · Eye blinking or fluttering.  · Lip smacking.  · Drooling.  · Rapid eye movements.  · Grunting.  · Loss of bladder control and bowel control.  · Staring.  · Unresponsiveness.  Some people have symptoms right before a seizure happens (aura) and right after a seizure happens. Symptoms of an aura include:  · Fear or anxiety.  · Nausea.  · Feeling like the room is spinning (vertigo).  · A feeling of having seen or heard something before (bette vu).  · Odd tastes or smells.  · Changes in vision, such as seeing flashing lights or spots.  Symptoms that may follow a seizure include:  · Confusion.  · Sleepiness.  · Headache.  · Weakness of one side of the body.  Follow these instructions at home:  Medicines  · Take over-the-counter and prescription medicines only as told by your health care provider.  · Avoid any substances that may prevent your medicine from working properly, such as alcohol.  Activity  · Do not drive, swim, or do any other activities that would be dangerous if you had another seizure. Wait until your health care provider approves.  · If you live in the [place] check with your local DMV (department of motor vehicles) to find out about the local driving laws. Each state has specific rules about when you can legally return to driving.  · Get enough rest. Lack of sleep can make seizures more likely to occur.  Educating others  Teach friends and family what to do if you have a seizure. They should:  · Lay you on the ground to prevent a fall.  · Cushion your head and body.  · Loosen any tight clothing around your neck.  · Turn you on your side. If vomiting occurs, this helps keep your airway clear.  · Stay with you until you recover.  · Not hold you down. Holding you down will not stop the seizure.  · Not put anything in your mouth.  · Know whether or not you  need emergency care.  General instructions  · Contact your health care provider each time you have a seizure.  · Avoid anything that has ever triggered a seizure for you.  · Keep a seizure diary. Record what you remember about each seizure, especially anything that might have triggered the seizure.  · Keep all follow-up visits as told by your health care provider. This is important.  Contact a health care provider if:  · You have another seizure.  · You have seizures more often.  · Your seizure symptoms change.  · You continue to have seizures with treatment.  · You have symptoms of an infection or illness. They might increase your risk of having a seizure.  Get help right away if:  · You have a seizure:  ¨ That lasts longer than 5 minutes.  ¨ That is different than previous seizures.  ¨ That leaves you unable to speak or use a part of your body.  ¨ That makes it harder to breathe.  ¨ After a head injury.  · You have:  ¨ Multiple seizures in a row.  ¨ Confusion or a severe headache right after a seizure.  · You are having seizures more often.  · You do not wake up immediately after a seizure.  · You injure yourself during a seizure.  These symptoms may represent a serious problem that is an emergency. Do not wait to see if the symptoms will go away. Get medical help right away. Call your local emergency services (911 in the [place] Do not drive yourself to the hospital.   This information is not intended to replace advice given to you by your health care provider. Make sure you discuss any questions you have with your health care provider.  Document Released: 12/15/2001 Document Revised: 08/13/2017 Document Reviewed: 07/21/2017  Elsevier Interactive Patient Education © 2017 [employer]    Depression / Suicide Risk    As you are discharged from this [facility], it is important to learn how to keep safe from harming yourself.    Recognize the warning signs:  · Abrupt changes in personality, positive or  negative- including increase in energy   · Giving away possessions  · Change in eating patterns- significant weight changes-  positive or negative  · Change in sleeping patterns- unable to sleep or sleeping all the time   · Unwillingness or inability to communicate  · Depression  · Unusual sadness, discouragement and loneliness  · Talk of wanting to die  · Neglect of personal appearance   · Rebelliousness- reckless behavior  · Withdrawal from people/activities they love  · Confusion- inability to concentrate     If you or a loved one observes any of these behaviors or has concerns about self-harm, here's what you can do:  · Talk about it- your feelings and reasons for harming yourself  · Remove any means that you might use to hurt yourself (examples: pills, rope, extension cords, firearm)  · Get professional help from the community (Mental Health, Substance Abuse, psychological counseling)  · Do not be alone:Call your Safe Contact- someone whom you trust who will be there for you.  · Call your local CRISIS HOTLINE 870-1709 or 514-467-2664  · Call your local Children's Mobile Crisis Response Team [place] (518) 169-0313 or www.[employer]  · Call the toll free National Suicide Prevention Hotlines   · National Suicide Prevention Lifeline 189-266-PXRI (7665)  · National Hope Line Network 800-SUICIDE (332-8547)

## 2019-03-26 NOTE — PROGRESS NOTES
2 RN skin check    Small scattered bruises on the posterior side of both legs.   Calloused blanching pink heel on RLE.   Redness on bridge of nose.   Sacrum intact.

## 2019-03-26 NOTE — RESPIRATORY CARE
COPD EDUCATION by COPD CLINICAL EDUCATOR  3/26/2019 at 6:41 AM by Merle Barboza     Patient reviewed by COPD education team. Patient does not qualify for COPD program.

## 2019-03-26 NOTE — CONSULTS
"CC:  \"seizures\":    Date of Admission: 3/24/2019    Today's Date: 03/26/19    Consulting Physician: Socorro Randolph M.D.      HPI:    Rasheeda Manzano is a 48 y.o. female r handed, with history with total #4 of known off and being told  of staring, stopping activity, unresponsiveness and making some snoring sound   The patient denies any aura, has no recollection of events, and has already appointment schedule in May to see Dr Mcmullen.  She was brought for having had one event yesterday.  She denies any headaches, or any other neurological events.  Per what she has been told the events are short, and with complete back to normal.        ROS:   Constitutional: No fevers or chills.  Eyes: blind  ENT: No dysphagia or hearing loss.  Respiratory: No cough or shortness of breath.  Cardiovascular: No chest pain or palpitations.  GI: No nausea, vomiting, or diarrhea.  : No urinary incontinence or dysuria.  Musculoskeletal: No joint swelling or arthralgias.  Skin: No skin rashes.  Neuro: See HPI.  Endocrine: No heat or cold intolerance. No polydipsia or polyuria.  Psych: No depression or anxiety.  Heme/Lymph: No easy bruising or swollen lymph nodes.  All other systems were reviewed and were negative.       Past Medical History:   Past Medical History:   Diagnosis Date   • C. 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   • Blind    • Chronic daily headache    • Depression    • Fall    • GERD (gastroesophageal reflux disease)    • H/O total hysterectomy    • Hydrocephalus    • Hydrocephalus     shunt drains into pleural place of L lung   • Jaundice     at birth   • Legally blind    • Migraine without aura, without mention of intractable migraine without mention of status migrainosus    • Other specified symptom associated with female genital organs     excessive bleeding   • Pain     gallbladder related   • Psychiatric problem     depression       Past Surgical History: "   Past Surgical History:   Procedure Laterality Date   • GASTROSCOPY N/A 1/2/2019    Procedure: GASTROSCOPY;  Surgeon: Matias Fernando M.D.;  Location: SURGERY [facility];  Service: Gastroenterology   • GASTROSCOPY-ENDO N/A 8/24/2017    Procedure: GASTROSCOPY-ENDO;  Surgeon: Matias Fernando M.D.;  Location: ENDOSCOPY [facility];  Service:    • AYALA BY LAPAROSCOPY N/A 8/13/2015    Procedure: AYALA BY LAPAROSCOPY;  Surgeon: Brice Johnson M.D.;  Location: SURGERY SAME DAY [facility];  Service:    • CHOLECYSTECTOMY N/A 8/13/2015    Procedure: CHOLECYSTECTOMY;  Surgeon: Brice Johnson M.D.;  Location: SURGERY SAME DAY [facility];  Service:    • LYSIS ADHESIONS GENERAL  12/10/2013    Performed by Arie Bass M.D. at SURGERY [facility]   • CYSTOSCOPY  12/10/2013    Performed by Joana Yeager M.D. at SURGERY [facility]   • EXPLORATORY LAPAROTOMY  12/10/2013    Performed by Arie Bass M.D. at [facility]   • APPENDECTOMY  12/10/2013    Performed by Arie Bass M.D. at SURGERY [facility]   • ABDOMINAL HYSTERECTOMY TOTAL  12/10/2013    Performed by Joana Yeager M.D. at [facility]   • LAPAROSCOPY  8/8/08    Performed by FERNANDO HENDERSON at [facility]   • NISSEN FUNDOPLICATION LAPAROSCOPIC     • OTHER      PLEURAL SHUNT, numerous revisions       Social History:   Social History     Social History   • Marital status:      Spouse name: N/A   • Number of children: N/A   • Years of education: N/A     Occupational History   • Not on file.     Social History Main Topics   • Smoking status: Former Smoker     Packs/day: 1.00     Years: 10.00     Types: Cigars     Start date: 5/1/2004     Quit date: 8/9/2017   • Smokeless tobacco: Never Used      Comment:  CIGAR/day    • Alcohol use Yes      Comment: socially   • Drug use: No   • Sexual activity: Yes     Partners: Male     Other Topics Concern   • Not on file     Social History Narrative   • No  narrative on file       Family History:   Family History   Problem Relation Age of Onset   • Hypertension Mother    • Hypertension Father    • Non-contributory Neg Hx         Migraine       Allergies:   Allergies   Allergen Reactions   • Tape Rash     RXN= ongoing  Adhesive Medical tape. Per patient, paper tape ok.         Current Facility-Administered Medications:   •  levETIRAcetam (KEPPRA) tablet 500 mg, 500 mg, Oral, BID, Socorro Randolph M.D., 500 mg at 03/26/19 0957  •  gabapentin (NEURONTIN) capsule 400 mg, 400 mg, Oral, BID, Clarence Bolaños Jr., D.O., 400 mg at 03/25/19 2008  •  famotidine (PEPCID) tablet 40 mg, 40 mg, Oral, Q EVENING, Clarence Bolaños Jr., D.O., 40 mg at 03/25/19 1803  •  HYDROcodone-acetaminophen (NORCO) 5-325 MG per tablet 1-2 Tab, 1-2 Tab, Oral, Q6HRS PRN, Clarence Bolaños Jr. D.O., 2 Tab at 03/26/19 1256  •  albuterol (PROVENTIL) 2.5mg/0.5ml nebulizer solution 2.5 mg, 2.5 mg, Nebulization, Q4H PRN (RT), Kushal Hoyt D.O.  •  ondansetron (ZOFRAN) syringe/vial injection 4 mg, 4 mg, Intravenous, Q4HRS PRN, Rj Echevarria M.D.  •  promethazine (PHENERGAN) suppository 12.5-25 mg, 12.5-25 mg, Rectal, Q4HRS PRN, Rj Echevarria M.D.  •  prochlorperazine (COMPAZINE) injection 5-10 mg, 5-10 mg, Intravenous, Q4HRS PRN, Rj cEhevarria M.D.  •  Respiratory Care per Protocol, , Nebulization, Continuous RT, Rj Echevarria M.D.  •  Pharmacy Consult: Enteral tube insertion - review meds/change route/product selection, , Other, PHARMACY TO DOSE, Rj Echevarria M.D.  •  enoxaparin (LOVENOX) inj 40 mg, 40 mg, Subcutaneous, DAILY, Rj Echevarria M.D., 40 mg at 03/26/19 0623  •  senna-docusate (PERICOLACE or SENOKOT S) 8.6-50 MG per tablet 2 Tab, 2 Tab, Enteral Tube, BID, Stopped at 03/24/19 1700 **AND** polyethylene glycol/lytes (MIRALAX) PACKET 1 Packet, 1 Packet, Enteral Tube, QDAY PRN **AND** magnesium hydroxide (MILK OF MAGNESIA) suspension 30 mL, 30 mL, Enteral Tube, QDAY PRN **AND** bisacodyl  (DULCOLAX) suppository 10 mg, 10 mg, Rectal, QDAY PRN, Rj Echevarria M.D.  •  acetaminophen (TYLENOL) tablet 650 mg, 650 mg, Enteral Tube, Q6HRS PRN, Rj Echevarria M.D., 650 mg at 03/25/19 1534  •  cloNIDine (CATAPRES) tablet 0.1 mg, 0.1 mg, Enteral Tube, Q6HRS PRN, Rj Echevarria M.D., 0.1 mg at 03/25/19 2330  •  ondansetron (ZOFRAN ODT) dispertab 4 mg, 4 mg, Enteral Tube, Q4HRS PRN, Rj Echevarria M.D.  •  promethazine (PHENERGAN) tablet 12.5-25 mg, 12.5-25 mg, Enteral Tube, Q4HRS PRN, Rj Echevarria M.D.      PHYSICAL EXAM    Vitals:    03/25/19 2315 03/26/19 0400 03/26/19 0705 03/26/19 1110   BP: 159/116 139/99 138/99 147/98   Pulse: (!) 112 (!) 111 100 (!) 104   Resp: 18 16 16 16   Temp: 37.1 °C (98.8 °F) 36.2 °C (97.2 °F) 36.3 °C (97.4 °F) 36.7 °C (98 °F)   TempSrc: Temporal Temporal Temporal Temporal   SpO2: 97% 97% 94% 97%   Weight:       Height:           Constitutional: lying in bed, resting comfortably ; eyes open not fixating , Left exotropia.    Head/Neck: NCAT. no meningismus neg kernig neg brudzinski. No obvious mass or heard bruit. No tender arteries or lost pulses. No rash of head or neck.    Cardiovascular: Regular, rhythmic    Pulmonary: Normal breath sounds    Extremities: Normal inspection, No edema, normal pulses    Skin: Warm, dry, intact. No rashes observed head/neck or body  No stigmata    Eyes/Funduscopic: normal conjuntiva, Left EXO    Psych:congruent     Mental Status: Awake, alert, oriented x 3. Name/repeat/fluent/command follows. No neglect/extinction. Attention and concentration, recent & remote memory, Fund of Knowledge wnl     Cranial Nerves: CN II-XII intact. Pupillary reflex APD left, , barely reactive   EOM full;  VF full; No nystagmus.       Motor:  5/5,      Sensory: symmetric to all modalities.  Coordination: dysmetria absent    DTR's: + 3;  no clonus.    Babinski: neg, but + irving's    Gait/Station:deferred.         Labs:  Recent Labs      03/24/19   0220  03/25/19    0530 03/26/19   0324   WBC  16.4*  8.8  7.1   RBC  4.43  3.35*  3.79*   HEMOGLOBIN  11.8*  8.9*  10.2*   HEMATOCRIT  37.5  28.8*  32.3*   MCV  84.7  86.0  85.2   MCH  26.6*  26.3*  26.9*   MCHC  31.5*  30.6*  31.6*   RDW  50.3*  55.1*  53.8*   PLATELETCT  316  249  272   MPV  11.7  11.9  11.8     Recent Labs      03/24/19 0220 03/25/19   0530   SODIUM  134*  140   POTASSIUM  4.0  3.4*   CHLORIDE  107  116*   CO2  20  22   GLUCOSE  140*  88   BUN  34*  10   CREATININE  1.77*  0.59   CALCIUM  8.5  7.8*             Recent Labs      03/24/19 0220   TROPONINI  <0.01         Recent Labs      03/24/19 0220 03/25/19   0530   SODIUM  134*  140   POTASSIUM  4.0  3.4*   CHLORIDE  107  116*   CO2  20  22   GLUCOSE  140*  88   BUN  34*  10     Recent Labs      03/24/19 0220 03/25/19   0530   SODIUM  134*  140   POTASSIUM  4.0  3.4*   CHLORIDE  107  116*   CO2  20  22   BUN  34*  10   CREATININE  1.77*  0.59   MAGNESIUM   --   1.6   PHOSPHORUS   --   2.1*   CALCIUM  8.5  7.8*         Results for orders placed or performed during the hospital encounter of 03/04/17   Echocardiogram Comp W/O Cont   Result Value Ref Range    Eject.Frac. MOD BP 65.48     Eject.Frac. MOD 4C 61.52     Eject.Frac. MOD 2C 71.39               Imaging: neuroimaging reviewed and directly visualized by me  DX-CHEST-PORTABLE (1 VIEW)   Final Result      Small left pleural effusion, increased from previous. No change otherwise.      US-RUQ   Final Result      Unremarkable findings, status post cholecystectomy.      CT-HEAD W/O   Final Result      Stable findings as noted above. No new intracranial abnormality.               INTERPRETING LOCATION:  [address], [place], [address]      DX-CHEST-PORTABLE (1 VIEW)   Final Result      Probable small left pleural effusion. Unremarkable chest findings otherwise.      MR-BRAIN-W/O    (Results Pending)       Assessment/Plan:    Complex partial epilepsy : with abnormal EEG per my review Left temporal shaps, and  per CT left temporal encephalomalacia.  Patient needs to be on AEDs, and Keppra is a good choice.  Patient already has follow up with Dr Wong as outpatient planned.    Patient shall not climb heights  Patient shall not swim alone or bathed alone  Patient understood, and acknowledge.              Amelie Orellana M.D.    Clinical Professor, [facility] [employer]  Diplomate, Neurology , Vascular Neurology, Neurophysiology

## 2019-03-26 NOTE — ASSESSMENT & PLAN NOTE
Apparently the patient is prescribed Norco for these headaches by her primary care provider  I reviewed the fact that opiates are not indicated for chronic headaches and she should review their use with her primary care provider following discharge.  Due to her opiate dependence she will be started on low-dose Norco during this hospitalization.

## 2019-03-27 NOTE — PROGRESS NOTES
Pt provided with discharge instructions and verbalized understanding. All questions answered at this time. PIV removed. All belongings gathered. Pt left via wheelchair with significant other and RN escort.

## 2019-04-02 ENCOUNTER — HOSPITAL ENCOUNTER (EMERGENCY)
Facility: MEDICAL CENTER | Age: 49
End: 2019-04-02
Attending: EMERGENCY MEDICINE
Payer: MEDICAID

## 2019-04-02 VITALS
DIASTOLIC BLOOD PRESSURE: 95 MMHG | HEIGHT: 64 IN | TEMPERATURE: 97.4 F | BODY MASS INDEX: 25.1 KG/M2 | SYSTOLIC BLOOD PRESSURE: 139 MMHG | HEART RATE: 88 BPM | RESPIRATION RATE: 16 BRPM | WEIGHT: 147 LBS | OXYGEN SATURATION: 100 %

## 2019-04-02 DIAGNOSIS — G43.009 MIGRAINE WITHOUT AURA AND WITHOUT STATUS MIGRAINOSUS, NOT INTRACTABLE: ICD-10-CM

## 2019-04-02 PROCEDURE — 99284 EMERGENCY DEPT VISIT MOD MDM: CPT

## 2019-04-02 PROCEDURE — 700111 HCHG RX REV CODE 636 W/ 250 OVERRIDE (IP): Performed by: EMERGENCY MEDICINE

## 2019-04-02 PROCEDURE — 96372 THER/PROPH/DIAG INJ SC/IM: CPT

## 2019-04-02 RX ORDER — PROCHLORPERAZINE MALEATE 10 MG
10 TABLET ORAL EVERY 6 HOURS PRN
Qty: 15 TAB | Refills: 3 | Status: SHIPPED | OUTPATIENT
Start: 2019-04-02 | End: 2019-06-06 | Stop reason: CLARIF

## 2019-04-02 RX ADMIN — PROCHLORPERAZINE EDISYLATE 10 MG: 5 INJECTION INTRAMUSCULAR; INTRAVENOUS at 00:57

## 2019-04-02 NOTE — ED TRIAGE NOTES
Chief Complaint   Patient presents with   • Migraine     started at 0800 monday morning   • Nausea     started at 1000       Pt brought in by EMS. Pt reports migraines states she took her migraine pills but cannot remember what they are. Nausea started yesterday morning as well. EMS gave 4 Zofran, 600 Ibuprofen and 500 Tylenol with no relief.

## 2019-04-02 NOTE — ED PROVIDER NOTES
ER Provider Note     Scribed for Zheng Zamarripa M.D. by Stephanie Naqvi. 4/2/2019, 12:46 AM.    Primary Care Provider: Tabitha Bautista M.D.  Means of Arrival: ambulance    History obtained from: Patient  History limited by: None     CHIEF COMPLAINT  Chief Complaint   Patient presents with   • Migraine     started at 0800 monday morning   • Nausea     started at 1000       HPI  Rasheeda Manzano is a 48 y.o. female who presents to the Emergency Department via ambulance with complaints of a constant headache onset 15 hours ago. She reports associated symptoms of nausea, but denies vomiting, numbness or weakness. The patient has a history of migraines and notes that her headache today is similar in nature to prior episodes. She takes migraine medication daily.       REVIEW OF SYSTEMS  See [facility] for further details.       PAST MEDICAL HISTORY   has a past medical history of Blind; C. difficile diarrhea (2013); Chronic daily headache; Depression; Esophageal atresia (1971); Fall; GERD (gastroesophageal reflux disease); H/O total hysterectomy; Hydrocephalus; Hydrocephalus; Jaundice; Legally blind; Migraine without aura, without mention of intractable migraine without mention of status migrainosus; Other specified symptom associated with female genital organs; Pain; and Psychiatric problem.    SURGICAL HISTORY   has a past surgical history that includes laparoscopy (8/8/08); lysis adhesions general (12/10/2013); cystoscopy (12/10/2013); exploratory laparotomy (12/10/2013); appendectomy (12/10/2013); abdominal hysterectomy total (12/10/2013); aakash by laparoscopy (N/A, 8/13/2015); cholecystectomy (N/A, 8/13/2015); other; gastroscopy-endo (N/A, 8/24/2017); nissen fundoplication laparoscopic; and gastroscopy (N/A, 1/2/2019).    SOCIAL HISTORY  Social History   Substance Use Topics   • Smoking status: Former Smoker     Packs/day: 1.00     Years: 10.00     Types: Cigars     Start date: 5/1/2004     Quit date: 8/9/2017  "  • Smokeless tobacco: Never Used      Comment:  CIGAR/day    • Alcohol use Yes      Comment: socially      History   Drug Use No       FAMILY HISTORY  Family History   Problem Relation Age of Onset   • Hypertension Mother    • Hypertension Father    • Non-contributory Neg Hx         Migraine       CURRENT MEDICATIONS  Home Medications    **Home medications have not yet been reviewed for this encounter**         ALLERGIES  Allergies   Allergen Reactions   • Tape Rash     RXN= ongoing  Adhesive Medical tape. Per patient, paper tape ok.       PHYSICAL EXAM  VITAL SIGNS: /95   Pulse 91   Temp 36.3 °C (97.4 °F) (Temporal)   Resp 16   Ht 1.626 m (5' 4\")   Wt 66.7 kg (147 lb)   LMP 11/27/2013   SpO2 100%   BMI 25.23 kg/m²      Constitutional: Alert in no apparent distress.  HENT: Normocephalic, Atraumatic, Bilateral external ears normal. Nose normal.   Eyes: Pupils are equal and reactive. Conjunctiva normal, non-icteric.   Heart: Regular rate and rythm, no murmurs.    Lungs: Clear to auscultation bilaterally.  Skin: Warm, Dry, No erythema, No rash.   Neurologic: Alert, Grossly non-focal. The patient has a normal finger-nose-finger as well as negative Romberg. The patient has the ability to stand on 1 foot for over 5 seconds on bilateral feet. Patient is able to stand on toes bilaterally and has 5 out of 5 strength in  as well as pushes and pulls in her upper extremity. Sensation is intact to light touch in all extremities. The patient has no dysmetria. The patient has normal cranial nerves III through XII. Not tracking   Psychiatric: Affect normal, Judgment normal, Mood normal, Appears appropriate and not intoxicated.       COURSE & MEDICAL DECISION MAKING  Pertinent Labs & Imaging studies reviewed. (See chart for details)    This is a 48 y.o. female that presents with what appears to be a typical migraine for the patient.  She is neurologically intact.  She is blind this is baseline for her.  She is in " no obvious distress.  I will treat her with Compazine and then reassess.    12:46 AM - Patient seen and examined at bedside.  Patient will be medicated with compazine 10 mg for her symptoms.     1:30 AM - Upon recheck, the patient is feeling improved. I discussed the plan for discharge as outlined below.   I will discharge her home with strict return precautions and prescribed Compazine for her.        FINAL IMPRESSION  1. Migraine without aura and without status migrainosus, not intractable        PRESCRIPTIONS  New Prescriptions    PROCHLORPERAZINE (COMPAZINE) 10 MG TAB    Take 1 Tab by mouth every 6 hours as needed (headache).       FOLLOW UP  Tabitha Bautista M.D.  [address] [facility] [address]  [phone]    In 2 days          -DISCHARGE-      FINAL IMPRESSION  1. Migraine without aura and without status migrainosus, not intractable          Stephanie SHARMA (Scribe), am scribing for, and in the presence of, Zheng Zamarripa M.D..    Electronically signed by: Stephanie Naqvi (Scribe), 4/2/2019    IZheng M.D. personally performed the services described in this documentation, as scribed by Stephanie Naqvi in my presence, and it is both accurate and complete.     E    The note accurately reflects work and decisions made by me.  Zheng Zamarripa  4/2/2019  1:47 AM

## 2019-04-22 ENCOUNTER — HOSPITAL ENCOUNTER (EMERGENCY)
Facility: MEDICAL CENTER | Age: 49
End: 2019-04-23
Attending: EMERGENCY MEDICINE
Payer: MEDICAID

## 2019-04-22 DIAGNOSIS — R51.9 CHRONIC NONINTRACTABLE HEADACHE, UNSPECIFIED HEADACHE TYPE: ICD-10-CM

## 2019-04-22 DIAGNOSIS — Z98.2 VP (VENTRICULOPERITONEAL) SHUNT STATUS: ICD-10-CM

## 2019-04-22 DIAGNOSIS — G89.29 CHRONIC NONINTRACTABLE HEADACHE, UNSPECIFIED HEADACHE TYPE: ICD-10-CM

## 2019-04-22 PROCEDURE — 99285 EMERGENCY DEPT VISIT HI MDM: CPT

## 2019-04-22 PROCEDURE — 81001 URINALYSIS AUTO W/SCOPE: CPT

## 2019-04-22 PROCEDURE — 81025 URINE PREGNANCY TEST: CPT

## 2019-04-22 RX ORDER — DIPHENHYDRAMINE HYDROCHLORIDE 50 MG/ML
25 INJECTION INTRAMUSCULAR; INTRAVENOUS ONCE
Status: COMPLETED | OUTPATIENT
Start: 2019-04-22 | End: 2019-04-23

## 2019-04-22 RX ORDER — SODIUM CHLORIDE 9 MG/ML
1000 INJECTION, SOLUTION INTRAVENOUS ONCE
Status: COMPLETED | OUTPATIENT
Start: 2019-04-22 | End: 2019-04-23

## 2019-04-22 ASSESSMENT — PAIN SCALES - WONG BAKER: WONGBAKER_NUMERICALRESPONSE: HURTS EVEN MORE

## 2019-04-23 ENCOUNTER — APPOINTMENT (OUTPATIENT)
Dept: RADIOLOGY | Facility: MEDICAL CENTER | Age: 49
End: 2019-04-23
Attending: EMERGENCY MEDICINE
Payer: MEDICAID

## 2019-04-23 VITALS
TEMPERATURE: 97.1 F | RESPIRATION RATE: 19 BRPM | WEIGHT: 142.64 LBS | HEIGHT: 64 IN | SYSTOLIC BLOOD PRESSURE: 128 MMHG | HEART RATE: 70 BPM | BODY MASS INDEX: 24.35 KG/M2 | DIASTOLIC BLOOD PRESSURE: 76 MMHG | OXYGEN SATURATION: 99 %

## 2019-04-23 LAB
APPEARANCE UR: CLEAR
BACTERIA #/AREA URNS HPF: ABNORMAL /HPF
BILIRUB UR QL STRIP.AUTO: NEGATIVE
COLOR UR: YELLOW
EPI CELLS #/AREA URNS HPF: ABNORMAL /HPF
GLUCOSE UR STRIP.AUTO-MCNC: NEGATIVE MG/DL
HCG UR QL: NEGATIVE
HYALINE CASTS #/AREA URNS LPF: ABNORMAL /LPF
KETONES UR STRIP.AUTO-MCNC: ABNORMAL MG/DL
LEUKOCYTE ESTERASE UR QL STRIP.AUTO: ABNORMAL
MICRO URNS: ABNORMAL
NITRITE UR QL STRIP.AUTO: NEGATIVE
PH UR STRIP.AUTO: 5.5 [PH]
PROT UR QL STRIP: NEGATIVE MG/DL
RBC # URNS HPF: ABNORMAL /HPF
RBC UR QL AUTO: NEGATIVE
SP GR UR STRIP.AUTO: 1.03
UROBILINOGEN UR STRIP.AUTO-MCNC: 1 MG/DL
WBC #/AREA URNS HPF: ABNORMAL /HPF

## 2019-04-23 PROCEDURE — 96375 TX/PRO/DX INJ NEW DRUG ADDON: CPT

## 2019-04-23 PROCEDURE — 700105 HCHG RX REV CODE 258: Performed by: EMERGENCY MEDICINE

## 2019-04-23 PROCEDURE — 96374 THER/PROPH/DIAG INJ IV PUSH: CPT

## 2019-04-23 PROCEDURE — 70250 X-RAY EXAM OF SKULL: CPT

## 2019-04-23 PROCEDURE — 71045 X-RAY EXAM CHEST 1 VIEW: CPT

## 2019-04-23 PROCEDURE — 700111 HCHG RX REV CODE 636 W/ 250 OVERRIDE (IP): Performed by: EMERGENCY MEDICINE

## 2019-04-23 PROCEDURE — 74018 RADEX ABDOMEN 1 VIEW: CPT

## 2019-04-23 RX ORDER — KETOROLAC TROMETHAMINE 30 MG/ML
15 INJECTION, SOLUTION INTRAMUSCULAR; INTRAVENOUS ONCE
Status: COMPLETED | OUTPATIENT
Start: 2019-04-23 | End: 2019-04-23

## 2019-04-23 RX ORDER — DEXAMETHASONE SODIUM PHOSPHATE 10 MG/ML
10 INJECTION, SOLUTION INTRAMUSCULAR; INTRAVENOUS ONCE
Status: COMPLETED | OUTPATIENT
Start: 2019-04-23 | End: 2019-04-23

## 2019-04-23 RX ADMIN — DEXAMETHASONE SODIUM PHOSPHATE 10 MG: 10 INJECTION, SOLUTION INTRAMUSCULAR; INTRAVENOUS at 01:49

## 2019-04-23 RX ADMIN — PROCHLORPERAZINE EDISYLATE 10 MG: 5 INJECTION INTRAMUSCULAR; INTRAVENOUS at 00:02

## 2019-04-23 RX ADMIN — KETOROLAC TROMETHAMINE 15 MG: 30 INJECTION, SOLUTION INTRAMUSCULAR at 01:49

## 2019-04-23 RX ADMIN — SODIUM CHLORIDE 1000 ML: 9 INJECTION, SOLUTION INTRAVENOUS at 00:01

## 2019-04-23 RX ADMIN — DIPHENHYDRAMINE HYDROCHLORIDE 25 MG: 50 INJECTION INTRAMUSCULAR; INTRAVENOUS at 00:02

## 2019-04-23 NOTE — ED PROVIDER NOTES
ED Provider Note    CHIEF COMPLAINT  Chief Complaint   Patient presents with   • Headache     intermittent since January, worse today   • Nausea       HPI  Rasheeda Manzano is a 48 y.o. female who presents with headache.  She states that she typically has daily headaches however today is more severe and associated with vomiting.  Denies any numbness or weakness.  Has a history of  shunt that has been present since she was a child.  She states that the last revision was performed about 7 years ago.  No fevers.  No recent trauma.  She was evaluated here a few weeks ago for similar symptoms and treated symptomatically with improvement.  She states that she typically has daily headaches for which she typically controls with prescription medications for her headache from Dr. Wong her neurologist.    The patient is concerned that there may be something wrong with her shunt today given how painful her headache is.  She has chronic blindness.  No new neurologic deficits.    REVIEW OF SYSTEMS  See HPI for further details. All other systems are negative.     PAST MEDICAL HISTORY   has a past medical history of Blind; C. difficile diarrhea (2013); Chronic daily headache; Depression; Esophageal atresia (1971); Fall; GERD (gastroesophageal reflux disease); H/O total hysterectomy; Hydrocephalus; Hydrocephalus; Jaundice; Legally blind; Migraine without aura, without mention of intractable migraine without mention of status migrainosus; Other specified symptom associated with female genital organs; Pain; and Psychiatric problem.    SOCIAL HISTORY  Social History     Social History Main Topics   • Smoking status: Former Smoker     Packs/day: 1.00     Years: 10.00     Types: Cigars     Start date: 5/1/2004     Quit date: 8/9/2017   • Smokeless tobacco: Never Used      Comment:  CIGAR/day    • Alcohol use Yes      Comment: socially   • Drug use: No   • Sexual activity: Yes     Partners: Male       SURGICAL HISTORY   has  "a past surgical history that includes laparoscopy (8/8/08); lysis adhesions general (12/10/2013); cystoscopy (12/10/2013); exploratory laparotomy (12/10/2013); appendectomy (12/10/2013); abdominal hysterectomy total (12/10/2013); aakash by laparoscopy (N/A, 8/13/2015); cholecystectomy (N/A, 8/13/2015); other; gastroscopy-endo (N/A, 8/24/2017); nissen fundoplication laparoscopic; and gastroscopy (N/A, 1/2/2019).    CURRENT MEDICATIONS  Home Medications    **Home medications have not yet been reviewed for this encounter**         ALLERGIES  Allergies   Allergen Reactions   • Tape Rash     RXN= ongoing  Adhesive Medical tape. Per patient, paper tape ok.       PHYSICAL EXAM  VITAL SIGNS: /80   Pulse 69   Temp 36.2 °C (97.1 °F) (Temporal)   Resp 20   Ht 1.626 m (5' 4\")   Wt 64.7 kg (142 lb 10.2 oz)   LMP 11/27/2013   SpO2 99%   BMI 24.48 kg/m²   Pulse ox interpretation: I interpret this pulse ox as normal.  Constitutional: Alert in no apparent distress.  HENT: No signs of trauma, Bilateral external ears normal, Nose normal.  Palpable  shunt to the left side of the head without significant tenderness or erythema.  Eyes: Pupils are equal and reactive, Conjunctiva normal, Non-icteric.   Neck: Normal range of motion, No tenderness, Supple, No stridor.   Cardiovascular: Regular rate and rhythm.   Thorax & Lungs: Normal breath sounds, No respiratory distress, No wheezing, No chest tenderness.   Abdomen: Bowel sounds normal, Soft, No tenderness, No masses, No pulsatile masses. No peritoneal signs.  Skin: Warm, Dry, No erythema, No rash.   Back: No bony tenderness, No CVA tenderness.   Extremities: Intact distal pulses, No edema, No tenderness, No cyanosis  Musculoskeletal: Good range of motion in all major joints. No tenderness to palpation or major deformities noted.   Neurologic: Alert , Normal motor function and gait, Normal sensory function, No focal deficits noted.       DIAGNOSTIC STUDIES / " PROCEDURES    LABS  Labs Reviewed   URINALYSIS - Abnormal; Notable for the following:        Result Value    Ketones Trace (*)     Leukocyte Esterase Small (*)     All other components within normal limits   URINE MICROSCOPIC (W/UA) - Abnormal; Notable for the following:     Bacteria Few (*)     Hyaline Cast 6-10 (*)     All other components within normal limits   HCG QUALITATIVE UR   REFRACTOMETER SG       RADIOLOGY  DX-SKULL-LIMITED 3-   Final Result      Unremarkable exam.      RX-LAMRZOV-7 VIEW   Final Result      Shunt tubing again projects over the LEFT chest. This is apparently a ventricular pleural shunt.                  DX-CHEST-LIMITED (1 VIEW)   Final Result      1.  No evidence of shunt discontinuity or kinking   2.  Enlarged cardiac silhouette compared to prior   3.  Mild central vascular congestion and interstitial pulmonary edema            COURSE & MEDICAL DECISION MAKING    Medications   NS infusion 1,000 mL (0 mL Intravenous Stopped 4/23/19 0129)   diphenhydrAMINE (BENADRYL) injection 25 mg (25 mg Intravenous Given 4/23/19 0002)   prochlorperazine (COMPAZINE) injection 10 mg (10 mg Intravenous Given 4/23/19 0002)   ketorolac (TORADOL) injection 15 mg (15 mg Intravenous Given 4/23/19 0149)   dexamethasone pf (DECADRON) injection 10 mg (10 mg Intravenous Given 4/23/19 0149)       Pertinent Labs & Imaging studies reviewed. (See chart for details)  48 y.o. female presenting with headache, nausea, vomiting.  The patient has chronic headaches and is followed by neurology for this.  Headaches occur pretty much daily however today's headache is worse than usual.  His history of multiple episodes of emesis.  No change in vision however the patient does have chronic blindness.  No known numbness or weakness.  No focal neurologic deficits.  The patient is concerned that there may be a problem with her shunt.  Notices some swelling around the left side of the head occasionally however denies any swelling at  "this time.  No erythema or signs of infection.  No recent fevers.  Shunt series was performed that was found to be unremarkable.  Patient was treated with typical migraine therapy with IV fluid hydration, Benadryl, Compazine.  Continued to have headache and she was treated with Toradol and Decadron.  Upon reevaluation, the patient reports feeling much improved overall.  She reports feeling comfortable with discharge in her current state.  Headache has resolved.  Patient is stable for discharge and instructed to follow-up with primary care physician and neurology for further management.  No signs of intracranial injury or infection requiring further work-up.  No reason to suspect subarachnoid hemorrhage in the setting.  Headache was not thunderclap in nature.  Has a history of chronic headaches.  Headache has improved    The patient will not drink alcohol nor drive with prescribed medications.   The patient was instructed to follow-up with primary care physician for further management.  To return immediately for any worsening symptoms or development of any other concerning signs or symptoms. The patient verbalizes understanding in their own words.    /80   Pulse 69   Temp 36.2 °C (97.1 °F) (Temporal)   Resp 20   Ht 1.626 m (5' 4\")   Wt 64.7 kg (142 lb 10.2 oz)   LMP 11/27/2013   SpO2 99%   BMI 24.48 kg/m²     The patient was referred to primary care where they will receive further BP management.      Tabitha Bautista M.D.  [address] [place] [id number]  [phone]    Schedule an appointment as soon as possible for a visit       [facility], Emergency Dept  [address] [facility] [address] [phone]    As needed, If symptoms worsen      FINAL IMPRESSION  1. Chronic nonintractable headache, unspecified headache type    2.  (ventriculoperitoneal) shunt status            Electronically signed by: Tone Cuello, 4/22/2019 10:06 PM    "

## 2019-04-23 NOTE — ED TRIAGE NOTES
Pt ambulatory to triage c/o headache & nausea, intermittent since January but worse today.  Pt is blind & has hx hydrocephalus.

## 2019-05-15 ENCOUNTER — HOSPITAL ENCOUNTER (EMERGENCY)
Facility: MEDICAL CENTER | Age: 49
End: 2019-05-15
Attending: EMERGENCY MEDICINE
Payer: MEDICAID

## 2019-05-15 VITALS
WEIGHT: 142 LBS | OXYGEN SATURATION: 99 % | RESPIRATION RATE: 18 BRPM | HEART RATE: 91 BPM | SYSTOLIC BLOOD PRESSURE: 164 MMHG | BODY MASS INDEX: 24.24 KG/M2 | TEMPERATURE: 96.6 F | HEIGHT: 64 IN | DIASTOLIC BLOOD PRESSURE: 95 MMHG

## 2019-05-15 DIAGNOSIS — G43.809 OTHER MIGRAINE WITHOUT STATUS MIGRAINOSUS, NOT INTRACTABLE: ICD-10-CM

## 2019-05-15 PROCEDURE — 96374 THER/PROPH/DIAG INJ IV PUSH: CPT

## 2019-05-15 PROCEDURE — 96375 TX/PRO/DX INJ NEW DRUG ADDON: CPT

## 2019-05-15 PROCEDURE — 36415 COLL VENOUS BLD VENIPUNCTURE: CPT

## 2019-05-15 PROCEDURE — 700111 HCHG RX REV CODE 636 W/ 250 OVERRIDE (IP): Performed by: EMERGENCY MEDICINE

## 2019-05-15 PROCEDURE — 99284 EMERGENCY DEPT VISIT MOD MDM: CPT

## 2019-05-15 RX ORDER — DEXAMETHASONE SODIUM PHOSPHATE 10 MG/ML
10 INJECTION, SOLUTION INTRAMUSCULAR; INTRAVENOUS ONCE
Status: COMPLETED | OUTPATIENT
Start: 2019-05-15 | End: 2019-05-15

## 2019-05-15 RX ORDER — DIPHENHYDRAMINE HYDROCHLORIDE 50 MG/ML
25 INJECTION INTRAMUSCULAR; INTRAVENOUS ONCE
Status: COMPLETED | OUTPATIENT
Start: 2019-05-15 | End: 2019-05-15

## 2019-05-15 RX ORDER — KETOROLAC TROMETHAMINE 30 MG/ML
30 INJECTION, SOLUTION INTRAMUSCULAR; INTRAVENOUS ONCE
Status: COMPLETED | OUTPATIENT
Start: 2019-05-15 | End: 2019-05-15

## 2019-05-15 RX ADMIN — PROCHLORPERAZINE EDISYLATE 10 MG: 5 INJECTION INTRAMUSCULAR; INTRAVENOUS at 05:41

## 2019-05-15 RX ADMIN — DEXAMETHASONE SODIUM PHOSPHATE 10 MG: 10 INJECTION INTRAMUSCULAR; INTRAVENOUS at 05:41

## 2019-05-15 RX ADMIN — DIPHENHYDRAMINE HYDROCHLORIDE 25 MG: 50 INJECTION INTRAMUSCULAR; INTRAVENOUS at 05:41

## 2019-05-15 RX ADMIN — KETOROLAC TROMETHAMINE 30 MG: 30 INJECTION, SOLUTION INTRAMUSCULAR at 07:28

## 2019-05-15 NOTE — ED NOTES
Discharge teaching provided and paperwork given to patient. Due to pt's hx of blindness, each part of the paperwork was read to the patient. The pt states she understands her migraine condition well and all questions were answered. The pt states she has an appointment with her primary MD on tomorrow. Pt was walked out to the lobby and helped to a chair. ER Tech called a cab and the pt states her boyfriend is at home to help her once she get there.

## 2019-05-15 NOTE — ED PROVIDER NOTES
ED Provider Note    CHIEF COMPLAINT  Chief Complaint   Patient presents with   • Headache     started at 2300 last night, had two episodes of vomiting with this, has since resolved. still has headache       HPI  Rasheeda Manzano is a 48 y.o. female who presents with headache.  Headache got worse last night.  Patient is a history of chronic recurrent headaches.  Frequently will have severe headaches that prompt evaluation in the ER.  She has a history of a  shunt that she has had for nearly her whole life.  Revised about 7 years ago.  She has not had a fever, trauma, weakness numbness neurologic symptoms.  No neck stiffness.  No rash.  Her head hurts over the right side of the head and front of the head.  Throbbing character.    Medical record was reviewed.  Patient has 47 CT scans noted in epic.    REVIEW OF SYSTEMS  As per HPI    All other systems are reviewed and negative    PAST MEDICAL HISTORY  Past Medical History:   Diagnosis Date   • Blind    • C. 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   • Fall    • GERD (gastroesophageal reflux disease)    • H/O total hysterectomy    • Hydrocephalus    • Hydrocephalus     shunt drains into pleural place of L lung   • Jaundice     at birth   • Legally blind    • Migraine without aura, without mention of intractable migraine without mention of status migrainosus    • Other specified symptom associated with female genital organs     excessive bleeding   • Pain     gallbladder related   • Psychiatric problem     depression       FAMILY HISTORY  Family History   Problem Relation Age of Onset   • Hypertension Mother    • Hypertension Father    • Non-contributory Neg Hx         Migraine       SOCIAL HISTORY  Social History   Substance Use Topics   • Smoking status: Former Smoker     Packs/day: 1.00     Years: 10.00     Types: Cigars     Start date: 5/1/2004     Quit date: 8/9/2017  "  • Smokeless tobacco: Never Used      Comment:  CIGAR/day    • Alcohol use Yes      Comment: socially       SURGICAL HISTORY  Past Surgical History:   Procedure Laterality Date   • GASTROSCOPY N/A 1/2/2019    Procedure: GASTROSCOPY;  Surgeon: Matias Fernando M.D.;  Location: SURGERY [facility];  Service: Gastroenterology   • GASTROSCOPY-ENDO N/A 8/24/2017    Procedure: GASTROSCOPY-ENDO;  Surgeon: Matias Fernando M.D.;  Location: ENDOSCOPY [facility];  Service:    • AYALA BY LAPAROSCOPY N/A 8/13/2015    Procedure: AYALA BY LAPAROSCOPY;  Surgeon: Brice Johnson M.D.;  Location: SURGERY SAME DAY [facility];  Service:    • CHOLECYSTECTOMY N/A 8/13/2015    Procedure: CHOLECYSTECTOMY;  Surgeon: Brice Johnson M.D.;  Location: SURGERY SAME DAY [facility];  Service:    • LYSIS ADHESIONS GENERAL  12/10/2013    Performed by Arie Bass M.D. at [facility]   • CYSTOSCOPY  12/10/2013    Performed by Joana Yeager M.D. at [facility]   • EXPLORATORY LAPAROTOMY  12/10/2013    Performed by Arie Bass M.D. at [facility]   • APPENDECTOMY  12/10/2013    Performed by Arie Bass M.D. at [facility]   • ABDOMINAL HYSTERECTOMY TOTAL  12/10/2013    Performed by Joana Yeager M.D. at [facility]   • LAPAROSCOPY  8/8/08    Performed by FERNANDO HENDERSON at [facility]   • NISSEN FUNDOPLICATION LAPAROSCOPIC     • OTHER      PLEURAL SHUNT, numerous revisions       CURRENT MEDICATIONS  Home Medications    **Home medications have not yet been reviewed for this encounter**         ALLERGIES  Allergies   Allergen Reactions   • Tape Rash     RXN= ongoing  Adhesive Medical tape. Per patient, paper tape ok.       PHYSICAL EXAM  VITAL SIGNS: BP (!) 164/95   Pulse 91   Temp 35.9 °C (96.6 °F) (Temporal)   Resp 18   Ht 1.626 m (5' 4\")   Wt 64.4 kg (142 lb)   LMP 11/27/2013   SpO2 99%   BMI 24.37 kg/m²   Constitutional:  Well developed, Well " nourished, No acute distress, Non-toxic appearance.   HENT: Head nontender , TMs are clear, pharynx normal  Eyes: She is blind  Neck: Normal range of motion, No tenderness, Supple, No stridor.   Lymphatic: No lymphadenopathy noted.   Cardiovascular: Normal heart rate, Normal rhythm, No murmurs, No rubs, No gallops.   Thorax & Lungs: Normal breath sounds, No respiratory distress, No wheezing, No chest tenderness.   Skin: Warm, Dry, No erythema, No rash.   Extremities: Intact distal pulses, No edema, No tenderness, No cyanosis, No clubbing.   Neurologic: Alert & oriented x 3, Normal motor function, Normal sensory function, No focal deficits noted, normal gait  Psychiatric: Affect normal, Judgment normal, Mood normal.       COURSE & MEDICAL DECISION MAKING  Patient without fever, has no neck stiffness, gradual onset, not worst of life, no neurologic symptoms, no trauma, no eye complaints or findings. No suggestion of meningitis, subarachnoid hemorrhage, tumor, central venous thrombosis, stroke.  Patient was treated with Benadryl, Compazine and Decadron.  She had persistent headache.  Subsequently given Toradol.  She reports significant improvement and stated that she was ready to go home.  I have advised that she follow-up with her neurologist.  Perhaps she would benefit from some rescue medication for her to take at home.  I precautioned her to return to the ER for any atypical headache, recurrent symptoms, fevers, neurologic symptoms or concern.    FINAL IMPRESSION  1.  Acute recurrent benign cephalgia    This dictation was created using voice recognition software. The accuracy of the dictation is limited to the abilities of the software. I expect there may be some errors of grammar and possibly content. The nursing notes were reviewed and certain aspects of this information were incorporated into this note.      Electronically signed by: Richmond Cowan, 5/15/2019 6:24 AM

## 2019-05-15 NOTE — ED NOTES
Report report from ELIZABETH Hameed. RN at bedside administering medication per MAR. Pt currently reports 7/10 pain headache behind her right eye. No neuro deficits. Pt is on monitor with call light in hand.

## 2019-05-15 NOTE — ED TRIAGE NOTES
Chief Complaint   Patient presents with   • Headache     started at 2300 last night, had two episodes of vomiting with this, has since resolved. still has headache       [facility] EMS gave 4 mg zofran for nausea, pt reports none at this time. Still has HA with photosensitivity.  Pt states HA came on suddenly and had some issues ambulating. No neuro deficits noted.

## 2019-06-06 ENCOUNTER — APPOINTMENT (OUTPATIENT)
Dept: RADIOLOGY | Facility: MEDICAL CENTER | Age: 49
End: 2019-06-06
Attending: EMERGENCY MEDICINE
Payer: MEDICAID

## 2019-06-06 ENCOUNTER — HOSPITAL ENCOUNTER (EMERGENCY)
Facility: MEDICAL CENTER | Age: 49
End: 2019-06-06
Attending: EMERGENCY MEDICINE
Payer: MEDICAID

## 2019-06-06 VITALS
DIASTOLIC BLOOD PRESSURE: 93 MMHG | RESPIRATION RATE: 16 BRPM | HEART RATE: 71 BPM | SYSTOLIC BLOOD PRESSURE: 157 MMHG | BODY MASS INDEX: 24.24 KG/M2 | TEMPERATURE: 97.3 F | OXYGEN SATURATION: 95 % | WEIGHT: 141.98 LBS | HEIGHT: 64 IN

## 2019-06-06 DIAGNOSIS — G44.89 OTHER HEADACHE SYNDROME: ICD-10-CM

## 2019-06-06 PROCEDURE — 96374 THER/PROPH/DIAG INJ IV PUSH: CPT

## 2019-06-06 PROCEDURE — 72020 X-RAY EXAM OF SPINE 1 VIEW: CPT

## 2019-06-06 PROCEDURE — 96375 TX/PRO/DX INJ NEW DRUG ADDON: CPT

## 2019-06-06 PROCEDURE — 70250 X-RAY EXAM OF SKULL: CPT

## 2019-06-06 PROCEDURE — 70450 CT HEAD/BRAIN W/O DYE: CPT

## 2019-06-06 PROCEDURE — 99284 EMERGENCY DEPT VISIT MOD MDM: CPT

## 2019-06-06 PROCEDURE — 700111 HCHG RX REV CODE 636 W/ 250 OVERRIDE (IP): Performed by: INTERNAL MEDICINE

## 2019-06-06 PROCEDURE — 71045 X-RAY EXAM CHEST 1 VIEW: CPT

## 2019-06-06 PROCEDURE — 700111 HCHG RX REV CODE 636 W/ 250 OVERRIDE (IP): Performed by: EMERGENCY MEDICINE

## 2019-06-06 RX ORDER — PROPRANOLOL HYDROCHLORIDE 120 MG/1
120 CAPSULE, EXTENDED RELEASE ORAL
COMMUNITY
End: 2020-08-26 | Stop reason: ALTCHOICE

## 2019-06-06 RX ORDER — DIPHENHYDRAMINE HYDROCHLORIDE 50 MG/ML
12.5 INJECTION INTRAMUSCULAR; INTRAVENOUS ONCE
Status: COMPLETED | OUTPATIENT
Start: 2019-06-06 | End: 2019-06-06

## 2019-06-06 RX ORDER — METOCLOPRAMIDE HYDROCHLORIDE 5 MG/ML
10 INJECTION INTRAMUSCULAR; INTRAVENOUS ONCE
Status: COMPLETED | OUTPATIENT
Start: 2019-06-06 | End: 2019-06-06

## 2019-06-06 RX ORDER — HYDROMORPHONE HYDROCHLORIDE 1 MG/ML
1 INJECTION, SOLUTION INTRAMUSCULAR; INTRAVENOUS; SUBCUTANEOUS ONCE
Status: COMPLETED | OUTPATIENT
Start: 2019-06-06 | End: 2019-06-06

## 2019-06-06 RX ORDER — KETOROLAC TROMETHAMINE 30 MG/ML
30 INJECTION, SOLUTION INTRAMUSCULAR; INTRAVENOUS ONCE
Status: COMPLETED | OUTPATIENT
Start: 2019-06-06 | End: 2019-06-06

## 2019-06-06 RX ADMIN — METOCLOPRAMIDE 10 MG: 5 INJECTION, SOLUTION INTRAMUSCULAR; INTRAVENOUS at 15:53

## 2019-06-06 RX ADMIN — HYDROMORPHONE HYDROCHLORIDE 1 MG: 1 INJECTION, SOLUTION INTRAMUSCULAR; INTRAVENOUS; SUBCUTANEOUS at 16:22

## 2019-06-06 RX ADMIN — DIPHENHYDRAMINE HYDROCHLORIDE 12.5 MG: 50 INJECTION INTRAMUSCULAR; INTRAVENOUS at 15:53

## 2019-06-06 RX ADMIN — KETOROLAC TROMETHAMINE 30 MG: 30 INJECTION, SOLUTION INTRAMUSCULAR at 15:00

## 2019-06-06 ASSESSMENT — LIFESTYLE VARIABLES: DO YOU DRINK ALCOHOL: NO

## 2019-06-06 NOTE — ED NOTES
Med Rec completed per patient and [facility]  Allergies reviewed  No ORAL antibiotics in last 30 days

## 2019-06-06 NOTE — ED NOTES
Agree with triage note, pt ambulatory to yellow 60 with assistance of . Pt appears to be in NAD at this time.

## 2019-06-06 NOTE — ED PROVIDER NOTES
CHIEF COMPLAINT(1/4)  Chief Complaint   Patient presents with   • Headache       HPI  Rasheeda Manzano is a 48 y.o. female with past medical history of migraine, hydrocephalus status post  shunt since 9-year-old presents with right sided headache started this morning around 7 AM.   She has 2 types of headaches, migraine and the other type when her  shunt becomes dysfunctional.  She got her  shunt readjusted on Monday from 4 to 5 cm by Dr. Hogan.  She was doing fine until this morning.   The headache started near her right eye right side of the headache and spread to the whole head.  It is 8 out of 10, sharp in nature, constant, did not notice any alleviating factor, got worse when she lie down flat.  She has mild nausea, denies any vomiting.  She is legally blind.  She denies any neck stiffness or pain, fever, chills, upper respiratory tract symptoms or any recent illness.  She recently visited ER 3 times in the past 2 months for headache.  CT head in March 2019 showed stable appearance without any findings of hydrocephalus.    She is convinced that this headache comes from  shunt malfunction.  She has not established with neurologist here.  She had her last  shunt placed in [place] 8 years ago to left pleural cavity.    REVIEW OF SYSTEMS(1/10)  Pertinent positives include: Headache, nausea.  Pertinent negatives include: Denies neck stiffness, vomiting, fever, chills, sinus ache, runny nose, sore throat, cough, shortness of breath.   All other systems are negative.     PAST MEDICAL HISTORY(PFS1,2)  Past Medical History:   Diagnosis Date   • Blind    • C. 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   • Fall    • GERD (gastroesophageal reflux disease)    • H/O total hysterectomy    • Hydrocephalus    • Hydrocephalus     shunt drains into pleural place of L lung   • Jaundice     at birth   • Legally blind    •  Migraine without aura, without mention of intractable migraine without mention of status migrainosus    • Other specified symptom associated with female genital organs     excessive bleeding   • Pain     gallbladder related   • Psychiatric problem     depression       FAMILY HISTORY  Family History   Problem Relation Age of Onset   • Hypertension Mother    • Hypertension Father    • Non-contributory Neg Hx         Migraine       SOCIAL HISTORY  Social History   Substance Use Topics   • Smoking status: Former Smoker     Packs/day: 1.00     Years: 10.00     Types: Cigars     Start date: 5/1/2004     Quit date: 8/9/2017   • Smokeless tobacco: Never Used      Comment:  CIGAR/day    • Alcohol use Yes      Comment: socially     History   Drug Use No       SURGICAL HISTORY  Past Surgical History:   Procedure Laterality Date   • GASTROSCOPY N/A 1/2/2019    Procedure: GASTROSCOPY;  Surgeon: Matias Fernando M.D.;  Location: SURGERY [facility];  Service: Gastroenterology   • GASTROSCOPY-ENDO N/A 8/24/2017    Procedure: GASTROSCOPY-ENDO;  Surgeon: Matias Fernando M.D.;  Location: ENDOSCOPY [facility];  Service:    • AYALA BY LAPAROSCOPY N/A 8/13/2015    Procedure: AYALA BY LAPAROSCOPY;  Surgeon: Brice Johnson M.D.;  Location: SURGERY SAME DAY [facility];  Service:    • CHOLECYSTECTOMY N/A 8/13/2015    Procedure: CHOLECYSTECTOMY;  Surgeon: Brice Johnson M.D.;  Location: SURGERY SAME DAY [facility];  Service:    • LYSIS ADHESIONS GENERAL  12/10/2013    Performed by Arie Bass M.D. at [facility]   • CYSTOSCOPY  12/10/2013    Performed by Joana Yeager M.D. at [facility]   • EXPLORATORY LAPAROTOMY  12/10/2013    Performed by Arie Bass M.D. at [facility]   • APPENDECTOMY  12/10/2013    Performed by Arie Bass M.D. at [facility]   • ABDOMINAL HYSTERECTOMY TOTAL  12/10/2013    Performed by Joana Yeager M.D. at [facility]   •  "LAPAROSCOPY  8/8/08    Performed by FERNANDO HENDERSON at SURGERY [facility]   • NISSEN FUNDOPLICATION LAPAROSCOPIC     • OTHER      PLEURAL SHUNT, numerous revisions       CURRENT MEDICATIONS  Home Medications     Reviewed by Heaven Buck (Pharmacy Tech) on 06/06/19 at 1605  Med List Status: Complete   Medication Last Dose Status   levETIRAcetam (KEPPRA) 500 MG Tab 6/6/2019 Active   propranolol CR (INDERAL LA) 120 MG CAPSULE SR 24 HR 6/5/2019 Active                ALLERGIES  Allergies   Allergen Reactions   • Tape Rash     RXN= ongoing  Adhesive Medical tape. Per patient, paper tape ok.       PHYSICAL EXAM  VITAL SIGNS: /93   Pulse 75   Temp 36.3 °C (97.4 °F) (Temporal)   Resp 16   Ht 1.626 m (5' 4\")   Wt 64.4 kg (141 lb 15.6 oz)   LMP 11/27/2013   SpO2 98%   BMI 24.37 kg/m²  Reviewed   Constitutional: Well developed, Well nourished, seem to be comfortably in bed.  HENT: Normocephalic, atraumatic, bilateral external ears normal, oropharynx moist, No exudates or erythema.  Neck is supple.   Eyes: PERRLA, conjunctiva pink, no scleral icterus.   Cardiovascular: S1, S2, no murmur, rub or gallop.  Respiratory: Clear to auscultation bilaterally.  Gastrointestinal: Soft, nontender, bowel sounds present, no hepatosplenomegaly.  Skin: No erythema, no rash.   Genitourinary:  No costovertebral angle tenderness.   Neurologic: Alert & oriented x 3, cranial nerves 2-12 intact by passive exam.  No focal deficit noted. Kernig sign and Brudzinski sign negative  psychiatric: Affect normal, Judgment normal, Mood normal.     DIFFERENTIAL DIAGNOSIS:  Migraine  Chronic recurrent headache  Hydrocephalus      EKG  Not done    RADIOLOGY/PROCEDURES  CT-HEAD W/O   Final Result      1.  No acute findings.   2.  Postoperative changes as described with ventriculostomy catheter again present and in similar position.            DX-CHEST-LIMITED (1 VIEW)   Final Result      1.  Shunt tip projects over the inferior aspect of " the heart. Shunt tubing proximal to the tip is in slightly different position compared to prior study. No shunt discontinuity.   2.  Clear lungs. No effusions.      DX-SKULL-LIMITED 3-   Final Result      Intact intracranial and extracranial shunt tubing.      DX-SPINE-ANY ONE VIEW   Final Result      No discontinuity of the visualized shunt tubing.          LABORATORY: Reviewed as below.  Results for orders placed or performed during the hospital encounter of 04/22/19   URINALYSIS   Result Value Ref Range    Color Yellow     Character Clear     Specific Gravity 1.026 <1.035    Ph 5.5 5.0 - 8.0    Glucose Negative Negative mg/dL    Ketones Trace (A) Negative mg/dL    Protein Negative Negative mg/dL    Bilirubin Negative Negative    Urobilinogen, Urine 1.0 Negative    Nitrite Negative Negative    Leukocyte Esterase Small (A) Negative    Occult Blood Negative Negative    Micro Urine Req Microscopic    BETA-HCG QUALITATIVE URINE   Result Value Ref Range    Beta-Hcg Urine Negative Negative   URINE MICROSCOPIC (W/UA)   Result Value Ref Range    WBC 2-5 /hpf    RBC 0-2 /hpf    Bacteria Few (A) None /hpf    Epithelial Cells Few /hpf    Hyaline Cast 6-10 (A) /lpf       INTERVENTIONS:  Medications   ketorolac (TORADOL) injection 30 mg (30 mg Intravenous Given 6/6/19 1500)   metoclopramide (REGLAN) injection 10 mg (10 mg Intravenous Given 6/6/19 1553)   diphenhydrAMINE (BENADRYL) injection 12.5 mg (12.5 mg Intravenous Given 6/6/19 1553)   HYDROmorphone pf (DILAUDID) injection 1 mg (1 mg Intravenous Given 6/6/19 1622)     Response: improvement of headache.    COURSE & MEDICAL DECISION MAKING  2:10 PM assessed patient at bedside.  Right-sided headache slightly different from migraine where her regular migraine would restrict to right eye only, but this has spread to entire head. vitals are stable, afebrile.  No meningeal sign.  No tenderness over  shunt on the left side.   shunt series and CT head ordered to assess for  shunt malfunction.  She will be given IV Toradol 30 mg, Reglan 10 mg, Benadryl 12.5 mg for possible migraine headache.    4:20 PM dilaudid 1 mg IV given for additional pain control    5:15PM  CT head came back. Stable appearance without any acute change compared to prior CT. patient feels improved and comfortable going home. Dr Hogan was called who did not recommend additional work up.    Discussed with Dr. Acevedo  Review nursing notes and vital signs a final time 5 PM    PLAN:  Discharge home    CONDITION:  stablee.    FINAL IMPRESSION  1. Other headache syndrome          Electronically signed by: Nu Wiley, 6/6/2019 3:22 PM    Very well-appearing patient who on my exam has a normal neurologic exam aside from her chronic blindness.  CT imaging and shunt series is unremarkable without any evidence of acute occlusion, I discussed the case with patient's neurosurgeon on-call who will see patient in office, we do not recommend any further treatment.  Patient denies any fevers, she is not febrile here, she has no neck pain or stiffness I believe that a chest infection is highly unlikely.  Patient's pain is improved following migraine cocktail and then hydromorphone.

## 2019-06-06 NOTE — ED TRIAGE NOTES
"Pt with migraine, multiple ED visits for same. Pt seen at neuro office on Monday and he adjusted her shunt. Pt states \"he didn't believe that was the reason for her headaches\". No distress noted. Pt legally blind.   "

## 2019-06-07 NOTE — DISCHARGE INSTRUCTIONS
If you develop a fever or weakness or numbness on one side of your body return to the emergency department.

## 2019-06-07 NOTE — ED NOTES
Patient discharged with instructions for other headache with prescriptions x0 signs and symptoms explained to patient for reasons to return to emergency department, Patient is understanding and has no further questions. Pt ambulatory to the [facility] with RN assistance due to seeing disability and left in care of friend who is providing ride for pt home.

## 2019-06-12 NOTE — CARE PLAN
Problem: Communication  Goal: The ability to communicate needs accurately and effectively will improve  Outcome: PROGRESSING AS EXPECTED  Encourage pt to voice concerns and feelings    Problem: Safety  Goal: Will remain free from falls  Outcome: PROGRESSING AS EXPECTED  Hourly rounding, bed low and locked, nonskid socks on, call bell within reach.      
Problem: Infection  Goal: Will remain free from infection    Intervention: Assess signs and symptoms of infection  Appropriate protocols and standards utilized to minimize likelihood of infection and the spread of infection. Proper hand hygiene utilized and pt and family members educated on hand hygiene.       Problem: Knowledge Deficit  Goal: Knowledge of disease process/condition, treatment plan, diagnostic tests, and medications will improve    Intervention: Assess knowledge level of disease process/condition, treatment plan, diagnostic tests, and medications  Pt educated on POC for the day, disease process, upcoming tests, and medication information. Will continue to answer questions and educate pt as necessary.         
Problem: Infection  Goal: Will remain free from infection    Intervention: Implement standard precautions and perform hand washing before and after patient contact  Hand washing implemented prior and after patient care. Hand hygiene performed in front of patient. Standard precautions in place.      Problem: Pain Management  Goal: Pain level will decrease to patient's comfort goal    Intervention: Educate and implement non-pharmacologic comfort measures. Examples: relaxation, distration, play therapy, activity therapy, massage, etc.  Pt provided education on use of repositioning, rest and environmental changes to provide comfort. Pt vocalized understanding and allows for interventions to be put in place. Will continue to reinforce teaching and monitor for learning.        
Problem: Safety  Goal: Will remain free from falls    Intervention: Implement fall precautions  Will do purposeful hourly rounding, will maintain bed alarm engaged        
Problem: Safety  Goal: Will remain free from falls    Intervention: Implement fall precautions  Will do purposeful hourly rounding, will maintain bed alarm engaged        
Problem: Safety  Goal: Will remain free from falls  Outcome: PROGRESSING AS EXPECTED    Intervention: Assess risk factors for falls   01/03/19 0800   OTHER   Pt Calls for Assistance Yes     Pt calls appropriately. Pt is alert and oriented. Bed in lowest and locked position. Call light within reach. Treaded slipper socks in place. Will continue to monitor.      Problem: Pain Management  Goal: Pain level will decrease to patient's comfort goal  Outcome: PROGRESSING AS EXPECTED   01/02/19 0200 01/03/19 0000 01/03/19 0800   OTHER   Pain Scale 0 - 10  --  --  7   Non Verbal Scale  --  Calm;Sleeping;Unlabored Breathing --    FLACC Total Score   0 --  --    Comfort Goal --  --  Comfort with Movement;Comfort at Rest;Sleep Comfortably         
Problem: Safety  Goal: Will remain free from falls  Outcome: PROGRESSING AS EXPECTED    Intervention: Assess risk factors for falls  Pt calls appropriately. Call light within reach, bed in lowest and locked position.      Problem: Pain Management  Goal: Pain level will decrease to patient's comfort goal  Outcome: PROGRESSING AS EXPECTED  Progressing towards pt comfort goals.       
Problem: Safety  Goal: Will remain free from injury  Outcome: PROGRESSING AS EXPECTED      Problem: Knowledge Deficit  Goal: Knowledge of disease process/condition, treatment plan, diagnostic tests, and medications will improve  Outcome: PROGRESSING AS EXPECTED      Problem: Pain Management  Goal: Pain level will decrease to patient's comfort goal  Outcome: PROGRESSING AS EXPECTED        
: Yes

## 2019-06-15 ENCOUNTER — APPOINTMENT (OUTPATIENT)
Dept: RADIOLOGY | Facility: MEDICAL CENTER | Age: 49
End: 2019-06-15
Attending: EMERGENCY MEDICINE
Payer: MEDICAID

## 2019-06-15 ENCOUNTER — HOSPITAL ENCOUNTER (EMERGENCY)
Facility: MEDICAL CENTER | Age: 49
End: 2019-06-15
Attending: EMERGENCY MEDICINE
Payer: MEDICAID

## 2019-06-15 VITALS
DIASTOLIC BLOOD PRESSURE: 80 MMHG | HEIGHT: 64 IN | WEIGHT: 141.98 LBS | SYSTOLIC BLOOD PRESSURE: 121 MMHG | RESPIRATION RATE: 18 BRPM | HEART RATE: 127 BPM | OXYGEN SATURATION: 89 % | BODY MASS INDEX: 24.24 KG/M2

## 2019-06-15 DIAGNOSIS — N30.01 ACUTE CYSTITIS WITH HEMATURIA: ICD-10-CM

## 2019-06-15 DIAGNOSIS — R51.9 ACUTE NONINTRACTABLE HEADACHE, UNSPECIFIED HEADACHE TYPE: ICD-10-CM

## 2019-06-15 DIAGNOSIS — R30.0 DYSURIA: ICD-10-CM

## 2019-06-15 LAB
ALBUMIN SERPL BCP-MCNC: 4 G/DL (ref 3.2–4.9)
ALBUMIN/GLOB SERPL: 1.5 G/DL
ALP SERPL-CCNC: 116 U/L (ref 30–99)
ALT SERPL-CCNC: 7 U/L (ref 2–50)
ANION GAP SERPL CALC-SCNC: 12 MMOL/L (ref 0–11.9)
APPEARANCE UR: CLEAR
AST SERPL-CCNC: 11 U/L (ref 12–45)
BACTERIA #/AREA URNS HPF: ABNORMAL /HPF
BASOPHILS # BLD AUTO: 0.6 % (ref 0–1.8)
BASOPHILS # BLD: 0.07 K/UL (ref 0–0.12)
BILIRUB SERPL-MCNC: 0.4 MG/DL (ref 0.1–1.5)
BILIRUB UR QL STRIP.AUTO: NEGATIVE
BUN SERPL-MCNC: 11 MG/DL (ref 8–22)
CALCIUM SERPL-MCNC: 9.2 MG/DL (ref 8.5–10.5)
CHLORIDE SERPL-SCNC: 108 MMOL/L (ref 96–112)
CO2 SERPL-SCNC: 23 MMOL/L (ref 20–33)
COLOR UR: YELLOW
CREAT SERPL-MCNC: 0.62 MG/DL (ref 0.5–1.4)
EOSINOPHIL # BLD AUTO: 0.03 K/UL (ref 0–0.51)
EOSINOPHIL NFR BLD: 0.3 % (ref 0–6.9)
EPI CELLS #/AREA URNS HPF: ABNORMAL /HPF
ERYTHROCYTE [DISTWIDTH] IN BLOOD BY AUTOMATED COUNT: 44.3 FL (ref 35.9–50)
GLOBULIN SER CALC-MCNC: 2.6 G/DL (ref 1.9–3.5)
GLUCOSE SERPL-MCNC: 103 MG/DL (ref 65–99)
GLUCOSE UR STRIP.AUTO-MCNC: NEGATIVE MG/DL
HCG SERPL QL: NEGATIVE
HCT VFR BLD AUTO: 37.7 % (ref 37–47)
HGB BLD-MCNC: 11.9 G/DL (ref 12–16)
HYALINE CASTS #/AREA URNS LPF: ABNORMAL /LPF
IMM GRANULOCYTES # BLD AUTO: 0.02 K/UL (ref 0–0.11)
IMM GRANULOCYTES NFR BLD AUTO: 0.2 % (ref 0–0.9)
KETONES UR STRIP.AUTO-MCNC: 15 MG/DL
LEUKOCYTE ESTERASE UR QL STRIP.AUTO: ABNORMAL
LYMPHOCYTES # BLD AUTO: 1.37 K/UL (ref 1–4.8)
LYMPHOCYTES NFR BLD: 11.9 % (ref 22–41)
MCH RBC QN AUTO: 26.3 PG (ref 27–33)
MCHC RBC AUTO-ENTMCNC: 31.6 G/DL (ref 33.6–35)
MCV RBC AUTO: 83.4 FL (ref 81.4–97.8)
MICRO URNS: ABNORMAL
MONOCYTES # BLD AUTO: 0.47 K/UL (ref 0–0.85)
MONOCYTES NFR BLD AUTO: 4.1 % (ref 0–13.4)
NEUTROPHILS # BLD AUTO: 9.56 K/UL (ref 2–7.15)
NEUTROPHILS NFR BLD: 82.9 % (ref 44–72)
NITRITE UR QL STRIP.AUTO: NEGATIVE
NRBC # BLD AUTO: 0 K/UL
NRBC BLD-RTO: 0 /100 WBC
PH UR STRIP.AUTO: 7 [PH]
PLATELET # BLD AUTO: 356 K/UL (ref 164–446)
PMV BLD AUTO: 10.9 FL (ref 9–12.9)
POTASSIUM SERPL-SCNC: 3.5 MMOL/L (ref 3.6–5.5)
PROT SERPL-MCNC: 6.6 G/DL (ref 6–8.2)
PROT UR QL STRIP: NEGATIVE MG/DL
RBC # BLD AUTO: 4.52 M/UL (ref 4.2–5.4)
RBC # URNS HPF: ABNORMAL /HPF
RBC UR QL AUTO: NEGATIVE
SODIUM SERPL-SCNC: 143 MMOL/L (ref 135–145)
SP GR UR STRIP.AUTO: 1.01
UROBILINOGEN UR STRIP.AUTO-MCNC: 0.2 MG/DL
WBC # BLD AUTO: 11.5 K/UL (ref 4.8–10.8)
WBC #/AREA URNS HPF: ABNORMAL /HPF

## 2019-06-15 PROCEDURE — 80053 COMPREHEN METABOLIC PANEL: CPT

## 2019-06-15 PROCEDURE — 84703 CHORIONIC GONADOTROPIN ASSAY: CPT

## 2019-06-15 PROCEDURE — 81001 URINALYSIS AUTO W/SCOPE: CPT

## 2019-06-15 PROCEDURE — 700105 HCHG RX REV CODE 258: Performed by: EMERGENCY MEDICINE

## 2019-06-15 PROCEDURE — 70450 CT HEAD/BRAIN W/O DYE: CPT

## 2019-06-15 PROCEDURE — 700111 HCHG RX REV CODE 636 W/ 250 OVERRIDE (IP): Performed by: EMERGENCY MEDICINE

## 2019-06-15 PROCEDURE — A9270 NON-COVERED ITEM OR SERVICE: HCPCS | Performed by: EMERGENCY MEDICINE

## 2019-06-15 PROCEDURE — 87086 URINE CULTURE/COLONY COUNT: CPT

## 2019-06-15 PROCEDURE — 700102 HCHG RX REV CODE 250 W/ 637 OVERRIDE(OP): Performed by: EMERGENCY MEDICINE

## 2019-06-15 PROCEDURE — 99285 EMERGENCY DEPT VISIT HI MDM: CPT

## 2019-06-15 PROCEDURE — 72020 X-RAY EXAM OF SPINE 1 VIEW: CPT

## 2019-06-15 PROCEDURE — 71045 X-RAY EXAM CHEST 1 VIEW: CPT

## 2019-06-15 PROCEDURE — 96375 TX/PRO/DX INJ NEW DRUG ADDON: CPT

## 2019-06-15 PROCEDURE — 96374 THER/PROPH/DIAG INJ IV PUSH: CPT

## 2019-06-15 PROCEDURE — 74018 RADEX ABDOMEN 1 VIEW: CPT

## 2019-06-15 PROCEDURE — 70250 X-RAY EXAM OF SKULL: CPT

## 2019-06-15 PROCEDURE — 85025 COMPLETE CBC W/AUTO DIFF WBC: CPT

## 2019-06-15 RX ORDER — SODIUM CHLORIDE 9 MG/ML
1000 INJECTION, SOLUTION INTRAVENOUS ONCE
Status: COMPLETED | OUTPATIENT
Start: 2019-06-15 | End: 2019-06-15

## 2019-06-15 RX ORDER — DEXAMETHASONE SODIUM PHOSPHATE 4 MG/ML
10 INJECTION, SOLUTION INTRA-ARTICULAR; INTRALESIONAL; INTRAMUSCULAR; INTRAVENOUS; SOFT TISSUE ONCE
Status: COMPLETED | OUTPATIENT
Start: 2019-06-15 | End: 2019-06-15

## 2019-06-15 RX ORDER — KETOROLAC TROMETHAMINE 30 MG/ML
15 INJECTION, SOLUTION INTRAMUSCULAR; INTRAVENOUS ONCE
Status: COMPLETED | OUTPATIENT
Start: 2019-06-15 | End: 2019-06-15

## 2019-06-15 RX ORDER — MAGNESIUM SULFATE HEPTAHYDRATE 40 MG/ML
2 INJECTION, SOLUTION INTRAVENOUS ONCE
Status: COMPLETED | OUTPATIENT
Start: 2019-06-15 | End: 2019-06-15

## 2019-06-15 RX ORDER — NITROFURANTOIN 25; 75 MG/1; MG/1
100 CAPSULE ORAL 2 TIMES DAILY
Qty: 10 CAP | Refills: 0 | Status: SHIPPED | OUTPATIENT
Start: 2019-06-15 | End: 2019-06-20

## 2019-06-15 RX ORDER — HYDROMORPHONE HYDROCHLORIDE 1 MG/ML
1 INJECTION, SOLUTION INTRAMUSCULAR; INTRAVENOUS; SUBCUTANEOUS ONCE
Status: COMPLETED | OUTPATIENT
Start: 2019-06-15 | End: 2019-06-15

## 2019-06-15 RX ORDER — ACETAMINOPHEN 325 MG/1
650 TABLET ORAL ONCE
Status: DISCONTINUED | OUTPATIENT
Start: 2019-06-15 | End: 2019-06-16 | Stop reason: HOSPADM

## 2019-06-15 RX ORDER — CEPHALEXIN 500 MG/1
500 CAPSULE ORAL ONCE
Status: COMPLETED | OUTPATIENT
Start: 2019-06-15 | End: 2019-06-15

## 2019-06-15 RX ADMIN — SODIUM CHLORIDE 1000 ML: 9 INJECTION, SOLUTION INTRAVENOUS at 20:53

## 2019-06-15 RX ADMIN — PROCHLORPERAZINE EDISYLATE 10 MG: 5 INJECTION INTRAMUSCULAR; INTRAVENOUS at 21:22

## 2019-06-15 RX ADMIN — HYDROMORPHONE HYDROCHLORIDE 1 MG: 1 INJECTION, SOLUTION INTRAMUSCULAR; INTRAVENOUS; SUBCUTANEOUS at 22:06

## 2019-06-15 RX ADMIN — MAGNESIUM SULFATE IN WATER 2 G: 40 INJECTION, SOLUTION INTRAVENOUS at 21:23

## 2019-06-15 RX ADMIN — CEPHALEXIN 500 MG: 500 CAPSULE ORAL at 23:15

## 2019-06-15 RX ADMIN — KETOROLAC TROMETHAMINE 15 MG: 30 INJECTION, SOLUTION INTRAMUSCULAR; INTRAVENOUS at 20:54

## 2019-06-15 RX ADMIN — DEXAMETHASONE SODIUM PHOSPHATE 10 MG: 4 INJECTION, SOLUTION INTRA-ARTICULAR; INTRALESIONAL; INTRAMUSCULAR; INTRAVENOUS; SOFT TISSUE at 20:54

## 2019-06-16 NOTE — ED NOTES
Pt discharged. Ambulate to lobby with guidance of RN. SHANON. Resting HR90s, /81, O2 sat 99% RA. Discharge instructions provided and time provided for questions.

## 2019-06-16 NOTE — ED NOTES
Pt requesting dilaudid for her pain level. Pt educated on medications given, pt verbalizes understanding and wishing to speak to the ERP about receiving dilaudid for her pain. Primary RN notified.

## 2019-06-16 NOTE — ED PROVIDER NOTES
ED Provider Note    CHIEF COMPLAINT  Chief Complaint   Patient presents with   • Headache       HPI  Patient is a 48-year-old female with past medical history of chronic migraines, Hydrocil bolus status post  shunt placed most recently 8 years prior right-sided headaches.  She has a history of several different types of headaches that she reports are both migraines and some from a  shunt dysfunction.  She has had her shunt readjusted most recently by Dr. melo of neurosurgery approximately 2 weeks ago.  In the intervening time she has had some intermittent headaches and came to the emergency room for further evaluation.  She says that her current headache started approximately 11 AM after awakening.  She had moderate to severe intensity, 8 out of 10, that continued to increase throughout the day.  She describes as starting on the right side of her face and progressing backwards and encompassing the right side of her head.  Sharp in nature, constant, has not noticed any alleviating or aggravating factors.  She did have some mild nausea and nonbilious and nonbloody vomiting.  She denies any bowel dysfunction, abdominal bloating, chest pain or shortness of breath.  She has some associated dysuria and urinary pressure in the last 24 hours.  She reports that she is legally blind.  She denies any neck stiffness, pain, fevers, chills or recent illness.  She has been seen in the emergency department 4 times in the last 2 months for headache symptomology.  CT head on 6/6/2019 was unremarkable.    REVIEW OF SYSTEMS  See HPI for further details. All 6 remaining systems are negative.     PAST MEDICAL HISTORY   has a past medical history of Blind; C. difficile diarrhea (2013); Chronic daily headache; Depression; Esophageal atresia (1971); Fall; GERD (gastroesophageal reflux disease); H/O total hysterectomy; Hydrocephalus; Hydrocephalus; Jaundice; Legally blind; Migraine without aura, without mention of intractable  "migraine without mention of status migrainosus; Other specified symptom associated with female genital organs; Pain; and Psychiatric problem.    SOCIAL HISTORY  Social History     Social History Main Topics   • Smoking status: Former Smoker     Packs/day: 1.00     Years: 10.00     Types: Cigars     Start date: 5/1/2004     Quit date: 8/9/2017   • Smokeless tobacco: Never Used      Comment:  CIGAR/day    • Alcohol use Yes      Comment: socially   • Drug use: No   • Sexual activity: Yes     Partners: Male       SURGICAL HISTORY   has a past surgical history that includes laparoscopy (8/8/08); lysis adhesions general (12/10/2013); cystoscopy (12/10/2013); exploratory laparotomy (12/10/2013); appendectomy (12/10/2013); abdominal hysterectomy total (12/10/2013); aakash by laparoscopy (N/A, 8/13/2015); cholecystectomy (N/A, 8/13/2015); other; gastroscopy-endo (N/A, 8/24/2017); nissen fundoplication laparoscopic; and gastroscopy (N/A, 1/2/2019).    CURRENT MEDICATIONS  Home Medications    **Home medications have not yet been reviewed for this encounter**       ALLERGIES  Allergies   Allergen Reactions   • Tape Rash     RXN= ongoing  Adhesive Medical tape. Per patient, paper tape ok.     PHYSICAL EXAM  VITAL SIGNS: /80   Pulse (!) 127   Resp 18   Ht 1.626 m (5' 4\")   Wt 64.4 kg (141 lb 15.6 oz)   LMP 11/27/2013   SpO2 89%   BMI 24.37 kg/m²     Pulse ox interpretation: I interpret this pulse ox as normal.  Genl: F sitting in chair uncomfortably, speaking clearly, appears in very mild distress   Head: NC/AT   ENT: Mucous membranes dry, posterior pharynx clear, uvula midline, nares patent bilaterally   Eyes: Normal sclera, left eye with permanent left gaze deficit  Neck: Supple, FROM, no LAD appreciated, no meningismus  Pulmonary: Lungs are clear to auscultation bilaterally  Chest: No TTP  CV:  tachycardia (110), no murmur appreciated, pulses 2+ in both upper and lower extremities,  Abdomen: soft, TTP in suprapubic " region.  ND; no rebound/guarding, no masses palpated, no HSM   : no CVA tenderness   Musculoskeletal: Pain free ROM of the neck. Moving upper and lower extremities and spontaneous in coordinated fashion  Neuro: Mental Status: Speech fluent without errors. Follows all commands. No dysarthria or apraxia.  Cranial Nerves: Pupils equal round and reactive to light. Extraocular motion grossly intact. Visual fields unable to test. CN V1-V3 intact to light touch. No facial asymmetry. Hearing clinically intact bilaterally. Tongue protrusion midline. No uvular deviation. Normal shoulder shrug and head turn.  Motor:  RUE: 5/5 with hand , 5/5 with flexion at the elbow 5/5 with extension at the elbow  LUE: 5/5 with hand , 5/5 with flexion at the elbow 5/5 with extension at the elbow  RLE: 5/5 with leg raise, 5/5 with plantar flexion, 5/5 with dorsal flexion  LLE: 5/5 with leg raise, 5/5 with plantar flexion, 5/5 with dorsal flexion  Sensation to light touch intact throughout  Reflexes 2+ Patellar tendons, No ataxia noted, Rapidly alternating movements without difficulty  Psych: Patient has an appropriate affect and behavior  Skin: No rash or lesions.  No pallor or jaundice.  No cyanosis.  Warm and dry.     DIAGNOSTIC STUDIES / PROCEDURES    Results for orders placed or performed during the hospital encounter of 06/15/19   HCG Qual Serum   Result Value Ref Range    Beta-Hcg Qualitative Serum Negative Negative   CBC WITH DIFFERENTIAL   Result Value Ref Range    WBC 11.5 (H) 4.8 - 10.8 K/uL    RBC 4.52 4.20 - 5.40 M/uL    Hemoglobin 11.9 (L) 12.0 - 16.0 g/dL    Hematocrit 37.7 37.0 - 47.0 %    MCV 83.4 81.4 - 97.8 fL    MCH 26.3 (L) 27.0 - 33.0 pg    MCHC 31.6 (L) 33.6 - 35.0 g/dL    RDW 44.3 35.9 - 50.0 fL    Platelet Count 356 164 - 446 K/uL    MPV 10.9 9.0 - 12.9 fL    Neutrophils-Polys 82.90 (H) 44.00 - 72.00 %    Lymphocytes 11.90 (L) 22.00 - 41.00 %    Monocytes 4.10 0.00 - 13.40 %    Eosinophils 0.30 0.00 - 6.90 %     Basophils 0.60 0.00 - 1.80 %    Immature Granulocytes 0.20 0.00 - 0.90 %    Nucleated RBC 0.00 /100 WBC    Neutrophils (Absolute) 9.56 (H) 2.00 - 7.15 K/uL    Lymphs (Absolute) 1.37 1.00 - 4.80 K/uL    Monos (Absolute) 0.47 0.00 - 0.85 K/uL    Eos (Absolute) 0.03 0.00 - 0.51 K/uL    Baso (Absolute) 0.07 0.00 - 0.12 K/uL    Immature Granulocytes (abs) 0.02 0.00 - 0.11 K/uL    NRBC (Absolute) 0.00 K/uL   COMP METABOLIC PANEL   Result Value Ref Range    Sodium 143 135 - 145 mmol/L    Potassium 3.5 (L) 3.6 - 5.5 mmol/L    Chloride 108 96 - 112 mmol/L    Co2 23 20 - 33 mmol/L    Anion Gap 12.0 (H) 0.0 - 11.9    Glucose 103 (H) 65 - 99 mg/dL    Bun 11 8 - 22 mg/dL    Creatinine 0.62 0.50 - 1.40 mg/dL    Calcium 9.2 8.5 - 10.5 mg/dL    AST(SGOT) 11 (L) 12 - 45 U/L    ALT(SGPT) 7 2 - 50 U/L    Alkaline Phosphatase 116 (H) 30 - 99 U/L    Total Bilirubin 0.4 0.1 - 1.5 mg/dL    Albumin 4.0 3.2 - 4.9 g/dL    Total Protein 6.6 6.0 - 8.2 g/dL    Globulin 2.6 1.9 - 3.5 g/dL    A-G Ratio 1.5 g/dL   URINALYSIS CULTURE, IF INDICATED   Result Value Ref Range    Color Yellow     Character Clear     Specific Gravity 1.015 <1.035    Ph 7.0 5.0 - 8.0    Glucose Negative Negative mg/dL    Ketones 15 (A) Negative mg/dL    Protein Negative Negative mg/dL    Bilirubin Negative Negative    Urobilinogen, Urine 0.2 Negative    Nitrite Negative Negative    Leukocyte Esterase Moderate (A) Negative    Occult Blood Negative Negative    Micro Urine Req Microscopic    ESTIMATED GFR   Result Value Ref Range    GFR If African American >60 >60 mL/min/1.73 m 2    GFR If Non African American >60 >60 mL/min/1.73 m 2   URINE MICROSCOPIC (W/UA)   Result Value Ref Range    WBC 10-20 (A) /hpf    RBC 0-2 /hpf    Bacteria Moderate (A) None /hpf    Epithelial Cells Moderate (A) /hpf    Hyaline Cast 0-2 /lpf       RADIOLOGY  DX-SKULL-LIMITED 3-   Final Result         The tip of the shunt tubing projects over the inferior aspect of the heart.      The position is  in slightly different compared to prior study. But no shunt discontinuity.      DX-CHEST-LIMITED (1 VIEW)   Final Result         The tip of the shunt tubing projects over the inferior aspect of the heart.      The position is in slightly different compared to prior study. But no shunt discontinuity.      DX-SPINE-ANY ONE VIEW   Final Result         The tip of the shunt tubing projects over the inferior aspect of the heart.      The position is in slightly different compared to prior study. But no shunt discontinuity.      HS-KMBPSFP-2 VIEW   Final Result         The tip of the shunt tubing projects over the inferior aspect of the heart.      The position is in slightly different compared to prior study. But no shunt discontinuity.      CT-HEAD W/O   Final Result            1. No acute intracranial hemorrhage.      2. No dilatation of the ventricles. Slightly smaller size of the lateral ventricles compared to prior.      3. Grossly unchanged position of the left ventriculostomy catheter.                 COURSE & MEDICAL DECISION MAKING  Pertinent Labs & Imaging studies reviewed. (See chart for details)    DDX:   shunt malfunction  Migraine  Dehydration  Electrolyte derangement  UTI  Hydrocephalus  Meningitis considered however less likely based on current symptomology    MDM    Initial evaluation at 2030:    Patient presents emergency room for the symptoms as described above.  At the time of my evaluation she has some tachycardia, some pain is localized to the right temporal region and atypical headache distribution for her overall prior presentations.  With her history described, she has relatively rapid onset without recent fevers, no signs of neck pain or rigidity, no other focal neurological deficits or signs of increased intracranial pressure.  Her chart is reviewed and it is noted that she been seen on many previous occasions for similar symptomology.  With her persistent tachycardia and discomfort work-up  for other sources of acute infection is also initiated in addition to basic laboratory work-up.  She received headache cocktail and on reassessment is noted to be sleeping and reports near complete alleviation of her headache symptomology.  Urinalysis demonstrates the signs of early urinary tract infection.  She received an oral dose of Keflex here in the emergency department.  After head CT demonstrates normal sized ventricles and shunt series demonstrates no acute occlusion of her  shunt, I discussed the findings with her established neurosurgeon Dr. Hogan at 1051.  He agrees with the current work-up, treatment for UTI, and outpatient follow-up.  The patient's vital signs normalized with a heart rate resting in she is afebrile and is otherwise well-appearing at the time of discharge.  Repeat neurological exam demonstrated no interval changes.  Questions were addressed with the patient and she is discharged home in stable condition.    HYDRATION: Based on the patient's presentation of Acute Vomiting, Tachycardia and Other dry membranes the patient was given IV fluids. IV Hydration was used because oral hydration was not adequate alone. Upon recheck following hydration, the patient was improved.    The patient will not drink alcohol nor drive with prescribed medications. The patient will return for worsening symptoms and is stable at the time of discharge. The patient verbalizes understanding and will comply.      FINAL IMPRESSION  Visit Diagnoses     ICD-10-CM   1. Acute nonintractable headache, unspecified headache type R51   2. Acute cystitis with hematuria N30.01   3. Dysuria R30.0       Electronically signed by: Den Figueredo, 6/15/2019 8:01 PM

## 2019-06-16 NOTE — ED TRIAGE NOTES
Pt bib ems. Pt seen in ED multiple times for headache. Pt c/o headache again with n/v. Pt legally blind. Pt given 100mcg fentanyl pta and 4mg zofran IV. Pt with a shunt and just recently seen neurologist and had it adjusted.

## 2019-06-17 LAB
BACTERIA UR CULT: NORMAL
SIGNIFICANT IND 70042: NORMAL
SITE SITE: NORMAL
SOURCE SOURCE: NORMAL

## 2019-06-25 ENCOUNTER — HOSPITAL ENCOUNTER (EMERGENCY)
Facility: MEDICAL CENTER | Age: 49
End: 2019-06-25
Attending: EMERGENCY MEDICINE
Payer: MEDICAID

## 2019-06-25 VITALS
DIASTOLIC BLOOD PRESSURE: 82 MMHG | OXYGEN SATURATION: 98 % | RESPIRATION RATE: 18 BRPM | SYSTOLIC BLOOD PRESSURE: 113 MMHG | BODY MASS INDEX: 29.38 KG/M2 | TEMPERATURE: 96.9 F | HEART RATE: 80 BPM | WEIGHT: 171.19 LBS

## 2019-06-25 DIAGNOSIS — G43.009 MIGRAINE WITHOUT AURA AND WITHOUT STATUS MIGRAINOSUS, NOT INTRACTABLE: ICD-10-CM

## 2019-06-25 LAB
APPEARANCE UR: ABNORMAL
BACTERIA #/AREA URNS HPF: ABNORMAL /HPF
BILIRUB UR QL STRIP.AUTO: NEGATIVE
COLOR UR: YELLOW
EPI CELLS #/AREA URNS HPF: NEGATIVE /HPF
GLUCOSE UR STRIP.AUTO-MCNC: NEGATIVE MG/DL
HYALINE CASTS #/AREA URNS LPF: ABNORMAL /LPF
KETONES UR STRIP.AUTO-MCNC: NEGATIVE MG/DL
LEUKOCYTE ESTERASE UR QL STRIP.AUTO: ABNORMAL
MICRO URNS: ABNORMAL
NITRITE UR QL STRIP.AUTO: NEGATIVE
PH UR STRIP.AUTO: 7.5 [PH]
PROT UR QL STRIP: NEGATIVE MG/DL
RBC # URNS HPF: ABNORMAL /HPF
RBC UR QL AUTO: NEGATIVE
SP GR UR STRIP.AUTO: 1.02
UROBILINOGEN UR STRIP.AUTO-MCNC: 0.2 MG/DL
WBC #/AREA URNS HPF: ABNORMAL /HPF

## 2019-06-25 PROCEDURE — 81001 URINALYSIS AUTO W/SCOPE: CPT

## 2019-06-25 PROCEDURE — 99284 EMERGENCY DEPT VISIT MOD MDM: CPT

## 2019-06-25 PROCEDURE — 87086 URINE CULTURE/COLONY COUNT: CPT

## 2019-06-25 PROCEDURE — 700111 HCHG RX REV CODE 636 W/ 250 OVERRIDE (IP): Performed by: EMERGENCY MEDICINE

## 2019-06-25 PROCEDURE — 87186 SC STD MICRODIL/AGAR DIL: CPT

## 2019-06-25 PROCEDURE — 87077 CULTURE AEROBIC IDENTIFY: CPT

## 2019-06-25 PROCEDURE — 96375 TX/PRO/DX INJ NEW DRUG ADDON: CPT

## 2019-06-25 PROCEDURE — 96374 THER/PROPH/DIAG INJ IV PUSH: CPT

## 2019-06-25 PROCEDURE — 700105 HCHG RX REV CODE 258: Performed by: EMERGENCY MEDICINE

## 2019-06-25 RX ORDER — KETOROLAC TROMETHAMINE 30 MG/ML
30 INJECTION, SOLUTION INTRAMUSCULAR; INTRAVENOUS ONCE
Status: DISCONTINUED | OUTPATIENT
Start: 2019-06-25 | End: 2019-06-25

## 2019-06-25 RX ORDER — SODIUM CHLORIDE 9 MG/ML
1000 INJECTION, SOLUTION INTRAVENOUS ONCE
Status: COMPLETED | OUTPATIENT
Start: 2019-06-25 | End: 2019-06-25

## 2019-06-25 RX ORDER — KETOROLAC TROMETHAMINE 30 MG/ML
30 INJECTION, SOLUTION INTRAMUSCULAR; INTRAVENOUS ONCE
Status: COMPLETED | OUTPATIENT
Start: 2019-06-25 | End: 2019-06-25

## 2019-06-25 RX ORDER — DIPHENHYDRAMINE HYDROCHLORIDE 50 MG/ML
25 INJECTION INTRAMUSCULAR; INTRAVENOUS ONCE
Status: DISCONTINUED | OUTPATIENT
Start: 2019-06-25 | End: 2019-06-25 | Stop reason: CLARIF

## 2019-06-25 RX ORDER — DIPHENHYDRAMINE HYDROCHLORIDE 50 MG/ML
25 INJECTION INTRAMUSCULAR; INTRAVENOUS ONCE
Status: COMPLETED | OUTPATIENT
Start: 2019-06-25 | End: 2019-06-25

## 2019-06-25 RX ADMIN — SODIUM CHLORIDE 1000 ML: 9 INJECTION, SOLUTION INTRAVENOUS at 19:55

## 2019-06-25 RX ADMIN — DIPHENHYDRAMINE HYDROCHLORIDE 25 MG: 50 INJECTION INTRAMUSCULAR; INTRAVENOUS at 19:57

## 2019-06-25 RX ADMIN — KETOROLAC TROMETHAMINE 30 MG: 30 INJECTION, SOLUTION INTRAMUSCULAR at 20:00

## 2019-06-25 RX ADMIN — PROCHLORPERAZINE EDISYLATE 10 MG: 5 INJECTION INTRAMUSCULAR; INTRAVENOUS at 19:58

## 2019-06-25 NOTE — ED TRIAGE NOTES
Pt bib ems c/o HA pt states feels like the pressure she has had in past with hydrocephalus. Pt denies fever. Pt was given phenergan 12.5mg im pta and fentanyl 50mcg im pta with no relief of HA pain. Pt is blind. A&Ox4.

## 2019-06-26 NOTE — ED PROVIDER NOTES
ED Provider Note    CHIEF COMPLAINT  Chief Complaint   Patient presents with   • Headache       HPI  Rasheeda Manzano is a 48 y.o. female with a history of chronic migraine headaches, congenital hydrocephalus, status post  shunt, legal blindness, GERD, depression who presents with complaints of a migraine headache for the past 5 hours.  The patient says she woke up from sleeping, and was having a severe headache is similar to her previous migraine headaches.  She has had nausea and vomiting and has not been able to keep any fluids down for the past several hours.  She denies any fever, chills, sore throat, cough, congestion, any difficulty breathing.  She denies any chest pain, abdominal pain.  She has had no urinary symptoms or diarrhea.  The patient has had multiple visits to the emergency department with similar complaints of headaches.  This is her third visit this month, and she was seen on June 6, and Brenda 15, and had CT scans and shunt series on both previous occasions which were unremarkable.  The patient says his headache is very similar to the previous headaches.    REVIEW OF SYSTEMS  See HPI for further details. All other systems are negative.     PAST MEDICAL HISTORY  Past Medical History:   Diagnosis Date   • Blind    • C. 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   • Fall    • GERD (gastroesophageal reflux disease)    • H/O total hysterectomy    • Hydrocephalus    • Hydrocephalus     shunt drains into pleural place of L lung   • Jaundice     at birth   • Legally blind    • Migraine without aura, without mention of intractable migraine without mention of status migrainosus    • Other specified symptom associated with female genital organs     excessive bleeding   • Pain     gallbladder related   • Psychiatric problem     depression       FAMILY HISTORY  Family History   Problem Relation Age of Onset   •  Hypertension Mother    • Hypertension Father    • Non-contributory Neg Hx         Migraine       SOCIAL HISTORY  Social History     Social History   • Marital status:      Spouse name: N/A   • Number of children: N/A   • Years of education: N/A     Social History Main Topics   • Smoking status: Former Smoker     Packs/day: 1.00     Years: 10.00     Types: Cigars     Start date: 5/1/2004     Quit date: 8/9/2017   • Smokeless tobacco: Never Used      Comment:  CIGAR/day    • Alcohol use Yes      Comment: socially   • Drug use: No   • Sexual activity: Yes     Partners: Male     Other Topics Concern   • Not on file     Social History Narrative   • No narrative on file       SURGICAL HISTORY  Past Surgical History:   Procedure Laterality Date   • GASTROSCOPY N/A 1/2/2019    Procedure: GASTROSCOPY;  Surgeon: Matias Fernando M.D.;  Location: SURGERY [facility];  Service: Gastroenterology   • GASTROSCOPY-ENDO N/A 8/24/2017    Procedure: GASTROSCOPY-ENDO;  Surgeon: Matias Fernando M.D.;  Location: [facility];  Service:    • AYALA BY LAPAROSCOPY N/A 8/13/2015    Procedure: AYALA BY LAPAROSCOPY;  Surgeon: Brice Johnson M.D.;  Location: SURGERY SAME DAY [facility];  Service:    • CHOLECYSTECTOMY N/A 8/13/2015    Procedure: CHOLECYSTECTOMY;  Surgeon: Brice Johnson M.D.;  Location: SURGERY SAME DAY [facility];  Service:    • LYSIS ADHESIONS GENERAL  12/10/2013    Performed by Arie Bass M.D. at [facility]   • CYSTOSCOPY  12/10/2013    Performed by Joana Yeager M.D. at [facility]   • EXPLORATORY LAPAROTOMY  12/10/2013    Performed by Arie Bass M.D. at [facility]   • APPENDECTOMY  12/10/2013    Performed by Arie Bass M.D. at [facility]   • ABDOMINAL HYSTERECTOMY TOTAL  12/10/2013    Performed by Joana Yeager M.D. at [facility]   • LAPAROSCOPY  8/8/08    Performed by FERNANDO HENDERSON at [facility]   •  NISSEN FUNDOPLICATION LAPAROSCOPIC     • OTHER      PLEURAL SHUNT, numerous revisions       CURRENT MEDICATIONS  Home Medications     Reviewed by Amrita Zavala R.N. (Registered Nurse) on 06/25/19 at 1648  Med List Status: Partial   Medication Last Dose Status   levETIRAcetam (KEPPRA) 500 MG Tab  Active   propranolol CR (INDERAL LA) 120 MG CAPSULE SR 24 HR  Active                ALLERGIES  Allergies   Allergen Reactions   • Tape Rash     RXN= ongoing  Adhesive Medical tape. Per patient, paper tape ok.       PHYSICAL EXAM  VITAL SIGNS: /98   Pulse 89   Temp 36.1 °C (96.9 °F) (Temporal)   Resp 17   Wt 77.7 kg (171 lb 3 oz)   LMP 11/27/2013   SpO2 98%   BMI 29.38 kg/m²   Constitutional: Awake, alert, in no acute distress, Non-toxic appearance.   HENT: Atraumatic. Bilateral external ears normal, mucous membranes mildly dry, throat nonerythematous without exudates, nose is normal.  Eyes: Pupils are round and reactive to light, EOMI, can only see light, cannot see my fingers moving in her visual fields, conjunctiva moist, noninjected.  Neck: Nontender, Normal range of motion, No nuchal rigidity, No stridor.   Lymphatic: No lymphadenopathy noted.   Cardiovascular: Regular rhythm, tachycardic, rate in 100s, no murmurs, rubs, gallops.  Thorax & Lungs:  Good breath sounds bilaterally, no wheezes, rales, or retractions.  No chest tenderness.  Abdomen: Bowel sounds normal, Soft, nontender, nondistended, no rebound, guarding, masses.  Back: No CVA or spinal tenderness.  Extremities: Intact distal pulses, No edema, No tenderness.   Skin: Warm, Dry, No rashes.   Musculoskeletal: No joint swelling or tenderness.  Neurologic: Alert & oriented x 3, cranial nerves II through XII notable for legal blindness to both eyes, there is no facial asymmetry, speech is fluent, sensory intact light touch, and motor function shows 5/5 strength to the upper and lower extremities, no focal deficits.   Psychiatric: Affect normal,  Judgment normal, Mood normal.         RADIOLOGY/PROCEDURES  No orders to display         COURSE & MEDICAL DECISION MAKING  Pertinent Labs & Imaging studies reviewed. (See chart for details)  The patient presents with the above complaints.  At this time I do not feel that further imaging is required given that she has had 2- shunt series this month alone.  IV was placed, she was given a bolus of normal saline as she has had vomiting and dry mucous membranes.  She was given a dose of Toradol, Benadryl, Compazine.    HYDRATION: Based on the patient's presentation of Acute Vomiting, Dehydration, Inability to take oral fluids and Tachycardia the patient was given IV fluids. IV Hydration was used because oral hydration was not adequate alone. Upon recheck following hydration, the patient was feeling improved, tachycardia was improved.    After allowing the patient to sleep for about 2 hours, she was rechecked and was feeling much improved.  She says her headache had resolved.  Patient will be discharged with instructions to follow-up with her PCP tomorrow.  She is to return to the ER for any worsening pain, fever, vomiting, or any other problems.      FINAL IMPRESSION  1.  Migraine headache  2.  Acute nausea, vomiting  3.         Electronically signed by: Pedrito Ocasio, 6/25/2019 5:35 PM

## 2019-06-26 NOTE — ED NOTES
Pt was assisted up to bedside commode x one standby assist and clean catch urine sample obtained. Pt states recently treated for uti and finished round of antibiotics pt urine has strong smell and cloudy.

## 2019-06-26 NOTE — ED NOTES
Pt educated regarding discharge instructions and demonstrated understanding. Pt ambulatory upon discharge and does not appear to be in any distress at this time. Pt walked to [facility] where her boyfriend is on his way to pick her up. Pt's discharge paperwork and belongings sent home with pt.

## 2019-06-26 NOTE — ED NOTES
Received report from ELIZABETH Crowe. Upon shift change pt is resting in bed with even and unlabored breaths, in no distress at this time. Sitter is in view of pt. Will continue to monitor.

## 2019-06-26 NOTE — ED NOTES
Pt sleeping in bed, no distress noted. Breaths are even and unlabored at this time. Will continue to monitor.

## 2019-06-26 NOTE — ED NOTES
Pt sleeping in bed with even and unlabored breaths. No signs of distress noted at this time. Will continue to monitor.

## 2019-06-27 LAB
BACTERIA UR CULT: ABNORMAL
BACTERIA UR CULT: ABNORMAL
SIGNIFICANT IND 70042: ABNORMAL
SITE SITE: ABNORMAL
SOURCE SOURCE: ABNORMAL

## 2019-06-28 NOTE — ED NOTES
ED Positive Culture Follow-up/Notification Note:    Date: 6/28/19     Patient seen in the ED on 6/25/2019 for migraine x 5 hours.   1. Migraine without aura and without status migrainosus, not intractable       Discharge Medication List as of 6/25/2019 10:30 PM          Allergies: Tape     Vitals:    06/25/19 2100 06/25/19 2130 06/25/19 2200 06/25/19 2230   BP: 138/77 112/69 117/75 113/82   Pulse: 89 87 81 80   Resp: 14 16 16 18   Temp:       TempSrc:       SpO2:       Weight:           Final cultures:   Results     Procedure Component Value Units Date/Time    URINE CULTURE(NEW) [[id number]]  (Abnormal)  (Susceptibility) Collected:  06/25/19 1740    Order Status:  Completed Specimen:  Urine Updated:  06/27/19 0836     Significant Indicator POS (POS)     Source UR     Site -     Culture Result - (A)      Escherichia coli  >100,000 cfu/mL   (A)    Culture & Susceptibility     ESCHERICHIA COLI     Antibiotic Sensitivity Microscan Unit Status    Ampicillin Sensitive <=8 mcg/mL Final    Method: [facility]ERT    Cefepime Sensitive <=8 mcg/mL Final    Method: [facility]BERT    Cefotaxime Sensitive <=2 mcg/mL Final    Method: [facility]BERT    Cefotetan Sensitive <=16 mcg/mL Final    Method: [facility]BERT    Ceftazidime Sensitive <=1 mcg/mL Final    Method: [facility]BERT    Ceftriaxone Sensitive <=8 mcg/mL Final    Method: [facility]BERT    Cefuroxime Sensitive <=4 mcg/mL Final    Method: [facility]BERT    Cephalothin Sensitive <=8 mcg/mL Final    Method: [facility]BERT    Ciprofloxacin Sensitive <=1 mcg/mL Final    Method: [facility]BERT    Gentamicin Sensitive <=4 mcg/mL Final    Method: [facility]BERT    Levofloxacin Sensitive <=2 mcg/mL Final    Method: [facility]BERT    Nitrofurantoin Sensitive <=32 mcg/mL Final    Method: [facility]BERT    Pip/Tazobactam Sensitive <=16 mcg/mL Final    Method: DELBERT    Piperacillin Sensitive <=16 mcg/mL Final    Method: [facility]BERT    Tigecycline Sensitive <=2 mcg/mL Final    Method: [facility]BERT    Tobramycin Sensitive <=4 mcg/mL Final    Method: [facility]BERT    Trimeth/Sulfa Sensitive <=2/38 mcg/mL Final    Method: [facility]BERT                        URINALYSIS,CULTURE IF INDICATED [[id number]]  (Abnormal) Collected:  06/25/19 1740    Order Status:  Completed Specimen:  Urine Updated:  06/25/19 1754     Color Yellow     Character Turbid (A)     Specific Gravity 1.016     Ph 7.5     Glucose Negative mg/dL      Ketones Negative mg/dL      Protein Negative mg/dL      Bilirubin Negative     Urobilinogen, Urine 0.2     Nitrite Negative     Leukocyte Esterase Trace (A)     Occult Blood Negative     Micro Urine Req Microscopic          Plan:   Patient without urinary complaints.  Culture represents asymptomatic bacteriuria. No need to treat at this time.     Anna Kenny

## 2019-09-09 ENCOUNTER — APPOINTMENT (OUTPATIENT)
Dept: RADIOLOGY | Facility: MEDICAL CENTER | Age: 49
End: 2019-09-09
Attending: FAMILY MEDICINE
Payer: MEDICAID

## 2019-09-09 ENCOUNTER — HOSPITAL ENCOUNTER (OUTPATIENT)
Facility: MEDICAL CENTER | Age: 49
End: 2019-09-10
Attending: EMERGENCY MEDICINE | Admitting: FAMILY MEDICINE
Payer: MEDICAID

## 2019-09-09 DIAGNOSIS — R51.9 ACUTE INTRACTABLE HEADACHE, UNSPECIFIED HEADACHE TYPE: ICD-10-CM

## 2019-09-09 DIAGNOSIS — R51.9 CHRONIC INTRACTABLE HEADACHE, UNSPECIFIED HEADACHE TYPE: ICD-10-CM

## 2019-09-09 DIAGNOSIS — R11.2 INTRACTABLE VOMITING WITH NAUSEA, UNSPECIFIED VOMITING TYPE: ICD-10-CM

## 2019-09-09 DIAGNOSIS — G89.29 CHRONIC INTRACTABLE HEADACHE, UNSPECIFIED HEADACHE TYPE: ICD-10-CM

## 2019-09-09 LAB
ALBUMIN SERPL BCP-MCNC: 4.3 G/DL (ref 3.2–4.9)
ALBUMIN/GLOB SERPL: 1.2 G/DL
ALP SERPL-CCNC: 109 U/L (ref 30–99)
ALT SERPL-CCNC: 18 U/L (ref 2–50)
ANION GAP SERPL CALC-SCNC: 15 MMOL/L (ref 0–11.9)
AST SERPL-CCNC: 24 U/L (ref 12–45)
BASOPHILS # BLD AUTO: 0.8 % (ref 0–1.8)
BASOPHILS # BLD: 0.07 K/UL (ref 0–0.12)
BILIRUB SERPL-MCNC: 0.3 MG/DL (ref 0.1–1.5)
BUN SERPL-MCNC: 9 MG/DL (ref 8–22)
CALCIUM SERPL-MCNC: 8.7 MG/DL (ref 8.5–10.5)
CHLORIDE SERPL-SCNC: 108 MMOL/L (ref 96–112)
CO2 SERPL-SCNC: 21 MMOL/L (ref 20–33)
COMMENT 1642: NORMAL
CREAT SERPL-MCNC: 0.64 MG/DL (ref 0.5–1.4)
CRP SERPL HS-MCNC: 0.23 MG/DL (ref 0–0.75)
EOSINOPHIL # BLD AUTO: 0.08 K/UL (ref 0–0.51)
EOSINOPHIL NFR BLD: 0.9 % (ref 0–6.9)
ERYTHROCYTE [DISTWIDTH] IN BLOOD BY AUTOMATED COUNT: 50.1 FL (ref 35.9–50)
ERYTHROCYTE [SEDIMENTATION RATE] IN BLOOD BY WESTERGREN METHOD: 25 MM/HOUR (ref 0–20)
GLOBULIN SER CALC-MCNC: 3.5 G/DL (ref 1.9–3.5)
GLUCOSE SERPL-MCNC: 132 MG/DL (ref 65–99)
HCG SERPL QL: NEGATIVE
HCT VFR BLD AUTO: 42 % (ref 37–47)
HGB BLD-MCNC: 13.6 G/DL (ref 12–16)
IMM GRANULOCYTES # BLD AUTO: 0.06 K/UL (ref 0–0.11)
IMM GRANULOCYTES NFR BLD AUTO: 0.6 % (ref 0–0.9)
LYMPHOCYTES # BLD AUTO: 1.59 K/UL (ref 1–4.8)
LYMPHOCYTES NFR BLD: 17.2 % (ref 22–41)
MCH RBC QN AUTO: 28.3 PG (ref 27–33)
MCHC RBC AUTO-ENTMCNC: 32.4 G/DL (ref 33.6–35)
MCV RBC AUTO: 87.3 FL (ref 81.4–97.8)
MONOCYTES # BLD AUTO: 0.62 K/UL (ref 0–0.85)
MONOCYTES NFR BLD AUTO: 6.7 % (ref 0–13.4)
MORPHOLOGY BLD-IMP: NORMAL
NEUTROPHILS # BLD AUTO: 6.83 K/UL (ref 2–7.15)
NEUTROPHILS NFR BLD: 73.8 % (ref 44–72)
NRBC # BLD AUTO: 0 K/UL
NRBC BLD-RTO: 0 /100 WBC
PLATELET # BLD AUTO: 294 K/UL (ref 164–446)
PMV BLD AUTO: 10.8 FL (ref 9–12.9)
POTASSIUM SERPL-SCNC: 3.7 MMOL/L (ref 3.6–5.5)
PROT SERPL-MCNC: 7.8 G/DL (ref 6–8.2)
RBC # BLD AUTO: 4.81 M/UL (ref 4.2–5.4)
SODIUM SERPL-SCNC: 144 MMOL/L (ref 135–145)
WBC # BLD AUTO: 9.3 K/UL (ref 4.8–10.8)

## 2019-09-09 PROCEDURE — 96376 TX/PRO/DX INJ SAME DRUG ADON: CPT

## 2019-09-09 PROCEDURE — 96375 TX/PRO/DX INJ NEW DRUG ADDON: CPT

## 2019-09-09 PROCEDURE — 84703 CHORIONIC GONADOTROPIN ASSAY: CPT

## 2019-09-09 PROCEDURE — 96365 THER/PROPH/DIAG IV INF INIT: CPT

## 2019-09-09 PROCEDURE — 85652 RBC SED RATE AUTOMATED: CPT

## 2019-09-09 PROCEDURE — 96366 THER/PROPH/DIAG IV INF ADDON: CPT

## 2019-09-09 PROCEDURE — 80053 COMPREHEN METABOLIC PANEL: CPT

## 2019-09-09 PROCEDURE — 99220 PR INITIAL OBSERVATION CARE,LEVL III: CPT | Performed by: FAMILY MEDICINE

## 2019-09-09 PROCEDURE — G0378 HOSPITAL OBSERVATION PER HR: HCPCS

## 2019-09-09 PROCEDURE — 70450 CT HEAD/BRAIN W/O DYE: CPT

## 2019-09-09 PROCEDURE — 86140 C-REACTIVE PROTEIN: CPT

## 2019-09-09 PROCEDURE — A9270 NON-COVERED ITEM OR SERVICE: HCPCS | Performed by: FAMILY MEDICINE

## 2019-09-09 PROCEDURE — 99285 EMERGENCY DEPT VISIT HI MDM: CPT

## 2019-09-09 PROCEDURE — 700105 HCHG RX REV CODE 258: Performed by: EMERGENCY MEDICINE

## 2019-09-09 PROCEDURE — 700102 HCHG RX REV CODE 250 W/ 637 OVERRIDE(OP): Performed by: FAMILY MEDICINE

## 2019-09-09 PROCEDURE — 700111 HCHG RX REV CODE 636 W/ 250 OVERRIDE (IP): Performed by: EMERGENCY MEDICINE

## 2019-09-09 PROCEDURE — 304561 HCHG STAT O2

## 2019-09-09 PROCEDURE — 700111 HCHG RX REV CODE 636 W/ 250 OVERRIDE (IP): Performed by: FAMILY MEDICINE

## 2019-09-09 PROCEDURE — 700105 HCHG RX REV CODE 258: Performed by: FAMILY MEDICINE

## 2019-09-09 PROCEDURE — 85025 COMPLETE CBC W/AUTO DIFF WBC: CPT

## 2019-09-09 RX ORDER — ONDANSETRON 2 MG/ML
4 INJECTION INTRAMUSCULAR; INTRAVENOUS
Status: DISCONTINUED | OUTPATIENT
Start: 2019-09-09 | End: 2019-09-10 | Stop reason: HOSPADM

## 2019-09-09 RX ORDER — KETOROLAC TROMETHAMINE 30 MG/ML
30 INJECTION, SOLUTION INTRAMUSCULAR; INTRAVENOUS EVERY 6 HOURS PRN
Status: DISCONTINUED | OUTPATIENT
Start: 2019-09-09 | End: 2019-09-10 | Stop reason: HOSPADM

## 2019-09-09 RX ORDER — SODIUM CHLORIDE 9 MG/ML
INJECTION, SOLUTION INTRAVENOUS CONTINUOUS
Status: DISCONTINUED | OUTPATIENT
Start: 2019-09-09 | End: 2019-09-10 | Stop reason: HOSPADM

## 2019-09-09 RX ORDER — HYDRALAZINE HYDROCHLORIDE 20 MG/ML
10 INJECTION INTRAMUSCULAR; INTRAVENOUS EVERY 6 HOURS PRN
Status: DISCONTINUED | OUTPATIENT
Start: 2019-09-09 | End: 2019-09-10 | Stop reason: HOSPADM

## 2019-09-09 RX ORDER — ONDANSETRON 4 MG/1
4 TABLET, ORALLY DISINTEGRATING ORAL EVERY 4 HOURS PRN
Status: DISCONTINUED | OUTPATIENT
Start: 2019-09-09 | End: 2019-09-10 | Stop reason: HOSPADM

## 2019-09-09 RX ORDER — KETOROLAC TROMETHAMINE 30 MG/ML
15 INJECTION, SOLUTION INTRAMUSCULAR; INTRAVENOUS ONCE
Status: COMPLETED | OUTPATIENT
Start: 2019-09-09 | End: 2019-09-09

## 2019-09-09 RX ORDER — ONDANSETRON 2 MG/ML
4 INJECTION INTRAMUSCULAR; INTRAVENOUS EVERY 4 HOURS PRN
Status: DISCONTINUED | OUTPATIENT
Start: 2019-09-09 | End: 2019-09-10 | Stop reason: HOSPADM

## 2019-09-09 RX ORDER — PROMETHAZINE HYDROCHLORIDE 25 MG/1
12.5-25 TABLET ORAL EVERY 4 HOURS PRN
Status: DISCONTINUED | OUTPATIENT
Start: 2019-09-09 | End: 2019-09-10 | Stop reason: HOSPADM

## 2019-09-09 RX ORDER — SODIUM CHLORIDE 9 MG/ML
1000 INJECTION, SOLUTION INTRAVENOUS ONCE
Status: COMPLETED | OUTPATIENT
Start: 2019-09-09 | End: 2019-09-09

## 2019-09-09 RX ORDER — DIPHENHYDRAMINE HYDROCHLORIDE 50 MG/ML
12.5 INJECTION INTRAMUSCULAR; INTRAVENOUS ONCE
Status: COMPLETED | OUTPATIENT
Start: 2019-09-09 | End: 2019-09-09

## 2019-09-09 RX ORDER — PROCHLORPERAZINE EDISYLATE 5 MG/ML
5-10 INJECTION INTRAMUSCULAR; INTRAVENOUS EVERY 4 HOURS PRN
Status: DISCONTINUED | OUTPATIENT
Start: 2019-09-09 | End: 2019-09-10 | Stop reason: HOSPADM

## 2019-09-09 RX ORDER — AMOXICILLIN 250 MG
2 CAPSULE ORAL 2 TIMES DAILY
Status: DISCONTINUED | OUTPATIENT
Start: 2019-09-09 | End: 2019-09-10 | Stop reason: HOSPADM

## 2019-09-09 RX ORDER — PROMETHAZINE HYDROCHLORIDE 12.5 MG/1
12.5-25 SUPPOSITORY RECTAL EVERY 4 HOURS PRN
Status: DISCONTINUED | OUTPATIENT
Start: 2019-09-09 | End: 2019-09-10 | Stop reason: HOSPADM

## 2019-09-09 RX ORDER — ACETAMINOPHEN 325 MG/1
650 TABLET ORAL EVERY 6 HOURS PRN
Status: DISCONTINUED | OUTPATIENT
Start: 2019-09-09 | End: 2019-09-10 | Stop reason: HOSPADM

## 2019-09-09 RX ORDER — HYDROMORPHONE HYDROCHLORIDE 1 MG/ML
0.5 INJECTION, SOLUTION INTRAMUSCULAR; INTRAVENOUS; SUBCUTANEOUS
Status: COMPLETED | OUTPATIENT
Start: 2019-09-09 | End: 2019-09-09

## 2019-09-09 RX ORDER — MAGNESIUM SULFATE HEPTAHYDRATE 40 MG/ML
2 INJECTION, SOLUTION INTRAVENOUS ONCE
Status: COMPLETED | OUTPATIENT
Start: 2019-09-09 | End: 2019-09-09

## 2019-09-09 RX ORDER — PROPRANOLOL HCL 60 MG
120 CAPSULE, EXTENDED RELEASE 24HR ORAL EVERY EVENING
Status: DISCONTINUED | OUTPATIENT
Start: 2019-09-09 | End: 2019-09-10 | Stop reason: HOSPADM

## 2019-09-09 RX ORDER — POLYETHYLENE GLYCOL 3350 17 G/17G
1 POWDER, FOR SOLUTION ORAL
Status: DISCONTINUED | OUTPATIENT
Start: 2019-09-09 | End: 2019-09-10 | Stop reason: HOSPADM

## 2019-09-09 RX ORDER — BISACODYL 10 MG
10 SUPPOSITORY, RECTAL RECTAL
Status: DISCONTINUED | OUTPATIENT
Start: 2019-09-09 | End: 2019-09-10 | Stop reason: HOSPADM

## 2019-09-09 RX ORDER — LEVETIRACETAM 500 MG/1
500 TABLET ORAL 2 TIMES DAILY
Status: DISCONTINUED | OUTPATIENT
Start: 2019-09-09 | End: 2019-09-10 | Stop reason: HOSPADM

## 2019-09-09 RX ORDER — PROCHLORPERAZINE EDISYLATE 5 MG/ML
10 INJECTION INTRAMUSCULAR; INTRAVENOUS ONCE
Status: COMPLETED | OUTPATIENT
Start: 2019-09-09 | End: 2019-09-09

## 2019-09-09 RX ADMIN — KETOROLAC TROMETHAMINE 30 MG: 30 INJECTION, SOLUTION INTRAMUSCULAR at 20:55

## 2019-09-09 RX ADMIN — SODIUM CHLORIDE: 9 INJECTION, SOLUTION INTRAVENOUS at 15:58

## 2019-09-09 RX ADMIN — SODIUM CHLORIDE 1000 ML: 9 INJECTION, SOLUTION INTRAVENOUS at 11:45

## 2019-09-09 RX ADMIN — PROCHLORPERAZINE EDISYLATE 10 MG: 5 INJECTION INTRAMUSCULAR; INTRAVENOUS at 14:34

## 2019-09-09 RX ADMIN — SODIUM CHLORIDE 1000 ML: 9 INJECTION, SOLUTION INTRAVENOUS at 09:33

## 2019-09-09 RX ADMIN — MAGNESIUM SULFATE 2 G: 2 INJECTION INTRAVENOUS at 15:57

## 2019-09-09 RX ADMIN — KETOROLAC TROMETHAMINE 15 MG: 30 INJECTION, SOLUTION INTRAMUSCULAR at 14:33

## 2019-09-09 RX ADMIN — DIPHENHYDRAMINE HYDROCHLORIDE 12.5 MG: 50 INJECTION INTRAMUSCULAR; INTRAVENOUS at 14:32

## 2019-09-09 RX ADMIN — ONDANSETRON 4 MG: 2 INJECTION INTRAMUSCULAR; INTRAVENOUS at 09:25

## 2019-09-09 RX ADMIN — LEVETIRACETAM 500 MG: 500 TABLET ORAL at 17:50

## 2019-09-09 RX ADMIN — HYDROMORPHONE HYDROCHLORIDE 0.5 MG: 1 INJECTION, SOLUTION INTRAMUSCULAR; INTRAVENOUS; SUBCUTANEOUS at 11:16

## 2019-09-09 RX ADMIN — PROPRANOLOL HYDROCHLORIDE 120 MG: 60 CAPSULE, EXTENDED RELEASE ORAL at 17:50

## 2019-09-09 RX ADMIN — HYDROMORPHONE HYDROCHLORIDE 0.5 MG: 1 INJECTION, SOLUTION INTRAMUSCULAR; INTRAVENOUS; SUBCUTANEOUS at 09:25

## 2019-09-09 RX ADMIN — ACETAMINOPHEN 650 MG: 325 TABLET, FILM COATED ORAL at 23:58

## 2019-09-09 ASSESSMENT — LIFESTYLE VARIABLES
DO YOU DRINK ALCOHOL: NO
HOW MANY TIMES IN THE PAST YEAR HAVE YOU HAD 5 OR MORE DRINKS IN A DAY: 0
ALCOHOL_USE: NO
EVER FELT BAD OR GUILTY ABOUT YOUR DRINKING: NO
TOTAL SCORE: 0
AVERAGE NUMBER OF DAYS PER WEEK YOU HAVE A DRINK CONTAINING ALCOHOL: 0
TOTAL SCORE: 0
EVER HAD A DRINK FIRST THING IN THE MORNING TO STEADY YOUR NERVES TO GET RID OF A HANGOVER: NO
EVER_SMOKED: NEVER
TOTAL SCORE: 0
HAVE PEOPLE ANNOYED YOU BY CRITICIZING YOUR DRINKING: NO
ON A TYPICAL DAY WHEN YOU DRINK ALCOHOL HOW MANY DRINKS DO YOU HAVE: 0
CONSUMPTION TOTAL: NEGATIVE
HAVE YOU EVER FELT YOU SHOULD CUT DOWN ON YOUR DRINKING: NO

## 2019-09-09 ASSESSMENT — ENCOUNTER SYMPTOMS
BLURRED VISION: 1
SPEECH CHANGE: 0
SHORTNESS OF BREATH: 0
COUGH: 0
NAUSEA: 1
NECK PAIN: 0
DIZZINESS: 0
NERVOUS/ANXIOUS: 1
HEARTBURN: 0
SORE THROAT: 0
DIAPHORESIS: 0
FOCAL WEAKNESS: 0
ABDOMINAL PAIN: 0
FLANK PAIN: 0
CHILLS: 0
FEVER: 0
DIARRHEA: 0
SENSORY CHANGE: 0
BACK PAIN: 0
WHEEZING: 0
PALPITATIONS: 0
WEAKNESS: 0
LOSS OF CONSCIOUSNESS: 0
HEADACHES: 1
VOMITING: 0

## 2019-09-09 ASSESSMENT — PATIENT HEALTH QUESTIONNAIRE - PHQ9
1. LITTLE INTEREST OR PLEASURE IN DOING THINGS: NOT AT ALL
SUM OF ALL RESPONSES TO PHQ9 QUESTIONS 1 AND 2: 0
2. FEELING DOWN, DEPRESSED, IRRITABLE, OR HOPELESS: NOT AT ALL

## 2019-09-09 NOTE — ED PROVIDER NOTES
"ED Provider Note    CHIEF COMPLAINT  \"My migraine.\"    HPI  Rasheeda Manzano is a 48 y.o. female who presents complaint of a headache.  Contrary to the nursing notes, this was not a sudden onset.  Patient started feeling poorly yesterday evening with a mild diffuse headache.  Is gotten progressively worse over the course of the night and the day.  She vomited one time.  She had had some drinking last night.  There is been no fever.  No recent cough or cold symptoms.  No trauma at all.  The headache is diffuse, nothing really makes it better or worse.  When asked if it is similar to headaches in the past, and she states \"yeah it is a migraine.\"  She is had frequent headaches.  From the record, it appears that she has had 9 CT scans of her brain at this facility in the last 12 months.  She had 2 shunt studies back in June.  There is been no chest pain or shortness of breath.  No belly pain.  No change in bowel or bladder.  There is no other complaint.    PAST MEDICAL HISTORY  Past Medical History:   Diagnosis Date   • Blind    • C. 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   • Fall    • GERD (gastroesophageal reflux disease)    • H/O total hysterectomy    • Hydrocephalus    • Hydrocephalus     shunt drains into pleural place of L lung   • Jaundice     at birth   • Legally blind    • Migraine without aura, without mention of intractable migraine without mention of status migrainosus    • Other specified symptom associated with female genital organs     excessive bleeding   • Pain     gallbladder related   • Psychiatric problem     depression       FAMILY HISTORY  Family History   Problem Relation Age of Onset   • Hypertension Mother    • Hypertension Father    • Non-contributory Neg Hx         Migraine       SOCIAL HISTORY  Social History     Tobacco Use   • Smoking status: Former Smoker     Packs/day: 1.00     Years: 10.00 "     Pack years: 10.00     Types: Cigars     Start date: 2004     Last attempt to quit: 2017     Years since quittin.0   • Smokeless tobacco: Never Used   • Tobacco comment:  CIGAR/day    Substance Use Topics   • Alcohol use: Yes     Comment: socially   • Drug use: No         SURGICAL HISTORY  Past Surgical History:   Procedure Laterality Date   • GASTROSCOPY N/A 2019    Procedure: GASTROSCOPY;  Surgeon: Matias Fernando M.D.;  Location: SURGERY [facility];  Service: Gastroenterology   • GASTROSCOPY-ENDO N/A 2017    Procedure: GASTROSCOPY-ENDO;  Surgeon: Matias Fernando M.D.;  Location: ENDOSCOPY [facility];  Service:    • AYALA BY LAPAROSCOPY N/A 2015    Procedure: AYALA BY LAPAROSCOPY;  Surgeon: Brice Johnson M.D.;  Location: SURGERY SAME DAY [facility];  Service:    • CHOLECYSTECTOMY N/A 2015    Procedure: CHOLECYSTECTOMY;  Surgeon: Brice Johnson M.D.;  Location: SURGERY SAME DAY [facility];  Service:    • LYSIS ADHESIONS GENERAL  12/10/2013    Performed by Arie Bass M.D. at SURGERY [facility]   • CYSTOSCOPY  12/10/2013    Performed by Joana Yeager M.D. at [facility]   • EXPLORATORY LAPAROTOMY  12/10/2013    Performed by Arie Bass M.D. at [facility]   • APPENDECTOMY  12/10/2013    Performed by Aire Bass M.D. at [facility]   • ABDOMINAL HYSTERECTOMY TOTAL  12/10/2013    Performed by Joana Yeager M.D. at [facility]   • LAPAROSCOPY  08    Performed by FERNANDO HENDERSON at [facility]   • NISSEN FUNDOPLICATION LAPAROSCOPIC     • OTHER      PLEURAL SHUNT, numerous revisions       CURRENT MEDICATIONS  Home Medications     Reviewed by Leighann Alicia R.N. (Registered Nurse) on 19 at 0844  Med List Status: Not Addressed   Medication Last Dose Status   levETIRAcetam (KEPPRA) 500 MG Tab  Active   propranolol CR (INDERAL LA) 120 MG CAPSULE SR 24 HR  Active                I have  "reviewed the nurses notes and/or the list brought with the patient.    ALLERGIES  Allergies   Allergen Reactions   • Tape Rash     RXN= ongoing  Adhesive Medical tape. Per patient, paper tape ok.       REVIEW OF SYSTEMS  See HPI for further details. Review of systems as above, otherwise all other systems are negative.     PHYSICAL EXAM  VITAL SIGNS: /81   Pulse (!) 111   Temp 35.8 °C (96.5 °F) (Temporal)   Resp 20   Ht 1.626 m (5' 4\")   Wt 63.5 kg (140 lb)   LMP 11/27/2013   BMI 24.03 kg/m²     Constitutional: Well appearing patient in no acute distress.  Not toxic, nor ill in appearance.  HENT: Mucus membranes somewhat dry.  Oropharynx is clear.  Eyes: Pupils equally round.  No scleral icterus.   Neck: Full nontender range of motion.  Lymphatic: No cervical lymphadenopathy noted.   Cardiovascular: Tachycardic.  No murmurs, rubs, nor gallop appreciated.   Thorax & Lungs: Chest is nontender.  Lungs are clear to auscultation with good air movement bilaterally.  No wheeze, rhonchi, nor rales.   Abdomen: Soft, with no tenderness, rebound nor guarding.  No mass, pulsatile mass, nor hepatosplenomegaly appreciated.  Skin: No purpura nor petechia noted.  Extremities/Musculoskeletal: No sign of trauma.  Calves are nontender with no cords nor edema.  No Gene's sign.  Pulses are intact all around.   Neurologic: Alert & oriented.  Strength and sensation is intact all around.  She is blind.  Cranial nerves otherwise unremarkable.  Psychiatric: Normal affect appropriate for the clinical situation.    LABS  Labs Reviewed   CBC WITH DIFFERENTIAL - Abnormal; Notable for the following components:       Result Value    MCHC 32.4 (*)     RDW 50.1 (*)     Neutrophils-Polys 73.80 (*)     Lymphocytes 17.20 (*)     All other components within normal limits   COMP METABOLIC PANEL - Abnormal; Notable for the following components:    Anion Gap 15.0 (*)     Glucose 132 (*)     Alkaline Phosphatase 109 (*)     All other components " within normal limits   HCG QUAL SERUM   ESTIMATED GFR   PERIPHERAL SMEAR REVIEW   DIFFERENTIAL COMMENT   WESTERGREN SED RATE   CRP QUANTITIVE (NON-CARDIAC)         MEDICAL RECORD  I have reviewed patient's medical record and pertinent results are listed above.    COURSE & MEDICAL DECISION MAKING  I have reviewed any medical record information, laboratory studies and radiographic results as noted above.  Patient presents with a headache which is slow in onset and has progressively worsened.  It feels the same to headache she has had previously.  We talked about imaging.  Together we have agreed that given there is no change in her usual migraine headaches, shunt films and another head CT is not indicated.  I did review her last head CT, and this showed no acute intra-cranial hemorrhage, no ventricular dilatation.      IVF are given because the patient is tachycardic, has slightly dry mucous membranes, and was not felt to be candidate for p.o. hydration initially.  She still tachycardic despite 2 L of IV fluid, still feels lightheaded and dizzy.  Still feels nauseated.    She was given Dilaudid and Zofran, still is feeling symptomatically after this.  I recommended Compazine Toradol and Benadryl which she is gotten last time, however she refuses.  At this point we will put her in the hospital for ongoing IV fluid hydration.  I discussed the patient's case with Dr Bill who was seen and admitted her.      FINAL IMPRESSION  1. Intractable vomiting with nausea, unspecified vomiting type    2. Acute intractable headache, unspecified headache type    3. Chronic intractable headache, unspecified headache type           This dictation was created using voice recognition software.    Electronically signed by: Shai Hills, 9/9/2019 9:29 AM

## 2019-09-09 NOTE — ED TRIAGE NOTES
Chief Complaint   Patient presents with   • Headache     BIB EMS for c/o headache.  Sudden onset at 0700 today, vomited x 1.  Denies trauma.  A&Ox4.  Speech clear.  Denies weakness.

## 2019-09-09 NOTE — H&P
"Hospital Medicine History & Physical Note    Date of Service  9/9/2019    Primary Care Physician  Tabitha Bautista M.D.    Consultants  None    Code Status  Full    Chief Complaint  headache    History of Presenting Illness  48 y.o. female who presented 9/9/2019 with headache.  Patient states that she woke up at 6 AM with a headache and associated symptoms of nausea.  She denies any vomiting.  She denies any dizziness lightheadedness.  Patient has history of blindness,  shunt in place for hydrocephalus.  I ordered a CT scan which shows the  shunt in place with no signs of hydrocephalus.  He has had history of migraines in the past, patient states \"only Dilaudid works for me.\"  She has received 2 doses of Dilaudid in the emergency room.  ER physician ordered IV Toradol, Benadryl, Compazine, which she refused.  After lengthy discussion with patient, she is willing to try these medications, I also added IV magnesium.  She denies any numbness or weakness, chest pain, palpitations, shortness of breath or diaphoresis.  She denies any fever or chills.    Review of Systems  Review of Systems   Constitutional: Negative for chills, diaphoresis, fever and malaise/fatigue.   HENT: Negative for hearing loss and sore throat.    Eyes: Positive for blurred vision.   Respiratory: Negative for cough, shortness of breath and wheezing.    Cardiovascular: Negative for chest pain, palpitations and leg swelling.   Gastrointestinal: Positive for nausea. Negative for abdominal pain, diarrhea, heartburn and vomiting.   Genitourinary: Negative for dysuria, flank pain and hematuria.   Musculoskeletal: Negative for back pain and neck pain.   Skin: Negative for rash.   Neurological: Positive for headaches. Negative for dizziness, sensory change, speech change, focal weakness, loss of consciousness and weakness.   Psychiatric/Behavioral: The patient is nervous/anxious.        Past Medical History   has a past medical history of Blind, C. " difficile diarrhea (2013), Chronic daily headache, Depression, Esophageal atresia (1971), Fall, GERD (gastroesophageal reflux disease), H/O total hysterectomy, Hydrocephalus, Hydrocephalus, Jaundice, Legally blind, Migraine without aura, without mention of intractable migraine without mention of status migrainosus, Other specified symptom associated with female genital organs, Pain, and Psychiatric problem.    Surgical History   has a past surgical history that includes laparoscopy (8/8/08); lysis adhesions general (12/10/2013); cystoscopy (12/10/2013); exploratory laparotomy (12/10/2013); appendectomy (12/10/2013); abdominal hysterectomy total (12/10/2013); aakash by laparoscopy (N/A, 8/13/2015); cholecystectomy (N/A, 8/13/2015); other; gastroscopy-endo (N/A, 8/24/2017); nissen fundoplication laparoscopic; and gastroscopy (N/A, 1/2/2019).     Family History  family history includes Hypertension in her father and mother.     Social History   reports that she quit smoking about 2 years ago. Her smoking use included cigars. She started smoking about 15 years ago. She has a 10.00 pack-year smoking history. She has never used smokeless tobacco. She reports that she drinks alcohol. She reports that she does not use drugs.    Allergies  Allergies   Allergen Reactions   • Tape Rash     RXN= ongoing  Adhesive Medical tape. Per patient, paper tape ok.       Medications  Prior to Admission Medications   Prescriptions Last Dose Informant Patient Reported? Taking?   levETIRAcetam (KEPPRA) 500 MG Tab 9/9/2019 at AM Patient No No   Sig: Take 1 Tab by mouth 2 Times a Day.   propranolol CR (INDERAL LA) 120 MG CAPSULE SR 24 HR 9/8/2019 at PM Patient Yes No   Sig: Take 120 mg by mouth every evening.      Facility-Administered Medications: None       Physical Exam  Temp:  [35.8 °C (96.5 °F)-36.7 °C (98.1 °F)] 35.9 °C (96.7 °F)  Pulse:  [111-127] 127  Resp:  [14-20] 18  BP: (138-144)/(74-94) 138/94  SpO2:  [100 %] 100  %    Physical Exam   Constitutional: She is oriented to person, place, and time. She appears well-developed and well-nourished.   HENT:   Head: Normocephalic and atraumatic.   Eyes: Conjunctivae are normal.   Neck: No tracheal deviation present. No thyromegaly present.   Cardiovascular: Normal rate and regular rhythm.   Pulmonary/Chest: Effort normal and breath sounds normal.   Abdominal: Soft. Bowel sounds are normal. She exhibits no distension. There is no tenderness.   Musculoskeletal: She exhibits no edema.   Lymphadenopathy:     She has no cervical adenopathy.   Neurological: She is alert and oriented to person, place, and time. No cranial nerve deficit.   MMT 5/5   Skin: Skin is warm and dry.   Nursing note and vitals reviewed.      Laboratory:  Recent Labs     09/09/19  0852   WBC 9.3   RBC 4.81   HEMOGLOBIN 13.6   HEMATOCRIT 42.0   MCV 87.3   MCH 28.3   MCHC 32.4*   RDW 50.1*   PLATELETCT 294   MPV 10.8     Recent Labs     09/09/19  0852   SODIUM 144   POTASSIUM 3.7   CHLORIDE 108   CO2 21   GLUCOSE 132*   BUN 9   CREATININE 0.64   CALCIUM 8.7     Recent Labs     09/09/19  0852   ALTSGPT 18   ASTSGOT 24   ALKPHOSPHAT 109*   TBILIRUBIN 0.3   GLUCOSE 132*         No results for input(s): NTPROBNP in the last 72 hours.      No results for input(s): TROPONINT in the last 72 hours.    Urinalysis:    No results found     Imaging:  CT-HEAD W/O   Final Result      Left posterior ventriculostomy tube unchanged in position.   Ventricular size is unchanged. No hydrocephalus.   No acute hemorrhage.            Assessment/Plan:  I anticipate this patient is appropriate for observation status at this time.    * Headache- (present on admission)  Assessment & Plan  Trial of IV Toradol, IV magnesium, IV Benadryl, IV Compazine  Check ESR and CRP     (ventriculoperitoneal) shunt status- (present on admission)  Assessment & Plan  Stable    Essential hypertension- (present on admission)  Assessment & Plan  Continue  propranolol  IV hydralazine as needed with parameters      Seizure (HCC)- (present on admission)  Assessment & Plan  Continue Keppra    Blindness- (present on admission)  Assessment & Plan  chronic        VTE prophylaxis: SCD

## 2019-09-10 VITALS
HEART RATE: 85 BPM | BODY MASS INDEX: 26.46 KG/M2 | RESPIRATION RATE: 17 BRPM | SYSTOLIC BLOOD PRESSURE: 139 MMHG | TEMPERATURE: 97.2 F | WEIGHT: 154.98 LBS | OXYGEN SATURATION: 97 % | DIASTOLIC BLOOD PRESSURE: 87 MMHG | HEIGHT: 64 IN

## 2019-09-10 LAB
ANION GAP SERPL CALC-SCNC: 10 MMOL/L (ref 0–11.9)
BASOPHILS # BLD AUTO: 0.7 % (ref 0–1.8)
BASOPHILS # BLD: 0.05 K/UL (ref 0–0.12)
BUN SERPL-MCNC: 10 MG/DL (ref 8–22)
CALCIUM SERPL-MCNC: 8.8 MG/DL (ref 8.5–10.5)
CHLORIDE SERPL-SCNC: 109 MMOL/L (ref 96–112)
CO2 SERPL-SCNC: 22 MMOL/L (ref 20–33)
CREAT SERPL-MCNC: 0.65 MG/DL (ref 0.5–1.4)
EOSINOPHIL # BLD AUTO: 0.13 K/UL (ref 0–0.51)
EOSINOPHIL NFR BLD: 1.7 % (ref 0–6.9)
ERYTHROCYTE [DISTWIDTH] IN BLOOD BY AUTOMATED COUNT: 49.7 FL (ref 35.9–50)
GLUCOSE SERPL-MCNC: 98 MG/DL (ref 65–99)
HCT VFR BLD AUTO: 40.3 % (ref 37–47)
HGB BLD-MCNC: 13.1 G/DL (ref 12–16)
IMM GRANULOCYTES # BLD AUTO: 0.04 K/UL (ref 0–0.11)
IMM GRANULOCYTES NFR BLD AUTO: 0.5 % (ref 0–0.9)
LYMPHOCYTES # BLD AUTO: 2.66 K/UL (ref 1–4.8)
LYMPHOCYTES NFR BLD: 35 % (ref 22–41)
MCH RBC QN AUTO: 28.2 PG (ref 27–33)
MCHC RBC AUTO-ENTMCNC: 32.5 G/DL (ref 33.6–35)
MCV RBC AUTO: 86.9 FL (ref 81.4–97.8)
MONOCYTES # BLD AUTO: 0.65 K/UL (ref 0–0.85)
MONOCYTES NFR BLD AUTO: 8.6 % (ref 0–13.4)
NEUTROPHILS # BLD AUTO: 4.06 K/UL (ref 2–7.15)
NEUTROPHILS NFR BLD: 53.5 % (ref 44–72)
NRBC # BLD AUTO: 0 K/UL
NRBC BLD-RTO: 0 /100 WBC
PLATELET # BLD AUTO: 251 K/UL (ref 164–446)
PMV BLD AUTO: 10.8 FL (ref 9–12.9)
POTASSIUM SERPL-SCNC: 3.9 MMOL/L (ref 3.6–5.5)
RBC # BLD AUTO: 4.64 M/UL (ref 4.2–5.4)
SODIUM SERPL-SCNC: 141 MMOL/L (ref 135–145)
WBC # BLD AUTO: 7.6 K/UL (ref 4.8–10.8)

## 2019-09-10 PROCEDURE — 85025 COMPLETE CBC W/AUTO DIFF WBC: CPT

## 2019-09-10 PROCEDURE — 99217 PR OBSERVATION CARE DISCHARGE: CPT | Performed by: HOSPITALIST

## 2019-09-10 PROCEDURE — 96376 TX/PRO/DX INJ SAME DRUG ADON: CPT

## 2019-09-10 PROCEDURE — A9270 NON-COVERED ITEM OR SERVICE: HCPCS | Performed by: FAMILY MEDICINE

## 2019-09-10 PROCEDURE — 700105 HCHG RX REV CODE 258: Performed by: FAMILY MEDICINE

## 2019-09-10 PROCEDURE — 80048 BASIC METABOLIC PNL TOTAL CA: CPT

## 2019-09-10 PROCEDURE — 700102 HCHG RX REV CODE 250 W/ 637 OVERRIDE(OP): Performed by: FAMILY MEDICINE

## 2019-09-10 PROCEDURE — G0378 HOSPITAL OBSERVATION PER HR: HCPCS

## 2019-09-10 PROCEDURE — A9270 NON-COVERED ITEM OR SERVICE: HCPCS | Performed by: NURSE PRACTITIONER

## 2019-09-10 PROCEDURE — 700102 HCHG RX REV CODE 250 W/ 637 OVERRIDE(OP): Performed by: NURSE PRACTITIONER

## 2019-09-10 PROCEDURE — 700111 HCHG RX REV CODE 636 W/ 250 OVERRIDE (IP): Performed by: FAMILY MEDICINE

## 2019-09-10 RX ORDER — BUTALBITAL, ACETAMINOPHEN AND CAFFEINE 50; 325; 40 MG/1; MG/1; MG/1
1 TABLET ORAL EVERY 6 HOURS PRN
Status: DISCONTINUED | OUTPATIENT
Start: 2019-09-10 | End: 2019-09-10 | Stop reason: HOSPADM

## 2019-09-10 RX ADMIN — SODIUM CHLORIDE: 9 INJECTION, SOLUTION INTRAVENOUS at 05:50

## 2019-09-10 RX ADMIN — LEVETIRACETAM 500 MG: 500 TABLET ORAL at 05:51

## 2019-09-10 RX ADMIN — KETOROLAC TROMETHAMINE 30 MG: 30 INJECTION, SOLUTION INTRAMUSCULAR at 03:51

## 2019-09-10 RX ADMIN — BUTALBITAL, ACETAMINOPHEN, AND CAFFEINE 1 TABLET: 50; 325; 40 TABLET ORAL at 10:03

## 2019-09-10 RX ADMIN — KETOROLAC TROMETHAMINE 30 MG: 30 INJECTION, SOLUTION INTRAMUSCULAR at 10:03

## 2019-09-10 NOTE — DISCHARGE SUMMARY
Discharge Summary    CHIEF COMPLAINT ON ADMISSION  Chief Complaint   Patient presents with   • Headache     BIB EMS for c/o headache.  Sudden onset at 0700 today, vomited x 1.  Denies trauma.  A&Ox4.  Speech clear.  Denies weakness.        Reason for Admission  Headache     Admission Date  9/9/2019    CODE STATUS  Full Code    HPI & HOSPITAL COURSE  This is a 48 y.o. female here with headache.  Please see the dictated history of present illness for complete details.  In short, the patient has history of blindness,  shunt in place for hydrocephalus, chronic daily headache, migraine, GERD, prior C. difficile, and esophageal atresia here for headache recurrence.  She sees an outpatient provider for chronic Norco and benzodiazepines.  Her last refill was August 13, 2019.     Laboratory work-up was largely unrevealing.  CT scan of the head without contrast was negative for mass, hemorrhage or ventriculomegaly or evidence of tube disconnect or  shunt malfunction.  The patient normally sees Dr.Richard Hogan for shunt checks and does not reestablish with a new provider as that surgeon is currently transitioning care of his patients to his partners.    Patient was given some headache medication and reevaluated the following morning.  She appears at her baseline function.  Laboratory work-up is again unrevealing. Therefore, she is discharged in good and stable conditionto home with close outpatient follow-up.    Discharge Date  9/10/2019    FOLLOW UP ITEMS POST DISCHARGE  PCP follow-up    DISCHARGE DIAGNOSES  Principal Problem:    Headache POA: Yes  Active Problems:    Seizure (HCC) POA: Yes    Essential hypertension POA: Yes     (ventriculoperitoneal) shunt status POA: Yes    Blindness POA: Yes  Resolved Problems:    * No resolved hospital problems. *      FOLLOW UP  Future Appointments   Date Time Provider Department Center   11/25/2019 12:20 PM [facility] None     Tabitha Bautista M.D.  [address] [place] [address]  [phone]            MEDICATIONS ON DISCHARGE     Medication List      CONTINUE taking these medications      Instructions   levETIRAcetam 500 MG Tabs  Commonly known as:  KEPPRA   Take 1 Tab by mouth 2 Times a Day.  Dose:  500 mg     propranolol  MG Cp24  Commonly known as:  INDERAL LA   Take 120 mg by mouth every evening.  Dose:  120 mg            Allergies  Allergies   Allergen Reactions   • Tape Rash     RXN= ongoing  Adhesive Medical tape. Per patient, paper tape ok.       DIET  Orders Placed This Encounter   Procedures   • Diet Order Regular     Standing Status:   Standing     Number of Occurrences:   1     Order Specific Question:   Diet:     Answer:   Regular [1]       ACTIVITY  As tolerated.  Weight bearing as tolerated    LABORATORY  Lab Results   Component Value Date    SODIUM 141 09/10/2019    POTASSIUM 3.9 09/10/2019    CHLORIDE 109 09/10/2019    CO2 22 09/10/2019    GLUCOSE 98 09/10/2019    BUN 10 09/10/2019    CREATININE 0.65 09/10/2019    CREATININE 0.6 08/12/2008        Lab Results   Component Value Date    WBC 7.6 09/10/2019    HEMOGLOBIN 13.1 09/10/2019    HEMATOCRIT 40.3 09/10/2019    PLATELETCT 251 09/10/2019        Total time of the discharge process exceeds 36 minutes.

## 2019-09-10 NOTE — PROGRESS NOTES
A/o, respirations are even and unlabored on room air ,assessment completed, vital signs stable,  Pt complaining of H/A, toradol given, IV fluids running per orders, updated communication board,  poc discussed and understood, verbalized understanding, all questions answered at this time , fall precautions in place, call button within reach, will continue to monitor.

## 2019-09-10 NOTE — DISCHARGE INSTRUCTIONS
Discharge Instructions    Discharged to home by car with relative. Discharged via walking, hospital escort: Yes.  Special equipment needed: Not Applicable    Be sure to schedule a follow-up appointment with your primary care doctor or any specialists as instructed.     Discharge Plan:   Diet Plan: Discussed  Activity Level: Discussed  Confirmed Follow up Appointment: Patient to Call and Schedule Appointment  Confirmed Symptoms Management: Discussed  Medication Reconciliation Updated: Yes  Influenza Vaccine Indication: Not indicated: Previously immunized this influenza season and > 8 years of age    I understand that a diet low in cholesterol, fat, and sodium is recommended for good health. Unless I have been given specific instructions below for another diet, I accept this instruction as my diet prescription.   Other diet: Regular Diet    Special Instructions: None    · Is patient discharged on Warfarin / Coumadin?   No     Depression / Suicide Risk    As you are discharged from this [facility] facility, it is important to learn how to keep safe from harming yourself.    Recognize the warning signs:  · Abrupt changes in personality, positive or negative- including increase in energy   · Giving away possessions  · Change in eating patterns- significant weight changes-  positive or negative  · Change in sleeping patterns- unable to sleep or sleeping all the time   · Unwillingness or inability to communicate  · Depression  · Unusual sadness, discouragement and loneliness  · Talk of wanting to die  · Neglect of personal appearance   · Rebelliousness- reckless behavior  · Withdrawal from people/activities they love  · Confusion- inability to concentrate     If you or a loved one observes any of these behaviors or has concerns about self-harm, here's what you can do:  · Talk about it- your feelings and reasons for harming yourself  · Remove any means that you might use to hurt yourself (examples: pills, rope, extension  cords, firearm)  · Get professional help from the community (Mental Health, Substance Abuse, psychological counseling)  · Do not be alone:Call your Safe Contact- someone whom you trust who will be there for you.  · Call your local CRISIS HOTLINE 666-3879 or 820-917-9172  · Call your local Children's Mobile Crisis Response Team [place] (023) 176-5025 or www.2sms  · Call the toll free National Suicide Prevention Hotlines   · National Suicide Prevention Lifeline 876-186-KKVT (6997)  · National Hope Line Network 800-SUICIDE (325-7003)        Discharge Instructions per James Palmer A.P.N.    1.  Review your discharge Medication Reconciliation form as you may be provided with new prescriptions or changes to previously prescribed medications.  Be sure to take all of your prescribed medications exactly as directed.  Further questions can be directed to your local pharmacist or primary care provider (PCP).    2. Follow-up with your PCP.  An appointment may have already been scheduled for you.  Please review your discharge paperwork and locate this information.  Please be sure to call your PCP to cancel if you are unable to make this appointment or require schedule changes.  It is critical to to follow-up with your PCP to review hospital records and ensure any further diagnostic work-up, prescription refills, or referrals.     3. ACTIVITIES: After discharge from the hospital, you may resume routine activities including walking and going u/down stairs. However, no strenuous activity. For further clarification, call your PCP     4. DRIVING: You may drive whenever you are off pain medications and are able to perform the activities needed to drive, i.e. turning, bending, twisting, etc.     5. BOWEL FUNCTION: A few patients, after hospitalizations, will develop bowel irregularity. You could also experience constipation due to pain medication and decreased activity level. If you feel this is occurring, please  take an over-the-counter laxative (Milk of Magnesia, Ex-Lax, Senokot, etc.) until the problem has resolved.     Return to ER if you develop recurrent chest pain, shortness of breath, bloody sputum, fever of 101.5 or greater, intractable nausea or vomiting, blood in the vomit or urine, intractable pain, new onset inability to ambulate, focal motor weakness, acute vision disturbance, acute speech disturbance, intractable abdominal pain, or any other worrisome symptoms.

## 2019-10-13 ENCOUNTER — APPOINTMENT (OUTPATIENT)
Dept: RADIOLOGY | Facility: MEDICAL CENTER | Age: 49
End: 2019-10-13
Attending: EMERGENCY MEDICINE
Payer: MEDICAID

## 2019-10-13 ENCOUNTER — HOSPITAL ENCOUNTER (EMERGENCY)
Facility: MEDICAL CENTER | Age: 49
End: 2019-10-13
Attending: EMERGENCY MEDICINE
Payer: MEDICAID

## 2019-10-13 VITALS
HEART RATE: 98 BPM | RESPIRATION RATE: 16 BRPM | BODY MASS INDEX: 26.29 KG/M2 | TEMPERATURE: 97.2 F | OXYGEN SATURATION: 98 % | SYSTOLIC BLOOD PRESSURE: 139 MMHG | HEIGHT: 64 IN | WEIGHT: 154 LBS | DIASTOLIC BLOOD PRESSURE: 74 MMHG

## 2019-10-13 DIAGNOSIS — Q03.9 CONGENITAL HYDROCEPHALUS (HCC): Chronic | ICD-10-CM

## 2019-10-13 DIAGNOSIS — R51.9 ACUTE NONINTRACTABLE HEADACHE, UNSPECIFIED HEADACHE TYPE: ICD-10-CM

## 2019-10-13 DIAGNOSIS — G89.4 CHRONIC PAIN SYNDROME: ICD-10-CM

## 2019-10-13 DIAGNOSIS — H54.40 BLINDNESS OF LEFT EYE, UNSPECIFIED RIGHT EYE VISUAL IMPAIRMENT CATEGORY: Chronic | ICD-10-CM

## 2019-10-13 DIAGNOSIS — R20.2 PARESTHESIAS: ICD-10-CM

## 2019-10-13 LAB
ALBUMIN SERPL BCP-MCNC: 4.2 G/DL (ref 3.2–4.9)
ALBUMIN/GLOB SERPL: 1.2 G/DL
ALP SERPL-CCNC: 96 U/L (ref 30–99)
ALT SERPL-CCNC: 11 U/L (ref 2–50)
ANION GAP SERPL CALC-SCNC: 13 MMOL/L (ref 0–11.9)
APTT PPP: 32.2 SEC (ref 24.7–36)
AST SERPL-CCNC: 19 U/L (ref 12–45)
BASOPHILS # BLD AUTO: 0.7 % (ref 0–1.8)
BASOPHILS # BLD: 0.07 K/UL (ref 0–0.12)
BILIRUB SERPL-MCNC: 0.4 MG/DL (ref 0.1–1.5)
BUN SERPL-MCNC: 11 MG/DL (ref 8–22)
CALCIUM SERPL-MCNC: 9.6 MG/DL (ref 8.5–10.5)
CHLORIDE SERPL-SCNC: 107 MMOL/L (ref 96–112)
CO2 SERPL-SCNC: 18 MMOL/L (ref 20–33)
CREAT SERPL-MCNC: 0.84 MG/DL (ref 0.5–1.4)
EOSINOPHIL # BLD AUTO: 0.07 K/UL (ref 0–0.51)
EOSINOPHIL NFR BLD: 0.7 % (ref 0–6.9)
ERYTHROCYTE [DISTWIDTH] IN BLOOD BY AUTOMATED COUNT: 47.8 FL (ref 35.9–50)
GLOBULIN SER CALC-MCNC: 3.5 G/DL (ref 1.9–3.5)
GLUCOSE SERPL-MCNC: 98 MG/DL (ref 65–99)
HCT VFR BLD AUTO: 41 % (ref 37–47)
HGB BLD-MCNC: 13.7 G/DL (ref 12–16)
IMM GRANULOCYTES # BLD AUTO: 0.03 K/UL (ref 0–0.11)
IMM GRANULOCYTES NFR BLD AUTO: 0.3 % (ref 0–0.9)
INR PPP: 0.93 (ref 0.87–1.13)
LYMPHOCYTES # BLD AUTO: 1.89 K/UL (ref 1–4.8)
LYMPHOCYTES NFR BLD: 19.3 % (ref 22–41)
MCH RBC QN AUTO: 29.5 PG (ref 27–33)
MCHC RBC AUTO-ENTMCNC: 33.4 G/DL (ref 33.6–35)
MCV RBC AUTO: 88.2 FL (ref 81.4–97.8)
MONOCYTES # BLD AUTO: 0.59 K/UL (ref 0–0.85)
MONOCYTES NFR BLD AUTO: 6 % (ref 0–13.4)
NEUTROPHILS # BLD AUTO: 7.15 K/UL (ref 2–7.15)
NEUTROPHILS NFR BLD: 73 % (ref 44–72)
NRBC # BLD AUTO: 0 K/UL
NRBC BLD-RTO: 0 /100 WBC
PLATELET # BLD AUTO: 269 K/UL (ref 164–446)
PMV BLD AUTO: 11.9 FL (ref 9–12.9)
POTASSIUM SERPL-SCNC: 3.7 MMOL/L (ref 3.6–5.5)
PROT SERPL-MCNC: 7.7 G/DL (ref 6–8.2)
PROTHROMBIN TIME: 12.7 SEC (ref 12–14.6)
RBC # BLD AUTO: 4.65 M/UL (ref 4.2–5.4)
SODIUM SERPL-SCNC: 138 MMOL/L (ref 135–145)
WBC # BLD AUTO: 9.8 K/UL (ref 4.8–10.8)

## 2019-10-13 PROCEDURE — 700102 HCHG RX REV CODE 250 W/ 637 OVERRIDE(OP): Performed by: EMERGENCY MEDICINE

## 2019-10-13 PROCEDURE — 72020 X-RAY EXAM OF SPINE 1 VIEW: CPT

## 2019-10-13 PROCEDURE — 96374 THER/PROPH/DIAG INJ IV PUSH: CPT

## 2019-10-13 PROCEDURE — 96372 THER/PROPH/DIAG INJ SC/IM: CPT | Mod: XU

## 2019-10-13 PROCEDURE — 96375 TX/PRO/DX INJ NEW DRUG ADDON: CPT

## 2019-10-13 PROCEDURE — 99285 EMERGENCY DEPT VISIT HI MDM: CPT

## 2019-10-13 PROCEDURE — 70450 CT HEAD/BRAIN W/O DYE: CPT

## 2019-10-13 PROCEDURE — 700105 HCHG RX REV CODE 258: Performed by: EMERGENCY MEDICINE

## 2019-10-13 PROCEDURE — 85730 THROMBOPLASTIN TIME PARTIAL: CPT

## 2019-10-13 PROCEDURE — 85025 COMPLETE CBC W/AUTO DIFF WBC: CPT

## 2019-10-13 PROCEDURE — 85610 PROTHROMBIN TIME: CPT

## 2019-10-13 PROCEDURE — 80053 COMPREHEN METABOLIC PANEL: CPT

## 2019-10-13 PROCEDURE — A9270 NON-COVERED ITEM OR SERVICE: HCPCS | Performed by: EMERGENCY MEDICINE

## 2019-10-13 PROCEDURE — 700111 HCHG RX REV CODE 636 W/ 250 OVERRIDE (IP): Performed by: EMERGENCY MEDICINE

## 2019-10-13 PROCEDURE — 71045 X-RAY EXAM CHEST 1 VIEW: CPT

## 2019-10-13 PROCEDURE — 70250 X-RAY EXAM OF SKULL: CPT

## 2019-10-13 RX ORDER — KETOROLAC TROMETHAMINE 30 MG/ML
15 INJECTION, SOLUTION INTRAMUSCULAR; INTRAVENOUS ONCE
Status: DISCONTINUED | OUTPATIENT
Start: 2019-10-13 | End: 2019-10-13

## 2019-10-13 RX ORDER — MAGNESIUM SULFATE HEPTAHYDRATE 40 MG/ML
2 INJECTION, SOLUTION INTRAVENOUS ONCE
Status: DISCONTINUED | OUTPATIENT
Start: 2019-10-13 | End: 2019-10-13 | Stop reason: HOSPADM

## 2019-10-13 RX ORDER — HYDROCODONE BITARTRATE AND ACETAMINOPHEN 5; 325 MG/1; MG/1
2 TABLET ORAL ONCE
Status: COMPLETED | OUTPATIENT
Start: 2019-10-13 | End: 2019-10-13

## 2019-10-13 RX ORDER — SODIUM CHLORIDE, SODIUM LACTATE, POTASSIUM CHLORIDE, CALCIUM CHLORIDE 600; 310; 30; 20 MG/100ML; MG/100ML; MG/100ML; MG/100ML
1000 INJECTION, SOLUTION INTRAVENOUS ONCE
Status: COMPLETED | OUTPATIENT
Start: 2019-10-13 | End: 2019-10-13

## 2019-10-13 RX ORDER — HYDROMORPHONE HYDROCHLORIDE 1 MG/ML
1 INJECTION, SOLUTION INTRAMUSCULAR; INTRAVENOUS; SUBCUTANEOUS ONCE
Status: COMPLETED | OUTPATIENT
Start: 2019-10-13 | End: 2019-10-13

## 2019-10-13 RX ORDER — PROCHLORPERAZINE EDISYLATE 5 MG/ML
10 INJECTION INTRAMUSCULAR; INTRAVENOUS ONCE
Status: COMPLETED | OUTPATIENT
Start: 2019-10-13 | End: 2019-10-13

## 2019-10-13 RX ORDER — DIPHENHYDRAMINE HYDROCHLORIDE 50 MG/ML
50 INJECTION INTRAMUSCULAR; INTRAVENOUS ONCE
Status: COMPLETED | OUTPATIENT
Start: 2019-10-13 | End: 2019-10-13

## 2019-10-13 RX ORDER — HYDROMORPHONE HYDROCHLORIDE 1 MG/ML
1 INJECTION, SOLUTION INTRAMUSCULAR; INTRAVENOUS; SUBCUTANEOUS ONCE
Status: DISCONTINUED | OUTPATIENT
Start: 2019-10-13 | End: 2019-10-13

## 2019-10-13 RX ORDER — DEXAMETHASONE SODIUM PHOSPHATE 10 MG/ML
10 INJECTION, SOLUTION INTRAMUSCULAR; INTRAVENOUS ONCE
Status: COMPLETED | OUTPATIENT
Start: 2019-10-13 | End: 2019-10-13

## 2019-10-13 RX ADMIN — HYDROCODONE BITARTRATE AND ACETAMINOPHEN 2 TABLET: 5; 325 TABLET ORAL at 18:15

## 2019-10-13 RX ADMIN — SODIUM CHLORIDE, POTASSIUM CHLORIDE, SODIUM LACTATE AND CALCIUM CHLORIDE 1000 ML: 600; 310; 30; 20 INJECTION, SOLUTION INTRAVENOUS at 17:01

## 2019-10-13 RX ADMIN — DIPHENHYDRAMINE HYDROCHLORIDE 50 MG: 50 INJECTION INTRAMUSCULAR; INTRAVENOUS at 15:53

## 2019-10-13 RX ADMIN — PROCHLORPERAZINE EDISYLATE 10 MG: 5 INJECTION INTRAMUSCULAR; INTRAVENOUS at 15:53

## 2019-10-13 RX ADMIN — DEXAMETHASONE SODIUM PHOSPHATE 10 MG: 10 INJECTION INTRAMUSCULAR; INTRAVENOUS at 17:01

## 2019-10-13 RX ADMIN — HYDROMORPHONE HYDROCHLORIDE 1 MG: 1 INJECTION, SOLUTION INTRAMUSCULAR; INTRAVENOUS; SUBCUTANEOUS at 17:27

## 2019-10-13 NOTE — ED PROVIDER NOTES
ED PROVIDER NOTE     Scribed for Rg Jordan M.D. by Lianet Wyatt. 10/13/2019, 3:02 PM.    CHIEF COMPLAINT  Chief Complaint   Patient presents with   • Headache       HPI    Primary care provider: Tabitha Bautista M.D.  Means of arrival: Walk-In  History obtained from: Patient  History limited by: None    Rasheeda Manzano is a 48 y.o. Female, with a history of congenital hydrocephalus with  shunt in place and blindness and chronic pain and chronic headaches, who presents with sudden onset of headache 3 hours ago.  Headache is achy and sharp, generalized, nonradiating.  10 out of 10 in severity.  She additionally endorses nausea and vomiting. She notes she responds well to dilauded for her headache treatment when she has come into the hospital, she takes Norco daily. She states she is currently taking Keppra and Propranolol daily. She additionally reports weeks to months of intermittent tingling sensations in her right leg and left arm.  She denies any recent illness or extra social stressors.  No recent trauma. She states her current  shunt has been in place for 7 years, however, she had her first shunt placed when she was 8 weeks old. She denies fevers, chills, chest pain, abdominal pain, or dysuria.  This is similar to many prior headaches that she has had in the past.    REVIEW OF SYSTEMS  Constitutional: Negative for fever or chills.   Cardiovascular: Negative for chest pain.   Gastrointestinal: Positive for nausea and vomiting. Negative for abdominal pain.  Genitourinary: Negative for dysuria or flank pain.  Neuro: Positive for intermittent paresthesias in right leg and left arm, and a generalized achy headache; denies any focal weakness.  All other systems reviewed and are negative.    PAST MEDICAL HISTORY   has a past medical history of Blind, C. difficile diarrhea (2013), Chronic daily headache, Depression, Esophageal atresia (1971), Fall, GERD (gastroesophageal reflux disease), H/O  "total hysterectomy, Hydrocephalus (HCC), Hydrocephalus (HCC), Jaundice, Legally blind, Migraine without aura, without mention of intractable migraine without mention of status migrainosus, Other specified symptom associated with female genital organs, Pain, and Psychiatric problem.    PAST FAMILY HISTORY  Family History   Problem Relation Age of Onset   • Hypertension Mother    • Hypertension Father    • Non-contributory Neg Hx         Migraine       SOCIAL HISTORY  Social History     Tobacco Use   • Smoking status: Former Smoker     Packs/day: 1.00     Years: 10.00     Pack years: 10.00     Types: Cigars     Start date: 2004     Last attempt to quit: 2017     Years since quittin.1   • Smokeless tobacco: Never Used   • Tobacco comment:  CIGAR/day    Substance and Sexual Activity   • Alcohol use: Yes     Comment: socially   • Drug use: No   • Sexual activity: Yes     Partners: Male       SURGICAL HISTORY   has a past surgical history that includes laparoscopy (08); lysis adhesions general (12/10/2013); cystoscopy (12/10/2013); exploratory laparotomy (12/10/2013); appendectomy (12/10/2013); abdominal hysterectomy total (12/10/2013); aakash by laparoscopy (N/A, 2015); cholecystectomy (N/A, 2015); other; gastroscopy-endo (N/A, 2017); nissen fundoplication laparoscopic; and gastroscopy (N/A, 2019).    CURRENT MEDICATIONS  Current Outpatient Medications:   •  propranolol CR (INDERAL LA) 120 MG CAPSULE SR 24 HR, Take 120 mg by mouth every evening., Disp: , Rfl:   •  levETIRAcetam (KEPPRA) 500 MG Tab, Take 1 Tab by mouth 2 Times a Day., Disp: 60 Tab, Rfl: 0      ALLERGIES  Allergies   Allergen Reactions   • Tape Rash     RXN= ongoing  Adhesive Medical tape. Per patient, paper tape ok.       PHYSICAL EXAM  VITAL SIGNS: /77   Pulse 93   Temp 36.2 °C (97.2 °F) (Temporal)   Resp 16   Ht 1.626 m (5' 4\")   Wt 69.9 kg (154 lb)   LMP 2013   SpO2 99%   BMI 26.43 kg/m²    Pulse ox " interpretation: On room air, I interpret this pulse ox as normal.  Constitutional: Chronically ill appearing lying on stretcher in mild distress. Well-developed, well-nourished. Sitting up.   HEENT: Atraumatic. Posterior pharynx clear, mucous membranes dry.  Eyes:  Blind and esotropia is present. Normal sclerae.  Neck: Supple, nontender.  Chest/Pulmonary: Clear to ausculation bilaterally, no wheezes or rhonchi.  Cardiovascular: Regular rate and rhythm, no murmur.   Abdomen: Soft, nontender; no rebound, guarding, or masses.  Back: No CVA or midline tenderness.   Musculoskeletal: No deformity or edema.  Neuro: Clear speech, normal coordination, no focal asymmetry. She has asymmetric but present sensation. Decreased sensation right leg and left arm.   Psych: Normal mood and affect.  Skin: No rashes, warm and dry.      DIAGNOSTIC STUDIES / PROCEDURES    LABS & EKG  Results for orders placed or performed during the hospital encounter of 10/13/19   CBC WITH DIFFERENTIAL   Result Value Ref Range    WBC 9.8 4.8 - 10.8 K/uL    RBC 4.65 4.20 - 5.40 M/uL    Hemoglobin 13.7 12.0 - 16.0 g/dL    Hematocrit 41.0 37.0 - 47.0 %    MCV 88.2 81.4 - 97.8 fL    MCH 29.5 27.0 - 33.0 pg    MCHC 33.4 (L) 33.6 - 35.0 g/dL    RDW 47.8 35.9 - 50.0 fL    Platelet Count 269 164 - 446 K/uL    MPV 11.9 9.0 - 12.9 fL    Neutrophils-Polys 73.00 (H) 44.00 - 72.00 %    Lymphocytes 19.30 (L) 22.00 - 41.00 %    Monocytes 6.00 0.00 - 13.40 %    Eosinophils 0.70 0.00 - 6.90 %    Basophils 0.70 0.00 - 1.80 %    Immature Granulocytes 0.30 0.00 - 0.90 %    Nucleated RBC 0.00 /100 WBC    Neutrophils (Absolute) 7.15 2.00 - 7.15 K/uL    Lymphs (Absolute) 1.89 1.00 - 4.80 K/uL    Monos (Absolute) 0.59 0.00 - 0.85 K/uL    Eos (Absolute) 0.07 0.00 - 0.51 K/uL    Baso (Absolute) 0.07 0.00 - 0.12 K/uL    Immature Granulocytes (abs) 0.03 0.00 - 0.11 K/uL    NRBC (Absolute) 0.00 K/uL   COMP METABOLIC PANEL   Result Value Ref Range    Sodium 138 135 - 145 mmol/L     Potassium 3.7 3.6 - 5.5 mmol/L    Chloride 107 96 - 112 mmol/L    Co2 18 (L) 20 - 33 mmol/L    Anion Gap 13.0 (H) 0.0 - 11.9    Glucose 98 65 - 99 mg/dL    Bun 11 8 - 22 mg/dL    Creatinine 0.84 0.50 - 1.40 mg/dL    Calcium 9.6 8.5 - 10.5 mg/dL    AST(SGOT) 19 12 - 45 U/L    ALT(SGPT) 11 2 - 50 U/L    Alkaline Phosphatase 96 30 - 99 U/L    Total Bilirubin 0.4 0.1 - 1.5 mg/dL    Albumin 4.2 3.2 - 4.9 g/dL    Total Protein 7.7 6.0 - 8.2 g/dL    Globulin 3.5 1.9 - 3.5 g/dL    A-G Ratio 1.2 g/dL   PROTHROMBIN TIME (INR)   Result Value Ref Range    PT 12.7 12.0 - 14.6 sec    INR 0.93 0.87 - 1.13   APTT   Result Value Ref Range    APTT 32.2 24.7 - 36.0 sec   ESTIMATED GFR   Result Value Ref Range    GFR If African American >60 >60 mL/min/1.73 m 2    GFR If Non African American >60 >60 mL/min/1.73 m 2         RADIOLOGY  DX-CHEST-LIMITED (1 VIEW)   Final Result      Left-sided shunt catheter without evidence of kinking or discontinuity. The tip of the catheter is unchanged compared to 6/6/2019 but appears pulled back compared to 6/15/2019. Clinical correlation recommended.      No acute cardiopulmonary process is seen.            DX-SPINE-ANY ONE VIEW   Final Result      Shunt catheter in the left neck without evidence of kinking or discontinuity.      Carotid atherosclerotic plaque on the left.         DX-SKULL-LIMITED 3-   Final Result      Left parietal shunt catheter is stable in appearance with a lucent connector at the level of the left parietal prosper hole.         CT-HEAD W/O   Final Result         1.  NO ACUTE ABNORMALITIES ARE NOTED ON CT SCAN OF THE HEAD.      2.  Left-sided ventriculostomy catheter is unchanged in position.      3.  No hydrocephalus or intracranial hemorrhage identified.           COURSE & MEDICAL DECISION MAKING    This is a 48 y.o. female who presents with headache    Differential Diagnosis includes but is not limited to:  Subarachnoid hemorrhage, hydrocephalis, migrane, or tension headache.      ED Course:    3:02 PM - Patient seen and examined at bedside. Discussed plan of care, including ordering labs and radiology to rule out emergent processes. Patient agrees to the plan of care. The patient will be lactated ringers infusion medicated with compazine injection 10 mg, benadryl injection 50 mg, and decadron injection 10 mg. Ordered for labs and radiology to evaluate her symptoms.  She has dry mucous membranes and I must keep her n.p.o. until a surgical process is ruled out, to avoid dehydration I would like to treat her with a crystalloid fluid bolus.    Thankfully the patient's work-up is quite reassuring her head CT shows nothing acute her  catheter seems to be in good position.  No hydrocephalus or intracranial hemorrhage noted.  Shunt study reassuring.  Patient has no fevers here her white count is normal highly doubt infection.  She is not anemic.  Electrolytes are stable without severe acidosis.  Coagulation studies are reassuring.    On recheck after Compazine and Benadryl  and fluid patient is still having a headache, but her mucous memories are more moist so I feel she is having a positive response to parenteral rehydration.  Discussed pain management plan.  On  review she has chronic opioids that do appear ready to be refilled.  She is out of her home Norco today this is likely the cause of her acute on chronic headache.  I will give her a single subcutaneous dose of hydromorphone for symptom relief, given reassuring work-up I feel she is safe for discharge and oral medications now so I will also give her her nighttime dose of Norco.  She has an appointment tomorrow morning with her primary care provider who provides her with her chronic pain medicines, she will comply with that appointment, but will also return to the ER if she has any worsening pain or passing out or fevers or vomiting or any neurologic changes or any other concerns.  The patient understands and agrees with the plan of  care.      Medications   magnesium sulfate IVPB premix 2 g (0 g Intravenous Held 10/13/19 1530)   prochlorperazine (COMPAZINE) injection 10 mg (10 mg Intravenous Given 10/13/19 1553)   diphenhydrAMINE (BENADRYL) injection 50 mg (50 mg Intravenous Given 10/13/19 1553)   dexamethasone pf (DECADRON) injection 10 mg (10 mg Intravenous Given 10/13/19 1701)   lactated ringers infusion (BOLUS) (0 mL Intravenous Stopped 10/13/19 1727)   HYDROmorphone pf (DILAUDID) injection 1 mg (1 mg Subcutaneous Given 10/13/19 1727)   HYDROcodone-acetaminophen (NORCO) 5-325 MG per tablet 2 Tab (2 Tabs Oral Given 10/13/19 1815)     FINAL IMPRESSION  1. Acute nonintractable headache, unspecified headache type    2. Congenital hydrocephalus (HCC)    3. Chronic pain syndrome    4. Paresthesias    5. Blindness of left eye, unspecified right eye visual impairment category        PRESCRIPTIONS  Discharge Medication List as of 10/13/2019  5:59 PM          FOLLOW UP  Tabitha Bautista M.D.  [address] [place] [id number]  [phone]    Schedule an appointment as soon as possible for a visit in 1 day  for recheck and routine health care    [facility], Emergency Dept  [facility] [facility] [id number] [phone]  Today  If you have ANY new or worse symptoms!        -DISCHARGE-     The patient is referred to her primary care provider for recheck, chronic pain management, and routine health care.    Pertinent Labs & Imaging studies reviewed and verified by myself, as well as nursing notes and the patient's past medical, family, and social histories (See chart for details).    Results, exam findings, clinical impression, presumed diagnosis, treatment options, and strict return precautions were discussed with the patient, and they verbalized understanding, agreed with, and appreciated the plan of care.    Lianet SHARMA), am scribing for, and in the presence of, Rg Jordan M.D..    Electronically  signed by: Lianet Wyatt (Scribe), 10/13/2019    I, Rg Jordan M.D. personally performed the services described in this documentation, as scribed by Lianet Wyatt in my presence, and it is both accurate and complete.C.     Portions of this record were made with voice recognition software.  Despite my review, spelling/grammar/context errors may still remain.  Interpretation of this chart should be taken in this context.    The note accurately reflects work and decisions made by me.  Rg Jordan  10/13/2019  5:26 PM

## 2019-10-13 NOTE — ED NOTES
Received pt to rm [facility] from triage. Pt reports diffuse headache x3hrs with N/V, intermittent numbness to RUE down to RLE, presently numb from buttock down to right foot. Pt reports she ran out of norco prescription that she usually takes for headaches. Pt in no acute distress, placed on monitor, ERP at bedside.

## 2019-10-14 NOTE — ED NOTES
Pt in no acute distress, assisted to restroom via wheelchair then to waiting room to await ride home.

## 2019-10-14 NOTE — DISCHARGE INSTRUCTIONS
You were seen and evaluated in the Emergency Department at [facility] for:     Recurrent severe headache    You had the following tests and studies:    Thankfully your brain scan and blood tests and shunt series x-rays are all stable.  We do not see a dangerous or life-threatening cause of your headache today.    You received the following medications:    Headache medication  ----------------------------    Please make sure to follow up with:    Primary care provider tomorrow for recheck and chronic pain management, if you have any new or worsening symptoms return to the ER immediately.    Good luck, we hope you get better soon!  ----------------------------    We always encourage patients to return IMMEDIATELY if they have:  Increased or changing pain, passing out, fevers over 100.4 (taken in your mouth or rectally) for more than 2 days, redness or swelling of skin or tissues, feeling like your heart is beating fast, chest pain that is new or worsening, trouble breathing, feeling like your throat is closing up and can not breath, inability to walk, weakness of any part of your body, new dizziness, severe bleeding that won't stop from any part of your body, if you can't eat or drink, or if you have any other concerns.   If you feel worse, please know that you can always return with any questions, concerns, worse symptoms, or you are feeling unsafe. We certainly cannot say for sure that we have ruled out every illness or dangerous disease, but we feel that at this specific time, your exam, tests, and vital signs like heart rate and blood pressure are safe for discharge.

## 2019-10-14 NOTE — ED NOTES
Per assisting RN Minal, pt reporting IV hurting. Per ERP, change dilaudid from IV to SQ, no need to replace IV. ERP aware LR partially infused, pt did not receiving Magnesium per MAR.

## 2019-11-12 ENCOUNTER — APPOINTMENT (OUTPATIENT)
Dept: RADIOLOGY | Facility: MEDICAL CENTER | Age: 49
End: 2019-11-12
Attending: STUDENT IN AN ORGANIZED HEALTH CARE EDUCATION/TRAINING PROGRAM
Payer: MEDICAID

## 2019-11-12 ENCOUNTER — HOSPITAL ENCOUNTER (EMERGENCY)
Facility: MEDICAL CENTER | Age: 49
End: 2019-11-12
Attending: EMERGENCY MEDICINE
Payer: MEDICAID

## 2019-11-12 VITALS
RESPIRATION RATE: 20 BRPM | BODY MASS INDEX: 26.35 KG/M2 | HEIGHT: 64 IN | HEART RATE: 85 BPM | DIASTOLIC BLOOD PRESSURE: 61 MMHG | WEIGHT: 154.32 LBS | SYSTOLIC BLOOD PRESSURE: 104 MMHG | TEMPERATURE: 98.7 F | OXYGEN SATURATION: 96 %

## 2019-11-12 DIAGNOSIS — R51.9 ACUTE NONINTRACTABLE HEADACHE, UNSPECIFIED HEADACHE TYPE: ICD-10-CM

## 2019-11-12 LAB
ALBUMIN SERPL BCP-MCNC: 4.3 G/DL (ref 3.2–4.9)
ALBUMIN/GLOB SERPL: 1.4 G/DL
ALP SERPL-CCNC: 103 U/L (ref 30–99)
ALT SERPL-CCNC: 9 U/L (ref 2–50)
ANION GAP SERPL CALC-SCNC: 11 MMOL/L (ref 0–11.9)
APPEARANCE UR: ABNORMAL
AST SERPL-CCNC: 13 U/L (ref 12–45)
BACTERIA #/AREA URNS HPF: NEGATIVE /HPF
BASOPHILS # BLD AUTO: 0.8 % (ref 0–1.8)
BASOPHILS # BLD: 0.07 K/UL (ref 0–0.12)
BILIRUB SERPL-MCNC: 0.4 MG/DL (ref 0.1–1.5)
BILIRUB UR QL STRIP.AUTO: NEGATIVE
BUN SERPL-MCNC: 16 MG/DL (ref 8–22)
CALCIUM SERPL-MCNC: 9.4 MG/DL (ref 8.5–10.5)
CHLORIDE SERPL-SCNC: 110 MMOL/L (ref 96–112)
CO2 SERPL-SCNC: 19 MMOL/L (ref 20–33)
COLOR UR: YELLOW
CREAT SERPL-MCNC: 0.76 MG/DL (ref 0.5–1.4)
EOSINOPHIL # BLD AUTO: 0.11 K/UL (ref 0–0.51)
EOSINOPHIL NFR BLD: 1.3 % (ref 0–6.9)
EPI CELLS #/AREA URNS HPF: NEGATIVE /HPF
ERYTHROCYTE [DISTWIDTH] IN BLOOD BY AUTOMATED COUNT: 47.3 FL (ref 35.9–50)
GLOBULIN SER CALC-MCNC: 3.1 G/DL (ref 1.9–3.5)
GLUCOSE SERPL-MCNC: 101 MG/DL (ref 65–99)
GLUCOSE UR STRIP.AUTO-MCNC: NEGATIVE MG/DL
HCT VFR BLD AUTO: 43.4 % (ref 37–47)
HGB BLD-MCNC: 14.1 G/DL (ref 12–16)
HYALINE CASTS #/AREA URNS LPF: NORMAL /LPF
IMM GRANULOCYTES # BLD AUTO: 0.02 K/UL (ref 0–0.11)
IMM GRANULOCYTES NFR BLD AUTO: 0.2 % (ref 0–0.9)
KETONES UR STRIP.AUTO-MCNC: NEGATIVE MG/DL
LEUKOCYTE ESTERASE UR QL STRIP.AUTO: NEGATIVE
LYMPHOCYTES # BLD AUTO: 2.34 K/UL (ref 1–4.8)
LYMPHOCYTES NFR BLD: 28.3 % (ref 22–41)
MCH RBC QN AUTO: 29.4 PG (ref 27–33)
MCHC RBC AUTO-ENTMCNC: 32.5 G/DL (ref 33.6–35)
MCV RBC AUTO: 90.6 FL (ref 81.4–97.8)
MICRO URNS: ABNORMAL
MONOCYTES # BLD AUTO: 0.68 K/UL (ref 0–0.85)
MONOCYTES NFR BLD AUTO: 8.2 % (ref 0–13.4)
NEUTROPHILS # BLD AUTO: 5.05 K/UL (ref 2–7.15)
NEUTROPHILS NFR BLD: 61.2 % (ref 44–72)
NITRITE UR QL STRIP.AUTO: NEGATIVE
NRBC # BLD AUTO: 0 K/UL
NRBC BLD-RTO: 0 /100 WBC
PH UR STRIP.AUTO: 8 [PH] (ref 5–8)
PLATELET # BLD AUTO: 316 K/UL (ref 164–446)
PMV BLD AUTO: 11.2 FL (ref 9–12.9)
POTASSIUM SERPL-SCNC: 3.7 MMOL/L (ref 3.6–5.5)
PROT SERPL-MCNC: 7.4 G/DL (ref 6–8.2)
PROT UR QL STRIP: NEGATIVE MG/DL
RBC # BLD AUTO: 4.79 M/UL (ref 4.2–5.4)
RBC # URNS HPF: NORMAL /HPF
RBC UR QL AUTO: NEGATIVE
SODIUM SERPL-SCNC: 140 MMOL/L (ref 135–145)
SP GR UR STRIP.AUTO: 1.01
UROBILINOGEN UR STRIP.AUTO-MCNC: 1 MG/DL
WBC # BLD AUTO: 8.3 K/UL (ref 4.8–10.8)
WBC #/AREA URNS HPF: NORMAL /HPF

## 2019-11-12 PROCEDURE — 96374 THER/PROPH/DIAG INJ IV PUSH: CPT

## 2019-11-12 PROCEDURE — 81001 URINALYSIS AUTO W/SCOPE: CPT

## 2019-11-12 PROCEDURE — 85025 COMPLETE CBC W/AUTO DIFF WBC: CPT

## 2019-11-12 PROCEDURE — 700105 HCHG RX REV CODE 258: Performed by: STUDENT IN AN ORGANIZED HEALTH CARE EDUCATION/TRAINING PROGRAM

## 2019-11-12 PROCEDURE — 36415 COLL VENOUS BLD VENIPUNCTURE: CPT

## 2019-11-12 PROCEDURE — 70450 CT HEAD/BRAIN W/O DYE: CPT

## 2019-11-12 PROCEDURE — 99284 EMERGENCY DEPT VISIT MOD MDM: CPT

## 2019-11-12 PROCEDURE — 80053 COMPREHEN METABOLIC PANEL: CPT

## 2019-11-12 PROCEDURE — 700111 HCHG RX REV CODE 636 W/ 250 OVERRIDE (IP): Performed by: STUDENT IN AN ORGANIZED HEALTH CARE EDUCATION/TRAINING PROGRAM

## 2019-11-12 PROCEDURE — 96375 TX/PRO/DX INJ NEW DRUG ADDON: CPT

## 2019-11-12 RX ORDER — LORAZEPAM 1 MG/1
0.5 TABLET ORAL DAILY
COMMUNITY
End: 2019-12-11

## 2019-11-12 RX ORDER — ONDANSETRON 2 MG/ML
4 INJECTION INTRAMUSCULAR; INTRAVENOUS ONCE
Status: COMPLETED | OUTPATIENT
Start: 2019-11-12 | End: 2019-11-12

## 2019-11-12 RX ORDER — DEXAMETHASONE SODIUM PHOSPHATE 4 MG/ML
10 INJECTION, SOLUTION INTRA-ARTICULAR; INTRALESIONAL; INTRAMUSCULAR; INTRAVENOUS; SOFT TISSUE ONCE
Status: COMPLETED | OUTPATIENT
Start: 2019-11-12 | End: 2019-11-12

## 2019-11-12 RX ORDER — DIPHENHYDRAMINE HYDROCHLORIDE 50 MG/ML
25 INJECTION INTRAMUSCULAR; INTRAVENOUS ONCE
Status: COMPLETED | OUTPATIENT
Start: 2019-11-12 | End: 2019-11-12

## 2019-11-12 RX ORDER — SODIUM CHLORIDE, SODIUM LACTATE, POTASSIUM CHLORIDE, CALCIUM CHLORIDE 600; 310; 30; 20 MG/100ML; MG/100ML; MG/100ML; MG/100ML
500 INJECTION, SOLUTION INTRAVENOUS ONCE
Status: COMPLETED | OUTPATIENT
Start: 2019-11-12 | End: 2019-11-12

## 2019-11-12 RX ADMIN — DIPHENHYDRAMINE HYDROCHLORIDE 25 MG: 50 INJECTION INTRAMUSCULAR; INTRAVENOUS at 02:33

## 2019-11-12 RX ADMIN — SODIUM CHLORIDE, POTASSIUM CHLORIDE, SODIUM LACTATE AND CALCIUM CHLORIDE 500 ML: 600; 310; 30; 20 INJECTION, SOLUTION INTRAVENOUS at 02:33

## 2019-11-12 RX ADMIN — DEXAMETHASONE SODIUM PHOSPHATE 10 MG: 4 INJECTION, SOLUTION INTRA-ARTICULAR; INTRALESIONAL; INTRAMUSCULAR; INTRAVENOUS; SOFT TISSUE at 02:30

## 2019-11-12 RX ADMIN — ONDANSETRON 4 MG: 2 INJECTION INTRAMUSCULAR; INTRAVENOUS at 02:34

## 2019-11-12 NOTE — ED TRIAGE NOTES
"Rasheeda Manzano  48 y.o. female  Chief Complaint   Patient presents with   • Headache     since this am, pt also states increasing confusion and memory loss x 4-5 hours       To triage via wc  Pt is alert and oriented, speaking in full sentences, follows commands and responds appropriately to questions. NAD. Resp are even and unlabored.  Pt placed in lobby. Pt educated on triage process. Pt encouraged to alert staff for any changes.  /80   Pulse 75   Temp 36 °C (96.8 °F) (Oral)   Resp 18   Ht 1.626 m (5' 4\")   Wt 70 kg (154 lb 5.2 oz)   LMP 11/27/2013   SpO2 98%   BMI 26.49 kg/m²     "

## 2019-11-12 NOTE — ED NOTES
Pt ambulated with steady gait to restroom with RN for guidance, voiding without difficulties, placed back on monitor, denies other needs.

## 2019-11-12 NOTE — ED PROVIDER NOTES
ED Provider Note    Scribed for Vic Garvin M.D. by Hannah Ferrer. 11/12/2019, 1:59 AM.    Primary care provider: Tabitha Bautista M.D.  Means of arrival: Walk-in  History obtained from: Patient  History limited by: None    CHIEF COMPLAINT  Chief Complaint   Patient presents with   • Headache     since this am, pt also states increasing confusion and memory loss x 4-5 hours       HPI  Rasheeda Manzano is a 48 y.o. female who presents to the Emergency Department with a severe headache onset this morning. She ran out of her prescription Norco which she ran out of 2 days ago. She endorses difficulty thinking, finding words, and right leg tingling. She denies any difficulty moving her limbs, nausea, vomiting, dizziness, or neck stiffness. She is also blind in both eyes.     REVIEW OF SYSTEMS  Pertinent positives include headache, difficulty thinking, finding words, and right leg tingling. Pertinent negatives include difficulty moving her limbs, nausea, vomiting, dizziness, or neck stiffness. All other systems negative.    PAST MEDICAL HISTORY   has a past medical history of Blind, C. difficile diarrhea (2013), Chronic daily headache, Depression, Esophageal atresia (1971), Fall, GERD (gastroesophageal reflux disease), H/O total hysterectomy, Hydrocephalus (HCC), Hydrocephalus (HCC), Jaundice, Legally blind, Migraine without aura, without mention of intractable migraine without mention of status migrainosus, Other specified symptom associated with female genital organs, Pain, and Psychiatric problem.    SURGICAL HISTORY   has a past surgical history that includes laparoscopy (8/8/08); lysis adhesions general (12/10/2013); cystoscopy (12/10/2013); exploratory laparotomy (12/10/2013); appendectomy (12/10/2013); abdominal hysterectomy total (12/10/2013); aakash by laparoscopy (N/A, 8/13/2015); cholecystectomy (N/A, 8/13/2015); other; gastroscopy-endo (N/A, 8/24/2017); nissen fundoplication laparoscopic;  "and gastroscopy (N/A, 2019).    SOCIAL HISTORY  Social History     Tobacco Use   • Smoking status: Former Smoker     Packs/day: 1.00     Years: 10.00     Pack years: 10.00     Types: Cigars     Start date: 2004     Last attempt to quit: 2017     Years since quittin.2   • Smokeless tobacco: Never Used   • Tobacco comment:  CIGAR/day    Substance Use Topics   • Alcohol use: Yes     Comment: socially   • Drug use: No      Social History     Substance and Sexual Activity   Drug Use No       FAMILY HISTORY  Family History   Problem Relation Age of Onset   • Hypertension Mother    • Hypertension Father    • Non-contributory Neg Hx         Migraine       CURRENT MEDICATIONS  Home Medications     Reviewed by Madisyn Noble R.N. (Registered Nurse) on 19 at 0135  Med List Status: Not Addressed   Medication Last Dose Status   levETIRAcetam (KEPPRA) 500 MG Tab 2019 Active   LORazepam (ATIVAN) 1 MG Tab 2019 Active   propranolol CR (INDERAL LA) 120 MG CAPSULE SR 24 HR 2019 Active                ALLERGIES  Allergies   Allergen Reactions   • Tape Rash     RXN= ongoing  Adhesive Medical tape. Per patient, paper tape ok.       PHYSICAL EXAM  VITAL SIGNS: /79   Pulse 82   Temp 36 °C (96.8 °F) (Oral)   Resp 16   Ht 1.626 m (5' 4\")   Wt 70 kg (154 lb 5.2 oz)   LMP 2013   SpO2 99%   BMI 26.49 kg/m²     Constitutional: Well developed, Well nourished, mild distress.   HENT: Normocephalic, Atraumatic, Oropharynx moist.   Eyes: Conjunctiva normal, No discharge.   Neck: Supple, No stridor, no no meningismus  Cardiovascular: Normal heart rate, Normal rhythm, No murmurs, equal pulses.   Pulmonary: Normal breath sounds, No respiratory distress, No wheezing, No rales, No rhonchi.  Chest: No chest wall tenderness or deformity.   Abdomen:Soft, No tenderness, No masses, no rebound, no guarding.   Back: No CVA tenderness.   Musculoskeletal: No major deformities noted, No tenderness.   Skin: " Warm, Dry, No erythema, No rash.   Neurologic: Alert & oriented x 3, Normal motor function,  No focal deficits noted, Normal cranial nerves II-XII, No pronator drift. Equal strength in upper and lower extremities bilaterally.    Psychiatric: Affect normal, Judgment normal, Mood normal.     LABS  Results for orders placed or performed during the hospital encounter of 11/12/19   CBC WITH DIFFERENTIAL   Result Value Ref Range    WBC 8.3 4.8 - 10.8 K/uL    RBC 4.79 4.20 - 5.40 M/uL    Hemoglobin 14.1 12.0 - 16.0 g/dL    Hematocrit 43.4 37.0 - 47.0 %    MCV 90.6 81.4 - 97.8 fL    MCH 29.4 27.0 - 33.0 pg    MCHC 32.5 (L) 33.6 - 35.0 g/dL    RDW 47.3 35.9 - 50.0 fL    Platelet Count 316 164 - 446 K/uL    MPV 11.2 9.0 - 12.9 fL    Neutrophils-Polys 61.20 44.00 - 72.00 %    Lymphocytes 28.30 22.00 - 41.00 %    Monocytes 8.20 0.00 - 13.40 %    Eosinophils 1.30 0.00 - 6.90 %    Basophils 0.80 0.00 - 1.80 %    Immature Granulocytes 0.20 0.00 - 0.90 %    Nucleated RBC 0.00 /100 WBC    Neutrophils (Absolute) 5.05 2.00 - 7.15 K/uL    Lymphs (Absolute) 2.34 1.00 - 4.80 K/uL    Monos (Absolute) 0.68 0.00 - 0.85 K/uL    Eos (Absolute) 0.11 0.00 - 0.51 K/uL    Baso (Absolute) 0.07 0.00 - 0.12 K/uL    Immature Granulocytes (abs) 0.02 0.00 - 0.11 K/uL    NRBC (Absolute) 0.00 K/uL   COMP METABOLIC PANEL   Result Value Ref Range    Sodium 140 135 - 145 mmol/L    Potassium 3.7 3.6 - 5.5 mmol/L    Chloride 110 96 - 112 mmol/L    Co2 19 (L) 20 - 33 mmol/L    Anion Gap 11.0 0.0 - 11.9    Glucose 101 (H) 65 - 99 mg/dL    Bun 16 8 - 22 mg/dL    Creatinine 0.76 0.50 - 1.40 mg/dL    Calcium 9.4 8.5 - 10.5 mg/dL    AST(SGOT) 13 12 - 45 U/L    ALT(SGPT) 9 2 - 50 U/L    Alkaline Phosphatase 103 (H) 30 - 99 U/L    Total Bilirubin 0.4 0.1 - 1.5 mg/dL    Albumin 4.3 3.2 - 4.9 g/dL    Total Protein 7.4 6.0 - 8.2 g/dL    Globulin 3.1 1.9 - 3.5 g/dL    A-G Ratio 1.4 g/dL   URINALYSIS (UA)   Result Value Ref Range    Color Yellow     Character Cloudy (A)      Specific Gravity 1.014 <1.035    Ph 8.0 5.0 - 8.0    Glucose Negative Negative mg/dL    Ketones Negative Negative mg/dL    Protein Negative Negative mg/dL    Bilirubin Negative Negative    Urobilinogen, Urine 1.0 Negative    Nitrite Negative Negative    Leukocyte Esterase Negative Negative    Occult Blood Negative Negative    Micro Urine Req Microscopic    ESTIMATED GFR   Result Value Ref Range    GFR If African American >60 >60 mL/min/1.73 m 2    GFR If Non African American >60 >60 mL/min/1.73 m 2   URINE MICROSCOPIC (W/UA)   Result Value Ref Range    WBC 0-2 /hpf    RBC 0-2 /hpf    Bacteria Negative None /hpf    Epithelial Cells Negative /hpf    Hyaline Cast 0-2 /lpf       All labs reviewed by me.    RADIOLOGY  CT-HEAD W/O   Final Result         1.  No acute intracranial abnormality.   2.  Encephalomalacia changes in the posterior medial left temporal lobe.        The radiologist's interpretation of all radiological studies have been reviewed by me.    COURSE & MEDICAL DECISION MAKING  Pertinent Labs & Imaging studies reviewed. (See chart for details)    1:59 AM - Patient seen and examined at bedside. Patient will be treated with LR infusion, Zofran, Decadron, Benadryl. Ordered CT-Head, CBC with differential, CMP, and UA to evaluate her symptoms. The differential diagnoses include but are not limited to: Intracranial hemorrhage, electrolyte abnormality, infectious process      Medical Decision Making: At this point time I think the patient's headache may be her chronic headache.  It seems like she always has a headache when she runs out of her narcotic pain medications.  Patient's electrolytes do appear to she has a little dehydration.  Patient's headache is not thunderclap in nature not the worst headache of her life.  Therefore do not think lumbar puncture is needed.  Patient's pain seems to be reduced with movement it felt like her previous headaches therefore do not think CT of the close is warranted.        The patient will return for new or worsening symptoms and is stable at the time of discharge.    The patient is referred to a primary physician for blood pressure management, diabetic screening, and for all other preventative health concerns.        DISPOSITION:  Patient will be discharged home in stable condition.    FOLLOW UP:  Tabitha Bautista M.D.  [address] [facility] [address]  [phone]    Schedule an appointment as soon as possible for a visit in 1 day        OUTPATIENT MEDICATIONS:  Discharge Medication List as of 11/12/2019  4:10 AM            FINAL IMPRESSION  1. Acute nonintractable headache, unspecified headache type          I, Hannah Ferrer (Dennis), am scribing for, and in the presence of, Vic Garvin M.D.    Electronically signed by: Hannah Ferrer (Dennis), 11/12/2019    IVic M.D. personally performed the services described in this documentation, as scribed by Hannah Ferrer in my presence, and it is both accurate and complete. C    The note accurately reflects work and decisions made by me.  Vic Garvin  11/12/2019  4:55 AM

## 2019-11-12 NOTE — ED NOTES
Received pt to rm Red 08 from triage. Pt with hx hydrocephalus, shunt states she had several episodes of confusion today, forgot her neighbors phone number and got lost trying to walk to their house. Also reports constant 8/10 HA since this morning. Denies nausea, CP, SOB, numbness and tingling anywhere, difficulty walking or talking. Pt on monitor, VS updated, pt assessed, in no acute distress, AAOx4 at this time, chart up to be seen.

## 2019-11-19 ENCOUNTER — APPOINTMENT (OUTPATIENT)
Dept: RADIOLOGY | Facility: MEDICAL CENTER | Age: 49
End: 2019-11-19
Attending: EMERGENCY MEDICINE
Payer: MEDICAID

## 2019-11-19 ENCOUNTER — HOSPITAL ENCOUNTER (EMERGENCY)
Facility: MEDICAL CENTER | Age: 49
End: 2019-11-20
Attending: EMERGENCY MEDICINE
Payer: MEDICAID

## 2019-11-19 DIAGNOSIS — R11.2 NON-INTRACTABLE VOMITING WITH NAUSEA, UNSPECIFIED VOMITING TYPE: ICD-10-CM

## 2019-11-19 DIAGNOSIS — R51.9 NONINTRACTABLE HEADACHE, UNSPECIFIED CHRONICITY PATTERN, UNSPECIFIED HEADACHE TYPE: ICD-10-CM

## 2019-11-19 PROCEDURE — 83605 ASSAY OF LACTIC ACID: CPT

## 2019-11-19 PROCEDURE — 80053 COMPREHEN METABOLIC PANEL: CPT

## 2019-11-19 PROCEDURE — 99285 EMERGENCY DEPT VISIT HI MDM: CPT

## 2019-11-19 PROCEDURE — 84703 CHORIONIC GONADOTROPIN ASSAY: CPT

## 2019-11-19 PROCEDURE — 85025 COMPLETE CBC W/AUTO DIFF WBC: CPT

## 2019-11-19 RX ORDER — DIPHENHYDRAMINE HCL 25 MG
25 TABLET ORAL ONCE
Status: DISCONTINUED | OUTPATIENT
Start: 2019-11-19 | End: 2019-11-20

## 2019-11-19 RX ORDER — PROCHLORPERAZINE EDISYLATE 5 MG/ML
10 INJECTION INTRAMUSCULAR; INTRAVENOUS ONCE
Status: DISCONTINUED | OUTPATIENT
Start: 2019-11-19 | End: 2019-11-20

## 2019-11-19 RX ORDER — ACETAMINOPHEN 325 MG/1
650 TABLET ORAL ONCE
Status: COMPLETED | OUTPATIENT
Start: 2019-11-19 | End: 2019-11-20

## 2019-11-19 RX ORDER — SODIUM CHLORIDE 9 MG/ML
1000 INJECTION, SOLUTION INTRAVENOUS ONCE
Status: COMPLETED | OUTPATIENT
Start: 2019-11-19 | End: 2019-11-20

## 2019-11-20 ENCOUNTER — APPOINTMENT (OUTPATIENT)
Dept: RADIOLOGY | Facility: MEDICAL CENTER | Age: 49
End: 2019-11-20
Attending: EMERGENCY MEDICINE
Payer: MEDICAID

## 2019-11-20 VITALS
RESPIRATION RATE: 16 BRPM | HEIGHT: 64 IN | BODY MASS INDEX: 25.78 KG/M2 | HEART RATE: 78 BPM | TEMPERATURE: 97.4 F | DIASTOLIC BLOOD PRESSURE: 80 MMHG | WEIGHT: 151 LBS | OXYGEN SATURATION: 98 % | SYSTOLIC BLOOD PRESSURE: 128 MMHG

## 2019-11-20 LAB
ALBUMIN SERPL BCP-MCNC: 4.2 G/DL (ref 3.2–4.9)
ALBUMIN/GLOB SERPL: 1.7 G/DL
ALP SERPL-CCNC: 85 U/L (ref 30–99)
ALT SERPL-CCNC: 13 U/L (ref 2–50)
ANION GAP SERPL CALC-SCNC: 10 MMOL/L (ref 0–11.9)
AST SERPL-CCNC: 11 U/L (ref 12–45)
BASOPHILS # BLD AUTO: 0.8 % (ref 0–1.8)
BASOPHILS # BLD: 0.07 K/UL (ref 0–0.12)
BILIRUB SERPL-MCNC: 0.6 MG/DL (ref 0.1–1.5)
BUN SERPL-MCNC: 17 MG/DL (ref 8–22)
CALCIUM SERPL-MCNC: 9.4 MG/DL (ref 8.5–10.5)
CHLORIDE SERPL-SCNC: 107 MMOL/L (ref 96–112)
CO2 SERPL-SCNC: 22 MMOL/L (ref 20–33)
CREAT SERPL-MCNC: 0.75 MG/DL (ref 0.5–1.4)
EOSINOPHIL # BLD AUTO: 0.1 K/UL (ref 0–0.51)
EOSINOPHIL NFR BLD: 1.1 % (ref 0–6.9)
ERYTHROCYTE [DISTWIDTH] IN BLOOD BY AUTOMATED COUNT: 44.7 FL (ref 35.9–50)
GLOBULIN SER CALC-MCNC: 2.5 G/DL (ref 1.9–3.5)
GLUCOSE SERPL-MCNC: 105 MG/DL (ref 65–99)
HCG SERPL QL: NEGATIVE
HCT VFR BLD AUTO: 36.3 % (ref 37–47)
HGB BLD-MCNC: 12.3 G/DL (ref 12–16)
IMM GRANULOCYTES # BLD AUTO: 0.02 K/UL (ref 0–0.11)
IMM GRANULOCYTES NFR BLD AUTO: 0.2 % (ref 0–0.9)
LACTATE BLD-SCNC: 1.2 MMOL/L (ref 0.5–2)
LYMPHOCYTES # BLD AUTO: 2.34 K/UL (ref 1–4.8)
LYMPHOCYTES NFR BLD: 25.2 % (ref 22–41)
MCH RBC QN AUTO: 29.2 PG (ref 27–33)
MCHC RBC AUTO-ENTMCNC: 33.9 G/DL (ref 33.6–35)
MCV RBC AUTO: 86.2 FL (ref 81.4–97.8)
MONOCYTES # BLD AUTO: 0.75 K/UL (ref 0–0.85)
MONOCYTES NFR BLD AUTO: 8.1 % (ref 0–13.4)
NEUTROPHILS # BLD AUTO: 5.99 K/UL (ref 2–7.15)
NEUTROPHILS NFR BLD: 64.6 % (ref 44–72)
NRBC # BLD AUTO: 0 K/UL
NRBC BLD-RTO: 0 /100 WBC
PLATELET # BLD AUTO: 323 K/UL (ref 164–446)
PMV BLD AUTO: 11.1 FL (ref 9–12.9)
POTASSIUM SERPL-SCNC: 2.9 MMOL/L (ref 3.6–5.5)
PROT SERPL-MCNC: 6.7 G/DL (ref 6–8.2)
RBC # BLD AUTO: 4.21 M/UL (ref 4.2–5.4)
SODIUM SERPL-SCNC: 139 MMOL/L (ref 135–145)
WBC # BLD AUTO: 9.3 K/UL (ref 4.8–10.8)

## 2019-11-20 PROCEDURE — 74018 RADEX ABDOMEN 1 VIEW: CPT

## 2019-11-20 PROCEDURE — 36415 COLL VENOUS BLD VENIPUNCTURE: CPT

## 2019-11-20 PROCEDURE — 96374 THER/PROPH/DIAG INJ IV PUSH: CPT

## 2019-11-20 PROCEDURE — A9270 NON-COVERED ITEM OR SERVICE: HCPCS | Performed by: EMERGENCY MEDICINE

## 2019-11-20 PROCEDURE — 70250 X-RAY EXAM OF SKULL: CPT

## 2019-11-20 PROCEDURE — 96375 TX/PRO/DX INJ NEW DRUG ADDON: CPT

## 2019-11-20 PROCEDURE — 700102 HCHG RX REV CODE 250 W/ 637 OVERRIDE(OP): Performed by: EMERGENCY MEDICINE

## 2019-11-20 PROCEDURE — 71045 X-RAY EXAM CHEST 1 VIEW: CPT

## 2019-11-20 PROCEDURE — 70450 CT HEAD/BRAIN W/O DYE: CPT

## 2019-11-20 PROCEDURE — 72020 X-RAY EXAM OF SPINE 1 VIEW: CPT

## 2019-11-20 PROCEDURE — 700105 HCHG RX REV CODE 258: Performed by: EMERGENCY MEDICINE

## 2019-11-20 PROCEDURE — 700111 HCHG RX REV CODE 636 W/ 250 OVERRIDE (IP): Performed by: EMERGENCY MEDICINE

## 2019-11-20 RX ORDER — HYDROMORPHONE HYDROCHLORIDE 1 MG/ML
0.5 INJECTION, SOLUTION INTRAMUSCULAR; INTRAVENOUS; SUBCUTANEOUS ONCE
Status: COMPLETED | OUTPATIENT
Start: 2019-11-20 | End: 2019-11-20

## 2019-11-20 RX ORDER — PROCHLORPERAZINE EDISYLATE 5 MG/ML
10 INJECTION INTRAMUSCULAR; INTRAVENOUS ONCE
Status: COMPLETED | OUTPATIENT
Start: 2019-11-20 | End: 2019-11-20

## 2019-11-20 RX ORDER — POTASSIUM CHLORIDE 20 MEQ/1
40 TABLET, EXTENDED RELEASE ORAL ONCE
Status: COMPLETED | OUTPATIENT
Start: 2019-11-20 | End: 2019-11-20

## 2019-11-20 RX ORDER — DIPHENHYDRAMINE HYDROCHLORIDE 50 MG/ML
25 INJECTION INTRAMUSCULAR; INTRAVENOUS ONCE
Status: COMPLETED | OUTPATIENT
Start: 2019-11-20 | End: 2019-11-20

## 2019-11-20 RX ADMIN — HYDROMORPHONE HYDROCHLORIDE 0.5 MG: 1 INJECTION, SOLUTION INTRAMUSCULAR; INTRAVENOUS; SUBCUTANEOUS at 01:18

## 2019-11-20 RX ADMIN — PROCHLORPERAZINE EDISYLATE 10 MG: 5 INJECTION INTRAMUSCULAR; INTRAVENOUS at 01:20

## 2019-11-20 RX ADMIN — ACETAMINOPHEN 650 MG: 325 TABLET, FILM COATED ORAL at 01:18

## 2019-11-20 RX ADMIN — DIPHENHYDRAMINE HYDROCHLORIDE 25 MG: 50 INJECTION INTRAMUSCULAR; INTRAVENOUS at 01:19

## 2019-11-20 RX ADMIN — POTASSIUM CHLORIDE 40 MEQ: 1500 TABLET, EXTENDED RELEASE ORAL at 02:21

## 2019-11-20 RX ADMIN — SODIUM CHLORIDE 1000 ML: 9 INJECTION, SOLUTION INTRAVENOUS at 00:00

## 2019-11-20 NOTE — ED TRIAGE NOTES
"Pt hx hyrdocephalus , head ache nausea and vomiting..  Denies fever said she feels chills,  Has shunt no problems reported past 9 years..  Headache describe and \"head exploding and pressure behind right eye.   "

## 2019-11-20 NOTE — ED NOTES
Pt discharged home. Explained discharge and medication instructions. Questions and comments addressed. Pt verbalized understanding of instructions. Pt advised to follow-up with neurosurgery or return to ED for any new or worsening of symptoms. Pt is ambulating well and steady on feet. VS stable. PIV removed.

## 2019-11-20 NOTE — ED NOTES
Pt ambulatory to room with steady gait. Attached to monitor, changed into gown, call bell in reach.   Agree with triage note. Pt states she was here for same a few weeks ago and CT didn't show anything.

## 2019-11-20 NOTE — ED PROVIDER NOTES
ED Provider Note    Scribed for Den Figueredo M.D. by Baldev Isbell. 11/19/2019  10:49 PM    CHIEF COMPLAINT  Chief Complaint   Patient presents with   • Headache   • N/V       HPI  Rasheeda Manzano is a 48 y.o. female with history of hydrocephalus, seizures, and bilateral blindness who presents complaining of headaches onset two days ago. She has a shunt in place over her left side that extends into her pleural space of her left lung. Her shunt has malfunctioned once before, when she as 11 years old, which cut the blood flow to her optic nerve, causing her bilateral blindness. The patient reports that her headache suddenly onset when she was at home, with no recent trauma or injury. Patient notes that she has had these types of headaches in the past before. She states that it has been waxing and waning since onset, but steadily worsening in severity which is why she presents today. Patient also has been having intermittent nausea and vomiting since these headaches onset. At presentation, the patient is endorsing symptoms of right eye pain, tingling of the left leg from the knee down, a slight cough, abdominal pain secondary to vomiting, and chills, but denies any congestion, sore throat, diarrhea, dysuria, or fever. The patient is currently taking Keppra as daily medication, but has not had a seizure for about a year now. Patient denies any history of diabetes or hypertension.    REVIEW OF SYSTEMS  Constitutional: Positive for chills. No fever.  Eyes: Positive for pain of the right eye  ENT: No nasal congestion, no sore throat  Respiratory: Positive for cough.  GI: Positive for N/V and abdominal pain secondary to vomiting. No diarrhea.  : No dysuria.  Neuro: Positive for HA and tingling of left leg  See HPI for further details. All other systems are negative.     PAST MEDICAL HISTORY   has a past medical history of Blind, C. difficile  diarrhea (), Chronic daily headache, Depression, Esophageal atresia (1971), Fall, GERD (gastroesophageal reflux disease), H/O total hysterectomy, Hydrocephalus (HCC), Hydrocephalus (HCC), Jaundice, Legally blind, Migraine without aura, without mention of intractable migraine without mention of status migrainosus, Other specified symptom associated with female genital organs, Pain, and Psychiatric problem.    SOCIAL HISTORY  Social History     Tobacco Use   • Smoking status: Former Smoker     Packs/day: 1.00     Years: 10.00     Pack years: 10.00     Types: Cigars     Start date: 2004     Last attempt to quit: 2017     Years since quittin.2   • Smokeless tobacco: Never Used   • Tobacco comment:  CIGAR/day    Substance and Sexual Activity   • Alcohol use: Yes     Comment: socially   • Drug use: No   • Sexual activity: Yes     Partners: Male       SURGICAL HISTORY   has a past surgical history that includes laparoscopy (08); lysis adhesions general (12/10/2013); cystoscopy (12/10/2013); exploratory laparotomy (12/10/2013); appendectomy (12/10/2013); abdominal hysterectomy total (12/10/2013); aakash by laparoscopy (N/A, 2015); cholecystectomy (N/A, 2015); other; gastroscopy-endo (N/A, 2017); nissen fundoplication laparoscopic; and gastroscopy (N/A, 2019).    CURRENT MEDICATIONS  No current facility-administered medications for this encounter.     Current Outpatient Medications:   •  LORazepam (ATIVAN) 1 MG Tab, Take 0.5 mg by mouth every day., Disp: , Rfl:   •  propranolol CR (INDERAL LA) 120 MG CAPSULE SR 24 HR, Take 120 mg by mouth every evening., Disp: , Rfl:   •  levETIRAcetam (KEPPRA) 500 MG Tab, Take 1 Tab by mouth 2 Times a Day., Disp: 60 Tab, Rfl: 0    ALLERGIES  Allergies   Allergen Reactions   • Tape Rash     RXN= ongoing  Adhesive Medical tape. Per patient, paper tape ok.       PHYSICAL EXAM  VITAL SIGNS: /69   Pulse 98   Temp 36 °C (96.8 °F) (Temporal)    "Resp 16   Ht 1.626 m (5' 4\")   Wt 68.5 kg (151 lb)   LMP 11/27/2013   SpO2 98%   BMI 25.92 kg/m²    Pulse ox interpretation: I interpret this pulse ox as normal.  Genl: F laying in bed anxious appearing, speaking clearly, appears in mild distress. Alert, non-toxic.  Head: NC/AT . Palpable shunt area in left posterior scalp going down the neck. No overlying skin changes. No tenderness or fluid collection. Normal TM's. Posterior oropharynx is erythematous without exudates. Full ROM.  ENT: Mucous membranes moist, posterior pharynx clear, uvula midline, nares patent bilaterally   Eyes: Baseline left lateral ocular gaze, no afferent or efferent blurry actions. Normal sclera, pupils equal round reactive to light  Neck: Supple, FROM, no LAD appreciated   Pulmonary: Lungs are clear to auscultation bilaterally  Chest: No TTP  CV: Borderline tachycardia. Regular rhythm, no murmur appreciated, pulses 2+ in both upper and lower extremities,  Abdomen: soft, NT/ND; no rebound/guarding, no masses palpated, no HSM   : no CVA or suprapubic tenderness   Musculoskeletal: Pain free ROM of the neck. Moving upper and lower extremities and spontaneous in coordinated fashion  Neuro: A&Ox4 (person, place, time, situation), speech fluent, no focal deficits appreciated, No cerebellar signs.  Sensation is grossly intact in the distal upper and lower extremities  5/5 strength in  and dorsiflexion/plantar flexion of the ankles  Mental Status: Speech fluent without errors. Follows all commands. No dysarthria or apraxia.  Cranial Nerves: Pupils equal round and reactive to light. No nystagmus. CN V1-V3 intact to light touch. No facial asymmetry. Hearing clinically intact bilaterally. Tongue protrusion midline. No uvular deviation. Normal shoulder shrug and head turn.  Motor:  RUE: 5/5 with hand , 5/5 with flexion at the elbow 5/5 with extension at the elbow  LUE: 5/5 with hand , 5/5 with flexion at the elbow 5/5 with extension " at the elbow  RLE: 5/5 with leg raise, 5/5 with plantar flexion, 5/5 with dorsal flexion  LLE: 5/5 with leg raise, 5/5 with plantar flexion, 5/5 with dorsal flexion  Sensation to light touch intact throughout  Reflexes 2+ Patellar tendons  No ataxia noted  Rapidly alternating movements without difficulty  Psych: Patient has an appropriate affect and behavior  Skin: No rash or lesions.  No pallor or jaundice.  No cyanosis.  Warm and dry.     DIAGNOSTIC STUDIES / PROCEDURES    LABS  Labs Reviewed   CBC WITH DIFFERENTIAL - Abnormal; Notable for the following components:       Result Value    Hematocrit 36.3 (*)     All other components within normal limits   COMP METABOLIC PANEL - Abnormal; Notable for the following components:    Potassium 2.9 (*)     Glucose 105 (*)     AST(SGOT) 11 (*)     All other components within normal limits   HCG QUAL SERUM   LACTIC ACID   ESTIMATED GFR     All labs reviewed by me.    RADIOLOGY  CT-HEAD W/O   Final Result         1.  Bilateral ventricles are narrowed, increased since prior study, consider over shunting.   2.  No acute intracranial abnormality identified.      OG-MCTVPOX-6 VIEW   Final Result         1.  Nonspecific bowel gas pattern.   2.  No evidence of shunt catheter disruption      DX-SPINE-ANY ONE VIEW   Final Result         1.  Mild levoscoliosis of the upper thoracic spine.   2.  No visualized shunt catheter disruption.      DX-CHEST-LIMITED (1 VIEW)   Final Result         1.  No acute cardiopulmonary disease.   2.  Shunt catheter without visualized obstruction.   3.  Trace left pleural effusion      DX-SKULL-LIMITED 3-   Final Result         1.  No radiographic evidence of shunt catheter disruption.        The radiologist's interpretations of all radiological studies have been reviewed by me.        COURSE & MEDICAL DECISION MAKING  Pertinent Labs & Imaging studies reviewed. (See chart for details)    Differential diagnoses include but not limited to: shunt  malfunction, hydrocephalus, meningitis unlikely, Complicated migraine, electrolyte derangement, dehydration, and pneumonia    10:49 PM - Patient seen and examined at bedside. Patient will be treated with Tylenol 650 mg, Compazine 10 mg, and Benadryl 25 mg. Ordered DX-Skull, DX-Chest, DX-Spine, DX-Abdomen, HCG Qual Serum, CBC w/ Diff, Estimated GFR, CMP, and Lactic Acid to evaluate her symptoms. Patient will receive IV fluids for tachycardia.    1:10 AM - Patient was reevaluated at bedside. She is resting in bed comfortably with no new symptoms. She is still complaining of a headache and nausea at this time. The patient initially declined pain medications, but is now agreeable to taking them.      1:15 AM - Treated patient with Compazine 10 mg, Benadryl 25 mg, and Dilaudid 0.5 mg. Ordered CT-Head w/o.    HYDRATION: Based on the patient's presentation of Tachycardia the patient was given IV fluids. IV Hydration was used because oral hydration was not adequate alone. Upon recheck following hydration, the patient was improved.      Medical Decision Making:   Presented the emergency room for symptoms as described above.  The patient is well-known to the emergency department for frequent visits for recurrent headaches with her history of shunting I was concerned about possible complications.  She has no recent traumatic injuries, she has no reported fevers, she has no thunderclap onset.  She reported having intractable headache and repeat episodes of nausea vomiting though this is not observed in the emergency department.  She has good signs of hydration on clinical exam.  Lab work is obtained for the differential as noted above though I do not think she has an acute evidence of meningismus, no central nervous system process, or deep space abscess of the head and neck.    The patient has been seen on multiple occasions in the last several months and characteristically comes in following running out of her chronic narcotic  medications.  I was paged to the room shortly after my initial evaluation the patient was asking repeatedly for Dilaudid.  I counseled the patient regarding the need for multiple medication approach and I understand her frustration.  Patient was treated with Compazine, Benadryl and half milligram of Dilaudid.  The patient's shunt series is unremarkable for any acute obstructions and she has no visible hydrocephalus.  The patient has not had a head CT in some time and continues to come in with recurrent pain is in discomfort.  Her neurosurgeon, Dr. Hogan has moved and she is following up with another member of the [facility] neurosurgery group and next couple months.  CT of the head demonstrated no ventricular abnormalities and shunt appears in the correct place.  I discussed the findings with the patient, she continues to have no acute symptomology and does feel slightly improved.    Without the severity of the symptoms as described, I do not think a lumbar puncture for intracranial bleed is warranted at this time.  The patient feels comfortable following up in outpatient setting and has all the numbers for her previously established neurosurgery group.  Questions are addressed to the patient, she is given strict return precautions, and she is discharged home in stable condition.    FINAL IMPRESSION  Visit Diagnoses     ICD-10-CM   1. Non-intractable vomiting with nausea, unspecified vomiting type R11.2   2. Nonintractable headache, unspecified chronicity pattern, unspecified headache type R51      Baldev SHARMA (Dennis), am scribing for, and in the presence of, Den Figueredo M.D..    Electronically signed by: Baldev Isbell (Dennis), 11/19/2019    Den SHARMA M.D. personally performed the services described in this documentation, as scribed by Baldev Isbell in my presence, and it is both accurate and complete.    C    The note accurately reflects work and decisions made by me.  Den Figueredo  11/20/2019  3:02 AM

## 2019-11-25 ENCOUNTER — APPOINTMENT (OUTPATIENT)
Dept: NEUROLOGY | Facility: MEDICAL CENTER | Age: 49
End: 2019-11-25
Payer: MEDICAID

## 2019-12-10 ENCOUNTER — APPOINTMENT (OUTPATIENT)
Dept: RADIOLOGY | Facility: MEDICAL CENTER | Age: 49
End: 2019-12-10
Attending: EMERGENCY MEDICINE
Payer: MEDICAID

## 2019-12-10 ENCOUNTER — HOSPITAL ENCOUNTER (EMERGENCY)
Facility: MEDICAL CENTER | Age: 49
End: 2019-12-10
Attending: EMERGENCY MEDICINE
Payer: MEDICAID

## 2019-12-10 VITALS
TEMPERATURE: 97.1 F | WEIGHT: 150 LBS | HEIGHT: 64 IN | SYSTOLIC BLOOD PRESSURE: 124 MMHG | BODY MASS INDEX: 25.61 KG/M2 | HEART RATE: 84 BPM | DIASTOLIC BLOOD PRESSURE: 74 MMHG | OXYGEN SATURATION: 95 % | RESPIRATION RATE: 16 BRPM

## 2019-12-10 DIAGNOSIS — R06.02 SHORTNESS OF BREATH: ICD-10-CM

## 2019-12-10 DIAGNOSIS — F41.9 ANXIETY: ICD-10-CM

## 2019-12-10 LAB
ALBUMIN SERPL BCP-MCNC: 4.4 G/DL (ref 3.2–4.9)
ALBUMIN/GLOB SERPL: 1.6 G/DL
ALP SERPL-CCNC: 101 U/L (ref 30–99)
ALT SERPL-CCNC: 12 U/L (ref 2–50)
ANION GAP SERPL CALC-SCNC: 10 MMOL/L (ref 0–11.9)
AST SERPL-CCNC: 13 U/L (ref 12–45)
BASOPHILS # BLD AUTO: 1.3 % (ref 0–1.8)
BASOPHILS # BLD: 0.08 K/UL (ref 0–0.12)
BILIRUB SERPL-MCNC: 0.3 MG/DL (ref 0.1–1.5)
BUN SERPL-MCNC: 10 MG/DL (ref 8–22)
CALCIUM SERPL-MCNC: 8.9 MG/DL (ref 8.5–10.5)
CHLORIDE SERPL-SCNC: 117 MMOL/L (ref 96–112)
CO2 SERPL-SCNC: 19 MMOL/L (ref 20–33)
CREAT SERPL-MCNC: 0.62 MG/DL (ref 0.5–1.4)
EKG IMPRESSION: NORMAL
EOSINOPHIL # BLD AUTO: 0.15 K/UL (ref 0–0.51)
EOSINOPHIL NFR BLD: 2.4 % (ref 0–6.9)
ERYTHROCYTE [DISTWIDTH] IN BLOOD BY AUTOMATED COUNT: 46.6 FL (ref 35.9–50)
GLOBULIN SER CALC-MCNC: 2.8 G/DL (ref 1.9–3.5)
GLUCOSE SERPL-MCNC: 91 MG/DL (ref 65–99)
HCT VFR BLD AUTO: 39.7 % (ref 37–47)
HGB BLD-MCNC: 13 G/DL (ref 12–16)
IMM GRANULOCYTES # BLD AUTO: 0.01 K/UL (ref 0–0.11)
IMM GRANULOCYTES NFR BLD AUTO: 0.2 % (ref 0–0.9)
LYMPHOCYTES # BLD AUTO: 2.56 K/UL (ref 1–4.8)
LYMPHOCYTES NFR BLD: 40.3 % (ref 22–41)
MCH RBC QN AUTO: 28.9 PG (ref 27–33)
MCHC RBC AUTO-ENTMCNC: 32.7 G/DL (ref 33.6–35)
MCV RBC AUTO: 88.2 FL (ref 81.4–97.8)
MONOCYTES # BLD AUTO: 0.63 K/UL (ref 0–0.85)
MONOCYTES NFR BLD AUTO: 9.9 % (ref 0–13.4)
NEUTROPHILS # BLD AUTO: 2.93 K/UL (ref 2–7.15)
NEUTROPHILS NFR BLD: 45.9 % (ref 44–72)
NRBC # BLD AUTO: 0 K/UL
NRBC BLD-RTO: 0 /100 WBC
PLATELET # BLD AUTO: 352 K/UL (ref 164–446)
PMV BLD AUTO: 11.6 FL (ref 9–12.9)
POTASSIUM SERPL-SCNC: 3.6 MMOL/L (ref 3.6–5.5)
PROT SERPL-MCNC: 7.2 G/DL (ref 6–8.2)
RBC # BLD AUTO: 4.5 M/UL (ref 4.2–5.4)
SODIUM SERPL-SCNC: 146 MMOL/L (ref 135–145)
TROPONIN T SERPL-MCNC: 7 NG/L (ref 6–19)
TROPONIN T SERPL-MCNC: <6 NG/L (ref 6–19)
WBC # BLD AUTO: 6.4 K/UL (ref 4.8–10.8)

## 2019-12-10 PROCEDURE — 99285 EMERGENCY DEPT VISIT HI MDM: CPT

## 2019-12-10 PROCEDURE — 80053 COMPREHEN METABOLIC PANEL: CPT

## 2019-12-10 PROCEDURE — A9270 NON-COVERED ITEM OR SERVICE: HCPCS | Performed by: EMERGENCY MEDICINE

## 2019-12-10 PROCEDURE — 93005 ELECTROCARDIOGRAM TRACING: CPT | Performed by: EMERGENCY MEDICINE

## 2019-12-10 PROCEDURE — 71045 X-RAY EXAM CHEST 1 VIEW: CPT

## 2019-12-10 PROCEDURE — 700102 HCHG RX REV CODE 250 W/ 637 OVERRIDE(OP): Performed by: EMERGENCY MEDICINE

## 2019-12-10 PROCEDURE — 84484 ASSAY OF TROPONIN QUANT: CPT

## 2019-12-10 PROCEDURE — 85025 COMPLETE CBC W/AUTO DIFF WBC: CPT

## 2019-12-10 RX ORDER — LORAZEPAM 1 MG/1
1 TABLET ORAL ONCE
Status: COMPLETED | OUTPATIENT
Start: 2019-12-10 | End: 2019-12-10

## 2019-12-10 RX ORDER — HYDROXYZINE HYDROCHLORIDE 25 MG/1
25 TABLET, FILM COATED ORAL 3 TIMES DAILY PRN
Qty: 30 TAB | Refills: 0 | Status: SHIPPED | OUTPATIENT
Start: 2019-12-10 | End: 2019-12-11

## 2019-12-10 RX ADMIN — LORAZEPAM 1 MG: 1 TABLET ORAL at 04:15

## 2019-12-10 RX ADMIN — LORAZEPAM 1 MG: 1 TABLET ORAL at 02:30

## 2019-12-10 ASSESSMENT — LIFESTYLE VARIABLES: DO YOU DRINK ALCOHOL: NO

## 2019-12-10 NOTE — DISCHARGE INSTRUCTIONS
You were seen in the emergency department for anxiety and shortness of breath.  Your work-up was reassuring.  There is no evidence of heart attack, or other dangerous conditions.  You were treated with Ativan with good effect.    You are being referred to mental health providers for follow-up.    You may take Ativan at home or are being given an alternative medication to try to help treat your symptoms when they occur.  You will likely benefit from cognitive behavioral therapy which can help manage your anxiety symptoms.    Please return to the emergency department or seek medical attention if you develop:  Chest pain, difficulty breathing, vomiting, any other new or concerning findings

## 2019-12-10 NOTE — ED NOTES
PT discharged. VSS on RA. All belongings accounted for. Pt assisted to ER lobby with ED RN. Discharge instruction given, all questions answered. Correct pharmacy.

## 2019-12-10 NOTE — ED NOTES
"Chief Complaint   Patient presents with   • Anxiety     Pt has hx of anxiety was told Today she has early breast cancer which caused her to have an anxiety attack, took 1 mg Ativan at 2000 with no relief       BIB EMS to [facility], pt on monitor and in gown. Pt is legally blind and has hx of hydrocephalus reports 5/10 headache after anxiety attack. Call light in reach, chart up for ERP.     Medications given en route: none    /81   Pulse 93   Temp 36.2 °C (97.1 °F) (Oral)   Resp 17   Ht 1.626 m (5' 4\")   Wt 68 kg (150 lb)   LMP 11/27/2013   SpO2 99%   BMI 25.75 kg/m²   "

## 2019-12-10 NOTE — ED PROVIDER NOTES
ED Provider Note    Scribed for Shawn Overton M.D. by Tsering Asencio. 12/10/2019,  2:02 AM.    Means of Arrival: EMS  History obtained from: Patient  History limited by: None    CHIEF COMPLAINT  Chief Complaint   Patient presents with   • Anxiety     Pt has hx of anxiety was told Today she has early breast cancer which caused her to have an anxiety attack, took 1 mg Ativan at 2000 with no relief       HPI  Rasheeda Manzano is a 49 y.o. female, with a history of anxiety and migraines, who presents to the Emergency Department for anxiety that began 4:00 PM yesterday after being diagnosed with breast cancer that same day. Patient reports she returned to her home, had a cup of tea, but she began experiencing associated tingling and numbness in her bilateral hands and feet, headache, and shortness of breath. She denies any chest pain, nausea, or vomiting. She states that she experience anxiety attacks in the past, however the numbness, tingling, and shortness of breath today is new. She has taken Ativan 1 mg at 8:00 PM last night with no improvement of complaints. She denies smoking cigarettes or illicit drug use, but admits to occasional alcohol use.    REVIEW OF SYSTEMS  RESPIRATORY:  Shortness of breath  CARDIAC: No chest pain  GASTROINTESTINAL:  No nausea or vomiting  PSYCHIATRIC: Anxiety  NEUROLOGICAL: numbness and tingling in bilateral hands and feet, headache    See HPI for further details.   All other systems are negative.     PAST MEDICAL HISTORY  Past Medical History:   Diagnosis Date   • Blind    • C. 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   • Fall    • GERD (gastroesophageal reflux disease)    • H/O total hysterectomy    • Hydrocephalus (HCC)    • Hydrocephalus (HCC)     shunt drains into pleural place of L lung   • Jaundice     at birth   • Legally blind    • Migraine without aura, without mention of  intractable migraine without mention of status migrainosus    • Other specified symptom associated with female genital organs     excessive bleeding   • Pain     gallbladder related   • Psychiatric problem     depression       FAMILY HISTORY  Family History   Problem Relation Age of Onset   • Hypertension Mother    • Hypertension Father    • Non-contributory Neg Hx         Migraine       SOCIAL HISTORY   reports that she quit smoking about 2 years ago. Her smoking use included cigars. She started smoking about 15 years ago. She has a 10.00 pack-year smoking history. She has never used smokeless tobacco. She reports current alcohol use. She reports that she does not use drugs.    SURGICAL HISTORY  Past Surgical History:   Procedure Laterality Date   • GASTROSCOPY N/A 1/2/2019    Procedure: GASTROSCOPY;  Surgeon: Matias Fernando M.D.;  Location: SURGERY [facility];  Service: Gastroenterology   • GASTROSCOPY-ENDO N/A 8/24/2017    Procedure: GASTROSCOPY-ENDO;  Surgeon: Matias Fernando M.D.;  Location: ENDOSCOPY [facility];  Service:    • AYALA BY LAPAROSCOPY N/A 8/13/2015    Procedure: AYALA BY LAPAROSCOPY;  Surgeon: Brice Johnson M.D.;  Location: SURGERY SAME DAY [facility];  Service:    • CHOLECYSTECTOMY N/A 8/13/2015    Procedure: CHOLECYSTECTOMY;  Surgeon: Brice Johnson M.D.;  Location: SURGERY SAME DAY [facility];  Service:    • LYSIS ADHESIONS GENERAL  12/10/2013    Performed by Arie Bass M.D. at [facility]   • CYSTOSCOPY  12/10/2013    Performed by Joana Yeager M.D. at [facility]   • EXPLORATORY LAPAROTOMY  12/10/2013    Performed by Arie Bass M.D. at [facility]   • APPENDECTOMY  12/10/2013    Performed by Arie Bass M.D. at [facility]   • ABDOMINAL HYSTERECTOMY TOTAL  12/10/2013    Performed by Joana Yeager M.D. at [facility]   • LAPAROSCOPY  8/8/08    Performed by FERNANDO HENDERSON at [facility]   •  "NISSEN FUNDOPLICATION LAPAROSCOPIC     • OTHER      PLEURAL SHUNT, numerous revisions       CURRENT MEDICATIONS  Ativan 1 mg as needed for anxiety    ALLERGIES  Allergies   Allergen Reactions   • Tape Rash     RXN= ongoing  Adhesive Medical tape. Per patient, paper tape ok.       PHYSICAL EXAM  VITAL SIGNS: /81   Pulse 93   Temp 36.2 °C (97.1 °F) (Oral)   Resp 17   Ht 1.626 m (5' 4\")   Wt 68 kg (150 lb)   LMP 2013   SpO2 99%   BMI 25.75 kg/m²    Gen: Alert, no acute distress  HEENT: ATNC  Eyes: PERRL, EOMI, normal conjunctiva.   Neck: trachea midline  Resp: no respiratory distress, lungs clear  CV: No JVD, regular, no murmur  Abd: non-distended  Ext: No deformities, no pedal edema  Psych: normal mood  Neuro: speech fluent     DIAGNOSTIC STUDIES / PROCEDURES     EKG  Results for orders placed or performed during the hospital encounter of 12/10/19   EKG (NOW)   Result Value Ref Range    Report       [facility] Emergency Dept.    Test Date:  2019-12-10  Pt Name:    NILAY MANN               Department: ER  MRN:        [id number]                      Room:       [facility]  Gender:     Female                       Technician: [id number]  :        1970                   Requested By:SHAWN OVERTON  Order #:    [id number]                    Reading MD: Shawn Overton    Measurements  Intervals                                Axis  Rate:       94                           P:          54  DC:         148                          QRS:        67  QRSD:       80                           T:          51  QT:         380  QTc:        476    Interpretive Statements  SINUS RHYTHM  ARTIFACT IN LEAD(S) II,III,aVR,aVL,aVF,V1,V2,V3,V4,V5,V6  Compared to ECG 2019 14:52:42  Sinus tachycardia no longer present  T-wave abnormality no longer present  Electronically Signed On 12- 5:25:33 PST by Shawn Overton         12 Lead EKG interpreted by me.    LABS  Labs Reviewed   CBC WITH DIFFERENTIAL - " Abnormal; Notable for the following components:       Result Value    MCHC 32.7 (*)     All other components within normal limits   COMP METABOLIC PANEL - Abnormal; Notable for the following components:    Sodium 146 (*)     Chloride 117 (*)     Co2 19 (*)     Alkaline Phosphatase 101 (*)     All other components within normal limits   TROPONIN   ESTIMATED GFR   TROPONIN     All labs reviewed by me.    RADIOLOGY  DX-CHEST-PORTABLE (1 VIEW)   Final Result      Mild cardiomegaly.        The radiologist’s interpretation of all radiology studies have been reviewed by me.    COURSE & MEDICAL DECISION MAKING  Pertinent Labs & Imaging studies reviewed. (See chart for details)    2:02 AM Patient seen and examined at bedside. Discussed plan of care with patient. I informed them that labs and imaging will be ordered to evaluate symptoms. Patient is understanding and agreeable with plan.   Ordered for labs and imaging to evaluate. Patient will be treated with Ativan 1 mg for her symptoms.     3:57 AM - Nurse informed me that patient is requesting more Ativan. I will treat the patient with an additional dose of Ativan 1 mg.     5:28 AM - Patient was reevaluated at bedside. Discussed lab and radiology results with the patient and informed them that they were reassuring. She is safe to be discharged home. All of her questions were addressed. She will be prescribed Atarax 25 mg to treat her symptoms, and follow up with [facility]. She understands and agrees with plan and discharge home.    The patient will return for new or worsening symptoms and is stable at the time of discharge.    Heart score: 1    Medical Decision Making:  The patient presented with symptoms concerning for anxiety, however as a female could be possible atypical cardiac chest pain. The EKG was not ischemic and the patient's initial workup was negative for MI, pneumothorax, heart failure. The patient's symptoms, labs and vital signs are not  consistent with a pulmonary embolism. The chest x-ray and symptoms are not suggestive of an aortic dissection. The patient was observed and 2nd troponin performed, which ruled out MI. Based on the patient's clinical picture, an admission for inpatient stress test was not indicated and the patient will follow up with their outpatient provider for follow up evaluation. The patient was provided with return precautions.  Patient likely is having anxiety relating to her recent diagnosis of breast cancer.  She will be provided with a prescription for hydroxyzine for anxiety symptoms.  She is recommended to obtain cognitive behavioral therapy.  She will be given information on local mental health resources and advised to follow-up with her regular doctor.  She is feeling much improved at the time of discharge.        DISPOSITION:  Patient will be discharged home in stable condition.    FOLLOW UP:  Tabitha Bautista M.D.  [address] [facility] [id number] [phone]    In 2 days      [facility]  [address] [facility] [address] [phone]        [facility]  [address] [address]  [phone]        [facility]  [address] [facility] [address]  [phone]          OUTPATIENT MEDICATIONS:  Discharge Medication List as of 12/10/2019  5:36 AM      START taking these medications    Details   hydrOXYzine HCl (ATARAX) 25 MG Tab Take 1 Tab by mouth 3 times a day as needed for Anxiety., Disp-30 Tab, R-0, Normal             FINAL IMPRESSION  1. Shortness of breath    2. Anxiety            Tsering SHARMA (Dennis), am scribing for, and in the presence of, Shawn Overton M.D..    Electronically signed by: Tsering Asencio (Dennis), 12/10/2019    Shawn SHARMA M.D. personally performed the services described in this documentation, as scribed by Tsering Asencio in my presence, and it is both accurate and complete.    C    The  note accurately reflects work and decisions made by me.  Shawn Overton  12/10/2019  5:56 AM

## 2019-12-10 NOTE — ED NOTES
ERP at bedside.    Patient Name: Jud Fink, : 1946, MRN: [id number]   Date:  2018  Referring Physician:  Libia Paz    Diagnosis: L5 radiculopathy    Discharge Summary  Pt has attended 10, cancelled 0, and no shown 0 visits in Physical Therapy.      Pr Goal status G Code:  Mobility: Walking and Moving Around, CJ: 20-39% impaired, limited, or restricted    Charges: MT 1, EX 2       Total Timed Treatment: MT 15 min, EX 30 min  Total Treatment Time: 48 min    Goals: 6 wks  Pt will be able to walk up to 30 mi

## 2019-12-11 ENCOUNTER — APPOINTMENT (OUTPATIENT)
Dept: RADIOLOGY | Facility: MEDICAL CENTER | Age: 49
End: 2019-12-11
Attending: EMERGENCY MEDICINE
Payer: MEDICAID

## 2019-12-11 ENCOUNTER — HOSPITAL ENCOUNTER (OUTPATIENT)
Facility: MEDICAL CENTER | Age: 49
End: 2019-12-12
Attending: EMERGENCY MEDICINE | Admitting: INTERNAL MEDICINE
Payer: MEDICAID

## 2019-12-11 DIAGNOSIS — Z86.69 HISTORY OF HYDROCEPHALUS: ICD-10-CM

## 2019-12-11 DIAGNOSIS — R51.9 INTRACTABLE HEADACHE, UNSPECIFIED CHRONICITY PATTERN, UNSPECIFIED HEADACHE TYPE: ICD-10-CM

## 2019-12-11 LAB — HCG UR QL: NEGATIVE

## 2019-12-11 PROCEDURE — 96375 TX/PRO/DX INJ NEW DRUG ADDON: CPT | Mod: XU

## 2019-12-11 PROCEDURE — G0378 HOSPITAL OBSERVATION PER HR: HCPCS

## 2019-12-11 PROCEDURE — 96374 THER/PROPH/DIAG INJ IV PUSH: CPT | Mod: XU

## 2019-12-11 PROCEDURE — 96376 TX/PRO/DX INJ SAME DRUG ADON: CPT | Mod: XU

## 2019-12-11 PROCEDURE — 74018 RADEX ABDOMEN 1 VIEW: CPT

## 2019-12-11 PROCEDURE — 71045 X-RAY EXAM CHEST 1 VIEW: CPT

## 2019-12-11 PROCEDURE — 70553 MRI BRAIN STEM W/O & W/DYE: CPT

## 2019-12-11 PROCEDURE — A9576 INJ PROHANCE MULTIPACK: HCPCS | Performed by: EMERGENCY MEDICINE

## 2019-12-11 PROCEDURE — A9270 NON-COVERED ITEM OR SERVICE: HCPCS | Performed by: EMERGENCY MEDICINE

## 2019-12-11 PROCEDURE — 99285 EMERGENCY DEPT VISIT HI MDM: CPT

## 2019-12-11 PROCEDURE — 700111 HCHG RX REV CODE 636 W/ 250 OVERRIDE (IP): Performed by: EMERGENCY MEDICINE

## 2019-12-11 PROCEDURE — 700111 HCHG RX REV CODE 636 W/ 250 OVERRIDE (IP): Performed by: INTERNAL MEDICINE

## 2019-12-11 PROCEDURE — A9270 NON-COVERED ITEM OR SERVICE: HCPCS | Performed by: INTERNAL MEDICINE

## 2019-12-11 PROCEDURE — 72020 X-RAY EXAM OF SPINE 1 VIEW: CPT

## 2019-12-11 PROCEDURE — 700117 HCHG RX CONTRAST REV CODE 255: Performed by: EMERGENCY MEDICINE

## 2019-12-11 PROCEDURE — 700101 HCHG RX REV CODE 250: Performed by: INTERNAL MEDICINE

## 2019-12-11 PROCEDURE — 700102 HCHG RX REV CODE 250 W/ 637 OVERRIDE(OP): Performed by: EMERGENCY MEDICINE

## 2019-12-11 PROCEDURE — 99220 PR INITIAL OBSERVATION CARE,LEVL III: CPT | Performed by: INTERNAL MEDICINE

## 2019-12-11 PROCEDURE — 700102 HCHG RX REV CODE 250 W/ 637 OVERRIDE(OP): Performed by: INTERNAL MEDICINE

## 2019-12-11 PROCEDURE — 70250 X-RAY EXAM OF SKULL: CPT

## 2019-12-11 PROCEDURE — 81025 URINE PREGNANCY TEST: CPT

## 2019-12-11 RX ORDER — POLYETHYLENE GLYCOL 3350 17 G/17G
1 POWDER, FOR SOLUTION ORAL
Status: DISCONTINUED | OUTPATIENT
Start: 2019-12-11 | End: 2019-12-12

## 2019-12-11 RX ORDER — BUTALBITAL, ACETAMINOPHEN AND CAFFEINE 50; 325; 40 MG/1; MG/1; MG/1
1 TABLET ORAL ONCE
Status: COMPLETED | OUTPATIENT
Start: 2019-12-11 | End: 2019-12-11

## 2019-12-11 RX ORDER — OXYCODONE HYDROCHLORIDE 5 MG/1
5 TABLET ORAL
Status: DISCONTINUED | OUTPATIENT
Start: 2019-12-11 | End: 2019-12-12

## 2019-12-11 RX ORDER — HYDROCODONE BITARTRATE AND ACETAMINOPHEN 5; 325 MG/1; MG/1
1 TABLET ORAL ONCE
Status: COMPLETED | OUTPATIENT
Start: 2019-12-11 | End: 2019-12-11

## 2019-12-11 RX ORDER — ONDANSETRON 4 MG/1
4 TABLET, ORALLY DISINTEGRATING ORAL EVERY 4 HOURS PRN
Status: DISCONTINUED | OUTPATIENT
Start: 2019-12-11 | End: 2019-12-12 | Stop reason: HOSPADM

## 2019-12-11 RX ORDER — LORAZEPAM 2 MG/ML
0.5 INJECTION INTRAMUSCULAR EVERY 4 HOURS PRN
Status: DISCONTINUED | OUTPATIENT
Start: 2019-12-11 | End: 2019-12-12 | Stop reason: HOSPADM

## 2019-12-11 RX ORDER — HYDROCODONE BITARTRATE AND ACETAMINOPHEN 5; 325 MG/1; MG/1
1-2 TABLET ORAL EVERY 4 HOURS PRN
Status: ON HOLD | COMMUNITY
End: 2019-12-19

## 2019-12-11 RX ORDER — TOPIRAMATE 100 MG/1
100 TABLET, FILM COATED ORAL 2 TIMES DAILY
Refills: 1 | COMMUNITY
Start: 2019-11-06 | End: 2019-12-11

## 2019-12-11 RX ORDER — PROCHLORPERAZINE EDISYLATE 5 MG/ML
5-10 INJECTION INTRAMUSCULAR; INTRAVENOUS EVERY 4 HOURS PRN
Status: DISCONTINUED | OUTPATIENT
Start: 2019-12-11 | End: 2019-12-12 | Stop reason: HOSPADM

## 2019-12-11 RX ORDER — HYDROXYZINE 50 MG/1
50 TABLET, FILM COATED ORAL
Refills: 1 | COMMUNITY
End: 2020-08-06

## 2019-12-11 RX ORDER — LORAZEPAM 0.5 MG/1
0.5 TABLET ORAL
Refills: 0 | Status: ON HOLD | COMMUNITY
Start: 2019-11-01 | End: 2020-02-17 | Stop reason: SDUPTHER

## 2019-12-11 RX ORDER — ENALAPRILAT 1.25 MG/ML
1.25 INJECTION INTRAVENOUS EVERY 6 HOURS PRN
Status: DISCONTINUED | OUTPATIENT
Start: 2019-12-11 | End: 2019-12-12 | Stop reason: HOSPADM

## 2019-12-11 RX ORDER — PROMETHAZINE HYDROCHLORIDE 25 MG/1
12.5-25 TABLET ORAL EVERY 4 HOURS PRN
Status: DISCONTINUED | OUTPATIENT
Start: 2019-12-11 | End: 2019-12-12 | Stop reason: HOSPADM

## 2019-12-11 RX ORDER — HYDROMORPHONE HYDROCHLORIDE 1 MG/ML
0.5 INJECTION, SOLUTION INTRAMUSCULAR; INTRAVENOUS; SUBCUTANEOUS
Status: DISCONTINUED | OUTPATIENT
Start: 2019-12-11 | End: 2019-12-12

## 2019-12-11 RX ORDER — ACETAMINOPHEN 325 MG/1
650 TABLET ORAL EVERY 6 HOURS PRN
Status: DISCONTINUED | OUTPATIENT
Start: 2019-12-11 | End: 2019-12-12 | Stop reason: HOSPADM

## 2019-12-11 RX ORDER — AMOXICILLIN 250 MG
2 CAPSULE ORAL 2 TIMES DAILY
Status: DISCONTINUED | OUTPATIENT
Start: 2019-12-11 | End: 2019-12-12

## 2019-12-11 RX ORDER — ESOMEPRAZOLE MAGNESIUM 40 MG/1
40 CAPSULE, DELAYED RELEASE ORAL
Refills: 5 | COMMUNITY
Start: 2019-11-09 | End: 2020-03-18

## 2019-12-11 RX ORDER — OXYCODONE HYDROCHLORIDE 10 MG/1
10 TABLET ORAL
Status: DISCONTINUED | OUTPATIENT
Start: 2019-12-11 | End: 2019-12-12

## 2019-12-11 RX ORDER — HYDROXYZINE 50 MG/1
50 TABLET, FILM COATED ORAL
Status: DISCONTINUED | OUTPATIENT
Start: 2019-12-11 | End: 2019-12-12 | Stop reason: HOSPADM

## 2019-12-11 RX ORDER — PROPRANOLOL HCL 60 MG
120 CAPSULE, EXTENDED RELEASE 24HR ORAL
Status: DISCONTINUED | OUTPATIENT
Start: 2019-12-11 | End: 2019-12-12 | Stop reason: HOSPADM

## 2019-12-11 RX ORDER — PROMETHAZINE HYDROCHLORIDE 12.5 MG/1
12.5-25 SUPPOSITORY RECTAL EVERY 4 HOURS PRN
Status: DISCONTINUED | OUTPATIENT
Start: 2019-12-11 | End: 2019-12-12 | Stop reason: HOSPADM

## 2019-12-11 RX ORDER — SUCRALFATE 1 G/1
1 TABLET ORAL 4 TIMES DAILY PRN
Refills: 5 | Status: ON HOLD | COMMUNITY
Start: 2019-11-01 | End: 2020-02-17

## 2019-12-11 RX ORDER — DIPHENHYDRAMINE HYDROCHLORIDE 50 MG/ML
25 INJECTION INTRAMUSCULAR; INTRAVENOUS ONCE
Status: COMPLETED | OUTPATIENT
Start: 2019-12-11 | End: 2019-12-11

## 2019-12-11 RX ORDER — KETOROLAC TROMETHAMINE 30 MG/ML
10 INJECTION, SOLUTION INTRAMUSCULAR; INTRAVENOUS ONCE
Status: COMPLETED | OUTPATIENT
Start: 2019-12-11 | End: 2019-12-11

## 2019-12-11 RX ORDER — LEVETIRACETAM 500 MG/1
1000 TABLET ORAL
Status: DISCONTINUED | OUTPATIENT
Start: 2019-12-11 | End: 2019-12-12 | Stop reason: HOSPADM

## 2019-12-11 RX ORDER — METOCLOPRAMIDE HYDROCHLORIDE 5 MG/ML
10 INJECTION INTRAMUSCULAR; INTRAVENOUS ONCE
Status: COMPLETED | OUTPATIENT
Start: 2019-12-11 | End: 2019-12-11

## 2019-12-11 RX ORDER — LEVETIRACETAM 500 MG/1
500 TABLET ORAL 2 TIMES DAILY
COMMUNITY

## 2019-12-11 RX ORDER — TOPIRAMATE 100 MG/1
200 TABLET, FILM COATED ORAL
Status: DISCONTINUED | OUTPATIENT
Start: 2019-12-11 | End: 2019-12-12 | Stop reason: HOSPADM

## 2019-12-11 RX ORDER — OMEPRAZOLE 20 MG/1
20 CAPSULE, DELAYED RELEASE ORAL
Status: DISCONTINUED | OUTPATIENT
Start: 2019-12-11 | End: 2019-12-12 | Stop reason: HOSPADM

## 2019-12-11 RX ORDER — BISACODYL 10 MG
10 SUPPOSITORY, RECTAL RECTAL
Status: DISCONTINUED | OUTPATIENT
Start: 2019-12-11 | End: 2019-12-12

## 2019-12-11 RX ORDER — ONDANSETRON 2 MG/ML
4 INJECTION INTRAMUSCULAR; INTRAVENOUS EVERY 4 HOURS PRN
Status: DISCONTINUED | OUTPATIENT
Start: 2019-12-11 | End: 2019-12-12 | Stop reason: HOSPADM

## 2019-12-11 RX ORDER — TOPIRAMATE 100 MG/1
200 TABLET, FILM COATED ORAL
COMMUNITY
End: 2020-03-18

## 2019-12-11 RX ADMIN — METOCLOPRAMIDE 10 MG: 5 INJECTION, SOLUTION INTRAMUSCULAR; INTRAVENOUS at 10:07

## 2019-12-11 RX ADMIN — DIPHENHYDRAMINE HYDROCHLORIDE 25 MG: 50 INJECTION INTRAMUSCULAR; INTRAVENOUS at 10:07

## 2019-12-11 RX ADMIN — LEVETIRACETAM 1000 MG: 500 TABLET ORAL at 22:29

## 2019-12-11 RX ADMIN — HYDROCODONE BITARTRATE AND ACETAMINOPHEN 1 TABLET: 5; 325 TABLET ORAL at 13:43

## 2019-12-11 RX ADMIN — HYDROXYZINE HYDROCHLORIDE 50 MG: 50 TABLET, FILM COATED ORAL at 22:29

## 2019-12-11 RX ADMIN — HYDROMORPHONE HYDROCHLORIDE 0.5 MG: 1 INJECTION, SOLUTION INTRAMUSCULAR; INTRAVENOUS; SUBCUTANEOUS at 23:40

## 2019-12-11 RX ADMIN — HYDROMORPHONE HYDROCHLORIDE 0.5 MG: 1 INJECTION, SOLUTION INTRAMUSCULAR; INTRAVENOUS; SUBCUTANEOUS at 20:36

## 2019-12-11 RX ADMIN — HYDROCODONE BITARTRATE AND ACETAMINOPHEN 1 TABLET: 5; 325 TABLET ORAL at 11:10

## 2019-12-11 RX ADMIN — KETOROLAC TROMETHAMINE 9.99 MG: 30 INJECTION, SOLUTION INTRAMUSCULAR; INTRAVENOUS at 10:16

## 2019-12-11 RX ADMIN — GADOTERIDOL 14 ML: 279.3 INJECTION, SOLUTION INTRAVENOUS at 14:47

## 2019-12-11 RX ADMIN — BUTALBITAL, ACETAMINOPHEN, AND CAFFEINE 1 TABLET: 50; 325; 40 TABLET ORAL at 15:44

## 2019-12-11 RX ADMIN — LORAZEPAM 0.5 MG: 2 INJECTION INTRAMUSCULAR; INTRAVENOUS at 22:26

## 2019-12-11 RX ADMIN — PROPRANOLOL HYDROCHLORIDE 120 MG: 60 CAPSULE, EXTENDED RELEASE ORAL at 22:30

## 2019-12-11 RX ADMIN — OMEPRAZOLE 20 MG: 20 CAPSULE, DELAYED RELEASE ORAL at 22:29

## 2019-12-11 RX ADMIN — TOPIRAMATE 200 MG: 100 TABLET, FILM COATED ORAL at 22:30

## 2019-12-11 ASSESSMENT — LIFESTYLE VARIABLES
HAVE YOU EVER FELT YOU SHOULD CUT DOWN ON YOUR DRINKING: NO
EVER_SMOKED: NEVER
ALCOHOL_USE: YES
HAVE PEOPLE ANNOYED YOU BY CRITICIZING YOUR DRINKING: NO
EVER FELT BAD OR GUILTY ABOUT YOUR DRINKING: NO
HOW MANY TIMES IN THE PAST YEAR HAVE YOU HAD 5 OR MORE DRINKS IN A DAY: 0
TOTAL SCORE: 0
ON A TYPICAL DAY WHEN YOU DRINK ALCOHOL HOW MANY DRINKS DO YOU HAVE: 1
TOTAL SCORE: 0
CONSUMPTION TOTAL: NEGATIVE
AVERAGE NUMBER OF DAYS PER WEEK YOU HAVE A DRINK CONTAINING ALCOHOL: 1
TOTAL SCORE: 0
EVER HAD A DRINK FIRST THING IN THE MORNING TO STEADY YOUR NERVES TO GET RID OF A HANGOVER: NO

## 2019-12-11 ASSESSMENT — ENCOUNTER SYMPTOMS
STRIDOR: 0
TINGLING: 0
NAUSEA: 0
DEPRESSION: 0
LOSS OF CONSCIOUSNESS: 0
FEVER: 0
HEADACHES: 1
ABDOMINAL PAIN: 0
VOMITING: 0
WEAKNESS: 0
PALPITATIONS: 0
CHILLS: 0
COUGH: 0
MYALGIAS: 0
DIARRHEA: 0
CONSTIPATION: 0
SHORTNESS OF BREATH: 0
DIZZINESS: 0
SPUTUM PRODUCTION: 0
FALLS: 0

## 2019-12-11 NOTE — ED TRIAGE NOTES
Rasheeda Manzano  Chief Complaint   Patient presents with   • Headache     x 3 days, no relief with home medication.     Pt ambulatory to triage with above complaint.   VSS,  Hx of same,  And states similar to previous headaches.  No relief with home medication. +nausea. Hx of hydrocephalus.   Pt returned to [facility], educated on triage process, and to inform staff of any changes or concerns.

## 2019-12-12 ENCOUNTER — HOSPITAL ENCOUNTER (OUTPATIENT)
Facility: MEDICAL CENTER | Age: 49
End: 2019-12-12
Attending: SURGERY
Payer: MEDICAID

## 2019-12-12 VITALS
DIASTOLIC BLOOD PRESSURE: 77 MMHG | HEART RATE: 66 BPM | SYSTOLIC BLOOD PRESSURE: 130 MMHG | OXYGEN SATURATION: 96 % | WEIGHT: 150.79 LBS | RESPIRATION RATE: 16 BRPM | TEMPERATURE: 98.6 F | BODY MASS INDEX: 25.74 KG/M2 | HEIGHT: 64 IN

## 2019-12-12 PROBLEM — R51.9 HEADACHE: Status: RESOLVED | Noted: 2018-12-04 | Resolved: 2019-12-12

## 2019-12-12 PROBLEM — E87.0 HYPERNATREMIA: Status: RESOLVED | Noted: 2019-12-12 | Resolved: 2019-12-12

## 2019-12-12 PROBLEM — C50.919 BREAST CANCER (HCC): Status: ACTIVE | Noted: 2019-12-12

## 2019-12-12 PROBLEM — E87.0 HYPERNATREMIA: Status: ACTIVE | Noted: 2019-12-12

## 2019-12-12 LAB — PATHOLOGY CONSULT NOTE: NORMAL

## 2019-12-12 PROCEDURE — 700102 HCHG RX REV CODE 250 W/ 637 OVERRIDE(OP): Performed by: INTERNAL MEDICINE

## 2019-12-12 PROCEDURE — 96376 TX/PRO/DX INJ SAME DRUG ADON: CPT

## 2019-12-12 PROCEDURE — G0378 HOSPITAL OBSERVATION PER HR: HCPCS

## 2019-12-12 PROCEDURE — A9270 NON-COVERED ITEM OR SERVICE: HCPCS | Performed by: INTERNAL MEDICINE

## 2019-12-12 PROCEDURE — 99217 PR OBSERVATION CARE DISCHARGE: CPT | Performed by: INTERNAL MEDICINE

## 2019-12-12 PROCEDURE — 700111 HCHG RX REV CODE 636 W/ 250 OVERRIDE (IP): Performed by: INTERNAL MEDICINE

## 2019-12-12 RX ADMIN — HYDROMORPHONE HYDROCHLORIDE 0.5 MG: 1 INJECTION, SOLUTION INTRAMUSCULAR; INTRAVENOUS; SUBCUTANEOUS at 02:18

## 2019-12-12 RX ADMIN — OXYCODONE HYDROCHLORIDE 10 MG: 10 TABLET ORAL at 08:08

## 2019-12-12 NOTE — ED NOTES
Med rec updated and complete. Allergies reviewed. Met with pt at bedside and dicussed current medications and last doses taken.  Pt denies antibiotic use in last 14 days.    Home pharmacy  [facility]  narc check complete.

## 2019-12-12 NOTE — DISCHARGE PLANNING
Patient is eligible for Medicaid Meds to Beds at discharge if they have coverage with [place] Medicaid, Medicaid FFS, Medicaid HMO ([facility]), or [place]. This service is provided through the [facility] if orders are received by the pharmacy prior to 4pm Monday through Friday excluding holidays. Preferred pharmacy has been changed to [facility]. Please call x [phone] prior to discharge.

## 2019-12-12 NOTE — ED NOTES
Pt medicated for pain per MAR at a 8/10. Pt c/o headache in R eye and R frontal region of her head. No neuro deficits noted.

## 2019-12-12 NOTE — DISCHARGE SUMMARY
Discharge Summary    CHIEF COMPLAINT ON ADMISSION  Chief Complaint   Patient presents with   • Headache     x 3 days, no relief with home medication.       Reason for Admission  Headache     Admission Date  12/11/2019    CODE STATUS  Full Code    HPI & HOSPITAL COURSE  This is a 49 y.o. female here with headaches.  She has underlying history of congenital hydrocephalus status post  shunt placement.  She has underlying chronic blindness.  Chronic migraines for which she has required previous hospitalizations for pain control.  Chronic narcotic/benzodiazepine dependence.  Recent diagnosis of breast cancer with plans in place for lumpectomy.  Patient presented to the hospital, emergency department on December 11, 2019 with a headaches, MRI of the brain was completed in the emergency department which did not reveal any acute concerns.  She was admitted to the CDU for observation and ongoing conservative management.  During hospital stay, headaches have resolved at this time, upon evaluation on December 12, 2019, patient eager to discharge home treatment and outpatient follow-up with her primary care physician.  CBC normal on presentation, no infectious concerns, patient has remained afebrile.  No other acute concerns at this time.  Patient eager to discharge home.  Requested a prescription for narcotics including Norco and benzodiazepines,  checked/narcotic check performed.  Discussed that she is already receiving prescriptions from outpatient providers, advised follow-up with outpatient providers for ongoing prescriptions for controlled substances.    Therefore, she is discharged in good and stable condition to home with close outpatient follow-up.    Discharge Date  12/12/19    FOLLOW UP ITEMS POST DISCHARGE    Discharge Instructions per Jose G Agarwal M.D.    Continue follow-up with your primary care physician, neurosurgeons and request to follow-up with neurology.  Continue work-up of breast cancer in the  outpatient setting.    DIET: Regular    ACTIVITY: As tolerated    DIAGNOSIS: Headache    Return to ER if recurrent symptoms                DISCHARGE DIAGNOSES  Active Problems:    Seizure (HCC) POA: Yes    Anxiety POA: Yes    Gastroesophageal reflux disease with esophagitis POA: Yes    Breast cancer (HCC) POA: Yes    Blindness POA: Yes  Resolved Problems:    Headache POA: Yes    Hypernatremia POA: Yes      FOLLOW UP  Future Appointments   Date Time Provider Department Center   12/19/2019 10:30 AM [address] [facility] [address]   1/13/2020 12:00 PM [facility] None     No follow-up provider specified.    MEDICATIONS ON DISCHARGE     Medication List      CONTINUE taking these medications      Instructions   esomeprazole 40 MG delayed-release capsule  Commonly known as:  NEXIUM   Take 40 mg by mouth every bedtime.  Dose:  40 mg     HYDROcodone-acetaminophen 5-325 MG Tabs per tablet  Commonly known as:  NORCO   Take 1-2 Tabs by mouth every four hours as needed. Indications: Pain  Dose:  1-2 Tab     hydrOXYzine HCl 50 MG Tabs  Commonly known as:  ATARAX   Take 50 mg by mouth every bedtime.  Dose:  50 mg     levETIRAcetam 500 MG Tabs  Commonly known as:  KEPPRA   Take 1,000 mg by mouth every bedtime.  Dose:  1,000 mg     LORazepam 0.5 MG Tabs  Commonly known as:  ATIVAN   Take 0.5 mg by mouth every bedtime.  Dose:  0.5 mg     propranolol  MG Cp24  Commonly known as:  INDERAL LA   Take 120 mg by mouth every bedtime.  Dose:  120 mg     sucralfate 1 GM Tabs  Commonly known as:  CARAFATE   Take 1 g by mouth 4 times a day as needed. Indications: Ulcer of the Duodenum  Dose:  1 g     topiramate 100 MG Tabs  Commonly known as:  TOPAMAX   Take 200 mg by mouth every bedtime.  Dose:  200 mg            Allergies  Allergies   Allergen Reactions   • Tape Rash     RXN= ongoing  Adhesive Medical tape. Per patient, paper tape ok.       DIET  Orders Placed This Encounter   Procedures   • Diet Order Regular      Standing Status:   Standing     Number of Occurrences:   1     Order Specific Question:   Diet:     Answer:   Regular [1]       ACTIVITY  As tolerated.  Weight bearing as tolerated    CONSULTATIONS  None    PROCEDURES  None    LABORATORY  Lab Results   Component Value Date    SODIUM 146 (H) 12/10/2019    POTASSIUM 3.6 12/10/2019    CHLORIDE 117 (H) 12/10/2019    CO2 19 (L) 12/10/2019    GLUCOSE 91 12/10/2019    BUN 10 12/10/2019    CREATININE 0.62 12/10/2019    CREATININE 0.6 08/12/2008        Lab Results   Component Value Date    WBC 6.4 12/10/2019    HEMOGLOBIN 13.0 12/10/2019    HEMATOCRIT 39.7 12/10/2019    PLATELETCT 352 12/10/2019

## 2019-12-12 NOTE — PROGRESS NOTES
Patient still in the emergency department, I have discussed the case with my partner Dr. Patel, he will admit the patient to the CDU for intractable headache control and symptomatic management.  Underlying history of  shunt.    Jose G Agarwal M.D.  12/11/19  7:17 PM

## 2019-12-12 NOTE — ASSESSMENT & PLAN NOTE
-Patient is possibly mildly dehydrated however no overt dehydration  -Monitor to see if she is eating and drinking, so I do not feel she needs IV fluids  -Repeat BMP in the morning

## 2019-12-12 NOTE — ASSESSMENT & PLAN NOTE
-Similar to previous headaches  -she does have a history of a  shunt  -Symptomatic care  -Due to her significant past medical history, MRI brain was obtained, multiple abnormalities but no acute changes

## 2019-12-12 NOTE — PROGRESS NOTES
Called pharmacy again for dilaudid 1mg for my pt who is getting 0.5mg dose for chronic headaches during this admission. Called around 2 am for the same. Waiting for delivery.

## 2019-12-12 NOTE — PROGRESS NOTES
Assessment completed.  Pt A&Ox4, bilat  strong and equal, no drift. No facial droop or slurred speech.  Pt denies any numbness/tingling at this time.  Respirations even, unlabored on room air. Pt complains of 8/10 headache, medicated per MAR.  POC discussed; communication board updated.    Seizure and fall precaution in place: arm band, non-skid socks, staff aware.  Bed in locked, lowest position.  Call light and belongings within reach.  Needs met, will continue to monitor.

## 2019-12-12 NOTE — PROGRESS NOTES
Pt states personal belongings are in possession.  Pt escorted off unit by this RN without incident.

## 2019-12-12 NOTE — H&P
Hospital Medicine History & Physical Note    Date of Service  12/11/2019    Primary Care Physician  Tabitha Bautista M.D.    Consultants  None    Code Status  Full    Chief Complaint  Headache    History of Presenting Illness  49 y.o. female who presented 12/11/2019 with headache.  Patient does have a history of a  shunt, does get headaches occasionally.  She states these are similar to her previous headaches.  She states this started 3 days ago, describes it as a mild dull ache.  Has been intermittent in nature.  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  She states it is located on the right side, starts behind her right eye.  She denies any nausea, vomiting, fever or chills.  She has been under increased stress lately, she was diagnosed with breast cancer on Monday.  Lumpectomy is being set up.  I did discuss the case including labs and imaging with the ER physician.    Review of Systems  Review of Systems   Constitutional: Negative for chills, fever and malaise/fatigue.   HENT: Negative for congestion.    Eyes:        Blind    Respiratory: Negative for cough, sputum production, shortness of breath and stridor.    Cardiovascular: Negative for chest pain, palpitations and leg swelling.   Gastrointestinal: Negative for abdominal pain, constipation, diarrhea, nausea and vomiting.   Genitourinary: Negative for dysuria and urgency.   Musculoskeletal: Negative for falls and myalgias.   Neurological: Positive for headaches. Negative for dizziness, tingling, loss of consciousness and weakness.   Psychiatric/Behavioral: Negative for depression and suicidal ideas.   All other systems reviewed and are negative.      Past Medical History   has a past medical history of Blind, C. difficile diarrhea (2013), Chronic daily headache, Depression, Esophageal atresia (1971), Fall, GERD (gastroesophageal reflux disease), H/O total hysterectomy, Hydrocephalus (HCC), Hydrocephalus (HCC), Jaundice, Legally blind, Migraine without  aura, without mention of intractable migraine without mention of status migrainosus, Other specified symptom associated with female genital organs, Pain, and Psychiatric problem.    Surgical History   has a past surgical history that includes laparoscopy (8/8/08); lysis adhesions general (12/10/2013); cystoscopy (12/10/2013); exploratory laparotomy (12/10/2013); appendectomy (12/10/2013); abdominal hysterectomy total (12/10/2013); aakash by laparoscopy (N/A, 8/13/2015); cholecystectomy (N/A, 8/13/2015); other; gastroscopy-endo (N/A, 8/24/2017); nissen fundoplication laparoscopic; and gastroscopy (N/A, 1/2/2019).     Family History  family history includes Hypertension in her father and mother.     Social History   reports that she quit smoking about 2 years ago. Her smoking use included cigars. She started smoking about 15 years ago. She has a 10.00 pack-year smoking history. She has never used smokeless tobacco. She reports current alcohol use. She reports that she does not use drugs.    Allergies  Allergies   Allergen Reactions   • Tape Rash     RXN= ongoing  Adhesive Medical tape. Per patient, paper tape ok.       Medications  Prior to Admission Medications   Prescriptions Last Dose Informant Patient Reported? Taking?   HYDROcodone-acetaminophen (NORCO) 5-325 MG Tab per tablet 12/11/2019 at 1100 Patient Yes Yes   Sig: Take 1-2 Tabs by mouth every four hours as needed. Indications: Pain   LORazepam (ATIVAN) 0.5 MG Tab 12/10/2019 at 2230 Patient Yes No   Sig: Take 0.5 mg by mouth every bedtime.   esomeprazole (NEXIUM) 40 MG delayed-release capsule 12/10/2019 at 2230 Patient Yes No   Sig: Take 40 mg by mouth every bedtime.   hydrOXYzine HCl (ATARAX) 50 MG Tab 12/10/2019 at 2230 Patient Yes No   Sig: Take 50 mg by mouth every bedtime.   levETIRAcetam (KEPPRA) 500 MG Tab 12/10/2019 at 2230 Patient Yes Yes   Sig: Take 1,000 mg by mouth every bedtime.   propranolol CR (INDERAL LA) 120 MG CAPSULE SR 24 HR 12/10/2019 at  2230 Patient Yes No   Sig: Take 120 mg by mouth every bedtime.   sucralfate (CARAFATE) 1 GM Tab 12/8/2019 at 2230 Patient Yes No   Sig: Take 1 g by mouth 4 times a day as needed. Indications: Ulcer of the Duodenum   topiramate (TOPAMAX) 100 MG Tab 12/10/2019 at 2230 Patient Yes Yes   Sig: Take 200 mg by mouth every bedtime.      Facility-Administered Medications: None       Physical Exam  Temp:  [36.3 °C (97.4 °F)] 36.3 °C (97.4 °F)  Pulse:  [68-73] 68  Resp:  [14] 14  BP: (111-118)/(72-83) 115/75  SpO2:  [98 %] 98 %    Physical Exam  Vitals signs and nursing note reviewed.   Constitutional:       General: She is not in acute distress.     Appearance: She is well-developed. She is not diaphoretic.   HENT:      Head: Normocephalic and atraumatic.      Right Ear: External ear normal.      Left Ear: External ear normal.      Nose: Nose normal. No congestion or rhinorrhea.      Mouth/Throat:      Mouth: Mucous membranes are moist.      Pharynx: No oropharyngeal exudate.   Eyes:      General: No scleral icterus.        Right eye: No discharge.         Left eye: No discharge.      Extraocular Movements: Extraocular movements intact.   Neck:      Musculoskeletal: Normal range of motion and neck supple.      Trachea: No tracheal deviation.   Cardiovascular:      Rate and Rhythm: Normal rate and regular rhythm.      Heart sounds: No murmur. No friction rub. No gallop.    Pulmonary:      Effort: Pulmonary effort is normal. No respiratory distress.      Breath sounds: Normal breath sounds. No stridor. No wheezing or rales.   Chest:      Chest wall: No tenderness.   Abdominal:      General: Bowel sounds are normal. There is no distension.      Palpations: Abdomen is soft.      Tenderness: There is no tenderness. There is no guarding or rebound.   Musculoskeletal: Normal range of motion.         General: No tenderness.      Right lower leg: No edema.      Left lower leg: No edema.   Lymphadenopathy:      Cervical: No cervical  adenopathy.   Skin:     General: Skin is warm and dry.      Coloration: Skin is not jaundiced.      Findings: No erythema or rash.   Neurological:      Mental Status: She is alert and oriented to person, place, and time.      Motor: No weakness.   Psychiatric:         Mood and Affect: Mood normal.         Behavior: Behavior normal.         Thought Content: Thought content normal.         Judgment: Judgment normal.         Laboratory:  Recent Labs     12/10/19  0238   WBC 6.4   RBC 4.50   HEMOGLOBIN 13.0   HEMATOCRIT 39.7   MCV 88.2   MCH 28.9   MCHC 32.7*   RDW 46.6   PLATELETCT 352   MPV 11.6     Recent Labs     12/10/19  0238   SODIUM 146*   POTASSIUM 3.6   CHLORIDE 117*   CO2 19*   GLUCOSE 91   BUN 10   CREATININE 0.62   CALCIUM 8.9     Recent Labs     12/10/19  0238   ALTSGPT 12   ASTSGOT 13   ALKPHOSPHAT 101*   TBILIRUBIN 0.3   GLUCOSE 91         No results for input(s): NTPROBNP in the last 72 hours.      Recent Labs     12/10/19  0238 12/10/19  0453   TROPONINT 7 <6       Urinalysis:    No results found     Imaging:  MR-BRAIN-WITH & W/O   Final Result      1.  Right parietal prosper hole. Presumed former ventriculoperitoneal shunt tube entry site.   2.  Left parietal ventriculoperitoneal shunt tube traversing the posterior body left lateral ventricle with catheter tip in the right frontal deep white matter at the level of the centrum semiovale. No change from recent CT.   3.  Shunted ventricular system with diminutive caliber of the lateral ventricles and third ventricle and fourth ventricle. Stable since recent CT scan.   4.  Plaque-like collections over both frontal and parietal convexities with corresponding calcific density on CT. These may represent chronic sequela of subdural hematomas. No change from prior CTs dating back to 2008.   5.  Diffuse dural meningeal thickening and enhancement in the supratentorial compartment and posterior fossa. This is of indeterminate age and could represent chronic sequela  of ventriculoperitoneal shunting with or without chronic subdural hematomas.    These findings could also be acute or subacute and could occur in the setting of intracranial hypotension related to over shunting or CSF dynamics in the spine creating a sump effect. This finding is thought unlikely to represent dural meningeal    metastatic disease. Meningeal carcinomatosis usually manifests as leptomeningeal enhancement which is not present.   6.  Dysgenesis of the corpus callosum.   7.  White matter volume loss particularly in the bilateral parietal and occipital lobes, left greater than right.   8.  No evidence of acute infarction or acute hemorrhage.      DX-SPINE-ANY ONE VIEW   Final Result      Left-sided shunt tubing appears intact.      [id number] VIEW   Final Result      Left-sided shunt tubing appears intact.      DX-CHEST-LIMITED (1 VIEW)   Final Result      Left-sided shunt tubing appears intact.      DX-SKULL-LIMITED 3-   Final Result      Left-sided shunt tubing appears intact.            Assessment/Plan:  I anticipate this patient is appropriate for observation status at this time.    Headache- (present on admission)  Assessment & Plan  -Similar to previous headaches  -she does have a history of a  shunt  -Symptomatic care  -Due to her significant past medical history, MRI brain was obtained, multiple abnormalities but no acute changes    Seizure (HCC)- (present on admission)  Assessment & Plan  -Chronic seizure disorder, no acute seizures  -Continue home Keppra and Topamax    Hypernatremia- (present on admission)  Assessment & Plan  -Patient is possibly mildly dehydrated however no overt dehydration  -Monitor to see if she is eating and drinking, so I do not feel she needs IV fluids  -Repeat BMP in the morning    Breast cancer (HCC)- (present on admission)  Assessment & Plan  -Recent diagnosis, continue outpatient follow-up    Gastroesophageal reflux disease with esophagitis- (present on  admission)  Assessment & Plan  -Continue home PPI    Anxiety- (present on admission)  Assessment & Plan  -Continue home Atarax, start PRN Ativan    Blindness- (present on admission)  Assessment & Plan  -Chronic, no change      VTE prophylaxis: SCDs

## 2019-12-12 NOTE — DISCHARGE INSTRUCTIONS
Discharge Instructions    Discharged to home by car with relative. Discharged via wheelchair, hospital escort: Yes.  Special equipment needed: Not Applicable    Be sure to schedule a follow-up appointment with your primary care doctor or any specialists as instructed.     Discharge Plan:   Diet Plan: Discussed  Activity Level: Discussed  Confirmed Follow up Appointment: Patient to Call and Schedule Appointment  Confirmed Symptoms Management: Discussed  Medication Reconciliation Updated: Yes  Influenza Vaccine Indication: Not indicated: Previously immunized this influenza season and > 8 years of age    I understand that a diet low in cholesterol, fat, and sodium is recommended for good health. Unless I have been given specific instructions below for another diet, I accept this instruction as my diet prescription.   Other diet: Heart healthy     Special Instructions: None    · Is patient discharged on Warfarin / Coumadin?   No     Depression / Suicide Risk    As you are discharged from this [facility] facility, it is important to learn how to keep safe from harming yourself.    Recognize the warning signs:  · Abrupt changes in personality, positive or negative- including increase in energy   · Giving away possessions  · Change in eating patterns- significant weight changes-  positive or negative  · Change in sleeping patterns- unable to sleep or sleeping all the time   · Unwillingness or inability to communicate  · Depression  · Unusual sadness, discouragement and loneliness  · Talk of wanting to die  · Neglect of personal appearance   · Rebelliousness- reckless behavior  · Withdrawal from people/activities they love  · Confusion- inability to concentrate     If you or a loved one observes any of these behaviors or has concerns about self-harm, here's what you can do:  · Talk about it- your feelings and reasons for harming yourself  · Remove any means that you might use to hurt yourself (examples: pills, rope,  extension cords, firearm)  · Get professional help from the community (Mental Health, Substance Abuse, psychological counseling)  · Do not be alone:Call your Safe Contact- someone whom you trust who will be there for you.  · Call your local CRISIS HOTLINE 404-4099 or 787-991-4393  · Call your local Children's Mobile Crisis Response Team [place] (986) 806-1219 or www.Smallaa  · Call the toll free National Suicide Prevention Hotlines   · National Suicide Prevention Lifeline 154-863-TGTH (6307)  · National Hope Line Network 800-SUICIDE (519-0441)

## 2019-12-17 ENCOUNTER — PATIENT OUTREACH (OUTPATIENT)
Dept: OTHER | Facility: MEDICAL CENTER | Age: 49
End: 2019-12-17

## 2019-12-17 DIAGNOSIS — Z65.8 PSYCHOSOCIAL STRESSORS: ICD-10-CM
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
## 2019-12-18 DIAGNOSIS — Z01.812 PRE-PROCEDURAL LABORATORY EXAMINATION: ICD-10-CM

## 2019-12-19 ENCOUNTER — ANESTHESIA (OUTPATIENT)
Dept: SURGERY | Facility: MEDICAL CENTER | Age: 49
End: 2019-12-19
Payer: MEDICAID

## 2019-12-19 ENCOUNTER — APPOINTMENT (OUTPATIENT)
Dept: RADIOLOGY | Facility: MEDICAL CENTER | Age: 49
End: 2019-12-19
Attending: SURGERY
Payer: MEDICAID

## 2019-12-19 ENCOUNTER — HOSPITAL ENCOUNTER (OUTPATIENT)
Facility: MEDICAL CENTER | Age: 49
End: 2019-12-19
Attending: SURGERY | Admitting: SURGERY
Payer: MEDICAID

## 2019-12-19 ENCOUNTER — ANESTHESIA EVENT (OUTPATIENT)
Dept: SURGERY | Facility: MEDICAL CENTER | Age: 49
End: 2019-12-19
Payer: MEDICAID

## 2019-12-19 VITALS
OXYGEN SATURATION: 98 % | BODY MASS INDEX: 25.22 KG/M2 | HEIGHT: 64 IN | HEART RATE: 75 BPM | SYSTOLIC BLOOD PRESSURE: 126 MMHG | DIASTOLIC BLOOD PRESSURE: 73 MMHG | WEIGHT: 147.71 LBS | TEMPERATURE: 97.2 F | RESPIRATION RATE: 16 BRPM

## 2019-12-19 DIAGNOSIS — C50.211 MALIGNANT NEOPLASM OF UPPER-INNER QUADRANT OF RIGHT FEMALE BREAST, UNSPECIFIED ESTROGEN RECEPTOR STATUS (HCC): ICD-10-CM

## 2019-12-19 LAB
INR PPP: 1.05 (ref 0.87–1.13)
PROTHROMBIN TIME: 13.9 SEC (ref 12–14.6)

## 2019-12-19 PROCEDURE — 85610 PROTHROMBIN TIME: CPT

## 2019-12-19 PROCEDURE — 700111 HCHG RX REV CODE 636 W/ 250 OVERRIDE (IP): Performed by: ANESTHESIOLOGY

## 2019-12-19 PROCEDURE — A6402 STERILE GAUZE <= 16 SQ IN: HCPCS | Performed by: SURGERY

## 2019-12-19 PROCEDURE — A9270 NON-COVERED ITEM OR SERVICE: HCPCS | Performed by: SURGERY

## 2019-12-19 PROCEDURE — 500389 HCHG DRAIN, RESERVOIR SUCT JP 100CC: Performed by: SURGERY

## 2019-12-19 PROCEDURE — 160025 RECOVERY II MINUTES (STATS): Performed by: SURGERY

## 2019-12-19 PROCEDURE — 502240 HCHG MISC OR SUPPLY RC 0272: Performed by: SURGERY

## 2019-12-19 PROCEDURE — 160048 HCHG OR STATISTICAL LEVEL 1-5: Performed by: SURGERY

## 2019-12-19 PROCEDURE — 700102 HCHG RX REV CODE 250 W/ 637 OVERRIDE(OP): Performed by: ANESTHESIOLOGY

## 2019-12-19 PROCEDURE — 160035 HCHG PACU - 1ST 60 MINS PHASE I: Performed by: SURGERY

## 2019-12-19 PROCEDURE — 501838 HCHG SUTURE GENERAL: Performed by: SURGERY

## 2019-12-19 PROCEDURE — 160041 HCHG SURGERY MINUTES - EA ADDL 1 MIN LEVEL 4: Performed by: SURGERY

## 2019-12-19 PROCEDURE — 160002 HCHG RECOVERY MINUTES (STAT): Performed by: SURGERY

## 2019-12-19 PROCEDURE — 160009 HCHG ANES TIME/MIN: Performed by: SURGERY

## 2019-12-19 PROCEDURE — 700101 HCHG RX REV CODE 250: Performed by: SURGERY

## 2019-12-19 PROCEDURE — 160036 HCHG PACU - EA ADDL 30 MINS PHASE I: Performed by: SURGERY

## 2019-12-19 PROCEDURE — 700101 HCHG RX REV CODE 250: Performed by: ANESTHESIOLOGY

## 2019-12-19 PROCEDURE — A9541 TC99M SULFUR COLLOID: HCPCS | Mod: RT

## 2019-12-19 PROCEDURE — A9270 NON-COVERED ITEM OR SERVICE: HCPCS | Performed by: ANESTHESIOLOGY

## 2019-12-19 PROCEDURE — 501445 HCHG STAPLER, SKIN DISP: Performed by: SURGERY

## 2019-12-19 PROCEDURE — 160029 HCHG SURGERY MINUTES - 1ST 30 MINS LEVEL 4: Performed by: SURGERY

## 2019-12-19 PROCEDURE — 700105 HCHG RX REV CODE 258: Performed by: SURGERY

## 2019-12-19 PROCEDURE — 500054 HCHG BANDAGE, ELASTIC 6: Performed by: SURGERY

## 2019-12-19 PROCEDURE — 700102 HCHG RX REV CODE 250 W/ 637 OVERRIDE(OP): Performed by: SURGERY

## 2019-12-19 PROCEDURE — 88307 TISSUE EXAM BY PATHOLOGIST: CPT | Mod: 59

## 2019-12-19 PROCEDURE — 160046 HCHG PACU - 1ST 60 MINS PHASE II: Performed by: SURGERY

## 2019-12-19 PROCEDURE — A6403 STERILE GAUZE>16 <= 48 SQ IN: HCPCS | Performed by: SURGERY

## 2019-12-19 PROCEDURE — 500378 HCHG DRAIN, J-VAC ROUND 19FR: Performed by: SURGERY

## 2019-12-19 RX ORDER — ALPRAZOLAM 0.25 MG/1
0.25 TABLET ORAL
Status: COMPLETED | OUTPATIENT
Start: 2019-12-19 | End: 2019-12-19

## 2019-12-19 RX ORDER — ONDANSETRON 2 MG/ML
4 INJECTION INTRAMUSCULAR; INTRAVENOUS
Status: DISCONTINUED | OUTPATIENT
Start: 2019-12-19 | End: 2019-12-19 | Stop reason: HOSPADM

## 2019-12-19 RX ORDER — OXYCODONE HYDROCHLORIDE AND ACETAMINOPHEN 5; 325 MG/1; MG/1
1 TABLET ORAL EVERY 4 HOURS PRN
Qty: 12 TAB | Refills: 0 | Status: SHIPPED | OUTPATIENT
Start: 2019-12-19 | End: 2019-12-24

## 2019-12-19 RX ORDER — GABAPENTIN 300 MG/1
300 CAPSULE ORAL ONCE
Status: COMPLETED | OUTPATIENT
Start: 2019-12-19 | End: 2019-12-19

## 2019-12-19 RX ORDER — BUPIVACAINE HYDROCHLORIDE AND EPINEPHRINE 5; 5 MG/ML; UG/ML
INJECTION, SOLUTION EPIDURAL; INTRACAUDAL; PERINEURAL
Status: DISCONTINUED | OUTPATIENT
Start: 2019-12-19 | End: 2019-12-19 | Stop reason: HOSPADM

## 2019-12-19 RX ORDER — ONDANSETRON 2 MG/ML
INJECTION INTRAMUSCULAR; INTRAVENOUS PRN
Status: DISCONTINUED | OUTPATIENT
Start: 2019-12-19 | End: 2019-12-19 | Stop reason: SURG

## 2019-12-19 RX ORDER — HYDROMORPHONE HYDROCHLORIDE 1 MG/ML
0.2 INJECTION, SOLUTION INTRAMUSCULAR; INTRAVENOUS; SUBCUTANEOUS
Status: DISCONTINUED | OUTPATIENT
Start: 2019-12-19 | End: 2019-12-19 | Stop reason: HOSPADM

## 2019-12-19 RX ORDER — CEFAZOLIN SODIUM 1 G/3ML
INJECTION, POWDER, FOR SOLUTION INTRAMUSCULAR; INTRAVENOUS PRN
Status: DISCONTINUED | OUTPATIENT
Start: 2019-12-19 | End: 2019-12-19 | Stop reason: SURG

## 2019-12-19 RX ORDER — HYDROMORPHONE HYDROCHLORIDE 1 MG/ML
0.4 INJECTION, SOLUTION INTRAMUSCULAR; INTRAVENOUS; SUBCUTANEOUS
Status: DISCONTINUED | OUTPATIENT
Start: 2019-12-19 | End: 2019-12-19 | Stop reason: HOSPADM

## 2019-12-19 RX ORDER — DEXAMETHASONE SODIUM PHOSPHATE 4 MG/ML
INJECTION, SOLUTION INTRA-ARTICULAR; INTRALESIONAL; INTRAMUSCULAR; INTRAVENOUS; SOFT TISSUE PRN
Status: DISCONTINUED | OUTPATIENT
Start: 2019-12-19 | End: 2019-12-19 | Stop reason: SURG

## 2019-12-19 RX ORDER — LIDOCAINE AND PRILOCAINE 25; 25 MG/G; MG/G
CREAM TOPICAL
Status: COMPLETED | OUTPATIENT
Start: 2019-12-19 | End: 2019-12-19

## 2019-12-19 RX ORDER — SODIUM CHLORIDE, SODIUM LACTATE, POTASSIUM CHLORIDE, CALCIUM CHLORIDE 600; 310; 30; 20 MG/100ML; MG/100ML; MG/100ML; MG/100ML
INJECTION, SOLUTION INTRAVENOUS CONTINUOUS
Status: DISCONTINUED | OUTPATIENT
Start: 2019-12-19 | End: 2019-12-19 | Stop reason: HOSPADM

## 2019-12-19 RX ORDER — HALOPERIDOL 5 MG/ML
1 INJECTION INTRAMUSCULAR
Status: DISCONTINUED | OUTPATIENT
Start: 2019-12-19 | End: 2019-12-19 | Stop reason: HOSPADM

## 2019-12-19 RX ORDER — OXYCODONE HCL 5 MG/5 ML
5 SOLUTION, ORAL ORAL
Status: COMPLETED | OUTPATIENT
Start: 2019-12-19 | End: 2019-12-19

## 2019-12-19 RX ORDER — OXYCODONE HCL 5 MG/5 ML
10 SOLUTION, ORAL ORAL
Status: COMPLETED | OUTPATIENT
Start: 2019-12-19 | End: 2019-12-19

## 2019-12-19 RX ORDER — MEPERIDINE HYDROCHLORIDE 25 MG/ML
6.25 INJECTION INTRAMUSCULAR; INTRAVENOUS; SUBCUTANEOUS
Status: DISCONTINUED | OUTPATIENT
Start: 2019-12-19 | End: 2019-12-19 | Stop reason: HOSPADM

## 2019-12-19 RX ORDER — HYDROMORPHONE HYDROCHLORIDE 1 MG/ML
0.6 INJECTION, SOLUTION INTRAMUSCULAR; INTRAVENOUS; SUBCUTANEOUS
Status: DISCONTINUED | OUTPATIENT
Start: 2019-12-19 | End: 2019-12-19 | Stop reason: HOSPADM

## 2019-12-19 RX ORDER — HYDRALAZINE HYDROCHLORIDE 20 MG/ML
5 INJECTION INTRAMUSCULAR; INTRAVENOUS
Status: DISCONTINUED | OUTPATIENT
Start: 2019-12-19 | End: 2019-12-19 | Stop reason: HOSPADM

## 2019-12-19 RX ORDER — BUPIVACAINE HYDROCHLORIDE AND EPINEPHRINE 5; 5 MG/ML; UG/ML
INJECTION, SOLUTION EPIDURAL; INTRACAUDAL; PERINEURAL
Status: DISCONTINUED
Start: 2019-12-19 | End: 2019-12-19 | Stop reason: HOSPADM

## 2019-12-19 RX ORDER — MIDAZOLAM HYDROCHLORIDE 1 MG/ML
INJECTION INTRAMUSCULAR; INTRAVENOUS PRN
Status: DISCONTINUED | OUTPATIENT
Start: 2019-12-19 | End: 2019-12-19 | Stop reason: SURG

## 2019-12-19 RX ORDER — ACETAMINOPHEN 500 MG
1000 TABLET ORAL ONCE
Status: COMPLETED | OUTPATIENT
Start: 2019-12-19 | End: 2019-12-19

## 2019-12-19 RX ORDER — DIPHENHYDRAMINE HYDROCHLORIDE 50 MG/ML
12.5 INJECTION INTRAMUSCULAR; INTRAVENOUS
Status: DISCONTINUED | OUTPATIENT
Start: 2019-12-19 | End: 2019-12-19 | Stop reason: HOSPADM

## 2019-12-19 RX ORDER — METOPROLOL TARTRATE 1 MG/ML
1 INJECTION, SOLUTION INTRAVENOUS
Status: DISCONTINUED | OUTPATIENT
Start: 2019-12-19 | End: 2019-12-19 | Stop reason: HOSPADM

## 2019-12-19 RX ORDER — LIDOCAINE HYDROCHLORIDE 20 MG/ML
INJECTION, SOLUTION EPIDURAL; INFILTRATION; INTRACAUDAL; PERINEURAL PRN
Status: DISCONTINUED | OUTPATIENT
Start: 2019-12-19 | End: 2019-12-19 | Stop reason: SURG

## 2019-12-19 RX ORDER — CELECOXIB 200 MG/1
400 CAPSULE ORAL ONCE
Status: COMPLETED | OUTPATIENT
Start: 2019-12-19 | End: 2019-12-19

## 2019-12-19 RX ORDER — HYDROCODONE BITARTRATE AND ACETAMINOPHEN 5; 325 MG/1; MG/1
1-2 TABLET ORAL EVERY 4 HOURS PRN
Qty: 12 TAB | Refills: 0 | Status: SHIPPED | OUTPATIENT
Start: 2019-12-19 | End: 2019-12-24

## 2019-12-19 RX ADMIN — SODIUM CHLORIDE, POTASSIUM CHLORIDE, SODIUM LACTATE AND CALCIUM CHLORIDE: 600; 310; 30; 20 INJECTION, SOLUTION INTRAVENOUS at 14:22

## 2019-12-19 RX ADMIN — FENTANYL CITRATE 50 MCG: 50 INJECTION, SOLUTION INTRAMUSCULAR; INTRAVENOUS at 15:27

## 2019-12-19 RX ADMIN — FENTANYL CITRATE 50 MCG: 0.05 INJECTION, SOLUTION INTRAMUSCULAR; INTRAVENOUS at 15:47

## 2019-12-19 RX ADMIN — FENTANYL CITRATE 50 MCG: 50 INJECTION, SOLUTION INTRAMUSCULAR; INTRAVENOUS at 15:02

## 2019-12-19 RX ADMIN — LIDOCAINE HYDROCHLORIDE 60 MG: 20 INJECTION, SOLUTION EPIDURAL; INFILTRATION; INTRACAUDAL at 14:25

## 2019-12-19 RX ADMIN — OXYCODONE HYDROCHLORIDE 10 MG: 5 SOLUTION ORAL at 15:47

## 2019-12-19 RX ADMIN — EPHEDRINE SULFATE 10 MG: 50 INJECTION, SOLUTION INTRAVENOUS at 15:23

## 2019-12-19 RX ADMIN — FENTANYL CITRATE 100 MCG: 50 INJECTION, SOLUTION INTRAMUSCULAR; INTRAVENOUS at 14:25

## 2019-12-19 RX ADMIN — FENTANYL CITRATE 50 MCG: 0.05 INJECTION, SOLUTION INTRAMUSCULAR; INTRAVENOUS at 16:11

## 2019-12-19 RX ADMIN — CEFAZOLIN 1 G: 330 INJECTION, POWDER, FOR SOLUTION INTRAMUSCULAR; INTRAVENOUS at 14:22

## 2019-12-19 RX ADMIN — ACETAMINOPHEN 1000 MG: 500 TABLET ORAL at 12:41

## 2019-12-19 RX ADMIN — DEXAMETHASONE SODIUM PHOSPHATE 4 MG: 4 INJECTION, SOLUTION INTRA-ARTICULAR; INTRALESIONAL; INTRAMUSCULAR; INTRAVENOUS; SOFT TISSUE at 14:52

## 2019-12-19 RX ADMIN — GABAPENTIN 300 MG: 300 CAPSULE ORAL at 12:43

## 2019-12-19 RX ADMIN — ONDANSETRON 4 MG: 2 INJECTION INTRAMUSCULAR; INTRAVENOUS at 14:52

## 2019-12-19 RX ADMIN — MIDAZOLAM 2 MG: 1 INJECTION INTRAMUSCULAR; INTRAVENOUS at 14:24

## 2019-12-19 RX ADMIN — FENTANYL CITRATE 50 MCG: 0.05 INJECTION, SOLUTION INTRAMUSCULAR; INTRAVENOUS at 16:25

## 2019-12-19 RX ADMIN — ALPRAZOLAM 0.25 MG: 0.25 TABLET ORAL at 10:41

## 2019-12-19 RX ADMIN — CELECOXIB 400 MG: 200 CAPSULE ORAL at 12:42

## 2019-12-19 RX ADMIN — PROPOFOL 200 MG: 10 INJECTION, EMULSION INTRAVENOUS at 14:26

## 2019-12-19 ASSESSMENT — PAIN SCALES - GENERAL: PAIN_LEVEL: 2

## 2019-12-19 NOTE — ANESTHESIA POSTPROCEDURE EVALUATION
Patient: Rasheeda Manzano    Procedure Summary     Date:  12/19/19 Room / Location:  [facility] ROOM 27 / SURGERY SAME DAY [facility] ORS    Anesthesia Start:  1422 Anesthesia Stop:  1541    Procedures:       MASTECTOMY-PARTIAL (Right Breast)      BIOPSY, LYMPH NODE, SENTINEL-AXILLARY (Right Breast) Diagnosis:  (BREAST MASS,RIGHT)    Surgeon:  Brice Johnson M.D. Responsible Provider:  Dewey Rodriguez M.D.    Anesthesia Type:  general ASA Status:  3          Final Anesthesia Type: general  Last vitals  BP   Blood Pressure: 135/76, NIBP: 121/70    Temp   36.2 °C (97.2 °F)    Pulse   Pulse: 100   Resp   14    SpO2   98 %      Anesthesia Post Evaluation    Patient location during evaluation: PACU  Patient participation: complete - patient participated  Level of consciousness: awake and alert  Pain score: 2    Airway patency: patent  Anesthetic complications: no  Cardiovascular status: hemodynamically stable  Respiratory status: acceptable  Hydration status: euvolemic    PONV: none           Nurse Pain Score: 0 (NPRS)

## 2019-12-19 NOTE — PROGRESS NOTES
"On December 17, 2019, pt. attended the Newly Diagnosed Breast Cancer class with her [facility] Complex Case Manager, Evelia Cunningham.  Pt. tated she was an 8 on psychosocial distress screening post education.  Pt. stated \"lowered stress level knowing support available.\"    "

## 2019-12-19 NOTE — ANESTHESIA PREPROCEDURE EVALUATION
Relevant Problems   PULMONARY   (+) Aspiration pneumonia (HCC)   (+) CAP (community acquired pneumonia)      NEURO   (+) History of depression   (+) New onset seizure (HCC)   (+) Seizure (HCC)      CARDIAC   (+) Essential hypertension   (+) HTN (hypertension)   (+) Migraine without aura   (+) Migraines   (+) Other type of intractable migraine   (+) Status migrainosus      GI   (+) Gastroesophageal reflux disease with esophagitis         (+) Hepatitis       Physical Exam    Airway   Mallampati: II  TM distance: >3 FB  Neck ROM: full       Cardiovascular - normal exam  Rhythm: regular  Rate: normal  (-) murmur     Dental - normal exam         Pulmonary - normal exam  Breath sounds clear to auscultation     Abdominal    Neurological - normal exam                 Anesthesia Plan    ASA 3   ASA physical status 3 criteria: hypertension - poorly controlled    Plan - general       Airway plan will be LMA        Induction: intravenous    Postoperative Plan: Postoperative administration of opioids is intended.    Pertinent diagnostic labs and testing reviewed    Informed Consent:    Anesthetic plan and risks discussed with patient.    Use of blood products discussed with: patient whom consented to blood products.
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
## 2019-12-19 NOTE — ANESTHESIA PROCEDURE NOTES
Airway  Date/Time: 12/19/2019 2:26 PM  Performed by: Dewey Rodriguez M.D.  Authorized by: Dewey Rodriguez M.D.     Location:  OR  Urgency:  Elective  Indications for Airway Management:  Anesthesia  Spontaneous Ventilation: absent    Sedation Level:  Deep  Preoxygenated: Yes    Final Airway Type:  Supraglottic airway  Final Supraglottic Airway:  Standard LMA  SGA Size:  4  Number of Attempts at Approach:  1

## 2019-12-19 NOTE — OR SURGEON
Immediate Post OP Note    PreOp Diagnosis: Right Breast Cancer    PostOp Diagnosis: same Clinical Stage I    Procedure(s):  MASTECTOMYRIGHT PARTIAL - Wound Class: Clean  BIOPSY, LYMPH NODE, SENTINEL-AXILLARY - Wound Class: Clean    Surgeon(s):  Brice Johnson M.D.    Anesthesiologist/Type of Anesthesia:  Anesthesiologist: Dewey Rodriguez M.D./General    Surgical Staff:  Circulator: Anna Cleary R.N.  Scrub Person: Fariha Parada  First Assist: SHAGGY Klein    Specimens removed if any:  ID Type Source Tests Collected by Time Destination   A : [place] node #1 Tissue Other PATHOLOGY SPECIMEN Brice Johnson M.D. 12/19/2019  2:42 PM    B : [place] node #2 Tissue Other PATHOLOGY SPECIMEN Brice Johnson M.D. 12/19/2019  2:42 PM    C : inferior additional margin suture marks new margin Tissue Other PATHOLOGY SPECIMEN Brice Johnson M.D. 12/19/2019  2:42 PM    D : superior additional margin suture marks new margin Tissue Other PATHOLOGY SPECIMEN Brice Johnson M.D. 12/19/2019  2:42 PM    E : medial additional margin suture marks new margin  Tissue Other PATHOLOGY SPECIMEN Brice Johnson M.D. 12/19/2019  2:43 PM    F : lateral additional margin suture marks new margin  Tissue Other PATHOLOGY SPECIMEN Brice Johnson M.D. 12/19/2019  2:43 PM    G : deep additional margin suture marks new margin  Tissue Other PATHOLOGY SPECIMEN Brice Johnson M.D. 12/19/2019  2:43 PM    H : right breast lumpectomy short superior long lateral  Tissue Other PATHOLOGY SPECIMEN Brice Johnson M.D. 12/19/2019  2:43 PM        Estimated Blood Loss: minimal    Findings: firm 1.5x1.5cm cancer at 1:00 position of upper right breast; two benign appearing sentinel nodes    Complications: no apparent complications        12/19/2019 3:35 PM Brice Johnson M.D.

## 2019-12-19 NOTE — ANESTHESIA QCDR
2019 [facility] Clinical Data Registry (for Quality Improvement)     Postoperative nausea/vomiting risk protocol (Adult = 18 yrs and Pediatric 3-17 yrs)- (430 and 463)  General inhalation anesthetic (NOT TIVA) with PONV risk factors: Yes  Provision of anti-emetic therapy with at least 2 different classes of agents: Yes   Patient DID NOT receive anti-emetic therapy and reason is documented in Medical Record:  N/A    Multimodal Pain Management- (AQI59)  Patient undergoing Elective Surgery (i.e. Outpatient, or ASC, or Prescheduled Surgery prior to Hospital Admission): Yes  Use of Multimodal Pain Management, two or more drugs and/or interventions, NOT including systemic opioids: Yes   Exception: Documented allergy to multiple classes of analgesics:  N/A    PACU assessment of acute postoperative pain prior to Anesthesia Care End- Applies to Patients Age = 18- (ABG7)  Initial PACU pain score is which of the following: < 7/10  Patient unable to report pain score: N/A    Post-anesthetic transfer of care checklist/protocol to PACU/ICU- (426 and 427)  Upon conclusion of case, patient transferred to which of the following locations: PACU/Non-ICU  Use of transfer checklist/protocol:   Exclusion: Service Performed in Patient Hospital Room (and thus did not require transfer):     PACU Reintubation- (AQI31)  General anesthesia requiring endotracheal intubation (ETT) along with subsequent extubation in OR or PACU: Yes  Required reintubation in the PACU: No   Extubation was a planned trial documented in the medical record prior to removal of the original airway device:  N/A    Unplanned admission to ICU related to anesthesia service up through end of PACU care- (MD51)  Unplanned admission to ICU (not initially anticipated at anesthesia start time): No

## 2019-12-19 NOTE — ANESTHESIA TIME REPORT
Anesthesia Start and Stop Event Times     Date Time Event    12/19/2019 1404 Ready for Procedure     1422 Anesthesia Start     1541 Anesthesia Stop        Responsible Staff  12/19/19    Name Role Begin End    Dewey Rodriguez M.D. Anesth 1422 1541        Preop Diagnosis (Free Text):  Pre-op Diagnosis     BREAST MASS,RIGHT        Preop Diagnosis (Codes):    Post op Diagnosis  Abnormal breast biopsy  Axillary node dissection    Premium Reason  G. Contracted, Vacation    Comments: Premium Jennifer on vacation, After 3

## 2019-12-19 NOTE — OR NURSING
1535  RECEIVED PATIENT FROM OR.  REPORT FROM DR. COOLEY.  NO AIRWAY IN PLACE.  RESPIRATIONS ARE EVEN AND UNLABORED.  ACE WRAP TO CHEST IS CDI.     1547  MEDICATED WITH IV AND PO PAIN MEDICATION FOR C/O RIGHT BREAST AND AXILLA PAIN 8.    1609  S.O. SHANTANU TO BEDSIDE.      1611  MEDICATED WITH IV FENTANYL FOR PAIN 7.     1625  MEDICATED WITH IV FENTANYL FOR C/O RIGHT BREAST PAIN 8    1700  PHASE 2.     1719  DISCHARGED.  DISCHARGE INSTRUCTIONS GIVEN TO PATIENT AND HER S.O. SHANTANU.  A VERBAL UNDERSTANDING OF ALL INSTRUCTIONS WAS STATED.  PATIENT TAKING PO AND AMBULATING WITH STAND BY ASSISTANCE WITHOUT DIFFICULTY.  PATIENT STATES SHE IS READY TO GO HOME.  20 GAUGE IV REMOVED FROM LEFT WRIST.  CATHLON INTACT.Z

## 2019-12-20 LAB — PATHOLOGY CONSULT NOTE: NORMAL

## 2019-12-20 NOTE — OP REPORT
DATE OF SERVICE:  12/19/2019    SURGEON PRESENT:  Brice Johnson MD    ASSISTANT:  RADHA Mauricio    ANESTHESIOLOGIST:  Dewey Rodriguez MD    TYPE OF ANESTHETIC:  General anesthetic using laryngeal mask airway plus local   0.5% Marcaine with epinephrine.    PREOPERATIVE DIAGNOSIS:  Right breast carcinoma, clinical stage I.    POSTOPERATIVE DIAGNOSIS:  Right breast carcinoma, clinical stage.    PROCEDURES:  1.  Right partial mastectomy.  2.  Right axillary sentinel node identification.  3.  Right axillary sentinel node biopsies.    FINDINGS:  The patient is a 49-year-old female who presents with a recent   finding of an abnormal mammogram in the right breast.  Biopsy revealed an   invasive ductal carcinoma.  The tumor size was thought to be about 1x1 cm in   size.  She had clinically negative nodes.  Therefore, it is a clinical stage I   tumor.  She presented today and underwent a right breast lumpectomy along   with right axillary node biopsies.  There were no apparent complications.    FINDINGS AND PROCEDURE:  The patient was brought to the operating room and   placed on table in supine position.  General anesthetic was then provided by   Dr. Rodriguez, the anesthesiologist.  The right breast was then prepped and   draped in the usual sterile fashion along with the axilla.  A time-out was   called.  The correct patient, correct diagnosis, correct surgery, and correct   location of the surgery were discussed and agreed upon.  She did receive   preoperative IV antibiotics.  Incision was made just below the hair bearing   portion of the right axilla with #15 blade.  All bleeding was controlled with   electrocautery.  Dissection was then carried down through the subcutaneous   tissue and then through the clavipectoral fascia.  The right axilla was then   entered using a navigator device, 2 sentinel lymph nodes were identified deep   within the right axilla.  These were carefully dissected out with the  LigaSure   device and radioactive counts were obtained.  Further interrogation of the   axilla did not reveal any areas hotter than 10% of the hottest node;   therefore, the wound was irrigated with approximately 100 mL warm normal   saline.  The deep tissue was injected with 0.5% Marcaine.  Next, the   clavipectoral fascia and the dermis were each closed with running 3-0 Vicryl   sutures.  Skin was closed using running 4-0 Monocryl suture in subcuticular   fashion.  Next, the lumpectomy in the right breast was performed.  The patient   had a palpable mass at the 1 o'clock position of the right breast.    Elliptical incision was made directly over this palpable mass with #15 blade   through the skin and dermis.  All bleeding was controlled with electrocautery.    Dissection was then carried out into the breast parenchyma.  Ny retractors   and later [place] retractor used to retract the skin edges.  An excisional   lumpectomy was performed around this palpable mass trying to obtain a 1 cm   margin around this very firm cancer.  This was carried down toward the   pectoralis major muscle.  Entire specimen was removed en bloc.  It was   oriented for the pathologist with sutures and then sent to pathology for   further characterization.  Additional margins were also taken to assure that   adequate margins had been obtained.  Additional superior, inferior, medial,   lateral and deep margins were obtained with electrocautery device, each margin   was marked with a suture.  Each additional margin was about 2x2x1 cm in size.    The wound was irrigated with approximately 100 mL of warm normal saline.    The deep breast tissue was then reapproximated loosely with 3-0 Vicryl suture.    The dermis was closed using running 3-0 Vicryl suture and the skin was   closed using running 4-0 Monocryl suture in subcuticular fashion.    Steri-Strips and a sterile dressing were applied.  An Ace wrap was applied.    The patient tolerated  the procedure well without complications.  Final sponge   and needle count were correct.       ____________________________________     MD CHASTITY HARGROVE / DAVE    DD:  12/19/2019 20:51:12  DT:  12/19/2019 23:20:01    D#:  [id number]  Job#:  [id number]    cc: Tabitha Bautista MD

## 2019-12-20 NOTE — DISCHARGE INSTRUCTIONS
ACTIVITY: Rest and take it easy for the first 24 hours.  A responsible adult is recommended to remain with you during that time.  It is normal to feel sleepy.  We encourage you to not do anything that requires balance, judgment or coordination.    MILD FLU-LIKE SYMPTOMS ARE NORMAL. YOU MAY EXPERIENCE GENERALIZED MUSCLE ACHES, THROAT IRRITATION, HEADACHE AND/OR SOME NAUSEA.    FOR 24 HOURS DO NOT:  Drive, operate machinery or run household appliances.  Drink beer or alcoholic beverages.   Make important decisions or sign legal documents.    SPECIAL INSTRUCTIONS: *NONE**    DIET: To avoid nausea, slowly advance diet as tolerated, avoiding spicy or greasy foods for the first day.  Add more substantial food to your diet according to your physician's instructions.  Babies can be fed formula or breast milk as soon as they are hungry.  INCREASE FLUIDS AND FIBER TO AVOID CONSTIPATION.    SURGICAL DRESSING/BATHING: *   Remove ACE wrap in 24 hours   Remove plastic and gauze in 3 days   Leave underlying steristips on until they fall off   Patient may shower on Post OP Day #2**    FOLLOW-UP APPOINTMENT:  A follow-up appointment should be arranged with your doctor in *FOLLOW UP WITH DR. PIERRE**; call to schedule.    You should CALL YOUR PHYSICIAN if you develop:  Fever greater than 101 degrees F.  Pain not relieved by medication, or persistent nausea or vomiting.  Excessive bleeding (blood soaking through dressing) or unexpected drainage from the wound.  Extreme redness or swelling around the incision site, drainage of pus or foul smelling drainage.  Inability to urinate or empty your bladder within 8 hours.  Problems with breathing or chest pain.    You should call 911 if you develop problems with breathing or chest pain.  If you are unable to contact your doctor or surgical center, you should go to the nearest emergency room or urgent care center.  Physician's telephone #: *DR. PIERRE [phone]**    If any questions arise,  call your doctor.  If your doctor is not available, please feel free to call the Surgical Center at [phone].  The Center is open Monday through Friday from 7AM to 7PM.  You can also call the HEALTH HOTLINE open 24 hours/day, 7 days/week and speak to a nurse at [phone], or toll free at [phone].    A registered nurse may call you a few days after your surgery to see how you are doing after your procedure.    MEDICATIONS: Resume taking daily medication.  Take prescribed pain medication with food.  If no medication is prescribed, you may take non-aspirin pain medication if needed.  PAIN MEDICATION CAN BE VERY CONSTIPATING.  Take a stool softener or laxative such as senokot, pericolace, or milk of magnesia if needed.    Prescription given for *PERCOCET**.  Last pain medication given at *3:47 PM**.    If your physician has prescribed pain medication that includes Acetaminophen (Tylenol), do not take additional Acetaminophen (Tylenol) while taking the prescribed medication.    Depression / Suicide Risk    As you are discharged from this [facility] facility, it is important to learn how to keep safe from harming yourself.    Recognize the warning signs:  · Abrupt changes in personality, positive or negative- including increase in energy   · Giving away possessions  · Change in eating patterns- significant weight changes-  positive or negative  · Change in sleeping patterns- unable to sleep or sleeping all the time   · Unwillingness or inability to communicate  · Depression  · Unusual sadness, discouragement and loneliness  · Talk of wanting to die  · Neglect of personal appearance   · Rebelliousness- reckless behavior  · Withdrawal from people/activities they love  · Confusion- inability to concentrate     If you or a loved one observes any of these behaviors or has concerns about self-harm, here's what you can do:  · Talk about it- your feelings and reasons for harming yourself  · Remove any means  that you might use to hurt yourself (examples: pills, rope, extension cords, firearm)  · Get professional help from the community (Mental Health, Substance Abuse, psychological counseling)  · Do not be alone:Call your Safe Contact- someone whom you trust who will be there for you.  · Call your local CRISIS HOTLINE 474-6623 or 722-300-2937  · Call your local Children's Mobile Crisis Response Team [place] (803) 346-2382 or www.[employer]  · Call the toll free National Suicide Prevention Hotlines   · National Suicide Prevention Lifeline 845-391-PFJB (6986)  · National Hope Line Network 800-SUICIDE (151-2381)

## 2019-12-24 NOTE — CARE PLAN
Problem: Communication  Goal: The ability to communicate needs accurately and effectively will improve  Outcome: PROGRESSING AS EXPECTED     Problem: Safety  Goal: Will remain free from injury  Outcome: PROGRESSING AS EXPECTED  Goal: Will remain free from falls  Outcome: PROGRESSING AS EXPECTED     Problem: Pain Management  Goal: Pain level will decrease to patient's comfort goal  Outcome: PROGRESSING AS EXPECTED     
  Problem: Communication  Goal: The ability to communicate needs accurately and effectively will improve  Outcome: PROGRESSING AS EXPECTED     Problem: Safety  Goal: Will remain free from injury  Outcome: PROGRESSING AS EXPECTED  Goal: Will remain free from falls  Outcome: PROGRESSING AS EXPECTED     Problem: Pain Management  Goal: Pain level will decrease to patient's comfort goal  Outcome: PROGRESSING AS EXPECTED     Problem: Knowledge Deficit  Goal: Knowledge of disease process/condition, treatment plan, diagnostic tests, and medications will improve  Outcome: PROGRESSING AS EXPECTED  Goal: Knowledge of the prescribed therapeutic regimen will improve  Outcome: PROGRESSING AS EXPECTED     
yes

## 2019-12-31 ENCOUNTER — HOSPITAL ENCOUNTER (OUTPATIENT)
Dept: RADIOLOGY | Facility: MEDICAL CENTER | Age: 49
End: 2019-12-31

## 2020-01-06 ENCOUNTER — HOSPITAL ENCOUNTER (EMERGENCY)
Facility: MEDICAL CENTER | Age: 50
End: 2020-01-06
Attending: EMERGENCY MEDICINE
Payer: MEDICAID

## 2020-01-06 ENCOUNTER — APPOINTMENT (OUTPATIENT)
Dept: RADIOLOGY | Facility: MEDICAL CENTER | Age: 50
End: 2020-01-06
Attending: EMERGENCY MEDICINE
Payer: MEDICAID

## 2020-01-06 ENCOUNTER — HOSPITAL ENCOUNTER (EMERGENCY)
Facility: MEDICAL CENTER | Age: 50
End: 2020-01-07
Payer: MEDICAID

## 2020-01-06 VITALS
HEIGHT: 64 IN | TEMPERATURE: 98.1 F | BODY MASS INDEX: 24.75 KG/M2 | OXYGEN SATURATION: 98 % | HEART RATE: 77 BPM | RESPIRATION RATE: 16 BRPM | WEIGHT: 145 LBS | SYSTOLIC BLOOD PRESSURE: 120 MMHG | DIASTOLIC BLOOD PRESSURE: 90 MMHG

## 2020-01-06 DIAGNOSIS — G43.001 MIGRAINE WITHOUT AURA AND WITH STATUS MIGRAINOSUS, NOT INTRACTABLE: ICD-10-CM

## 2020-01-06 LAB
ALBUMIN SERPL BCP-MCNC: 3.8 G/DL (ref 3.2–4.9)
ALBUMIN/GLOB SERPL: 1.2 G/DL
ALP SERPL-CCNC: 103 U/L (ref 30–99)
ALT SERPL-CCNC: 9 U/L (ref 2–50)
ANION GAP SERPL CALC-SCNC: 11 MMOL/L (ref 0–11.9)
AST SERPL-CCNC: 9 U/L (ref 12–45)
BASOPHILS # BLD AUTO: 0.8 % (ref 0–1.8)
BASOPHILS # BLD: 0.08 K/UL (ref 0–0.12)
BILIRUB SERPL-MCNC: 0.7 MG/DL (ref 0.1–1.5)
BUN SERPL-MCNC: 14 MG/DL (ref 8–22)
CALCIUM SERPL-MCNC: 9.2 MG/DL (ref 8.5–10.5)
CHLORIDE SERPL-SCNC: 110 MMOL/L (ref 96–112)
CO2 SERPL-SCNC: 20 MMOL/L (ref 20–33)
CREAT SERPL-MCNC: 0.73 MG/DL (ref 0.5–1.4)
EOSINOPHIL # BLD AUTO: 0.17 K/UL (ref 0–0.51)
EOSINOPHIL NFR BLD: 1.7 % (ref 0–6.9)
ERYTHROCYTE [DISTWIDTH] IN BLOOD BY AUTOMATED COUNT: 46.5 FL (ref 35.9–50)
GLOBULIN SER CALC-MCNC: 3.1 G/DL (ref 1.9–3.5)
GLUCOSE SERPL-MCNC: 92 MG/DL (ref 65–99)
HCT VFR BLD AUTO: 38.8 % (ref 37–47)
HGB BLD-MCNC: 12.7 G/DL (ref 12–16)
IMM GRANULOCYTES # BLD AUTO: 0.02 K/UL (ref 0–0.11)
IMM GRANULOCYTES NFR BLD AUTO: 0.2 % (ref 0–0.9)
LYMPHOCYTES # BLD AUTO: 1.73 K/UL (ref 1–4.8)
LYMPHOCYTES NFR BLD: 17.7 % (ref 22–41)
MCH RBC QN AUTO: 28.9 PG (ref 27–33)
MCHC RBC AUTO-ENTMCNC: 32.7 G/DL (ref 33.6–35)
MCV RBC AUTO: 88.2 FL (ref 81.4–97.8)
MONOCYTES # BLD AUTO: 0.63 K/UL (ref 0–0.85)
MONOCYTES NFR BLD AUTO: 6.5 % (ref 0–13.4)
NEUTROPHILS # BLD AUTO: 7.13 K/UL (ref 2–7.15)
NEUTROPHILS NFR BLD: 73.1 % (ref 44–72)
NRBC # BLD AUTO: 0 K/UL
NRBC BLD-RTO: 0 /100 WBC
PLATELET # BLD AUTO: 307 K/UL (ref 164–446)
PMV BLD AUTO: 11 FL (ref 9–12.9)
POTASSIUM SERPL-SCNC: 3.7 MMOL/L (ref 3.6–5.5)
PROT SERPL-MCNC: 6.9 G/DL (ref 6–8.2)
RBC # BLD AUTO: 4.4 M/UL (ref 4.2–5.4)
SODIUM SERPL-SCNC: 141 MMOL/L (ref 135–145)
WBC # BLD AUTO: 9.8 K/UL (ref 4.8–10.8)

## 2020-01-06 PROCEDURE — 72020 X-RAY EXAM OF SPINE 1 VIEW: CPT

## 2020-01-06 PROCEDURE — 700102 HCHG RX REV CODE 250 W/ 637 OVERRIDE(OP): Performed by: EMERGENCY MEDICINE

## 2020-01-06 PROCEDURE — 99285 EMERGENCY DEPT VISIT HI MDM: CPT

## 2020-01-06 PROCEDURE — A9270 NON-COVERED ITEM OR SERVICE: HCPCS | Performed by: EMERGENCY MEDICINE

## 2020-01-06 PROCEDURE — 70450 CT HEAD/BRAIN W/O DYE: CPT

## 2020-01-06 PROCEDURE — 70250 X-RAY EXAM OF SKULL: CPT

## 2020-01-06 PROCEDURE — 36415 COLL VENOUS BLD VENIPUNCTURE: CPT

## 2020-01-06 PROCEDURE — 85025 COMPLETE CBC W/AUTO DIFF WBC: CPT

## 2020-01-06 PROCEDURE — 74018 RADEX ABDOMEN 1 VIEW: CPT

## 2020-01-06 PROCEDURE — 80053 COMPREHEN METABOLIC PANEL: CPT

## 2020-01-06 PROCEDURE — 302449 STATCHG TRIAGE ONLY (STATISTIC)

## 2020-01-06 PROCEDURE — 71045 X-RAY EXAM CHEST 1 VIEW: CPT

## 2020-01-06 RX ORDER — ONDANSETRON 2 MG/ML
4 INJECTION INTRAMUSCULAR; INTRAVENOUS ONCE
Status: DISCONTINUED | OUTPATIENT
Start: 2020-01-06 | End: 2020-01-06

## 2020-01-06 RX ORDER — HYDROMORPHONE HYDROCHLORIDE 1 MG/ML
0.5 INJECTION, SOLUTION INTRAMUSCULAR; INTRAVENOUS; SUBCUTANEOUS ONCE
Status: DISCONTINUED | OUTPATIENT
Start: 2020-01-06 | End: 2020-01-06

## 2020-01-06 RX ORDER — OXYCODONE HYDROCHLORIDE AND ACETAMINOPHEN 5; 325 MG/1; MG/1
2 TABLET ORAL ONCE
Status: COMPLETED | OUTPATIENT
Start: 2020-01-06 | End: 2020-01-06

## 2020-01-06 RX ADMIN — OXYCODONE HYDROCHLORIDE AND ACETAMINOPHEN 2 TABLET: 5; 325 TABLET ORAL at 10:33

## 2020-01-06 NOTE — ED TRIAGE NOTES
Pt bi remsa c/o migraine. Hx of hydrocephalus. Given 10 morphine PTA with 4zofran. Pt reports HA is 8/10 at this time. Pt also reports associated nausea.     Pt visually impaired. Pt recently dx with R sided breast cancer,to begin radiation Friday.     Pt speaking in full sentences, NAD noted.

## 2020-01-06 NOTE — ED PROVIDER NOTES
"ED Provider Note    CHIEF COMPLAINT  Chief Complaint   Patient presents with   • Migraine       HPI  Rasheeda Manzano is a 49 y.o. female who presents for evaluation of headache.  The patient has a complex and extensive medical history as listed below.  This includes hydrocephalus as a child and has required  shunt.  She also has relatively new diagnosis of metastatic breast cancer based on lumpectomy and lymph node dissection 3 weeks ago.  She has history of migraines as well.  She is chronically on pain medicine and reports that she recently ran out.  She denies any new focal numbness weakness or tingling.  She has chronic vision loss that developed when she had complications from a  shunt issue several decades ago.  No new trauma no high fevers or neck stiffness.  She reports 8 out of 10 headache.  No other symptoms reported  REVIEW OF SYSTEMS  See HPI for further details.  No high fevers confusion numbness weakness tingling all other systems are negative.     PAST MEDICAL HISTORY  Past Medical History:   Diagnosis Date   • Esophageal bleeding 12/18/2019    H/O, \"three times with last one a year ago.\"   • Indigestion 12/18/2019   • Seizure (HCC) 12/18/2019    \"Last seizure one year ago.\"   • Heart burn 12/18/2019   • Pain 12/18/2019    \"Pain In Head From Hydrocephalus.\".   • Snoring 12/18/2019    Has not had a sleep study.   • C. 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   • Acute nasopharyngitis     H/O Cold 12-8-19   • Blind     age 11   • C. difficile colitis     H/O x3   • Cancer (HCC)     Right Breast Cancer DX 2-2019   • Chronic daily headache    • Depression    • Fall    • GERD (gastroesophageal reflux disease)    • H/O total hysterectomy    • Hydrocephalus (HCC)    • Hydrocephalus (HCC)     shunt drains into pleural place of L lung   • Jaundice     at birth   • Legally blind    • Migraine without aura, without mention of intractable migraine without " mention of status migrainosus    • Other specified symptom associated with female genital organs     excessive bleeding   • Pain     gallbladder related   • Psychiatric problem     depression   • PTSD (post-traumatic stress disorder)        FAMILY HISTORY  Noncontributory    SOCIAL HISTORY  Social History     Socioeconomic History   • Marital status:      Spouse name: Not on file   • Number of children: Not on file   • Years of education: Not on file   • Highest education level: Not on file   Occupational History   • Not on file   Social Needs   • Financial resource strain: Not on file   • Food insecurity:     Worry: Not on file     Inability: Not on file   • Transportation needs:     Medical: Not on file     Non-medical: Not on file   Tobacco Use   • Smoking status: Former Smoker     Packs/day: 1.00     Years: 10.00     Pack years: 10.00     Types: Cigars     Start date: 2004     Last attempt to quit: 2017     Years since quittin.4   • Smokeless tobacco: Never Used   • Tobacco comment:  CIGAR/day    Substance and Sexual Activity   • Alcohol use: Yes     Comment: socially 12-18-19 4/week   • Drug use: No   • Sexual activity: Yes     Partners: Male   Lifestyle   • Physical activity:     Days per week: Not on file     Minutes per session: Not on file   • Stress: Not on file   Relationships   • Social connections:     Talks on phone: Not on file     Gets together: Not on file     Attends Rastafari service: Not on file     Active member of club or organization: Not on file     Attends meetings of clubs or organizations: Not on file     Relationship status: Not on file   • Intimate partner violence:     Fear of current or ex partner: Not on file     Emotionally abused: Not on file     Physically abused: Not on file     Forced sexual activity: Not on file   Other Topics Concern   • Primary/coprimary nurse & associates Not Asked   • Family contact information Not Asked   • OK to release patient  information to the following Not Asked   • Patient preferred routine/Privacy concerns Not Asked   • Patient likes and dislikes Not Asked   • Participating In Research Study Not Asked   • Miscellaneous Not Asked   Social History Narrative   • Not on file       SURGICAL HISTORY  Past Surgical History:   Procedure Laterality Date   • MASTECTOMY Right 12/19/2019    Procedure: MASTECTOMY-PARTIAL;  Surgeon: Brice Johnson M.D.;  Location: SURGERY SAME DAY [facility];  Service: General   • NODE BIOPSY SENTINEL Right 12/19/2019    Procedure: BIOPSY, LYMPH NODE, SENTINEL-AXILLARY;  Surgeon: Brice Johnson M.D.;  Location: SURGERY SAME DAY [facility];  Service: General   • GASTROSCOPY N/A 1/2/2019    Procedure: GASTROSCOPY;  Surgeon: Matias Fernando M.D.;  Location: SURGERY [facility];  Service: Gastroenterology   • GASTROSCOPY-ENDO N/A 8/24/2017    Procedure: GASTROSCOPY-ENDO;  Surgeon: Matias Fernando M.D.;  Location: ENDOSCOPY [facility];  Service:    • AYALA BY LAPAROSCOPY N/A 8/13/2015    Procedure: AYALA BY LAPAROSCOPY;  Surgeon: Brice Johnson M.D.;  Location: SURGERY SAME DAY [facility];  Service:    • CHOLECYSTECTOMY N/A 8/13/2015    Procedure: CHOLECYSTECTOMY;  Surgeon: Brice Johnson M.D.;  Location: SURGERY SAME DAY [facility];  Service:    • LYSIS ADHESIONS GENERAL  12/10/2013    Performed by Arie Bass M.D. at [facility]   • CYSTOSCOPY  12/10/2013    Performed by Joana Yeager M.D. at [facility]   • ABDOMINAL HYSTERECTOMY TOTAL  12/10/2013    Performed by Joana Yeager M.D. at [facility]   • EXPLORATORY LAPAROTOMY  12/10/2013    Performed by Arie Bass M.D. at [facility]   • APPENDECTOMY  12/10/2013    Performed by Arie Bass M.D. at [facility]   • LAPAROSCOPY  8/8/08    Performed by FERNANDO HENDERSON at [facility]   • NISSEN FUNDOPLICATION LAPAROSCOPIC     • OTHER      PLEURAL SHUNT, numerous  "revisions       CURRENT MEDICATIONS  Home Medications     Reviewed by Schuyler B Collett, R.N. (Registered Nurse) on 01/06/20 at 0752  Med List Status: <None>   Medication Last Dose Status   esomeprazole (NEXIUM) 40 MG delayed-release capsule  Active   hydrOXYzine HCl (ATARAX) 50 MG Tab  Active   levETIRAcetam (KEPPRA) 500 MG Tab  Active   LORazepam (ATIVAN) 0.5 MG Tab  Active   propranolol CR (INDERAL LA) 120 MG CAPSULE SR 24 HR  Active   sucralfate (CARAFATE) 1 GM Tab  Active   topiramate (TOPAMAX) 100 MG Tab  Active                ALLERGIES  Allergies   Allergen Reactions   • Tape Rash     RXN= ongoing  Adhesive Medical tape. Per patient, paper tape ok.   • Latex Rash     Rash       PHYSICAL EXAM  VITAL SIGNS: /90   Pulse 78   Temp 36.7 °C (98.1 °F) (Temporal)   Resp 15   Ht 1.626 m (5' 4\")   Wt 65.8 kg (145 lb)   LMP 11/27/2013   SpO2 96%   BMI 24.89 kg/m²       Constitutional: Well developed, Well nourished, No acute distress, Non-toxic appearance.   HENT: Normocephalic, Atraumatic, Bilateral external ears normal, Oropharynx moist, No oral exudates, Nose normal.   Eyes: PERRLA, EOMI, Conjunctiva normal, No discharge.   Neck: Normal range of motion, No tenderness, Supple, No stridor.    Cardiovascular: Normal heart rate, Normal rhythm, No murmurs, No rubs, No gallops.   Thorax & Lungs: Normal breath sounds, No respiratory distress, No wheezing, No chest tenderness.   Abdomen: Bowel sounds normal, Soft, No tenderness, No masses, No pulsatile masses.   Skin: Warm, Dry, No erythema, No rash.   Back: No tenderness, No CVA tenderness.   Extremities: Intact distal pulses, No edema, No tenderness, No cyanosis, No clubbing.   Musculoskeletal: Good range of motion in all major joints. No tenderness to palpation or major deformities noted.   Neurologic: Alert & oriented x 3, Normal motor function, Normal sensory function, No focal deficits noted.   Psychiatric: Anxious  [id number] VIEW   Final Result    "   Left-sided shunt again appears to be an intrathoracic/pleural shunt (ventriculopleural shunt).      DX-SPINE-ANY ONE VIEW   Final Result      Intact appearing cervical portion of left-sided  shunt.      DX-CHEST-LIMITED (1 VIEW)   Final Result      Shunt tubing projects into the lower left hemithorax and appears to be intact.   Small left pleural effusion.      DX-SKULL-LIMITED 3-   Final Result      Left posterior ventriculostomy tube and left-sided shunt tubing appear to be intact.      CT-HEAD W/O   Final Result      Stable head CT findings.        Results for orders placed or performed during the hospital encounter of 01/06/20   CBC WITH DIFFERENTIAL   Result Value Ref Range    WBC 9.8 4.8 - 10.8 K/uL    RBC 4.40 4.20 - 5.40 M/uL    Hemoglobin 12.7 12.0 - 16.0 g/dL    Hematocrit 38.8 37.0 - 47.0 %    MCV 88.2 81.4 - 97.8 fL    MCH 28.9 27.0 - 33.0 pg    MCHC 32.7 (L) 33.6 - 35.0 g/dL    RDW 46.5 35.9 - 50.0 fL    Platelet Count 307 164 - 446 K/uL    MPV 11.0 9.0 - 12.9 fL    Neutrophils-Polys 73.10 (H) 44.00 - 72.00 %    Lymphocytes 17.70 (L) 22.00 - 41.00 %    Monocytes 6.50 0.00 - 13.40 %    Eosinophils 1.70 0.00 - 6.90 %    Basophils 0.80 0.00 - 1.80 %    Immature Granulocytes 0.20 0.00 - 0.90 %    Nucleated RBC 0.00 /100 WBC    Neutrophils (Absolute) 7.13 2.00 - 7.15 K/uL    Lymphs (Absolute) 1.73 1.00 - 4.80 K/uL    Monos (Absolute) 0.63 0.00 - 0.85 K/uL    Eos (Absolute) 0.17 0.00 - 0.51 K/uL    Baso (Absolute) 0.08 0.00 - 0.12 K/uL    Immature Granulocytes (abs) 0.02 0.00 - 0.11 K/uL    NRBC (Absolute) 0.00 K/uL   Comp Metabolic Panel   Result Value Ref Range    Sodium 141 135 - 145 mmol/L    Potassium 3.7 3.6 - 5.5 mmol/L    Chloride 110 96 - 112 mmol/L    Co2 20 20 - 33 mmol/L    Anion Gap 11.0 0.0 - 11.9    Glucose 92 65 - 99 mg/dL    Bun 14 8 - 22 mg/dL    Creatinine 0.73 0.50 - 1.40 mg/dL    Calcium 9.2 8.5 - 10.5 mg/dL    AST(SGOT) 9 (L) 12 - 45 U/L    ALT(SGPT) 9 2 - 50 U/L    Alkaline  Phosphatase 103 (H) 30 - 99 U/L    Total Bilirubin 0.7 0.1 - 1.5 mg/dL    Albumin 3.8 3.2 - 4.9 g/dL    Total Protein 6.9 6.0 - 8.2 g/dL    Globulin 3.1 1.9 - 3.5 g/dL    A-G Ratio 1.2 g/dL   ESTIMATED GFR   Result Value Ref Range    GFR If African American >60 >60 mL/min/1.73 m 2    GFR If Non African American >60 >60 mL/min/1.73 m 2       COURSE & MEDICAL DECISION MAKING  Pertinent Labs & Imaging studies reviewed. (See chart for details)  An IV was established.  Reviewed the patient's extensive history.  CT scan was performed to rule out worsening of hydrocephalus.  This appears to be stable and unchanged.  Her shunt series also demonstrates intact tubing.  She was given some small amount of pain medication by the paramedics as well as some oral analgesia here.  She has no suggestion of infection.  I suspect that this is a typical cephalgia consistent with her migraine syndrome.  No indication for further work-up or imaging.  I recommended that she follow-up with her PCP for ongoing management.  I reviewed the patient's [facility] website.  She has active narcotic prescriptions with a 30-day supply as well as benzodiazepines.  I counseled her that we cannot administer any additional opioids    FINAL IMPRESSION  1.  Chronic headache  2.  History of hydrocephalus, stable      Electronically signed by: Blair Camarillo, 1/6/2020 8:11 AM

## 2020-01-07 ENCOUNTER — HOSPITAL ENCOUNTER (EMERGENCY)
Facility: MEDICAL CENTER | Age: 50
End: 2020-01-07
Attending: EMERGENCY MEDICINE
Payer: MEDICAID

## 2020-01-07 VITALS
HEART RATE: 96 BPM | TEMPERATURE: 97.9 F | SYSTOLIC BLOOD PRESSURE: 135 MMHG | DIASTOLIC BLOOD PRESSURE: 91 MMHG | WEIGHT: 150.79 LBS | RESPIRATION RATE: 16 BRPM | OXYGEN SATURATION: 96 % | HEIGHT: 64 IN | BODY MASS INDEX: 25.74 KG/M2

## 2020-01-07 VITALS
WEIGHT: 150.79 LBS | RESPIRATION RATE: 18 BRPM | BODY MASS INDEX: 25.74 KG/M2 | TEMPERATURE: 98.1 F | HEIGHT: 64 IN | SYSTOLIC BLOOD PRESSURE: 121 MMHG | DIASTOLIC BLOOD PRESSURE: 74 MMHG | HEART RATE: 84 BPM | OXYGEN SATURATION: 97 %

## 2020-01-07 DIAGNOSIS — G89.29 CHRONIC INTRACTABLE HEADACHE, UNSPECIFIED HEADACHE TYPE: ICD-10-CM

## 2020-01-07 DIAGNOSIS — Z91.148 NONCOMPLIANCE WITH MEDICATION REGIMEN: ICD-10-CM

## 2020-01-07 DIAGNOSIS — R51.9 CHRONIC INTRACTABLE HEADACHE, UNSPECIFIED HEADACHE TYPE: ICD-10-CM

## 2020-01-07 DIAGNOSIS — Z79.891 CHRONIC PRESCRIPTION OPIATE USE: ICD-10-CM

## 2020-01-07 PROCEDURE — 96372 THER/PROPH/DIAG INJ SC/IM: CPT

## 2020-01-07 PROCEDURE — A9270 NON-COVERED ITEM OR SERVICE: HCPCS | Performed by: EMERGENCY MEDICINE

## 2020-01-07 PROCEDURE — 700111 HCHG RX REV CODE 636 W/ 250 OVERRIDE (IP): Performed by: EMERGENCY MEDICINE

## 2020-01-07 PROCEDURE — 700102 HCHG RX REV CODE 250 W/ 637 OVERRIDE(OP): Performed by: EMERGENCY MEDICINE

## 2020-01-07 PROCEDURE — 99284 EMERGENCY DEPT VISIT MOD MDM: CPT

## 2020-01-07 RX ORDER — DEXAMETHASONE 4 MG/1
12 TABLET ORAL ONCE
Status: COMPLETED | OUTPATIENT
Start: 2020-01-07 | End: 2020-01-07

## 2020-01-07 RX ORDER — KETOROLAC TROMETHAMINE 30 MG/ML
30 INJECTION, SOLUTION INTRAMUSCULAR; INTRAVENOUS ONCE
Status: COMPLETED | OUTPATIENT
Start: 2020-01-07 | End: 2020-01-07

## 2020-01-07 RX ORDER — ONDANSETRON 4 MG/1
4 TABLET, ORALLY DISINTEGRATING ORAL ONCE
Status: COMPLETED | OUTPATIENT
Start: 2020-01-07 | End: 2020-01-07

## 2020-01-07 RX ADMIN — ONDANSETRON 4 MG: 4 TABLET, ORALLY DISINTEGRATING ORAL at 08:46

## 2020-01-07 RX ADMIN — DEXAMETHASONE 12 MG: 4 TABLET ORAL at 08:46

## 2020-01-07 RX ADMIN — KETOROLAC TROMETHAMINE 30 MG: 30 INJECTION, SOLUTION INTRAMUSCULAR at 08:46

## 2020-01-07 ASSESSMENT — ENCOUNTER SYMPTOMS
COUGH: 0
TINGLING: 0
ABDOMINAL PAIN: 0
NERVOUS/ANXIOUS: 1
VOMITING: 1
SHORTNESS OF BREATH: 0
BACK PAIN: 1
CONSTIPATION: 0
DIZZINESS: 0
FEVER: 0
DIARRHEA: 0
SORE THROAT: 0
NAUSEA: 1
CHILLS: 0
HEADACHES: 1

## 2020-01-07 NOTE — ED TRIAGE NOTES
Rasheeda Manzano  49 y.o.  female  Chief Complaint   Patient presents with   • Headache     c/o headache with vomiting since 1300. states that she was seen here earlier today for the same and was sent home at around noon. no vomiting noted at this time.

## 2020-01-07 NOTE — ED TRIAGE NOTES
Rasheeda Manzano  49 y.o.  female  Chief Complaint   Patient presents with   • Headache     brought in by sreekanth from home. c/o headache ( right side ) x 2 days. patient was seen here yesterday for the same. patient earlier check herself in and left due to wait time.

## 2020-01-10 ENCOUNTER — HOSPITAL ENCOUNTER (OUTPATIENT)
Dept: RADIOLOGY | Facility: MEDICAL CENTER | Age: 50
End: 2020-01-10
Attending: INTERNAL MEDICINE
Payer: MEDICAID

## 2020-01-10 ENCOUNTER — HOSPITAL ENCOUNTER (OUTPATIENT)
Dept: RADIATION ONCOLOGY | Facility: MEDICAL CENTER | Age: 50
End: 2020-01-31
Attending: RADIOLOGY
Payer: MEDICAID

## 2020-01-10 VITALS
OXYGEN SATURATION: 98 % | SYSTOLIC BLOOD PRESSURE: 157 MMHG | BODY MASS INDEX: 26.93 KG/M2 | DIASTOLIC BLOOD PRESSURE: 101 MMHG | WEIGHT: 152 LBS | HEART RATE: 113 BPM | HEIGHT: 63 IN | TEMPERATURE: 97.3 F

## 2020-01-10 DIAGNOSIS — C50.411 MALIGNANT NEOPLASM OF UPPER-OUTER QUADRANT OF RIGHT BREAST IN FEMALE, ESTROGEN RECEPTOR POSITIVE (HCC): ICD-10-CM

## 2020-01-10 DIAGNOSIS — Z17.0 MALIGNANT NEOPLASM OF UPPER-OUTER QUADRANT OF RIGHT BREAST IN FEMALE, ESTROGEN RECEPTOR POSITIVE (HCC): ICD-10-CM

## 2020-01-10 DIAGNOSIS — C50.911 MALIGNANT NEOPLASM OF RIGHT FEMALE BREAST, UNSPECIFIED ESTROGEN RECEPTOR STATUS, UNSPECIFIED SITE OF BREAST (HCC): ICD-10-CM

## 2020-01-10 PROCEDURE — 700117 HCHG RX CONTRAST REV CODE 255: Performed by: INTERNAL MEDICINE

## 2020-01-10 PROCEDURE — 71260 CT THORAX DX C+: CPT

## 2020-01-10 PROCEDURE — 99205 OFFICE O/P NEW HI 60 MIN: CPT | Performed by: RADIOLOGY

## 2020-01-10 PROCEDURE — 99214 OFFICE O/P EST MOD 30 MIN: CPT | Performed by: RADIOLOGY

## 2020-01-10 RX ORDER — OXYCODONE HYDROCHLORIDE 10 MG/1
TABLET ORAL
Status: ON HOLD | COMMUNITY
Start: 2019-12-26 | End: 2020-02-17 | Stop reason: SDUPTHER

## 2020-01-10 RX ADMIN — IOHEXOL 25 ML: 240 INJECTION, SOLUTION INTRATHECAL; INTRAVASCULAR; INTRAVENOUS; ORAL at 15:30

## 2020-01-10 RX ADMIN — IOHEXOL 100 ML: 350 INJECTION, SOLUTION INTRAVENOUS at 15:30

## 2020-01-10 SDOH — HEALTH STABILITY: MENTAL HEALTH: HOW OFTEN DO YOU HAVE A DRINK CONTAINING ALCOHOL?: MONTHLY OR LESS

## 2020-01-10 SDOH — HEALTH STABILITY: MENTAL HEALTH: HOW MANY STANDARD DRINKS CONTAINING ALCOHOL DO YOU HAVE ON A TYPICAL DAY?: 1 OR 2

## 2020-01-10 ASSESSMENT — PAIN SCALES - GENERAL: PAINLEVEL: 4=SLIGHT-MODERATE PAIN

## 2020-01-10 NOTE — CONSULTS
RADIATION ONCOLOGY CONSULT    DATE OF SERVICE: 1/10/2020    IDENTIFICATION: A 49 y.o. female with [id number], grade 1 ER MO positive HER-2/milvia negative breast cancer status post lumpectomy and sentinel node dissection with 2+ sentinel nodes one microscopic met 1 macroscopic met with associated lymph vascular invasion and the tumor which measured 1.6 cm.  She has a MammaPrint pending.  She is here at the kind request of Dr. Fabian Johnson.      HISTORY OF PRESENT ILLNESS: Patient's history dates back to 9/27/2019 when on routine mammogram which was her first mammogram she was found to have an asymmetric density in the upper outer posterior right breast.  She then underwent a diagnostic mammogram and ultrasound on 10/17/2019 and at the 1 o'clock position there was a hypoechoic mass measuring 1 x 1.1 x 1 and this was also confirmed as a spiculated lesion at the 1 o'clock position on mammogram.  She then underwent a biopsy 12/3/2018 this was a HER-2 1+ grade 1 infiltrating ductal carcinoma tumor was ER greater than 95 MO greater than 95 Ki-67 5 to 10% breast cancer.  She then underwent a lumpectomy and sentinel node dissection on 12/19/2019 2 sentinel nodes were positive one was a microscopic met 1 was a macroscopic met.  The largest metastatic deposit was 0.6 cm and there was extranodal extension present.  Lymph vascular invasion was also present and the tumor itself measured 1.6 cm margins were negative.  Postoperatively she has been doing well she is getting staging work-up including a CT scan of the chest abdomen and pelvis and bone scan in the next few days.  She has seen Dr. Wang and a MammaPrint is ordered and she will be seeing her next week to discuss the results.  She is here to discuss radiation therapy to decrease her chance of local regional recurrence.    PAST MEDICAL HISTORY:   Past Medical History:   Diagnosis Date   • Acute nasopharyngitis     H/O Cold 12-8-19   • Blind     age 11   • C. difficile colitis   "   H/O x3   • C. difficile diarrhea 2013   • Cancer (HCC)     Right Breast Cancer DX 2-2019   • Chronic daily headache    • Depression    • Esophageal atresia 1971    Treated surgically at birth.   • Esophageal bleeding 12/18/2019    H/O, \"three times with last one a year ago.\"   • Fall    • GERD (gastroesophageal reflux disease)    • H/O total hysterectomy    • Heart burn 12/18/2019   • Hydrocephalus (HCC)    • Hydrocephalus (HCC)     shunt drains into pleural place of L lung   • Indigestion 12/18/2019   • Jaundice     at birth   • Legally blind    • Migraine without aura, without mention of intractable migraine without mention of status migrainosus    • Other specified symptom associated with female genital organs     excessive bleeding   • Pain     gallbladder related   • Pain 12/18/2019    \"Pain In Head From Hydrocephalus.\".   • Psychiatric problem     depression   • PTSD (post-traumatic stress disorder)    • Seizure (HCC) 12/18/2019    \"Last seizure one year ago.\"   • Snoring 12/18/2019    Has not had a sleep study.       PAST SURGICAL HISTORY:  Past Surgical History:   Procedure Laterality Date   • MASTECTOMY Right 12/19/2019    Procedure: MASTECTOMY-PARTIAL;  Surgeon: Brice Johnson M.D.;  Location: SURGERY SAME DAY [facility]S;  Service: General   • NODE BIOPSY SENTINEL Right 12/19/2019    Procedure: BIOPSY, LYMPH NODE, SENTINEL-AXILLARY;  Surgeon: Brice Johnson M.D.;  Location: SURGERY SAME DAY [facility] ORS;  Service: General   • GASTROSCOPY N/A 1/2/2019    Procedure: GASTROSCOPY;  Surgeon: Matias Fernando M.D.;  Location: SURGERY [facility];  Service: Gastroenterology   • GASTROSCOPY-ENDO N/A 8/24/2017    Procedure: GASTROSCOPY-ENDO;  Surgeon: Matias Fernando M.D.;  Location: ENDOSCOPY [facility];  Service:    • AYALA BY LAPAROSCOPY N/A 8/13/2015    Procedure: AYALA BY LAPAROSCOPY;  Surgeon: Brice Johnson M.D.;  Location: SURGERY SAME DAY [facility];  Service:    • CHOLECYSTECTOMY " N/A 8/13/2015    Procedure: CHOLECYSTECTOMY;  Surgeon: Brice Johnson M.D.;  Location: SURGERY SAME DAY [facility];  Service:    • LYSIS ADHESIONS GENERAL  12/10/2013    Performed by Arie Bass M.D. at SURGERY [facility]   • CYSTOSCOPY  12/10/2013    Performed by Joana Yeager M.D. at SURGERY [facility]   • ABDOMINAL HYSTERECTOMY TOTAL  12/10/2013    Performed by Joana Yeager M.D. at SURGERY [facility]   • EXPLORATORY LAPAROTOMY  12/10/2013    Performed by Arie Bass M.D. at SURGERY [facility]   • APPENDECTOMY  12/10/2013    Performed by Arie Bass M.D. at SURGERY [facility]   • LAPAROSCOPY  8/8/08    Performed by FERNANDO HENDERSON at SURGERY [facility]   • NISSEN FUNDOPLICATION LAPAROSCOPIC     • OTHER      PLEURAL SHUNT, numerous revisions       GYNECOLOGICAL STATUS:  G0, P0 she was on hormone replacement for 1 year    CURRENT MEDICATIONS:  Current Outpatient Medications   Medication Sig Dispense Refill   • oxyCODONE immediate release (ROXICODONE) 10 MG immediate release tablet TAKE 1 TABLET BY MOUTH EVERY 4 HOURS FOR 4 DAYS C50     • sucralfate (CARAFATE) 1 GM Tab Take 1 g by mouth 4 times a day as needed. Indications: Ulcer of the Duodenum  5   • LORazepam (ATIVAN) 0.5 MG Tab Take 0.5 mg by mouth every bedtime.  0   • hydrOXYzine HCl (ATARAX) 50 MG Tab Take 50 mg by mouth every bedtime.  1   • esomeprazole (NEXIUM) 40 MG delayed-release capsule Take 40 mg by mouth every bedtime.  5   • levETIRAcetam (KEPPRA) 500 MG Tab Take 1,000 mg by mouth every bedtime.     • topiramate (TOPAMAX) 100 MG Tab Take 200 mg by mouth every bedtime.     • propranolol CR (INDERAL LA) 120 MG CAPSULE SR 24 HR Take 120 mg by mouth every bedtime.       No current facility-administered medications for this encounter.        ALLERGIES:    Tape and Latex    FAMILY HISTORY:    Family History   Problem Relation Age of Onset   • Hypertension Mother    • Hypertension Father    • Non-contributory  "Neg Hx         Migraine   [unfilled]        SOCIAL HISTORY:     reports that she quit smoking about 2 years ago. Her smoking use included cigars. She started smoking about 15 years ago. She has a 10.00 pack-year smoking history. She has quit using smokeless tobacco. She reports current alcohol use. She reports that she does not use drugs.  She lives alone and has a distress score of 10    REVIEW OF SYSTEMS: Pertinent positives consist of the mass in the breast with pain since surgery, she has occasional chest pain but that is because of all the scars from the stents.  She has fatigue hot flashes she is blind she has diarrhea heartburn indigestion nausea headaches memory loss according to her nurse but not the patient seizure depression and anxiety  The rest of the review of systems is negative and has been reviewed by me. It is in the nursing note dated 1/10/2020 in [facility]  Pain 4 out of 10  What makes the pain better: Medications decreased mobility  What makes the pain worse: Mobility  Pain controlled with current regimen: yes  Pain related to condition being seen here for: no      PHYSICAL EXAM:    1= Restricted in physically strenuous activity, but ambulatory and able to carry out work of a light sedentary nature, e.g., light housework, office work.  Vitals:    01/10/20 1044   BP: 157/101   BP Location: Left arm   Patient Position: Sitting   BP Cuff Size: Adult   Pulse: (!) 113   Temp: 36.3 °C (97.3 °F)   TempSrc: Temporal   SpO2: 98%   Weight: 68.9 kg (152 lb)   Height: 1.6 m (5' 3\")   Pain Score: 4=Slight-Moderate Pain        GENERAL: Well-appearing alert and oriented x3 in no apparent distress, blind  Breasts: Bilaterally symmetrical multiple scars from the prior shunts that she has had.  The right breast has evidence of the lumpectomy in the upper outer quadrant and axillary scar both are well-healed she has no discrete masses in either breast or axilla  HEENT: Patient is blind sclerae nonicteric.  " Conjunctivae pink.  Oral cavity, tongue   protrudes midline.   NECK:   No peripheral adenopathy of the neck, supraclavicular fossa or axillae   bilaterally.  LUNGS:  Clear to ascultation   HEART:  Regular rate and rhythm.  No murmur appreciated  ABDOMEN:  Soft. No evidence of hepatosplenomegaly.  Multiple abdominal scars consistent with multiple surgeries  EXTREMITIES:  Without Edema.  NEUROLOGIC: Blind.  Cranial nerves II through XII were intact.  Motor and sensory grossly within normal limits patient does say on the right side that she had a hemorrhage as 20-year-old and has always had some sensation issues and some coordination issues on the right.              IMPRESSION:    A 49 y.o. with  with [id number], grade 1 ER AL positive HER-2/milvia negative breast cancer status post lumpectomy and sentinel node dissection with 2+ sentinel nodes one microscopic met 1 macroscopic met with associated lymph vascular invasion and the tumor which measured 1.6 cm.  She has a MammaPrint pending. .      RECOMMENDATIONS:   I had a long discussion with the patient her nurse and best friend regarding diagnosis prognosis and treatment.  The understand the goals of radiation therapy is to decrease the chance of local regional recurrence.  I've described the details of radiation along with the side effects both acute and chronic, including but not exclusive to fatigue, skin reaction, local soreness, swelling, delayed healing, scarring fibrosis discoloration. Ample time was allowed for questions, and patient understands.    They understand that if she does require chemotherapy that radiation therapy would follow.  I have given them a number to contact us when it is time for her to get radiation therapy.    Thank you for the opportunity to participate in her care.  If any questions or comments, please do not hesitate in calling.    Please note that this dictation was created using voice recognition software. I have made every reasonable  attempt to correct obvious errors, but I expect that there are errors of grammar and possibly content that I did not discover before finalizing the note.

## 2020-01-10 NOTE — NON-PROVIDER
"Patient was seen today in clinic with Dr. Davis for consult.  Vitals signs and weight were obtained and pain assessment was completed.  Allergies and medications were reviewed with the patient.  Review of systems completed.     Vitals/Pain:  Vitals:    01/10/20 1044   BP: 157/101   BP Location: Left arm   Patient Position: Sitting   BP Cuff Size: Adult   Pulse: (!) 113   Temp: 36.3 °C (97.3 °F)   TempSrc: Temporal   SpO2: 98%   Weight: 68.9 kg (152 lb)   Height: 1.6 m (5' 3\")   Pain Score: 4=Slight-Moderate Pain        Allergies:   Tape and Latex    Current Medications:  Current Outpatient Medications   Medication Sig Dispense Refill   • oxyCODONE immediate release (ROXICODONE) 10 MG immediate release tablet TAKE 1 TABLET BY MOUTH EVERY 4 HOURS FOR 4 DAYS C50     • sucralfate (CARAFATE) 1 GM Tab Take 1 g by mouth 4 times a day as needed. Indications: Ulcer of the Duodenum  5   • LORazepam (ATIVAN) 0.5 MG Tab Take 0.5 mg by mouth every bedtime.  0   • hydrOXYzine HCl (ATARAX) 50 MG Tab Take 50 mg by mouth every bedtime.  1   • esomeprazole (NEXIUM) 40 MG delayed-release capsule Take 40 mg by mouth every bedtime.  5   • levETIRAcetam (KEPPRA) 500 MG Tab Take 1,000 mg by mouth every bedtime.     • topiramate (TOPAMAX) 100 MG Tab Take 200 mg by mouth every bedtime.     • propranolol CR (INDERAL LA) 120 MG CAPSULE SR 24 HR Take 120 mg by mouth every bedtime.       No current facility-administered medications for this encounter.          PCP:  Lily Singh, Med Ass't    "

## 2020-01-16 ENCOUNTER — PATIENT OUTREACH (OUTPATIENT)
Dept: OTHER | Facility: MEDICAL CENTER | Age: 50
End: 2020-01-16

## 2020-01-16 NOTE — PROGRESS NOTES
Oncology Nurse Navigation      Returned pt's call.  Pt very anxious as she has learned that she will need chemotherapy.  She is scheduled to see Dr. Wang to review the results of her CT on 1/27/2020.  She states she had to cancel her neurology appt as it conflicted with another appt in her cancer care.  Pt is overwhelmed with keeping track of her appointments.  She states her current system of keeping track is using her recorder.  Provided number to [facility] for pt to schedule her rides as soon as her appts are made.  Requested to meet with pt after her POC is in place to ensure she will be able to make it to her txs.  Updated OSW Ortiz.

## 2020-01-21 ENCOUNTER — HOSPITAL ENCOUNTER (OUTPATIENT)
Dept: RADIOLOGY | Facility: MEDICAL CENTER | Age: 50
End: 2020-01-21
Attending: INTERNAL MEDICINE
Payer: MEDICAID

## 2020-01-21 DIAGNOSIS — C50.911 MALIGNANT NEOPLASM OF RIGHT FEMALE BREAST, UNSPECIFIED ESTROGEN RECEPTOR STATUS, UNSPECIFIED SITE OF BREAST (HCC): ICD-10-CM

## 2020-01-21 PROCEDURE — A9503 TC99M MEDRONATE: HCPCS

## 2020-01-28 ENCOUNTER — TELEPHONE (OUTPATIENT)
Dept: HEMATOLOGY ONCOLOGY | Facility: MEDICAL CENTER | Age: 50
End: 2020-01-28

## 2020-01-28 ENCOUNTER — HOSPITAL ENCOUNTER (OUTPATIENT)
Dept: RADIATION ONCOLOGY | Facility: MEDICAL CENTER | Age: 50
End: 2020-01-28

## 2020-01-28 PROCEDURE — 77290 THER RAD SIMULAJ FIELD CPLX: CPT | Mod: 26 | Performed by: RADIOLOGY

## 2020-01-28 PROCEDURE — 77263 THER RADIOLOGY TX PLNG CPLX: CPT | Performed by: RADIOLOGY

## 2020-01-28 PROCEDURE — 77334 RADIATION TREATMENT AID(S): CPT | Mod: 26 | Performed by: RADIOLOGY

## 2020-01-28 PROCEDURE — 77334 RADIATION TREATMENT AID(S): CPT | Performed by: RADIOLOGY

## 2020-01-28 PROCEDURE — 77290 THER RAD SIMULAJ FIELD CPLX: CPT | Performed by: RADIOLOGY

## 2020-01-28 NOTE — TELEPHONE ENCOUNTER
1st attempt to contact the patient- Left voicemail for patient requesting a return call to schedule New Oncology Genetic appointment. (Remind patient to bring a list/ or questionnaire, of her cancer related family history)  NP/Chantel/ breast cancer/ Brice Johnson

## 2020-01-30 ENCOUNTER — PATIENT OUTREACH (OUTPATIENT)
Dept: OTHER | Facility: MEDICAL CENTER | Age: 50
End: 2020-01-30

## 2020-01-30 NOTE — PROGRESS NOTES
On January 30, 2020, Oncology Social Worker Tiff Alcantar contacted [facility] to set up transportation for pt.  [facility] set up recurring appointments for radiation treatment to begin on February 5th, 2020.  Trip ID#[id number]  at 8:15 am from home address to drop off at [facility], Trip ID#[id number]  from [facility] at 9 am to home address.  OSW Ortiz was informed [facility] to contact [facility] to schedule appointments.

## 2020-01-30 NOTE — PROGRESS NOTES
On January 30, 2020, Oncology Social Worker Tiff Alcantar contacted [facility] ([phone]) and spoke to Kristy who informed OSW Ortiz she would need to call back on Tuesday to get Wednesday's appointment scheduled.  Kristy informed OSJACOB Alcantar they only take appointments three days ahead of time and the earliest for Wednesday's appointment to call would be Sunday.

## 2020-01-30 NOTE — PROGRESS NOTES
On January 30, 2020, Oncology Social Worker Tiff Alcantar scheduled counseling appointment with [facility] for February 6th, 2020 at 3pm at [address] [place] [address].  OSW Ortiz will provide this information to pt. at appointment on February 4th, 2020 at 9:30 pm.

## 2020-01-30 NOTE — PROGRESS NOTES
"On January 30, 2020, Oncology Social Worker Tiff Alcantar contacted pt. via telephone.  Pt. states she is \"doing okay.\"  Pt. shared she starts radiation treatment on Tuesday, February 4th, 2020 at 9:15 am.  MICH Alcantar asked pt. about transportation.  Pt. stated she has some friends she could ask but stated she may need help with transportation and asked MICH Alcantar if she had other resources.  MICH Alcantar asked pt. about [facility].  Pt. shared she does have [facility] and it's up to date.  MICH Alcantar informed pt. she would get [facility] transportation set up for pt. and additionally provide pt. with some taxi vouchers as back up.  Pt. states financially she is \"fine.\"  MICH Alcantar asked pt. how she was doing emotionally.  Pt. began crying stating she is \"hanging in there.\"  MICH Alcantar asked if counseling had been scheduled.  Pt. states it has not been scheduled.  MICH Alcantar asked pt. for permission to get her scheduled.  Pt. agreed and thanked MICH Alcantar for helping her.  MICH Alcantar will meet with pt. on Tuesday, February 4th at 9:30 am to go over all resources set up.      "

## 2020-02-03 ENCOUNTER — HOSPITAL ENCOUNTER (OUTPATIENT)
Dept: RADIATION ONCOLOGY | Facility: MEDICAL CENTER | Age: 50
End: 2020-02-29
Attending: RADIOLOGY
Payer: MEDICARE

## 2020-02-03 PROCEDURE — 77295 3-D RADIOTHERAPY PLAN: CPT | Mod: 26 | Performed by: RADIOLOGY

## 2020-02-03 PROCEDURE — 77300 RADIATION THERAPY DOSE PLAN: CPT | Mod: 26 | Performed by: RADIOLOGY

## 2020-02-03 PROCEDURE — 77295 3-D RADIOTHERAPY PLAN: CPT | Performed by: RADIOLOGY

## 2020-02-03 PROCEDURE — 77334 RADIATION TREATMENT AID(S): CPT | Mod: 26 | Performed by: RADIOLOGY

## 2020-02-03 PROCEDURE — 77300 RADIATION THERAPY DOSE PLAN: CPT | Performed by: RADIOLOGY

## 2020-02-03 PROCEDURE — 77334 RADIATION TREATMENT AID(S): CPT | Performed by: RADIOLOGY

## 2020-02-04 ENCOUNTER — PATIENT OUTREACH (OUTPATIENT)
Dept: OTHER | Facility: MEDICAL CENTER | Age: 50
End: 2020-02-04

## 2020-02-04 ENCOUNTER — HOSPITAL ENCOUNTER (OUTPATIENT)
Dept: RADIATION ONCOLOGY | Facility: MEDICAL CENTER | Age: 50
End: 2020-02-04

## 2020-02-04 LAB

## 2020-02-04 PROCEDURE — 77280 THER RAD SIMULAJ FIELD SMPL: CPT | Mod: 26 | Performed by: RADIOLOGY

## 2020-02-04 PROCEDURE — 77280 THER RAD SIMULAJ FIELD SMPL: CPT | Performed by: RADIOLOGY

## 2020-02-04 PROCEDURE — 77412 RADIATION TX DELIVERY LVL 3: CPT | Performed by: RADIOLOGY

## 2020-02-04 NOTE — PROGRESS NOTES
Oncology Nurse Navigation  Accompanied by OSW Tiff Alcantar, met with pt to discuss resources.    OSW scheduled counseling for pt and addressed transportation barrier.  [facility] transport arranged for remaining treatments this week.   Taxi vouchers offered  Pt states that her friend Clarence Rangel will provide transportation.

## 2020-02-05 ENCOUNTER — HOSPITAL ENCOUNTER (OUTPATIENT)
Dept: RADIATION ONCOLOGY | Facility: MEDICAL CENTER | Age: 50
End: 2020-02-05

## 2020-02-05 ENCOUNTER — PATIENT OUTREACH (OUTPATIENT)
Dept: OTHER | Facility: MEDICAL CENTER | Age: 50
End: 2020-02-05

## 2020-02-05 VITALS
DIASTOLIC BLOOD PRESSURE: 75 MMHG | BODY MASS INDEX: 26.04 KG/M2 | TEMPERATURE: 97.6 F | WEIGHT: 147 LBS | SYSTOLIC BLOOD PRESSURE: 122 MMHG | HEART RATE: 87 BPM | OXYGEN SATURATION: 98 %

## 2020-02-05 LAB

## 2020-02-05 PROCEDURE — 77412 RADIATION TX DELIVERY LVL 3: CPT | Performed by: RADIOLOGY

## 2020-02-05 ASSESSMENT — PAIN SCALES - GENERAL: PAINLEVEL: NO PAIN

## 2020-02-05 NOTE — PROGRESS NOTES
On February 4, 2020, Oncology Social Worker Tiff Alcantar contacted [facility] to cancel scheduled appointments for pt.'s radiation and counseling appointments.

## 2020-02-05 NOTE — ON TREATMENT VISIT
ON TREATMENT NOTE  RADIATION ONCOLOGY DEPARTMENT    Patient name:  Rasheeda Manzano    Primary Physician:  Tabitha Bautista M.D. MRN: [id number]  CSN: [id number]   Referring physician:  Brice Johnson M.D. : 1970, 49 y.o.     ENCOUNTER DATE:  20    DIAGNOSIS:    Breast cancer (HCC)  Staging form: Breast, AJCC 8th Edition  - Pathologic: Stage IA (pT1c, pN1a, cM0, G1, ER+, DE+, HER2-) - Signed by Michelle Davis M.D. on 2020  Neoadjuvant therapy: No  Laterality: Right  Multigene prognostic tests performed: None  Histologic grading system: 3 grade system      TREATMENT SUMMARY:  Radiation Treatments     Active   Plans   R Breast   Most recent treatment: Dose planned: 180 cGy (fraction 2 of 28 on 2020)   Total: Dose planned: 5,040 cGy   Elapsed Course Treatment Days: 1 @       R SCV   Most recent treatment: Dose planned: 180 cGy (fraction 2 of 28 on 2020)   Total: Dose planned: 5,040 cGy   Elapsed Course Treatment Days: 1 @       Reference Points   R Breast   Most recent treatment: Dose given: 180 cGy (on 2020)   Total: Dose given: 360 cGy   Elapsed Course Treatment Days:  @       R SCV   Most recent treatment: Dose given: 180 cGy (on 2020)   Total: Dose given: 360 cGy   Elapsed Course Treatment Days: 1 @       R SCV CP   Most recent treatment: Dose given: 180 cGy (on 2020)   Total: Dose given: 360 cGy   Elapsed Course Treatment Days: 1 @       Rt Breast CP   Most recent treatment: Dose given: 180 cGy (on 2020)   Total: Dose given: 360 cGy   Elapsed Course Treatment Days:  @                     SUBJECTIVE:   Tolerating therapy without event      VITAL SIGNS:  /75 (BP Location: Left arm, Patient Position: Sitting, BP Cuff Size: Adult)   Pulse 87   Temp 36.4 °C (97.6 °F) (Temporal)   Wt 66.7 kg (147 lb)   SpO2 98%    Encounter Vitals  Temperature: 36.4 °C (97.6 °F)  Temp src:  Temporal  Blood Pressure: 122/75  Pulse: 87  Pulse Oximetry: 98 %  Weight: 66.7 kg (147 lb)  Pain Score: No pain  Pain Assessment 2/5/2020 1/10/2020   Pain Assessment Denies Pain -   Pain Score 0 4   Pain Loc - Neck   Some recent data might be hidden          PHYSICAL EXAM:    No erythema    Toxicity Assessment 2/5/2020   Toxicity Assessment Breast   Fatigue (lethargy, malaise, asthenia) None   Fever (in the absence of neutropenia) None   Radiation Dermatitis None   Lymphatics Normal   RT - Pain due to RT None   Dyspnea Normal         IMPRESSION:  Cancer Staging  Breast cancer (HCC)  Staging form: Breast, AJCC 8th Edition  - Pathologic: Stage IA (pT1c, pN1a, cM0, G1, ER+, TN+, HER2-) - Signed by Michelle Davis M.D. on 1/7/2020      PLAN:  No change in treatment plan    Disposition:  Treatment plan reviewed. Questions answered. Continue therapy outlined.     Michelle Davis M.D.    No orders of the defined types were placed in this encounter.

## 2020-02-05 NOTE — PROGRESS NOTES
On February 4, 2020, Oncology Social Worker Tiff Alcantar and Oncology Nurse Navigator Esthela Reid met with pt. following her radiation treatment.  Also present was pt.'s significant other, Kyle Duran.       OSW Ortiz provided pt. Information regarding scheduled counseling appointment and transportation set up through [facility] for the remainder of the week.      Scheduled counseling appointment with [facility] for February 6th, 2020 at 3pm at [address] [place] [address].  [facility] will  pt. from home between 2:02 pm to 2:22 pm   [facility] will pick pt. back up from [facility] between 4:10 pm to 4:30 pm      Scheduled [facility]  for Radiation Treatment on 2/5  [facility] will  pt. from home between 7:11 am to 7:31 am   [facility] will pick pt. back up from [facility] between 9:24 am to 9:44 am     Scheduled [facility]  for Radiation Treatment on 2/6  [facility] will  pt. from home between 7:11 am to 7:31 am  [facility] will pick pt. back up from [facility] between     Scheduled [facility]  for Radiation Treatment on 2/7  [facility] will  pt. from home between  [facility] will pick pt. back up from [facility] between     Following discussion of all appointment details for transportation, pt. and her friend Kyle both agreed he would be driving her to her appointments instead of having to take [facility].  Pt. agreed and MICH Alcantar will cancel all appointment pick ups through [facility] and request mileage reimbursement through [facility].

## 2020-02-06 ENCOUNTER — HOSPITAL ENCOUNTER (OUTPATIENT)
Dept: RADIATION ONCOLOGY | Facility: MEDICAL CENTER | Age: 50
End: 2020-02-06
Payer: MEDICARE

## 2020-02-06 ENCOUNTER — TELEPHONE (OUTPATIENT)
Dept: HEMATOLOGY ONCOLOGY | Facility: MEDICAL CENTER | Age: 50
End: 2020-02-06

## 2020-02-06 LAB

## 2020-02-06 PROCEDURE — 77336 RADIATION PHYSICS CONSULT: CPT | Performed by: RADIOLOGY

## 2020-02-06 PROCEDURE — 77412 RADIATION TX DELIVERY LVL 3: CPT | Performed by: RADIOLOGY

## 2020-02-06 NOTE — TELEPHONE ENCOUNTER
Received page for patient regarding concerns after receiving chemotherapy earlier this week. This patient is not a patient of [facility]. This patient is a patient of Dr. Davis with radiation oncology and Dr. Wang, medical oncologist with [facility]. Discussed with the call center.

## 2020-02-07 ENCOUNTER — HOSPITAL ENCOUNTER (OUTPATIENT)
Dept: RADIATION ONCOLOGY | Facility: MEDICAL CENTER | Age: 50
End: 2020-02-07
Payer: MEDICARE

## 2020-02-07 LAB

## 2020-02-07 PROCEDURE — 77412 RADIATION TX DELIVERY LVL 3: CPT | Performed by: RADIOLOGY

## 2020-02-10 ENCOUNTER — HOSPITAL ENCOUNTER (OUTPATIENT)
Dept: RADIATION ONCOLOGY | Facility: MEDICAL CENTER | Age: 50
End: 2020-02-10
Payer: MEDICARE

## 2020-02-10 LAB

## 2020-02-10 PROCEDURE — 77412 RADIATION TX DELIVERY LVL 3: CPT | Performed by: RADIOLOGY

## 2020-02-10 PROCEDURE — 77427 RADIATION TX MANAGEMENT X5: CPT | Performed by: RADIOLOGY

## 2020-02-11 ENCOUNTER — HOSPITAL ENCOUNTER (OUTPATIENT)
Dept: RADIATION ONCOLOGY | Facility: MEDICAL CENTER | Age: 50
End: 2020-02-11
Payer: MEDICARE

## 2020-02-11 LAB

## 2020-02-11 PROCEDURE — 77417 THER RADIOLOGY PORT IMAGE(S): CPT | Performed by: RADIOLOGY

## 2020-02-11 PROCEDURE — 77412 RADIATION TX DELIVERY LVL 3: CPT | Performed by: RADIOLOGY

## 2020-02-11 NOTE — TELEPHONE ENCOUNTER
2nd attempt to contact the patient- Left voicemail for patient requesting a return call to schedule New Oncology Genetic appointment. (Remind patient to bring a list/ or questionnaire, of her cancer related family history)  NP/Chantel/ breast cancer/ Brice Johnson

## 2020-02-12 ENCOUNTER — HOSPITAL ENCOUNTER (OUTPATIENT)
Dept: RADIATION ONCOLOGY | Facility: MEDICAL CENTER | Age: 50
End: 2020-02-12
Payer: MEDICARE

## 2020-02-12 VITALS
DIASTOLIC BLOOD PRESSURE: 107 MMHG | BODY MASS INDEX: 25.95 KG/M2 | TEMPERATURE: 97.6 F | SYSTOLIC BLOOD PRESSURE: 151 MMHG | OXYGEN SATURATION: 96 % | HEART RATE: 126 BPM | WEIGHT: 146.5 LBS

## 2020-02-12 DIAGNOSIS — H54.3 BLINDNESS OF BOTH EYES: ICD-10-CM

## 2020-02-12 DIAGNOSIS — C50.411 MALIGNANT NEOPLASM OF UPPER-OUTER QUADRANT OF RIGHT BREAST IN FEMALE, ESTROGEN RECEPTOR POSITIVE (HCC): ICD-10-CM

## 2020-02-12 DIAGNOSIS — Z17.0 MALIGNANT NEOPLASM OF UPPER-OUTER QUADRANT OF RIGHT BREAST IN FEMALE, ESTROGEN RECEPTOR POSITIVE (HCC): ICD-10-CM

## 2020-02-12 LAB

## 2020-02-12 PROCEDURE — 77412 RADIATION TX DELIVERY LVL 3: CPT | Performed by: RADIOLOGY

## 2020-02-12 RX ORDER — ZOLPIDEM TARTRATE 10 MG/1
10 TABLET ORAL NIGHTLY PRN
Qty: 30 TAB | Refills: 0 | Status: SHIPPED | OUTPATIENT
Start: 2020-02-12 | End: 2020-03-13

## 2020-02-12 ASSESSMENT — PAIN SCALES - GENERAL: PAINLEVEL: 6=MODERATE PAIN

## 2020-02-12 NOTE — ON TREATMENT VISIT
ON TREATMENT NOTE  RADIATION ONCOLOGY DEPARTMENT    Patient name:  Rasheeda Manzano    Primary Physician:  Tabitha Bautista M.D. MRN: [id number]  CSN: [id number]   Referring physician:  Brice Johnson M.D. : 1970, 49 y.o.     ENCOUNTER DATE:  20    DIAGNOSIS:    Breast cancer (HCC)  Staging form: Breast, AJCC 8th Edition  - Pathologic: Stage IA (pT1c, pN1a, cM0, G1, ER+, MA+, HER2-) - Signed by Michelle Davis M.D. on 2020  Neoadjuvant therapy: No  Laterality: Right  Multigene prognostic tests performed: None  Histologic grading system: 3 grade system      TREATMENT SUMMARY:  Radiation Treatments     Active   Plans   R Breast   Most recent treatment: Dose planned: 180 cGy (fraction 7 of 28 on 2020)   Total: Dose planned: 5,040 cGy   Elapsed Course Treatment Days: 8 @       R SCV   Most recent treatment: Dose planned: 180 cGy (fraction 7 of 28 on 2020)   Total: Dose planned: 5,040 cGy   Elapsed Course Treatment Days: 8 @       Reference Points   R Breast   Most recent treatment: Dose given: 180 cGy (on 2020)   Total: Dose given: 1,260 cGy   Elapsed Course Treatment Days: 8 @       R SCV   Most recent treatment: Dose given: 180 cGy (on 2020)   Total: Dose given: 1,260 cGy   Elapsed Course Treatment Days: 8 @       R SCV CP   Most recent treatment: Dose given: 180 cGy (on 2020)   Total: Dose given: 1,260 cGy   Elapsed Course Treatment Days: 8 @       Rt Breast CP   Most recent treatment: Dose given: 180 cGy (on 2020)   Total: Dose given: 1,260 cGy   Elapsed Course Treatment Days: 8 @                     SUBJECTIVE:   Mild pain in the upper aspect of the breast. Not sleeping. Anxious      VITAL SIGNS:  /107 (BP Location: Left arm, Patient Position: Sitting, BP Cuff Size: Adult)   Pulse (!) 126   Temp 36.4 °C (97.6 °F) (Temporal)   Wt 66.5 kg (146 lb 8 oz)   SpO2 96%    Encounter  Vitals  Temperature: 36.4 °C (97.6 °F)  Temp src: Temporal  Blood Pressure: 151/107  Pulse: (!) 126  Pulse Oximetry: 96 %  Weight: 66.5 kg (146 lb 8 oz)  Pain Score: 6=Moderate Pain(Soreness)  Pain Assessment 2/12/2020 2/5/2020 1/10/2020   Pain Assessment Acute Pain Denies Pain -   Pain Score 6 0 4   Pain Loc Breast - Neck   Some recent data might be hidden          PHYSICAL EXAM:    No erythema    Toxicity Assessment 2/12/2020 2/5/2020   Toxicity Assessment Breast Breast   Fatigue (lethargy, malaise, asthenia) Increased fatigue over baseline, but not altering normal activities None   Fever (in the absence of neutropenia) None None   Radiation Dermatitis None None   Lymphatics Normal Normal   RT - Pain due to RT Mild pain not interfering with function None   Dyspnea Normal Normal         IMPRESSION:  Cancer Staging  Breast cancer (HCC)  Staging form: Breast, AJCC 8th Edition  - Pathologic: Stage IA (pT1c, pN1a, cM0, G1, ER+, OR+, HER2-) - Signed by Michelle Davis M.D. on 1/7/2020      PLAN:  No change in treatment plan. Given ambien, recommended that she stop drinking caffeine at 1 pm. Don't take Ambien until 8:30 and no naps.    Disposition:  Treatment plan reviewed. Questions answered. Continue therapy outlined.     Michelle Davis M.D.    Orders Placed This Encounter   • zolpidem (AMBIEN) 10 MG Tab

## 2020-02-13 ENCOUNTER — HOSPITAL ENCOUNTER (OUTPATIENT)
Dept: RADIATION ONCOLOGY | Facility: MEDICAL CENTER | Age: 50
End: 2020-02-13
Payer: MEDICARE

## 2020-02-13 LAB

## 2020-02-13 PROCEDURE — 77336 RADIATION PHYSICS CONSULT: CPT | Performed by: RADIOLOGY

## 2020-02-13 PROCEDURE — 77412 RADIATION TX DELIVERY LVL 3: CPT | Performed by: RADIOLOGY

## 2020-02-13 NOTE — TELEPHONE ENCOUNTER
3rd attempt to contact the patient- Left voicemail and mailed a letter to the patient requesting a return call to schedule New Oncology Genetic appointment. (Remind patient to bring a list/ or questionnaire, of her cancer related family history)  NP/Chantel/ breast cancer/ Brice Johnson

## 2020-02-14 ENCOUNTER — HOSPITAL ENCOUNTER (OUTPATIENT)
Dept: RADIATION ONCOLOGY | Facility: MEDICAL CENTER | Age: 50
End: 2020-02-14
Payer: MEDICARE

## 2020-02-14 LAB

## 2020-02-14 PROCEDURE — 77412 RADIATION TX DELIVERY LVL 3: CPT | Performed by: RADIOLOGY

## 2020-02-16 ENCOUNTER — HOSPITAL ENCOUNTER (OUTPATIENT)
Facility: MEDICAL CENTER | Age: 50
End: 2020-02-18
Attending: EMERGENCY MEDICINE | Admitting: HOSPITALIST
Payer: MEDICARE

## 2020-02-16 ENCOUNTER — APPOINTMENT (OUTPATIENT)
Dept: RADIOLOGY | Facility: MEDICAL CENTER | Age: 50
End: 2020-02-16
Attending: EMERGENCY MEDICINE
Payer: MEDICARE

## 2020-02-16 ENCOUNTER — APPOINTMENT (OUTPATIENT)
Dept: RADIOLOGY | Facility: MEDICAL CENTER | Age: 50
End: 2020-02-16
Attending: HOSPITALIST
Payer: MEDICARE

## 2020-02-16 DIAGNOSIS — I27.82 OTHER CHRONIC PULMONARY EMBOLISM WITHOUT ACUTE COR PULMONALE (HCC): ICD-10-CM

## 2020-02-16 DIAGNOSIS — F41.9 ANXIETY: ICD-10-CM

## 2020-02-16 DIAGNOSIS — Z98.2 VP (VENTRICULOPERITONEAL) SHUNT STATUS: ICD-10-CM

## 2020-02-16 DIAGNOSIS — C50.411 MALIGNANT NEOPLASM OF UPPER-OUTER QUADRANT OF RIGHT BREAST IN FEMALE, ESTROGEN RECEPTOR POSITIVE (HCC): ICD-10-CM

## 2020-02-16 DIAGNOSIS — I27.82 CHRONIC PULMONARY EMBOLISM WITHOUT ACUTE COR PULMONALE, UNSPECIFIED PULMONARY EMBOLISM TYPE (HCC): ICD-10-CM

## 2020-02-16 DIAGNOSIS — H54.3 BLINDNESS OF BOTH EYES: ICD-10-CM

## 2020-02-16 DIAGNOSIS — Z17.0 MALIGNANT NEOPLASM OF UPPER-OUTER QUADRANT OF RIGHT BREAST IN FEMALE, ESTROGEN RECEPTOR POSITIVE (HCC): ICD-10-CM

## 2020-02-16 PROBLEM — I26.99 PULMONARY EMBOLISM (HCC): Status: ACTIVE | Noted: 2020-02-16

## 2020-02-16 LAB
ALBUMIN SERPL BCP-MCNC: 4.3 G/DL (ref 3.2–4.9)
ALBUMIN/GLOB SERPL: 1.2 G/DL
ALP SERPL-CCNC: 97 U/L (ref 30–99)
ALT SERPL-CCNC: 12 U/L (ref 2–50)
ANION GAP SERPL CALC-SCNC: 11 MMOL/L (ref 0–11.9)
AST SERPL-CCNC: 14 U/L (ref 12–45)
BASOPHILS # BLD AUTO: 0.8 % (ref 0–1.8)
BASOPHILS # BLD: 0.05 K/UL (ref 0–0.12)
BILIRUB SERPL-MCNC: 0.6 MG/DL (ref 0.1–1.5)
BUN SERPL-MCNC: 12 MG/DL (ref 8–22)
CALCIUM SERPL-MCNC: 9.9 MG/DL (ref 8.5–10.5)
CHLORIDE SERPL-SCNC: 106 MMOL/L (ref 96–112)
CO2 SERPL-SCNC: 21 MMOL/L (ref 20–33)
CREAT SERPL-MCNC: 0.64 MG/DL (ref 0.5–1.4)
EOSINOPHIL # BLD AUTO: 0.03 K/UL (ref 0–0.51)
EOSINOPHIL NFR BLD: 0.5 % (ref 0–6.9)
ERYTHROCYTE [DISTWIDTH] IN BLOOD BY AUTOMATED COUNT: 50.8 FL (ref 35.9–50)
GLOBULIN SER CALC-MCNC: 3.6 G/DL (ref 1.9–3.5)
GLUCOSE SERPL-MCNC: 92 MG/DL (ref 65–99)
HCT VFR BLD AUTO: 43.2 % (ref 37–47)
HGB BLD-MCNC: 14.2 G/DL (ref 12–16)
IMM GRANULOCYTES # BLD AUTO: 0.02 K/UL (ref 0–0.11)
IMM GRANULOCYTES NFR BLD AUTO: 0.3 % (ref 0–0.9)
LYMPHOCYTES # BLD AUTO: 1.11 K/UL (ref 1–4.8)
LYMPHOCYTES NFR BLD: 18.3 % (ref 22–41)
MCH RBC QN AUTO: 29.1 PG (ref 27–33)
MCHC RBC AUTO-ENTMCNC: 32.9 G/DL (ref 33.6–35)
MCV RBC AUTO: 88.5 FL (ref 81.4–97.8)
MONOCYTES # BLD AUTO: 0.52 K/UL (ref 0–0.85)
MONOCYTES NFR BLD AUTO: 8.6 % (ref 0–13.4)
NEUTROPHILS # BLD AUTO: 4.33 K/UL (ref 2–7.15)
NEUTROPHILS NFR BLD: 71.5 % (ref 44–72)
NRBC # BLD AUTO: 0 K/UL
NRBC BLD-RTO: 0 /100 WBC
PLATELET # BLD AUTO: 330 K/UL (ref 164–446)
PMV BLD AUTO: 10.8 FL (ref 9–12.9)
POTASSIUM SERPL-SCNC: 3.4 MMOL/L (ref 3.6–5.5)
PROT SERPL-MCNC: 7.9 G/DL (ref 6–8.2)
RBC # BLD AUTO: 4.88 M/UL (ref 4.2–5.4)
SODIUM SERPL-SCNC: 138 MMOL/L (ref 135–145)
TROPONIN T SERPL-MCNC: <6 NG/L (ref 6–19)
WBC # BLD AUTO: 6.1 K/UL (ref 4.8–10.8)

## 2020-02-16 PROCEDURE — 700117 HCHG RX CONTRAST REV CODE 255: Performed by: EMERGENCY MEDICINE

## 2020-02-16 PROCEDURE — 700111 HCHG RX REV CODE 636 W/ 250 OVERRIDE (IP): Performed by: EMERGENCY MEDICINE

## 2020-02-16 PROCEDURE — 96374 THER/PROPH/DIAG INJ IV PUSH: CPT | Mod: XU

## 2020-02-16 PROCEDURE — 96375 TX/PRO/DX INJ NEW DRUG ADDON: CPT | Mod: XU

## 2020-02-16 PROCEDURE — 700102 HCHG RX REV CODE 250 W/ 637 OVERRIDE(OP): Performed by: HOSPITALIST

## 2020-02-16 PROCEDURE — 80053 COMPREHEN METABOLIC PANEL: CPT

## 2020-02-16 PROCEDURE — 93970 EXTREMITY STUDY: CPT

## 2020-02-16 PROCEDURE — 84484 ASSAY OF TROPONIN QUANT: CPT

## 2020-02-16 PROCEDURE — 96376 TX/PRO/DX INJ SAME DRUG ADON: CPT | Mod: XU

## 2020-02-16 PROCEDURE — A9270 NON-COVERED ITEM OR SERVICE: HCPCS | Performed by: HOSPITALIST

## 2020-02-16 PROCEDURE — G0378 HOSPITAL OBSERVATION PER HR: HCPCS

## 2020-02-16 PROCEDURE — 71275 CT ANGIOGRAPHY CHEST: CPT

## 2020-02-16 PROCEDURE — 85025 COMPLETE CBC W/AUTO DIFF WBC: CPT

## 2020-02-16 PROCEDURE — 700111 HCHG RX REV CODE 636 W/ 250 OVERRIDE (IP): Performed by: HOSPITALIST

## 2020-02-16 PROCEDURE — 99285 EMERGENCY DEPT VISIT HI MDM: CPT

## 2020-02-16 PROCEDURE — 700101 HCHG RX REV CODE 250: Performed by: HOSPITALIST

## 2020-02-16 PROCEDURE — 99220 PR INITIAL OBSERVATION CARE,LEVL III: CPT | Mod: AI | Performed by: HOSPITALIST

## 2020-02-16 PROCEDURE — 96372 THER/PROPH/DIAG INJ SC/IM: CPT | Mod: XU

## 2020-02-16 RX ORDER — LORAZEPAM 1 MG/1
0.5 TABLET ORAL
Status: DISCONTINUED | OUTPATIENT
Start: 2020-02-16 | End: 2020-02-18 | Stop reason: HOSPADM

## 2020-02-16 RX ORDER — LORAZEPAM 2 MG/ML
1 INJECTION INTRAMUSCULAR EVERY 4 HOURS PRN
Status: DISCONTINUED | OUTPATIENT
Start: 2020-02-16 | End: 2020-02-18 | Stop reason: HOSPADM

## 2020-02-16 RX ORDER — PROMETHAZINE HYDROCHLORIDE 12.5 MG/1
12.5-25 SUPPOSITORY RECTAL EVERY 4 HOURS PRN
Status: DISCONTINUED | OUTPATIENT
Start: 2020-02-16 | End: 2020-02-18 | Stop reason: HOSPADM

## 2020-02-16 RX ORDER — POLYETHYLENE GLYCOL 3350 17 G/17G
1 POWDER, FOR SOLUTION ORAL
Status: DISCONTINUED | OUTPATIENT
Start: 2020-02-16 | End: 2020-02-18 | Stop reason: HOSPADM

## 2020-02-16 RX ORDER — PROMETHAZINE HYDROCHLORIDE 25 MG/1
12.5-25 TABLET ORAL EVERY 4 HOURS PRN
Status: DISCONTINUED | OUTPATIENT
Start: 2020-02-16 | End: 2020-02-18 | Stop reason: HOSPADM

## 2020-02-16 RX ORDER — PROCHLORPERAZINE EDISYLATE 5 MG/ML
5-10 INJECTION INTRAMUSCULAR; INTRAVENOUS EVERY 4 HOURS PRN
Status: DISCONTINUED | OUTPATIENT
Start: 2020-02-16 | End: 2020-02-18 | Stop reason: HOSPADM

## 2020-02-16 RX ORDER — TOPIRAMATE 100 MG/1
200 TABLET, FILM COATED ORAL
Status: DISCONTINUED | OUTPATIENT
Start: 2020-02-16 | End: 2020-02-18 | Stop reason: HOSPADM

## 2020-02-16 RX ORDER — PROPRANOLOL HCL 60 MG
120 CAPSULE, EXTENDED RELEASE 24HR ORAL
Status: DISCONTINUED | OUTPATIENT
Start: 2020-02-16 | End: 2020-02-18 | Stop reason: HOSPADM

## 2020-02-16 RX ORDER — LORAZEPAM 2 MG/ML
1 INJECTION INTRAMUSCULAR ONCE
Status: COMPLETED | OUTPATIENT
Start: 2020-02-16 | End: 2020-02-16

## 2020-02-16 RX ORDER — HYDROXYZINE HYDROCHLORIDE 25 MG/1
50 TABLET, FILM COATED ORAL
Status: DISCONTINUED | OUTPATIENT
Start: 2020-02-16 | End: 2020-02-18 | Stop reason: HOSPADM

## 2020-02-16 RX ORDER — BISACODYL 10 MG
10 SUPPOSITORY, RECTAL RECTAL
Status: DISCONTINUED | OUTPATIENT
Start: 2020-02-16 | End: 2020-02-18 | Stop reason: HOSPADM

## 2020-02-16 RX ORDER — AMOXICILLIN 250 MG
2 CAPSULE ORAL 2 TIMES DAILY
Status: DISCONTINUED | OUTPATIENT
Start: 2020-02-16 | End: 2020-02-18 | Stop reason: HOSPADM

## 2020-02-16 RX ORDER — LEVETIRACETAM 500 MG/1
1000 TABLET ORAL
Status: DISCONTINUED | OUTPATIENT
Start: 2020-02-16 | End: 2020-02-18 | Stop reason: HOSPADM

## 2020-02-16 RX ORDER — ONDANSETRON 2 MG/ML
4 INJECTION INTRAMUSCULAR; INTRAVENOUS EVERY 4 HOURS PRN
Status: DISCONTINUED | OUTPATIENT
Start: 2020-02-16 | End: 2020-02-18 | Stop reason: HOSPADM

## 2020-02-16 RX ORDER — OMEPRAZOLE 20 MG/1
20 CAPSULE, DELAYED RELEASE ORAL
Status: DISCONTINUED | OUTPATIENT
Start: 2020-02-16 | End: 2020-02-18 | Stop reason: HOSPADM

## 2020-02-16 RX ORDER — ONDANSETRON 4 MG/1
4 TABLET, ORALLY DISINTEGRATING ORAL EVERY 4 HOURS PRN
Status: DISCONTINUED | OUTPATIENT
Start: 2020-02-16 | End: 2020-02-18 | Stop reason: HOSPADM

## 2020-02-16 RX ORDER — ZOLPIDEM TARTRATE 5 MG/1
10 TABLET ORAL NIGHTLY PRN
Status: DISCONTINUED | OUTPATIENT
Start: 2020-02-16 | End: 2020-02-18 | Stop reason: HOSPADM

## 2020-02-16 RX ORDER — MORPHINE SULFATE 4 MG/ML
2-4 INJECTION, SOLUTION INTRAMUSCULAR; INTRAVENOUS EVERY 4 HOURS PRN
Status: DISCONTINUED | OUTPATIENT
Start: 2020-02-16 | End: 2020-02-18 | Stop reason: HOSPADM

## 2020-02-16 RX ADMIN — LORAZEPAM 1 MG: 2 INJECTION INTRAMUSCULAR; INTRAVENOUS at 02:45

## 2020-02-16 RX ADMIN — MORPHINE SULFATE 4 MG: 4 INJECTION INTRAVENOUS at 21:58

## 2020-02-16 RX ADMIN — HYDROXYZINE HYDROCHLORIDE 50 MG: 25 TABLET, FILM COATED ORAL at 22:04

## 2020-02-16 RX ADMIN — LORAZEPAM 0.5 MG: 1 TABLET ORAL at 03:52

## 2020-02-16 RX ADMIN — ZOLPIDEM TARTRATE 10 MG: 5 TABLET ORAL at 22:05

## 2020-02-16 RX ADMIN — LEVETIRACETAM 1000 MG: 500 TABLET ORAL at 22:03

## 2020-02-16 RX ADMIN — IOHEXOL 45 ML: 350 INJECTION, SOLUTION INTRAVENOUS at 02:25

## 2020-02-16 RX ADMIN — ENOXAPARIN SODIUM 60 MG: 100 INJECTION SUBCUTANEOUS at 18:01

## 2020-02-16 RX ADMIN — ONDANSETRON 4 MG: 2 INJECTION INTRAMUSCULAR; INTRAVENOUS at 03:22

## 2020-02-16 RX ADMIN — LORAZEPAM 0.5 MG: 1 TABLET ORAL at 22:07

## 2020-02-16 RX ADMIN — LORAZEPAM 1 MG: 2 INJECTION INTRAMUSCULAR; INTRAVENOUS at 07:54

## 2020-02-16 RX ADMIN — TOPIRAMATE 200 MG: 100 TABLET, FILM COATED ORAL at 05:23

## 2020-02-16 RX ADMIN — HYDROXYZINE HYDROCHLORIDE 50 MG: 25 TABLET, FILM COATED ORAL at 03:53

## 2020-02-16 RX ADMIN — OMEPRAZOLE 20 MG: 20 CAPSULE, DELAYED RELEASE ORAL at 03:52

## 2020-02-16 RX ADMIN — ENOXAPARIN SODIUM 60 MG: 100 INJECTION SUBCUTANEOUS at 03:55

## 2020-02-16 RX ADMIN — LORAZEPAM 1 MG: 2 INJECTION INTRAMUSCULAR; INTRAVENOUS at 12:22

## 2020-02-16 RX ADMIN — MORPHINE SULFATE 4 MG: 4 INJECTION INTRAVENOUS at 03:22

## 2020-02-16 RX ADMIN — LORAZEPAM 1 MG: 2 INJECTION INTRAMUSCULAR; INTRAVENOUS at 17:57

## 2020-02-16 RX ADMIN — OMEPRAZOLE 20 MG: 20 CAPSULE, DELAYED RELEASE ORAL at 21:00

## 2020-02-16 RX ADMIN — LEVETIRACETAM 1000 MG: 500 TABLET ORAL at 03:52

## 2020-02-16 RX ADMIN — TOPIRAMATE 200 MG: 100 TABLET, FILM COATED ORAL at 22:08

## 2020-02-16 ASSESSMENT — ENCOUNTER SYMPTOMS
WHEEZING: 0
TINGLING: 0
PHOTOPHOBIA: 0
PALPITATIONS: 0
MYALGIAS: 0
VOMITING: 0
DIARRHEA: 0
DIZZINESS: 0
DEPRESSION: 0
FOCAL WEAKNESS: 0
COUGH: 0
SHORTNESS OF BREATH: 0
ABDOMINAL PAIN: 0
CHILLS: 0
NAUSEA: 0
HEADACHES: 0
SORE THROAT: 0
FEVER: 0

## 2020-02-16 ASSESSMENT — PATIENT HEALTH QUESTIONNAIRE - PHQ9
SUM OF ALL RESPONSES TO PHQ QUESTIONS 1-9: 15
SUM OF ALL RESPONSES TO PHQ9 QUESTIONS 1 AND 2: 4
7. TROUBLE CONCENTRATING ON THINGS, SUCH AS READING THE NEWSPAPER OR WATCHING TELEVISION: MORE THAN HALF THE DAYS
2. FEELING DOWN, DEPRESSED, IRRITABLE, OR HOPELESS: NEARLY EVERY DAY
5. POOR APPETITE OR OVEREATING: NEARLY EVERY DAY
8. MOVING OR SPEAKING SO SLOWLY THAT OTHER PEOPLE COULD HAVE NOTICED. OR THE OPPOSITE, BEING SO FIGETY OR RESTLESS THAT YOU HAVE BEEN MOVING AROUND A LOT MORE THAN USUAL: NOT AT ALL
3. TROUBLE FALLING OR STAYING ASLEEP OR SLEEPING TOO MUCH: NEARLY EVERY DAY
9. THOUGHTS THAT YOU WOULD BE BETTER OFF DEAD, OR OF HURTING YOURSELF: NOT AT ALL
6. FEELING BAD ABOUT YOURSELF - OR THAT YOU ARE A FAILURE OR HAVE LET YOURSELF OR YOUR FAMILY DOWN: NOT AL ALL
4. FEELING TIRED OR HAVING LITTLE ENERGY: NEARLY EVERY DAY
1. LITTLE INTEREST OR PLEASURE IN DOING THINGS: SEVERAL DAYS

## 2020-02-16 NOTE — ASSESSMENT & PLAN NOTE
CT scan shows chronic bilateral pulmonary emboli. No evidence of right heart strain.  She is hemodynamically stable and has no hypoxia.  Troponin is negative.  Patient has increased risk secondary to her malignancy.  Continue symptomatic management for her pain.  Started on lovenox 1mg/kg sc bid on admission and transitioned to Eliquis.  AC clinic   RN to ensure taking meds:  Legally blind, lives alone.

## 2020-02-16 NOTE — PROGRESS NOTES
Report received. Pt in room    0610: pt requesting medication for continued pain and anxiety. Pt unable to receive morphine until 0722 and pt does not have a prn med for anxiety. Page sent to hospitalist    0637: Pt continuing to request the above medication. No response yet from previous page. page resent to hospitalist    0646: Dr. Chavez called back and was notified of the above. New order received: Ativan 1mg IV q4h prn anxiety. Otherwise, no other new orders received    Report given to Day RN, TWILA, on 2/16, at 0710

## 2020-02-16 NOTE — PROGRESS NOTES
Spoke with Nupur regarding Psych consult r/t recent depression, consult was prompted by Epic questionairre.

## 2020-02-16 NOTE — ED PROVIDER NOTES
ED Provider Note    CHIEF COMPLAINT  Chief Complaint   Patient presents with   • Anxiety       HPI  Rasheeda Manzano is a 49 y.o. female here for evaluation of anxiety.  The patient states that she was recently diagnosed with breast cancer on the right side, and is currently undergoing radiation therapy.  The patient states that last few hours, she was trying to go to sleep, and noticed that she was having increasing anxiety due to to the recent diagnosis and radiation therapy.  Patient states that she does have some anxiety concerning this, and states is what brought her in today.  She states that there is diffuse chest heaviness, and shortness of breath when she thinks about her condition, but no radiation of any pain or discomfort to the back.  Patient has no abdominal pain, and no leg pain.  She has no fever chills, and no vomiting.  Patient states that she can remain calm and relaxed herself, with sitting to the television, and this is often help her.    ROS;  Please see HPI  O/W negative     PAST MEDICAL HISTORY   has a past medical history of Acute nasopharyngitis, Blind, C. difficile colitis, C. difficile diarrhea (2013), Cancer (HCC), Chronic daily headache, Depression, Esophageal atresia (1971), Esophageal bleeding (12/18/2019), Fall, GERD (gastroesophageal reflux disease), H/O total hysterectomy, Heart burn (12/18/2019), Hydrocephalus (HCC), Hydrocephalus (HCC), Indigestion (12/18/2019), Jaundice, Legally blind, Migraine without aura, without mention of intractable migraine without mention of status migrainosus, Other specified symptom associated with female genital organs, Pain, Pain (12/18/2019), Psychiatric problem, PTSD (post-traumatic stress disorder), Seizure (HCC) (12/18/2019), and Snoring (12/18/2019).    SOCIAL HISTORY  Social History     Tobacco Use   • Smoking status: Former Smoker     Packs/day: 1.00     Years: 10.00     Pack years: 10.00     Types: Cigars     Start date: 5/1/2004  "    Last attempt to quit: 2017     Years since quittin.5   • Smokeless tobacco: Former User   • Tobacco comment:  CIGAR/day    Substance and Sexual Activity   • Alcohol use: Yes     Frequency: Monthly or less     Drinks per session: 1 or 2   • Drug use: No   • Sexual activity: Yes     Partners: Male       SURGICAL HISTORY   has a past surgical history that includes laparoscopy (08); lysis adhesions general (12/10/2013); cystoscopy (12/10/2013); aakash by laparoscopy (N/A, 2015); gastroscopy-endo (N/A, 2017); nissen fundoplication laparoscopic; abdominal hysterectomy total (12/10/2013); other; exploratory laparotomy (12/10/2013); appendectomy (12/10/2013); cholecystectomy (N/A, 2015); gastroscopy (N/A, 2019); mastectomy (Right, 2019); and node biopsy sentinel (Right, 2019).    CURRENT MEDICATIONS  Home Medications    **Home medications have not yet been reviewed for this encounter**         ALLERGIES  Allergies   Allergen Reactions   • Tape Rash     RXN= ongoing  Adhesive Medical tape. Per patient, paper tape ok.   • Latex Rash     Rash       REVIEW OF SYSTEMS  See HPI for further details. Review of systems as above, otherwise all other systems are negative.     PHYSICAL EXAM  VITAL SIGNS: /77   Pulse 100   Temp 36.6 °C (97.8 °F) (Temporal)   Resp 20   Ht 1.626 m (5' 4\")   Wt 65.8 kg (145 lb)   LMP 2013   SpO2 99%   BMI 24.89 kg/m²     Constitutional: Well developed, well nourished. No acute distress.  HEENT: Normocephalic, atraumatic. MMM  Neck: Supple, Full range of motion   Chest/Pulmonary:  No respiratory distress.  Equal expansion   Musculoskeletal: No deformity, no edema, neurovascular intact.   Neuro: Clear speech, appropriate, cooperative, cranial nerves II-XII grossly intact.  Psych: Anxious, mood and affect    Results for orders placed or performed during the hospital encounter of 20   CBC w/ Differential   Result Value Ref Range    WBC 6.1 " 4.8 - 10.8 K/uL    RBC 4.88 4.20 - 5.40 M/uL    Hemoglobin 14.2 12.0 - 16.0 g/dL    Hematocrit 43.2 37.0 - 47.0 %    MCV 88.5 81.4 - 97.8 fL    MCH 29.1 27.0 - 33.0 pg    MCHC 32.9 (L) 33.6 - 35.0 g/dL    RDW 50.8 (H) 35.9 - 50.0 fL    Platelet Count 330 164 - 446 K/uL    MPV 10.8 9.0 - 12.9 fL    Neutrophils-Polys 71.50 44.00 - 72.00 %    Lymphocytes 18.30 (L) 22.00 - 41.00 %    Monocytes 8.60 0.00 - 13.40 %    Eosinophils 0.50 0.00 - 6.90 %    Basophils 0.80 0.00 - 1.80 %    Immature Granulocytes 0.30 0.00 - 0.90 %    Nucleated RBC 0.00 /100 WBC    Neutrophils (Absolute) 4.33 2.00 - 7.15 K/uL    Lymphs (Absolute) 1.11 1.00 - 4.80 K/uL    Monos (Absolute) 0.52 0.00 - 0.85 K/uL    Eos (Absolute) 0.03 0.00 - 0.51 K/uL    Baso (Absolute) 0.05 0.00 - 0.12 K/uL    Immature Granulocytes (abs) 0.02 0.00 - 0.11 K/uL    NRBC (Absolute) 0.00 K/uL   Complete Metabolic Panel (CMP)   Result Value Ref Range    Sodium 138 135 - 145 mmol/L    Potassium 3.4 (L) 3.6 - 5.5 mmol/L    Chloride 106 96 - 112 mmol/L    Co2 21 20 - 33 mmol/L    Anion Gap 11.0 0.0 - 11.9    Glucose 92 65 - 99 mg/dL    Bun 12 8 - 22 mg/dL    Creatinine 0.64 0.50 - 1.40 mg/dL    Calcium 9.9 8.5 - 10.5 mg/dL    AST(SGOT) 14 12 - 45 U/L    ALT(SGPT) 12 2 - 50 U/L    Alkaline Phosphatase 97 30 - 99 U/L    Total Bilirubin 0.6 0.1 - 1.5 mg/dL    Albumin 4.3 3.2 - 4.9 g/dL    Total Protein 7.9 6.0 - 8.2 g/dL    Globulin 3.6 (H) 1.9 - 3.5 g/dL    A-G Ratio 1.2 g/dL   Troponin STAT   Result Value Ref Range    Troponin T <6 6 - 19 ng/L   ESTIMATED GFR   Result Value Ref Range    GFR If African American >60 >60 mL/min/1.73 m 2    GFR If Non African American >60 >60 mL/min/1.73 m 2     CT-CTA CHEST PULMONARY ARTERY W/ RECONS   Final Result      1.  Chronic bilateral pulmonary emboli. No evidence of right heart strain.   2.  Stable small left pleural effusion secondary to ventriculopleural shunt.   3.  Unchanged right pleural calcifications and pleural thickening.   4.   Small hiatal hernia.      These findings were discussed with ROSENDO BEAL on 2/16/2020 2:32 AM.          PROCEDURES     MEDICAL RECORD  I have reviewed patient's medical record and pertinent results are listed.    COURSE & MEDICAL DECISION MAKING  I have reviewed any medical record information, laboratory studies and radiographic results as noted above.    2:35 AM  I just spoke to Dr. Cotton on for radiology, and he states that the patient has no acute findings on the chest CT, and that the PEs that are noted are chronic and calcified.  Again no new findings.    3:00 AM  Dr. kruger will admit the pt.  She will put her in for lovenox injections, and further evaluation and care. The pt is in agreement.         FINAL IMPRESSION  1. Chronic pulmonary embolism without acute cor pulmonale, unspecified pulmonary embolism type (HCC)     2. Anxiety             Electronically signed by: Rosendo Beal D.O., 2/16/2020 2:32 AM

## 2020-02-16 NOTE — PROGRESS NOTES
Please see H&P performed on same day 2/16/20.    I explained patients bilateral PEs likely contributing to her anxiety and SOB.  Can switch from lovenox to xarelto at [place].   Patient is legally blind and prefers oral AC.  Undergoing current XRT and chemotherapy x 1 week.  Rt Mastectomy 12/19.  Ordered u/s LEs to look for DVT.  No significant depression noted on exam, just lots of medical interventions.

## 2020-02-16 NOTE — PROGRESS NOTES
Patient is A&Ox4,even non labored breathing,no complaints of chest pain,no complaints of SOB,scarring to abdomen scarring to right side of breast, 5 radiation markers to chest. Given Ativan for anxiety,will continue to monitor.

## 2020-02-16 NOTE — ED TRIAGE NOTES
Pt c/o hyperventilation and fast heart rate before going to bed.  EMS relates pt tachycardic with unremarkable 12 Lead.  EMS relates giving pt 1mg Versed with heart rate decreasing.  EMS relates heart rate came back up and given another 1 mg Versed resulting in decreased rate.  This occurred one more time en route.    Pt blind.  Pt being treated for right breast CA with radiation currently.    Pt endorses some chest tightness when her heart races but is relieved with Versed and decreased heart rate.

## 2020-02-16 NOTE — H&P
Hospital Medicine History & Physical Note    Date of Service  2/16/2020    Primary Care Physician  Tabitha Bautista M.D.    Consultants  None    Code Status  Full    Chief Complaint  Chief Complaint   Patient presents with   • Anxiety       History of Presenting Illness  49 y.o. female who presented on 2/16/2020 with anxiety and right-sided chest pain.  This is a 49-year-old female who carries a diagnosis of blindness, hydrocephalus status post shunt placement, seizure disorder, depression/anxiety, chronic pain on narcotics, and recent diagnosis of breast cancer.  The patient is status post right partial mastectomy and lymph node biopsy with sentinel nodes showing metastatic carcinoma.  She is now on active radiation therapy with Dr. Davis of radiation oncology and chemotherapy with Dr. Waite of oncology.  She comes in today with complaints of right-sided chest discomfort which radiates below the right breast over the site of her recent surgery.  She has no associated diaphoresis, nausea, or palpitations.  However, she has significant anxiety regarding her symptoms and therefore came to the hospital for further evaluation.  Otherwise the patient has no medical complaints.  She is however depressed and anxious over poor relationship with her mother.  She has had no fevers, chills, nausea or vomiting.  No shortness of breath, abdominal pain, diarrhea or dysuria.    Review of Systems  Review of Systems   Constitutional: Negative for chills and fever.   HENT: Negative for congestion and sore throat.    Eyes: Negative for photophobia.   Respiratory: Negative for cough, shortness of breath and wheezing.    Cardiovascular: Positive for chest pain. Negative for palpitations.   Gastrointestinal: Negative for abdominal pain, diarrhea, nausea and vomiting.   Genitourinary: Negative for dysuria.   Musculoskeletal: Negative for myalgias.   Skin: Negative.    Neurological: Negative for dizziness, tingling, focal weakness and  "headaches.   Psychiatric/Behavioral: Negative for depression and suicidal ideas.       Past Medical History  Past Medical History:   Diagnosis Date   • Esophageal bleeding 12/18/2019    H/O, \"three times with last one a year ago.\"   • Indigestion 12/18/2019   • Seizure (HCC) 12/18/2019    \"Last seizure one year ago.\"   • Heart burn 12/18/2019   • Pain 12/18/2019    \"Pain In Head From Hydrocephalus.\".   • Snoring 12/18/2019    Has not had a sleep study.   • C. 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   • Acute nasopharyngitis     H/O Cold 12-8-19   • Blind     age 11   • C. difficile colitis     H/O x3   • Cancer (HCC)     Right Breast Cancer DX 2-2019   • Chronic daily headache    • Depression    • Fall    • GERD (gastroesophageal reflux disease)    • H/O total hysterectomy    • Hydrocephalus (HCC)    • Hydrocephalus (HCC)     shunt drains into pleural place of L lung   • Jaundice     at birth   • Legally blind    • Migraine without aura, without mention of intractable migraine without mention of status migrainosus    • Other specified symptom associated with female genital organs     excessive bleeding   • Pain     gallbladder related   • Psychiatric problem     depression   • PTSD (post-traumatic stress disorder)        Surgical History  Past Surgical History:   Procedure Laterality Date   • MASTECTOMY Right 12/19/2019    Procedure: MASTECTOMY-PARTIAL;  Surgeon: Brice Johnson M.D.;  Location: SURGERY SAME DAY [facility];  Service: General   • NODE BIOPSY SENTINEL Right 12/19/2019    Procedure: BIOPSY, LYMPH NODE, SENTINEL-AXILLARY;  Surgeon: Brice Johnson M.D.;  Location: SURGERY SAME DAY [facility];  Service: General   • GASTROSCOPY N/A 1/2/2019    Procedure: GASTROSCOPY;  Surgeon: Matias Fernando M.D.;  Location: SURGERY [facility];  Service: Gastroenterology   • GASTROSCOPY-ENDO N/A 8/24/2017    Procedure: GASTROSCOPY-ENDO;  Surgeon: Matias Fernando, " M.D.;  Location: ENDOSCOPY [facility];  Service:    • AYALA BY LAPAROSCOPY N/A 2015    Procedure: AYALA BY LAPAROSCOPY;  Surgeon: Brice Johnson M.D.;  Location: SURGERY SAME DAY [facility];  Service:    • CHOLECYSTECTOMY N/A 2015    Procedure: CHOLECYSTECTOMY;  Surgeon: Brice Johnson M.D.;  Location: SURGERY SAME DAY [facility];  Service:    • LYSIS ADHESIONS GENERAL  12/10/2013    Performed by Arie Bass M.D. at SURGERY [facility]   • CYSTOSCOPY  12/10/2013    Performed by Joana Yeager M.D. at [facility]   • ABDOMINAL HYSTERECTOMY TOTAL  12/10/2013    Performed by Joana Yeager M.D. at SURGERY [facility]   • EXPLORATORY LAPAROTOMY  12/10/2013    Performed by Arie Bass M.D. at [facility]   • APPENDECTOMY  12/10/2013    Performed by Arie Bass M.D. at SURGERY [facility]   • LAPAROSCOPY  08    Performed by FERNANDO HENDERSON at SURGERY [facility]   • NISSEN FUNDOPLICATION LAPAROSCOPIC     • OTHER      PLEURAL SHUNT, numerous revisions       Family History  Family History   Problem Relation Age of Onset   • Hypertension Mother    • Hypertension Father    • Non-contributory Neg Hx         Migraine       Social History  Social History     Tobacco Use   • Smoking status: Former Smoker     Packs/day: 1.00     Years: 10.00     Pack years: 10.00     Types: Cigars     Start date: 2004     Last attempt to quit: 2017     Years since quittin.5   • Smokeless tobacco: Former User   • Tobacco comment:  CIGAR/day    Substance Use Topics   • Alcohol use: Yes     Frequency: Monthly or less     Drinks per session: 1 or 2   • Drug use: No       Allergies  Allergies   Allergen Reactions   • Tape Rash     RXN= ongoing  Adhesive Medical tape. Per patient, paper tape ok.   • Latex Rash     Rash       Medications  No current facility-administered medications on file prior to encounter.      Current Outpatient Medications on File Prior to  Encounter   Medication Sig Dispense Refill   • zolpidem (AMBIEN) 10 MG Tab Take 1 Tab by mouth at bedtime as needed for Sleep for up to 30 days. 30 Tab 0   • oxyCODONE immediate release (ROXICODONE) 10 MG immediate release tablet TAKE 1 TABLET BY MOUTH EVERY 4 HOURS FOR 4 DAYS C50     • sucralfate (CARAFATE) 1 GM Tab Take 1 g by mouth 4 times a day as needed. Indications: Ulcer of the Duodenum  5   • LORazepam (ATIVAN) 0.5 MG Tab Take 0.5 mg by mouth every bedtime.  0   • hydrOXYzine HCl (ATARAX) 50 MG Tab Take 50 mg by mouth every bedtime.  1   • esomeprazole (NEXIUM) 40 MG delayed-release capsule Take 40 mg by mouth every bedtime.  5   • levETIRAcetam (KEPPRA) 500 MG Tab Take 1,000 mg by mouth every bedtime.     • topiramate (TOPAMAX) 100 MG Tab Take 200 mg by mouth every bedtime.     • propranolol CR (INDERAL LA) 120 MG CAPSULE SR 24 HR Take 120 mg by mouth every bedtime.         Physical Exam  Hemodynamics  Temp (24hrs), Av.6 °C (97.8 °F), Min:36.6 °C (97.8 °F), Max:36.6 °C (97.8 °F)   Temperature: 36.6 °C (97.8 °F)  Pulse  Av.2  Min: 77  Max: 100    Blood Pressure: 121/77      Respiratory      Respiration: 20, Pulse Oximetry: 99 %             Physical Exam   Constitutional: She is oriented to person, place, and time. No distress.   HENT:   Head: Normocephalic and atraumatic.   Right Ear: External ear normal.   Left Ear: External ear normal.   Blind   Eyes: EOM are normal. Right eye exhibits no discharge. Left eye exhibits no discharge.   Neck: Neck supple. No JVD present.   Cardiovascular: Normal rate, regular rhythm and normal heart sounds.   Pulmonary/Chest: Effort normal and breath sounds normal. No respiratory distress. She exhibits no tenderness.   Abdominal: Soft. Bowel sounds are normal. She exhibits no distension. There is no abdominal tenderness.   Musculoskeletal: Normal range of motion.         General: No edema.      Comments: No calf tenderness   Neurological: She is alert and oriented to  person, place, and time. No cranial nerve deficit.   Skin: Skin is warm and dry. She is not diaphoretic. No erythema.   Psychiatric: She has a normal mood and affect. Her behavior is normal.   Nursing note and vitals reviewed.    Capillary refill less than 3 seconds, distal pulses intact    Laboratory:  Recent Labs     02/16/20  0200   WBC 6.1   RBC 4.88   HEMOGLOBIN 14.2   HEMATOCRIT 43.2   MCV 88.5   MCH 29.1   MCHC 32.9*   RDW 50.8*   PLATELETCT 330   MPV 10.8     Recent Labs     02/16/20  0200   SODIUM 138   POTASSIUM 3.4*   CHLORIDE 106   CO2 21   GLUCOSE 92   BUN 12   CREATININE 0.64   CALCIUM 9.9     Recent Labs     02/16/20  0200   ALTSGPT 12   ASTSGOT 14   ALKPHOSPHAT 97   TBILIRUBIN 0.6   GLUCOSE 92                 Lab Results   Component Value Date    TROPONINI <0.01 03/24/2019       Imaging  Nm-bone/joint Scan Whole Body    Result Date: 1/21/2020 1/21/2020 1:42 PM HISTORY/REASON FOR EXAM:  Malignant neoplasm of right female breast, unspecified estrogen receptor status, unspecified site of breast (HCC). TECHNIQUE/EXAM DESCRIPTION AND NUMBER OF VIEWS: Radionuclide whole body bone scan. PROCEDURE:  25.5 mCi of Tc 99m-MDP was administered intravenously followed by delayed whole body planar imaging. COMPARISON: CT 1/10/2020 FINDINGS: There is a focal area of decreased uptake in the right calvarium consistent with a known area of a prosper hole. There is a scoliosis in the cervical thoracic region. There is no evidence for skeletal metastasis. There is symmetric renal excretion of tracer. There is no abnormal soft tissue uptake seen.     1.  No evidence of skeletal metastasis.    Ct-cta Chest Pulmonary Artery W/ Recons    Result Date: 2/16/2020 2/16/2020 2:04 AM HISTORY/REASON FOR EXAM:  PE suspected, high pretest prob TECHNIQUE/EXAM DESCRIPTION: CT angiogram scan for pulmonary embolism with contrast, with reconstructions. 1.25 mm helical sections were obtained from the lung apices through the lung bases  following the rapid bolus administration of 45 mL of Omnipaque 350 nonionic contrast. Thin-section overlapping reconstruction interval was utilized. Coronal reconstructions were generated from the axial data. MIP post processing was performed and utilized for the interpretation. Low dose optimization technique was utilized for this CT exam including automated exposure control and adjustment of the mA and/or kV according to patient size. COMPARISON: 1/10/2020 FINDINGS: Pulmonary Embolism: Yes. Main Pulmonary Arteries: Yes. Segmental branches: Yes. Subsegmental branches: Yes. Only if positive for PE: RV diameter: 2.5 cm. LV diameter: 3.6 cm. RV/LV ratio: 0.69. (Greater than 0.9 is abnormal.) Additional Comments: Pulmonary clot burden is mild and has a chronic appearance with calcification of the segmental and subsegmental filling defects. Lungs: No suspicious nodules or air space process. Pleura: There is a stable small left pleural effusion with associated compressive atelectasis secondary to a ventriculopleural shunt. There are right pleural calcifications and pleural thickening medially which are unchanged from the previous exam. Nodes: No enlarged lymph nodes. Additional findings: There is a small hiatal hernia.     1.  Chronic bilateral pulmonary emboli. No evidence of right heart strain. 2.  Stable small left pleural effusion secondary to ventriculopleural shunt. 3.  Unchanged right pleural calcifications and pleural thickening. 4.  Small hiatal hernia. These findings were discussed with JANIE BEAL on 2/16/2020 2:32 AM.        Assessment/Plan:  Anticipate that patient will need less than 2 midnights for management of the discussed medical issues.    * Pulmonary embolism (HCC)  Assessment & Plan  CT scan shows chronic bilateral pulmonary emboli. No evidence of right heart strain.  She is hemodynamically stable and has no hypoxia.  Troponin is negative.  Patient has increased risk secondary to her  malignancy.  She will be started on empiric anticoagulation with IV Lovenox, she will require inpatient teaching for administering own injections prior to discharge home.  Continue symptomatic management for her pain.    Seizure (HCC)- (present on admission)  Assessment & Plan  This is chronic, resume home Keppra.    Depression- (present on admission)  Assessment & Plan  Also with anxiety, continue home medications.    Blindness- (present on admission)  Assessment & Plan  This is chronic, at baseline.      Prophylaxis: Patient will be on Lovenox for treatment of pulmonary emboli, no additional need for DVT prophylaxis, home PPI indicated, bowel protocol as needed

## 2020-02-17 ENCOUNTER — PATIENT OUTREACH (OUTPATIENT)
Dept: HEALTH INFORMATION MANAGEMENT | Facility: OTHER | Age: 50
End: 2020-02-17

## 2020-02-17 PROBLEM — Z79.01 ON CONTINUOUS ORAL ANTICOAGULATION: Status: ACTIVE | Noted: 2020-02-17

## 2020-02-17 PROCEDURE — 700102 HCHG RX REV CODE 250 W/ 637 OVERRIDE(OP): Performed by: HOSPITALIST

## 2020-02-17 PROCEDURE — G0378 HOSPITAL OBSERVATION PER HR: HCPCS

## 2020-02-17 PROCEDURE — A9270 NON-COVERED ITEM OR SERVICE: HCPCS | Performed by: HOSPITALIST

## 2020-02-17 PROCEDURE — 99217 PR OBSERVATION CARE DISCHARGE: CPT | Performed by: HOSPITALIST

## 2020-02-17 PROCEDURE — 700111 HCHG RX REV CODE 636 W/ 250 OVERRIDE (IP): Performed by: HOSPITALIST

## 2020-02-17 PROCEDURE — 96372 THER/PROPH/DIAG INJ SC/IM: CPT

## 2020-02-17 PROCEDURE — 96376 TX/PRO/DX INJ SAME DRUG ADON: CPT

## 2020-02-17 PROCEDURE — 700101 HCHG RX REV CODE 250: Performed by: HOSPITALIST

## 2020-02-17 RX ORDER — OXYCODONE HYDROCHLORIDE 10 MG/1
TABLET ORAL
Qty: 28 TAB | Refills: 0 | Status: SHIPPED | OUTPATIENT
Start: 2020-02-17 | End: 2020-02-23

## 2020-02-17 RX ORDER — LORAZEPAM 0.5 MG/1
0.5 TABLET ORAL
Qty: 30 TAB | Refills: 0 | Status: SHIPPED | OUTPATIENT
Start: 2020-02-17 | End: 2020-03-23

## 2020-02-17 RX ORDER — AMOXICILLIN 250 MG
2 CAPSULE ORAL 2 TIMES DAILY
Qty: 30 TAB | Refills: 0 | Status: SHIPPED | OUTPATIENT
Start: 2020-02-17 | End: 2020-03-18

## 2020-02-17 RX ADMIN — LEVETIRACETAM 1000 MG: 500 TABLET ORAL at 21:03

## 2020-02-17 RX ADMIN — MORPHINE SULFATE 4 MG: 4 INJECTION INTRAVENOUS at 18:04

## 2020-02-17 RX ADMIN — LORAZEPAM 1 MG: 2 INJECTION INTRAMUSCULAR; INTRAVENOUS at 00:25

## 2020-02-17 RX ADMIN — ENOXAPARIN SODIUM 60 MG: 100 INJECTION SUBCUTANEOUS at 05:11

## 2020-02-17 RX ADMIN — APIXABAN 10 MG: 5 TABLET, FILM COATED ORAL at 18:03

## 2020-02-17 RX ADMIN — MORPHINE SULFATE 4 MG: 4 INJECTION INTRAVENOUS at 14:44

## 2020-02-17 RX ADMIN — TOPIRAMATE 200 MG: 100 TABLET, FILM COATED ORAL at 21:03

## 2020-02-17 RX ADMIN — SENNOSIDES AND DOCUSATE SODIUM 2 TABLET: 8.6; 5 TABLET ORAL at 05:12

## 2020-02-17 RX ADMIN — HYDROXYZINE HYDROCHLORIDE 50 MG: 25 TABLET, FILM COATED ORAL at 21:03

## 2020-02-17 RX ADMIN — MORPHINE SULFATE 4 MG: 4 INJECTION INTRAVENOUS at 22:49

## 2020-02-17 RX ADMIN — LORAZEPAM 1 MG: 2 INJECTION INTRAMUSCULAR; INTRAVENOUS at 13:15

## 2020-02-17 RX ADMIN — LORAZEPAM 1 MG: 2 INJECTION INTRAMUSCULAR; INTRAVENOUS at 18:04

## 2020-02-17 RX ADMIN — LORAZEPAM 1 MG: 2 INJECTION INTRAMUSCULAR; INTRAVENOUS at 22:48

## 2020-02-17 RX ADMIN — LORAZEPAM 0.5 MG: 1 TABLET ORAL at 21:04

## 2020-02-17 RX ADMIN — LORAZEPAM 1 MG: 2 INJECTION INTRAMUSCULAR; INTRAVENOUS at 09:10

## 2020-02-17 RX ADMIN — MORPHINE SULFATE 4 MG: 4 INJECTION INTRAVENOUS at 05:12

## 2020-02-17 RX ADMIN — LORAZEPAM 1 MG: 2 INJECTION INTRAMUSCULAR; INTRAVENOUS at 05:11

## 2020-02-17 RX ADMIN — OMEPRAZOLE 20 MG: 20 CAPSULE, DELAYED RELEASE ORAL at 21:03

## 2020-02-17 RX ADMIN — MORPHINE SULFATE 4 MG: 4 INJECTION INTRAVENOUS at 09:56

## 2020-02-17 ASSESSMENT — ENCOUNTER SYMPTOMS
SHORTNESS OF BREATH: 1
CHILLS: 0
DEPRESSION: 0
CONSTIPATION: 0
WHEEZING: 0
NAUSEA: 0
SORE THROAT: 0
COUGH: 0
LOSS OF CONSCIOUSNESS: 0
BRUISES/BLEEDS EASILY: 0
SPEECH CHANGE: 0
EYE DISCHARGE: 0
ABDOMINAL PAIN: 0
DIZZINESS: 0
FEVER: 0
NECK PAIN: 0
HEADACHES: 0
DIAPHORESIS: 0
VOMITING: 0
PALPITATIONS: 0
SPUTUM PRODUCTION: 0
WEAKNESS: 0
EYE PAIN: 0
MYALGIAS: 0
BACK PAIN: 0
FOCAL WEAKNESS: 0
DIARRHEA: 0
SENSORY CHANGE: 0
HEMOPTYSIS: 0
CLAUDICATION: 0

## 2020-02-17 ASSESSMENT — LIFESTYLE VARIABLES: SUBSTANCE_ABUSE: 0

## 2020-02-17 NOTE — FACE TO FACE
Face to Face Supporting Documentation - Home Health    The encounter with this patient was in whole or in part the primary reason for home health admission.    Date of encounter:   Patient:                    MRN:                       YOB: 2019  Rasheeda Manzano  [id number]  1970     Home health to see patient for:  Skilled Nursing care for assessment, interventions & education    Skilled need for:  Recent Deterioration of Health Status new bilateral PEs with right breast cancer undergoing XRT and chemo    Skilled nursing interventions to include:  Comment: home safety    Homebound status evidenced by:  Needs the assistance of another person in order to leave the home. Leaving home requires a considerable and taxing effort. There is a normal inability to leave the home.    Community Physician to provide follow up care: Tabitha Bautista M.D.     Optional Interventions? No      I certify the face to face encounter for this home health care referral meets the CMS requirements and the encounter/clinical assessment with the patient was, in whole, or in part, for the medical condition(s) listed above, which is the primary reason for home health care. Based on my clinical findings: the service(s) are medically necessary, support the need for home health care, and the homebound criteria are met.  I certify that this patient has had a face to face encounter by myself.  Yesy Espitia M.D. - NPI: [id number]

## 2020-02-17 NOTE — PROGRESS NOTES
RN MOBILITY NOTE     Surgery patient?: n  Date of surgery: na  Ambulated 50 ft on day of surgery? (N/A if today is not date of surgery): na   Number of times ambulated 50 feet or greater today: 3  Patient has been up to chair, edge of bed or HOB 90 degrees for all meals?: not documented  Goal met? (goal is ambulating at least 50 feet 2 times on day shift, one time on night shift): y  If patient did not meet mobility goal, why?: na

## 2020-02-17 NOTE — PROGRESS NOTES
Night shift:    Pt is A&Ox 4  Ambulated: Yes  Up with 1x assist, steady gait (pt blind, just requires an arm for guidance)  Voiding: Yes  Last BM: ___  Pain: c/o 7/10 pain at R breast , morphine given

## 2020-02-17 NOTE — DISCHARGE PLANNING
Anticipated Discharge Disposition: Home with     Action: Spoke with Rasheeda at the bedside, she can not remember which home health she used last. She wanted me to send her referral to [facility]. Choice was faxed to [facility] at [phone]. She seen her PCP 2 weeks ago. She was independent prior to admission. She has Rx coverage.     Barriers to Discharge: HH acceptance, Eliquis PA, Medical Clearance    Plan: Home with home health.      Care Transition Team Assessment    Information Source  Orientation : Oriented x 4  Information Given By: Patient  Informant's Name: Rasheeda  Who is responsible for making decisions for patient? : Patient    Readmission Evaluation  Is this a readmission?: No    Elopement Risk  Legal Hold: No  Ambulatory or Self Mobile in Wheelchair: Yes  Disoriented: No  Psychiatric Symptoms: None  History of Wandering: No  Elopement this Admit: No  Vocalizing Wanting to Leave: No  Displays Behaviors, Body Language Wanting to Leave: No-Not at Risk for Elopement  Elopement Risk: Not at Risk for Elopement    Interdisciplinary Discharge Planning  Primary Care Physician: Tabitha Bautista MD  Support Systems: Family Member(s), Friends / Neighbors  Able to Return to Previous ADL's: Yes  Mobility Issues: No  Prior Services: Home-Independent  Patient Expects to be Discharged to:: Home with [facility]  Assistance Needed: Yes    Discharge Preparedness  What is your plan after discharge?: Home with help, Home health care  What are your discharge supports?: Sibling  Prior Functional Level: Ambulatory, Independent with Activities of Daily Living, Needs Assist with Medication Management  Difficulity with ADLs: None  Difficulity with IADLs: Driving, Shopping    Functional Assesment  Prior Functional Level: Ambulatory, Independent with Activities of Daily Living, Needs Assist with Medication Management    Finances  Financial Barriers to Discharge: No  Prescription Coverage: Yes    Vision / Hearing Impairment  Right Eye Vision:  Blind  Left Eye Vision: Blind              Domestic Abuse  Have you ever been the victim of abuse or violence?: No    Psychological Assessment  History of Substance Abuse: None  History of Psychiatric Problems: No    Discharge Risks or Barriers  Discharge risks or barriers?: Lives alone, no community support, Complex medical needs  Patient risk factors: Complex medical needs, Lack of outside supports    Anticipated Discharge Information  Anticipated discharge disposition: [facility], Home  Discharge Address: [address] [place] [address]  Discharge Contact Phone Number: [phone]

## 2020-02-17 NOTE — DISCHARGE PLANNING
Received Choice form at [phone]  Agency/Facility Name: [facility]  Referral sent per Choice form @ [phone]

## 2020-02-17 NOTE — PROGRESS NOTES
Patient is A&Ox4,even non labored breathing,no distress noted,mildly anxious, given Ativan per orders,seen by Nupur during her rounds today,no new orders,will continue to monitor

## 2020-02-17 NOTE — DISCHARGE SUMMARY
Discharge Summary    CHIEF COMPLAINT ON ADMISSION  Chief Complaint   Patient presents with   • Anxiety       Reason for Admission  Anxiety     Admission Date  2/16/2020    CODE STATUS  Full Code    HPI & HOSPITAL COURSE  This is a 49 y.o. female here with bilateral blindness,  shunt, seizure disorder, depression, lives alone, s/p right mastectomy 12/2019 for breast cancer intraductal carcinoma ER [place] positive followed by Dr. Wang and Dr. Davis for chemo and XRT.  She initially complained of severe anxiety and shortness of breath to EMS.  CTA chest did show bilateral chronic PEs.  She was able to be weaned to room air at discharge.  Ultrasound of the lower extremities were negative for DVT.  She was originally started on Lovenox subcu twice daily on admission and later changed over to Eliquis.  Patient is legally blind bilaterally lives alone and would not be able to do any Lovenox injections.  She has no help at home.  I have ordered home health RN to check on her medications as well as anticoagulation clinic in 1 week when she switches from Eliquis 10 twice daily to 5 twice daily.  The patient's anxiety actually improved with her diagnosis knowledge and treatment with the blood thinners.  She is able to ambulate and do things on her own.  She was somewhat overwhelmed with her breast cancer treatments including chemotherapy and ongoing radiation therapy.  She had multiple questions regarding lymph node metastases and her staging.  I did explain that she should follow-up with Dr. Wang regarding her diagnosis plans etc. and she was understanding of this.  She does understand that she needs to stay on anticoagulation for at least 6 months possibly longer.  She did feel that she would be able to discharge to home as long as she had home health RN as well as prescriptions checked by social work for cost.  She did request refills on her Ativan and oxycodone.       Therefore, she is discharged in good and stable  condition to home with organized home healthcare and close outpatient follow-up.    The patient recovered much more quickly than anticipated on admission.    Discharge Date  2/17/20    FOLLOW UP ITEMS POST DISCHARGE  Follow up with Dr. Wang in 1 week for questions about her lymph node spread of breast cancer and new bilateral chronic PEs.    Follow up as scheduled with Dr. Davis for XRT.  Follow up with PCP and AC clinic on 2/24/20 for eliquis compliance.  HH RN ordered.    DISCHARGE DIAGNOSES  Principal Problem:    Pulmonary embolism (HCC) POA: Yes  Active Problems:    Breast cancer (HCC) POA: Yes    Seizure (HCC) POA: Yes     (ventriculoperitoneal) shunt status POA: Yes    History of depression POA: Yes    On continuous oral anticoagulation POA: No    Blindness POA: Yes  Resolved Problems:    * No resolved hospital problems. *      FOLLOW UP  Future Appointments   Date Time Provider Department Center   2/18/2020  9:30 AM RADIATION THERAPY RADT None   2/19/2020  9:30 AM RADIATION THERAPY RADT None   2/20/2020  9:30 AM RADIATION THERAPY RADT None   2/21/2020  9:30 AM RADIATION THERAPY RADT None   2/24/2020  9:30 AM RADIATION THERAPY RADT None   2/25/2020  9:30 AM RADIATION THERAPY RADT None   2/26/2020  9:30 AM RADIATION THERAPY RADT None   2/27/2020  9:30 AM RADIATION THERAPY RADT None   2/28/2020  9:30 AM RADIATION THERAPY RADT None   3/2/2020  9:30 AM RADIATION THERAPY RADT None   3/3/2020  9:30 AM RADIATION THERAPY RADT None   3/4/2020  9:30 AM RADIATION THERAPY RADT None   3/5/2020  9:30 AM RADIATION THERAPY RADT None   3/6/2020  9:30 AM RADIATION THERAPY RADT None   3/9/2020  9:30 AM RADIATION THERAPY RADT None   3/10/2020  9:30 AM RADIATION THERAPY RADT None   3/11/2020  9:30 AM RADIATION THERAPY RADT None   3/12/2020  9:30 AM RADIATION THERAPY RADT None   3/13/2020  9:30 AM RADIATION THERAPY RADT None   3/16/2020  9:30 AM RADIATION THERAPY RADT None   3/17/2020  9:30 AM RADIATION THERAPY RADT None    3/18/2020  9:30 AM RADIATION THERAPY RADT None   3/19/2020  9:30 AM RADIATION THERAPY RADT None   3/20/2020  9:30 AM RADIATION THERAPY RADT None     No follow-up provider specified.    MEDICATIONS ON DISCHARGE     Medication List      START taking these medications      Instructions   * apixaban 5mg Tabs  Commonly known as:  ELIQUIS   Take 2 Tabs by mouth 2 Times a Day for 7 days.  Dose:  10 mg     * apixaban 5mg Tabs  Start taking on:  February 24, 2020  Commonly known as:  ELIQUIS   Take 1 Tab by mouth 2 Times a Day.  Dose:  5 mg     senna-docusate 8.6-50 MG Tabs  Commonly known as:  PERICOLACE or SENOKOT S   Take 2 Tabs by mouth 2 Times a Day.  Dose:  2 Tab         * This list has 2 medication(s) that are the same as other medications prescribed for you. Read the directions carefully, and ask your doctor or other care provider to review them with you.            CHANGE how you take these medications      Instructions   LORazepam 0.5 MG Tabs  What changed:    · when to take this  · reasons to take this  Commonly known as:  ATIVAN   Take 1 Tab by mouth at bedtime as needed for Anxiety for up to 30 days.  Dose:  0.5 mg        CONTINUE taking these medications      Instructions   esomeprazole 40 MG delayed-release capsule  Commonly known as:  NEXIUM   Take 40 mg by mouth every bedtime.  Dose:  40 mg     hydrOXYzine HCl 50 MG Tabs  Commonly known as:  ATARAX   Take 50 mg by mouth every bedtime.  Dose:  50 mg     levETIRAcetam 500 MG Tabs  Commonly known as:  KEPPRA   Take 1,000 mg by mouth every bedtime.  Dose:  1,000 mg     oxyCODONE immediate release 10 MG immediate release tablet  Commonly known as:  ROXICODONE   TAKE 1 TABLET BY MOUTH EVERY 4 HOURS FOR 4 DAYS C50     propranolol  MG Cp24  Commonly known as:  INDERAL LA   Take 120 mg by mouth every bedtime.  Dose:  120 mg     topiramate 100 MG Tabs  Commonly known as:  TOPAMAX   Take 200 mg by mouth every bedtime.  Dose:  200 mg     zolpidem 10 MG  Tabs  Commonly known as:  AMBIEN   Take 1 Tab by mouth at bedtime as needed for Sleep for up to 30 days.  Dose:  10 mg        STOP taking these medications    sucralfate 1 GM Tabs  Commonly known as:  CARAFATE            Allergies  Allergies   Allergen Reactions   • Tape Rash     RXN= ongoing  Adhesive Medical tape. Per patient, paper tape ok.   • Latex Rash     Rash       DIET  Orders Placed This Encounter   Procedures   • Diet Order Regular     Standing Status:   Standing     Number of Occurrences:   1     Order Specific Question:   Diet:     Answer:   Regular [1]       ACTIVITY  As tolerated.  Weight bearing as tolerated    CONSULTATIONS  none    PROCEDURES  2/16/2020 2:04 AM     HISTORY/REASON FOR EXAM:  PE suspected, high pretest prob        TECHNIQUE/EXAM DESCRIPTION:  CT angiogram scan for pulmonary embolism with contrast, with reconstructions.     1.25 mm helical sections were obtained from the lung apices through the lung bases following the rapid bolus administration of 45 mL of Omnipaque 350 nonionic contrast. Thin-section overlapping reconstruction interval was utilized. Coronal   reconstructions were generated from the axial data. MIP post processing was performed and utilized for the interpretation.     Low dose optimization technique was utilized for this CT exam including automated exposure control and adjustment of the mA and/or kV according to patient size.     COMPARISON: 1/10/2020     FINDINGS:  Pulmonary Embolism: Yes.     Main Pulmonary Arteries: Yes.     Segmental branches: Yes.     Subsegmental branches: Yes.     Only if positive for PE:     RV diameter: 2.5 cm.     LV diameter: 3.6 cm.     RV/LV ratio: 0.69. (Greater than 0.9 is abnormal.)     Additional Comments: Pulmonary clot burden is mild and has a chronic appearance with calcification of the segmental and subsegmental filling defects.     Lungs: No suspicious nodules or air space process.     Pleura: There is a stable small left  pleural effusion with associated compressive atelectasis secondary to a ventriculopleural shunt. There are right pleural calcifications and pleural thickening medially which are unchanged from the previous exam.     Nodes: No enlarged lymph nodes.     Additional findings: There is a small hiatal hernia.     IMPRESSION:     1.  Chronic bilateral pulmonary emboli. No evidence of right heart strain.  2.  Stable small left pleural effusion secondary to ventriculopleural shunt.  3.  Unchanged right pleural calcifications and pleural thickening.  4.  Small hiatal hernia.     These findings were discussed with JANIE BEAL on 2020 2:32 AM.     Vascular Laboratory   CONCLUSIONS   No evidence of  superficial or deep venous thrombosis bilaterally.      NILAY MANN      Exam Date:     2020 11:21      Room #:     Inpatient      Priority:     Routine      Ht (in):             Wt (lb):      Ordering Physician:        LIN RAUSCH      Referring Physician:       SHALOM Liriano      Sonographer:               Rema Cotto RVT      Study Type:                Complete Bilateral      Technical Quality:         Adequate      Age:    49    Gender:     F      MRN:    [id number]      :    1970      BSA:      Indications:     Pulmonary Embolus      CPT Codes:       38917      ICD Codes:       415.19      History:         Pulmonary Embolis      Limitations:      PROCEDURES:   Bilateral lower extremity venous duplex imaging.    The following venous structures were evaluated: common femoral, profunda    femoral, greater saphenous, femoral, popliteal , peroneal and posterior    tibial veins.    Serial compression, augmentation maneuvers,  color and spectral Doppler    flow evaluations were performed.       FINDINGS:   Bilateral lower extremities -.    Complete color filling and compressibility with normal venous flow dynamics    including spontaneous flow, response to  augmentation maneuvers, and    respiratory phasicity.    No evidence of  superficial or deep venous thrombosis bilaterally.       Mohan Herrera   (Electronically Signed)   Final Date:      16 February 2020                     13:31    LABORATORY  Lab Results   Component Value Date    SODIUM 138 02/16/2020    POTASSIUM 3.4 (L) 02/16/2020    CHLORIDE 106 02/16/2020    CO2 21 02/16/2020    GLUCOSE 92 02/16/2020    BUN 12 02/16/2020    CREATININE 0.64 02/16/2020    CREATININE 0.6 08/12/2008        Lab Results   Component Value Date    WBC 6.1 02/16/2020    HEMOGLOBIN 14.2 02/16/2020    HEMATOCRIT 43.2 02/16/2020    PLATELETCT 330 02/16/2020        Total time of the discharge process exceeds 45 minutes.

## 2020-02-18 ENCOUNTER — HOSPITAL ENCOUNTER (OUTPATIENT)
Dept: RADIATION ONCOLOGY | Facility: MEDICAL CENTER | Age: 50
End: 2020-02-18
Payer: MEDICARE

## 2020-02-18 VITALS
HEIGHT: 64 IN | HEART RATE: 101 BPM | RESPIRATION RATE: 18 BRPM | BODY MASS INDEX: 25.29 KG/M2 | WEIGHT: 148.15 LBS | OXYGEN SATURATION: 95 % | SYSTOLIC BLOOD PRESSURE: 113 MMHG | DIASTOLIC BLOOD PRESSURE: 71 MMHG | TEMPERATURE: 97 F

## 2020-02-18 LAB

## 2020-02-18 PROCEDURE — 96376 TX/PRO/DX INJ SAME DRUG ADON: CPT

## 2020-02-18 PROCEDURE — 700111 HCHG RX REV CODE 636 W/ 250 OVERRIDE (IP): Performed by: HOSPITALIST

## 2020-02-18 PROCEDURE — A9270 NON-COVERED ITEM OR SERVICE: HCPCS | Performed by: HOSPITALIST

## 2020-02-18 PROCEDURE — 77427 RADIATION TX MANAGEMENT X5: CPT | Performed by: RADIOLOGY

## 2020-02-18 PROCEDURE — 77412 RADIATION TX DELIVERY LVL 3: CPT | Performed by: RADIOLOGY

## 2020-02-18 PROCEDURE — G0378 HOSPITAL OBSERVATION PER HR: HCPCS

## 2020-02-18 PROCEDURE — 700102 HCHG RX REV CODE 250 W/ 637 OVERRIDE(OP): Performed by: HOSPITALIST

## 2020-02-18 PROCEDURE — 99217 PR OBSERVATION CARE DISCHARGE: CPT | Performed by: HOSPITALIST

## 2020-02-18 RX ADMIN — MORPHINE SULFATE 4 MG: 4 INJECTION INTRAVENOUS at 12:08

## 2020-02-18 RX ADMIN — LORAZEPAM 1 MG: 2 INJECTION INTRAMUSCULAR; INTRAVENOUS at 03:25

## 2020-02-18 RX ADMIN — APIXABAN 10 MG: 5 TABLET, FILM COATED ORAL at 05:14

## 2020-02-18 RX ADMIN — MORPHINE SULFATE 4 MG: 4 INJECTION INTRAVENOUS at 07:41

## 2020-02-18 RX ADMIN — LORAZEPAM 1 MG: 2 INJECTION INTRAMUSCULAR; INTRAVENOUS at 07:41

## 2020-02-18 RX ADMIN — LORAZEPAM 1 MG: 2 INJECTION INTRAMUSCULAR; INTRAVENOUS at 12:08

## 2020-02-18 RX ADMIN — MORPHINE SULFATE 4 MG: 4 INJECTION INTRAVENOUS at 03:25

## 2020-02-18 RX ADMIN — SENNOSIDES AND DOCUSATE SODIUM 2 TABLET: 8.6; 5 TABLET ORAL at 05:13

## 2020-02-18 ASSESSMENT — COGNITIVE AND FUNCTIONAL STATUS - GENERAL
STANDING UP FROM CHAIR USING ARMS: A LITTLE
SUGGESTED CMS G CODE MODIFIER MOBILITY: CJ
HELP NEEDED FOR BATHING: A LITTLE
PERSONAL GROOMING: A LITTLE
TOILETING: A LITTLE
MOBILITY SCORE: 22
WALKING IN HOSPITAL ROOM: A LITTLE
EATING MEALS: A LITTLE
DAILY ACTIVITIY SCORE: 20
SUGGESTED CMS G CODE MODIFIER DAILY ACTIVITY: CJ

## 2020-02-18 NOTE — PROGRESS NOTES
[facility] Medicine Daily Progress Note    Date of Service  2/17/2020    Chief Complaint  49 y.o. female admitted 2/16/2020 with SOB, anxiety found to have bilateral chronic PEs on CTA.    Hospital Course    2/17:  This 50 yo female legally blind bilateral eyes, s/p  shunt, seizure disorder, recent right mastectomy 12/2019 for intraductal carcinoma, anxiety undergoing XRT and chemo over the past week presented with SOB and anxiety.  CTA chest with chronic bilateral PEs.  Started on lovenox bid sc transitioned to eliquis.  Dc summary completed.   MICHELLE RN, AC clinic in 7 days, orders in, check cost on Eliquis.  *        Consultants/Specialty  none    Code Status  full    Disposition  MICHELLE RN ordered.  eliquis cost check. AC clinic on 2/24 in 1 week to ensure compliance with dose change.    Review of Systems  Review of Systems   Constitutional: Negative for chills, diaphoresis, fever and malaise/fatigue.   HENT: Negative for congestion and sore throat.    Eyes: Negative for pain and discharge.   Respiratory: Positive for shortness of breath. Negative for cough, hemoptysis, sputum production and wheezing.    Cardiovascular: Negative for chest pain, palpitations, claudication and leg swelling.   Gastrointestinal: Negative for abdominal pain, constipation, diarrhea, melena, nausea and vomiting.   Genitourinary: Negative for dysuria, frequency and urgency.   Musculoskeletal: Negative for back pain, joint pain, myalgias and neck pain.   Skin: Negative for itching and rash.   Neurological: Negative for dizziness, sensory change, speech change, focal weakness, loss of consciousness, weakness and headaches.   Endo/Heme/Allergies: Does not bruise/bleed easily.   Psychiatric/Behavioral: Negative for depression, substance abuse and suicidal ideas.        Physical Exam  Temp:  [36.2 °C (97.2 °F)-36.7 °C (98.1 °F)] 36.4 °C (97.5 °F)  Pulse:  [] 101  Resp:  [17-18] 17  BP: ()/(65-77) 101/77  SpO2:  [95 %-98 %] 97  %    Physical Exam  Vitals signs and nursing note reviewed.   Constitutional:       General: She is not in acute distress.     Appearance: She is well-developed. She is not diaphoretic.   HENT:      Head: Normocephalic and atraumatic.      Nose: Nose normal.      Mouth/Throat:      Pharynx: No oropharyngeal exudate.   Eyes:      General: No scleral icterus.        Right eye: No discharge.         Left eye: No discharge.      Conjunctiva/sclera: Conjunctivae normal.      Comments: Legally blind bilaterally   Neck:      Musculoskeletal: Normal range of motion and neck supple.      Thyroid: No thyromegaly.      Vascular: No JVD.      Trachea: No tracheal deviation.   Cardiovascular:      Rate and Rhythm: Normal rate and regular rhythm.      Heart sounds: Normal heart sounds. No murmur. No friction rub. No gallop.    Pulmonary:      Effort: Pulmonary effort is normal. No respiratory distress.      Breath sounds: Normal breath sounds. No stridor. No wheezing or rales.   Chest:      Chest wall: No tenderness.   Abdominal:      General: Bowel sounds are normal. There is no distension.      Palpations: Abdomen is soft. There is no mass.      Tenderness: There is no abdominal tenderness. There is no guarding or rebound.   Musculoskeletal: Normal range of motion.         General: No tenderness.   Lymphadenopathy:      Cervical: No cervical adenopathy.   Skin:     General: Skin is warm and dry.      Findings: No erythema or rash.   Neurological:      Mental Status: She is alert and oriented to person, place, and time.      Cranial Nerves: No cranial nerve deficit.      Motor: No abnormal muscle tone.      Coordination: Coordination normal.   Psychiatric:         Behavior: Behavior normal.         Thought Content: Thought content normal.         Judgment: Judgment normal.         Fluids    Intake/Output Summary (Last 24 hours) at 2/17/2020 2301  Last data filed at 2/17/2020 1315  Gross per 24 hour   Intake 240 ml   Output --    Net 240 ml       Laboratory  Recent Labs     02/16/20  0200   WBC 6.1   RBC 4.88   HEMOGLOBIN 14.2   HEMATOCRIT 43.2   MCV 88.5   MCH 29.1   MCHC 32.9*   RDW 50.8*   PLATELETCT 330   MPV 10.8     Recent Labs     02/16/20  0200   SODIUM 138   POTASSIUM 3.4*   CHLORIDE 106   CO2 21   GLUCOSE 92   BUN 12   CREATININE 0.64   CALCIUM 9.9                   Imaging  US-EXTREMITY VENOUS LOWER BILAT   Final Result      CT-CTA CHEST PULMONARY ARTERY W/ RECONS   Final Result      1.  Chronic bilateral pulmonary emboli. No evidence of right heart strain.   2.  Stable small left pleural effusion secondary to ventriculopleural shunt.   3.  Unchanged right pleural calcifications and pleural thickening.   4.  Small hiatal hernia.      These findings were discussed with JANIE BEAL on 2/16/2020 2:32 AM.           Assessment/Plan  * Pulmonary embolism (HCC)- (present on admission)  Assessment & Plan  CT scan shows chronic bilateral pulmonary emboli. No evidence of right heart strain.  She is hemodynamically stable and has no hypoxia.  Troponin is negative.  Patient has increased risk secondary to her malignancy.  Continue symptomatic management for her pain.  Started on lovenox 1mg/kg sc bid on admission and transitioned to Eliquis.  AC clinic  HH RN to ensure taking meds:  Legally blind, lives alone.     (ventriculoperitoneal) shunt status- (present on admission)  Assessment & Plan  history    Seizure (HCC)- (present on admission)  Assessment & Plan  This is chronic, resume home Keppra.    On continuous oral anticoagulation  Assessment & Plan  Started on eliquis    Blindness- (present on admission)  Assessment & Plan  This is chronic, at baseline.       VTE prophylaxis: eliquis

## 2020-02-18 NOTE — PROGRESS NOTES
1312   The patient requested to speak with primary team about wanting to be dc'd on new pain medications. Dr. Mireille Aleman had an extensive conversation at bedside with patient and with nurse present. The patient will continue home pain med regimen. Dr. Mireille Aleman will not order new pain meds for home. Dr. Espitia did write scripts for Ativan and Oxycodone which the patient was on PTA. The patient has a pain contract with her PCP and if she wants more pain meds then she will need to follow up with her PCP. Nurse assisted in scheduling an apt with PCP for 2/24 at 1130 (soonest available). Patient agreeable to dc plan. DC paperwork discussed. Pt is awaiting [facility] acceptance and medications to be delivered to bedside. Pt states she has a friend that can transport her at time of dc.      4891  Pt being dc'd to home with [facility]. The following prescriptions given oxycodone and ativan. IV dc'd. Flu vaccine up to date. Pt will not be getting meds to beds for scripts. Eliquis and senna were e-scripted to pt's preferred pharmacy.  Dc instructions discussed. All questions answered.  Patient agreeable to dc plan. Pt's friend to transport at time of dc.

## 2020-02-18 NOTE — PROGRESS NOTES
Night shift:    Pt is A&Ox 4   Ambulated: Yes  Up with 1x assist, steady gait (pt blind, lead by arm)  Voiding: Yes  Last BM: 2/16  Pain: Takes prn IV morphine and ativan whenever due (c/o pain in R breast)

## 2020-02-18 NOTE — PROGRESS NOTES
RN MOBILITY NOTE     Surgery patient?: y  Date of surgery: 2/16  Ambulated 50 ft on day of surgery? (N/A if today is not date of surgery): na   Number of times ambulated 50 feet or greater today: 3+  Patient has been up to chair, edge of bed or HOB 90 degrees for all meals?: y  Goal met? (goal is ambulating at least 50 feet 2 times on day shift, one time on night shift): y  If patient did not meet mobility goal, why?: na

## 2020-02-18 NOTE — DISCHARGE PLANNING
Case Management Note:  CM LM with [facility] pharmacist, to see if patient qualifies for meds to bed. CM also wanted to know if a PA was needed for Eliquis.    CM also notifies Medicaid FFS, [employer] [facility]. Medication is approved under Dr. Mireille Aleman's NPI. A new Rx would need to be sent to the pharmacy. Dr. Espitia is not listed as a provider for Medicaid.  CM will notify Dr. Mireille Aleman.

## 2020-02-18 NOTE — PROGRESS NOTES
Handed off to Zheng,even non labored breathing,no distress noted,medicated for pain per orders,new IV placed in LFA by Amelie at 1930.

## 2020-02-18 NOTE — DISCHARGE INSTRUCTIONS
Discharge Instructions    Discharged to home by car with relative. Discharged via wheelchair, hospital escort: Yes.  Special equipment needed: Not Applicable    Be sure to schedule a follow-up appointment with your primary care doctor or any specialists as instructed.     Discharge Plan:   Diet Plan: Discussed  Activity Level: Discussed  Confirmed Follow up Appointment: Patient to Call and Schedule Appointment    I understand that a diet low in cholesterol, fat, and sodium is recommended for good health. Unless I have been given specific instructions below for another diet, I accept this instruction as my diet prescription.   Other diet: regular    Special Instructions:   · Follow up with Dr. Wang in 1 week for questions about her lymph node spread of breast cancer and new bilateral chronic PEs.    · Follow up as scheduled with Dr. Davis for XRT.  · Follow up with PCP and [facility] clinic on 2/24/20 for eliquis compliance.   RN ordered.      · Is patient discharged on Warfarin / Coumadin?   No       Pulmonary Embolism  A pulmonary embolism (PE) is a sudden blockage or decrease of blood flow in one lung or both lungs. Most blockages come from a blood clot that travels from the legs or the pelvis to the lungs. PE is a dangerous and potentially life-threatening condition if it is not treated right away.  What are the causes?  A pulmonary embolism occurs most commonly when a blood clot travels from one of your veins to your lungs. Rarely, PE is caused by air, fat, amniotic fluid, or part of a tumor traveling through your veins to your lungs.  What increases the risk?  A PE is more likely to develop in:  · People who smoke.  · People who are older, especially over 60 years of age.  · People who are overweight (obese).  · People who sit or lie still for a long time, such as during long-distance travel (over 4 hours), bed rest, hospitalization, or during recovery from certain medical conditions like a stroke.  · People who  do not engage in much physical activity (sedentary lifestyle).  · People who have chronic breathing disorders.  · People who have a personal or family history of blood clots or blood clotting disease.  · People who have peripheral vascular disease (PVD), diabetes, or some types of cancer.  · People who have heart disease, especially if the person had a recent heart attack or has congestive heart failure.  · People who have neurological diseases that affect the legs (leg paresis).  · People who have had a traumatic injury, such as breaking a hip or leg.  · People who have recently had major or lengthy surgery, especially on the hip, knee, or abdomen.  · People who have had a central line placed inside a large vein.  · People who take medicines that contain the hormone estrogen. These include birth control pills and hormone replacement therapy.  · Pregnancy or during childbirth or the postpartum period.  What are the signs or symptoms?  The symptoms of a PE usually start suddenly and include:  · Shortness of breath while active or at rest.  · Coughing or coughing up blood or blood-tinged mucus.  · Chest pain that is often worse with deep breaths.  · Rapid or irregular heartbeat.  · Feeling light-headed or dizzy.  · Fainting.  · Feeling anxious.  · Sweating.  There may also be pain and swelling in a leg if that is where the blood clot started.  These symptoms may represent a serious problem that is an emergency. Do not wait to see if the symptoms will go away. Get medical help right away. Call your local emergency services (911 in the [place] Do not drive yourself to the hospital.   How is this diagnosed?  Your health care provider will take a medical history and perform a physical exam. You may also have other tests, including:  · Blood tests to assess the clotting properties of your blood, assess oxygen levels in your blood, and find blood clots.  · Imaging tests, such as CT, ultrasound, MRI, X-ray, and other tests to  see if you have clots anywhere in your body.  · An electrocardiogram (ECG) to look for heart strain from blood clots in the lungs.  How is this treated?  The main goals of PE treatment are:  · To stop a blood clot from growing larger.  · To stop new blood clots from forming.  The type of treatment that you receive depends on many factors, such as the cause of your PE, your risk for bleeding or developing more clots, and other medical conditions that you have. Sometimes, a combination of treatments is necessary.  This condition may be treated with:  · Medicines, including newer oral blood thinners (anticoagulants), warfarin, low molecular weight heparins, thrombolytics, or heparins.  · Wearing compression stockings or using different types of devices.  · Surgery (rare) to remove the blood clot or to place a filter in your abdomen to stop the blood clot from traveling to your lungs.  Treatments for a PE are often divided into immediate treatment, long-term treatment (up to 3 months after PE), and extended treatment (more than 3 months after PE). Your treatment may continue for several months. This is called maintenance therapy, and it is used to prevent the forming of new blood clots. You can work with your health care provider to choose the treatment program that is best for you.  What are anticoagulants?   Anticoagulants are medicines that treat PEs. They can stop current blood clots from growing and stop new clots from forming. They cannot dissolve existing clots. Your body dissolves clots by itself over time. Anticoagulants are given by mouth, by injection, or through an IV tube.  What are thrombolytics?   Thrombolytics are clot-dissolving medicines that are used to dissolve a PE. They carry a high risk of bleeding, so they tend to be used only in severe cases or if you have very low blood pressure.  Follow these instructions at home:  If you are taking a newer oral anticoagulant:  · Take the medicine every single  day at the same time each day.  · Understand what foods and drugs interact with this medicine.  · Understand that there are no regular blood tests required when using this medicine.  · Understand the side effects of this medicine, including excessive bruising or bleeding. Ask your health care provider or pharmacist about other possible side effects.  If you are taking warfarin:  · Understand how to take warfarin and know which foods can affect how warfarin works in your body.  · Understand that it is dangerous to take too much or too little warfarin. Too much warfarin increases the risk of bleeding. Too little warfarin continues to allow the risk for blood clots.  · Follow your PT and INR blood testing schedule. The PT and INR results allow your health care provider to adjust your dose of warfarin. It is very important that you have your PT and INR tested as often as told by your health care provider.  · Avoid major changes in your diet, or tell your health care provider before you change your diet. Arrange a visit with a registered dietitian to answer your questions. Many foods, especially foods that are high in vitamin K, can interfere with warfarin and affect the PT and INR results. Eat a consistent amount of foods that are high in vitamin K, such as:  ¨ Spinach, kale, broccoli, cabbage, [place] greens, turnip greens, [place] sprouts, peas, cauliflower, seaweed, and parsley.  ¨ Beef liver and pork liver.  ¨ Green tea.  ¨ Soybean oil.  · Tell your health care provider about any and all medicines, vitamins, and supplements that you take, including aspirin and other over-the-counter anti-inflammatory medicines. Be especially cautious with aspirin and anti-inflammatory medicines. Do not take those before you ask your health care provider if it is safe to do so. This is important because many medicines can interfere with warfarin and affect the PT and INR results.  · Do not start or stop taking any over-the-counter  or prescription medicine unless your health care provider or pharmacist tells you to do so.  If you take warfarin, you will also need to do these things:  · Hold pressure over cuts for longer than usual.  · Tell your dentist and other health care providers that you are taking warfarin before you have any procedures in which bleeding may occur.  · Avoid alcohol or drink very small amounts. Tell your health care provider if you change your alcohol intake.  · Do not use tobacco products, including cigarettes, chewing tobacco, and e-cigarettes. If you need help quitting, ask your health care provider.  · Avoid contact sports.  General instructions  · Take over-the-counter and prescription medicines only as told by your health care provider. Anticoagulant medicines can have side effects, including easy bruising and difficulty stopping bleeding. If you are prescribed an anticoagulant, you will also need to do these things:  ¨ Hold pressure over cuts for longer than usual.  ¨ Tell your dentist and other health care providers that you are taking anticoagulants before you have any procedures in which bleeding may occur.  ¨ Avoid contact sports.  · Wear a medical alert bracelet or carry a medical alert card that says you have had a PE.  · Ask your health care provider how soon you can go back to your normal activities. Stay active to prevent new blood clots from forming.  · Make sure to exercise while traveling or when you have been sitting or standing for a long period of time. It is very important to exercise. Exercise your legs by walking or by tightening and relaxing your leg muscles often. Take frequent walks.  · Wear compression stockings as told by your health care provider to help prevent more blood clots from forming.  · Do not use tobacco products, including cigarettes, chewing tobacco, and e-cigarettes. If you need help quitting, ask your health care provider.  · Keep all follow-up appointments with your health  care provider. This is important.  How is this prevented?  Take these actions to decrease your risk of developing another PE:  · Exercise regularly. For at least 30 minutes every day, engage in:  ¨ Activity that involves moving your arms and legs.  ¨ Activity that encourages good blood flow through your body by increasing your heart rate.  · Exercise your arms and legs every hour during long-distance travel (over 4 hours). Drink plenty of water and avoid drinking alcohol while traveling.  · Avoid sitting or lying in bed for long periods of time without moving your legs.  · Maintain a weight that is appropriate for your height. Ask your health care provider what weight is healthy for you.  · If you are a woman who is over 35 years of age, avoid unnecessary use of medicines that contain estrogen. These include birth control pills.  · Do not smoke, especially if you take estrogen medicines. If you need help quitting, ask your health care provider.  · If you are at very high risk for PE, wear compression stockings.  · If you recently had a PE, have regularly scheduled ultrasound testing on your legs to check for new blood clots.  If you are hospitalized, prevention measures may include:  · Early walking after surgery, as soon as your health care provider says that it is safe.  · Receiving anticoagulants to prevent blood clots. If you cannot take anticoagulants, other options may be available, such as wearing compression stockings or using different types of devices.  Get help right away if:  · You have new or increased pain, swelling, or redness in an arm or leg.  · You have numbness or tingling in an arm or leg.  · You have shortness of breath while active or at rest.  · You have chest pain.  · You have a rapid or irregular heartbeat.  · You feel light-headed or dizzy.  · You cough up blood.  · You notice blood in your vomit, bowel movement, or urine.  · You have a fever.  These symptoms may represent a serious problem  that is an emergency. Do not wait to see if the symptoms will go away. Get medical help right away. Call your local emergency services (911 in the [place] Do not drive yourself to the hospital.   This information is not intended to replace advice given to you by your health care provider. Make sure you discuss any questions you have with your health care provider.  Document Released: 12/15/2001 Document Revised: 05/25/2017 Document Reviewed: 04/13/2016  [employer] Interactive Patient Education © 2017 [employer]    Apixaban oral tablets  What is this medicine?  APIXABAN (a PIX a ban) is an anticoagulant (blood thinner). It is used to lower the chance of stroke in people with a medical condition called atrial fibrillation. It is also used to treat or prevent blood clots in the lungs or in the veins.  This medicine may be used for other purposes; ask your health care provider or pharmacist if you have questions.  COMMON BRAND NAME(S): Eliquis  What should I tell my health care provider before I take this medicine?  They need to know if you have any of these conditions:  -bleeding disorders  -bleeding in the brain  -blood in your stools (black or tarry stools) or if you have blood in your vomit  -history of stomach bleeding  -kidney disease  -liver disease  -mechanical heart valve  -an unusual or allergic reaction to apixaban, other medicines, foods, dyes, or preservatives  -pregnant or trying to get pregnant  -breast-feeding  How should I use this medicine?  Take this medicine by mouth with a glass of water. Follow the directions on the prescription label. You can take it with or without food. If it upsets your stomach, take it with food. Take your medicine at regular intervals. Do not take it more often than directed. Do not stop taking except on your doctor's advice. Stopping this medicine may increase your risk of a blot clot. Be sure to refill your prescription before you run out of medicine.  Talk to your  pediatrician regarding the use of this medicine in children. Special care may be needed.  Overdosage: If you think you have taken too much of this medicine contact a poison control center or emergency room at once.  NOTE: This medicine is only for you. Do not share this medicine with others.  What if I miss a dose?  If you miss a dose, take it as soon as you can. If it is almost time for your next dose, take only that dose. Do not take double or extra doses.  What may interact with this medicine?  This medicine may interact with the following:  -aspirin and aspirin-like medicines  -certain medicines for fungal infections like ketoconazole and itraconazole  -certain medicines for seizures like carbamazepine and phenytoin  -certain medicines that treat or prevent blood clots like warfarin, enoxaparin, and dalteparin  -clarithromycin  -NSAIDs, medicines for pain and inflammation, like ibuprofen or naproxen  -rifampin  -ritonavir  -Jase's wort  This list may not describe all possible interactions. Give your health care provider a list of all the medicines, herbs, non-prescription drugs, or dietary supplements you use. Also tell them if you smoke, drink alcohol, or use illegal drugs. Some items may interact with your medicine.  What should I watch for while using this medicine?  Visit your doctor or health care professional for regular checks on your progress.  Notify your doctor or health care professional and seek emergency treatment if you develop breathing problems; changes in vision; chest pain; severe, sudden headache; pain, swelling, warmth in the leg; trouble speaking; sudden numbness or weakness of the face, arm or leg. These can be signs that your condition has gotten worse.  If you are going to have surgery or other procedure, tell your doctor that you are taking this medicine.  What side effects may I notice from receiving this medicine?  Side effects that you should report to your doctor or health care  professional as soon as possible:  -allergic reactions like skin rash, itching or hives, swelling of the face, lips, or tongue  -signs and symptoms of bleeding such as bloody or black, tarry stools; red or dark-brown urine; spitting up blood or brown material that looks like coffee grounds; red spots on the skin; unusual bruising or bleeding from the eye, gums, or nose  This list may not describe all possible side effects. Call your doctor for medical advice about side effects. You may report side effects to FDA at 3-308-NGY-3799.  Where should I keep my medicine?  Keep out of the reach of children.  Store at room temperature between 20 and 25 degrees C (68 and 77 degrees F). Throw away any unused medicine after the expiration date.  NOTE: This sheet is a summary. It may not cover all possible information. If you have questions about this medicine, talk to your doctor, pharmacist, or health care provider.  © 2018 [employer]vier/Gold Standard (2017-07-10 11:54:23)      Depression / Suicide Risk    As you are discharged from this [facility] facility, it is important to learn how to keep safe from harming yourself.    Recognize the warning signs:  · Abrupt changes in personality, positive or negative- including increase in energy   · Giving away possessions  · Change in eating patterns- significant weight changes-  positive or negative  · Change in sleeping patterns- unable to sleep or sleeping all the time   · Unwillingness or inability to communicate  · Depression  · Unusual sadness, discouragement and loneliness  · Talk of wanting to die  · Neglect of personal appearance   · Rebelliousness- reckless behavior  · Withdrawal from people/activities they love  · Confusion- inability to concentrate     If you or a loved one observes any of these behaviors or has concerns about self-harm, here's what you can do:  · Talk about it- your feelings and reasons for harming yourself  · Remove any means that you might use to hurt  yourself (examples: pills, rope, extension cords, firearm)  · Get professional help from the community (Mental Health, Substance Abuse, psychological counseling)  · Do not be alone:Call your Safe Contact- someone whom you trust who will be there for you.  · Call your local CRISIS HOTLINE 828-5874 or 406-020-0288  · Call your local Children's Mobile Crisis Response Team [place] (637) 442-7342 or www.[employer]  · Call the toll free National Suicide Prevention Hotlines   · National Suicide Prevention Lifeline 579-662-PNOM (1327)  · National Hope Line Network 800-SUICIDE (540-8539)

## 2020-02-18 NOTE — PROGRESS NOTES
Received report from [facility] RNZheng.  Bed in lowest position, wheels locked, call light within reach, all questions answered.

## 2020-02-18 NOTE — DISCHARGE SUMMARY
Patient evaluated today was not able to discharge yesterday pending home health approval and medication approval.  She requested home prescriptions with increases in her oxycodone lorazepam.  I had a long discussion with her that chronic controlled substance dose adjustment should be only performed by her outpatient prescribing provider, reports that she has a pain management contract with her PCP and she agrees to follow-up with her PCP to discuss adjustments to her oxycodone and lorazepam.  On exam her lungs are clear and she is on room air. i Spoke with the  and electronically sent prescriptions for Eliquis.  Reviewed with the patient Eliquis dose adjustment on 2/24/2020.    For further details regarding her hospitalization course please refer to the dictated summary by Dr. Yesy Espitia on 2/17/2020    Total time spent today was 35 minutes including discussing discharge  medication and instructions with patient and coordinating care with nursing staff and case management        Current Outpatient Medications   Medication Sig Dispense Refill   • apixaban (ELIQUIS) 5mg Tab Take 2 Tabs by mouth 2 Times a Day for 6 days. 24 Tab 0   • [START ON 2/24/2020] apixaban (ELIQUIS) 5mg Tab Take 1 Tab by mouth 2 Times a Day. 60 Tab 5   • LORazepam (ATIVAN) 0.5 MG Tab Take 1 Tab by mouth at bedtime as needed for Anxiety for up to 30 days. 30 Tab 0   • oxyCODONE immediate release (ROXICODONE) 10 MG immediate release tablet TAKE 1 TABLET BY MOUTH EVERY 4 HOURS FOR 4 DAYS C50 28 Tab 0   • senna-docusate (PERICOLACE OR SENOKOT S) 8.6-50 MG Tab Take 2 Tabs by mouth 2 Times a Day. 30 Tab 0

## 2020-02-19 ENCOUNTER — HOSPITAL ENCOUNTER (EMERGENCY)
Facility: MEDICAL CENTER | Age: 50
End: 2020-02-19
Attending: EMERGENCY MEDICINE
Payer: MEDICARE

## 2020-02-19 ENCOUNTER — HOSPITAL ENCOUNTER (OUTPATIENT)
Dept: RADIATION ONCOLOGY | Facility: MEDICAL CENTER | Age: 50
End: 2020-02-19
Payer: MEDICARE

## 2020-02-19 VITALS
DIASTOLIC BLOOD PRESSURE: 75 MMHG | HEART RATE: 99 BPM | TEMPERATURE: 98.1 F | OXYGEN SATURATION: 99 % | WEIGHT: 144.62 LBS | HEIGHT: 64 IN | RESPIRATION RATE: 16 BRPM | SYSTOLIC BLOOD PRESSURE: 107 MMHG | BODY MASS INDEX: 24.69 KG/M2

## 2020-02-19 DIAGNOSIS — I27.82 OTHER CHRONIC PULMONARY EMBOLISM WITHOUT ACUTE COR PULMONALE (HCC): ICD-10-CM

## 2020-02-19 DIAGNOSIS — R03.0 ELEVATED BLOOD PRESSURE READING: ICD-10-CM

## 2020-02-19 LAB

## 2020-02-19 PROCEDURE — 77412 RADIATION TX DELIVERY LVL 3: CPT | Performed by: RADIOLOGY

## 2020-02-19 PROCEDURE — 77417 THER RADIOLOGY PORT IMAGE(S): CPT | Performed by: RADIOLOGY

## 2020-02-19 PROCEDURE — 99285 EMERGENCY DEPT VISIT HI MDM: CPT | Mod: 25

## 2020-02-19 RX ORDER — LISINOPRIL 20 MG/1
20 TABLET ORAL DAILY
Qty: 30 TAB | Refills: 0 | Status: SHIPPED | OUTPATIENT
Start: 2020-02-19 | End: 2020-06-17

## 2020-02-19 NOTE — ED PROVIDER NOTES
ED Provider Note    Scribed for Fabián Mayes M.D. by Rosie Camilo. 2/19/2020  9:56 AM    Primary care provider: Tabitha Bautista M.D.  Means of arrival: Walk-in  History obtained from: Patient  History limited by: None    CHIEF COMPLAINT  Chief Complaint   Patient presents with   • Blood Pressure Problem     pt states htn at home, normal now       HPI  Rasheeda Manzano is a 49 y.o. female who presents to the Emergency Department for hypertension onset today. Patient reports that her blood pressure was high at home, but has since resolved. The patient states she does not have hypertension medications at home. She denies any swelling. The patient takes a blood thinner daily. The patient has breast cancer, as well as severe blood clotting in her chest and lungs.     REVIEW OF SYSTEMS  Pertinent positives include hypertension.   Pertinent negatives include no swelling.    All other systems reviewed and negative. See HPI for further details.       PAST MEDICAL HISTORY   has a past medical history of Acute nasopharyngitis, Blind, C. difficile colitis, C. difficile diarrhea (2013), Cancer (HCC), Chronic daily headache, Depression, Esophageal atresia (1971), Esophageal bleeding (12/18/2019), Fall, GERD (gastroesophageal reflux disease), H/O total hysterectomy, Heart burn (12/18/2019), Hydrocephalus (HCC), Hydrocephalus ([facility]), Indigestion (12/18/2019), Jaundice, Legally blind, Migraine without aura, without mention of intractable migraine without mention of status migrainosus, Other specified symptom associated with female genital organs, Pain, Pain (12/18/2019), Psychiatric problem, PTSD (post-traumatic stress disorder), Seizure ([facility]) (12/18/2019), and Snoring (12/18/2019).    SURGICAL HISTORY   has a past surgical history that includes laparoscopy (8/8/08); lysis adhesions general (12/10/2013); cystoscopy (12/10/2013); aakash by laparoscopy (N/A, 8/13/2015); gastroscopy-endo (N/A, 8/24/2017); nissen  "fundoplication laparoscopic; abdominal hysterectomy total (12/10/2013); other; exploratory laparotomy (12/10/2013); appendectomy (12/10/2013); cholecystectomy (N/A, 2015); gastroscopy (N/A, 2019); mastectomy (Right, 2019); and node biopsy sentinel (Right, 2019).    SOCIAL HISTORY  Social History     Tobacco Use   • Smoking status: Former Smoker     Packs/day: 1.00     Years: 10.00     Pack years: 10.00     Types: Cigars     Start date: 2004     Last attempt to quit: 2017     Years since quittin.5   • Smokeless tobacco: Former User   • Tobacco comment:  CIGAR/day    Substance Use Topics   • Alcohol use: Yes     Frequency: Monthly or less     Drinks per session: 1 or 2   • Drug use: No      Social History     Substance and Sexual Activity   Drug Use No       FAMILY HISTORY  Family History   Problem Relation Age of Onset   • Hypertension Mother    • Hypertension Father    • Non-contributory Neg Hx         Migraine       CURRENT MEDICATIONS  Home Medications    **Home medications have not yet been reviewed for this encounter**         ALLERGIES  Allergies   Allergen Reactions   • Tape Rash     RXN= ongoing  Adhesive Medical tape. Per patient, paper tape ok.   • Latex Rash     Rash       PHYSICAL EXAM  VITAL SIGNS: /84   Pulse (!) 104   Temp 36.7 °C (98.1 °F) (Temporal)   Resp 16   Ht 1.626 m (5' 4\")   Wt 65.6 kg (144 lb 10 oz)   LMP 2013   SpO2 99%   BMI 24.82 kg/m²     Nursing note and vitals reviewed.  Constitutional: Well-developed and well-nourished. No distress.   HENT: Head is normocephalic and atraumatic. Oropharynx is clear and moist without exudate or erythema.   Eyes: no acute abnormality.  Cardiovascular: Normal rate and regular rhythm. No murmur heard. Normal radial pulses.  Pulmonary/Chest: Breath sounds normal. No wheezes or rales.   Abdominal: Soft and non-tender. No distention    Musculoskeletal: Extremities exhibit normal range of motion without edema " or tenderness.   Neurological: Awake, alert and oriented to person, place, and time. No focal deficits noted.  Skin: Skin is warm and dry. No rash.   Psychiatric: Normal mood and affect. Appropriate for clinical situation.    DIAGNOSTIC STUDIES / PROCEDURES    COURSE & MEDICAL DECISION MAKING  Nursing notes, VS, PMSFHx reviewed in chart.       9:56 AM - Patient seen and examined at bedside. Patient is unsure of her hypertension medications, so she will be started on Lisinopril and is referred to anticoagulation clinic. Patient informed she is stable for discharge home with medication refills. Patient verbalizes understanding and agreement to this plan of care.     The patient will return for new or worsening symptoms and is stable at the time of discharge.    The patient is referred to a primary physician for blood pressure management, diabetic screening, and for all other preventative health concerns.    DISPOSITION:  Patient will be discharged home in stable condition.    FOLLOW UP:  Tabitha Bautista M.D.  [address] [id number]  [phone]    Schedule an appointment as soon as possible for a visit       [facility], Emergency Dept  [address] [place] [id number] [phone]    If symptoms worsen      OUTPATIENT MEDICATIONS:  Discharge Medication List as of 2/19/2020 10:47 AM      START taking these medications    Details   lisinopril (PRINIVIL) 20 MG Tab Take 1 Tab by mouth every day., Disp-30 Tab, R-0, Normal             FINAL IMPRESSION  1. Elevated blood pressure reading    2. Other chronic pulmonary embolism without acute cor pulmonale (HCC)          Rosie SHARMA), am scribing for, and in the presence of, Fabián Mayes M.D..    Electronically signed by: Rosie Briceno), 2/19/2020    Fabián SHARMA M.D. personally performed the services described in this documentation, as scribed by Rosie Camilo in my presence, and it is both accurate and complete.  C.    The note accurately reflects work and decisions made by me.  Fabián Mayes M.D.  2/19/2020  1:04 PM

## 2020-02-19 NOTE — ED NOTES
Pt verbally understands d/c instructions. All questions answered. Pt stable and leaving via wheelchair with transport to radiation upon discharge.

## 2020-02-19 NOTE — ED TRIAGE NOTES
Chief Complaint   Patient presents with   • Blood Pressure Problem     pt states htn at home, normal now

## 2020-02-19 NOTE — ED NOTES
Pt resting quietly with family at bedside. Respirations even and unlabored. Call light within reach.

## 2020-02-20 ENCOUNTER — HOSPITAL ENCOUNTER (OUTPATIENT)
Dept: RADIATION ONCOLOGY | Facility: MEDICAL CENTER | Age: 50
End: 2020-02-20
Payer: MEDICARE

## 2020-02-20 LAB

## 2020-02-20 PROCEDURE — 77412 RADIATION TX DELIVERY LVL 3: CPT | Performed by: RADIOLOGY

## 2020-02-21 ENCOUNTER — HOSPITAL ENCOUNTER (OUTPATIENT)
Dept: RADIATION ONCOLOGY | Facility: MEDICAL CENTER | Age: 50
End: 2020-02-21
Payer: MEDICARE

## 2020-02-21 LAB

## 2020-02-21 PROCEDURE — 77412 RADIATION TX DELIVERY LVL 3: CPT | Performed by: RADIOLOGY

## 2020-02-21 PROCEDURE — 77336 RADIATION PHYSICS CONSULT: CPT | Performed by: RADIOLOGY

## 2020-02-23 ENCOUNTER — APPOINTMENT (OUTPATIENT)
Dept: RADIOLOGY | Facility: MEDICAL CENTER | Age: 50
End: 2020-02-23
Attending: EMERGENCY MEDICINE
Payer: MEDICARE

## 2020-02-23 ENCOUNTER — HOSPITAL ENCOUNTER (EMERGENCY)
Facility: MEDICAL CENTER | Age: 50
End: 2020-02-23
Attending: EMERGENCY MEDICINE
Payer: MEDICARE

## 2020-02-23 VITALS
OXYGEN SATURATION: 98 % | SYSTOLIC BLOOD PRESSURE: 133 MMHG | WEIGHT: 145 LBS | TEMPERATURE: 97.4 F | BODY MASS INDEX: 24.75 KG/M2 | HEART RATE: 108 BPM | RESPIRATION RATE: 18 BRPM | HEIGHT: 64 IN | DIASTOLIC BLOOD PRESSURE: 97 MMHG

## 2020-02-23 DIAGNOSIS — I26.94 MULTIPLE SUBSEGMENTAL PULMONARY EMBOLI WITHOUT ACUTE COR PULMONALE (HCC): ICD-10-CM

## 2020-02-23 DIAGNOSIS — R07.9 CHEST PAIN, UNSPECIFIED TYPE: ICD-10-CM

## 2020-02-23 LAB
ALBUMIN SERPL BCP-MCNC: 3.9 G/DL (ref 3.2–4.9)
ALBUMIN/GLOB SERPL: 1.3 G/DL
ALP SERPL-CCNC: 79 U/L (ref 30–99)
ALT SERPL-CCNC: 16 U/L (ref 2–50)
ANION GAP SERPL CALC-SCNC: 12 MMOL/L (ref 0–11.9)
AST SERPL-CCNC: 17 U/L (ref 12–45)
BASOPHILS # BLD AUTO: 0.8 % (ref 0–1.8)
BASOPHILS # BLD: 0.06 K/UL (ref 0–0.12)
BILIRUB SERPL-MCNC: 0.6 MG/DL (ref 0.1–1.5)
BUN SERPL-MCNC: 8 MG/DL (ref 8–22)
CALCIUM SERPL-MCNC: 9.4 MG/DL (ref 8.5–10.5)
CHLORIDE SERPL-SCNC: 112 MMOL/L (ref 96–112)
CO2 SERPL-SCNC: 17 MMOL/L (ref 20–33)
CREAT SERPL-MCNC: 0.69 MG/DL (ref 0.5–1.4)
EKG IMPRESSION: NORMAL
EOSINOPHIL # BLD AUTO: 0.03 K/UL (ref 0–0.51)
EOSINOPHIL NFR BLD: 0.4 % (ref 0–6.9)
ERYTHROCYTE [DISTWIDTH] IN BLOOD BY AUTOMATED COUNT: 49.4 FL (ref 35.9–50)
GLOBULIN SER CALC-MCNC: 3 G/DL (ref 1.9–3.5)
GLUCOSE SERPL-MCNC: 110 MG/DL (ref 65–99)
HCT VFR BLD AUTO: 41.5 % (ref 37–47)
HGB BLD-MCNC: 14.1 G/DL (ref 12–16)
IMM GRANULOCYTES # BLD AUTO: 0.02 K/UL (ref 0–0.11)
IMM GRANULOCYTES NFR BLD AUTO: 0.3 % (ref 0–0.9)
LYMPHOCYTES # BLD AUTO: 0.8 K/UL (ref 1–4.8)
LYMPHOCYTES NFR BLD: 10.7 % (ref 22–41)
MCH RBC QN AUTO: 29.7 PG (ref 27–33)
MCHC RBC AUTO-ENTMCNC: 34 G/DL (ref 33.6–35)
MCV RBC AUTO: 87.4 FL (ref 81.4–97.8)
MONOCYTES # BLD AUTO: 0.64 K/UL (ref 0–0.85)
MONOCYTES NFR BLD AUTO: 8.5 % (ref 0–13.4)
NEUTROPHILS # BLD AUTO: 5.94 K/UL (ref 2–7.15)
NEUTROPHILS NFR BLD: 79.3 % (ref 44–72)
NRBC # BLD AUTO: 0 K/UL
NRBC BLD-RTO: 0 /100 WBC
NT-PROBNP SERPL IA-MCNC: 111 PG/ML (ref 0–125)
PLATELET # BLD AUTO: 329 K/UL (ref 164–446)
PMV BLD AUTO: 11.5 FL (ref 9–12.9)
POTASSIUM SERPL-SCNC: 3.7 MMOL/L (ref 3.6–5.5)
PROT SERPL-MCNC: 6.9 G/DL (ref 6–8.2)
RBC # BLD AUTO: 4.75 M/UL (ref 4.2–5.4)
SODIUM SERPL-SCNC: 141 MMOL/L (ref 135–145)
TROPONIN T SERPL-MCNC: 7 NG/L (ref 6–19)
TROPONIN T SERPL-MCNC: 7 NG/L (ref 6–19)
WBC # BLD AUTO: 7.5 K/UL (ref 4.8–10.8)

## 2020-02-23 PROCEDURE — 93005 ELECTROCARDIOGRAM TRACING: CPT

## 2020-02-23 PROCEDURE — 71045 X-RAY EXAM CHEST 1 VIEW: CPT

## 2020-02-23 PROCEDURE — 700111 HCHG RX REV CODE 636 W/ 250 OVERRIDE (IP): Performed by: EMERGENCY MEDICINE

## 2020-02-23 PROCEDURE — 71275 CT ANGIOGRAPHY CHEST: CPT

## 2020-02-23 PROCEDURE — 84484 ASSAY OF TROPONIN QUANT: CPT

## 2020-02-23 PROCEDURE — 96374 THER/PROPH/DIAG INJ IV PUSH: CPT

## 2020-02-23 PROCEDURE — A9270 NON-COVERED ITEM OR SERVICE: HCPCS | Performed by: EMERGENCY MEDICINE

## 2020-02-23 PROCEDURE — 93005 ELECTROCARDIOGRAM TRACING: CPT | Performed by: EMERGENCY MEDICINE

## 2020-02-23 PROCEDURE — 99285 EMERGENCY DEPT VISIT HI MDM: CPT

## 2020-02-23 PROCEDURE — 83880 ASSAY OF NATRIURETIC PEPTIDE: CPT

## 2020-02-23 PROCEDURE — 700102 HCHG RX REV CODE 250 W/ 637 OVERRIDE(OP): Performed by: EMERGENCY MEDICINE

## 2020-02-23 PROCEDURE — 700117 HCHG RX CONTRAST REV CODE 255: Performed by: EMERGENCY MEDICINE

## 2020-02-23 PROCEDURE — 85025 COMPLETE CBC W/AUTO DIFF WBC: CPT

## 2020-02-23 PROCEDURE — 80053 COMPREHEN METABOLIC PANEL: CPT

## 2020-02-23 PROCEDURE — 36415 COLL VENOUS BLD VENIPUNCTURE: CPT

## 2020-02-23 RX ORDER — ACETAMINOPHEN 500 MG
1000 TABLET ORAL ONCE
Status: COMPLETED | OUTPATIENT
Start: 2020-02-23 | End: 2020-02-23

## 2020-02-23 RX ORDER — OXYCODONE HYDROCHLORIDE 5 MG/1
5 TABLET ORAL ONCE
Status: COMPLETED | OUTPATIENT
Start: 2020-02-23 | End: 2020-02-23

## 2020-02-23 RX ORDER — OXYCODONE HYDROCHLORIDE 5 MG/1
5 TABLET ORAL EVERY 6 HOURS PRN
Qty: 10 TAB | Refills: 0 | Status: SHIPPED | OUTPATIENT
Start: 2020-02-23 | End: 2020-02-26

## 2020-02-23 RX ORDER — MORPHINE SULFATE 4 MG/ML
4 INJECTION, SOLUTION INTRAMUSCULAR; INTRAVENOUS ONCE
Status: COMPLETED | OUTPATIENT
Start: 2020-02-23 | End: 2020-02-23

## 2020-02-23 RX ADMIN — IOHEXOL 38 ML: 350 INJECTION, SOLUTION INTRAVENOUS at 05:44

## 2020-02-23 RX ADMIN — MORPHINE SULFATE 4 MG: 4 INJECTION INTRAVENOUS at 05:06

## 2020-02-23 RX ADMIN — OXYCODONE HYDROCHLORIDE 5 MG: 5 TABLET ORAL at 06:40

## 2020-02-23 RX ADMIN — ACETAMINOPHEN 1000 MG: 500 TABLET ORAL at 06:40

## 2020-02-23 NOTE — ED TRIAGE NOTES
"Chief Complaint   Patient presents with   • Chest Pain     BIB ems for chest pain 2 hours ago, pt was laying down and had a sudden onset of constant stabbing on R side of chest, worse upon inspiration.    • Shortness of Breath     per ems pt was very anxious upon arrival and asked for anxiety meds that were not given. Pt resting on RA at 99%, speaking full sentences with a RR of 16     /80   Pulse (!) 109   Temp 36.3 °C (97.4 °F) (Temporal)   Resp 16   Ht 1.626 m (5' 4\")   Wt 65.8 kg (145 lb)   LMP 11/27/2013   BMI 24.89 kg/m²     Pt is currently receiving radiation therapy for breast cancer, had a partial masectomy of her R breast December 19, 2019. Pt was placed on eliquis on Monday due do hx of PE.     Pt is visually impaired, cannot see anything.    EMS provided 324 asa and 100 fentanyl IV that brought pt's cp from a 9 to a 7    Chart up for eval.    "

## 2020-02-23 NOTE — ED PROVIDER NOTES
ED Provider Note    Scribed for Shawn Overton M.D. by Baldev Isbell. 2/23/2020,  4:56 AM.    Means of Arrival: Ambulance  History obtained from: Patient  History limited by: None    CHIEF COMPLAINT  Chief Complaint   Patient presents with   • Chest Pain     BIB ems for chest pain 2 hours ago, pt was laying down and had a sudden onset of constant stabbing on R side of chest, worse upon inspiration.    • Shortness of Breath     per ems pt was very anxious upon arrival and asked for anxiety meds that were not given. Pt resting on RA at 99%, speaking full sentences with a RR of 16       HPI  Rasheeda Manzano is a 49 y.o. female with history of breast cancer and partial right mastectomy of who presents to the Emergency Department complaining of right sided chest pain and shortness of breath onset about 3 hours ago. Patient reports that she had similar symptoms last week and was diagnosed with pulmonary embolism, and was subsequently placed on Eliquis then. The patient states that she was at home, laying down, when she suddenly had another episode of right sided chest pain alongside shortness of breath. She notes that palpation of the right chest exacerbates the pain  in addition to deep breaths. At presentation, the patient endorses chronic diarrhea that has been occurring for months, but denies any abdominal pain, fever, or cough. The patient has been undergoing radiation therapy for the breast cancer.     REVIEW OF SYSTEMS  CONSTITUTIONAL:  No fever.  CARDIOVASCULAR:  Positive for chest pain  RESPIRATORY:  Positive for shortness of breath. No cough  GASTROINTESTINAL:  No abdominal pain. Positive for chronic diarrhea.    See HPI for further details.   All other systems are negative.     PAST MEDICAL HISTORY  Past Medical History:   Diagnosis Date   • Acute nasopharyngitis     H/O Cold 12-8-19   • Blind     age 11   • C. difficile colitis     H/O x3   • C. difficile diarrhea 2013   • Cancer (HCC)     Right Breast  "Cancer DX 2-2019   • Chronic daily headache    • Depression    • Esophageal atresia 1971    Treated surgically at birth.   • Esophageal bleeding 12/18/2019    H/O, \"three times with last one a year ago.\"   • Fall    • GERD (gastroesophageal reflux disease)    • H/O total hysterectomy    • Heart burn 12/18/2019   • Hydrocephalus (HCC)    • Hydrocephalus (HCC)     shunt drains into pleural place of L lung   • Indigestion 12/18/2019   • Jaundice     at birth   • Legally blind    • Migraine without aura, without mention of intractable migraine without mention of status migrainosus    • Other specified symptom associated with female genital organs     excessive bleeding   • Pain     gallbladder related   • Pain 12/18/2019    \"Pain In Head From Hydrocephalus.\".   • Psychiatric problem     depression   • PTSD (post-traumatic stress disorder)    • Seizure (HCC) 12/18/2019    \"Last seizure one year ago.\"   • Snoring 12/18/2019    Has not had a sleep study.       FAMILY HISTORY  Family History   Problem Relation Age of Onset   • Hypertension Mother    • Hypertension Father    • Non-contributory Neg Hx         Migraine       SOCIAL HISTORY   reports that she quit smoking about 2 years ago. Her smoking use included cigars. She started smoking about 15 years ago. She has a 10.00 pack-year smoking history. She has quit using smokeless tobacco. She reports current alcohol use. She reports that she does not use drugs.    SURGICAL HISTORY  Past Surgical History:   Procedure Laterality Date   • MASTECTOMY Right 12/19/2019    Procedure: MASTECTOMY-PARTIAL;  Surgeon: Brice Johnson M.D.;  Location: SURGERY SAME DAY [facility];  Service: General   • NODE BIOPSY SENTINEL Right 12/19/2019    Procedure: BIOPSY, LYMPH NODE, SENTINEL-AXILLARY;  Surgeon: Brice Johnson M.D.;  Location: SURGERY SAME DAY [facility];  Service: General   • GASTROSCOPY N/A 1/2/2019    Procedure: GASTROSCOPY;  Surgeon: Matias Fernando M.D.;  Location: " SURGERY [facility];  Service: Gastroenterology   • GASTROSCOPY-ENDO N/A 8/24/2017    Procedure: GASTROSCOPY-ENDO;  Surgeon: Matias Fernando M.D.;  Location: ENDOSCOPY [facility];  Service:    • AYALA BY LAPAROSCOPY N/A 8/13/2015    Procedure: AYALA BY LAPAROSCOPY;  Surgeon: Brice Johnson M.D.;  Location: SURGERY SAME DAY [facility];  Service:    • CHOLECYSTECTOMY N/A 8/13/2015    Procedure: CHOLECYSTECTOMY;  Surgeon: Brice Johnson M.D.;  Location: SURGERY SAME DAY [facility];  Service:    • LYSIS ADHESIONS GENERAL  12/10/2013    Performed by Arie Bass M.D. at [facility]   • CYSTOSCOPY  12/10/2013    Performed by Joana Yeager M.D. at [facility]   • ABDOMINAL HYSTERECTOMY TOTAL  12/10/2013    Performed by Joana Yeager M.D. at [facility]   • EXPLORATORY LAPAROTOMY  12/10/2013    Performed by Arie Bass M.D. at [facility]   • APPENDECTOMY  12/10/2013    Performed by Arie Bass M.D. at [facility]   • LAPAROSCOPY  8/8/08    Performed by FERNANDO HENDERSON at [facility]   • NISSEN FUNDOPLICATION LAPAROSCOPIC     • OTHER      PLEURAL SHUNT, numerous revisions       CURRENT MEDICATIONS  No current facility-administered medications for this encounter.     Current Outpatient Medications:   •  oxyCODONE immediate-release (ROXICODONE) 5 MG Tab, Take 1 Tab by mouth every 6 hours as needed for Severe Pain for up to 3 days., Disp: 10 Tab, Rfl: 0  •  apixaban (ELIQUIS) 5mg Tab, Take 2 Tabs by mouth 2 Times a Day for 6 days., Disp: 24 Tab, Rfl: 0  •  [START ON 2/24/2020] apixaban (ELIQUIS) 5mg Tab, Take 1 Tab by mouth 2 Times a Day., Disp: 60 Tab, Rfl: 5  •  lisinopril (PRINIVIL) 20 MG Tab, Take 1 Tab by mouth every day., Disp: 30 Tab, Rfl: 0  •  LORazepam (ATIVAN) 0.5 MG Tab, Take 1 Tab by mouth at bedtime as needed for Anxiety for up to 30 days., Disp: 30 Tab, Rfl: 0  •  senna-docusate (PERICOLACE OR SENOKOT S) 8.6-50 MG  "Tab, Take 2 Tabs by mouth 2 Times a Day., Disp: 30 Tab, Rfl: 0  •  zolpidem (AMBIEN) 10 MG Tab, Take 1 Tab by mouth at bedtime as needed for Sleep for up to 30 days., Disp: 30 Tab, Rfl: 0  •  hydrOXYzine HCl (ATARAX) 50 MG Tab, Take 50 mg by mouth every bedtime., Disp: , Rfl: 1  •  esomeprazole (NEXIUM) 40 MG delayed-release capsule, Take 40 mg by mouth every bedtime., Disp: , Rfl: 5  •  levETIRAcetam (KEPPRA) 500 MG Tab, Take 1,000 mg by mouth every bedtime., Disp: , Rfl:   •  topiramate (TOPAMAX) 100 MG Tab, Take 200 mg by mouth every bedtime., Disp: , Rfl:   •  propranolol CR (INDERAL LA) 120 MG CAPSULE SR 24 HR, Take 120 mg by mouth every bedtime., Disp: , Rfl:      ALLERGIES  Allergies   Allergen Reactions   • Tape Rash     RXN= ongoing  Adhesive Medical tape. Per patient, paper tape ok.   • Latex Rash     Rash       PHYSICAL EXAM  VITAL SIGNS: /80   Pulse (!) 101   Temp 36.3 °C (97.4 °F) (Temporal)   Resp 16   Ht 1.626 m (5' 4\")   Wt 65.8 kg (145 lb)   LMP 2013   SpO2 99%   BMI 24.89 kg/m²    Gen: Alert, no acute distress  HEENT: ATNC  Eyes: PERRL, EOMI, normal conjunctiva.   Neck: trachea midline  Resp: Lungs clear. no respiratory distress  CV: RRR. No murmurs. No JVD  Back: No CVA tenderness.  Abd: non-distended  Ext: No pedal edema. No deformities  Psych: normal mood  Neuro: speech fluent     DIAGNOSTIC STUDIES / PROCEDURES     EKG  Results for orders placed or performed during the hospital encounter of 20   EKG (NOW)   Result Value Ref Range    Report       [facility]    Test Date:  2020  Pt Name:    NILAY MANN               Department: ER  MRN:        [id number]                      Room:       [facility]  Gender:     Female                       Technician: [id number]  :        1970                   Requested By:ER TRIAGE PROTOCOL  Order #:    [id number]                    Reading MD: Shawn Overton    Measurements  Intervals                 "                Axis  Rate:       103                          P:          40  MN:         140                          QRS:        70  QRSD:       76                           T:          51  QT:         352  QTc:        461    Interpretive Statements  SINUS TACHYCARDIA  BASELINE WANDER IN LEAD(S) II,III,aVR,aVF,V1,V4,V5,V6  Compared to ECG 12/10/2019 02:10:22  Sinus rhythm no longer present  Electronically Signed On 2- 7:08:53 PST by Shawn Overton        Interpreted and reviewed by me as seen above.    LABS  Labs Reviewed   CBC WITH DIFFERENTIAL - Abnormal; Notable for the following components:       Result Value    Neutrophils-Polys 79.30 (*)     Lymphocytes 10.70 (*)     Lymphs (Absolute) 0.80 (*)     All other components within normal limits   COMP METABOLIC PANEL - Abnormal; Notable for the following components:    Co2 17 (*)     Anion Gap 12.0 (*)     Glucose 110 (*)     All other components within normal limits   TROPONIN   PROBRAIN NATRIURETIC PEPTIDE, NT   ESTIMATED GFR   TROPONIN     All labs reviewed by me.    RADIOLOGY  CT-CTA CHEST PULMONARY ARTERY W/ RECONS   Final Result      1.  Stable CT chest      2.  Multiple segmental nonocclusive pulmonary thromboemboli bilaterally. No evidence for right heart strain      3.  Ventriculopleural catheter on the left with stable small left effusion      4.  Stable hiatal hernia      5.  Stable right pleural calcified and noncalcified plaques likely related to prior shunt positioning      DX-CHEST-PORTABLE (1 VIEW)   Final Result      No radiographic evidence of acute cardiopulmonary process.        The radiologist’s interpretation of all radiology studies have been reviewed by me.    COURSE & MEDICAL DECISION MAKING  Pertinent Labs & Imaging studies reviewed. (See chart for details)    4:56 AM Patient seen and examined at bedside. Ordered for labs, EKG, and imaging to evaluate. Patient will be treated with morphine 4 mg for her symptoms.     Medical Decision  Making:  Patient presents with chest pain similar to her chest pain the time of her diagnosis of PE.  Her CT of the chest demonstrates stable pulmonary emboli in multiple sites.  She does not have an elevated troponin or BNP to suggest heart strain.  Repeat troponin reassuring.  Low suspicion for ACS.  No evidence of pericarditis.  No evidence of aortic dissection.  Patient's condition appears to be unchanged from prior.  No evidence of new decompensation or new blood clots to suggest failure of treatment.  Patient has recently been on opioid pain medications for her chest pain, however her prescription appears to have run out.  Nonsteroidal anti-inflammatory medications are contraindicated in this situation due to the apixaban.    In prescribing controlled substances to this patient, I certify that I have obtained and reviewed the medical history of Rasheeda Manzano. I have also made a good poly effort to obtain applicable records from other providers who have treated the patient and records did not demonstrate any increased risk of substance abuse that would prevent me from prescribing controlled substances.     I have conducted a physical exam and documented it. I have reviewed Ms. Manzano’s prescription history as maintained by the [place] Prescription Monitoring Program.     I have assessed the patient’s risk for abuse, dependency, and addiction using the validated Opioid Risk Tool available at https://www.mdcalc.com/vzulss-jkoo-nsuh-ort-narcotic-abuse.     Given the above, I believe the benefits of controlled substance therapy outweigh the risks. The reasons for prescribing controlled substances include non-narcotic, oral analgesic alternatives have been inadequate for pain control. Accordingly, I have discussed the risk and benefits, treatment plan, and alternative therapies with the patient.   The risks of the medication, including constipation, addiction and altered mental status were discussed with the  patient and they expressed understanding. They were encouraged to use the minimum dose necessary to control their breakthrough pain.      Patient will be signed out to Dr. Snow awaiting repeat troponin with plan to discharge home.    FINAL IMPRESSION  1. Chest pain, unspecified type    2. Multiple subsegmental pulmonary emboli without acute cor pulmonale            Baldev SHARMA (Scribjohn), am scribing for, and in the presence of, Shawn Overton M.D..    Electronically signed by: Baldev Isbell (Dennis), 2/23/2020    IShawn M.D. personally performed the services described in this documentation, as scribed by Baldev Isbell in my presence, and it is both accurate and complete.    C    The note accurately reflects work and decisions made by me.  Shawn Overton M.D.  2/23/2020  7:14 AM

## 2020-02-23 NOTE — ED NOTES
Pt medicated per MAR and educated on purpose/side effects of medication. Pt verbalizes understanding and tolerates well at this time. Will continue to monitor.      Pt guided to the bathroom w/ a steady gait to urinate.

## 2020-02-23 NOTE — ED NOTES
Pt states she is having increased, generalized cp at this time. Erp notified, no interventions at this time. Will continue to monitor.

## 2020-02-23 NOTE — ED PROVIDER NOTES
ED PROVIDER NOTE    Scribed for No att. providers found by Lianet Wyatt. 2/23/2020, 10:58 AM.    This is a note on Rasheeda Manzano. For further details and full chart entry, see the previously signed ED Provider Note written by Dr. Overton (ERP).      7:00 amAM - I discussed the patient's case with Dr. Overton (ERP) who will transfer care of the patient to me at this time.        The patient has a negative troponin, EKG was negative, CTA was negative and I do with the patient is a for discharge.  Please refer to Dr. Overton's note on his initial evaluation.       I, Lianet Wyatt (Dennis), am scribing for, and in the presence of, No att. providers found.    Electronically signed by: Lianet Wyatt (Dennis), 2/23/2020    I, No att. providers found personally performed the services described in this documentation, as scribed by Lianet Wyatt in my presence, and it is both accurate and complete.    The note accurately reflects work and decisions made by me.  Kory Snow D.O.  2/23/2020  1:21 PM

## 2020-02-23 NOTE — DISCHARGE INSTRUCTIONS
You were seen in the emergency department for chest pain.  The CT scan of your chest demonstrates no new blood clots.  You were evaluated for a heart attack, which was also reassuring.  The cause of your symptoms is not entirely clear, however it may very well be from your pulmonary embolism.    For pain, you may take Tylenol, 1000 mg every 8 hours.  Please do not exceed 3000 mg of Tylenol (acetaminophen) in a 24-hour.    You are being sent home with an opioid pain medication. This medication causes sedation and you should not drive or operate machinery while taking it. You should not use alcohol or recreational drugs while taking this medication. Side effects of this medication can include itching, nausea, and constipation.    There is a risk of addiction to this medication and you should only take the smallest amount necessary to control your symptoms. You should use other non-opioid pain medications for your baseline pain and only use this medication if necessary.     There are many alternatives to pain medications, such as massage, acupuncture, and meditation. Check with your health insurance regarding their coverage of these complementary treatments.    Please follow-up with your regular doctor for ongoing treatment and pain management.    Please return to the emergency department seek medical attention if you develop:  Worsening chest pain, lightheadedness, shortness of breath, any other new or concerning findings

## 2020-02-24 ENCOUNTER — HOSPITAL ENCOUNTER (OUTPATIENT)
Dept: RADIATION ONCOLOGY | Facility: MEDICAL CENTER | Age: 50
End: 2020-02-24
Payer: MEDICARE

## 2020-02-24 LAB

## 2020-02-24 PROCEDURE — 77412 RADIATION TX DELIVERY LVL 3: CPT | Performed by: RADIOLOGY

## 2020-02-25 ENCOUNTER — HOSPITAL ENCOUNTER (OUTPATIENT)
Dept: RADIATION ONCOLOGY | Facility: MEDICAL CENTER | Age: 50
End: 2020-02-25
Payer: MEDICARE

## 2020-02-25 LAB

## 2020-02-25 PROCEDURE — 77412 RADIATION TX DELIVERY LVL 3: CPT | Performed by: RADIOLOGY

## 2020-02-25 PROCEDURE — 77427 RADIATION TX MANAGEMENT X5: CPT | Performed by: RADIOLOGY

## 2020-02-26 ENCOUNTER — TELEPHONE (OUTPATIENT)
Dept: VASCULAR LAB | Facility: MEDICAL CENTER | Age: 50
End: 2020-02-26

## 2020-02-26 ENCOUNTER — HOSPITAL ENCOUNTER (OUTPATIENT)
Dept: RADIATION ONCOLOGY | Facility: MEDICAL CENTER | Age: 50
End: 2020-02-26
Payer: MEDICARE

## 2020-02-26 VITALS
BODY MASS INDEX: 24.24 KG/M2 | DIASTOLIC BLOOD PRESSURE: 92 MMHG | HEART RATE: 109 BPM | OXYGEN SATURATION: 98 % | WEIGHT: 141.2 LBS | SYSTOLIC BLOOD PRESSURE: 136 MMHG | TEMPERATURE: 97.2 F

## 2020-02-26 LAB

## 2020-02-26 PROCEDURE — 77412 RADIATION TX DELIVERY LVL 3: CPT | Performed by: RADIOLOGY

## 2020-02-26 PROCEDURE — 77417 THER RADIOLOGY PORT IMAGE(S): CPT | Performed by: RADIOLOGY

## 2020-02-26 ASSESSMENT — PAIN SCALES - GENERAL: PAINLEVEL: 4=SLIGHT-MODERATE PAIN

## 2020-02-26 NOTE — TELEPHONE ENCOUNTER
Received anticoagulation referral for Eliquis due to PE from Dr. Mayes on 2/19/20    Appears that pt has [facility] as of right now, as such may want to wait to come in for appt until after HH has ended, this is patient preference. Please call and schedule new anticoagulation visit.    Insurance: Medicare  PCP: [facility]  Locations to be seen: [facility] at [phone], fax [phone]    Sue Mcdermott, ChrisD

## 2020-02-26 NOTE — ON TREATMENT VISIT
ON TREATMENT NOTE  RADIATION ONCOLOGY DEPARTMENT    Patient name:  Rasheeda Manzano    Primary Physician:  Tabitha Bautista M.D. MRN: [id number]  CSN: [id number]   Referring physician:  Brice Johnson M.D. : 1970, 49 y.o.     ENCOUNTER DATE:  20    DIAGNOSIS:    Breast cancer (HCC)  Staging form: Breast, AJCC 8th Edition  - Pathologic: Stage IA (pT1c, pN1a, cM0, G1, ER+, [place]+, HER2-) - Signed by Michelle Davis M.D. on 2020  Neoadjuvant therapy: No  Laterality: Right  Multigene prognostic tests performed: None  Histologic grading system: 3 grade system      TREATMENT SUMMARY:  Radiation Treatments     Active   Plans   R Breast   Most recent treatment: Dose planned: 180 cGy (fraction 16 of 28 on 2020)   Total: Dose planned: 5,040 cGy   Elapsed Course Treatment Days:  @       R SCV   Most recent treatment: Dose planned: 180 cGy (fraction 16 of 28 on 2020)   Total: Dose planned: 5,040 cGy   Elapsed Course Treatment Days:  @       Reference Points   R Breast   Most recent treatment: Dose given: 180 cGy (on 2020)   Total: Dose given: 2,880 cGy   Elapsed Course Treatment Days:  @       R SCV   Most recent treatment: Dose given: 180 cGy (on 2020)   Total: Dose given: 2,880 cGy   Elapsed Course Treatment Days:  @       R SCV CP   Most recent treatment: Dose given: 180 cGy (on 2020)   Total: Dose given: 2,880 cGy   Elapsed Course Treatment Days:  @       Rt Breast CP   Most recent treatment: Dose given: 180 cGy (on 2020)   Total: Dose given: 2,880 cGy   Elapsed Course Treatment Days:  @                     SUBJECTIVE:   No complaints doing better than last week      VITAL SIGNS:  /92 (BP Location: Left arm, Patient Position: Sitting, BP Cuff Size: Adult)   Pulse (!) 109   Temp 36.2 °C (97.2 °F) (Temporal)   Wt 64 kg (141 lb 3.2 oz)   SpO2 98%    Encounter Vitals  Temperature:  36.2 °C (97.2 °F)  Temp src: Temporal  Blood Pressure: 136/92  Pulse: (!) 109  Pulse Oximetry: 98 %  Weight: 64 kg (141 lb 3.2 oz)  Pain Score: 4=Slight-Moderate Pain  Pain Assessment 2/26/2020 2/12/2020   Pain Assessment Acute Pain Acute Pain   Pain Score 4 6   Pain Loc Breast Breast   Some recent data might be hidden          PHYSICAL EXAM:    Mild erythema in the treatment field    Toxicity Assessment 2/26/2020 2/12/2020 2/5/2020   Toxicity Assessment Breast Breast Breast   Fatigue (lethargy, malaise, asthenia) Increased fatigue over baseline, but not altering normal activities Increased fatigue over baseline, but not altering normal activities None   Fever (in the absence of neutropenia) None None None   Radiation Dermatitis None None None   Lymphatics Normal Normal Normal   RT - Pain due to RT None Mild pain not interfering with function None   Dyspnea Normal Normal Normal         IMPRESSION:  Cancer Staging  Breast cancer (HCC)  Staging form: Breast, AJCC 8th Edition  - Pathologic: Stage IA (pT1c, pN1a, cM0, G1, ER+, [place]+, HER2-) - Signed by Michelle Davis M.D. on 1/7/2020      PLAN:  No change in treatment plan    Disposition:  Treatment plan reviewed. Questions answered. Continue therapy outlined.     Michelle Davis M.D.    No orders of the defined types were placed in this encounter.

## 2020-02-27 ENCOUNTER — HOSPITAL ENCOUNTER (OUTPATIENT)
Dept: RADIATION ONCOLOGY | Facility: MEDICAL CENTER | Age: 50
End: 2020-02-27
Payer: MEDICARE

## 2020-02-27 ENCOUNTER — TELEPHONE (OUTPATIENT)
Dept: VASCULAR LAB | Facility: MEDICAL CENTER | Age: 50
End: 2020-02-27

## 2020-02-27 LAB

## 2020-02-27 PROCEDURE — 77412 RADIATION TX DELIVERY LVL 3: CPT | Performed by: RADIOLOGY

## 2020-02-27 PROCEDURE — 77417 THER RADIOLOGY PORT IMAGE(S): CPT | Performed by: RADIOLOGY

## 2020-02-27 NOTE — TELEPHONE ENCOUNTER
1X-lft vm    Received anticoagulation referral for Eliquis due to PE from Dr. Mayes on 2/19/20     Appears that pt has [facility] as of right now, as such may want to wait to come in for appt until after HH has ended, this is patient preference. Please call and schedule new anticoagulation visit.     Insurance: Medicare  PCP: [facility]  Locations to be seen: [facility] at [phone], fax [phone]     Sue Mcdermott, ChrisD

## 2020-02-28 ENCOUNTER — HOSPITAL ENCOUNTER (OUTPATIENT)
Dept: RADIATION ONCOLOGY | Facility: MEDICAL CENTER | Age: 50
End: 2020-02-28
Payer: MEDICARE

## 2020-02-28 LAB

## 2020-02-28 PROCEDURE — 77412 RADIATION TX DELIVERY LVL 3: CPT | Performed by: RADIOLOGY

## 2020-02-28 PROCEDURE — 77336 RADIATION PHYSICS CONSULT: CPT | Performed by: RADIOLOGY

## 2020-03-02 ENCOUNTER — HOSPITAL ENCOUNTER (OUTPATIENT)
Dept: RADIATION ONCOLOGY | Facility: MEDICAL CENTER | Age: 50
End: 2020-03-31
Attending: RADIOLOGY
Payer: MEDICARE

## 2020-03-02 ENCOUNTER — HOSPITAL ENCOUNTER (OUTPATIENT)
Dept: RADIATION ONCOLOGY | Facility: MEDICAL CENTER | Age: 50
End: 2020-03-02
Payer: MEDICARE

## 2020-03-02 ENCOUNTER — ANTICOAGULATION VISIT (OUTPATIENT)
Dept: VASCULAR LAB | Facility: MEDICAL CENTER | Age: 50
End: 2020-03-02
Attending: INTERNAL MEDICINE
Payer: MEDICARE

## 2020-03-02 DIAGNOSIS — I27.82 OTHER CHRONIC PULMONARY EMBOLISM WITHOUT ACUTE COR PULMONALE (HCC): ICD-10-CM

## 2020-03-02 LAB

## 2020-03-02 PROCEDURE — 77417 THER RADIOLOGY PORT IMAGE(S): CPT | Performed by: RADIOLOGY

## 2020-03-02 PROCEDURE — 99212 OFFICE O/P EST SF 10 MIN: CPT | Mod: 25

## 2020-03-02 PROCEDURE — 77412 RADIATION TX DELIVERY LVL 3: CPT | Performed by: RADIOLOGY

## 2020-03-02 NOTE — PROGRESS NOTES
NEW DOAC   .  PCP: Tabitha Bautista M.D.  Cardiologist: None  Dx: Pulmonary Embolism    Pt Hx: Pt with a PMH of breast cancer intraductal carcinoma ER LA positive s/p partial mastectomy undergoing chemotherapy and radiation treatment. CTA on 2/17/2020 showed new bilateral chronic PEs. Originally started on therapeutic lovenox and then transitioned to Eliquis 10 mg twice daily for one week. She is now on Eliquis 5 mg BID for at least 6 months per MD discharge notes.     She does have a history of GI bleed over a year ago.    Labs:  Lab Results   Component Value Date/Time    WBC 7.5 02/23/2020 05:20 AM    RBC 4.75 02/23/2020 05:20 AM    HEMOGLOBIN 14.1 02/23/2020 05:20 AM    HEMATOCRIT 41.5 02/23/2020 05:20 AM    MCV 87.4 02/23/2020 05:20 AM    MCH 29.7 02/23/2020 05:20 AM    MCHC 34.0 02/23/2020 05:20 AM    MPV 11.5 02/23/2020 05:20 AM    NEUTSPOLYS 79.30 (H) 02/23/2020 05:20 AM    LYMPHOCYTES 10.70 (L) 02/23/2020 05:20 AM    MONOCYTES 8.50 02/23/2020 05:20 AM    EOSINOPHILS 0.40 02/23/2020 05:20 AM    EOSINOPHILS 62 08/11/2008 03:35 PM    BASOPHILS 0.80 02/23/2020 05:20 AM    HYPOCHROMIA 1 05/30/2014 06:55 PM    ANISOCYTOSIS 1 05/20/2014 09:30 PM      Lab Results   Component Value Date/Time    SODIUM 141 02/23/2020 04:41 AM    POTASSIUM 3.7 02/23/2020 04:41 AM    CHLORIDE 112 02/23/2020 04:41 AM    CO2 17 (L) 02/23/2020 04:41 AM    GLUCOSE 110 (H) 02/23/2020 04:41 AM    BUN 8 02/23/2020 04:41 AM    CREATININE 0.69 02/23/2020 04:41 AM    CREATININE 0.6 08/12/2008 05:45 AM      Last H/H: 14.2  Last H/H: 43.2  Last Platelets: 330   Last Creatinine: 0.64  Last LFTs:   Ref. Range 2/16/2020 02:00   AST(SGOT) Latest Ref Range: 12 - 45 U/L 14   ALT(SGPT) Latest Ref Range: 2 - 50 U/L 12       Pt is new to Eliquis and new to [facility]. Discussed:   · Indication for DOAC therapy.  · Importance of monitoring and compliance.   · Monitoring parameters, signs and symptoms of bleeding or clotting.  · DOAC therapy, side effects, potential  DDIs, OTC medications  · ASA: none  · DDI: none  · Lifestyle safety, ie smoking, ETOH, hobby safety, fall safety/prevention  · Procedures for missed doses or suspected missed doses, surgeries/procedures, travel, dental work, any medication changes    Start with Eliquis 10mg taken 2 times a day for 1 week and then decrease to 5mg taken 2 times daily thereafter. She has already made this transition.    DOAC affordable = yes  Samples provided: none    Labs to be completed prior to next f/u - CBC, CMP    F/U - one month    Michelle Johnson, PharmD      ADDENDUM:  CHARGES REVIEWED  Jyoti Interiano, Clinical Pharmacist, CDE, CACP

## 2020-03-03 ENCOUNTER — HOSPITAL ENCOUNTER (OUTPATIENT)
Dept: RADIATION ONCOLOGY | Facility: MEDICAL CENTER | Age: 50
End: 2020-03-03
Payer: MEDICARE

## 2020-03-03 LAB

## 2020-03-03 PROCEDURE — 77427 RADIATION TX MANAGEMENT X5: CPT | Performed by: RADIOLOGY

## 2020-03-03 PROCEDURE — 77412 RADIATION TX DELIVERY LVL 3: CPT | Performed by: RADIOLOGY

## 2020-03-04 ENCOUNTER — APPOINTMENT (OUTPATIENT)
Dept: RADIATION ONCOLOGY | Facility: MEDICAL CENTER | Age: 50
End: 2020-03-04
Payer: MEDICARE

## 2020-03-04 VITALS
WEIGHT: 139.9 LBS | HEART RATE: 98 BPM | DIASTOLIC BLOOD PRESSURE: 78 MMHG | OXYGEN SATURATION: 100 % | SYSTOLIC BLOOD PRESSURE: 118 MMHG | BODY MASS INDEX: 24.01 KG/M2 | TEMPERATURE: 97.2 F

## 2020-03-04 LAB

## 2020-03-04 PROCEDURE — 77412 RADIATION TX DELIVERY LVL 3: CPT | Performed by: RADIOLOGY

## 2020-03-04 ASSESSMENT — PAIN SCALES - GENERAL: PAINLEVEL: NO PAIN

## 2020-03-04 ASSESSMENT — FIBROSIS 4 INDEX: FIB4 SCORE: 0.62

## 2020-03-04 NOTE — ON TREATMENT VISIT
ON TREATMENT NOTE  RADIATION ONCOLOGY DEPARTMENT    Patient name:  Rasheeda Manzano    Primary Physician:  Tabitha Bautista M.D. MRN: [id number]  CSN: [id number]   Referring physician:  Brice Johnson M.D. : 1970, 49 y.o.     ENCOUNTER DATE:  20    DIAGNOSIS:    Breast cancer (HCC)  Staging form: Breast, AJCC 8th Edition  - Pathologic: Stage IA (pT1c, pN1a, cM0, G1, ER+, [place]+, HER2-) - Signed by Michelle Davis M.D. on 2020  Neoadjuvant therapy: No  Laterality: Right  Multigene prognostic tests performed: None  Histologic grading system: 3 grade system      TREATMENT SUMMARY:  Radiation Treatments     Active   Plans   R Breast   Most recent treatment: Dose planned: 180 cGy (fraction 21 of 28 on 3/4/2020)   Total: Dose planned: 5,040 cGy   Elapsed Course Treatment Days:  @       R SCV   Most recent treatment: Dose planned: 180 cGy (fraction 21 of 28 on 3/4/2020)   Total: Dose planned: 5,040 cGy   Elapsed Course Treatment Days:  @ [id number]      Reference Points   R Breast   Most recent treatment: Dose given: 180 cGy (on 3/4/2020)   Total: Dose given: 3,780 cGy   Elapsed Course Treatment Days:  @       R SCV   Most recent treatment: Dose given: 180 cGy (on 3/4/2020)   Total: Dose given: 3,780 cGy   Elapsed Course Treatment Days:  @ [id number]      R SCV CP   Most recent treatment: Dose given: 180 cGy (on 3/4/2020)   Total: Dose given: 3,780 cGy   Elapsed Course Treatment Days:  @ [id number]      Rt Breast CP   Most recent treatment: Dose given: 180 cGy (on 3/4/2020)   Total: Dose given: 3,780 cGy   Elapsed Course Treatment Days:  @                     SUBJECTIVE:   Doing well no complaints      VITAL SIGNS:  /78 (BP Location: Left arm, Patient Position: Sitting, BP Cuff Size: Adult)   Pulse 98   Temp 36.2 °C (97.2 °F) (Temporal)   Wt 63.5 kg (139 lb 14.4 oz)   SpO2 100%    Encounter Vitals  Temperature: 36.2 °C (97.2 °F)  Temp  src: Temporal  Blood Pressure: 118/78  Pulse: 98  Pulse Oximetry: 100 %  Weight: 63.5 kg (139 lb 14.4 oz)  Pain Score: No pain  Pain Assessment 3/4/2020 2/26/2020 2/12/2020   Pain Assessment Denies Pain Acute Pain Acute Pain   Pain Score 0 4 6   Pain Loc - Breast Breast   Some recent data might be hidden          PHYSICAL EXAM:  Slight erythema in the inframammary fold      Toxicity Assessment 3/4/2020 2/26/2020 2/12/2020 2/5/2020   Toxicity Assessment Breast Breast Breast Breast   Fatigue (lethargy, malaise, asthenia) Increased fatigue over baseline, but not altering normal activities Increased fatigue over baseline, but not altering normal activities Increased fatigue over baseline, but not altering normal activities None   Fever (in the absence of neutropenia) - None None None   Radiation Dermatitis Faint erythema or dry desquamation None None None   Lymphatics Normal Normal Normal Normal   RT - Pain due to RT None None Mild pain not interfering with function None   Dyspnea Normal Normal Normal Normal         IMPRESSION:  Cancer Staging  Breast cancer (HCC)  Staging form: Breast, AJCC 8th Edition  - Pathologic: Stage IA (pT1c, pN1a, cM0, G1, ER+, [place]+, HER2-) - Signed by Michelle Davis M.D. on 1/7/2020      PLAN:  No change in treatment plan    Disposition:  Treatment plan reviewed. Questions answered. Continue therapy outlined.     Michelle Davis M.D.    Orders Placed This Encounter   • Rad Onc Aria Session Summary

## 2020-03-05 ENCOUNTER — HOSPITAL ENCOUNTER (OUTPATIENT)
Dept: RADIATION ONCOLOGY | Facility: MEDICAL CENTER | Age: 50
End: 2020-03-05
Payer: MEDICARE

## 2020-03-05 LAB

## 2020-03-05 PROCEDURE — 77412 RADIATION TX DELIVERY LVL 3: CPT | Performed by: RADIOLOGY

## 2020-03-06 ENCOUNTER — HOSPITAL ENCOUNTER (OUTPATIENT)
Dept: RADIATION ONCOLOGY | Facility: MEDICAL CENTER | Age: 50
End: 2020-03-06
Payer: MEDICARE

## 2020-03-06 LAB

## 2020-03-06 PROCEDURE — 77336 RADIATION PHYSICS CONSULT: CPT | Performed by: RADIOLOGY

## 2020-03-06 PROCEDURE — 77412 RADIATION TX DELIVERY LVL 3: CPT | Performed by: RADIOLOGY

## 2020-03-08 PROCEDURE — 93005 ELECTROCARDIOGRAM TRACING: CPT | Performed by: EMERGENCY MEDICINE

## 2020-03-08 PROCEDURE — 99284 EMERGENCY DEPT VISIT MOD MDM: CPT

## 2020-03-08 PROCEDURE — 93005 ELECTROCARDIOGRAM TRACING: CPT

## 2020-03-08 ASSESSMENT — FIBROSIS 4 INDEX: FIB4 SCORE: 0.62

## 2020-03-09 ENCOUNTER — HOSPITAL ENCOUNTER (OUTPATIENT)
Dept: RADIATION ONCOLOGY | Facility: MEDICAL CENTER | Age: 50
End: 2020-03-09
Payer: MEDICARE

## 2020-03-09 ENCOUNTER — HOSPITAL ENCOUNTER (EMERGENCY)
Facility: MEDICAL CENTER | Age: 50
End: 2020-03-09
Attending: EMERGENCY MEDICINE
Payer: MEDICARE

## 2020-03-09 VITALS
HEIGHT: 64 IN | OXYGEN SATURATION: 98 % | TEMPERATURE: 97 F | SYSTOLIC BLOOD PRESSURE: 111 MMHG | WEIGHT: 143.96 LBS | RESPIRATION RATE: 18 BRPM | DIASTOLIC BLOOD PRESSURE: 80 MMHG | HEART RATE: 84 BPM | BODY MASS INDEX: 24.58 KG/M2

## 2020-03-09 DIAGNOSIS — R10.13 EPIGASTRIC PAIN: ICD-10-CM

## 2020-03-09 DIAGNOSIS — R74.8 ELEVATED LIPASE: ICD-10-CM

## 2020-03-09 LAB
ALBUMIN SERPL BCP-MCNC: 3.8 G/DL (ref 3.2–4.9)
ALBUMIN/GLOB SERPL: 1.5 G/DL
ALP SERPL-CCNC: 71 U/L (ref 30–99)
ALT SERPL-CCNC: 8 U/L (ref 2–50)
ANION GAP SERPL CALC-SCNC: 9 MMOL/L (ref 0–11.9)
AST SERPL-CCNC: 9 U/L (ref 12–45)
BASOPHILS # BLD AUTO: 0.5 % (ref 0–1.8)
BASOPHILS # BLD: 0.03 K/UL (ref 0–0.12)
BILIRUB SERPL-MCNC: 0.4 MG/DL (ref 0.1–1.5)
BUN SERPL-MCNC: 13 MG/DL (ref 8–22)
CALCIUM SERPL-MCNC: 9.3 MG/DL (ref 8.5–10.5)
CHEMOTHERAPY INFUSION START DATE: NORMAL
CHEMOTHERAPY RECORDS: 1.8
CHEMOTHERAPY RECORDS: 1.8
CHEMOTHERAPY RECORDS: 5040
CHEMOTHERAPY RECORDS: 5040
CHEMOTHERAPY RECORDS: NORMAL
CHEMOTHERAPY RX CANCER: NORMAL
CHLORIDE SERPL-SCNC: 111 MMOL/L (ref 96–112)
CO2 SERPL-SCNC: 23 MMOL/L (ref 20–33)
CREAT SERPL-MCNC: 0.72 MG/DL (ref 0.5–1.4)
DATE 1ST CHEMO CANCER: NORMAL
EKG IMPRESSION: NORMAL
EOSINOPHIL # BLD AUTO: 0.1 K/UL (ref 0–0.51)
EOSINOPHIL NFR BLD: 1.7 % (ref 0–6.9)
ERYTHROCYTE [DISTWIDTH] IN BLOOD BY AUTOMATED COUNT: 52.3 FL (ref 35.9–50)
GLOBULIN SER CALC-MCNC: 2.6 G/DL (ref 1.9–3.5)
GLUCOSE SERPL-MCNC: 107 MG/DL (ref 65–99)
HCT VFR BLD AUTO: 38 % (ref 37–47)
HGB BLD-MCNC: 12.6 G/DL (ref 12–16)
IMM GRANULOCYTES # BLD AUTO: 0.02 K/UL (ref 0–0.11)
IMM GRANULOCYTES NFR BLD AUTO: 0.3 % (ref 0–0.9)
LIPASE SERPL-CCNC: 100 U/L (ref 11–82)
LYMPHOCYTES # BLD AUTO: 1.11 K/UL (ref 1–4.8)
LYMPHOCYTES NFR BLD: 19 % (ref 22–41)
MCH RBC QN AUTO: 30.1 PG (ref 27–33)
MCHC RBC AUTO-ENTMCNC: 33.2 G/DL (ref 33.6–35)
MCV RBC AUTO: 90.7 FL (ref 81.4–97.8)
MONOCYTES # BLD AUTO: 0.6 K/UL (ref 0–0.85)
MONOCYTES NFR BLD AUTO: 10.3 % (ref 0–13.4)
NEUTROPHILS # BLD AUTO: 3.99 K/UL (ref 2–7.15)
NEUTROPHILS NFR BLD: 68.2 % (ref 44–72)
NRBC # BLD AUTO: 0 K/UL
NRBC BLD-RTO: 0 /100 WBC
PLATELET # BLD AUTO: 220 K/UL (ref 164–446)
PMV BLD AUTO: 10.8 FL (ref 9–12.9)
POTASSIUM SERPL-SCNC: 3.1 MMOL/L (ref 3.6–5.5)
PROT SERPL-MCNC: 6.4 G/DL (ref 6–8.2)
RAD ONC ARIA COURSE LAST TREATMENT DATE: NORMAL
RAD ONC ARIA COURSE TREATMENT ELAPSED DAYS: NORMAL
RAD ONC ARIA REFERENCE POINT DOSAGE GIVEN TO DATE: 43.2
RAD ONC ARIA REFERENCE POINT ID: NORMAL
RAD ONC ARIA REFERENCE POINT SESSION DOSAGE GIVEN: 1.8
RBC # BLD AUTO: 4.19 M/UL (ref 4.2–5.4)
SODIUM SERPL-SCNC: 143 MMOL/L (ref 135–145)
TROPONIN T SERPL-MCNC: 8 NG/L (ref 6–19)
WBC # BLD AUTO: 5.9 K/UL (ref 4.8–10.8)

## 2020-03-09 PROCEDURE — 80053 COMPREHEN METABOLIC PANEL: CPT

## 2020-03-09 PROCEDURE — 83690 ASSAY OF LIPASE: CPT

## 2020-03-09 PROCEDURE — A9270 NON-COVERED ITEM OR SERVICE: HCPCS | Performed by: EMERGENCY MEDICINE

## 2020-03-09 PROCEDURE — 85025 COMPLETE CBC W/AUTO DIFF WBC: CPT

## 2020-03-09 PROCEDURE — 77412 RADIATION TX DELIVERY LVL 3: CPT | Performed by: RADIOLOGY

## 2020-03-09 PROCEDURE — 700102 HCHG RX REV CODE 250 W/ 637 OVERRIDE(OP): Performed by: EMERGENCY MEDICINE

## 2020-03-09 PROCEDURE — 84484 ASSAY OF TROPONIN QUANT: CPT

## 2020-03-09 RX ORDER — HYDROCODONE BITARTRATE AND ACETAMINOPHEN 5; 325 MG/1; MG/1
2 TABLET ORAL ONCE
Status: COMPLETED | OUTPATIENT
Start: 2020-03-09 | End: 2020-03-09

## 2020-03-09 RX ADMIN — LIDOCAINE HYDROCHLORIDE 30 ML: 20 SOLUTION OROPHARYNGEAL at 01:29

## 2020-03-09 RX ADMIN — HYDROCODONE BITARTRATE AND ACETAMINOPHEN 2 TABLET: 5; 325 TABLET ORAL at 02:06

## 2020-03-09 ASSESSMENT — LIFESTYLE VARIABLES
DO YOU DRINK ALCOHOL: YES
TOTAL SCORE: 0
TOTAL SCORE: 0
CONSUMPTION TOTAL: INCOMPLETE
HAVE PEOPLE ANNOYED YOU BY CRITICIZING YOUR DRINKING: NO
TOTAL SCORE: 0
EVER HAD A DRINK FIRST THING IN THE MORNING TO STEADY YOUR NERVES TO GET RID OF A HANGOVER: NO
HAVE YOU EVER FELT YOU SHOULD CUT DOWN ON YOUR DRINKING: NO
EVER FELT BAD OR GUILTY ABOUT YOUR DRINKING: NO

## 2020-03-09 NOTE — ED TRIAGE NOTES
Chief Complaint   Patient presents with   • Chest Pain     x30 min; pt has multiple blood clots throughout lungs diagnosed a couple weeks ago   • Shortness of Breath     x30 min; see above       Pt ambulatory to triage for above complaint. Pt has breast cancer that has traveled to her lymph nodes and is currently undergoing radiation therapy. Pt recently diagnosed with multiple blood clots and has been taking Eliquis as prescribed.    Pt is alert/oriented and follows commands. Pt speaking in full sentences and responds appropriately to questions. No acute distress noted in triage and respirations are even and unlabored.     Pt taken to EKG room by ED tech from triage.

## 2020-03-09 NOTE — ED NOTES
Discharge education provided. Discharge paperwork signed and given to pt. All questions answered. All belongings with pt. Pt ambulated to lobby unassisted.

## 2020-03-09 NOTE — ED PROVIDER NOTES
ED Provider Note    CHIEF COMPLAINT  Chief Complaint   Patient presents with   • Chest Pain     x30 min; pt has multiple blood clots throughout lungs diagnosed a couple weeks ago   • Shortness of Breath     x30 min; see above       HPI  Rasheeda Manzano is a 49 y.o. female who presents to the emergency department chief complaint of upper epigastric lower chest pain.  Patient has multiple medical issues including history of pulmonary embolism compliant with her Eliquis.  She states there is been a little bit of associated nausea and the pain kind of just started this evening.  However she endorses compliance with her medications she denies any current difficulty breathing any fevers or chills or cough she denies any lower extremity edema.  States pain is more sharp does not radiate anywhere including the jaw arms or back.    After reviewing the patient's chart she has had 2 CT pulmonary angiograms within the last month for the same complaint both of these showed no significant changes    REVIEW OF SYSTEMS  Positives as above. Pertinent negatives include diarrhea constipation nausea vomiting back pain jaw pain arm pain weakness numbness tingling lower extremity edema  All other review of systems are negative    PAST MEDICAL HISTORY   has a past medical history of Acute nasopharyngitis, Blind, C. difficile colitis, C. difficile diarrhea (2013), Cancer ([facility]), Chronic daily headache, Depression, Esophageal atresia (1971), Esophageal bleeding (12/18/2019), Fall, GERD (gastroesophageal reflux disease), H/O total hysterectomy, Heart burn (12/18/2019), Hydrocephalus ([facility]), Hydrocephalus ([facility]), Indigestion (12/18/2019), Jaundice, Legally blind, Migraine without aura, without mention of intractable migraine without mention of status migrainosus, Other specified symptom associated with female genital organs, Pain, Pain (12/18/2019), Psychiatric problem, PTSD (post-traumatic stress disorder), Seizure ([facility])  "(2019), and Snoring (2019).    SOCIAL HISTORY  Social History     Tobacco Use   • Smoking status: Former Smoker     Packs/day: 1.00     Years: 10.00     Pack years: 10.00     Types: Cigars     Start date: 2004     Last attempt to quit: 2017     Years since quittin.5   • Smokeless tobacco: Former User   • Tobacco comment:  CIGAR/day    Substance and Sexual Activity   • Alcohol use: Yes     Frequency: Monthly or less     Drinks per session: 1 or 2   • Drug use: No   • Sexual activity: Yes     Partners: Male       SURGICAL HISTORY   has a past surgical history that includes laparoscopy (08); lysis adhesions general (12/10/2013); cystoscopy (12/10/2013); aakash by laparoscopy (N/A, 2015); gastroscopy-endo (N/A, 2017); nissen fundoplication laparoscopic; abdominal hysterectomy total (12/10/2013); other; exploratory laparotomy (12/10/2013); appendectomy (12/10/2013); cholecystectomy (N/A, 2015); gastroscopy (N/A, 2019); mastectomy (Right, 2019); and node biopsy sentinel (Right, 2019).    CURRENT MEDICATIONS  Home Medications    **Home medications have not yet been reviewed for this encounter**         ALLERGIES  Allergies   Allergen Reactions   • Tape Rash     RXN= ongoing  Adhesive Medical tape. Per patient, paper tape ok.   • Latex Rash     Rash       PHYSICAL EXAM  VITAL SIGNS: /76   Pulse 86   Temp 36.1 °C (97 °F) (Temporal)   Resp 18   Ht 1.626 m (5' 4\")   Wt 65.3 kg (143 lb 15.4 oz)   LMP 2013   SpO2 97%   BMI 24.71 kg/m²    Pulse ox interpretation: I interpret this pulse ox as normal.  Constitutional: Alert in no apparent distress.  HENT: Normocephalic atraumatic, MMM  Eyes: PER, Conjunctiva normal, Non-icteric.   Neck: Normal range of motion, No tenderness, Supple, No stridor.   Cardiovascular: Regular rate and rhythm, no murmurs.   Thorax & Lungs: Normal breath sounds, No respiratory distress, No wheezing, No chest tenderness. "   Abdomen: Bowel sounds normal, Soft, N epigastric tenderness to palpation, No pulsatile masses. No peritoneal signs.  Skin: Warm, Dry, No erythema, No rash.   Back: No bony tenderness, No CVA tenderness.   Extremities/MSK: Intact distal pulses, No edema, No tenderness, No cyanosis, no major deformities noted  Neurologic: Alert and oriented x3, No focal deficits noted.       DIFFERENTIAL DIAGNOSIS AND WORK UP PLAN    This is a 49 y.o. female who presents with epigastric pain and lower chest pain, she does have a little bit epigastric pain on examination, after reviewing patient's chart she has had several work-ups for recurrence of pulmonary embolism although there is been no acute change and she is been compliant with her Eliquis and there is no history of worsening of her PEs on her Eliquis.  She is not tachycardic she is not hypoxic she is not in respiratory distress and otherwise well-appearing.  We had a long discussion that I like to attempt to just treat her epigastric pain and if that improves then potentially we may not need to re-CT scan her especially in light of her normal vital signs and compliance with her medications and her recent negative work-up x2    DIAGNOSTIC STUDIES / PROCEDURES    EKG  Results for orders placed or performed during the hospital encounter of 20   EKG   Result Value Ref Range    Report       [facility] Emergency Dept.    Test Date:  2020  Pt Name:    NILAY MANN               Department: ER  MRN:        [id number]                      Room:  Gender:     Female                       Technician: [id number]  :        1970                   Requested By:ER TRIAGE PROTOCOL  Order #:    [id number]                    Reading MD: Michelle Montero MD    Measurements  Intervals                                Axis  Rate:       89                           P:          24  IL:         132                          QRS:        57  QRSD:       76                  "          T:          52  QT:         376  QTc:        458    Interpretive Statements  SINUS RHYTHM at a rate of 89 no ST elevations or ST depressions no abnormal T    wave inversions no pathognomonic Q waves normal intervals normal axis  Compared to ECG 02/23/2020 04:26:35  Sinus tachycardia no longer present  Electronically Signed On 3-9-2020 5:35:57 PDT by Michelle Montero MD         LABS  Pertinent Lab Findings  CBC within normal limits lipase mildly elevated at 100 CMP with potassium 3.1 otherwise normal negative troponin      RADIOLOGY  No orders to display     The radiologist's interpretation of all radiological studies have been reviewed by me.      COURSE & MEDICAL DECISION MAKING  Pertinent Labs & Imaging studies reviewed. (See chart for details)    2:22 AM  Patient had a minimal response to GI cocktail with a positive response to oral hydrocodone.  I reassessed her at the bedside she is resting comfortably again has not been hypoxic tachycardic tachypneic or showing any signs of recurrence or worsening of pulmonary embolism in the setting of compliance with her Eliquis and normal CTs twice in the last month I do not believe that she has had a worsening of her PE.  She feels much better after the oral pain meds.  She just received a prescription 3 weeks ago for 8410 mg of hydrocodone and we discussed that I will not be able to write her for anything more at home.  She did have a mildly elevated lipase though not 3 times the upper limit of normal -she denies alcohol abuse and is already had a cholecystectomy.  We discussed a liquid diet over the next few days and pain meds as needed and she will return to the emergency department for any new or worsening symptoms    /80   Pulse 84   Temp 36.1 °C (97 °F) (Temporal)   Resp 18   Ht 1.626 m (5' 4\")   Wt 65.3 kg (143 lb 15.4 oz)   LMP 11/27/2013   SpO2 98%   BMI 24.71 kg/m²     The patient will return for new or worsening symptoms and is stable at " the time of discharge.    The patient is referred to a primary physician for blood pressure management, diabetic screening, and for all other preventative health concerns.    DISPOSITION:  Patient will be discharged home in stable condition.    FOLLOW UP:  Tabitha Bautista M.D.  [address] [facility] [address]  [phone]    Schedule an appointment as soon as possible for a visit       [facility], Emergency Dept  [address] [facility] [address] [phone]    If symptoms worsen      OUTPATIENT MEDICATIONS:  Discharge Medication List as of 3/9/2020  2:30 AM            FINAL IMPRESSION  1. Epigastric pain     2. Elevated lipase             Electronically signed by: Michelle Montero M.D., 3/9/2020 12:59 AM    This dictation has been created using voice recognition software and/or scribes. The accuracy of the dictation is limited by the abilities of the software and the expertise of the scribes. I expect there may be some errors of grammar and possibly content. I made every attempt to manually correct the errors within my dictation. However, errors related to voice recognition software and/or scribes may still exist and should be interpreted within the appropriate context.

## 2020-03-10 ENCOUNTER — TELEPHONE (OUTPATIENT)
Dept: VASCULAR LAB | Facility: MEDICAL CENTER | Age: 50
End: 2020-03-10

## 2020-03-10 ENCOUNTER — HOSPITAL ENCOUNTER (OUTPATIENT)
Dept: RADIATION ONCOLOGY | Facility: MEDICAL CENTER | Age: 50
End: 2020-03-10
Payer: MEDICARE

## 2020-03-10 LAB
CHEMOTHERAPY INFUSION START DATE: NORMAL
CHEMOTHERAPY RECORDS: 1.8
CHEMOTHERAPY RECORDS: 1.8
CHEMOTHERAPY RECORDS: 5040
CHEMOTHERAPY RECORDS: 5040
CHEMOTHERAPY RECORDS: NORMAL
CHEMOTHERAPY RX CANCER: NORMAL
DATE 1ST CHEMO CANCER: NORMAL
RAD ONC ARIA COURSE LAST TREATMENT DATE: NORMAL
RAD ONC ARIA COURSE TREATMENT ELAPSED DAYS: NORMAL
RAD ONC ARIA REFERENCE POINT DOSAGE GIVEN TO DATE: 45
RAD ONC ARIA REFERENCE POINT ID: NORMAL
RAD ONC ARIA REFERENCE POINT SESSION DOSAGE GIVEN: 1.8

## 2020-03-10 PROCEDURE — 77417 THER RADIOLOGY PORT IMAGE(S): CPT | Performed by: RADIOLOGY

## 2020-03-10 PROCEDURE — 77412 RADIATION TX DELIVERY LVL 3: CPT | Performed by: RADIOLOGY

## 2020-03-10 PROCEDURE — 77427 RADIATION TX MANAGEMENT X5: CPT | Performed by: RADIOLOGY

## 2020-03-10 NOTE — TELEPHONE ENCOUNTER
Initial anticoag note and most recent dc summary reviewed.  Pending further recs from heme, we will continue with indefinite anticoagulation with eliquis as recommended at d/c for malignancy associated PE.     Michael Bloch, MD  Anticoagulation Clinic    Cc:  DAINA Wang

## 2020-03-11 ENCOUNTER — HOSPITAL ENCOUNTER (OUTPATIENT)
Facility: MEDICAL CENTER | Age: 50
End: 2020-03-12
Attending: EMERGENCY MEDICINE | Admitting: HOSPITALIST
Payer: MEDICARE

## 2020-03-11 ENCOUNTER — APPOINTMENT (OUTPATIENT)
Dept: RADIOLOGY | Facility: MEDICAL CENTER | Age: 50
End: 2020-03-11
Attending: EMERGENCY MEDICINE
Payer: MEDICARE

## 2020-03-11 ENCOUNTER — APPOINTMENT (OUTPATIENT)
Dept: RADIATION ONCOLOGY | Facility: MEDICAL CENTER | Age: 50
End: 2020-03-11
Payer: MEDICARE

## 2020-03-11 VITALS
OXYGEN SATURATION: 97 % | HEART RATE: 87 BPM | SYSTOLIC BLOOD PRESSURE: 110 MMHG | BODY MASS INDEX: 24.68 KG/M2 | DIASTOLIC BLOOD PRESSURE: 84 MMHG | TEMPERATURE: 97.2 F | WEIGHT: 143.8 LBS

## 2020-03-11 DIAGNOSIS — R07.9 CHEST PAIN, UNSPECIFIED TYPE: ICD-10-CM

## 2020-03-11 DIAGNOSIS — C50.911 MALIGNANT NEOPLASM OF RIGHT FEMALE BREAST, UNSPECIFIED ESTROGEN RECEPTOR STATUS, UNSPECIFIED SITE OF BREAST (HCC): ICD-10-CM

## 2020-03-11 DIAGNOSIS — R52 INTRACTABLE PAIN: Primary | ICD-10-CM

## 2020-03-11 DIAGNOSIS — R11.2 NON-INTRACTABLE VOMITING WITH NAUSEA, UNSPECIFIED VOMITING TYPE: ICD-10-CM

## 2020-03-11 LAB
ALBUMIN SERPL BCP-MCNC: 4.1 G/DL (ref 3.2–4.9)
ALBUMIN/GLOB SERPL: 1.6 G/DL
ALP SERPL-CCNC: 74 U/L (ref 30–99)
ALT SERPL-CCNC: 39 U/L (ref 2–50)
ANION GAP SERPL CALC-SCNC: 12 MMOL/L (ref 0–11.9)
AST SERPL-CCNC: 40 U/L (ref 12–45)
BASOPHILS # BLD AUTO: 0.8 % (ref 0–1.8)
BASOPHILS # BLD: 0.08 K/UL (ref 0–0.12)
BILIRUB SERPL-MCNC: 0.7 MG/DL (ref 0.1–1.5)
BUN SERPL-MCNC: 14 MG/DL (ref 8–22)
CALCIUM SERPL-MCNC: 9.8 MG/DL (ref 8.5–10.5)
CHEMOTHERAPY INFUSION START DATE: NORMAL
CHEMOTHERAPY RECORDS: 1.8
CHEMOTHERAPY RECORDS: 1.8
CHEMOTHERAPY RECORDS: 5040
CHEMOTHERAPY RECORDS: 5040
CHEMOTHERAPY RECORDS: NORMAL
CHEMOTHERAPY RX CANCER: NORMAL
CHLORIDE SERPL-SCNC: 103 MMOL/L (ref 96–112)
CO2 SERPL-SCNC: 22 MMOL/L (ref 20–33)
CREAT SERPL-MCNC: 0.65 MG/DL (ref 0.5–1.4)
DATE 1ST CHEMO CANCER: NORMAL
EOSINOPHIL # BLD AUTO: 0.12 K/UL (ref 0–0.51)
EOSINOPHIL NFR BLD: 1.2 % (ref 0–6.9)
ERYTHROCYTE [DISTWIDTH] IN BLOOD BY AUTOMATED COUNT: 50.2 FL (ref 35.9–50)
GLOBULIN SER CALC-MCNC: 2.5 G/DL (ref 1.9–3.5)
GLUCOSE SERPL-MCNC: 109 MG/DL (ref 65–99)
HCT VFR BLD AUTO: 43.3 % (ref 37–47)
HGB BLD-MCNC: 14 G/DL (ref 12–16)
IMM GRANULOCYTES # BLD AUTO: 0.03 K/UL (ref 0–0.11)
IMM GRANULOCYTES NFR BLD AUTO: 0.3 % (ref 0–0.9)
LIPASE SERPL-CCNC: 23 U/L (ref 11–82)
LYMPHOCYTES # BLD AUTO: 0.73 K/UL (ref 1–4.8)
LYMPHOCYTES NFR BLD: 7.4 % (ref 22–41)
MCH RBC QN AUTO: 29.7 PG (ref 27–33)
MCHC RBC AUTO-ENTMCNC: 32.3 G/DL (ref 33.6–35)
MCV RBC AUTO: 91.7 FL (ref 81.4–97.8)
MONOCYTES # BLD AUTO: 0.5 K/UL (ref 0–0.85)
MONOCYTES NFR BLD AUTO: 5.1 % (ref 0–13.4)
NEUTROPHILS # BLD AUTO: 8.35 K/UL (ref 2–7.15)
NEUTROPHILS NFR BLD: 85.2 % (ref 44–72)
NRBC # BLD AUTO: 0 K/UL
NRBC BLD-RTO: 0 /100 WBC
PLATELET # BLD AUTO: 255 K/UL (ref 164–446)
PMV BLD AUTO: 11 FL (ref 9–12.9)
POTASSIUM SERPL-SCNC: 3.8 MMOL/L (ref 3.6–5.5)
PROT SERPL-MCNC: 6.6 G/DL (ref 6–8.2)
RAD ONC ARIA COURSE LAST TREATMENT DATE: NORMAL
RAD ONC ARIA COURSE TREATMENT ELAPSED DAYS: NORMAL
RAD ONC ARIA REFERENCE POINT DOSAGE GIVEN TO DATE: 46.8
RAD ONC ARIA REFERENCE POINT ID: NORMAL
RAD ONC ARIA REFERENCE POINT SESSION DOSAGE GIVEN: 1.8
RBC # BLD AUTO: 4.72 M/UL (ref 4.2–5.4)
SODIUM SERPL-SCNC: 137 MMOL/L (ref 135–145)
TROPONIN T SERPL-MCNC: 8 NG/L (ref 6–19)
WBC # BLD AUTO: 9.8 K/UL (ref 4.8–10.8)

## 2020-03-11 PROCEDURE — 700111 HCHG RX REV CODE 636 W/ 250 OVERRIDE (IP): Performed by: EMERGENCY MEDICINE

## 2020-03-11 PROCEDURE — 77334 RADIATION TREATMENT AID(S): CPT | Mod: 26 | Performed by: RADIOLOGY

## 2020-03-11 PROCEDURE — 81001 URINALYSIS AUTO W/SCOPE: CPT

## 2020-03-11 PROCEDURE — 96374 THER/PROPH/DIAG INJ IV PUSH: CPT

## 2020-03-11 PROCEDURE — 700105 HCHG RX REV CODE 258: Performed by: EMERGENCY MEDICINE

## 2020-03-11 PROCEDURE — 77307 TELETHX ISODOSE PLAN CPLX: CPT | Mod: 26 | Performed by: RADIOLOGY

## 2020-03-11 PROCEDURE — 83690 ASSAY OF LIPASE: CPT

## 2020-03-11 PROCEDURE — 84484 ASSAY OF TROPONIN QUANT: CPT

## 2020-03-11 PROCEDURE — 99220 PR INITIAL OBSERVATION CARE,LEVL III: CPT | Mod: AI | Performed by: HOSPITALIST

## 2020-03-11 PROCEDURE — 85025 COMPLETE CBC W/AUTO DIFF WBC: CPT

## 2020-03-11 PROCEDURE — 80053 COMPREHEN METABOLIC PANEL: CPT

## 2020-03-11 PROCEDURE — 36415 COLL VENOUS BLD VENIPUNCTURE: CPT

## 2020-03-11 PROCEDURE — 93005 ELECTROCARDIOGRAM TRACING: CPT | Performed by: EMERGENCY MEDICINE

## 2020-03-11 PROCEDURE — 77412 RADIATION TX DELIVERY LVL 3: CPT | Performed by: RADIOLOGY

## 2020-03-11 PROCEDURE — 77334 RADIATION TREATMENT AID(S): CPT | Performed by: RADIOLOGY

## 2020-03-11 PROCEDURE — 71045 X-RAY EXAM CHEST 1 VIEW: CPT

## 2020-03-11 PROCEDURE — 99285 EMERGENCY DEPT VISIT HI MDM: CPT

## 2020-03-11 PROCEDURE — 96375 TX/PRO/DX INJ NEW DRUG ADDON: CPT

## 2020-03-11 PROCEDURE — G0378 HOSPITAL OBSERVATION PER HR: HCPCS

## 2020-03-11 PROCEDURE — 700111 HCHG RX REV CODE 636 W/ 250 OVERRIDE (IP)

## 2020-03-11 PROCEDURE — 77307 TELETHX ISODOSE PLAN CPLX: CPT | Performed by: RADIOLOGY

## 2020-03-11 RX ORDER — ONDANSETRON 4 MG/1
4 TABLET, ORALLY DISINTEGRATING ORAL ONCE
Status: COMPLETED | OUTPATIENT
Start: 2020-03-11 | End: 2020-03-11

## 2020-03-11 RX ORDER — HYDROMORPHONE HYDROCHLORIDE 1 MG/ML
1 INJECTION, SOLUTION INTRAMUSCULAR; INTRAVENOUS; SUBCUTANEOUS ONCE
Status: COMPLETED | OUTPATIENT
Start: 2020-03-11 | End: 2020-03-11

## 2020-03-11 RX ORDER — METOCLOPRAMIDE HYDROCHLORIDE 5 MG/ML
10 INJECTION INTRAMUSCULAR; INTRAVENOUS ONCE
Status: COMPLETED | OUTPATIENT
Start: 2020-03-11 | End: 2020-03-11

## 2020-03-11 RX ORDER — SODIUM CHLORIDE 9 MG/ML
1000 INJECTION, SOLUTION INTRAVENOUS ONCE
Status: COMPLETED | OUTPATIENT
Start: 2020-03-11 | End: 2020-03-11

## 2020-03-11 RX ADMIN — METOCLOPRAMIDE 10 MG: 5 INJECTION, SOLUTION INTRAMUSCULAR; INTRAVENOUS at 22:34

## 2020-03-11 RX ADMIN — ONDANSETRON 4 MG: 4 TABLET, ORALLY DISINTEGRATING ORAL at 20:52

## 2020-03-11 RX ADMIN — HYDROMORPHONE HYDROCHLORIDE 1 MG: 1 INJECTION, SOLUTION INTRAMUSCULAR; INTRAVENOUS; SUBCUTANEOUS at 22:34

## 2020-03-11 RX ADMIN — SODIUM CHLORIDE 1000 ML: 9 INJECTION, SOLUTION INTRAVENOUS at 22:33

## 2020-03-11 ASSESSMENT — FIBROSIS 4 INDEX
FIB4 SCORE: 0.69
FIB4 SCORE: 0.69

## 2020-03-11 ASSESSMENT — ENCOUNTER SYMPTOMS
VOMITING: 1
DIARRHEA: 1
NAUSEA: 1

## 2020-03-11 ASSESSMENT — PAIN SCALES - GENERAL: PAINLEVEL: NO PAIN

## 2020-03-11 NOTE — ON TREATMENT VISIT
ON TREATMENT NOTE  RADIATION ONCOLOGY DEPARTMENT    Patient name:  Rasheeda Manzano    Primary Physician:  Tabitha Bautista M.D. MRN: [id number]  CSN: [id number]   Referring physician:  Brice Johnson M.D. : 1970, 49 y.o.     ENCOUNTER DATE:  20    DIAGNOSIS:    Breast cancer (HCC)  Staging form: Breast, AJCC 8th Edition  - Pathologic: Stage IA (pT1c, pN1a, cM0, G1, ER+, [place]+, HER2-) - Signed by Michelle Davis M.D. on 2020  Neoadjuvant therapy: No  Laterality: Right  Multigene prognostic tests performed: None  Histologic grading system: 3 grade system      TREATMENT SUMMARY:  Radiation Treatments     Active   Plans   R Breast   Most recent treatment: Dose planned: 180 cGy (fraction 26 of 28 on 3/11/2020)   Total: Dose planned: 5,040 cGy   Elapsed Course Treatment Days: 36 @       R SCV   Most recent treatment: Dose planned: 180 cGy (fraction 26 of 28 on 3/11/2020)   Total: Dose planned: 5,040 cGy   Elapsed Course Treatment Days: 36 @       Reference Points   R Breast   Most recent treatment: Dose given: 180 cGy (on 3/11/2020)   Total: Dose given: 4,680 cGy   Elapsed Course Treatment Days: 36 @       R SCV   Most recent treatment: Dose given: 180 cGy (on 3/11/2020)   Total: Dose given: 4,680 cGy   Elapsed Course Treatment Days: 36 @       R SCV CP   Most recent treatment: Dose given: 180 cGy (on 3/11/2020)   Total: Dose given: 4,680 cGy   Elapsed Course Treatment Days: 36 @       Rt Breast CP   Most recent treatment: Dose given: 180 cGy (on 3/11/2020)   Total: Dose given: 4,680 cGy   Elapsed Course Treatment Days: 36 @                     SUBJECTIVE:   Having trouble sleeping.  Did not  prescription for Ambien.      VITAL SIGNS:  There were no vitals taken for this visit.      Pain Assessment 3/4/2020 2020 2020   Pain Assessment Denies Pain Acute Pain Acute Pain   Pain Score 0 4 6   Pain Loc - Breast  Breast   Some recent data might be hidden          PHYSICAL EXAM:  Slight erythema in the treatment field in the inframammary fold, otherwise no change      Toxicity Assessment 3/4/2020 2/26/2020 2/12/2020 2/5/2020   Toxicity Assessment Breast Breast Breast Breast   Fatigue (lethargy, malaise, asthenia) Increased fatigue over baseline, but not altering normal activities Increased fatigue over baseline, but not altering normal activities Increased fatigue over baseline, but not altering normal activities None   Fever (in the absence of neutropenia) - None None None   Radiation Dermatitis Faint erythema or dry desquamation None None None   Lymphatics Normal Normal Normal Normal   RT - Pain due to RT None None Mild pain not interfering with function None   Dyspnea Normal Normal Normal Normal         IMPRESSION:  Cancer Staging  Breast cancer (HCC)  Staging form: Breast, AJCC 8th Edition  - Pathologic: Stage IA (pT1c, pN1a, cM0, G1, ER+, [place]+, HER2-) - Signed by Michelle Davis M.D. on 1/7/2020      PLAN:  No change in treatment plan.  We will try and  Ambien.    Disposition:  Treatment plan reviewed. Questions answered. Continue therapy outlined.     Michelle Davis M.D.    Orders Placed This Encounter   • Rad Onc Aria Session Summary

## 2020-03-12 ENCOUNTER — HOSPITAL ENCOUNTER (OUTPATIENT)
Dept: RADIATION ONCOLOGY | Facility: MEDICAL CENTER | Age: 50
End: 2020-03-12
Payer: MEDICARE

## 2020-03-12 VITALS
SYSTOLIC BLOOD PRESSURE: 94 MMHG | WEIGHT: 147.49 LBS | BODY MASS INDEX: 25.18 KG/M2 | HEIGHT: 64 IN | TEMPERATURE: 99.5 F | RESPIRATION RATE: 16 BRPM | DIASTOLIC BLOOD PRESSURE: 56 MMHG | OXYGEN SATURATION: 94 % | HEART RATE: 94 BPM

## 2020-03-12 PROBLEM — R52 UNCONTROLLED PAIN: Status: ACTIVE | Noted: 2020-03-12

## 2020-03-12 PROBLEM — R52 UNCONTROLLED PAIN: Status: RESOLVED | Noted: 2020-03-12 | Resolved: 2020-03-12

## 2020-03-12 LAB
APPEARANCE UR: CLEAR
BACTERIA #/AREA URNS HPF: ABNORMAL /HPF
BILIRUB UR QL STRIP.AUTO: NEGATIVE
CHEMOTHERAPY INFUSION START DATE: NORMAL
CHEMOTHERAPY RECORDS: 1.8
CHEMOTHERAPY RECORDS: 1.8
CHEMOTHERAPY RECORDS: 5040
CHEMOTHERAPY RECORDS: 5040
CHEMOTHERAPY RECORDS: NORMAL
CHEMOTHERAPY RX CANCER: NORMAL
COLOR UR: YELLOW
DATE 1ST CHEMO CANCER: NORMAL
EKG IMPRESSION: NORMAL
EPI CELLS #/AREA URNS HPF: ABNORMAL /HPF
GLUCOSE UR STRIP.AUTO-MCNC: NEGATIVE MG/DL
HYALINE CASTS #/AREA URNS LPF: ABNORMAL /LPF
KETONES UR STRIP.AUTO-MCNC: ABNORMAL MG/DL
LEUKOCYTE ESTERASE UR QL STRIP.AUTO: ABNORMAL
MICRO URNS: ABNORMAL
NITRITE UR QL STRIP.AUTO: NEGATIVE
PH UR STRIP.AUTO: 5 [PH] (ref 5–8)
PROT UR QL STRIP: NEGATIVE MG/DL
RAD ONC ARIA COURSE LAST TREATMENT DATE: NORMAL
RAD ONC ARIA COURSE TREATMENT ELAPSED DAYS: NORMAL
RAD ONC ARIA REFERENCE POINT DOSAGE GIVEN TO DATE: 48.6
RAD ONC ARIA REFERENCE POINT ID: NORMAL
RAD ONC ARIA REFERENCE POINT SESSION DOSAGE GIVEN: 1.8
RBC # URNS HPF: ABNORMAL /HPF
RBC UR QL AUTO: NEGATIVE
SP GR UR STRIP.AUTO: 1.03
UROBILINOGEN UR STRIP.AUTO-MCNC: 0.2 MG/DL
WBC #/AREA URNS HPF: ABNORMAL /HPF

## 2020-03-12 PROCEDURE — 700111 HCHG RX REV CODE 636 W/ 250 OVERRIDE (IP): Performed by: HOSPITALIST

## 2020-03-12 PROCEDURE — G0378 HOSPITAL OBSERVATION PER HR: HCPCS

## 2020-03-12 PROCEDURE — A9270 NON-COVERED ITEM OR SERVICE: HCPCS | Performed by: EMERGENCY MEDICINE

## 2020-03-12 PROCEDURE — A9270 NON-COVERED ITEM OR SERVICE: HCPCS | Performed by: INTERNAL MEDICINE

## 2020-03-12 PROCEDURE — 96375 TX/PRO/DX INJ NEW DRUG ADDON: CPT

## 2020-03-12 PROCEDURE — 77412 RADIATION TX DELIVERY LVL 3: CPT | Performed by: RADIOLOGY

## 2020-03-12 PROCEDURE — 700102 HCHG RX REV CODE 250 W/ 637 OVERRIDE(OP): Performed by: EMERGENCY MEDICINE

## 2020-03-12 PROCEDURE — 700102 HCHG RX REV CODE 250 W/ 637 OVERRIDE(OP): Performed by: INTERNAL MEDICINE

## 2020-03-12 PROCEDURE — A9270 NON-COVERED ITEM OR SERVICE: HCPCS | Performed by: HOSPITALIST

## 2020-03-12 PROCEDURE — 700102 HCHG RX REV CODE 250 W/ 637 OVERRIDE(OP): Performed by: HOSPITALIST

## 2020-03-12 PROCEDURE — 700101 HCHG RX REV CODE 250: Performed by: HOSPITALIST

## 2020-03-12 PROCEDURE — 99217 PR OBSERVATION CARE DISCHARGE: CPT | Performed by: INTERNAL MEDICINE

## 2020-03-12 RX ORDER — TOPIRAMATE 100 MG/1
200 TABLET, FILM COATED ORAL
Status: DISCONTINUED | OUTPATIENT
Start: 2020-03-12 | End: 2020-03-12 | Stop reason: HOSPADM

## 2020-03-12 RX ORDER — OXYCODONE HYDROCHLORIDE 5 MG/1
5 TABLET ORAL
Status: DISCONTINUED | OUTPATIENT
Start: 2020-03-12 | End: 2020-03-12 | Stop reason: HOSPADM

## 2020-03-12 RX ORDER — ZOLPIDEM TARTRATE 5 MG/1
10 TABLET ORAL NIGHTLY PRN
Status: DISCONTINUED | OUTPATIENT
Start: 2020-03-12 | End: 2020-03-12 | Stop reason: HOSPADM

## 2020-03-12 RX ORDER — ONDANSETRON 4 MG/1
4 TABLET, ORALLY DISINTEGRATING ORAL EVERY 4 HOURS PRN
Status: DISCONTINUED | OUTPATIENT
Start: 2020-03-12 | End: 2020-03-12 | Stop reason: HOSPADM

## 2020-03-12 RX ORDER — AMOXICILLIN 250 MG
2 CAPSULE ORAL 2 TIMES DAILY
Status: DISCONTINUED | OUTPATIENT
Start: 2020-03-12 | End: 2020-03-12 | Stop reason: HOSPADM

## 2020-03-12 RX ORDER — PROMETHAZINE HYDROCHLORIDE 25 MG/1
12.5-25 TABLET ORAL EVERY 4 HOURS PRN
Status: DISCONTINUED | OUTPATIENT
Start: 2020-03-12 | End: 2020-03-12 | Stop reason: HOSPADM

## 2020-03-12 RX ORDER — PROPRANOLOL HCL 60 MG
120 CAPSULE, EXTENDED RELEASE 24HR ORAL
Status: DISCONTINUED | OUTPATIENT
Start: 2020-03-12 | End: 2020-03-12 | Stop reason: HOSPADM

## 2020-03-12 RX ORDER — PROCHLORPERAZINE EDISYLATE 5 MG/ML
5-10 INJECTION INTRAMUSCULAR; INTRAVENOUS EVERY 4 HOURS PRN
Status: DISCONTINUED | OUTPATIENT
Start: 2020-03-12 | End: 2020-03-12 | Stop reason: HOSPADM

## 2020-03-12 RX ORDER — LEVETIRACETAM 500 MG/1
1000 TABLET ORAL
Status: DISCONTINUED | OUTPATIENT
Start: 2020-03-12 | End: 2020-03-12 | Stop reason: HOSPADM

## 2020-03-12 RX ORDER — OXYCODONE HCL 10 MG/1
10 TABLET, FILM COATED, EXTENDED RELEASE ORAL EVERY 12 HOURS
Qty: 14 EACH | Refills: 0 | Status: SHIPPED | OUTPATIENT
Start: 2020-03-12 | End: 2020-03-18

## 2020-03-12 RX ORDER — HYDROXYZINE 50 MG/1
50 TABLET, FILM COATED ORAL
Status: DISCONTINUED | OUTPATIENT
Start: 2020-03-12 | End: 2020-03-12 | Stop reason: HOSPADM

## 2020-03-12 RX ORDER — OXYCODONE HYDROCHLORIDE 10 MG/1
10 TABLET ORAL
Status: DISCONTINUED | OUTPATIENT
Start: 2020-03-12 | End: 2020-03-12 | Stop reason: HOSPADM

## 2020-03-12 RX ORDER — HYDROMORPHONE HYDROCHLORIDE 1 MG/ML
0.5 INJECTION, SOLUTION INTRAMUSCULAR; INTRAVENOUS; SUBCUTANEOUS
Status: DISCONTINUED | OUTPATIENT
Start: 2020-03-12 | End: 2020-03-12 | Stop reason: HOSPADM

## 2020-03-12 RX ORDER — ONDANSETRON 2 MG/ML
4 INJECTION INTRAMUSCULAR; INTRAVENOUS EVERY 4 HOURS PRN
Status: DISCONTINUED | OUTPATIENT
Start: 2020-03-12 | End: 2020-03-12 | Stop reason: HOSPADM

## 2020-03-12 RX ORDER — PROMETHAZINE HYDROCHLORIDE 25 MG/1
12.5-25 SUPPOSITORY RECTAL EVERY 4 HOURS PRN
Status: DISCONTINUED | OUTPATIENT
Start: 2020-03-12 | End: 2020-03-12 | Stop reason: HOSPADM

## 2020-03-12 RX ORDER — LISINOPRIL 20 MG/1
20 TABLET ORAL DAILY
Status: DISCONTINUED | OUTPATIENT
Start: 2020-03-12 | End: 2020-03-12 | Stop reason: HOSPADM

## 2020-03-12 RX ORDER — OXYCODONE HYDROCHLORIDE AND ACETAMINOPHEN 5; 325 MG/1; MG/1
1 TABLET ORAL ONCE
Status: COMPLETED | OUTPATIENT
Start: 2020-03-12 | End: 2020-03-12

## 2020-03-12 RX ORDER — OMEPRAZOLE 20 MG/1
20 CAPSULE, DELAYED RELEASE ORAL DAILY
Status: DISCONTINUED | OUTPATIENT
Start: 2020-03-12 | End: 2020-03-12 | Stop reason: HOSPADM

## 2020-03-12 RX ORDER — BISACODYL 10 MG
10 SUPPOSITORY, RECTAL RECTAL
Status: DISCONTINUED | OUTPATIENT
Start: 2020-03-12 | End: 2020-03-12 | Stop reason: HOSPADM

## 2020-03-12 RX ORDER — ESOMEPRAZOLE MAGNESIUM 40 MG/1
40 CAPSULE, DELAYED RELEASE ORAL
Status: DISCONTINUED | OUTPATIENT
Start: 2020-03-12 | End: 2020-03-12

## 2020-03-12 RX ORDER — POLYETHYLENE GLYCOL 3350 17 G/17G
1 POWDER, FOR SOLUTION ORAL
Status: DISCONTINUED | OUTPATIENT
Start: 2020-03-12 | End: 2020-03-12 | Stop reason: HOSPADM

## 2020-03-12 RX ORDER — OXYCODONE HCL 10 MG/1
10 TABLET, FILM COATED, EXTENDED RELEASE ORAL EVERY 12 HOURS
Status: DISCONTINUED | OUTPATIENT
Start: 2020-03-12 | End: 2020-03-12 | Stop reason: HOSPADM

## 2020-03-12 RX ORDER — ONDANSETRON 4 MG/1
4 TABLET, ORALLY DISINTEGRATING ORAL EVERY 8 HOURS PRN
Qty: 21 TAB | Refills: 0 | Status: ON HOLD | OUTPATIENT
Start: 2020-03-12 | End: 2020-03-23

## 2020-03-12 RX ADMIN — LEVETIRACETAM 1000 MG: 500 TABLET ORAL at 05:13

## 2020-03-12 RX ADMIN — TOPIRAMATE 200 MG: 100 TABLET, FILM COATED ORAL at 03:13

## 2020-03-12 RX ADMIN — LISINOPRIL 20 MG: 20 TABLET ORAL at 05:14

## 2020-03-12 RX ADMIN — OXYCODONE HYDROCHLORIDE 10 MG: 10 TABLET ORAL at 02:14

## 2020-03-12 RX ADMIN — OXYCODONE HYDROCHLORIDE 10 MG: 10 TABLET, FILM COATED, EXTENDED RELEASE ORAL at 09:51

## 2020-03-12 RX ADMIN — OXYCODONE HYDROCHLORIDE 10 MG: 10 TABLET ORAL at 05:22

## 2020-03-12 RX ADMIN — OMEPRAZOLE 20 MG: 20 CAPSULE, DELAYED RELEASE ORAL at 05:14

## 2020-03-12 RX ADMIN — APIXABAN 5 MG: 5 TABLET, FILM COATED ORAL at 05:15

## 2020-03-12 RX ADMIN — OXYCODONE HYDROCHLORIDE AND ACETAMINOPHEN 1 TABLET: 5; 325 TABLET ORAL at 00:16

## 2020-03-12 RX ADMIN — ONDANSETRON 4 MG: 2 INJECTION INTRAMUSCULAR; INTRAVENOUS at 02:15

## 2020-03-12 RX ADMIN — ZOLPIDEM TARTRATE 10 MG: 5 TABLET ORAL at 02:14

## 2020-03-12 RX ADMIN — HYDROXYZINE HYDROCHLORIDE 50 MG: 50 TABLET, FILM COATED ORAL at 05:14

## 2020-03-12 ASSESSMENT — LIFESTYLE VARIABLES
AVERAGE NUMBER OF DAYS PER WEEK YOU HAVE A DRINK CONTAINING ALCOHOL: 0
EVER FELT BAD OR GUILTY ABOUT YOUR DRINKING: NO
HOW MANY TIMES IN THE PAST YEAR HAVE YOU HAD 5 OR MORE DRINKS IN A DAY: 0
TOTAL SCORE: 0
HAVE YOU EVER FELT YOU SHOULD CUT DOWN ON YOUR DRINKING: NO
EVER_SMOKED: NEVER
CONSUMPTION TOTAL: NEGATIVE
TOTAL SCORE: 0
HAVE PEOPLE ANNOYED YOU BY CRITICIZING YOUR DRINKING: NO
TOTAL SCORE: 0
ON A TYPICAL DAY WHEN YOU DRINK ALCOHOL HOW MANY DRINKS DO YOU HAVE: 0
ALCOHOL_USE: NO
EVER HAD A DRINK FIRST THING IN THE MORNING TO STEADY YOUR NERVES TO GET RID OF A HANGOVER: NO

## 2020-03-12 ASSESSMENT — ENCOUNTER SYMPTOMS
SHORTNESS OF BREATH: 0
VOMITING: 0
SORE THROAT: 0
DIZZINESS: 0
PHOTOPHOBIA: 0
PALPITATIONS: 0
COUGH: 0
ABDOMINAL PAIN: 0
WHEEZING: 0
MYALGIAS: 0
TINGLING: 0
FEVER: 0
DEPRESSION: 0
CHILLS: 0
HEADACHES: 0
FOCAL WEAKNESS: 0
NAUSEA: 0
DIARRHEA: 0

## 2020-03-12 ASSESSMENT — FIBROSIS 4 INDEX: FIB4 SCORE: 1.21

## 2020-03-12 ASSESSMENT — PATIENT HEALTH QUESTIONNAIRE - PHQ9
SUM OF ALL RESPONSES TO PHQ9 QUESTIONS 1 AND 2: 0
1. LITTLE INTEREST OR PLEASURE IN DOING THINGS: NOT AT ALL
1. LITTLE INTEREST OR PLEASURE IN DOING THINGS: NOT AT ALL
2. FEELING DOWN, DEPRESSED, IRRITABLE, OR HOPELESS: NOT AT ALL
SUM OF ALL RESPONSES TO PHQ9 QUESTIONS 1 AND 2: 0
2. FEELING DOWN, DEPRESSED, IRRITABLE, OR HOPELESS: NOT AT ALL

## 2020-03-12 NOTE — ED NOTES
Patient reports that she came to the ED today of N/V, CP, and Cough. Patient states that she is currently getting Radiation for breast Cancer and is currently on Elaquis for blood Clots

## 2020-03-12 NOTE — DISCHARGE INSTRUCTIONS
Discharge Instructions    Discharged to home by car with relative. Discharged via wheelchair, hospital escort: Yes.  Special equipment needed: Not Applicable    Be sure to schedule a follow-up appointment with your primary care doctor or any specialists as instructed.     Discharge Plan:   Diet Plan: Discussed  Activity Level: Discussed  Confirmed Follow up Appointment: Appointment Scheduled  Confirmed Symptoms Management: Discussed  Medication Reconciliation Updated: Yes  Influenza Vaccine Indication: Not indicated: Previously immunized this influenza season and > 8 years of age    I understand that a diet low in cholesterol, fat, and sodium is recommended for good health. Unless I have been given specific instructions below for another diet, I accept this instruction as my diet prescription.   Other diet: Heart healthy     Special Instructions: None    · Is patient discharged on Warfarin / Coumadin?   No     Depression / Suicide Risk    As you are discharged from this [facility] facility, it is important to learn how to keep safe from harming yourself.    Recognize the warning signs:  · Abrupt changes in personality, positive or negative- including increase in energy   · Giving away possessions  · Change in eating patterns- significant weight changes-  positive or negative  · Change in sleeping patterns- unable to sleep or sleeping all the time   · Unwillingness or inability to communicate  · Depression  · Unusual sadness, discouragement and loneliness  · Talk of wanting to die  · Neglect of personal appearance   · Rebelliousness- reckless behavior  · Withdrawal from people/activities they love  · Confusion- inability to concentrate     If you or a loved one observes any of these behaviors or has concerns about self-harm, here's what you can do:  · Talk about it- your feelings and reasons for harming yourself  · Remove any means that you might use to hurt yourself (examples: pills, rope, extension cords,  firearm)  · Get professional help from the community (Mental Health, Substance Abuse, psychological counseling)  · Do not be alone:Call your Safe Contact- someone whom you trust who will be there for you.  · Call your local CRISIS HOTLINE 787-4679 or 616-787-7711  · Call your local Children's Mobile Crisis Response Team [place] (357) 183-6612 or www.[employer]  · Call the toll free National Suicide Prevention Hotlines   · National Suicide Prevention Lifeline 474-999-VULC (1688)  · [facility] Line Network 800-SUICIDE (381-4338)                Discharge Instructions per Bianca Lopez, A.P.R.N.    DIET: Regular diet.    ACTIVITY: No restrictions.     DIAGNOSIS: Uncontrolled pain related to breast cancer.     Return to ER if you experience severe chest pain, pain with breathing, confusion or lethargy, fall with head trauma or uncontrolled bleeding.

## 2020-03-12 NOTE — PROGRESS NOTES
Assumed pt care at 0700. Received report from Marleen JOHNSON. Pt A&O x4. Pt c/o 8/10 pain at this time. Will medicate per MAR. Respirations are even and unlabored on room air.   Updated on POC, communication board updated. Bed locked and in lowest position. Call light and belongings within reach. Non-skid socks in place. Needs met, will continue to monitor.

## 2020-03-12 NOTE — CARE PLAN
Problem: Communication  Goal: The ability to communicate needs accurately and effectively will improve  Outcome: PROGRESSING AS EXPECTED     Problem: Safety  Goal: Will remain free from injury  Outcome: PROGRESSING AS EXPECTED  Goal: Will remain free from falls  Outcome: PROGRESSING AS EXPECTED     Problem: Safety  Goal: Will remain free from falls  Outcome: PROGRESSING AS EXPECTED     Problem: Pain Management  Goal: Pain level will decrease to patient's comfort goal  Outcome: PROGRESSING AS EXPECTED

## 2020-03-12 NOTE — H&P
Hospital Medicine History & Physical Note    Date of Service  3/11/2020    Primary Care Physician  Tabitha Bautista M.D.    Consultants  None    Code Status  Full    Chief Complaint  Chief Complaint   Patient presents with   • N/V   • Chest Pain       History of Presenting Illness  49 y.o. female who presented on 3/11/2020 with chest pain.  This is a 49-year-old female who is known to me from her previous hospitalization.  She carries a known history of breast cancer which is still in the process of being staged and is followed by Dr. Davis of radiation oncology as well as Dr. maria r cardoza of oncology.  She was admitted to our hospital last month for right-sided chest pain and has been diagnosed with pulmonary emboli.  Because of her blindness, she was unable to be discharged home on Lovenox injections and instead was sent home on Eliquis.  Patient carries a history of blindness, hydrocephalus status post shunt placement, seizure disorder, depression, anxiety, and chronic pain on narcotics.  She returns again today with the same complaint of chest pain.  She describes it as centralized, nonradiating, and not associated with diaphoresis, lightheadedness, palpitations, or shortness of breath.  There are no alleviating or aggravating factors.  She does report occasional episodes of nausea with vomiting which contributes to her difficulty keeping foods down.  She has been seen several times in the emergency room with similar complaints.    Review of Systems  Review of Systems   Constitutional: Negative for chills and fever.   HENT: Negative for congestion and sore throat.    Eyes: Negative for photophobia.   Respiratory: Negative for cough, shortness of breath and wheezing.    Cardiovascular: Positive for chest pain. Negative for palpitations.   Gastrointestinal: Negative for abdominal pain, diarrhea, nausea and vomiting.   Genitourinary: Negative for dysuria.   Musculoskeletal: Negative for myalgias.   Skin: Negative.   "  Neurological: Negative for dizziness, tingling, focal weakness and headaches.   Psychiatric/Behavioral: Negative for depression and suicidal ideas.       Past Medical History  Past Medical History:   Diagnosis Date   • Esophageal bleeding 12/18/2019    H/O, \"three times with last one a year ago.\"   • Indigestion 12/18/2019   • Seizure (HCC) 12/18/2019    \"Last seizure one year ago.\"   • Heart burn 12/18/2019   • Pain 12/18/2019    \"Pain In Head From Hydrocephalus.\".   • Snoring 12/18/2019    Has not had a sleep study.   • C. 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   • Acute nasopharyngitis     H/O Cold 12-8-19   • Blind     age 11   • C. difficile colitis     H/O x3   • Cancer (HCC)     Right Breast Cancer DX 2-2019/Moved to right lymph nodes (3/8/2020)   • Chronic daily headache    • Depression    • Fall    • GERD (gastroesophageal reflux disease)    • H/O total hysterectomy    • Hydrocephalus (HCC)    • Hydrocephalus (HCC)     shunt drains into pleural place of L lung   • Jaundice     at birth   • Legally blind    • Migraine without aura, without mention of intractable migraine without mention of status migrainosus    • Other specified symptom associated with female genital organs     excessive bleeding   • Pain     gallbladder related   • Psychiatric problem     depression   • PTSD (post-traumatic stress disorder)        Surgical History  Past Surgical History:   Procedure Laterality Date   • MASTECTOMY Right 12/19/2019    Procedure: MASTECTOMY-PARTIAL;  Surgeon: Brice Johnson M.D.;  Location: SURGERY SAME DAY [facility];  Service: General   • NODE BIOPSY SENTINEL Right 12/19/2019    Procedure: BIOPSY, LYMPH NODE, SENTINEL-AXILLARY;  Surgeon: Brice Johnson M.D.;  Location: SURGERY SAME DAY [facility];  Service: General   • GASTROSCOPY N/A 1/2/2019    Procedure: GASTROSCOPY;  Surgeon: Matias Fernando M.D.;  Location: [facility];  Service: " Gastroenterology   • GASTROSCOPY-ENDO N/A 2017    Procedure: GASTROSCOPY-ENDO;  Surgeon: Matias Fernando M.D.;  Location: ENDOSCOPY [facility];  Service:    • AYALA BY LAPAROSCOPY N/A 2015    Procedure: AYALA BY LAPAROSCOPY;  Surgeon: Brice Johnson M.D.;  Location: SURGERY SAME DAY [facility];  Service:    • CHOLECYSTECTOMY N/A 2015    Procedure: CHOLECYSTECTOMY;  Surgeon: Brice Johnson M.D.;  Location: SURGERY SAME DAY [facility];  Service:    • LYSIS ADHESIONS GENERAL  12/10/2013    Performed by Arie Bass M.D. at SURGERY [facility]   • CYSTOSCOPY  12/10/2013    Performed by Joana Yeager M.D. at [facility]   • ABDOMINAL HYSTERECTOMY TOTAL  12/10/2013    Performed by Joana Yeager M.D. at SURGERY [facility]   • EXPLORATORY LAPAROTOMY  12/10/2013    Performed by Arie Bass M.D. at SURGERY [facility]   • APPENDECTOMY  12/10/2013    Performed by Arie Bass M.D. at SURGERY [facility]   • LAPAROSCOPY  08    Performed by FERNANDO HENDERSON at SURGERY [facility]   • NISSEN FUNDOPLICATION LAPAROSCOPIC     • OTHER      PLEURAL SHUNT, numerous revisions       Family History  Family History   Problem Relation Age of Onset   • Hypertension Mother    • Hypertension Father    • Non-contributory Neg Hx         Migraine       Social History  Social History     Tobacco Use   • Smoking status: Former Smoker     Packs/day: 1.00     Years: 10.00     Pack years: 10.00     Types: Cigars     Start date: 2004     Last attempt to quit: 2017     Years since quittin.5   • Smokeless tobacco: Former User   • Tobacco comment:  CIGAR/day    Substance Use Topics   • Alcohol use: Yes     Frequency: Monthly or less     Drinks per session: 1 or 2   • Drug use: No       Allergies  Allergies   Allergen Reactions   • Tape Rash     RXN= ongoing  Adhesive Medical tape. Per patient, paper tape ok.   • Latex Rash     Rash       Medications  No current  facility-administered medications on file prior to encounter.      Current Outpatient Medications on File Prior to Encounter   Medication Sig Dispense Refill   • apixaban (ELIQUIS) 5mg Tab Take 1 Tab by mouth 2 Times a Day. 60 Tab 5   • lisinopril (PRINIVIL) 20 MG Tab Take 1 Tab by mouth every day. 30 Tab 0   • LORazepam (ATIVAN) 0.5 MG Tab Take 1 Tab by mouth at bedtime as needed for Anxiety for up to 30 days. 30 Tab 0   • senna-docusate (PERICOLACE OR SENOKOT S) 8.6-50 MG Tab Take 2 Tabs by mouth 2 Times a Day. 30 Tab 0   • zolpidem (AMBIEN) 10 MG Tab Take 1 Tab by mouth at bedtime as needed for Sleep for up to 30 days. 30 Tab 0   • hydrOXYzine HCl (ATARAX) 50 MG Tab Take 50 mg by mouth every bedtime.  1   • esomeprazole (NEXIUM) 40 MG delayed-release capsule Take 40 mg by mouth every bedtime.  5   • levETIRAcetam (KEPPRA) 500 MG Tab Take 1,000 mg by mouth every bedtime.     • topiramate (TOPAMAX) 100 MG Tab Take 200 mg by mouth every bedtime.     • propranolol CR (INDERAL LA) 120 MG CAPSULE SR 24 HR Take 120 mg by mouth every bedtime.         Physical Exam  Hemodynamics  Temp (24hrs), Av.2 °C (97.1 °F), Min:35.8 °C (96.4 °F), Max:36.5 °C (97.7 °F)   Temperature: (!) 35.8 °C (96.4 °F)  Pulse  Av.5  Min: 73  Max: 109    Blood Pressure: (!) 96/68      Respiratory      Respiration: 16, Pulse Oximetry: 98 %             Physical Exam   Constitutional: She is oriented to person, place, and time. No distress.   Chronically ill-appearing   HENT:   Head: Normocephalic and atraumatic.   Right Ear: External ear normal.   Left Ear: External ear normal.   Eyes: EOM are normal. Right eye exhibits no discharge. Left eye exhibits no discharge.   Neck: Neck supple. No JVD present.   Cardiovascular: Normal rate, regular rhythm and normal heart sounds.   Pulmonary/Chest: Effort normal and breath sounds normal. No respiratory distress. She exhibits no tenderness.   Abdominal: Soft. Bowel sounds are normal. She exhibits no  distension. There is no abdominal tenderness.   Musculoskeletal: Normal range of motion.         General: No edema.   Neurological: She is alert and oriented to person, place, and time. No cranial nerve deficit.   Skin: Skin is warm and dry. She is not diaphoretic. No erythema.   Psychiatric: She has a normal mood and affect. Her behavior is normal.   Nursing note and vitals reviewed.    Capillary refill less than 3 seconds, distal pulses intact    Laboratory:  Recent Labs     03/09/20  0143 03/11/20  1849   WBC 5.9 9.8   RBC 4.19* 4.72   HEMOGLOBIN 12.6 14.0   HEMATOCRIT 38.0 43.3   MCV 90.7 91.7   MCH 30.1 29.7   MCHC 33.2* 32.3*   RDW 52.3* 50.2*   PLATELETCT 220 255   MPV 10.8 11.0     Recent Labs     03/09/20  0143 03/11/20  1849   SODIUM 143 137   POTASSIUM 3.1* 3.8   CHLORIDE 111 103   CO2 23 22   GLUCOSE 107* 109*   BUN 13 14   CREATININE 0.72 0.65   CALCIUM 9.3 9.8     Recent Labs     03/09/20  0143 03/11/20  1849   ALTSGPT 8 39   ASTSGOT 9* 40   ALKPHOSPHAT 71 74   TBILIRUBIN 0.4 0.7   LIPASE 100* 23   GLUCOSE 107* 109*                 Lab Results   Component Value Date    TROPONINI <0.01 03/24/2019       Imaging  Dx-chest-portable (1 View)    Result Date: 3/11/2020    3/11/2020 10:03 PM HISTORY/REASON FOR EXAM: Chest Pain TECHNIQUE/EXAM DESCRIPTION:  Single AP view of the chest. COMPARISON: February 23, 2020 FINDINGS: Overlying cardiac leads are present. The cardiac silhouette appears within normal limits. The mediastinal contour appears within normal limits.  The central pulmonary vasculature appears normal. The lungs appear well expanded bilaterally.  Left lower lobe opacity is seen. Small left pleural effusion is identified. The bony structures appear age-appropriate.     1.  Left lower lobe infiltrate and layering left pleural effusion    Dx-chest-portable (1 View)    Result Date: 2/23/2020 2/23/2020 4:56 AM HISTORY/REASON FOR EXAM: Chest Pain. TECHNIQUE/EXAM DESCRIPTION AND NUMBER OF VIEWS: Single  AP view of the chest. COMPARISON: 2020 FINDINGS: Hardware: No change. Left ventriculoperitoneal shunt tubing crosses the left chest with out discontinuity noted Unchanged moderate upper thoracic levoscoliosis Lungs: No consolidation detected. Pleura:  No pleural space process is seen. Heart and mediastinum: The cardiomediastinal contours are stable.     No radiographic evidence of acute cardiopulmonary process.    Us-extremity Venous Lower Bilat    Result Date: 2020   Vascular Laboratory  CONCLUSIONS  No evidence of  superficial or deep venous thrombosis bilaterally.  MACKENZIE NILAY  Exam Date:     2020 11:21  Room #:     Inpatient  Priority:     Routine  Ht (in):             Wt (lb):  Ordering Physician:        LIN RAUSCH  Referring Physician:       SHALOM Liriano  Sonographer:               Rema Cotto RVT  Study Type:                Complete Bilateral  Technical Quality:         Adequate  Age:    49    Gender:     F  MRN:    [id number]  :    1970      BSA:  Indications:     Pulmonary Embolus  CPT Codes:       27983  ICD Codes:       415.19  History:         Pulmonary Embolis  Limitations:  PROCEDURES:  Bilateral lower extremity venous duplex imaging.  The following venous structures were evaluated: common femoral, profunda  femoral, greater saphenous, femoral, popliteal , peroneal and posterior  tibial veins.  Serial compression, augmentation maneuvers,  color and spectral Doppler  flow evaluations were performed.  FINDINGS:  Bilateral lower extremities -.  Complete color filling and compressibility with normal venous flow dynamics  including spontaneous flow, response to augmentation maneuvers, and  respiratory phasicity.  No evidence of  superficial or deep venous thrombosis bilaterally.  Mohan Herrera  (Electronically Signed)  Final Date:      2020                   13:31    Ct-cta Chest Pulmonary Artery W/ Recons    Result Date:  2/23/2020 2/23/2020 5:30 AM HISTORY/REASON FOR EXAM:  PE suspected, high pretest prob Chest pain and shortness of breath. Right throbbing chest pain. No new pulmonary emboli TECHNIQUE/EXAM DESCRIPTION: CT angiogram scan for pulmonary embolism with contrast, with reconstructions. 1.25 mm helical sections were obtained from the lung apices through the lung bases following the rapid bolus administration of 38 mL of Omnipaque 350 nonionic contrast. Thin-section overlapping reconstruction interval was utilized. Coronal reconstructions were generated from the axial data. MIP post processing was performed and utilized for the interpretation. Low dose optimization technique was utilized for this CT exam including automated exposure control and adjustment of the mA and/or kV according to patient size. COMPARISON: February 16 FINDINGS: Pulmonary Embolism: Yes, unchanged. Main Pulmonary Arteries: No. Segmental branches: Multiple bilateral lower lobe segmental branch filling defects are nonocclusive and no significant change is seen. Some of these show some calcification confirming chronicity Only if positive for PE: RV diameter: 2.9 cm. LV diameter: 3.3 cm. RV/LV ratio: 0.88. (Greater than 0.9 is abnormal.) Additional Comments: Medium-sized hiatal hernia. Lungs: No acute suspicious nodules or air space process. There is subpleural right middle lobe opacity likely from atelectasis, this is new Pleura: Left ventriculopleural shunt catheter tubing is contiguous and terminates in the left pleural space posteriorly where there is a similar small effusion . Right pleural calcifications are again seen with some plaques Nodes: No enlarged lymph nodes. Additional findings: Cholecystectomy clips.     1.  Stable CT chest 2.  Multiple segmental nonocclusive pulmonary thromboemboli bilaterally. No evidence for right heart strain 3.  Ventriculopleural catheter on the left with stable small left effusion 4.  Stable hiatal hernia 5.  Stable  right pleural calcified and noncalcified plaques likely related to prior shunt positioning    Ct-cta Chest Pulmonary Artery W/ Recons    Result Date: 2/16/2020 2/16/2020 2:04 AM HISTORY/REASON FOR EXAM:  PE suspected, high pretest prob TECHNIQUE/EXAM DESCRIPTION: CT angiogram scan for pulmonary embolism with contrast, with reconstructions. 1.25 mm helical sections were obtained from the lung apices through the lung bases following the rapid bolus administration of 45 mL of Omnipaque 350 nonionic contrast. Thin-section overlapping reconstruction interval was utilized. Coronal reconstructions were generated from the axial data. MIP post processing was performed and utilized for the interpretation. Low dose optimization technique was utilized for this CT exam including automated exposure control and adjustment of the mA and/or kV according to patient size. COMPARISON: 1/10/2020 FINDINGS: Pulmonary Embolism: Yes. Main Pulmonary Arteries: Yes. Segmental branches: Yes. Subsegmental branches: Yes. Only if positive for PE: RV diameter: 2.5 cm. LV diameter: 3.6 cm. RV/LV ratio: 0.69. (Greater than 0.9 is abnormal.) Additional Comments: Pulmonary clot burden is mild and has a chronic appearance with calcification of the segmental and subsegmental filling defects. Lungs: No suspicious nodules or air space process. Pleura: There is a stable small left pleural effusion with associated compressive atelectasis secondary to a ventriculopleural shunt. There are right pleural calcifications and pleural thickening medially which are unchanged from the previous exam. Nodes: No enlarged lymph nodes. Additional findings: There is a small hiatal hernia.     1.  Chronic bilateral pulmonary emboli. No evidence of right heart strain. 2.  Stable small left pleural effusion secondary to ventriculopleural shunt. 3.  Unchanged right pleural calcifications and pleural thickening. 4.  Small hiatal hernia. These findings were discussed with JANIE JAMES  EMIGDIO on 2/16/2020 2:32 AM.        Assessment/Plan:  Anticipate that patient will need less than 2 midnights for management of the discussed medical issues.    * Uncontrolled pain  Assessment & Plan  Patient does carry a history of chronic narcotic use, reviewed the medical records does not indicate any history of drug-seeking behavior.  This may very well be acute on chronic pain in light of her recently diagnosed pulmonary emboli.  She may benefit from long-acting pain treatment.  In the meantime, she will be admitted for symptomatic management.    Pulmonary embolism (HCC)- (present on admission)  Assessment & Plan  Continue home Eliquis.    Essential hypertension- (present on admission)  Assessment & Plan  Patient normotensive at the time of presentation, okay to resume home Prinivil and propranolol    Breast cancer (HCC)- (present on admission)  Assessment & Plan  Outpatient follow-up once medically stable for discharge.      Prophylaxis: Patient is on Eliquis, no additional need for DVT prophylaxis, home PPI indicated, bowel protocol as needed

## 2020-03-12 NOTE — CARE PLAN
Problem: Knowledge Deficit  Goal: Knowledge of disease process/condition, treatment plan, diagnostic tests, and medications will improve  Outcome: PROGRESSING AS EXPECTED   Pt educated regarding plan of care and medications. All questions answered.

## 2020-03-12 NOTE — ED TRIAGE NOTES
Pt comes in complaining of N/V. Pt currently undergoing treatment for breast cancer. Pt denies chemotherapy stating radiation.

## 2020-03-12 NOTE — ASSESSMENT & PLAN NOTE
Patient does carry a history of chronic narcotic use, reviewed the medical records does not indicate any history of drug-seeking behavior.  This may very well be acute on chronic pain in light of her recently diagnosed pulmonary emboli.  She may benefit from long-acting pain treatment.  In the meantime, she will be admitted for symptomatic management.

## 2020-03-12 NOTE — PROGRESS NOTES
Patient discharged. IV removed and discharge instructions provided to patient. Pt verbalized understanding. Pt denies questions. Copy of discharge paperwork provided to patient, RX provided to patient, signed copy in chart. Pt states all belongings are in possession. Pt escorted off the unit via wheelchair without incident.

## 2020-03-12 NOTE — ED PROVIDER NOTES
ED Provider Note    Scribed for HANNAH Carlson II* by Rosie Camilo. 3/11/2020  10:06 PM    Means of Arrival: Walk-in  History obtained by: Patient  Limitations: None    CHIEF COMPLAINT  Chief Complaint   Patient presents with   • N/V   • Chest Pain       HPI  Rasheeda Manzano is a 49 y.o. female with breast cancer who presents to the Emergency Department for nausea and vomiting today. She states she was able to keep food down 45 minutes ago. She states she experienced diarrhea a few days ago, which has since resolved. She endorses associated chest pain. She describes her chest pain as pressure, central.. She denies any cough. The patient is currently undergoing radiology for her breast cancer and recently had surgery December 19, 2019.  She adds that she was informed her cancer has spread to her lymph nodes. She has been taking her normal pain medications at home (norco 10), without alleviation. She reports she has had multiple abdominal surgeries in the past.     She was here 2 days ago with similar concerns.     REVIEW OF SYSTEMS  Review of Systems   Cardiovascular: Positive for chest pain.   Gastrointestinal: Positive for diarrhea, nausea and vomiting.   All other systems reviewed and are negative.    See HPI for further details.    PAST MEDICAL HISTORY   has a past medical history of Acute nasopharyngitis, Blind, C. difficile colitis, C. difficile diarrhea (2013), Cancer (HCC), Chronic daily headache, Depression, Esophageal atresia (1971), Esophageal bleeding (12/18/2019), Fall, GERD (gastroesophageal reflux disease), H/O total hysterectomy, Heart burn (12/18/2019), Hydrocephalus (HCC), Hydrocephalus (HCC), Indigestion (12/18/2019), Jaundice, Legally blind, Migraine without aura, without mention of intractable migraine without mention of status migrainosus, Other specified symptom associated with female genital organs, Pain, Pain (12/18/2019), Psychiatric problem, PTSD (post-traumatic stress  "disorder), Seizure (HCC) (2019), and Snoring (2019).    SOCIAL HISTORY  Social History     Tobacco Use   • Smoking status: Former Smoker     Packs/day: 1.00     Years: 10.00     Pack years: 10.00     Types: Cigars     Start date: 2004     Last attempt to quit: 2017     Years since quittin.5   • Smokeless tobacco: Former User   • Tobacco comment:  CIGAR/day    Substance and Sexual Activity   • Alcohol use: Yes     Frequency: Monthly or less     Drinks per session: 1 or 2   • Drug use: No   • Sexual activity: Yes     Partners: Male       SURGICAL HISTORY   has a past surgical history that includes laparoscopy (08); lysis adhesions general (12/10/2013); cystoscopy (12/10/2013); aakash by laparoscopy (N/A, 2015); gastroscopy-endo (N/A, 2017); nissen fundoplication laparoscopic; abdominal hysterectomy total (12/10/2013); other; exploratory laparotomy (12/10/2013); appendectomy (12/10/2013); cholecystectomy (N/A, 2015); gastroscopy (N/A, 2019); mastectomy (Right, 2019); and node biopsy sentinel (Right, 2019).    CURRENT MEDICATIONS  Home Medications    **Home medications have not yet been reviewed for this encounter**         ALLERGIES  Allergies   Allergen Reactions   • Tape Rash     RXN= ongoing  Adhesive Medical tape. Per patient, paper tape ok.   • Latex Rash     Rash       PHYSICAL EXAM  VITAL SIGNS: BP (!) 96/68   Pulse 78   Temp (!) 35.8 °C (96.4 °F)   Resp 16   Ht 1.626 m (5' 4\")   Wt 64.9 kg (143 lb)   LMP 2013   SpO2 98%   BMI 24.55 kg/m²     Pulse ox interpretation: I interpret this pulse ox as normal.  Constitutional: Alert in no apparent distress. Pleasant, blind 49 year-old female.  HENT: No signs of trauma, Bilateral external ears normal, Nose normal.   Eyes: Blind. No redness.   Neck: Normal range of motion, No tenderness, Supple, No stridor.   Cardiovascular: Regular rate and rhythm, no murmurs. Symmetric distal pulses. No cyanosis of " extremities. No peripheral edema of extremities.  Thorax & Lungs: Normal breath sounds, No respiratory distress, No wheezing.  Abdomen: Well healed post surgical scars, Bowel sounds normal, Soft, No tenderness, No masses, No pulsatile masses. No peritoneal signs.  Skin: radiation marking on her chest, post surgical scars at axilla and breast that are well healed, Warm, Dry, No erythema, No rash.   Back: No midline bony tenderness, No CVA tenderness.   Musculoskeletal: Good range of motion in all major joints. No tenderness to palpation or major deformities noted.   Neurologic: Alert , Normal motor function, Normal sensory function, No focal deficits noted.   Psychiatric: Affect normal, Judgment normal, Mood normal.     DIAGNOSTIC STUDIES / PROCEDURES  Results for orders placed or performed during the hospital encounter of 20   EKG   Result Value Ref Range    Report       [facility] Emergency Dept.    Test Date:  2020  Pt Name:    NILAY MANN               Department: ER  MRN:        [id number]                      Room:       [facility]  Gender:     Female                       Technician: [id number]  :        1970                   Requested By:ER TRIAGE PROTOCOL  Order #:    [id number]                    Reading MD: Meliton Gibson II, MD    Measurements  Intervals                                Axis  Rate:       86                           P:          35  TX:         140                          QRS:        54  QRSD:       74                           T:          44  QT:         384  QTc:        460    Interpretive Statements  SINUS RHYTHM  Rate 86  Normal intervals.  No ST elevation. No ST depression.  Normal sinus rhythm EKG  Compared to ECG 2020 22:36:19  ST (T wave) deviation no longer present  T-wave abnormality no longer present  Q waves no longer present  Electronically Signed On 3- 0:03:57 PDT by Bronson Gibson II, MD       LABS  Pertinent Labs & Imaging  studies reviewed. (See chart for details)    RADIOLOGY  DX-CHEST-PORTABLE (1 VIEW)   Final Result         1.  Left lower lobe infiltrate and layering left pleural effusion        Pertinent Labs & Imaging studies reviewed. (See chart for details)    COURSE & MEDICAL DECISION MAKING  Pertinent Labs & Imaging studies reviewed. (See chart for details)     10:06 PM This is a 49 y.o. female who presents with chest pain and vomiting and the differential diagnosis includes but is not limited to pain from radiation, pain from pulmonary emboli, gastritis. Less likely bowel obstruction since having stools and passing gas. Ordered for Dx-chest, Troponin, CBC with differential, CMP, Lipase, Ua, and EKG to evaluate. Patient will be treated with Zofran 4 mg, Dilaudid 1 mg, and Reglan 10 mg for nausea, vomiting, and chest pain.     11:31 PM - Patient was seen at bedside. I noticed she is taking Eliqus for pulmonary embolism, typically in setting of cancer this is not an adequate anticoagulation, typically this would be managed with Lovenox. I reviewed charting and taking Elquis because she is blind and cannot do lovenox injections.     Labs similar to previous. Lipase slightly elevated 2 days ago but normal today. Troponin negative and unchanged from 2 days ago. EKG as noted above and no changes from previous. CXR with LLL pleural effusion which is chronic and likely from  shunt. No UTI.     12:000 AM I discussed the patient's case and the above findings with Dr. Chavez (hospitalist) who will admit for intractable pain related to malignancy/radiation.  Mrs. Manzano fills 84 tables of hydrocone 10 mg (last filled this week), says she is taking her medication, and that her boyfriend has been administering her medications.     HYDRATION: Based on the patient's presentation of Inability to take oral fluids the patient was given IV fluids. IV Hydration was used because oral hydration was not adequate alone. Upon recheck following  hydration, the patient was improved nausea and heart rate.    DISPOSITION:  Patient will be hospitalized by Dr. Chavez in gaurded condition.    FINAL IMPRESSION  1. Intractable pain    2. Malignant neoplasm of right female breast, unspecified estrogen receptor status, unspecified site of breast (HCC)    3. Chest pain, unspecified type    4. Non-intractable vomiting with nausea, unspecified vomiting type          I, Rosie Camilo (Scribe), am scribing for, and in the presence of, SKYE Carlson II.    Electronically signed by: Rosie Camilo (Scribe), 3/11/2020    IMeliton II, M* personally performed the services described in this documentation, as scribed by Rosie Camilo in my presence, and it is both accurate and complete. C.    The note accurately reflects work and decisions made by me.  Meliton Gibson II, M.D.  3/12/2020  1:39 AM

## 2020-03-12 NOTE — PROGRESS NOTES
Patient received treatment in Radiation Therapy. Patient received treatment number 27  of 33 planned.

## 2020-03-12 NOTE — DISCHARGE SUMMARY
Discharge Summary    CHIEF COMPLAINT ON ADMISSION  Chief Complaint   Patient presents with   • N/V   • Chest Pain       Reason for Admission  N/V     Admission Date  3/11/2020    CODE STATUS  Full Code    HPI & HOSPITAL COURSE  This is a 49 y.o. female here with a PMHx of breast cancer receiving radiation, HTN and pulmonary embolism. She presented for c/o uncontrolled pain and was brought to the CDU for pain management. Pain improved with addition of long-acting narcotic. Nausea and vomiting improved with pain control and patient was able to tolerate regular diet without issue. For these reasons, she was discharged with a short-course prescription for Oxycontin and instructed to follow-up with PCP for further pain management.        Therefore, she is discharged in guarded and stable condition to home with close outpatient follow-up.    The patient recovered much more quickly than anticipated on admission.    Discharge Date  03/12/20    FOLLOW UP ITEMS POST DISCHARGE  Follow-up with PCP    DISCHARGE DIAGNOSES  Principal Problem (Resolved):    Uncontrolled pain POA: Unknown  Active Problems:    Essential hypertension POA: Yes    Pulmonary embolism (HCC) POA: Yes    Breast cancer (HCC) POA: Yes      FOLLOW UP  Future Appointments   Date Time Provider Department Center   3/13/2020  9:30 AM RADIATION THERAPY RADT None   3/16/2020  9:30 AM RADIATION THERAPY RADT None   3/17/2020  9:30 AM RADIATION THERAPY RADT None   3/18/2020  9:30 AM RADIATION THERAPY RADT None   3/19/2020  9:30 AM RADIATION THERAPY RADT None   3/20/2020  9:30 AM RADIATION THERAPY RADT None   3/30/2020 11:15 AM [facility] EXAM 4 VMED None     Tabitha Bautista M.D.  [address] [facility] [address]  [phone]    In 1 week        MEDICATIONS ON DISCHARGE     Medication List      START taking these medications      Instructions   ondansetron 4 MG Tbdp  Commonly known as:  ZOFRAN ODT   Take 1 Tab by mouth every 8 hours as needed for Nausea for up to 7  days.  Dose:  4 mg     oxyCODONE CR 10 MG T12a  Commonly known as:  OXYCONTIN   Take 1 Tab by mouth every 12 hours for 7 days.  Dose:  10 mg        CONTINUE taking these medications      Instructions   apixaban 5mg Tabs  Commonly known as:  ELIQUIS   Doctor's comments:  Starting  2/2420 pm dose  Take 1 Tab by mouth 2 Times a Day.  Dose:  5 mg     esomeprazole 40 MG delayed-release capsule  Commonly known as:  NEXIUM   Take 40 mg by mouth every bedtime.  Dose:  40 mg     hydrOXYzine HCl 50 MG Tabs  Commonly known as:  ATARAX   Take 50 mg by mouth every bedtime.  Dose:  50 mg     levETIRAcetam 500 MG Tabs  Commonly known as:  KEPPRA   Take 1,000 mg by mouth every bedtime.  Dose:  1,000 mg     lisinopril 20 MG Tabs  Commonly known as:  PRINIVIL   Take 1 Tab by mouth every day.  Dose:  20 mg     LORazepam 0.5 MG Tabs  Commonly known as:  ATIVAN   Take 1 Tab by mouth at bedtime as needed for Anxiety for up to 30 days.  Dose:  0.5 mg     propranolol  MG Cp24  Commonly known as:  INDERAL LA   Take 120 mg by mouth every bedtime.  Dose:  120 mg     senna-docusate 8.6-50 MG Tabs  Commonly known as:  PERICOLACE or SENOKOT S   Take 2 Tabs by mouth 2 Times a Day.  Dose:  2 Tab     topiramate 100 MG Tabs  Commonly known as:  TOPAMAX   Take 200 mg by mouth every bedtime.  Dose:  200 mg     zolpidem 10 MG Tabs  Commonly known as:  AMBIEN   Take 1 Tab by mouth at bedtime as needed for Sleep for up to 30 days.  Dose:  10 mg            Allergies  Allergies   Allergen Reactions   • Tape Rash     RXN= ongoing  Adhesive Medical tape. Per patient, paper tape ok.   • Latex Rash     Rash       DIET  Orders Placed This Encounter   Procedures   • Diet Order Regular     Standing Status:   Standing     Number of Occurrences:   1     Order Specific Question:   Diet:     Answer:   Regular [1]       ACTIVITY  As tolerated.  Weight bearing as tolerated    CONSULTATIONS  None     PROCEDURES  None    LABORATORY  Lab Results   Component Value  Date    SODIUM 137 03/11/2020    POTASSIUM 3.8 03/11/2020    CHLORIDE 103 03/11/2020    CO2 22 03/11/2020    GLUCOSE 109 (H) 03/11/2020    BUN 14 03/11/2020    CREATININE 0.65 03/11/2020    CREATININE 0.6 08/12/2008        Lab Results   Component Value Date    WBC 9.8 03/11/2020    HEMOGLOBIN 14.0 03/11/2020    HEMATOCRIT 43.3 03/11/2020    PLATELETCT 255 03/11/2020

## 2020-03-12 NOTE — PROGRESS NOTES
1 Assist to BR r/t blindness- steady gait. Voids adequately. Reports pain under R  breast-PRN meds administered and effective on reassessment. Pt requesting resources for assistance @ home- reports feeling weak after radiation txs which she receives M-Fand per her report she has a difficult time meeting ADLs. Also reports nausea- no emesis noted. POC discussed and verbalized an understanding. All needs met @ this time. Hourly and PRN rounding in practice.

## 2020-03-16 LAB

## 2020-03-16 PROCEDURE — 77412 RADIATION TX DELIVERY LVL 3: CPT | Performed by: RADIOLOGY

## 2020-03-17 ENCOUNTER — HOSPITAL ENCOUNTER (OUTPATIENT)
Dept: RADIATION ONCOLOGY | Facility: MEDICAL CENTER | Age: 50
End: 2020-03-17
Payer: MEDICARE

## 2020-03-17 VITALS
HEART RATE: 118 BPM | WEIGHT: 138.45 LBS | SYSTOLIC BLOOD PRESSURE: 132 MMHG | DIASTOLIC BLOOD PRESSURE: 92 MMHG | BODY MASS INDEX: 23.76 KG/M2 | OXYGEN SATURATION: 98 % | TEMPERATURE: 98.1 F

## 2020-03-17 LAB
CHEMOTHERAPY INFUSION START DATE: NORMAL
CHEMOTHERAPY RECORDS: 1000
CHEMOTHERAPY RECORDS: 2
CHEMOTHERAPY RECORDS: NORMAL
CHEMOTHERAPY RX CANCER: NORMAL
DATE 1ST CHEMO CANCER: NORMAL
RAD ONC ARIA COURSE LAST TREATMENT DATE: NORMAL
RAD ONC ARIA COURSE TREATMENT ELAPSED DAYS: NORMAL
RAD ONC ARIA REFERENCE POINT DOSAGE GIVEN TO DATE: 2
RAD ONC ARIA REFERENCE POINT DOSAGE GIVEN TO DATE: 2.08
RAD ONC ARIA REFERENCE POINT ID: NORMAL
RAD ONC ARIA REFERENCE POINT ID: NORMAL
RAD ONC ARIA REFERENCE POINT SESSION DOSAGE GIVEN: 2
RAD ONC ARIA REFERENCE POINT SESSION DOSAGE GIVEN: 2.08

## 2020-03-17 PROCEDURE — 77280 THER RAD SIMULAJ FIELD SMPL: CPT | Mod: 26 | Performed by: RADIOLOGY

## 2020-03-17 PROCEDURE — 77280 THER RAD SIMULAJ FIELD SMPL: CPT | Performed by: RADIOLOGY

## 2020-03-17 PROCEDURE — 77412 RADIATION TX DELIVERY LVL 3: CPT | Performed by: RADIOLOGY

## 2020-03-17 PROCEDURE — 77336 RADIATION PHYSICS CONSULT: CPT | Performed by: RADIOLOGY

## 2020-03-17 ASSESSMENT — PAIN SCALES - GENERAL: PAINLEVEL: 8=MODERATE-SEVERE PAIN

## 2020-03-17 ASSESSMENT — FIBROSIS 4 INDEX: FIB4 SCORE: 1.21

## 2020-03-17 NOTE — ON TREATMENT VISIT
ON TREATMENT NOTE  RADIATION ONCOLOGY DEPARTMENT    Patient name:  Rasheeda Manzano    Primary Physician:  Tabitha Bautista M.D. MRN: [id number]  CSN: [id number]   Referring physician:  Brice Johnson M.D. : 1970, 49 y.o.     ENCOUNTER DATE:  20    DIAGNOSIS:    Breast cancer (HCC)  Staging form: Breast, AJCC 8th Edition  - Pathologic: Stage IA (pT1c, pN1a, cM0, G1, ER+, RI+, HER2-) - Signed by Michelle Davis M.D. on 2020  Neoadjuvant therapy: No  Laterality: Right  Multigene prognostic tests performed: None  Histologic grading system: 3 grade system      TREATMENT SUMMARY:  Radiation Treatments     Active   Plans   R Breast   Most recent treatment: Dose planned: 180 cGy (fraction 28 of 28 on 3/16/2020)   Total: Dose planned: 5,040 cGy   Elapsed Course Treatment Days: 41 @       R Brst Bst   Most recent treatment: Dose planned: 200 cGy (fraction 1 of 5 on 3/17/2020)   Total: Dose planned: 1,000 cGy   Elapsed Course Treatment Days: 42 @ [id number]      R SCV   Most recent treatment: Dose planned: 180 cGy (fraction 28 of 28 on 3/16/2020)   Total: Dose planned: 5,040 cGy   Elapsed Course Treatment Days: 41 @       Reference Points   R Breast   Most recent treatment: Dose given: 180 cGy (on 3/16/2020)   Total: Dose given: 5,040 cGy   Elapsed Course Treatment Days: 41 @ [id number]      R Brst Bst   Most recent treatment: Dose given: 200 cGy (on 3/17/2020)   Total: Dose given: 200 cGy   Elapsed Course Treatment Days: 42 @ [id number]      R Brst Bst CP   Most recent treatment: Dose given: 208 cGy (on 3/17/2020)   Total: Dose given: 208 cGy   Elapsed Course Treatment Days: 42 @ [id number]      R SCV   Most recent treatment: Dose given: 180 cGy (on 3/16/2020)   Total: Dose given: 5,040 cGy   Elapsed Course Treatment Days: 41 @       R SCV CP   Most recent treatment: Dose given: 180 cGy (on 3/16/2020)   Total: Dose given: 5,040 cGy   Elapsed Course  Treatment Days: 41 @ [id number]      Rt Breast CP   Most recent treatment: Dose given: 180 cGy (on 3/16/2020)   Total: Dose given: 5,040 cGy   Elapsed Course Treatment Days: 41 @ [id number]                    SUBJECTIVE:   First boost treatment today.  Having a little bit of pain in the supraclav axilla and inframammary fold.      VITAL SIGNS:  /92 (BP Location: Left arm, Patient Position: Sitting, BP Cuff Size: Adult)   Pulse (!) 118   Temp 36.7 °C (98.1 °F) (Temporal)   Wt 62.8 kg (138 lb 7.2 oz)   SpO2 98%    Encounter Vitals  Temperature: 36.7 °C (98.1 °F)  Temp src: Temporal  Blood Pressure: 132/92  Pulse: (!) 118  Pulse Oximetry: 98 %  Weight: 62.8 kg (138 lb 7.2 oz)  Pain Score: 8=Moderate-Severe Pain  Pain Assessment 3/17/2020 3/11/2020 3/4/2020   Pain Assessment Acute Pain Denies Pain Denies Pain   Pain Score 8 0 0   Pain Loc Breast - -   Some recent data might be hidden          PHYSICAL EXAM:  Dry desquamation in the supraclavicular fossa, moist desquamation in the inframammary fold and some erythema in the axilla      Toxicity Assessment 3/17/2020 3/11/2020 3/4/2020 2/26/2020 2/12/2020 2/5/2020   Toxicity Assessment Breast Breast Breast Breast Breast Breast   Fatigue (lethargy, malaise, asthenia) Increased fatigue over baseline, but not altering normal activities Increased fatigue over baseline, but not altering normal activities Increased fatigue over baseline, but not altering normal activities Increased fatigue over baseline, but not altering normal activities Increased fatigue over baseline, but not altering normal activities None   Fever (in the absence of neutropenia) None None - None None None   Radiation Dermatitis Faint erythema or dry desquamation Faint erythema or dry desquamation Faint erythema or dry desquamation None None None   Lymphatics Normal Normal Normal Normal Normal Normal   RT - Pain due to RT Mild pain not interfering with function None None None Mild pain not  interfering with function None   Dyspnea Normal Normal Normal Normal Normal Normal         IMPRESSION:  Cancer Staging  Breast cancer (HCC)  Staging form: Breast, AJCC 8th Edition  - Pathologic: Stage IA (pT1c, pN1a, cM0, G1, ER+, HI+, HER2-) - Signed by Michelle Davis M.D. on 1/7/2020      PLAN:  No change in treatment plan.  I recommended that she use a very thick coating of either Eucerin cream or Aquaphor on all these affected areas.  She will be complete on Monday.  All the areas that are developing the desquamation are not in the boost field.  She will see me in follow-up in 6 to 8 weeks or sooner as needed.    Disposition:  Treatment plan reviewed. Questions answered. Continue therapy outlined.     Michelle Davis M.D.    No orders of the defined types were placed in this encounter.

## 2020-03-18 ENCOUNTER — APPOINTMENT (OUTPATIENT)
Dept: RADIOLOGY | Facility: MEDICAL CENTER | Age: 50
DRG: 643 | End: 2020-03-18
Attending: EMERGENCY MEDICINE
Payer: MEDICARE

## 2020-03-18 ENCOUNTER — HOSPITAL ENCOUNTER (INPATIENT)
Facility: MEDICAL CENTER | Age: 50
LOS: 4 days | DRG: 643 | End: 2020-03-23
Attending: EMERGENCY MEDICINE | Admitting: HOSPITALIST
Payer: MEDICARE

## 2020-03-18 ENCOUNTER — HOSPITAL ENCOUNTER (OUTPATIENT)
Dept: RADIATION ONCOLOGY | Facility: MEDICAL CENTER | Age: 50
End: 2020-03-18
Payer: MEDICARE

## 2020-03-18 DIAGNOSIS — G89.29 CHRONIC NONINTRACTABLE HEADACHE, UNSPECIFIED HEADACHE TYPE: ICD-10-CM

## 2020-03-18 DIAGNOSIS — R51.9 CHRONIC NONINTRACTABLE HEADACHE, UNSPECIFIED HEADACHE TYPE: ICD-10-CM

## 2020-03-18 LAB
ALBUMIN SERPL BCP-MCNC: 4.5 G/DL (ref 3.2–4.9)
ALBUMIN/GLOB SERPL: 1.3 G/DL
ALP SERPL-CCNC: 95 U/L (ref 30–99)
ALT SERPL-CCNC: 18 U/L (ref 2–50)
ANION GAP SERPL CALC-SCNC: 14 MMOL/L (ref 7–16)
AST SERPL-CCNC: 15 U/L (ref 12–45)
BASOPHILS # BLD AUTO: 0.4 % (ref 0–1.8)
BASOPHILS # BLD: 0.03 K/UL (ref 0–0.12)
BILIRUB SERPL-MCNC: 0.8 MG/DL (ref 0.1–1.5)
BUN SERPL-MCNC: 5 MG/DL (ref 8–22)
CALCIUM SERPL-MCNC: 10.3 MG/DL (ref 8.5–10.5)
CHEMOTHERAPY INFUSION START DATE: NORMAL
CHEMOTHERAPY RECORDS: 1000
CHEMOTHERAPY RECORDS: 2
CHEMOTHERAPY RECORDS: NORMAL
CHEMOTHERAPY RX CANCER: NORMAL
CHLORIDE SERPL-SCNC: 102 MMOL/L (ref 96–112)
CO2 SERPL-SCNC: 19 MMOL/L (ref 20–33)
CREAT SERPL-MCNC: 0.7 MG/DL (ref 0.5–1.4)
DATE 1ST CHEMO CANCER: NORMAL
EOSINOPHIL # BLD AUTO: 0.01 K/UL (ref 0–0.51)
EOSINOPHIL NFR BLD: 0.1 % (ref 0–6.9)
ERYTHROCYTE [DISTWIDTH] IN BLOOD BY AUTOMATED COUNT: 49.3 FL (ref 35.9–50)
GLOBULIN SER CALC-MCNC: 3.4 G/DL (ref 1.9–3.5)
GLUCOSE SERPL-MCNC: 134 MG/DL (ref 65–99)
HCT VFR BLD AUTO: 41.9 % (ref 37–47)
HGB BLD-MCNC: 14.2 G/DL (ref 12–16)
IMM GRANULOCYTES # BLD AUTO: 0.02 K/UL (ref 0–0.11)
IMM GRANULOCYTES NFR BLD AUTO: 0.2 % (ref 0–0.9)
LIPASE SERPL-CCNC: 92 U/L (ref 11–82)
LYMPHOCYTES # BLD AUTO: 0.57 K/UL (ref 1–4.8)
LYMPHOCYTES NFR BLD: 7.1 % (ref 22–41)
MCH RBC QN AUTO: 29.8 PG (ref 27–33)
MCHC RBC AUTO-ENTMCNC: 33.9 G/DL (ref 33.6–35)
MCV RBC AUTO: 87.8 FL (ref 81.4–97.8)
MONOCYTES # BLD AUTO: 0.61 K/UL (ref 0–0.85)
MONOCYTES NFR BLD AUTO: 7.6 % (ref 0–13.4)
NEUTROPHILS # BLD AUTO: 6.82 K/UL (ref 2–7.15)
NEUTROPHILS NFR BLD: 84.6 % (ref 44–72)
NRBC # BLD AUTO: 0 K/UL
NRBC BLD-RTO: 0 /100 WBC
PLATELET # BLD AUTO: 374 K/UL (ref 164–446)
PMV BLD AUTO: 10.8 FL (ref 9–12.9)
POTASSIUM SERPL-SCNC: 3.3 MMOL/L (ref 3.6–5.5)
PROT SERPL-MCNC: 7.9 G/DL (ref 6–8.2)
RAD ONC ARIA COURSE LAST TREATMENT DATE: NORMAL
RAD ONC ARIA COURSE TREATMENT ELAPSED DAYS: NORMAL
RAD ONC ARIA REFERENCE POINT DOSAGE GIVEN TO DATE: 4
RAD ONC ARIA REFERENCE POINT DOSAGE GIVEN TO DATE: 4.16
RAD ONC ARIA REFERENCE POINT ID: NORMAL
RAD ONC ARIA REFERENCE POINT ID: NORMAL
RAD ONC ARIA REFERENCE POINT SESSION DOSAGE GIVEN: 2
RAD ONC ARIA REFERENCE POINT SESSION DOSAGE GIVEN: 2.08
RBC # BLD AUTO: 4.77 M/UL (ref 4.2–5.4)
SODIUM SERPL-SCNC: 135 MMOL/L (ref 135–145)
TSH SERPL DL<=0.005 MIU/L-ACNC: 0.24 UIU/ML (ref 0.38–5.33)
WBC # BLD AUTO: 8.1 K/UL (ref 4.8–10.8)

## 2020-03-18 PROCEDURE — 80053 COMPREHEN METABOLIC PANEL: CPT

## 2020-03-18 PROCEDURE — 700111 HCHG RX REV CODE 636 W/ 250 OVERRIDE (IP): Performed by: EMERGENCY MEDICINE

## 2020-03-18 PROCEDURE — 71045 X-RAY EXAM CHEST 1 VIEW: CPT

## 2020-03-18 PROCEDURE — 77387 GUIDANCE FOR RADJ TX DLVR: CPT | Performed by: RADIOLOGY

## 2020-03-18 PROCEDURE — 85025 COMPLETE CBC W/AUTO DIFF WBC: CPT

## 2020-03-18 PROCEDURE — 36415 COLL VENOUS BLD VENIPUNCTURE: CPT

## 2020-03-18 PROCEDURE — 96375 TX/PRO/DX INJ NEW DRUG ADDON: CPT

## 2020-03-18 PROCEDURE — 99285 EMERGENCY DEPT VISIT HI MDM: CPT

## 2020-03-18 PROCEDURE — 70250 X-RAY EXAM OF SKULL: CPT

## 2020-03-18 PROCEDURE — 72020 X-RAY EXAM OF SPINE 1 VIEW: CPT

## 2020-03-18 PROCEDURE — 70450 CT HEAD/BRAIN W/O DYE: CPT

## 2020-03-18 PROCEDURE — 84439 ASSAY OF FREE THYROXINE: CPT

## 2020-03-18 PROCEDURE — 700117 HCHG RX CONTRAST REV CODE 255: Performed by: EMERGENCY MEDICINE

## 2020-03-18 PROCEDURE — 84443 ASSAY THYROID STIM HORMONE: CPT

## 2020-03-18 PROCEDURE — 700102 HCHG RX REV CODE 250 W/ 637 OVERRIDE(OP): Performed by: EMERGENCY MEDICINE

## 2020-03-18 PROCEDURE — 96374 THER/PROPH/DIAG INJ IV PUSH: CPT

## 2020-03-18 PROCEDURE — 700105 HCHG RX REV CODE 258: Performed by: EMERGENCY MEDICINE

## 2020-03-18 PROCEDURE — 74177 CT ABD & PELVIS W/CONTRAST: CPT

## 2020-03-18 PROCEDURE — 83690 ASSAY OF LIPASE: CPT

## 2020-03-18 PROCEDURE — 77427 RADIATION TX MANAGEMENT X5: CPT | Performed by: RADIOLOGY

## 2020-03-18 PROCEDURE — 77412 RADIATION TX DELIVERY LVL 3: CPT | Performed by: RADIOLOGY

## 2020-03-18 PROCEDURE — A9270 NON-COVERED ITEM OR SERVICE: HCPCS | Performed by: EMERGENCY MEDICINE

## 2020-03-18 PROCEDURE — 77014 PR CT GUIDANCE PLACEMENT RAD THERAPY FIELDS: CPT | Mod: 26 | Performed by: RADIOLOGY

## 2020-03-18 PROCEDURE — 74018 RADEX ABDOMEN 1 VIEW: CPT

## 2020-03-18 RX ORDER — ASCORBIC ACID 500 MG
500 TABLET ORAL DAILY
Status: ON HOLD | COMMUNITY
End: 2020-06-08

## 2020-03-18 RX ORDER — HYDROMORPHONE HYDROCHLORIDE 1 MG/ML
1 INJECTION, SOLUTION INTRAMUSCULAR; INTRAVENOUS; SUBCUTANEOUS ONCE
Status: COMPLETED | OUTPATIENT
Start: 2020-03-18 | End: 2020-03-18

## 2020-03-18 RX ORDER — SODIUM CHLORIDE 9 MG/ML
1000 INJECTION, SOLUTION INTRAVENOUS ONCE
Status: COMPLETED | OUTPATIENT
Start: 2020-03-18 | End: 2020-03-18

## 2020-03-18 RX ORDER — PROCHLORPERAZINE EDISYLATE 5 MG/ML
10 INJECTION INTRAMUSCULAR; INTRAVENOUS ONCE
Status: COMPLETED | OUTPATIENT
Start: 2020-03-18 | End: 2020-03-18

## 2020-03-18 RX ORDER — ANASTROZOLE 1 MG/1
1 TABLET ORAL EVERY EVENING
COMMUNITY

## 2020-03-18 RX ORDER — LORAZEPAM 2 MG/ML
1 INJECTION INTRAMUSCULAR ONCE
Status: COMPLETED | OUTPATIENT
Start: 2020-03-19 | End: 2020-03-19

## 2020-03-18 RX ORDER — KETOROLAC TROMETHAMINE 30 MG/ML
15 INJECTION, SOLUTION INTRAMUSCULAR; INTRAVENOUS ONCE
Status: COMPLETED | OUTPATIENT
Start: 2020-03-18 | End: 2020-03-18

## 2020-03-18 RX ORDER — PETROLATUM 42 G/100G
OINTMENT TOPICAL ONCE
Status: COMPLETED | OUTPATIENT
Start: 2020-03-18 | End: 2020-03-18

## 2020-03-18 RX ADMIN — IOHEXOL 100 ML: 350 INJECTION, SOLUTION INTRAVENOUS at 23:46

## 2020-03-18 RX ADMIN — PROCHLORPERAZINE EDISYLATE 10 MG: 5 INJECTION INTRAMUSCULAR; INTRAVENOUS at 20:30

## 2020-03-18 RX ADMIN — KETOROLAC TROMETHAMINE 15 MG: 30 INJECTION, SOLUTION INTRAMUSCULAR at 22:30

## 2020-03-18 RX ADMIN — HYDROMORPHONE HYDROCHLORIDE 1 MG: 1 INJECTION, SOLUTION INTRAMUSCULAR; INTRAVENOUS; SUBCUTANEOUS at 20:33

## 2020-03-18 RX ADMIN — SODIUM CHLORIDE 1000 ML: 9 INJECTION, SOLUTION INTRAVENOUS at 20:37

## 2020-03-18 RX ADMIN — Medication: at 21:25

## 2020-03-18 RX ADMIN — SODIUM CHLORIDE 1000 ML: 9 INJECTION, SOLUTION INTRAVENOUS at 20:32

## 2020-03-18 ASSESSMENT — FIBROSIS 4 INDEX: FIB4 SCORE: 1.21

## 2020-03-19 ENCOUNTER — HOSPITAL ENCOUNTER (OUTPATIENT)
Dept: RADIATION ONCOLOGY | Facility: MEDICAL CENTER | Age: 50
End: 2020-03-19
Payer: MEDICARE

## 2020-03-19 PROBLEM — E05.90 HYPERTHYROIDISM: Status: ACTIVE | Noted: 2020-03-19

## 2020-03-19 LAB
CHEMOTHERAPY INFUSION START DATE: NORMAL
CHEMOTHERAPY RECORDS: 1000
CHEMOTHERAPY RECORDS: 2
CHEMOTHERAPY RECORDS: NORMAL
CHEMOTHERAPY RX CANCER: NORMAL
DATE 1ST CHEMO CANCER: NORMAL
RAD ONC ARIA COURSE LAST TREATMENT DATE: NORMAL
RAD ONC ARIA COURSE TREATMENT ELAPSED DAYS: NORMAL
RAD ONC ARIA REFERENCE POINT DOSAGE GIVEN TO DATE: 6
RAD ONC ARIA REFERENCE POINT DOSAGE GIVEN TO DATE: 6.25
RAD ONC ARIA REFERENCE POINT ID: NORMAL
RAD ONC ARIA REFERENCE POINT ID: NORMAL
RAD ONC ARIA REFERENCE POINT SESSION DOSAGE GIVEN: 2
RAD ONC ARIA REFERENCE POINT SESSION DOSAGE GIVEN: 2.08
T4 FREE SERPL-MCNC: 1.85 NG/DL (ref 0.53–1.43)

## 2020-03-19 PROCEDURE — 700102 HCHG RX REV CODE 250 W/ 637 OVERRIDE(OP): Performed by: HOSPITALIST

## 2020-03-19 PROCEDURE — 700111 HCHG RX REV CODE 636 W/ 250 OVERRIDE (IP): Performed by: HOSPITALIST

## 2020-03-19 PROCEDURE — 700101 HCHG RX REV CODE 250: Performed by: HOSPITALIST

## 2020-03-19 PROCEDURE — 99223 1ST HOSP IP/OBS HIGH 75: CPT | Mod: AI | Performed by: HOSPITALIST

## 2020-03-19 PROCEDURE — 770020 HCHG ROOM/CARE - TELE (206)

## 2020-03-19 PROCEDURE — 77412 RADIATION TX DELIVERY LVL 3: CPT | Performed by: RADIOLOGY

## 2020-03-19 PROCEDURE — A9270 NON-COVERED ITEM OR SERVICE: HCPCS | Performed by: HOSPITALIST

## 2020-03-19 PROCEDURE — 77014 PR CT GUIDANCE PLACEMENT RAD THERAPY FIELDS: CPT | Mod: 26 | Performed by: RADIOLOGY

## 2020-03-19 PROCEDURE — 77387 GUIDANCE FOR RADJ TX DLVR: CPT | Performed by: RADIOLOGY

## 2020-03-19 PROCEDURE — 700111 HCHG RX REV CODE 636 W/ 250 OVERRIDE (IP): Performed by: EMERGENCY MEDICINE

## 2020-03-19 PROCEDURE — 700105 HCHG RX REV CODE 258: Performed by: HOSPITALIST

## 2020-03-19 RX ORDER — OXYCODONE HYDROCHLORIDE 5 MG/1
5 TABLET ORAL
Status: DISCONTINUED | OUTPATIENT
Start: 2020-03-19 | End: 2020-03-22

## 2020-03-19 RX ORDER — LABETALOL HYDROCHLORIDE 5 MG/ML
10 INJECTION, SOLUTION INTRAVENOUS ONCE
Status: COMPLETED | OUTPATIENT
Start: 2020-03-19 | End: 2020-03-19

## 2020-03-19 RX ORDER — ACETAMINOPHEN 325 MG/1
650 TABLET ORAL EVERY 6 HOURS PRN
Status: DISCONTINUED | OUTPATIENT
Start: 2020-03-19 | End: 2020-03-23 | Stop reason: HOSPADM

## 2020-03-19 RX ORDER — OXYCODONE HCL 10 MG/1
10 TABLET, FILM COATED, EXTENDED RELEASE ORAL EVERY 12 HOURS
Status: DISCONTINUED | OUTPATIENT
Start: 2020-03-19 | End: 2020-03-23 | Stop reason: HOSPADM

## 2020-03-19 RX ORDER — SODIUM CHLORIDE, SODIUM LACTATE, POTASSIUM CHLORIDE, CALCIUM CHLORIDE 600; 310; 30; 20 MG/100ML; MG/100ML; MG/100ML; MG/100ML
INJECTION, SOLUTION INTRAVENOUS CONTINUOUS
Status: DISCONTINUED | OUTPATIENT
Start: 2020-03-19 | End: 2020-03-23

## 2020-03-19 RX ORDER — BISACODYL 10 MG
10 SUPPOSITORY, RECTAL RECTAL
Status: DISCONTINUED | OUTPATIENT
Start: 2020-03-19 | End: 2020-03-21

## 2020-03-19 RX ORDER — PROMETHAZINE HYDROCHLORIDE 12.5 MG/1
12.5-25 SUPPOSITORY RECTAL EVERY 4 HOURS PRN
Status: DISCONTINUED | OUTPATIENT
Start: 2020-03-19 | End: 2020-03-23 | Stop reason: HOSPADM

## 2020-03-19 RX ORDER — OXYCODONE HYDROCHLORIDE 5 MG/1
2.5 TABLET ORAL
Status: DISCONTINUED | OUTPATIENT
Start: 2020-03-19 | End: 2020-03-22

## 2020-03-19 RX ORDER — AMOXICILLIN 250 MG
2 CAPSULE ORAL 2 TIMES DAILY
Status: DISCONTINUED | OUTPATIENT
Start: 2020-03-19 | End: 2020-03-21

## 2020-03-19 RX ORDER — ONDANSETRON 4 MG/1
4 TABLET, ORALLY DISINTEGRATING ORAL EVERY 4 HOURS PRN
Status: DISCONTINUED | OUTPATIENT
Start: 2020-03-19 | End: 2020-03-23 | Stop reason: HOSPADM

## 2020-03-19 RX ORDER — MORPHINE SULFATE 4 MG/ML
2 INJECTION, SOLUTION INTRAMUSCULAR; INTRAVENOUS
Status: DISCONTINUED | OUTPATIENT
Start: 2020-03-19 | End: 2020-03-23 | Stop reason: HOSPADM

## 2020-03-19 RX ORDER — HYDROXYZINE 50 MG/1
50 TABLET, FILM COATED ORAL
Status: DISCONTINUED | OUTPATIENT
Start: 2020-03-19 | End: 2020-03-23 | Stop reason: HOSPADM

## 2020-03-19 RX ORDER — LORAZEPAM 1 MG/1
0.5 TABLET ORAL
Status: DISCONTINUED | OUTPATIENT
Start: 2020-03-19 | End: 2020-03-23 | Stop reason: HOSPADM

## 2020-03-19 RX ORDER — METHIMAZOLE 5 MG/1
5 TABLET ORAL DAILY
Status: DISCONTINUED | OUTPATIENT
Start: 2020-03-19 | End: 2020-03-23 | Stop reason: HOSPADM

## 2020-03-19 RX ORDER — CLONIDINE HYDROCHLORIDE 0.1 MG/1
0.1 TABLET ORAL EVERY 6 HOURS PRN
Status: DISCONTINUED | OUTPATIENT
Start: 2020-03-19 | End: 2020-03-23 | Stop reason: HOSPADM

## 2020-03-19 RX ORDER — LISINOPRIL 20 MG/1
20 TABLET ORAL DAILY
Status: DISCONTINUED | OUTPATIENT
Start: 2020-03-19 | End: 2020-03-23 | Stop reason: HOSPADM

## 2020-03-19 RX ORDER — ONDANSETRON 2 MG/ML
4 INJECTION INTRAMUSCULAR; INTRAVENOUS EVERY 4 HOURS PRN
Status: DISCONTINUED | OUTPATIENT
Start: 2020-03-19 | End: 2020-03-23 | Stop reason: HOSPADM

## 2020-03-19 RX ORDER — PROMETHAZINE HYDROCHLORIDE 25 MG/1
12.5-25 TABLET ORAL EVERY 4 HOURS PRN
Status: DISCONTINUED | OUTPATIENT
Start: 2020-03-19 | End: 2020-03-23 | Stop reason: HOSPADM

## 2020-03-19 RX ORDER — LORAZEPAM 2 MG/ML
.5-1 INJECTION INTRAMUSCULAR EVERY 8 HOURS PRN
Status: DISCONTINUED | OUTPATIENT
Start: 2020-03-19 | End: 2020-03-23 | Stop reason: HOSPADM

## 2020-03-19 RX ORDER — POLYETHYLENE GLYCOL 3350 17 G/17G
1 POWDER, FOR SOLUTION ORAL
Status: DISCONTINUED | OUTPATIENT
Start: 2020-03-19 | End: 2020-03-21

## 2020-03-19 RX ORDER — PROCHLORPERAZINE EDISYLATE 5 MG/ML
5-10 INJECTION INTRAMUSCULAR; INTRAVENOUS EVERY 4 HOURS PRN
Status: DISCONTINUED | OUTPATIENT
Start: 2020-03-19 | End: 2020-03-23 | Stop reason: HOSPADM

## 2020-03-19 RX ORDER — LEVETIRACETAM 500 MG/1
1000 TABLET ORAL
Status: DISCONTINUED | OUTPATIENT
Start: 2020-03-19 | End: 2020-03-23 | Stop reason: HOSPADM

## 2020-03-19 RX ADMIN — OXYCODONE HYDROCHLORIDE 5 MG: 5 TABLET ORAL at 22:19

## 2020-03-19 RX ADMIN — SODIUM CHLORIDE, POTASSIUM CHLORIDE, SODIUM LACTATE AND CALCIUM CHLORIDE: 600; 310; 30; 20 INJECTION, SOLUTION INTRAVENOUS at 02:39

## 2020-03-19 RX ADMIN — ACETAMINOPHEN 650 MG: 325 TABLET, FILM COATED ORAL at 02:18

## 2020-03-19 RX ADMIN — OXYCODONE HYDROCHLORIDE 5 MG: 5 TABLET ORAL at 02:18

## 2020-03-19 RX ADMIN — CLONIDINE HYDROCHLORIDE 0.1 MG: 0.1 TABLET ORAL at 02:18

## 2020-03-19 RX ADMIN — METHIMAZOLE 5 MG: 5 TABLET ORAL at 04:55

## 2020-03-19 RX ADMIN — LISINOPRIL 20 MG: 20 TABLET ORAL at 04:55

## 2020-03-19 RX ADMIN — LORAZEPAM 1 MG: 2 INJECTION INTRAMUSCULAR; INTRAVENOUS at 00:04

## 2020-03-19 RX ADMIN — LEVETIRACETAM 1000 MG: 500 TABLET ORAL at 02:18

## 2020-03-19 RX ADMIN — OXYCODONE HYDROCHLORIDE 10 MG: 10 TABLET, FILM COATED, EXTENDED RELEASE ORAL at 18:17

## 2020-03-19 RX ADMIN — PROCHLORPERAZINE EDISYLATE 10 MG: 5 INJECTION INTRAMUSCULAR; INTRAVENOUS at 16:15

## 2020-03-19 RX ADMIN — LORAZEPAM 1 MG: 2 INJECTION INTRAMUSCULAR; INTRAVENOUS at 18:17

## 2020-03-19 RX ADMIN — OXYCODONE HYDROCHLORIDE 10 MG: 10 TABLET, FILM COATED, EXTENDED RELEASE ORAL at 04:55

## 2020-03-19 RX ADMIN — OXYCODONE HYDROCHLORIDE 5 MG: 5 TABLET ORAL at 16:15

## 2020-03-19 RX ADMIN — HYDROXYZINE HYDROCHLORIDE 50 MG: 50 TABLET, FILM COATED ORAL at 22:07

## 2020-03-19 RX ADMIN — OXYCODONE HYDROCHLORIDE 5 MG: 5 TABLET ORAL at 12:38

## 2020-03-19 RX ADMIN — ONDANSETRON 4 MG: 2 INJECTION INTRAMUSCULAR; INTRAVENOUS at 12:38

## 2020-03-19 RX ADMIN — OXYCODONE HYDROCHLORIDE 5 MG: 5 TABLET ORAL at 07:58

## 2020-03-19 RX ADMIN — LABETALOL HYDROCHLORIDE 10 MG: 5 INJECTION INTRAVENOUS at 04:55

## 2020-03-19 RX ADMIN — HYDROXYZINE HYDROCHLORIDE 50 MG: 50 TABLET, FILM COATED ORAL at 02:38

## 2020-03-19 RX ADMIN — METOPROLOL TARTRATE 25 MG: 25 TABLET, FILM COATED ORAL at 18:17

## 2020-03-19 RX ADMIN — LORAZEPAM 0.5 MG: 1 TABLET ORAL at 22:19

## 2020-03-19 RX ADMIN — METOPROLOL TARTRATE 25 MG: 25 TABLET, FILM COATED ORAL at 01:23

## 2020-03-19 RX ADMIN — LEVETIRACETAM 1000 MG: 500 TABLET ORAL at 22:07

## 2020-03-19 RX ADMIN — APIXABAN 5 MG: 5 TABLET, FILM COATED ORAL at 18:17

## 2020-03-19 RX ADMIN — SODIUM CHLORIDE, POTASSIUM CHLORIDE, SODIUM LACTATE AND CALCIUM CHLORIDE: 600; 310; 30; 20 INJECTION, SOLUTION INTRAVENOUS at 12:14

## 2020-03-19 RX ADMIN — APIXABAN 5 MG: 5 TABLET, FILM COATED ORAL at 04:55

## 2020-03-19 ASSESSMENT — PATIENT HEALTH QUESTIONNAIRE - PHQ9
1. LITTLE INTEREST OR PLEASURE IN DOING THINGS: NEARLY EVERY DAY
2. FEELING DOWN, DEPRESSED, IRRITABLE, OR HOPELESS: NEARLY EVERY DAY
SUM OF ALL RESPONSES TO PHQ9 QUESTIONS 1 AND 2: 6
5. POOR APPETITE OR OVEREATING: NEARLY EVERY DAY
SUM OF ALL RESPONSES TO PHQ QUESTIONS 1-9: 18
7. TROUBLE CONCENTRATING ON THINGS, SUCH AS READING THE NEWSPAPER OR WATCHING TELEVISION: NEARLY EVERY DAY
6. FEELING BAD ABOUT YOURSELF - OR THAT YOU ARE A FAILURE OR HAVE LET YOURSELF OR YOUR FAMILY DOWN: NOT AL ALL
3. TROUBLE FALLING OR STAYING ASLEEP OR SLEEPING TOO MUCH: NEARLY EVERY DAY
8. MOVING OR SPEAKING SO SLOWLY THAT OTHER PEOPLE COULD HAVE NOTICED. OR THE OPPOSITE, BEING SO FIGETY OR RESTLESS THAT YOU HAVE BEEN MOVING AROUND A LOT MORE THAN USUAL: NOT AT ALL
4. FEELING TIRED OR HAVING LITTLE ENERGY: NEARLY EVERY DAY
9. THOUGHTS THAT YOU WOULD BE BETTER OFF DEAD, OR OF HURTING YOURSELF: NOT AT ALL

## 2020-03-19 ASSESSMENT — LIFESTYLE VARIABLES
ALCOHOL_USE: YES
DOES PATIENT WANT TO STOP DRINKING: NO
CONSUMPTION TOTAL: NEGATIVE
EVER HAD A DRINK FIRST THING IN THE MORNING TO STEADY YOUR NERVES TO GET RID OF A HANGOVER: NO
HOW MANY TIMES IN THE PAST YEAR HAVE YOU HAD 5 OR MORE DRINKS IN A DAY: 0
TOTAL SCORE: 0
EVER FELT BAD OR GUILTY ABOUT YOUR DRINKING: NO
ON A TYPICAL DAY WHEN YOU DRINK ALCOHOL HOW MANY DRINKS DO YOU HAVE: 1
HAVE PEOPLE ANNOYED YOU BY CRITICIZING YOUR DRINKING: NO
EVER_SMOKED: YES
HAVE YOU EVER FELT YOU SHOULD CUT DOWN ON YOUR DRINKING: NO
TOTAL SCORE: 0
AVERAGE NUMBER OF DAYS PER WEEK YOU HAVE A DRINK CONTAINING ALCOHOL: 1
TOTAL SCORE: 0

## 2020-03-19 ASSESSMENT — COGNITIVE AND FUNCTIONAL STATUS - GENERAL
DRESSING REGULAR LOWER BODY CLOTHING: A LITTLE
MOVING TO AND FROM BED TO CHAIR: A LITTLE
MOBILITY SCORE: 19
DAILY ACTIVITIY SCORE: 18
PERSONAL GROOMING: A LITTLE
EATING MEALS: A LITTLE
SUGGESTED CMS G CODE MODIFIER MOBILITY: CK
CLIMB 3 TO 5 STEPS WITH RAILING: A LITTLE
MOVING FROM LYING ON BACK TO SITTING ON SIDE OF FLAT BED: A LITTLE
TOILETING: A LITTLE
STANDING UP FROM CHAIR USING ARMS: A LITTLE
DRESSING REGULAR UPPER BODY CLOTHING: A LITTLE
SUGGESTED CMS G CODE MODIFIER DAILY ACTIVITY: CK
HELP NEEDED FOR BATHING: A LITTLE
WALKING IN HOSPITAL ROOM: A LITTLE

## 2020-03-19 ASSESSMENT — ENCOUNTER SYMPTOMS
MUSCULOSKELETAL NEGATIVE: 1
PSYCHIATRIC NEGATIVE: 1
VOMITING: 1
ABDOMINAL PAIN: 0
EYES NEGATIVE: 1
DIARRHEA: 1
PALPITATIONS: 1
RESPIRATORY NEGATIVE: 1
NEUROLOGICAL NEGATIVE: 1
CONSTITUTIONAL NEGATIVE: 1

## 2020-03-19 ASSESSMENT — FIBROSIS 4 INDEX
FIB4 SCORE: 0.45
FIB4 SCORE: 0.45

## 2020-03-19 NOTE — DIETARY
Nutrition services: Day 0 of admit.  Rasheeda Manzano is a 49 y.o. female with admitting DX of Hyperthyroidism.   Consult received for MST 2 (2-13# wt loss in 1 month, decreased appetite)    Met w/ pt at bedside. Reports UBW of 150# (68 kg) which she last weighed in February prior to starting radiation therapy. States that her PO has been poor d/t decreased appetite. Denies taste changes. Discussed small frequent nutritionally dense meals. Pt with continued poor appetite this AM. Agreeable to high protein shake and RD to add daily snacks.     Assessment:  Weight: 63.9 kg (140 lb 14 oz)  Body mass index is 24.18 kg/m²., BMI classification: Normal;   Diet/Intake: Regular; No documented PO since admit.     Evaluation:   1. Noted pt with hypothyroidism, pulmonary embolism, HTN, seizure, congenital hydrocephalus, breast cancer, blindness.   2. Wt on 2/23/20 was 65.8 kg, which is down 1.9 (3.4%) kg x ~1 month (notable but not severe).   3. Pt appears well nourished.     Malnutrition Risk: Pt at risk d/t wt loss but does not meet ASPEN criteria at this time.     Recommendations/Plan:  1. Continue diet as ordered. RD to add high protein shakes and snacks per pt preference.   2. Encourage intake of meals.   3. Document intake of all meals as % taken in ADL's to provide interdisciplinary communication across all shifts.   4. Monitor weight.  5. Nutrition rep will continue to see patient for ongoing meal and snack preferences.     RD Following.

## 2020-03-19 NOTE — PROGRESS NOTES
Patient's BP at 171/113  after receiving 1 PRN dose of clonidine for high BP.  Paged hospitalist twice and updated Dr. Echevarria.  Dr. Echevarria ordered one time dose of 10 mg of IV labetalol.

## 2020-03-19 NOTE — PROGRESS NOTES
2 RN skin check completed with ELIZABETH Washburn.   Devices in place heart monitor, stickers for radiation therapy, nasal cannula.  Skin assessed under devices red and blanching.  Confirmed pressure ulcers found on N/A.  New potential pressure ulcers noted on N/A. Wound consult placed N/A.  The following interventions in place pillows for support and positioning, soft silicone nasal cannula, gray foam pads.    Bilateral ears are pink and blanching.  Bilateral elbows are pink and blanching.  Sacrum is pink and blanching.  Bilateral feet are dry, heels are red and blanching.  Left foot has redness and scabbing between 2nd, 3rd, and 4th toes.  Right breast and area under right arm is red and excoriated/burned from radiation.  Scar on right breast from partial mastectomy, healed.

## 2020-03-19 NOTE — ED TRIAGE NOTES
Ambulates to triage  Chief Complaint   Patient presents with   • Nausea/Vomiting/Diarrhea     x2 days   • Headache     x2 days     Pt has been receiving radiation M-F since he end of Feb for breast CA, and has had the above sx for 2 days. Has been taking zofran at home, but is not helping.

## 2020-03-19 NOTE — ED PROVIDER NOTES
ED Provider Note    CHIEF COMPLAINT  Chief Complaint   Patient presents with   • Nausea/Vomiting/Diarrhea     x2 days   • Headache     x2 days       HPI  Rasheeda Manzano is a 49 y.o. female with a history of hydrocephalus with  shunt, blindness, breast cancer currently undergoing radiation here with nausea and vomiting.  Patient reports that she has been vomiting for the last few days.  Patient is unsure if emesis is nonbloody or bilious as she is blind.  She also reports diarrhea is unsure if it bloody or there is any melena.  She denies any associate abdominal pain.  Patient also reports headache.  She reports the headache preceded the nausea and vomiting.  Reports headache is mild and frontal in location.  She denies any surgical weakness or numbness.  Patient denies any fevers or constitutional symptoms.  Patient Zofran at home but reports this is not working.    REVIEW OF SYSTEMS  ROS  See HPI for further details. All other systems are negative.     PAST MEDICAL HISTORY   has a past medical history of Acute nasopharyngitis, Blind, C. difficile colitis, C. difficile diarrhea (2013), Cancer (HCC), Chronic daily headache, Depression, Esophageal atresia (1971), Esophageal bleeding (12/18/2019), Fall, GERD (gastroesophageal reflux disease), H/O total hysterectomy, Heart burn (12/18/2019), Hydrocephalus ([facility]), Hydrocephalus (HCC), Indigestion (12/18/2019), Jaundice, Legally blind, Migraine without aura, without mention of intractable migraine without mention of status migrainosus, Other specified symptom associated with female genital organs, Pain, Pain (12/18/2019), Psychiatric problem, PTSD (post-traumatic stress disorder), Seizure ([facility]) (12/18/2019), and Snoring (12/18/2019).    SOCIAL HISTORY  Social History     Tobacco Use   • Smoking status: Former Smoker     Packs/day: 1.00     Years: 10.00     Pack years: 10.00     Types: Cigars     Start date: 5/1/2004     Last attempt to quit: 8/9/2017      Years since quittin.6   • Smokeless tobacco: Former User   • Tobacco comment:  CIGAR/day    Substance and Sexual Activity   • Alcohol use: Yes     Frequency: Monthly or less     Drinks per session: 1 or 2   • Drug use: No   • Sexual activity: Yes     Partners: Male       SURGICAL HISTORY   has a past surgical history that includes laparoscopy (08); lysis adhesions general (12/10/2013); cystoscopy (12/10/2013); aakash by laparoscopy (N/A, 2015); gastroscopy-endo (N/A, 2017); nissen fundoplication laparoscopic; abdominal hysterectomy total (12/10/2013); other; exploratory laparotomy (12/10/2013); appendectomy (12/10/2013); cholecystectomy (N/A, 2015); gastroscopy (N/A, 2019); mastectomy (Right, 2019); and node biopsy sentinel (Right, 2019).    CURRENT MEDICATIONS  Home Medications     Reviewed by Sheela Steel R.N. (Registered Nurse) on 20 at 1915  Med List Status: Complete   Medication Last Dose Status   anastrozole (ARIMIDEX) 1 MG Tab 3/18/2020 Active   apixaban (ELIQUIS) 5mg Tab 3/11/2020 Active   esomeprazole (NEXIUM) 40 MG delayed-release capsule 3/18/2020 Active   hydrOXYzine HCl (ATARAX) 50 MG Tab 3/17/2020 Active   levETIRAcetam (KEPPRA) 500 MG Tab 3/17/2020 Active   lisinopril (PRINIVIL) 20 MG Tab 3/18/2020 Active   LORazepam (ATIVAN) 0.5 MG Tab 3/17/2020 Active   ondansetron (ZOFRAN ODT) 4 MG TABLET DISPERSIBLE 3/18/2020 Active   oxyCODONE CR (OXYCONTIN) 10 MG Tablet Extended Release 12 hour Abuse-Deterrent 3/18/2020 Active   oxyCODONE ER 9 MG Capsule Extended Release 12 hour Abuse-Deterrent 3/18/2020 Active   propranolol CR (INDERAL LA) 120 MG CAPSULE SR 24 HR 3/18/2020 Active   senna-docusate (PERICOLACE OR SENOKOT S) 8.6-50 MG Tab  Active                ALLERGIES  Allergies   Allergen Reactions   • Tape Rash     RXN= ongoing  Adhesive Medical tape. Per patient, paper tape ok.   • Latex Rash     Rash       PHYSICAL EXAM  Physical Exam   Constitutional: She  is oriented to person, place, and time. She appears well-developed and well-nourished.   HENT:   Head: Normocephalic and atraumatic.   Eyes: Conjunctivae are normal.   Neck: Normal range of motion. Neck supple.   Cardiovascular: Normal rate and regular rhythm.   Pulmonary/Chest: Effort normal and breath sounds normal.   Abdominal: Soft. Bowel sounds are normal. She exhibits no distension. There is no abdominal tenderness. There is no rebound.   Neurological: She is alert and oriented to person, place, and time.   Disconjugate gaze, persistent nystagmus  Full strength in bilateral upper and lower extremities.  Sensation intact throughout.  Cranial nerves are intact aside from vision; patient light-sensitive   Skin: Skin is warm and dry. No rash noted.   Psychiatric: She has a normal mood and affect. Her behavior is normal.     Labs  Results for orders placed or performed during the hospital encounter of 03/18/20   CBC WITH DIFFERENTIAL   Result Value Ref Range    WBC 8.1 4.8 - 10.8 K/uL    RBC 4.77 4.20 - 5.40 M/uL    Hemoglobin 14.2 12.0 - 16.0 g/dL    Hematocrit 41.9 37.0 - 47.0 %    MCV 87.8 81.4 - 97.8 fL    MCH 29.8 27.0 - 33.0 pg    MCHC 33.9 33.6 - 35.0 g/dL    RDW 49.3 35.9 - 50.0 fL    Platelet Count 374 164 - 446 K/uL    MPV 10.8 9.0 - 12.9 fL    Neutrophils-Polys 84.60 (H) 44.00 - 72.00 %    Lymphocytes 7.10 (L) 22.00 - 41.00 %    Monocytes 7.60 0.00 - 13.40 %    Eosinophils 0.10 0.00 - 6.90 %    Basophils 0.40 0.00 - 1.80 %    Immature Granulocytes 0.20 0.00 - 0.90 %    Nucleated RBC 0.00 /100 WBC    Neutrophils (Absolute) 6.82 2.00 - 7.15 K/uL    Lymphs (Absolute) 0.57 (L) 1.00 - 4.80 K/uL    Monos (Absolute) 0.61 0.00 - 0.85 K/uL    Eos (Absolute) 0.01 0.00 - 0.51 K/uL    Baso (Absolute) 0.03 0.00 - 0.12 K/uL    Immature Granulocytes (abs) 0.02 0.00 - 0.11 K/uL    NRBC (Absolute) 0.00 K/uL   CMP   Result Value Ref Range    Sodium 135 135 - 145 mmol/L    Potassium 3.3 (L) 3.6 - 5.5 mmol/L    Chloride 102  96 - 112 mmol/L    Co2 19 (L) 20 - 33 mmol/L    Anion Gap 14.0 7.0 - 16.0    Glucose 134 (H) 65 - 99 mg/dL    Bun 5 (L) 8 - 22 mg/dL    Creatinine 0.70 0.50 - 1.40 mg/dL    Calcium 10.3 8.5 - 10.5 mg/dL    AST(SGOT) 15 12 - 45 U/L    ALT(SGPT) 18 2 - 50 U/L    Alkaline Phosphatase 95 30 - 99 U/L    Total Bilirubin 0.8 0.1 - 1.5 mg/dL    Albumin 4.5 3.2 - 4.9 g/dL    Total Protein 7.9 6.0 - 8.2 g/dL    Globulin 3.4 1.9 - 3.5 g/dL    A-G Ratio 1.3 g/dL   LIPASE   Result Value Ref Range    Lipase 92 (H) 11 - 82 U/L   ESTIMATED GFR   Result Value Ref Range    GFR If African American >60 >60 mL/min/1.73 m 2    GFR If Non African American >60 >60 mL/min/1.73 m 2   TSH   Result Value Ref Range    TSH 0.242 (L) 0.380 - 5.330 uIU/mL   FREE THYROXINE   Result Value Ref Range    Free T-4 1.85 (H) 0.53 - 1.43 ng/dL     Wet read  CT-ABDOMEN-PELVIS WITH   Final Result      1.  No acute abnormality within the abdomen or pelvis      2.  Ventricular-left pleural catheter present producing a small left pleural effusion      3.  Prior cholecystectomy with mild, unchanged biliary dilation      4.  Probably prior hysterectomy and appendectomy      5.  Abdominal aortic atherosclerotic plaque      6.  Hepatic steatosis      CT-HEAD W/O   Final Result      No acute intracranial abnormality      [id number] VIEW   Final Result      Intact shunt extending from the left side of the head to the left pleural space      DX-SPINE-ANY ONE VIEW   Final Result      Intact shunt extending from the left side of the head to the left pleural space      DX-CHEST-LIMITED (1 VIEW)   Final Result      Intact shunt extending from the left side of the head to the left pleural space      DX-SKULL-LIMITED 3-   Final Result      Intact shunt extending from the left side of the head to the left pleural space            COURSE & MEDICAL DECISION MAKING  Pertinent Labs & Imaging studies reviewed. (See chart for details)    Patient with complex medical history,   shunt in place with nausea vomiting and headache.  She does not have a fever or any constitutional symptoms to suggest infection however she will need evaluation of the  shunt for possible increased intracranial pressures or shunt malfunction otherwise.  Patient's neurologic exam is otherwise reassuring.  Patient abdominal exam is entirely benign to believe surgical pathology is highly unlikely.  Patient vomited in front of me twice, there is no bilious emesis.  Patient will be given Dilaudid, will check shunt series and CT head, given IV fluids for tachycardia in the setting of nausea or vomiting which is likely representative of dehydration.  Also will give Compazine for patient's nausea and vomiting as she is not responding to her Zofran at home.  Patient is feeling better following Compazine however her headache persists  Will give Dilaudid.  Patient's CT and shunt series is normal.  Patient's laboratory results are reassuring  Despite IV fluids patient remains significantly tachycardic, she denies any chest pain or shortness of breath  Given patient's nausea persisting tachycardia my suspicion is change and therefore will check a CT of her belly  CT fails to reveal any evidence of surgical pathology  Patient was admitted to hospital service for ongoing work-up and patient's persistent tachycardia  I discussed the case with hospitalist who is agreed to admit.  Patient thyroid is consistent with hyperthyroidism, will discuss with hospitalist they are comfortable with me giving metoprolol        FINAL IMPRESSION  1.  Intractable nausea vomiting, dehydration, tachycardia, hyperthyroidism      Electronically signed by: Tony Acevdeo M.D., 3/18/2020 7:53 PM

## 2020-03-19 NOTE — ED NOTES
Patient assisted to use the bathroom. Ambulated with steady gait. Per patient her headache still not going away. Nausea improved. No vomiting.

## 2020-03-19 NOTE — PROGRESS NOTES
I evaluated and examined the patient at bedside, patient's and nursing's questions were answered. For details see admission H&P.
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
## 2020-03-19 NOTE — CARE PLAN
Problem: Nutritional:  Goal: Achieve adequate nutritional intake  Description: Patient will consume > 50% of meals   Outcome: NOT MET     See RD note.
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
## 2020-03-19 NOTE — PROGRESS NOTES
Patient in bed, anxious, restless, tossing and turning. Emotional, crying. States she has these episodes of anxiety at home and takes ativan and this helps. Given emotional support, medicated for pin, assisted into bed. On room air, on telemetry. Bed alarm on, call bell in hand, bed locked, whiteboard updated, hourly rounding initiated

## 2020-03-19 NOTE — PROGRESS NOTES
Received report from previous nurse, Brice, regarding prior time in ED.  Patient attached to zoll and transported to Telemetry 8.  Patient then attached to heart monitor and monitor room confirmed Sinus Tachycardia.  POC reviewed with pt, white board updated, pt verbalized understanding, call light within reach.  Pt encouraged to call before getting up.  Bed in lowest position, treaded slippers on.

## 2020-03-19 NOTE — PROGRESS NOTES
Patient received treatment in Radiation Therapy. Patient received treatment number 31 of 33 planned.

## 2020-03-20 ENCOUNTER — HOSPITAL ENCOUNTER (OUTPATIENT)
Dept: RADIATION ONCOLOGY | Facility: MEDICAL CENTER | Age: 50
End: 2020-03-20
Payer: MEDICARE

## 2020-03-20 LAB
ANION GAP SERPL CALC-SCNC: 11 MMOL/L (ref 7–16)
BASOPHILS # BLD AUTO: 0.8 % (ref 0–1.8)
BASOPHILS # BLD: 0.05 K/UL (ref 0–0.12)
BUN SERPL-MCNC: 4 MG/DL (ref 8–22)
CALCIUM SERPL-MCNC: 8.8 MG/DL (ref 8.5–10.5)
CHEMOTHERAPY INFUSION START DATE: NORMAL
CHEMOTHERAPY RECORDS: 1000
CHEMOTHERAPY RECORDS: 2
CHEMOTHERAPY RECORDS: NORMAL
CHEMOTHERAPY RX CANCER: NORMAL
CHLORIDE SERPL-SCNC: 107 MMOL/L (ref 96–112)
CO2 SERPL-SCNC: 21 MMOL/L (ref 20–33)
CREAT SERPL-MCNC: 0.53 MG/DL (ref 0.5–1.4)
DATE 1ST CHEMO CANCER: NORMAL
EKG IMPRESSION: NORMAL
EOSINOPHIL # BLD AUTO: 0.07 K/UL (ref 0–0.51)
EOSINOPHIL NFR BLD: 1.1 % (ref 0–6.9)
ERYTHROCYTE [DISTWIDTH] IN BLOOD BY AUTOMATED COUNT: 55.5 FL (ref 35.9–50)
GLUCOSE SERPL-MCNC: 102 MG/DL (ref 65–99)
HCT VFR BLD AUTO: 39.6 % (ref 37–47)
HGB BLD-MCNC: 12.4 G/DL (ref 12–16)
IMM GRANULOCYTES # BLD AUTO: 0.02 K/UL (ref 0–0.11)
IMM GRANULOCYTES NFR BLD AUTO: 0.3 % (ref 0–0.9)
LYMPHOCYTES # BLD AUTO: 1.03 K/UL (ref 1–4.8)
LYMPHOCYTES NFR BLD: 16.3 % (ref 22–41)
MCH RBC QN AUTO: 29.6 PG (ref 27–33)
MCHC RBC AUTO-ENTMCNC: 31.3 G/DL (ref 33.6–35)
MCV RBC AUTO: 94.5 FL (ref 81.4–97.8)
MONOCYTES # BLD AUTO: 0.76 K/UL (ref 0–0.85)
MONOCYTES NFR BLD AUTO: 12 % (ref 0–13.4)
NEUTROPHILS # BLD AUTO: 4.39 K/UL (ref 2–7.15)
NEUTROPHILS NFR BLD: 69.5 % (ref 44–72)
NRBC # BLD AUTO: 0 K/UL
NRBC BLD-RTO: 0 /100 WBC
PLATELET # BLD AUTO: 280 K/UL (ref 164–446)
PMV BLD AUTO: 10.2 FL (ref 9–12.9)
POTASSIUM SERPL-SCNC: 3.6 MMOL/L (ref 3.6–5.5)
RAD ONC ARIA COURSE LAST TREATMENT DATE: NORMAL
RAD ONC ARIA COURSE TREATMENT ELAPSED DAYS: NORMAL
RAD ONC ARIA REFERENCE POINT DOSAGE GIVEN TO DATE: 8
RAD ONC ARIA REFERENCE POINT DOSAGE GIVEN TO DATE: 8.33
RAD ONC ARIA REFERENCE POINT ID: NORMAL
RAD ONC ARIA REFERENCE POINT ID: NORMAL
RAD ONC ARIA REFERENCE POINT SESSION DOSAGE GIVEN: 2
RAD ONC ARIA REFERENCE POINT SESSION DOSAGE GIVEN: 2.08
RBC # BLD AUTO: 4.19 M/UL (ref 4.2–5.4)
SODIUM SERPL-SCNC: 139 MMOL/L (ref 135–145)
WBC # BLD AUTO: 6.3 K/UL (ref 4.8–10.8)

## 2020-03-20 PROCEDURE — 36415 COLL VENOUS BLD VENIPUNCTURE: CPT

## 2020-03-20 PROCEDURE — 85025 COMPLETE CBC W/AUTO DIFF WBC: CPT

## 2020-03-20 PROCEDURE — 93010 ELECTROCARDIOGRAM REPORT: CPT | Performed by: INTERNAL MEDICINE

## 2020-03-20 PROCEDURE — 700105 HCHG RX REV CODE 258: Performed by: HOSPITALIST

## 2020-03-20 PROCEDURE — 700102 HCHG RX REV CODE 250 W/ 637 OVERRIDE(OP): Performed by: HOSPITALIST

## 2020-03-20 PROCEDURE — 99233 SBSQ HOSP IP/OBS HIGH 50: CPT | Performed by: HOSPITALIST

## 2020-03-20 PROCEDURE — 77412 RADIATION TX DELIVERY LVL 3: CPT | Performed by: RADIOLOGY

## 2020-03-20 PROCEDURE — 77014 PR CT GUIDANCE PLACEMENT RAD THERAPY FIELDS: CPT | Mod: 26 | Performed by: RADIOLOGY

## 2020-03-20 PROCEDURE — 97165 OT EVAL LOW COMPLEX 30 MIN: CPT

## 2020-03-20 PROCEDURE — A9270 NON-COVERED ITEM OR SERVICE: HCPCS | Performed by: HOSPITALIST

## 2020-03-20 PROCEDURE — 700111 HCHG RX REV CODE 636 W/ 250 OVERRIDE (IP): Performed by: HOSPITALIST

## 2020-03-20 PROCEDURE — 77387 GUIDANCE FOR RADJ TX DLVR: CPT | Performed by: RADIOLOGY

## 2020-03-20 PROCEDURE — 770009 HCHG ROOM/CARE - ONCOLOGY SEMI PRI*

## 2020-03-20 PROCEDURE — 80048 BASIC METABOLIC PNL TOTAL CA: CPT

## 2020-03-20 PROCEDURE — 93005 ELECTROCARDIOGRAM TRACING: CPT | Performed by: HOSPITALIST

## 2020-03-20 PROCEDURE — 97161 PT EVAL LOW COMPLEX 20 MIN: CPT

## 2020-03-20 RX ORDER — HYDROXYZINE HYDROCHLORIDE 10 MG/1
10 TABLET, FILM COATED ORAL 3 TIMES DAILY PRN
Status: DISCONTINUED | OUTPATIENT
Start: 2020-03-20 | End: 2020-03-23 | Stop reason: HOSPADM

## 2020-03-20 RX ORDER — LABETALOL 200 MG/1
100 TABLET, FILM COATED ORAL EVERY 6 HOURS PRN
Status: DISCONTINUED | OUTPATIENT
Start: 2020-03-20 | End: 2020-03-23 | Stop reason: HOSPADM

## 2020-03-20 RX ORDER — PROPRANOLOL HCL 60 MG
120 CAPSULE, EXTENDED RELEASE 24HR ORAL
Status: DISCONTINUED | OUTPATIENT
Start: 2020-03-20 | End: 2020-03-23 | Stop reason: HOSPADM

## 2020-03-20 RX ORDER — LABETALOL HYDROCHLORIDE 5 MG/ML
10 INJECTION, SOLUTION INTRAVENOUS EVERY 4 HOURS PRN
Status: DISCONTINUED | OUTPATIENT
Start: 2020-03-20 | End: 2020-03-23 | Stop reason: HOSPADM

## 2020-03-20 RX ADMIN — LORAZEPAM 0.5 MG: 1 TABLET ORAL at 22:32

## 2020-03-20 RX ADMIN — SODIUM CHLORIDE, POTASSIUM CHLORIDE, SODIUM LACTATE AND CALCIUM CHLORIDE: 600; 310; 30; 20 INJECTION, SOLUTION INTRAVENOUS at 22:25

## 2020-03-20 RX ADMIN — LORAZEPAM 1 MG: 2 INJECTION INTRAMUSCULAR; INTRAVENOUS at 23:37

## 2020-03-20 RX ADMIN — OXYCODONE HYDROCHLORIDE 5 MG: 5 TABLET ORAL at 10:02

## 2020-03-20 RX ADMIN — APIXABAN 5 MG: 5 TABLET, FILM COATED ORAL at 05:56

## 2020-03-20 RX ADMIN — SODIUM CHLORIDE, POTASSIUM CHLORIDE, SODIUM LACTATE AND CALCIUM CHLORIDE: 600; 310; 30; 20 INJECTION, SOLUTION INTRAVENOUS at 02:04

## 2020-03-20 RX ADMIN — LORAZEPAM 1 MG: 2 INJECTION INTRAMUSCULAR; INTRAVENOUS at 03:42

## 2020-03-20 RX ADMIN — LISINOPRIL 20 MG: 20 TABLET ORAL at 05:56

## 2020-03-20 RX ADMIN — OXYCODONE HYDROCHLORIDE 5 MG: 5 TABLET ORAL at 03:41

## 2020-03-20 RX ADMIN — OXYCODONE HYDROCHLORIDE 5 MG: 5 TABLET ORAL at 15:42

## 2020-03-20 RX ADMIN — OXYCODONE HYDROCHLORIDE 10 MG: 10 TABLET, FILM COATED, EXTENDED RELEASE ORAL at 05:56

## 2020-03-20 RX ADMIN — METHIMAZOLE 5 MG: 5 TABLET ORAL at 05:56

## 2020-03-20 RX ADMIN — OXYCODONE HYDROCHLORIDE 5 MG: 5 TABLET ORAL at 22:21

## 2020-03-20 RX ADMIN — METOPROLOL TARTRATE 25 MG: 25 TABLET, FILM COATED ORAL at 05:56

## 2020-03-20 RX ADMIN — APIXABAN 5 MG: 5 TABLET, FILM COATED ORAL at 17:20

## 2020-03-20 RX ADMIN — LEVETIRACETAM 1000 MG: 500 TABLET ORAL at 20:15

## 2020-03-20 RX ADMIN — OXYCODONE HYDROCHLORIDE 10 MG: 10 TABLET, FILM COATED, EXTENDED RELEASE ORAL at 17:20

## 2020-03-20 RX ADMIN — PROPRANOLOL HYDROCHLORIDE 120 MG: 60 CAPSULE, EXTENDED RELEASE ORAL at 20:15

## 2020-03-20 RX ADMIN — HYDROXYZINE HYDROCHLORIDE 50 MG: 50 TABLET, FILM COATED ORAL at 20:15

## 2020-03-20 ASSESSMENT — ENCOUNTER SYMPTOMS
VOMITING: 0
LOSS OF CONSCIOUSNESS: 0
EYE REDNESS: 0
NERVOUS/ANXIOUS: 0
DIARRHEA: 0
FLANK PAIN: 0
EYE DISCHARGE: 0
EYE PAIN: 0
FEVER: 0
MUSCULOSKELETAL NEGATIVE: 1
INSOMNIA: 0
ABDOMINAL PAIN: 0
STRIDOR: 0
SORE THROAT: 0
SEIZURES: 0
ROS GI COMMENTS: ANOREXIA
DIZZINESS: 1
PALPITATIONS: 0
SINUS PAIN: 0
SHORTNESS OF BREATH: 0
CHILLS: 1

## 2020-03-20 ASSESSMENT — COGNITIVE AND FUNCTIONAL STATUS - GENERAL
TOILETING: A LITTLE
MOBILITY SCORE: 24
SUGGESTED CMS G CODE MODIFIER DAILY ACTIVITY: CJ
SUGGESTED CMS G CODE MODIFIER MOBILITY: CH
DRESSING REGULAR LOWER BODY CLOTHING: A LITTLE
DAILY ACTIVITIY SCORE: 21
HELP NEEDED FOR BATHING: A LITTLE

## 2020-03-20 ASSESSMENT — FIBROSIS 4 INDEX
FIB4 SCORE: 0.61
FIB4 SCORE: 0.61

## 2020-03-20 ASSESSMENT — GAIT ASSESSMENTS
GAIT LEVEL OF ASSIST: SUPERVISED
ASSISTIVE DEVICE: OTHER (COMMENTS)
DEVIATION: OTHER (COMMENT)
DISTANCE (FEET): 60

## 2020-03-20 ASSESSMENT — ACTIVITIES OF DAILY LIVING (ADL): TOILETING: INDEPENDENT

## 2020-03-20 NOTE — PROGRESS NOTES
Report received from Joana. Pt having anxiety while awake, PRN ativan ordered . Pt awake in bed A&Ox4; no complaints at this time. POC discussed; all questions answered. Bed locked in lowest position; call light in reach; bed alarm in use.

## 2020-03-20 NOTE — THERAPY
"Physical Therapy Evaluation completed.   Bed Mobility:  Supine to Sit: Modified Independent  Transfers: Sit to Stand: Supervised  Gait: Level Of Assist: Supervised with No Equipment Needed       Plan of Care: Patient with no further skilled PT needs in the acute care setting at this time  Discharge Recommendations: Equipment: No Equipment Needed. See below    After initial evaluation, pt has no further acute PT needs. She reports no functional concerns with dc to home once medically cleared. She is primarily limited in this unfamiliar environment 2' to blindess (for wayfinding). She has no PT needs after dc to home.     See \"Rehab Therapy-Acute\" Patient Summary Report for complete documentation.     "

## 2020-03-20 NOTE — THERAPY
"Occupational Therapy Evaluation completed.   Functional Status: Pt is a 50 y/o female admitted with nausea, vomiting, diarrhea found to have hyperthyroidism. She has a hx of blindness, seizures, and  shunt. She was pleasant and cooperative. She is at a supv-mod I level for basic self cares, functional mobility, and functional transfers w/o AD. She did need HHA for wayfinding purposes only. She reports no concerns in re: to performing her ADLs upon d/c and has good support from a friend for IADLs as needed.   Plan of Care: Patient with no further skilled OT needs in the acute care setting at this time  Discharge Recommendations:  Equipment: No Equipment Needed. Anticipate that the patient will have no further occupational therapy needs after discharge from the hospital.       See \"Rehab Therapy-Acute\" Patient Summary Report for complete documentation.    "

## 2020-03-20 NOTE — CARE PLAN
Problem: Safety  Goal: Will remain free from falls  Outcome: PROGRESSING AS EXPECTED   Pt high fall risk; bed alarm in use; pt calls prior to ambulating.    Problem: Psychosocial Needs:  Goal: Level of anxiety will decrease  Outcome: PROGRESSING SLOWER THAN EXPECTED   Pt experiencing anxiety majority of time while awake; PRN anxiolytic ordered.    Problem: Skin Integrity  Goal: Risk for impaired skin integrity will decrease  Outcome: PROGRESSING SLOWER THAN EXPECTED   Pt has redness to right breast and rash/redness to right clavicle from current radiation therapy.

## 2020-03-20 NOTE — PROGRESS NOTES
[facility] Medicine Daily Progress Note    Date of Service  3/20/2020    Chief Complaint  49 y.o. 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 diarrhea, and vomiting, improved with supportive care and intravenous fluids.  She is getting radiation for her breast cancer that was continued.          Interval Problem Update  Tachycardia improved, heart rate 70s-80s, down from 110s-130s,  Still dehydrated, anorexic, encourage oral intake, antiemetics on board.  Continue intravenous fluids  EKG personally reviewed heart rate 95, QTc 458, no significant ST depression, no ST elevation.  Can be transferred to medical floor, no longer needs telemetry   Discussed with patient, patient's nurse and with multidisciplinary team during rounds including , pharmacist and charge nurse.      Consultants/Specialty  None    Code Status  Full code    Disposition  To be determined, PT/OT  Can be transferred to medical floor, no longer needs telemetry, can go to oncology to resume radiation therapy.      Review of Systems  Review of Systems   Constitutional: Positive for chills and malaise/fatigue. Negative for fever.   HENT: Negative for sinus pain and sore throat.    Eyes: Negative for pain, discharge and redness.   Respiratory: Negative for shortness of breath and stridor.    Cardiovascular: Negative for palpitations (Resolved).   Gastrointestinal: Negative for abdominal pain, diarrhea (Improving) and vomiting (Resolved).        Anorexia   Genitourinary: Negative for flank pain and hematuria.   Musculoskeletal: Negative.    Skin: Negative for itching.   Neurological: Positive for dizziness. Negative for seizures and loss of consciousness.   Psychiatric/Behavioral: The patient is  not nervous/anxious and does not have insomnia.       Physical Exam  Temp:  [36.2 °C (97.1 °F)-36.7 °C (98.1 °F)] 36.2 °C (97.1 °F)  Pulse:  [74-89] 74  Resp:  [14-16] 14  BP: (107-146)/(74-91) 107/74  SpO2:  [94 %-99 %] 94 %    Physical Exam  Vitals signs and nursing note reviewed.   Constitutional:       Appearance: Normal appearance.   HENT:      Head: Normocephalic and atraumatic.      Nose: Nose normal.      Mouth/Throat:      Mouth: Mucous membranes are dry.      Pharynx: Oropharynx is clear.   Eyes:      Extraocular Movements: Extraocular movements intact.      Conjunctiva/sclera: Conjunctivae normal.   Neck:      Musculoskeletal: Normal range of motion and neck supple. No neck rigidity or muscular tenderness.   Cardiovascular:      Rate and Rhythm: Regular rhythm. Tachycardia present.      Pulses: Normal pulses.      Heart sounds: Normal heart sounds. No murmur. No friction rub. No gallop.    Pulmonary:      Effort: Pulmonary effort is normal. No respiratory distress.      Breath sounds: Normal breath sounds. No stridor. No wheezing.   Abdominal:      General: Abdomen is flat. Bowel sounds are normal. There is no distension.      Palpations: Abdomen is soft. There is no mass.      Tenderness: There is no abdominal tenderness. There is no right CVA tenderness or left CVA tenderness.   Musculoskeletal: Normal range of motion.         General: No swelling.   Lymphadenopathy:      Cervical: No cervical adenopathy.   Skin:     General: Skin is warm and dry.   Neurological:      General: No focal deficit present.      Mental Status: She is alert and oriented to person, place, and time.   Psychiatric:         Mood and Affect: Mood normal.         Behavior: Behavior normal.         Fluids    Intake/Output Summary (Last 24 hours) at 3/20/2020 1349  Last data filed at 3/19/2020 2045  Gross per 24 hour   Intake 460 ml   Output --   Net 460 ml       Laboratory  Recent Labs     03/18/20 2024 03/20/20  0351   WBC 8.1 6.3    RBC 4.77 4.19*   HEMOGLOBIN 14.2 12.4   HEMATOCRIT 41.9 39.6   MCV 87.8 94.5   MCH 29.8 29.6   MCHC 33.9 31.3*   RDW 49.3 55.5*   PLATELETCT 374 280   MPV 10.8 10.2     Recent Labs     03/18/20 2024 03/20/20  0351   SODIUM 135 139   POTASSIUM 3.3* 3.6   CHLORIDE 102 107   CO2 19* 21   GLUCOSE 134* 102*   BUN 5* 4*   CREATININE 0.70 0.53   CALCIUM 10.3 8.8                   Imaging  CT-ABDOMEN-PELVIS WITH   Final Result      1.  No acute abnormality within the abdomen or pelvis      2.  Ventricular-left pleural catheter present producing a small left pleural effusion      3.  Prior cholecystectomy with mild, unchanged biliary dilation      4.  Probably prior hysterectomy and appendectomy      5.  Abdominal aortic atherosclerotic plaque      6.  Hepatic steatosis      CT-HEAD W/O   Final Result      No acute intracranial abnormality      DA-[id number]-4 VIEW   Final Result      Intact shunt extending from the left side of the head to the left pleural space      DX-SPINE-ANY ONE VIEW   Final Result      Intact shunt extending from the left side of the head to the left pleural space      DX-CHEST-LIMITED (1 VIEW)   Final Result      Intact shunt extending from the left side of the head to the left pleural space      DX-SKULL-LIMITED 3-   Final Result      Intact shunt extending from the left side of the head to the left pleural space         Assessment/Plan  * Hyperthyroidism  Assessment & Plan  With vomiting, diarrhea and palpitations.  Plan for initiation of methimazole and Inderal.  Monitor.         Pulmonary embolism (HCC)- (present on admission)  Assessment & Plan  On anticoagulation which will be continued.      Essential hypertension- (present on admission)  Assessment & Plan  Lisinopril, and Inderal as outpatient.  Resume.  Clonidine & labetalolas needed for extreme hypertension, .  Vital signs per policy.      Seizure (HCC)- (present on admission)  Assessment & Plan  Resume home Keppra      Congenital  hydrocephalus (HCC)- (present on admission)  Assessment & Plan  Status post shunt placement, without current issue.       Breast cancer (HCC)- (present on admission)  Assessment & Plan  Stable on medical regimen.  Continue outpatient follow-up     Blind- (present on admission)  Assessment & Plan  Since age 11 due to prior shunt malfunction and optic nerve ischemia.        VTE prophylaxis: On apixaban for pulmonary embolism

## 2020-03-20 NOTE — CARE PLAN
Problem: Safety  Goal: Will remain free from injury  Outcome: PROGRESSING AS EXPECTED     Problem: Infection  Goal: Will remain free from infection  Outcome: PROGRESSING AS EXPECTED     Problem: Venous Thromboembolism (VTW)/Deep Vein Thrombosis (DVT) Prevention:  Goal: Patient will participate in Venous Thrombosis (VTE)/Deep Vein Thrombosis (DVT)Prevention Measures  Outcome: PROGRESSING AS EXPECTED     Problem: Communication  Goal: The ability to communicate needs accurately and effectively will improve  3/20/2020 1024 by Yaneth Haq R.N.  Outcome: PROGRESSING AS EXPECTED  3/20/2020 1022 by Yaneth Haq R.N.  Outcome: PROGRESSING SLOWER THAN EXPECTED     Problem: Safety  Goal: Will remain free from injury  3/20/2020 1024 by Yaneth Haq R.N.  Outcome: PROGRESSING AS EXPECTED  3/20/2020 1022 by Yaneth Haq R.N.  Outcome: PROGRESSING AS EXPECTED     Problem: Infection  Goal: Will remain free from infection  3/20/2020 1024 by Yaneth Haq R.N.  Outcome: PROGRESSING AS EXPECTED  3/20/2020 1022 by Yaneth Haq R.N.  Outcome: PROGRESSING AS EXPECTED     Problem: Venous Thromboembolism (VTW)/Deep Vein Thrombosis (DVT) Prevention:  Goal: Patient will participate in Venous Thrombosis (VTE)/Deep Vein Thrombosis (DVT)Prevention Measures  3/20/2020 1024 by Yaneth Haq R.N.  Outcome: PROGRESSING AS EXPECTED  3/20/2020 1022 by Yaneth Haq R.N.  Outcome: PROGRESSING AS EXPECTED     Problem: Knowledge Deficit  Goal: Knowledge of disease process/condition, treatment plan, diagnostic tests, and medications will improve  Outcome: PROGRESSING AS EXPECTED

## 2020-03-21 ENCOUNTER — APPOINTMENT (OUTPATIENT)
Dept: CARDIOLOGY | Facility: MEDICAL CENTER | Age: 50
DRG: 643 | End: 2020-03-21
Attending: FAMILY MEDICINE
Payer: MEDICARE

## 2020-03-21 ENCOUNTER — APPOINTMENT (OUTPATIENT)
Dept: RADIOLOGY | Facility: MEDICAL CENTER | Age: 50
DRG: 643 | End: 2020-03-21
Attending: FAMILY MEDICINE
Payer: MEDICARE

## 2020-03-21 PROBLEM — E83.42 HYPOMAGNESEMIA: Status: ACTIVE | Noted: 2020-03-21

## 2020-03-21 LAB
ALBUMIN SERPL BCP-MCNC: 3.1 G/DL (ref 3.2–4.9)
ALBUMIN/GLOB SERPL: 1.2 G/DL
ALP SERPL-CCNC: 68 U/L (ref 30–99)
ALT SERPL-CCNC: 14 U/L (ref 2–50)
ANION GAP SERPL CALC-SCNC: 11 MMOL/L (ref 7–16)
AST SERPL-CCNC: 14 U/L (ref 12–45)
BASOPHILS # BLD AUTO: 1 % (ref 0–1.8)
BASOPHILS # BLD: 0.06 K/UL (ref 0–0.12)
BILIRUB SERPL-MCNC: 0.3 MG/DL (ref 0.1–1.5)
BUN SERPL-MCNC: 7 MG/DL (ref 8–22)
CALCIUM SERPL-MCNC: 8.4 MG/DL (ref 8.5–10.5)
CHLORIDE SERPL-SCNC: 108 MMOL/L (ref 96–112)
CO2 SERPL-SCNC: 20 MMOL/L (ref 20–33)
CREAT SERPL-MCNC: 0.53 MG/DL (ref 0.5–1.4)
EOSINOPHIL # BLD AUTO: 0.11 K/UL (ref 0–0.51)
EOSINOPHIL NFR BLD: 1.8 % (ref 0–6.9)
ERYTHROCYTE [DISTWIDTH] IN BLOOD BY AUTOMATED COUNT: 52 FL (ref 35.9–50)
GLOBULIN SER CALC-MCNC: 2.5 G/DL (ref 1.9–3.5)
GLUCOSE SERPL-MCNC: 86 MG/DL (ref 65–99)
HCT VFR BLD AUTO: 38.3 % (ref 37–47)
HGB BLD-MCNC: 12.7 G/DL (ref 12–16)
IMM GRANULOCYTES # BLD AUTO: 0.02 K/UL (ref 0–0.11)
IMM GRANULOCYTES NFR BLD AUTO: 0.3 % (ref 0–0.9)
LV EJECT FRACT  99904: 65
LV EJECT FRACT MOD 2C 99903: 52.07
LV EJECT FRACT MOD 4C 99902: 70.63
LV EJECT FRACT MOD BP 99901: 61.27
LYMPHOCYTES # BLD AUTO: 1.03 K/UL (ref 1–4.8)
LYMPHOCYTES NFR BLD: 16.4 % (ref 22–41)
MAGNESIUM SERPL-MCNC: 1.9 MG/DL (ref 1.5–2.5)
MCH RBC QN AUTO: 30.4 PG (ref 27–33)
MCHC RBC AUTO-ENTMCNC: 33.2 G/DL (ref 33.6–35)
MCV RBC AUTO: 91.6 FL (ref 81.4–97.8)
MONOCYTES # BLD AUTO: 0.71 K/UL (ref 0–0.85)
MONOCYTES NFR BLD AUTO: 11.3 % (ref 0–13.4)
NEUTROPHILS # BLD AUTO: 4.35 K/UL (ref 2–7.15)
NEUTROPHILS NFR BLD: 69.2 % (ref 44–72)
NRBC # BLD AUTO: 0 K/UL
NRBC BLD-RTO: 0 /100 WBC
PLATELET # BLD AUTO: 257 K/UL (ref 164–446)
PMV BLD AUTO: 10.5 FL (ref 9–12.9)
POTASSIUM SERPL-SCNC: 3.6 MMOL/L (ref 3.6–5.5)
PROT SERPL-MCNC: 5.6 G/DL (ref 6–8.2)
RBC # BLD AUTO: 4.18 M/UL (ref 4.2–5.4)
SODIUM SERPL-SCNC: 139 MMOL/L (ref 135–145)
WBC # BLD AUTO: 6.3 K/UL (ref 4.8–10.8)

## 2020-03-21 PROCEDURE — A9270 NON-COVERED ITEM OR SERVICE: HCPCS | Performed by: HOSPITALIST

## 2020-03-21 PROCEDURE — 700102 HCHG RX REV CODE 250 W/ 637 OVERRIDE(OP): Performed by: HOSPITALIST

## 2020-03-21 PROCEDURE — 76536 US EXAM OF HEAD AND NECK: CPT

## 2020-03-21 PROCEDURE — 700105 HCHG RX REV CODE 258: Performed by: HOSPITALIST

## 2020-03-21 PROCEDURE — 85025 COMPLETE CBC W/AUTO DIFF WBC: CPT

## 2020-03-21 PROCEDURE — 83735 ASSAY OF MAGNESIUM: CPT

## 2020-03-21 PROCEDURE — 93306 TTE W/DOPPLER COMPLETE: CPT | Mod: 26 | Performed by: INTERNAL MEDICINE

## 2020-03-21 PROCEDURE — 80053 COMPREHEN METABOLIC PANEL: CPT

## 2020-03-21 PROCEDURE — 770004 HCHG ROOM/CARE - ONCOLOGY PRIVATE *

## 2020-03-21 PROCEDURE — 80048 BASIC METABOLIC PNL TOTAL CA: CPT

## 2020-03-21 PROCEDURE — 700111 HCHG RX REV CODE 636 W/ 250 OVERRIDE (IP): Performed by: HOSPITALIST

## 2020-03-21 PROCEDURE — 84100 ASSAY OF PHOSPHORUS: CPT

## 2020-03-21 PROCEDURE — 99233 SBSQ HOSP IP/OBS HIGH 50: CPT | Performed by: FAMILY MEDICINE

## 2020-03-21 PROCEDURE — 93306 TTE W/DOPPLER COMPLETE: CPT

## 2020-03-21 PROCEDURE — 700102 HCHG RX REV CODE 250 W/ 637 OVERRIDE(OP): Performed by: FAMILY MEDICINE

## 2020-03-21 PROCEDURE — 36415 COLL VENOUS BLD VENIPUNCTURE: CPT

## 2020-03-21 PROCEDURE — A9270 NON-COVERED ITEM OR SERVICE: HCPCS | Performed by: FAMILY MEDICINE

## 2020-03-21 RX ADMIN — OXYCODONE HYDROCHLORIDE 10 MG: 10 TABLET, FILM COATED, EXTENDED RELEASE ORAL at 05:27

## 2020-03-21 RX ADMIN — OXYCODONE HYDROCHLORIDE 10 MG: 10 TABLET, FILM COATED, EXTENDED RELEASE ORAL at 18:03

## 2020-03-21 RX ADMIN — Medication 400 MG: at 11:00

## 2020-03-21 RX ADMIN — OXYCODONE HYDROCHLORIDE 5 MG: 5 TABLET ORAL at 03:27

## 2020-03-21 RX ADMIN — OXYCODONE HYDROCHLORIDE 5 MG: 5 TABLET ORAL at 20:20

## 2020-03-21 RX ADMIN — LORAZEPAM 1 MG: 2 INJECTION INTRAMUSCULAR; INTRAVENOUS at 07:37

## 2020-03-21 RX ADMIN — OXYCODONE HYDROCHLORIDE 5 MG: 5 TABLET ORAL at 09:36

## 2020-03-21 RX ADMIN — PROPRANOLOL HYDROCHLORIDE 120 MG: 60 CAPSULE, EXTENDED RELEASE ORAL at 23:33

## 2020-03-21 RX ADMIN — LISINOPRIL 20 MG: 20 TABLET ORAL at 05:28

## 2020-03-21 RX ADMIN — APIXABAN 5 MG: 5 TABLET, FILM COATED ORAL at 05:28

## 2020-03-21 RX ADMIN — Medication 400 MG: at 18:04

## 2020-03-21 RX ADMIN — APIXABAN 5 MG: 5 TABLET, FILM COATED ORAL at 18:06

## 2020-03-21 RX ADMIN — LORAZEPAM 1 MG: 2 INJECTION INTRAMUSCULAR; INTRAVENOUS at 15:32

## 2020-03-21 RX ADMIN — LORAZEPAM 0.5 MG: 1 TABLET ORAL at 23:34

## 2020-03-21 RX ADMIN — HYDROXYZINE HYDROCHLORIDE 50 MG: 50 TABLET, FILM COATED ORAL at 20:22

## 2020-03-21 RX ADMIN — SENNOSIDES AND DOCUSATE SODIUM 1 TABLET: 8.6; 5 TABLET ORAL at 05:28

## 2020-03-21 RX ADMIN — LEVETIRACETAM 1000 MG: 500 TABLET ORAL at 20:22

## 2020-03-21 RX ADMIN — SODIUM CHLORIDE, POTASSIUM CHLORIDE, SODIUM LACTATE AND CALCIUM CHLORIDE: 600; 310; 30; 20 INJECTION, SOLUTION INTRAVENOUS at 05:29

## 2020-03-21 RX ADMIN — OXYCODONE HYDROCHLORIDE 5 MG: 5 TABLET ORAL at 13:32

## 2020-03-21 RX ADMIN — METHIMAZOLE 5 MG: 5 TABLET ORAL at 05:28

## 2020-03-21 ASSESSMENT — ENCOUNTER SYMPTOMS
BLURRED VISION: 1
PALPITATIONS: 0
HEARTBURN: 0
SPEECH CHANGE: 0
BACK PAIN: 0
FOCAL WEAKNESS: 0
SENSORY CHANGE: 0
DIARRHEA: 0
HEADACHES: 1
NECK PAIN: 0
WHEEZING: 0
SORE THROAT: 0
FEVER: 0
CHILLS: 0
VOMITING: 0
SHORTNESS OF BREATH: 0
ABDOMINAL PAIN: 0
NAUSEA: 0
DIZZINESS: 0
FLANK PAIN: 0
WEAKNESS: 1
DIAPHORESIS: 0
NERVOUS/ANXIOUS: 1
COUGH: 0

## 2020-03-21 ASSESSMENT — FIBROSIS 4 INDEX: FIB4 SCORE: 0.7

## 2020-03-21 NOTE — CARE PLAN
Problem: Safety  Goal: Will remain free from injury  Outcome: PROGRESSING AS EXPECTED  Intervention: Provide assistance with mobility  Note: Pt is A&Ox4, hand held assist with steady gait. Pt is blind in both eyes. Bed alarm on for safety, calls appropriately for assistance.      Problem: Psychosocial Needs:  Goal: Level of anxiety will decrease  Outcome: PROGRESSING AS EXPECTED  Intervention: Identify and develop with patient strategies to cope with anxiety triggers  Note: Pt is able to communicate level of anxiety, medicated per MAR.

## 2020-03-21 NOTE — PROGRESS NOTES
Report received from Yaneth. Pt had radiation today. Pt awake in bed A&Ox4; no complaints at this time. POC discussed; all questions answered. Bed locked in lowest position; call light in reach; bed alarm in use.

## 2020-03-21 NOTE — PROGRESS NOTES
Assumed care of patient at 2200. Pt is A&Ox4, standby assist with steady gait. Bed alarm on for safety, pt calls appropriately for assistance. PIV with positive blood return, infusing per MAR. Call light within reach, hourly rounding in place.

## 2020-03-21 NOTE — PROGRESS NOTES
[facility] Medicine Daily Progress Note    Date of Service  3/21/2020    Chief Complaint  49 y.o. female admitted 3/18/2020 with Hyperthyroidism    Hospital Course  Admitted with Hyperthyroidism    Interval Problem Update  Hyperthyroid - palpitations and diarrhea resolved  HTN - sbp 110s  PE - denies chest pain or shortness of breath  Low Magnesium    Consultants/Specialty  None    Code Status  Full    Disposition  TBD    Review of Systems  Review of Systems   Constitutional: Positive for malaise/fatigue. Negative for chills, diaphoresis and fever.   HENT: Negative for hearing loss and sore throat.    Eyes: Positive for blurred vision.   Respiratory: Negative for cough, shortness of breath and wheezing.    Cardiovascular: Negative for chest pain, palpitations and leg swelling.   Gastrointestinal: Negative for abdominal pain, diarrhea, heartburn, nausea and vomiting.   Genitourinary: Negative for dysuria, flank pain and hematuria.   Musculoskeletal: Negative for back pain and neck pain.   Skin: Negative for rash.   Neurological: Positive for weakness and headaches. Negative for dizziness, sensory change, speech change and focal weakness.   Psychiatric/Behavioral: The patient is nervous/anxious.         Physical Exam  Temp:  [36.4 °C (97.5 °F)-36.8 °C (98.2 °F)] 36.4 °C (97.6 °F)  Pulse:  [] 71  Resp:  [16-18] 17  BP: (107-121)/(62-84) 117/81  SpO2:  [95 %-98 %] 95 %    Physical Exam  HENT:      Head: Normocephalic.      Nose: No congestion.      Mouth/Throat:      Mouth: Mucous membranes are dry.   Eyes:      Conjunctiva/sclera: Conjunctivae normal.   Neck:      Musculoskeletal: No muscular tenderness.   Cardiovascular:      Rate and Rhythm: Normal rate and regular rhythm.   Pulmonary:      Effort: Pulmonary effort is normal.      Breath sounds: Normal breath sounds.   Abdominal:      General: Bowel sounds are normal. There is no distension.      Palpations: Abdomen is soft.      Tenderness: There is no abdominal  tenderness. There is no guarding or rebound.   Musculoskeletal:      Right lower leg: No edema.      Left lower leg: No edema.   Lymphadenopathy:      Cervical: No cervical adenopathy.   Skin:     General: Skin is warm and dry.   Neurological:      General: No focal deficit present.      Mental Status: She is alert and oriented to person, place, and time.      Cranial Nerves: No cranial nerve deficit.         Fluids    Intake/Output Summary (Last 24 hours) at 3/21/2020 1042  Last data filed at 3/20/2020 2015  Gross per 24 hour   Intake 90 ml   Output --   Net 90 ml       Laboratory  Recent Labs     03/18/20 2024 03/20/20  0351 03/21/20  0033   WBC 8.1 6.3 6.3   RBC 4.77 4.19* 4.18*   HEMOGLOBIN 14.2 12.4 12.7   HEMATOCRIT 41.9 39.6 38.3   MCV 87.8 94.5 91.6   MCH 29.8 29.6 30.4   MCHC 33.9 31.3* 33.2*   RDW 49.3 55.5* 52.0*   PLATELETCT 374 280 257   MPV 10.8 10.2 10.5     Recent Labs     03/18/20 2024 03/20/20  0351 03/21/20  0033   SODIUM 135 139 139   POTASSIUM 3.3* 3.6 3.6   CHLORIDE 102 107 108   CO2 19* 21 20   GLUCOSE 134* 102* 86   BUN 5* 4* 7*   CREATININE 0.70 0.53 0.53   CALCIUM 10.3 8.8 8.4*                   Imaging  CT-ABDOMEN-PELVIS WITH   Final Result      1.  No acute abnormality within the abdomen or pelvis      2.  Ventricular-left pleural catheter present producing a small left pleural effusion      3.  Prior cholecystectomy with mild, unchanged biliary dilation      4.  Probably prior hysterectomy and appendectomy      5.  Abdominal aortic atherosclerotic plaque      6.  Hepatic steatosis      CT-HEAD W/O   Final Result      No acute intracranial abnormality      SY-IDZHLEX-0 VIEW   Final Result      Intact shunt extending from the left side of the head to the left pleural space      DX-SPINE-ANY ONE VIEW   Final Result      Intact shunt extending from the left side of the head to the left pleural space      DX-CHEST-LIMITED (1 VIEW)   Final Result      Intact shunt extending from the left  side of the head to the left pleural space      DX-SKULL-LIMITED 3-   Final Result      Intact shunt extending from the left side of the head to the left pleural space      EC-ECHOCARDIOGRAM COMPLETE W/O CONT    (Results Pending)   US-THYROID    (Results Pending)        Assessment/Plan  * Hyperthyroidism- (present on admission)  Assessment & Plan  Methimazole, Propranolol  Check TSH on 4/2/2020  Check Thyroid US    Hypomagnesemia- (present on admission)  Assessment & Plan  Start oral Mg    Pulmonary embolism (HCC)- (present on admission)  Assessment & Plan  Eliquis  Check Echocardiogram     Breast cancer (HCC)- (present on admission)  Assessment & Plan  Radiation treatments  pain control    Essential hypertension- (present on admission)  Assessment & Plan  Lisinopril, Propranolol    Seizure (HCC)- (present on admission)  Assessment & Plan  Keppra      Hypokalemia- (present on admission)  Assessment & Plan  Follow bmp    Chronic pain- (present on admission)  Assessment & Plan  Pain control    Chronic headache- (present on admission)  Assessment & Plan  Pain control     (ventriculoperitoneal) shunt status- (present on admission)  Assessment & Plan  stable    Blind- (present on admission)  Assessment & Plan  Assistance with ADLS       VTE prophylaxis: Eliquis

## 2020-03-21 NOTE — CARE PLAN
Problem: Communication  Goal: The ability to communicate needs accurately and effectively will improve  Outcome: PROGRESSING AS EXPECTED  Note: Patient is able to communicate needs accurately and express what she needs. Uses call light appropriately.     Problem: Safety  Goal: Will remain free from falls  Outcome: PROGRESSING AS EXPECTED  Note: Patient has remained free from falls when ambulating to the bathroom. Precautions in place.

## 2020-03-21 NOTE — CARE PLAN
Problem: Communication  Goal: The ability to communicate needs accurately and effectively will improve  Outcome: PROGRESSING AS EXPECTED   Pt notifies staff of needs appropriately.    Problem: Safety  Goal: Will remain free from falls  Outcome: PROGRESSING AS EXPECTED   Pt high fall risk; bed alarm in use; pt calls prior to ambulating.    Problem: Psychosocial Needs:  Goal: Level of anxiety will decrease  Outcome: PROGRESSING SLOWER THAN EXPECTED   Pt reported reduced anxiety levels today.    Problem: Skin Integrity  Goal: Risk for impaired skin integrity will decrease  Outcome: PROGRESSING SLOWER THAN EXPECTED   Pt has redness to right breast and rash to right clavicle from current radiation therapy.

## 2020-03-21 NOTE — PROGRESS NOTES
Bedside report received from night shift RN. Assumed care. Pt is A&Ox4. Pt is in bed. Pt denies pain at this time. Pt was updated on the plan of care for the day. All questions answered.   Pt has call light within reach and bed is in lowest position.  All fall precautions in place. Pt had no other needs at this time, hourly rounding in place.  Patient is vision impaired and requires frequent reassurances as she has panic attacks and is frequently anxious.

## 2020-03-22 PROBLEM — I27.20 PULMONARY HTN (HCC): Status: ACTIVE | Noted: 2020-03-22

## 2020-03-22 LAB
ANION GAP SERPL CALC-SCNC: 11 MMOL/L (ref 7–16)
BUN SERPL-MCNC: 9 MG/DL (ref 8–22)
CALCIUM SERPL-MCNC: 8.8 MG/DL (ref 8.5–10.5)
CHLORIDE SERPL-SCNC: 105 MMOL/L (ref 96–112)
CO2 SERPL-SCNC: 24 MMOL/L (ref 20–33)
CREAT SERPL-MCNC: 0.59 MG/DL (ref 0.5–1.4)
GLUCOSE SERPL-MCNC: 106 MG/DL (ref 65–99)
PHOSPHATE SERPL-MCNC: 4.7 MG/DL (ref 2.5–4.5)
POTASSIUM SERPL-SCNC: 3.6 MMOL/L (ref 3.6–5.5)
SODIUM SERPL-SCNC: 140 MMOL/L (ref 135–145)

## 2020-03-22 PROCEDURE — 99233 SBSQ HOSP IP/OBS HIGH 50: CPT | Performed by: FAMILY MEDICINE

## 2020-03-22 PROCEDURE — 700102 HCHG RX REV CODE 250 W/ 637 OVERRIDE(OP): Performed by: HOSPITALIST

## 2020-03-22 PROCEDURE — 770004 HCHG ROOM/CARE - ONCOLOGY PRIVATE *

## 2020-03-22 PROCEDURE — A9270 NON-COVERED ITEM OR SERVICE: HCPCS | Performed by: HOSPITALIST

## 2020-03-22 PROCEDURE — 700105 HCHG RX REV CODE 258: Performed by: FAMILY MEDICINE

## 2020-03-22 PROCEDURE — 700111 HCHG RX REV CODE 636 W/ 250 OVERRIDE (IP): Performed by: HOSPITALIST

## 2020-03-22 PROCEDURE — A9270 NON-COVERED ITEM OR SERVICE: HCPCS | Performed by: FAMILY MEDICINE

## 2020-03-22 PROCEDURE — 700102 HCHG RX REV CODE 250 W/ 637 OVERRIDE(OP): Performed by: FAMILY MEDICINE

## 2020-03-22 RX ORDER — AMOXICILLIN 250 MG
2 CAPSULE ORAL 2 TIMES DAILY
Status: DISCONTINUED | OUTPATIENT
Start: 2020-03-22 | End: 2020-03-23 | Stop reason: HOSPADM

## 2020-03-22 RX ORDER — BISACODYL 10 MG
10 SUPPOSITORY, RECTAL RECTAL
Status: DISCONTINUED | OUTPATIENT
Start: 2020-03-22 | End: 2020-03-23 | Stop reason: HOSPADM

## 2020-03-22 RX ORDER — BUTALBITAL, ACETAMINOPHEN AND CAFFEINE 50; 325; 40 MG/1; MG/1; MG/1
1 TABLET ORAL EVERY 6 HOURS PRN
Status: DISCONTINUED | OUTPATIENT
Start: 2020-03-22 | End: 2020-03-23 | Stop reason: HOSPADM

## 2020-03-22 RX ORDER — HYDROCODONE BITARTRATE AND ACETAMINOPHEN 10; 325 MG/1; MG/1
1 TABLET ORAL EVERY 4 HOURS PRN
Status: DISCONTINUED | OUTPATIENT
Start: 2020-03-22 | End: 2020-03-23 | Stop reason: HOSPADM

## 2020-03-22 RX ORDER — POLYETHYLENE GLYCOL 3350 17 G/17G
1 POWDER, FOR SOLUTION ORAL
Status: DISCONTINUED | OUTPATIENT
Start: 2020-03-22 | End: 2020-03-23 | Stop reason: HOSPADM

## 2020-03-22 RX ADMIN — OXYCODONE HYDROCHLORIDE 5 MG: 5 TABLET ORAL at 05:58

## 2020-03-22 RX ADMIN — BUTALBITAL, ACETAMINOPHEN, AND CAFFEINE 1 TABLET: 50; 325; 40 TABLET ORAL at 17:20

## 2020-03-22 RX ADMIN — SODIUM CHLORIDE, POTASSIUM CHLORIDE, SODIUM LACTATE AND CALCIUM CHLORIDE: 600; 310; 30; 20 INJECTION, SOLUTION INTRAVENOUS at 17:27

## 2020-03-22 RX ADMIN — HYDROCODONE BITARTRATE AND ACETAMINOPHEN 1 TABLET: 10; 325 TABLET ORAL at 09:49

## 2020-03-22 RX ADMIN — OXYCODONE HYDROCHLORIDE 10 MG: 10 TABLET, FILM COATED, EXTENDED RELEASE ORAL at 05:58

## 2020-03-22 RX ADMIN — HYDROXYZINE HYDROCHLORIDE 10 MG: 10 TABLET, FILM COATED ORAL at 06:10

## 2020-03-22 RX ADMIN — METHIMAZOLE 5 MG: 5 TABLET ORAL at 05:58

## 2020-03-22 RX ADMIN — LISINOPRIL 20 MG: 20 TABLET ORAL at 05:58

## 2020-03-22 RX ADMIN — BUTALBITAL, ACETAMINOPHEN, AND CAFFEINE 1 TABLET: 50; 325; 40 TABLET ORAL at 23:45

## 2020-03-22 RX ADMIN — MORPHINE SULFATE 2 MG: 4 INJECTION INTRAVENOUS at 17:16

## 2020-03-22 RX ADMIN — OXYCODONE HYDROCHLORIDE 5 MG: 5 TABLET ORAL at 02:49

## 2020-03-22 RX ADMIN — MORPHINE SULFATE 2 MG: 4 INJECTION INTRAVENOUS at 13:17

## 2020-03-22 RX ADMIN — HYDROXYZINE HYDROCHLORIDE 50 MG: 50 TABLET, FILM COATED ORAL at 20:27

## 2020-03-22 RX ADMIN — BUTALBITAL, ACETAMINOPHEN, AND CAFFEINE 1 TABLET: 50; 325; 40 TABLET ORAL at 11:04

## 2020-03-22 RX ADMIN — APIXABAN 5 MG: 5 TABLET, FILM COATED ORAL at 05:58

## 2020-03-22 RX ADMIN — SENNOSIDES AND DOCUSATE SODIUM 2 TABLET: 8.6; 5 TABLET ORAL at 17:19

## 2020-03-22 RX ADMIN — SENNOSIDES AND DOCUSATE SODIUM 2 TABLET: 8.6; 5 TABLET ORAL at 09:49

## 2020-03-22 RX ADMIN — Medication 400 MG: at 05:57

## 2020-03-22 RX ADMIN — LORAZEPAM 0.5 MG: 1 TABLET ORAL at 09:50

## 2020-03-22 RX ADMIN — Medication 400 MG: at 17:19

## 2020-03-22 RX ADMIN — LEVETIRACETAM 1000 MG: 500 TABLET ORAL at 20:25

## 2020-03-22 RX ADMIN — APIXABAN 5 MG: 5 TABLET, FILM COATED ORAL at 17:19

## 2020-03-22 RX ADMIN — OXYCODONE HYDROCHLORIDE 10 MG: 10 TABLET, FILM COATED, EXTENDED RELEASE ORAL at 17:19

## 2020-03-22 RX ADMIN — PROPRANOLOL HYDROCHLORIDE 120 MG: 60 CAPSULE, EXTENDED RELEASE ORAL at 23:46

## 2020-03-22 RX ADMIN — HYDROCODONE BITARTRATE AND ACETAMINOPHEN 1 TABLET: 10; 325 TABLET ORAL at 14:49

## 2020-03-22 RX ADMIN — HYDROCODONE BITARTRATE AND ACETAMINOPHEN 1 TABLET: 10; 325 TABLET ORAL at 19:33

## 2020-03-22 RX ADMIN — HYDROCODONE BITARTRATE AND ACETAMINOPHEN 1 TABLET: 10; 325 TABLET ORAL at 23:44

## 2020-03-22 RX ADMIN — OXYCODONE HYDROCHLORIDE 5 MG: 5 TABLET ORAL at 05:57

## 2020-03-22 ASSESSMENT — ENCOUNTER SYMPTOMS
FLANK PAIN: 0
CHILLS: 0
DIZZINESS: 0
WHEEZING: 0
COUGH: 0
SORE THROAT: 0
ABDOMINAL PAIN: 0
HEADACHES: 1
SPEECH CHANGE: 0
NAUSEA: 0
SHORTNESS OF BREATH: 0
NERVOUS/ANXIOUS: 1
DIAPHORESIS: 0
FOCAL WEAKNESS: 0
PALPITATIONS: 0
NECK PAIN: 0
WEAKNESS: 1
BLURRED VISION: 1
BACK PAIN: 0
VOMITING: 0
CONSTIPATION: 1
SENSORY CHANGE: 0
FEVER: 0
HEARTBURN: 0
DIARRHEA: 0

## 2020-03-22 ASSESSMENT — COGNITIVE AND FUNCTIONAL STATUS - GENERAL
SUGGESTED CMS G CODE MODIFIER MOBILITY: CH
SUGGESTED CMS G CODE MODIFIER DAILY ACTIVITY: CH
MOBILITY SCORE: 24
DAILY ACTIVITIY SCORE: 24

## 2020-03-22 NOTE — PROGRESS NOTES
Report given at bedside, assumed care of patient at 0700. Patient A/Ox4, intermittent anxiety - prn Ativan administered with good effect. Blind in both eyes, assistance provided as needed. Intermittent pain of the head, oxycodone discontinued, prn norco initiated and administered, prn fioricet started as well. No nausea. PIV intact, LR infusing at 75/hour. Up to bathroom with SBA. POC discussed. Bed alarm on, call bell within arms reach, and bed in lowest position.

## 2020-03-22 NOTE — PROGRESS NOTES
[facility] Medicine Daily Progress Note    Date of Service  3/22/2020    Chief Complaint  49 y.o. female admitted 3/18/2020 with Hyperthyroidism    Hospital Course  Admitted with Hyperthyroidism    Interval Problem Update  Hyperthyroid - palpitations and diarrhea resolved  HTN - sbp 120s  PE - denies chest pain or shortness of breath  Chronic pain - pt states Norco works better for her     Consultants/Specialty  None    Code Status  Full    Disposition  TBD    Review of Systems  Review of Systems   Constitutional: Positive for malaise/fatigue. Negative for chills, diaphoresis and fever.   HENT: Negative for hearing loss and sore throat.    Eyes: Positive for blurred vision.   Respiratory: Negative for cough, shortness of breath and wheezing.    Cardiovascular: Negative for chest pain, palpitations and leg swelling.   Gastrointestinal: Positive for constipation. Negative for abdominal pain, diarrhea, heartburn, nausea and vomiting.   Genitourinary: Negative for dysuria, flank pain and hematuria.   Musculoskeletal: Negative for back pain and neck pain.   Skin: Negative for rash.   Neurological: Positive for weakness and headaches. Negative for dizziness, sensory change, speech change and focal weakness.   Psychiatric/Behavioral: The patient is nervous/anxious.         Physical Exam  Temp:  [36.3 °C (97.4 °F)-36.7 °C (98.1 °F)] 36.7 °C (98 °F)  Pulse:  [81-86] 84  Resp:  [16-18] 18  BP: (112-120)/(72-82) 115/72  SpO2:  [93 %-96 %] 96 %    Physical Exam  HENT:      Head: Normocephalic.      Nose: No congestion.      Mouth/Throat:      Mouth: Mucous membranes are dry.   Eyes:      Conjunctiva/sclera: Conjunctivae normal.   Neck:      Musculoskeletal: No muscular tenderness.   Cardiovascular:      Rate and Rhythm: Normal rate and regular rhythm.   Pulmonary:      Effort: Pulmonary effort is normal.      Breath sounds: Normal breath sounds.   Abdominal:      General: Bowel sounds are normal. There is no distension.       Palpations: Abdomen is soft.      Tenderness: There is no abdominal tenderness. There is no guarding or rebound.   Musculoskeletal:      Right lower leg: No edema.      Left lower leg: No edema.   Lymphadenopathy:      Cervical: No cervical adenopathy.   Skin:     General: Skin is warm and dry.   Neurological:      General: No focal deficit present.      Mental Status: She is alert and oriented to person, place, and time.      Cranial Nerves: No cranial nerve deficit.         Fluids    Intake/Output Summary (Last 24 hours) at 3/22/2020 1040  Last data filed at 3/22/2020 0400  Gross per 24 hour   Intake 50 ml   Output --   Net 50 ml       Laboratory  Recent Labs     03/20/20  0351 03/21/20  0033   WBC 6.3 6.3   RBC 4.19* 4.18*   HEMOGLOBIN 12.4 12.7   HEMATOCRIT 39.6 38.3   MCV 94.5 91.6   MCH 29.6 30.4   MCHC 31.3* 33.2*   RDW 55.5* 52.0*   PLATELETCT 280 257   MPV 10.2 10.5     Recent Labs     03/20/20  0351 03/21/20  0033 03/21/20  2356   SODIUM 139 139 140   POTASSIUM 3.6 3.6 3.6   CHLORIDE 107 108 105   CO2 21 20 24   GLUCOSE 102* 86 106*   BUN 4* 7* 9   CREATININE 0.53 0.53 0.59   CALCIUM 8.8 8.4* 8.8                   Imaging  EC-ECHOCARDIOGRAM COMPLETE W/O CONT   Final Result      US-THYROID   Final Result      No thyroid nodules identified.      CT-ABDOMEN-PELVIS WITH   Final Result      1.  No acute abnormality within the abdomen or pelvis      2.  Ventricular-left pleural catheter present producing a small left pleural effusion      3.  Prior cholecystectomy with mild, unchanged biliary dilation      4.  Probably prior hysterectomy and appendectomy      5.  Abdominal aortic atherosclerotic plaque      6.  Hepatic steatosis      CT-HEAD W/O   Final Result      No acute intracranial abnormality      [id number] VIEW   Final Result      Intact shunt extending from the left side of the head to the left pleural space      DX-SPINE-ANY ONE VIEW   Final Result      Intact shunt extending from the left side of  the head to the left pleural space      DX-CHEST-LIMITED (1 VIEW)   Final Result      Intact shunt extending from the left side of the head to the left pleural space      DX-SKULL-LIMITED 3-   Final Result      Intact shunt extending from the left side of the head to the left pleural space           Assessment/Plan  * Hyperthyroidism- (present on admission)  Assessment & Plan  Methimazole, Propranolol  Check TSH on 4/2/2020  Check Thyroid US    Pulmonary HTN (HCC)- (present on admission)  Assessment & Plan  Watch fluid status    Hypomagnesemia- (present on admission)  Assessment & Plan  oral Mg    Pulmonary embolism (HCC)- (present on admission)  Assessment & Plan  Eliquis    Breast cancer (HCC)- (present on admission)  Assessment & Plan  Radiation treatments  pain control    Essential hypertension- (present on admission)  Assessment & Plan  Lisinopril, Propranolol    Seizure (HCC)- (present on admission)  Assessment & Plan  Keppra      Hypokalemia- (present on admission)  Assessment & Plan  Follow bmp    Chronic pain- (present on admission)  Assessment & Plan  Pain control - change to Norco    Chronic headache- (present on admission)  Assessment & Plan  Pain control  Trial of Fioricet     (ventriculoperitoneal) shunt status- (present on admission)  Assessment & Plan  stable    Blind- (present on admission)  Assessment & Plan  Assistance with ADLS       VTE prophylaxis: Eliquis

## 2020-03-22 NOTE — CARE PLAN
Problem: Bowel/Gastric:  Goal: Will not experience complications related to bowel motility  Outcome: PROGRESSING AS EXPECTED  Intervention: Assess baseline bowel pattern  Note: Reports it is normal to have BM's every couple of days. Education on constipation with narcotic use provided. Encouraged PO water hydration and ambulation.      Problem: Psychosocial Needs:  Goal: Level of anxiety will decrease  Outcome: PROGRESSING AS EXPECTED  Intervention: Identify and develop with patient strategies to cope with anxiety triggers  Note: PRN ativan in use

## 2020-03-22 NOTE — PROGRESS NOTES
A&Ox4. Blind in both eyes. Switched rooms, oriented to new room. Had better PO intake with dinner last night. Able to eat about 50% of meal. Complains of R head & ocular pain, medicating per MAR. Anxious, anxiolytics given PRN per MAR. Discussing POC with pt helps significantly  anxiety. No BM overnight. Education on constipation with narcotic use provided, encouraged PO hydration and ambulation topromote bowel motility. All needs met, bed locked & in low position. Bed alarm on.

## 2020-03-23 ENCOUNTER — PATIENT OUTREACH (OUTPATIENT)
Dept: HEALTH INFORMATION MANAGEMENT | Facility: OTHER | Age: 50
End: 2020-03-23

## 2020-03-23 ENCOUNTER — HOSPITAL ENCOUNTER (OUTPATIENT)
Dept: RADIATION ONCOLOGY | Facility: MEDICAL CENTER | Age: 50
End: 2020-03-23
Payer: MEDICARE

## 2020-03-23 VITALS
SYSTOLIC BLOOD PRESSURE: 140 MMHG | HEART RATE: 79 BPM | TEMPERATURE: 98.4 F | WEIGHT: 137.13 LBS | OXYGEN SATURATION: 98 % | BODY MASS INDEX: 23.54 KG/M2 | RESPIRATION RATE: 16 BRPM | DIASTOLIC BLOOD PRESSURE: 80 MMHG

## 2020-03-23 LAB
ANION GAP SERPL CALC-SCNC: 15 MMOL/L (ref 7–16)
BUN SERPL-MCNC: 9 MG/DL (ref 8–22)
CALCIUM SERPL-MCNC: 8.6 MG/DL (ref 8.5–10.5)
CHEMOTHERAPY INFUSION START DATE: NORMAL
CHEMOTHERAPY RECORDS: 1000
CHEMOTHERAPY RECORDS: 2
CHEMOTHERAPY RECORDS: NORMAL
CHEMOTHERAPY RX CANCER: NORMAL
CHLORIDE SERPL-SCNC: 104 MMOL/L (ref 96–112)
CO2 SERPL-SCNC: 23 MMOL/L (ref 20–33)
CREAT SERPL-MCNC: 0.72 MG/DL (ref 0.5–1.4)
DATE 1ST CHEMO CANCER: NORMAL
GLUCOSE SERPL-MCNC: 99 MG/DL (ref 65–99)
POTASSIUM SERPL-SCNC: 3.9 MMOL/L (ref 3.6–5.5)
RAD ONC ARIA COURSE LAST TREATMENT DATE: NORMAL
RAD ONC ARIA COURSE TREATMENT ELAPSED DAYS: NORMAL
RAD ONC ARIA REFERENCE POINT DOSAGE GIVEN TO DATE: 10
RAD ONC ARIA REFERENCE POINT DOSAGE GIVEN TO DATE: 10.41
RAD ONC ARIA REFERENCE POINT ID: NORMAL
RAD ONC ARIA REFERENCE POINT ID: NORMAL
RAD ONC ARIA REFERENCE POINT SESSION DOSAGE GIVEN: 2
RAD ONC ARIA REFERENCE POINT SESSION DOSAGE GIVEN: 2.08
SODIUM SERPL-SCNC: 142 MMOL/L (ref 135–145)

## 2020-03-23 PROCEDURE — 77014 PR CT GUIDANCE PLACEMENT RAD THERAPY FIELDS: CPT | Mod: 26 | Performed by: RADIOLOGY

## 2020-03-23 PROCEDURE — 700111 HCHG RX REV CODE 636 W/ 250 OVERRIDE (IP): Performed by: HOSPITALIST

## 2020-03-23 PROCEDURE — 80048 BASIC METABOLIC PNL TOTAL CA: CPT

## 2020-03-23 PROCEDURE — 36415 COLL VENOUS BLD VENIPUNCTURE: CPT

## 2020-03-23 PROCEDURE — 77387 GUIDANCE FOR RADJ TX DLVR: CPT | Performed by: RADIOLOGY

## 2020-03-23 PROCEDURE — A9270 NON-COVERED ITEM OR SERVICE: HCPCS | Performed by: FAMILY MEDICINE

## 2020-03-23 PROCEDURE — 700102 HCHG RX REV CODE 250 W/ 637 OVERRIDE(OP): Performed by: HOSPITALIST

## 2020-03-23 PROCEDURE — 700102 HCHG RX REV CODE 250 W/ 637 OVERRIDE(OP): Performed by: FAMILY MEDICINE

## 2020-03-23 PROCEDURE — A9270 NON-COVERED ITEM OR SERVICE: HCPCS | Performed by: HOSPITALIST

## 2020-03-23 PROCEDURE — 99239 HOSP IP/OBS DSCHRG MGMT >30: CPT | Performed by: FAMILY MEDICINE

## 2020-03-23 PROCEDURE — 77412 RADIATION TX DELIVERY LVL 3: CPT | Performed by: RADIOLOGY

## 2020-03-23 RX ORDER — ONDANSETRON 4 MG/1
4 TABLET, ORALLY DISINTEGRATING ORAL EVERY 6 HOURS PRN
Qty: 30 TAB | Refills: 0 | Status: SHIPPED | OUTPATIENT
Start: 2020-03-23 | End: 2020-06-17

## 2020-03-23 RX ORDER — METHIMAZOLE 5 MG/1
5 TABLET ORAL DAILY
Qty: 30 TAB | Refills: 2 | Status: SHIPPED | OUTPATIENT
Start: 2020-03-24 | End: 2020-06-17

## 2020-03-23 RX ORDER — HYDROCODONE BITARTRATE AND ACETAMINOPHEN 10; 325 MG/1; MG/1
1 TABLET ORAL EVERY 6 HOURS PRN
Qty: 20 TAB | Refills: 0 | Status: SHIPPED | OUTPATIENT
Start: 2020-03-23 | End: 2020-03-28

## 2020-03-23 RX ADMIN — METHIMAZOLE 5 MG: 5 TABLET ORAL at 05:06

## 2020-03-23 RX ADMIN — Medication 400 MG: at 05:04

## 2020-03-23 RX ADMIN — BUTALBITAL, ACETAMINOPHEN, AND CAFFEINE 1 TABLET: 50; 325; 40 TABLET ORAL at 09:37

## 2020-03-23 RX ADMIN — HYDROCODONE BITARTRATE AND ACETAMINOPHEN 1 TABLET: 10; 325 TABLET ORAL at 05:04

## 2020-03-23 RX ADMIN — OXYCODONE HYDROCHLORIDE 10 MG: 10 TABLET, FILM COATED, EXTENDED RELEASE ORAL at 05:05

## 2020-03-23 RX ADMIN — MORPHINE SULFATE 2 MG: 4 INJECTION INTRAVENOUS at 07:48

## 2020-03-23 RX ADMIN — HYDROCODONE BITARTRATE AND ACETAMINOPHEN 1 TABLET: 10; 325 TABLET ORAL at 09:36

## 2020-03-23 RX ADMIN — ACETAMINOPHEN 650 MG: 325 TABLET, FILM COATED ORAL at 09:37

## 2020-03-23 RX ADMIN — HYDROXYZINE HYDROCHLORIDE 10 MG: 10 TABLET, FILM COATED ORAL at 05:07

## 2020-03-23 RX ADMIN — MORPHINE SULFATE 2 MG: 4 INJECTION INTRAVENOUS at 01:08

## 2020-03-23 RX ADMIN — LISINOPRIL 20 MG: 20 TABLET ORAL at 05:05

## 2020-03-23 RX ADMIN — SENNOSIDES AND DOCUSATE SODIUM 2 TABLET: 8.6; 5 TABLET ORAL at 05:05

## 2020-03-23 RX ADMIN — APIXABAN 5 MG: 5 TABLET, FILM COATED ORAL at 05:05

## 2020-03-23 RX ADMIN — MAGNESIUM HYDROXIDE 30 ML: 400 SUSPENSION ORAL at 05:07

## 2020-03-23 NOTE — CARE PLAN
Problem: Safety  Goal: Will remain free from injury  Outcome: PROGRESSING AS EXPECTED  Note: Pt up with SBA, steady gait noted. Fall precautions maintained.     Problem: Pain Management  Goal: Pain level will decrease to patient's comfort goal  Outcome: PROGRESSING AS EXPECTED  Note: Pain frequently assessed throughout shift. PRN pain regimen provided as ordered with relief.

## 2020-03-23 NOTE — CARE PLAN
Problem: Bowel/Gastric:  Goal: Will not experience complications related to bowel motility  Outcome: PROGRESSING AS EXPECTED  Intervention: Assess baseline bowel pattern  Note: Has not had BM since 3/19, bowel protocol initiated. If no BM over night will give MOM.      Problem: Pain Management  Goal: Pain level will decrease to patient's comfort goal  Outcome: PROGRESSING AS EXPECTED  Intervention: Follow pain managment plan developed in collaboration with patient and Interdisciplinary Team  Note: Able to communicate pain needs appropriately. Scheduled and PRN medication in use per MAR.

## 2020-03-23 NOTE — PROGRESS NOTES
Assumed cares at 0700. Pt up to bathroom SBA with CNA during bedside shift report, no signs and distress. Left PIV infusing LR at 75 mL as ordered. Pt on RA. A&O x4. Bed in lowest locked position, call light in reach. Safety precautions maintained.    0907: Pt being transferred off unit for radiation    0929: Pt back on floor from radiation

## 2020-03-23 NOTE — DISCHARGE SUMMARY
Discharge Summary    CHIEF COMPLAINT ON ADMISSION  Chief Complaint   Patient presents with   • Nausea/Vomiting/Diarrhea     x2 days   • Headache     x2 days       Reason for Admission  Vomitting, Diarrhea     Admission Date  3/18/2020    CODE STATUS  Full Code    HPI & HOSPITAL COURSE  This is a 49 y.o. female here with nausea, vomiting, and diarrhea.  She was hydrated with fluids.  Work-up showed that her TSH was low, free T4 was elevated.  She was started on methimazole.  Her diarrhea resolved.  She also has known history of chronic headache and chronic pain.  This is managed with pain control.  She has no history of breast cancer was receiving radiation treatment.  She received her radiation treatment today.  She will be discharged and will follow-up with oncology as outpatient.        Therefore, she is discharged in good and stable condition to home with close outpatient follow-up.    The patient met 2-midnight criteria for an inpatient stay at the time of discharge.    Discharge Date  3/23/2020    FOLLOW UP ITEMS POST DISCHARGE  Follow up with Oncology    DISCHARGE DIAGNOSES  Principal Problem:    Hyperthyroidism POA: Yes  Active Problems:    Chronic headache POA: Yes    Chronic pain POA: Yes    Hypokalemia POA: Yes    Seizure (HCC) POA: Yes    Essential hypertension POA: Yes    Breast cancer (HCC) POA: Yes    Pulmonary embolism (HCC) POA: Yes    Hypomagnesemia POA: Yes    Pulmonary HTN (HCC) POA: Yes    Blind (Chronic) POA: Yes     (ventriculoperitoneal) shunt status POA: Yes  Resolved Problems:    * No resolved hospital problems. *      FOLLOW UP  Future Appointments   Date Time Provider Department Center   3/30/2020 11:15 AM [facility] EXAM 4 VMED None   5/18/2020 11:30 AM Michelle Davis M.D. RADT None     Tabitha Bautista M.D.  [address] [facility] [address]  [phone]    Schedule an appointment as soon as possible for a visit  Please call to schedule a hospital follow up with your provider. Thank you!        MEDICATIONS ON DISCHARGE     Medication List      START taking these medications      Instructions   HYDROcodone/acetaminophen  MG Tabs  Commonly known as:  NORCO   Take 1 Tab by mouth every 6 hours as needed for up to 5 days.  Dose:  1 Tab     methimazole 5 MG Tabs  Start taking on:  March 24, 2020  Commonly known as:  TAPAZOLE   Take 1 Tab by mouth every day.  Dose:  5 mg        CHANGE how you take these medications      Instructions   ondansetron 4 MG Tbdp  What changed:  when to take this  Commonly known as:  ZOFRAN ODT   Take 1 Tab by mouth every 6 hours as needed for Nausea.  Dose:  4 mg        CONTINUE taking these medications      Instructions   anastrozole 1 MG Tabs  Commonly known as:  ARIMIDEX   Take 1 mg by mouth every day.  Dose:  1 mg     apixaban 5mg Tabs  Commonly known as:  ELIQUIS   Doctor's comments:  Starting  2/2420 pm dose  Take 1 Tab by mouth 2 Times a Day.  Dose:  5 mg     ascorbic acid 500 MG Tabs  Commonly known as:  ascorbic acid   Take 500 mg by mouth every day.  Dose:  500 mg     hydrOXYzine HCl 50 MG Tabs  Commonly known as:  ATARAX   Take 50 mg by mouth every bedtime.  Dose:  50 mg     levETIRAcetam 500 MG Tabs  Commonly known as:  KEPPRA   Take 1,000 mg by mouth every bedtime.  Dose:  1,000 mg     lisinopril 20 MG Tabs  Commonly known as:  PRINIVIL   Take 1 Tab by mouth every day.  Dose:  20 mg     oxyCODONE ER 9 MG C12a   Take 9 mg by mouth every 12 hours.  Dose:  9 mg     propranolol  MG Cp24  Commonly known as:  INDERAL LA   Take 120 mg by mouth every bedtime.  Dose:  120 mg        STOP taking these medications    LORazepam 0.5 MG Tabs  Commonly known as:  ATIVAN            Allergies  Allergies   Allergen Reactions   • Tape Rash     RXN= ongoing  Adhesive Medical tape. Per patient, paper tape ok.   • Latex Rash     Rash       DIET  Orders Placed This Encounter   Procedures   • Diet Order Regular     Standing Status:   Standing     Number of Occurrences:   1      Order Specific Question:   Diet:     Answer:   Regular [1]       ACTIVITY  As tolerated.  Weight bearing as tolerated    CONSULTATIONS  None    PROCEDURES  Radiation Treatments    LABORATORY  Lab Results   Component Value Date    SODIUM 142 03/23/2020    POTASSIUM 3.9 03/23/2020    CHLORIDE 104 03/23/2020    CO2 23 03/23/2020    GLUCOSE 99 03/23/2020    BUN 9 03/23/2020    CREATININE 0.72 03/23/2020    CREATININE 0.6 08/12/2008        Lab Results   Component Value Date    WBC 6.3 03/21/2020    HEMOGLOBIN 12.7 03/21/2020    HEMATOCRIT 38.3 03/21/2020    PLATELETCT 257 03/21/2020        Total time of the discharge process exceeds 40 minutes.

## 2020-03-23 NOTE — PROGRESS NOTES
A&Ox4. R ocular and head pain, medicating per MAR. Last radiation to L breast today. No BM over night. Bowel protocol initiated, MOM given per MAR. Appetite steadily improving.

## 2020-03-23 NOTE — PROGRESS NOTES
Patient received treatment in Radiation Therapy. Patient received treatment number 33 of 33 planned.

## 2020-03-23 NOTE — PROGRESS NOTES
Pt being discharged at this time. PIV removed. Discharge instructions and opiate consent form discussed and signed by patient. Pt denies questions or concerns at this time. Home medications returned to pt. Refused flu vaccine, previously immunized this season. Pt escorted off unit via wheelchair.

## 2020-03-23 NOTE — DISCHARGE INSTRUCTIONS
Discharge Instructions    Discharged to home by car with friend. Discharged via wheelchair, hospital escort: Yes.  Special equipment needed: Not Applicable    Be sure to schedule a follow-up appointment with your primary care doctor or any specialists as instructed.     Discharge Plan:   Smoking Cessation Offered: Patient Refused  Influenza Vaccine Indication: Not indicated: Previously immunized this influenza season and > 8 years of age    I understand that a diet low in cholesterol, fat, and sodium is recommended for good health. Unless I have been given specific instructions below for another diet, I accept this instruction as my diet prescription.   Other diet: Regular    Special Instructions: None    · Is patient discharged on Warfarin / Coumadin?   No     Depression / Suicide Risk    As you are discharged from this [facility] facility, it is important to learn how to keep safe from harming yourself.    Recognize the warning signs:  · Abrupt changes in personality, positive or negative- including increase in energy   · Giving away possessions  · Change in eating patterns- significant weight changes-  positive or negative  · Change in sleeping patterns- unable to sleep or sleeping all the time   · Unwillingness or inability to communicate  · Depression  · Unusual sadness, discouragement and loneliness  · Talk of wanting to die  · Neglect of personal appearance   · Rebelliousness- reckless behavior  · Withdrawal from people/activities they love  · Confusion- inability to concentrate     If you or a loved one observes any of these behaviors or has concerns about self-harm, here's what you can do:  · Talk about it- your feelings and reasons for harming yourself  · Remove any means that you might use to hurt yourself (examples: pills, rope, extension cords, firearm)  · Get professional help from the community (Mental Health, Substance Abuse, psychological counseling)  · Do not be alone:Call your Safe Contact-  someone whom you trust who will be there for you.  · Call your local CRISIS HOTLINE 116-0788 or 839-261-5863  · Call your local Children's Mobile Crisis Response Team [place] (344) 929-1837 or www.Netsize  · Call the toll free National Suicide Prevention Hotlines   · National Suicide Prevention Lifeline 545-093-ZYID (9197)  · [employer] Hope Line Network 800-SUICIDE (543-8967)      Methimazole tablets  What is this medicine?  METHIMAZOLE (meth IM a zole) prevents the thyroid gland from producing too much thyroid hormone. It is used to treat a condition known as hyperthyroidism.  This medicine may be used for other purposes; ask your health care provider or pharmacist if you have questions.  COMMON BRAND NAME(S): Northyx, Tapazole  What should I tell my health care provider before I take this medicine?  They need to know if you have any of these conditions:  -bone marrow disease  -liver disease  -an unusual or allergic reaction to methimazole, other medicines, foods, dyes, or preservatives  -pregnant or trying to get pregnant  -breast-feeding  How should I use this medicine?  Take this medicine by mouth with a glass of water. Follow the directions on the prescription label. You can take this medicine with or without food. However, you should always take it the same way to make sure the effects are the same. Take your doses at regular intervals. Do not take your medicine more often than directed. Do not stop taking this medicine except on the advice of your doctor or health care professional.  Talk to your pediatrician regarding the use of this medicine in children. Special care may be needed. While this drug may be prescribed for children for selected conditions, precautions do apply.  Overdosage: If you think you have taken too much of this medicine contact a poison control center or emergency room at once.  NOTE: This medicine is only for you. Do not share this medicine with others.  What if I miss a  dose?  If you miss a dose, take it as soon as you can. If it is almost time for your next dose, take only that dose. Do not take double or extra doses.  What may interact with this medicine?  Do not take this medicine with any of the following medications:  -sodium iodide  -thyroid hormones  This medicine may also interact with the following medications:  -certain medicines for high blood pressure like metoprolol and propranolol  -digoxin  -theophylline  -warfarin  This list may not describe all possible interactions. Give your health care provider a list of all the medicines, herbs, non-prescription drugs, or dietary supplements you use. Also tell them if you smoke, drink alcohol, or use illegal drugs. Some items may interact with your medicine.  What should I watch for while using this medicine?  Visit your doctor or health care professional for regular checks on your progress. Your thyroid hormone levels will need to be checked.  This medicine can reduce your resistance to infection. Contact your doctor or health care professional if you have any infection or injury. Avoid people who have colds, flu, bronchitis or other infectious disease. Do not have any vaccinations without asking your doctor or health care professional. Avoid people who have recently received oral polio vaccine.  What side effects may I notice from receiving this medicine?  Side effects that you should report to your doctor or health care professional as soon as possible:  -black, tarry stools  -fever, sore throat, hoarseness  -numbness or tingling in the hands or feet  -severe redness or itching of the skin, or dry cracked skin  -stomach pain  -swelling of the feet or legs  -unusual bleeding or bruising, pinpoint red spots on the skin  -unusual or sudden weight increase  -unusually weak or tired  -yellowing of skin or eyes  Side effects that usually do not require medical attention (report to your doctor or health care professional if they  continue or are bothersome):  -headache  -nausea, vomiting  -mild skin rash, itching  -muscle aches and pains  This list may not describe all possible side effects. Call your doctor for medical advice about side effects. You may report side effects to FDA at 3-171-FDA-3871.  Where should I keep my medicine?  Keep out of the reach of children.  Store at room temperature between 15 and 30 degrees C (59 and 86 degrees F). Throw away any unused medicine after the expiration date.  NOTE: This sheet is a summary. It may not cover all possible information. If you have questions about this medicine, talk to your doctor, pharmacist, or health care provider.  © 2018 [employer] (2009-07-06 15:59:43)    Hyperthyroidism  Hyperthyroidism is when the thyroid is too active (overactive). Your thyroid is a large gland that is located in your neck. The thyroid helps to control how your body uses food (metabolism). When your thyroid is overactive, it produces too much of a hormone called thyroxine.  What are the causes?  Causes of hyperthyroidism may include:  · Graves disease. This is when your immune system attacks the thyroid gland. This is the most common cause.  · Inflammation of the thyroid gland.  · Tumor in the thyroid gland or somewhere else.  · Excessive use of thyroid medicines, including:  ¨ Prescription thyroid supplement.  ¨ Herbal supplements that mimic thyroid hormones.  · Solid or fluid-filled lumps within your thyroid gland (thyroid nodules).  · Excessive ingestion of iodine.  What increases the risk?  · Being female.  · Having a family history of thyroid conditions.  What are the signs or symptoms?  Signs and symptoms of hyperthyroidism may include:  · Nervousness.  · Inability to tolerate heat.  · Unexplained weight loss.  · Diarrhea.  · Change in the texture of hair or skin.  · Heart skipping beats or making extra beats.  · Rapid heart rate.  · Loss of menstruation.  · Shaky  hands.  · Fatigue.  · Restlessness.  · Increased appetite.  · Sleep problems.  · Enlarged thyroid gland or nodules.  How is this diagnosed?  Diagnosis of hyperthyroidism may include:  · Medical history and physical exam.  · Blood tests.  · Ultrasound tests.  How is this treated?  Treatment may include:  · Medicines to control your thyroid.  · Surgery to remove your thyroid.  · Radiation therapy.  Follow these instructions at home:  · Take medicines only as directed by your health care provider.  · Do not use any tobacco products, including cigarettes, chewing tobacco, or electronic cigarettes. If you need help quitting, ask your health care provider.  · Do not exercise or do physical activity until your health care provider approves.  · Keep all follow-up appointments as directed by your health care provider. This is important.  Contact a health care provider if:  · Your symptoms do not get better with treatment.  · You have fever.  · You are taking thyroid replacement medicine and you:  ¨ Have depression.  ¨ Feel mentally and physically slow.  ¨ Have weight gain.  Get help right away if:  · You have decreased alertness or a change in your awareness.  · You have abdominal pain.  · You feel dizzy.  · You have a rapid heartbeat.  · You have an irregular heartbeat.  This information is not intended to replace advice given to you by your health care provider. Make sure you discuss any questions you have with your health care provider.  Document Released: 12/18/2006 Document Revised: 05/18/2017 Document Reviewed: 05/05/2015  "[employer] Interactive Patient Education © 2017 "[employer] Inc.

## 2020-03-23 NOTE — CARE PLAN
Problem: Nutritional:  Goal: Achieve adequate nutritional intake  Description: Patient will consume > 50% of meals    3/23/2020 1101 by Lyssa Hsu R.D.  Outcome: PROGRESSING AS EXPECTED     Patient with improving intakes of %.  Patient has snacks BID between meals as well as high protien milkshakes with lunch and dinner.

## 2020-03-25 LAB
CHEMOTHERAPY INFUSION START DATE: NORMAL
CHEMOTHERAPY INFUSION STOP DATE: NORMAL
CHEMOTHERAPY RECORDS: 1.8
CHEMOTHERAPY RECORDS: 1.8
CHEMOTHERAPY RECORDS: 1000
CHEMOTHERAPY RECORDS: 2
CHEMOTHERAPY RECORDS: 5040
CHEMOTHERAPY RECORDS: 5040
CHEMOTHERAPY RECORDS: NORMAL
CHEMOTHERAPY RX CANCER: NORMAL
DATE 1ST CHEMO CANCER: NORMAL
RAD ONC ARIA COURSE LAST TREATMENT DATE: NORMAL
RAD ONC ARIA COURSE TREATMENT ELAPSED DAYS: NORMAL
RAD ONC ARIA REFERENCE POINT DOSAGE GIVEN TO DATE: 10
RAD ONC ARIA REFERENCE POINT DOSAGE GIVEN TO DATE: 10.41
RAD ONC ARIA REFERENCE POINT DOSAGE GIVEN TO DATE: 50.4
RAD ONC ARIA REFERENCE POINT ID: NORMAL

## 2020-03-25 PROCEDURE — 77336 RADIATION PHYSICS CONSULT: CPT | Performed by: RADIOLOGY

## 2020-03-30 ENCOUNTER — APPOINTMENT (OUTPATIENT)
Dept: VASCULAR LAB | Facility: MEDICAL CENTER | Age: 50
End: 2020-03-30
Payer: MEDICARE

## 2020-04-01 NOTE — DOCUMENTATION QUERY
[facility]                                                                       Query Response Note      PATIENT:               NILAY MANN  ACCT #:                  [id number]  MRN:                     [id number]  :                      1970  ADMIT DATE:       3/18/2020 7:21 PM  DISCH DATE:        3/23/2020 11:35 AM  RESPONDING  PROVIDER #:        [id number]           QUERY TEXT:    Pulmonary embolism (PE) is documented in the Medical Record. Please specify type or etiology and whether with or without acute cor pulmonale    NOTE:  If an appropriate response is not listed below, please respond with a new note.    The patient's Clinical Indicators include:  Pulmonary embolism (HCC)- (present on admission)  Assessment & Plan  On anticoagulation which will be continued.  Options provided:   -- Acute pulmonary embolism with acute cor pulmonale   -- Acute pulmonary embolism without acute cor pulmonale   -- Chronic pulmonary embolism   -- History of pulmonary embolism   -- Unable to determine      Query created by: Polly De Los Santos on 2020 9:27 AM    RESPONSE TEXT:    Acute pulmonary embolism without acute cor pulmonale          Electronically signed by:  KRISTIN PRESCOTT MD 2020 2:49 PM

## 2020-04-06 ENCOUNTER — TELEPHONE (OUTPATIENT)
Dept: VASCULAR LAB | Facility: MEDICAL CENTER | Age: 50
End: 2020-04-06

## 2020-04-07 ENCOUNTER — TELEPHONE (OUTPATIENT)
Dept: VASCULAR LAB | Facility: MEDICAL CENTER | Age: 50
End: 2020-04-07

## 2020-04-11 ENCOUNTER — HOSPITAL ENCOUNTER (EMERGENCY)
Facility: MEDICAL CENTER | Age: 50
End: 2020-04-11
Attending: EMERGENCY MEDICINE
Payer: MEDICARE

## 2020-04-11 ENCOUNTER — APPOINTMENT (OUTPATIENT)
Dept: RADIOLOGY | Facility: MEDICAL CENTER | Age: 50
End: 2020-04-11
Attending: EMERGENCY MEDICINE
Payer: MEDICARE

## 2020-04-11 VITALS
DIASTOLIC BLOOD PRESSURE: 76 MMHG | SYSTOLIC BLOOD PRESSURE: 125 MMHG | WEIGHT: 135 LBS | RESPIRATION RATE: 19 BRPM | OXYGEN SATURATION: 99 % | HEART RATE: 96 BPM | TEMPERATURE: 97.1 F | HEIGHT: 64 IN | BODY MASS INDEX: 23.05 KG/M2

## 2020-04-11 DIAGNOSIS — G43.009 MIGRAINE WITHOUT AURA AND WITHOUT STATUS MIGRAINOSUS, NOT INTRACTABLE: ICD-10-CM

## 2020-04-11 DIAGNOSIS — R51.9 ACUTE NONINTRACTABLE HEADACHE, UNSPECIFIED HEADACHE TYPE: ICD-10-CM

## 2020-04-11 PROCEDURE — 74018 RADEX ABDOMEN 1 VIEW: CPT

## 2020-04-11 PROCEDURE — 96372 THER/PROPH/DIAG INJ SC/IM: CPT

## 2020-04-11 PROCEDURE — 700102 HCHG RX REV CODE 250 W/ 637 OVERRIDE(OP): Performed by: EMERGENCY MEDICINE

## 2020-04-11 PROCEDURE — 99285 EMERGENCY DEPT VISIT HI MDM: CPT

## 2020-04-11 PROCEDURE — 99284 EMERGENCY DEPT VISIT MOD MDM: CPT | Performed by: PSYCHIATRY & NEUROLOGY

## 2020-04-11 PROCEDURE — 700102 HCHG RX REV CODE 250 W/ 637 OVERRIDE(OP): Performed by: PSYCHIATRY & NEUROLOGY

## 2020-04-11 PROCEDURE — 70450 CT HEAD/BRAIN W/O DYE: CPT

## 2020-04-11 PROCEDURE — 72020 X-RAY EXAM OF SPINE 1 VIEW: CPT

## 2020-04-11 PROCEDURE — A9270 NON-COVERED ITEM OR SERVICE: HCPCS | Performed by: PSYCHIATRY & NEUROLOGY

## 2020-04-11 PROCEDURE — 700111 HCHG RX REV CODE 636 W/ 250 OVERRIDE (IP): Performed by: EMERGENCY MEDICINE

## 2020-04-11 PROCEDURE — A9270 NON-COVERED ITEM OR SERVICE: HCPCS | Performed by: EMERGENCY MEDICINE

## 2020-04-11 PROCEDURE — 70250 X-RAY EXAM OF SKULL: CPT

## 2020-04-11 PROCEDURE — 71045 X-RAY EXAM CHEST 1 VIEW: CPT

## 2020-04-11 RX ORDER — DEXAMETHASONE 4 MG/1
10 TABLET ORAL ONCE
Status: COMPLETED | OUTPATIENT
Start: 2020-04-11 | End: 2020-04-11

## 2020-04-11 RX ORDER — DIPHENHYDRAMINE HCL 25 MG
25 TABLET ORAL ONCE
Status: COMPLETED | OUTPATIENT
Start: 2020-04-11 | End: 2020-04-11

## 2020-04-11 RX ORDER — METOCLOPRAMIDE 10 MG/1
10 TABLET ORAL ONCE
Status: COMPLETED | OUTPATIENT
Start: 2020-04-11 | End: 2020-04-11

## 2020-04-11 RX ORDER — ONDANSETRON 4 MG/1
4 TABLET, ORALLY DISINTEGRATING ORAL ONCE
Status: COMPLETED | OUTPATIENT
Start: 2020-04-11 | End: 2020-04-11

## 2020-04-11 RX ORDER — VALPROIC ACID 250 MG/1
1000 CAPSULE, LIQUID FILLED ORAL ONCE
Status: COMPLETED | OUTPATIENT
Start: 2020-04-11 | End: 2020-04-11

## 2020-04-11 RX ORDER — HYDROCODONE BITARTRATE AND ACETAMINOPHEN 5; 325 MG/1; MG/1
1 TABLET ORAL ONCE
Status: COMPLETED | OUTPATIENT
Start: 2020-04-11 | End: 2020-04-11

## 2020-04-11 RX ORDER — AMITRIPTYLINE HYDROCHLORIDE 10 MG/1
10 TABLET, FILM COATED ORAL
Qty: 30 TAB | Refills: 1 | Status: SHIPPED | OUTPATIENT
Start: 2020-04-11 | End: 2020-08-06

## 2020-04-11 RX ORDER — KETOROLAC TROMETHAMINE 30 MG/ML
15 INJECTION, SOLUTION INTRAMUSCULAR; INTRAVENOUS ONCE
Status: COMPLETED | OUTPATIENT
Start: 2020-04-11 | End: 2020-04-11

## 2020-04-11 RX ADMIN — DEXAMETHASONE 10 MG: 4 TABLET ORAL at 09:13

## 2020-04-11 RX ADMIN — ONDANSETRON 4 MG: 4 TABLET, ORALLY DISINTEGRATING ORAL at 06:42

## 2020-04-11 RX ADMIN — DIPHENHYDRAMINE HYDROCHLORIDE 25 MG: 25 TABLET ORAL at 09:13

## 2020-04-11 RX ADMIN — KETOROLAC TROMETHAMINE 15 MG: 30 INJECTION, SOLUTION INTRAMUSCULAR at 08:28

## 2020-04-11 RX ADMIN — VALPROIC ACID 1000 MG: 250 CAPSULE, LIQUID FILLED ORAL at 10:15

## 2020-04-11 RX ADMIN — HYDROCODONE BITARTRATE AND ACETAMINOPHEN 1 TABLET: 5; 325 TABLET ORAL at 06:41

## 2020-04-11 RX ADMIN — METOCLOPRAMIDE 10 MG: 10 TABLET ORAL at 09:13

## 2020-04-11 ASSESSMENT — FIBROSIS 4 INDEX: FIB4 SCORE: 0.7

## 2020-04-11 NOTE — CONSULTS
"Neurology Initial Consult H&P  Neurohospitalist Service, [facility] Neuro[facility]iences    Referring Physician: Cinthya Macias M.D.    Chief Complaint   Patient presents with   • Headache     pt states she has a headache \"all over\" that is throbbing for 11 hours, she took norco 2-3 hrs ago, she also has had nausea and vomitting for 11 hours, she is blind, so cannot describe what it looks like.        HPI: Rasheeda Manzano is a 49 y.o. woman with a history of a shunt placed in 2001 (for hydrocephalus), PE on AC (since Feb 2020), and breast CA undergoing radiation therapy for whom neurology was consulted for headache.  The pt. Presents to [facility] 4/11/20 complaining of a headache, described as throbbing in quality, and diffuse in nature.  Symptoms started yesterday 4/10/20 afternoon and has persisted without remission. The pt. Took a dose of Norco (Hydrocodone/acetaminophen) prior to presentation which did not alleviate symptoms.  Notably, the patient says she has chronic headaches nearly 4 times per week associated with nausea.  Pt. admits that she has nausea with an episode of vomiting earlier today.  She denies any falls, LOC, or trauma.  Patient denies vertigo nor lightheadedness.  The patient is was lost to follow up in neurology clinic for epilepsy; the patient notes she has had a total of 3 seizures in the last 3 years.  She is compliant on seizure medications; denies any recent seizures.  On chart review, patient has had visits in the past complaining of headache.    Review of systems: In addition to what is detailed in the HPI above, (and scanned into the chart if and when applicable), all other systems reviewed and are negative.    Past Medical History:    has a past medical history of Acute nasopharyngitis, Blind, C. difficile colitis, C. difficile diarrhea (2013), Cancer (HCC), Chronic daily headache, Depression, Esophageal atresia (1971), Esophageal bleeding (12/18/2019), Fall, GERD " (gastroesophageal reflux disease), H/O total hysterectomy, Heart burn (12/18/2019), Hydrocephalus (HCC), Hydrocephalus (HCC), Indigestion (12/18/2019), Jaundice, Legally blind, Migraine without aura, without mention of intractable migraine without mention of status migrainosus, Other specified symptom associated with female genital organs, Pain, Pain (12/18/2019), Psychiatric problem, PTSD (post-traumatic stress disorder), Seizure (HCC) (12/18/2019), and Snoring (12/18/2019).    FHx:  family history includes Hypertension in her father and mother.    SHx:   reports that she quit smoking about 2 years ago. Her smoking use included cigars. She started smoking about 15 years ago. She has a 10.00 pack-year smoking history. She has quit using smokeless tobacco. She reports current alcohol use. She reports that she does not use drugs.    Allergies:  Allergies   Allergen Reactions   • Tape Rash     RXN= ongoing  Adhesive Medical tape. Per patient, paper tape ok.   • Latex Rash     Rash       Medications:    Current Facility-Administered Medications:   •  dexamethasone (DECADRON) tablet 10 mg, 10 mg, Oral, Once, Feliberto Sequeira M.D.  •  metoclopramide (REGLAN) tablet 10 mg, 10 mg, Oral, Once, Feliberto Sequeira M.D.  •  diphenhydrAMINE (BENADRYL) tablet/capsule 25 mg, 25 mg, Oral, Once, Feliberto Sequeira M.D.    Current Outpatient Medications:   •  ondansetron (ZOFRAN ODT) 4 MG TABLET DISPERSIBLE, Take 1 Tab by mouth every 6 hours as needed for Nausea., Disp: 30 Tab, Rfl: 0  •  methimazole (TAPAZOLE) 5 MG Tab, Take 1 Tab by mouth every day., Disp: 30 Tab, Rfl: 2  •  oxyCODONE ER 9 MG Capsule Extended Release 12 hour Abuse-Deterrent, Take 9 mg by mouth every 12 hours., Disp: , Rfl:   •  anastrozole (ARIMIDEX) 1 MG Tab, Take 1 mg by mouth every day., Disp: , Rfl:   •  ascorbic acid (ASCORBIC ACID) 500 MG Tab, Take 500 mg by mouth every day., Disp: , Rfl:   •  apixaban (ELIQUIS) 5mg Tab, Take 1 Tab by mouth 2 Times a Day., Disp:  60 Tab, Rfl: 5  •  lisinopril (PRINIVIL) 20 MG Tab, Take 1 Tab by mouth every day., Disp: 30 Tab, Rfl: 0  •  hydrOXYzine HCl (ATARAX) 50 MG Tab, Take 50 mg by mouth every bedtime., Disp: , Rfl: 1  •  levETIRAcetam (KEPPRA) 500 MG Tab, Take 1,000 mg by mouth every bedtime., Disp: , Rfl:   •  propranolol CR (INDERAL LA) 120 MG CAPSULE SR 24 HR, Take 120 mg by mouth every bedtime., Disp: , Rfl:     Physical Examination:     Vitals:    04/11/20 0631 04/11/20 0731 04/11/20 0738 04/11/20 0801   BP: 132/69 121/85  124/80   Pulse: (!) 112 (!) 101 98 100   Resp: 18 18  20   Temp:       TempSrc:       SpO2: 100% 98% 97% 98%   Weight:       Height:           General: Patient is awake and in no acute distress  Eyes: examination of optic disks not indicated at this time  CV: RRR    NEUROLOGICAL EXAM:     Mental status: Awake, alert and fully oriented, follows commands  Speech and language: speech is clear and fluent. The patient is able to name and repeat.  Cranial nerve exam: Pupils are equal, round and sluggishly reactive to light bilaterally. Visual fields: blink b/l with some preservation of light sensation OD. Extraocular muscles: exotropia OS, able to look laterally with few beats of nystagmus; skew deviation.  Sensation in the face is intact to light touch. Face is symmetric. Hearing to finger rub equal. Palate elevates symmetrically. Shoulder shrug is full. Tongue is midline.  Motor exam: Strength is 5/5 in all extremities both distally and proximally. Tone is normal. No abnormal movements were seen on exam.  Sensory exam: No sensory deficits identified   Deep tendon reflexes:  2+ and symmetric. Toes down-going bilaterally.  Coordination: no ataxia   Gait: deferred per patient request    Objective Data:    Labs:  Lab Results   Component Value Date/Time    PROTHROMBTM 13.9 12/19/2019 12:00 PM    INR 1.05 12/19/2019 12:00 PM      Lab Results   Component Value Date/Time    WBC 6.3 03/21/2020 12:33 AM    RBC 4.18 (L)  03/21/2020 12:33 AM    HEMOGLOBIN 12.7 03/21/2020 12:33 AM    HEMATOCRIT 38.3 03/21/2020 12:33 AM    MCV 91.6 03/21/2020 12:33 AM    MCH 30.4 03/21/2020 12:33 AM    MCHC 33.2 (L) 03/21/2020 12:33 AM    MPV 10.5 03/21/2020 12:33 AM    NEUTSPOLYS 69.20 03/21/2020 12:33 AM    LYMPHOCYTES 16.40 (L) 03/21/2020 12:33 AM    MONOCYTES 11.30 03/21/2020 12:33 AM    EOSINOPHILS 1.80 03/21/2020 12:33 AM    EOSINOPHILS 62 08/11/2008 03:35 PM    BASOPHILS 1.00 03/21/2020 12:33 AM    HYPOCHROMIA 1 05/30/2014 06:55 PM    ANISOCYTOSIS 1 05/20/2014 09:30 PM      Lab Results   Component Value Date/Time    SODIUM 142 03/23/2020 12:18 AM    POTASSIUM 3.9 03/23/2020 12:18 AM    CHLORIDE 104 03/23/2020 12:18 AM    CO2 23 03/23/2020 12:18 AM    GLUCOSE 99 03/23/2020 12:18 AM    BUN 9 03/23/2020 12:18 AM    CREATININE 0.72 03/23/2020 12:18 AM    CREATININE 0.6 08/12/2008 05:45 AM      Lab Results   Component Value Date/Time    TRIGLYCERIDE 100 10/17/2015 03:25 PM       Lab Results   Component Value Date/Time    ALKPHOSPHAT 68 03/21/2020 12:33 AM    ASTSGOT 14 03/21/2020 12:33 AM    ALTSGPT 14 03/21/2020 12:33 AM    TBILIRUBIN 0.3 03/21/2020 12:33 AM        Imaging/Testing:    I interpreted and/or reviewed the patient's neuroimaging    DX-SPINE-ANY ONE VIEW   Final Result         Left ventriculostomy catheter terminating in the left pleural space. Trace left pleural effusion. No kinking or discontinuity of the tubing.      [id number]-2 VIEW   Final Result         Left ventriculostomy catheter terminating in the left pleural space. Trace left pleural effusion. No kinking or discontinuity of the tubing.      DX-CHEST-LIMITED (1 VIEW)   Final Result         Left ventriculostomy catheter terminating in the left pleural space. Trace left pleural effusion. No kinking or discontinuity of the tubing.      DX-SKULL-LIMITED 3-   Final Result         Left ventriculostomy catheter terminating in the left pleural space. Trace left pleural effusion. No  kinking or discontinuity of the tubing.      CT-HEAD W/O   Final Result      1.  No acute intracranial findings.      2.  Left parietal intraventricular shunt is unchanged in position. Ventricular size is grossly stable.             Assessment and Plan:    Rasheeda Manzano is a 49 y.o. woman with relevant history of hydrocephalus s/p shunt placement 2001, breast CA on radiation therapy, and PE on AC presenting for whom neurology was consulted to address intractable headache.  Shunt series performed in the ED is reassuring.  The described symptomatology is consistent with migraines.  Patient may benefit from a daily preventative medication.  Should have close follow up in neurology headache clinic given she no longer has a neurologist to follow with in out patient setting.    Plan:    - multiple failed attempts at placing IV; thus recommend headache cocktail PO:  --> 10mg Reglan, 10mg Decadron, 25mg Benadryl PO  - 15mg Toradol IM ordered by ERP as well  - IVF  - If HA does NOT break give 500mg caffeine and 1g of Depakote  - consideration of a daily preventative medication given frequency of HA: 10mg Elavil qhs  - avoid opiods for this type of headache as they are not indicated, efficacious, and carry side effects that outweigh benefit  - referral placed for headache clinic as pt. Was lost to follow up with her former neurologist.    The evaluation of the patient, and recommended management, was discussed with Dr. Macias via [employer].    Feliberto Sequeira MD  Director, Comprehensive Stroke Center, [facility]  Neurohospitalist, [facility]  Clinical  of Neurology, [facility] [employer]  t) [phone] (f) [phone]

## 2020-04-11 NOTE — ED TRIAGE NOTES
"Rasheeda Manzano  49 y.o. female    Chief Complaint   Patient presents with   • Headache     pt states she has a headache \"all over\" that is throbbing for 11 hours, she took norco 2-3 hrs ago, she also has had nausea and vomitting for 11 hours, she is blind, so cannot describe what it looks like.          Pt arrives via EMS for complaints of HA that she reports is throbbing and rates 8/10. She says she often gets headaches due hydrocephalus. She reports having n/v for about 11 hours.      Interventions given 4 mg po zofran in route    Pt denies travel outside [facility] in the past 14 days, denies respiratory symptoms.           Hx: breast ca- states radiation 10 days ago. Depression.     Blood Pressure: 129/79, Pulse: (!) 112, Temperature: 36.2 °C (97.1 °F), Height: 162.6 cm (5' 4\"), Weight: 61.2 kg (135 lb), BMI (Calculated): 23.17, BSA (Calculated): 1.7, Pulse Oximetry: 100 %, O2 Delivery Device: None - Room Air    "

## 2020-04-11 NOTE — ED NOTES
Discharge instructions given to patient. Education provided on diagnosis, treatment, follow up, and prescriptions provided. Pt verbalized understanding. All questions answered.  Pt taken to the lobby in wheelchair via RN. Boyfriend picking up patient.

## 2020-04-11 NOTE — ED PROVIDER NOTES
"ED Provider Note    CHIEF COMPLAINT  Chief Complaint   Patient presents with   • Headache     pt states she has a headache \"all over\" that is throbbing for 11 hours, she took norco 2-3 hrs ago, she also has had nausea and vomitting for 11 hours, she is blind, so cannot describe what it looks like.        HPI  Rasheeda Manzano is a 49 y.o. female who presents complaining of a headache that is been throbbing all over her head for the last 11 hours.  She took Norco about 4 hours ago.  She states she has had some nausea and vomiting earlier today.  She denies any diarrhea but has had constipation.  She has a history of a shunt placed 9 years ago for hydrocephalus.  Does have a history of pulmonary embolus diagnosed on February 16 of this year and is on Eliquis for this.  She denies any falls or trauma.  He denies any dizziness or lightheadedness.  She does describe some right-sided foot tingling.  She denies any other symptoms of weakness or numbness.  She states she has had about 3 seizures in the last 3 years.  She does take her seizure medications and denies missing any doses.  She denies any recent seizures.  Patient does not currently have a neurologist because they left recently.  She cannot member the name of her neurosurgeon.  Upon chart review she has been here multiple times for headaches.  She does take chronic narcotic pain medications for this.  She also has a history of breast cancer and is currently undergoing radiation therapy.    REVIEW OF SYSTEMS  HEENT:  No ear pain, congestion or sore throat   EYES: no discharge redness or vision changes  CARDIAC: no chest pain, palpitations    PULMONARY: no dyspnea, cough or congestion   GI: no vomiting diarrhea or abdominal pain   : no dysuria, back pain or hematuria   Neuro: no weakness, numbness aphasia or headache  Musculoskeletal: no swelling deformity or pain no joint swelling  Endocrine: no fevers, sweating, weight loss   SKIN: no rash, erythema or " "contusions     See history of present illness all other systems are negative      PAST MEDICAL HISTORY  Past Medical History:   Diagnosis Date   • Esophageal bleeding 12/18/2019    H/O, \"three times with last one a year ago.\"   • Indigestion 12/18/2019   • Seizure (HCC) 12/18/2019    \"Last seizure one year ago.\"   • Heart burn 12/18/2019   • Pain 12/18/2019    \"Pain In Head From Hydrocephalus.\".   • Snoring 12/18/2019    Has not had a sleep study.   • C. 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   • Acute nasopharyngitis     H/O Cold 12-8-19   • Blind     age 11   • C. difficile colitis     H/O x3   • Cancer (HCC)     Right Breast Cancer DX 2-2019/Moved to right lymph nodes (3/8/2020)   • Chronic daily headache    • Depression    • Fall    • GERD (gastroesophageal reflux disease)    • H/O total hysterectomy    • Hydrocephalus (HCC)    • Hydrocephalus (HCC)     shunt drains into pleural place of L lung   • Jaundice     at birth   • Legally blind    • Migraine without aura, without mention of intractable migraine without mention of status migrainosus    • Other specified symptom associated with female genital organs     excessive bleeding   • Pain     gallbladder related   • Psychiatric problem     depression   • PTSD (post-traumatic stress disorder)        FAMILY HISTORY  Family History   Problem Relation Age of Onset   • Hypertension Mother    • Hypertension Father    • Non-contributory Neg Hx         Migraine       SOCIAL HISTORY  Social History     Socioeconomic History   • Marital status:      Spouse name: Not on file   • Number of children: Not on file   • Years of education: Not on file   • Highest education level: Not on file   Occupational History   • Not on file   Social Needs   • Financial resource strain: Not on file   • Food insecurity     Worry: Not on file     Inability: Not on file   • Transportation needs     Medical: Not on file     Non-medical: " Not on file   Tobacco Use   • Smoking status: Former Smoker     Packs/day: 1.00     Years: 10.00     Pack years: 10.00     Types: Cigars     Start date: 2004     Last attempt to quit: 2017     Years since quittin.6   • Smokeless tobacco: Former User   • Tobacco comment:  CIGAR/day    Substance and Sexual Activity   • Alcohol use: Yes     Frequency: Monthly or less     Drinks per session: 1 or 2   • Drug use: No   • Sexual activity: Yes     Partners: Male   Lifestyle   • Physical activity     Days per week: Not on file     Minutes per session: Not on file   • Stress: Not on file   Relationships   • Social connections     Talks on phone: Not on file     Gets together: Not on file     Attends Buddhism service: Not on file     Active member of club or organization: Not on file     Attends meetings of clubs or organizations: Not on file     Relationship status: Not on file   • Intimate partner violence     Fear of current or ex partner: Not on file     Emotionally abused: Not on file     Physically abused: Not on file     Forced sexual activity: Not on file   Other Topics Concern   • Primary/coprimary nurse & associates Not Asked   • Family contact information Not Asked   • OK to release patient information to the following Not Asked   • Patient preferred routine/Privacy concerns Not Asked   • Patient likes and dislikes Not Asked   • Participating In Research Study Not Asked   • Miscellaneous Not Asked   Social History Narrative   • Not on file       SURGICAL HISTORY  Past Surgical History:   Procedure Laterality Date   • MASTECTOMY Right 2019    Procedure: MASTECTOMY-PARTIAL;  Surgeon: Brice Johnson M.D.;  Location: SURGERY SAME DAY [facility];  Service: General   • NODE BIOPSY SENTINEL Right 2019    Procedure: BIOPSY, LYMPH NODE, SENTINEL-AXILLARY;  Surgeon: Brice Johnson M.D.;  Location: SURGERY SAME DAY [facility];  Service: General   • GASTROSCOPY N/A 2019    Procedure:  "GASTROSCOPY;  Surgeon: Matias Fernando M.D.;  Location: SURGERY [facility];  Service: Gastroenterology   • GASTROSCOPY-ENDO N/A 8/24/2017    Procedure: GASTROSCOPY-ENDO;  Surgeon: Matias Fernando M.D.;  Location: ENDOSCOPY [facility];  Service:    • AYALA BY LAPAROSCOPY N/A 8/13/2015    Procedure: AYALA BY LAPAROSCOPY;  Surgeon: Brice Johnson M.D.;  Location: SURGERY SAME DAY [facility];  Service:    • CHOLECYSTECTOMY N/A 8/13/2015    Procedure: CHOLECYSTECTOMY;  Surgeon: Brice Johnson M.D.;  Location: SURGERY SAME DAY [facility];  Service:    • LYSIS ADHESIONS GENERAL  12/10/2013    Performed by Arie Bass M.D. at [facility]   • CYSTOSCOPY  12/10/2013    Performed by Joana Yeager M.D. at [facility]   • ABDOMINAL HYSTERECTOMY TOTAL  12/10/2013    Performed by Joana Yeager M.D. at [facility]   • EXPLORATORY LAPAROTOMY  12/10/2013    Performed by Arie Bass M.D. at [facility]   • APPENDECTOMY  12/10/2013    Performed by Arie Bass M.D. at [facility]   • LAPAROSCOPY  8/8/08    Performed by FERNANDO HENDERSON at [facility]   • NISSEN FUNDOPLICATION LAPAROSCOPIC     • OTHER      PLEURAL SHUNT, numerous revisions       CURRENT MEDICATIONS  Home Medications    **Home medications have not yet been reviewed for this encounter**         ALLERGIES  Allergies   Allergen Reactions   • Tape Rash     RXN= ongoing  Adhesive Medical tape. Per patient, paper tape ok.   • Latex Rash     Rash       PHYSICAL EXAM  VITAL SIGNS: /79   Pulse 96   Temp 36.2 °C (97.1 °F) (Temporal)   Resp 19   Ht 1.626 m (5' 4\")   Wt 61.2 kg (135 lb)   LMP 11/27/2013   SpO2 99%   BMI 23.17 kg/m²       Constitutional:  Well developed, Well nourished, No acute distress, Non-toxic appearance.   HENT: Normocephalic atraumatic, pharynx pink mucous membranes moist no rhinorrhea or mucosal edema no facial swelling  Eyes: PERRLA, EOMI, " Conjunctiva normal, No discharge.  Positive blindness chronic  Neck: Normal range of motion, No tenderness, Supple, No stridor.   Lymphatic: No lymphadenopathy noted.   Cardiovascular: Tachycardic, Normal rhythm, No murmurs, No rubs, No gallops.   Thorax & Lungs: Normal breath sounds, No respiratory distress, No wheezing, No chest tenderness.   Skin: Warm, Dry, No erythema, No rash.   Extremities: Intact distal pulses, No edema, No tenderness, No cyanosis, No clubbing.   Neurologic: Alert & oriented x 3, Normal motor function, Normal sensory function, No focal deficits noted.  Bilateral blindness which is chronic  Psychiatric: Affect normal, Judgment normal, Mood normal.     RADIOLOGY/PROCEDURES  DX-SPINE-ANY ONE VIEW   Final Result         Left ventriculostomy catheter terminating in the left pleural space. Trace left pleural effusion. No kinking or discontinuity of the tubing.      [id number] VIEW   Final Result         Left ventriculostomy catheter terminating in the left pleural space. Trace left pleural effusion. No kinking or discontinuity of the tubing.      DX-CHEST-LIMITED (1 VIEW)   Final Result         Left ventriculostomy catheter terminating in the left pleural space. Trace left pleural effusion. No kinking or discontinuity of the tubing.      DX-SKULL-LIMITED 3-   Final Result         Left ventriculostomy catheter terminating in the left pleural space. Trace left pleural effusion. No kinking or discontinuity of the tubing.      CT-HEAD W/O   Final Result      1.  No acute intracranial findings.      2.  Left parietal intraventricular shunt is unchanged in position. Ventricular size is grossly stable.               COURSE & MEDICAL DECISION MAKING  Pertinent Labs & Imaging studies reviewed. (See chart for details)  Differential diagnosis: Shunt malfunction, migraine, intracranial hemorrhage, metastatic cancer from the breast    6:45 AM I  gave the patient oral Norco and Zofran and CT and shunt series  were ordered.    8:19 AM the patient is still complaining of a headache that she states is now getting worse.  I ordered IM Toradol.    8:20 am I have paged neurology on-call Dr. Sequeira regarding the patient's symptoms    10:03 AM Dr Sequeira ordered a migraine cocktail for the patient.  An hour later she still feels no better.  Per his orders we will give her a gram of Depakote and some caffeine.  He wishes to her give her Elavil 10 mg every evening for chronic headache control and refer her to the headache clinic.  The patient understands and is okay with this plan.    11:03 AM patient feels improved after the Depakote and some coffee.  We will give her an oral challenge and I will discharge her home with follow-up with neurology in the headache clinic.  I did prescribe her Elavil nightly per recommendation of Dr. Sequeira.  She will be discharged home in stable condition.    The patient will return for new or worsening symptoms and is stable at the time of discharge.    The patient is referred to a primary physician for blood pressure management, diabetic screening, and for all other preventative health concerns.      DISPOSITION:  Patient will be discharged home in stable condition.    FOLLOW UP:  Feliberto Sequeira M.D.  [address] [place] [id number] [phone]    Call in 2 days  to establish care in headache clinic      OUTPATIENT MEDICATIONS:  New Prescriptions    AMITRIPTYLINE (ELAVIL) 10 MG TAB    Take 1 Tab by mouth every bedtime.         FINAL IMPRESSION  1. Acute nonintractable headache, unspecified headache type            Electronically signed by: Cinthya Macias M.D., 4/11/2020 6:39 AM

## 2020-04-16 ENCOUNTER — TELEPHONE (OUTPATIENT)
Dept: VASCULAR LAB | Facility: MEDICAL CENTER | Age: 50
End: 2020-04-16

## 2020-04-16 NOTE — TELEPHONE ENCOUNTER
----- Message from Polly Gonzalez sent at 4/16/2020  9:45 AM PDT -----  Regarding: Missed ant-coag appt unable to reach patient  Hello,   I have been unsuccessful in scheduling the patient MRN [id number]. We have left 3 voicemail's and provided our number for scheduling.     Thank you,   V

## 2020-04-16 NOTE — TELEPHONE ENCOUNTER
3X-Left message for patient to call back and reschedule missed rcc appt / sent in-basket to [facility]

## 2020-04-17 ENCOUNTER — HOSPITAL ENCOUNTER (OUTPATIENT)
Dept: LAB | Facility: MEDICAL CENTER | Age: 50
End: 2020-04-17
Attending: NURSE PRACTITIONER
Payer: MEDICARE

## 2020-04-17 ENCOUNTER — ANTICOAGULATION VISIT (OUTPATIENT)
Dept: VASCULAR LAB | Facility: MEDICAL CENTER | Age: 50
End: 2020-04-17
Attending: INTERNAL MEDICINE
Payer: MEDICARE

## 2020-04-17 DIAGNOSIS — I27.82 OTHER CHRONIC PULMONARY EMBOLISM WITHOUT ACUTE COR PULMONALE (HCC): ICD-10-CM

## 2020-04-17 DIAGNOSIS — Z79.01 CHRONIC ANTICOAGULATION: ICD-10-CM

## 2020-04-17 LAB
ALBUMIN SERPL BCP-MCNC: 3.8 G/DL (ref 3.2–4.9)
ALBUMIN/GLOB SERPL: 1.3 G/DL
ALP SERPL-CCNC: 125 U/L (ref 30–99)
ALT SERPL-CCNC: 1273 U/L (ref 2–50)
ANION GAP SERPL CALC-SCNC: 13 MMOL/L (ref 7–16)
AST SERPL-CCNC: 232 U/L (ref 12–45)
BILIRUB SERPL-MCNC: 1.2 MG/DL (ref 0.1–1.5)
BUN SERPL-MCNC: 6 MG/DL (ref 8–22)
CALCIUM SERPL-MCNC: 9.1 MG/DL (ref 8.5–10.5)
CHLORIDE SERPL-SCNC: 105 MMOL/L (ref 96–112)
CO2 SERPL-SCNC: 22 MMOL/L (ref 20–33)
CREAT SERPL-MCNC: 0.61 MG/DL (ref 0.5–1.4)
ERYTHROCYTE [DISTWIDTH] IN BLOOD BY AUTOMATED COUNT: 47.7 FL (ref 35.9–50)
GLOBULIN SER CALC-MCNC: 3 G/DL (ref 1.9–3.5)
GLUCOSE SERPL-MCNC: 105 MG/DL (ref 65–99)
HCT VFR BLD AUTO: 40.7 % (ref 37–47)
HGB BLD-MCNC: 13.5 G/DL (ref 12–16)
MCH RBC QN AUTO: 30.1 PG (ref 27–33)
MCHC RBC AUTO-ENTMCNC: 33.2 G/DL (ref 33.6–35)
MCV RBC AUTO: 90.6 FL (ref 81.4–97.8)
PLATELET # BLD AUTO: 275 K/UL (ref 164–446)
PMV BLD AUTO: 10.8 FL (ref 9–12.9)
POTASSIUM SERPL-SCNC: 3.3 MMOL/L (ref 3.6–5.5)
PROT SERPL-MCNC: 6.8 G/DL (ref 6–8.2)
RBC # BLD AUTO: 4.49 M/UL (ref 4.2–5.4)
SODIUM SERPL-SCNC: 140 MMOL/L (ref 135–145)
WBC # BLD AUTO: 6.3 K/UL (ref 4.8–10.8)

## 2020-04-17 PROCEDURE — 36415 COLL VENOUS BLD VENIPUNCTURE: CPT

## 2020-04-17 PROCEDURE — 85027 COMPLETE CBC AUTOMATED: CPT

## 2020-04-17 PROCEDURE — 80053 COMPREHEN METABOLIC PANEL: CPT

## 2020-04-17 PROCEDURE — 99211 OFF/OP EST MAY X REQ PHY/QHP: CPT

## 2020-04-17 NOTE — PROGRESS NOTES
Target end date:Indefinite   Indication: PE  Drug: Eliquis 5 mg twice     Health Status Since Last Assessment  Patient denies any new relevant medical problems, ED visits or hospitalizations  Patient denies any embolic events (stroke/tia/systemic embolism)    Adherence with DOAC Therapy  Pt has missed any doses for the past week    Bleeding Risk Assessment    No  Epistaxis  Pt denies any excessive or unusual bleeding/hematomas.  Pt denies any GI bleeds or hematemesis.  Pt denies any concerning daily headache or sub dural hematoma symptoms.    Pt denies any hematuria or abnormal vaginal bleeding.  Latest Hemoglobin Results for NILAY MANN (MRN [id number]) as of 4/17/2020 15:00   Ref. Range 3/21/2020 00:33   WBC Latest Ref Range: 4.8 - 10.8 K/uL 6.3   RBC Latest Ref Range: 4.20 - 5.40 M/uL 4.18 (L)   Hemoglobin Latest Ref Range: 12.0 - 16.0 g/dL 12.7   Hematocrit Latest Ref Range: 37.0 - 47.0 % 38.3   MCV Latest Ref Range: 81.4 - 97.8 fL 91.6   MCH Latest Ref Range: 27.0 - 33.0 pg 30.4   MCHC Latest Ref Range: 33.6 - 35.0 g/dL 33.2 (L)   RDW Latest Ref Range: 35.9 - 50.0 fL 52.0 (H)   Platelet Count Latest Ref Range: 164 - 446 K/uL 257     ETOH overuse None     Creatinine Clearance/Renal Function    Latest ClCr Results for NILAY MANN (MRN [id number]) as of 4/17/2020 15:00   Ref. Range 3/23/2020 00:18   GFR If  Latest Ref Range: >60 mL/min/1.73 m 2 >60       Hepatic function  Latest LFTs Results for NILAY MANN (MRN [id number]) as of 4/17/2020 15:00   Ref. Range 3/21/2020 00:33   AST(SGOT) Latest Ref Range: 12 - 45 U/L 14   ALT(SGPT) Latest Ref Range: 2 - 50 U/L 14     Pt denies any history of liver dysfunction      Drug Interactions  ASA/other antiplatelets None  NSAID None  Other drug interactions None  Verified no anticonvulsant or azole therapy, education provided for future use.     Examination  Symptomatic hypotension None  Significant gait impairment/imbalance/high risk  for falls? None    Final Assessment and Recommendations:  Patient appears stable from the anticoagulation staindpoint.    Benefits of continued DOAC therapy outweigh risks for this patient  Recommend pt continue with current DOAC therapy Eliquis 5 mg twice daily     Other Actions: cmp/ cbc hemogram ordered prior to next visit    New refill of Eliquis 5 mg was sent to patients pharmacy as she has been off this for about 1 week now    Follow up:  Will follow up with patient 3  months.      Kev Tyler, Pharm.D

## 2020-05-01 ENCOUNTER — APPOINTMENT (OUTPATIENT)
Dept: RADIOLOGY | Facility: MEDICAL CENTER | Age: 50
End: 2020-05-01
Attending: EMERGENCY MEDICINE
Payer: MEDICARE

## 2020-05-01 ENCOUNTER — HOSPITAL ENCOUNTER (EMERGENCY)
Facility: MEDICAL CENTER | Age: 50
End: 2020-05-01
Attending: EMERGENCY MEDICINE
Payer: MEDICARE

## 2020-05-01 VITALS
RESPIRATION RATE: 16 BRPM | OXYGEN SATURATION: 98 % | BODY MASS INDEX: 22.53 KG/M2 | WEIGHT: 132 LBS | HEART RATE: 67 BPM | DIASTOLIC BLOOD PRESSURE: 75 MMHG | TEMPERATURE: 96.9 F | SYSTOLIC BLOOD PRESSURE: 128 MMHG | HEIGHT: 64 IN

## 2020-05-01 DIAGNOSIS — Z79.01 ANTICOAGULATED: ICD-10-CM

## 2020-05-01 DIAGNOSIS — R10.9 LEFT FLANK PAIN: ICD-10-CM

## 2020-05-01 DIAGNOSIS — Z98.2 VENTRICULAR SHUNT IN PLACE: ICD-10-CM

## 2020-05-01 DIAGNOSIS — F11.20 NARCOTIC DEPENDENCE (HCC): ICD-10-CM

## 2020-05-01 DIAGNOSIS — N10 ACUTE PYELONEPHRITIS: ICD-10-CM

## 2020-05-01 DIAGNOSIS — Z86.711 HISTORY OF PULMONARY EMBOLISM: ICD-10-CM

## 2020-05-01 LAB
ALBUMIN SERPL BCP-MCNC: 4.4 G/DL (ref 3.2–4.9)
ALBUMIN/GLOB SERPL: 1.3 G/DL
ALP SERPL-CCNC: 117 U/L (ref 30–99)
ALT SERPL-CCNC: 33 U/L (ref 2–50)
AMORPH CRY #/AREA URNS HPF: PRESENT /HPF
ANION GAP SERPL CALC-SCNC: 16 MMOL/L (ref 7–16)
APPEARANCE UR: ABNORMAL
APTT PPP: 39 SEC (ref 24.7–36)
AST SERPL-CCNC: 20 U/L (ref 12–45)
BACTERIA #/AREA URNS HPF: NEGATIVE /HPF
BASOPHILS # BLD AUTO: 1 % (ref 0–1.8)
BASOPHILS # BLD: 0.07 K/UL (ref 0–0.12)
BILIRUB SERPL-MCNC: 1 MG/DL (ref 0.1–1.5)
BILIRUB UR QL STRIP.AUTO: NEGATIVE
BUN SERPL-MCNC: 12 MG/DL (ref 8–22)
CALCIUM SERPL-MCNC: 9.8 MG/DL (ref 8.5–10.5)
CHLORIDE SERPL-SCNC: 104 MMOL/L (ref 96–112)
CO2 SERPL-SCNC: 19 MMOL/L (ref 20–33)
COLOR UR: YELLOW
CREAT SERPL-MCNC: 0.69 MG/DL (ref 0.5–1.4)
EOSINOPHIL # BLD AUTO: 0.09 K/UL (ref 0–0.51)
EOSINOPHIL NFR BLD: 1.3 % (ref 0–6.9)
EPI CELLS #/AREA URNS HPF: NORMAL /HPF
ERYTHROCYTE [DISTWIDTH] IN BLOOD BY AUTOMATED COUNT: 53 FL (ref 35.9–50)
GLOBULIN SER CALC-MCNC: 3.3 G/DL (ref 1.9–3.5)
GLUCOSE SERPL-MCNC: 107 MG/DL (ref 65–99)
GLUCOSE UR STRIP.AUTO-MCNC: NEGATIVE MG/DL
HCG SERPL QL: NEGATIVE
HCT VFR BLD AUTO: 45 % (ref 37–47)
HGB BLD-MCNC: 14.2 G/DL (ref 12–16)
IMM GRANULOCYTES # BLD AUTO: 0.02 K/UL (ref 0–0.11)
IMM GRANULOCYTES NFR BLD AUTO: 0.3 % (ref 0–0.9)
INR PPP: 1.14 (ref 0.87–1.13)
KETONES UR STRIP.AUTO-MCNC: ABNORMAL MG/DL
LEUKOCYTE ESTERASE UR QL STRIP.AUTO: ABNORMAL
LIPASE SERPL-CCNC: 50 U/L (ref 11–82)
LYMPHOCYTES # BLD AUTO: 0.7 K/UL (ref 1–4.8)
LYMPHOCYTES NFR BLD: 10.1 % (ref 22–41)
MCH RBC QN AUTO: 29.8 PG (ref 27–33)
MCHC RBC AUTO-ENTMCNC: 31.6 G/DL (ref 33.6–35)
MCV RBC AUTO: 94.5 FL (ref 81.4–97.8)
MICRO URNS: ABNORMAL
MONOCYTES # BLD AUTO: 0.56 K/UL (ref 0–0.85)
MONOCYTES NFR BLD AUTO: 8.1 % (ref 0–13.4)
NEUTROPHILS # BLD AUTO: 5.5 K/UL (ref 2–7.15)
NEUTROPHILS NFR BLD: 79.2 % (ref 44–72)
NITRITE UR QL STRIP.AUTO: NEGATIVE
NRBC # BLD AUTO: 0 K/UL
NRBC BLD-RTO: 0 /100 WBC
PH UR STRIP.AUTO: 8.5 [PH] (ref 5–8)
PLATELET # BLD AUTO: 282 K/UL (ref 164–446)
PMV BLD AUTO: 11.4 FL (ref 9–12.9)
POTASSIUM SERPL-SCNC: 3.8 MMOL/L (ref 3.6–5.5)
PROT SERPL-MCNC: 7.7 G/DL (ref 6–8.2)
PROT UR QL STRIP: NEGATIVE MG/DL
PROTHROMBIN TIME: 14.9 SEC (ref 12–14.6)
RBC # BLD AUTO: 4.76 M/UL (ref 4.2–5.4)
RBC # URNS HPF: NORMAL /HPF
RBC UR QL AUTO: NEGATIVE
SODIUM SERPL-SCNC: 139 MMOL/L (ref 135–145)
SP GR UR STRIP.AUTO: 1.01
UROBILINOGEN UR STRIP.AUTO-MCNC: 0.2 MG/DL
WBC # BLD AUTO: 6.9 K/UL (ref 4.8–10.8)
WBC #/AREA URNS HPF: NORMAL /HPF

## 2020-05-01 PROCEDURE — 700105 HCHG RX REV CODE 258: Performed by: EMERGENCY MEDICINE

## 2020-05-01 PROCEDURE — 96376 TX/PRO/DX INJ SAME DRUG ADON: CPT

## 2020-05-01 PROCEDURE — 74176 CT ABD & PELVIS W/O CONTRAST: CPT

## 2020-05-01 PROCEDURE — 96374 THER/PROPH/DIAG INJ IV PUSH: CPT

## 2020-05-01 PROCEDURE — A9270 NON-COVERED ITEM OR SERVICE: HCPCS | Performed by: EMERGENCY MEDICINE

## 2020-05-01 PROCEDURE — 83690 ASSAY OF LIPASE: CPT

## 2020-05-01 PROCEDURE — 99285 EMERGENCY DEPT VISIT HI MDM: CPT

## 2020-05-01 PROCEDURE — 80053 COMPREHEN METABOLIC PANEL: CPT

## 2020-05-01 PROCEDURE — 84703 CHORIONIC GONADOTROPIN ASSAY: CPT

## 2020-05-01 PROCEDURE — 700102 HCHG RX REV CODE 250 W/ 637 OVERRIDE(OP): Performed by: EMERGENCY MEDICINE

## 2020-05-01 PROCEDURE — 700111 HCHG RX REV CODE 636 W/ 250 OVERRIDE (IP): Performed by: EMERGENCY MEDICINE

## 2020-05-01 PROCEDURE — 81001 URINALYSIS AUTO W/SCOPE: CPT

## 2020-05-01 PROCEDURE — 85025 COMPLETE CBC W/AUTO DIFF WBC: CPT

## 2020-05-01 PROCEDURE — 85610 PROTHROMBIN TIME: CPT

## 2020-05-01 PROCEDURE — 85730 THROMBOPLASTIN TIME PARTIAL: CPT

## 2020-05-01 RX ORDER — HYDROMORPHONE HYDROCHLORIDE 1 MG/ML
0.5 INJECTION, SOLUTION INTRAMUSCULAR; INTRAVENOUS; SUBCUTANEOUS ONCE
Status: COMPLETED | OUTPATIENT
Start: 2020-05-01 | End: 2020-05-01

## 2020-05-01 RX ORDER — CEFDINIR 300 MG/1
300 CAPSULE ORAL 2 TIMES DAILY
Qty: 20 CAP | Refills: 0 | Status: ON HOLD | OUTPATIENT
Start: 2020-05-01 | End: 2020-06-08

## 2020-05-01 RX ORDER — CEFDINIR 300 MG/1
300 CAPSULE ORAL 2 TIMES DAILY
Qty: 20 CAP | Refills: 0 | Status: SHIPPED | OUTPATIENT
Start: 2020-05-01 | End: 2020-05-01 | Stop reason: SDUPTHER

## 2020-05-01 RX ORDER — KETOROLAC TROMETHAMINE 30 MG/ML
15 INJECTION, SOLUTION INTRAMUSCULAR; INTRAVENOUS ONCE
Status: DISCONTINUED | OUTPATIENT
Start: 2020-05-01 | End: 2020-05-01 | Stop reason: HOSPADM

## 2020-05-01 RX ORDER — SODIUM CHLORIDE 9 MG/ML
1000 INJECTION, SOLUTION INTRAVENOUS ONCE
Status: COMPLETED | OUTPATIENT
Start: 2020-05-01 | End: 2020-05-01

## 2020-05-01 RX ORDER — ONDANSETRON 2 MG/ML
4 INJECTION INTRAMUSCULAR; INTRAVENOUS
Status: DISCONTINUED | OUTPATIENT
Start: 2020-05-01 | End: 2020-05-01 | Stop reason: HOSPADM

## 2020-05-01 RX ORDER — CEFDINIR 300 MG/1
300 CAPSULE ORAL ONCE
Status: COMPLETED | OUTPATIENT
Start: 2020-05-01 | End: 2020-05-01

## 2020-05-01 RX ADMIN — HYDROMORPHONE HYDROCHLORIDE 0.5 MG: 1 INJECTION, SOLUTION INTRAMUSCULAR; INTRAVENOUS; SUBCUTANEOUS at 18:23

## 2020-05-01 RX ADMIN — HYDROMORPHONE HYDROCHLORIDE 0.5 MG: 1 INJECTION, SOLUTION INTRAMUSCULAR; INTRAVENOUS; SUBCUTANEOUS at 19:26

## 2020-05-01 RX ADMIN — CEFDINIR 300 MG: 300 CAPSULE ORAL at 21:21

## 2020-05-01 RX ADMIN — SODIUM CHLORIDE 1000 ML: 9 INJECTION, SOLUTION INTRAVENOUS at 18:23

## 2020-05-01 ASSESSMENT — FIBROSIS 4 INDEX: FIB4 SCORE: 1.13

## 2020-05-01 NOTE — ED TRIAGE NOTES
"Chief Complaint   Patient presents with   • Flank Pain     L sided flank pain x's 7 days    • Urinary Retention     c/o of abd pain and \"it's very uncomfortable when i urinate\". pt currently undergoing radiation for breast cancer    ./72   Pulse 63   Temp 36.1 °C (97 °F) (Temporal)   Resp 16   Ht 1.626 m (5' 4\")   Wt 59.9 kg (132 lb)   SpO2 98%   "

## 2020-05-01 NOTE — ED PROVIDER NOTES
"ED Provider Note    CHIEF COMPLAINT  Chief Complaint   Patient presents with   • Flank Pain     L sided flank pain x's 7 days    • Urinary Retention     c/o of abd pain and \"it's very uncomfortable when i urinate\". pt currently undergoing radiation for breast cancer        HPI    Primary care provider: Tabitha Bautista M.D.  Means of arrival: Walk-in  History obtained from: Patient  History limited by: Nothing    Rasheeda Manzano is a 49 y.o. female who presents with left-sided flank pain.  Intermittent.  Onset 7 days ago.  Described as dull and achy, nonradiating.  Isolated to her left lower flank.  Associated symptoms include suprapubic abdominal pain worsened with urination.  No alleviating measures noted.  No aggravating factors.  She has had urine and kidney infections in the past and they have felt like this.  She has a complex medical history, she has a history of hydrocephalus with many shunt revisions in the past, she is blind, she has PTSD and also has breast cancer currently on hormone therapy status post surgical resection in December of last year.  Reportedly negative PET scans in the last several months.  She denies any other associated symptoms specifically no chest pain or dyspnea or pleuritic pain or vomiting or nausea or fevers, and no respiratory complaints or known sick contacts with coronavirus.  She does have a history of PE and is on Eliquis and has been compliant with her medications.  No headache or neurologic complaints.    PPE Note: I personally donned full PPE for all patient encounters during this visit, including being clean-shaven with an N95 respirator mask.      REVIEW OF SYSTEMS  Constitutional: Negative for fever or chills.   HENT: Negative for rhinorrhea or sore throat.    Respiratory: Negative for cough or shortness of breath.    Cardiovascular: Negative for chest pain or palpitations.   Gastrointestinal: Negative for nausea, vomiting, but positive for suprapubic/lower " midline abdominal pain.   Genitourinary: Positive for dysuria and left-sided flank pain  Musculoskeletal: Negative for midline back pain or joint pain.   Skin: Negative for itching or rash.   Neurological: Negative for sensory or motor changes.   See HPI for further details. All other systems are negative.     PAST MEDICAL HISTORY   has a past medical history of Acute nasopharyngitis, Blind, C. difficile colitis, C. difficile diarrhea (), Cancer (), Chronic daily headache, Depression, Esophageal atresia (1971), Esophageal bleeding (2019), Fall, GERD (gastroesophageal reflux disease), H/O total hysterectomy, Heart burn (2019), Hydrocephalus (), Hydrocephalus ([facility]), Indigestion (2019), Jaundice, Legally blind, Migraine without aura, without mention of intractable migraine without mention of status migrainosus, Other specified symptom associated with female genital organs, Pain, Pain (2019), Psychiatric problem, PTSD (post-traumatic stress disorder), Seizure ([facility]) (2019), and Snoring (2019).    PAST FAMILY HISTORY  Family History   Problem Relation Age of Onset   • Hypertension Mother    • Hypertension Father    • Non-contributory Neg Hx         Migraine       SOCIAL HISTORY  Social History     Tobacco Use   • Smoking status: Former Smoker     Packs/day: 1.00     Years: 10.00     Pack years: 10.00     Types: Cigars     Start date: 2004     Last attempt to quit: 2017     Years since quittin.7   • Smokeless tobacco: Former User   • Tobacco comment:  CIGAR/day    Substance and Sexual Activity   • Alcohol use: Yes     Frequency: Monthly or less     Drinks per session: 1 or 2   • Drug use: No   • Sexual activity: Yes     Partners: Male       SURGICAL HISTORY   has a past surgical history that includes laparoscopy (08); lysis adhesions general (12/10/2013); cystoscopy (12/10/2013); aakash by laparoscopy (N/A, 2015); gastroscopy-endo (N/A, 2017);  "nissen fundoplication laparoscopic; abdominal hysterectomy total (12/10/2013); other; exploratory laparotomy (12/10/2013); appendectomy (12/10/2013); cholecystectomy (N/A, 8/13/2015); gastroscopy (N/A, 1/2/2019); mastectomy (Right, 12/19/2019); and node biopsy sentinel (Right, 12/19/2019).    CURRENT MEDICATIONS  Home Medications     Reviewed by Amelie Vega R.N. (Registered Nurse) on 05/01/20 at 1629  Med List Status: Not Addressed   Medication Last Dose Status   amitriptyline (ELAVIL) 10 MG Tab  Active   anastrozole (ARIMIDEX) 1 MG Tab  Active   apixaban (ELIQUIS) 5mg Tab  Active   ascorbic acid (ASCORBIC ACID) 500 MG Tab  Active   hydrOXYzine HCl (ATARAX) 50 MG Tab  Active   levETIRAcetam (KEPPRA) 500 MG Tab  Active   lisinopril (PRINIVIL) 20 MG Tab  Active   methimazole (TAPAZOLE) 5 MG Tab  Active   ondansetron (ZOFRAN ODT) 4 MG TABLET DISPERSIBLE  Active   oxyCODONE ER 9 MG Capsule Extended Release 12 hour Abuse-Deterrent  Active   propranolol CR (INDERAL LA) 120 MG CAPSULE SR 24 HR  Active                ALLERGIES  Allergies   Allergen Reactions   • Tape Rash     RXN= ongoing  Adhesive Medical tape. Per patient, paper tape ok.   • Latex Rash     Rash       PHYSICAL EXAM  VITAL SIGNS: /75   Pulse 67   Temp 36.1 °C (96.9 °F) (Axillary)   Resp 16   Ht 1.626 m (5' 4\")   Wt 59.9 kg (132 lb)   LMP 11/27/2013   SpO2 98%   BMI 22.66 kg/m²    Pulse ox interpretation: On room air, I interpret this pulse ox as normal.  Constitutional: Sitting up in mild distress  HEENT: Normocephalic, atraumatic. Posterior pharynx clear, mucous membranes dry.  Eyes: Esotropia is present, normal sclerae.  Neck: Supple, nontender.  Chest/Pulmonary: Clear to ausculation bilaterally, no wheezes or rhonchi.  Cardiovascular: Regular rate and rhythm, no murmur.   Abdomen: Soft, suprapubic tenderness; no rebound, guarding, or masses.  Back: Left CVA tenderness no midline tenderness.  Musculoskeletal: No deformity or " edema.  Neuro: Alert, no focal weakness or asymmetry.  Psych: Normal mood and affect.  Skin: No rashes or vesicular lesions, warm and dry.      DIAGNOSTIC STUDIES / PROCEDURES    LABS & EKG  Results for orders placed or performed during the hospital encounter of 05/01/20   CBC WITH DIFFERENTIAL   Result Value Ref Range    WBC 6.9 4.8 - 10.8 K/uL    RBC 4.76 4.20 - 5.40 M/uL    Hemoglobin 14.2 12.0 - 16.0 g/dL    Hematocrit 45.0 37.0 - 47.0 %    MCV 94.5 81.4 - 97.8 fL    MCH 29.8 27.0 - 33.0 pg    MCHC 31.6 (L) 33.6 - 35.0 g/dL    RDW 53.0 (H) 35.9 - 50.0 fL    Platelet Count 282 164 - 446 K/uL    MPV 11.4 9.0 - 12.9 fL    Neutrophils-Polys 79.20 (H) 44.00 - 72.00 %    Lymphocytes 10.10 (L) 22.00 - 41.00 %    Monocytes 8.10 0.00 - 13.40 %    Eosinophils 1.30 0.00 - 6.90 %    Basophils 1.00 0.00 - 1.80 %    Immature Granulocytes 0.30 0.00 - 0.90 %    Nucleated RBC 0.00 /100 WBC    Neutrophils (Absolute) 5.50 2.00 - 7.15 K/uL    Lymphs (Absolute) 0.70 (L) 1.00 - 4.80 K/uL    Monos (Absolute) 0.56 0.00 - 0.85 K/uL    Eos (Absolute) 0.09 0.00 - 0.51 K/uL    Baso (Absolute) 0.07 0.00 - 0.12 K/uL    Immature Granulocytes (abs) 0.02 0.00 - 0.11 K/uL    NRBC (Absolute) 0.00 K/uL   COMP METABOLIC PANEL   Result Value Ref Range    Sodium 139 135 - 145 mmol/L    Potassium 3.8 3.6 - 5.5 mmol/L    Chloride 104 96 - 112 mmol/L    Co2 19 (L) 20 - 33 mmol/L    Anion Gap 16.0 7.0 - 16.0    Glucose 107 (H) 65 - 99 mg/dL    Bun 12 8 - 22 mg/dL    Creatinine 0.69 0.50 - 1.40 mg/dL    Calcium 9.8 8.5 - 10.5 mg/dL    AST(SGOT) 20 12 - 45 U/L    ALT(SGPT) 33 2 - 50 U/L    Alkaline Phosphatase 117 (H) 30 - 99 U/L    Total Bilirubin 1.0 0.1 - 1.5 mg/dL    Albumin 4.4 3.2 - 4.9 g/dL    Total Protein 7.7 6.0 - 8.2 g/dL    Globulin 3.3 1.9 - 3.5 g/dL    A-G Ratio 1.3 g/dL   LIPASE   Result Value Ref Range    Lipase 50 11 - 82 U/L   URINALYSIS CULTURE, IF INDICATED   Result Value Ref Range    Color Yellow     Character Hazy (A)     Specific  Gravity 1.010 <1.035    Ph 8.5 (A) 5.0 - 8.0    Glucose Negative Negative mg/dL    Ketones Trace (A) Negative mg/dL    Protein Negative Negative mg/dL    Bilirubin Negative Negative    Urobilinogen, Urine 0.2 Negative    Nitrite Negative Negative    Leukocyte Esterase Small (A) Negative    Occult Blood Negative Negative    Micro Urine Req Microscopic    HCG QUAL SERUM   Result Value Ref Range    Beta-Hcg Qualitative Serum Negative Negative   PROTHROMBIN TIME (INR)   Result Value Ref Range    PT 14.9 (H) 12.0 - 14.6 sec    INR 1.14 (H) 0.87 - 1.13   APTT   Result Value Ref Range    APTT 39.0 (H) 24.7 - 36.0 sec   URINE MICROSCOPIC (W/UA)   Result Value Ref Range    WBC 0-2 /hpf    RBC 0-2 /hpf    Bacteria Negative None /hpf    Epithelial Cells Rare /hpf    Amorphous Crystal Present /hpf   ESTIMATED GFR   Result Value Ref Range    GFR If African American >60 >60 mL/min/1.73 m 2    GFR If Non African American >60 >60 mL/min/1.73 m 2       RADIOLOGY  CT-RENAL COLIC EVALUATION(A/P W/O)   Final Result      1.  No renal abnormality or hydronephrosis identified.      2.  Status post Nissen fundoplication with questionable small hiatal hernia.      3.  Left pleural effusion with pleural drain in place.      4.  Mild diverticulosis.          COURSE & MEDICAL DECISION MAKING    This is a 49 y.o. female who presents with flank pain and dysuria and suprapubic pain.  History of bladder and kidney infections.    Differential Diagnosis includes but is not limited to:  Pyelonephritis, ureterolithiasis, dehydration, BRENDA, zoster, UTI    ED Course:  Given the patient's complex medical history and her sites of pain plan broad work-up including labs, urinalysis, and CT renal colic.  I will keep her n.p.o. until surgical process is ruled out, clinically she looks dehydrated with dry mucous membranes, so we will treat her with a crystalloid fluid bolus IV, as well as parenteral pain and nausea medicine.    Thankfully her today is  reassuring aside from early evidence of urine infection and trace ketones in the urine concordant with my suspicion for dehydration.  Nothing surgical on CT scan.    Recheck patient feeling better after fluids vital signs are stable.  No acidosis or kidney injury, now tolerating by mouth dose of Cornel given here for presumed early pyelonephritis.  Plan discharge with Omnicef, follow-up with PCP, return for any new or worsening symptoms.  Patient understands and agrees with plan of care, normal vitals white count normal doubt sepsis.  Return for any new or worsening symptoms.  No skin findings of zoster.    Medications   HYDROmorphone pf (DILAUDID) injection 0.5 mg (0.5 mg Intravenous Given 5/1/20 1823)   NS infusion 1,000 mL (0 mL Intravenous Stopped 5/1/20 2012)   HYDROmorphone pf (DILAUDID) injection 0.5 mg (0.5 mg Intravenous Given 5/1/20 1926)   cefdinir (OMNICEF) capsule 300 mg (300 mg Oral Given 5/1/20 2121)       FINAL IMPRESSION  1. Left flank pain    2. Acute pyelonephritis    3. Anticoagulated    4. History of pulmonary embolism    5. Narcotic dependence (CMS-[facility])    6. Ventricular shunt in place        PRESCRIPTIONS  Discharge Medication List as of 5/1/2020  9:26 PM          FOLLOW UP  [facility], Emergency Dept  [address] [facility] [address] [phone]  Today  If you have ANY new or worse symptoms!    Tabitha Bautista M.D.  [address] [facility] [address] [phone]    Schedule an appointment as soon as possible for a visit in 3 days  for recheck and routine care        -DISCHARGE-       Results, exam findings, clinical impression, presumed diagnosis, treatment options, and strict return precautions were discussed with the patient, and they verbalized understanding, agreed with, and appreciated the plan of care.    Pertinent Labs & Imaging studies reviewed and verified by myself, as well as nursing notes and the patient's past medical, family, and social histories (See  chart for details).    The patient is referred to a primary physician for blood pressure management, diabetic screening, and for all other preventative health concerns.     Portions of this record were made with voice recognition software.  Despite my review, spelling/grammar/context errors may still remain.  Interpretation of this chart should be taken in this context.    Electronically signed by Rg Jordan M.D. on 5/2/2020 at 9:36 AM.

## 2020-05-02 NOTE — ED NOTES
Patient discharged. Patient provided information about pyelonephritis. Pt educated to follow-up w/ PCP and to return to the hospital w/ any worsening symptoms. Patient provided prescriptions and walked to the lobby w/ RN and  will pick her up.

## 2020-05-02 NOTE — ED NOTES
Received report on patient. Pt complaining of Lt flank and bladder pain 8/10. Notified Dr. Jordan. No other needs at this time.

## 2020-05-02 NOTE — DISCHARGE INSTRUCTIONS
You were seen and evaluated in the Emergency Department at [facility] [place] for:     Left-sided flank pain and pain when urinating    You had the following tests and studies:    Thankfully your work-up today is reassuring you do have evidence of an early urine infection which could be spreading to your kidney but thankfully your blood work and CT scan look great    You received the following medications:    Pain medication and IV fluids and antibiotic    You received the following prescriptions:    Omnicef/cefdinir, an antibiotic to take twice a day for 10 days  ----------------------------    Please make sure to follow up with:    Your primary care provider for recheck and routine care in 3 days, but if you have worsening pain or fevers or vomiting or any other new or worsening symptoms return to the ER immediately.    Good luck, we hope you get better soon!  ----------------------------    We always encourage patients to return IMMEDIATELY if they have:  Increased or changing pain, passing out, fevers over 100.4 (taken in your mouth or rectally) for more than 2 days, redness or swelling of skin or tissues, feeling like your heart is beating fast, chest pain that is new or worsening, trouble breathing, feeling like your throat is closing up and can not breath, inability to walk, weakness of any part of your body, new dizziness, severe bleeding that won't stop from any part of your body, if you can't eat or drink, or if you have any other concerns.   If you feel worse, please know that you can always return with any questions, concerns, worse symptoms, or you are feeling unsafe. We certainly cannot say for sure that we have ruled out every illness or dangerous disease, but we feel that at this specific time, your exam, tests, and vital signs like heart rate and blood pressure are safe for discharge.

## 2020-05-12 ENCOUNTER — HOSPITAL ENCOUNTER (EMERGENCY)
Facility: MEDICAL CENTER | Age: 50
End: 2020-05-12
Attending: EMERGENCY MEDICINE
Payer: MEDICARE

## 2020-05-12 ENCOUNTER — APPOINTMENT (OUTPATIENT)
Dept: RADIOLOGY | Facility: MEDICAL CENTER | Age: 50
End: 2020-05-12
Attending: EMERGENCY MEDICINE
Payer: MEDICARE

## 2020-05-12 VITALS
DIASTOLIC BLOOD PRESSURE: 89 MMHG | TEMPERATURE: 97.6 F | BODY MASS INDEX: 22.71 KG/M2 | RESPIRATION RATE: 20 BRPM | HEART RATE: 98 BPM | WEIGHT: 132.28 LBS | OXYGEN SATURATION: 100 % | SYSTOLIC BLOOD PRESSURE: 144 MMHG

## 2020-05-12 DIAGNOSIS — J90 RECURRENT LEFT PLEURAL EFFUSION: ICD-10-CM

## 2020-05-12 DIAGNOSIS — R10.9 LEFT FLANK PAIN: ICD-10-CM

## 2020-05-12 LAB
ALBUMIN SERPL BCP-MCNC: 4.1 G/DL (ref 3.2–4.9)
ALBUMIN/GLOB SERPL: 1.4 G/DL
ALP SERPL-CCNC: 122 U/L (ref 30–99)
ALT SERPL-CCNC: 15 U/L (ref 2–50)
ANION GAP SERPL CALC-SCNC: 14 MMOL/L (ref 7–16)
APPEARANCE UR: CLEAR
AST SERPL-CCNC: 15 U/L (ref 12–45)
BACTERIA #/AREA URNS HPF: NEGATIVE /HPF
BASOPHILS # BLD AUTO: 0.5 % (ref 0–1.8)
BASOPHILS # BLD: 0.05 K/UL (ref 0–0.12)
BILIRUB SERPL-MCNC: 0.7 MG/DL (ref 0.1–1.5)
BILIRUB UR QL STRIP.AUTO: NEGATIVE
BUN SERPL-MCNC: 10 MG/DL (ref 8–22)
CALCIUM SERPL-MCNC: 9.5 MG/DL (ref 8.5–10.5)
CHLORIDE SERPL-SCNC: 107 MMOL/L (ref 96–112)
CO2 SERPL-SCNC: 17 MMOL/L (ref 20–33)
COLOR UR: YELLOW
CREAT SERPL-MCNC: 0.66 MG/DL (ref 0.5–1.4)
EOSINOPHIL # BLD AUTO: 0.13 K/UL (ref 0–0.51)
EOSINOPHIL NFR BLD: 1.4 % (ref 0–6.9)
EPI CELLS #/AREA URNS HPF: ABNORMAL /HPF
ERYTHROCYTE [DISTWIDTH] IN BLOOD BY AUTOMATED COUNT: 49.3 FL (ref 35.9–50)
GLOBULIN SER CALC-MCNC: 3 G/DL (ref 1.9–3.5)
GLUCOSE SERPL-MCNC: 108 MG/DL (ref 65–99)
GLUCOSE UR STRIP.AUTO-MCNC: NEGATIVE MG/DL
HCT VFR BLD AUTO: 38.4 % (ref 37–47)
HGB BLD-MCNC: 13.1 G/DL (ref 12–16)
HYALINE CASTS #/AREA URNS LPF: ABNORMAL /LPF
IMM GRANULOCYTES # BLD AUTO: 0.05 K/UL (ref 0–0.11)
IMM GRANULOCYTES NFR BLD AUTO: 0.5 % (ref 0–0.9)
KETONES UR STRIP.AUTO-MCNC: ABNORMAL MG/DL
LEUKOCYTE ESTERASE UR QL STRIP.AUTO: ABNORMAL
LIPASE SERPL-CCNC: 45 U/L (ref 11–82)
LYMPHOCYTES # BLD AUTO: 0.71 K/UL (ref 1–4.8)
LYMPHOCYTES NFR BLD: 7.6 % (ref 22–41)
MCH RBC QN AUTO: 31.3 PG (ref 27–33)
MCHC RBC AUTO-ENTMCNC: 34.1 G/DL (ref 33.6–35)
MCV RBC AUTO: 91.6 FL (ref 81.4–97.8)
MICRO URNS: ABNORMAL
MONOCYTES # BLD AUTO: 0.65 K/UL (ref 0–0.85)
MONOCYTES NFR BLD AUTO: 6.9 % (ref 0–13.4)
NEUTROPHILS # BLD AUTO: 7.79 K/UL (ref 2–7.15)
NEUTROPHILS NFR BLD: 83.1 % (ref 44–72)
NITRITE UR QL STRIP.AUTO: NEGATIVE
NRBC # BLD AUTO: 0 K/UL
NRBC BLD-RTO: 0 /100 WBC
PH UR STRIP.AUTO: 8.5 [PH] (ref 5–8)
PLATELET # BLD AUTO: 318 K/UL (ref 164–446)
PMV BLD AUTO: 11.1 FL (ref 9–12.9)
POTASSIUM SERPL-SCNC: 3.6 MMOL/L (ref 3.6–5.5)
PROT SERPL-MCNC: 7.1 G/DL (ref 6–8.2)
PROT UR QL STRIP: NEGATIVE MG/DL
RBC # BLD AUTO: 4.19 M/UL (ref 4.2–5.4)
RBC # URNS HPF: ABNORMAL /HPF
RBC UR QL AUTO: NEGATIVE
SODIUM SERPL-SCNC: 138 MMOL/L (ref 135–145)
SP GR UR STRIP.AUTO: 1.01
UROBILINOGEN UR STRIP.AUTO-MCNC: 1 MG/DL
WBC # BLD AUTO: 9.4 K/UL (ref 4.8–10.8)
WBC #/AREA URNS HPF: ABNORMAL /HPF

## 2020-05-12 PROCEDURE — 96374 THER/PROPH/DIAG INJ IV PUSH: CPT | Mod: XU

## 2020-05-12 PROCEDURE — 85025 COMPLETE CBC W/AUTO DIFF WBC: CPT

## 2020-05-12 PROCEDURE — 99284 EMERGENCY DEPT VISIT MOD MDM: CPT

## 2020-05-12 PROCEDURE — 80053 COMPREHEN METABOLIC PANEL: CPT

## 2020-05-12 PROCEDURE — 81001 URINALYSIS AUTO W/SCOPE: CPT

## 2020-05-12 PROCEDURE — 74177 CT ABD & PELVIS W/CONTRAST: CPT

## 2020-05-12 PROCEDURE — 700111 HCHG RX REV CODE 636 W/ 250 OVERRIDE (IP): Performed by: EMERGENCY MEDICINE

## 2020-05-12 PROCEDURE — 87086 URINE CULTURE/COLONY COUNT: CPT

## 2020-05-12 PROCEDURE — 83690 ASSAY OF LIPASE: CPT

## 2020-05-12 PROCEDURE — 700117 HCHG RX CONTRAST REV CODE 255: Performed by: EMERGENCY MEDICINE

## 2020-05-12 RX ORDER — MORPHINE SULFATE 4 MG/ML
4 INJECTION, SOLUTION INTRAMUSCULAR; INTRAVENOUS ONCE
Status: COMPLETED | OUTPATIENT
Start: 2020-05-12 | End: 2020-05-12

## 2020-05-12 RX ADMIN — MORPHINE SULFATE 4 MG: 4 INJECTION INTRAVENOUS at 01:05

## 2020-05-12 RX ADMIN — IOHEXOL 80 ML: 350 INJECTION, SOLUTION INTRAVENOUS at 01:37

## 2020-05-12 ASSESSMENT — LIFESTYLE VARIABLES
HAVE PEOPLE ANNOYED YOU BY CRITICIZING YOUR DRINKING: NO
EVER FELT BAD OR GUILTY ABOUT YOUR DRINKING: NO
CONSUMPTION TOTAL: INCOMPLETE
DO YOU DRINK ALCOHOL: NO
TOTAL SCORE: 0
TOTAL SCORE: 0
HAVE YOU EVER FELT YOU SHOULD CUT DOWN ON YOUR DRINKING: NO
TOTAL SCORE: 0
EVER HAD A DRINK FIRST THING IN THE MORNING TO STEADY YOUR NERVES TO GET RID OF A HANGOVER: NO

## 2020-05-12 ASSESSMENT — FIBROSIS 4 INDEX: FIB4 SCORE: 0.59

## 2020-05-12 NOTE — ED NOTES
Patient ambulated with RN to lobby, patient calling taxi,  aware to assist patient to taxi as needed.

## 2020-05-12 NOTE — ED PROVIDER NOTES
ED Provider Note    CHIEF COMPLAINT  Chief Complaint   Patient presents with   • Pyelonephritis     Pt has been dealing with a recurrent kidney infection that has become worse over the past couple days       HPI  Rasheeda Manzano is a 49 y.o. female who presents to the emergency department with chief complaint of recurrent left flank discomfort that does come to radiate up her shoulder and down into her lower back.  She was recently here on the first of this month and diagnosed with pyelonephritis.  She was started on Omnicef and states that she has almost completed her antibiotics and thought she was doing better until this evening when the flank pain returned.  She denies any difficulty breathing or chest pain any fevers or chills nausea vomiting diarrhea or constipation.  She denies dysuria or hematuria.  Pain is currently a 5 out of 10 she does take hydrocodone at home which help with the discomfort.    REVIEW OF SYSTEMS  Positives as above. Pertinent negatives include nausea vomiting fevers chills hematuria dysuria constipation diarrhea chest pain shortness of breath rash trauma  All other review of systems are negative    PAST MEDICAL HISTORY   has a past medical history of Acute nasopharyngitis, Blind, C. difficile colitis, C. difficile diarrhea (2013), Cancer ([facility]), Chronic daily headache, Depression, Esophageal atresia (1971), Esophageal bleeding (12/18/2019), Fall, GERD (gastroesophageal reflux disease), H/O total hysterectomy, Heart burn (12/18/2019), Hydrocephalus ([facility]), Hydrocephalus ([facility]), Indigestion (12/18/2019), Jaundice, Legally blind, Migraine without aura, without mention of intractable migraine without mention of status migrainosus, Other specified symptom associated with female genital organs, Pain, Pain (12/18/2019), Psychiatric problem, PTSD (post-traumatic stress disorder), Seizure ([facility]) (12/18/2019), and Snoring (12/18/2019).    SOCIAL HISTORY  Social History     Tobacco Use   •  Smoking status: Former Smoker     Packs/day: 1.00     Years: 10.00     Pack years: 10.00     Types: Cigars     Start date: 2004     Last attempt to quit: 2017     Years since quittin.7   • Smokeless tobacco: Former User   • Tobacco comment:  CIGAR/day    Substance and Sexual Activity   • Alcohol use: Yes     Frequency: Monthly or less     Drinks per session: 1 or 2   • Drug use: No   • Sexual activity: Yes     Partners: Male       SURGICAL HISTORY   has a past surgical history that includes laparoscopy (08); lysis adhesions general (12/10/2013); cystoscopy (12/10/2013); aakash by laparoscopy (N/A, 2015); gastroscopy-endo (N/A, 2017); nissen fundoplication laparoscopic; abdominal hysterectomy total (12/10/2013); other; exploratory laparotomy (12/10/2013); appendectomy (12/10/2013); cholecystectomy (N/A, 2015); gastroscopy (N/A, 2019); mastectomy (Right, 2019); and node biopsy sentinel (Right, 2019).    CURRENT MEDICATIONS  Home Medications     Reviewed by Zain Delacruz R.N. (Registered Nurse) on 20 at 0013  Med List Status: <None>   Medication Last Dose Status   amitriptyline (ELAVIL) 10 MG Tab  Active   anastrozole (ARIMIDEX) 1 MG Tab  Active   apixaban (ELIQUIS) 5mg Tab  Active   ascorbic acid (ASCORBIC ACID) 500 MG Tab  Active   cefdinir (OMNICEF) 300 MG Cap  Active   hydrOXYzine HCl (ATARAX) 50 MG Tab  Active   levETIRAcetam (KEPPRA) 500 MG Tab  Active   lisinopril (PRINIVIL) 20 MG Tab  Active   methimazole (TAPAZOLE) 5 MG Tab  Active   ondansetron (ZOFRAN ODT) 4 MG TABLET DISPERSIBLE  Active   oxyCODONE ER 9 MG Capsule Extended Release 12 hour Abuse-Deterrent  Active   propranolol CR (INDERAL LA) 120 MG CAPSULE SR 24 HR  Active                ALLERGIES  Allergies   Allergen Reactions   • Tape Rash     RXN= ongoing  Adhesive Medical tape. Per patient, paper tape ok.   • Latex Rash     Rash       PHYSICAL EXAM  VITAL SIGNS: /82   Pulse 89   Temp 36.4  °C (97.6 °F) (Temporal)   Resp 17   Wt 60 kg (132 lb 4.4 oz)   LMP 11/27/2013   SpO2 96%   BMI 22.71 kg/m²    Pulse ox interpretation: I interpret this pulse ox as normal.  Constitutional: Alert in no apparent distress.  HENT: Normocephalic atraumatic, MMM  Eyes: PER, Conjunctiva normal, Non-icteric.   Neck: Normal range of motion, No tenderness, Supple, No stridor.   Cardiovascular: Regular rate and rhythm, no murmurs.   Thorax & Lungs: Normal breath sounds, No respiratory distress, No wheezing, No chest tenderness.   Abdomen: Bowel sounds normal, Soft, No tenderness, No pulsatile masses. No peritoneal signs.  Skin: Warm, Dry, No erythema, No rash.   Back: No bony tenderness, No CVA tenderness.   Extremities/MSK: Intact distal pulses, No edema, No tenderness, No cyanosis, no major deformities noted  Neurologic: Alert and oriented x3, No focal deficits noted.         DIFFERENTIAL DIAGNOSIS AND WORK UP PLAN    This is a 49 y.o. female who presents with worsening left-sided flank pain with a recent diagnosed pyelonephritis although after reviewing her chart she only had some mild leuks but no nitrites no bacteria and no red blood cells will evaluate with a CT scan with contrast to ensure this is not some diverticulitis or colitis or renal artery stenosis or abscess.  Patient be treated with IV morphine and laboratory analysis    DIAGNOSTIC STUDIES / PROCEDURES    LABS  Pertinent Lab Findings  CBC within normal limits panel mild left shift, CMP with mild acidosis but no anion gap alk phos 122 unchanged from prior urinalysis with leuk esterase again but no red blood cells and no bacteria and normal lipase urine culture was sent      RADIOLOGY  CT-ABDOMEN-PELVIS WITH   Final Result      1.  Small left pleural effusion with tunneled pleural catheter in place.   2.  Postoperative cholecystectomy.   3.  No evidence of acute renal obstruction, imaging findings indicative of pyelonephritis or renal abscess or perinephric  abscess.   4.  A few rare colonic diverticula are noted.   5.  No acute intra-abdominal disease process is appreciated.        The radiologist's interpretation of all radiological studies have been reviewed by me.      COURSE & MEDICAL DECISION MAKING  Pertinent Labs & Imaging studies reviewed. (See chart for details)    1:53 AM  Reassessed patient at the bedside, she has that tunneled pleural catheter secondary to  shunt on the left side which is leading to a small pleural effusion which in the end could be diaphragmatic irritation leading to her flank pain is there is no evidence of abscess pyelonephritis or inflammation or bowel discomfort.  She is otherwise well-appearing I told her to finish her antibiotic and take her at home medications.  But at this time there is nothing more that I would change or do.  She can return to the ED for any new or worsening issues in the next 24 to 48 hours such as worsening vomiting severe pain or fever    /89   Pulse 98   Temp 36.4 °C (97.6 °F) (Temporal)   Resp 20   Wt 60 kg (132 lb 4.4 oz)   LMP 11/27/2013   SpO2 100%   BMI 22.71 kg/m²     The patient will return for new or worsening symptoms and is stable at the time of discharge.    The patient is referred to a primary physician for blood pressure management, diabetic screening, and for all other preventative health concerns.    DISPOSITION:  Patient will be discharged home in stable condition.    FOLLOW UP:  Tabitha Bautista M.D.  [address] [address]  [phone]    Schedule an appointment as soon as possible for a visit       [facility], Emergency Dept  [address] [facility] [id number] [phone]    If symptoms worsen - fevers, vomiting, difficulty breathing      OUTPATIENT MEDICATIONS:  Discharge Medication List as of 5/12/2020  2:00 AM          FINAL IMPRESSION  1. Left flank pain     2. Recurrent left pleural effusion             Electronically signed by:  Michelle Montero M.D., 5/12/2020 12:23 AM    This dictation has been created using voice recognition software and/or scribes. The accuracy of the dictation is limited by the abilities of the software and the expertise of the scribes. I expect there may be some errors of grammar and possibly content. I made every attempt to manually correct the errors within my dictation. However, errors related to voice recognition software and/or scribes may still exist and should be interpreted within the appropriate context.

## 2020-05-12 NOTE — ED TRIAGE NOTES
Rasheeda Manzano  49 y.o. female  Chief Complaint   Patient presents with   • Pyelonephritis     Pt has been dealing with a recurrent kidney infection that has become worse over the past couple days   BIB ems for above.  Pt is legally blind but a/o x4.  Pt is already taking abx for this infection.  Pt is very pleasant and answers all questions appropriately.  /82   Pulse 89   Temp 36.4 °C (97.6 °F) (Temporal)   Resp 17   Wt 60 kg (132 lb 4.4 oz)   LMP 11/27/2013   SpO2 96%   BMI 22.71 kg/m²

## 2020-05-13 ENCOUNTER — HOSPITAL ENCOUNTER (EMERGENCY)
Facility: MEDICAL CENTER | Age: 50
End: 2020-05-13
Attending: EMERGENCY MEDICINE
Payer: MEDICARE

## 2020-05-13 ENCOUNTER — APPOINTMENT (OUTPATIENT)
Dept: RADIOLOGY | Facility: MEDICAL CENTER | Age: 50
End: 2020-05-13
Attending: EMERGENCY MEDICINE
Payer: MEDICARE

## 2020-05-13 VITALS
WEIGHT: 132.28 LBS | OXYGEN SATURATION: 98 % | HEIGHT: 64 IN | SYSTOLIC BLOOD PRESSURE: 121 MMHG | BODY MASS INDEX: 22.58 KG/M2 | TEMPERATURE: 97.9 F | DIASTOLIC BLOOD PRESSURE: 60 MMHG | RESPIRATION RATE: 18 BRPM | HEART RATE: 85 BPM

## 2020-05-13 DIAGNOSIS — I27.82 CHRONIC PULMONARY EMBOLISM WITHOUT ACUTE COR PULMONALE, UNSPECIFIED PULMONARY EMBOLISM TYPE (HCC): ICD-10-CM

## 2020-05-13 DIAGNOSIS — R07.81 PLEURITIC CHEST PAIN: Primary | ICD-10-CM

## 2020-05-13 DIAGNOSIS — G89.29 OTHER CHRONIC PAIN: ICD-10-CM

## 2020-05-13 LAB
ALBUMIN SERPL BCP-MCNC: 4.1 G/DL (ref 3.2–4.9)
ALBUMIN/GLOB SERPL: 1.4 G/DL
ALP SERPL-CCNC: 130 U/L (ref 30–99)
ALT SERPL-CCNC: 14 U/L (ref 2–50)
ANION GAP SERPL CALC-SCNC: 14 MMOL/L (ref 7–16)
APTT PPP: 40 SEC (ref 24.7–36)
AST SERPL-CCNC: 14 U/L (ref 12–45)
BASOPHILS # BLD AUTO: 0.6 % (ref 0–1.8)
BASOPHILS # BLD: 0.06 K/UL (ref 0–0.12)
BILIRUB SERPL-MCNC: 0.7 MG/DL (ref 0.1–1.5)
BUN SERPL-MCNC: 10 MG/DL (ref 8–22)
CALCIUM SERPL-MCNC: 9.9 MG/DL (ref 8.5–10.5)
CHLORIDE SERPL-SCNC: 107 MMOL/L (ref 96–112)
CO2 SERPL-SCNC: 18 MMOL/L (ref 20–33)
CREAT SERPL-MCNC: 0.78 MG/DL (ref 0.5–1.4)
EKG IMPRESSION: NORMAL
EOSINOPHIL # BLD AUTO: 0.08 K/UL (ref 0–0.51)
EOSINOPHIL NFR BLD: 0.8 % (ref 0–6.9)
ERYTHROCYTE [DISTWIDTH] IN BLOOD BY AUTOMATED COUNT: 50 FL (ref 35.9–50)
GLOBULIN SER CALC-MCNC: 3 G/DL (ref 1.9–3.5)
GLUCOSE SERPL-MCNC: 118 MG/DL (ref 65–99)
HCT VFR BLD AUTO: 41.1 % (ref 37–47)
HGB BLD-MCNC: 14.1 G/DL (ref 12–16)
IMM GRANULOCYTES # BLD AUTO: 0.04 K/UL (ref 0–0.11)
IMM GRANULOCYTES NFR BLD AUTO: 0.4 % (ref 0–0.9)
INR PPP: 1.17 (ref 0.87–1.13)
LIPASE SERPL-CCNC: 96 U/L (ref 11–82)
LYMPHOCYTES # BLD AUTO: 0.99 K/UL (ref 1–4.8)
LYMPHOCYTES NFR BLD: 9.4 % (ref 22–41)
MCH RBC QN AUTO: 31.6 PG (ref 27–33)
MCHC RBC AUTO-ENTMCNC: 34.3 G/DL (ref 33.6–35)
MCV RBC AUTO: 92.2 FL (ref 81.4–97.8)
MONOCYTES # BLD AUTO: 0.86 K/UL (ref 0–0.85)
MONOCYTES NFR BLD AUTO: 8.2 % (ref 0–13.4)
NEUTROPHILS # BLD AUTO: 8.48 K/UL (ref 2–7.15)
NEUTROPHILS NFR BLD: 80.6 % (ref 44–72)
NRBC # BLD AUTO: 0 K/UL
NRBC BLD-RTO: 0 /100 WBC
PLATELET # BLD AUTO: 338 K/UL (ref 164–446)
PMV BLD AUTO: 11 FL (ref 9–12.9)
POTASSIUM SERPL-SCNC: 3.6 MMOL/L (ref 3.6–5.5)
PROT SERPL-MCNC: 7.1 G/DL (ref 6–8.2)
PROTHROMBIN TIME: 15.2 SEC (ref 12–14.6)
RBC # BLD AUTO: 4.46 M/UL (ref 4.2–5.4)
SODIUM SERPL-SCNC: 139 MMOL/L (ref 135–145)
TROPONIN T SERPL-MCNC: 11 NG/L (ref 6–19)
WBC # BLD AUTO: 10.5 K/UL (ref 4.8–10.8)

## 2020-05-13 PROCEDURE — 94760 N-INVAS EAR/PLS OXIMETRY 1: CPT

## 2020-05-13 PROCEDURE — 93005 ELECTROCARDIOGRAM TRACING: CPT | Performed by: EMERGENCY MEDICINE

## 2020-05-13 PROCEDURE — 700117 HCHG RX CONTRAST REV CODE 255: Performed by: EMERGENCY MEDICINE

## 2020-05-13 PROCEDURE — 83690 ASSAY OF LIPASE: CPT

## 2020-05-13 PROCEDURE — 99285 EMERGENCY DEPT VISIT HI MDM: CPT

## 2020-05-13 PROCEDURE — 700111 HCHG RX REV CODE 636 W/ 250 OVERRIDE (IP): Performed by: EMERGENCY MEDICINE

## 2020-05-13 PROCEDURE — 85025 COMPLETE CBC W/AUTO DIFF WBC: CPT

## 2020-05-13 PROCEDURE — 85730 THROMBOPLASTIN TIME PARTIAL: CPT

## 2020-05-13 PROCEDURE — 85610 PROTHROMBIN TIME: CPT

## 2020-05-13 PROCEDURE — 96374 THER/PROPH/DIAG INJ IV PUSH: CPT

## 2020-05-13 PROCEDURE — 80053 COMPREHEN METABOLIC PANEL: CPT

## 2020-05-13 PROCEDURE — 96375 TX/PRO/DX INJ NEW DRUG ADDON: CPT

## 2020-05-13 PROCEDURE — 71275 CT ANGIOGRAPHY CHEST: CPT

## 2020-05-13 PROCEDURE — 84484 ASSAY OF TROPONIN QUANT: CPT

## 2020-05-13 RX ORDER — KETOROLAC TROMETHAMINE 30 MG/ML
30 INJECTION, SOLUTION INTRAMUSCULAR; INTRAVENOUS ONCE
Status: COMPLETED | OUTPATIENT
Start: 2020-05-13 | End: 2020-05-13

## 2020-05-13 RX ADMIN — FENTANYL CITRATE 50 MCG: 50 INJECTION, SOLUTION INTRAMUSCULAR; INTRAVENOUS at 06:30

## 2020-05-13 RX ADMIN — KETOROLAC TROMETHAMINE 30 MG: 30 INJECTION, SOLUTION INTRAMUSCULAR at 06:29

## 2020-05-13 RX ADMIN — IOHEXOL 40 ML: 350 INJECTION, SOLUTION INTRAVENOUS at 07:45

## 2020-05-13 ASSESSMENT — FIBROSIS 4 INDEX: FIB4 SCORE: 0.58

## 2020-05-13 NOTE — ED TRIAGE NOTES
"Rasheeda Manzano   49 y.o. female   Chief Complaint   Patient presents with   • Chest Pain     Pt reports non-radiating, reproduceable right-sided chest pain over the last couple of days associated with SOB and LUE and LLE numbness and tingling. Denies N/V, fevers/chills.      Pt brought in by EMS for above complaint. Pt with recent diagnosis of breast cancer in December with mets to lymph notes, last dose of radiation in March. Pt also on blood thinner for hx of blood clots.     Pt is alert and oriented, speaking in full sentences, follows commands and responds appropriately to questions. NAD. Respirations are short and shallow, chest pain worse with inspiration.    /77   Pulse 85   Temp 36.4 °C (97.5 °F) (Temporal)   Resp 16   Ht 1.626 m (5' 4\")   Wt 60 kg (132 lb 4.4 oz)   LMP 11/27/2013   SpO2 100%   BMI 22.71 kg/m²     "

## 2020-05-13 NOTE — ED NOTES
Pt received discharge instructions and understood all including follow up, pt taken to lobby in wheel chair with no difficulty, pts friend on way to pick pt up

## 2020-05-13 NOTE — DISCHARGE INSTRUCTIONS
Follow-up with primary care this week for reevaluation, medication management and close blood pressure monitoring.  Follow-up with specialty services as scheduled.    Continue home medications as previously indicated.    Return to the emergency department for persistent worsening chest pain, shortness of breath, syncope, fever, altered mental status or other new concerns.

## 2020-05-13 NOTE — ED NOTES
Received report from ELIZABETH Mars, taking over pt care at this time, pt resting comfortably, no needs at this time, bed in lowered position, side rails up, call light in reach

## 2020-05-13 NOTE — ED PROVIDER NOTES
"ED Provider Note    CHIEF COMPLAINT  Chief Complaint   Patient presents with   • Chest Pain     Pt reports non-radiating, reproduceable right-sided chest pain over the last couple of days associated with SOB and LUE and LLE numbness and tingling. Denies N/V, fevers/chills.        HPI  Rasheeda Manzano is a 49 y.o. female who presents to the emergency department by ambulance for chest pain and shortness of breath.  Patient describes right-sided chest pain for 2 days.  Patient states she was seen at this facility yesterday for flank pain and diagnosed with UTI, presumed pyelonephritis, discharged home with antibiotics.  Patient states the back pain is improved but she continues to have right-sided chest pain which she believes is separate, and although was present at evaluation yesterday \"I am not 1 to panic and I did not say anything.\"  However patient states pain is worse now causing shortness of breath and worsening pain with inspiration.  No cough, sputum production, sore throat or congestion.  Denies fever chills.  Patient does describe vague left upper and left lower extremity numbness and tingling yesterday but none at this time.    Patient does have history of breast cancer, status post lumpectomy and radiation, awaiting PET scan this week for disposition.  She does have history of pulmonary embolism, compliant with Xarelto.  Congenital hydrocephalus wit shunt in place, no complications/revisions for 9 years.    REVIEW OF SYSTEMS  See HPI for further details. All other systems are negative.     PAST MEDICAL HISTORY   has a past medical history of Acute nasopharyngitis, Blind, C. difficile colitis, C. difficile diarrhea (2013), Cancer (HCC), Chronic daily headache, Depression, Esophageal atresia (1971), Esophageal bleeding (12/18/2019), Fall, GERD (gastroesophageal reflux disease), H/O total hysterectomy, Heart burn (12/18/2019), Hydrocephalus (H[facility]), Hydrocephalus ([facility]), Indigestion (12/18/2019), " Jaundice, Legally blind, Migraine without aura, without mention of intractable migraine without mention of status migrainosus, Other specified symptom associated with female genital organs, Pain, Pain (2019), Psychiatric problem, PTSD (post-traumatic stress disorder), Seizure (HCC) (2019), and Snoring (2019).    SOCIAL HISTORY  Social History     Tobacco Use   • Smoking status: Former Smoker     Packs/day: 1.00     Years: 10.00     Pack years: 10.00     Types: Cigars     Start date: 2004     Last attempt to quit: 2017     Years since quittin.7   • Smokeless tobacco: Former User   • Tobacco comment:  CIGAR/day    Substance and Sexual Activity   • Alcohol use: Yes     Frequency: Monthly or less     Drinks per session: 1 or 2   • Drug use: No   • Sexual activity: Yes     Partners: Male       SURGICAL HISTORY   has a past surgical history that includes laparoscopy (08); lysis adhesions general (12/10/2013); cystoscopy (12/10/2013); aakash by laparoscopy (N/A, 2015); gastroscopy-endo (N/A, 2017); nissen fundoplication laparoscopic; abdominal hysterectomy total (12/10/2013); other; exploratory laparotomy (12/10/2013); appendectomy (12/10/2013); cholecystectomy (N/A, 2015); gastroscopy (N/A, 2019); mastectomy (Right, 2019); and node biopsy sentinel (Right, 2019).    CURRENT MEDICATIONS  Home Medications     Reviewed by Madisyn Noble R.N. (Registered Nurse) on 20 at 0608  Med List Status: <None>   Medication Last Dose Status   amitriptyline (ELAVIL) 10 MG Tab  Active   anastrozole (ARIMIDEX) 1 MG Tab  Active   apixaban (ELIQUIS) 5mg Tab  Active   ascorbic acid (ASCORBIC ACID) 500 MG Tab  Active   cefdinir (OMNICEF) 300 MG Cap  Active   hydrOXYzine HCl (ATARAX) 50 MG Tab  Active   levETIRAcetam (KEPPRA) 500 MG Tab  Active   lisinopril (PRINIVIL) 20 MG Tab  Active   methimazole (TAPAZOLE) 5 MG Tab  Active   ondansetron (ZOFRAN ODT) 4 MG TABLET DISPERSIBLE   "Active   oxyCODONE ER 9 MG Capsule Extended Release 12 hour Abuse-Deterrent  Active   propranolol CR (INDERAL LA) 120 MG CAPSULE SR 24 HR  Active                ALLERGIES  Allergies   Allergen Reactions   • Tape Rash     RXN= ongoing  Adhesive Medical tape. Per patient, paper tape ok.   • Latex Rash     Rash       PHYSICAL EXAM  VITAL SIGNS: /60   Pulse 85   Temp 36.6 °C (97.9 °F) (Tympanic)   Resp 18   Ht 1.626 m (5' 4\")   Wt 60 kg (132 lb 4.4 oz)   LMP 11/27/2013   SpO2 98%   BMI 22.71 kg/m²   Pulse ox interpretation: I interpret this pulse ox as normal.  Constitutional: Alert in no apparent distress.  Disheveled.  HENT: Normocephalic, atraumatic. Bilateral external ears normal, Nose normal. Moist mucous membranes.    Eyes: Chronic blindness.  Neck: Normal range of motion, Supple.  Lymphatic: No lymphadenopathy noted.   Cardiovascular: Regular rate and rhythm, no murmurs. Distal pulses intact.  No peripheral edema.  Thorax & Lungs: Normal breath sounds.  No wheezing/rales/ronchi. No increased work of breathing, clipped speech or retractions.   Abdomen: Soft, non-distended, non-tender to palpation. No palpable or pulsatile masses. No peritoneal signs. No CVA tenderness.  Skin: Warm, Dry, No erythema, No rash.   Musculoskeletal: Good range of motion in all major joints.   Neurologic: Alert and oriented x4.  Speech is clear and cohesive.  Moves 4 extremity spontaneously.  5 out of 5 strength in 4 extremities with proximal and distal muscle groups.  Psychiatric: Odd affect.  Tearful.  Cooperative.    DIAGNOSTIC STUDIES / PROCEDURES    LABS  Results for orders placed or performed during the hospital encounter of 05/13/20   CBC w/ Differential   Result Value Ref Range    WBC 10.5 4.8 - 10.8 K/uL    RBC 4.46 4.20 - 5.40 M/uL    Hemoglobin 14.1 12.0 - 16.0 g/dL    Hematocrit 41.1 37.0 - 47.0 %    MCV 92.2 81.4 - 97.8 fL    MCH 31.6 27.0 - 33.0 pg    MCHC 34.3 33.6 - 35.0 g/dL    RDW 50.0 35.9 - 50.0 fL    " Platelet Count 338 164 - 446 K/uL    MPV 11.0 9.0 - 12.9 fL    Neutrophils-Polys 80.60 (H) 44.00 - 72.00 %    Lymphocytes 9.40 (L) 22.00 - 41.00 %    Monocytes 8.20 0.00 - 13.40 %    Eosinophils 0.80 0.00 - 6.90 %    Basophils 0.60 0.00 - 1.80 %    Immature Granulocytes 0.40 0.00 - 0.90 %    Nucleated RBC 0.00 /100 WBC    Neutrophils (Absolute) 8.48 (H) 2.00 - 7.15 K/uL    Lymphs (Absolute) 0.99 (L) 1.00 - 4.80 K/uL    Monos (Absolute) 0.86 (H) 0.00 - 0.85 K/uL    Eos (Absolute) 0.08 0.00 - 0.51 K/uL    Baso (Absolute) 0.06 0.00 - 0.12 K/uL    Immature Granulocytes (abs) 0.04 0.00 - 0.11 K/uL    NRBC (Absolute) 0.00 K/uL   Complete Metabolic Panel (CMP)   Result Value Ref Range    Sodium 139 135 - 145 mmol/L    Potassium 3.6 3.6 - 5.5 mmol/L    Chloride 107 96 - 112 mmol/L    Co2 18 (L) 20 - 33 mmol/L    Anion Gap 14.0 7.0 - 16.0    Glucose 118 (H) 65 - 99 mg/dL    Bun 10 8 - 22 mg/dL    Creatinine 0.78 0.50 - 1.40 mg/dL    Calcium 9.9 8.5 - 10.5 mg/dL    AST(SGOT) 14 12 - 45 U/L    ALT(SGPT) 14 2 - 50 U/L    Alkaline Phosphatase 130 (H) 30 - 99 U/L    Total Bilirubin 0.7 0.1 - 1.5 mg/dL    Albumin 4.1 3.2 - 4.9 g/dL    Total Protein 7.1 6.0 - 8.2 g/dL    Globulin 3.0 1.9 - 3.5 g/dL    A-G Ratio 1.4 g/dL   Troponin STAT   Result Value Ref Range    Troponin T 11 6 - 19 ng/L   Lipase   Result Value Ref Range    Lipase 96 (H) 11 - 82 U/L   APTT   Result Value Ref Range    APTT 40.0 (H) 24.7 - 36.0 sec   PT/INR   Result Value Ref Range    PT 15.2 (H) 12.0 - 14.6 sec    INR 1.17 (H) 0.87 - 1.13   ESTIMATED GFR   Result Value Ref Range    GFR If African American >60 >60 mL/min/1.73 m 2    GFR If Non African American >60 >60 mL/min/1.73 m 2   EKG (NOW)   Result Value Ref Range    Report       [facility] Emergency Dept.    Test Date:  2020-05-13  Pt Name:    NILAY MANN               Department: ER  MRN:        [id number]                      Room:       [facility]  Gender:     Female                        Technician: [id number]  :        1970                   Requested By:ER TRIAGE PROTOCOL  Order #:    [id number]                    Reading MD: DEBRA MCKINNEY DO    Measurements  Intervals                                Axis  Rate:       84                           P:          41  NM:         128                          QRS:        80  QRSD:       82                           T:          68  QT:         396  QTc:        469    Interpretive Statements  SINUS RHYTHM  Compared to ECG 2020 10:43:27  No significant changes  Electronically Signed On 2020 8:17:19 PDT by DEBRA MCKINNEY DO       RADIOLOGY  CT-CTA CHEST PULMONARY ARTERY W/ RECONS   Final Result      1.  Multiple bilateral nonocclusive pulmonary emboli are present and appear unchanged compared with 2020.      2.  No evidence of right heart decompensation.      3.  Stable bilateral pleural effusions, left greater than right.      4.  Stable ill-defined pulmonary opacities in each lower lobe and in the right middle lobe.      Findings were discussed with DEBRA MCKINNEY M.D. on 2020 7:33 AM.                COURSE & MEDICAL DECISION MAKING  Nursing notes and vital signs were reviewed. (See chart for details)  The patients records were reviewed, history was obtained from the patient;     Record record review: Patient has not here infrequently.  Coagulation clinic 2026 patient is on Eliquis for history PE.  Discharge summary from 3/18/2020 after admission for nausea, vomiting, diarrhea and headache.  She was hydrated, decreased TSH, elevated T4, started on methimazole.  History of chronic headache and chronic pain, managed with pain control.  To continue radiation treatment.  Discharge summary 3/11/2020 admitted for chest pain, work-up unimpressive.  Tropes were negative.  Chest x-ray with left lower lobe infiltrate layering pleural effusion.  This note comments on repeated hospital admissions, chronic pain related to breast  cancer versus PE.  Started on OxyContin 10 mg twice daily.  Discharge summary 2/18/2020 after admission for anxiety and shortness of breath, CT showed chronic bilateral PEs.  She was weaned to room air.  Started on Eliquis.      ED evaluation for pleuritic chest pain, dyspnea is unrevealing.  Patient has chronic similar symptoms.  She also has history of pulmonary embolisms, CT today demonstrates he is a chronic nonocclusive pulmonary emboli, no new changes or acute PE.  No heart strain.  No pneumonia.  No pneumothorax, pleural effusion or other acute pathology. Labs otherwise unrevealing.  Pain is controlled with single dose of fentanyl.  Hemodynamically stable without fever, tachycardia, hypotension or hypoxia.    Patient is stable for discharge at this time, anticipatory guidance provided, to resume home medications including Eliquis and those for pain, close follow-up is encouraged, and strict ED return instructions have been detailed. Patient is agreeable to the disposition and plan.    Patient's blood pressure was elevated in the emergency department, and has been referred to primary care for close monitoring.    FINAL IMPRESSION  (R07.81) Pleuritic chest pain  (primary encounter diagnosis)  (I27.82) Chronic pulmonary embolism without acute cor pulmonale, unspecified pulmonary embolism type (HCC)  (G89.29) Other chronic pain      Electronically signed by: Michelle Owens D.O., 5/13/2020 6:58 AM      This dictation was created using voice recognition software. The accuracy of the dictation is limited to the abilities of the software. I expect there may be some errors of grammar and possibly content. The nursing notes were reviewed and certain aspects of this information were incorporated into this note.

## 2020-05-14 LAB
BACTERIA UR CULT: NORMAL
SIGNIFICANT IND 70042: NORMAL
SITE SITE: NORMAL
SOURCE SOURCE: NORMAL

## 2020-05-15 ENCOUNTER — HOSPITAL ENCOUNTER (OUTPATIENT)
Dept: RADIATION ONCOLOGY | Facility: MEDICAL CENTER | Age: 50
End: 2020-05-31
Attending: RADIOLOGY
Payer: MEDICARE

## 2020-05-15 NOTE — PROGRESS NOTES
Telephone Appointment Visit   As a means of avoiding spread of COVID-19, this visit is being conducted by telephone. This telephone visit was initiated by the patient and they verbally consented.    Time at start of call: 1:33    Reason for Call:  Symptom Follow-up    Patient Comments / History:   'Liya is doing better since she completed radiation.  She had a little bit of soreness in the irradiated field but that slowly getting better.  She does have some fatigue as well.  She said she is taking the pills the Dr. Wang gave her and she has an appointment to see her later this month.  She also was seen in the emergency room because she had shortness of breath but all studies were negative.  They were concerned about COVID but it does look like she still had residual pulmonary emboli but they were not changed from the prior scan.    Labs / Images Reviewed   none     Assessment and Plan:     49 y.o. female with [id number], grade 1 ER TN positive  HER-2/milvia negative breast cancer status post lumpectomy and sentinel node  dissection with 2+ sentinel nodes one microscopic met 1 macroscopic met  with associated lymph vascular invasion and the tumor which measured 1.6  cm. She has a MammaPrint low. Radiation Therapy 3/23/20    Follow-up: Patient is going to continue her follow-up based on national cancer guidelines with Dr. Wang.  I am happy to see her on an as-needed basis for radiation.  Time at end of call: 1:41  Total Time Spent: 5-10 minutes    Michelle Davis M.D.

## 2020-05-18 ENCOUNTER — HOSPITAL ENCOUNTER (OUTPATIENT)
Dept: RADIOLOGY | Facility: MEDICAL CENTER | Age: 50
End: 2020-05-18
Attending: INTERNAL MEDICINE
Payer: MEDICARE

## 2020-05-18 DIAGNOSIS — M85.89 OTHER SPECIFIED DISORDERS OF BONE DENSITY AND STRUCTURE, MULTIPLE SITES: ICD-10-CM

## 2020-05-18 PROCEDURE — 77080 DXA BONE DENSITY AXIAL: CPT

## 2020-05-20 ENCOUNTER — PATIENT OUTREACH (OUTPATIENT)
Dept: HEALTH INFORMATION MANAGEMENT | Facility: OTHER | Age: 50
End: 2020-05-20

## 2020-06-08 ENCOUNTER — APPOINTMENT (OUTPATIENT)
Dept: RADIOLOGY | Facility: MEDICAL CENTER | Age: 50
DRG: 092 | End: 2020-06-08
Attending: EMERGENCY MEDICINE
Payer: MEDICARE

## 2020-06-08 ENCOUNTER — APPOINTMENT (OUTPATIENT)
Dept: RADIOLOGY | Facility: MEDICAL CENTER | Age: 50
DRG: 092 | End: 2020-06-08
Attending: NEUROLOGICAL SURGERY
Payer: MEDICARE

## 2020-06-08 ENCOUNTER — HOSPITAL ENCOUNTER (INPATIENT)
Facility: MEDICAL CENTER | Age: 50
LOS: 1 days | DRG: 092 | End: 2020-06-10
Attending: EMERGENCY MEDICINE | Admitting: INTERNAL MEDICINE
Payer: MEDICARE

## 2020-06-08 DIAGNOSIS — R11.2 NON-INTRACTABLE VOMITING WITH NAUSEA, UNSPECIFIED VOMITING TYPE: ICD-10-CM

## 2020-06-08 DIAGNOSIS — Z98.2 VP (VENTRICULOPERITONEAL) SHUNT STATUS: ICD-10-CM

## 2020-06-08 DIAGNOSIS — H54.3 BLINDNESS OF BOTH EYES: ICD-10-CM

## 2020-06-08 DIAGNOSIS — I26.92 CHRONIC SADDLE PULMONARY EMBOLISM WITHOUT ACUTE COR PULMONALE (HCC): ICD-10-CM

## 2020-06-08 DIAGNOSIS — R00.0 TACHYCARDIA: ICD-10-CM

## 2020-06-08 DIAGNOSIS — R06.02 SHORTNESS OF BREATH: ICD-10-CM

## 2020-06-08 DIAGNOSIS — G43.901 STATUS MIGRAINOSUS: ICD-10-CM

## 2020-06-08 DIAGNOSIS — I27.82 CHRONIC SADDLE PULMONARY EMBOLISM WITHOUT ACUTE COR PULMONALE (HCC): ICD-10-CM

## 2020-06-08 DIAGNOSIS — R07.9 ACUTE CHEST PAIN: ICD-10-CM

## 2020-06-08 DIAGNOSIS — R51.9 ACUTE INTRACTABLE HEADACHE, UNSPECIFIED HEADACHE TYPE: ICD-10-CM

## 2020-06-08 PROBLEM — Z86.711 HISTORY OF PULMONARY EMBOLUS (PE): Status: ACTIVE | Noted: 2020-06-08

## 2020-06-08 PROBLEM — Z87.898 HISTORY OF SEIZURES: Status: ACTIVE | Noted: 2020-06-08

## 2020-06-08 LAB
ALBUMIN SERPL BCP-MCNC: 4 G/DL (ref 3.2–4.9)
ALBUMIN/GLOB SERPL: 1.4 G/DL
ALP SERPL-CCNC: 127 U/L (ref 30–99)
ALT SERPL-CCNC: 70 U/L (ref 2–50)
ANION GAP SERPL CALC-SCNC: 16 MMOL/L (ref 7–16)
APTT PPP: 38.3 SEC (ref 24.7–36)
AST SERPL-CCNC: 67 U/L (ref 12–45)
BASOPHILS # BLD AUTO: 1 % (ref 0–1.8)
BASOPHILS # BLD: 0.06 K/UL (ref 0–0.12)
BILIRUB SERPL-MCNC: 0.9 MG/DL (ref 0.1–1.5)
BUN SERPL-MCNC: 9 MG/DL (ref 8–22)
CALCIUM SERPL-MCNC: 9.3 MG/DL (ref 8.5–10.5)
CHLORIDE SERPL-SCNC: 102 MMOL/L (ref 96–112)
CO2 SERPL-SCNC: 19 MMOL/L (ref 20–33)
COVID ORDER STATUS COVID19: NORMAL
CREAT SERPL-MCNC: 0.53 MG/DL (ref 0.5–1.4)
EKG IMPRESSION: NORMAL
EOSINOPHIL # BLD AUTO: 0.13 K/UL (ref 0–0.51)
EOSINOPHIL NFR BLD: 2.3 % (ref 0–6.9)
ERYTHROCYTE [DISTWIDTH] IN BLOOD BY AUTOMATED COUNT: 47.8 FL (ref 35.9–50)
ERYTHROCYTE [SEDIMENTATION RATE] IN BLOOD BY WESTERGREN METHOD: 6 MM/HOUR (ref 0–20)
GLOBULIN SER CALC-MCNC: 2.9 G/DL (ref 1.9–3.5)
GLUCOSE SERPL-MCNC: 136 MG/DL (ref 65–99)
HCT VFR BLD AUTO: 41 % (ref 37–47)
HGB BLD-MCNC: 13.3 G/DL (ref 12–16)
IMM GRANULOCYTES # BLD AUTO: 0.01 K/UL (ref 0–0.11)
IMM GRANULOCYTES NFR BLD AUTO: 0.2 % (ref 0–0.9)
INR PPP: 1.14 (ref 0.87–1.13)
LACTATE BLD-SCNC: 2 MMOL/L (ref 0.5–2)
LDH SERPL L TO P-CCNC: 242 U/L (ref 107–266)
LIPASE SERPL-CCNC: 17 U/L (ref 11–82)
LYMPHOCYTES # BLD AUTO: 0.78 K/UL (ref 1–4.8)
LYMPHOCYTES NFR BLD: 13.5 % (ref 22–41)
MCH RBC QN AUTO: 30.4 PG (ref 27–33)
MCHC RBC AUTO-ENTMCNC: 32.4 G/DL (ref 33.6–35)
MCV RBC AUTO: 93.6 FL (ref 81.4–97.8)
MONOCYTES # BLD AUTO: 0.67 K/UL (ref 0–0.85)
MONOCYTES NFR BLD AUTO: 11.6 % (ref 0–13.4)
NEUTROPHILS # BLD AUTO: 4.12 K/UL (ref 2–7.15)
NEUTROPHILS NFR BLD: 71.4 % (ref 44–72)
NRBC # BLD AUTO: 0 K/UL
NRBC BLD-RTO: 0 /100 WBC
PLATELET # BLD AUTO: 323 K/UL (ref 164–446)
PMV BLD AUTO: 10.8 FL (ref 9–12.9)
POTASSIUM SERPL-SCNC: 3.6 MMOL/L (ref 3.6–5.5)
PROCALCITONIN SERPL-MCNC: 22.48 NG/ML
PROT SERPL-MCNC: 6.9 G/DL (ref 6–8.2)
PROTHROMBIN TIME: 14.9 SEC (ref 12–14.6)
RBC # BLD AUTO: 4.38 M/UL (ref 4.2–5.4)
SARS-COV-2 RNA RESP QL NAA+PROBE: NOTDETECTED
SODIUM SERPL-SCNC: 137 MMOL/L (ref 135–145)
SPECIMEN SOURCE: NORMAL
TROPONIN T SERPL-MCNC: 7 NG/L (ref 6–19)
TROPONIN T SERPL-MCNC: 8 NG/L (ref 6–19)
WBC # BLD AUTO: 5.8 K/UL (ref 4.8–10.8)

## 2020-06-08 PROCEDURE — 70250 X-RAY EXAM OF SKULL: CPT

## 2020-06-08 PROCEDURE — 70496 CT ANGIOGRAPHY HEAD: CPT

## 2020-06-08 PROCEDURE — 83615 LACTATE (LD) (LDH) ENZYME: CPT

## 2020-06-08 PROCEDURE — 83605 ASSAY OF LACTIC ACID: CPT

## 2020-06-08 PROCEDURE — 85610 PROTHROMBIN TIME: CPT

## 2020-06-08 PROCEDURE — 96365 THER/PROPH/DIAG IV INF INIT: CPT

## 2020-06-08 PROCEDURE — 96376 TX/PRO/DX INJ SAME DRUG ADON: CPT

## 2020-06-08 PROCEDURE — 85730 THROMBOPLASTIN TIME PARTIAL: CPT

## 2020-06-08 PROCEDURE — 99220 PR INITIAL OBSERVATION CARE,LEVL III: CPT | Performed by: HOSPITALIST

## 2020-06-08 PROCEDURE — 700105 HCHG RX REV CODE 258: Performed by: EMERGENCY MEDICINE

## 2020-06-08 PROCEDURE — 85025 COMPLETE CBC W/AUTO DIFF WBC: CPT

## 2020-06-08 PROCEDURE — 700111 HCHG RX REV CODE 636 W/ 250 OVERRIDE (IP): Performed by: HOSPITALIST

## 2020-06-08 PROCEDURE — 71275 CT ANGIOGRAPHY CHEST: CPT

## 2020-06-08 PROCEDURE — 93005 ELECTROCARDIOGRAM TRACING: CPT | Performed by: EMERGENCY MEDICINE

## 2020-06-08 PROCEDURE — G0378 HOSPITAL OBSERVATION PER HR: HCPCS

## 2020-06-08 PROCEDURE — 72020 X-RAY EXAM OF SPINE 1 VIEW: CPT

## 2020-06-08 PROCEDURE — 80053 COMPREHEN METABOLIC PANEL: CPT

## 2020-06-08 PROCEDURE — 87040 BLOOD CULTURE FOR BACTERIA: CPT

## 2020-06-08 PROCEDURE — A9270 NON-COVERED ITEM OR SERVICE: HCPCS | Performed by: FAMILY MEDICINE

## 2020-06-08 PROCEDURE — 84145 PROCALCITONIN (PCT): CPT

## 2020-06-08 PROCEDURE — 700111 HCHG RX REV CODE 636 W/ 250 OVERRIDE (IP): Performed by: FAMILY MEDICINE

## 2020-06-08 PROCEDURE — 96375 TX/PRO/DX INJ NEW DRUG ADDON: CPT

## 2020-06-08 PROCEDURE — 700102 HCHG RX REV CODE 250 W/ 637 OVERRIDE(OP): Performed by: HOSPITALIST

## 2020-06-08 PROCEDURE — 99285 EMERGENCY DEPT VISIT HI MDM: CPT

## 2020-06-08 PROCEDURE — U0004 COV-19 TEST NON-CDC HGH THRU: HCPCS

## 2020-06-08 PROCEDURE — 700111 HCHG RX REV CODE 636 W/ 250 OVERRIDE (IP): Performed by: EMERGENCY MEDICINE

## 2020-06-08 PROCEDURE — 84484 ASSAY OF TROPONIN QUANT: CPT

## 2020-06-08 PROCEDURE — A9270 NON-COVERED ITEM OR SERVICE: HCPCS | Performed by: HOSPITALIST

## 2020-06-08 PROCEDURE — 85652 RBC SED RATE AUTOMATED: CPT

## 2020-06-08 PROCEDURE — 700117 HCHG RX CONTRAST REV CODE 255: Performed by: EMERGENCY MEDICINE

## 2020-06-08 PROCEDURE — 83690 ASSAY OF LIPASE: CPT

## 2020-06-08 PROCEDURE — 71045 X-RAY EXAM CHEST 1 VIEW: CPT

## 2020-06-08 PROCEDURE — 700102 HCHG RX REV CODE 250 W/ 637 OVERRIDE(OP): Performed by: FAMILY MEDICINE

## 2020-06-08 PROCEDURE — C9803 HOPD COVID-19 SPEC COLLECT: HCPCS | Performed by: EMERGENCY MEDICINE

## 2020-06-08 RX ORDER — SUCRALFATE 1 G/1
1 TABLET ORAL
COMMUNITY
End: 2020-06-17

## 2020-06-08 RX ORDER — BISACODYL 10 MG
10 SUPPOSITORY, RECTAL RECTAL
Status: DISCONTINUED | OUTPATIENT
Start: 2020-06-08 | End: 2020-06-10 | Stop reason: HOSPADM

## 2020-06-08 RX ORDER — HYDROXYZINE 50 MG/1
50 TABLET, FILM COATED ORAL
Status: DISCONTINUED | OUTPATIENT
Start: 2020-06-08 | End: 2020-06-10 | Stop reason: HOSPADM

## 2020-06-08 RX ORDER — HYDROMORPHONE HYDROCHLORIDE 1 MG/ML
1 INJECTION, SOLUTION INTRAMUSCULAR; INTRAVENOUS; SUBCUTANEOUS ONCE
Status: COMPLETED | OUTPATIENT
Start: 2020-06-08 | End: 2020-06-08

## 2020-06-08 RX ORDER — ONDANSETRON 2 MG/ML
4 INJECTION INTRAMUSCULAR; INTRAVENOUS EVERY 4 HOURS PRN
Status: DISCONTINUED | OUTPATIENT
Start: 2020-06-08 | End: 2020-06-10 | Stop reason: HOSPADM

## 2020-06-08 RX ORDER — LORAZEPAM 1 MG/1
1 TABLET ORAL
COMMUNITY

## 2020-06-08 RX ORDER — PROMETHAZINE HYDROCHLORIDE 25 MG/1
12.5-25 TABLET ORAL EVERY 4 HOURS PRN
Status: DISCONTINUED | OUTPATIENT
Start: 2020-06-08 | End: 2020-06-10 | Stop reason: HOSPADM

## 2020-06-08 RX ORDER — MAGNESIUM SULFATE HEPTAHYDRATE 40 MG/ML
2 INJECTION, SOLUTION INTRAVENOUS ONCE
Status: COMPLETED | OUTPATIENT
Start: 2020-06-08 | End: 2020-06-08

## 2020-06-08 RX ORDER — LISINOPRIL 20 MG/1
20 TABLET ORAL DAILY
Status: DISCONTINUED | OUTPATIENT
Start: 2020-06-08 | End: 2020-06-10 | Stop reason: HOSPADM

## 2020-06-08 RX ORDER — ANASTROZOLE 1 MG/1
1 TABLET ORAL DAILY
Status: DISCONTINUED | OUTPATIENT
Start: 2020-06-08 | End: 2020-06-10 | Stop reason: HOSPADM

## 2020-06-08 RX ORDER — ONDANSETRON 4 MG/1
4 TABLET, ORALLY DISINTEGRATING ORAL EVERY 4 HOURS PRN
Status: DISCONTINUED | OUTPATIENT
Start: 2020-06-08 | End: 2020-06-10 | Stop reason: HOSPADM

## 2020-06-08 RX ORDER — ESOMEPRAZOLE MAGNESIUM 40 MG/1
40 CAPSULE, DELAYED RELEASE ORAL
COMMUNITY
End: 2020-08-06

## 2020-06-08 RX ORDER — DEXAMETHASONE SODIUM PHOSPHATE 4 MG/ML
10 INJECTION, SOLUTION INTRA-ARTICULAR; INTRALESIONAL; INTRAMUSCULAR; INTRAVENOUS; SOFT TISSUE EVERY 6 HOURS
Status: DISCONTINUED | OUTPATIENT
Start: 2020-06-08 | End: 2020-06-08

## 2020-06-08 RX ORDER — KETOROLAC TROMETHAMINE 30 MG/ML
30 INJECTION, SOLUTION INTRAMUSCULAR; INTRAVENOUS EVERY 6 HOURS PRN
Status: DISCONTINUED | OUTPATIENT
Start: 2020-06-08 | End: 2020-06-10 | Stop reason: HOSPADM

## 2020-06-08 RX ORDER — PROPRANOLOL HCL 60 MG
120 CAPSULE, EXTENDED RELEASE 24HR ORAL
Status: DISCONTINUED | OUTPATIENT
Start: 2020-06-08 | End: 2020-06-10 | Stop reason: HOSPADM

## 2020-06-08 RX ORDER — METOCLOPRAMIDE HYDROCHLORIDE 5 MG/ML
10 INJECTION INTRAMUSCULAR; INTRAVENOUS EVERY 6 HOURS PRN
Status: DISCONTINUED | OUTPATIENT
Start: 2020-06-08 | End: 2020-06-10 | Stop reason: HOSPADM

## 2020-06-08 RX ORDER — OXYCODONE HCL 10 MG/1
10 TABLET, FILM COATED, EXTENDED RELEASE ORAL EVERY 12 HOURS
Status: DISCONTINUED | OUTPATIENT
Start: 2020-06-08 | End: 2020-06-10 | Stop reason: HOSPADM

## 2020-06-08 RX ORDER — DIPHENHYDRAMINE HYDROCHLORIDE 50 MG/ML
25 INJECTION INTRAMUSCULAR; INTRAVENOUS ONCE
Status: COMPLETED | OUTPATIENT
Start: 2020-06-08 | End: 2020-06-08

## 2020-06-08 RX ORDER — SODIUM CHLORIDE, SODIUM LACTATE, POTASSIUM CHLORIDE, CALCIUM CHLORIDE 600; 310; 30; 20 MG/100ML; MG/100ML; MG/100ML; MG/100ML
30 INJECTION, SOLUTION INTRAVENOUS ONCE
Status: COMPLETED | OUTPATIENT
Start: 2020-06-08 | End: 2020-06-08

## 2020-06-08 RX ORDER — HYDROCODONE BITARTRATE AND ACETAMINOPHEN 5; 325 MG/1; MG/1
1 TABLET ORAL EVERY 6 HOURS PRN
Status: DISCONTINUED | OUTPATIENT
Start: 2020-06-08 | End: 2020-06-09

## 2020-06-08 RX ORDER — LEVETIRACETAM 500 MG/1
1000 TABLET ORAL
Status: DISCONTINUED | OUTPATIENT
Start: 2020-06-08 | End: 2020-06-10 | Stop reason: HOSPADM

## 2020-06-08 RX ORDER — PROCHLORPERAZINE EDISYLATE 5 MG/ML
10 INJECTION INTRAMUSCULAR; INTRAVENOUS ONCE
Status: COMPLETED | OUTPATIENT
Start: 2020-06-08 | End: 2020-06-08

## 2020-06-08 RX ORDER — PROCHLORPERAZINE EDISYLATE 5 MG/ML
5-10 INJECTION INTRAMUSCULAR; INTRAVENOUS EVERY 4 HOURS PRN
Status: DISCONTINUED | OUTPATIENT
Start: 2020-06-08 | End: 2020-06-10 | Stop reason: HOSPADM

## 2020-06-08 RX ORDER — HYDROCODONE BITARTRATE AND ACETAMINOPHEN 5; 325 MG/1; MG/1
1 TABLET ORAL EVERY 4 HOURS PRN
Status: ON HOLD | COMMUNITY
End: 2020-06-18 | Stop reason: SDUPTHER

## 2020-06-08 RX ORDER — AMITRIPTYLINE HYDROCHLORIDE 10 MG/1
10 TABLET, FILM COATED ORAL
Status: DISCONTINUED | OUTPATIENT
Start: 2020-06-08 | End: 2020-06-10 | Stop reason: HOSPADM

## 2020-06-08 RX ORDER — DIPHENHYDRAMINE HYDROCHLORIDE 50 MG/ML
25 INJECTION INTRAMUSCULAR; INTRAVENOUS EVERY 6 HOURS PRN
Status: DISCONTINUED | OUTPATIENT
Start: 2020-06-08 | End: 2020-06-10 | Stop reason: HOSPADM

## 2020-06-08 RX ORDER — AMOXICILLIN 250 MG
2 CAPSULE ORAL 2 TIMES DAILY
Status: DISCONTINUED | OUTPATIENT
Start: 2020-06-08 | End: 2020-06-10 | Stop reason: HOSPADM

## 2020-06-08 RX ORDER — METHIMAZOLE 5 MG/1
5 TABLET ORAL DAILY
Status: DISCONTINUED | OUTPATIENT
Start: 2020-06-08 | End: 2020-06-10 | Stop reason: HOSPADM

## 2020-06-08 RX ORDER — PROMETHAZINE HYDROCHLORIDE 25 MG/1
12.5-25 SUPPOSITORY RECTAL EVERY 4 HOURS PRN
Status: DISCONTINUED | OUTPATIENT
Start: 2020-06-08 | End: 2020-06-10 | Stop reason: HOSPADM

## 2020-06-08 RX ORDER — POLYETHYLENE GLYCOL 3350 17 G/17G
1 POWDER, FOR SOLUTION ORAL
Status: DISCONTINUED | OUTPATIENT
Start: 2020-06-08 | End: 2020-06-10 | Stop reason: HOSPADM

## 2020-06-08 RX ORDER — METOCLOPRAMIDE HYDROCHLORIDE 5 MG/ML
10 INJECTION INTRAMUSCULAR; INTRAVENOUS ONCE
Status: COMPLETED | OUTPATIENT
Start: 2020-06-08 | End: 2020-06-08

## 2020-06-08 RX ADMIN — HYDROMORPHONE HYDROCHLORIDE 1 MG: 1 INJECTION, SOLUTION INTRAMUSCULAR; INTRAVENOUS; SUBCUTANEOUS at 05:54

## 2020-06-08 RX ADMIN — KETOROLAC TROMETHAMINE 30 MG: 30 INJECTION, SOLUTION INTRAMUSCULAR at 23:24

## 2020-06-08 RX ADMIN — OXYCODONE HYDROCHLORIDE 10 MG: 10 TABLET, FILM COATED, EXTENDED RELEASE ORAL at 17:48

## 2020-06-08 RX ADMIN — HYDROCODONE BITARTRATE AND ACETAMINOPHEN 1 TABLET: 5; 325 TABLET ORAL at 14:33

## 2020-06-08 RX ADMIN — METOCLOPRAMIDE 10 MG: 5 INJECTION, SOLUTION INTRAMUSCULAR; INTRAVENOUS at 08:15

## 2020-06-08 RX ADMIN — HYDROCODONE BITARTRATE AND ACETAMINOPHEN 1 TABLET: 5; 325 TABLET ORAL at 20:57

## 2020-06-08 RX ADMIN — APIXABAN 5 MG: 5 TABLET, FILM COATED ORAL at 08:14

## 2020-06-08 RX ADMIN — IOHEXOL 60 ML: 350 INJECTION, SOLUTION INTRAVENOUS at 03:24

## 2020-06-08 RX ADMIN — MAGNESIUM SULFATE IN WATER 2 G: 40 INJECTION, SOLUTION INTRAVENOUS at 05:54

## 2020-06-08 RX ADMIN — KETOROLAC TROMETHAMINE 30 MG: 30 INJECTION, SOLUTION INTRAMUSCULAR at 11:41

## 2020-06-08 RX ADMIN — HYDROMORPHONE HYDROCHLORIDE 1 MG: 1 INJECTION, SOLUTION INTRAMUSCULAR; INTRAVENOUS; SUBCUTANEOUS at 02:05

## 2020-06-08 RX ADMIN — ANASTROZOLE 1 MG: 1 TABLET, COATED ORAL at 08:45

## 2020-06-08 RX ADMIN — PROCHLORPERAZINE EDISYLATE 10 MG: 5 INJECTION INTRAMUSCULAR; INTRAVENOUS at 03:25

## 2020-06-08 RX ADMIN — DIPHENHYDRAMINE HYDROCHLORIDE 25 MG: 50 INJECTION INTRAMUSCULAR; INTRAVENOUS at 08:14

## 2020-06-08 RX ADMIN — OXYCODONE HYDROCHLORIDE 10 MG: 10 TABLET, FILM COATED, EXTENDED RELEASE ORAL at 08:14

## 2020-06-08 RX ADMIN — METHIMAZOLE 5 MG: 5 TABLET ORAL at 08:43

## 2020-06-08 RX ADMIN — DEXAMETHASONE SODIUM PHOSPHATE 10 MG: 4 INJECTION, SOLUTION INTRA-ARTICULAR; INTRALESIONAL; INTRAMUSCULAR; INTRAVENOUS; SOFT TISSUE at 08:15

## 2020-06-08 RX ADMIN — LISINOPRIL 20 MG: 20 TABLET ORAL at 08:14

## 2020-06-08 RX ADMIN — LEVETIRACETAM 1000 MG: 500 TABLET ORAL at 20:58

## 2020-06-08 RX ADMIN — APIXABAN 5 MG: 5 TABLET, FILM COATED ORAL at 17:48

## 2020-06-08 RX ADMIN — SODIUM CHLORIDE, POTASSIUM CHLORIDE, SODIUM LACTATE AND CALCIUM CHLORIDE 1782 ML: 600; 310; 30; 20 INJECTION, SOLUTION INTRAVENOUS at 02:15

## 2020-06-08 RX ADMIN — AMITRIPTYLINE HYDROCHLORIDE 10 MG: 10 TABLET, FILM COATED ORAL at 20:57

## 2020-06-08 RX ADMIN — DIPHENHYDRAMINE HYDROCHLORIDE 25 MG: 50 INJECTION INTRAMUSCULAR; INTRAVENOUS at 03:25

## 2020-06-08 RX ADMIN — HYDROXYZINE HYDROCHLORIDE 50 MG: 50 TABLET, FILM COATED ORAL at 20:58

## 2020-06-08 SDOH — ECONOMIC STABILITY: TRANSPORTATION INSECURITY
IN THE PAST 12 MONTHS, HAS LACK OF TRANSPORTATION KEPT YOU FROM MEETINGS, WORK, OR FROM GETTING THINGS NEEDED FOR DAILY LIVING?: PATIENT DECLINED

## 2020-06-08 SDOH — ECONOMIC STABILITY: TRANSPORTATION INSECURITY
IN THE PAST 12 MONTHS, HAS THE LACK OF TRANSPORTATION KEPT YOU FROM MEDICAL APPOINTMENTS OR FROM GETTING MEDICATIONS?: PATIENT DECLINED

## 2020-06-08 SDOH — ECONOMIC STABILITY: FOOD INSECURITY: WITHIN THE PAST 12 MONTHS, YOU WORRIED THAT YOUR FOOD WOULD RUN OUT BEFORE YOU GOT MONEY TO BUY MORE.: PATIENT DECLINED

## 2020-06-08 SDOH — ECONOMIC STABILITY: FOOD INSECURITY: WITHIN THE PAST 12 MONTHS, THE FOOD YOU BOUGHT JUST DIDN'T LAST AND YOU DIDN'T HAVE MONEY TO GET MORE.: PATIENT DECLINED

## 2020-06-08 ASSESSMENT — COGNITIVE AND FUNCTIONAL STATUS - GENERAL
CLIMB 3 TO 5 STEPS WITH RAILING: A LITTLE
MOBILITY SCORE: 22
DAILY ACTIVITIY SCORE: 23
SUGGESTED CMS G CODE MODIFIER MOBILITY: CJ
SUGGESTED CMS G CODE MODIFIER DAILY ACTIVITY: CI
TOILETING: A LITTLE
WALKING IN HOSPITAL ROOM: A LITTLE

## 2020-06-08 ASSESSMENT — ENCOUNTER SYMPTOMS
ABDOMINAL PAIN: 0
HEADACHES: 1
PHOTOPHOBIA: 0
DIARRHEA: 0
NAUSEA: 1
WHEEZING: 0
DEPRESSION: 0
PALPITATIONS: 0
TINGLING: 0
MYALGIAS: 0
FEVER: 0
COUGH: 0
SORE THROAT: 0
FOCAL WEAKNESS: 0
CHILLS: 0
VOMITING: 1
SHORTNESS OF BREATH: 0
DIZZINESS: 0

## 2020-06-08 ASSESSMENT — LIFESTYLE VARIABLES
PACK_YEARS: CIGAR
EVER_SMOKED: YES

## 2020-06-08 ASSESSMENT — FIBROSIS 4 INDEX
FIB4 SCORE: 0.53
FIB4 SCORE: 1.19

## 2020-06-08 NOTE — PROGRESS NOTES
Pt is seen at the bed side and chart is reviewed,she was admitted early this morning by the admitting physician.

## 2020-06-08 NOTE — PROCEDURES
DATE OF SERVICE:  06/08/2020    PREPROCEDURAL DIAGNOSIS:  Shunt, inappropriate level.    POSTOPERATIVE DIAGNOSIS:  Shunt was turned to 5 when it needs to be 3.    PROCEDURE:  The patient was interrogated as her x-rays demonstrated that her   valve setting was at 5, where she is normally at 3.  This was reset likely   from MRI.  We used the interrogator for the [employer] Certas valve system and   noted that she was set to 5.  We then used the magnet to change it to 3.  We   checked with the [employer] Certas  and it appears to be at 3 at this   time.  The patient tolerated the procedure well.  This was carried out by Dr. Gonzalo Haney; and the patient will have a followup plain x-ray, 2-view,   skull, to confirm that the patient's setting is now at 3, which is her   original setting.       ____________________________________     MD DIOGENES Dior / DAVE    DD:  06/08/2020 08:55:39  DT:  06/08/2020 09:18:26    D#:  [id number]  Job#:  [id number]

## 2020-06-08 NOTE — ED TRIAGE NOTES
Pt to rm 1 via EMS. Pt able to ambulate from gurney to ED bed. Pt is completely blind bilateral. Pt complains of N/V x 4hrs. Pt complains of headache x 1 day.

## 2020-06-08 NOTE — ASSESSMENT & PLAN NOTE
Still having intermittent headaches  Continue pain control  Needing the use of IV Benadryl, Norco, high risk medications, monitor for adverse effects  Shunt adjusted by neurosurgery team, will need outpatient follow-up with neurosurgery and neurology closely  Patient counseled and educated  Anticipate discharge home tomorrow    Leukocytosis associated with the use of IV Decadron on presentation, no further evaluation needed, no infectious concerns apparent.

## 2020-06-08 NOTE — H&P
Hospital Medicine History & Physical Note    Date of Service  6/8/2020    Primary Care Physician  Tabitha Bautista M.D.    Consultants  Dr. Haney, neurosurgery    Code Status  Full    Chief Complaint  Chief Complaint   Patient presents with   • N/V     x 4hrs   • Headache     x 1day       History of Presenting Illness  49 y.o. female who presented on 6/8/2020 with nausea, vomiting, and headaches.  This is a 49-year-old female with multiple medical issues including congenital hydrocephalus status post  shunt placement, cortical blindness, breast cancer, and recent diagnosis of saddle emboli now on anticoagulation.  Patient is a frequent visitor to this hospital and has required admissions in the past for intractable headache.  She was last seen by neurology in April for intractable headache and had good resolution with an abortive therapy recommended by the specialist.  This evening, patient is brought in by EMS for resumption of her usual symptoms.  She has had an intractable headache for the past day and began to have episodes of nausea with vomiting approximately 4 hours prior to arrival.  Patient denies any alleviating factors, she denies any aggravating factors.  Otherwise she has been in her usual state of health, no recent URIs, cough, chest pain, breathing problems, diarrhea or dysuria.    Review of Systems  Review of Systems   Constitutional: Negative for chills and fever.   HENT: Negative for congestion and sore throat.    Eyes: Negative for photophobia.   Respiratory: Negative for cough, shortness of breath and wheezing.    Cardiovascular: Negative for chest pain and palpitations.   Gastrointestinal: Positive for nausea and vomiting. Negative for abdominal pain and diarrhea.   Genitourinary: Negative for dysuria.   Musculoskeletal: Negative for myalgias.   Skin: Negative.    Neurological: Positive for headaches. Negative for dizziness, tingling and focal weakness.   Psychiatric/Behavioral: Negative for  "depression and suicidal ideas.       Past Medical History  Past Medical History:   Diagnosis Date   • Esophageal bleeding 12/18/2019    H/O, \"three times with last one a year ago.\"   • Indigestion 12/18/2019   • Seizure (HCC) 12/18/2019    \"Last seizure one year ago.\"   • Heart burn 12/18/2019   • Pain 12/18/2019    \"Pain In Head From Hydrocephalus.\".   • Snoring 12/18/2019    Has not had a sleep study.   • C. 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   • Acute nasopharyngitis     H/O Cold 12-8-19   • Blind     age 11   • C. difficile colitis     H/O x3   • Cancer (HCC)     Right Breast Cancer DX 2-2019/Moved to right lymph nodes (3/8/2020)   • Chronic daily headache    • Depression    • Fall    • GERD (gastroesophageal reflux disease)    • H/O total hysterectomy    • Hydrocephalus (HCC)    • Hydrocephalus (HCC)     shunt drains into pleural place of L lung   • Jaundice     at birth   • Legally blind    • Migraine without aura, without mention of intractable migraine without mention of status migrainosus    • Other specified symptom associated with female genital organs     excessive bleeding   • Pain     gallbladder related   • Psychiatric problem     depression   • PTSD (post-traumatic stress disorder)        Surgical History  Past Surgical History:   Procedure Laterality Date   • MASTECTOMY Right 12/19/2019    Procedure: MASTECTOMY-PARTIAL;  Surgeon: Brice Johnson M.D.;  Location: SURGERY SAME DAY [facility];  Service: General   • NODE BIOPSY SENTINEL Right 12/19/2019    Procedure: BIOPSY, LYMPH NODE, SENTINEL-AXILLARY;  Surgeon: Brice Johnson M.D.;  Location: SURGERY SAME DAY [facility];  Service: General   • GASTROSCOPY N/A 1/2/2019    Procedure: GASTROSCOPY;  Surgeon: Matias Fernando M.D.;  Location: SURGERY [facility];  Service: Gastroenterology   • GASTROSCOPY-ENDO N/A 8/24/2017    Procedure: GASTROSCOPY-ENDO;  Surgeon: Matias Fernando M.D.;  " Location: ENDOSCOPY [facility];  Service:    • AYALA BY LAPAROSCOPY N/A 2015    Procedure: AYALA BY LAPAROSCOPY;  Surgeon: Brice Johnson M.D.;  Location: SURGERY SAME DAY [facility];  Service:    • CHOLECYSTECTOMY N/A 2015    Procedure: CHOLECYSTECTOMY;  Surgeon: Brice Johnson M.D.;  Location: SURGERY SAME DAY [facility];  Service:    • LYSIS ADHESIONS GENERAL  12/10/2013    Performed by Arie Bass M.D. at SURGERY [facility]   • CYSTOSCOPY  12/10/2013    Performed by Joana Yeager M.D. at [facility]   • ABDOMINAL HYSTERECTOMY TOTAL  12/10/2013    Performed by Joana Yeager M.D. at [facility]   • EXPLORATORY LAPAROTOMY  12/10/2013    Performed by Arie Bass M.D. at [facility]   • APPENDECTOMY  12/10/2013    Performed by Arie Bass M.D. at SURGERY [facility]   • LAPAROSCOPY  08    Performed by FERNANDO HENDERSON at [facility]   • NISSEN FUNDOPLICATION LAPAROSCOPIC     • OTHER      PLEURAL SHUNT, numerous revisions       Family History  Family History   Problem Relation Age of Onset   • Hypertension Mother    • Hypertension Father    • Non-contributory Neg Hx         Migraine       Social History  Social History     Tobacco Use   • Smoking status: Former Smoker     Packs/day: 1.00     Years: 10.00     Pack years: 10.00     Types: Cigars     Start date: 2004     Last attempt to quit: 2017     Years since quittin.8   • Smokeless tobacco: Former User   • Tobacco comment:  CIGAR/day    Substance Use Topics   • Alcohol use: Yes     Frequency: Monthly or less     Drinks per session: 1 or 2   • Drug use: No       Allergies  Allergies   Allergen Reactions   • Tape Rash     RXN= ongoing  Adhesive Medical tape. Per patient, paper tape ok.   • Latex Rash     Rash       Medications  No current facility-administered medications on file prior to encounter.      Current Outpatient Medications on File Prior to Encounter    Medication Sig Dispense Refill   • cefdinir (OMNICEF) 300 MG Cap Take 1 Cap by mouth 2 times a day. 20 Cap 0   • apixaban (ELIQUIS) 5mg Tab Take 1 Tab by mouth 2 Times a Day. 60 Tab 11   • amitriptyline (ELAVIL) 10 MG Tab Take 1 Tab by mouth every bedtime. 30 Tab 1   • ondansetron (ZOFRAN ODT) 4 MG TABLET DISPERSIBLE Take 1 Tab by mouth every 6 hours as needed for Nausea. 30 Tab 0   • methimazole (TAPAZOLE) 5 MG Tab Take 1 Tab by mouth every day. 30 Tab 2   • oxyCODONE ER 9 MG Capsule Extended Release 12 hour Abuse-Deterrent Take 9 mg by mouth every 12 hours.     • anastrozole (ARIMIDEX) 1 MG Tab Take 1 mg by mouth every day.     • ascorbic acid (ASCORBIC ACID) 500 MG Tab Take 500 mg by mouth every day.     • lisinopril (PRINIVIL) 20 MG Tab Take 1 Tab by mouth every day. 30 Tab 0   • hydrOXYzine HCl (ATARAX) 50 MG Tab Take 50 mg by mouth every bedtime.  1   • levETIRAcetam (KEPPRA) 500 MG Tab Take 1,000 mg by mouth every bedtime.     • propranolol CR (INDERAL LA) 120 MG CAPSULE SR 24 HR Take 120 mg by mouth every bedtime.         Physical Exam  Hemodynamics  Temp (24hrs), Av.1 °C (97 °F), Min:36.1 °C (97 °F), Max:36.1 °C (97 °F)   Temperature: 36.1 °C (97 °F)  Pulse  Av.6  Min: 62  Max: 126    Blood Pressure: (!) 163/95      Respiratory      Respiration: 14, Pulse Oximetry: 99 %             Physical Exam   Constitutional: She is oriented to person, place, and time. No distress.   HENT:   Head: Normocephalic and atraumatic.   Right Ear: External ear normal.   Left Ear: External ear normal.   Blindness   Eyes: EOM are normal. Right eye exhibits no discharge. Left eye exhibits no discharge.   Neck: Neck supple. No JVD present.   Cardiovascular: Normal rate, regular rhythm and normal heart sounds.   Pulmonary/Chest: Effort normal and breath sounds normal. No respiratory distress. She exhibits no tenderness.   Abdominal: Soft. Bowel sounds are normal. She exhibits no distension. There is no abdominal  tenderness.   Musculoskeletal: Normal range of motion.         General: No edema.   Neurological: She is alert and oriented to person, place, and time. No cranial nerve deficit.   Skin: Skin is warm and dry. She is not diaphoretic. No erythema.   Psychiatric: She has a normal mood and affect. Her behavior is normal.   Nursing note and vitals reviewed.    Capillary refill less than 3 seconds, distal pulses intact    Laboratory:  Recent Labs     06/08/20 0138   WBC 5.8   RBC 4.38   HEMOGLOBIN 13.3   HEMATOCRIT 41.0   MCV 93.6   MCH 30.4   MCHC 32.4*   RDW 47.8   PLATELETCT 323   MPV 10.8     Recent Labs     06/08/20 0138   SODIUM 137   POTASSIUM 3.6   CHLORIDE 102   CO2 19*   GLUCOSE 136*   BUN 9   CREATININE 0.53   CALCIUM 9.3     Recent Labs     06/08/20 0138   ALTSGPT 70*   ASTSGOT 67*   ALKPHOSPHAT 127*   TBILIRUBIN 0.9   LIPASE 17   GLUCOSE 136*     Recent Labs     06/08/20 0138   APTT 38.3*   INR 1.14*             Lab Results   Component Value Date    TROPONINI <0.01 03/24/2019       Imaging  Ct-abdomen-pelvis With    Result Date: 5/12/2020 5/12/2020 1:26 AM HISTORY/REASON FOR EXAM:  LLQ pain. Pyelonephritis. Left lower quadrant pain worsening. TECHNIQUE/EXAM DESCRIPTION:   CT scan of the abdomen and pelvis with contrast. Contrast-enhanced helical scanning was obtained from the diaphragmatic domes through the pubic symphysis following the bolus administration of nonionic contrast without complication. 80 mL of Omnipaque 350 nonionic contrast was administered without complication. Low dose optimization technique was utilized for this CT exam including automated exposure control and adjustment of the mA and/or kV according to patient size. COMPARISON: Renal colic CT 5/1/2020 FINDINGS: CT Abdomen: The lung bases show some minimal subsegmental atelectatic changes bilaterally. There is a small left pleural effusion. A catheter is again noted within the left pleural effusion which tunnels along the left lateral  chest wall. The liver is unremarkable. There is normal enhancement of intrahepatic portal veins and hepatic veins. The extrahepatic portal vein, SMV, and splenic vein appear intact. The gallbladder is surgically absent. There is no dilatation of intrahepatic biliary ducts. The spleen is unremarkable. The pancreas is normal. The adrenal glands are normal. The kidneys show uniform nephrograms. There is no hydronephrosis or perinephric collection. There are no renal calcifications or ureteral calcifications. There is no retroperitoneal mass or para-aortic adenopathy. The abdominal bowel loops are unremarkable. There is no free air or free fluid in the abdomen. In the pelvis, the urinary bladder is unremarkable. The pelvic bowel loops show a few rare sigmoid diverticula. There is no pelvic mass or iliac chain/inguinal adenopathy. There is no free air or free fluid in the pelvis.     1.  Small left pleural effusion with tunneled pleural catheter in place. 2.  Postoperative cholecystectomy. 3.  No evidence of acute renal obstruction, imaging findings indicative of pyelonephritis or renal abscess or perinephric abscess. 4.  A few rare colonic diverticula are noted. 5.  No acute intra-abdominal disease process is appreciated.    Ct-cta Head With & W/o-post Process    Result Date: 6/8/2020 6/8/2020 3:04 AM HISTORY/REASON FOR EXAM:  Headache, intracranial hemorrhage suspected TECHNIQUE/EXAM DESCRIPTION: CT angiogram of the [place] with contrast. Postcontrast images were obtained of the [place] following the power injection of nonionic contrast at 5.0 mL/sec. Thin-section helical images were obtained with overlapping reconstruction interval. Coronal and sagittal multiplanar volume reformats were generated.  3D angiographic images were reviewed on PACS.  Maximum intensity projection (MIP) images were generated and reviewed. 60 mL of Omnipaque 350 nonionic contrast was injected intravenously. Low dose  optimization technique was utilized for this CT exam including automated exposure control and adjustment of the mA and/or kV according to patient size. COMPARISON:  None. FINDINGS: The posterior circulation shows the distal vertebral arteries to be patent. The vertebrobasilar confluence is intact. The basilar artery is patent. No aneurysm or occlusive lesion is evident. Persistent fetal morphology of the right posterior cerebral artery is seen. The anterior circulation shows no stenotic or occlusive lesion. No aneurysm is evident about the [place]. The brain is unremarkable. Right parietal approach ventriculostomy tube is seen crossing the midline and terminating in the right lateral ventricle. There are bilateral dural calcifications seen. 3D angiographic/MIP images of the vasculature confirm the vascular findings as described above.     1.  No large vessel occlusion or aneurysm identified. 2.  Noncontrast images are not presented due to prior contrast administration, this limits evaluation for subtle subarachnoid hemorrhage.    Ds-bone Density Study (dexa)    Result Date: 5/18/2020 5/18/2020 8:54 AM HISTORY/REASON FOR EXAM:  Postmenopausal, hysterectomy, disorders of bone density and structure TECHNIQUE/EXAM DESCRIPTION AND NUMBER OF VIEWS: Dual x-ray bone densitometry was performed from the L1 through L4 levels and from the proximal left femur utilizing the GE Prodigy unit. COMPARISON:   None FINDINGS: The mean bone mineral density for the lumbar spine is 0.986 g/cm2, which corresponds to a T score of -1.6 and a Z score of -1.2. The proximal left femur has a mean bone mineral density of 0.635 g/cm2, with a T score of -3.0 and a Z score of -2.4.     According to the World Health Organization classification, bone mineral density of this patient is consistent with osteopenia in the lumbar spine and osteoporosis of the proximal left femur. 10-year Probability of Fracture: Major Osteoporotic     6.8% Hip      2.2% Population      [place] () Based on left femur neck BMD INTERPRETING LOCATION:  [address] [place] [address], DEREK FAIRCHILD, [id number]    Dx-chest-limited (1 View)    Result Date: 6/8/2020 6/8/2020 2:27 AM HISTORY/REASON FOR EXAM: Shunt Series Adult TECHNIQUE/EXAM DESCRIPTION:  Single AP view of the chest. COMPARISON: April 11, 2020 FINDINGS: Ventriculoperitoneal shunt catheter is seen overlying the left thorax. Catheter tip projects overlying the central heart. The cardiac silhouette appears within normal limits. The mediastinal contour appears within normal limits.  The central pulmonary vasculature appears normal. The lungs appear well expanded bilaterally.  Bilateral lungs are clear. No significant pleural effusions are identified. The bony structures appear age-appropriate.     1.  No acute cardiopulmonary disease. 2.  Ventriculoperitoneal shunt catheter without visualized disruption, catheter tip overlies the heart suggesting diaphragmatic hernia shunt versus ventriculopleural shunt, correlate with history.    Dx-skull-limited 3-    Result Date: 6/8/2020 6/8/2020 1:27 AM HISTORY/REASON FOR EXAM:  Headache; Shunt Series Adult. TECHNIQUE/EXAM DESCRIPTION AND NUMBER OF VIEWS:  Skull series. COMPARISON: None FINDINGS: No fracture  is seen.  No bone erosion is seen. Left parietal approach ventriculoperitoneal shunt catheter is visualized, no catheter disruption is apparent.     1.  Unremarkable exam. No catheter shunt disruption is apparent.    Dx-spine-any One View    Result Date: 6/8/2020 6/8/2020 2:27 AM HISTORY/REASON FOR EXAM:  Shunt Series Adult. TECHNIQUE/EXAM DESCRIPTION AND NUMBER OF VIEWS:  Single view of the spine. COMPARISON: April 11, 2020 FINDINGS: Shunt catheter is seen overlying the left neck, appears grossly intact.     1.  Radiographic evidence of shunt catheter disruption.    Ct-cta Chest Pulmonary Artery W/ Recons    Result Date: 6/8/2020 6/8/2020 3:04 AM HISTORY/REASON FOR EXAM:  Nausea and  "vomiting, headache TECHNIQUE/EXAM DESCRIPTION:  CT angiogram of the chest with contrast. Transaxial MDCT scan of chest post bolus 30 cc Omnipaque 350 IV administration. MIP reconstructed images were created and reviewed. Low dose optimization technique was utilized for this CT exam including automated exposure control and adjustment of the mA and/or kV according to patient size. COMPARISON: May 13, 2020 FINDINGS: The visualized portion of the thyroid appear within normal limits.  The trachea and main stem airways are normal in caliber. There are no pathologically enlarged mediastinal lymph nodes. The heart and pericardium appear within normal limits.   The aorta and its main branch vessels are normal in caliber and configuration.  The pulmonary arteries demonstrates residual thin saddle embolus with irregular areas of calcification, extending into the segmental and subsegmental branches of the bilateral lower lobes. The pulmonary parenchyma demonstrates small left pleural effusion, slightly increased in size since prior study. Linear density in the bilateral lung bases favors changes of atelectasis. Limited views of the abdomen demonstrate no acute abnormality. Small hiatal hernia is noted. The bony structures are age appropriate.     1.  Stable saddle pulmonary embolus extending into the segmental and subsegmental branches of the bilateral lower lobes. Tubular appearance of this \"embolus\" is seen, consider possible embolized catheter segment as clinically appropriate, does not appear  significantly changed since prior study. 2.  Small hiatal hernia    Ct-cta Chest Pulmonary Artery W/ Recons    Result Date: 5/13/2020 5/13/2020 7:01 AM HISTORY/REASON FOR EXAM: Shortness of breath. PE suspected, TECHNIQUE/EXAM DESCRIPTION: CT angiogram scan for pulmonary embolism with contrast, with reconstructions. 1.25 mm helical sections were obtained from the lung apices through the lung bases following the rapid bolus " administration of 100 mL of Omnipaque 350 nonionic contrast. Thin-section overlapping reconstruction interval was utilized. Coronal reconstructions were generated from the axial data. MIP post processing was performed and utilized for the interpretation. Low dose optimization technique was utilized for this CT exam including automated exposure control and adjustment of the mA and/or kV according to patient size. COMPARISON: CT for PE 2/23/2020. CXR 4/11/2020 FINDINGS: Pulmonary Embolism: Yes. Main Pulmonary Arteries: Yes. Thin saddle embolus is present. Segmental branches: Yes. Bilateral lower lobes Subsegmental branches: Yes. Bilateral lower lobes Only if positive for PE: RV diameter: 24.4 cm. LV diameter: 30.9 cm. RV/LV ratio: 0.8. (Greater than 0.9 is abnormal.) Additional Comments: None. Lungs: Peripheral subpleural opacity in the right middle lobe and rounded area of interstitial opacity or old cavitary lesion in the left lower lobe are stable. Ill-defined pulmonary opacity in the right middle lobe medially is present which may represent fibrotic change or pneumonitis. Pleura: A small left pleural effusion and a minimal right pleural effusion are present. Linear pleural calcifications in the left hemithorax are unchanged. Nodes: No enlarged lymph nodes. Additional findings: No adrenal enlargement or hydronephrosis visualized.     1.  Multiple bilateral nonocclusive pulmonary emboli are present and appear unchanged compared with 2/23/2020. 2.  No evidence of right heart decompensation. 3.  Stable bilateral pleural effusions, left greater than right. 4.  Stable ill-defined pulmonary opacities in each lower lobe and in the right middle lobe. Findings were discussed with DEBRA MCKINNEY M.D. on 5/13/2020 7:33 AM.         Assessment/Plan:  Anticipate that patient will need less than 2 midnights for management of the discussed medical issues.    * Intractable headache  Assessment & Plan  Patient with frequent  hospitalizations for uncontrolled headache, during 1 of her recent visits she was given an abortive cocktail by neurology with good efficacy.  We will give her a trial of IV Reglan, Decadron, and Benadryl as well as PRN Toradol.  If this is ineffective, we will proceed with other options.  Of note, patient with known history of hydrocephalus with  shunt and this has not been checked in some time.    Hydrocephalus (HCC)- (present on admission)  Assessment & Plan  Status post  shunt, patient reports that it has been several years since she has had this assessed.  Dr. Haney of neurosurgery was consulted by the emergency room physician and will see the patient in the morning.  I will keep her n.p.o. pending his consultation in the event that surgical intervention is required.    History of pulmonary embolus (PE)  Assessment & Plan  Continue home Eliquis.    History of seizures  Assessment & Plan  Continue home Keppra.    Essential hypertension- (present on admission)  Assessment & Plan  Blood pressure was controlled at the time of my visit at bedside, resume home Prinivil and propranolol.    Breast cancer (HCC)- (present on admission)  Assessment & Plan  Outpatient follow-up once medically stable for discharge.      Prophylaxis: Patient is on Eliquis, no additional need for DVT prophylaxis, no PPI indicated, bowel protocol as needed

## 2020-06-08 NOTE — ED NOTES
Pt to rm 1 via EMS. Pt able to ambulate from rney to ED bed. Pt complains of N/V x 4hrs. Pt complains of headache x 1 days. Pt complains of R-sided chest pain. Pt states hx R breast cancer.

## 2020-06-08 NOTE — CARE PLAN
Problem: Safety  Goal: Will remain free from falls  Outcome: PROGRESSING AS EXPECTED  Note: Fall precautions in place. Patient uses call light for needs to attend.     Problem: Pain Management  Goal: Pain level will decrease to patient's comfort goal  Outcome: PROGRESSING AS EXPECTED  Flowsheets (Taken 6/8/2020 1123)  Comfort Goal:   Comfort with Movement   Perform Activity   4  Pain Rating Scale (NPRS): 6  Note: Medicated with pain medications with some relief.

## 2020-06-08 NOTE — PROGRESS NOTES
"Report received from night, RN. Per report patient just admitted from ER around 0700. Patient in bed, blind, AAO X 4. Speaks in full sentences. Responds appropriately.  VS Reviewed (see vitals). Respirations even, unlabored. Call light, belongings within reach, treaded slipper socks on, bed in lowest position. Hourly round in place. Educated to call for any needs. Patient verbalized understanding. Stated \" I need help when out of bed, so I will call prior. I have my call light.\" Patient was able to find call light.  "

## 2020-06-08 NOTE — CONSULTS
DATE OF SERVICE:  06/08/2020    NEUROSURGERY CONSULTATION    REASON FOR CONSULTATION:  Concern for intractable headache and vomiting with a   history of shunt.    HISTORY OF PRESENT ILLNESS:  This is a 49-year-old woman with congenital   hydrocephalus, has been shunted since birth.  She is cognitively intact.  She   is legally blind.  The patient states that her symptoms are not consistent   with her overt shunt failures in the past.  It feels different and she is   really complaining symptomatically of just headaches and nausea.    Unfortunately, the patient has been diagnosed with breast cancer and has   recently been treated with radiation and chemotherapy with lymph node   extension of her breast met.  As part of her evaluation, she did get an MRI   and the patient has a programmable shunt.  This MRI was conducted   approximately 2 months ago.  She has subsequently had a headache since the   time of her MRI.  No one has reevaluated her shunt for its setting.  She says   that she knows that her setting is typically a 3, which would correlate with a   Codman Certas valve.  We checked the CT scan and  skull films and it   demonstrated that her shunt has been reset to 5, likely due to the MRI and   subsequent no followup with evaluation for her shunt check.  Given that   finding, we have told her that it is likely that she did not have an overt   failure; however, her shunt setting was too high and proceeded with a shunt   reprogramming today.    REVIEW OF SYSTEMS:  A 12-point review of systems as per HPI.  She denies any   focal neurologic deficit, chest pain, shortness of breath, numbness, weakness.    PAST MEDICAL HISTORY:  Per HPI.    PAST SURGICAL HISTORY:  Per HPI.    MEDICATIONS:  Please see EMR.    PHYSICAL EXAMINATION:  GENERAL:  The patient is alert.  She is oriented x3.  She is able to answer   questions appropriately.  No signs of dysarthria.  HEENT:  Atraumatic, normocephalic with a well-healed  shunt that is placed over   the left parietal region.    IMAGING:  The patient has dysmorphic ventricles with a left parietal catheter   that extends into a dysmorphic ventricle on to the patient's right side.    There is no sign of transependymal flow at this time and she is not grossly   dilated.  However, we do not have imaging to compare to.  Her shunt series   does not demonstrate any findings of breaking of her tubing.    ASSESSMENT AND PLAN:  At this time, this is a patient who presents with likely   a valve that has inappropriate programmed up to 5 due to an MRI of her body   for her breast cancer without a shunt recheck to ensure that the setting did   not change.  On imaging, it demonstrates the patient has a finding that her   shunt setting was changed up to 5.  We did interrogate her shunt.  It was set   to 5 and reprogrammed to 3.  The patient will need to have a skull film, plain   view x-ray, AP and lateral, to ensure that the setting ____ and that is at   the correct setting of 3.  If this is the case and she recovers and feels   better, she can be discharged home.  We have instructed her that if she has   another MRI that she needs to have this performed at our facility at [facility] as we can check her shunt and she is established with us at this   time.  If she is to have imaging done at [facility], she needs to have it checked   by whoever is on-call, which would be a problem as this would require somebody   to come in from home and she is not established with them now.  She is in   agreement with this plan as this was quite an awful experience for her and   [facility] did not inform us that the patient was getting an MRI, which clearly   reprogrammed her shunt to a higher setting than she tolerates.    A total of 75 minutes were spent in direct patient care, coordination and   evaluation.       ____________________________________     MD DIOGENES Dior / DAVE    DD:  06/08/2020  08:53:59  DT:  06/08/2020 10:08:14    D#:  [id number]  Job#:  [id number]

## 2020-06-08 NOTE — ASSESSMENT & PLAN NOTE
Blood pressure was controlled at the time of my visit at bedside, resume home Prinivil and propranolol.

## 2020-06-08 NOTE — ED PROVIDER NOTES
ED Provider Note    CHIEF COMPLAINT  Chief Complaint   Patient presents with   • N/V     x 4hrs   • Headache     x 1day       HPI    Primary care provider: Tabitha Bautista M.D.  Means of arrival: EMS  History obtained from: Patient and paramedic  History limited by: Nothing    Rasheeda Manzano is a 49 y.o. female who presents with headache.  Onset yesterday.  Insidious onset steadily worsening over the last day.  No falls or injuries.  Many prior episodes patient has a complicated medical history, including hydrocephalus since she was a child with numerous  shunts, and recently she was diagnosed with right-sided breast cancer and also has had a saddle PE recently.  She is legally blind.  She has had shunt malfunctions in the distant past, and this headache does not feel like that it seems different.  No relief with her home [place].  No aggravating factors noted.  She has not had her shunt assessed in at least 9 years, she moved here from [place] at that time and has yet to follow-up with a neurosurgeon since then.  No fevers.    The patient also reports some right-sided anterior achy and sharp nonradiating chest pain.  Associated with some mild dyspnea.  No cough, no known coronavirus contacts.    PPE Note: I personally donned full PPE for all patient encounters during this visit, including being clean-shaven with an N95 respirator mask.      REVIEW OF SYSTEMS  Constitutional: Negative for fever or chills.   HENT: Negative for rhinorrhea or sore throat.  Positive for headache.  Respiratory: Negative for cough but positive for shortness of breath.    Cardiovascular: Positive for right-sided chest pain no syncope.  Gastrointestinal: Positive for nausea and vomiting no abdominal pain.  Genitourinary: Negative for dysuria or flank pain.   Musculoskeletal: Negative for back pain or joint pain.   Skin: Negative for itching or rash.   Neurological: Negative for sensory or motor changes.   See HPI for further  details. All other systems are negative.     PAST MEDICAL HISTORY   has a past medical history of Acute nasopharyngitis, Blind, C. difficile colitis, C. difficile diarrhea (), Cancer (), Chronic daily headache, Depression, Esophageal atresia (1971), Esophageal bleeding (2019), Fall, GERD (gastroesophageal reflux disease), H/O total hysterectomy, Heart burn (2019), Hydrocephalus (), Hydrocephalus (HCC), Indigestion (2019), Jaundice, Legally blind, Migraine without aura, without mention of intractable migraine without mention of status migrainosus, Other specified symptom associated with female genital organs, Pain, Pain (2019), Psychiatric problem, PTSD (post-traumatic stress disorder), Seizure (HCC) (2019), and Snoring (2019).    PAST FAMILY HISTORY  Family History   Problem Relation Age of Onset   • Hypertension Mother    • Hypertension Father    • Non-contributory Neg Hx         Migraine       SOCIAL HISTORY  Social History     Tobacco Use   • Smoking status: Former Smoker     Packs/day: 1.00     Years: 10.00     Pack years: 10.00     Types: Cigars     Start date: 2004     Last attempt to quit: 2017     Years since quittin.8   • Smokeless tobacco: Former User   • Tobacco comment:  CIGAR/day    Substance and Sexual Activity   • Alcohol use: Yes     Frequency: Monthly or less     Drinks per session: 1 or 2   • Drug use: No   • Sexual activity: Yes     Partners: Male       SURGICAL HISTORY   has a past surgical history that includes laparoscopy (08); lysis adhesions general (12/10/2013); cystoscopy (12/10/2013); aakash by laparoscopy (N/A, 2015); gastroscopy-endo (N/A, 2017); nissen fundoplication laparoscopic; abdominal hysterectomy total (12/10/2013); other; exploratory laparotomy (12/10/2013); appendectomy (12/10/2013); cholecystectomy (N/A, 2015); gastroscopy (N/A, 2019); mastectomy (Right, 2019); and node biopsy sentinel  "(Right, 12/19/2019).    CURRENT MEDICATIONS  Apixaban, amitriptyline, methimazole, oxycodone, anastrozole, ascorbic acid, lisinopril, hydroxyzine, Keppra, propranolol    ALLERGIES  Allergies   Allergen Reactions   • Tape Rash     RXN= ongoing  Adhesive Medical tape. Per patient, paper tape ok.   • Latex Rash     Rash       PHYSICAL EXAM  VITAL SIGNS: /77   Pulse (!) 107   Temp 36.8 °C (98.3 °F) (Temporal)   Resp 18   Ht 1.626 m (5' 4\")   Wt 59.4 kg (131 lb)   LMP 11/27/2013   SpO2 98%   BMI 22.49 kg/m²    Pulse ox interpretation: On room air, I interpret this pulse ox as normal.  Constitutional: Chronically ill-appearing sitting up in mild distress.  HEENT: Normocephalic, atraumatic. Posterior pharynx clear, mucous membranes dry.  Eyes: Pupils are symmetric, normal sclerae.  Neck: Supple, nontender.  Chest/Pulmonary: Clear to ausculation bilaterally, no wheezes or rhonchi.  Cardiovascular: Tachycardic rate, regular rhythm, no murmur.   Abdomen: Soft, nontender; no rebound, guarding, or masses.  Back: No CVA or midline tenderness.   Musculoskeletal: No deformity or edema.  Neuro: Alert, no focal asymmetry.  Psych: Flat affect but cooperative.  Skin: No rashes, warm and dry.      DIAGNOSTIC STUDIES / PROCEDURES    LABS & EKG  Results for orders placed or performed during the hospital encounter of 06/08/20   CBC WITH DIFFERENTIAL   Result Value Ref Range    WBC 5.8 4.8 - 10.8 K/uL    RBC 4.38 4.20 - 5.40 M/uL    Hemoglobin 13.3 12.0 - 16.0 g/dL    Hematocrit 41.0 37.0 - 47.0 %    MCV 93.6 81.4 - 97.8 fL    MCH 30.4 27.0 - 33.0 pg    MCHC 32.4 (L) 33.6 - 35.0 g/dL    RDW 47.8 35.9 - 50.0 fL    Platelet Count 323 164 - 446 K/uL    MPV 10.8 9.0 - 12.9 fL    Neutrophils-Polys 71.40 44.00 - 72.00 %    Lymphocytes 13.50 (L) 22.00 - 41.00 %    Monocytes 11.60 0.00 - 13.40 %    Eosinophils 2.30 0.00 - 6.90 %    Basophils 1.00 0.00 - 1.80 %    Immature Granulocytes 0.20 0.00 - 0.90 %    Nucleated RBC 0.00 /100 WBC "    Neutrophils (Absolute) 4.12 2.00 - 7.15 K/uL    Lymphs (Absolute) 0.78 (L) 1.00 - 4.80 K/uL    Monos (Absolute) 0.67 0.00 - 0.85 K/uL    Eos (Absolute) 0.13 0.00 - 0.51 K/uL    Baso (Absolute) 0.06 0.00 - 0.12 K/uL    Immature Granulocytes (abs) 0.01 0.00 - 0.11 K/uL    NRBC (Absolute) 0.00 K/uL   COMP METABOLIC PANEL   Result Value Ref Range    Sodium 137 135 - 145 mmol/L    Potassium 3.6 3.6 - 5.5 mmol/L    Chloride 102 96 - 112 mmol/L    Co2 19 (L) 20 - 33 mmol/L    Anion Gap 16.0 7.0 - 16.0    Glucose 136 (H) 65 - 99 mg/dL    Bun 9 8 - 22 mg/dL    Creatinine 0.53 0.50 - 1.40 mg/dL    Calcium 9.3 8.5 - 10.5 mg/dL    AST(SGOT) 67 (H) 12 - 45 U/L    ALT(SGPT) 70 (H) 2 - 50 U/L    Alkaline Phosphatase 127 (H) 30 - 99 U/L    Total Bilirubin 0.9 0.1 - 1.5 mg/dL    Albumin 4.0 3.2 - 4.9 g/dL    Total Protein 6.9 6.0 - 8.2 g/dL    Globulin 2.9 1.9 - 3.5 g/dL    A-G Ratio 1.4 g/dL   LIPASE   Result Value Ref Range    Lipase 17 11 - 82 U/L   COVID/SARS CoV-2    Specimen: Nasopharyngeal; Respirate   Result Value Ref Range    COVID Order Status Received    TROPONIN   Result Value Ref Range    Troponin T 8 6 - 19 ng/L   Sed Rate   Result Value Ref Range    Sed Rate Westergren 6 0 - 20 mm/hour   PT/INR   Result Value Ref Range    PT 14.9 (H) 12.0 - 14.6 sec    INR 1.14 (H) 0.87 - 1.13   PTT   Result Value Ref Range    APTT 38.3 (H) 24.7 - 36.0 sec   LACTIC ACID   Result Value Ref Range    Lactic Acid 2.0 0.5 - 2.0 mmol/L   PROCALCITONIN   Result Value Ref Range    Procalcitonin 22.48 (H) <0.25 ng/mL   LDH   Result Value Ref Range    LDH Total 242 107 - 266 U/L   ESTIMATED GFR   Result Value Ref Range    GFR If African American >60 >60 mL/min/1.73 m 2    GFR If Non African American >60 >60 mL/min/1.73 m 2   SARS-CoV-2, PCR (In-House)   Result Value Ref Range    SARS-CoV-2 Source NP Swab     SARS-CoV-2 by PCR NotDetected NotDetected   TROPONIN   Result Value Ref Range    Troponin T 7 6 - 19 ng/L   EKG   Result Value Ref  "Range    Report       [facility].    Test Date:  2020  Pt Name:    NILAY MANN               Department: ER  MRN:        [id number]                      Room:       T334  Gender:     Female                       Technician: [id number]  :        1970                   Requested By:RG ZAVALA  Order #:    [id number]                    Reading MD: Rg Zavala MD    Measurements  Intervals                                Axis  Rate:       126                          P:          53  WA:         136                          QRS:        66  QRSD:       76                           T:          44  QT:         296  QTc:        429    Interpretive Statements  SINUS TACHYCARDIA  Compared to ECG 2020 06:03:13  Sinus rhythm no longer present  No STEMI  Electronically Signed On 2020 7:23:37 PDT by Rg Zavala MD           RADIOLOGY  CT-CTA CHEST PULMONARY ARTERY W/ RECONS   Final Result         1.  Stable saddle pulmonary embolus extending into the segmental and subsegmental branches of the bilateral lower lobes. Tubular appearance of this \"embolus\" is seen, consider possible embolized catheter segment as clinically appropriate, does not appear    significantly changed since prior study.   2.  Small hiatal hernia      CT-CTA HEAD WITH & W/O-POST PROCESS   Final Result         1.  No large vessel occlusion or aneurysm identified.   2.  Noncontrast images are not presented due to prior contrast administration, this limits evaluation for subtle subarachnoid hemorrhage.      DX-CHEST-LIMITED (1 VIEW)   Final Result         1.  No acute cardiopulmonary disease.   2.  Ventriculoperitoneal shunt catheter without visualized disruption, catheter tip overlies the heart suggesting diaphragmatic hernia shunt versus ventriculopleural shunt, correlate with history.      DX-SPINE-ANY ONE VIEW   Final Result         1.  Radiographic evidence of shunt catheter disruption.    "   DX-SKULL-LIMITED 3-   Final Result         1.  Unremarkable exam. No catheter shunt disruption is apparent.          COURSE & MEDICAL DECISION MAKING    This is a 49 y.o. female who presents with severe headache associated with vomiting, as well as chest pain.  Complicated past medical history including  shunt, breast cancer, and saddle PE.    Differential Diagnosis includes but is not limited to:  Intracranial hemorrhage, migraine, shunt malfunction, infection, ACS, PE, chronic pain    ED Course:  This is a 49-year-old female well-known to this hospital with severe headache with vomiting and chest pain.  Both complaints will be addressed with work-up.  Parenteral pain medications, she looks dehydrated I will keep her n.p.o. until a surgical process is ruled out and give her a crystalloid fluid bolus.    Thankfully medical work-up is mostly reassuring, EKG shows tachycardia but no STEMI.  Lactic acid level reassuring, sed rate not elevated.  Electrolytes are stable without acidosis and CBC is normal.  Coronavirus Greening done in case patient requires hospitalization and she does have dyspnea.    There does not appear to be anything acute on CT imaging of her brain, chest CT shows stable PE.    Despite several rounds of parenteral medications the patient still has a severe headache.  She has not had an evaluation of her shunt in many years, I consulted by phone with her neurosurgeon Dr. Haney, and he will kindly evaluate the patient and assess her shunt status.  Plan medical admission for status migrainosus, hospitalist Dr. Chavez will kindly admit the patient for further work-up and treatment.  She is hemodynamically stable for admission to the hospital in guarded condition.    Medications   magnesium sulfate IVPB premix 2 g (2 g Intravenous New Bag 6/8/20 0554)   propranolol LA (INDERAL LA) capsule 120 mg (has no administration in time range)   oxyCODONE CR (OXYCONTIN) tablet 10 mg (has no administration in  time range)   methimazole (TAPAZOLE) tablet 5 mg (has no administration in time range)   lisinopril (PRINIVIL) tablet 20 mg (has no administration in time range)   levETIRAcetam (KEPPRA) tablet 1,000 mg (has no administration in time range)   hydrOXYzine HCl (ATARAX) tablet 50 mg (has no administration in time range)   apixaban (ELIQUIS) tablet 5 mg (has no administration in time range)   anastrozole (ARIMIDEX) tablet 1 mg (has no administration in time range)   amitriptyline (ELAVIL) tablet 10 mg (has no administration in time range)   metoclopramide (REGLAN) injection 10 mg (has no administration in time range)   dexamethasone (DECADRON) injection 10 mg (has no administration in time range)   diphenhydrAMINE (BENADRYL) injection 25 mg (has no administration in time range)   senna-docusate (PERICOLACE or SENOKOT S) 8.6-50 MG per tablet 2 Tab (has no administration in time range)     And   polyethylene glycol/lytes (MIRALAX) PACKET 1 Packet (has no administration in time range)     And   magnesium hydroxide (MILK OF MAGNESIA) suspension 30 mL (has no administration in time range)     And   bisacodyl (DULCOLAX) suppository 10 mg (has no administration in time range)   ondansetron (ZOFRAN) syringe/vial injection 4 mg (has no administration in time range)   ondansetron (ZOFRAN ODT) dispertab 4 mg (has no administration in time range)   promethazine (PHENERGAN) tablet 12.5-25 mg (has no administration in time range)   promethazine (PHENERGAN) suppository 12.5-25 mg (has no administration in time range)   prochlorperazine (COMPAZINE) injection 5-10 mg (has no administration in time range)   ketorolac (TORADOL) injection 30 mg (has no administration in time range)   HYDROmorphone pf (DILAUDID) injection 1 mg (1 mg Intravenous Given 6/8/20 0205)   lactated ringers infusion (BOLUS) (0 mL/kg × 59.4 kg Intravenous Stopped 6/8/20 0450)   prochlorperazine (COMPAZINE) injection 10 mg (10 mg Intravenous Given 6/8/20 0445)    diphenhydrAMINE (BENADRYL) injection 25 mg (25 mg Intravenous Given 6/8/20 1577)   iohexol (OMNIPAQUE) 350 mg/mL ( Intravenous Canceled Entry 6/8/20 0345)   HYDROmorphone pf (DILAUDID) injection 1 mg (1 mg Intravenous Given 6/8/20 0710)       FINAL IMPRESSION  1. Acute chest pain    2. Acute intractable headache, unspecified headache type    3. Non-intractable vomiting with nausea, unspecified vomiting type    4. 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  (ventriculoperitoneal) shunt status    6. Blindness of both eyes    7. Status migrainosus    8. Tachycardia    9. Shortness of breath        -ADMIT-       Pertinent Labs & Imaging studies reviewed and verified by myself, as well as nursing notes and the patient's past medical, family, and social histories (See chart for details).      Portions of this record were made with voice recognition software.  Despite my review, spelling/grammar/context errors may still remain.  Interpretation of this chart should be taken in this context.    Electronically signed by Rg Jordan M.D. on 6/8/2020 at 7:28 AM.

## 2020-06-09 ENCOUNTER — APPOINTMENT (OUTPATIENT)
Dept: RADIOLOGY | Facility: MEDICAL CENTER | Age: 50
DRG: 092 | End: 2020-06-09
Attending: NEUROLOGICAL SURGERY
Payer: MEDICARE

## 2020-06-09 LAB
ANION GAP SERPL CALC-SCNC: 8 MMOL/L (ref 7–16)
BUN SERPL-MCNC: 11 MG/DL (ref 8–22)
CALCIUM SERPL-MCNC: 9.3 MG/DL (ref 8.5–10.5)
CHLORIDE SERPL-SCNC: 105 MMOL/L (ref 96–112)
CO2 SERPL-SCNC: 22 MMOL/L (ref 20–33)
CREAT SERPL-MCNC: 0.52 MG/DL (ref 0.5–1.4)
ERYTHROCYTE [DISTWIDTH] IN BLOOD BY AUTOMATED COUNT: 49.8 FL (ref 35.9–50)
GLUCOSE SERPL-MCNC: 120 MG/DL (ref 65–99)
HCT VFR BLD AUTO: 35.9 % (ref 37–47)
HGB BLD-MCNC: 11.9 G/DL (ref 12–16)
MCH RBC QN AUTO: 31 PG (ref 27–33)
MCHC RBC AUTO-ENTMCNC: 33.1 G/DL (ref 33.6–35)
MCV RBC AUTO: 93.5 FL (ref 81.4–97.8)
PLATELET # BLD AUTO: 281 K/UL (ref 164–446)
PMV BLD AUTO: 10.2 FL (ref 9–12.9)
POTASSIUM SERPL-SCNC: 3.8 MMOL/L (ref 3.6–5.5)
RBC # BLD AUTO: 3.84 M/UL (ref 4.2–5.4)
SODIUM SERPL-SCNC: 135 MMOL/L (ref 135–145)
WBC # BLD AUTO: 11.2 K/UL (ref 4.8–10.8)

## 2020-06-09 PROCEDURE — A9270 NON-COVERED ITEM OR SERVICE: HCPCS | Performed by: INTERNAL MEDICINE

## 2020-06-09 PROCEDURE — 700102 HCHG RX REV CODE 250 W/ 637 OVERRIDE(OP): Performed by: INTERNAL MEDICINE

## 2020-06-09 PROCEDURE — 70450 CT HEAD/BRAIN W/O DYE: CPT

## 2020-06-09 PROCEDURE — 36415 COLL VENOUS BLD VENIPUNCTURE: CPT

## 2020-06-09 PROCEDURE — 700102 HCHG RX REV CODE 250 W/ 637 OVERRIDE(OP): Performed by: HOSPITALIST

## 2020-06-09 PROCEDURE — 85027 COMPLETE CBC AUTOMATED: CPT

## 2020-06-09 PROCEDURE — 770006 HCHG ROOM/CARE - MED/SURG/GYN SEMI*

## 2020-06-09 PROCEDURE — 96376 TX/PRO/DX INJ SAME DRUG ADON: CPT

## 2020-06-09 PROCEDURE — 700111 HCHG RX REV CODE 636 W/ 250 OVERRIDE (IP): Performed by: HOSPITALIST

## 2020-06-09 PROCEDURE — 700102 HCHG RX REV CODE 250 W/ 637 OVERRIDE(OP): Performed by: FAMILY MEDICINE

## 2020-06-09 PROCEDURE — 700111 HCHG RX REV CODE 636 W/ 250 OVERRIDE (IP): Performed by: FAMILY MEDICINE

## 2020-06-09 PROCEDURE — 80048 BASIC METABOLIC PNL TOTAL CA: CPT

## 2020-06-09 PROCEDURE — 99232 SBSQ HOSP IP/OBS MODERATE 35: CPT | Performed by: INTERNAL MEDICINE

## 2020-06-09 PROCEDURE — A9270 NON-COVERED ITEM OR SERVICE: HCPCS | Performed by: FAMILY MEDICINE

## 2020-06-09 PROCEDURE — 70250 X-RAY EXAM OF SKULL: CPT

## 2020-06-09 PROCEDURE — A9270 NON-COVERED ITEM OR SERVICE: HCPCS | Performed by: HOSPITALIST

## 2020-06-09 RX ORDER — HYDROCODONE BITARTRATE AND ACETAMINOPHEN 5; 325 MG/1; MG/1
1 TABLET ORAL EVERY 4 HOURS PRN
Status: DISCONTINUED | OUTPATIENT
Start: 2020-06-09 | End: 2020-06-10 | Stop reason: HOSPADM

## 2020-06-09 RX ADMIN — HYDROCODONE BITARTRATE AND ACETAMINOPHEN 1 TABLET: 5; 325 TABLET ORAL at 09:49

## 2020-06-09 RX ADMIN — HYDROXYZINE HYDROCHLORIDE 50 MG: 50 TABLET, FILM COATED ORAL at 21:14

## 2020-06-09 RX ADMIN — KETOROLAC TROMETHAMINE 30 MG: 30 INJECTION, SOLUTION INTRAMUSCULAR at 06:00

## 2020-06-09 RX ADMIN — DOCUSATE SODIUM 50 MG AND SENNOSIDES 8.6 MG 2 TABLET: 8.6; 5 TABLET, FILM COATED ORAL at 05:58

## 2020-06-09 RX ADMIN — HYDROCODONE BITARTRATE AND ACETAMINOPHEN 1 TABLET: 5; 325 TABLET ORAL at 18:38

## 2020-06-09 RX ADMIN — OXYCODONE HYDROCHLORIDE 10 MG: 10 TABLET, FILM COATED, EXTENDED RELEASE ORAL at 18:08

## 2020-06-09 RX ADMIN — DIPHENHYDRAMINE HYDROCHLORIDE 25 MG: 50 INJECTION INTRAMUSCULAR; INTRAVENOUS at 08:05

## 2020-06-09 RX ADMIN — KETOROLAC TROMETHAMINE 30 MG: 30 INJECTION, SOLUTION INTRAMUSCULAR at 12:34

## 2020-06-09 RX ADMIN — KETOROLAC TROMETHAMINE 30 MG: 30 INJECTION, SOLUTION INTRAMUSCULAR at 21:15

## 2020-06-09 RX ADMIN — METHIMAZOLE 5 MG: 5 TABLET ORAL at 05:57

## 2020-06-09 RX ADMIN — APIXABAN 5 MG: 5 TABLET, FILM COATED ORAL at 18:08

## 2020-06-09 RX ADMIN — HYDROCODONE BITARTRATE AND ACETAMINOPHEN 1 TABLET: 5; 325 TABLET ORAL at 03:21

## 2020-06-09 RX ADMIN — HYDROCODONE BITARTRATE AND ACETAMINOPHEN 1 TABLET: 5; 325 TABLET ORAL at 23:39

## 2020-06-09 RX ADMIN — DOCUSATE SODIUM 50 MG AND SENNOSIDES 8.6 MG 2 TABLET: 8.6; 5 TABLET, FILM COATED ORAL at 18:08

## 2020-06-09 RX ADMIN — APIXABAN 5 MG: 5 TABLET, FILM COATED ORAL at 05:56

## 2020-06-09 RX ADMIN — LISINOPRIL 20 MG: 20 TABLET ORAL at 05:57

## 2020-06-09 RX ADMIN — HYDROCODONE BITARTRATE AND ACETAMINOPHEN 1 TABLET: 5; 325 TABLET ORAL at 14:16

## 2020-06-09 RX ADMIN — DIPHENHYDRAMINE HYDROCHLORIDE 25 MG: 50 INJECTION INTRAMUSCULAR; INTRAVENOUS at 00:45

## 2020-06-09 RX ADMIN — METOCLOPRAMIDE 10 MG: 5 INJECTION, SOLUTION INTRAMUSCULAR; INTRAVENOUS at 06:02

## 2020-06-09 RX ADMIN — LEVETIRACETAM 1000 MG: 500 TABLET ORAL at 21:14

## 2020-06-09 RX ADMIN — ANASTROZOLE 1 MG: 1 TABLET, COATED ORAL at 05:55

## 2020-06-09 RX ADMIN — OXYCODONE HYDROCHLORIDE 10 MG: 10 TABLET, FILM COATED, EXTENDED RELEASE ORAL at 05:58

## 2020-06-09 RX ADMIN — AMITRIPTYLINE HYDROCHLORIDE 10 MG: 10 TABLET, FILM COATED ORAL at 21:13

## 2020-06-09 ASSESSMENT — ENCOUNTER SYMPTOMS
DIARRHEA: 0
CHILLS: 0
BLURRED VISION: 0
NAUSEA: 0
DOUBLE VISION: 0
BLOOD IN STOOL: 0
VOMITING: 0
HEADACHES: 1
PND: 0
COUGH: 0
DIZZINESS: 0
PALPITATIONS: 0
FEVER: 0
HEMOPTYSIS: 0
SHORTNESS OF BREATH: 0
ABDOMINAL PAIN: 0
CONSTIPATION: 0

## 2020-06-09 ASSESSMENT — LIFESTYLE VARIABLES
ALCOHOL_USE: YES
HOW MANY TIMES IN THE PAST YEAR HAVE YOU HAD 5 OR MORE DRINKS IN A DAY: 0
EVER HAD A DRINK FIRST THING IN THE MORNING TO STEADY YOUR NERVES TO GET RID OF A HANGOVER: NO
AVERAGE NUMBER OF DAYS PER WEEK YOU HAVE A DRINK CONTAINING ALCOHOL: 1
HAVE PEOPLE ANNOYED YOU BY CRITICIZING YOUR DRINKING: NO
CONSUMPTION TOTAL: NEGATIVE
TOTAL SCORE: 0
HAVE YOU EVER FELT YOU SHOULD CUT DOWN ON YOUR DRINKING: NO
TOTAL SCORE: 0
ON A TYPICAL DAY WHEN YOU DRINK ALCOHOL HOW MANY DRINKS DO YOU HAVE: 1
EVER FELT BAD OR GUILTY ABOUT YOUR DRINKING: NO
TOTAL SCORE: 0

## 2020-06-09 NOTE — PROGRESS NOTES
Hospital Medicine Daily Progress Note    Date of Service  6/9/2020    Chief Complaint  49 y.o. female admitted 6/8/2020 with Headache    Hospital Course    Patient with history of  shunt, PE, breast cancer, previous multiple admissions with headaches admitted with intractable headache      Interval Problem Update  Patient seen and evaluated on rounds  Discussed with nursing staff on rounds   shunt adjusted by neurosurgery team  We will monitor overnight, continue medical management  If stable anticipate discharge home tomorrow with outpatient follow-up with neurology and neurosurgery  Still having intermittent headaches, legally blind otherwise  No new issues or concerns at this time  Leukocytosis associated with the use of steroids, no infectious concerns apparent    Consultants/Specialty  Neurosurgery     Code Status  Full code     Disposition  Stable, anticipate discharge home tomorrow    Review of Systems  Review of Systems   Constitutional: Negative for chills, fever and malaise/fatigue.   HENT: Negative for hearing loss.         Legally blind   Eyes: Negative for blurred vision and double vision.   Respiratory: Negative for cough, hemoptysis and shortness of breath.    Cardiovascular: Negative for chest pain, palpitations and PND.   Gastrointestinal: Negative for abdominal pain, blood in stool, constipation, diarrhea, melena, nausea and vomiting.   Skin: Negative for rash.   Neurological: Positive for headaches. Negative for dizziness.        Physical Exam  Temp:  [36.1 °C (97 °F)-36.9 °C (98.5 °F)] 36.9 °C (98.5 °F)  Pulse:  [103-125] 103  Resp:  [16-18] 17  BP: (108-147)/(67-90) 108/67  SpO2:  [95 %-98 %] 97 %    Physical Exam  HENT:      Head: Normocephalic and atraumatic.      Right Ear: External ear normal.      Left Ear: External ear normal.      Nose: Nose normal.      Mouth/Throat:      Mouth: Mucous membranes are moist.   Eyes:      Extraocular Movements: Extraocular movements intact.       Conjunctiva/sclera: Conjunctivae normal.      Pupils: Pupils are equal, round, and reactive to light.   Neck:      Musculoskeletal: Normal range of motion.   Cardiovascular:      Rate and Rhythm: Normal rate and regular rhythm.      Pulses: Normal pulses.      Heart sounds: No murmur. No friction rub. No gallop.    Pulmonary:      Effort: Pulmonary effort is normal. No respiratory distress.      Breath sounds: Normal breath sounds. No stridor. No wheezing, rhonchi or rales.   Chest:      Chest wall: No tenderness.   Abdominal:      General: Abdomen is flat. Bowel sounds are normal. There is no distension.      Palpations: Abdomen is soft. There is no mass.      Tenderness: There is no abdominal tenderness. There is no right CVA tenderness, left CVA tenderness, guarding or rebound.      Hernia: No hernia is present.   Musculoskeletal: Normal range of motion.      Right lower leg: No edema.      Left lower leg: No edema.   Skin:     General: Skin is warm and dry.      Capillary Refill: Capillary refill takes less than 2 seconds.   Neurological:      General: No focal deficit present.      Mental Status: She is alert and oriented to person, place, and time. Mental status is at baseline.      Cranial Nerves: No cranial nerve deficit.      Sensory: No sensory deficit.      Motor: No weakness.      Coordination: Coordination normal.      Gait: Gait normal.      Deep Tendon Reflexes: Reflexes normal.   Psychiatric:         Mood and Affect: Mood normal.         Behavior: Behavior normal.         Thought Content: Thought content normal.         Judgment: Judgment normal.         Fluids    Intake/Output Summary (Last 24 hours) at 6/9/2020 1650  Last data filed at 6/8/2020 2030  Gross per 24 hour   Intake 240 ml   Output --   Net 240 ml       Laboratory  Recent Labs     06/08/20  0138 06/09/20  0501   WBC 5.8 11.2*   RBC 4.38 3.84*   HEMOGLOBIN 13.3 11.9*   HEMATOCRIT 41.0 35.9*   MCV 93.6 93.5   MCH 30.4 31.0   MCHC 32.4*  "33.1*   RDW 47.8 49.8   PLATELETCT 323 281   MPV 10.8 10.2     Recent Labs     06/08/20  0138 06/09/20  0501   SODIUM 137 135   POTASSIUM 3.6 3.8   CHLORIDE 102 105   CO2 19* 22   GLUCOSE 136* 120*   BUN 9 11   CREATININE 0.53 0.52   CALCIUM 9.3 9.3     Recent Labs     06/08/20  0138   APTT 38.3*   INR 1.14*               Imaging  CT-HEAD W/O   Final Result      1.  No evidence of acute intracranial process. Ventricular volume is stable and unchanged.      2.  Again seen left posterior parietal ventriculoperitoneal shunt catheter.      DX-SKULL-LIMITED 3-   Final Result      No evidence of disruption of left ventriculoperitoneal shunt catheter tubing.      DX-SKULL-LIMITED 3-   Final Result      1.  There is a stable appearance of the left  shunt tubing.      CT-CTA CHEST PULMONARY ARTERY W/ RECONS   Final Result         1.  Stable saddle pulmonary embolus extending into the segmental and subsegmental branches of the bilateral lower lobes. Tubular appearance of this \"embolus\" is seen, consider possible embolized catheter segment as clinically appropriate, does not appear    significantly changed since prior study.   2.  Small hiatal hernia      CT-CTA HEAD WITH & W/O-POST PROCESS   Final Result         1.  No large vessel occlusion or aneurysm identified.   2.  Noncontrast images are not presented due to prior contrast administration, this limits evaluation for subtle subarachnoid hemorrhage.      DX-CHEST-LIMITED (1 VIEW)   Final Result         1.  No acute cardiopulmonary disease.   2.  Ventriculoperitoneal shunt catheter without visualized disruption, catheter tip overlies the heart suggesting diaphragmatic hernia shunt versus ventriculopleural shunt, correlate with history.      DX-SPINE-ANY ONE VIEW   Final Result         1.  Radiographic evidence of shunt catheter disruption.      DX-SKULL-LIMITED 3-   Final Result         1.  Unremarkable exam. No catheter shunt disruption is apparent.       "     Assessment/Plan  * Intractable headache- (present on admission)  Assessment & Plan  Still having intermittent headaches  Continue pain control  Needing the use of IV Benadryl, Norco, high risk medications, monitor for adverse effects  Shunt adjusted by neurosurgery team, will need outpatient follow-up with neurosurgery and neurology closely  Patient counseled and educated  Anticipate discharge home tomorrow    Leukocytosis associated with the use of IV Decadron on presentation, no further evaluation needed, no infectious concerns apparent.    Hydrocephalus (HCC)- (present on admission)  Assessment & Plan   shunt adjusted by neurosurgical team  Patient has been advised close outpatient follow-up with Dr. Haney from neurosurgery    History of pulmonary embolus (PE)- (present on admission)  Assessment & Plan  Continue home Eliquis.    History of seizures- (present on admission)  Assessment & Plan  Continue home Keppra.    Breast cancer (HCC)- (present on admission)  Assessment & Plan  Outpatient follow-up once medically stable for discharge.    Essential hypertension- (present on admission)  Assessment & Plan  Blood pressure was controlled at the time of my visit at bedside, resume home Prinivil and propranolol.         VTE prophylaxis: Eliquis

## 2020-06-09 NOTE — PROCEDURES
DATE OF SERVICE:  06/09/2020    PROCEDURALIST:  Gonzalo Haney MD    PREPROCEDURE DIAGNOSIS:  Concern for shunt malfunction.    POSTPROCEDURAL DIAGNOSIS:  Shunt was working.    PROCEDURE:  Shunt tap.      PROCEDURE IN DETAIL:  The patient was verbally consented for the procedure   telling her that it could break the shunt, infect the shunt or be   nondiagnostic or require further exploration.  She agreed and consented.      PROCEDURE:  The patient was placed in a lateral recumbent position on her   right side with her left side up with a shunt was exposed.  We then cleaned   the shunt thoroughly with ChloraPrep, and then draped her appropriately.  We   then flushed out a butterfly needle with normal saline using a 3-way stopcock   and accessed the shunt reservoir.  At this point, the 3-way stopcock was ____   towards the sterile flush and it was noted that there was no significant   spontaneous flow.  This is likely due to the very minimal space around the   catheter and the patient's preoperative assessment CT scan, which demonstrated   that there was complete collapse of the ventricle around the catheter.  We   therefore occluded the proximal valve stem by pressing on the proximal tubing   adjacent to it, collapsing it and then filled a monometer to a total height of   50 and then turned it towards the patient's valve; therefore, flushing   towards the patient's abdomen.  She has a pleural shunt and this showed very   good distal runoff completely draining the patient's monometer briskly showing   no signs of distal occlusion or obstruction.  We then repeated this process   towards the patient's cranium and again there was very brisk distal runoff   towards the patient's ventriculostomy catheter and this demonstrated there was   no proximal obstruction.  A total pressure at the time of the runoff   decreased at 6 cm of water and there was pulsatile representation of good   fluid column communication with the  monometer.  At this point, it was   diagnosed that the patient is not having an obstruction of the catheter or the   distal valve stem and is likely having post-residual headaches after having   her valve inappropriately set at 5 by the MRI machine without being checked   post-MRI for approximately 2 months and has not recovered completely from her   headaches.  We then removed the butterfly needle from her valve and placed a   Band-Aid over the puncture site.  The patient tolerated the procedure well.    She did not have any increased headaches and her valve reservoir depressed and   refilled briskly after flushing proximally.  At this point, we did not feel   that the patient is having a shunt malfunction, it is likely that she is   having a post-residual headache from having the shunt valve set at 5, instead   of her baseline of 3 for 2 months.  She has not had any altered mental status   and purely has headaches at this time.  We have told her we would give it a   few more days to see if she is having any improvement in her headaches.  If   she has persistent headaches, then we would possibly explore the shunt to see   if there was a proximal slight obstruction or needs to be repositioned for   ball-valving purposes.      We have also ordered a skull film to ensure that the valve setting had changed   to the appropriate setting of 3 yesterday as well as a CT of the head to   determine that there is no interval increase in her ventricular size and/or   there is hopefully interval decrease in the dilated occipital horn after her   valve setting change.      The patient tolerated the procedure well without any complications.       ____________________________________     MD DIOGENES Dior / DAVE    DD:  06/09/2020 09:02:08  DT:  06/09/2020 10:10:48    D#:  [id number]  Job#:  [id number]

## 2020-06-09 NOTE — CARE PLAN
Patient complains of headache and right eye pain.  Patient requesting pain medication whenever it is available.

## 2020-06-09 NOTE — PROGRESS NOTES
Spoke with  per patient request if she will be going home today or staying over night. Per provider patient will be staying today as her headache is still present and monitor thru night with current pain medication and reevaluated in AM. Patient updated. Ok with POC.

## 2020-06-09 NOTE — PROGRESS NOTES
Neurosurgery Progress Note    Subjective:  Still having stable ha after shunt valve adjustment yesterday, tapped shunt today appears to flow well both proximal and distal with IC pressure of 6     Exam:  stable    BP  Min: 100/61  Max: 147/90  Pulse  Av.1  Min: 107  Max: 116  Resp  Av  Min: 16  Max: 18  Temp  Av.6 °C (97.8 °F)  Min: 36.3 °C (97.3 °F)  Max: 36.7 °C (98.1 °F)  SpO2  Av.7 %  Min: 95 %  Max: 98 %    No data recorded    Recent Labs     20  0138 20  0501   WBC 5.8 11.2*   RBC 4.38 3.84*   HEMOGLOBIN 13.3 11.9*   HEMATOCRIT 41.0 35.9*   MCV 93.6 93.5   MCH 30.4 31.0   MCHC 32.4* 33.1*   RDW 47.8 49.8   PLATELETCT 323 281   MPV 10.8 10.2     Recent Labs     20  0138 20  0501   SODIUM 137 135   POTASSIUM 3.6 3.8   CHLORIDE 102 105   CO2 19* 22   GLUCOSE 136* 120*   BUN 9 11   CREATININE 0.53 0.52   CALCIUM 9.3 9.3     Recent Labs     20  013   APTT 38.3*   INR 1.14*           Intake/Output       20 - 2059 20 - 06/10/20 0659      [id number] [id number] Total  Total       Intake    P.O.  --  240 240  --  -- --    P.O. -- 240 240 -- -- --    Total Intake -- 240 240 -- -- --       Output    Total Output -- -- -- -- -- --       Net I/O     -- 240 240 -- -- --            Intake/Output Summary (Last 24 hours) at 2020 0851  Last data filed at 2020  Gross per 24 hour   Intake 240 ml   Output --   Net 240 ml            • propranolol CR  120 mg QHS   • oxyCODONE CR  10 mg Q12HRS   • methimazole  5 mg DAILY   • lisinopril  20 mg DAILY   • levETIRAcetam  1,000 mg QHS   • hydrOXYzine HCl  50 mg QHS   • apixaban  5 mg BID   • anastrozole  1 mg DAILY   • amitriptyline  10 mg QHS   • senna-docusate  2 Tab BID    And   • polyethylene glycol/lytes  1 Packet QDAY PRN    And   • magnesium hydroxide  30 mL QDAY PRN    And   • bisacodyl  10 mg QDAY PRN   • ondansetron  4 mg Q4HRS PRN   • ondansetron  4 mg Q4HRS PRN   •  promethazine  12.5-25 mg Q4HRS PRN   • promethazine  12.5-25 mg Q4HRS PRN   • prochlorperazine  5-10 mg Q4HRS PRN   • ketorolac  30 mg Q6HRS PRN   • metoclopramide  10 mg Q6HRS PRN   • diphenhydrAMINE  25 mg Q6HRS PRN   • HYDROcodone-acetaminophen  1 Tab Q6HRS PRN       Assessment and Plan:  Hospital day #3  POD #na  Prophylactic anticoagulation: yes         Start date/time: 6/9/2020    Shunt tap today working without sign of obstruction possible ha due to intermittent failure vs likely protracted sigh setting after MRI 2 months ago  Ordered CT head today for interval eval and skull films to confirm setting of 3 on codmen shunt    ICP 6 on tap    Would keep at least until tomorrow to ensure some improvement and that scans are stable without concern     Total of 50 minutes were spent in direct pt care         Haney

## 2020-06-09 NOTE — CARE PLAN
Problem: Infection  Goal: Will remain free from infection  Outcome: PROGRESSING AS EXPECTED  Note: Afebrile, educated about handwashing and signs of infection     Problem: Pain Management  Goal: Pain level will decrease to patient's comfort goal  Outcome: PROGRESSING AS EXPECTED  Note: Educated about the pain scale, educated about non pharmacological pain methods, check the MAR

## 2020-06-09 NOTE — PROGRESS NOTES
Rounded with MD Agarwal about pt. Status.   Pt. Still continuing to have lots of pain. Norco changed to Q4 PRN per MD Agarwal.

## 2020-06-09 NOTE — CARE PLAN
Patient is blind and is compliant with use of call light and waits for staff assistance.  Frequent rounding.

## 2020-06-09 NOTE — PROGRESS NOTES
Patient IV infiltrated. Attempted to get new IV line X4 by three different nurses and also called ultrasound guided. No success. Night nurse Vannesa was endorse call for another ultrasound or call provider for diffenrent option. Vannesa was ok to continue with care.

## 2020-06-10 ENCOUNTER — PATIENT OUTREACH (OUTPATIENT)
Dept: HEALTH INFORMATION MANAGEMENT | Facility: OTHER | Age: 50
End: 2020-06-10

## 2020-06-10 VITALS
RESPIRATION RATE: 16 BRPM | TEMPERATURE: 98.9 F | OXYGEN SATURATION: 95 % | SYSTOLIC BLOOD PRESSURE: 118 MMHG | DIASTOLIC BLOOD PRESSURE: 75 MMHG | HEART RATE: 86 BPM | WEIGHT: 132 LBS | BODY MASS INDEX: 22.53 KG/M2 | HEIGHT: 64 IN

## 2020-06-10 PROBLEM — R51.9 INTRACTABLE HEADACHE: Status: RESOLVED | Noted: 2018-12-04 | Resolved: 2020-06-10

## 2020-06-10 PROCEDURE — 700102 HCHG RX REV CODE 250 W/ 637 OVERRIDE(OP): Performed by: HOSPITALIST

## 2020-06-10 PROCEDURE — 99239 HOSP IP/OBS DSCHRG MGMT >30: CPT | Performed by: INTERNAL MEDICINE

## 2020-06-10 PROCEDURE — A9270 NON-COVERED ITEM OR SERVICE: HCPCS | Performed by: INTERNAL MEDICINE

## 2020-06-10 PROCEDURE — A9270 NON-COVERED ITEM OR SERVICE: HCPCS | Performed by: HOSPITALIST

## 2020-06-10 PROCEDURE — 700102 HCHG RX REV CODE 250 W/ 637 OVERRIDE(OP): Performed by: INTERNAL MEDICINE

## 2020-06-10 PROCEDURE — 700111 HCHG RX REV CODE 636 W/ 250 OVERRIDE (IP): Performed by: HOSPITALIST

## 2020-06-10 PROCEDURE — 96376 TX/PRO/DX INJ SAME DRUG ADON: CPT

## 2020-06-10 RX ADMIN — LISINOPRIL 20 MG: 20 TABLET ORAL at 06:06

## 2020-06-10 RX ADMIN — KETOROLAC TROMETHAMINE 30 MG: 30 INJECTION, SOLUTION INTRAMUSCULAR at 10:55

## 2020-06-10 RX ADMIN — HYDROCODONE BITARTRATE AND ACETAMINOPHEN 1 TABLET: 5; 325 TABLET ORAL at 08:04

## 2020-06-10 RX ADMIN — HYDROCODONE BITARTRATE AND ACETAMINOPHEN 1 TABLET: 5; 325 TABLET ORAL at 12:11

## 2020-06-10 RX ADMIN — ANASTROZOLE 1 MG: 1 TABLET, COATED ORAL at 06:05

## 2020-06-10 RX ADMIN — OXYCODONE HYDROCHLORIDE 10 MG: 10 TABLET, FILM COATED, EXTENDED RELEASE ORAL at 06:07

## 2020-06-10 RX ADMIN — HYDROCODONE BITARTRATE AND ACETAMINOPHEN 1 TABLET: 5; 325 TABLET ORAL at 03:37

## 2020-06-10 RX ADMIN — APIXABAN 5 MG: 5 TABLET, FILM COATED ORAL at 06:05

## 2020-06-10 RX ADMIN — METHIMAZOLE 5 MG: 5 TABLET ORAL at 06:06

## 2020-06-10 NOTE — DISCHARGE SUMMARY
Discharge Summary    CHIEF COMPLAINT ON ADMISSION  Chief Complaint   Patient presents with   • N/V     x 4hrs   • Headache     x 1day       Reason for Admission  EMS     Admission Date  6/8/2020    CODE STATUS  Full Code    HPI & HOSPITAL COURSE  This is a 49 y.o. female here with tractable headaches.    Patient has underlying history of congenital hydrocephalus status post  shunt placement, chronic blindness/legally blind, chronic migraine headaches, chronic narcotic/benzodiazepine dependence, history of pulmonary embolism on anticoagulation, history of seizure disorder on antiepileptic drug therapy, hypertension, breast cancer and other metabolic concerns including hypothyroidism.    Patient was admitted to the hospital on June 8, 2020 with complaints of intractable headaches.  CT angiogram of the head on presentation without any acute concerns, CTA PE does not reveal any significant changes in the saddle pulmonary embolism compared to prior.  Patient on therapeutic anticoagulation upon further questioning, patient revealed that she previously has completed an MRI, concerned that this might have color changes in the setting of the  shunt.  Given this neurosurgery consultation was obtained, patient was evaluated by Dr. Haney from neurosurgery and  shunt was appropriately programmed/adjusted by neurosurgery team.  Patient at this time has been cleared for discharge from neurosurgery perspective.  Patient is advised close outpatient follow-up with Dr. Haney from neurosurgery, headache neurology, PCP.  Schedulers informed to arrange close outpatient follow-up with PCP.  Patient is feeling significantly better at this time, headaches have completely resolved, she remains baseline with respect to her chronic debility, no postacute placement needs are noted.  Discharge planning has been discussed with the patient, she is advised close outpatient follow-up with PCP for ongoing management of underlying chronic  comorbid conditions and follow-up with neurosurgery and neurology.    Therefore, she is discharged in good and stable condition to home with close outpatient follow-up.    The patient met 2-midnight criteria for an inpatient stay at the time of discharge.    Discharge Date  06/10/20    FOLLOW UP ITEMS POST DISCHARGE  F/U PCP     DISCHARGE DIAGNOSES  Principal Problem (Resolved):    Intractable headache POA: Yes  Active Problems:    Hydrocephalus (HCC) POA: Yes    Essential hypertension POA: Yes    Breast cancer (HCC) POA: Yes    History of seizures POA: Yes    History of pulmonary embolus (PE) POA: Yes      FOLLOW UP  Future Appointments   Date Time Provider Department Center   6/25/2020  8:40 AM [facility] None   7/2/2020  9:30 AM [facility]   7/24/2020  3:00 PM [facility]AM 4 [facility] None     Gonzalo Haney M.D.  [address]  [facility] [id number]  [phone]    In 2 weeks  Follow up      MEDICATIONS ON DISCHARGE     Medication List      CONTINUE taking these medications      Instructions   amitriptyline 10 MG Tabs  Commonly known as:  ELAVIL   Take 1 Tab by mouth every bedtime.  Dose:  10 mg     anastrozole 1 MG Tabs  Commonly known as:  ARIMIDEX   Take 1 mg by mouth every day.  Dose:  1 mg     apixaban 5mg Tabs  Commonly known as:  ELIQUIS   Doctor's comments:  This medication was prescribed through a collaborative practice  Take 1 Tab by mouth 2 Times a Day.  Dose:  5 mg     Carafate 1 GM Tabs  Generic drug:  sucralfate   Take 1 g by mouth 4 Times a Day,Before Meals and at Bedtime.  Dose:  1 g     HYDROcodone-acetaminophen 5-325 MG Tabs per tablet  Commonly known as:  NORCO   Take 1 Tab by mouth every four hours as needed.  Dose:  1 Tab     hydrOXYzine HCl 50 MG Tabs  Commonly known as:  ATARAX   Take 50 mg by mouth at bedtime as needed.  Dose:  50 mg     levETIRAcetam 500 MG Tabs  Commonly known as:  KEPPRA   Take 500 mg by mouth 2 times a day.  Dose:  500 mg     lisinopril 20 MG  Tabs  Commonly known as:  PRINIVIL   Take 1 Tab by mouth every day.  Dose:  20 mg     LORazepam 1 MG Tabs  Commonly known as:  ATIVAN   Take 1 mg by mouth at bedtime as needed for Anxiety.  Dose:  1 mg     methimazole 5 MG Tabs  Commonly known as:  TAPAZOLE   Take 1 Tab by mouth every day.  Dose:  5 mg     NexIUM 40 MG delayed-release capsule  Generic drug:  esomeprazole   Take 40 mg by mouth every morning before breakfast.  Dose:  40 mg     ondansetron 4 MG Tbdp  Commonly known as:  ZOFRAN ODT   Take 1 Tab by mouth every 6 hours as needed for Nausea.  Dose:  4 mg     oxyCODONE ER 9 MG C12a   Take 9 mg by mouth every 12 hours.  Dose:  9 mg     propranolol  MG Cp24  Commonly known as:  INDERAL LA   Take 120 mg by mouth every bedtime.  Dose:  120 mg            Allergies  Allergies   Allergen Reactions   • Tape Rash     RXN= ongoing  Adhesive Medical tape. Per patient, paper tape ok.   • Latex Rash     Rash       DIET  Orders Placed This Encounter   Procedures   • Diet Order Regular     Standing Status:   Standing     Number of Occurrences:   1     Order Specific Question:   Diet:     Answer:   Regular [1]       ACTIVITY  As tolerated.  Weight bearing as tolerated    CONSULTATIONS  Neurosurgery     PROCEDURES   shunt adjustment     LABORATORY  Lab Results   Component Value Date    SODIUM 135 06/09/2020    POTASSIUM 3.8 06/09/2020    CHLORIDE 105 06/09/2020    CO2 22 06/09/2020    GLUCOSE 120 (H) 06/09/2020    BUN 11 06/09/2020    CREATININE 0.52 06/09/2020    CREATININE 0.6 08/12/2008        Lab Results   Component Value Date    WBC 11.2 (H) 06/09/2020    HEMOGLOBIN 11.9 (L) 06/09/2020    HEMATOCRIT 35.9 (L) 06/09/2020    PLATELETCT 281 06/09/2020        Total time of the discharge process exceeds 33 minutes.

## 2020-06-10 NOTE — CARE PLAN
Patient still complaining of headache rated at 7/10. She no longer has eye pain. Patient asking for pain medication whenever it is available.

## 2020-06-10 NOTE — PROGRESS NOTES
Pt. Is given discharge information, educated about medications, educated about following up with upcoming appointment. Friend will  pt. When ready to go.   Pt. IV d/c  Pt. Has follow up appointments made already and will call neurosurgery team to follow up.   Pt. Still has medication at home for pain.   No prescriptions given

## 2020-06-10 NOTE — DISCHARGE INSTRUCTIONS
Discharge Instructions    Discharged to home by car with relative. Discharged via wheelchair, hospital escort: Yes.  Special equipment needed: Not Applicable    Be sure to schedule a follow-up appointment with your primary care doctor or any specialists as instructed.     Discharge Plan:   Diet Plan: Discussed  Activity Level: Discussed  Smoking Cessation Offered: Patient Refused  Confirmed Follow up Appointment: Patient to Call and Schedule Appointment  Confirmed Symptoms Management: Discussed  Medication Reconciliation Updated: Yes    I understand that a diet low in cholesterol, fat, and sodium is recommended for good health. Unless I have been given specific instructions below for another diet, I accept this instruction as my diet prescription.   Other diet: regular    Special Instructions: None    · Is patient discharged on Warfarin / Coumadin?   No     Depression / Suicide Risk    As you are discharged from this [facility] facility, it is important to learn how to keep safe from harming yourself.    Recognize the warning signs:  · Abrupt changes in personality, positive or negative- including increase in energy   · Giving away possessions  · Change in eating patterns- significant weight changes-  positive or negative  · Change in sleeping patterns- unable to sleep or sleeping all the time   · Unwillingness or inability to communicate  · Depression  · Unusual sadness, discouragement and loneliness  · Talk of wanting to die  · Neglect of personal appearance   · Rebelliousness- reckless behavior  · Withdrawal from people/activities they love  · Confusion- inability to concentrate     If you or a loved one observes any of these behaviors or has concerns about self-harm, here's what you can do:  · Talk about it- your feelings and reasons for harming yourself  · Remove any means that you might use to hurt yourself (examples: pills, rope, extension cords, firearm)  · Get professional help from the community (Mental  Health, Substance Abuse, psychological counseling)  · Do not be alone:Call your Safe Contact- someone whom you trust who will be there for you.  · Call your local CRISIS HOTLINE 857-6228 or 950-248-2211  · Call your local Children's Mobile Crisis Response Team [place] (650) 793-7728 or www.[employer]  · Call the toll free National Suicide Prevention Hotlines   · National Suicide Prevention Lifeline 668-571-QYFL (7433)  · National Hope Line Network 800-SUICIDE (992-9043)      Ventriculoperitoneal Shunt Home Guide  A ventriculoperitoneal () shunt is a small, plastic tube that is used to drain fluid from your brain into a sac in your belly (peritoneum). The peritoneum absorbs this fluid and gets rid of it. Normally, your brain releases the fluid that cushions your brain and spine (cerebrospinal fluid, CSF). Your brain then reabsorbs it through drainage channels. If your brain's drainage channels are not working properly, fluid builds up in your brain and needs to be redirected with a shunt. You may need a  shunt if you have too much CSF inside your brain (hydrocephalus).  Your health care provider determines how much fluid needs to be drained and adjusts the settings on the shunt. Some shunt settings cannot be changed after they have been set (nonprogrammable shunt). Others can be adjusted (programmable shunt) by your health care provider. You may feel the tube behind your ear and under your skin where it passes down your neck and your chest before it enters your belly (abdomen).   When you have a shunt, you need to take certain precautions and be aware of signs that may indicate a problem with the shunt. After your shunt is placed, it is important to have the following information with you:  · The contact information for the surgeon who placed your shunt.  · The name and type of  shunt that you have.  WHEN WILL I HAVE MY SHUNT REMOVED?  Your shunt may be temporary or permanent, depending on your  condition. For some people, a  shunt is a lifelong device.  WHAT PRECAUTIONS MUST I FOLLOW?  · Contact your health care provider if you have a programmable shunt and need to get an MRI for any reason. This is very important because many programmable shunts are sensitive to magnets, which are involved in MRIs.  · Tell your health care provider about your shunt before you have surgery, especially abdominal surgery. You may need to take antibiotic medicines before having a procedure.    · Do not wear tight-fitting hats or headgear.  · Ask your health care provider which activities are safe for you.  WHAT ARE THE WARNING SIGNS OF A SHUNT MALFUNCTION?  A  shunt can malfunction or become clogged. If the shunt is not working properly, it will not drain the CSF. This can cause an increase in brain pressure. It is important to know the warning signs of a shunt malfunction because they can start suddenly.  Warning signs of a malfunction include:   · A headache that gets worse over time.  · Vomiting without cause.  · Feeling sleepier than usual.  · Loss of appetite.  · Low energy.  · Irritability.  · Personality change or confusion.  · Vision changes, such as blurry vision, double vision, or loss of vision.  · Swelling of the skin that runs along the path of the shunt.  · A return of your original symptoms.  · Trouble walking.  · Inability to control your bladder (urinary incontinence).  · Having a seizure.  WHAT ARE THE WARNING SIGNS OF A SHUNT INFECTION?  If germs (bacteria) get into the tissue around the shunt, you can develop an infection. This can cause your shunt to stop working properly.  Watch for signs of infection, such as:  · Fever.  · Redness or swelling of the skin along the shunt path.  · Pain around the shunt or shunt tubing.  · A headache or a stiff neck.  · Nausea or vomiting.  WHEN SHOULD I SEEK IMMEDIATE MEDICAL CARE?   When you have a  shunt, seek medical care right away if:  · You are sleepier than  usual or have trouble waking up.  · You vomit for no reason.  · You have a fever.  · You notice redness or swelling along the shunt path.  · You have a headache that is getting worse.  · You start to twitch or shake (seizure).  · You develop vision problems.  · You lose coordination or balance.  · You become irritable or start to behave abnormally.     This information is not intended to replace advice given to you by your health care provider. Make sure you discuss any questions you have with your health care provider.     Document Released: 07/07/2006 Document Revised: 01/08/2016 Document Reviewed: 05/27/2015  [employer] Interactive Patient Education ©2016 [employer]  Ventriculoperitoneal Shunt Placement, Care After  Refer to this sheet in the next few weeks. These instructions provide you with information about caring for yourself after your procedure. Your health care provider may also give you more specific instructions. Your treatment has been planned according to current medical practices, but problems sometimes occur. Call your health care provider if you have any problems or questions after your procedure.  WHAT TO EXPECT AFTER THE PROCEDURE  After your procedure, it is typical to have the following:  · Swelling in the ventriculoperitoneal () shunt placement area.  · Soreness near your scalp or abdominal incision.  · Soreness in your neck and chest on the side of your  shunt.  HOME CARE INSTRUCTIONS  · Take medicines only as directed by your health care provider.  · Do not take baths, swim, or use a hot tub until your health care provider approves.  · Rest often. Your body needs time to adjust to the  shunt.  · If you have a programmable shunt and need an MRI, it is very important to see your surgeon to have your shunt reprogrammed before the procedure. Many programmable shunts are sensitive to magnets, which are involved in MRIs.  · There are many different ways to close and cover an incision,  including stitches (sutures), skin glue, and adhesive strips. Follow your health care provider's instructions on:  ¨ Incision care.  ¨ Bandage (dressing) changes and removal.  ¨ Incision closure removal.  · Keep all follow-up visits as directed by your health care provider. This is important.  SEEK MEDICAL CARE IF:  · You have a poor appetite.  · You have low energy.  · You feel restless, confused, or irritable.  SEEK IMMEDIATE MEDICAL CARE IF:  · You have drainage from an incision site.  · An incision site starts to get red, warm, swollen, or tender.    · An incision site opens up.  · You have any signs or symptoms of shunt malfunction. These include:  ¨ Headaches.  ¨ Nausea and vomiting.  ¨ Fever.  ¨ Swelling along the  shunt path.  ¨ Vision changes.  · You cannot control your bladder.  · You have a seizure.  · You have trouble walking.     This information is not intended to replace advice given to you by your health care provider. Make sure you discuss any questions you have with your health care provider.     Document Released: 05/15/2012 Document Revised: 01/08/2016 Document Reviewed: 05/27/2015  [employer] Interactive Patient Education ©2016 [employer]  Headaches, Frequently Asked Questions  MIGRAINE HEADACHES  Q: What is migraine? What causes it? How can I treat it?  A: Generally, migraine headaches begin as a dull ache. Then they develop into a constant, throbbing, and pulsating pain. You may experience pain at the temples. You may experience pain at the front or back of one or both sides of the head. The pain is usually accompanied by a combination of:  · Nausea.   · Vomiting.   · Sensitivity to light and noise.   Some people (about 15%) experience an aura (see below) before an attack. The cause of migraine is believed to be chemical reactions in the brain. Treatment for migraine may include over-the-counter or prescription medications. It may also include self-help techniques. These include relaxation  "training and biofeedback.   Q: What is an aura?  A: About 15% of people with migraine get an \"aura\". This is a sign of neurological symptoms that occur before a migraine headache. You may see wavy or jagged lines, dots, or flashing lights. You might experience tunnel vision or blind spots in one or both eyes. The aura can include visual or auditory hallucinations (something imagined). It may include disruptions in smell (such as strange odors), taste or touch. Other symptoms include:  · Numbness.   · A \"pins and needles\" sensation.   · Difficulty in recalling or speaking the correct word.   These neurological events may last as long as 60 minutes. These symptoms will fade as the headache begins.  Q: What is a trigger?  A: Certain physical or environmental factors can lead to or \"trigger\" a migraine. These include:  · Foods.   · Hormonal changes.   · Weather.   · Stress.   It is important to remember that triggers are different for everyone. To help prevent migraine attacks, you need to figure out which triggers affect you. Keep a headache diary. This is a good way to track triggers. The diary will help you talk to your healthcare professional about your condition.  Q: Does weather affect migraines?  A: Bright sunshine, hot, humid conditions, and drastic changes in barometric pressure may lead to, or \"trigger,\" a migraine attack in some people. But studies have shown that weather does not act as a trigger for everyone with migraines.  Q: What is the link between migraine and hormones?  A: Hormones start and regulate many of your body's functions. Hormones keep your body in balance within a constantly changing environment. The levels of hormones in your body are unbalanced at times. Examples are during menstruation, pregnancy, or menopause. That can lead to a migraine attack. In fact, about three quarters of all women with migraine report that their attacks are related to the menstrual cycle.   Q: Is there an " "increased risk of stroke for migraine sufferers?  A: The likelihood of a migraine attack causing a stroke is very remote. That is not to say that migraine sufferers cannot have a stroke associated with their migraines. In persons under age 40, the most common associated factor for stroke is migraine headache. But over the course of a person's normal life span, the occurrence of migraine headache may actually be associated with a reduced risk of dying from cerebrovascular disease due to stroke.   Q: What are acute medications for migraine?  A: Acute medications are used to treat the pain of the headache after it has started. Examples over-the-counter medications, NSAIDs, ergots, and triptans.   Q: What are the triptans?  A: Triptans are the newest class of abortive medications. They are specifically targeted to treat migraine. Triptans are vasoconstrictors. They moderate some chemical reactions in the brain. The triptans work on receptors in your brain. Triptans help to restore the balance of a neurotransmitter called serotonin. Fluctuations in levels of serotonin are thought to be a main cause of migraine.   Q: Are over-the-counter medications for migraine effective?  A: Over-the-counter, or \"OTC,\" medications may be effective in relieving mild to moderate pain and associated symptoms of migraine. But you should see your caregiver before beginning any treatment regimen for migraine.   Q: What are preventive medications for migraine?  A: Preventive medications for migraine are sometimes referred to as \"prophylactic\" treatments. They are used to reduce the frequency, severity, and length of migraine attacks. Examples of preventive medications include antiepileptic medications, antidepressants, beta-blockers, calcium channel blockers, and NSAIDs (nonsteroidal anti-inflammatory drugs).  Q: Why are anticonvulsants used to treat migraine?  A: During the past few years, there has been an increased interest in antiepileptic " "drugs for the prevention of migraine. They are sometimes referred to as \"anticonvulsants\". Both epilepsy and migraine may be caused by similar reactions in the brain.   Q: Why are antidepressants used to treat migraine?  A: Antidepressants are typically used to treat people with depression. They may reduce migraine frequency by regulating chemical levels, such as serotonin, in the brain.   Q: What alternative therapies are used to treat migraine?  A: The term \"alternative therapies\" is often used to describe treatments considered outside the scope of conventional Western medicine. Examples of alternative therapy include acupuncture, acupressure, and yoga. Another common alternative treatment is herbal therapy. Some herbs are believed to relieve headache pain. Always discuss alternative therapies with your caregiver before proceeding. Some herbal products contain arsenic and other toxins.  TENSION HEADACHES  Q: What is a tension-type headache? What causes it? How can I treat it?  A: Tension-type headaches occur randomly. They are often the result of temporary stress, anxiety, fatigue, or anger. Symptoms include soreness in your temples, a tightening band-like sensation around your head (a \"vice-like\" ache). Symptoms can also include a pulling feeling, pressure sensations, and sandi head and neck muscles. The headache begins in your forehead, temples, or the back of your head and neck. Treatment for tension-type headache may include over-the-counter or prescription medications. Treatment may also include self-help techniques such as relaxation training and biofeedback.  CLUSTER HEADACHES  Q: What is a cluster headache? What causes it? How can I treat it?  A: Cluster headache gets its name because the attacks come in groups. The pain arrives with little, if any, warning. It is usually on one side of the head. A tearing or bloodshot eye and a runny nose on the same side of the headache may also accompany the pain. " "Cluster headaches are believed to be caused by chemical reactions in the brain. They have been described as the most severe and intense of any headache type. Treatment for cluster headache includes prescription medication and oxygen.  SINUS HEADACHES  Q: What is a sinus headache? What causes it? How can I treat it?  A: When a cavity in the bones of the face and skull (a sinus) becomes inflamed, the inflammation will cause localized pain. This condition is usually the result of an allergic reaction, a tumor, or an infection. If your headache is caused by a sinus blockage, such as an infection, you will probably have a fever. An x-ray will confirm a sinus blockage. Your caregiver's treatment might include antibiotics for the infection, as well as antihistamines or decongestants.   REBOUND HEADACHES  Q: What is a rebound headache? What causes it? How can I treat it?  A: A pattern of taking acute headache medications too often can lead to a condition known as \"rebound headache.\" A pattern of taking too much headache medication includes taking it more than 2 days per week or in excessive amounts. That means more than the label or a caregiver advises. With rebound headaches, your medications not only stop relieving pain, they actually begin to cause headaches. Doctors treat rebound headache by tapering the medication that is being overused. Sometimes your caregiver will gradually substitute a different type of treatment or medication. Stopping may be a challenge. Regularly overusing a medication increases the potential for serious side effects. Consult a caregiver if you regularly use headache medications more than 2 days per week or more than the label advises.  ADDITIONAL QUESTIONS AND ANSWERS  Q: What is biofeedback?  A: Biofeedback is a self-help treatment. Biofeedback uses special equipment to monitor your body's involuntary physical responses. Biofeedback monitors:  · Breathing.   · Pulse.   · Heart rate. "   · Temperature.   · Muscle tension.   · Brain activity.   Biofeedback helps you refine and perfect your relaxation exercises. You learn to control the physical responses that are related to stress. Once the technique has been mastered, you do not need the equipment any more.  Q: Are headaches hereditary?  A: Four out of five (80%) of people that suffer report a family history of migraine. Scientists are not sure if this is genetic or a family predisposition. Despite the uncertainty, a child has a 50% chance of having migraine if one parent suffers. The child has a 75% chance if both parents suffer.   Q: Can children get headaches?  A: By the time they reach high school, most young people have experienced some type of headache. Many safe and effective approaches or medications can prevent a headache from occurring or stop it after it has begun.   Q: What type of doctor should I see to diagnose and treat my headache?  A: Start with your primary caregiver. Discuss his or her experience and approach to headaches. Discuss methods of classification, diagnosis, and treatment. Your caregiver may decide to recommend you to a headache specialist, depending upon your symptoms or other physical conditions. Having diabetes, allergies, etc., may require a more comprehensive and inclusive approach to your headache. The National Headache Foundation will provide, upon request, a list of NHF physician members in your state.  Document Released: 03/09/2005 Document Revised: 03/11/2013 Document Reviewed: 08/17/2009  [employer]® Patient Information ©2013 [place].

## 2020-06-10 NOTE — PROGRESS NOTES
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  DIEZ with FS, sensation grossly intact  Facial symmetry, tongue midline    BP  Min: 108/67  Max: 138/87  Pulse  Av.8  Min: 103  Max: 125  Resp  Av.5  Min: 17  Max: 18  Temp  Av.4 °C (97.5 °F)  Min: 35.9 °C (96.7 °F)  Max: 36.9 °C (98.5 °F)  SpO2  Av.3 %  Min: 95 %  Max: 97 %    No data recorded    Recent Labs     208 20  0501   WBC 5.8 11.2*   RBC 4.38 3.84*   HEMOGLOBIN 13.3 11.9*   HEMATOCRIT 41.0 35.9*   MCV 93.6 93.5   MCH 30.4 31.0   MCHC 32.4* 33.1*   RDW 47.8 49.8   PLATELETCT 323 281   MPV 10.8 10.2     Recent Labs     20  0138 20  0501   SODIUM 137 135   POTASSIUM 3.6 3.8   CHLORIDE 102 105   CO2 19* 22   GLUCOSE 136* 120*   BUN 9 11   CREATININE 0.53 0.52   CALCIUM 9.3 9.3     Recent Labs     20  0138   APTT 38.3*   INR 1.14*           Intake/Output       20 - 06/10/20 0659 06/10/20 0700 - 20 0659       Total  Total       Intake    P.O.  --  220 220  --  -- --    P.O. -- 220 220 -- -- --    Total Intake -- 220 220 -- -- --       Output    Urine  --  -- --  --  -- --    Number of Times Voided -- 3 x 3 x -- -- --    Stool  --  -- --  --  -- --    Number of Times Stooled -- 1 x 1 x -- -- --    Total Output -- -- -- -- -- --       Net I/O     -- 220 220 -- -- --            Intake/Output Summary (Last 24 hours) at 6/10/2020 0751  Last data filed at 20205  Gross per 24 hour   Intake 220 ml   Output --   Net 220 ml            • HYDROcodone-acetaminophen  1 Tab Q4HRS PRN   • propranolol CR  120 mg QHS   • oxyCODONE CR  10 mg Q12HRS   • methimazole  5 mg DAILY   • lisinopril  20 mg DAILY   •  levETIRAcetam  1,000 mg QHS   • hydrOXYzine HCl  50 mg QHS   • apixaban  5 mg BID   • anastrozole  1 mg DAILY   • amitriptyline  10 mg QHS   • senna-docusate  2 Tab BID    And   • polyethylene glycol/lytes  1 Packet QDAY PRN    And   • magnesium hydroxide  30 mL QDAY PRN    And   • bisacodyl  10 mg QDAY PRN   • ondansetron  4 mg Q4HRS PRN   • ondansetron  4 mg Q4HRS PRN   • promethazine  12.5-25 mg Q4HRS PRN   • promethazine  12.5-25 mg Q4HRS PRN   • prochlorperazine  5-10 mg Q4HRS PRN   • ketorolac  30 mg Q6HRS PRN   • metoclopramide  10 mg Q6HRS PRN   • diphenhydrAMINE  25 mg Q6HRS PRN       Assessment and Plan:  Hospital day#  headache  POD #na  Prophylactic anticoagulation: yes         Start date/time: 6/9/2020    Shunt tap yesterday working without sign of obstruction possible ha due to intermittent failure vs likely protracted sigh setting after MRI 2 months ago  Head CT stable  Skull XR confirms correct shunt setting of 3    OK to DC from NSGY POV when headaches controlled

## 2020-06-10 NOTE — CARE PLAN
Problem: Pain Management  Goal: Pain level will decrease to patient's comfort goal  Outcome: PROGRESSING AS EXPECTED  Note: Norco medication adjusted yesterday 06/09/2020. Pt. Educated about the pain scale and non pharmacological pain methods, check the MAR     Problem: Mobility  Goal: Risk for activity intolerance will decrease  Outcome: PROGRESSING AS EXPECTED  Note: Pt. Frequently ambulating to the bathroom and in the room, continue to encourage to mobilize

## 2020-06-12 ENCOUNTER — HOSPITAL ENCOUNTER (EMERGENCY)
Facility: MEDICAL CENTER | Age: 50
End: 2020-06-12
Attending: EMERGENCY MEDICINE
Payer: MEDICARE

## 2020-06-12 VITALS
OXYGEN SATURATION: 97 % | HEIGHT: 64 IN | TEMPERATURE: 97.9 F | BODY MASS INDEX: 23.05 KG/M2 | RESPIRATION RATE: 18 BRPM | HEART RATE: 101 BPM | DIASTOLIC BLOOD PRESSURE: 89 MMHG | WEIGHT: 135 LBS | SYSTOLIC BLOOD PRESSURE: 141 MMHG

## 2020-06-12 DIAGNOSIS — R51.9 ACUTE NONINTRACTABLE HEADACHE, UNSPECIFIED HEADACHE TYPE: Primary | ICD-10-CM

## 2020-06-12 PROCEDURE — 96372 THER/PROPH/DIAG INJ SC/IM: CPT | Mod: XU

## 2020-06-12 PROCEDURE — 99284 EMERGENCY DEPT VISIT MOD MDM: CPT

## 2020-06-12 PROCEDURE — 96374 THER/PROPH/DIAG INJ IV PUSH: CPT

## 2020-06-12 PROCEDURE — 700111 HCHG RX REV CODE 636 W/ 250 OVERRIDE (IP): Performed by: EMERGENCY MEDICINE

## 2020-06-12 PROCEDURE — 700105 HCHG RX REV CODE 258: Performed by: EMERGENCY MEDICINE

## 2020-06-12 PROCEDURE — 96375 TX/PRO/DX INJ NEW DRUG ADDON: CPT

## 2020-06-12 RX ORDER — SUMATRIPTAN 6 MG/.5ML
6 INJECTION, SOLUTION SUBCUTANEOUS ONCE
Status: COMPLETED | OUTPATIENT
Start: 2020-06-12 | End: 2020-06-12

## 2020-06-12 RX ORDER — METOCLOPRAMIDE HYDROCHLORIDE 5 MG/ML
10 INJECTION INTRAMUSCULAR; INTRAVENOUS ONCE
Status: COMPLETED | OUTPATIENT
Start: 2020-06-12 | End: 2020-06-12

## 2020-06-12 RX ORDER — DIPHENHYDRAMINE HYDROCHLORIDE 50 MG/ML
25 INJECTION INTRAMUSCULAR; INTRAVENOUS ONCE
Status: COMPLETED | OUTPATIENT
Start: 2020-06-12 | End: 2020-06-12

## 2020-06-12 RX ORDER — SODIUM CHLORIDE 9 MG/ML
1000 INJECTION, SOLUTION INTRAVENOUS ONCE
Status: COMPLETED | OUTPATIENT
Start: 2020-06-12 | End: 2020-06-12

## 2020-06-12 RX ADMIN — METOCLOPRAMIDE 10 MG: 5 INJECTION, SOLUTION INTRAMUSCULAR; INTRAVENOUS at 17:24

## 2020-06-12 RX ADMIN — SUMATRIPTAN 6 MG: 6 INJECTION, SOLUTION SUBCUTANEOUS at 17:24

## 2020-06-12 RX ADMIN — SODIUM CHLORIDE 1000 ML: 9 INJECTION, SOLUTION INTRAVENOUS at 18:18

## 2020-06-12 RX ADMIN — DIPHENHYDRAMINE HYDROCHLORIDE 25 MG: 50 INJECTION INTRAMUSCULAR; INTRAVENOUS at 17:24

## 2020-06-12 ASSESSMENT — ENCOUNTER SYMPTOMS
NAUSEA: 1
TINGLING: 0
COUGH: 0
PALPITATIONS: 0
SHORTNESS OF BREATH: 0
DIZZINESS: 0
VOMITING: 0
NECK PAIN: 0
HEADACHES: 1
ABDOMINAL PAIN: 0
SENSORY CHANGE: 0
FEVER: 0
CHILLS: 0
SPEECH CHANGE: 0
FOCAL WEAKNESS: 0
LOSS OF CONSCIOUSNESS: 0

## 2020-06-12 ASSESSMENT — LIFESTYLE VARIABLES
DO YOU DRINK ALCOHOL: YES
SUBSTANCE_ABUSE: 0

## 2020-06-12 ASSESSMENT — FIBROSIS 4 INDEX: FIB4 SCORE: 1.37

## 2020-06-12 NOTE — ED PROVIDER NOTES
ED Provider Note   6/12/2020  4:37 PM    Means of Arrival: EMS  History obtained by: patient  Limitations: none    CHIEF COMPLAINT  Chief Complaint   Patient presents with   • Headache     [id number], history of migraine        HPI  Rasheeda Manzano is a 49 y.o. female with history of migraines, hydrocephalus with a  shunt, saddle pulmonary emboli, anticoagulated, blindness presenting back to emergency department with headache that started 4 hours ago.  Headache started approximately 4 hours ago.  She says similar onset location the prior.  Pain is at the right side of her head.  Described as intense, pounding. No improvement after taking norco.  Denies any weakness or numbness in extremities.  No difficulty speaking.  No aphasia.  Feeling nauseous but no vomiting.  No fevers.  She says that the medication that worked in the hospital was Dilaudid.    She was last admitted on June 8, 2020 (4 days ago) for similar concerns.  She was admitted for intractable headache.  She had extensive work-up with multiple consults.  She had CT scan of the head, CTA of the head, neurosurgery evaluation that included a shunt tap.  None of these tests revealed cause of headache.  There was no increased intracranial pressure.  Blood cultures negative.  She received multiple intravenous medications to treat headache.  Initial lab work without leukocytosis.  There was an increase in white blood cell count to 11.2 but this was after steroids.      REVIEW OF SYSTEMS  Review of Systems   Constitutional: Negative for chills and fever.   HENT: Negative for congestion.    Eyes:        Blind since age 11   Respiratory: Negative for cough and shortness of breath.    Cardiovascular: Negative for chest pain and palpitations.   Gastrointestinal: Positive for nausea. Negative for abdominal pain and vomiting.   Musculoskeletal: Negative for neck pain.   Skin: Negative for rash.   Neurological: Positive for headaches. Negative for dizziness,  tingling, sensory change, speech change, focal weakness and loss of consciousness.   Psychiatric/Behavioral: Negative for substance abuse.   All other systems reviewed and are negative.    See HPI for further details.     PAST MEDICAL HISTORY   has a past medical history of Acute nasopharyngitis, Blind, C. difficile colitis, C. difficile diarrhea (), Cancer (), Chronic daily headache, Depression, Esophageal atresia (1971), Esophageal bleeding (2019), Fall, GERD (gastroesophageal reflux disease), H/O total hysterectomy, Heart burn (2019), Hydrocephalus (), Hydrocephalus ([facility]), Indigestion (2019), Jaundice, Legally blind, Migraine without aura, without mention of intractable migraine without mention of status migrainosus, Other specified symptom associated with female genital organs, Pain, Pain (2019), Psychiatric problem, PTSD (post-traumatic stress disorder), Seizure () (2019), and Snoring (2019).    SOCIAL HISTORY  Social History     Tobacco Use   • Smoking status: Former Smoker     Packs/day: 1.00     Years: 10.00     Pack years: 10.00     Types: Cigars     Start date: 2004     Last attempt to quit: 2017     Years since quittin.8   • Smokeless tobacco: Former User   • Tobacco comment:  CIGAR/day    Substance and Sexual Activity   • Alcohol use: Yes     Frequency: Monthly or less     Drinks per session: 1 or 2   • Drug use: No   • Sexual activity: Yes     Partners: Male       SURGICAL HISTORY   has a past surgical history that includes laparoscopy (08); lysis adhesions general (12/10/2013); cystoscopy (12/10/2013); aakash by laparoscopy (N/A, 2015); gastroscopy-endo (N/A, 2017); nissen fundoplication laparoscopic; abdominal hysterectomy total (12/10/2013); other; exploratory laparotomy (12/10/2013); appendectomy (12/10/2013); cholecystectomy (N/A, 2015); gastroscopy (N/A, 2019); mastectomy (Right, 2019); and node biopsy  "sentinel (Right, 12/19/2019).    CURRENT MEDICATIONS  Home Medications     Reviewed by Patt Dutton R.N. (Registered Nurse) on 06/12/20 at 1604  Med List Status: Unable to Obtain   Medication Last Dose Status   amitriptyline (ELAVIL) 10 MG Tab  Active   anastrozole (ARIMIDEX) 1 MG Tab  Active   apixaban (ELIQUIS) 5mg Tab  Active   esomeprazole (NEXIUM) 40 MG delayed-release capsule  Active   HYDROcodone-acetaminophen (NORCO) 5-325 MG Tab per tablet 6/12/2020 Active   hydrOXYzine HCl (ATARAX) 50 MG Tab  Active   levETIRAcetam (KEPPRA) 500 MG Tab  Active   lisinopril (PRINIVIL) 20 MG Tab  Active   LORazepam (ATIVAN) 1 MG Tab  Active   methimazole (TAPAZOLE) 5 MG Tab  Active   ondansetron (ZOFRAN ODT) 4 MG TABLET DISPERSIBLE  Active   oxyCODONE ER 9 MG Capsule Extended Release 12 hour Abuse-Deterrent  Active   propranolol CR (INDERAL LA) 120 MG CAPSULE SR 24 HR  Active   sucralfate (CARAFATE) 1 GM Tab  Active                ALLERGIES  Allergies   Allergen Reactions   • Tape Rash     RXN= ongoing  Adhesive Medical tape. Per patient, paper tape ok.   • Latex Rash     Rash       PHYSICAL EXAM  VITAL SIGNS: /89   Pulse (!) 113   Temp 36.4 °C (97.5 °F) (Temporal)   Resp 12   Ht 1.626 m (5' 4\")   Wt 61.2 kg (135 lb)   LMP 11/27/2013   SpO2 94%   BMI 23.17 kg/m²    Pulse ox interpretation: I interpret this pulse ox as normal.  Constitutional: Alert in no apparent distress.  Calm and pleasant 49-year-old woman.  HENT: Normocephalic, Atraumatic, Bilateral external ears normal. Nose normal.   Eyes: Blindness, extraocular movements normal, no discharge.  Heart: Mildly increased heart rate and regular rhythm, no murmurs.    Lungs: No respiratory distress, regular respirations. Clear to auscultation bilaterally.  Abdomen: Normal appearance, nondistended, nontender.  Skin: Warm, Dry, No erythema, No rash.   Neurologic: Alert and oriented to person place time situation.  Clear speech.  No aphasia.  Moving all " extremities with equal and normal strength.  No facial droop.  MSK: No neck tenderness or limitations with range of motion.  Psychiatric: Affect normal, Judgment normal, Mood normal, Appears appropriate and not intoxicated.   Physical Exam      COURSE & MEDICAL DECISION MAKING  Pertinent Labs & Imaging studies reviewed. (See chart for details)    4:37 PM This is an emergent evaluation of a 49 y.o., female with multiple medical problems including hydrocephalus  shunt, thromboembolic disease now with anticoagulation, chronic migraines now presenting with recurrent migraine-like headache.  She says that the onset and quality symptoms are similar to usual.  Was unable to treat headache at home this came to the right department.  She has no neurologic deficits.  Plan to try migraine abortive medications with Benadryl, Reglan, Imitrex.  She says it has been a long time since she has tried Imitrex.  No prior adverse reactions to this medication.    8:02 PM  I checked on her after she received medications and her symptoms were starting to improve and she requested more time to rest to see if headache would resolve.  Headache now completely resolved.  She is requesting to go home.  She continues to have baseline neurologic exam.  She will be discharged now at this time.  She still has mildly elevated heart rate but continues to be very well-appearing with no symptoms at this time.  No fever, no meningeal symptoms.     The patient will return for worsening symptoms and is stable at the time of discharge. The patient verbalizes understanding. Guidance was provided on appropriate use of medications including driving under the influence, overdose, and side effects.     FINAL IMPRESSION  1. Acute nonintractable headache, unspecified headache type Active              Electronically signed by: Meliton Gibson II, M.D., 6/12/2020 4:37 PM

## 2020-06-12 NOTE — ED TRIAGE NOTES
"Chief Complaint   Patient presents with   • Headache     [id number], history of migraine      Pt wheeled to triage, legally blind, c/o \"migraine headache\" started 4hours ago. Pt has history of Migraine and Hydrocephalus , recently admitted for the same. Denies neurological deficits.   Pt took Norco for pain but no relief.       "

## 2020-06-12 NOTE — ED NOTES
50 y/o female reports HA x4 hours described as 9/10 in intensity that came on suddenly with associated vomiting/nausea. Reports Eliquis use today (per regular Rx). No Neurological symptoms noted in triage, [place] stroke exam negative.

## 2020-06-17 ENCOUNTER — APPOINTMENT (OUTPATIENT)
Dept: RADIOLOGY | Facility: MEDICAL CENTER | Age: 50
End: 2020-06-17
Attending: EMERGENCY MEDICINE
Payer: MEDICARE

## 2020-06-17 ENCOUNTER — HOSPITAL ENCOUNTER (OUTPATIENT)
Facility: MEDICAL CENTER | Age: 50
End: 2020-06-18
Attending: EMERGENCY MEDICINE | Admitting: INTERNAL MEDICINE
Payer: MEDICARE

## 2020-06-17 DIAGNOSIS — G43.901 STATUS MIGRAINOSUS: ICD-10-CM

## 2020-06-17 DIAGNOSIS — E87.6 HYPOKALEMIA: ICD-10-CM

## 2020-06-17 DIAGNOSIS — R42 DIZZINESS: ICD-10-CM

## 2020-06-17 DIAGNOSIS — R51.9 CHRONIC NONINTRACTABLE HEADACHE, UNSPECIFIED HEADACHE TYPE: ICD-10-CM

## 2020-06-17 DIAGNOSIS — S09.90XA CLOSED HEAD INJURY, INITIAL ENCOUNTER: ICD-10-CM

## 2020-06-17 DIAGNOSIS — G43.001 MIGRAINE WITHOUT AURA AND WITH STATUS MIGRAINOSUS, NOT INTRACTABLE: ICD-10-CM

## 2020-06-17 DIAGNOSIS — G89.29 CHRONIC NONINTRACTABLE HEADACHE, UNSPECIFIED HEADACHE TYPE: ICD-10-CM

## 2020-06-17 PROBLEM — D68.318 ACQUIRED CIRCULATING ANTICOAGULANTS (HCC): Status: ACTIVE | Noted: 2020-06-17

## 2020-06-17 PROBLEM — S09.8XXA BLUNT HEAD TRAUMA: Status: ACTIVE | Noted: 2020-06-17

## 2020-06-17 LAB
ALBUMIN SERPL BCP-MCNC: 5.3 G/DL (ref 3.2–4.9)
ALBUMIN/GLOB SERPL: 1.6 G/DL
ALP SERPL-CCNC: 145 U/L (ref 30–99)
ALT SERPL-CCNC: 25 U/L (ref 2–50)
ANION GAP SERPL CALC-SCNC: 19 MMOL/L (ref 7–16)
AST SERPL-CCNC: 17 U/L (ref 12–45)
BASOPHILS # BLD AUTO: 0.7 % (ref 0–1.8)
BASOPHILS # BLD: 0.06 K/UL (ref 0–0.12)
BILIRUB SERPL-MCNC: 0.9 MG/DL (ref 0.1–1.5)
BUN SERPL-MCNC: 5 MG/DL (ref 8–22)
CALCIUM SERPL-MCNC: 10.2 MG/DL (ref 8.5–10.5)
CHLORIDE SERPL-SCNC: 98 MMOL/L (ref 96–112)
CO2 SERPL-SCNC: 22 MMOL/L (ref 20–33)
CREAT SERPL-MCNC: 0.77 MG/DL (ref 0.5–1.4)
EKG IMPRESSION: NORMAL
EOSINOPHIL # BLD AUTO: 0.11 K/UL (ref 0–0.51)
EOSINOPHIL NFR BLD: 1.3 % (ref 0–6.9)
ERYTHROCYTE [DISTWIDTH] IN BLOOD BY AUTOMATED COUNT: 46.5 FL (ref 35.9–50)
GLOBULIN SER CALC-MCNC: 3.4 G/DL (ref 1.9–3.5)
GLUCOSE SERPL-MCNC: 107 MG/DL (ref 65–99)
HCT VFR BLD AUTO: 45 % (ref 37–47)
HGB BLD-MCNC: 14.9 G/DL (ref 12–16)
IMM GRANULOCYTES # BLD AUTO: 0.02 K/UL (ref 0–0.11)
IMM GRANULOCYTES NFR BLD AUTO: 0.2 % (ref 0–0.9)
INR PPP: 1.29 (ref 0.87–1.13)
LYMPHOCYTES # BLD AUTO: 1.02 K/UL (ref 1–4.8)
LYMPHOCYTES NFR BLD: 11.7 % (ref 22–41)
MAGNESIUM SERPL-MCNC: 2 MG/DL (ref 1.5–2.5)
MCH RBC QN AUTO: 30.5 PG (ref 27–33)
MCHC RBC AUTO-ENTMCNC: 33.1 G/DL (ref 33.6–35)
MCV RBC AUTO: 92.2 FL (ref 81.4–97.8)
MONOCYTES # BLD AUTO: 0.7 K/UL (ref 0–0.85)
MONOCYTES NFR BLD AUTO: 8 % (ref 0–13.4)
NEUTROPHILS # BLD AUTO: 6.84 K/UL (ref 2–7.15)
NEUTROPHILS NFR BLD: 78.1 % (ref 44–72)
NRBC # BLD AUTO: 0 K/UL
NRBC BLD-RTO: 0 /100 WBC
PLATELET # BLD AUTO: 403 K/UL (ref 164–446)
PMV BLD AUTO: 9.9 FL (ref 9–12.9)
POTASSIUM SERPL-SCNC: 2.7 MMOL/L (ref 3.6–5.5)
PROT SERPL-MCNC: 8.7 G/DL (ref 6–8.2)
PROTHROMBIN TIME: 16.5 SEC (ref 12–14.6)
RBC # BLD AUTO: 4.88 M/UL (ref 4.2–5.4)
SODIUM SERPL-SCNC: 139 MMOL/L (ref 135–145)
TROPONIN T SERPL-MCNC: 9 NG/L (ref 6–19)
WBC # BLD AUTO: 8.8 K/UL (ref 4.8–10.8)

## 2020-06-17 PROCEDURE — 94760 N-INVAS EAR/PLS OXIMETRY 1: CPT

## 2020-06-17 PROCEDURE — 99285 EMERGENCY DEPT VISIT HI MDM: CPT

## 2020-06-17 PROCEDURE — 96375 TX/PRO/DX INJ NEW DRUG ADDON: CPT

## 2020-06-17 PROCEDURE — 96365 THER/PROPH/DIAG IV INF INIT: CPT

## 2020-06-17 PROCEDURE — 99220 PR INITIAL OBSERVATION CARE,LEVL III: CPT | Performed by: INTERNAL MEDICINE

## 2020-06-17 PROCEDURE — 700105 HCHG RX REV CODE 258: Performed by: INTERNAL MEDICINE

## 2020-06-17 PROCEDURE — 71045 X-RAY EXAM CHEST 1 VIEW: CPT

## 2020-06-17 PROCEDURE — 84484 ASSAY OF TROPONIN QUANT: CPT

## 2020-06-17 PROCEDURE — 85610 PROTHROMBIN TIME: CPT

## 2020-06-17 PROCEDURE — 700102 HCHG RX REV CODE 250 W/ 637 OVERRIDE(OP): Performed by: INTERNAL MEDICINE

## 2020-06-17 PROCEDURE — 70450 CT HEAD/BRAIN W/O DYE: CPT

## 2020-06-17 PROCEDURE — 80053 COMPREHEN METABOLIC PANEL: CPT

## 2020-06-17 PROCEDURE — 96376 TX/PRO/DX INJ SAME DRUG ADON: CPT

## 2020-06-17 PROCEDURE — G0378 HOSPITAL OBSERVATION PER HR: HCPCS

## 2020-06-17 PROCEDURE — 93005 ELECTROCARDIOGRAM TRACING: CPT | Performed by: EMERGENCY MEDICINE

## 2020-06-17 PROCEDURE — A9270 NON-COVERED ITEM OR SERVICE: HCPCS | Performed by: INTERNAL MEDICINE

## 2020-06-17 PROCEDURE — 83735 ASSAY OF MAGNESIUM: CPT

## 2020-06-17 PROCEDURE — 700111 HCHG RX REV CODE 636 W/ 250 OVERRIDE (IP): Performed by: INTERNAL MEDICINE

## 2020-06-17 PROCEDURE — 85025 COMPLETE CBC W/AUTO DIFF WBC: CPT

## 2020-06-17 PROCEDURE — 700111 HCHG RX REV CODE 636 W/ 250 OVERRIDE (IP): Performed by: EMERGENCY MEDICINE

## 2020-06-17 RX ORDER — OXYCODONE HYDROCHLORIDE 5 MG/1
5 TABLET ORAL
Status: DISCONTINUED | OUTPATIENT
Start: 2020-06-17 | End: 2020-06-18 | Stop reason: HOSPADM

## 2020-06-17 RX ORDER — METHIMAZOLE 5 MG/1
5 TABLET ORAL
COMMUNITY
End: 2020-08-06

## 2020-06-17 RX ORDER — ENALAPRILAT 1.25 MG/ML
1.25 INJECTION INTRAVENOUS EVERY 6 HOURS PRN
Status: DISCONTINUED | OUTPATIENT
Start: 2020-06-17 | End: 2020-06-18 | Stop reason: HOSPADM

## 2020-06-17 RX ORDER — LISINOPRIL 20 MG/1
20 TABLET ORAL DAILY
COMMUNITY

## 2020-06-17 RX ORDER — ONDANSETRON 2 MG/ML
4 INJECTION INTRAMUSCULAR; INTRAVENOUS ONCE
Status: COMPLETED | OUTPATIENT
Start: 2020-06-17 | End: 2020-06-17

## 2020-06-17 RX ORDER — POTASSIUM CHLORIDE 7.45 MG/ML
10 INJECTION INTRAVENOUS ONCE
Status: DISCONTINUED | OUTPATIENT
Start: 2020-06-17 | End: 2020-06-17

## 2020-06-17 RX ORDER — OMEPRAZOLE 20 MG/1
40 CAPSULE, DELAYED RELEASE ORAL
Status: DISCONTINUED | OUTPATIENT
Start: 2020-06-17 | End: 2020-06-18 | Stop reason: HOSPADM

## 2020-06-17 RX ORDER — PROCHLORPERAZINE EDISYLATE 5 MG/ML
5-10 INJECTION INTRAMUSCULAR; INTRAVENOUS EVERY 4 HOURS PRN
Status: DISCONTINUED | OUTPATIENT
Start: 2020-06-17 | End: 2020-06-18 | Stop reason: HOSPADM

## 2020-06-17 RX ORDER — ACETAMINOPHEN 325 MG/1
650 TABLET ORAL EVERY 6 HOURS PRN
Status: DISCONTINUED | OUTPATIENT
Start: 2020-06-17 | End: 2020-06-18 | Stop reason: HOSPADM

## 2020-06-17 RX ORDER — METOCLOPRAMIDE HYDROCHLORIDE 5 MG/ML
10 INJECTION INTRAMUSCULAR; INTRAVENOUS EVERY 6 HOURS
Status: DISCONTINUED | OUTPATIENT
Start: 2020-06-17 | End: 2020-06-18 | Stop reason: HOSPADM

## 2020-06-17 RX ORDER — HYDROXYZINE 50 MG/1
50 TABLET, FILM COATED ORAL
Status: DISCONTINUED | OUTPATIENT
Start: 2020-06-17 | End: 2020-06-18 | Stop reason: HOSPADM

## 2020-06-17 RX ORDER — OXYCODONE HYDROCHLORIDE 10 MG/1
10 TABLET ORAL
Status: DISCONTINUED | OUTPATIENT
Start: 2020-06-17 | End: 2020-06-18 | Stop reason: HOSPADM

## 2020-06-17 RX ORDER — ONDANSETRON 2 MG/ML
4 INJECTION INTRAMUSCULAR; INTRAVENOUS EVERY 4 HOURS PRN
Status: DISCONTINUED | OUTPATIENT
Start: 2020-06-17 | End: 2020-06-18 | Stop reason: HOSPADM

## 2020-06-17 RX ORDER — METOCLOPRAMIDE HYDROCHLORIDE 5 MG/ML
10 INJECTION INTRAMUSCULAR; INTRAVENOUS ONCE
Status: COMPLETED | OUTPATIENT
Start: 2020-06-17 | End: 2020-06-17

## 2020-06-17 RX ORDER — MORPHINE SULFATE 4 MG/ML
4 INJECTION, SOLUTION INTRAMUSCULAR; INTRAVENOUS
Status: DISCONTINUED | OUTPATIENT
Start: 2020-06-17 | End: 2020-06-18 | Stop reason: HOSPADM

## 2020-06-17 RX ORDER — BISACODYL 10 MG
10 SUPPOSITORY, RECTAL RECTAL
Status: DISCONTINUED | OUTPATIENT
Start: 2020-06-17 | End: 2020-06-18 | Stop reason: HOSPADM

## 2020-06-17 RX ORDER — MORPHINE SULFATE 4 MG/ML
4 INJECTION, SOLUTION INTRAMUSCULAR; INTRAVENOUS ONCE
Status: COMPLETED | OUTPATIENT
Start: 2020-06-17 | End: 2020-06-17

## 2020-06-17 RX ORDER — LEVETIRACETAM 500 MG/1
500 TABLET ORAL 2 TIMES DAILY
Status: DISCONTINUED | OUTPATIENT
Start: 2020-06-17 | End: 2020-06-18 | Stop reason: HOSPADM

## 2020-06-17 RX ORDER — DEXAMETHASONE SODIUM PHOSPHATE 4 MG/ML
10 INJECTION, SOLUTION INTRA-ARTICULAR; INTRALESIONAL; INTRAMUSCULAR; INTRAVENOUS; SOFT TISSUE ONCE
Status: COMPLETED | OUTPATIENT
Start: 2020-06-17 | End: 2020-06-17

## 2020-06-17 RX ORDER — POTASSIUM CHLORIDE 20 MEQ/1
60 TABLET, EXTENDED RELEASE ORAL ONCE
Status: COMPLETED | OUTPATIENT
Start: 2020-06-17 | End: 2020-06-17

## 2020-06-17 RX ORDER — CLONIDINE HYDROCHLORIDE 0.1 MG/1
0.1 TABLET ORAL EVERY 6 HOURS PRN
Status: DISCONTINUED | OUTPATIENT
Start: 2020-06-17 | End: 2020-06-18 | Stop reason: HOSPADM

## 2020-06-17 RX ORDER — DIPHENHYDRAMINE HYDROCHLORIDE 50 MG/ML
25 INJECTION INTRAMUSCULAR; INTRAVENOUS ONCE
Status: COMPLETED | OUTPATIENT
Start: 2020-06-17 | End: 2020-06-17

## 2020-06-17 RX ORDER — ANASTROZOLE 1 MG/1
1 TABLET ORAL EVERY EVENING
Status: DISCONTINUED | OUTPATIENT
Start: 2020-06-17 | End: 2020-06-18 | Stop reason: HOSPADM

## 2020-06-17 RX ORDER — PROPRANOLOL HCL 60 MG
120 CAPSULE, EXTENDED RELEASE 24HR ORAL
Status: DISCONTINUED | OUTPATIENT
Start: 2020-06-17 | End: 2020-06-18 | Stop reason: HOSPADM

## 2020-06-17 RX ORDER — ONDANSETRON 4 MG/1
4 TABLET, ORALLY DISINTEGRATING ORAL EVERY 4 HOURS PRN
Status: DISCONTINUED | OUTPATIENT
Start: 2020-06-17 | End: 2020-06-18 | Stop reason: HOSPADM

## 2020-06-17 RX ORDER — LABETALOL HYDROCHLORIDE 5 MG/ML
10 INJECTION, SOLUTION INTRAVENOUS EVERY 4 HOURS PRN
Status: DISCONTINUED | OUTPATIENT
Start: 2020-06-17 | End: 2020-06-18 | Stop reason: HOSPADM

## 2020-06-17 RX ORDER — PROMETHAZINE HYDROCHLORIDE 25 MG/1
12.5-25 TABLET ORAL EVERY 4 HOURS PRN
Status: DISCONTINUED | OUTPATIENT
Start: 2020-06-17 | End: 2020-06-18 | Stop reason: HOSPADM

## 2020-06-17 RX ORDER — AMITRIPTYLINE HYDROCHLORIDE 10 MG/1
10 TABLET, FILM COATED ORAL
Status: DISCONTINUED | OUTPATIENT
Start: 2020-06-17 | End: 2020-06-18 | Stop reason: HOSPADM

## 2020-06-17 RX ORDER — POLYETHYLENE GLYCOL 3350 17 G/17G
1 POWDER, FOR SOLUTION ORAL
Status: DISCONTINUED | OUTPATIENT
Start: 2020-06-17 | End: 2020-06-18 | Stop reason: HOSPADM

## 2020-06-17 RX ORDER — LISINOPRIL 20 MG/1
20 TABLET ORAL
Status: DISCONTINUED | OUTPATIENT
Start: 2020-06-17 | End: 2020-06-18 | Stop reason: HOSPADM

## 2020-06-17 RX ORDER — AMOXICILLIN 250 MG
2 CAPSULE ORAL 2 TIMES DAILY
Status: DISCONTINUED | OUTPATIENT
Start: 2020-06-17 | End: 2020-06-18 | Stop reason: HOSPADM

## 2020-06-17 RX ORDER — MAGNESIUM SULFATE HEPTAHYDRATE 40 MG/ML
2 INJECTION, SOLUTION INTRAVENOUS ONCE
Status: COMPLETED | OUTPATIENT
Start: 2020-06-17 | End: 2020-06-18

## 2020-06-17 RX ORDER — PROMETHAZINE HYDROCHLORIDE 25 MG/1
12.5-25 SUPPOSITORY RECTAL EVERY 4 HOURS PRN
Status: DISCONTINUED | OUTPATIENT
Start: 2020-06-17 | End: 2020-06-18 | Stop reason: HOSPADM

## 2020-06-17 RX ORDER — LORAZEPAM 1 MG/1
1 TABLET ORAL
Status: DISCONTINUED | OUTPATIENT
Start: 2020-06-17 | End: 2020-06-18 | Stop reason: HOSPADM

## 2020-06-17 RX ORDER — DIHYDROERGOTAMINE MESYLATE 1 MG/ML
1 INJECTION, SOLUTION INTRAMUSCULAR; INTRAVENOUS; SUBCUTANEOUS ONCE
Status: COMPLETED | OUTPATIENT
Start: 2020-06-17 | End: 2020-06-17

## 2020-06-17 RX ORDER — SODIUM CHLORIDE 9 MG/ML
1000 INJECTION, SOLUTION INTRAVENOUS ONCE
Status: COMPLETED | OUTPATIENT
Start: 2020-06-17 | End: 2020-06-18

## 2020-06-17 RX ORDER — DIPHENHYDRAMINE HYDROCHLORIDE 50 MG/ML
25 INJECTION INTRAMUSCULAR; INTRAVENOUS EVERY 6 HOURS
Status: DISCONTINUED | OUTPATIENT
Start: 2020-06-17 | End: 2020-06-18 | Stop reason: HOSPADM

## 2020-06-17 RX ORDER — METHIMAZOLE 5 MG/1
5 TABLET ORAL
Status: DISCONTINUED | OUTPATIENT
Start: 2020-06-17 | End: 2020-06-18 | Stop reason: HOSPADM

## 2020-06-17 RX ADMIN — LEVETIRACETAM 500 MG: 500 TABLET ORAL at 17:13

## 2020-06-17 RX ADMIN — OXYCODONE 5 MG: 5 TABLET ORAL at 23:02

## 2020-06-17 RX ADMIN — DEXAMETHASONE SODIUM PHOSPHATE 10 MG: 4 INJECTION, SOLUTION INTRA-ARTICULAR; INTRALESIONAL; INTRAMUSCULAR; INTRAVENOUS; SOFT TISSUE at 17:15

## 2020-06-17 RX ADMIN — METOCLOPRAMIDE 10 MG: 5 INJECTION, SOLUTION INTRAMUSCULAR; INTRAVENOUS at 14:16

## 2020-06-17 RX ADMIN — METHIMAZOLE 5 MG: 5 TABLET ORAL at 22:53

## 2020-06-17 RX ADMIN — METOCLOPRAMIDE 10 MG: 5 INJECTION, SOLUTION INTRAMUSCULAR; INTRAVENOUS at 17:15

## 2020-06-17 RX ADMIN — LORAZEPAM 1 MG: 1 TABLET ORAL at 22:55

## 2020-06-17 RX ADMIN — DIHYDROERGOTAMINE MESYLATE 1 MG: 1 INJECTION, SOLUTION INTRAMUSCULAR; INTRAVENOUS; SUBCUTANEOUS at 17:14

## 2020-06-17 RX ADMIN — DIPHENHYDRAMINE HYDROCHLORIDE 25 MG: 50 INJECTION INTRAMUSCULAR; INTRAVENOUS at 14:17

## 2020-06-17 RX ADMIN — ANASTROZOLE 1 MG: 1 TABLET, COATED ORAL at 17:13

## 2020-06-17 RX ADMIN — AMITRIPTYLINE HYDROCHLORIDE 10 MG: 10 TABLET, FILM COATED ORAL at 22:58

## 2020-06-17 RX ADMIN — APIXABAN 5 MG: 5 TABLET, FILM COATED ORAL at 22:58

## 2020-06-17 RX ADMIN — ONDANSETRON 4 MG: 2 INJECTION INTRAMUSCULAR; INTRAVENOUS at 13:25

## 2020-06-17 RX ADMIN — LISINOPRIL 20 MG: 20 TABLET ORAL at 22:56

## 2020-06-17 RX ADMIN — OMEPRAZOLE 40 MG: 20 CAPSULE, DELAYED RELEASE ORAL at 22:54

## 2020-06-17 RX ADMIN — MORPHINE SULFATE 4 MG: 4 INJECTION INTRAVENOUS at 13:27

## 2020-06-17 RX ADMIN — POTASSIUM CHLORIDE 10 MEQ: 7.46 INJECTION, SOLUTION INTRAVENOUS at 14:15

## 2020-06-17 RX ADMIN — SODIUM CHLORIDE 1000 ML: 9 INJECTION, SOLUTION INTRAVENOUS at 17:12

## 2020-06-17 RX ADMIN — PROPRANOLOL HYDROCHLORIDE 120 MG: 60 CAPSULE, EXTENDED RELEASE ORAL at 22:51

## 2020-06-17 RX ADMIN — DIPHENHYDRAMINE HYDROCHLORIDE 25 MG: 50 INJECTION INTRAMUSCULAR; INTRAVENOUS at 17:15

## 2020-06-17 RX ADMIN — HYDROXYZINE HYDROCHLORIDE 50 MG: 50 TABLET, FILM COATED ORAL at 22:57

## 2020-06-17 RX ADMIN — POTASSIUM CHLORIDE 60 MEQ: 1500 TABLET, EXTENDED RELEASE ORAL at 17:12

## 2020-06-17 ASSESSMENT — LIFESTYLE VARIABLES
EVER FELT BAD OR GUILTY ABOUT YOUR DRINKING: NO
TOTAL SCORE: 0
TOTAL SCORE: 0
HAVE PEOPLE ANNOYED YOU BY CRITICIZING YOUR DRINKING: NO
EVER HAD A DRINK FIRST THING IN THE MORNING TO STEADY YOUR NERVES TO GET RID OF A HANGOVER: NO
HOW MANY TIMES IN THE PAST YEAR HAVE YOU HAD 5 OR MORE DRINKS IN A DAY: 0
ALCOHOL_USE: NO
TOTAL SCORE: 0
HAVE YOU EVER FELT YOU SHOULD CUT DOWN ON YOUR DRINKING: NO
AVERAGE NUMBER OF DAYS PER WEEK YOU HAVE A DRINK CONTAINING ALCOHOL: 0
ON A TYPICAL DAY WHEN YOU DRINK ALCOHOL HOW MANY DRINKS DO YOU HAVE: 0
EVER_SMOKED: NEVER
CONSUMPTION TOTAL: NEGATIVE
DOES PATIENT WANT TO STOP DRINKING: NO

## 2020-06-17 ASSESSMENT — COPD QUESTIONNAIRES
DO YOU EVER COUGH UP ANY MUCUS OR PHLEGM?: NO/ONLY WITH OCCASIONAL COLDS OR INFECTIONS
DURING THE PAST 4 WEEKS HOW MUCH DID YOU FEEL SHORT OF BREATH: NONE/LITTLE OF THE TIME
IN THE PAST 12 MONTHS DO YOU DO LESS THAN YOU USED TO BECAUSE OF YOUR BREATHING PROBLEMS: DISAGREE/UNSURE
COPD SCREENING SCORE: 0
DURING THE PAST 4 WEEKS HOW MUCH DID YOU FEEL SHORT OF BREATH: NONE/LITTLE OF THE TIME
HAVE YOU SMOKED AT LEAST 100 CIGARETTES IN YOUR ENTIRE LIFE: NO/DON'T KNOW
COPD SCREENING SCORE: 0
HAVE YOU SMOKED AT LEAST 100 CIGARETTES IN YOUR ENTIRE LIFE: NO/DON'T KNOW
DO YOU EVER COUGH UP ANY MUCUS OR PHLEGM?: NO/ONLY WITH OCCASIONAL COLDS OR INFECTIONS

## 2020-06-17 ASSESSMENT — PATIENT HEALTH QUESTIONNAIRE - PHQ9
1. LITTLE INTEREST OR PLEASURE IN DOING THINGS: NOT AT ALL
2. FEELING DOWN, DEPRESSED, IRRITABLE, OR HOPELESS: NOT AT ALL
SUM OF ALL RESPONSES TO PHQ9 QUESTIONS 1 AND 2: 0
1. LITTLE INTEREST OR PLEASURE IN DOING THINGS: NOT AT ALL
SUM OF ALL RESPONSES TO PHQ9 QUESTIONS 1 AND 2: 0
2. FEELING DOWN, DEPRESSED, IRRITABLE, OR HOPELESS: NOT AT ALL

## 2020-06-17 ASSESSMENT — FIBROSIS 4 INDEX
FIB4 SCORE: 0.4
FIB4 SCORE: 1.37

## 2020-06-17 NOTE — ASSESSMENT & PLAN NOTE
-Resume home lisinopril, and propranolol.  Monitor blood pressure trend closely with as needed IV labetalol, Vasotec, and oral clonidine for significant hypertension.

## 2020-06-17 NOTE — ASSESSMENT & PLAN NOTE
-Due to fall following dizziness.  No acute intracranial injury per head CT.  -Neurochecks every 4 hours to monitor for delayed head injury, low threshold to reimage with head CT if with emergently symptoms.

## 2020-06-17 NOTE — PROGRESS NOTES
49 years old female presenting with headache after a fall she hit her head patient is on Eliquis due to history of pulmonary embolism there is no history of loss of consciousness, patient past medical history of blindness, patient complaining of dizziness, CT head negative in the emergency room, I have discussed with Dr. Godoy ER physician, patient had hypokalemia, patient will need to be admitted to the hospital.  Dr. Goodson informed about admission.

## 2020-06-17 NOTE — ED TRIAGE NOTES
Chief Complaint   Patient presents with   • T-5000 Head Injury   • Dizziness   Pt bib [facility].  Pt reports she was dizzy and fell forward several feet striking her head on the dresser.  Pt has small hematoma to left side of forehead near eyebrow.  Pt denies LOC.  A&Ox4.  Pt takes Eliquis for history of pulmonary embolism.  Pt has history of breast cancer with right lumpectomy and radiation.  Pt last had radiation in March.

## 2020-06-17 NOTE — ASSESSMENT & PLAN NOTE
-Her migraine headache is flaring up due to her blunt force head trauma.  -She was given DHE, Benadryl, morphine, Zofran, and Reglan in the ED.  I will give her a dose of magnesium sulfate, along with IV NS bolus (1 L), and IV dexamethasone.  I will also place her on scheduled Reglan with Benadryl IV every 6 hours for now.  Hold off on NSAIDs given that she is on DOAC, instead I will start her on oxycodone and IV morphine as needed for breakthrough headaches.

## 2020-06-17 NOTE — ASSESSMENT & PLAN NOTE
-Given her new blunt force trauma to the head, I will start her on oxycodone, and IV morphine for pain.

## 2020-06-17 NOTE — ASSESSMENT & PLAN NOTE
-I will give her 60 mEq of oral K-Dur.  Check magnesium level and replace if low.  Repeat BMP in the morning.

## 2020-06-17 NOTE — ED NOTES
Med rec updated and complete.  Allergies reviewed.  VIA  Interview with pt at bedside.  No antibiotic use in last 14 days.  narxcehck complete.       Home  Pharmacy [facility]

## 2020-06-17 NOTE — ASSESSMENT & PLAN NOTE
-Suspect this is orthostasis, may be dehydrated as manifested by her low potassium.  -Check orthostatic vital signs.  I will give her 1 L of IV NS bolus.  Replace low potassium.  -For completion, we will monitor her on telemetry to rule out any arrhythmias.  -PT/OT evaluation.

## 2020-06-17 NOTE — H&P
Hospital Medicine History & Physical Note    Date of Service  6/17/2020    Primary Care Physician  Tabitha Buatista M.D.    Code Status  Full Code    Chief Complaint  Chief Complaint   Patient presents with   • T-5000 Head Injury   • Dizziness       History of Presenting Illness  49 y.o. female with congenital hydrocephalus with  shunt in place, chronic blindness, chronic migraine headache, chronic narcotic and benzodiazepine dependence, seizure disorder, history of PE on anticoagulation with Eliquis, hypertension, breast cancer, and hypothyroidism, who presented 6/17/2020 with headaches following a fall due to dizziness.    Patient states she was walking to her room, when she bent down and became lightheaded.  She then swung around, and since she cannot see due to her blindness, she hit her left forehead on her dresser.  She denied any loss of consciousness.  She then started to have progressively worsening headache, on the side and top of her head, which rated 8 out of 10 in severity, throbbing, consistent with her migraine headaches.  She had no nausea, vomiting, or any neurologic symptoms such as focal weakness or numbness, or tingling sensation.  She had no other complaints.  She had no chest pain, shortness of breath, fevers or chills, bowel movement changes.  She then decided to go to the ED today.    ED course:  The patient was initially evaluated.  Vital signs were stable.  Initial blood work-up was notable for potassium of 2.7.  Troponin was negative.  INR was 1.29.  Chest x-ray (personally reviewed) did not show any infiltrates or consolidation.  Head CT (my read) did not show any acute intracranial pathology, with left posterior parietal  shunt with associated encephalomalacia remaining unchanged.  EKG (personally reviewed) showed normal sinus rhythm.  Patient was given morphine, Zofran, and Reglan along with DHE  in the ED. she was subsequently admitted to the hospitalist service.    Review of  Systems  ROS     Pertinent positives/negatives as mentioned above.     A complete review of systems was personally done by me. All other systems were negative.       Past Medical History   has a past medical history of Acute nasopharyngitis, Blind, C. difficile colitis, C. difficile diarrhea (2013), Cancer (HCC), Chronic daily headache, Depression, Esophageal atresia (1971), Esophageal bleeding (12/18/2019), Fall, GERD (gastroesophageal reflux disease), H/O total hysterectomy, Heart burn (12/18/2019), Hydrocephalus (HCC), Hydrocephalus (HCC), Indigestion (12/18/2019), Jaundice, Legally blind, Migraine without aura, without mention of intractable migraine without mention of status migrainosus, Other specified symptom associated with female genital organs, Pain, Pain (12/18/2019), Psychiatric problem, PTSD (post-traumatic stress disorder), Seizure ([facility]) (12/18/2019), and Snoring (12/18/2019).    Surgical History   has a past surgical history that includes laparoscopy (8/8/08); lysis adhesions general (12/10/2013); cystoscopy (12/10/2013); aakash by laparoscopy (N/A, 8/13/2015); gastroscopy-endo (N/A, 8/24/2017); nissen fundoplication laparoscopic; abdominal hysterectomy total (12/10/2013); other; exploratory laparotomy (12/10/2013); appendectomy (12/10/2013); cholecystectomy (N/A, 8/13/2015); gastroscopy (N/A, 1/2/2019); mastectomy (Right, 12/19/2019); and node biopsy sentinel (Right, 12/19/2019).     Family History  family history includes Hypertension in her father and mother.     Social History   reports that she quit smoking about 2 years ago. Her smoking use included cigars. She started smoking about 16 years ago. She has a 10.00 pack-year smoking history. She has quit using smokeless tobacco. She reports current alcohol use. She reports that she does not use drugs.    Allergies  Allergies   Allergen Reactions   • Tape Rash     RXN= ongoing  Adhesive Medical tape. Per patient, paper tape ok.   • Latex Rash      Rash       Medications  Prior to Admission Medications   Prescriptions Last Dose Informant Patient Reported? Taking?   HYDROcodone-acetaminophen (NORCO) 5-325 MG Tab per tablet 6/17/2020 at 2300 Patient Yes No   Sig: Take 1 Tab by mouth every four hours as needed. Indications: Pain   LORazepam (ATIVAN) 1 MG Tab 6/16/2020 at 2300 Patient Yes No   Sig: Take 1 mg by mouth every bedtime.   amitriptyline (ELAVIL) 10 MG Tab 6/16/2020 at 2300 Patient No No   Sig: Take 1 Tab by mouth every bedtime.   anastrozole (ARIMIDEX) 1 MG Tab 6/16/2020 at 2300 Patient Yes No   Sig: Take 1 mg by mouth every evening.   apixaban (ELIQUIS) 5mg Tab 6/17/2020 at 2300 Patient No No   Sig: Take 1 Tab by mouth 2 Times a Day.   esomeprazole (NEXIUM) 40 MG delayed-release capsule 6/16/2020 at 2300 Patient Yes No   Sig: Take 40 mg by mouth every bedtime.   hydrOXYzine HCl (ATARAX) 50 MG Tab 6/16/2020 at 2300 Patient Yes No   Sig: Take 50 mg by mouth every bedtime.   levETIRAcetam (KEPPRA) 500 MG Tab 6/17/2020 at 2300 Patient Yes No   Sig: Take 500 mg by mouth 2 times a day.   lisinopril (PRINIVIL) 20 MG Tab 6/16/2020 at 2300 Patient Yes Yes   Sig: Take 20 mg by mouth every bedtime.   methimazole (TAPAZOLE) 5 MG Tab 6/16/2020 at 2300 Patient Yes Yes   Sig: Take 5 mg by mouth every bedtime.   propranolol CR (INDERAL LA) 120 MG CAPSULE SR 24 HR 6/16/2020 at 2300 Patient Yes No   Sig: Take 120 mg by mouth every bedtime.      Facility-Administered Medications: None       Physical Exam  Temp:  [36.6 °C (97.8 °F)] 36.6 °C (97.8 °F)  Pulse:  [] 93  Resp:  [16-18] 17  BP: (135-147)/(83-85) 135/83  SpO2:  [95 %-99 %] 95 %    Physical Exam  Vitals signs reviewed.   Constitutional:       General: She is not in acute distress.     Appearance: Normal appearance. She is normal weight. She is not ill-appearing or diaphoretic.   HENT:      Head: Normocephalic and atraumatic.      Mouth/Throat:      Mouth: Mucous membranes are moist.      Pharynx: No  oropharyngeal exudate or posterior oropharyngeal erythema.   Eyes:      General: No scleral icterus.     Conjunctiva/sclera: Conjunctivae normal.      Pupils: Pupils are equal, round, and reactive to light.      Comments: Blind B/L   Neck:      Musculoskeletal: Normal range of motion and neck supple. No neck rigidity or muscular tenderness.   Cardiovascular:      Rate and Rhythm: Normal rate and regular rhythm.      Heart sounds: Normal heart sounds. No murmur.   Pulmonary:      Effort: Pulmonary effort is normal. No respiratory distress.      Breath sounds: Normal breath sounds. No stridor. No wheezing, rhonchi or rales.   Chest:      Chest wall: No tenderness.   Abdominal:      General: Bowel sounds are normal. There is no distension.      Palpations: Abdomen is soft. There is no mass.      Tenderness: There is no abdominal tenderness. There is no guarding or rebound.   Musculoskeletal: Normal range of motion.         General: No swelling.      Right lower leg: No edema.      Left lower leg: No edema.   Lymphadenopathy:      Cervical: No cervical adenopathy.   Skin:     General: Skin is warm and dry.      Coloration: Skin is not jaundiced.      Findings: No rash.   Neurological:      General: No focal deficit present.      Mental Status: She is alert and oriented to person, place, and time. Mental status is at baseline.      Cranial Nerves: No cranial nerve deficit.   Psychiatric:         Mood and Affect: Mood normal.         Behavior: Behavior normal.         Thought Content: Thought content normal.         Judgment: Judgment normal.         Laboratory:  Recent Labs     06/17/20  1317   WBC 8.8   RBC 4.88   HEMOGLOBIN 14.9   HEMATOCRIT 45.0   MCV 92.2   MCH 30.5   MCHC 33.1*   RDW 46.5   PLATELETCT 403   MPV 9.9     Recent Labs     06/17/20  1317   SODIUM 139   POTASSIUM 2.7*   CHLORIDE 98   CO2 22   GLUCOSE 107*   BUN 5*   CREATININE 0.77   CALCIUM 10.2     Recent Labs     06/17/20  1317   ALTSGPT 25   ASTSGOT  17   ALKPHOSPHAT 145*   TBILIRUBIN 0.9   GLUCOSE 107*     Recent Labs     06/17/20  1317   INR 1.29*     No results for input(s): NTPROBNP in the last 72 hours.      Recent Labs     06/17/20  1317   TROPONINT 9       Imaging:  DX-CHEST-PORTABLE (1 VIEW)   Final Result      No acute cardiopulmonary findings.      CT-HEAD W/O   Final Result      1.  No acute intracranial findings.      2.  Left posterior parietal ventriculoperitoneal shunt and associated encephalomalacia are unchanged.               Assessment/Plan:  Patient is appropriate for observation.  Will require less than 2 midnights hospital stay.    * Dizziness- (present on admission)  Assessment & Plan  -Suspect this is orthostasis, may be dehydrated as manifested by her low potassium.  -Check orthostatic vital signs.  I will give her 1 L of IV NS bolus.  Replace low potassium.  -For completion, we will monitor her on telemetry to rule out any arrhythmias.  -PT/OT evaluation.    Blunt head trauma- (present on admission)  Assessment & Plan  -Due to fall following dizziness.  No acute intracranial injury per head CT.  -Neurochecks every 4 hours to monitor for delayed head injury, low threshold to reimage with head CT if with emergently symptoms.    Hypokalemia- (present on admission)  Assessment & Plan  -I will give her 60 mEq of oral K-Dur.  Check magnesium level and replace if low.  Repeat BMP in the morning.    Chronic narcotic dependence (HCC)- (present on admission)  Assessment & Plan  -Given her new blunt force trauma to the head, I will start her on oxycodone, and IV morphine for pain.    Migraine headache- (present on admission)  Assessment & Plan  -Her migraine headache is flaring up due to her blunt force head trauma.  -She was given DHE, Benadryl, morphine, Zofran, and Reglan in the ED.  I will give her a dose of magnesium sulfate, along with IV NS bolus (1 L), and IV dexamethasone.  I will also place her on scheduled Reglan with Benadryl IV every  6 hours for now.  Hold off on NSAIDs given that she is on DOAC, instead I will start her on oxycodone and IV morphine as needed for breakthrough headaches.    Acquired circulating anticoagulants (HCC)- (present on admission)  Assessment & Plan  -Patient is on Eliquis for history of PE.  Will continue.  No gross bleeding.    Hyperthyroidism- (present on admission)  Assessment & Plan  -Resume home methimazole, and propranolol.    HTN (hypertension)- (present on admission)  Assessment & Plan  -Resume home lisinopril, and propranolol.  Monitor blood pressure trend closely with as needed IV labetalol, Vasotec, and oral clonidine for significant hypertension.    Seizure disorder (HCC)- (present on admission)  Assessment & Plan  -Resume home Keppra.    Congenital hydrocephalus (HCC)- (present on admission)  Assessment & Plan  - s/p  shunt, no change/displacement per head CT.      DVT prophylaxis: Eliquis

## 2020-06-17 NOTE — ED PROVIDER NOTES
ED Provider Note  CHIEF COMPLAINT  No chief complaint on file.  TBI    HPI  Rasheeda Manzano is a 49 y.o. female who presents with a headache after hitting her head on an open dresser.  The incident happened about 30 minutes ago.  Patient is on Eliquis due to a history of pulmonary embolisms.  Patient denies any loss of consciousness after hitting her head.  However she was concerned since she is on a blood thinner and how hard she hit her head.  She has a history of chronic migraine headaches and states her headache is slightly worse since hitting her head.  She denies any one-sided numbness or weakness.  No difficulty speaking.  Patient is blind.  Patient has a complicated past medical history including chronic migraines, hydrocephalus with a  shunt, saddle pulmonary emboli currently on Eliquis and blindness.  Patient also states that prior to hitting her head she was having some dizziness today.  She states she was having some difficulty standing due to this dizziness.  She denies any recent exposure COVID-19.  She denies any vomiting or diarrhea.  She denies any new or different headache.  No neck pain.  Denies any history of cardiac problems.  She denies any shortness of breath or difficulty breathing.  She states she is been taking her medication as directed.    REVIEW OF SYSTEMSSee HPI for further details.  Denies syncope, neck or back pain, dysuria, abdominal pain, vomiting, diarrhea, fevers, numbness or weakness to arms or legs, difficulty speaking. all other systems are negative.     PAST MEDICAL HISTORY   has a past medical history of Acute nasopharyngitis, Blind, C. difficile colitis, C. difficile diarrhea (2013), Cancer ([facility]), Chronic daily headache, Depression, Esophageal atresia (1971), Esophageal bleeding (12/18/2019), Fall, GERD (gastroesophageal reflux disease), H/O total hysterectomy, Heart burn (12/18/2019), Hydrocephalus ([facility]), Hydrocephalus ([facility]), Indigestion (12/18/2019),  Jaundice, Legally blind, Migraine without aura, without mention of intractable migraine without mention of status migrainosus, Other specified symptom associated with female genital organs, Pain, Pain (2019), Psychiatric problem, PTSD (post-traumatic stress disorder), Seizure (HCC) (2019), and Snoring (2019).    Positive history of breast cancer and trauma    FAMILY HISTORY  Family History   Problem Relation Age of Onset   • Hypertension Mother    • Hypertension Father    • Non-contributory Neg Hx         Migraine       SOCIAL HISTORY  Social History     Tobacco Use   • Smoking status: Former Smoker     Packs/day: 1.00     Years: 10.00     Pack years: 10.00     Types: Cigars     Start date: 2004     Last attempt to quit: 2017     Years since quittin.8   • Smokeless tobacco: Former User   • Tobacco comment:  CIGAR/day    Substance and Sexual Activity   • Alcohol use: Yes     Frequency: Monthly or less     Drinks per session: 1 or 2   • Drug use: No   • Sexual activity: Yes     Partners: Male       no methamphetamine or cocaine use.    SURGICAL HISTORY   has a past surgical history that includes laparoscopy (08); lysis adhesions general (12/10/2013); cystoscopy (12/10/2013); aakash by laparoscopy (N/A, 2015); gastroscopy-endo (N/A, 2017); nissen fundoplication laparoscopic; abdominal hysterectomy total (12/10/2013); other; exploratory laparotomy (12/10/2013); appendectomy (12/10/2013); cholecystectomy (N/A, 2015); gastroscopy (N/A, 2019); mastectomy (Right, 2019); and node biopsy sentinel (Right, 2019).    CURRENT MEDICATIONS  Home Medications    **Home medications have not yet been reviewed for this encounter**         ALLERGIES  Allergies   Allergen Reactions   • Tape Rash     RXN= ongoing  Adhesive Medical tape. Per patient, paper tape ok.   • Latex Rash     Rash       PHYSICAL EXAM  VITAL SIGNS:  Constitutional:  Well developed, Well nourished,  Appears uncomfortable.   HENT: Slight tenderness to the forehead, no abrasions or obvious hematoma, normocephalic, no pharyngeal erythema, mucosa moist, normal voice, no maxillary tenderness, no nasal drainage  Eyes: blind, no nystagmus Conjunctiva normal, No discharge. no tenderness over temporal artery  Neck: Normal range of motion, No tenderness, Supple, No meningeal signs  Cardiovascular: Normal heart rate, Normal rhythm,   Thorax & Lungs: Normal breath sounds, No respiratory distress,   Skin: Warm, Dry, No erythema, No rash.  Extremities: Intact distal pulses, No edema, No tenderness,   Neuro: Oriented to person place and time.  Clear speech.  No aphasia.  Moving all extremities equally.  No facial droop.  Normal sensation throughout.  Psychiatric: Affect normal, Judgment normal, Mood normal.     Labs:  Results for orders placed or performed during the hospital encounter of 06/17/20   CBC WITH DIFFERENTIAL   Result Value Ref Range    WBC 8.8 4.8 - 10.8 K/uL    RBC 4.88 4.20 - 5.40 M/uL    Hemoglobin 14.9 12.0 - 16.0 g/dL    Hematocrit 45.0 37.0 - 47.0 %    MCV 92.2 81.4 - 97.8 fL    MCH 30.5 27.0 - 33.0 pg    MCHC 33.1 (L) 33.6 - 35.0 g/dL    RDW 46.5 35.9 - 50.0 fL    Platelet Count 403 164 - 446 K/uL    MPV 9.9 9.0 - 12.9 fL    Neutrophils-Polys 78.10 (H) 44.00 - 72.00 %    Lymphocytes 11.70 (L) 22.00 - 41.00 %    Monocytes 8.00 0.00 - 13.40 %    Eosinophils 1.30 0.00 - 6.90 %    Basophils 0.70 0.00 - 1.80 %    Immature Granulocytes 0.20 0.00 - 0.90 %    Nucleated RBC 0.00 /100 WBC    Neutrophils (Absolute) 6.84 2.00 - 7.15 K/uL    Lymphs (Absolute) 1.02 1.00 - 4.80 K/uL    Monos (Absolute) 0.70 0.00 - 0.85 K/uL    Eos (Absolute) 0.11 0.00 - 0.51 K/uL    Baso (Absolute) 0.06 0.00 - 0.12 K/uL    Immature Granulocytes (abs) 0.02 0.00 - 0.11 K/uL    NRBC (Absolute) 0.00 K/uL   COMP METABOLIC PANEL   Result Value Ref Range    Sodium 139 135 - 145 mmol/L    Potassium 2.7 (LL) 3.6 - 5.5 mmol/L    Chloride 98 96 -  112 mmol/L    Co2 22 20 - 33 mmol/L    Anion Gap 19.0 (H) 7.0 - 16.0    Glucose 107 (H) 65 - 99 mg/dL    Bun 5 (L) 8 - 22 mg/dL    Creatinine 0.77 0.50 - 1.40 mg/dL    Calcium 10.2 8.5 - 10.5 mg/dL    AST(SGOT) 17 12 - 45 U/L    ALT(SGPT) 25 2 - 50 U/L    Alkaline Phosphatase 145 (H) 30 - 99 U/L    Total Bilirubin 0.9 0.1 - 1.5 mg/dL    Albumin 5.3 (H) 3.2 - 4.9 g/dL    Total Protein 8.7 (H) 6.0 - 8.2 g/dL    Globulin 3.4 1.9 - 3.5 g/dL    A-G Ratio 1.6 g/dL   TROPONIN   Result Value Ref Range    Troponin T 9 6 - 19 ng/L   PROTHROMBIN TIME   Result Value Ref Range    PT 16.5 (H) 12.0 - 14.6 sec    INR 1.29 (H) 0.87 - 1.13   ESTIMATED GFR   Result Value Ref Range    GFR If African American >60 >60 mL/min/1.73 m 2    GFR If Non African American >60 >60 mL/min/1.73 m 2   EKG (NOW)   Result Value Ref Range    Report       [facility]    Test Date:  2020  Pt Name:    NILAY MANN               Department: ER  MRN:        [id number]                      Room:       [facility]  Gender:     Female                       Technician: [id number]  :        1970                   Requested By:BRENNA LUCIA  Order #:    [id number]                    Reading MD: Brenna Murphy    Measurements  Intervals                                Axis  Rate:       99                           P:          28  WI:         132                          QRS:        66  QRSD:       84                           T:          39  QT:         356  QTc:        457    Interpretive Statements  SINUS RHYTHM  Compared to ECG 2020 03:06:48  Sinus tachycardia no longer present  Electronically Signed On 2020 14:31:17 PDT by Brenna Murphy       RADIOLOGY/PROCEDURES  DX-CHEST-PORTABLE (1 VIEW)   Final Result      No acute cardiopulmonary findings.      CT-HEAD W/O   Final Result      1.  No acute intracranial findings.      2.  Left posterior parietal ventriculoperitoneal shunt and associated encephalomalacia  are unchanged.               COURSE & MEDICAL DECISION MAKING  Pertinent Labs & Imaging studies reviewed. (See chart for details)    Patient presents the emergency department with a headache after hitting her head on a dresser.  She was initially seen at the charge desk as she was identified as a TBI.  Therefore she was immediately sent to CT scanner due to the fact she is on Eliquis and has a headache.  Patient also has been complaining of dizziness today prior to hitting her head.  She denies any syncopal episodes.  She states hitting her head was not related to feeling faint.  She denies any chest pain or shortness of breath.  EKG shows no obvious arrhythmia.  She denied any fevers or recent illness.  Review of the old chart shows she is has actually been here quite frequently over the last 2 months.  Let us check laboratory studies to rule out electrolyte abnormalities or anemia causing her dizziness.  CT of the head has been ordered to rule out intracranial bleed related to her being on Eliquis.    CT has returned and shows no evidence of acute bleed.  Patient's dizziness is better when she is sitting in the bed.  Headache continued but has improved after the morphine.  She had good results with Benadryl and Reglan in the past and therefore a dose of that was given.  Electrolytes have returned and show hypo-kalemia.  Patient's potassium was normal on her previous 2 visits to the emergency department.  Due to the dizziness, headache while on Eliquis and the hypokalemia she will be hospitalized for further treatment.    Discussed with the hospitalist who will see the patient.    FINAL IMPRESSION  1. Dizziness     2. Closed head injury, initial encounter     3. Chronic nonintractable headache, unspecified headache type     4. Hypokalemia           Electronically signed by: Brenna Godoy M.D., 6/17/2020 12:56 PM

## 2020-06-18 ENCOUNTER — PATIENT OUTREACH (OUTPATIENT)
Dept: HEALTH INFORMATION MANAGEMENT | Facility: OTHER | Age: 50
End: 2020-06-18

## 2020-06-18 VITALS
HEIGHT: 64 IN | BODY MASS INDEX: 22.58 KG/M2 | TEMPERATURE: 97.6 F | SYSTOLIC BLOOD PRESSURE: 134 MMHG | RESPIRATION RATE: 20 BRPM | DIASTOLIC BLOOD PRESSURE: 87 MMHG | WEIGHT: 132.28 LBS | HEART RATE: 98 BPM | OXYGEN SATURATION: 98 %

## 2020-06-18 PROBLEM — R42 DIZZINESS: Status: RESOLVED | Noted: 2020-06-17 | Resolved: 2020-06-18

## 2020-06-18 PROBLEM — S09.8XXA BLUNT HEAD TRAUMA: Status: RESOLVED | Noted: 2020-06-17 | Resolved: 2020-06-18

## 2020-06-18 LAB
ANION GAP SERPL CALC-SCNC: 10 MMOL/L (ref 7–16)
BASOPHILS # BLD AUTO: 0.3 % (ref 0–1.8)
BASOPHILS # BLD: 0.04 K/UL (ref 0–0.12)
BUN SERPL-MCNC: 7 MG/DL (ref 8–22)
CALCIUM SERPL-MCNC: 9.3 MG/DL (ref 8.5–10.5)
CHLORIDE SERPL-SCNC: 104 MMOL/L (ref 96–112)
CO2 SERPL-SCNC: 20 MMOL/L (ref 20–33)
CREAT SERPL-MCNC: 0.48 MG/DL (ref 0.5–1.4)
EOSINOPHIL # BLD AUTO: 0 K/UL (ref 0–0.51)
EOSINOPHIL NFR BLD: 0 % (ref 0–6.9)
ERYTHROCYTE [DISTWIDTH] IN BLOOD BY AUTOMATED COUNT: 46.9 FL (ref 35.9–50)
GLUCOSE SERPL-MCNC: 130 MG/DL (ref 65–99)
HCT VFR BLD AUTO: 39.7 % (ref 37–47)
HGB BLD-MCNC: 13 G/DL (ref 12–16)
IMM GRANULOCYTES # BLD AUTO: 0.09 K/UL (ref 0–0.11)
IMM GRANULOCYTES NFR BLD AUTO: 0.6 % (ref 0–0.9)
LYMPHOCYTES # BLD AUTO: 0.47 K/UL (ref 1–4.8)
LYMPHOCYTES NFR BLD: 3.1 % (ref 22–41)
MCH RBC QN AUTO: 30.7 PG (ref 27–33)
MCHC RBC AUTO-ENTMCNC: 32.7 G/DL (ref 33.6–35)
MCV RBC AUTO: 93.6 FL (ref 81.4–97.8)
MONOCYTES # BLD AUTO: 0.35 K/UL (ref 0–0.85)
MONOCYTES NFR BLD AUTO: 2.3 % (ref 0–13.4)
NEUTROPHILS # BLD AUTO: 14.44 K/UL (ref 2–7.15)
NEUTROPHILS NFR BLD: 93.7 % (ref 44–72)
NRBC # BLD AUTO: 0 K/UL
NRBC BLD-RTO: 0 /100 WBC
PLATELET # BLD AUTO: 348 K/UL (ref 164–446)
PMV BLD AUTO: 9.9 FL (ref 9–12.9)
POTASSIUM SERPL-SCNC: 3.8 MMOL/L (ref 3.6–5.5)
RBC # BLD AUTO: 4.24 M/UL (ref 4.2–5.4)
SODIUM SERPL-SCNC: 134 MMOL/L (ref 135–145)
WBC # BLD AUTO: 15.4 K/UL (ref 4.8–10.8)

## 2020-06-18 PROCEDURE — 85025 COMPLETE CBC W/AUTO DIFF WBC: CPT

## 2020-06-18 PROCEDURE — 99217 PR OBSERVATION CARE DISCHARGE: CPT | Performed by: INTERNAL MEDICINE

## 2020-06-18 PROCEDURE — 96365 THER/PROPH/DIAG IV INF INIT: CPT

## 2020-06-18 PROCEDURE — 80048 BASIC METABOLIC PNL TOTAL CA: CPT

## 2020-06-18 PROCEDURE — G0378 HOSPITAL OBSERVATION PER HR: HCPCS

## 2020-06-18 PROCEDURE — A9270 NON-COVERED ITEM OR SERVICE: HCPCS | Performed by: INTERNAL MEDICINE

## 2020-06-18 PROCEDURE — 96366 THER/PROPH/DIAG IV INF ADDON: CPT

## 2020-06-18 PROCEDURE — 700111 HCHG RX REV CODE 636 W/ 250 OVERRIDE (IP): Performed by: INTERNAL MEDICINE

## 2020-06-18 PROCEDURE — 96376 TX/PRO/DX INJ SAME DRUG ADON: CPT

## 2020-06-18 PROCEDURE — 700102 HCHG RX REV CODE 250 W/ 637 OVERRIDE(OP): Performed by: INTERNAL MEDICINE

## 2020-06-18 PROCEDURE — 36415 COLL VENOUS BLD VENIPUNCTURE: CPT

## 2020-06-18 RX ORDER — HYDROCODONE BITARTRATE AND ACETAMINOPHEN 5; 325 MG/1; MG/1
1 TABLET ORAL EVERY 6 HOURS PRN
Qty: 20 TAB | Refills: 0 | Status: SHIPPED | OUTPATIENT
Start: 2020-06-18 | End: 2020-06-25

## 2020-06-18 RX ADMIN — OXYCODONE 5 MG: 5 TABLET ORAL at 05:34

## 2020-06-18 RX ADMIN — MAGNESIUM SULFATE 2 G: 2 INJECTION INTRAVENOUS at 02:00

## 2020-06-18 RX ADMIN — OXYCODONE HYDROCHLORIDE 10 MG: 10 TABLET ORAL at 08:44

## 2020-06-18 RX ADMIN — LEVETIRACETAM 500 MG: 500 TABLET ORAL at 05:35

## 2020-06-18 RX ADMIN — METOCLOPRAMIDE 10 MG: 5 INJECTION, SOLUTION INTRAMUSCULAR; INTRAVENOUS at 02:00

## 2020-06-18 RX ADMIN — APIXABAN 5 MG: 5 TABLET, FILM COATED ORAL at 05:35

## 2020-06-18 RX ADMIN — DIPHENHYDRAMINE HYDROCHLORIDE 25 MG: 50 INJECTION INTRAMUSCULAR; INTRAVENOUS at 01:58

## 2020-06-18 NOTE — DISCHARGE SUMMARY
Discharge Summary    CHIEF COMPLAINT ON ADMISSION  Chief Complaint   Patient presents with   • T-5000 Head Injury   • Dizziness       Reason for Admission  TBI      Admission Date  6/17/2020    CODE STATUS  Full Code    HPI & HOSPITAL COURSE  This is a 49 y.o. female with congenital hydrocephalus with  shunt in place, chronic blindness, chronic migraine headache, chronic narcotic and benzodiazepine dependence, seizure disorder, history of PE on anticoagulation with Eliquis, hypertension, breast cancer, and hypothyroidism, who presented 6/17/2020 with headaches following a fall due to dizziness.  She hit her head on her dresser, causing progressive headaches similar to her migraine headaches.    The patient was initially evaluated.  Vital signs were stable.  Initial blood work-up was notable for potassium of 2.7.  Troponin was negative.  INR was 1.29.  Chest x-ray did not show any infiltrates or consolidation.  Head CT did not show any acute intracranial pathology, with left posterior parietal  shunt with associated encephalomalacia remaining unchanged.  EKG showed normal sinus rhythm. Patient was given morphine, Zofran, and Reglan along with DHE  in the ED, and was admitted to the hospital service. She was placed on neurologic monitoring and she remained neurologically intact.  She was placed on migraine cocktail and analgesics for her headaches with subsequent improvement in her migraine headaches.  She was given IV fluids as she was felt to be dry and dehydrated.  Her low potassium was replaced, and her level subsequently normalized.    She had improved sense of wellbeing.  She was able to walk around independently without issues.  As mentioned, her headache has resolved.  She remained hemodynamically, and neurologically intact.    I have personally seen and examined the patient on the day of discharge. With her clinical improvement, she was deemed ready to discharge from the hospital as she did not have any  further hospitalization needs. Patient felt comfortable going home. The discharge plan was discussed with the patient, with which she was agreeable to.     Therefore, she is discharged in good and stable condition to home with close outpatient follow-up.        Discharge Date  6/18/2020      FOLLOW UP ITEMS POST DISCHARGE  -She will be continued on her usual migraine medications.  I gave her a limited supply of Norco until she can see her PCP.  She has been consented to the opioid prescription.  -She has been counseled to ensure adequate oral fluid intake to prevent getting dehydrated again.  - follow-up with PCP.   - counseled to seek immediate medical attention, or return to the ED for recurrent or worsening symptoms.      DISCHARGE DIAGNOSES  Principal Problem (Resolved):    Dizziness POA: Yes  Active Problems:    Migraine headache POA: Yes    Chronic narcotic dependence (HCC) POA: Yes    Congenital hydrocephalus (HCC) POA: Yes    Seizure disorder (HCC) POA: Yes    HTN (hypertension) POA: Yes    Hyperthyroidism POA: Yes    Acquired circulating anticoagulants (HCC) POA: Yes  Resolved Problems:    Blunt head trauma POA: Yes    Hypokalemia POA: Yes      FOLLOW UP  Future Appointments   Date Time Provider Department Center   6/25/2020  8:40 AM [facility] None   7/2/2020  9:30 AM [facility]   7/24/2020  3:00 PM [facility] None     No follow-up provider specified.    MEDICATIONS ON DISCHARGE     Medication List      CHANGE how you take these medications      Instructions   HYDROcodone-acetaminophen 5-325 MG Tabs per tablet  What changed:  when to take this  Commonly known as:  NORCO   Take 1 Tab by mouth every 6 hours as needed for up to 7 days. Indications: Pain  Dose:  1 Tab        CONTINUE taking these medications      Instructions   amitriptyline 10 MG Tabs  Commonly known as:  ELAVIL   Take 1 Tab by mouth every bedtime.  Dose:  10 mg     anastrozole 1 MG Tabs  Commonly known  as:  ARIMIDEX   Take 1 mg by mouth every evening.  Dose:  1 mg     apixaban 5mg Tabs  Commonly known as:  ELIQUIS   Doctor's comments:  This medication was prescribed through a collaborative practice  Take 1 Tab by mouth 2 Times a Day.  Dose:  5 mg     hydrOXYzine HCl 50 MG Tabs  Commonly known as:  ATARAX   Take 50 mg by mouth every bedtime.  Dose:  50 mg     levETIRAcetam 500 MG Tabs  Commonly known as:  KEPPRA   Take 500 mg by mouth 2 times a day.  Dose:  500 mg     lisinopril 20 MG Tabs  Commonly known as:  PRINIVIL   Take 20 mg by mouth every bedtime.  Dose:  20 mg     LORazepam 1 MG Tabs  Commonly known as:  ATIVAN   Take 1 mg by mouth every bedtime.  Dose:  1 mg     methimazole 5 MG Tabs  Commonly known as:  TAPAZOLE   Take 5 mg by mouth every bedtime.  Dose:  5 mg     NexIUM 40 MG delayed-release capsule  Generic drug:  esomeprazole   Take 40 mg by mouth every bedtime.  Dose:  40 mg     propranolol  MG Cp24  Commonly known as:  INDERAL LA   Take 120 mg by mouth every bedtime.  Dose:  120 mg            Allergies  Allergies   Allergen Reactions   • Tape Rash     RXN= ongoing  Adhesive Medical tape. Per patient, paper tape ok.   • Latex Rash     Rash       DIET  Orders Placed This Encounter   Procedures   • Diet Order Regular     Standing Status:   Standing     Number of Occurrences:   1     Order Specific Question:   Diet:     Answer:   Regular [1]       ACTIVITY  As tolerated.  Weight bearing as tolerated    CONSULTATIONS  none    PROCEDURES  As above    LABORATORY  Lab Results   Component Value Date    SODIUM 134 (L) 06/18/2020    POTASSIUM 3.8 06/18/2020    CHLORIDE 104 06/18/2020    CO2 20 06/18/2020    GLUCOSE 130 (H) 06/18/2020    BUN 7 (L) 06/18/2020    CREATININE 0.48 (L) 06/18/2020    CREATININE 0.6 08/12/2008        Lab Results   Component Value Date    WBC 15.4 (H) 06/18/2020    HEMOGLOBIN 13.0 06/18/2020    HEMATOCRIT 39.7 06/18/2020    PLATELETCT 348 06/18/2020        Total time of the  discharge process 40 minutes.

## 2020-06-18 NOTE — CARE PLAN
Problem: Communication  Goal: The ability to communicate needs accurately and effectively will improve  Outcome: PROGRESSING AS EXPECTED     Problem: Safety  Goal: Will remain free from injury  Outcome: PROGRESSING AS EXPECTED  Goal: Will remain free from falls  Outcome: PROGRESSING AS EXPECTED     Problem: Knowledge Deficit  Goal: Knowledge of disease process/condition, treatment plan, diagnostic tests, and medications will improve  Outcome: PROGRESSING AS EXPECTED    Pt educated regarding plan of care and medications. All questions answered.

## 2020-06-18 NOTE — PROGRESS NOTES
Bedside report received 0718. POC discussed with pt; Neuro intact; Pt ambulating in hallways without assistance; C/o mild HA; Medicated per MAR; MD discontinues PT/OT eval; Pt eager discharge due to PCP appointment today; No overnight events; all questions answered at this time.

## 2020-06-18 NOTE — PROGRESS NOTES
Pt dc'd home; Transported home with friend;. IV and monitor removed; monitor room notified. Pt left unit via wheelchair with UC. Personal belongings with pt when leaving unit. Pt given discharge instructions prior to leaving unit including prescription and when to visit with physician; verbalizes understanding. Copy of discharge instructions with pt and in the chart.

## 2020-06-18 NOTE — DISCHARGE INSTRUCTIONS
Discharge Instructions    Discharged to home by car with friend. Discharged via wheelchair, hospital escort: Yes.  Special equipment needed: Not Applicable    Be sure to schedule a follow-up appointment with your primary care doctor or any specialists as instructed.     Discharge Plan:   Diet Plan: Discussed  Activity Level: Discussed  Confirmed Follow up Appointment: Patient to Call and Schedule Appointment  Confirmed Symptoms Management: Discussed  Medication Reconciliation Updated: Yes    I understand that a diet low in cholesterol, fat, and sodium is recommended for good health. Unless I have been given specific instructions below for another diet, I accept this instruction as my diet prescription.   Other diet: Heart Healthy     Special Instructions: None    · Is patient discharged on Warfarin / Coumadin?   No     Depression / Suicide Risk    As you are discharged from this [facility] facility, it is important to learn how to keep safe from harming yourself.    Recognize the warning signs:  · Abrupt changes in personality, positive or negative- including increase in energy   · Giving away possessions  · Change in eating patterns- significant weight changes-  positive or negative  · Change in sleeping patterns- unable to sleep or sleeping all the time   · Unwillingness or inability to communicate  · Depression  · Unusual sadness, discouragement and loneliness  · Talk of wanting to die  · Neglect of personal appearance   · Rebelliousness- reckless behavior  · Withdrawal from people/activities they love  · Confusion- inability to concentrate     If you or a loved one observes any of these behaviors or has concerns about self-harm, here's what you can do:  · Talk about it- your feelings and reasons for harming yourself  · Remove any means that you might use to hurt yourself (examples: pills, rope, extension cords, firearm)  · Get professional help from the community (Mental Health, Substance Abuse, psychological  counseling)  · Do not be alone:Call your Safe Contact- someone whom you trust who will be there for you.  · Call your local CRISIS HOTLINE 359-6376 or 387-809-5688  · Call your local Children's Mobile Crisis Response Team [place] (678) 348-5248 or www.[employer]  · Call the toll free National Suicide Prevention Hotlines   · National Suicide Prevention Lifeline 606-741-MCJG (9740)  · [employer] Line Network 800-SUICIDE (650-1346)          Concussion, Adult  A concussion is a brain injury. It is caused by:  · A hit to the head.  · A quick and sudden movement (jolt) of the head or neck.  A concussion is usually not life threatening. Even so, it can cause serious problems. If you had a concussion before, you may have concussion-like problems after a hit to your head.  Follow these instructions at home:  General instructions  · Follow your doctor's directions carefully.  · Take medicines only as told by your doctor.  · Only take medicines your doctor says are safe.  · Do not drink alcohol until your doctor says it is okay. Alcohol and some drugs can slow down healing. They can also put you at risk for further injury.  · If you are having trouble remembering things, write them down.  · Try to do one thing at a time if you get distracted easily. For example, do not watch TV while making dinner.  · Talk to your family members or close friends when making important decisions.  · Follow up with your doctor as told.  · Watch your symptoms. Tell others to do the same. Serious problems can sometimes happen after a concussion. Older adults are more likely to have these problems.  · Tell your teachers, school nurse, school counselor, , , or  about your concussion. Tell them about what you can or cannot do. They should watch to see if:  ¨ It gets even harder for you to pay attention or concentrate.  ¨ It gets even harder for you to remember things or learn new things.  ¨ You need more  time than normal to finish things.  ¨ You become annoyed (irritable) more than before.  ¨ You are not able to deal with stress as well.  ¨ You have more problems than before.  · Rest. Make sure you:  ¨ Get plenty of sleep at night.  ¨ Go to sleep early.  ¨ Go to bed at the same time every day. Try to wake up at the same time.  ¨ Rest during the day.  ¨ Take naps when you feel tired.  · Limit activities where you have to think a lot or concentrate. These include:  ¨ Doing homework.  ¨ Doing work related to a job.  ¨ Watching TV.  ¨ Using the computer.  Returning To Your Regular Activities   Return to your normal activities slowly, not all at once. You must give your body and brain enough time to heal.  · Do not play sports or do other athletic activities until your doctor says it is okay.  · Ask your doctor when you can drive, ride a bicycle, or work other vehicles or machines. Never do these things if you feel dizzy.  · Ask your doctor about when you can return to work or school.  Preventing Another Concussion   It is very important to avoid another brain injury, especially before you have healed. In rare cases, another injury can lead to permanent brain damage, brain swelling, or death. The risk of this is greatest during the first 7-10 days after your injury. Avoid injuries by:  · Wearing a seat belt when riding in a car.  · Not drinking too much alcohol.  · Avoiding activities that could lead to a second concussion (such as contact sports).  · Wearing a helmet when doing activities like:  ¨ Biking.  ¨ Skiing.  ¨ Skateboarding.  ¨ Skating.  · Making your home safer by:  ¨ Removing things from the floor or stairways that could make you trip.  ¨ Using grab bars in bathrooms and handrails by stairs.  ¨ Placing non-slip mats on floors and in bathtubs.  ¨ Improve lighting in dark areas.  Contact a doctor if:  · It gets even harder for you to pay attention or concentrate.  · It gets even harder for you to remember  things or learn new things.  · You need more time than normal to finish things.  · You become annoyed (irritable) more than before.  · You are not able to deal with stress as well.  · You have more problems than before.  · You have problems keeping your balance.  · You are not able to react quickly when you should.  Get help if you have any of these problems for more than 2 weeks:  · Lasting (chronic) headaches.  · Dizziness or trouble balancing.  · Feeling sick to your stomach (nausea).  · Seeing (vision) problems.  · Being affected by noises or light more than normal.  · Feeling sad, low, down in the dumps, blue, gloomy, or empty (depressed).  · Mood changes (mood swings).  · Feeling of fear or nervousness about what may happen (anxiety).  · Feeling annoyed.  · Memory problems.  · Problems concentrating or paying attention.  · Sleep problems.  · Feeling tired all the time.  Get help right away if:  · You have bad headaches or your headaches get worse.  · You have weakness (even if it is in one hand, leg, or part of the face).  · You have loss of feeling (numbness).  · You feel off balance.  · You keep throwing up (vomiting).  · You feel tired.  · One black center of your eye (pupil) is larger than the other.  · You twitch or shake violently (convulse).  · Your speech is not clear (slurred).  · You are more confused, easily angered (agitated), or annoyed than before.  · You have more trouble resting than before.  · You are unable to recognize people or places.  · You have neck pain.  · It is difficult to wake you up.  · You have unusual behavior changes.  · You pass out (lose consciousness).  This information is not intended to replace advice given to you by your health care provider. Make sure you discuss any questions you have with your health care provider.  Document Released: 12/06/2010 Document Revised: 05/25/2017 Document Reviewed: 07/10/2014  Elsevier Interactive Patient Education © 2017 [employer]vier  Inc.

## 2020-06-18 NOTE — PROGRESS NOTES
Assumed pt care at 0700. Received report from Lisbeth JOHNSON. Pt A&O x4. Pt c/o 7/10 pain at this time. Respirations are even and unlabored on  Room air.   Updated on POC, communication board updated. Bed locked and in lowest position. Call light and belongings within reach. Non-skid socks in place. Needs met, will continue to monitor.

## 2020-07-10 ENCOUNTER — HOSPITAL ENCOUNTER (OUTPATIENT)
Dept: RADIOLOGY | Facility: MEDICAL CENTER | Age: 50
End: 2020-07-10
Attending: INTERNAL MEDICINE
Payer: MEDICARE

## 2020-07-10 DIAGNOSIS — C50.911 MALIGNANT NEOPLASM OF RIGHT FEMALE BREAST, UNSPECIFIED ESTROGEN RECEPTOR STATUS, UNSPECIFIED SITE OF BREAST (HCC): ICD-10-CM

## 2020-07-10 PROCEDURE — 71250 CT THORAX DX C-: CPT

## 2020-07-24 ENCOUNTER — ANTICOAGULATION VISIT (OUTPATIENT)
Dept: VASCULAR LAB | Facility: MEDICAL CENTER | Age: 50
End: 2020-07-24
Attending: INTERNAL MEDICINE
Payer: MEDICARE

## 2020-07-24 DIAGNOSIS — Z79.01 CHRONIC ANTICOAGULATION: ICD-10-CM

## 2020-07-24 PROCEDURE — 99212 OFFICE O/P EST SF 10 MIN: CPT

## 2020-07-24 NOTE — PROGRESS NOTES
Target end date:Indefinite      Indication: PE  Drug: Eliquis 5 mg twice     Health Status Since Last Assessment  Patient with recent ED visit 6/17/2020 with headaches following a fall due to dizziness.  She hit her head on her dresser, causing progressive headaches similar to her migraine headaches.  Patient denies any embolic events (stroke/tia/systemic embolism)    CT 06/17/2020-    1. No acute intracranial findings.    Adherence with DOAC Therapy  Pt has 0 missed any doses in the average week    Bleeding Risk Assessment    None Epistaxis  Pt denies any excessive or unusual bleeding/hematomas.  Pt denies any GI bleeds or hematemesis.  Pt denies any concerning daily headache or sub dural hematoma symptoms.    Pt denies any hematuria or abnormal vaginal bleeding.  Latest Hemoglobin Results for NILAY MANN (MRN [id number]) as of 7/24/2020 14:52   Ref. Range 6/18/2020 06:05   WBC Latest Ref Range: 4.8 - 10.8 K/uL 15.4 (H)   RBC Latest Ref Range: 4.20 - 5.40 M/uL 4.24   Hemoglobin Latest Ref Range: 12.0 - 16.0 g/dL 13.0   Hematocrit Latest Ref Range: 37.0 - 47.0 % 39.7   MCV Latest Ref Range: 81.4 - 97.8 fL 93.6   MCH Latest Ref Range: 27.0 - 33.0 pg 30.7   MCHC Latest Ref Range: 33.6 - 35.0 g/dL 32.7 (L)   RDW Latest Ref Range: 35.9 - 50.0 fL 46.9   Platelet Count Latest Ref Range: 164 - 446 K/uL 348   MPV Latest Ref Range: 9.0 - 12.9 fL 9.9     ETOH overuse None     Creatinine Clearance/Renal Function    Latest ClCr Results for NILAY MANN (MRN [id number]) as of 7/24/2020 14:52   Ref. Range 6/18/2020 06:05   Creatinine Latest Ref Range: 0.50 - 1.40 mg/dL 0.48 (L)       Hepatic function  Latest LFTs Results for NILAY MANN (MRN [id number]) as of 7/24/2020 14:52   Ref. Range 6/17/2020 13:17   AST(SGOT) Latest Ref Range: 12 - 45 U/L 17   ALT(SGPT) Latest Ref Range: 2 - 50 U/L 25     Pt denies any history of liver dysfunction      Drug Interactions  Platelets: Results for NILAY MANN  (MRN [id number]) as of 7/24/2020 14:52   Ref. Range 6/18/2020 06:05   Platelet Count Latest Ref Range: 164 - 446 K/uL 348     ASA/other antiplatelets None  NSAID None  Other drug interactions None. Patient is on Keppra for seizures but no DDI  Examination  Symptomatic hypotension None   Significant gait impairment/imbalance/high risk for falls? Yes-Patient is blind.     Final Assessment and Recommendations:  Patient appears stable from the anticoagulation staindpoint.    Benefits of continued DOAC therapy outweigh risks for this patient  Recommend pt continue with current DOAC therapy Eliquis 5 mg twice daily    Follow up:  Will follow up with patient 5  months.      Kev Tyler, Pharm.D

## 2020-08-06 ENCOUNTER — APPOINTMENT (OUTPATIENT)
Dept: RADIOLOGY | Facility: MEDICAL CENTER | Age: 50
DRG: 948 | End: 2020-08-06
Attending: EMERGENCY MEDICINE
Payer: MEDICARE

## 2020-08-06 ENCOUNTER — HOSPITAL ENCOUNTER (INPATIENT)
Facility: MEDICAL CENTER | Age: 50
LOS: 4 days | DRG: 948 | End: 2020-08-10
Attending: EMERGENCY MEDICINE | Admitting: HOSPITALIST
Payer: MEDICARE

## 2020-08-06 DIAGNOSIS — T50.901S: ICD-10-CM

## 2020-08-06 DIAGNOSIS — R74.01 TRANSAMINITIS: ICD-10-CM

## 2020-08-06 DIAGNOSIS — D68.318 ACQUIRED CIRCULATING ANTICOAGULANTS (HCC): ICD-10-CM

## 2020-08-06 DIAGNOSIS — T39.1X1A ACCIDENTAL ACETAMINOPHEN OVERDOSE, INITIAL ENCOUNTER: ICD-10-CM

## 2020-08-06 DIAGNOSIS — Z79.01 ON CONTINUOUS ORAL ANTICOAGULATION: ICD-10-CM

## 2020-08-06 DIAGNOSIS — R51.9 SEVERE HEADACHE: ICD-10-CM

## 2020-08-06 DIAGNOSIS — Q03.9 CONGENITAL HYDROCEPHALUS (HCC): ICD-10-CM

## 2020-08-06 DIAGNOSIS — H54.3 BLINDNESS OF BOTH EYES: ICD-10-CM

## 2020-08-06 DIAGNOSIS — G89.29 CHRONIC NONINTRACTABLE HEADACHE, UNSPECIFIED HEADACHE TYPE: ICD-10-CM

## 2020-08-06 DIAGNOSIS — Z98.2 VP (VENTRICULOPERITONEAL) SHUNT STATUS: ICD-10-CM

## 2020-08-06 DIAGNOSIS — R51.9 CHRONIC NONINTRACTABLE HEADACHE, UNSPECIFIED HEADACHE TYPE: ICD-10-CM

## 2020-08-06 PROBLEM — R79.89 ELEVATED LFTS: Status: ACTIVE | Noted: 2020-08-06

## 2020-08-06 PROBLEM — E87.6 HYPOKALEMIA: Status: ACTIVE | Noted: 2020-08-06

## 2020-08-06 LAB
ALBUMIN SERPL BCP-MCNC: 3.9 G/DL (ref 3.2–4.9)
ALBUMIN/GLOB SERPL: 1.6 G/DL
ALP SERPL-CCNC: 147 U/L (ref 30–99)
ALT SERPL-CCNC: 1371 U/L (ref 2–50)
ANION GAP SERPL CALC-SCNC: 16 MMOL/L (ref 7–16)
APAP SERPL-MCNC: <5 UG/ML (ref 10–30)
AST SERPL-CCNC: 1115 U/L (ref 12–45)
BASOPHILS # BLD AUTO: 0.6 % (ref 0–1.8)
BASOPHILS # BLD: 0.06 K/UL (ref 0–0.12)
BILIRUB SERPL-MCNC: 1.1 MG/DL (ref 0.1–1.5)
BUN SERPL-MCNC: 15 MG/DL (ref 8–22)
CALCIUM SERPL-MCNC: 10.3 MG/DL (ref 8.5–10.5)
CHLORIDE SERPL-SCNC: 106 MMOL/L (ref 96–112)
CO2 SERPL-SCNC: 18 MMOL/L (ref 20–33)
CREAT SERPL-MCNC: 0.59 MG/DL (ref 0.5–1.4)
EOSINOPHIL # BLD AUTO: 0.07 K/UL (ref 0–0.51)
EOSINOPHIL NFR BLD: 0.6 % (ref 0–6.9)
ERYTHROCYTE [DISTWIDTH] IN BLOOD BY AUTOMATED COUNT: 46 FL (ref 35.9–50)
GLOBULIN SER CALC-MCNC: 2.5 G/DL (ref 1.9–3.5)
GLUCOSE SERPL-MCNC: 112 MG/DL (ref 65–99)
HAV IGM SERPL QL IA: NORMAL
HBV CORE IGM SER QL: NORMAL
HBV SURFACE AG SER QL: NORMAL
HCT VFR BLD AUTO: 39.6 % (ref 37–47)
HCV AB SER QL: NORMAL
HGB BLD-MCNC: 13.1 G/DL (ref 12–16)
IMM GRANULOCYTES # BLD AUTO: 0.05 K/UL (ref 0–0.11)
IMM GRANULOCYTES NFR BLD AUTO: 0.5 % (ref 0–0.9)
INR PPP: 1.53 (ref 0.87–1.13)
LYMPHOCYTES # BLD AUTO: 0.65 K/UL (ref 1–4.8)
LYMPHOCYTES NFR BLD: 6 % (ref 22–41)
MCH RBC QN AUTO: 30.1 PG (ref 27–33)
MCHC RBC AUTO-ENTMCNC: 33.1 G/DL (ref 33.6–35)
MCV RBC AUTO: 91 FL (ref 81.4–97.8)
MONOCYTES # BLD AUTO: 0.74 K/UL (ref 0–0.85)
MONOCYTES NFR BLD AUTO: 6.8 % (ref 0–13.4)
NEUTROPHILS # BLD AUTO: 9.24 K/UL (ref 2–7.15)
NEUTROPHILS NFR BLD: 85.5 % (ref 44–72)
NRBC # BLD AUTO: 0 K/UL
NRBC BLD-RTO: 0 /100 WBC
PLATELET # BLD AUTO: 291 K/UL (ref 164–446)
PMV BLD AUTO: 11.2 FL (ref 9–12.9)
POTASSIUM SERPL-SCNC: 3.5 MMOL/L (ref 3.6–5.5)
PROT SERPL-MCNC: 6.4 G/DL (ref 6–8.2)
PROTHROMBIN TIME: 18.9 SEC (ref 12–14.6)
RBC # BLD AUTO: 4.35 M/UL (ref 4.2–5.4)
SODIUM SERPL-SCNC: 140 MMOL/L (ref 135–145)
WBC # BLD AUTO: 10.8 K/UL (ref 4.8–10.8)

## 2020-08-06 PROCEDURE — 99223 1ST HOSP IP/OBS HIGH 75: CPT | Mod: AI | Performed by: HOSPITALIST

## 2020-08-06 PROCEDURE — 700101 HCHG RX REV CODE 250: Performed by: EMERGENCY MEDICINE

## 2020-08-06 PROCEDURE — 770020 HCHG ROOM/CARE - TELE (206)

## 2020-08-06 PROCEDURE — 700102 HCHG RX REV CODE 250 W/ 637 OVERRIDE(OP): Performed by: HOSPITALIST

## 2020-08-06 PROCEDURE — 76705 ECHO EXAM OF ABDOMEN: CPT

## 2020-08-06 PROCEDURE — C9803 HOPD COVID-19 SPEC COLLECT: HCPCS | Performed by: INTERNAL MEDICINE

## 2020-08-06 PROCEDURE — 700111 HCHG RX REV CODE 636 W/ 250 OVERRIDE (IP): Performed by: EMERGENCY MEDICINE

## 2020-08-06 PROCEDURE — 80074 ACUTE HEPATITIS PANEL: CPT

## 2020-08-06 PROCEDURE — 80307 DRUG TEST PRSMV CHEM ANLYZR: CPT

## 2020-08-06 PROCEDURE — A9270 NON-COVERED ITEM OR SERVICE: HCPCS | Performed by: HOSPITALIST

## 2020-08-06 PROCEDURE — 96375 TX/PRO/DX INJ NEW DRUG ADDON: CPT

## 2020-08-06 PROCEDURE — 80053 COMPREHEN METABOLIC PANEL: CPT

## 2020-08-06 PROCEDURE — 700102 HCHG RX REV CODE 250 W/ 637 OVERRIDE(OP): Performed by: EMERGENCY MEDICINE

## 2020-08-06 PROCEDURE — 700105 HCHG RX REV CODE 258: Performed by: HOSPITALIST

## 2020-08-06 PROCEDURE — 96372 THER/PROPH/DIAG INJ SC/IM: CPT

## 2020-08-06 PROCEDURE — 85025 COMPLETE CBC W/AUTO DIFF WBC: CPT

## 2020-08-06 PROCEDURE — 99285 EMERGENCY DEPT VISIT HI MDM: CPT

## 2020-08-06 PROCEDURE — A9270 NON-COVERED ITEM OR SERVICE: HCPCS | Performed by: EMERGENCY MEDICINE

## 2020-08-06 PROCEDURE — 96365 THER/PROPH/DIAG IV INF INIT: CPT

## 2020-08-06 PROCEDURE — 85610 PROTHROMBIN TIME: CPT

## 2020-08-06 PROCEDURE — 700101 HCHG RX REV CODE 250: Performed by: HOSPITALIST

## 2020-08-06 PROCEDURE — 700105 HCHG RX REV CODE 258: Performed by: EMERGENCY MEDICINE

## 2020-08-06 RX ORDER — PROCHLORPERAZINE EDISYLATE 5 MG/ML
10 INJECTION INTRAMUSCULAR; INTRAVENOUS ONCE
Status: COMPLETED | OUTPATIENT
Start: 2020-08-06 | End: 2020-08-06

## 2020-08-06 RX ORDER — LISINOPRIL 20 MG/1
20 TABLET ORAL
Status: DISCONTINUED | OUTPATIENT
Start: 2020-08-06 | End: 2020-08-10 | Stop reason: HOSPADM

## 2020-08-06 RX ORDER — AMOXICILLIN 250 MG
2 CAPSULE ORAL 2 TIMES DAILY
Status: DISCONTINUED | OUTPATIENT
Start: 2020-08-06 | End: 2020-08-10 | Stop reason: HOSPADM

## 2020-08-06 RX ORDER — SUCRALFATE 1 G/1
1 TABLET ORAL
COMMUNITY
End: 2020-08-13 | Stop reason: ALTCHOICE

## 2020-08-06 RX ORDER — LABETALOL HYDROCHLORIDE 5 MG/ML
10 INJECTION, SOLUTION INTRAVENOUS EVERY 4 HOURS PRN
Status: DISCONTINUED | OUTPATIENT
Start: 2020-08-06 | End: 2020-08-10 | Stop reason: HOSPADM

## 2020-08-06 RX ORDER — ONDANSETRON 2 MG/ML
4 INJECTION INTRAMUSCULAR; INTRAVENOUS EVERY 4 HOURS PRN
Status: DISCONTINUED | OUTPATIENT
Start: 2020-08-06 | End: 2020-08-10 | Stop reason: HOSPADM

## 2020-08-06 RX ORDER — OXYCODONE HYDROCHLORIDE 5 MG/1
5 TABLET ORAL
Status: DISCONTINUED | OUTPATIENT
Start: 2020-08-06 | End: 2020-08-07

## 2020-08-06 RX ORDER — OXYCODONE HYDROCHLORIDE 5 MG/1
2.5 TABLET ORAL
Status: DISCONTINUED | OUTPATIENT
Start: 2020-08-06 | End: 2020-08-07

## 2020-08-06 RX ORDER — HYDROXYZINE 50 MG/1
50 TABLET, FILM COATED ORAL
COMMUNITY
End: 2020-08-13 | Stop reason: ALTCHOICE

## 2020-08-06 RX ORDER — ALENDRONATE SODIUM 10 MG/1
10 TABLET ORAL
COMMUNITY
End: 2020-08-06

## 2020-08-06 RX ORDER — KETOROLAC TROMETHAMINE 30 MG/ML
30 INJECTION, SOLUTION INTRAMUSCULAR; INTRAVENOUS ONCE
Status: COMPLETED | OUTPATIENT
Start: 2020-08-06 | End: 2020-08-06

## 2020-08-06 RX ORDER — LORAZEPAM 1 MG/1
1 TABLET ORAL
Status: DISCONTINUED | OUTPATIENT
Start: 2020-08-06 | End: 2020-08-10 | Stop reason: HOSPADM

## 2020-08-06 RX ORDER — HYDROMORPHONE HYDROCHLORIDE 1 MG/ML
0.25 INJECTION, SOLUTION INTRAMUSCULAR; INTRAVENOUS; SUBCUTANEOUS
Status: DISCONTINUED | OUTPATIENT
Start: 2020-08-06 | End: 2020-08-07

## 2020-08-06 RX ORDER — PROPRANOLOL HCL 60 MG
120 CAPSULE, EXTENDED RELEASE 24HR ORAL
Status: DISCONTINUED | OUTPATIENT
Start: 2020-08-06 | End: 2020-08-10 | Stop reason: HOSPADM

## 2020-08-06 RX ORDER — LEVETIRACETAM 500 MG/1
500 TABLET ORAL 2 TIMES DAILY
Status: DISCONTINUED | OUTPATIENT
Start: 2020-08-06 | End: 2020-08-10 | Stop reason: HOSPADM

## 2020-08-06 RX ORDER — AMITRIPTYLINE HYDROCHLORIDE 75 MG/1
75 TABLET ORAL NIGHTLY
COMMUNITY

## 2020-08-06 RX ORDER — SODIUM CHLORIDE 9 MG/ML
1000 INJECTION, SOLUTION INTRAVENOUS ONCE
Status: COMPLETED | OUTPATIENT
Start: 2020-08-06 | End: 2020-08-06

## 2020-08-06 RX ORDER — PROCHLORPERAZINE EDISYLATE 5 MG/ML
5-10 INJECTION INTRAMUSCULAR; INTRAVENOUS EVERY 4 HOURS PRN
Status: DISCONTINUED | OUTPATIENT
Start: 2020-08-06 | End: 2020-08-10 | Stop reason: HOSPADM

## 2020-08-06 RX ORDER — SUMATRIPTAN 6 MG/.5ML
6 INJECTION, SOLUTION SUBCUTANEOUS ONCE
Status: COMPLETED | OUTPATIENT
Start: 2020-08-06 | End: 2020-08-06

## 2020-08-06 RX ORDER — POTASSIUM CHLORIDE 20 MEQ/1
40 TABLET, EXTENDED RELEASE ORAL ONCE
Status: COMPLETED | OUTPATIENT
Start: 2020-08-06 | End: 2020-08-06

## 2020-08-06 RX ORDER — PROMETHAZINE HYDROCHLORIDE 12.5 MG/1
12.5-25 SUPPOSITORY RECTAL EVERY 4 HOURS PRN
Status: DISCONTINUED | OUTPATIENT
Start: 2020-08-06 | End: 2020-08-10 | Stop reason: HOSPADM

## 2020-08-06 RX ORDER — POTASSIUM CHLORIDE 20 MEQ/1
20 TABLET, EXTENDED RELEASE ORAL ONCE
Status: COMPLETED | OUTPATIENT
Start: 2020-08-06 | End: 2020-08-06

## 2020-08-06 RX ORDER — AMITRIPTYLINE HYDROCHLORIDE 10 MG/1
10 TABLET, FILM COATED ORAL
Status: DISCONTINUED | OUTPATIENT
Start: 2020-08-06 | End: 2020-08-10 | Stop reason: HOSPADM

## 2020-08-06 RX ORDER — PROMETHAZINE HYDROCHLORIDE 25 MG/1
12.5-25 TABLET ORAL EVERY 4 HOURS PRN
Status: DISCONTINUED | OUTPATIENT
Start: 2020-08-06 | End: 2020-08-10 | Stop reason: HOSPADM

## 2020-08-06 RX ORDER — DIPHENHYDRAMINE HYDROCHLORIDE 50 MG/ML
25 INJECTION INTRAMUSCULAR; INTRAVENOUS ONCE
Status: COMPLETED | OUTPATIENT
Start: 2020-08-06 | End: 2020-08-06

## 2020-08-06 RX ORDER — OXYCODONE HCL 10 MG/1
10 TABLET, FILM COATED, EXTENDED RELEASE ORAL 2 TIMES DAILY
Status: DISCONTINUED | OUTPATIENT
Start: 2020-08-06 | End: 2020-08-10 | Stop reason: HOSPADM

## 2020-08-06 RX ORDER — POLYETHYLENE GLYCOL 3350 17 G/17G
1 POWDER, FOR SOLUTION ORAL
Status: DISCONTINUED | OUTPATIENT
Start: 2020-08-06 | End: 2020-08-10 | Stop reason: HOSPADM

## 2020-08-06 RX ORDER — OXYCODONE 9 MG/1
9 CAPSULE, EXTENDED RELEASE ORAL EVERY 12 HOURS
COMMUNITY

## 2020-08-06 RX ORDER — SODIUM CHLORIDE 9 MG/ML
INJECTION, SOLUTION INTRAVENOUS CONTINUOUS
Status: DISCONTINUED | OUTPATIENT
Start: 2020-08-06 | End: 2020-08-09

## 2020-08-06 RX ORDER — ALENDRONATE SODIUM 10 MG/1
10 TABLET ORAL
Status: DISCONTINUED | OUTPATIENT
Start: 2020-08-06 | End: 2020-08-06

## 2020-08-06 RX ORDER — BISACODYL 10 MG
10 SUPPOSITORY, RECTAL RECTAL
Status: DISCONTINUED | OUTPATIENT
Start: 2020-08-06 | End: 2020-08-10 | Stop reason: HOSPADM

## 2020-08-06 RX ORDER — HYDROCODONE BITARTRATE AND ACETAMINOPHEN 10; 325 MG/1; MG/1
1 TABLET ORAL EVERY 4 HOURS PRN
Status: ON HOLD | COMMUNITY
End: 2020-08-10 | Stop reason: SDUPTHER

## 2020-08-06 RX ORDER — ANASTROZOLE 1 MG/1
1 TABLET ORAL EVERY EVENING
Status: DISCONTINUED | OUTPATIENT
Start: 2020-08-06 | End: 2020-08-10 | Stop reason: HOSPADM

## 2020-08-06 RX ORDER — ONDANSETRON 4 MG/1
4 TABLET, ORALLY DISINTEGRATING ORAL EVERY 4 HOURS PRN
Status: DISCONTINUED | OUTPATIENT
Start: 2020-08-06 | End: 2020-08-10 | Stop reason: HOSPADM

## 2020-08-06 RX ORDER — ESOMEPRAZOLE MAGNESIUM 40 MG/1
40 CAPSULE, DELAYED RELEASE ORAL EVERY MORNING
COMMUNITY
End: 2020-08-13 | Stop reason: ALTCHOICE

## 2020-08-06 RX ORDER — ALENDRONATE SODIUM 10 MG/1
10 TABLET ORAL
Status: DISCONTINUED | OUTPATIENT
Start: 2020-08-07 | End: 2020-08-10 | Stop reason: HOSPADM

## 2020-08-06 RX ORDER — ALENDRONATE SODIUM 70 MG/1
70 TABLET ORAL
COMMUNITY

## 2020-08-06 RX ORDER — TOPIRAMATE 100 MG/1
100 TABLET, FILM COATED ORAL 2 TIMES DAILY
COMMUNITY
End: 2020-08-13 | Stop reason: ALTCHOICE

## 2020-08-06 RX ORDER — PHENOL 1.4 %
10 AEROSOL, SPRAY (ML) MUCOUS MEMBRANE
COMMUNITY
End: 2020-08-13 | Stop reason: ALTCHOICE

## 2020-08-06 RX ADMIN — LORAZEPAM 1 MG: 1 TABLET ORAL at 22:13

## 2020-08-06 RX ADMIN — KETOROLAC TROMETHAMINE 30 MG: 30 INJECTION, SOLUTION INTRAMUSCULAR at 15:30

## 2020-08-06 RX ADMIN — DIPHENHYDRAMINE HYDROCHLORIDE 25 MG: 50 INJECTION INTRAMUSCULAR; INTRAVENOUS at 15:29

## 2020-08-06 RX ADMIN — PROPRANOLOL HYDROCHLORIDE 120 MG: 60 CAPSULE, EXTENDED RELEASE ORAL at 22:16

## 2020-08-06 RX ADMIN — POTASSIUM CHLORIDE 40 MEQ: 1500 TABLET, EXTENDED RELEASE ORAL at 17:19

## 2020-08-06 RX ADMIN — LEVETIRACETAM 500 MG: 500 TABLET ORAL at 22:11

## 2020-08-06 RX ADMIN — SODIUM CHLORIDE 1000 ML: 9 INJECTION, SOLUTION INTRAVENOUS at 17:18

## 2020-08-06 RX ADMIN — POTASSIUM CHLORIDE 20 MEQ: 1500 TABLET, EXTENDED RELEASE ORAL at 22:18

## 2020-08-06 RX ADMIN — PROCHLORPERAZINE EDISYLATE 10 MG: 5 INJECTION INTRAMUSCULAR; INTRAVENOUS at 15:29

## 2020-08-06 RX ADMIN — ACETYLCYSTEINE 8760 MG: 200 SOLUTION ORAL; RESPIRATORY (INHALATION) at 18:35

## 2020-08-06 RX ADMIN — AMITRIPTYLINE HYDROCHLORIDE 10 MG: 10 TABLET, FILM COATED ORAL at 22:11

## 2020-08-06 RX ADMIN — ANASTROZOLE 1 MG: 1 TABLET, COATED ORAL at 22:16

## 2020-08-06 RX ADMIN — APIXABAN 5 MG: 5 TABLET, FILM COATED ORAL at 22:12

## 2020-08-06 RX ADMIN — SODIUM CHLORIDE 1000 ML: 9 INJECTION, SOLUTION INTRAVENOUS at 15:28

## 2020-08-06 RX ADMIN — LISINOPRIL 20 MG: 20 TABLET ORAL at 22:12

## 2020-08-06 RX ADMIN — OXYCODONE 5 MG: 5 TABLET ORAL at 20:08

## 2020-08-06 RX ADMIN — SUMATRIPTAN 6 MG: 6 INJECTION, SOLUTION SUBCUTANEOUS at 15:36

## 2020-08-06 RX ADMIN — ACETYLCYSTEINE 2920 MG: 200 SOLUTION ORAL; RESPIRATORY (INHALATION) at 22:11

## 2020-08-06 ASSESSMENT — FIBROSIS 4 INDEX
FIB4 SCORE: 0.47
FIB4 SCORE: 4.97

## 2020-08-06 NOTE — ED PROVIDER NOTES
ED Provider  Scribed for Brady Garner D.O. by Darby Alex. 8/6/2020  2:31 PM    Means of arrival: Walk in  History obtained from: patient   History limited by: none    CHIEF COMPLAINT  Chief Complaint   Patient presents with   • N/V     x3 days.   • Headache     x2 months, dienies hitting head or LOC       HPI  Rasheeda Manzano is a 49 y.o. female, with a history of headaches who presents for a headache onset 3 days. She states this headache is similar to her typical headaches and is located diffusely throughout her head. Patient describes the pain as throbbing. At this time her symptoms are slightly improved since onset. She has associated nausea and vomiting. Denies changes in appetite, numbness, tingling, or photophobia but states she is blind and only has some light sensitivity in her right eye. Patient notes is usually treated with Norco for her headaches but states this has not been helping as much as it used to. She  follows with a neurosurgeon and is scheduled to see a headache specialist.    REVIEW OF SYSTEMS  See HPI for further details. All other systems are negative.     PAST MEDICAL HISTORY   has a past medical history of Acute nasopharyngitis, Blind, C. difficile colitis, C. difficile diarrhea (2013), Cancer ([facility]), Chronic daily headache, Depression, Esophageal atresia (1971), Esophageal bleeding (12/18/2019), Fall, GERD (gastroesophageal reflux disease), H/O total hysterectomy, Heart burn (12/18/2019), Hydrocephalus ([facility]), Hydrocephalus ([facility]), Indigestion (12/18/2019), Jaundice, Legally blind, Migraine without aura, without mention of intractable migraine without mention of status migrainosus, Other specified symptom associated with female genital organs, Pain, Pain (12/18/2019), Psychiatric problem, PTSD (post-traumatic stress disorder), Seizure ([facility]) (12/18/2019), and Snoring (12/18/2019).    SOCIAL HISTORY  Social History     Tobacco Use   • Smoking status: Former Smoker      Packs/day: 1.00     Years: 10.00     Pack years: 10.00     Types: Cigars     Start date: 2004     Quit date: 2017     Years since quittin.9   • Smokeless tobacco: Never Used   • Tobacco comment:  CIGAR/day    Substance and Sexual Activity   • Alcohol use: Yes     Frequency: Monthly or less     Drinks per session: 1 or 2   • Drug use: No   • Sexual activity: Yes     Partners: Male       SURGICAL HISTORY   has a past surgical history that includes laparoscopy (08); lysis adhesions general (12/10/2013); cystoscopy (12/10/2013); aakash by laparoscopy (N/A, 2015); gastroscopy-endo (N/A, 2017); nissen fundoplication laparoscopic; abdominal hysterectomy total (12/10/2013); other; exploratory laparotomy (12/10/2013); appendectomy (12/10/2013); cholecystectomy (N/A, 2015); gastroscopy (N/A, 2019); mastectomy (Right, 2019); and node biopsy sentinel (Right, 2019).    CURRENT MEDICATIONS  Home Medications     Reviewed by Heaven Casey (Pharmacy Tech) on 20 at 1823  Med List Status: Complete   Medication Last Dose Status   alendronate (FOSAMAX) 70 MG Tab UNK Active   amitriptyline (ELAVIL) 75 MG Tab 2020 Active   anastrozole (ARIMIDEX) 1 MG Tab 2020 Active   apixaban (ELIQUIS) 5mg Tab 2020 Active   esomeprazole (NEXIUM) 40 MG delayed-release capsule 2020 Active   HYDROcodone/acetaminophen (NORCO)  MG Tab 2020 Active   hydrOXYzine HCl (ATARAX) 50 MG Tab 2020 Active   levETIRAcetam (KEPPRA) 500 MG Tab 2020 Active   lisinopril (PRINIVIL) 20 MG Tab 2020 Active   LORazepam (ATIVAN) 1 MG Tab 2020 Active   Melatonin 10 MG Tab 2020 Active   oxyCODONE ER (XTAMPZA ER) 9 MG Capsule Extended Release 12 hour Abuse-Deterrent 2020 Active   propranolol CR (INDERAL LA) 120 MG CAPSULE SR 24 HR 2020 Active   sucralfate (CARAFATE) 1 GM Tab 2020 Active   topiramate (TOPAMAX) 100 MG Tab 2020 Active           "      ALLERGIES  Allergies   Allergen Reactions   • Latex Rash     Rash   • Tape Rash     RXN= ongoing  Adhesive Medical tape. Per patient, paper tape ok.       PHYSICAL EXAM  VITAL SIGNS: /76   Pulse (!) 122   Temp 36.8 °C (98.2 °F) (Oral)   Resp 18   Ht 1.626 m (5' 4\")   Wt 58.4 kg (128 lb 12 oz)   LMP 11/27/2013   SpO2 96%   BMI 22.10 kg/m²   Constitutional: Alert in no apparent distress.  HENT: No signs of trauma, mucous membranes are moist  Eyes: Conjunctiva normal, Non-icteric, pupils equal and minimally reactive.  Neck: Normal range of motion, No tenderness, Supple.  Lymphatic: No lymphadenopathy noted.   Cardiovascular: Tachycardic, Regular rhythm, no murmurs.   Thorax & Lungs: Normal breath sounds, No respiratory distress, No wheezing, No chest tenderness.   Abdomen: Bowel sounds normal, Soft, No tenderness, No masses, No pulsatile masses. No peritoneal signs.  Skin: Warm, Dry, normal color.   Back: No bony tenderness, No CVA tenderness.   Extremities: No edema, No tenderness, No cyanosis  Musculoskeletal: Good range of motion in all major joints. No tenderness to palpation or major deformities noted.   Neurologic: Alert and oriented x4, Normal motor function, Normal sensory function, No focal deficits noted.   Psychiatric: Affect normal, Judgment normal, Mood normal.     DIAGNOSTIC STUDIES / PROCEDURES    LABS  Results for orders placed or performed during the hospital encounter of 08/06/20   CBC WITH DIFFERENTIAL   Result Value Ref Range    WBC 10.8 4.8 - 10.8 K/uL    RBC 4.35 4.20 - 5.40 M/uL    Hemoglobin 13.1 12.0 - 16.0 g/dL    Hematocrit 39.6 37.0 - 47.0 %    MCV 91.0 81.4 - 97.8 fL    MCH 30.1 27.0 - 33.0 pg    MCHC 33.1 (L) 33.6 - 35.0 g/dL    RDW 46.0 35.9 - 50.0 fL    Platelet Count 291 164 - 446 K/uL    MPV 11.2 9.0 - 12.9 fL    Neutrophils-Polys 85.50 (H) 44.00 - 72.00 %    Lymphocytes 6.00 (L) 22.00 - 41.00 %    Monocytes 6.80 0.00 - 13.40 %    Eosinophils 0.60 0.00 - 6.90 %    " Basophils 0.60 0.00 - 1.80 %    Immature Granulocytes 0.50 0.00 - 0.90 %    Nucleated RBC 0.00 /100 WBC    Neutrophils (Absolute) 9.24 (H) 2.00 - 7.15 K/uL    Lymphs (Absolute) 0.65 (L) 1.00 - 4.80 K/uL    Monos (Absolute) 0.74 0.00 - 0.85 K/uL    Eos (Absolute) 0.07 0.00 - 0.51 K/uL    Baso (Absolute) 0.06 0.00 - 0.12 K/uL    Immature Granulocytes (abs) 0.05 0.00 - 0.11 K/uL    NRBC (Absolute) 0.00 K/uL   COMP METABOLIC PANEL   Result Value Ref Range    Sodium 140 135 - 145 mmol/L    Potassium 3.5 (L) 3.6 - 5.5 mmol/L    Chloride 106 96 - 112 mmol/L    Co2 18 (L) 20 - 33 mmol/L    Anion Gap 16.0 7.0 - 16.0    Glucose 112 (H) 65 - 99 mg/dL    Bun 15 8 - 22 mg/dL    Creatinine 0.59 0.50 - 1.40 mg/dL    Calcium 10.3 8.5 - 10.5 mg/dL    AST(SGOT) 1115 (H) 12 - 45 U/L    ALT(SGPT) 1371 (H) 2 - 50 U/L    Alkaline Phosphatase 147 (H) 30 - 99 U/L    Total Bilirubin 1.1 0.1 - 1.5 mg/dL    Albumin 3.9 3.2 - 4.9 g/dL    Total Protein 6.4 6.0 - 8.2 g/dL    Globulin 2.5 1.9 - 3.5 g/dL    A-G Ratio 1.6 g/dL   ESTIMATED GFR   Result Value Ref Range    GFR If African American >60 >60 mL/min/1.73 m 2    GFR If Non African American >60 >60 mL/min/1.73 m 2   HEPATITIS PANEL ACUTE(4 COMPONENTS)   Result Value Ref Range    Hepatitis B Surface Antigen Non-Reactive Non-Reactive    Hepatitis B Cors Ab,IgM Non-Reactive Non-Reactive    Hepatitis A Virus Ab, IgM Non-Reactive Non-Reactive    Hepatitis C Antibody Non-Reactive Non-Reactive   PT/INR   Result Value Ref Range    PT 18.9 (H) 12.0 - 14.6 sec    INR 1.53 (H) 0.87 - 1.13   ACETAMINOPHEN   Result Value Ref Range    Acetaminophen -Tylenol <5 (L) 10 - 30 ug/mL     All labs reviewed by me.    COURSE  Pertinent Labs & Imaging studies reviewed. (See chart for details)    Review of past medical records shows the patient showed multiple visits for headaches. She was admitted last month. Patient had a CT, CTA, and neurosurgery evaluation for her shunt which showed no cause for her  headache.    2:31 PM - Patient seen and examined at bedside. Patients past medical records show that her  shunt was recently evaluated and there were no concerns so we do not want to do another CT due to cancer risk. I discussed that we will treat her with medication for her headache and fluids for her tachycardia and reevaluate her after treatment. Discussed plan of care. The patient will be resuscitated with 1L NS IV and medicated with Compazine 10 mg, Toradol 30 mg, Benadryl 25 mg and Imitrex 6 mg. Ordered for CBC w/ diff and CMP to evaluate her symptoms.     5:05 PM - Patients labs showed elevated AST and ALT which were significantly elevated compared to labs from 2 months ago. She denies abdominal pain and has only had a small amount of vomiting secondary to her headache. There are concerns for tylenol overdose. Patient then admitted to up to 15 tablets of Norco three days ago, 3 doses yesterday, and 2 doses today. Spoke with poison control, case number [id number], who recommended a full course of acetadote and reevaluation after treatment. Patient will be treated with acetylcysteine 8760 mg.    5:13 PM - Paged hospitalist.    5:41 PM - I discussed the patient's case and the above findings with Dr. Lubin (Hospitalist) who agreed to evaluate patient for hospitalization. Patients care will be transferred at this time.     5:58 PM - Updated the patient about admission. Patients headache is almost completely resolved at this time.    HYDRATION: Based on the patient's presentation of Tachycardia the patient was given IV fluids. IV Hydration was used because oral hydration was not adequate alone. Upon recheck following hydration, the patient was well improved.     The total critical care time on this patient is 30 minutes, resuscitating patient, speaking with admitting physician, and deciphering test results. This 30 minutes is exclusive of separately billable procedures.    MEDICAL DECISION MAKING  This is a 49 y.o.  female who presents with complaints of a severe headache been going on for 3 days.  She does have a history of migraines, history of IVP shunt and congenital hydrocephalus.  She has no neurological deficits, she was medicated for migraine and this did seem to improve her pain.  She did have an episode of vomiting and she is not been eating or drinking much so labs were done to evaluate.  Her heart rate was elevated and there was concerns for dehydration so IV fluids were given.  Her lab test shows no significant increase in her transaminase levels, this is a significant change from records that were reviewed from June of this year.  She has been taking Norco with Tylenol in it and it seems she has been taking more than prescribed.  This may be a chronic Tylenol overdose which is concerning.  Her INR is also elevated consistent with a liver dysfunction.  I spoke with poison control and they recommended starting acetylcysteine for possible chronic Tylenol overdose.  Patient will be admitted for further evaluation and treatment      DISPOSITION:  Patient will be hospitalized by Dr. Lubin in guarded condition.     FINAL IMPRESSION  1. Severe headache    2. Transaminitis    3. Accidental acetaminophen overdose, initial encounter         Darby SHARMA (Dennis), am scribing for, and in the presence of, Brady Garner D.O..    Electronically signed by: Darby Alex (Dennis), 8/6/2020    Brady SHARMA D.O. personally performed the services described in this documentation, as scribed by Darby Alex in my presence, and it is both accurate and complete. C    The note accurately reflects work and decisions made by me.  Brady Garner D.O.  8/6/2020  9:56 PM

## 2020-08-06 NOTE — ED TRIAGE NOTES
Chief Complaint   Patient presents with   • N/V     x3 days.   • Headache     x2 months, dienies hitting head or LOC     Patient to triage via ambualtion, with a steady gait, patient A&O x4, ambulates with white cane d/t blindness.  Patient reports headache, taking medication as prescribed with no relief.        Explained wait time and triage process to pt. Pt placed back in lobby, told to notify ED tech or triage RN of any changes, verbalized understanding.

## 2020-08-07 ENCOUNTER — APPOINTMENT (OUTPATIENT)
Dept: RADIOLOGY | Facility: MEDICAL CENTER | Age: 50
DRG: 948 | End: 2020-08-07
Attending: HOSPITALIST
Payer: MEDICARE

## 2020-08-07 LAB
ALBUMIN SERPL BCP-MCNC: 3 G/DL (ref 3.2–4.9)
ALBUMIN SERPL BCP-MCNC: 3.6 G/DL (ref 3.2–4.9)
ALBUMIN/GLOB SERPL: 1.4 G/DL
ALBUMIN/GLOB SERPL: 1.6 G/DL
ALP SERPL-CCNC: 104 U/L (ref 30–99)
ALP SERPL-CCNC: 124 U/L (ref 30–99)
ALT SERPL-CCNC: 642 U/L (ref 2–50)
ALT SERPL-CCNC: 692 U/L (ref 2–50)
ALT SERPL-CCNC: 782 U/L (ref 2–50)
ANION GAP SERPL CALC-SCNC: 10 MMOL/L (ref 7–16)
ANION GAP SERPL CALC-SCNC: 14 MMOL/L (ref 7–16)
APAP SERPL-MCNC: <5 UG/ML (ref 10–30)
AST SERPL-CCNC: 214 U/L (ref 12–45)
AST SERPL-CCNC: 384 U/L (ref 12–45)
BASOPHILS # BLD AUTO: 0.6 % (ref 0–1.8)
BASOPHILS # BLD: 0.04 K/UL (ref 0–0.12)
BILIRUB SERPL-MCNC: 0.8 MG/DL (ref 0.1–1.5)
BILIRUB SERPL-MCNC: 0.9 MG/DL (ref 0.1–1.5)
BUN SERPL-MCNC: 10 MG/DL (ref 8–22)
BUN SERPL-MCNC: 5 MG/DL (ref 8–22)
CALCIUM SERPL-MCNC: 8.4 MG/DL (ref 8.5–10.5)
CALCIUM SERPL-MCNC: 8.4 MG/DL (ref 8.5–10.5)
CHLORIDE SERPL-SCNC: 112 MMOL/L (ref 96–112)
CHLORIDE SERPL-SCNC: 113 MMOL/L (ref 96–112)
CO2 SERPL-SCNC: 16 MMOL/L (ref 20–33)
CO2 SERPL-SCNC: 16 MMOL/L (ref 20–33)
COVID ORDER STATUS COVID19: NORMAL
CREAT SERPL-MCNC: 0.39 MG/DL (ref 0.5–1.4)
CREAT SERPL-MCNC: 0.5 MG/DL (ref 0.5–1.4)
EOSINOPHIL # BLD AUTO: 0.24 K/UL (ref 0–0.51)
EOSINOPHIL NFR BLD: 3.5 % (ref 0–6.9)
ERYTHROCYTE [DISTWIDTH] IN BLOOD BY AUTOMATED COUNT: 47.5 FL (ref 35.9–50)
GLOBULIN SER CALC-MCNC: 2.2 G/DL (ref 1.9–3.5)
GLOBULIN SER CALC-MCNC: 2.3 G/DL (ref 1.9–3.5)
GLUCOSE SERPL-MCNC: 108 MG/DL (ref 65–99)
GLUCOSE SERPL-MCNC: 109 MG/DL (ref 65–99)
HCT VFR BLD AUTO: 34.1 % (ref 37–47)
HGB BLD-MCNC: 11.3 G/DL (ref 12–16)
IMM GRANULOCYTES # BLD AUTO: 0.04 K/UL (ref 0–0.11)
IMM GRANULOCYTES NFR BLD AUTO: 0.6 % (ref 0–0.9)
INR PPP: 1.32 (ref 0.87–1.13)
LYMPHOCYTES # BLD AUTO: 0.83 K/UL (ref 1–4.8)
LYMPHOCYTES NFR BLD: 12 % (ref 22–41)
MCH RBC QN AUTO: 30.1 PG (ref 27–33)
MCHC RBC AUTO-ENTMCNC: 33.1 G/DL (ref 33.6–35)
MCV RBC AUTO: 90.9 FL (ref 81.4–97.8)
MONOCYTES # BLD AUTO: 0.8 K/UL (ref 0–0.85)
MONOCYTES NFR BLD AUTO: 11.5 % (ref 0–13.4)
NEUTROPHILS # BLD AUTO: 4.98 K/UL (ref 2–7.15)
NEUTROPHILS NFR BLD: 71.8 % (ref 44–72)
NRBC # BLD AUTO: 0 K/UL
NRBC BLD-RTO: 0 /100 WBC
PLATELET # BLD AUTO: 235 K/UL (ref 164–446)
PMV BLD AUTO: 11.3 FL (ref 9–12.9)
POTASSIUM SERPL-SCNC: 3.3 MMOL/L (ref 3.6–5.5)
POTASSIUM SERPL-SCNC: 3.4 MMOL/L (ref 3.6–5.5)
PROT SERPL-MCNC: 5.2 G/DL (ref 6–8.2)
PROT SERPL-MCNC: 5.9 G/DL (ref 6–8.2)
PROTHROMBIN TIME: 16.8 SEC (ref 12–14.6)
RBC # BLD AUTO: 3.75 M/UL (ref 4.2–5.4)
SARS-COV-2 RNA RESP QL NAA+PROBE: NOTDETECTED
SODIUM SERPL-SCNC: 139 MMOL/L (ref 135–145)
SODIUM SERPL-SCNC: 142 MMOL/L (ref 135–145)
SPECIMEN SOURCE: NORMAL
WBC # BLD AUTO: 6.9 K/UL (ref 4.8–10.8)

## 2020-08-07 PROCEDURE — 36415 COLL VENOUS BLD VENIPUNCTURE: CPT

## 2020-08-07 PROCEDURE — 770020 HCHG ROOM/CARE - TELE (206)

## 2020-08-07 PROCEDURE — 85025 COMPLETE CBC W/AUTO DIFF WBC: CPT

## 2020-08-07 PROCEDURE — A9270 NON-COVERED ITEM OR SERVICE: HCPCS | Performed by: HOSPITALIST

## 2020-08-07 PROCEDURE — C9803 HOPD COVID-19 SPEC COLLECT: HCPCS | Performed by: HOSPITALIST

## 2020-08-07 PROCEDURE — 700105 HCHG RX REV CODE 258: Performed by: HOSPITALIST

## 2020-08-07 PROCEDURE — 700102 HCHG RX REV CODE 250 W/ 637 OVERRIDE(OP): Performed by: HOSPITALIST

## 2020-08-07 PROCEDURE — U0003 INFECTIOUS AGENT DETECTION BY NUCLEIC ACID (DNA OR RNA); SEVERE ACUTE RESPIRATORY SYNDROME CORONAVIRUS 2 (SARS-COV-2) (CORONAVIRUS DISEASE [COVID-19]), AMPLIFIED PROBE TECHNIQUE, MAKING USE OF HIGH THROUGHPUT TECHNOLOGIES AS DESCRIBED BY CMS-2020-01-R: HCPCS

## 2020-08-07 PROCEDURE — 80053 COMPREHEN METABOLIC PANEL: CPT

## 2020-08-07 PROCEDURE — 70450 CT HEAD/BRAIN W/O DYE: CPT | Mod: MF

## 2020-08-07 PROCEDURE — 700101 HCHG RX REV CODE 250: Performed by: HOSPITALIST

## 2020-08-07 PROCEDURE — 85610 PROTHROMBIN TIME: CPT

## 2020-08-07 PROCEDURE — 80307 DRUG TEST PRSMV CHEM ANLYZR: CPT

## 2020-08-07 PROCEDURE — 99233 SBSQ HOSP IP/OBS HIGH 50: CPT | Performed by: HOSPITALIST

## 2020-08-07 PROCEDURE — 84460 ALANINE AMINO (ALT) (SGPT): CPT

## 2020-08-07 RX ORDER — OXYCODONE HYDROCHLORIDE 10 MG/1
10 TABLET ORAL
Status: DISCONTINUED | OUTPATIENT
Start: 2020-08-07 | End: 2020-08-10 | Stop reason: HOSPADM

## 2020-08-07 RX ORDER — OXYCODONE HYDROCHLORIDE 5 MG/1
5 TABLET ORAL
Status: DISCONTINUED | OUTPATIENT
Start: 2020-08-07 | End: 2020-08-10 | Stop reason: HOSPADM

## 2020-08-07 RX ORDER — HYDROMORPHONE HYDROCHLORIDE 1 MG/ML
0.25 INJECTION, SOLUTION INTRAMUSCULAR; INTRAVENOUS; SUBCUTANEOUS
Status: DISCONTINUED | OUTPATIENT
Start: 2020-08-07 | End: 2020-08-10 | Stop reason: HOSPADM

## 2020-08-07 RX ADMIN — SODIUM CHLORIDE: 9 INJECTION, SOLUTION INTRAVENOUS at 14:21

## 2020-08-07 RX ADMIN — ANASTROZOLE 1 MG: 1 TABLET, COATED ORAL at 17:41

## 2020-08-07 RX ADMIN — OXYCODONE 5 MG: 5 TABLET ORAL at 02:54

## 2020-08-07 RX ADMIN — OXYCODONE 5 MG: 5 TABLET ORAL at 08:13

## 2020-08-07 RX ADMIN — AMITRIPTYLINE HYDROCHLORIDE 10 MG: 10 TABLET, FILM COATED ORAL at 22:50

## 2020-08-07 RX ADMIN — APIXABAN 5 MG: 5 TABLET, FILM COATED ORAL at 17:40

## 2020-08-07 RX ADMIN — PROPRANOLOL HYDROCHLORIDE 120 MG: 60 CAPSULE, EXTENDED RELEASE ORAL at 22:49

## 2020-08-07 RX ADMIN — LEVETIRACETAM 500 MG: 500 TABLET ORAL at 05:50

## 2020-08-07 RX ADMIN — LISINOPRIL 20 MG: 20 TABLET ORAL at 22:51

## 2020-08-07 RX ADMIN — OXYCODONE HYDROCHLORIDE 10 MG: 10 TABLET, FILM COATED, EXTENDED RELEASE ORAL at 05:50

## 2020-08-07 RX ADMIN — OXYCODONE HYDROCHLORIDE 10 MG: 10 TABLET ORAL at 19:54

## 2020-08-07 RX ADMIN — SODIUM CHLORIDE: 9 INJECTION, SOLUTION INTRAVENOUS at 00:52

## 2020-08-07 RX ADMIN — LORAZEPAM 1 MG: 1 TABLET ORAL at 22:50

## 2020-08-07 RX ADMIN — LEVETIRACETAM 500 MG: 500 TABLET ORAL at 17:40

## 2020-08-07 RX ADMIN — ACETYLCYSTEINE 6.25 MG/KG/HR: 200 SOLUTION ORAL; RESPIRATORY (INHALATION) at 02:54

## 2020-08-07 RX ADMIN — APIXABAN 5 MG: 5 TABLET, FILM COATED ORAL at 05:50

## 2020-08-07 RX ADMIN — OXYCODONE HYDROCHLORIDE 10 MG: 10 TABLET ORAL at 11:44

## 2020-08-07 RX ADMIN — OXYCODONE HYDROCHLORIDE 10 MG: 10 TABLET, FILM COATED, EXTENDED RELEASE ORAL at 17:40

## 2020-08-07 RX ADMIN — ALENDRONATE SODIUM 10 MG: 10 TABLET ORAL at 08:13

## 2020-08-07 RX ADMIN — ACETYLCYSTEINE 6.25 MG/KG/HR: 200 SOLUTION ORAL; RESPIRATORY (INHALATION) at 20:30

## 2020-08-07 RX ADMIN — OXYCODONE HYDROCHLORIDE 10 MG: 10 TABLET ORAL at 14:56

## 2020-08-07 ASSESSMENT — ENCOUNTER SYMPTOMS
FOCAL WEAKNESS: 0
VOMITING: 0
CARDIOVASCULAR NEGATIVE: 1
POLYDIPSIA: 0
WEAKNESS: 0
BRUISES/BLEEDS EASILY: 0
DIZZINESS: 0
BLURRED VISION: 1
FEVER: 0
NERVOUS/ANXIOUS: 1
PALPITATIONS: 0
HEARTBURN: 0
GASTROINTESTINAL NEGATIVE: 1
COUGH: 0
NAUSEA: 0
BACK PAIN: 0
HEADACHES: 1
MUSCULOSKELETAL NEGATIVE: 1
RESPIRATORY NEGATIVE: 1
CHILLS: 0
DEPRESSION: 0
NECK PAIN: 0

## 2020-08-07 ASSESSMENT — FIBROSIS 4 INDEX: FIB4 SCORE: 1.66

## 2020-08-07 NOTE — PROGRESS NOTES
2 RN skin check complete.   Devices in place NA.  Skin assessed under devices NA.  Confirmed pressure ulcers found on NA.  New potential pressure ulcers noted on NA. Wound consult placed NA.  The following interventions in place: Pillows for positioning, pillows to float heels.    Scab/skin tear right elbow, JOSEFA  BLUE and BLLE scattered bruising and scabs.   Multiple scars to abdomen from prior cancers

## 2020-08-07 NOTE — PROGRESS NOTES
[facility] Medicine Daily Progress Note    Date of Service  8/7/2020    Chief Complaint  49 y.o. female admitted 8/6/2020 with a history of hydrocephalus and  shunt, under the care of Dr. Haney neurosurgery, apparently is suffering from chronic headaches with narcotic use on a chronic basis, presents to the emergency room with a headache and nausea vomiting, apparently the patient had a recent headache development after a MRI and altering her  shunt pressure settings at that time.  This was then readjusted, the patient returns and is found to have abnormal LFTs where she reportedly had a increasing intake of Norco at home, the patient is legally blind and might have some difficulty with her medication intake.  Poison control was contacted and the patient is recommended to undergo Mucomyst treatment per protocol.    Hospital Course    Admitted with Tylenol toxicity, headache      Interval Problem Update  Patient seen and examined today.    Patient tolerating treatment and therapies.  All Data, Medication data reviewed.  Case discussed with nursing as available.  Plan of Care reviewed with patient and notified of changes.  8/7 the patient still is suffering from significant amount of headache, her LFTs are currently improving per laboratory follow-up, discussed her case with Dr. Haney, suggesting a follow-up CT  Her abdominal ultrasound was found fairly unremarkable, her bilirubin is normal, Tylenol level this morning less than 5, INR 1.53, hepatitis panel negative  Consultants/Specialty  Poison control    Code Status  Full Code    Disposition  TBD    Review of Systems  Review of Systems   Constitutional: Positive for malaise/fatigue. Negative for chills and fever.   HENT: Negative.    Eyes: Positive for blurred vision.   Respiratory: Negative.  Negative for cough.    Cardiovascular: Negative.  Negative for chest pain and palpitations.   Gastrointestinal: Negative.  Negative for heartburn, nausea and vomiting.    Genitourinary: Negative.  Negative for dysuria and frequency.   Musculoskeletal: Negative.  Negative for back pain and neck pain.   Skin: Negative.  Negative for itching and rash.   Neurological: Positive for headaches. Negative for dizziness, focal weakness and weakness.   Endo/Heme/Allergies: Negative.  Negative for polydipsia. Does not bruise/bleed easily.   Psychiatric/Behavioral: Negative for depression. The patient is nervous/anxious.         Physical Exam  Temp:  [36.4 °C (97.6 °F)-37 °C (98.6 °F)] 36.8 °C (98.2 °F)  Pulse:  [] 98  Resp:  [13-26] 17  BP: (108-150)/() 121/80  SpO2:  [94 %-99 %] 98 %    Physical Exam  Vitals signs and nursing note reviewed.   Constitutional:       Appearance: She is well-developed. She is not diaphoretic.   HENT:      Head: Normocephalic and atraumatic.      Nose: Nose normal.   Eyes:      Conjunctiva/sclera: Conjunctivae normal.      Pupils: Pupils are equal, round, and reactive to light.      Comments: Abnormal eye movements  Decreased visual acuity   Neck:      Musculoskeletal: Normal range of motion and neck supple.      Thyroid: No thyromegaly.      Vascular: No JVD.   Cardiovascular:      Rate and Rhythm: Normal rate and regular rhythm.      Heart sounds: Normal heart sounds. No friction rub. No gallop.    Pulmonary:      Effort: Pulmonary effort is normal.      Breath sounds: Normal breath sounds. No wheezing or rales.   Abdominal:      General: Bowel sounds are normal. There is no distension.      Palpations: Abdomen is soft. There is no mass.      Tenderness: There is no abdominal tenderness. There is no guarding or rebound.   Musculoskeletal: Normal range of motion.         General: No tenderness.   Lymphadenopathy:      Cervical: No cervical adenopathy.   Skin:     General: Skin is warm and dry.   Neurological:      Mental Status: She is alert and oriented to person, place, and time.      Cranial Nerves: No cranial nerve deficit.   Psychiatric:          Behavior: Behavior normal.         Fluids    Intake/Output Summary (Last 24 hours) at 8/7/2020 1532  Last data filed at 8/7/2020 0418  Gross per 24 hour   Intake 400 ml   Output --   Net 400 ml       Laboratory  Recent Labs     08/06/20  1523 08/07/20  0508   WBC 10.8 6.9   RBC 4.35 3.75*   HEMOGLOBIN 13.1 11.3*   HEMATOCRIT 39.6 34.1*   MCV 91.0 90.9   MCH 30.1 30.1   MCHC 33.1* 33.1*   RDW 46.0 47.5   PLATELETCT 291 235   MPV 11.2 11.3     Recent Labs     08/06/20  1523 08/07/20  0508   SODIUM 140 139   POTASSIUM 3.5* 3.3*   CHLORIDE 106 113*   CO2 18* 16*   GLUCOSE 112* 109*   BUN 15 10   CREATININE 0.59 0.39*   CALCIUM 10.3 8.4*     Recent Labs     08/06/20  1715   INR 1.53*               Imaging  US-RUQ   Final Result      1. Dilated CBD could relate to post cholecystectomy status.      2. Prominent right renal pelvis.         CT-HEAD W/O    (Results Pending)        Assessment/Plan  * Elevated LFTs  Assessment & Plan  Concern for Tylenol toxicity  Discussed with pharmacist patient will be started on N-acetylcysteine  Monitor LFTs  Hepatitis panel is negative    Hypokalemia  Assessment & Plan  Replete and monitor     Migraine without aura- (present on admission)  Assessment & Plan  History of chronic headaches and narcotic dependence    Counseled on Tylenol overdosage with Norco  Continue amitriptyline and Inderal  Continue home dose of oxycodone  DC Norco    Congenital hydrocephalus (HCC)- (present on admission)  Assessment & Plan   shunt in place established with Dr. Haney  Patient reports that she had her shunt checked 3 weeks ago and is functioning normally  Follow-up CT  Plan  Case discussed briefly with Dr. Haney, he suggested follow-up CT otherwise no adjustment indicated,  Continue Mucomyst, follow-up labs  See orders  Medically complex high risk      VTE prophylaxis: Apixaban    I have performed a physical exam and reviewed and updated ROS and Plan today . In review of yesterday's note , there are no  changes except as documented above.        Please note that this dictation was created using voice recognition software. I have made every reasonable attempt to correct obvious errors, but I expect that there are errors of grammar and possibly context that I did not discover before finalizing the note.

## 2020-08-07 NOTE — ASSESSMENT & PLAN NOTE
History of chronic headaches and narcotic dependence    Counseled on Tylenol overdosage with Norco  Continue amitriptyline and Inderal  Continue home dose of oxycodone  DC Norco

## 2020-08-07 NOTE — ASSESSMENT & PLAN NOTE
shunt in place established with Dr. Haney  Patient reports that she had her shunt checked 3 weeks ago and is functioning normally  Follow-up CT

## 2020-08-07 NOTE — ASSESSMENT & PLAN NOTE
Concern for Tylenol toxicity  The patient is status post Mucomyst protocol  Monitor LFTs  Hepatitis panel is negative  Recheck labs in likely stop today

## 2020-08-07 NOTE — ED NOTES
Pharmacy Medication Reconciliation      Medication reconciliation updated and complete per pt at bedside & pt home pharmacy  Allergies have been verified and updated   No oral ABX within the last 14 days  Pt home pharmacy:[facility]    Pt reports she has been taking Norco as follows  2-4 tablets every 4 hours    Pt reports she has not been taking her Keppra because she ran out about a week ago, pt home pharmacy reports pt picked up a 90 day supply on 07/29/20

## 2020-08-07 NOTE — PROGRESS NOTES
Assumed care of PT A&O 4. Pt resting in bed with no signs of labored breathing. On RA. Tele monitor in place, cardiac rhythm being monitored. Call light within reach, bed in lowest position, upper bed rails up. Pt was updated on plan of care for the day and of location of items in the room. Will continue to monitor.

## 2020-08-07 NOTE — ED NOTES
Medications given per MAR. Pt ambulated to restroom and back with assistance. Pt on monitor. Call light in reach. Will continue to monitor.

## 2020-08-07 NOTE — PROGRESS NOTES
[employer] ACCEPTANCE NOTE  ED admission.  Rasheeda Manzano 49 y.o. female   MRN [id number]  Location ED Y66.  I have reviewed the case and discussed with Dr. Pascual GRAYP.  Presents with N/V (x3 days.) and Headache (x2 months, dienies hitting head or LOC)     Summary  Chronic headache chronic norco user  Took Tylenol round the clock for past 2d  Workup at ED Tylenol poisoning  Transaminitis  Poison Control contacted  Started IV N acetylcysteine  Not suicidal per ED    Vitals and Labs  Vitals:    20 1701   BP: 143/75   Pulse: (!) 126   Resp: 16   Temp:    SpO2: 98%     Results for orders placed or performed during the hospital encounter of 20   EKG (NOW)   Result Value Ref Range    Report       [facility] Emergency Dept.    Test Date:  2020  Pt Name:    RASHEEDA MANZANO               Department: ER  MRN:        [id number]                      Room:       [facility]  Gender:     Female                       Technician: [id number]  :        1970                   Requested By:BRENNA LUCIA  Order #:    [id number]                    Reading MD: Brenna Murphy    Measurements  Intervals                                Axis  Rate:       99                           P:          28  UT:         132                          QRS:        66  QRSD:       84                           T:          39  QT:         356  QTc:        457    Interpretive Statements  SINUS RHYTHM  Compared to ECG 2020 03:06:48  Sinus tachycardia no longer present  Electronically Signed On 2020 14:31:17 PDT by Brenna Murphy       Lab Results   Component Value Date/Time    WBC 10.8 2020 03:23 PM    RBC 4.35 2020 03:23 PM    HEMOGLOBIN 13.1 2020 03:23 PM    HEMATOCRIT 39.6 2020 03:23 PM    MCV 91.0 2020 03:23 PM    MCH 30.1 2020 03:23 PM    MCHC 33.1 (L) 2020 03:23 PM    MPV 11.2 2020 03:23 PM    NEUTSPOLYS 85.50 (H) 2020 03:23 PM    LYMPHOCYTES 6.00  (L) 08/06/2020 03:23 PM    MONOCYTES 6.80 08/06/2020 03:23 PM    EOSINOPHILS 0.60 08/06/2020 03:23 PM    EOSINOPHILS 62 08/11/2008 03:35 PM    BASOPHILS 0.60 08/06/2020 03:23 PM    HYPOCHROMIA 1 05/30/2014 06:55 PM    ANISOCYTOSIS 1 05/20/2014 09:30 PM       Lab Results   Component Value Date/Time    SODIUM 140 08/06/2020 03:23 PM    POTASSIUM 3.5 (L) 08/06/2020 03:23 PM    CHLORIDE 106 08/06/2020 03:23 PM    CO2 18 (L) 08/06/2020 03:23 PM    GLUCOSE 112 (H) 08/06/2020 03:23 PM    BUN 15 08/06/2020 03:23 PM    CREATININE 0.59 08/06/2020 03:23 PM    CREATININE 0.6 08/12/2008 05:45 AM       Lab Results   Component Value Date/Time    PROTHROMBTM 16.5 (H) 06/17/2020 01:17 PM    INR 1.29 (H) 06/17/2020 01:17 PM       Imaging  Ct-chest (thorax) W/o    Result Date: 7/10/2020  7/10/2020 11:23 AM HISTORY/REASON FOR EXAM:  Malignancy right female breast history of pulmonary embolism TECHNIQUE/EXAM DESCRIPTION: CT scan of the chest without contrast. Thin-section helical images were obtained from the lung apices through the adrenal glands. Low dose optimization technique was utilized for this CT exam including automated exposure control and adjustment of the mA and/or kV according to patient size. COMPARISON:  06/08/2020 FINDINGS: The study is limited due to non-use of intravenous contrast.  The mediastinum and hilum is not well evaluated. Region of groundglass opacification in the apex of the right lung appears larger more dense and more confluent compared to minimal scattered faint groundglass opacifications in the region on the prior CT. Left pleural effusion is again identified and unchanged. Pleural-based nodular opacifications adjacent to the right hemidiaphragm are again noted. Linear opacifications are again noted posteriorly in each lower lobe and anteriorly in the right upper lobe. There is no evidence of mediastinal or hilar adenopathy. There is no evidence of pericardial effusion. No large masses are seen within the  upper abdomen. Calcifications within sagittal shaped pulmonary embolus are noted which extend out into the lower lobe pulmonary arteries. Thrombus is not visible on this noncontrast CT. Hiatal hernia is again identified. No solid mass within the chest wall is appreciated. No axillary or internal mammary adenopathy is identified.     1.  Groundglass opacification in the apex of the right lung is more extensive than on the prior CT. Findings could be due to infectious or inflammatory process. Covid 19 pneumonia is a possibility. 2.  Stable left pleural effusion. 3.  Calcified thrombus noted in the pulmonary arteries which was present on the prior CT. 4.  Hiatal hernia is again identified. 5.  No mass or adenopathy noted in the chest wall or axilla. Imaging features can be seen with COVID-19 pneumonia, though are nonspecific and can occur with a variety of infectious and noninfectious processes.     Us-ruq    Result Date: 8/6/2020 8/6/2020 4:58 PM HISTORY/REASON FOR EXAM:  Abnormal Labs Abdominal pain TECHNIQUE/EXAM DESCRIPTION AND NUMBER OF VIEWS:  Real-time sonography of the liver and biliary tree. COMPARISON: None FINDINGS: The liver measures 13.41 cm. The liver is normal in contour. There is no evidence of solid mass lesion. The gallbladder has been resected. The common duct measures 10.90 mm in diameter. The visualized pancreas is unremarkable. The visualized aorta is normal in caliber. Intrahepatic IVC is patent. The portal vein is patent with hepatopetal flow. The MPV measures 0.8 cm. The right kidney measures 10.39 cm. The right kidney has normal cortical size and echotexture. Prominent right renal pelvis. There is no hydronephrosis or renal calculi. There is no ascites.     1. Dilated CBD could relate to post cholecystectomy status. 2. Prominent right renal pelvis.     Working Diagnoses and Problems  Tylenol overdosse  Consultants None  Initial Work-up and Management:  Poison Control contacted  N acetyl  cysteine started  CoVID risk per EDP assessment is low. No need for CoVID isolation unit at this time. The admitting physician will reevaluate to confirm CoVID risk and make changes to the admission order when appropriate.  Admit to telemetry  Dr. Mireille Beaver, Hospitalist to complete admission.

## 2020-08-07 NOTE — H&P
Hospital Medicine History & Physical Note    Date of Service  8/6/2020    Primary Care Physician  Tabitha Bautista M.D.    Consultants      Code Status  Full Code    Chief Complaint  Chief Complaint   Patient presents with   • N/V     x3 days.   • Headache     x2 months, dienies hitting head or LOC       History of Presenting Illness  49 y.o. female who presented 8/6/2020 with history of hydrocephalus with  shunt in place chronic headaches presented to the emergency department for evaluation of headache with nausea and vomiting.  Headache started 3 days ago was a throbbing headache moderate to severe similar to her prior episodes of headache.  She reports that she had a  shunt 3 weeks ago and was functioning normally.  She is currently in the process of being referred to neurology for her chronic headaches.  In the emergency department she was noted to have elevated LFTs.  Upon further questioning patient reported that she had took 15 Norco's 2 days ago and 3 Norco's yesterday into today.  There was concern for Tylenol toxicity and Poison Control Center was contacted and recommended N-acetylcysteine.  No fever no chills.  Abdominal examination she reports that her headache is significantly improved.  Denies any suicidal ideation or intent took the Norco to help with her headache      Review of Systems  Review of Systems   All other systems reviewed and are negative.      Past Medical History   has a past medical history of Acute nasopharyngitis, Blind, C. difficile colitis, C. difficile diarrhea (2013), Cancer (HCC), Chronic daily headache, Depression, Esophageal atresia (1971), Esophageal bleeding (12/18/2019), Fall, GERD (gastroesophageal reflux disease), H/O total hysterectomy, Heart burn (12/18/2019), Hydrocephalus (HCC), Hydrocephalus (HCC), Indigestion (12/18/2019), Jaundice, Legally blind, Migraine without aura, without mention of intractable migraine without mention of status migrainosus, Other  specified symptom associated with female genital organs, Pain, Pain (12/18/2019), Psychiatric problem, PTSD (post-traumatic stress disorder), Seizure (HCC) (12/18/2019), and Snoring (12/18/2019).    Surgical History   has a past surgical history that includes laparoscopy (8/8/08); lysis adhesions general (12/10/2013); cystoscopy (12/10/2013); aakash by laparoscopy (N/A, 8/13/2015); gastroscopy-endo (N/A, 8/24/2017); nissen fundoplication laparoscopic; abdominal hysterectomy total (12/10/2013); other; exploratory laparotomy (12/10/2013); appendectomy (12/10/2013); cholecystectomy (N/A, 8/13/2015); gastroscopy (N/A, 1/2/2019); mastectomy (Right, 12/19/2019); and node biopsy sentinel (Right, 12/19/2019).     Family History  family history includes Hypertension in her father and mother.     Social History   reports that she quit smoking about 2 years ago. Her smoking use included cigars. She started smoking about 16 years ago. She has a 10.00 pack-year smoking history. She has never used smokeless tobacco. She reports current alcohol use. She reports that she does not use drugs.    Allergies  Allergies   Allergen Reactions   • Latex Rash     Rash   • Tape Rash     RXN= ongoing  Adhesive Medical tape. Per patient, paper tape ok.       Medications  Prior to Admission Medications   Prescriptions Last Dose Informant Patient Reported? Taking?   HYDROcodone/acetaminophen (NORCO)  MG Tab 8/5/2020 at AM Patient's Home Pharmacy Yes Yes   Sig: Take 1 Tab by mouth every four hours as needed.   LORazepam (ATIVAN) 1 MG Tab 8/4/2020 at  Patient's Home Pharmacy Yes No   Sig: Take 1 mg by mouth every bedtime.   Melatonin 10 MG Tab 8/4/2020 at  Patient's Home Pharmacy Yes Yes   Sig: Take 10 mg by mouth every bedtime.   alendronate (FOSAMAX) 70 MG Tab UNK at UNK Patient's Home Pharmacy Yes Yes   Sig: Take 70 mg by mouth every 7 days.   amitriptyline (ELAVIL) 75 MG Tab 8/4/2020 at  Patient's Home Pharmacy Yes Yes   Sig: Take 75  mg by mouth every evening.   anastrozole (ARIMIDEX) 1 MG Tab 8/4/2020 at HS Patient's Home Pharmacy Yes No   Sig: Take 1 mg by mouth every evening.   apixaban (ELIQUIS) 5mg Tab 8/5/2020 at AM Patient's Home Pharmacy Yes Yes   Sig: Take 5 mg by mouth 2 Times a Day.   esomeprazole (NEXIUM) 40 MG delayed-release capsule 8/5/2020 at AM Patient's Home Pharmacy Yes Yes   Sig: Take 40 mg by mouth every morning.   hydrOXYzine HCl (ATARAX) 50 MG Tab 8/4/2020 at HS Patient's Home Pharmacy Yes Yes   Sig: Take 50 mg by mouth every bedtime.   levETIRAcetam (KEPPRA) 500 MG Tab 8/5/2020 at AM Patient's Home Pharmacy Yes No   Sig: Take 500 mg by mouth 2 times a day.   lisinopril (PRINIVIL) 20 MG Tab 8/5/2020 at AM Patient's Home Pharmacy Yes No   Sig: Take 20 mg by mouth every day.   oxyCODONE ER (XTAMPZA ER) 9 MG Capsule Extended Release 12 hour Abuse-Deterrent 8/5/2020 at AM Patient's Home Pharmacy Yes Yes   Sig: Take 9 mg by mouth every 12 hours.   propranolol CR (INDERAL LA) 120 MG CAPSULE SR 24 HR 8/4/2020 at HS Patient's Home Pharmacy Yes No   Sig: Take 120 mg by mouth every bedtime.   sucralfate (CARAFATE) 1 GM Tab 8/5/2020 at AM Patient's Home Pharmacy Yes Yes   Sig: Take 1 g by mouth 4 Times a Day,Before Meals and at Bedtime.   topiramate (TOPAMAX) 100 MG Tab 8/5/2020 at AM Patient's Home Pharmacy Yes Yes   Sig: Take 100 mg by mouth 2 times a day.      Facility-Administered Medications: None       Physical Exam  Temp:  [36.4 °C (97.6 °F)-36.8 °C (98.2 °F)] 36.4 °C (97.6 °F)  Pulse:  [116-134] 130  Resp:  [13-26] 19  BP: (120-148)/() 147/97  SpO2:  [96 %-99 %] 96 %    Physical Exam  Vitals signs and nursing note reviewed.   Constitutional:       General: She is not in acute distress.  HENT:      Head: Normocephalic and atraumatic.      Nose: Nose normal.      Mouth/Throat:      Pharynx: No oropharyngeal exudate or posterior oropharyngeal erythema.   Eyes:      General:         Right eye: No discharge.         Left  eye: No discharge.      Comments: Patient is blind   Neck:      Musculoskeletal: Neck supple.   Cardiovascular:      Rate and Rhythm: Normal rate and regular rhythm.      Heart sounds: No murmur. No friction rub. No gallop.    Pulmonary:      Effort: Pulmonary effort is normal. No respiratory distress.      Breath sounds: No stridor. No rhonchi or rales.   Chest:      Chest wall: No tenderness.   Abdominal:      General: Bowel sounds are normal. There is no distension.      Palpations: Abdomen is soft. There is no mass.      Tenderness: There is no abdominal tenderness. There is no guarding.   Musculoskeletal:         General: No swelling or tenderness.   Skin:     General: Skin is warm and dry.      Coloration: Skin is not cyanotic.      Nails: There is no clubbing.     Neurological:      General: No focal deficit present.      Mental Status: She is alert and oriented to person, place, and time.      Cranial Nerves: No cranial nerve deficit.      Motor: No weakness.   Psychiatric:         Mood and Affect: Mood normal.         Behavior: Behavior normal.         Thought Content: Thought content normal.         Judgment: Judgment normal.         Laboratory:  Recent Labs     08/06/20  1523   WBC 10.8   RBC 4.35   HEMOGLOBIN 13.1   HEMATOCRIT 39.6   MCV 91.0   MCH 30.1   MCHC 33.1*   RDW 46.0   PLATELETCT 291   MPV 11.2     Recent Labs     08/06/20  1523   SODIUM 140   POTASSIUM 3.5*   CHLORIDE 106   CO2 18*   GLUCOSE 112*   BUN 15   CREATININE 0.59   CALCIUM 10.3     Recent Labs     08/06/20  1523   ALTSGPT 1371*   ASTSGOT 1115*   ALKPHOSPHAT 147*   TBILIRUBIN 1.1   GLUCOSE 112*     Recent Labs     08/06/20  1715   INR 1.53*     No results for input(s): NTPROBNP in the last 72 hours.      No results for input(s): TROPONINT in the last 72 hours.    Imaging:  US-RUQ   Final Result      1. Dilated CBD could relate to post cholecystectomy status.      2. Prominent right renal pelvis.               Assessment/Plan:  I  anticipate this patient will require at least two midnights for appropriate medical management, necessitating inpatient admission.    * Elevated LFTs  Assessment & Plan  Concern for Tylenol toxicity  Discussed with pharmacist patient will be started on N-acetylcysteine  Monitor LFTs  Hepatitis panel is negative    Hypokalemia  Assessment & Plan  Replete and monitor     Migraine without aura- (present on admission)  Assessment & Plan  History of chronic headaches and narcotic dependence    Counseled on Tylenol overdosage with Norco  Continue amitriptyline and Inderal  Continue home dose of oxycodone  DC Norco    Congenital hydrocephalus (HCC)- (present on admission)  Assessment & Plan   shunt in place established with Dr. Haney  Patient reports that she had her shunt checked 3 weeks ago and is functioning normally

## 2020-08-07 NOTE — CARE PLAN
Problem: Communication  Goal: The ability to communicate needs accurately and effectively will improve  Outcome: PROGRESSING AS EXPECTED    Patient educated to utilize call light. Patient and oriented to hospital room. Patient encouraged to ask questions about plan of care. Patient effectively uses call light and is involved in POC.    Problem: Safety  Goal: Will remain free from falls  Outcome: PROGRESSING AS EXPECTED     Patient's risk for injury and falls assessed. Appropriate safety precautions in place. Patient educated to utilize call light for needs. Patient verbalizes understanding.

## 2020-08-07 NOTE — PROGRESS NOTES
"Pharmacy Note: Poison control updated for an acetaminophen overdose.    Poison control case #:[id number]    The patient reports taking HYDROcodone/acetaminophen (NORCO)  MG Tabs \"roughly 10-15 tablets per day for quite some time\".     Labs:  Recent Labs     08/06/20  1523 08/06/20  1715   SODIUM 140  --    POTASSIUM 3.5*  --    CHLORIDE 106  --    CO2 18*  --    BUN 15  --    CREATININE 0.59  --    GLUCOSE 112*  --    CALCIUM 10.3  --    ASTSGOT 1115*  --    ALTSGPT 1371*  --    ALBUMIN 3.9  --    TBILIRUBIN 1.1  --    INR  --  1.53*        Levels:     8/6/2020 17:15   Acetaminophen -Tylenol <5 (L)       Recommended Plan:  1. Continue full course n-acetylcysteine. Obtain repeat CMP and INR ~3 hours prior to end of third NAC bag.     2. Continue to provide supportive care if necessary.     Stacy Glover, PharmD, BCPS        "

## 2020-08-07 NOTE — PROGRESS NOTES
Handoff report received. Assumed patient care. PT is assisted to bed, AAOx4, complaint of headache 8/10 and medicated per MAR. POC discussed with PT and all questions addressed. Safety precautions in place. Call light and personal belongings within reach. Educated to call for assistance if needed.

## 2020-08-08 LAB
ALBUMIN SERPL BCP-MCNC: 3.1 G/DL (ref 3.2–4.9)
ALBUMIN SERPL BCP-MCNC: 3.6 G/DL (ref 3.2–4.9)
ALBUMIN/GLOB SERPL: 1.4 G/DL
ALBUMIN/GLOB SERPL: 1.5 G/DL
ALP SERPL-CCNC: 106 U/L (ref 30–99)
ALP SERPL-CCNC: 120 U/L (ref 30–99)
ALT SERPL-CCNC: 531 U/L (ref 2–50)
ALT SERPL-CCNC: 533 U/L (ref 2–50)
ANION GAP SERPL CALC-SCNC: 12 MMOL/L (ref 7–16)
ANION GAP SERPL CALC-SCNC: 15 MMOL/L (ref 7–16)
AST SERPL-CCNC: 114 U/L (ref 12–45)
AST SERPL-CCNC: 97 U/L (ref 12–45)
BILIRUB SERPL-MCNC: 0.7 MG/DL (ref 0.1–1.5)
BILIRUB SERPL-MCNC: 0.8 MG/DL (ref 0.1–1.5)
BUN SERPL-MCNC: 4 MG/DL (ref 8–22)
BUN SERPL-MCNC: 5 MG/DL (ref 8–22)
CALCIUM SERPL-MCNC: 8.2 MG/DL (ref 8.5–10.5)
CALCIUM SERPL-MCNC: 8.8 MG/DL (ref 8.5–10.5)
CHLORIDE SERPL-SCNC: 110 MMOL/L (ref 96–112)
CHLORIDE SERPL-SCNC: 113 MMOL/L (ref 96–112)
CO2 SERPL-SCNC: 17 MMOL/L (ref 20–33)
CO2 SERPL-SCNC: 17 MMOL/L (ref 20–33)
CREAT SERPL-MCNC: 0.43 MG/DL (ref 0.5–1.4)
CREAT SERPL-MCNC: 0.56 MG/DL (ref 0.5–1.4)
ERYTHROCYTE [DISTWIDTH] IN BLOOD BY AUTOMATED COUNT: 47.4 FL (ref 35.9–50)
GLOBULIN SER CALC-MCNC: 2.1 G/DL (ref 1.9–3.5)
GLOBULIN SER CALC-MCNC: 2.5 G/DL (ref 1.9–3.5)
GLUCOSE SERPL-MCNC: 113 MG/DL (ref 65–99)
GLUCOSE SERPL-MCNC: 114 MG/DL (ref 65–99)
HCT VFR BLD AUTO: 33.9 % (ref 37–47)
HGB BLD-MCNC: 11.2 G/DL (ref 12–16)
MAGNESIUM SERPL-MCNC: 1.8 MG/DL (ref 1.5–2.5)
MCH RBC QN AUTO: 29.6 PG (ref 27–33)
MCHC RBC AUTO-ENTMCNC: 33 G/DL (ref 33.6–35)
MCV RBC AUTO: 89.4 FL (ref 81.4–97.8)
PHOSPHATE SERPL-MCNC: 2.5 MG/DL (ref 2.5–4.5)
PLATELET # BLD AUTO: 253 K/UL (ref 164–446)
PMV BLD AUTO: 11.1 FL (ref 9–12.9)
POTASSIUM SERPL-SCNC: 3 MMOL/L (ref 3.6–5.5)
POTASSIUM SERPL-SCNC: 3.3 MMOL/L (ref 3.6–5.5)
PROT SERPL-MCNC: 5.2 G/DL (ref 6–8.2)
PROT SERPL-MCNC: 6.1 G/DL (ref 6–8.2)
RBC # BLD AUTO: 3.79 M/UL (ref 4.2–5.4)
SODIUM SERPL-SCNC: 142 MMOL/L (ref 135–145)
SODIUM SERPL-SCNC: 142 MMOL/L (ref 135–145)
WBC # BLD AUTO: 6 K/UL (ref 4.8–10.8)

## 2020-08-08 PROCEDURE — 700102 HCHG RX REV CODE 250 W/ 637 OVERRIDE(OP): Performed by: HOSPITALIST

## 2020-08-08 PROCEDURE — 36415 COLL VENOUS BLD VENIPUNCTURE: CPT

## 2020-08-08 PROCEDURE — 770020 HCHG ROOM/CARE - TELE (206)

## 2020-08-08 PROCEDURE — A9270 NON-COVERED ITEM OR SERVICE: HCPCS | Performed by: HOSPITALIST

## 2020-08-08 PROCEDURE — 83735 ASSAY OF MAGNESIUM: CPT

## 2020-08-08 PROCEDURE — 84100 ASSAY OF PHOSPHORUS: CPT

## 2020-08-08 PROCEDURE — 85027 COMPLETE CBC AUTOMATED: CPT

## 2020-08-08 PROCEDURE — 80053 COMPREHEN METABOLIC PANEL: CPT

## 2020-08-08 PROCEDURE — 700105 HCHG RX REV CODE 258: Performed by: HOSPITALIST

## 2020-08-08 PROCEDURE — 99232 SBSQ HOSP IP/OBS MODERATE 35: CPT | Performed by: HOSPITALIST

## 2020-08-08 RX ORDER — POTASSIUM CHLORIDE 20 MEQ/1
40 TABLET, EXTENDED RELEASE ORAL ONCE
Status: COMPLETED | OUTPATIENT
Start: 2020-08-08 | End: 2020-08-08

## 2020-08-08 RX ADMIN — OXYCODONE HYDROCHLORIDE 10 MG: 10 TABLET ORAL at 15:41

## 2020-08-08 RX ADMIN — AMITRIPTYLINE HYDROCHLORIDE 10 MG: 10 TABLET, FILM COATED ORAL at 20:19

## 2020-08-08 RX ADMIN — SODIUM CHLORIDE: 9 INJECTION, SOLUTION INTRAVENOUS at 22:49

## 2020-08-08 RX ADMIN — LEVETIRACETAM 500 MG: 500 TABLET ORAL at 04:49

## 2020-08-08 RX ADMIN — ALENDRONATE SODIUM 10 MG: 10 TABLET ORAL at 08:12

## 2020-08-08 RX ADMIN — APIXABAN 5 MG: 5 TABLET, FILM COATED ORAL at 04:49

## 2020-08-08 RX ADMIN — PROPRANOLOL HYDROCHLORIDE 120 MG: 60 CAPSULE, EXTENDED RELEASE ORAL at 20:20

## 2020-08-08 RX ADMIN — LEVETIRACETAM 500 MG: 500 TABLET ORAL at 18:22

## 2020-08-08 RX ADMIN — OXYCODONE HYDROCHLORIDE 10 MG: 10 TABLET ORAL at 00:21

## 2020-08-08 RX ADMIN — OXYCODONE HYDROCHLORIDE 10 MG: 10 TABLET, FILM COATED, EXTENDED RELEASE ORAL at 04:49

## 2020-08-08 RX ADMIN — POTASSIUM CHLORIDE 40 MEQ: 1500 TABLET, EXTENDED RELEASE ORAL at 15:40

## 2020-08-08 RX ADMIN — ANASTROZOLE 1 MG: 1 TABLET, COATED ORAL at 18:21

## 2020-08-08 RX ADMIN — LISINOPRIL 20 MG: 20 TABLET ORAL at 20:20

## 2020-08-08 RX ADMIN — OXYCODONE HYDROCHLORIDE 10 MG: 10 TABLET, FILM COATED, EXTENDED RELEASE ORAL at 18:21

## 2020-08-08 RX ADMIN — OXYCODONE HYDROCHLORIDE 10 MG: 10 TABLET ORAL at 08:12

## 2020-08-08 RX ADMIN — OXYCODONE HYDROCHLORIDE 10 MG: 10 TABLET ORAL at 11:54

## 2020-08-08 RX ADMIN — LORAZEPAM 1 MG: 1 TABLET ORAL at 20:19

## 2020-08-08 RX ADMIN — APIXABAN 5 MG: 5 TABLET, FILM COATED ORAL at 18:22

## 2020-08-08 ASSESSMENT — ENCOUNTER SYMPTOMS
BLURRED VISION: 1
BRUISES/BLEEDS EASILY: 0
GASTROINTESTINAL NEGATIVE: 1
MUSCULOSKELETAL NEGATIVE: 1
NAUSEA: 0
FOCAL WEAKNESS: 0
WEAKNESS: 0
CARDIOVASCULAR NEGATIVE: 1
NERVOUS/ANXIOUS: 1
HEARTBURN: 0
FEVER: 0
HEADACHES: 1
NECK PAIN: 0
DEPRESSION: 0
RESPIRATORY NEGATIVE: 1
BACK PAIN: 0
VOMITING: 0
PALPITATIONS: 0
COUGH: 0
POLYDIPSIA: 0
DIZZINESS: 0
CHILLS: 0

## 2020-08-08 ASSESSMENT — COGNITIVE AND FUNCTIONAL STATUS - GENERAL
SUGGESTED CMS G CODE MODIFIER MOBILITY: CJ
TOILETING: A LITTLE
STANDING UP FROM CHAIR USING ARMS: A LITTLE
SUGGESTED CMS G CODE MODIFIER DAILY ACTIVITY: CJ
WALKING IN HOSPITAL ROOM: A LITTLE
EATING MEALS: A LITTLE
DAILY ACTIVITIY SCORE: 22
MOBILITY SCORE: 21
CLIMB 3 TO 5 STEPS WITH RAILING: A LITTLE

## 2020-08-08 ASSESSMENT — LIFESTYLE VARIABLES
TOTAL SCORE: 0
HAVE YOU EVER FELT YOU SHOULD CUT DOWN ON YOUR DRINKING: NO
HAVE PEOPLE ANNOYED YOU BY CRITICIZING YOUR DRINKING: NO
AVERAGE NUMBER OF DAYS PER WEEK YOU HAVE A DRINK CONTAINING ALCOHOL: 0
TOTAL SCORE: 0
DOES PATIENT WANT TO STOP DRINKING: NO
TOTAL SCORE: 0
EVER HAD A DRINK FIRST THING IN THE MORNING TO STEADY YOUR NERVES TO GET RID OF A HANGOVER: NO
ON A TYPICAL DAY WHEN YOU DRINK ALCOHOL HOW MANY DRINKS DO YOU HAVE: 0
ALCOHOL_USE: NO
CONSUMPTION TOTAL: NEGATIVE
EVER FELT BAD OR GUILTY ABOUT YOUR DRINKING: NO
HOW MANY TIMES IN THE PAST YEAR HAVE YOU HAD 5 OR MORE DRINKS IN A DAY: 0

## 2020-08-08 NOTE — PROGRESS NOTES
Bedside report received, patient A&Ox4, pain level assessed, patient updated on plan of care, patient questions answered, laboratory results reviewed.

## 2020-08-08 NOTE — PROGRESS NOTES
Patient's ALT is now 642 and AST is now 214, Poison control center contacted for updates and questions, ELIZABETH Fierro updated on new lab results and advised that ALT and AST must be half of initial peak lab values and patient must be medically stable. RN further advised to repeat lab draws 2 hours before Acetylcysteine infusion is complete. Will continue to monitor.  Poison control center number is [phone]  Case number is [id number]

## 2020-08-08 NOTE — CARE PLAN
Problem: Safety  Goal: Will remain free from falls  Outcome: PROGRESSING AS EXPECTED  Note: Pt mobility assessed at beginning of shift. Pt is standby assist. Fall precautions in place. Non-slip socks on. Bed in lowest locked position. Bed alarm on. Call light within reach. Pt educated to call for assistance and verbalizes understanding.      Problem: Discharge Barriers/Planning  Goal: Patient's continuum of care needs will be met  Outcome: PROGRESSING AS EXPECTED  Note: MD is going to see if CM can get patient's meds sent in a way that is suitable for blindness in order to aviod accidental OD or med error again.

## 2020-08-08 NOTE — PROGRESS NOTES
Poison Control was contacted in order to d/c mucomyst. Patient's abdominal pain has subsided and AST, ALT, Cr and INR are within range, mucomyst has been discontinued.

## 2020-08-08 NOTE — PROGRESS NOTES
Patient had labs drawn for d/c of mucomyst, however blood hemolyzed. Lab back to redraw before calling Poison Control.

## 2020-08-08 NOTE — PROGRESS NOTES
[facility] Medicine Daily Progress Note    Date of Service  8/8/2020    Chief Complaint  49 y.o. female admitted 8/6/2020 with a history of hydrocephalus and  shunt, under the care of Dr. Haney neurosurgery, apparently is suffering from chronic headaches with narcotic use on a chronic basis, presents to the emergency room with a headache and nausea vomiting, apparently the patient had a recent headache development after a MRI and altering her  shunt pressure settings at that time.  This was then readjusted, the patient returns and is found to have abnormal LFTs where she reportedly had a increasing intake of Norco at home, the patient is legally blind and might have some difficulty with her medication intake.  Poison control was contacted and the patient is recommended to undergo Mucomyst treatment per protocol.    Hospital Course    Admitted with Tylenol toxicity, headache      Interval Problem Update  Patient seen and examined today.    Patient tolerating treatment and therapies.  All Data, Medication data reviewed.  Case discussed with nursing as available.  Plan of Care reviewed with patient and notified of changes.  8/7 the patient still is suffering from significant amount of headache, her LFTs are currently improving per laboratory follow-up, discussed her case with Dr. Haney, suggesting a follow-up CT  Her abdominal ultrasound was found fairly unremarkable, her bilirubin is normal, Tylenol level this morning less than 5, INR 1.53, hepatitis panel negative  8/8 the patient feels better, her headache is intermittent not as severe, she feels that at times she is unsteady and loses her balance, will get PT OT eval, the patient states that she manages her medication by herself, she does not have assistance at home, she does have significant visual impairment  Consultants/Specialty  Poison control    Code Status  Full Code    Disposition  TBD    Review of Systems  Review of Systems   Constitutional: Positive for  malaise/fatigue. Negative for chills and fever.   HENT: Negative.    Eyes: Positive for blurred vision.   Respiratory: Negative.  Negative for cough.    Cardiovascular: Negative.  Negative for chest pain and palpitations.   Gastrointestinal: Negative.  Negative for heartburn, nausea and vomiting.   Genitourinary: Negative.  Negative for dysuria and frequency.   Musculoskeletal: Negative.  Negative for back pain and neck pain.   Skin: Negative.  Negative for itching and rash.   Neurological: Positive for headaches. Negative for dizziness, focal weakness and weakness.   Endo/Heme/Allergies: Negative.  Negative for polydipsia. Does not bruise/bleed easily.   Psychiatric/Behavioral: Negative for depression. The patient is nervous/anxious.         Physical Exam  Temp:  [36 °C (96.8 °F)-36.4 °C (97.6 °F)] 36.1 °C (96.9 °F)  Pulse:  [83-91] 88  Resp:  [16-17] 17  BP: (115-136)/(73-98) 136/98  SpO2:  [95 %-97 %] 96 %    Physical Exam  Vitals signs and nursing note reviewed.   Constitutional:       Appearance: She is well-developed. She is not diaphoretic.   HENT:      Head: Normocephalic and atraumatic.      Nose: Nose normal.   Eyes:      Conjunctiva/sclera: Conjunctivae normal.      Pupils: Pupils are equal, round, and reactive to light.      Comments: Abnormal eye movements  Decreased visual acuity   Neck:      Musculoskeletal: Normal range of motion and neck supple.      Thyroid: No thyromegaly.      Vascular: No JVD.   Cardiovascular:      Rate and Rhythm: Normal rate and regular rhythm.      Heart sounds: Normal heart sounds. No friction rub. No gallop.    Pulmonary:      Effort: Pulmonary effort is normal.      Breath sounds: Normal breath sounds. No wheezing or rales.   Abdominal:      General: Bowel sounds are normal. There is no distension.      Palpations: Abdomen is soft. There is no mass.      Tenderness: There is no abdominal tenderness. There is no guarding or rebound.   Musculoskeletal: Normal range of  motion.         General: No tenderness.   Lymphadenopathy:      Cervical: No cervical adenopathy.   Skin:     General: Skin is warm and dry.   Neurological:      Mental Status: She is alert and oriented to person, place, and time.      Cranial Nerves: No cranial nerve deficit.   Psychiatric:         Behavior: Behavior normal.         Fluids    Intake/Output Summary (Last 24 hours) at 8/8/2020 1427  Last data filed at 8/8/2020 1400  Gross per 24 hour   Intake 480 ml   Output --   Net 480 ml       Laboratory  Recent Labs     08/06/20  1523 08/07/20  0508 08/08/20  0419   WBC 10.8 6.9 6.0   RBC 4.35 3.75* 3.79*   HEMOGLOBIN 13.1 11.3* 11.2*   HEMATOCRIT 39.6 34.1* 33.9*   MCV 91.0 90.9 89.4   MCH 30.1 30.1 29.6   MCHC 33.1* 33.1* 33.0*   RDW 46.0 47.5 47.4   PLATELETCT 291 235 253   MPV 11.2 11.3 11.1     Recent Labs     08/07/20  1800 08/08/20  0422 08/08/20  1159   SODIUM 142 142 142   POTASSIUM 3.4* 3.0* 3.3*   CHLORIDE 112 113* 110   CO2 16* 17* 17*   GLUCOSE 108* 114* 113*   BUN 5* 4* 5*   CREATININE 0.50 0.43* 0.56   CALCIUM 8.4* 8.2* 8.8     Recent Labs     08/06/20  1715 08/07/20  1800   INR 1.53* 1.32*               Imaging  CT-HEAD W/O   Final Result      Left parietal approach ventriculostomy catheter is again noted with stable size and configuration of the ventricles.      Left periventricular left parietal encephalomalacia is again noted.      There are periventricular and subcortical white matter changes present.  This finding is nonspecific and could be from previous small vessel ischemia, demyelination, or gliosis.         US-RUQ   Final Result      1. Dilated CBD could relate to post cholecystectomy status.      2. Prominent right renal pelvis.              Assessment/Plan  * Elevated LFTs  Assessment & Plan  Concern for Tylenol toxicity  The patient is status post Mucomyst protocol  Monitor LFTs  Hepatitis panel is negative  Recheck labs in likely stop today    Hypokalemia  Assessment & Plan  Replete  and monitor     Migraine without aura- (present on admission)  Assessment & Plan  History of chronic headaches and narcotic dependence    Counseled on Tylenol overdosage with Norco  Continue amitriptyline and Inderal  Continue home dose of oxycodone  DC Norco    Congenital hydrocephalus (HCC)- (present on admission)  Assessment & Plan   shunt in place established with Dr. Haney  Patient reports that she had her shunt checked 3 weeks ago and is functioning normally  Follow-up CT  Plan  Case discussed briefly with Dr. Haney, he suggested follow-up CT otherwise no adjustment indicated, CT is unchanged  Continue Mucomyst, follow-up labs, then discontinue  Home health order placed for home safety and medication administration assistance  See orders  Medically complex high risk      VTE prophylaxis: Apixaban    I have performed a physical exam and reviewed and updated ROS and Plan today . In review of yesterday's note , there are no changes except as documented above.        Please note that this dictation was created using voice recognition software. I have made every reasonable attempt to correct obvious errors, but I expect that there are errors of grammar and possibly context that I did not discover before finalizing the note.

## 2020-08-08 NOTE — FACE TO FACE
Face to Face Supporting Documentation - Home Health    The encounter with this patient was in whole or in part the primary reason for home health admission.    Date of encounter:   Patient:                    MRN:                       YOB: 2019  Rasheeda Manzano  [id number]  1970     Home health to see patient for:  Skilled Nursing care for assessment, interventions & education    Skilled need for:  Exacerbation of Chronic Disease State NPH, Blind, unsteady gait    Skilled nursing interventions to include:  Comment: Medication administration, home safety    Homebound status evidenced by:  Needs the assistance of another person in order to leave the home. Leaving home requires a considerable and taxing effort. There is a normal inability to leave the home.    Community Physician to provide follow up care: Tabitha Bautista M.D.     Optional Interventions? No      I certify the face to face encounter for this home health care referral meets the CMS requirements and the encounter/clinical assessment with the patient was, in whole, or in part, for the medical condition(s) listed above, which is the primary reason for home health care. Based on my clinical findings: the service(s) are medically necessary, support the need for home health care, and the homebound criteria are met.  I certify that this patient has had a face to face encounter by myself.  Charan Ball M.D. - NPI: [id number]

## 2020-08-09 ENCOUNTER — HOME HEALTH ADMISSION (OUTPATIENT)
Dept: HOME HEALTH SERVICES | Facility: HOME HEALTHCARE | Age: 50
End: 2020-08-09
Payer: MEDICARE

## 2020-08-09 LAB
ALBUMIN SERPL BCP-MCNC: 3.3 G/DL (ref 3.2–4.9)
ALBUMIN/GLOB SERPL: 1.4 G/DL
ALP SERPL-CCNC: 110 U/L (ref 30–99)
ALT SERPL-CCNC: 376 U/L (ref 2–50)
ANION GAP SERPL CALC-SCNC: 12 MMOL/L (ref 7–16)
AST SERPL-CCNC: 52 U/L (ref 12–45)
BILIRUB SERPL-MCNC: 1 MG/DL (ref 0.1–1.5)
BUN SERPL-MCNC: 7 MG/DL (ref 8–22)
CALCIUM SERPL-MCNC: 8.6 MG/DL (ref 8.5–10.5)
CHLORIDE SERPL-SCNC: 112 MMOL/L (ref 96–112)
CO2 SERPL-SCNC: 16 MMOL/L (ref 20–33)
CREAT SERPL-MCNC: 0.44 MG/DL (ref 0.5–1.4)
ERYTHROCYTE [DISTWIDTH] IN BLOOD BY AUTOMATED COUNT: 50.4 FL (ref 35.9–50)
GLOBULIN SER CALC-MCNC: 2.3 G/DL (ref 1.9–3.5)
GLUCOSE SERPL-MCNC: 88 MG/DL (ref 65–99)
HCT VFR BLD AUTO: 36.4 % (ref 37–47)
HGB BLD-MCNC: 11.7 G/DL (ref 12–16)
INR PPP: 1.29 (ref 0.87–1.13)
MAGNESIUM SERPL-MCNC: 2 MG/DL (ref 1.5–2.5)
MCH RBC QN AUTO: 30.1 PG (ref 27–33)
MCHC RBC AUTO-ENTMCNC: 32.1 G/DL (ref 33.6–35)
MCV RBC AUTO: 93.6 FL (ref 81.4–97.8)
PHOSPHATE SERPL-MCNC: 3.5 MG/DL (ref 2.5–4.5)
PLATELET # BLD AUTO: 252 K/UL (ref 164–446)
PMV BLD AUTO: 11 FL (ref 9–12.9)
POTASSIUM SERPL-SCNC: 3.7 MMOL/L (ref 3.6–5.5)
PROT SERPL-MCNC: 5.6 G/DL (ref 6–8.2)
PROTHROMBIN TIME: 16.5 SEC (ref 12–14.6)
RBC # BLD AUTO: 3.89 M/UL (ref 4.2–5.4)
SODIUM SERPL-SCNC: 140 MMOL/L (ref 135–145)
WBC # BLD AUTO: 5.8 K/UL (ref 4.8–10.8)

## 2020-08-09 PROCEDURE — 84100 ASSAY OF PHOSPHORUS: CPT

## 2020-08-09 PROCEDURE — 80053 COMPREHEN METABOLIC PANEL: CPT

## 2020-08-09 PROCEDURE — 770020 HCHG ROOM/CARE - TELE (206)

## 2020-08-09 PROCEDURE — 85610 PROTHROMBIN TIME: CPT

## 2020-08-09 PROCEDURE — 85027 COMPLETE CBC AUTOMATED: CPT

## 2020-08-09 PROCEDURE — 99232 SBSQ HOSP IP/OBS MODERATE 35: CPT | Performed by: HOSPITALIST

## 2020-08-09 PROCEDURE — 700102 HCHG RX REV CODE 250 W/ 637 OVERRIDE(OP): Performed by: HOSPITALIST

## 2020-08-09 PROCEDURE — A9270 NON-COVERED ITEM OR SERVICE: HCPCS | Performed by: HOSPITALIST

## 2020-08-09 PROCEDURE — 700111 HCHG RX REV CODE 636 W/ 250 OVERRIDE (IP): Performed by: HOSPITALIST

## 2020-08-09 PROCEDURE — 83735 ASSAY OF MAGNESIUM: CPT

## 2020-08-09 PROCEDURE — 97161 PT EVAL LOW COMPLEX 20 MIN: CPT

## 2020-08-09 PROCEDURE — 36415 COLL VENOUS BLD VENIPUNCTURE: CPT

## 2020-08-09 RX ADMIN — OXYCODONE HYDROCHLORIDE 10 MG: 10 TABLET, FILM COATED, EXTENDED RELEASE ORAL at 04:50

## 2020-08-09 RX ADMIN — LEVETIRACETAM 500 MG: 500 TABLET ORAL at 17:17

## 2020-08-09 RX ADMIN — AMITRIPTYLINE HYDROCHLORIDE 10 MG: 10 TABLET, FILM COATED ORAL at 22:10

## 2020-08-09 RX ADMIN — ANASTROZOLE 1 MG: 1 TABLET, COATED ORAL at 17:17

## 2020-08-09 RX ADMIN — ALENDRONATE SODIUM 10 MG: 10 TABLET ORAL at 08:56

## 2020-08-09 RX ADMIN — OXYCODONE HYDROCHLORIDE 10 MG: 10 TABLET ORAL at 00:51

## 2020-08-09 RX ADMIN — PROPRANOLOL HYDROCHLORIDE 120 MG: 60 CAPSULE, EXTENDED RELEASE ORAL at 22:10

## 2020-08-09 RX ADMIN — LEVETIRACETAM 500 MG: 500 TABLET ORAL at 04:50

## 2020-08-09 RX ADMIN — APIXABAN 5 MG: 5 TABLET, FILM COATED ORAL at 17:17

## 2020-08-09 RX ADMIN — LISINOPRIL 20 MG: 20 TABLET ORAL at 22:10

## 2020-08-09 RX ADMIN — OXYCODONE HYDROCHLORIDE 10 MG: 10 TABLET ORAL at 20:17

## 2020-08-09 RX ADMIN — APIXABAN 5 MG: 5 TABLET, FILM COATED ORAL at 04:50

## 2020-08-09 RX ADMIN — OXYCODONE HYDROCHLORIDE 10 MG: 10 TABLET, FILM COATED, EXTENDED RELEASE ORAL at 17:17

## 2020-08-09 RX ADMIN — DOCUSATE SODIUM 50 MG AND SENNOSIDES 8.6 MG 2 TABLET: 8.6; 5 TABLET, FILM COATED ORAL at 17:17

## 2020-08-09 RX ADMIN — HYDROMORPHONE HYDROCHLORIDE 0.25 MG: 1 INJECTION, SOLUTION INTRAMUSCULAR; INTRAVENOUS; SUBCUTANEOUS at 11:03

## 2020-08-09 RX ADMIN — LORAZEPAM 1 MG: 1 TABLET ORAL at 22:10

## 2020-08-09 RX ADMIN — OXYCODONE 5 MG: 5 TABLET ORAL at 08:56

## 2020-08-09 RX ADMIN — OXYCODONE HYDROCHLORIDE 10 MG: 10 TABLET ORAL at 14:34

## 2020-08-09 ASSESSMENT — ENCOUNTER SYMPTOMS
NECK PAIN: 0
BLURRED VISION: 1
BRUISES/BLEEDS EASILY: 0
FOCAL WEAKNESS: 0
DEPRESSION: 0
NAUSEA: 0
DIZZINESS: 0
FEVER: 0
HEARTBURN: 0
POLYDIPSIA: 0
BACK PAIN: 0
PALPITATIONS: 0
MUSCULOSKELETAL NEGATIVE: 1
WEAKNESS: 0
COUGH: 0
CHILLS: 0
RESPIRATORY NEGATIVE: 1
HEADACHES: 1
VOMITING: 0
CARDIOVASCULAR NEGATIVE: 1
NERVOUS/ANXIOUS: 1
GASTROINTESTINAL NEGATIVE: 1

## 2020-08-09 ASSESSMENT — COGNITIVE AND FUNCTIONAL STATUS - GENERAL
MOBILITY SCORE: 24
SUGGESTED CMS G CODE MODIFIER MOBILITY: CH

## 2020-08-09 ASSESSMENT — GAIT ASSESSMENTS
GAIT LEVEL OF ASSIST: SUPERVISED
DISTANCE (FEET): 50

## 2020-08-09 ASSESSMENT — FIBROSIS 4 INDEX
FIB4 SCORE: 0.51
FIB4 SCORE: 0.8

## 2020-08-09 NOTE — PROGRESS NOTES
[facility] Medicine Daily Progress Note    Date of Service  8/9/2020    Chief Complaint  49 y.o. female admitted 8/6/2020 with a history of hydrocephalus and  shunt, under the care of Dr. Haney neurosurgery, apparently is suffering from chronic headaches with narcotic use on a chronic basis, presents to the emergency room with a headache and nausea vomiting, apparently the patient had a recent headache development after a MRI and altering her  shunt pressure settings at that time.  This was then readjusted, the patient returns and is found to have abnormal LFTs where she reportedly had a increasing intake of Norco at home, the patient is legally blind and might have some difficulty with her medication intake.  Poison control was contacted and the patient is recommended to undergo Mucomyst treatment per protocol.    Hospital Course    Admitted with Tylenol toxicity, headache      Interval Problem Update  Patient seen and examined today.    Patient tolerating treatment and therapies.  All Data, Medication data reviewed.  Case discussed with nursing as available.  Plan of Care reviewed with patient and notified of changes.  8/7 the patient still is suffering from significant amount of headache, her LFTs are currently improving per laboratory follow-up, discussed her case with Dr. Haney, suggesting a follow-up CT  Her abdominal ultrasound was found fairly unremarkable, her bilirubin is normal, Tylenol level this morning less than 5, INR 1.53, hepatitis panel negative  8/8 the patient feels better, her headache is intermittent not as severe, she feels that at times she is unsteady and loses her balance, will get PT OT eval, the patient states that she manages her medication by herself, she does not have assistance at home, she does have significant visual impairment  8/9 the patient feels better, her LFTs are improving, mild headache, balance problems, home health pending.  Consultants/Specialty  Poison control    Code  Status  Full Code    Disposition  TBD    Review of Systems  Review of Systems   Constitutional: Positive for malaise/fatigue. Negative for chills and fever.   HENT: Negative.    Eyes: Positive for blurred vision.   Respiratory: Negative.  Negative for cough.    Cardiovascular: Negative.  Negative for chest pain and palpitations.   Gastrointestinal: Negative.  Negative for heartburn, nausea and vomiting.   Genitourinary: Negative.  Negative for dysuria and frequency.   Musculoskeletal: Negative.  Negative for back pain and neck pain.   Skin: Negative.  Negative for itching and rash.   Neurological: Positive for headaches. Negative for dizziness, focal weakness and weakness.   Endo/Heme/Allergies: Negative.  Negative for polydipsia. Does not bruise/bleed easily.   Psychiatric/Behavioral: Negative for depression. The patient is nervous/anxious.         Physical Exam  Temp:  [36.2 °C (97.2 °F)-36.6 °C (97.8 °F)] 36.3 °C (97.3 °F)  Pulse:  [] 100  Resp:  [17-18] 18  BP: (100-120)/(62-86) 103/69  SpO2:  [96 %-98 %] 97 %    Physical Exam  Vitals signs and nursing note reviewed.   Constitutional:       Appearance: She is well-developed. She is not diaphoretic.   HENT:      Head: Normocephalic and atraumatic.      Nose: Nose normal.   Eyes:      Conjunctiva/sclera: Conjunctivae normal.      Pupils: Pupils are equal, round, and reactive to light.      Comments: Abnormal eye movements  Decreased visual acuity   Neck:      Musculoskeletal: Normal range of motion and neck supple.      Thyroid: No thyromegaly.      Vascular: No JVD.   Cardiovascular:      Rate and Rhythm: Normal rate and regular rhythm.      Heart sounds: Normal heart sounds. No friction rub. No gallop.    Pulmonary:      Effort: Pulmonary effort is normal.      Breath sounds: Normal breath sounds. No wheezing or rales.   Abdominal:      General: Bowel sounds are normal. There is no distension.      Palpations: Abdomen is soft. There is no mass.       Tenderness: There is no abdominal tenderness. There is no guarding or rebound.   Musculoskeletal: Normal range of motion.         General: No tenderness.   Lymphadenopathy:      Cervical: No cervical adenopathy.   Skin:     General: Skin is warm and dry.   Neurological:      Mental Status: She is alert and oriented to person, place, and time.      Cranial Nerves: No cranial nerve deficit.   Psychiatric:         Behavior: Behavior normal.         Fluids    Intake/Output Summary (Last 24 hours) at 8/9/2020 1250  Last data filed at 8/9/2020 0800  Gross per 24 hour   Intake 480 ml   Output 150 ml   Net 330 ml       Laboratory  Recent Labs     08/07/20  0508 08/08/20  0419 08/09/20  0324   WBC 6.9 6.0 5.8   RBC 3.75* 3.79* 3.89*   HEMOGLOBIN 11.3* 11.2* 11.7*   HEMATOCRIT 34.1* 33.9* 36.4*   MCV 90.9 89.4 93.6   MCH 30.1 29.6 30.1   MCHC 33.1* 33.0* 32.1*   RDW 47.5 47.4 50.4*   PLATELETCT 235 253 252   MPV 11.3 11.1 11.0     Recent Labs     08/08/20  0422 08/08/20  1159 08/09/20  0324   SODIUM 142 142 140   POTASSIUM 3.0* 3.3* 3.7   CHLORIDE 113* 110 112   CO2 17* 17* 16*   GLUCOSE 114* 113* 88   BUN 4* 5* 7*   CREATININE 0.43* 0.56 0.44*   CALCIUM 8.2* 8.8 8.6     Recent Labs     08/06/20  1715 08/07/20  1800 08/09/20  0325   INR 1.53* 1.32* 1.29*               Imaging  CT-HEAD W/O   Final Result      Left parietal approach ventriculostomy catheter is again noted with stable size and configuration of the ventricles.      Left periventricular left parietal encephalomalacia is again noted.      There are periventricular and subcortical white matter changes present.  This finding is nonspecific and could be from previous small vessel ischemia, demyelination, or gliosis.         US-RUQ   Final Result      1. Dilated CBD could relate to post cholecystectomy status.      2. Prominent right renal pelvis.              Assessment/Plan  * Elevated LFTs  Assessment & Plan  Concern for Tylenol toxicity  The patient is status post  Mucomyst protocol  Monitor LFTs  Hepatitis panel is negative  Recheck labs in likely stop today    Hypokalemia  Assessment & Plan  Replete and monitor     Migraine without aura- (present on admission)  Assessment & Plan  History of chronic headaches and narcotic dependence    Counseled on Tylenol overdosage with Norco  Continue amitriptyline and Inderal  Continue home dose of oxycodone  DC Norco    Congenital hydrocephalus (HCC)- (present on admission)  Assessment & Plan   shunt in place established with Dr. Haney  Patient reports that she had her shunt checked 3 weeks ago and is functioning normally  Follow-up CT  Plan  Continue with current [facility] medical care, observe laboratory development  Case discussed briefly with Dr. Haney, he suggested follow-up CT otherwise no adjustment indicated, CT is unchanged  Home health order placed for home safety and medication administration assistance  See orders  Medically complex high risk  Discharge likely Monday once home health is approved    VTE prophylaxis: Apixaban    I have performed a physical exam and reviewed and updated ROS and Plan today . In review of yesterday's note , there are no changes except as documented above.        Please note that this dictation was created using voice recognition software. I have made every reasonable attempt to correct obvious errors, but I expect that there are errors of grammar and possibly context that I did not discover before finalizing the note.

## 2020-08-09 NOTE — DISCHARGE PLANNING
Thank you for sending this referral to [facility]. We are unable to verify patients PCP until Monday. Referral will be placed on hold.     Thanks,   [facility].

## 2020-08-09 NOTE — DISCHARGE PLANNING
This referral has been escalated to a Clinical Supervisor for review in order to determine Home Health appropriateness.  This issue will be resolved as quickly as possible, but for any questions feel free to call us at [phone].  Thank you!

## 2020-08-09 NOTE — THERAPY
Physical Therapy   Initial Evaluation     Patient Name: Rasheeda Manzano  Age:  49 y.o., Sex:  female  Medical Record #: [id number]  Today's Date: 8/9/2020     Precautions: Fall Risk    Assessment  Patient is 49 y.o. female with a diagnosis of tylenol overdose.  PMHx includes history of hydrocephalus and  shunt, chronic HA, chronic narcotic use and legally blind.  Patient states that she has concerns with managing pills herself, as well as performing cleaning tasks as she has notices she is tripping over the cord more frequently when she vacuums.  No falls.  Patient demonstrates household level functional mobility at this time.         08/09/20 0855   Precautions   Precautions Fall Risk   Comments legally blind   Prior Living Situation   Prior Services None   Housing / Facility 1 Story Apartment / Condo   Steps Into Home 0   Steps In Home 0   Equipment Owned Other (Comments)  (walking stick because she is blind)   Lives with - Patient's Self Care Capacity Alone and Able to Care For Self   Comments neighbor/bf helps her with grocery shopping   Prior Level of Functional Mobility   Bed Mobility Independent   Transfer Status Independent   Ambulation Independent   Distance Ambulation (Feet) 150   Assistive Devices Used   (stick)   Stairs Independent   Comments states she is noticing she is tripping over vaccum cord more frequently  (has concerns about managing pills)   History of Falls   History of Falls No  (last fall was 4 years ago)   Cognition    Cognition / Consciousness WDL   Level of Consciousness Alert   Passive ROM Lower Body   Passive ROM Lower Body WDL   Active ROM Lower Body    Active ROM Lower Body  WDL   Strength Lower Body   Lower Body Strength  WDL   Sensation Lower Body   Lower Extremity Sensation   WDL   Balance Assessment   Sitting Balance (Static) Good   Sitting Balance (Dynamic) Good   Standing Balance (Static) Good   Standing Balance (Dynamic) Good   Weight Shift Sitting Good   Weight Shift  Standing Good   Comments no AD, needs verbal cues secondary to blindness   Gait Analysis   Gait Level Of Assist Supervised   Assistive Device None   Distance (Feet) 50   # of Times Distance was Traveled 1   # of Stairs Climbed 0   Weight Bearing Status fwb   Skilled Intervention Verbal Cuing   Comments demonstrates  good reactive balance   Bed Mobility    Supine to Sit Supervised   Sit to Supine Supervised   Scooting Supervised   Functional Mobility   Sit to Stand Supervised   How much difficulty does the patient currently have...   Turning over in bed (including adjusting bedclothes, sheets and blankets)? 4   Sitting down on and standing up from a chair with arms (e.g., wheelchair, bedside commode, etc.) 4   Moving from lying on back to sitting on the side of the bed? 4   How much help from another person does the patient currently need...   Moving to and from a bed to a chair (including a wheelchair)? 4   Need to walk in a hospital room? 4   Climbing 3-5 steps with a railing? 4   6 clicks Mobility Score 24   Education Group   Education Provided Role of Physical Therapist   Role of Physical Therapist Patient Response Patient;Acceptance;Verbal Demonstration;Action Demonstration;Explanation;Demonstration   Anticipated Discharge Equipment and Recommendations   DC Equipment Recommendations None   Discharge Recommendations Other -   Interdisciplinary Plan of Care Collaboration   IDT Collaboration with  Nursing   Patient Position at End of Therapy In Bed;Call Light within Reach;Tray Table within Reach;Phone within Reach   Collaboration Comments results of PT       Plan    Recommend Physical Therapy for Evaluation only     DC Equipment Recommendations: None  Discharge Recommendations: Other - Home with home health would be ideal, however patient can go home without home health services.  Recommend home evaluation to allow patient to perform ADLs safely within home.

## 2020-08-09 NOTE — DISCHARGE PLANNING
Anticipated Discharge Disposition: home with home health    Action: Spoke with pt and obtained choice for HH: [facility].     Barriers to Discharge: HH acceptance    Plan: follow up with [facility] re: acceptance

## 2020-08-09 NOTE — DISCHARGE PLANNING
Received Choice form at 0857  Agency/Facility Name: [facility]  Referral sent per Choice form @ 9515

## 2020-08-09 NOTE — PROGRESS NOTES
Bedside report received, patient AOx4 sitting up in bed, tele monitor in place, on room air. RN assessed needs, no additional needs at this time. Call light within reach, patient verbalized the understanding on the need to call when needing assistance. Bed locked in lowest position, non skid socks on, bed rails up x2, hourly rounding in place.

## 2020-08-09 NOTE — PROGRESS NOTES
Received bedside report from RN, pt care assumed, VSS, pt assessment complete. Pt AAOx4 with complaint of mild headache, no intervention at this time. No signs of acute distress noted at this time. POC discussed with pt and verbalizes no questions. Pt denies any additional needs at this time. Bed in lowest position, bed alarm on, pt educated on fall risk and verbalized understanding, call light within reach, hourly rounding initiated.

## 2020-08-10 ENCOUNTER — PATIENT OUTREACH (OUTPATIENT)
Dept: HEALTH INFORMATION MANAGEMENT | Facility: OTHER | Age: 50
End: 2020-08-10

## 2020-08-10 VITALS
DIASTOLIC BLOOD PRESSURE: 84 MMHG | RESPIRATION RATE: 17 BRPM | BODY MASS INDEX: 22.02 KG/M2 | HEIGHT: 64 IN | TEMPERATURE: 97 F | OXYGEN SATURATION: 95 % | HEART RATE: 86 BPM | SYSTOLIC BLOOD PRESSURE: 138 MMHG | WEIGHT: 128.97 LBS

## 2020-08-10 PROBLEM — E87.6 HYPOKALEMIA: Status: RESOLVED | Noted: 2020-08-06 | Resolved: 2020-08-10

## 2020-08-10 LAB
ALBUMIN SERPL BCP-MCNC: 3.4 G/DL (ref 3.2–4.9)
ALBUMIN/GLOB SERPL: 1.6 G/DL
ALP SERPL-CCNC: 110 U/L (ref 30–99)
ALT SERPL-CCNC: 260 U/L (ref 2–50)
ANION GAP SERPL CALC-SCNC: 12 MMOL/L (ref 7–16)
AST SERPL-CCNC: 29 U/L (ref 12–45)
BILIRUB SERPL-MCNC: 0.6 MG/DL (ref 0.1–1.5)
BUN SERPL-MCNC: 12 MG/DL (ref 8–22)
CALCIUM SERPL-MCNC: 8.3 MG/DL (ref 8.5–10.5)
CHLORIDE SERPL-SCNC: 108 MMOL/L (ref 96–112)
CO2 SERPL-SCNC: 20 MMOL/L (ref 20–33)
CREAT SERPL-MCNC: 0.48 MG/DL (ref 0.5–1.4)
ERYTHROCYTE [DISTWIDTH] IN BLOOD BY AUTOMATED COUNT: 46.2 FL (ref 35.9–50)
GLOBULIN SER CALC-MCNC: 2.1 G/DL (ref 1.9–3.5)
GLUCOSE SERPL-MCNC: 93 MG/DL (ref 65–99)
HCT VFR BLD AUTO: 36.1 % (ref 37–47)
HGB BLD-MCNC: 12 G/DL (ref 12–16)
MAGNESIUM SERPL-MCNC: 2.1 MG/DL (ref 1.5–2.5)
MCH RBC QN AUTO: 29.9 PG (ref 27–33)
MCHC RBC AUTO-ENTMCNC: 33.2 G/DL (ref 33.6–35)
MCV RBC AUTO: 90 FL (ref 81.4–97.8)
PHOSPHATE SERPL-MCNC: 4 MG/DL (ref 2.5–4.5)
PLATELET # BLD AUTO: 259 K/UL (ref 164–446)
PMV BLD AUTO: 11.2 FL (ref 9–12.9)
POTASSIUM SERPL-SCNC: 3.5 MMOL/L (ref 3.6–5.5)
PROT SERPL-MCNC: 5.5 G/DL (ref 6–8.2)
RBC # BLD AUTO: 4.01 M/UL (ref 4.2–5.4)
SODIUM SERPL-SCNC: 140 MMOL/L (ref 135–145)
WBC # BLD AUTO: 5.9 K/UL (ref 4.8–10.8)

## 2020-08-10 PROCEDURE — 97165 OT EVAL LOW COMPLEX 30 MIN: CPT

## 2020-08-10 PROCEDURE — 80053 COMPREHEN METABOLIC PANEL: CPT

## 2020-08-10 PROCEDURE — A9270 NON-COVERED ITEM OR SERVICE: HCPCS | Performed by: HOSPITALIST

## 2020-08-10 PROCEDURE — 700102 HCHG RX REV CODE 250 W/ 637 OVERRIDE(OP): Performed by: HOSPITALIST

## 2020-08-10 PROCEDURE — 84100 ASSAY OF PHOSPHORUS: CPT

## 2020-08-10 PROCEDURE — 85027 COMPLETE CBC AUTOMATED: CPT

## 2020-08-10 PROCEDURE — 36415 COLL VENOUS BLD VENIPUNCTURE: CPT

## 2020-08-10 PROCEDURE — 99239 HOSP IP/OBS DSCHRG MGMT >30: CPT | Performed by: HOSPITALIST

## 2020-08-10 PROCEDURE — 83735 ASSAY OF MAGNESIUM: CPT

## 2020-08-10 RX ORDER — POTASSIUM CHLORIDE 20 MEQ/1
20 TABLET, EXTENDED RELEASE ORAL DAILY
Status: DISCONTINUED | OUTPATIENT
Start: 2020-08-10 | End: 2020-08-10 | Stop reason: HOSPADM

## 2020-08-10 RX ORDER — HYDROCODONE BITARTRATE AND ACETAMINOPHEN 10; 325 MG/1; MG/1
1 TABLET ORAL EVERY 8 HOURS PRN
Qty: 15 TAB | Refills: 0
Start: 2020-08-10 | End: 2020-08-15

## 2020-08-10 RX ADMIN — POTASSIUM CHLORIDE 20 MEQ: 1500 TABLET, EXTENDED RELEASE ORAL at 10:24

## 2020-08-10 RX ADMIN — APIXABAN 5 MG: 5 TABLET, FILM COATED ORAL at 05:52

## 2020-08-10 RX ADMIN — OXYCODONE HYDROCHLORIDE 10 MG: 10 TABLET, FILM COATED, EXTENDED RELEASE ORAL at 05:52

## 2020-08-10 RX ADMIN — LEVETIRACETAM 500 MG: 500 TABLET ORAL at 05:52

## 2020-08-10 RX ADMIN — OXYCODONE HYDROCHLORIDE 10 MG: 10 TABLET ORAL at 09:12

## 2020-08-10 RX ADMIN — ALENDRONATE SODIUM 10 MG: 10 TABLET ORAL at 09:12

## 2020-08-10 RX ADMIN — OXYCODONE HYDROCHLORIDE 10 MG: 10 TABLET ORAL at 16:11

## 2020-08-10 ASSESSMENT — COGNITIVE AND FUNCTIONAL STATUS - GENERAL
DAILY ACTIVITIY SCORE: 24
SUGGESTED CMS G CODE MODIFIER DAILY ACTIVITY: CH

## 2020-08-10 ASSESSMENT — ACTIVITIES OF DAILY LIVING (ADL): TOILETING: INDEPENDENT

## 2020-08-10 NOTE — PROGRESS NOTES
Received bedside report from ELIZABETH Webb, pt care assumed, VSS, pt assessment complete. Pt AAOx4, c/o 8/10 pain at this time. No signs of acute distress noted at this time. POC discussed with pt and verbalizes no questions. Pt denies any additional needs at this time. Bed in lowest position, bed alarm on, pt educated on fall risk and verbalized understanding, call light within reach, hourly rounding initiated.

## 2020-08-10 NOTE — DISCHARGE SUMMARY
Discharge Summary    CHIEF COMPLAINT ON ADMISSION  Chief Complaint   Patient presents with   • N/V     x3 days.   • Headache     x2 months, dienies hitting head or LOC       Reason for Admission  Headache/Nausea     Admission Date  8/6/2020    CODE STATUS  Full Code    HPI & HOSPITAL COURSE  This is a 49 y.o. female   Admitted with Tylenol toxicity, headache , The patient has multiple medical problems including hydrocephalus managed with a shunt, there was a recent revision at this was reviewed by CT and was found normal, the patient underwent Mucomyst protocol for Tylenol toxicity and had significant improvement of her LFTs, her overall status improved.  The overuse of Tylenol was likely accidental, the patient unfortunately is blind and has some difficulty with her medication intake.  She has a chronic severe headache which is currently being referred to outpatient specialty evaluation.  The patient was evaluated by PT OT and was found in significant needs to reassess her home environment and help the patient at home with occupational therapy and hopefully medication set up in a way that she is not likely to make further mistakes in terms of her medication intake.  The patient otherwise remained stable, she was able to take nutrition well.  She was continued on her medication regimen except for Norco and tolerated that.  The patient this time is medically improved, stabilized for discharge and outpatient follow-up.  I did discuss her case with her neurosurgeon Dr. Haney and he suggested a follow-up CAT scan which was performed that did not show any evidence of  shunt malfunction.    Therefore, she is discharged in fair and stable condition to home with organized home healthcare and close outpatient follow-up.    The patient met 2-midnight criteria for an inpatient stay at the time of discharge.    Discharge Date  8/10/2020    FOLLOW UP ITEMS POST DISCHARGE  Close follow-up with PT OT, home medical aid  Close  follow-up with the patient's primary care physician and neurology set up in the near future for headache evaluation    DISCHARGE DIAGNOSES  Principal Problem:    Elevated LFTs POA: Unknown  Active Problems:    Migraine without aura POA: Yes      Overview: Failed (SE) Prednisone (arthralgias)      Failed (ineffective):      ICD-10 transition    Congenital hydrocephalus (HCC) POA: Yes  Resolved Problems:    Hypokalemia POA: Unknown      FOLLOW UP  Future Appointments   Date Time Provider Department Center   12/18/2020  1:00 PM [facility] EXAM 4 [facility] None       MEDICATIONS ON DISCHARGE     Medication List      CHANGE how you take these medications      Instructions   HYDROcodone/acetaminophen  MG Tabs  What changed: when to take this  Commonly known as: NORCO   Take 1 Tab by mouth every 8 hours as needed for up to 5 days.  Dose: 1 Tab        CONTINUE taking these medications      Instructions   alendronate 70 MG Tabs  Commonly known as: FOSAMAX   Take 70 mg by mouth every 7 days.  Dose: 70 mg     amitriptyline 75 MG Tabs  Commonly known as: ELAVIL   Take 75 mg by mouth every evening.  Dose: 75 mg     anastrozole 1 MG Tabs  Commonly known as: ARIMIDEX   Take 1 mg by mouth every evening.  Dose: 1 mg     Eliquis 5mg Tabs  Generic drug: apixaban   Take 5 mg by mouth 2 Times a Day.  Dose: 5 mg     hydrOXYzine HCl 50 MG Tabs  Commonly known as: ATARAX   Take 50 mg by mouth every bedtime.  Dose: 50 mg     levETIRAcetam 500 MG Tabs  Commonly known as: KEPPRA   Take 500 mg by mouth 2 times a day.  Dose: 500 mg     lisinopril 20 MG Tabs  Commonly known as: PRINIVIL   Take 20 mg by mouth every day.  Dose: 20 mg     LORazepam 1 MG Tabs  Commonly known as: ATIVAN   Take 1 mg by mouth every bedtime.  Dose: 1 mg     Melatonin 10 MG Tabs   Take 10 mg by mouth every bedtime.  Dose: 10 mg     NexIUM 40 MG delayed-release capsule  Generic drug: esomeprazole   Take 40 mg by mouth every morning.  Dose: 40 mg     propranolol  MG  Cp24  Commonly known as: INDERAL LA   Take 120 mg by mouth every bedtime.  Dose: 120 mg     sucralfate 1 GM Tabs  Commonly known as: CARAFATE   Take 1 g by mouth 4 Times a Day,Before Meals and at Bedtime.  Dose: 1 g     topiramate 100 MG Tabs  Commonly known as: TOPAMAX   Take 100 mg by mouth 2 times a day.  Dose: 100 mg     Xtampza ER 9 MG C12a  Generic drug: oxyCODONE ER   Take 9 mg by mouth every 12 hours.  Dose: 9 mg            Allergies  Allergies   Allergen Reactions   • Latex Rash     Rash   • Tape Rash     RXN= ongoing  Adhesive Medical tape. Per patient, paper tape ok.       DIET  Orders Placed This Encounter   Procedures   • Diet Order Cardiac     Standing Status:   Standing     Number of Occurrences:   1     Order Specific Question:   Diet:     Answer:   Cardiac [6]       ACTIVITY  As tolerated.  Weight bearing as tolerated  Careful ambulation at home to avoid falls    CONSULTATIONS  Curb siding neurosurgery  Poison control  PROCEDURES  None, Mucomyst protocol IV    LABORATORY  Lab Results   Component Value Date    SODIUM 140 08/10/2020    POTASSIUM 3.5 (L) 08/10/2020    CHLORIDE 108 08/10/2020    CO2 20 08/10/2020    GLUCOSE 93 08/10/2020    BUN 12 08/10/2020    CREATININE 0.48 (L) 08/10/2020    CREATININE 0.6 08/12/2008        Lab Results   Component Value Date    WBC 5.9 08/10/2020    HEMOGLOBIN 12.0 08/10/2020    HEMATOCRIT 36.1 (L) 08/10/2020    PLATELETCT 259 08/10/2020      The patient is set up now with home health with PT OT and medical aid, hopefully improving the patient's home safety including ambulation, preventing falls, preventing medication errors.    Total time of the discharge process exceeds 55 minutes.

## 2020-08-10 NOTE — DISCHARGE PLANNING
ATTN: Case Management  RE: Referral for Home Health    As of 08/10/2020, we have accepted the Home Health referral for the patient listed above.    A [facility] clinician will be out to see the patient within 48 hours. If you have any questions or concerns regarding the patient's transition to Home Health, please do not hesitate to contact us at [phone].      We look forward to collaborating with you,  [facility]

## 2020-08-10 NOTE — CARE PLAN
Problem: Safety  Goal: Will remain free from injury  Outcome: PROGRESSING AS EXPECTED  Intervention: Collaborate with Interdisciplinary Team for safe transfer and mobilization techniques  Note: Patient educated to use call light for assistance. Fall precautions in place. Staff will assist with mobilization. Hourly rounding in place.      Problem: Pain Management  Goal: Pain level will decrease to patient's comfort goal  Outcome: PROGRESSING AS EXPECTED  Note: Pt is able to verbalize pain on a scale of 1-10 and is able to verbalize comfort goal. Pain management plan followed and pt educated on nonpharmacologic and pharmacological comfort measures.  Established goal for pain management with client.

## 2020-08-10 NOTE — DISCHARGE INSTRUCTIONS
Discharge Instructions    Discharged to home by car with relative. Discharged via wheelchair, hospital escort: Yes.  Special equipment needed: Not Applicable    Be sure to schedule a follow-up appointment with your primary care doctor or any specialists as instructed.     Discharge Plan:   Diet Plan: Discussed  Activity Level: Discussed  Confirmed Follow up Appointment: Patient to Call and Schedule Appointment  Confirmed Symptoms Management: Discussed  Medication Reconciliation Updated: Yes    I understand that a diet low in cholesterol, fat, and sodium is recommended for good health. Unless I have been given specific instructions below for another diet, I accept this instruction as my diet prescription.   Other diet: none    Special Instructions: None    · Is patient discharged on Warfarin / Coumadin?       Acetaminophen; Hydrocodone tablets or capsules  What is this medicine?  ACETAMINOPHEN; HYDROCODONE (a set a MARS orlando fen; king droe KOE done) is a pain reliever. It is used to treat moderate to severe pain.  This medicine may be used for other purposes; ask your health care provider or pharmacist if you have questions.  COMMON BRAND NAME(S): Anexsia, Bancap HC, Ceta-Plus, Co-Gesic, Comfortpak, Dolagesic, Dolorex Forte, DuoCet, Hydrocet, Hydrogesic, Lorcet, Lorcet HD, Lorcet Plus, Lortab, Margesic H, Maxidone, Norco, Polygesic, Stagesic, Vanacet, Verdrocet, Vicodin, Vicodin ES, Vicodin HP, Xodol, Zydone  What should I tell my health care provider before I take this medicine?  They need to know if you have any of these conditions:  · brain tumor  · Crohn's disease, inflammatory bowel disease, or ulcerative colitis  · drug abuse or addiction  · head injury  · heart or circulation problems  · if you often drink alcohol  · kidney disease or problems going to the bathroom  · liver disease  · lung disease, asthma, or breathing problems  · an unusual or allergic reaction to acetaminophen, hydrocodone, other opioid  analgesics, other medicines, foods, dyes, or preservatives  · pregnant or trying to get pregnant  · breast-feeding  How should I use this medicine?  Take this medicine by mouth with a glass of water. Follow the directions on the prescription label. You can take it with or without food. If it upsets your stomach, take it with food. Do not take your medicine more often than directed.  A special MedGuide will be given to you by the pharmacist with each prescription and refill. Be sure to read this information carefully each time.  Talk to your pediatrician regarding the use of this medicine in children. Special care may be needed.  Overdosage: If you think you have taken too much of this medicine contact a poison control center or emergency room at once.  NOTE: This medicine is only for you. Do not share this medicine with others.  What if I miss a dose?  If you miss a dose, take it as soon as you can. If it is almost time for your next dose, take only that dose. Do not take double or extra doses.  What may interact with this medicine?  This medicine may interact with the following medications:  · alcohol  · antiviral medicines for HIV or AIDS  · atropine  · antihistamines for allergy, cough and cold  · certain antibiotics like erythromycin, clarithromycin  · certain medicines for anxiety or sleep  · certain medicines for bladder problems like oxybutynin, tolterodine  · certain medicines for depression like amitriptyline, fluoxetine, sertraline  · certain medicines for fungal infections like ketoconazole and itraconazole  · certain medicines for Parkinson's disease like benztropine, trihexyphenidyl  · certain medicines for seizures like carbamazepine, phenobarbital, phenytoin, primidone  · certain medicines for stomach problems like dicyclomine, hyoscyamine  · certain medicines for travel sickness like scopolamine  · general anesthetics like halothane, isoflurane, methoxyflurane, propofol  · ipratropium  · local  anesthetics like lidocaine, pramoxine, tetracaine  · MAOIs like Carbex, Eldepryl, Marplan, Nardil, and Parnate  · medicines that relax muscles for surgery  · other medicines with acetaminophen  · other narcotic medicines for pain or cough  · phenothiazines like chlorpromazine, mesoridazine, prochlorperazine, thioridazine  · rifampin  This list may not describe all possible interactions. Give your health care provider a list of all the medicines, herbs, non-prescription drugs, or dietary supplements you use. Also tell them if you smoke, drink alcohol, or use illegal drugs. Some items may interact with your medicine.  What should I watch for while using this medicine?  Tell your doctor or health care professional if your pain does not go away, if it gets worse, or if you have new or a different type of pain. You may develop tolerance to the medicine. Tolerance means that you will need a higher dose of the medicine for pain relief. Tolerance is normal and is expected if you take the medicine for a long time.  Do not suddenly stop taking your medicine because you may develop a severe reaction. Your body becomes used to the medicine. This does NOT mean you are addicted. Addiction is a behavior related to getting and using a drug for a non-medical reason. If you have pain, you have a medical reason to take pain medicine. Your doctor will tell you how much medicine to take. If your doctor wants you to stop the medicine, the dose will be slowly lowered over time to avoid any side effects.  There are different types of narcotic medicines (opiates). If you take more than one type at the same time or if you are taking another medicine that also causes drowsiness, you may have more side effects. Give your health care provider a list of all medicines you use. Your doctor will tell you how much medicine to take. Do not take more medicine than directed. Call emergency for help if you have problems breathing or unusual  sleepiness.  Do not take other medicines that contain acetaminophen with this medicine. Always read labels carefully. If you have questions, ask your doctor or pharmacist.  If you take too much acetaminophen get medical help right away. Too much acetaminophen can be very dangerous and cause liver damage. Even if you do not have symptoms, it is important to get help right away.  You may get drowsy or dizzy. Do not drive, use machinery, or do anything that needs mental alertness until you know how this medicine affects you. Do not stand or sit up quickly, especially if you are an older patient. This reduces the risk of dizzy or fainting spells. Alcohol may interfere with the effect of this medicine. Avoid alcoholic drinks.  The medicine will cause constipation. Try to have a bowel movement at least every 2 to 3 days. If you do not have a bowel movement for 3 days, call your doctor or health care professional.  Your mouth may get dry. Chewing sugarless gum or sucking hard candy, and drinking plenty of water may help. Contact your doctor if the problem does not go away or is severe.  What side effects may I notice from receiving this medicine?  Side effects that you should report to your doctor or health care professional as soon as possible:  · allergic reactions like skin rash, itching or hives, swelling of the face, lips, or tongue  · breathing problems  · confusion  · redness, blistering, peeling or loosening of the skin, including inside the mouth  · signs and symptoms of low blood pressure like dizziness; feeling faint or lightheaded, falls; unusually weak or tired  · trouble passing urine or change in the amount of urine  · yellowing of the eyes or skin  Side effects that usually do not require medical attention (report to your doctor or health care professional if they continue or are bothersome):  · constipation  · dry mouth  · nausea, vomiting  · tiredness  This list may not describe all possible side effects.  Call your doctor for medical advice about side effects. You may report side effects to FDA at 4-324-USK-1608.  Where should I keep my medicine?  Keep out of the reach of children. This medicine can be abused. Keep your medicine in a safe place to protect it from theft. Do not share this medicine with anyone. Selling or giving away this medicine is dangerous and against the law.  Store at room temperature between 15 and 30 degrees C (59 and 86 degrees F).  This medicine may cause harm and death if it is taken by other adults, children, or pets. Return medicine that has not been used to an official disposal site. Contact the [facility] at [phone] or your [facility] government to find a site. If you cannot return the medicine, flush it down the toilet. Do not use the medicine after the expiration date.  NOTE: This sheet is a summary. It may not cover all possible information. If you have questions about this medicine, talk to your doctor, pharmacist, or health care provider.  © 2020 [employer]vier/Gold Standard (2018-04-24 12:44:49)  No         Depression / Suicide Risk    As you are discharged from this [facility] facility, it is important to learn how to keep safe from harming yourself.    Recognize the warning signs:  · Abrupt changes in personality, positive or negative- including increase in energy   · Giving away possessions  · Change in eating patterns- significant weight changes-  positive or negative  · Change in sleeping patterns- unable to sleep or sleeping all the time   · Unwillingness or inability to communicate  · Depression  · Unusual sadness, discouragement and loneliness  · Talk of wanting to die  · Neglect of personal appearance   · Rebelliousness- reckless behavior  · Withdrawal from people/activities they love  · Confusion- inability to concentrate     If you or a loved one observes any of these behaviors or has concerns about self-harm, here's what you can do:  · Talk about it- your feelings and  reasons for harming yourself  · Remove any means that you might use to hurt yourself (examples: pills, rope, extension cords, firearm)  · Get professional help from the community (Mental Health, Substance Abuse, psychological counseling)  · Do not be alone:Call your Safe Contact- someone whom you trust who will be there for you.  · Call your local CRISIS HOTLINE 711-0741 or 313-895-5160  · Call your local Children's Mobile Crisis Response Team [place] (010) 391-6639 or www.[place]  · Call the toll free National Suicide Prevention Hotlines   · National Suicide Prevention Lifeline 147-475-OQDV (7413)  · National Hope Line Network 800-SUICIDE (543-9977)

## 2020-08-10 NOTE — PROGRESS NOTES
Received Bedside report. Assumed care at 0700. This pt is AOx4, ambulatory with SBA and Hand held assist for legal blindness, voiding appropriately, no s/s of pain. Patient and RN discussed plan of care: questions answered. Labs noted, assessment complete, patient tolerating cardiac diet. Tele box in place. Pt is on RA. Call light in place, fall precautions in place, patient educated on importance of calling for assistance. No additional needs at this time. VSS

## 2020-08-10 NOTE — PROGRESS NOTES
Patient discharging in stable condition. VSS on RA. Discharge teaching provided with AVS and readback. Plan to dc home with home health services. PCP office to call patient to schedule f/u appointment- patient aware. PIV removed. Escorted off unit per CNA in wheelchair with all belongings.

## 2020-08-10 NOTE — THERAPY
Occupational Therapy   Initial Evaluation     Patient Name: Rasheeda Manzano  Age:  49 y.o., Sex:  female  Medical Record #: [id number]  Today's Date: 8/10/2020       Precautions: Fall Risk  Comments: legally blind    Assessment  Patient is 49 y.o. female with a diagnosis of Tylenol toxicity after mistaking her meds as she is legally blind. Pt is currently able to perform basic ADl's with supervision.  Highly recommend HH OT to assess pt's home environment & make modifications to accomadate for pt's blindness.    Plan    Recommend Occupational Therapy for Evaluation only.    DC Equipment Recommendations: None  Discharge Recommendations: Recommend home health for continued occupational therapy services        Objective       08/10/20 1252   Prior Living Situation   Prior Services None   Housing / Facility 1 Story Apartment / Condo   Comments pt reports her SO & neighbors help her with driving to appt & grocery shopping   Cognition    Comments pleasant & co-oprative.  Pt needs to re-evaluate how she manages her meds at home   Balance Assessment   Sitting Balance (Static) Normal   Sitting Balance (Dynamic) Good   Standing Balance (Static) Good   Standing Balance (Dynamic) Good   Weight Shift Sitting Good   Weight Shift Standing Good   ADL Assessment   Eating Modified Independent   Grooming Modified Independent   Bathing Modified Independent   Upper Body Dressing Modified Independent   Lower Body Dressing Modified Independent   Toileting Modified Independent   Functional Mobility   Sit to Stand Supervised   Bed, Chair, Wheelchair Transfer Supervised   Toilet Transfers Supervised   Visual Perception   Visual Acuity Blind Bilaterally  (legally blind since the age of 11)

## 2020-08-13 ENCOUNTER — HOME CARE VISIT (OUTPATIENT)
Dept: HOME HEALTH SERVICES | Facility: HOME HEALTHCARE | Age: 50
End: 2020-08-13
Payer: MEDICARE

## 2020-08-13 VITALS
HEART RATE: 90 BPM | WEIGHT: 129.6 LBS | TEMPERATURE: 98.1 F | OXYGEN SATURATION: 96 % | BODY MASS INDEX: 22.13 KG/M2 | SYSTOLIC BLOOD PRESSURE: 120 MMHG | HEIGHT: 64 IN | RESPIRATION RATE: 16 BRPM | DIASTOLIC BLOOD PRESSURE: 78 MMHG

## 2020-08-13 PROCEDURE — G0493 RN CARE EA 15 MIN HH/HOSPICE: HCPCS

## 2020-08-13 PROCEDURE — 665001 SOC-HOME HEALTH

## 2020-08-13 PROCEDURE — 665998 HH PPS REVENUE CREDIT

## 2020-08-13 PROCEDURE — 665999 HH PPS REVENUE DEBIT

## 2020-08-13 ASSESSMENT — PATIENT HEALTH QUESTIONNAIRE - PHQ9
5. POOR APPETITE OR OVEREATING: 2 - MORE THAN HALF THE DAYS
SUM OF ALL RESPONSES TO PHQ QUESTIONS 1-9: 10
CLINICAL INTERPRETATION OF PHQ2 SCORE: 4

## 2020-08-13 ASSESSMENT — FIBROSIS 4 INDEX: FIB4 SCORE: 0.33

## 2020-08-14 ENCOUNTER — HOME CARE VISIT (OUTPATIENT)
Dept: HOME HEALTH SERVICES | Facility: HOME HEALTHCARE | Age: 50
End: 2020-08-14
Payer: MEDICARE

## 2020-08-14 PROCEDURE — 665998 HH PPS REVENUE CREDIT

## 2020-08-14 PROCEDURE — 665999 HH PPS REVENUE DEBIT

## 2020-08-14 ASSESSMENT — PATIENT HEALTH QUESTIONNAIRE - PHQ9
1. LITTLE INTEREST OR PLEASURE IN DOING THINGS: 02
2. FEELING DOWN, DEPRESSED, IRRITABLE, OR HOPELESS: 02

## 2020-08-14 ASSESSMENT — ENCOUNTER SYMPTOMS
VOMITING: DENIES
NAUSEA: DENIES

## 2020-08-15 PROCEDURE — 665998 HH PPS REVENUE CREDIT

## 2020-08-15 PROCEDURE — 665999 HH PPS REVENUE DEBIT

## 2020-08-16 PROCEDURE — 665999 HH PPS REVENUE DEBIT

## 2020-08-16 PROCEDURE — 665998 HH PPS REVENUE CREDIT

## 2020-08-17 ENCOUNTER — HOME CARE VISIT (OUTPATIENT)
Dept: HOME HEALTH SERVICES | Facility: HOME HEALTHCARE | Age: 50
End: 2020-08-17
Payer: MEDICARE

## 2020-08-17 VITALS
RESPIRATION RATE: 16 BRPM | SYSTOLIC BLOOD PRESSURE: 124 MMHG | HEART RATE: 104 BPM | DIASTOLIC BLOOD PRESSURE: 80 MMHG | TEMPERATURE: 98.1 F | OXYGEN SATURATION: 98 %

## 2020-08-17 PROCEDURE — 665998 HH PPS REVENUE CREDIT

## 2020-08-17 PROCEDURE — 665999 HH PPS REVENUE DEBIT

## 2020-08-17 PROCEDURE — G0495 RN CARE TRAIN/EDU IN HH: HCPCS

## 2020-08-17 ASSESSMENT — ENCOUNTER SYMPTOMS
DEBILITATING PAIN: 1
DEPRESSED MOOD: 1

## 2020-08-18 ENCOUNTER — HOME CARE VISIT (OUTPATIENT)
Dept: HOME HEALTH SERVICES | Facility: HOME HEALTHCARE | Age: 50
End: 2020-08-18
Payer: MEDICARE

## 2020-08-18 PROCEDURE — 665999 HH PPS REVENUE DEBIT

## 2020-08-18 PROCEDURE — 665998 HH PPS REVENUE CREDIT

## 2020-08-18 PROCEDURE — G0151 HHCP-SERV OF PT,EA 15 MIN: HCPCS

## 2020-08-18 ASSESSMENT — ENCOUNTER SYMPTOMS
MUSCLE WEAKNESS: 1
NAUSEA: DENIES
VOMITING: DENIES

## 2020-08-19 ENCOUNTER — HOME CARE VISIT (OUTPATIENT)
Dept: HOME HEALTH SERVICES | Facility: HOME HEALTHCARE | Age: 50
End: 2020-08-19
Payer: MEDICARE

## 2020-08-19 PROCEDURE — 665999 HH PPS REVENUE DEBIT

## 2020-08-19 PROCEDURE — G0155 HHCP-SVS OF CSW,EA 15 MIN: HCPCS

## 2020-08-19 PROCEDURE — 665998 HH PPS REVENUE CREDIT

## 2020-08-20 ENCOUNTER — HOME CARE VISIT (OUTPATIENT)
Dept: HOME HEALTH SERVICES | Facility: HOME HEALTHCARE | Age: 50
End: 2020-08-20
Payer: MEDICARE

## 2020-08-20 VITALS
DIASTOLIC BLOOD PRESSURE: 82 MMHG | OXYGEN SATURATION: 98 % | RESPIRATION RATE: 18 BRPM | HEART RATE: 92 BPM | SYSTOLIC BLOOD PRESSURE: 120 MMHG | TEMPERATURE: 98.1 F

## 2020-08-20 VITALS
TEMPERATURE: 97.4 F | RESPIRATION RATE: 16 BRPM | DIASTOLIC BLOOD PRESSURE: 70 MMHG | HEART RATE: 107 BPM | OXYGEN SATURATION: 99 % | SYSTOLIC BLOOD PRESSURE: 112 MMHG

## 2020-08-20 PROCEDURE — G0299 HHS/HOSPICE OF RN EA 15 MIN: HCPCS

## 2020-08-20 PROCEDURE — 665998 HH PPS REVENUE CREDIT

## 2020-08-20 PROCEDURE — 665999 HH PPS REVENUE DEBIT

## 2020-08-20 SDOH — ECONOMIC STABILITY: HOUSING INSECURITY
HOME SAFETY: PT LIVES ALONE AND HAS A FRIEND THAT HELPS HER AS NEEDED DOES HAVE ANOTHER FRIEND/SO THAT HELPS BUT HE IS NOT CONSISTENT. SHE IS LOOKING TO GET WITH MSW AND GET HELP WITH CLEANING, SHOPPING AND OTHER IADLS AS NEEDED

## 2020-08-20 ASSESSMENT — BALANCE ASSESSMENTS
NUDGED SCORE: 2
STANDING BALANCE: 1 - STEADY BUT WIDE STANCE AND USES CANE OR OTHER SUPPORT
BALANCE SCORE: 13
ARISING SCORE: 1
TURNING 360 DEGREES STEPS: 1 - CONTINUOUS STEPS
NUDGED: 2 - STEADY
ATTEMPTS TO ARISE: 2 - ABLE TO RISE, ONE ATTEMPT
SITTING DOWN: 1 - USES ARMS OR NOT SMOOTH MOTION
ARISES: 1 - ABLE, USES ARMS TO HELP
IMMEDIATE STANDING BALANCE FIRST 5 SECONDS: 2 - STEADY WITHOUT WALKER OR OTHER SUPPORT
EYES CLOSED AT MAXIMUM POSITION NUDGED: 1 - STEADY
SITTING BALANCE: 1 - STEADY, SAFE

## 2020-08-20 ASSESSMENT — ENCOUNTER SYMPTOMS
DEBILITATING PAIN: 1
MUSCLE WEAKNESS: 1

## 2020-08-20 ASSESSMENT — ACTIVITIES OF DAILY LIVING (ADL)
AMBULATION ASSISTANCE: 1
AMBULATION ASSISTANCE: SUPERVISION
CURRENT_FUNCTION: INDEPENDENT
GROOMING_COMMENTS: PLEASE SEE OT EVAL FOR MORE DETAILS ON ADLS
AMBULATION ASSISTANCE ON FLAT SURFACES: 1
BATHING_COMMENTS: PLEASE SEE OT EVAL FOR MORE DETAILS ON ADLS
PHYSICAL TRANSFERS ASSESSED: 1
FEEDING_COMMENTS: PLEASE SEE OT EVAL FOR MORE DETAILS ON ADLS

## 2020-08-20 ASSESSMENT — GAIT ASSESSMENTS
PATH: 1 - MILD/MODERATE DEVIATION OR USES WALKING AID
GAIT SCORE: 8
TRUNK SCORE: 1
PATH SCORE: 1
BALANCE AND GAIT SCORE: 21
WALKING STANCE: 0 - HEELS APART
STEP SYMMETRY: 1 - RIGHT AND LEFT STEP LENGTH APPEAR EQUAL
INITIATION OF GAIT IMMEDIATELY AFTER GO: 1 - NO HESITANCY
STEP CONTINUITY: 0 - STOPPING OR DISCONTINUITY BETWEEN STEPS
TRUNK: 1 - NO SWAY BUT FLEXION OF KNEES OR BACK OR SPREADS ARMS WHILE WALKING

## 2020-08-21 ENCOUNTER — HOME CARE VISIT (OUTPATIENT)
Dept: HOME HEALTH SERVICES | Facility: HOME HEALTHCARE | Age: 50
End: 2020-08-21
Payer: MEDICARE

## 2020-08-21 VITALS
TEMPERATURE: 98.2 F | OXYGEN SATURATION: 98 % | SYSTOLIC BLOOD PRESSURE: 126 MMHG | DIASTOLIC BLOOD PRESSURE: 82 MMHG | HEART RATE: 106 BPM | RESPIRATION RATE: 18 BRPM

## 2020-08-21 PROCEDURE — G0151 HHCP-SERV OF PT,EA 15 MIN: HCPCS

## 2020-08-21 PROCEDURE — 665999 HH PPS REVENUE DEBIT

## 2020-08-21 PROCEDURE — 665998 HH PPS REVENUE CREDIT

## 2020-08-21 ASSESSMENT — ACTIVITIES OF DAILY LIVING (ADL): OASIS_M1830: 03

## 2020-08-22 PROCEDURE — 665999 HH PPS REVENUE DEBIT

## 2020-08-22 PROCEDURE — 665998 HH PPS REVENUE CREDIT

## 2020-08-23 PROCEDURE — 665999 HH PPS REVENUE DEBIT

## 2020-08-23 PROCEDURE — 665998 HH PPS REVENUE CREDIT

## 2020-08-24 ENCOUNTER — HOME CARE VISIT (OUTPATIENT)
Dept: HOME HEALTH SERVICES | Facility: HOME HEALTHCARE | Age: 50
End: 2020-08-24
Payer: MEDICARE

## 2020-08-24 VITALS
SYSTOLIC BLOOD PRESSURE: 140 MMHG | DIASTOLIC BLOOD PRESSURE: 94 MMHG | RESPIRATION RATE: 16 BRPM | OXYGEN SATURATION: 98 % | TEMPERATURE: 98.3 F | HEART RATE: 98 BPM

## 2020-08-24 PROCEDURE — G0299 HHS/HOSPICE OF RN EA 15 MIN: HCPCS

## 2020-08-24 PROCEDURE — 665998 HH PPS REVENUE CREDIT

## 2020-08-24 PROCEDURE — 665999 HH PPS REVENUE DEBIT

## 2020-08-24 ASSESSMENT — ENCOUNTER SYMPTOMS: MUSCLE WEAKNESS: 1

## 2020-08-25 ENCOUNTER — HOME CARE VISIT (OUTPATIENT)
Dept: HOME HEALTH SERVICES | Facility: HOME HEALTHCARE | Age: 50
End: 2020-08-25
Payer: MEDICARE

## 2020-08-25 VITALS
DIASTOLIC BLOOD PRESSURE: 72 MMHG | SYSTOLIC BLOOD PRESSURE: 120 MMHG | RESPIRATION RATE: 16 BRPM | OXYGEN SATURATION: 98 % | HEART RATE: 114 BPM | TEMPERATURE: 98.3 F

## 2020-08-25 PROCEDURE — 665999 HH PPS REVENUE DEBIT

## 2020-08-25 PROCEDURE — G0151 HHCP-SERV OF PT,EA 15 MIN: HCPCS

## 2020-08-25 PROCEDURE — 665998 HH PPS REVENUE CREDIT

## 2020-08-25 PROCEDURE — G0153 HHCP-SVS OF S/L PATH,EA 15MN: HCPCS

## 2020-08-25 ASSESSMENT — ACTIVITIES OF DAILY LIVING (ADL): TRANSPORTATION COMMENTS: LEGALLY BLIND

## 2020-08-25 ASSESSMENT — ENCOUNTER SYMPTOMS
AGGRESSION WITHIN DEFINED LIMITS: 1
ANGER WITHIN DEFINED LIMITS: 1

## 2020-08-26 ENCOUNTER — HOME CARE VISIT (OUTPATIENT)
Dept: HOME HEALTH SERVICES | Facility: HOME HEALTHCARE | Age: 50
End: 2020-08-26
Payer: MEDICARE

## 2020-08-26 VITALS
DIASTOLIC BLOOD PRESSURE: 72 MMHG | TEMPERATURE: 98.3 F | HEART RATE: 114 BPM | OXYGEN SATURATION: 98 % | SYSTOLIC BLOOD PRESSURE: 120 MMHG | RESPIRATION RATE: 16 BRPM

## 2020-08-26 VITALS
RESPIRATION RATE: 16 BRPM | SYSTOLIC BLOOD PRESSURE: 110 MMHG | TEMPERATURE: 97.9 F | DIASTOLIC BLOOD PRESSURE: 60 MMHG | OXYGEN SATURATION: 97 % | HEART RATE: 106 BPM

## 2020-08-26 PROCEDURE — G0299 HHS/HOSPICE OF RN EA 15 MIN: HCPCS

## 2020-08-26 PROCEDURE — G0155 HHCP-SVS OF CSW,EA 15 MIN: HCPCS

## 2020-08-26 PROCEDURE — 665998 HH PPS REVENUE CREDIT

## 2020-08-26 PROCEDURE — 665999 HH PPS REVENUE DEBIT

## 2020-08-27 ENCOUNTER — HOME CARE VISIT (OUTPATIENT)
Dept: HOME HEALTH SERVICES | Facility: HOME HEALTHCARE | Age: 50
End: 2020-08-27
Payer: MEDICARE

## 2020-08-27 VITALS
DIASTOLIC BLOOD PRESSURE: 68 MMHG | HEART RATE: 109 BPM | RESPIRATION RATE: 16 BRPM | OXYGEN SATURATION: 98 % | SYSTOLIC BLOOD PRESSURE: 108 MMHG | TEMPERATURE: 98.8 F

## 2020-08-27 PROCEDURE — 665999 HH PPS REVENUE DEBIT

## 2020-08-27 PROCEDURE — G0153 HHCP-SVS OF S/L PATH,EA 15MN: HCPCS

## 2020-08-27 PROCEDURE — 665998 HH PPS REVENUE CREDIT

## 2020-08-27 ASSESSMENT — ENCOUNTER SYMPTOMS
DIFFICULTY THINKING: 1
MUSCLE WEAKNESS: 1

## 2020-08-27 ASSESSMENT — ACTIVITIES OF DAILY LIVING (ADL): TRANSPORTATION COMMENTS: LEGALLY BLIND

## 2020-08-28 ENCOUNTER — HOME CARE VISIT (OUTPATIENT)
Dept: HOME HEALTH SERVICES | Facility: HOME HEALTHCARE | Age: 50
End: 2020-08-28
Payer: MEDICARE

## 2020-08-28 ENCOUNTER — APPOINTMENT (OUTPATIENT)
Dept: RADIOLOGY | Facility: MEDICAL CENTER | Age: 50
DRG: 948 | End: 2020-08-28
Attending: EMERGENCY MEDICINE
Payer: MEDICARE

## 2020-08-28 ENCOUNTER — HOSPITAL ENCOUNTER (INPATIENT)
Facility: MEDICAL CENTER | Age: 50
LOS: 3 days | DRG: 948 | End: 2020-08-31
Attending: EMERGENCY MEDICINE | Admitting: HOSPITALIST
Payer: MEDICARE

## 2020-08-28 DIAGNOSIS — F11.20 NARCOTIC DEPENDENCE (HCC): ICD-10-CM

## 2020-08-28 DIAGNOSIS — G43.819 OTHER MIGRAINE WITHOUT STATUS MIGRAINOSUS, INTRACTABLE: ICD-10-CM

## 2020-08-28 DIAGNOSIS — R40.4 ALTERED LEVEL OF CONSCIOUSNESS: ICD-10-CM

## 2020-08-28 DIAGNOSIS — T50.901S: ICD-10-CM

## 2020-08-28 DIAGNOSIS — R41.0 DELIRIUM: ICD-10-CM

## 2020-08-28 LAB
ALBUMIN SERPL BCP-MCNC: 3 G/DL (ref 3.2–4.9)
ALBUMIN/GLOB SERPL: 1.4 G/DL
ALP SERPL-CCNC: 102 U/L (ref 30–99)
ALT SERPL-CCNC: 11 U/L (ref 2–50)
AMPHET UR QL SCN: NEGATIVE
ANION GAP SERPL CALC-SCNC: 13 MMOL/L (ref 7–16)
APPEARANCE UR: CLEAR
AST SERPL-CCNC: 16 U/L (ref 12–45)
BARBITURATES UR QL SCN: NEGATIVE
BASOPHILS # BLD AUTO: 0.5 % (ref 0–1.8)
BASOPHILS # BLD: 0.03 K/UL (ref 0–0.12)
BENZODIAZ UR QL SCN: NEGATIVE
BILIRUB SERPL-MCNC: 0.5 MG/DL (ref 0.1–1.5)
BILIRUB UR QL STRIP.AUTO: NEGATIVE
BUN SERPL-MCNC: 8 MG/DL (ref 8–22)
BZE UR QL SCN: NEGATIVE
CALCIUM SERPL-MCNC: 7.7 MG/DL (ref 8.5–10.5)
CANNABINOIDS UR QL SCN: NEGATIVE
CHLORIDE SERPL-SCNC: 111 MMOL/L (ref 96–112)
CO2 SERPL-SCNC: 19 MMOL/L (ref 20–33)
COLOR UR: YELLOW
CREAT SERPL-MCNC: 0.45 MG/DL (ref 0.5–1.4)
EOSINOPHIL # BLD AUTO: 0.14 K/UL (ref 0–0.51)
EOSINOPHIL NFR BLD: 2.2 % (ref 0–6.9)
ERYTHROCYTE [DISTWIDTH] IN BLOOD BY AUTOMATED COUNT: 49.6 FL (ref 35.9–50)
GLOBULIN SER CALC-MCNC: 2.1 G/DL (ref 1.9–3.5)
GLUCOSE SERPL-MCNC: 69 MG/DL (ref 65–99)
GLUCOSE UR STRIP.AUTO-MCNC: NEGATIVE MG/DL
HCT VFR BLD AUTO: 32.8 % (ref 37–47)
HGB BLD-MCNC: 10.6 G/DL (ref 12–16)
IMM GRANULOCYTES # BLD AUTO: 0.03 K/UL (ref 0–0.11)
IMM GRANULOCYTES NFR BLD AUTO: 0.5 % (ref 0–0.9)
KETONES UR STRIP.AUTO-MCNC: NEGATIVE MG/DL
LACTATE BLD-SCNC: 1.3 MMOL/L (ref 0.5–2)
LEUKOCYTE ESTERASE UR QL STRIP.AUTO: NEGATIVE
LYMPHOCYTES # BLD AUTO: 0.86 K/UL (ref 1–4.8)
LYMPHOCYTES NFR BLD: 13.8 % (ref 22–41)
MCH RBC QN AUTO: 30.2 PG (ref 27–33)
MCHC RBC AUTO-ENTMCNC: 32.3 G/DL (ref 33.6–35)
MCV RBC AUTO: 93.4 FL (ref 81.4–97.8)
METHADONE UR QL SCN: NEGATIVE
MICRO URNS: NORMAL
MONOCYTES # BLD AUTO: 0.62 K/UL (ref 0–0.85)
MONOCYTES NFR BLD AUTO: 9.9 % (ref 0–13.4)
NEUTROPHILS # BLD AUTO: 4.57 K/UL (ref 2–7.15)
NEUTROPHILS NFR BLD: 73.1 % (ref 44–72)
NITRITE UR QL STRIP.AUTO: NEGATIVE
NRBC # BLD AUTO: 0 K/UL
NRBC BLD-RTO: 0 /100 WBC
OPIATES UR QL SCN: NEGATIVE
OXYCODONE UR QL SCN: POSITIVE
PCP UR QL SCN: NEGATIVE
PH UR STRIP.AUTO: 5 [PH] (ref 5–8)
PLATELET # BLD AUTO: 219 K/UL (ref 164–446)
PMV BLD AUTO: 10.8 FL (ref 9–12.9)
POTASSIUM SERPL-SCNC: 3.1 MMOL/L (ref 3.6–5.5)
PROPOXYPH UR QL SCN: NEGATIVE
PROT SERPL-MCNC: 5.1 G/DL (ref 6–8.2)
PROT UR QL STRIP: NEGATIVE MG/DL
RBC # BLD AUTO: 3.51 M/UL (ref 4.2–5.4)
RBC UR QL AUTO: NEGATIVE
SODIUM SERPL-SCNC: 143 MMOL/L (ref 135–145)
SP GR UR STRIP.AUTO: 1.01
UROBILINOGEN UR STRIP.AUTO-MCNC: 1 MG/DL
WBC # BLD AUTO: 6.3 K/UL (ref 4.8–10.8)

## 2020-08-28 PROCEDURE — 85025 COMPLETE CBC W/AUTO DIFF WBC: CPT

## 2020-08-28 PROCEDURE — 80053 COMPREHEN METABOLIC PANEL: CPT

## 2020-08-28 PROCEDURE — 302128 INFUSION PUMP: Performed by: HOSPITALIST

## 2020-08-28 PROCEDURE — 99223 1ST HOSP IP/OBS HIGH 75: CPT | Mod: AI | Performed by: HOSPITALIST

## 2020-08-28 PROCEDURE — 665998 HH PPS REVENUE CREDIT

## 2020-08-28 PROCEDURE — 81003 URINALYSIS AUTO W/O SCOPE: CPT

## 2020-08-28 PROCEDURE — 87040 BLOOD CULTURE FOR BACTERIA: CPT | Mod: 91

## 2020-08-28 PROCEDURE — 36415 COLL VENOUS BLD VENIPUNCTURE: CPT

## 2020-08-28 PROCEDURE — A9270 NON-COVERED ITEM OR SERVICE: HCPCS | Performed by: HOSPITALIST

## 2020-08-28 PROCEDURE — 71045 X-RAY EXAM CHEST 1 VIEW: CPT

## 2020-08-28 PROCEDURE — 770006 HCHG ROOM/CARE - MED/SURG/GYN SEMI*

## 2020-08-28 PROCEDURE — 665999 HH PPS REVENUE DEBIT

## 2020-08-28 PROCEDURE — 51701 INSERT BLADDER CATHETER: CPT

## 2020-08-28 PROCEDURE — 70450 CT HEAD/BRAIN W/O DYE: CPT

## 2020-08-28 PROCEDURE — 700105 HCHG RX REV CODE 258: Performed by: HOSPITALIST

## 2020-08-28 PROCEDURE — 83605 ASSAY OF LACTIC ACID: CPT

## 2020-08-28 PROCEDURE — 87086 URINE CULTURE/COLONY COUNT: CPT

## 2020-08-28 PROCEDURE — 99285 EMERGENCY DEPT VISIT HI MDM: CPT

## 2020-08-28 PROCEDURE — 700102 HCHG RX REV CODE 250 W/ 637 OVERRIDE(OP): Performed by: HOSPITALIST

## 2020-08-28 PROCEDURE — C9803 HOPD COVID-19 SPEC COLLECT: HCPCS | Performed by: HOSPITALIST

## 2020-08-28 PROCEDURE — 72125 CT NECK SPINE W/O DYE: CPT

## 2020-08-28 PROCEDURE — 80307 DRUG TEST PRSMV CHEM ANLYZR: CPT

## 2020-08-28 RX ORDER — AMOXICILLIN 250 MG
2 CAPSULE ORAL 2 TIMES DAILY
Status: DISCONTINUED | OUTPATIENT
Start: 2020-08-28 | End: 2020-08-31 | Stop reason: HOSPADM

## 2020-08-28 RX ORDER — PROCHLORPERAZINE EDISYLATE 5 MG/ML
5-10 INJECTION INTRAMUSCULAR; INTRAVENOUS EVERY 4 HOURS PRN
Status: DISCONTINUED | OUTPATIENT
Start: 2020-08-28 | End: 2020-08-30

## 2020-08-28 RX ORDER — PROMETHAZINE HYDROCHLORIDE 25 MG/1
12.5-25 SUPPOSITORY RECTAL EVERY 4 HOURS PRN
Status: DISCONTINUED | OUTPATIENT
Start: 2020-08-28 | End: 2020-08-31 | Stop reason: HOSPADM

## 2020-08-28 RX ORDER — POLYETHYLENE GLYCOL 3350 17 G/17G
1 POWDER, FOR SOLUTION ORAL
Status: DISCONTINUED | OUTPATIENT
Start: 2020-08-28 | End: 2020-08-31 | Stop reason: HOSPADM

## 2020-08-28 RX ORDER — OXYCODONE HCL 10 MG/1
10 TABLET, FILM COATED, EXTENDED RELEASE ORAL EVERY 12 HOURS
Status: DISCONTINUED | OUTPATIENT
Start: 2020-08-28 | End: 2020-08-31 | Stop reason: HOSPADM

## 2020-08-28 RX ORDER — ONDANSETRON 2 MG/ML
4 INJECTION INTRAMUSCULAR; INTRAVENOUS EVERY 4 HOURS PRN
Status: DISCONTINUED | OUTPATIENT
Start: 2020-08-28 | End: 2020-08-30

## 2020-08-28 RX ORDER — PROMETHAZINE HYDROCHLORIDE 25 MG/1
12.5-25 TABLET ORAL EVERY 4 HOURS PRN
Status: DISCONTINUED | OUTPATIENT
Start: 2020-08-28 | End: 2020-08-31 | Stop reason: HOSPADM

## 2020-08-28 RX ORDER — HYDROCODONE BITARTRATE AND ACETAMINOPHEN 10; 325 MG/1; MG/1
1-2 TABLET ORAL EVERY 4 HOURS PRN
COMMUNITY
Start: 2020-08-15

## 2020-08-28 RX ORDER — ONDANSETRON 4 MG/1
4 TABLET, ORALLY DISINTEGRATING ORAL EVERY 4 HOURS PRN
Status: DISCONTINUED | OUTPATIENT
Start: 2020-08-28 | End: 2020-08-31 | Stop reason: HOSPADM

## 2020-08-28 RX ORDER — ANASTROZOLE 1 MG/1
1 TABLET ORAL EVERY EVENING
Status: DISCONTINUED | OUTPATIENT
Start: 2020-08-28 | End: 2020-08-31 | Stop reason: HOSPADM

## 2020-08-28 RX ORDER — SODIUM CHLORIDE, SODIUM LACTATE, POTASSIUM CHLORIDE, CALCIUM CHLORIDE 600; 310; 30; 20 MG/100ML; MG/100ML; MG/100ML; MG/100ML
INJECTION, SOLUTION INTRAVENOUS CONTINUOUS
Status: ACTIVE | OUTPATIENT
Start: 2020-08-28 | End: 2020-08-29

## 2020-08-28 RX ORDER — BISACODYL 10 MG
10 SUPPOSITORY, RECTAL RECTAL
Status: DISCONTINUED | OUTPATIENT
Start: 2020-08-28 | End: 2020-08-31 | Stop reason: HOSPADM

## 2020-08-28 RX ORDER — AMITRIPTYLINE HYDROCHLORIDE 75 MG/1
75 TABLET ORAL NIGHTLY
Status: DISCONTINUED | OUTPATIENT
Start: 2020-08-28 | End: 2020-08-31 | Stop reason: HOSPADM

## 2020-08-28 RX ORDER — LISINOPRIL 20 MG/1
20 TABLET ORAL DAILY
Status: DISCONTINUED | OUTPATIENT
Start: 2020-08-29 | End: 2020-08-31 | Stop reason: HOSPADM

## 2020-08-28 RX ORDER — LEVETIRACETAM 500 MG/1
500 TABLET ORAL 2 TIMES DAILY
Status: DISCONTINUED | OUTPATIENT
Start: 2020-08-28 | End: 2020-08-31 | Stop reason: HOSPADM

## 2020-08-28 RX ADMIN — OXYCODONE HYDROCHLORIDE 10 MG: 10 TABLET, FILM COATED, EXTENDED RELEASE ORAL at 22:35

## 2020-08-28 RX ADMIN — SODIUM CHLORIDE, POTASSIUM CHLORIDE, SODIUM LACTATE AND CALCIUM CHLORIDE: 600; 310; 30; 20 INJECTION, SOLUTION INTRAVENOUS at 22:18

## 2020-08-28 RX ADMIN — AMITRIPTYLINE HYDROCHLORIDE 75 MG: 75 TABLET, FILM COATED ORAL at 23:22

## 2020-08-28 RX ADMIN — ANASTROZOLE 1 MG: 1 TABLET, COATED ORAL at 23:23

## 2020-08-28 RX ADMIN — LEVETIRACETAM 500 MG: 500 TABLET ORAL at 22:18

## 2020-08-28 RX ADMIN — APIXABAN 5 MG: 5 TABLET, FILM COATED ORAL at 23:22

## 2020-08-28 ASSESSMENT — COGNITIVE AND FUNCTIONAL STATUS - GENERAL
HELP NEEDED FOR BATHING: A LITTLE
STANDING UP FROM CHAIR USING ARMS: A LITTLE
CLIMB 3 TO 5 STEPS WITH RAILING: A LOT
DRESSING REGULAR LOWER BODY CLOTHING: A LITTLE
DAILY ACTIVITIY SCORE: 21
WALKING IN HOSPITAL ROOM: A LITTLE
TOILETING: A LITTLE
SUGGESTED CMS G CODE MODIFIER DAILY ACTIVITY: CJ
MOVING FROM LYING ON BACK TO SITTING ON SIDE OF FLAT BED: A LITTLE
MOBILITY SCORE: 18
MOVING TO AND FROM BED TO CHAIR: A LITTLE
SUGGESTED CMS G CODE MODIFIER MOBILITY: CK

## 2020-08-28 ASSESSMENT — LIFESTYLE VARIABLES
EVER FELT BAD OR GUILTY ABOUT YOUR DRINKING: NO
CONSUMPTION TOTAL: NEGATIVE
EVER HAD A DRINK FIRST THING IN THE MORNING TO STEADY YOUR NERVES TO GET RID OF A HANGOVER: NO
TOTAL SCORE: 0
ALCOHOL_USE: YES
HOW MANY TIMES IN THE PAST YEAR HAVE YOU HAD 5 OR MORE DRINKS IN A DAY: 0
AVERAGE NUMBER OF DAYS PER WEEK YOU HAVE A DRINK CONTAINING ALCOHOL: 0
TOTAL SCORE: 0
ON A TYPICAL DAY WHEN YOU DRINK ALCOHOL HOW MANY DRINKS DO YOU HAVE: 1
HAVE YOU EVER FELT YOU SHOULD CUT DOWN ON YOUR DRINKING: NO
EVER_SMOKED: YES
HAVE PEOPLE ANNOYED YOU BY CRITICIZING YOUR DRINKING: NO
DOES PATIENT WANT TO STOP DRINKING: NO
TOTAL SCORE: 0

## 2020-08-28 ASSESSMENT — PATIENT HEALTH QUESTIONNAIRE - PHQ9
7. TROUBLE CONCENTRATING ON THINGS, SUCH AS READING THE NEWSPAPER OR WATCHING TELEVISION: MORE THAN HALF THE DAYS
9. THOUGHTS THAT YOU WOULD BE BETTER OFF DEAD, OR OF HURTING YOURSELF: NOT AT ALL
SUM OF ALL RESPONSES TO PHQ9 QUESTIONS 1 AND 2: 6
SUM OF ALL RESPONSES TO PHQ QUESTIONS 1-9: 19
6. FEELING BAD ABOUT YOURSELF - OR THAT YOU ARE A FAILURE OR HAVE LET YOURSELF OR YOUR FAMILY DOWN: SEVERAL DAYS
8. MOVING OR SPEAKING SO SLOWLY THAT OTHER PEOPLE COULD HAVE NOTICED. OR THE OPPOSITE, BEING SO FIGETY OR RESTLESS THAT YOU HAVE BEEN MOVING AROUND A LOT MORE THAN USUAL: SEVERAL DAYS
1. LITTLE INTEREST OR PLEASURE IN DOING THINGS: NEARLY EVERY DAY
4. FEELING TIRED OR HAVING LITTLE ENERGY: NEARLY EVERY DAY
3. TROUBLE FALLING OR STAYING ASLEEP OR SLEEPING TOO MUCH: NEARLY EVERY DAY
2. FEELING DOWN, DEPRESSED, IRRITABLE, OR HOPELESS: NEARLY EVERY DAY
5. POOR APPETITE OR OVEREATING: NEARLY EVERY DAY

## 2020-08-28 ASSESSMENT — COPD QUESTIONNAIRES
HAVE YOU SMOKED AT LEAST 100 CIGARETTES IN YOUR ENTIRE LIFE: YES
IN THE PAST 12 MONTHS DO YOU DO LESS THAN YOU USED TO BECAUSE OF YOUR BREATHING PROBLEMS: DISAGREE/UNSURE
DO YOU EVER COUGH UP ANY MUCUS OR PHLEGM?: NO/ONLY WITH OCCASIONAL COLDS OR INFECTIONS
COPD SCREENING SCORE: 2
DURING THE PAST 4 WEEKS HOW MUCH DID YOU FEEL SHORT OF BREATH: NONE/LITTLE OF THE TIME

## 2020-08-28 ASSESSMENT — FIBROSIS 4 INDEX
FIB4 SCORE: 0.33
FIB4 SCORE: 1.12

## 2020-08-28 ASSESSMENT — NEW YORK HEART ASSOCIATION (NYHA) CLASSIFICATION
NYHA FUNCTIONAL CLASS: I - NO SYMPTOMS AND NO LIMITATION IN ORDINARY PHYSICAL ACTIVITY, E.G. SHORTNESS OF BREATH WHEN WALKING, CLIMBING STAIRS ETC.

## 2020-08-28 ASSESSMENT — ENCOUNTER SYMPTOMS: HEADACHES: 1

## 2020-08-28 NOTE — ED TRIAGE NOTES
"Chief Complaint   Patient presents with   • T-5000 Head Injury     witnessed fall and hit her head an hour ago, altered, + blood thinners. -LOC       Pt brought in by EMS for above.    Ht 1.626 m (5' 4\")   Wt 58.8 kg (129 lb 10.1 oz)   LMP 11/27/2013   BMI 22.25 kg/m²   "

## 2020-08-29 LAB
ANION GAP SERPL CALC-SCNC: 13 MMOL/L (ref 7–16)
BUN SERPL-MCNC: 9 MG/DL (ref 8–22)
CALCIUM SERPL-MCNC: 9.3 MG/DL (ref 8.5–10.5)
CHLORIDE SERPL-SCNC: 104 MMOL/L (ref 96–112)
CO2 SERPL-SCNC: 22 MMOL/L (ref 20–33)
CREAT SERPL-MCNC: 0.47 MG/DL (ref 0.5–1.4)
ERYTHROCYTE [DISTWIDTH] IN BLOOD BY AUTOMATED COUNT: 48.5 FL (ref 35.9–50)
GLUCOSE SERPL-MCNC: 81 MG/DL (ref 65–99)
HCT VFR BLD AUTO: 40.3 % (ref 37–47)
HGB BLD-MCNC: 13.4 G/DL (ref 12–16)
MCH RBC QN AUTO: 30.5 PG (ref 27–33)
MCHC RBC AUTO-ENTMCNC: 33.3 G/DL (ref 33.6–35)
MCV RBC AUTO: 91.8 FL (ref 81.4–97.8)
PLATELET # BLD AUTO: 206 K/UL (ref 164–446)
PMV BLD AUTO: 10.7 FL (ref 9–12.9)
POTASSIUM SERPL-SCNC: 3.8 MMOL/L (ref 3.6–5.5)
RBC # BLD AUTO: 4.39 M/UL (ref 4.2–5.4)
SODIUM SERPL-SCNC: 139 MMOL/L (ref 135–145)
WBC # BLD AUTO: 5.1 K/UL (ref 4.8–10.8)

## 2020-08-29 PROCEDURE — A9270 NON-COVERED ITEM OR SERVICE: HCPCS | Performed by: HOSPITALIST

## 2020-08-29 PROCEDURE — 770006 HCHG ROOM/CARE - MED/SURG/GYN SEMI*

## 2020-08-29 PROCEDURE — A9270 NON-COVERED ITEM OR SERVICE: HCPCS | Performed by: STUDENT IN AN ORGANIZED HEALTH CARE EDUCATION/TRAINING PROGRAM

## 2020-08-29 PROCEDURE — 665999 HH PPS REVENUE DEBIT

## 2020-08-29 PROCEDURE — 700102 HCHG RX REV CODE 250 W/ 637 OVERRIDE(OP): Performed by: HOSPITALIST

## 2020-08-29 PROCEDURE — 36415 COLL VENOUS BLD VENIPUNCTURE: CPT

## 2020-08-29 PROCEDURE — 700102 HCHG RX REV CODE 250 W/ 637 OVERRIDE(OP): Performed by: STUDENT IN AN ORGANIZED HEALTH CARE EDUCATION/TRAINING PROGRAM

## 2020-08-29 PROCEDURE — 99232 SBSQ HOSP IP/OBS MODERATE 35: CPT | Performed by: STUDENT IN AN ORGANIZED HEALTH CARE EDUCATION/TRAINING PROGRAM

## 2020-08-29 PROCEDURE — 80048 BASIC METABOLIC PNL TOTAL CA: CPT

## 2020-08-29 PROCEDURE — 85027 COMPLETE CBC AUTOMATED: CPT

## 2020-08-29 PROCEDURE — 665998 HH PPS REVENUE CREDIT

## 2020-08-29 RX ORDER — CYCLOBENZAPRINE HCL 10 MG
5 TABLET ORAL 3 TIMES DAILY PRN
Status: DISCONTINUED | OUTPATIENT
Start: 2020-08-29 | End: 2020-08-29

## 2020-08-29 RX ORDER — CYCLOBENZAPRINE HCL 10 MG
5 TABLET ORAL 3 TIMES DAILY PRN
Status: DISCONTINUED | OUTPATIENT
Start: 2020-08-29 | End: 2020-08-31 | Stop reason: HOSPADM

## 2020-08-29 RX ADMIN — ANASTROZOLE 1 MG: 1 TABLET, COATED ORAL at 16:54

## 2020-08-29 RX ADMIN — AMITRIPTYLINE HYDROCHLORIDE 75 MG: 75 TABLET, FILM COATED ORAL at 20:53

## 2020-08-29 RX ADMIN — LEVETIRACETAM 500 MG: 500 TABLET ORAL at 20:53

## 2020-08-29 RX ADMIN — DOCUSATE SODIUM 50 MG AND SENNOSIDES 8.6 MG 2 TABLET: 8.6; 5 TABLET, FILM COATED ORAL at 16:54

## 2020-08-29 RX ADMIN — LEVETIRACETAM 500 MG: 500 TABLET ORAL at 09:01

## 2020-08-29 RX ADMIN — CYCLOBENZAPRINE HYDROCHLORIDE 5 MG: 10 TABLET, FILM COATED ORAL at 16:58

## 2020-08-29 RX ADMIN — OXYCODONE HYDROCHLORIDE 10 MG: 10 TABLET, FILM COATED, EXTENDED RELEASE ORAL at 20:53

## 2020-08-29 RX ADMIN — APIXABAN 5 MG: 5 TABLET, FILM COATED ORAL at 16:54

## 2020-08-29 RX ADMIN — OXYCODONE HYDROCHLORIDE 10 MG: 10 TABLET, FILM COATED, EXTENDED RELEASE ORAL at 09:02

## 2020-08-29 RX ADMIN — LISINOPRIL 20 MG: 20 TABLET ORAL at 05:11

## 2020-08-29 RX ADMIN — APIXABAN 5 MG: 5 TABLET, FILM COATED ORAL at 09:01

## 2020-08-29 ASSESSMENT — ENCOUNTER SYMPTOMS
SENSORY CHANGE: 0
LOSS OF CONSCIOUSNESS: 0
MEMORY LOSS: 0
BLURRED VISION: 0
CHILLS: 0
FOCAL WEAKNESS: 0
COUGH: 0
NERVOUS/ANXIOUS: 0
FEVER: 0
SPEECH CHANGE: 0
WEIGHT LOSS: 0
TREMORS: 0
HEARTBURN: 0
WEAKNESS: 0
TINGLING: 0
SORE THROAT: 0
SPUTUM PRODUCTION: 0
DIAPHORESIS: 0
DIZZINESS: 0
NAUSEA: 0
HEMOPTYSIS: 0
HEADACHES: 1
PALPITATIONS: 0
BACK PAIN: 1
NECK PAIN: 1
DEPRESSION: 0
DOUBLE VISION: 0
ABDOMINAL PAIN: 0
EYE PAIN: 0
VOMITING: 0
SEIZURES: 0
SHORTNESS OF BREATH: 0
MYALGIAS: 0

## 2020-08-29 ASSESSMENT — FIBROSIS 4 INDEX: FIB4 SCORE: 1.12

## 2020-08-29 ASSESSMENT — LIFESTYLE VARIABLES: SUBSTANCE_ABUSE: 0

## 2020-08-29 NOTE — ASSESSMENT & PLAN NOTE
CT done tonight would appear to indicate that it continues to work.  > Shunt Xray series ordered for today.

## 2020-08-29 NOTE — ED NOTES
Pt educated on the plan of care. Call light within reach. Bed in locked position. Pt in bed, no needs expressed at this time.

## 2020-08-29 NOTE — ASSESSMENT & PLAN NOTE
Patient is maintained on multiple narcotics.  -Continue holding except for Long-acting Oxycontin.

## 2020-08-29 NOTE — PROGRESS NOTES
[facility] Medicine Daily Progress Note    Date of Service  8/29/2020    Chief Complaint  49 y.o. female PMHx hydrocephalus at age 11 (causing optic nerve blindness) with shunt, chronic headaches, and incorrect medication use (possibly d/t blindness) Acute nasopharyngitis, Blind, C. difficile colitis, C. difficile diarrhea (2013), Cancer (HCC), Chronic daily headache, Depression, Esophageal atresia (1971), Esophageal bleeding (12/18/2019), Fall, GERD (gastroesophageal reflux disease), H/O total hysterectomy, Heart burn (12/18/2019), Hydrocephalus (HCC), Hydrocephalus (HCC), Indigestion (12/18/2019), Jaundice, Legally blind, Migraine without aura, without mention of intractable migraine without mention of status migrainosus, Other specified symptom associated with female genital organs, Pain, Pain (12/18/2019), Psychiatric problem, PTSD (post-traumatic stress disorder), Seizure (HCC) (12/18/2019), and Snoring (12/18/2019), laparoscopy (8/8/08); lysis adhesions general (12/10/2013); cystoscopy (12/10/2013); aakash by laparoscopy (N/A, 8/13/2015); gastroscopy-endo (N/A, 8/24/2017); nissen fundoplication laparoscopic; abdominal hysterectomy total (12/10/2013); other; exploratory laparotomy (12/10/2013); appendectomy (12/10/2013); cholecystectomy (N/A, 8/13/2015); gastroscopy (N/A, 1/2/2019); mastectomy (Right, 12/19/2019); and node biopsy sentinel (Right, 12/19/2019). was found outside her apartment with altered mental status and complaining of severe Headache.    Hospital Course    49 y.o. female with extensive medical and surgical history above including hydrocephalus at age 11 (causing optic nerve blindness) with shunt, chronic headaches, and incorrect medication use (likely contributed by her blindness) was found outside her apartment prone with altered mental status and complaining of severe Headache.     In ED, patient was oriented to hospital and month but still reportedly altered, but with HA pain level  "returning to her baseline of \"6-8/10 intensity\" per patient. Per ED Reports, patients' mental status improved. Upon admission, patient slept the majority of the morning, with return to full A&Ox3. Patient had recent Pet cat of many years put down 1 week ago. Patient was screened for Depression, but appeared well-adapted to this change, was happy with her personal life, seeing a boyfriend, and recently using a pill organizer. Patient claims a VNS service fills her pill organizer. Patient states she has intermittent [Fainting] spells where she falls to the ground, and believes it might be medication related. Patient states she has support from her neighbors and boyfriend, but feels she might need a better situation such as a HHA and would like to speak to social work (consulted).       Interval Problem Update  PT/OT pending, Patient c/o muscle spasms began trial of Flexeril 5mg prn. Social Work Consulted for HHA. Patient likely had medication misadventure, CT Head was negative for pathology and her baseline mental status seems to have returned. Patient has history of possible Suicide attempt through medication overdose versus accidental overdose due to blindness.  It is possible this is attention-seeking behavior, as patient does not seem depressed today on interview.    Consultants/Specialty  Social Work, PT/OT    Code Status  Full Code    Disposition  Anticipated Discharge tomorrow following PT/OT and SW initiation of possible HHA service.    Review of Systems  Review of Systems   Constitutional: Negative for chills, diaphoresis, fever and weight loss.   HENT: Negative for ear discharge, ear pain and sore throat.    Eyes: Negative for blurred vision, double vision and pain.   Respiratory: Negative for cough, hemoptysis, sputum production and shortness of breath.    Cardiovascular: Negative for chest pain and palpitations.   Gastrointestinal: Negative for abdominal pain, heartburn, nausea and vomiting. " "  Musculoskeletal: Positive for back pain and neck pain. Negative for myalgias.   Skin: Negative for rash.   Neurological: Positive for headaches (6/10 intensity which patient states is her \"baseline\", originates behind right eye superiorly, and over time causes her neck and back muscles to become more tense. At home this can cause shooting pain down her lateral leg to foot,). Negative for dizziness, tingling, tremors, sensory change, speech change, focal weakness, seizures, loss of consciousness and weakness.   Endo/Heme/Allergies: Negative for environmental allergies.   Psychiatric/Behavioral: Negative for depression, memory loss, substance abuse and suicidal ideas. The patient is not nervous/anxious.         Physical Exam  Temp:  [36.1 °C (97 °F)-36.5 °C (97.7 °F)] 36.2 °C (97.1 °F)  Pulse:  [] 95  Resp:  [16-17] 16  BP: (113-137)/(79-93) 113/79  SpO2:  [95 %-99 %] 95 %    Physical Exam  Constitutional:       Appearance: Normal appearance.   HENT:      Head: Normocephalic and atraumatic.      Comments:  Shunt Long-healed scar on Left Occipito/temporal regions overlying calvarium     Right Ear: External ear normal.      Left Ear: External ear normal.      Nose: Nose normal. No congestion or rhinorrhea.      Mouth/Throat:      Mouth: Mucous membranes are moist.      Pharynx: No oropharyngeal exudate or posterior oropharyngeal erythema.   Eyes:      Comments: Right eye EOM intact  Left eye movement limited with significant lateral deviation.  Pupils slight miosis b/l, no significant response to light.  Patient is blind.   Neck:      Musculoskeletal: Normal range of motion and neck supple. Muscular tenderness (Tender to palpation of Trapezius muscles of right side) present. No neck rigidity.   Cardiovascular:      Rate and Rhythm: Normal rate and regular rhythm.      Pulses: Normal pulses.      Heart sounds: Normal heart sounds.   Pulmonary:      Effort: Pulmonary effort is normal.      Breath sounds: Normal " breath sounds.   Abdominal:      General: Abdomen is flat. Bowel sounds are normal.      Palpations: Abdomen is soft.      Tenderness: There is no abdominal tenderness.   Musculoskeletal: Normal range of motion.         General: No swelling or tenderness.   Skin:     General: Skin is warm and dry.   Neurological:      General: No focal deficit present.      Mental Status: She is alert and oriented to person, place, and time.      Comments: Gait not assessed.Pending PT/OT  No neglect noted, but patient favors lying on her left side, often lays with eyes closed with appearance of sleeping.  Patient is Blind, with left eye permanently deviated laterally and slightly down.  Strength grossly 5/5 x 4 limbs.  A&Ox3  No sensory deficits.  Right eye tracks while having conversation, but does not focus on objects.          Fluids    Intake/Output Summary (Last 24 hours) at 8/29/2020 2032  Last data filed at 8/29/2020 1300  Gross per 24 hour   Intake 240 ml   Output 450 ml   Net -210 ml       Laboratory  Recent Labs     08/28/20  1530 08/29/20  0120   WBC 6.3 5.1   RBC 3.51* 4.39   HEMOGLOBIN 10.6* 13.4   HEMATOCRIT 32.8* 40.3   MCV 93.4 91.8   MCH 30.2 30.5   MCHC 32.3* 33.3*   RDW 49.6 48.5   PLATELETCT 219 206   MPV 10.8 10.7     Recent Labs     08/28/20  1530 08/29/20  0120   SODIUM 143 139   POTASSIUM 3.1* 3.8   CHLORIDE 111 104   CO2 19* 22   GLUCOSE 69 81   BUN 8 9   CREATININE 0.45* 0.47*   CALCIUM 7.7* 9.3                   Imaging  DX-CHEST-PORTABLE (1 VIEW)   Final Result         Hazy opacity in the left lung base could be layering small left pleural effusion, probably secondary to the shunt, and associated atelectasis.      CT-HEAD W/O   Final Result      1.  No evidence of acute intracranial process.      2.  Left-sided ventricular shunt tube remains in place. No hydrocephalus.      CT-CSPINE WITHOUT PLUS RECONS   Final Result      1.  No evidence of cervical spine fracture.      2.  Scoliosis.      3.  Mild  multilevel degenerative disc disease.           Assessment/Plan  Altered level of consciousness- (present on admission)  Assessment & Plan  -Lab work-up and imaging work-up is essentially negative.  -CT does not demonstrate any acute findings, the  shunt would appear to be working.  -Multiple previous episodes where she has mistaken her medications given that she is blind, this has caused problems with acute mental status changes in the past.  -Patient improved after several hours of sleep and became A&Ox3  >Continue Neuro checks and A&O status.  >Futher workup if worsening findings such as fever, meningismus or worsening mental status.   >PT/OT, and SW on board, patient may need HHA and VNS services, with possible change in living situation due to multiple admissions for similar problems and follow up with PMD regarding home medication regimen.  -Patient has history of possible Suicide attempt through medication overdose and Suicidal ideation. It is possible this is attention-seeking behavior, as patient does not seem depressed.    Pulmonary embolism (HCC)- (present on admission)  Assessment & Plan  Continue anticoagulation on apixaban    Narcotic dependence (HCC)- (present on admission)  Assessment & Plan  Patient is maintained on multiple narcotics.  -Continue holding except for Long-acting Oxycontin.      Migraines- (present on admission)  Assessment & Plan  By patient's report her headache is at basleine of 6/10 pain.  CT scan done tonight is negative    Acquired circulating anticoagulants (HCC)- (present on admission)  Assessment & Plan  Apixaban for history of PE     (ventriculoperitoneal) shunt status- (present on admission)  Assessment & Plan  CT done tonight would appear to indicate that it continues to work.    HTN (hypertension)- (present on admission)  Assessment & Plan  Continue lisinopril       VTE prophylaxis: SCD's

## 2020-08-29 NOTE — ED PROVIDER NOTES
"ED Provider Note    CHIEF COMPLAINT  Chief Complaint   Patient presents with   • T-5000 Head Injury     witnessed fall and hit her head an hour ago, altered, + blood thinners. -LOC       HPI  Rasheeda Manzano is a 49 y.o. female who presents with a head injury and altered mental status.  Patient was found confused laying on the ground in front of her residence.  She was partially undressed.  She did not remember how she got there.  Neighbor states that she is usually awake alert and oriented x3.  Patient is legally blind.  Patient denies chest pain or abdominal pain.  Patient is slurring her words and is oriented only to person at this point.    REVIEW OF SYSTEMS  See HPI for further details.  No fever no chills.  No cough or cold symptoms.  No vomiting or diarrhea.  All other systems are negative.    PAST MEDICAL HISTORY  Past Medical History:   Diagnosis Date   • Acute nasopharyngitis     H/O Cold 12-8-19   • Blind     age 11   • C. difficile colitis     H/O x3   • C. difficile diarrhea 2013   • Cancer (HCC)     Right Breast Cancer DX 2-2019/Moved to right lymph nodes (3/8/2020)   • Chronic daily headache    • Depression    • Esophageal atresia 1971    Treated surgically at birth.   • Esophageal bleeding 12/18/2019    H/O, \"three times with last one a year ago.\"   • Fall    • GERD (gastroesophageal reflux disease)    • H/O total hysterectomy    • Heart burn 12/18/2019   • Hydrocephalus (HCC)    • Hydrocephalus (HCC)     shunt drains into pleural place of L lung   • Indigestion 12/18/2019   • Jaundice     at birth   • Legally blind    • Migraine without aura, without mention of intractable migraine without mention of status migrainosus    • Other specified symptom associated with female genital organs     excessive bleeding   • Pain     gallbladder related   • Pain 12/18/2019    \"Pain In Head From Hydrocephalus.\".   • Psychiatric problem     depression   • PTSD (post-traumatic stress disorder)    • " "Seizure (HCC) 12/18/2019    \"Last seizure one year ago.\"   • Snoring 12/18/2019    Has not had a sleep study.       FAMILY HISTORY  Family History   Problem Relation Age of Onset   • Hypertension Mother    • Hypertension Father    • Non-contributory Neg Hx         Migraine       SOCIAL HISTORY  Social History     Socioeconomic History   • Marital status:      Spouse name: Not on file   • Number of children: Not on file   • Years of education: Not on file   • Highest education level: Not on file   Occupational History   • Not on file   Social Needs   • Financial resource strain: Not on file   • Food insecurity     Worry: Patient refused     Inability: Patient refused   • Transportation needs     Medical: Patient refused     Non-medical: Patient refused   Tobacco Use   • Smoking status: Former Smoker     Packs/day: 1.00     Years: 10.00     Pack years: 10.00     Types: Cigars     Start date: 5/1/2004     Quit date: 8/9/2017     Years since quitting: 3.0   • Smokeless tobacco: Never Used   • Tobacco comment:  CIGAR/day    Substance and Sexual Activity   • Alcohol use: Yes     Frequency: Monthly or less     Drinks per session: 1 or 2   • Drug use: No   • Sexual activity: Yes     Partners: Male   Lifestyle   • Physical activity     Days per week: Not on file     Minutes per session: Not on file   • Stress: Not on file   Relationships   • Social connections     Talks on phone: Not on file     Gets together: Not on file     Attends Faith service: Not on file     Active member of club or organization: Not on file     Attends meetings of clubs or organizations: Not on file     Relationship status: Not on file   • Intimate partner violence     Fear of current or ex partner: Not on file     Emotionally abused: Not on file     Physically abused: Not on file     Forced sexual activity: Not on file   Other Topics Concern   • Primary/coprimary nurse & associates Not Asked   • Family contact information Not Asked   • OK " to release patient information to the following Not Asked   • Patient preferred routine/Privacy concerns Not Asked   • Patient likes and dislikes Not Asked   • Participating In Research Study Not Asked   • Miscellaneous Not Asked   Social History Narrative   • Not on file       SURGICAL HISTORY  Past Surgical History:   Procedure Laterality Date   • MASTECTOMY Right 12/19/2019    Procedure: MASTECTOMY-PARTIAL;  Surgeon: Brice Johnson M.D.;  Location: SURGERY SAME DAY [facility];  Service: General   • NODE BIOPSY SENTINEL Right 12/19/2019    Procedure: BIOPSY, LYMPH NODE, SENTINEL-AXILLARY;  Surgeon: Brice Johnson M.D.;  Location: SURGERY SAME DAY [facility];  Service: General   • GASTROSCOPY N/A 1/2/2019    Procedure: GASTROSCOPY;  Surgeon: Matias Fernando M.D.;  Location: SURGERY [facility];  Service: Gastroenterology   • GASTROSCOPY-ENDO N/A 8/24/2017    Procedure: GASTROSCOPY-ENDO;  Surgeon: Matias Fernando M.D.;  Location: [facility];  Service:    • AYALA BY LAPAROSCOPY N/A 8/13/2015    Procedure: AYALA BY LAPAROSCOPY;  Surgeon: Brice Johnson M.D.;  Location: SURGERY SAME DAY [facility];  Service:    • CHOLECYSTECTOMY N/A 8/13/2015    Procedure: CHOLECYSTECTOMY;  Surgeon: Brice Johnson M.D.;  Location: SURGERY SAME DAY [facility];  Service:    • LYSIS ADHESIONS GENERAL  12/10/2013    Performed by Arie Bass M.D. at [facility]   • CYSTOSCOPY  12/10/2013    Performed by Joana Yeager M.D. at [facility]   • ABDOMINAL HYSTERECTOMY TOTAL  12/10/2013    Performed by Joana Yeager M.D. at [facility]   • EXPLORATORY LAPAROTOMY  12/10/2013    Performed by Arie Bass M.D. at [facility]   • APPENDECTOMY  12/10/2013    Performed by Arie Bass M.D. at [facility]   • LAPAROSCOPY  8/8/08    Performed by FERNANDO HENDERSON at [facility]   • NISSEN FUNDOPLICATION LAPAROSCOPIC     • OTHER      PLEURAL  "SHUNT, numerous revisions       CURRENT MEDICATIONS  Home Medications    **Home medications have not yet been reviewed for this encounter**         ALLERGIES  Allergies   Allergen Reactions   • Latex Rash     Rash   • Tape Rash     RXN= ongoing  Adhesive Medical tape. Per patient, paper tape ok.       PHYSICAL EXAM  VITAL SIGNS: /70   Pulse 95   Temp 36.8 °C (98.2 °F)   Resp 20   Ht 1.626 m (5' 4\")   Wt 58.8 kg (129 lb 10.1 oz)   LMP 11/27/2013   SpO2 97%   BMI 22.25 kg/m²   Constitutional: Middle-aged female.  Appears older than stated age.  Mildly lethargic, no distress,   HENT: Normocephalic atraumatic.  No lacerations or wounds  Eyes: Pupils are equal reactive to light extraocular movements are intact  Neck: Normal range of motion, No tenderness,  Cardiovascular: Normal heart rate, Normal rhythm, No murmurs,   Thorax & Lungs: Normal breath sounds, No respiratory distress, No wheezing,   Abdomen: Bowel sounds normal, Soft, No tenderness, No masses,   Skin: Warm, Dry, No erythema, No rash.  Pale  Back: No tenderness, No CVA tenderness.   Extremities: No edema, No tenderness, No cyanosis, No clubbing. Dorsalis pedis pulses 2+ equal bilaterally. Radial pulses 2+ equal bilaterally   Neurologic: Moving upper and lower extremities.  Cranial nerves II through XII are intact  Psychiatric: Affect flat.  Judgment altered.  Awake alert oriented x1    Results for orders placed or performed during the hospital encounter of 08/28/20   Lactic acid (lactate)   Result Value Ref Range    Lactic Acid 1.3 0.5 - 2.0 mmol/L   CBC WITH DIFFERENTIAL   Result Value Ref Range    WBC 6.3 4.8 - 10.8 K/uL    RBC 3.51 (L) 4.20 - 5.40 M/uL    Hemoglobin 10.6 (L) 12.0 - 16.0 g/dL    Hematocrit 32.8 (L) 37.0 - 47.0 %    MCV 93.4 81.4 - 97.8 fL    MCH 30.2 27.0 - 33.0 pg    MCHC 32.3 (L) 33.6 - 35.0 g/dL    RDW 49.6 35.9 - 50.0 fL    Platelet Count 219 164 - 446 K/uL    MPV 10.8 9.0 - 12.9 fL    Neutrophils-Polys 73.10 (H) 44.00 - " 72.00 %    Lymphocytes 13.80 (L) 22.00 - 41.00 %    Monocytes 9.90 0.00 - 13.40 %    Eosinophils 2.20 0.00 - 6.90 %    Basophils 0.50 0.00 - 1.80 %    Immature Granulocytes 0.50 0.00 - 0.90 %    Nucleated RBC 0.00 /100 WBC    Neutrophils (Absolute) 4.57 2.00 - 7.15 K/uL    Lymphs (Absolute) 0.86 (L) 1.00 - 4.80 K/uL    Monos (Absolute) 0.62 0.00 - 0.85 K/uL    Eos (Absolute) 0.14 0.00 - 0.51 K/uL    Baso (Absolute) 0.03 0.00 - 0.12 K/uL    Immature Granulocytes (abs) 0.03 0.00 - 0.11 K/uL    NRBC (Absolute) 0.00 K/uL   COMP METABOLIC PANEL   Result Value Ref Range    Sodium 143 135 - 145 mmol/L    Potassium 3.1 (L) 3.6 - 5.5 mmol/L    Chloride 111 96 - 112 mmol/L    Co2 19 (L) 20 - 33 mmol/L    Anion Gap 13.0 7.0 - 16.0    Glucose 69 65 - 99 mg/dL    Bun 8 8 - 22 mg/dL    Creatinine 0.45 (L) 0.50 - 1.40 mg/dL    Calcium 7.7 (L) 8.5 - 10.5 mg/dL    AST(SGOT) 16 12 - 45 U/L    ALT(SGPT) 11 2 - 50 U/L    Alkaline Phosphatase 102 (H) 30 - 99 U/L    Total Bilirubin 0.5 0.1 - 1.5 mg/dL    Albumin 3.0 (L) 3.2 - 4.9 g/dL    Total Protein 5.1 (L) 6.0 - 8.2 g/dL    Globulin 2.1 1.9 - 3.5 g/dL    A-G Ratio 1.4 g/dL   URINALYSIS    Specimen: Urine, Clean Catch   Result Value Ref Range    Color Yellow     Character Clear     Specific Gravity 1.014 <1.035    Ph 5.0 5.0 - 8.0    Glucose Negative Negative mg/dL    Ketones Negative Negative mg/dL    Protein Negative Negative mg/dL    Bilirubin Negative Negative    Urobilinogen, Urine 1.0 Negative    Nitrite Negative Negative    Leukocyte Esterase Negative Negative    Occult Blood Negative Negative    Micro Urine Req see below    URINE DRUG SCREEN   Result Value Ref Range    Amphetamines Urine Negative Negative    Barbiturates Negative Negative    Benzodiazepines Negative Negative    Cocaine Metabolite Negative Negative    Methadone Negative Negative    Opiates Negative Negative    Oxycodone Positive (A) Negative    Phencyclidine -Pcp Negative Negative    Propoxyphene Negative  Negative    Cannabinoid Metab Negative Negative   ESTIMATED GFR   Result Value Ref Range    GFR If African American >60 >60 mL/min/1.73 m 2    GFR If Non African American >60 >60 mL/min/1.73 m 2         RADIOLOGY/PROCEDURES  DX-CHEST-PORTABLE (1 VIEW)   Final Result         Hazy opacity in the left lung base could be layering small left pleural effusion, probably secondary to the shunt, and associated atelectasis.      CT-HEAD W/O   Final Result      1.  No evidence of acute intracranial process.      2.  Left-sided ventricular shunt tube remains in place. No hydrocephalus.      CT-CSPINE WITHOUT PLUS RECONS   Final Result      1.  No evidence of cervical spine fracture.      2.  Scoliosis.      3.  Mild multilevel degenerative disc disease.            COURSE & MEDICAL DECISION MAKING  Pertinent Labs & Imaging studies reviewed. (See chart for details)  Patient is confused status post head injury.  CT scan of the head and cervical spine did not show any signs of traumatic injuries.  Patient's blood work is obtained.  There is no sign of sepsis or occult infection.  Patient was observed here in the emergency department for 3 hours.  She is improving but still slurring her words.  I suspect that the patient has had some difficulties with her medications as she is blind and this is happened previously..  Patient will be admitted observation.  The hospitalist was consulted.    FINAL IMPRESSION  1.  Altered mental status  2.  Delirium  3.          Electronically signed by: Tony Mora M.D., 8/28/2020 7:37 PM

## 2020-08-29 NOTE — ED NOTES
Pharmacy Medication Reconciliation      Medication reconciliation updated and complete per pt at bedside & pt home pharmacy  Allergies have been verified and updated   No oral ABX within the last 14 days  Pt home pharmacy:[facility]

## 2020-08-29 NOTE — DISCHARGE PLANNING
ATTN Discharge Planning team: This patient is currently on service with [facility]. Please submit a referral order and face-to-face prior to discharging the patient home. If you have any questions, contact our Transitional Care Specialist team at x [phone].

## 2020-08-29 NOTE — ASSESSMENT & PLAN NOTE
-Lab work-up and imaging work-up is essentially negative.  -CT does not demonstrate any acute findings, the  shunt would appear to be working.  -Multiple previous episodes where she has mistaken her medications given that she is blind, this has caused problems with acute mental status changes in the past.  -Patient improved after several hours of sleep and became A&Ox3  >Continue Neuro checks and A&O status.  >Futher workup if worsening findings such as fever, meningismus or worsening mental status.   >PT/OT, and SW on board, patient may need HHA and VNS services, with possible change in living situation due to multiple admissions for similar problems and follow up with PMD regarding home medication regimen.  -Patient has history of possible Suicide attempt through medication overdose and Suicidal ideation. It is possible this is attention-seeking behavior, as patient does not seem depressed.  >Metabolic Toxins ordered for f/u  >Due to waxing/waning AMS and A&O Status (today was 1-2) likely 2/2 medication overdose, will keep patient for observation an additional night, f/u with PMD in AM, and plan for discharge if she is more stable throughout the day.

## 2020-08-29 NOTE — PROGRESS NOTES
Notified RADHA York of patients Potassium level of 3.1 and trending down. No orders received at this time. Will continue to monitor.

## 2020-08-29 NOTE — CARE PLAN
Problem: Communication  Goal: The ability to communicate needs accurately and effectively will improve  Outcome: PROGRESSING AS EXPECTED  Note: Pt using call light appropriately. Pt oriented to room and expectations on unit. Purposeful hourly rounding in place.     Problem: Safety  Goal: Will remain free from falls  Outcome: PROGRESSING AS EXPECTED  Note: Fall precautions in place.

## 2020-08-29 NOTE — PROGRESS NOTES
2 RN skin check completed with ELIZABETH Norton.   Scattered abrasions on BLE. No open wounds or other injuries noted at this time.

## 2020-08-29 NOTE — H&P
Hospital Medicine History & Physical Note    Date of Service  8/28/2020    Primary Care Physician  Tabitha Bautista M.D.    Consultants      Code Status  Full Code    Chief Complaint  Chief Complaint   Patient presents with   • T-5000 Head Injury     witnessed fall and hit her head an hour ago, altered, + blood thinners. -LOC       History of Presenting Illness  49 y.o. female who presented 8/28/2020 having been found in the plantar outside of her apartment altered.  This patient has a previous history of hydrocephalus with shunt in place.  It was recently revised she is also blind and has difficulty taking her medications.  She was in fact recently admitted after an inadvertent Tylenol overdose.  Earlier today she was found by neighbors and plantar in front of the apartment complex, covered in leaves and branches and partly naked.  Review of systems here is difficult as the patient is still altered.  She does not recall being in the plant earlier today, however she is oriented to the hospital in the month.  She states that she is having a headache, but she reports that headache is normal for her.  The headache she has now is no different than what she would consider to be her normal.  Per the ER RN, her mental status has improved during her ED stay.  It is thought that she had another medication misadventure.    Review of Systems  Review of Systems   Unable to perform ROS: Mental status change   Neurological: Positive for headaches.       Past Medical History   has a past medical history of Acute nasopharyngitis, Blind, C. difficile colitis, C. difficile diarrhea (2013), Cancer (HCC), Chronic daily headache, Depression, Esophageal atresia (1971), Esophageal bleeding (12/18/2019), Fall, GERD (gastroesophageal reflux disease), H/O total hysterectomy, Heart burn (12/18/2019), Hydrocephalus ([facility]), Hydrocephalus ([facility]), Indigestion (12/18/2019), Jaundice, Legally blind, Migraine without aura, without mention of  intractable migraine without mention of status migrainosus, Other specified symptom associated with female genital organs, Pain, Pain (12/18/2019), Psychiatric problem, PTSD (post-traumatic stress disorder), Seizure (HCC) (12/18/2019), and Snoring (12/18/2019).    Surgical History   has a past surgical history that includes laparoscopy (8/8/08); lysis adhesions general (12/10/2013); cystoscopy (12/10/2013); aakash by laparoscopy (N/A, 8/13/2015); gastroscopy-endo (N/A, 8/24/2017); nissen fundoplication laparoscopic; abdominal hysterectomy total (12/10/2013); other; exploratory laparotomy (12/10/2013); appendectomy (12/10/2013); cholecystectomy (N/A, 8/13/2015); gastroscopy (N/A, 1/2/2019); mastectomy (Right, 12/19/2019); and node biopsy sentinel (Right, 12/19/2019).     Family History  family history includes Hypertension in her father and mother.     Social History   reports that she quit smoking about 3 years ago. Her smoking use included cigars. She started smoking about 16 years ago. She has a 10.00 pack-year smoking history. She has never used smokeless tobacco. She reports current alcohol use. She reports that she does not use drugs.    Allergies  Allergies   Allergen Reactions   • Latex Rash     Rash   • Tape Rash     RXN= ongoing  Adhesive Medical tape. Per patient, paper tape ok.       Medications  Prior to Admission Medications   Prescriptions Last Dose Informant Patient Reported? Taking?   HYDROcodone/acetaminophen (NORCO)  MG Tab UNK at PRN Patient's Home Pharmacy Yes No   Sig: Take 1 Tab by mouth every four hours as needed.   LORazepam (ATIVAN) 1 MG Tab 8/26/2020 at HS Patient's Home Pharmacy Yes No   Sig: Take 1 mg by mouth every bedtime.   alendronate (FOSAMAX) 70 MG Tab UNK at UNK Patient's Home Pharmacy Yes No   Sig: Take 70 mg by mouth every 7 days.   amitriptyline (ELAVIL) 75 MG Tab 8/26/2020 at PM Patient's Home Pharmacy Yes No   Sig: Take 75 mg by mouth every evening.   anastrozole  (ARIMIDEX) 1 MG Tab 08/26/2020 at PM Patient's Home Pharmacy Yes No   Sig: Take 1 mg by mouth every evening.   apixaban (ELIQUIS) 5mg Tab 8/27/2020 at AFTERNOON Patient's Home Pharmacy Yes No   Sig: Take 5 mg by mouth 2 Times a Day.   esomeprazole (NEXIUM) 40 MG delayed-release capsule 8/27/2020 at AFTERNOON Patient's Home Pharmacy Yes No   Sig: Take 40 mg by mouth every day. Before breakfast   levETIRAcetam (KEPPRA) 500 MG Tab 8/27/2020 at AFTERNOON Patient's Home Pharmacy Yes No   Sig: Take 500 mg by mouth 2 times a day.   lisinopril (PRINIVIL) 20 MG Tab 8/27/2020 at AFTERNOON Patient's Home Pharmacy Yes No   Sig: Take 20 mg by mouth every day.   oxyCODONE ER (XTAMPZA ER) 9 MG Capsule Extended Release 12 hour Abuse-Deterrent 8/27/2020 at AFTERNOON Patient's Home Pharmacy Yes No   Sig: Take 9 mg by mouth every 12 hours.      Facility-Administered Medications: None       Physical Exam  Temp:  [36.1 °C (97 °F)-36.8 °C (98.2 °F)] 36.1 °C (97 °F)  Pulse:  [] 105  Resp:  [16-23] 17  BP: ()/(48-81) 118/81  SpO2:  [93 %-98 %] 98 %    Physical Exam  Vitals signs reviewed.   Constitutional:       General: She is not in acute distress.     Appearance: She is well-developed. She is not diaphoretic.   HENT:      Head: Normocephalic and atraumatic.   Neck:      Musculoskeletal: No neck rigidity or muscular tenderness.      Vascular: No JVD.   Cardiovascular:      Rate and Rhythm: Normal rate and regular rhythm.      Heart sounds: No murmur.   Pulmonary:      Effort: Pulmonary effort is normal. No respiratory distress.      Breath sounds: No stridor. No wheezing or rales.   Abdominal:      Palpations: Abdomen is soft.      Tenderness: There is no abdominal tenderness. There is no guarding or rebound.   Musculoskeletal:         General: No tenderness.      Right lower leg: No edema.      Left lower leg: No edema.   Skin:     General: Skin is warm and dry.      Findings: No rash.   Neurological:      General: No focal  deficit present.      Mental Status: She is oriented to person, place, and time.      Comments: Patient is alert and oriented to the month and the hospital, she is unsure why she is here however.  Her exam is nonfocal with exception of being blind, which is baseline for her.  Speech is slightly slurred however intelligible, she appears to have no receptive deficits.         Laboratory:  Recent Labs     08/28/20  1530   WBC 6.3   RBC 3.51*   HEMOGLOBIN 10.6*   HEMATOCRIT 32.8*   MCV 93.4   MCH 30.2   MCHC 32.3*   RDW 49.6   PLATELETCT 219   MPV 10.8     Recent Labs     08/28/20  1530   SODIUM 143   POTASSIUM 3.1*   CHLORIDE 111   CO2 19*   GLUCOSE 69   BUN 8   CREATININE 0.45*   CALCIUM 7.7*     Recent Labs     08/28/20  1530   ALTSGPT 11   ASTSGOT 16   ALKPHOSPHAT 102*   TBILIRUBIN 0.5   GLUCOSE 69         No results for input(s): NTPROBNP in the last 72 hours.      No results for input(s): TROPONINT in the last 72 hours.    Imaging:  DX-CHEST-PORTABLE (1 VIEW)   Final Result         Hazy opacity in the left lung base could be layering small left pleural effusion, probably secondary to the shunt, and associated atelectasis.      CT-HEAD W/O   Final Result      1.  No evidence of acute intracranial process.      2.  Left-sided ventricular shunt tube remains in place. No hydrocephalus.      CT-CSPINE WITHOUT PLUS RECONS   Final Result      1.  No evidence of cervical spine fracture.      2.  Scoliosis.      3.  Mild multilevel degenerative disc disease.            Assessment/Plan:  I anticipate this patient will require at least two midnights for appropriate medical management, necessitating inpatient admission.    Altered level of consciousness- (present on admission)  Assessment & Plan  Patient's lab work-up and imaging work-up is essentially negative.  CT does not demonstrate any acute findings, the  shunt would appear to be working.  This patient has had several episodes in the past where she has mistaken her  medications given that she is blind, this has caused problems with acute mental status changes in the past.  She appears to be improving without intervention therefore we will continue supportive care and withhold her pain medications until her mental status returns to normal.  Would consider further work-up if it does not do so or she should demonstrate any other worsening findings such as fever, meningismus or worsening mental status.  May need to find a more suitable place for the patient to live    Pulmonary embolism (HCC)- (present on admission)  Assessment & Plan  Continue anticoagulation on apixaban    Narcotic dependence (HCC)- (present on admission)  Assessment & Plan  Patient is maintained on multiple narcotics.  We will hold these until her mentation returns to baseline.    Migraines- (present on admission)  Assessment & Plan  By patient's report her headache tonight is the same as what she considers to be her normal headache.  CT scan done tonight is negative    Acquired circulating anticoagulants (HCC)- (present on admission)  Assessment & Plan  Apixaban for history of PE     (ventriculoperitoneal) shunt status- (present on admission)  Assessment & Plan  CT done tonight would appear to indicate that it continues to work.    HTN (hypertension)- (present on admission)  Assessment & Plan  Continue lisinopril

## 2020-08-29 NOTE — ED NOTES
"Pt now a&ox4, pt stating she fell after \"standing on chair\" Pt resting comfortably. VSS Stable appears in no acute distress. Denies any additional needs.Call light within reach. Awaiting dispo per ERP.   "

## 2020-08-30 ENCOUNTER — APPOINTMENT (OUTPATIENT)
Dept: RADIOLOGY | Facility: MEDICAL CENTER | Age: 50
DRG: 948 | End: 2020-08-30
Attending: STUDENT IN AN ORGANIZED HEALTH CARE EDUCATION/TRAINING PROGRAM
Payer: MEDICARE

## 2020-08-30 LAB
ALBUMIN SERPL BCP-MCNC: 3.8 G/DL (ref 3.2–4.9)
ALBUMIN/GLOB SERPL: 1.4 G/DL
ALP SERPL-CCNC: 128 U/L (ref 30–99)
ALT SERPL-CCNC: 11 U/L (ref 2–50)
AMMONIA PLAS-SCNC: 33 UMOL/L (ref 11–45)
ANION GAP SERPL CALC-SCNC: 14 MMOL/L (ref 7–16)
AST SERPL-CCNC: 16 U/L (ref 12–45)
BACTERIA UR CULT: NORMAL
BASOPHILS # BLD AUTO: 0.4 % (ref 0–1.8)
BASOPHILS # BLD: 0.04 K/UL (ref 0–0.12)
BILIRUB SERPL-MCNC: 0.5 MG/DL (ref 0.1–1.5)
BUN SERPL-MCNC: 6 MG/DL (ref 8–22)
CALCIUM SERPL-MCNC: 9.5 MG/DL (ref 8.5–10.5)
CHLORIDE SERPL-SCNC: 102 MMOL/L (ref 96–112)
CO2 SERPL-SCNC: 23 MMOL/L (ref 20–33)
CREAT SERPL-MCNC: 0.58 MG/DL (ref 0.5–1.4)
CRP SERPL HS-MCNC: 0.4 MG/DL (ref 0–0.75)
EOSINOPHIL # BLD AUTO: 0.15 K/UL (ref 0–0.51)
EOSINOPHIL NFR BLD: 1.6 % (ref 0–6.9)
ERYTHROCYTE [DISTWIDTH] IN BLOOD BY AUTOMATED COUNT: 46.7 FL (ref 35.9–50)
GLOBULIN SER CALC-MCNC: 2.8 G/DL (ref 1.9–3.5)
GLUCOSE SERPL-MCNC: 107 MG/DL (ref 65–99)
HCT VFR BLD AUTO: 42.2 % (ref 37–47)
HGB BLD-MCNC: 14 G/DL (ref 12–16)
HIV 1+2 AB+HIV1 P24 AG SERPL QL IA: NORMAL
IMM GRANULOCYTES # BLD AUTO: 0.03 K/UL (ref 0–0.11)
IMM GRANULOCYTES NFR BLD AUTO: 0.3 % (ref 0–0.9)
LYMPHOCYTES # BLD AUTO: 0.78 K/UL (ref 1–4.8)
LYMPHOCYTES NFR BLD: 8.4 % (ref 22–41)
MCH RBC QN AUTO: 30.3 PG (ref 27–33)
MCHC RBC AUTO-ENTMCNC: 33.2 G/DL (ref 33.6–35)
MCV RBC AUTO: 91.3 FL (ref 81.4–97.8)
MONOCYTES # BLD AUTO: 0.64 K/UL (ref 0–0.85)
MONOCYTES NFR BLD AUTO: 6.9 % (ref 0–13.4)
NEUTROPHILS # BLD AUTO: 7.6 K/UL (ref 2–7.15)
NEUTROPHILS NFR BLD: 82.4 % (ref 44–72)
NRBC # BLD AUTO: 0 K/UL
NRBC BLD-RTO: 0 /100 WBC
PLATELET # BLD AUTO: 238 K/UL (ref 164–446)
PMV BLD AUTO: 10.8 FL (ref 9–12.9)
POTASSIUM SERPL-SCNC: 4.2 MMOL/L (ref 3.6–5.5)
PROCALCITONIN SERPL-MCNC: <0.05 NG/ML
PROLACTIN SERPL-MCNC: 19.8 NG/ML (ref 2.8–26)
PROT SERPL-MCNC: 6.6 G/DL (ref 6–8.2)
RBC # BLD AUTO: 4.62 M/UL (ref 4.2–5.4)
SIGNIFICANT IND 70042: NORMAL
SITE SITE: NORMAL
SODIUM SERPL-SCNC: 139 MMOL/L (ref 135–145)
SOURCE SOURCE: NORMAL
TREPONEMA PALLIDUM IGG+IGM AB [PRESENCE] IN SERUM OR PLASMA BY IMMUNOASSAY: NORMAL
VIT B12 SERPL-MCNC: 535 PG/ML (ref 211–911)
WBC # BLD AUTO: 9.2 K/UL (ref 4.8–10.8)

## 2020-08-30 PROCEDURE — 72020 X-RAY EXAM OF SPINE 1 VIEW: CPT

## 2020-08-30 PROCEDURE — 84146 ASSAY OF PROLACTIN: CPT

## 2020-08-30 PROCEDURE — 82140 ASSAY OF AMMONIA: CPT

## 2020-08-30 PROCEDURE — 97166 OT EVAL MOD COMPLEX 45 MIN: CPT

## 2020-08-30 PROCEDURE — 85025 COMPLETE CBC W/AUTO DIFF WBC: CPT

## 2020-08-30 PROCEDURE — 84145 PROCALCITONIN (PCT): CPT

## 2020-08-30 PROCEDURE — A9270 NON-COVERED ITEM OR SERVICE: HCPCS | Performed by: HOSPITALIST

## 2020-08-30 PROCEDURE — 82607 VITAMIN B-12: CPT

## 2020-08-30 PROCEDURE — 97162 PT EVAL MOD COMPLEX 30 MIN: CPT

## 2020-08-30 PROCEDURE — 86780 TREPONEMA PALLIDUM: CPT

## 2020-08-30 PROCEDURE — 99232 SBSQ HOSP IP/OBS MODERATE 35: CPT | Performed by: STUDENT IN AN ORGANIZED HEALTH CARE EDUCATION/TRAINING PROGRAM

## 2020-08-30 PROCEDURE — 36415 COLL VENOUS BLD VENIPUNCTURE: CPT

## 2020-08-30 PROCEDURE — 87389 HIV-1 AG W/HIV-1&-2 AB AG IA: CPT

## 2020-08-30 PROCEDURE — 770006 HCHG ROOM/CARE - MED/SURG/GYN SEMI*

## 2020-08-30 PROCEDURE — 665998 HH PPS REVENUE CREDIT

## 2020-08-30 PROCEDURE — 700102 HCHG RX REV CODE 250 W/ 637 OVERRIDE(OP): Performed by: HOSPITALIST

## 2020-08-30 PROCEDURE — 86140 C-REACTIVE PROTEIN: CPT

## 2020-08-30 PROCEDURE — 665999 HH PPS REVENUE DEBIT

## 2020-08-30 PROCEDURE — 71045 X-RAY EXAM CHEST 1 VIEW: CPT

## 2020-08-30 PROCEDURE — 80053 COMPREHEN METABOLIC PANEL: CPT

## 2020-08-30 PROCEDURE — 70250 X-RAY EXAM OF SKULL: CPT

## 2020-08-30 RX ADMIN — ANASTROZOLE 1 MG: 1 TABLET, COATED ORAL at 17:02

## 2020-08-30 RX ADMIN — LISINOPRIL 20 MG: 20 TABLET ORAL at 05:03

## 2020-08-30 RX ADMIN — DOCUSATE SODIUM 50 MG AND SENNOSIDES 8.6 MG 2 TABLET: 8.6; 5 TABLET, FILM COATED ORAL at 05:04

## 2020-08-30 RX ADMIN — DOCUSATE SODIUM 50 MG AND SENNOSIDES 8.6 MG 2 TABLET: 8.6; 5 TABLET, FILM COATED ORAL at 17:02

## 2020-08-30 RX ADMIN — APIXABAN 5 MG: 5 TABLET, FILM COATED ORAL at 17:02

## 2020-08-30 RX ADMIN — LEVETIRACETAM 500 MG: 500 TABLET ORAL at 09:05

## 2020-08-30 RX ADMIN — AMITRIPTYLINE HYDROCHLORIDE 75 MG: 75 TABLET, FILM COATED ORAL at 20:54

## 2020-08-30 RX ADMIN — OXYCODONE HYDROCHLORIDE 10 MG: 10 TABLET, FILM COATED, EXTENDED RELEASE ORAL at 09:05

## 2020-08-30 RX ADMIN — OXYCODONE HYDROCHLORIDE 10 MG: 10 TABLET, FILM COATED, EXTENDED RELEASE ORAL at 20:54

## 2020-08-30 RX ADMIN — APIXABAN 5 MG: 5 TABLET, FILM COATED ORAL at 05:03

## 2020-08-30 RX ADMIN — LEVETIRACETAM 500 MG: 500 TABLET ORAL at 20:54

## 2020-08-30 ASSESSMENT — COGNITIVE AND FUNCTIONAL STATUS - GENERAL
WALKING IN HOSPITAL ROOM: A LITTLE
SUGGESTED CMS G CODE MODIFIER MOBILITY: CK
TOILETING: A LITTLE
PERSONAL GROOMING: A LITTLE
EATING MEALS: A LITTLE
SUGGESTED CMS G CODE MODIFIER DAILY ACTIVITY: CK
STANDING UP FROM CHAIR USING ARMS: A LITTLE
TURNING FROM BACK TO SIDE WHILE IN FLAT BAD: A LITTLE
MOBILITY SCORE: 18
MOVING TO AND FROM BED TO CHAIR: A LITTLE
MOVING FROM LYING ON BACK TO SITTING ON SIDE OF FLAT BED: A LITTLE
CLIMB 3 TO 5 STEPS WITH RAILING: A LITTLE
HELP NEEDED FOR BATHING: A LITTLE
DRESSING REGULAR LOWER BODY CLOTHING: A LITTLE
DAILY ACTIVITIY SCORE: 18
DRESSING REGULAR UPPER BODY CLOTHING: A LITTLE

## 2020-08-30 ASSESSMENT — GAIT ASSESSMENTS
DEVIATION: DECREASED BASE OF SUPPORT;BRADYKINETIC
GAIT LEVEL OF ASSIST: MINIMAL ASSIST
DISTANCE (FEET): 75
ASSISTIVE DEVICE: HAND HELD ASSIST

## 2020-08-30 ASSESSMENT — ENCOUNTER SYMPTOMS
BACK PAIN: 1
FEVER: 0
WEIGHT LOSS: 0
CHILLS: 0
TINGLING: 0
SHORTNESS OF BREATH: 0
COUGH: 0
DEPRESSION: 0
DOUBLE VISION: 0
SORE THROAT: 0
HEADACHES: 1
TREMORS: 0
NAUSEA: 0
MEMORY LOSS: 0
LOSS OF CONSCIOUSNESS: 0
WEAKNESS: 0
PALPITATIONS: 0
MYALGIAS: 0
HEMOPTYSIS: 0
SPEECH CHANGE: 0
ABDOMINAL PAIN: 0
NECK PAIN: 1
HEARTBURN: 0
EYE PAIN: 0
VOMITING: 0
DIAPHORESIS: 0
SPUTUM PRODUCTION: 0
NERVOUS/ANXIOUS: 0
SENSORY CHANGE: 0
FOCAL WEAKNESS: 0
BLURRED VISION: 0
DIZZINESS: 0
SEIZURES: 0

## 2020-08-30 ASSESSMENT — ACTIVITIES OF DAILY LIVING (ADL): TOILETING: INDEPENDENT

## 2020-08-30 ASSESSMENT — LIFESTYLE VARIABLES: SUBSTANCE_ABUSE: 0

## 2020-08-30 NOTE — THERAPY
Occupational Therapy   Initial Evaluation     Patient Name: Rasheeda Manzano  Age:  49 y.o., Sex:  female  Medical Record #: [id number]  Today's Date: 8/30/2020     Precautions  Precautions: Fall Risk  Comments: blind    Assessment  Patient is 49 y.o. female with a diagnosis of AMS.  Additional factors influencing patient status / progress: has been blind since age 11. Limited community support, per patient report.      Plan    Recommend Occupational Therapy 3 times per week until therapy goals are met for the following treatments:  Cognitive Skill Development, Self Care/Activities of Daily Living, Therapeutic Activities and Therapeutic Exercises.    DC Equipment Recommendations: Unable to determine at this time  Discharge Recommendations: Recommend home health for continued occupational therapy services     Subjective    Confused throughout session, despite multiple re-orientations, believes she is at home, keeps reaching for her dresser by her bed, asking to sit in her living room. Will benefit from OT to focus on safety, ADls, transfers     Objective       08/30/20 1220   Total Time Spent   Total Time Spent (Mins) 40   Charge Group   OT Evaluation OT Evaluation Mod   Prior Living Situation   Prior Services None   Housing / Facility 1 Story Apartment / Condo   Steps Into Home 0   Steps In Home 0   Bathroom Set up Bathtub / Shower Combination;Grab Bars   Equipment Owned Single Point Cane   Lives with - Patient's Self Care Capacity Alone and Able to Care For Self  (states boyfriend is around occasionally)   Prior Level of ADL Function   Self Feeding Independent   Grooming / Hygiene Independent   Bathing Independent   Dressing Independent   Toileting Independent   Prior Level of IADL Function   Medication Management Independent   Laundry Independent   Kitchen Mobility Independent   Finances Independent   Home Management Independent   Shopping Independent   Prior Level Of Mobility Independent With Device in Home    Comments has been blind since she was 11 years old   Precautions   Precautions Fall Risk   Comments blind   Vitals   O2 Delivery Device None - Room Air   Pain 0 - 10 Group   Therapist Pain Assessment Post Activity Pain Same as Prior to Activity;Nurse Notified;0   Cognition    Cognition / Consciousness X   Orientation Level Not Oriented to Month;Not Oriented to Day;Not Oriented to Time;Not Oriented to Place;Not Oriented to Reason   Level of Consciousness Alert   Ability To Follow Commands 1 Step   Safety Awareness Impaired   Comments feels that she is at home, reaching for furniture, reoriented multiple times during session   Passive ROM Upper Body   Passive ROM Upper Body WDL   Active ROM Upper Body   Active ROM Upper Body  WDL   Dominant Hand Right   Strength Upper Body   Upper Body Strength  WDL   Sensation Upper Body   Upper Extremity Sensation  WDL   Upper Body Muscle Tone   Upper Body Muscle Tone  WDL   Coordination Upper Body   Coordination WDL   Balance Assessment   Sitting Balance (Static) Fair   Sitting Balance (Dynamic) Fair   Standing Balance (Static) Poor -   Standing Balance (Dynamic) Trace +   Weight Shift Sitting Fair   Weight Shift Standing Poor   Comments moderate left lean, unable to maintain upright balance without assist   Bed Mobility    Supine to Sit Minimal Assist   Sit to Supine Moderate Assist  (unable to orient self to bed, despite mod cues)   Scooting Supervised   ADL Assessment   Eating Minimal Assist   Grooming Minimal Assist;Seated   Bathing   (NT)   Upper Body Dressing Minimal Assist   Lower Body Dressing Minimal Assist   Toileting Moderate Assist   How much help from another person does the patient currently need...   Putting on and taking off regular lower body clothing? 3   Bathing (including washing, rinsing, and drying)? 3   Toileting, which includes using a toilet, bedpan, or urinal? 3   Putting on and taking off regular upper body clothing? 3   Taking care of personal grooming  such as brushing teeth? 3   Eating meals? 3   6 Clicks Daily Activity Score 18   Functional Mobility   Sit to Stand Minimal Assist   Bed, Chair, Wheelchair Transfer Minimal Assist   Toilet Transfers Minimal Assist   Transfer Method Stand Step   Visual Perception   Visual Perception  X   Comments blind since age 11   Patient / Family Goals   Patient / Family Goal #1 regain strength   Short Term Goals   Short Term Goal # 1 set up for seated dressing tasks   Short Term Goal # 2 set up for self feeding, cues to orient to location of items   Short Term Goal # 3 supervised level necessary functional transfers   Education Group   Role of Occupational Therapist Patient Response Patient;Acceptance;Explanation;Demonstration;Reinforcement Needed   Problem List   Problem List Decreased Active Daily Living Skills;Decreased Functional Mobility;Decreased Activity Tolerance;Safety Awareness Deficits / Cognition;Impaired Cognitive Function;Impaired Vision;Impaired Postural Control / Balance   Anticipated Discharge Equipment and Recommendations   DC Equipment Recommendations Unable to determine at this time   Discharge Recommendations Recommend home health for continued occupational therapy services   Interdisciplinary Plan of Care Collaboration   IDT Collaboration with  Nursing   Patient Position at End of Therapy In Bed;Bed Alarm On;Call Light within Reach;Tray Table within Reach;Phone within Reach   Collaboration Comments Rn aware   Session Information   Date / Session Number  8/30,1( 1/3,9/6)   Priority 2

## 2020-08-30 NOTE — THERAPY
Physical Therapy   Initial Evaluation     Patient Name: Rasheeda Manzano  Age:  49 y.o., Sex:  female  Medical Record #: [id number]  Today's Date: 8/30/2020     Precautions: Fall Risk    Assessment  Patient is 49 y.o. female presenting to PT with dx of altered mental status after falling and hitting her head outside of her apartment. Upon evaluation, pt appearing near her functional baseline as she is able to perform all bed mobility and transfers with SPV. While seated EOB, pt demonstrating BLE strength, ROM, and sensation WDL. Pt endorses use of SPC to help identify objects in her surrounding when walking 2/2 pt being blind. SPC deferred today; pt ambulated with HHA and required verbal cues for proper negotiation around hospital unit. Pt appearing to have some deficits with dynamic balance during gait--likely to improve with use of SPC. At this time, pt appears to be near baseline. Anticipate no further PT needs during acute stay or after DC.     Plan    Recommend Physical Therapy 3x/wk. Anticipate 1-2 more visits to trial SPC for improved independence    DC Equipment Recommendations: None  Discharge Recommendations:  Recommend home health transitional care for continued physical therapy services.        Objective       08/30/20 0902   Prior Living Situation   Prior Services None   Housing / Facility 1 Story Apartment / Condo   Steps Into Home 0   Equipment Owned Single Point Cane   Lives with - Patient's Self Care Capacity Alone and Able to Care For Self;Friends  (friend helps with groceries)   Comments pt uses SPC to help idenitfy objects as she is ambulating   Prior Level of Functional Mobility   Bed Mobility Independent   Transfer Status Independent   Ambulation Independent   Distance Ambulation (Feet)   (limited community ambulator)   Assistive Devices Used Single Point Cane   Stairs Independent   Cognition    Level of Consciousness Alert   Ability To Follow Commands 3 Step   Safety Awareness Impaired    Comments pt's safety awareness impacted by blindness   Passive ROM Lower Body   Passive ROM Lower Body WDL   Active ROM Lower Body    Active ROM Lower Body  WDL   Strength Lower Body   Lower Body Strength  WDL   Sensation Lower Body   Lower Extremity Sensation   WDL   Vision   Vision Comments pt's balance is impacted due to being blind   Balance Assessment   Sitting Balance (Static) Fair   Sitting Balance (Dynamic) Fair   Standing Balance (Static) Poor -   Standing Balance (Dynamic) Trace +   Comments pt requiring HHA to help balance/negotiate surroundings. SPC not used, pt reports it was in her bag.    Gait Analysis   Gait Level Of Assist Minimal Assist   Assistive Device Hand Held Assist   Distance (Feet) 75   # of Times Distance was Traveled 1   Deviation Decreased Base Of Support;Bradykinetic  (pt deviated to the L when ambulating)   Vision Deficits Impacting Mobility pt is blind   Bed Mobility    Supine to Sit Supervised   Sit to Supine Supervised   Functional Mobility   Bed, Chair, Wheelchair Transfer Supervised   Short Term Goal #1: Pt will amb >150ft with SPC and SPV within 6 visits to negotiate household distances upon DC.    Short Term Goal #2: Pt will ascend/descend 1 step with SPC and SPV within 6 visits to negotiate curbs in community upon DC.   Anticipated Discharge Equipment and Recommendations   DC Equipment Recommendations None   Discharge Recommendations Anticipate that the patient will have no further physical therapy needs after discharge from the hospital

## 2020-08-30 NOTE — PROGRESS NOTES
[facility] Medicine Daily Progress Note    Date of Service  8/31/2020    Chief Complaint  49 y.o. female PMHx hydrocephalus at age 11 (causing optic nerve blindness) with shunt, chronic headaches, and incorrect medication use (possibly d/t blindness) Acute nasopharyngitis, Blind, C. difficile colitis, C. difficile diarrhea (2013), Cancer (HCC), Chronic daily headache, Depression, Esophageal atresia (1971), Esophageal bleeding (12/18/2019), Fall, GERD (gastroesophageal reflux disease), H/O total hysterectomy, Heart burn (12/18/2019), Hydrocephalus (HCC), Hydrocephalus (HCC), Indigestion (12/18/2019), Jaundice, Legally blind, Migraine without aura, without mention of intractable migraine without mention of status migrainosus, Other specified symptom associated with female genital organs, Pain, Pain (12/18/2019), Psychiatric problem, PTSD (post-traumatic stress disorder), Seizure (HCC) (12/18/2019), and Snoring (12/18/2019), laparoscopy (8/8/08); lysis adhesions general (12/10/2013); cystoscopy (12/10/2013); aakash by laparoscopy (N/A, 8/13/2015); gastroscopy-endo (N/A, 8/24/2017); nissen fundoplication laparoscopic; abdominal hysterectomy total (12/10/2013); other; exploratory laparotomy (12/10/2013); appendectomy (12/10/2013); cholecystectomy (N/A, 8/13/2015); gastroscopy (N/A, 1/2/2019); mastectomy (Right, 12/19/2019); and node biopsy sentinel (Right, 12/19/2019). was found outside her apartment prone with altered mental status and complaining of severe Headache.    Hospital Course    49 y.o. female with extensive medical and surgical history above including hydrocephalus at age 11 (causing optic nerve blindness) with shunt, chronic headaches, and incorrect medication use (likely contributed by her blindness) was found outside her apartment prone with altered mental status and complaining of severe Headache.     In ED, patient was oriented to hospital and month but still reportedly altered, but with HA pain level  "returning to her baseline of \"6-8/10 intensity\" per patient. Per ED Reports, patients' mental status improved. Upon admission, patient slept the majority of the morning, with return to full A&Ox3. Patient had recent Pet cat of many years put down 1 week ago. Patient was screened for Depression, but appeared well-adapted to this change, was happy with her personal life, seeing a boyfriend, and recently using a pill organizer. Patient claims a VNS service fills her pill organizer. Patient states she has intermittent [Fainting] spells where she falls to the ground, and believes it might be medication related. Patient states she has support from her neighbors and boyfriend, but feels she might need a better situation such as a HHA and would like to speak to social work (consulted).       Interval Problem Update  PT/OT pending, Patient c/o muscle spasms began trial of Flexeril 5mg prn. Social Work Consulted for HHA. Patient likely had medication misadventure, CT Head was negative for pathology and her baseline mental status seems to have returned. Patient has history of possible Suicide attempt through medication overdose versus accidental overdose due to blindness.  It is possible this is attention-seeking behavior, as patient does not seem depressed today on interview versus accidental overdose. Overnight, patients' mental status became very disoriented confused, and in morning during rounds, she stated her boyfriend had just visited her and that he was having a stroke. Patient seemed very confused, possibly \"hallucinating\" and was A&Ox2. She did not seem post-ictal. As it is the weekend, cannot reach PMD at this time for further background information. In this state, it would not be advisable to discharge the patient as she may still be still recovering from medication overdose. Will keep her additional night, contact PMD in AM, and prepare for discharge if she is more stable.    Consultants/Specialty  Social Work, " "PT/OT    Code Status  Full Code    Disposition  Anticipated Discharge tomorrow following PT/OT and SW initiation of possible HHA service.    Review of Systems  Review of Systems   Constitutional: Negative for chills, diaphoresis, fever and weight loss.   HENT: Negative for ear discharge, ear pain and sore throat.    Eyes: Negative for blurred vision, double vision and pain.   Respiratory: Negative for cough, hemoptysis, sputum production and shortness of breath.    Cardiovascular: Negative for chest pain and palpitations.   Gastrointestinal: Negative for abdominal pain, heartburn, nausea and vomiting.   Musculoskeletal: Positive for back pain and neck pain. Negative for myalgias.   Skin: Negative for rash.   Neurological: Positive for headaches (6/10 intensity which patient states is her \"baseline\", originates behind right eye superiorly, and over time causes her neck and back muscles to become more tense. At home this can cause shooting pain down her lateral leg to foot,). Negative for dizziness, tingling, tremors, sensory change, speech change, focal weakness, seizures, loss of consciousness and weakness.   Endo/Heme/Allergies: Negative for environmental allergies.   Psychiatric/Behavioral: Negative for depression, memory loss, substance abuse and suicidal ideas. The patient is not nervous/anxious.         Physical Exam  Temp:  [36.1 °C (97 °F)-36.4 °C (97.6 °F)] 36.4 °C (97.6 °F)  Pulse:  [] 99  Resp:  [16-18] 18  BP: ()/(58-73) 82/58  SpO2:  [95 %-97 %] 97 %    Physical Exam  Constitutional:       Appearance: Normal appearance.   HENT:      Head: Normocephalic and atraumatic.      Comments:  Shunt Long-healed scar on Left Occipito/temporal regions overlying calvarium     Right Ear: External ear normal.      Left Ear: External ear normal.      Nose: Nose normal. No congestion or rhinorrhea.      Mouth/Throat:      Mouth: Mucous membranes are moist.      Pharynx: No oropharyngeal exudate or posterior " oropharyngeal erythema.   Eyes:      Comments: Right eye EOM intact  Left eye movement limited with significant lateral deviation.  Pupils slight miosis b/l, no significant response to light.  Patient is blind.   Neck:      Musculoskeletal: Normal range of motion and neck supple. Muscular tenderness (Tender to palpation of Trapezius muscles of right side) present. No neck rigidity.   Cardiovascular:      Rate and Rhythm: Normal rate and regular rhythm.      Pulses: Normal pulses.      Heart sounds: Normal heart sounds.   Pulmonary:      Effort: Pulmonary effort is normal.      Breath sounds: Normal breath sounds.   Abdominal:      General: Abdomen is flat. Bowel sounds are normal.      Palpations: Abdomen is soft.      Tenderness: There is no abdominal tenderness.   Musculoskeletal: Normal range of motion.         General: No swelling or tenderness.   Skin:     General: Skin is warm and dry.   Neurological:      General: No focal deficit present.      Mental Status: She is alert and oriented to person, place, and time.      Comments: Gait not assessed.Pending PT/OT  No neglect noted, but patient favors lying on her left side, often lays with eyes closed with appearance of sleeping.  Patient is Blind, with left eye permanently deviated laterally and slightly down.  Strength grossly 5/5 x 4 limbs.  A&Ox3  No sensory deficits.  Right eye tracks while having conversation, but does not focus on objects.   This AM, nurse reports patient awake all night and seemed confused, poor spatial awareness in spite of blindness, patient thought her boyfriend had a stroke and attempted to call 911, patient thought her boyfriend came to see her in the hospital.  Overnight, patients' mental status became very altered disoriented confused, and in morning during rounds, she stated her boyfriend had just visited her and that he was having a stroke. However, the nurse at bedside states that the boyfriend had not visited overnight or this  "morning. Patient seemed very confused, possibly \"hallucinating\" and was A&Ox1-2. She did not seem overtly post-ictal.         Fluids    Intake/Output Summary (Last 24 hours) at 8/31/2020 0924  Last data filed at 8/30/2020 1317  Gross per 24 hour   Intake 240 ml   Output --   Net 240 ml       Laboratory  Recent Labs     08/28/20  1530 08/29/20  0120 08/30/20  1458   WBC 6.3 5.1 9.2   RBC 3.51* 4.39 4.62   HEMOGLOBIN 10.6* 13.4 14.0   HEMATOCRIT 32.8* 40.3 42.2   MCV 93.4 91.8 91.3   MCH 30.2 30.5 30.3   MCHC 32.3* 33.3* 33.2*   RDW 49.6 48.5 46.7   PLATELETCT 219 206 238   MPV 10.8 10.7 10.8     Recent Labs     08/28/20  1530 08/29/20  0120 08/30/20  1458   SODIUM 143 139 139   POTASSIUM 3.1* 3.8 4.2   CHLORIDE 111 104 102   CO2 19* 22 23   GLUCOSE 69 81 107*   BUN 8 9 6*   CREATININE 0.45* 0.47* 0.58   CALCIUM 7.7* 9.3 9.5                   Imaging  DX-SPINE-ANY ONE VIEW   Final Result      Left cervical shunt tubing is contiguous      DX-CHEST-LIMITED (1 VIEW)   Final Result      Left ventriculopleural shunt tubing is contiguous and terminates over the left base      DX-SKULL-LIMITED 3-   Final Result      Left parietal approach ventriculostomy catheter is stable in configuration. There is unchanged discontinuity at the level of the prosper hole      DX-CHEST-PORTABLE (1 VIEW)   Final Result         Hazy opacity in the left lung base could be layering small left pleural effusion, probably secondary to the shunt, and associated atelectasis.      CT-HEAD W/O   Final Result      1.  No evidence of acute intracranial process.      2.  Left-sided ventricular shunt tube remains in place. No hydrocephalus.      CT-CSPINE WITHOUT PLUS RECONS   Final Result      1.  No evidence of cervical spine fracture.      2.  Scoliosis.      3.  Mild multilevel degenerative disc disease.           Assessment/Plan  Altered level of consciousness- (present on admission)  Assessment & Plan  -Lab work-up and imaging work-up is essentially " negative.  -CT does not demonstrate any acute findings, the  shunt would appear to be working.  -Multiple previous episodes where she has mistaken her medications given that she is blind, this has caused problems with acute mental status changes in the past.  -Patient improved after several hours of sleep and became A&Ox3  >Continue Neuro checks and A&O status.  >Futher workup if worsening findings such as fever, meningismus or worsening mental status.   >PT/OT, and SW on board, patient may need HHA and VNS services, with possible change in living situation due to multiple admissions for similar problems and follow up with PMD regarding home medication regimen.  -Patient has history of possible Suicide attempt through medication overdose and Suicidal ideation. It is possible this is attention-seeking behavior, as patient does not seem depressed.  >Metabolic Toxins ordered for f/u  >Due to waxing/waning AMS and A&O Status (today was 1-2) likely 2/2 medication overdose, will keep patient for observation an additional night, f/u with PMD in AM, and plan for discharge if she is more stable throughout the day.    Pulmonary embolism (HCC)- (present on admission)  Assessment & Plan  Continue anticoagulation on apixaban    Narcotic dependence (HCC)- (present on admission)  Assessment & Plan  Patient is maintained on multiple narcotics.  -Continue holding except for Long-acting Oxycontin.      Migraines- (present on admission)  Assessment & Plan  By patient's report her headache is at basleine of 6/10 pain.  CT scan done tonight is negative    Acquired circulating anticoagulants (HCC)- (present on admission)  Assessment & Plan  Apixaban for history of PE     (ventriculoperitoneal) shunt status- (present on admission)  Assessment & Plan  CT done tonight would appear to indicate that it continues to work.  > Shunt Xray series ordered for today.    HTN (hypertension)- (present on admission)  Assessment & Plan  Continue  lisinopril       VTE prophylaxis: SCD's

## 2020-08-30 NOTE — CARE PLAN
Problem: Communication  Goal: The ability to communicate needs accurately and effectively will improve  Outcome: PROGRESSING AS EXPECTED     Problem: Safety  Goal: Will remain free from injury  Outcome: PROGRESSING AS EXPECTED     Problem: Infection  Goal: Will remain free from infection  Outcome: PROGRESSING AS EXPECTED     Problem: Venous Thromboembolism (VTW)/Deep Vein Thrombosis (DVT) Prevention:  Goal: Patient will participate in Venous Thrombosis (VTE)/Deep Vein Thrombosis (DVT)Prevention Measures  Outcome: PROGRESSING AS EXPECTED     Problem: Pain Management  Goal: Pain level will decrease to patient's comfort goal  Outcome: PROGRESSING AS EXPECTED

## 2020-08-30 NOTE — PROGRESS NOTES
Per MRI, pt needs  shunt settings to be checked by a neurologist before/after MRI. Hospitalist paged.

## 2020-08-30 NOTE — CARE PLAN
Problem: Safety  Goal: Will remain free from falls  Outcome: PROGRESSING AS EXPECTED  Note: Fall precautions in place. Pt educated on use of call light and call light within reach with sticker on button for pt to feel for.      Problem: Knowledge Deficit  Goal: Knowledge of disease process/condition, treatment plan, diagnostic tests, and medications will improve  Outcome: PROGRESSING SLOWER THAN EXPECTED  Note: Pt intermittently confused to event. Reorienting pt and updating pt on POC as it evolves.

## 2020-08-30 NOTE — PROGRESS NOTES
Patient attempting to get out of bed multiple times throughout shift. Patient is blind and often misplaces her call light despite pinning it to her gown. She sits up and throws her feet over the side rails or leans back in bed too quickly and could fall right out of it as she has no spatial awareness or familiarity of her surroundings. She seems to be more confused throughout shift which could be due to lack of sleep and/or confusion from unfamiliar sounds. Telesitter ordered and to be delivered as a bed alarm and frame alarm do not appear to be enough to maintain safety. Patient is easily reoriented verbally. Will continue to monitor.

## 2020-08-31 ENCOUNTER — APPOINTMENT (OUTPATIENT)
Dept: RADIOLOGY | Facility: MEDICAL CENTER | Age: 50
DRG: 948 | End: 2020-08-31
Attending: STUDENT IN AN ORGANIZED HEALTH CARE EDUCATION/TRAINING PROGRAM
Payer: MEDICARE

## 2020-08-31 ENCOUNTER — PATIENT OUTREACH (OUTPATIENT)
Dept: HEALTH INFORMATION MANAGEMENT | Facility: OTHER | Age: 50
End: 2020-08-31

## 2020-08-31 ENCOUNTER — HOME CARE VISIT (OUTPATIENT)
Dept: HOME HEALTH SERVICES | Facility: HOME HEALTHCARE | Age: 50
End: 2020-08-31
Payer: MEDICARE

## 2020-08-31 VITALS
OXYGEN SATURATION: 97 % | WEIGHT: 141.54 LBS | RESPIRATION RATE: 18 BRPM | HEIGHT: 64 IN | DIASTOLIC BLOOD PRESSURE: 71 MMHG | HEART RATE: 99 BPM | BODY MASS INDEX: 24.16 KG/M2 | SYSTOLIC BLOOD PRESSURE: 112 MMHG | TEMPERATURE: 97.6 F

## 2020-08-31 PROCEDURE — 700105 HCHG RX REV CODE 258: Performed by: STUDENT IN AN ORGANIZED HEALTH CARE EDUCATION/TRAINING PROGRAM

## 2020-08-31 PROCEDURE — A9270 NON-COVERED ITEM OR SERVICE: HCPCS | Performed by: HOSPITALIST

## 2020-08-31 PROCEDURE — 665998 HH PPS REVENUE CREDIT

## 2020-08-31 PROCEDURE — 665999 HH PPS REVENUE DEBIT

## 2020-08-31 PROCEDURE — 700102 HCHG RX REV CODE 250 W/ 637 OVERRIDE(OP): Performed by: HOSPITALIST

## 2020-08-31 PROCEDURE — 99239 HOSP IP/OBS DSCHRG MGMT >30: CPT | Performed by: STUDENT IN AN ORGANIZED HEALTH CARE EDUCATION/TRAINING PROGRAM

## 2020-08-31 RX ORDER — DEXTROSE AND SODIUM CHLORIDE 5; .9 G/100ML; G/100ML
500 INJECTION, SOLUTION INTRAVENOUS CONTINUOUS
Status: DISCONTINUED | OUTPATIENT
Start: 2020-08-31 | End: 2020-08-31 | Stop reason: HOSPADM

## 2020-08-31 RX ORDER — DEXTROSE AND SODIUM CHLORIDE 5; .9 G/100ML; G/100ML
INJECTION, SOLUTION INTRAVENOUS CONTINUOUS
Status: DISCONTINUED | OUTPATIENT
Start: 2020-08-31 | End: 2020-08-31

## 2020-08-31 RX ORDER — DEXTROSE AND SODIUM CHLORIDE 5; .9 G/100ML; G/100ML
500 INJECTION, SOLUTION INTRAVENOUS ONCE
Status: COMPLETED | OUTPATIENT
Start: 2020-08-31 | End: 2020-08-31

## 2020-08-31 RX ADMIN — APIXABAN 5 MG: 5 TABLET, FILM COATED ORAL at 04:44

## 2020-08-31 RX ADMIN — OXYCODONE HYDROCHLORIDE 10 MG: 10 TABLET, FILM COATED, EXTENDED RELEASE ORAL at 13:03

## 2020-08-31 RX ADMIN — DEXTROSE AND SODIUM CHLORIDE 500 ML: 5; 900 INJECTION, SOLUTION INTRAVENOUS at 11:33

## 2020-08-31 RX ADMIN — LEVETIRACETAM 500 MG: 500 TABLET ORAL at 07:37

## 2020-08-31 RX ADMIN — DEXTROSE AND SODIUM CHLORIDE 500 ML: 5; 900 INJECTION, SOLUTION INTRAVENOUS at 14:05

## 2020-08-31 NOTE — PROGRESS NOTES
Report received. Assumed care. Pt in bed awake A/O x4. Tele sitter in progress. VSS. Responds appropriately. Denies pain at this time, SOB. Assessment complete. Pt is blind. Discussed POC, monitor VS/labs, pain control, MRI, Echo, LP, mobility, safety, DC planning , pt verbalizes understanding. Explained importance of calling before getting OOB. Call light and belongings within reach. Bed alarm on. Bed in the lowest position. Treaded socks in place. Hourly rounding in progress. Will continue to monitor .

## 2020-08-31 NOTE — PROGRESS NOTES
Notified MD of pt's BP of 82/58, rechecked BP  80/54, HR 95. MD placed orders for fluid bolus in Epic. Will continue to monitor.

## 2020-08-31 NOTE — DISCHARGE PLANNING
ATTN: Case Management  RE: Referral for Home Health    As of 08/31/2020, we have accepted the Home Health referral for the patient listed above.    A [facility] clinician will be out to see the patient within 48 hours. If you have any questions or concerns regarding the patient's transition to Home Health, please do not hesitate to contact us at [phone].      We look forward to collaborating with you,  [facility] Home Health Team

## 2020-08-31 NOTE — CARE PLAN
Problem: Safety  Goal: Will remain free from injury  Outcome: PROGRESSING AS EXPECTED  Note: Treaded socks in place, bed in the lowest position, bed alarm on, call light and belongings within reach, pt call for assistance appropriately      Problem: Knowledge Deficit  Goal: Knowledge of disease process/condition, treatment plan, diagnostic tests, and medications will improve  Outcome: PROGRESSING AS EXPECTED  Note: Educated on POC, all questions answered, hourly rounding in progress     Problem: Pain Management  Goal: Pain level will decrease to patient's comfort goal  Outcome: PROGRESSING AS EXPECTED  Note: Medicated per MAR with adequate pain control, hourly rounding in progress

## 2020-08-31 NOTE — PROGRESS NOTES
D/C'd.  Discharge instructions provided to pt.  Pt states understanding.  Pt states all questions have been answered.  Copy of discharge provided to pt.  Signed copy in chart.   Pt states that all personal belongings are in possession.   Pt escorted off unit with assistance from [employer] via wheelchair at 1558 without incident. Copy of [facility] transport ID in pt's chart.

## 2020-08-31 NOTE — DISCHARGE PLANNING
Received Transport Form @ 1400  Spoke to Peter @ [facility]    Transport is scheduled for 08/31/20 @4970 going to [address] [facility] [address].

## 2020-08-31 NOTE — DISCHARGE PLANNING
Received Choice form at 0989  Agency/Facility Name: [facility]  Referral sent per Choice form @ 0970     Agency/Facility Name: [facility]  Outcome: Patient Accepted@0083

## 2020-08-31 NOTE — CARE PLAN
Problem: Communication  Goal: The ability to communicate needs accurately and effectively will improve  Outcome: PROGRESSING AS EXPECTED     Problem: Safety  Goal: Will remain free from injury  Outcome: PROGRESSING AS EXPECTED      [facility]

## 2020-08-31 NOTE — DISCHARGE SUMMARY
"Discharge Summary    CHIEF COMPLAINT ON ADMISSION  Chief Complaint   Patient presents with   • T-5000 Head Injury     witnessed fall and hit her head an hour ago, altered, + blood thinners. -LOC       Reason for Admission  ems     Admission Date  8/28/2020    CODE STATUS  Full Code    HPI & HOSPITAL COURSE    49 y.o. female with extensive medical and surgical history above including hydrocephalus at age 11 (causing optic nerve blindness) with shunt, chronic headaches, and incorrect medication use (likely contributed by her blindness) was found outside her apartment prone with altered mental status and complaining of severe Headache.     In ED, patient was oriented to hospital and month but still reportedly altered, but with HA pain level returning to her baseline of \"6-8/10 intensity\" per patient. Per ED Reports, patients' mental status improved. Upon admission, patient slept the majority of the morning, with return to full A&Ox3. Patient had recent Pet cat of many years put down 1 week ago. Patient was screened for Depression, but appeared well-adapted to this change, was happy with her personal life, seeing a boyfriend, and recently using a pill organizer. Patient claims a VNS service fills her pill organizer. Patient states she has intermittent [Fainting] spells where she falls to the ground, and believes it might be medication related. Patient states she has support from her neighbors and boyfriend, but feels she might need a better situation such as a HHA and would like to speak to social work (consulted).   During the course of her stay, patient had waxing and waning confusion and possible hallucination where she thought her boyfriend had visited her then called back and said he was having a stroke. Monday morning on 8/31/2020, spoke with PMD on file. Patient has follow up with PMD scheduled for sept. 8th, PMD stated likely would have a phone call follow up in the interim and try to schedule her for sooner " visit. Patient also has Pain management appointment scheduled in 2 weeks. Patient has consistent HHA visits that are in place, and per PMD, patient had recent overdose of her Norco which had similar results. Patient's mental status gradually improved and she became A&Ox3. Patient had metabolic toxin workup which was negative and a  shunt series which demonstrated  shunt to be working. There was no hydrocephalus or abnormalities noted on CT Head. There was concern for depression/intentional medication overdose versus accidental medication overdose, however patients' PHQ-9 score was negative/null. In light of no overt other causes of her episode, medication overdose is the most likely etiology, and patient has had unremarkable telemetry and vitals throughout her admission/stay and is safe for discharge with close follow up with PMD and HHA/SW.    Discharge Diagnosis: Accidental Medication Overdose causing altered mental state, resolved.       Therefore, she is discharged in good and stable condition to home with organized home healthcare and close outpatient follow-up.    The patient met 2-midnight criteria for an inpatient stay at the time of discharge.    Discharge Date  8/31/2020    FOLLOW UP ITEMS POST DISCHARGE  >HHA, Social work, f/u with PMD regarding medication regimen.    DISCHARGE DIAGNOSES  Active Problems:    Altered level of consciousness POA: Yes    Narcotic dependence (HCC) POA: Yes    Pulmonary embolism (HCC) POA: Yes    Migraines POA: Yes    HTN (hypertension) POA: Yes     (ventriculoperitoneal) shunt status POA: Yes    Acquired circulating anticoagulants (HCC) POA: Yes  Resolved Problems:    * No resolved hospital problems. *      FOLLOW UP  Future Appointments   Date Time Provider Department Center   8/31/2020  9:30 AM Claudia Jerez R.N. [facility] None   9/1/2020 To Be Determined Maryse Lira OT [facility] None   9/1/2020 To Be Determined Deb Najera, SLP [facility] None   9/2/2020 10:00 AM  Merlecatrina Thayer, MSW [facility] None   9/3/2020 To Be Determined RWN  [facility] None   9/7/2020 To Be Determined R[facility] None   9/10/2020 To Be Determined R[facility] None   9/15/2020 To Be Determined [facility] None   9/22/2020 To Be Determined Claudia Jerez R.N. [facility] None   9/29/2020 To Be Determined Claudia Jerez R.N. [facility] None   10/6/2020 To Be Determined Claudia Jerez R.N. [facility] None   12/18/2020  1:00 PM [facility] EXAM 4 [facility] None     Tabitha Bautista M.D.  [address] [address]  [phone]            MEDICATIONS ON DISCHARGE     Medication List      ASK your doctor about these medications      Instructions   alendronate 70 MG Tabs  Commonly known as: FOSAMAX   Take 70 mg by mouth every 7 days.  Dose: 70 mg     amitriptyline 75 MG Tabs  Commonly known as: ELAVIL   Take 75 mg by mouth every evening.  Dose: 75 mg     anastrozole 1 MG Tabs  Commonly known as: ARIMIDEX   Take 1 mg by mouth every evening.  Dose: 1 mg     Eliquis 5mg Tabs  Generic drug: apixaban   Take 5 mg by mouth 2 Times a Day.  Dose: 5 mg     esomeprazole 40 MG delayed-release capsule  Commonly known as: NEXIUM   Take 40 mg by mouth every day. Before breakfast  Dose: 40 mg     HYDROcodone/acetaminophen  MG Tabs  Commonly known as: NORCO   Take 1 Tab by mouth every four hours as needed.  Dose: 1 Tab     levETIRAcetam 500 MG Tabs  Commonly known as: KEPPRA   Take 500 mg by mouth 2 times a day.  Dose: 500 mg     lisinopril 20 MG Tabs  Commonly known as: PRINIVIL   Take 20 mg by mouth every day.  Dose: 20 mg     LORazepam 1 MG Tabs  Commonly known as: ATIVAN   Take 1 mg by mouth every bedtime.  Dose: 1 mg     Xtampza ER 9 MG C12a  Generic drug: oxyCODONE ER   Take 9 mg by mouth every 12 hours.  Dose: 9 mg            Allergies  Allergies   Allergen Reactions   • Latex Rash     Rash   • Tape Rash     RXN= ongoing  Adhesive Medical tape. Per patient, paper tape ok.       DIET  Orders  Placed This Encounter   Procedures   • Diet Order Regular     Standing Status:   Standing     Number of Occurrences:   1     Order Specific Question:   Diet:     Answer:   Regular [1]       ACTIVITY  As tolerated.  Weight bearing as tolerated    CONSULTATIONS  None    PROCEDURES  None    LABORATORY  Lab Results   Component Value Date    SODIUM 139 08/30/2020    POTASSIUM 4.2 08/30/2020    CHLORIDE 102 08/30/2020    CO2 23 08/30/2020    GLUCOSE 107 (H) 08/30/2020    BUN 6 (L) 08/30/2020    CREATININE 0.58 08/30/2020    CREATININE 0.6 08/12/2008        Lab Results   Component Value Date    WBC 9.2 08/30/2020    HEMOGLOBIN 14.0 08/30/2020    HEMATOCRIT 42.2 08/30/2020    PLATELETCT 238 08/30/2020        Total time of the discharge process exceeds 30 minutes.

## 2020-08-31 NOTE — FACE TO FACE
Face to Face Supporting Documentation - Home Health    The encounter with this patient was in whole or in part the primary reason for home health admission.    Date of encounter:   Patient:                    MRN:                       YOB: 2019  Rasheeda Manzano  [id number]  1970     Home health to see patient for:  Skilled Nursing care for assessment, interventions & education, Home health aide, Physical Therapy evaluation and treatment, Occupational therapy evaluation and treatment and Comment: Patient may benefit from assistance in finding different living situation.    Skilled need for:  Exacerbation of Chronic Disease State Medication Overdose, resumption of physical and occupational therapy (recommended by them as 3x/week)    Skilled nursing interventions to include:  Comment: Needs more help in organizing/maintaining her medications to avoid overdose/missed doses.    Homebound status evidenced by:  Require the use of special transportation. Patient is Blind. Leaving home requires a considerable and taxing effort. There is a normal inability to leave the home.    Community Physician to provide follow up care: Tabitha Bautista M.D.     Optional Interventions? No      I certify the face to face encounter for this home health care referral meets the CMS requirements and the encounter/clinical assessment with the patient was, in whole, or in part, for the medical condition(s) listed above, which is the primary reason for home health care. Based on my clinical findings: the service(s) are medically necessary, support the need for home health care, and the homebound criteria are met.  I certify that this patient has had a face to face encounter by myself.  Rigo Lee M.D. - NPI: [id number]

## 2020-08-31 NOTE — DISCHARGE INSTRUCTIONS
Discharge Instructions    Discharged to home with [facility] via [employer]. Discharged via wheelchair, hospital escort: Yes.  Special equipment needed: Not Applicable    Be sure to schedule a follow-up appointment with your primary care doctor or any specialists as instructed.     Discharge Plan:   Smoking Cessation Offered: Patient Counseled    I understand that a diet low in cholesterol, fat, and sodium is recommended for good health. Unless I have been given specific instructions below for another diet, I accept this instruction as my diet prescription.   Other diet: Diet as tolerated    Special Instructions: None    · Is patient discharged on Warfarin / Coumadin?   No       Depression / Suicide Risk    As you are discharged from this [facility] facility, it is important to learn how to keep safe from harming yourself.    Recognize the warning signs:  · Abrupt changes in personality, positive or negative- including increase in energy   · Giving away possessions  · Change in eating patterns- significant weight changes-  positive or negative  · Change in sleeping patterns- unable to sleep or sleeping all the time   · Unwillingness or inability to communicate  · Depression  · Unusual sadness, discouragement and loneliness  · Talk of wanting to die  · Neglect of personal appearance   · Rebelliousness- reckless behavior  · Withdrawal from people/activities they love  · Confusion- inability to concentrate     If you or a loved one observes any of these behaviors or has concerns about self-harm, here's what you can do:  · Talk about it- your feelings and reasons for harming yourself  · Remove any means that you might use to hurt yourself (examples: pills, rope, extension cords, firearm)  · Get professional help from the community (Mental Health, Substance Abuse, psychological counseling)  · Do not be alone:Call your Safe Contact- someone whom you trust who will be there for you.  · Call your local CRISIS  HOTLINE 082-6518 or 247-570-8021  · Call your local Children's Mobile Crisis Response Team [place] (034) 887-3438 or www.[employer]  · Call the toll free National Suicide Prevention Hotlines   · National Suicide Prevention Lifeline 719-894-EVZQ (1457)  · National [employer] Line Network 800-SUICIDE (553-6888)

## 2020-09-01 ENCOUNTER — HOME CARE VISIT (OUTPATIENT)
Dept: HOME HEALTH SERVICES | Facility: HOME HEALTHCARE | Age: 50
End: 2020-09-01
Payer: MEDICARE

## 2020-09-01 PROCEDURE — 665999 HH PPS REVENUE DEBIT

## 2020-09-01 PROCEDURE — 665998 HH PPS REVENUE CREDIT

## 2020-09-02 ENCOUNTER — HOME CARE VISIT (OUTPATIENT)
Dept: HOME HEALTH SERVICES | Facility: HOME HEALTHCARE | Age: 50
End: 2020-09-02
Payer: MEDICARE

## 2020-09-02 PROCEDURE — 665999 HH PPS REVENUE DEBIT

## 2020-09-02 PROCEDURE — 665998 HH PPS REVENUE CREDIT

## 2020-09-03 PROCEDURE — 665998 HH PPS REVENUE CREDIT

## 2020-09-03 PROCEDURE — 665999 HH PPS REVENUE DEBIT

## 2020-09-04 ENCOUNTER — HOME CARE VISIT (OUTPATIENT)
Dept: HOME HEALTH SERVICES | Facility: HOME HEALTHCARE | Age: 50
End: 2020-09-04
Payer: MEDICARE

## 2020-09-04 PROCEDURE — G0493 RN CARE EA 15 MIN HH/HOSPICE: HCPCS

## 2020-09-04 PROCEDURE — 665998 HH PPS REVENUE CREDIT

## 2020-09-04 PROCEDURE — 665999 HH PPS REVENUE DEBIT

## 2020-09-04 PROCEDURE — 6650537 HCR  CLEANSER ANTISEPTIC HAND FOAM 1.6OZ

## 2020-09-04 ASSESSMENT — FIBROSIS 4 INDEX: FIB4 SCORE: 0.97

## 2020-09-05 PROCEDURE — 665999 HH PPS REVENUE DEBIT

## 2020-09-05 PROCEDURE — 665998 HH PPS REVENUE CREDIT

## 2020-09-06 PROCEDURE — 665998 HH PPS REVENUE CREDIT

## 2020-09-06 PROCEDURE — 665999 HH PPS REVENUE DEBIT

## 2020-09-07 ENCOUNTER — HOME CARE VISIT (OUTPATIENT)
Dept: HOME HEALTH SERVICES | Facility: HOME HEALTHCARE | Age: 50
End: 2020-09-07
Payer: MEDICARE

## 2020-09-07 VITALS
HEIGHT: 64 IN | OXYGEN SATURATION: 94 % | SYSTOLIC BLOOD PRESSURE: 134 MMHG | DIASTOLIC BLOOD PRESSURE: 74 MMHG | WEIGHT: 141 LBS | HEART RATE: 88 BPM | TEMPERATURE: 98.6 F | RESPIRATION RATE: 16 BRPM | BODY MASS INDEX: 24.07 KG/M2

## 2020-09-07 PROCEDURE — 665999 HH PPS REVENUE DEBIT

## 2020-09-07 PROCEDURE — 665998 HH PPS REVENUE CREDIT

## 2020-09-07 ASSESSMENT — PATIENT HEALTH QUESTIONNAIRE - PHQ9
5. POOR APPETITE OR OVEREATING: 0 - NOT AT ALL
1. LITTLE INTEREST OR PLEASURE IN DOING THINGS: 00
CLINICAL INTERPRETATION OF PHQ2 SCORE: 2
2. FEELING DOWN, DEPRESSED, IRRITABLE, OR HOPELESS: 00
SUM OF ALL RESPONSES TO PHQ QUESTIONS 1-9: 2

## 2020-09-07 ASSESSMENT — ENCOUNTER SYMPTOMS
DEBILITATING PAIN: 1
DEPRESSED MOOD: 1
VOMITING: DENIES
NAUSEA: DENIES

## 2020-09-08 ENCOUNTER — HOME CARE VISIT (OUTPATIENT)
Dept: HOME HEALTH SERVICES | Facility: HOME HEALTHCARE | Age: 50
End: 2020-09-08
Payer: MEDICARE

## 2020-09-08 PROCEDURE — 665999 HH PPS REVENUE DEBIT

## 2020-09-08 PROCEDURE — G0299 HHS/HOSPICE OF RN EA 15 MIN: HCPCS

## 2020-09-08 PROCEDURE — 665998 HH PPS REVENUE CREDIT

## 2020-09-08 NOTE — PROGRESS NOTES
"DIAGNOSIS AND MEDICAL HISTORY: Migraine HA causing frequent falls, injury in home environment.  Recent fall LFA wound observed and noted.  Pt. independent with Tx. Blind and requires SN assessment and possible further intervention.  Pt. denies depression.  She is isolated and relies on neighbor for home management assistance. BF is \"always OOT\"  Had a   for 22 years who had sight and currently has no relationship with the [place] of [place]    SOC to  for Wound Care, Depression and Pain management. Admitted from [facility].    Chief compliant / Relevant medical history / Short Clinical Summary: CC pt wants to be free of pain that is causing her to fall    Mentation: A&Ox4    Date Discharged from Hospital/Rehab/SNF: 8/31/2020    Names of physicians involved: Dr. Tabitha Bautista MD [facility] visits/    LIVING SITUATION AND CAREGIVING:   Ho mebound Status: frequent falls, needs assistance to leave home, weakness and fatigue and isolation complicated by COVID   Type of Home:apartment  Lives with:alone  Receives Assistance:none  Unresolved Safety Concerns:isolation is my concern    Does the patient have an Advanced Directive?    Yes, not available and need to obtain copy. Write need to obtain copy in directions portion of chart    Does the patient have a POLST?     No POLST  and not interested    REASON FOR HOME HEALTH/SUPPORTING INFORMATION:  Skilled Nursing to assess and teach the following but not limited to: Medications/High Risk, Fall prevention, hydration, nutrition, infection control and S&S, Home safety.    PRIOR LEVEL OF FUNCTION:   Ambulation and Mobility: Independent  Patient Equipment: cane for ambulation.     CURRENT LEVEL OF FUNCTION:   Ambulation and Mobility: Independent  Patient Equipment:  cane for ambulation.   Transferring: Independent  Bathing: Shower  Dressing: Independent  Special equipment used: cane    MEDICATION MANAGEMENT:  Patient uses pharmacy:   prepared by " another person  Medication issues: none

## 2020-09-09 ENCOUNTER — HOME CARE VISIT (OUTPATIENT)
Dept: HOME HEALTH SERVICES | Facility: HOME HEALTHCARE | Age: 50
End: 2020-09-09
Payer: MEDICARE

## 2020-09-09 ENCOUNTER — APPOINTMENT (OUTPATIENT)
Dept: RADIOLOGY | Facility: MEDICAL CENTER | Age: 50
DRG: 194 | End: 2020-09-09
Attending: EMERGENCY MEDICINE
Payer: MEDICARE

## 2020-09-09 ENCOUNTER — HOSPITAL ENCOUNTER (INPATIENT)
Facility: MEDICAL CENTER | Age: 50
LOS: 1 days | DRG: 194 | End: 2020-09-11
Attending: EMERGENCY MEDICINE | Admitting: INTERNAL MEDICINE
Payer: MEDICARE

## 2020-09-09 VITALS
RESPIRATION RATE: 18 BRPM | HEART RATE: 114 BPM | TEMPERATURE: 98.4 F | OXYGEN SATURATION: 98 % | SYSTOLIC BLOOD PRESSURE: 142 MMHG | DIASTOLIC BLOOD PRESSURE: 88 MMHG

## 2020-09-09 VITALS
HEART RATE: 117 BPM | OXYGEN SATURATION: 98 % | SYSTOLIC BLOOD PRESSURE: 144 MMHG | RESPIRATION RATE: 18 BRPM | DIASTOLIC BLOOD PRESSURE: 88 MMHG | TEMPERATURE: 98.4 F

## 2020-09-09 VITALS
OXYGEN SATURATION: 98 % | DIASTOLIC BLOOD PRESSURE: 78 MMHG | HEART RATE: 120 BPM | TEMPERATURE: 98 F | RESPIRATION RATE: 18 BRPM | SYSTOLIC BLOOD PRESSURE: 136 MMHG

## 2020-09-09 DIAGNOSIS — G89.29 CHRONIC NONINTRACTABLE HEADACHE, UNSPECIFIED HEADACHE TYPE: ICD-10-CM

## 2020-09-09 DIAGNOSIS — D68.318 ACQUIRED CIRCULATING ANTICOAGULANTS (HCC): ICD-10-CM

## 2020-09-09 DIAGNOSIS — G40.909 SEIZURE DISORDER (HCC): ICD-10-CM

## 2020-09-09 DIAGNOSIS — G43.001 MIGRAINE WITHOUT AURA AND WITH STATUS MIGRAINOSUS, NOT INTRACTABLE: ICD-10-CM

## 2020-09-09 DIAGNOSIS — H54.3 BLINDNESS OF BOTH EYES: ICD-10-CM

## 2020-09-09 DIAGNOSIS — R51.9 CHRONIC NONINTRACTABLE HEADACHE, UNSPECIFIED HEADACHE TYPE: ICD-10-CM

## 2020-09-09 LAB
ALBUMIN SERPL BCP-MCNC: 3.6 G/DL (ref 3.2–4.9)
ALBUMIN/GLOB SERPL: 1.5 G/DL
ALP SERPL-CCNC: 130 U/L (ref 30–99)
ALT SERPL-CCNC: 18 U/L (ref 2–50)
ANION GAP SERPL CALC-SCNC: 13 MMOL/L (ref 7–16)
AST SERPL-CCNC: 10 U/L (ref 12–45)
BASOPHILS # BLD AUTO: 0.5 % (ref 0–1.8)
BASOPHILS # BLD: 0.04 K/UL (ref 0–0.12)
BILIRUB SERPL-MCNC: 0.3 MG/DL (ref 0.1–1.5)
BUN SERPL-MCNC: 14 MG/DL (ref 8–22)
CALCIUM SERPL-MCNC: 9.2 MG/DL (ref 8.5–10.5)
CHLORIDE SERPL-SCNC: 105 MMOL/L (ref 96–112)
CO2 SERPL-SCNC: 24 MMOL/L (ref 20–33)
CREAT SERPL-MCNC: 0.53 MG/DL (ref 0.5–1.4)
CRP SERPL HS-MCNC: 0.07 MG/DL (ref 0–0.75)
D DIMER PPP IA.FEU-MCNC: 0.53 UG/ML (FEU) (ref 0–0.5)
EKG IMPRESSION: NORMAL
EOSINOPHIL # BLD AUTO: 0.1 K/UL (ref 0–0.51)
EOSINOPHIL NFR BLD: 1.3 % (ref 0–6.9)
ERYTHROCYTE [DISTWIDTH] IN BLOOD BY AUTOMATED COUNT: 49 FL (ref 35.9–50)
GLOBULIN SER CALC-MCNC: 2.4 G/DL (ref 1.9–3.5)
GLUCOSE SERPL-MCNC: 108 MG/DL (ref 65–99)
HCG SERPL QL: NEGATIVE
HCT VFR BLD AUTO: 36.1 % (ref 37–47)
HGB BLD-MCNC: 11.9 G/DL (ref 12–16)
IMM GRANULOCYTES # BLD AUTO: 0.03 K/UL (ref 0–0.11)
IMM GRANULOCYTES NFR BLD AUTO: 0.4 % (ref 0–0.9)
LYMPHOCYTES # BLD AUTO: 1.06 K/UL (ref 1–4.8)
LYMPHOCYTES NFR BLD: 13.8 % (ref 22–41)
MCH RBC QN AUTO: 29.9 PG (ref 27–33)
MCHC RBC AUTO-ENTMCNC: 33 G/DL (ref 33.6–35)
MCV RBC AUTO: 90.7 FL (ref 81.4–97.8)
MONOCYTES # BLD AUTO: 0.66 K/UL (ref 0–0.85)
MONOCYTES NFR BLD AUTO: 8.6 % (ref 0–13.4)
NEUTROPHILS # BLD AUTO: 5.77 K/UL (ref 2–7.15)
NEUTROPHILS NFR BLD: 75.4 % (ref 44–72)
NRBC # BLD AUTO: 0 K/UL
NRBC BLD-RTO: 0 /100 WBC
PLATELET # BLD AUTO: 289 K/UL (ref 164–446)
PMV BLD AUTO: 10.7 FL (ref 9–12.9)
POTASSIUM SERPL-SCNC: 3.6 MMOL/L (ref 3.6–5.5)
PROT SERPL-MCNC: 6 G/DL (ref 6–8.2)
RBC # BLD AUTO: 3.98 M/UL (ref 4.2–5.4)
SODIUM SERPL-SCNC: 142 MMOL/L (ref 135–145)
T4 FREE SERPL-MCNC: 1.09 NG/DL (ref 0.93–1.7)
TROPONIN T SERPL-MCNC: 6 NG/L (ref 6–19)
TSH SERPL DL<=0.005 MIU/L-ACNC: 0.87 UIU/ML (ref 0.38–5.33)
WBC # BLD AUTO: 7.7 K/UL (ref 4.8–10.8)

## 2020-09-09 PROCEDURE — 80053 COMPREHEN METABOLIC PANEL: CPT

## 2020-09-09 PROCEDURE — 86140 C-REACTIVE PROTEIN: CPT

## 2020-09-09 PROCEDURE — 93005 ELECTROCARDIOGRAM TRACING: CPT

## 2020-09-09 PROCEDURE — G0153 HHCP-SVS OF S/L PATH,EA 15MN: HCPCS

## 2020-09-09 PROCEDURE — 87040 BLOOD CULTURE FOR BACTERIA: CPT | Mod: 91

## 2020-09-09 PROCEDURE — G0151 HHCP-SERV OF PT,EA 15 MIN: HCPCS

## 2020-09-09 PROCEDURE — 71045 X-RAY EXAM CHEST 1 VIEW: CPT

## 2020-09-09 PROCEDURE — 84439 ASSAY OF FREE THYROXINE: CPT

## 2020-09-09 PROCEDURE — 83735 ASSAY OF MAGNESIUM: CPT

## 2020-09-09 PROCEDURE — 665998 HH PPS REVENUE CREDIT

## 2020-09-09 PROCEDURE — 36415 COLL VENOUS BLD VENIPUNCTURE: CPT

## 2020-09-09 PROCEDURE — 700117 HCHG RX CONTRAST REV CODE 255: Performed by: EMERGENCY MEDICINE

## 2020-09-09 PROCEDURE — 99285 EMERGENCY DEPT VISIT HI MDM: CPT

## 2020-09-09 PROCEDURE — 85379 FIBRIN DEGRADATION QUANT: CPT

## 2020-09-09 PROCEDURE — 84145 PROCALCITONIN (PCT): CPT

## 2020-09-09 PROCEDURE — 84484 ASSAY OF TROPONIN QUANT: CPT

## 2020-09-09 PROCEDURE — 84703 CHORIONIC GONADOTROPIN ASSAY: CPT

## 2020-09-09 PROCEDURE — 93005 ELECTROCARDIOGRAM TRACING: CPT | Performed by: EMERGENCY MEDICINE

## 2020-09-09 PROCEDURE — 700105 HCHG RX REV CODE 258: Performed by: EMERGENCY MEDICINE

## 2020-09-09 PROCEDURE — 84443 ASSAY THYROID STIM HORMONE: CPT

## 2020-09-09 PROCEDURE — 83605 ASSAY OF LACTIC ACID: CPT

## 2020-09-09 PROCEDURE — 700111 HCHG RX REV CODE 636 W/ 250 OVERRIDE (IP): Performed by: EMERGENCY MEDICINE

## 2020-09-09 PROCEDURE — 71275 CT ANGIOGRAPHY CHEST: CPT

## 2020-09-09 PROCEDURE — 85025 COMPLETE CBC W/AUTO DIFF WBC: CPT

## 2020-09-09 PROCEDURE — 665999 HH PPS REVENUE DEBIT

## 2020-09-09 PROCEDURE — 96375 TX/PRO/DX INJ NEW DRUG ADDON: CPT

## 2020-09-09 PROCEDURE — G0299 HHS/HOSPICE OF RN EA 15 MIN: HCPCS

## 2020-09-09 RX ORDER — SODIUM CHLORIDE, SODIUM LACTATE, POTASSIUM CHLORIDE, CALCIUM CHLORIDE 600; 310; 30; 20 MG/100ML; MG/100ML; MG/100ML; MG/100ML
1000 INJECTION, SOLUTION INTRAVENOUS ONCE
Status: COMPLETED | OUTPATIENT
Start: 2020-09-10 | End: 2020-09-10

## 2020-09-09 RX ORDER — SODIUM CHLORIDE 9 MG/ML
1000 INJECTION, SOLUTION INTRAVENOUS ONCE
Status: COMPLETED | OUTPATIENT
Start: 2020-09-09 | End: 2020-09-09

## 2020-09-09 RX ORDER — AZITHROMYCIN 500 MG/1
500 INJECTION, POWDER, LYOPHILIZED, FOR SOLUTION INTRAVENOUS ONCE
Status: COMPLETED | OUTPATIENT
Start: 2020-09-09 | End: 2020-09-10

## 2020-09-09 RX ORDER — DIPHENHYDRAMINE HYDROCHLORIDE 50 MG/ML
25 INJECTION INTRAMUSCULAR; INTRAVENOUS ONCE
Status: COMPLETED | OUTPATIENT
Start: 2020-09-09 | End: 2020-09-09

## 2020-09-09 RX ORDER — METOCLOPRAMIDE HYDROCHLORIDE 5 MG/ML
10 INJECTION INTRAMUSCULAR; INTRAVENOUS ONCE
Status: COMPLETED | OUTPATIENT
Start: 2020-09-09 | End: 2020-09-09

## 2020-09-09 RX ADMIN — FENTANYL CITRATE 50 MCG: 50 INJECTION INTRAMUSCULAR; INTRAVENOUS at 21:48

## 2020-09-09 RX ADMIN — METOCLOPRAMIDE 10 MG: 5 INJECTION, SOLUTION INTRAMUSCULAR; INTRAVENOUS at 19:48

## 2020-09-09 RX ADMIN — DIPHENHYDRAMINE HYDROCHLORIDE 25 MG: 50 INJECTION INTRAMUSCULAR; INTRAVENOUS at 19:48

## 2020-09-09 RX ADMIN — SODIUM CHLORIDE 1000 ML: 9 INJECTION, SOLUTION INTRAVENOUS at 19:47

## 2020-09-09 RX ADMIN — IOHEXOL 40 ML: 350 INJECTION, SOLUTION INTRAVENOUS at 22:40

## 2020-09-09 SDOH — ECONOMIC STABILITY: HOUSING INSECURITY: HOME SAFETY: GET SOME HELP IN THE HOME

## 2020-09-09 SDOH — ECONOMIC STABILITY: HOUSING INSECURITY
HOME SAFETY: PT LIVES ALONE AND HAS A NEIGHBOR THAT HELPS HER AND SO BUT HE IS NOT RELIABLE OR AROUND A LOT. PT HOME IS CLUTTERED, PATHWAYS FOR WALKING, HOME IS DIRTY AS SHE IS UNABLE TO SEE, BUT IT DOES LOOK LIKE SHE TRIES TO PICK UP. MSW IS WORKING WITH HER TO

## 2020-09-09 ASSESSMENT — BALANCE ASSESSMENTS
BALANCE SCORE: 12
ARISES: 1 - ABLE, USES ARMS TO HELP
ATTEMPTS TO ARISE: 2 - ABLE TO RISE, ONE ATTEMPT
TURNING 360 DEGREES STEPS: 0 - DISCONTINUOUS STEPS
NUDGED SCORE: 2
STANDING BALANCE: 1 - STEADY BUT WIDE STANCE AND USES CANE OR OTHER SUPPORT
SITTING DOWN: 1 - USES ARMS OR NOT SMOOTH MOTION
NUDGED: 2 - STEADY
ARISING SCORE: 1
EYES CLOSED AT MAXIMUM POSITION NUDGED: 1 - STEADY
IMMEDIATE STANDING BALANCE FIRST 5 SECONDS: 2 - STEADY WITHOUT WALKER OR OTHER SUPPORT
SITTING BALANCE: 1 - STEADY, SAFE

## 2020-09-09 ASSESSMENT — ACTIVITIES OF DAILY LIVING (ADL)
TRANSPORTATION COMMENTS: BLIND
AMBULATION ASSISTANCE: 1
AMBULATION ASSISTANCE ON FLAT SURFACES: 1
PHYSICAL TRANSFERS ASSESSED: 1
AMBULATION ASSISTANCE: SUPERVISION
CURRENT_FUNCTION: INDEPENDENT
TRANSPORTATION COMMENTS: LEGALLY BLIND

## 2020-09-09 ASSESSMENT — GAIT ASSESSMENTS
STEP SYMMETRY: 1 - RIGHT AND LEFT STEP LENGTH APPEAR EQUAL
BALANCE AND GAIT SCORE: 19
GAIT SCORE: 7
INITIATION OF GAIT IMMEDIATELY AFTER GO: 0 - ANY HESITANCY OR MULTIPLE ATTEMPTS TO START
PATH SCORE: 1
TRUNK SCORE: 1
STEP CONTINUITY: 0 - STOPPING OR DISCONTINUITY BETWEEN STEPS
TRUNK: 1 - NO SWAY BUT FLEXION OF KNEES OR BACK OR SPREADS ARMS WHILE WALKING
PATH: 1 - MILD/MODERATE DEVIATION OR USES WALKING AID
WALKING STANCE: 0 - HEELS APART

## 2020-09-09 ASSESSMENT — ENCOUNTER SYMPTOMS
NAUSEA: DENIES
VOMITING: DENIES
POOR JUDGMENT: 1

## 2020-09-09 ASSESSMENT — FIBROSIS 4 INDEX: FIB4 SCORE: 0.97

## 2020-09-10 ENCOUNTER — APPOINTMENT (OUTPATIENT)
Dept: CARDIOLOGY | Facility: MEDICAL CENTER | Age: 50
DRG: 194 | End: 2020-09-10
Attending: INTERNAL MEDICINE
Payer: MEDICARE

## 2020-09-10 ENCOUNTER — HOME CARE VISIT (OUTPATIENT)
Dept: HOME HEALTH SERVICES | Facility: HOME HEALTHCARE | Age: 50
End: 2020-09-10
Payer: MEDICARE

## 2020-09-10 VITALS
HEART RATE: 114 BPM | TEMPERATURE: 98.4 F | DIASTOLIC BLOOD PRESSURE: 88 MMHG | SYSTOLIC BLOOD PRESSURE: 142 MMHG | OXYGEN SATURATION: 98 % | RESPIRATION RATE: 18 BRPM

## 2020-09-10 PROBLEM — R00.0 SINUS TACHYCARDIA: Status: ACTIVE | Noted: 2020-09-10

## 2020-09-10 LAB
COVID ORDER STATUS COVID19: NORMAL
LACTATE BLD-SCNC: 0.9 MMOL/L (ref 0.5–2)
LV EJECT FRACT  99904: 65
LV EJECT FRACT MOD 2C 99903: 62.01
LV EJECT FRACT MOD 4C 99902: 75.77
LV EJECT FRACT MOD BP 99901: 69.42
MAGNESIUM SERPL-MCNC: 1.7 MG/DL (ref 1.5–2.5)
PROCALCITONIN SERPL-MCNC: 7.54 NG/ML
SARS-COV-2 RNA RESP QL NAA+PROBE: NOTDETECTED
SPECIMEN SOURCE: NORMAL

## 2020-09-10 PROCEDURE — 665999 HH PPS REVENUE DEBIT

## 2020-09-10 PROCEDURE — 99219 PR INITIAL OBSERVATION CARE,LEVL II: CPT | Performed by: INTERNAL MEDICINE

## 2020-09-10 PROCEDURE — 96367 TX/PROPH/DG ADDL SEQ IV INF: CPT

## 2020-09-10 PROCEDURE — 700111 HCHG RX REV CODE 636 W/ 250 OVERRIDE (IP): Performed by: INTERNAL MEDICINE

## 2020-09-10 PROCEDURE — 770020 HCHG ROOM/CARE - TELE (206)

## 2020-09-10 PROCEDURE — 93306 TTE W/DOPPLER COMPLETE: CPT | Mod: 26 | Performed by: INTERNAL MEDICINE

## 2020-09-10 PROCEDURE — 700105 HCHG RX REV CODE 258: Performed by: INTERNAL MEDICINE

## 2020-09-10 PROCEDURE — 700102 HCHG RX REV CODE 250 W/ 637 OVERRIDE(OP): Performed by: INTERNAL MEDICINE

## 2020-09-10 PROCEDURE — 93306 TTE W/DOPPLER COMPLETE: CPT

## 2020-09-10 PROCEDURE — 700111 HCHG RX REV CODE 636 W/ 250 OVERRIDE (IP): Performed by: EMERGENCY MEDICINE

## 2020-09-10 PROCEDURE — 96365 THER/PROPH/DIAG IV INF INIT: CPT

## 2020-09-10 PROCEDURE — C9803 HOPD COVID-19 SPEC COLLECT: HCPCS | Performed by: INTERNAL MEDICINE

## 2020-09-10 PROCEDURE — 665998 HH PPS REVENUE CREDIT

## 2020-09-10 PROCEDURE — 700105 HCHG RX REV CODE 258: Performed by: EMERGENCY MEDICINE

## 2020-09-10 PROCEDURE — U0003 INFECTIOUS AGENT DETECTION BY NUCLEIC ACID (DNA OR RNA); SEVERE ACUTE RESPIRATORY SYNDROME CORONAVIRUS 2 (SARS-COV-2) (CORONAVIRUS DISEASE [COVID-19]), AMPLIFIED PROBE TECHNIQUE, MAKING USE OF HIGH THROUGHPUT TECHNOLOGIES AS DESCRIBED BY CMS-2020-01-R: HCPCS

## 2020-09-10 PROCEDURE — A9270 NON-COVERED ITEM OR SERVICE: HCPCS | Performed by: INTERNAL MEDICINE

## 2020-09-10 RX ORDER — ESOMEPRAZOLE MAGNESIUM 40 MG/1
40 CAPSULE, DELAYED RELEASE ORAL
Status: DISCONTINUED | OUTPATIENT
Start: 2020-09-10 | End: 2020-09-10

## 2020-09-10 RX ORDER — MORPHINE SULFATE 4 MG/ML
4 INJECTION, SOLUTION INTRAMUSCULAR; INTRAVENOUS
Status: DISCONTINUED | OUTPATIENT
Start: 2020-09-10 | End: 2020-09-11 | Stop reason: HOSPADM

## 2020-09-10 RX ORDER — OMEPRAZOLE 20 MG/1
20 CAPSULE, DELAYED RELEASE ORAL DAILY
Status: DISCONTINUED | OUTPATIENT
Start: 2020-09-10 | End: 2020-09-11 | Stop reason: HOSPADM

## 2020-09-10 RX ORDER — ALENDRONATE SODIUM 70 MG/1
70 TABLET ORAL
Status: DISCONTINUED | OUTPATIENT
Start: 2020-09-13 | End: 2020-09-11 | Stop reason: HOSPADM

## 2020-09-10 RX ORDER — OXYCODONE HCL 10 MG/1
10 TABLET, FILM COATED, EXTENDED RELEASE ORAL EVERY 12 HOURS
Status: DISCONTINUED | OUTPATIENT
Start: 2020-09-10 | End: 2020-09-11 | Stop reason: HOSPADM

## 2020-09-10 RX ORDER — PROMETHAZINE HYDROCHLORIDE 25 MG/1
12.5-25 SUPPOSITORY RECTAL EVERY 4 HOURS PRN
Status: DISCONTINUED | OUTPATIENT
Start: 2020-09-10 | End: 2020-09-11 | Stop reason: HOSPADM

## 2020-09-10 RX ORDER — LORAZEPAM 1 MG/1
1 TABLET ORAL
Status: DISCONTINUED | OUTPATIENT
Start: 2020-09-10 | End: 2020-09-11 | Stop reason: HOSPADM

## 2020-09-10 RX ORDER — PROCHLORPERAZINE EDISYLATE 5 MG/ML
5-10 INJECTION INTRAMUSCULAR; INTRAVENOUS EVERY 4 HOURS PRN
Status: DISCONTINUED | OUTPATIENT
Start: 2020-09-10 | End: 2020-09-11 | Stop reason: HOSPADM

## 2020-09-10 RX ORDER — POLYETHYLENE GLYCOL 3350 17 G/17G
1 POWDER, FOR SOLUTION ORAL
Status: DISCONTINUED | OUTPATIENT
Start: 2020-09-10 | End: 2020-09-11 | Stop reason: HOSPADM

## 2020-09-10 RX ORDER — OXYCODONE HYDROCHLORIDE 5 MG/1
5 TABLET ORAL
Status: DISCONTINUED | OUTPATIENT
Start: 2020-09-10 | End: 2020-09-11 | Stop reason: HOSPADM

## 2020-09-10 RX ORDER — AMOXICILLIN 250 MG
2 CAPSULE ORAL 2 TIMES DAILY
Status: DISCONTINUED | OUTPATIENT
Start: 2020-09-10 | End: 2020-09-11 | Stop reason: HOSPADM

## 2020-09-10 RX ORDER — PROMETHAZINE HYDROCHLORIDE 25 MG/1
12.5-25 TABLET ORAL EVERY 4 HOURS PRN
Status: DISCONTINUED | OUTPATIENT
Start: 2020-09-10 | End: 2020-09-11 | Stop reason: HOSPADM

## 2020-09-10 RX ORDER — AZITHROMYCIN 250 MG/1
500 TABLET, FILM COATED ORAL DAILY
Status: DISCONTINUED | OUTPATIENT
Start: 2020-09-11 | End: 2020-09-11 | Stop reason: HOSPADM

## 2020-09-10 RX ORDER — CLONIDINE HYDROCHLORIDE 0.1 MG/1
0.1 TABLET ORAL EVERY 6 HOURS PRN
Status: DISCONTINUED | OUTPATIENT
Start: 2020-09-10 | End: 2020-09-11 | Stop reason: HOSPADM

## 2020-09-10 RX ORDER — LISINOPRIL 20 MG/1
20 TABLET ORAL DAILY
Status: DISCONTINUED | OUTPATIENT
Start: 2020-09-10 | End: 2020-09-11 | Stop reason: HOSPADM

## 2020-09-10 RX ORDER — HYDROCODONE BITARTRATE AND ACETAMINOPHEN 10; 325 MG/1; MG/1
1-2 TABLET ORAL EVERY 4 HOURS PRN
Status: DISCONTINUED | OUTPATIENT
Start: 2020-09-10 | End: 2020-09-11 | Stop reason: HOSPADM

## 2020-09-10 RX ORDER — OXYCODONE HYDROCHLORIDE 10 MG/1
10 TABLET ORAL
Status: DISCONTINUED | OUTPATIENT
Start: 2020-09-10 | End: 2020-09-11 | Stop reason: HOSPADM

## 2020-09-10 RX ORDER — LEVETIRACETAM 500 MG/1
500 TABLET ORAL 2 TIMES DAILY
Status: DISCONTINUED | OUTPATIENT
Start: 2020-09-10 | End: 2020-09-11 | Stop reason: HOSPADM

## 2020-09-10 RX ORDER — ONDANSETRON 2 MG/ML
4 INJECTION INTRAMUSCULAR; INTRAVENOUS EVERY 4 HOURS PRN
Status: DISCONTINUED | OUTPATIENT
Start: 2020-09-10 | End: 2020-09-11 | Stop reason: HOSPADM

## 2020-09-10 RX ORDER — ONDANSETRON 4 MG/1
4 TABLET, ORALLY DISINTEGRATING ORAL EVERY 4 HOURS PRN
Status: DISCONTINUED | OUTPATIENT
Start: 2020-09-10 | End: 2020-09-11 | Stop reason: HOSPADM

## 2020-09-10 RX ORDER — ANASTROZOLE 1 MG/1
1 TABLET ORAL EVERY EVENING
Status: DISCONTINUED | OUTPATIENT
Start: 2020-09-10 | End: 2020-09-11 | Stop reason: HOSPADM

## 2020-09-10 RX ORDER — BISACODYL 10 MG
10 SUPPOSITORY, RECTAL RECTAL
Status: DISCONTINUED | OUTPATIENT
Start: 2020-09-10 | End: 2020-09-11 | Stop reason: HOSPADM

## 2020-09-10 RX ORDER — AZITHROMYCIN 500 MG/5ML
500 INJECTION, POWDER, LYOPHILIZED, FOR SOLUTION INTRAVENOUS EVERY 24 HOURS
Status: DISCONTINUED | OUTPATIENT
Start: 2020-09-11 | End: 2020-09-10

## 2020-09-10 RX ORDER — ACETAMINOPHEN 325 MG/1
650 TABLET ORAL EVERY 6 HOURS PRN
Status: DISCONTINUED | OUTPATIENT
Start: 2020-09-10 | End: 2020-09-11 | Stop reason: HOSPADM

## 2020-09-10 RX ADMIN — LEVETIRACETAM 500 MG: 500 TABLET ORAL at 05:51

## 2020-09-10 RX ADMIN — SODIUM CHLORIDE 3 G: 900 INJECTION INTRAVENOUS at 00:22

## 2020-09-10 RX ADMIN — OMEPRAZOLE 20 MG: 20 CAPSULE, DELAYED RELEASE ORAL at 05:51

## 2020-09-10 RX ADMIN — OXYCODONE HYDROCHLORIDE 10 MG: 5 TABLET ORAL at 01:03

## 2020-09-10 RX ADMIN — ANASTROZOLE 1 MG: 1 TABLET, COATED ORAL at 18:05

## 2020-09-10 RX ADMIN — AZITHROMYCIN MONOHYDRATE 500 MG: 500 INJECTION, POWDER, LYOPHILIZED, FOR SOLUTION INTRAVENOUS at 01:14

## 2020-09-10 RX ADMIN — SODIUM CHLORIDE, POTASSIUM CHLORIDE, SODIUM LACTATE AND CALCIUM CHLORIDE 1000 ML: 600; 310; 30; 20 INJECTION, SOLUTION INTRAVENOUS at 01:37

## 2020-09-10 RX ADMIN — LORAZEPAM 1 MG: 1 TABLET ORAL at 19:36

## 2020-09-10 RX ADMIN — OXYCODONE HYDROCHLORIDE 10 MG: 5 TABLET ORAL at 09:31

## 2020-09-10 RX ADMIN — CEFTRIAXONE SODIUM 1 G: 1 INJECTION, POWDER, FOR SOLUTION INTRAMUSCULAR; INTRAVENOUS at 05:53

## 2020-09-10 RX ADMIN — LORAZEPAM 1 MG: 1 TABLET ORAL at 01:25

## 2020-09-10 RX ADMIN — LEVETIRACETAM 500 MG: 500 TABLET ORAL at 18:05

## 2020-09-10 RX ADMIN — LISINOPRIL 20 MG: 20 TABLET ORAL at 05:51

## 2020-09-10 RX ADMIN — OXYCODONE HYDROCHLORIDE 10 MG: 10 TABLET, FILM COATED, EXTENDED RELEASE ORAL at 18:04

## 2020-09-10 RX ADMIN — HYDROCODONE BITARTRATE AND ACETAMINOPHEN 1 TABLET: 10; 325 TABLET ORAL at 12:27

## 2020-09-10 RX ADMIN — OXYCODONE HYDROCHLORIDE 10 MG: 10 TABLET, FILM COATED, EXTENDED RELEASE ORAL at 05:51

## 2020-09-10 RX ADMIN — METOPROLOL TARTRATE 12.5 MG: 25 TABLET, FILM COATED ORAL at 18:04

## 2020-09-10 RX ADMIN — APIXABAN 5 MG: 5 TABLET, FILM COATED ORAL at 18:05

## 2020-09-10 RX ADMIN — APIXABAN 5 MG: 5 TABLET, FILM COATED ORAL at 05:51

## 2020-09-10 RX ADMIN — HYDROCODONE BITARTRATE AND ACETAMINOPHEN 2 TABLET: 10; 325 TABLET ORAL at 22:57

## 2020-09-10 RX ADMIN — DOCUSATE SODIUM 50 MG AND SENNOSIDES 8.6 MG 2 TABLET: 8.6; 5 TABLET, FILM COATED ORAL at 18:04

## 2020-09-10 RX ADMIN — METOPROLOL TARTRATE 12.5 MG: 25 TABLET, FILM COATED ORAL at 05:51

## 2020-09-10 ASSESSMENT — ENCOUNTER SYMPTOMS
TREMORS: 0
SPEECH CHANGE: 0
CHILLS: 0
SHORTNESS OF BREATH: 1
DIZZINESS: 1
POLYDIPSIA: 0
DOUBLE VISION: 0
FEVER: 0
VOMITING: 0
HEARTBURN: 0
FOCAL WEAKNESS: 0
HEADACHES: 0
HEMOPTYSIS: 0
NAUSEA: 0
BRUISES/BLEEDS EASILY: 0
COUGH: 0
SPUTUM PRODUCTION: 0
NECK PAIN: 0
FLANK PAIN: 0
NAUSEA: DENIES
PHOTOPHOBIA: 0
NERVOUS/ANXIOUS: 0
PALPITATIONS: 1
BACK PAIN: 0
VOMITING: DENIES
ORTHOPNEA: 0
BLURRED VISION: 0
HALLUCINATIONS: 0
WEIGHT LOSS: 0

## 2020-09-10 ASSESSMENT — LIFESTYLE VARIABLES
EVER HAD A DRINK FIRST THING IN THE MORNING TO STEADY YOUR NERVES TO GET RID OF A HANGOVER: NO
DOES PATIENT WANT TO STOP DRINKING: NO
EVER HAD A DRINK FIRST THING IN THE MORNING TO STEADY YOUR NERVES TO GET RID OF A HANGOVER: NO
TOTAL SCORE: 0
HAVE YOU EVER FELT YOU SHOULD CUT DOWN ON YOUR DRINKING: NO
ALCOHOL_USE: NO
HAVE PEOPLE ANNOYED YOU BY CRITICIZING YOUR DRINKING: NO
ON A TYPICAL DAY WHEN YOU DRINK ALCOHOL HOW MANY DRINKS DO YOU HAVE: 0
ON A TYPICAL DAY WHEN YOU DRINK ALCOHOL HOW MANY DRINKS DO YOU HAVE: 0
HOW MANY TIMES IN THE PAST YEAR HAVE YOU HAD 5 OR MORE DRINKS IN A DAY: 0
AVERAGE NUMBER OF DAYS PER WEEK YOU HAVE A DRINK CONTAINING ALCOHOL: 0
TOTAL SCORE: 0
HAVE YOU EVER FELT YOU SHOULD CUT DOWN ON YOUR DRINKING: NO
HOW MANY TIMES IN THE PAST YEAR HAVE YOU HAD 5 OR MORE DRINKS IN A DAY: 0
ALCOHOL_USE: NO
CONSUMPTION TOTAL: INCOMPLETE
AVERAGE NUMBER OF DAYS PER WEEK YOU HAVE A DRINK CONTAINING ALCOHOL: 0
DOES PATIENT WANT TO STOP DRINKING: NO
SUBSTANCE_ABUSE: 0
HAVE PEOPLE ANNOYED YOU BY CRITICIZING YOUR DRINKING: NO
CONSUMPTION TOTAL: NEGATIVE
TOTAL SCORE: 0
EVER FELT BAD OR GUILTY ABOUT YOUR DRINKING: NO

## 2020-09-10 ASSESSMENT — PAIN DESCRIPTION - PAIN TYPE
TYPE: CHRONIC PAIN
TYPE: ACUTE PAIN

## 2020-09-10 ASSESSMENT — COPD QUESTIONNAIRES
DO YOU EVER COUGH UP ANY MUCUS OR PHLEGM?: NO/ONLY WITH OCCASIONAL COLDS OR INFECTIONS
DURING THE PAST 4 WEEKS HOW MUCH DID YOU FEEL SHORT OF BREATH: NONE/LITTLE OF THE TIME
COPD SCREENING SCORE: 2
HAVE YOU SMOKED AT LEAST 100 CIGARETTES IN YOUR ENTIRE LIFE: YES

## 2020-09-10 NOTE — PLAN OF CARE (IOPOC)
Admit after MN - sinus tach and suspected PNA.   On ABX now and started BB.   Follow up PCT  Remainder as per H&P

## 2020-09-10 NOTE — H&P
Hospital Medicine History & Physical Note    Date of Service  9/10/2020    Primary Care Physician  Tabitha Bautista M.D.    Consultants  None    Code Status  Full Code    Chief Complaint  Chief Complaint   Patient presents with   • Tachycardia     Tachycardia today since 0900; pt has had multiple episodes of the same over the past year with multiple tests done but no official diagnosis, per pt (last episode was 2-3 days ago)   • Shortness of Breath     since 0900 with tachycardia       History of Presenting Illness  49 y.o. female with history of blindness, hydrocephalus with  shunt, chronic sinus tachycardia, migraine, psychiatric problems, PTSD, seizure, anxiety, pulmonary embolism, chronic anticoagulation with apixaban, right breast cancer status post radiation finished in March 2020, C. difficile colitis, who presented 9/9/2020 with complaints of palpitations on and off since 9 AM today, that started suddenly without alleviating or aggravating factors, associated with some shortness of breath and right-sided sharp chest pain on inspiration..  According to the patient she has been having episodes of tachycardia in the past and was seen by cardiology in the past in 2017, but not recently.  Per chart review, she has been taking propranolol in the past, currently not on any beta-blockers or calcium channel blockers.  CT of pulmonary artery was done in ER.  There is stable saddle embolism without significant changes from previous CT.  There is what appears to be opacification in the right upper lobe.  Therefore, she was started on antibiotic for pneumonia.  She received 1 L of normal saline and has been on continuous IV normal saline without improvement of tachycardia, 120/min since 9 AM today.  Review of Systems  Review of Systems   Constitutional: Negative for chills, fever and weight loss.   HENT: Negative for ear pain, hearing loss and tinnitus.    Eyes: Negative for blurred vision, double vision and  photophobia.   Respiratory: Positive for shortness of breath. Negative for cough, hemoptysis and sputum production.    Cardiovascular: Positive for chest pain and palpitations. Negative for orthopnea.   Gastrointestinal: Negative for heartburn, nausea and vomiting.   Genitourinary: Negative for dysuria, flank pain, frequency and hematuria.   Musculoskeletal: Negative for back pain, joint pain and neck pain.   Skin: Negative for itching and rash.   Neurological: Positive for dizziness. Negative for tremors, speech change, focal weakness and headaches.   Endo/Heme/Allergies: Negative for environmental allergies and polydipsia. Does not bruise/bleed easily.   Psychiatric/Behavioral: Negative for hallucinations and substance abuse. The patient is not nervous/anxious.        Past Medical History   has a past medical history of Acute nasopharyngitis, Blind, C. difficile colitis, C. difficile diarrhea (2013), Cancer (HCC), Chronic daily headache, Depression, Esophageal atresia (1971), Esophageal bleeding (12/18/2019), Fall, GERD (gastroesophageal reflux disease), H/O total hysterectomy, Heart burn (12/18/2019), Hydrocephalus (HCC), Hydrocephalus ([facility]), Indigestion (12/18/2019), Jaundice, Legally blind, Migraine without aura, without mention of intractable migraine without mention of status migrainosus, Other specified symptom associated with female genital organs, Pain (12/18/2019), Pain, Psychiatric problem, PTSD (post-traumatic stress disorder), Seizure ([facility]) (12/18/2019), and Snoring (12/18/2019).    Surgical History   has a past surgical history that includes laparoscopy (8/8/08); lysis adhesions general (12/10/2013); cystoscopy (12/10/2013); aakash by laparoscopy (N/A, 8/13/2015); gastroscopy-endo (N/A, 8/24/2017); nissen fundoplication laparoscopic; abdominal hysterectomy total (12/10/2013); other; exploratory laparotomy (12/10/2013); appendectomy (12/10/2013); cholecystectomy (N/A, 8/13/2015); gastroscopy (N/A,  1/2/2019); mastectomy (Right, 12/19/2019); and node biopsy sentinel (Right, 12/19/2019).     Family History  family history includes Hypertension in her father and mother.     Social History   reports that she quit smoking about 3 years ago. Her smoking use included cigars. She started smoking about 16 years ago. She has a 10.00 pack-year smoking history. She has never used smokeless tobacco. She reports current alcohol use. She reports that she does not use drugs.    Allergies  Allergies   Allergen Reactions   • Latex Rash     Rash   • Tape Rash     RXN= ongoing  Adhesive Medical tape. Per patient, paper tape ok.       Medications  Prior to Admission Medications   Prescriptions Last Dose Informant Patient Reported? Taking?   HYDROcodone/acetaminophen (NORCO)  MG Tab 9/9/2020 at 1500 Patient Yes No   Sig: Take 1-2 Tabs by mouth every four hours as needed.   LORazepam (ATIVAN) 1 MG Tab 2 days ago at unknown Patient Yes No   Sig: Take 1 mg by mouth every bedtime.   alendronate (FOSAMAX) 70 MG Tab 09/06/20 at unknown Patient Yes No   Sig: Take 70 mg by mouth every 7 days.   amitriptyline (ELAVIL) 75 MG Tab 2-3 days ago at unknown Patient Yes No   Sig: Take 75 mg by mouth every evening.   anastrozole (ARIMIDEX) 1 MG Tab 9/8/2020 at 2200 Patient Yes No   Sig: Take 1 mg by mouth every evening.   apixaban (ELIQUIS) 5mg Tab 9/9/2020 at 0700 Patient Yes No   Sig: Take 5 mg by mouth 2 Times a Day.   esomeprazole (NEXIUM) 40 MG delayed-release capsule 9/9/2020 at 0700 Patient Yes No   Sig: Take 40 mg by mouth every day. Before breakfast   levETIRAcetam (KEPPRA) 500 MG Tab 9/9/2020 at 0700 Patient Yes No   Sig: Take 500 mg by mouth 2 times a day.   lisinopril (PRINIVIL) 20 MG Tab 2-3 days ago at unknown Patient Yes No   Sig: Take 20 mg by mouth every day.   oxyCODONE ER (XTAMPZA ER) 9 MG Capsule Extended Release 12 hour Abuse-Deterrent 9/9/2020 at 0700 Patient Yes No   Sig: Take 9 mg by mouth every 12 hours.       Facility-Administered Medications: None       Physical Exam  Temp:  [36.3 °C (97.3 °F)] 36.3 °C (97.3 °F)  Pulse:  [113-132] 118  Resp:  [12-19] 17  BP: (139-168)/(90-99) 168/96  SpO2:  [92 %-98 %] 92 %    Physical Exam  Vitals signs and nursing note reviewed.   Constitutional:       General: She is not in acute distress.     Appearance: Normal appearance.   HENT:      Head: Normocephalic and atraumatic.      Nose: Nose normal.      Mouth/Throat:      Mouth: Mucous membranes are moist.   Eyes:      Extraocular Movements: Extraocular movements intact.      Pupils: Pupils are equal, round, and reactive to light.      Comments: Blindness bilaterally   Neck:      Musculoskeletal: Normal range of motion and neck supple.   Cardiovascular:      Rate and Rhythm: Regular rhythm. Tachycardia present.   Pulmonary:      Effort: Pulmonary effort is normal.      Breath sounds: Normal breath sounds.   Chest:      Chest wall: Tenderness present.   Abdominal:      General: Abdomen is flat. There is no distension.      Tenderness: There is no abdominal tenderness.   Musculoskeletal: Normal range of motion.         General: No swelling or deformity.   Skin:     General: Skin is warm and dry.   Neurological:      General: No focal deficit present.      Mental Status: She is alert and oriented to person, place, and time.   Psychiatric:         Mood and Affect: Mood normal.         Behavior: Behavior normal.         Laboratory:  Recent Labs     09/09/20 1940   WBC 7.7   RBC 3.98*   HEMOGLOBIN 11.9*   HEMATOCRIT 36.1*   MCV 90.7   MCH 29.9   MCHC 33.0*   RDW 49.0   PLATELETCT 289   MPV 10.7     Recent Labs     09/09/20 1940   SODIUM 142   POTASSIUM 3.6   CHLORIDE 105   CO2 24   GLUCOSE 108*   BUN 14   CREATININE 0.53   CALCIUM 9.2     Recent Labs     09/09/20 1940   ALTSGPT 18   ASTSGOT 10*   ALKPHOSPHAT 130*   TBILIRUBIN 0.3   GLUCOSE 108*         No results for input(s): NTPROBNP in the last 72 hours.      Recent Labs      "09/09/20 1940   TROPONINT 6       Imaging:  CT-CTA CHEST PULMONARY ARTERY W/ RECONS   Final Result         1.  Low-density structure in the main pulmonary arteries with areas of peripheral calcification, somewhat tubular appearance is seen. Appearance suggests stable saddle embolus, as previously described, embolized catheter segment cannot be    radiographically excluded as clinically appropriate.   2.  Patchy right apical infiltrate   3.  Moderate layering left pleural effusion      DX-CHEST-PORTABLE (1 VIEW)   Final Result      Opacity in the inferior lateral left hemithorax which may represent loculated pleural fluid and/or pleural thickening.      EC-ECHOCARDIOGRAM COMPLETE W/O CONT    (Results Pending)         Assessment/Plan:  I anticipate this patient is appropriate for observation status at this time.    Sinus tachycardia  Assessment & Plan  History of chronic\" inappropriate sinus tachycardia\" per cardiology note from 2017  Used to be on propranolol,  Most recent echo in March 2020 showed RVSP 45 mm, we will repeat echo to see if patient has worsening of pulmonary hypertension.  There is no evidence of progression of pulmonary embolism on CTA today.  TSH is not elevated.  Patient will be treated for possible pneumonia empirically.  Monitor on telemetry  We will start on metoprolol    CAP (community acquired pneumonia)- (present on admission)  Assessment & Plan  Patient started on empiric antibiotics  We will check procalcitonin, de-escalate antibiotic as appropriate  COVID-19 rule out pending  In the context of history of hydrocephalus and prior history of aspiration pneumonia, opiate dependence, we will order speech evaluation to rule out silent aspiration    Chronic narcotic dependence (HCC)- (present on admission)  Assessment & Plan  Continue home regimen    Acquired circulating anticoagulants (HCC)- (present on admission)  Assessment & Plan  Patient states she has been compliant with apixaban  Continue " apixaban in the hospital    Breast cancer (HCC)  Assessment & Plan  Follow-up with oncology and radiation oncology as outpatient.    Seizure disorder (HCC)- (present on admission)  Assessment & Plan  Continue Keppra

## 2020-09-10 NOTE — PROGRESS NOTES
[facility] note on 9/8/20  Pt just returned from appt with Dr. Bautista, PCP,  prior to SN arrival. Boyfriend present at this visit and he assisted pt to put grocery away. /78, . Hx of tachycardia and gets anxious easily per pt. Pt denies any headache, chest pain or palpitation; asymptomatic at this time. She tells this nurse that Dr. Bautista is aware of elevated HR during MD visit earlier today. Reports she took her AM meds and no qu estions or concern at this time. Pt denies any new falls or changes to current meds. No open wound over left knee at this time. Several scabs noted. Photo taken and sent to pt's chart. Educated pt on fall and infection prevention.   CC to PCP re: elevated HR

## 2020-09-10 NOTE — ED NOTES
Pt moved onto hospital bed. Updated on POC. Verbalizes understanding. No further needs at this time.

## 2020-09-10 NOTE — ED NOTES
Received report from Bridget JOHNSON. Assumed pt care. Pt connected to monitor, call light within reach. Bed in lowest and locked position

## 2020-09-10 NOTE — PROGRESS NOTES
Pt is oriented x 4 and legally blind.  Pt complains of head pain.  Medicated per MAR.  Pt waiting for bed placement.  Call light within reach.

## 2020-09-10 NOTE — ED NOTES
Pt moved to Y57. Bedside report to Coby JOHNSON. Questions answered. All belongings transferred with patient.

## 2020-09-10 NOTE — PROGRESS NOTES
Pt is transported to [facility] by [facility] due to elevated BP and HR on 9/9/20.

## 2020-09-10 NOTE — ED PROVIDER NOTES
ED Provider Note    CHIEF COMPLAINT(1/4)  Chief Complaint   Patient presents with   • Tachycardia     Tachycardia today since 0900; pt has had multiple episodes of the same over the past year with multiple tests done but no official diagnosis, per pt (last episode was 2-3 days ago)   • Shortness of Breath     since 0900 with tachycardia       HPI  Rasheeda Manzano is a 49 y.o. female blindness, right breast cancer (s/p radiation therapy finished first course in March 2020), Pulmonary Embolism, total hysterectomy, and migraine without aura who presents for tachycardia. Patient has had tachycardia since January, but this past week it has noticeably gotten worse and the patient reports palpitations even at rest. Patient has a nurse who goes to her house daily, so that the patient receives the proper daily medications and noticed her heart rate in the 120s. Patient also reports some right sided chest pressure and SOB since January that comes and goes with no transforming or alleviating factors. Patient currently does not have any pain. Patient has had right breast cancer with axilla lymph node involvement and was treated with the first round of radiation therapy from December 2019 till March 2020. Patient also recalls some nausea. Patient denies bleeding, tremors, fevers/chills, hot flashes, abdominal pain, changes in urine or stools, new masses, weakness, and head trauma.         REVIEW OF SYSTEMS(1/10)  Pertinent positives include: Tachycardia, palpitations, chest pressure/pain, SOB, chronic migraines, and nausea.  Pertinent negatives include: Bleeding, tremors, fevers/chills, hot flashes, abdominal pain, changes in urine or stools, new masses, weakness, and head trauma.   All other systems are negative.     PAST MEDICAL HISTORY(PFS1,2)  Past Medical History:   Diagnosis Date   • Acute nasopharyngitis     H/O Cold 12-8-19   • Blind     age 11   • C. difficile colitis     H/O x3   • C. difficile diarrhea 2013   •  "Cancer (HCC)     Right Breast Cancer DX 2-2019/Moved to right lymph nodes (received radiation on 3/8/2020)   • Chronic daily headache    • Depression    • Esophageal atresia 1971    Treated surgically at birth.   • Esophageal bleeding 12/18/2019    H/O, \"three times with last one a year ago.\"   • Fall    • GERD (gastroesophageal reflux disease)    • H/O total hysterectomy    • Heart burn 12/18/2019   • Hydrocephalus (HCC)    • Hydrocephalus (HCC)     shunt drains into pleural place of L lung   • Indigestion 12/18/2019   • Jaundice     at birth   • Legally blind    • Migraine without aura, without mention of intractable migraine without mention of status migrainosus    • Other specified symptom associated with female genital organs     excessive bleeding   • Pain 12/18/2019    \"Pain In Head From Hydrocephalus.\".   • Pain    • Psychiatric problem     depression   • PTSD (post-traumatic stress disorder)    • Seizure (HCC) 12/18/2019    \"Last seizure one year ago.\"   • Snoring 12/18/2019    Has not had a sleep study.       FAMILY HISTORY  Family History   Problem Relation Age of Onset   • Hypertension Mother    • Hypertension Father    • Non-contributory Neg Hx         Migraine       SOCIAL HISTORY  Social History     Tobacco Use   • Smoking status: Former Smoker     Packs/day: 1.00     Years: 10.00     Pack years: 10.00     Types: Cigars     Start date: 5/1/2004     Quit date: 8/9/2017     Years since quitting: 3.0   • Smokeless tobacco: Never Used   • Tobacco comment:  CIGAR/day    Substance Use Topics   • Alcohol use: Yes     Frequency: Monthly or less     Drinks per session: 1 or 2   • Drug use: No     Social History     Substance and Sexual Activity   Drug Use No       SURGICAL HISTORY  Past Surgical History:   Procedure Laterality Date   • MASTECTOMY Right 12/19/2019    Procedure: MASTECTOMY-PARTIAL;  Surgeon: Brice Johnson M.D.;  Location: SURGERY SAME DAY [facility];  Service: General   • NODE BIOPSY " "SENTINEL Right 12/19/2019    Procedure: BIOPSY, LYMPH NODE, SENTINEL-AXILLARY;  Surgeon: Brice Johnson M.D.;  Location: SURGERY SAME DAY [facility];  Service: General   • GASTROSCOPY N/A 1/2/2019    Procedure: GASTROSCOPY;  Surgeon: Matias Fernando M.D.;  Location: SURGERY [facility];  Service: Gastroenterology   • GASTROSCOPY-ENDO N/A 8/24/2017    Procedure: GASTROSCOPY-ENDO;  Surgeon: Matias Fernando M.D.;  Location: ENDOSCOPY [facility];  Service:    • AYALA BY LAPAROSCOPY N/A 8/13/2015    Procedure: AYALA BY LAPAROSCOPY;  Surgeon: Brice Johnson M.D.;  Location: SURGERY SAME DAY [facility];  Service:    • CHOLECYSTECTOMY N/A 8/13/2015    Procedure: CHOLECYSTECTOMY;  Surgeon: Brice Johnson M.D.;  Location: SURGERY SAME DAY [facility];  Service:    • LYSIS ADHESIONS GENERAL  12/10/2013    Performed by Arie Bass M.D. at SURGERY [facility]   • CYSTOSCOPY  12/10/2013    Performed by Joana Yeager M.D. at [facility]   • ABDOMINAL HYSTERECTOMY TOTAL  12/10/2013    Performed by Joana Yeager M.D. at [facility]   • EXPLORATORY LAPAROTOMY  12/10/2013    Performed by Arie Bass M.D. at [facility]   • APPENDECTOMY  12/10/2013    Performed by Arie Bass M.D. at [facility]   • LAPAROSCOPY  8/8/08    Performed by FERNANDO HENDERSON at [facility]   • NISSEN FUNDOPLICATION LAPAROSCOPIC     • OTHER      PLEURAL SHUNT, numerous revisions       CURRENT MEDICATIONS  Home Medications    **Home medications have not yet been reviewed for this encounter**         ALLERGIES  Allergies   Allergen Reactions   • Latex Rash     Rash   • Tape Rash     RXN= ongoing  Adhesive Medical tape. Per patient, paper tape ok.       PHYSICAL EXAM  VITAL SIGNS: /96   Pulse (!) 116   Temp 36.3 °C (97.3 °F) (Temporal)   Resp 16   Ht 1.626 m (5' 4\")   Wt 63.5 kg (140 lb)   LMP 11/27/2013   SpO2 93%   BMI 24.03 kg/m²  Reviewed and patient is " still tachycardic.   Constitutional: Well developed, Well nourished, alert, and not in acute distress.  HENT: Normocephalic, atraumatic, bilateral external ears normal, oropharynx moist, No exudates or erythema.   Eyes: Patient is blind in both eyes. PERRLA, conjunctiva pink, no scleral icterus.   Cardiovascular: Tachycardic rate and rhythm. No murmurs.  Respiratory: CTAB. No wheezing or crackles.  Gastrointestinal: Soft, non-distended, and non-tender. +BS in all quadrants.  Skin: Surgical scars on the abdomen. No erythema, no rash. No jaundice.  Genitourinary:  No costovertebral angle tenderness.   Neurologic: No tremors. Alert & oriented x 3, cranial nerves 2-12 intact by passive exam.  No focal deficit noted.  Psychiatric: Affect normal, Judgment normal, Mood normal.     DIFFERENTIAL DIAGNOSIS:  Myocardial infarction.  Hyperthyroidism.    EKG  Sinus tachycardia with rate of 123.    RADIOLOGY/PROCEDURES  CT-CTA CHEST PULMONARY ARTERY W/ RECONS   Final Result         1.  Low-density structure in the main pulmonary arteries with areas of peripheral calcification, somewhat tubular appearance is seen. Appearance suggests stable saddle embolus, as previously described, embolized catheter segment cannot be    radiographically excluded as clinically appropriate.   2.  Patchy right apical infiltrate   3.  Moderate layering left pleural effusion      DX-CHEST-PORTABLE (1 VIEW)   Final Result      Opacity in the inferior lateral left hemithorax which may represent loculated pleural fluid and/or pleural thickening.          LABORATORY: Reviewed as below.  Results for orders placed or performed during the hospital encounter of 09/09/20   CBC WITH DIFFERENTIAL   Result Value Ref Range    WBC 7.7 4.8 - 10.8 K/uL    RBC 3.98 (L) 4.20 - 5.40 M/uL    Hemoglobin 11.9 (L) 12.0 - 16.0 g/dL    Hematocrit 36.1 (L) 37.0 - 47.0 %    MCV 90.7 81.4 - 97.8 fL    MCH 29.9 27.0 - 33.0 pg    MCHC 33.0 (L) 33.6 - 35.0 g/dL    RDW 49.0 35.9 -  50.0 fL    Platelet Count 289 164 - 446 K/uL    MPV 10.7 9.0 - 12.9 fL    Neutrophils-Polys 75.40 (H) 44.00 - 72.00 %    Lymphocytes 13.80 (L) 22.00 - 41.00 %    Monocytes 8.60 0.00 - 13.40 %    Eosinophils 1.30 0.00 - 6.90 %    Basophils 0.50 0.00 - 1.80 %    Immature Granulocytes 0.40 0.00 - 0.90 %    Nucleated RBC 0.00 /100 WBC    Neutrophils (Absolute) 5.77 2.00 - 7.15 K/uL    Lymphs (Absolute) 1.06 1.00 - 4.80 K/uL    Monos (Absolute) 0.66 0.00 - 0.85 K/uL    Eos (Absolute) 0.10 0.00 - 0.51 K/uL    Baso (Absolute) 0.04 0.00 - 0.12 K/uL    Immature Granulocytes (abs) 0.03 0.00 - 0.11 K/uL    NRBC (Absolute) 0.00 K/uL   Comp Metabolic Panel   Result Value Ref Range    Sodium 142 135 - 145 mmol/L    Potassium 3.6 3.6 - 5.5 mmol/L    Chloride 105 96 - 112 mmol/L    Co2 24 20 - 33 mmol/L    Anion Gap 13.0 7.0 - 16.0    Glucose 108 (H) 65 - 99 mg/dL    Bun 14 8 - 22 mg/dL    Creatinine 0.53 0.50 - 1.40 mg/dL    Calcium 9.2 8.5 - 10.5 mg/dL    AST(SGOT) 10 (L) 12 - 45 U/L    ALT(SGPT) 18 2 - 50 U/L    Alkaline Phosphatase 130 (H) 30 - 99 U/L    Total Bilirubin 0.3 0.1 - 1.5 mg/dL    Albumin 3.6 3.2 - 4.9 g/dL    Total Protein 6.0 6.0 - 8.2 g/dL    Globulin 2.4 1.9 - 3.5 g/dL    A-G Ratio 1.5 g/dL   TSH   Result Value Ref Range    TSH 0.872 0.380 - 5.330 uIU/mL   TROPONIN   Result Value Ref Range    Troponin T 6 6 - 19 ng/L   BETA-HCG QUALITATIVE SERUM   Result Value Ref Range    Beta-Hcg Qualitative Serum Negative Negative   FREE THYROXINE   Result Value Ref Range    Free T-4 1.09 0.93 - 1.70 ng/dL   ESTIMATED GFR   Result Value Ref Range    GFR If African American >60 >60 mL/min/1.73 m 2    GFR If Non African American >60 >60 mL/min/1.73 m 2   D-DIMER   Result Value Ref Range    D-Dimer Screen 0.53 (H) 0.00 - 0.50 ug/mL (FEU)   EKG (NOW)   Result Value Ref Range    Report       [facility]    Test Date:  2020-09-09  Pt Name:    NILAY MANN               Department: ER  MRN:         [id number]                      Room:       [facility]  Gender:     Female                       Technician: [id number]  :        1970                   Requested By:ER TRIAGE PROTOCOL  Order #:    [id number]                    Reading MD: CHALINO KIMBLE MD    Measurements  Intervals                                Axis  Rate:       123                          P:          46  SD:         148                          QRS:        61  QRSD:       76                           T:          48  QT:         312  QTc:        447    Interpretive Statements  SINUS TACHYCARDIA  Compared to ECG 2020 13:02:25  Sinus rhythm no longer present  Electronically Signed On 2020 18:52:08 PDT by CHALINO KIMBLE MD         INTERVENTIONS:  Medications   metoclopramide (REGLAN) injection 10 mg (10 mg Intravenous Given 20)   diphenhydrAMINE (BENADRYL) injection 25 mg (25 mg Intravenous Given 20)   NS infusion 1,000 mL (0 mL Intravenous Stopped 20)   fentaNYL (SUBLIMAZE) injection 50 mcg (50 mcg Intravenous Given 20)     Response: Patient's tachycardia did not improve and increased with fluids and initial pain control medications of Reglan and Benadryl.. Patient still reported migraine and new intermittent, left chest pressure, so Fentanyl was ordered.    COURSE & MEDICAL DECISION MAKING  Discussed with Dr. Kimble. 49 y.o. female blindness, right breast cancer (s/p radiation therapy finished first course in 2020), Pulmonary Embolism, total hysterectomy, and migraine without aura who presents for tachycardia. Patient also reported some on and off right sided chest pain/pressure and SOB since January. Patient was tachycardic on physical exam, but otherwise exam was within normal limits. Patient denies any pain or acute complaints during examination. CBC, CMP, TSH, T4, Troponin, B-HCG, and CXR were ordered. Patient's CBC had slight decrease in hemoglobin and RBC's, but not significant to aid in etiology. CMP,  "troponin, B-HCG, TSH, and T4 were within normal limits. CXR revealed \"Opacity in the inferior lateral left hemithorax which may represent loculated pleural fluid and/or pleural thickening.\"  Patient's tachycardia did not improve and increased with fluids and initial pain control medications of Reglan and Benadryl. Patient still reported migraine and new intermittent, left chest pressure, so Fentanyl was ordered. D-Dimer was also ordered and was elevated, so CTA was ordered as well. CTA revealed stable saddle embolus, patchy right apical infiltrate, and moderate layering left pleural effusion. Besides the stable saddle embolus, the latter findings are new compared to previous imaging. Patient was started on Unasyn and Azithromycin for CAP.         Review nursing notes and vital signs a final time 7:02 PM    PLAN:  Admit to hospital.    CONDITION: Stable.    FINAL IMPRESSION  Tachycardia. Community-Acquired Pneumonia.    Electronically signed by: Mendoza Sandoval M.D., 9/9/2020 7:02 PM        "

## 2020-09-10 NOTE — PROGRESS NOTES
Karen, please send CC to Tabitha Bautista MD:  Dr. Bautista,  This Home Health RN visited patient on 9/8/20. Her HR was 120/min, higher than HH parameter of 100/min. Hx of tachycardia and gets anxious easily per pt. Pt denies any headache, chest pain or palpitation; asymptomatic at this time.   Thank you,  ELIZABETH Valenzuela

## 2020-09-10 NOTE — ASSESSMENT & PLAN NOTE
Patient started on empiric antibiotics  We will check procalcitonin, de-escalate antibiotic as appropriate  COVID-19 rule out pending  In the context of history of hydrocephalus and prior history of aspiration pneumonia, opiate dependence, we will order speech evaluation to rule out silent aspiration

## 2020-09-10 NOTE — ED TRIAGE NOTES
Chief Complaint   Patient presents with   • Tachycardia     Tachycardia today since 0900; pt has had multiple episodes of the same over the past year with multiple tests done but no official diagnosis, per pt (last episode was 2-3 days ago)   • Shortness of Breath     since 0900 with tachycardia     Pt brought in by EMS from home to triage for above complaint. EKG done in triage.     Pt is alert/oriented and follows commands. Pt speaking in full sentences and responds appropriately to questions. No acute distress noted in triage and respirations are even and unlabored.     Pt placed in lobby and educated on triage process. Pt encouraged to alert staff for any changes in condition.

## 2020-09-10 NOTE — ED NOTES
Med rec updated and complete via interview with pt at bedside. Allergies reviewed. Pt denies antibiotic use in past 14 days.    Home pharmacy [facility]

## 2020-09-10 NOTE — ASSESSMENT & PLAN NOTE
"History of chronic\" inappropriate sinus tachycardia\" per cardiology note from 2017  Used to be on propranolol,  Most recent echo in March 2020 showed RVSP 45 mm, we will repeat echo to see if patient has worsening of pulmonary hypertension.  There is no evidence of progression of pulmonary embolism on CTA today.  TSH is not elevated.  Patient will be treated for possible pneumonia empirically.  Monitor on telemetry  We will start on metoprolol  "

## 2020-09-11 ENCOUNTER — HOME CARE VISIT (OUTPATIENT)
Dept: HOME HEALTH SERVICES | Facility: HOME HEALTHCARE | Age: 50
End: 2020-09-11
Payer: MEDICARE

## 2020-09-11 ENCOUNTER — PHARMACY VISIT (OUTPATIENT)
Dept: PHARMACY | Facility: MEDICAL CENTER | Age: 50
End: 2020-09-11
Payer: MEDICARE

## 2020-09-11 VITALS
OXYGEN SATURATION: 93 % | HEIGHT: 64 IN | TEMPERATURE: 97.8 F | RESPIRATION RATE: 20 BRPM | HEART RATE: 86 BPM | BODY MASS INDEX: 23.9 KG/M2 | DIASTOLIC BLOOD PRESSURE: 73 MMHG | WEIGHT: 140 LBS | SYSTOLIC BLOOD PRESSURE: 107 MMHG

## 2020-09-11 PROBLEM — R00.0 SINUS TACHYCARDIA: Status: RESOLVED | Noted: 2020-09-10 | Resolved: 2020-09-11

## 2020-09-11 PROBLEM — J18.9 CAP (COMMUNITY ACQUIRED PNEUMONIA): Status: RESOLVED | Noted: 2018-10-08 | Resolved: 2020-09-11

## 2020-09-11 LAB
ALBUMIN SERPL BCP-MCNC: 3 G/DL (ref 3.2–4.9)
ALBUMIN/GLOB SERPL: 1.2 G/DL
ALP SERPL-CCNC: 103 U/L (ref 30–99)
ALT SERPL-CCNC: 14 U/L (ref 2–50)
ANION GAP SERPL CALC-SCNC: 11 MMOL/L (ref 7–16)
AST SERPL-CCNC: 13 U/L (ref 12–45)
BASOPHILS # BLD AUTO: 0.9 % (ref 0–1.8)
BASOPHILS # BLD: 0.04 K/UL (ref 0–0.12)
BILIRUB SERPL-MCNC: 0.3 MG/DL (ref 0.1–1.5)
BUN SERPL-MCNC: 13 MG/DL (ref 8–22)
CALCIUM SERPL-MCNC: 8.6 MG/DL (ref 8.5–10.5)
CHLORIDE SERPL-SCNC: 106 MMOL/L (ref 96–112)
CO2 SERPL-SCNC: 24 MMOL/L (ref 20–33)
CREAT SERPL-MCNC: 0.44 MG/DL (ref 0.5–1.4)
EOSINOPHIL # BLD AUTO: 0.14 K/UL (ref 0–0.51)
EOSINOPHIL NFR BLD: 3 % (ref 0–6.9)
ERYTHROCYTE [DISTWIDTH] IN BLOOD BY AUTOMATED COUNT: 48.9 FL (ref 35.9–50)
GLOBULIN SER CALC-MCNC: 2.5 G/DL (ref 1.9–3.5)
GLUCOSE SERPL-MCNC: 81 MG/DL (ref 65–99)
HCT VFR BLD AUTO: 34.6 % (ref 37–47)
HGB BLD-MCNC: 11.5 G/DL (ref 12–16)
IMM GRANULOCYTES # BLD AUTO: 0.01 K/UL (ref 0–0.11)
IMM GRANULOCYTES NFR BLD AUTO: 0.2 % (ref 0–0.9)
LYMPHOCYTES # BLD AUTO: 1.24 K/UL (ref 1–4.8)
LYMPHOCYTES NFR BLD: 26.8 % (ref 22–41)
MCH RBC QN AUTO: 30 PG (ref 27–33)
MCHC RBC AUTO-ENTMCNC: 33.2 G/DL (ref 33.6–35)
MCV RBC AUTO: 90.3 FL (ref 81.4–97.8)
MONOCYTES # BLD AUTO: 0.43 K/UL (ref 0–0.85)
MONOCYTES NFR BLD AUTO: 9.3 % (ref 0–13.4)
NEUTROPHILS # BLD AUTO: 2.77 K/UL (ref 2–7.15)
NEUTROPHILS NFR BLD: 59.8 % (ref 44–72)
NRBC # BLD AUTO: 0 K/UL
NRBC BLD-RTO: 0 /100 WBC
PLATELET # BLD AUTO: 259 K/UL (ref 164–446)
PMV BLD AUTO: 10.6 FL (ref 9–12.9)
POTASSIUM SERPL-SCNC: 4 MMOL/L (ref 3.6–5.5)
PROT SERPL-MCNC: 5.5 G/DL (ref 6–8.2)
RBC # BLD AUTO: 3.83 M/UL (ref 4.2–5.4)
SODIUM SERPL-SCNC: 141 MMOL/L (ref 135–145)
WBC # BLD AUTO: 4.6 K/UL (ref 4.8–10.8)

## 2020-09-11 PROCEDURE — 665998 HH PPS REVENUE CREDIT

## 2020-09-11 PROCEDURE — A9270 NON-COVERED ITEM OR SERVICE: HCPCS | Performed by: HOSPITALIST

## 2020-09-11 PROCEDURE — RXMED WILLOW AMBULATORY MEDICATION CHARGE: Performed by: HOSPITALIST

## 2020-09-11 PROCEDURE — A9270 NON-COVERED ITEM OR SERVICE: HCPCS | Performed by: INTERNAL MEDICINE

## 2020-09-11 PROCEDURE — 80053 COMPREHEN METABOLIC PANEL: CPT

## 2020-09-11 PROCEDURE — 700102 HCHG RX REV CODE 250 W/ 637 OVERRIDE(OP): Performed by: HOSPITALIST

## 2020-09-11 PROCEDURE — 700111 HCHG RX REV CODE 636 W/ 250 OVERRIDE (IP): Performed by: INTERNAL MEDICINE

## 2020-09-11 PROCEDURE — 665997 HH PPS REVENUE ADJ

## 2020-09-11 PROCEDURE — 700105 HCHG RX REV CODE 258: Performed by: INTERNAL MEDICINE

## 2020-09-11 PROCEDURE — G0299 HHS/HOSPICE OF RN EA 15 MIN: HCPCS

## 2020-09-11 PROCEDURE — 99239 HOSP IP/OBS DSCHRG MGMT >30: CPT | Performed by: HOSPITALIST

## 2020-09-11 PROCEDURE — 700102 HCHG RX REV CODE 250 W/ 637 OVERRIDE(OP): Performed by: INTERNAL MEDICINE

## 2020-09-11 PROCEDURE — 665999 HH PPS REVENUE DEBIT

## 2020-09-11 PROCEDURE — 85025 COMPLETE CBC W/AUTO DIFF WBC: CPT

## 2020-09-11 RX ORDER — AZITHROMYCIN 250 MG/1
250 TABLET, FILM COATED ORAL DAILY
Qty: 3 TAB | Refills: 0 | Status: SHIPPED
Start: 2020-09-11 | End: 2020-09-15

## 2020-09-11 RX ORDER — AMOXICILLIN AND CLAVULANATE POTASSIUM 875; 125 MG/1; MG/1
1 TABLET, FILM COATED ORAL 2 TIMES DAILY
Qty: 10 TAB | Refills: 0 | Status: SHIPPED
Start: 2020-09-11 | End: 2020-09-15

## 2020-09-11 RX ADMIN — CEFTRIAXONE SODIUM 1 G: 1 INJECTION, POWDER, FOR SOLUTION INTRAMUSCULAR; INTRAVENOUS at 06:05

## 2020-09-11 RX ADMIN — OMEPRAZOLE 20 MG: 20 CAPSULE, DELAYED RELEASE ORAL at 06:11

## 2020-09-11 RX ADMIN — LEVETIRACETAM 500 MG: 500 TABLET ORAL at 06:10

## 2020-09-11 RX ADMIN — LISINOPRIL 20 MG: 20 TABLET ORAL at 06:11

## 2020-09-11 RX ADMIN — OXYCODONE HYDROCHLORIDE 10 MG: 10 TABLET, FILM COATED, EXTENDED RELEASE ORAL at 06:10

## 2020-09-11 RX ADMIN — HYDROCODONE BITARTRATE AND ACETAMINOPHEN 1 TABLET: 10; 325 TABLET ORAL at 09:11

## 2020-09-11 RX ADMIN — AZITHROMYCIN MONOHYDRATE 500 MG: 250 TABLET ORAL at 06:11

## 2020-09-11 RX ADMIN — APIXABAN 5 MG: 5 TABLET, FILM COATED ORAL at 06:09

## 2020-09-11 RX ADMIN — METOPROLOL TARTRATE 12.5 MG: 25 TABLET, FILM COATED ORAL at 06:11

## 2020-09-11 RX ADMIN — DOCUSATE SODIUM 50 MG AND SENNOSIDES 8.6 MG 2 TABLET: 8.6; 5 TABLET, FILM COATED ORAL at 06:11

## 2020-09-11 RX ADMIN — HYDROCODONE BITARTRATE AND ACETAMINOPHEN 2 TABLET: 10; 325 TABLET ORAL at 03:46

## 2020-09-11 ASSESSMENT — ACTIVITIES OF DAILY LIVING (ADL): OASIS_M1830: 03

## 2020-09-11 ASSESSMENT — PAIN DESCRIPTION - PAIN TYPE: TYPE: ACUTE PAIN

## 2020-09-11 NOTE — H&P
Hospital Medicine History & Physical Note    Date of Service  3/24/2019    Primary Care Physician  Tabitha Bautista M.D.    Consultants  [facility]    Code Status  Full code     Chief Complaint  Altered mentation    History of Presenting Illness  48 y.o. female with prior history of hydrocephalus with associated blindness status post shunt placement distant, apparent episodic gastroesophageal reflux disease, was apparently in her usual state of health until the day of admission.  She had been to [place] with a friend, and was acting okay although having periods of weakness, and wanting to sleep.  It is unclear if this is her normal baseline status or a large change, however on the way back from [place], shortly after carrying on a conversation with her friend, she was noted to have fallen asleep or at least what he felt was falling asleep.  After several minutes, he heard a rattling noise or snoring noise, and realized that this represented some respiratory distress, and checked that her airway was open,(he is a retired ), and realized that she did have a open airway, however she was nonresponsive regardless of painful stimuli or otherwise.  She was subsequently brought to this facility for further evaluation.    Review of Systems  Review of Systems   Unable to perform ROS: Mental status change       Past Medical History   has a past medical history of Blind; C. difficile diarrhea (2013); Chronic daily headache; Depression; Esophageal atresia (1971); Fall; GERD (gastroesophageal reflux disease); H/O total hysterectomy; Hydrocephalus; Hydrocephalus; Jaundice; Legally blind; Migraine without aura, without mention of intractable migraine without mention of status migrainosus; Other specified symptom associated with female genital organs; Pain; and Psychiatric problem.    Surgical History   has a past surgical history that includes laparoscopy (8/8/08); lysis adhesions general  (12/10/2013); cystoscopy (12/10/2013); exploratory laparotomy (12/10/2013); appendectomy (12/10/2013); abdominal hysterectomy total (12/10/2013); aakash by laparoscopy (N/A, 8/13/2015); cholecystectomy (N/A, 8/13/2015); other; gastroscopy-endo (N/A, 8/24/2017); nissen fundoplication laparoscopic; and gastroscopy (N/A, 1/2/2019).     Family History  family history includes Hypertension in her father and mother.     Social History   reports that she quit smoking about 19 months ago. Her smoking use included Cigars. She started smoking about 14 years ago. She has a 10.00 pack-year smoking history. She has never used smokeless tobacco. She reports that she drinks alcohol. She reports that she does not use drugs.    Allergies  Allergies   Allergen Reactions   • Tape Rash     RXN= ongoing  Adhesive Medical tape. Per patient, paper tape ok.       Medications  Prior to Admission Medications   Prescriptions Last Dose Informant Patient Reported? Taking?   Dextromethorphan-Guaifenesin (ROBITUSSIN DM)  MG/5ML Syrup   No No   Sig: Take 10 mL by mouth every 6 hours as needed.   LORazepam (ATIVAN) 1 MG Tab  Patient Yes No   Sig: Take 1 mg by mouth 1 time daily as needed (sleep).   acetaminophen (TYLENOL) 325 MG Tab  Patient Yes No   Sig: Take 2 Tabs by mouth every 6 hours as needed (Mild Pain; (Pain scale 1-3); Temp greater than 100.5 F).   famotidine (PEPCID) 40 MG Tab  Patient Yes No   Sig: Take 40 mg by mouth every evening.   gabapentin (NEURONTIN) 400 MG Cap  Patient No No   Sig: Take 400mg PO at 1900, then take 400mg PO before bed   propranolol CR (INDERAL LA) 80 MG CAPSULE SR 24 HR  Patient No No   Sig: Take 1 Cap by mouth every day.      Facility-Administered Medications: None       Physical Exam  Temp:  [36.4 °C (97.5 °F)] 36.4 °C (97.5 °F)  Pulse:  [] 100  Resp:  [15-24] 20  BP: (95)/(59) 95/59  SpO2:  [90 %-100 %] 100 %    Physical Exam   Constitutional: She appears well-developed and well-nourished. No  distress.   Intubated and sedated   HENT:   Head: Normocephalic and atraumatic.   Eyes: Pupils are equal, round, and reactive to light.   Neck: Normal range of motion. Neck supple.   Cardiovascular: Normal rate, regular rhythm and normal heart sounds.  Exam reveals no gallop and no friction rub.    No murmur heard.  Pulmonary/Chest: Effort normal and breath sounds normal. No respiratory distress. She has no wheezes. She has no rales.   Abdominal: Soft. Bowel sounds are normal. She exhibits no distension. There is no tenderness. There is no rebound.   Musculoskeletal: Normal range of motion. She exhibits no edema.   Neurological: No cranial nerve deficit.   Skin: Skin is warm and dry. She is not diaphoretic.   Psychiatric: She has a normal mood and affect.   Nursing note and vitals reviewed.      Laboratory:  Recent Labs      03/24/19 0220   WBC  16.4*   RBC  4.43   HEMOGLOBIN  11.8*   HEMATOCRIT  37.5   MCV  84.7   MCH  26.6*   MCHC  31.5*   RDW  50.3*   PLATELETCT  316   MPV  11.7     Recent Labs      03/24/19 0220   SODIUM  134*   POTASSIUM  4.0   CHLORIDE  107   CO2  20   GLUCOSE  140*   BUN  34*   CREATININE  1.77*   CALCIUM  8.5     Recent Labs      03/24/19 0220   ALTSGPT  463*   ASTSGOT  254*   ALKPHOSPHAT  146*   TBILIRUBIN  0.4   GLUCOSE  140*                 Recent Labs      03/24/19 0220   TROPONINI  <0.01       Urinalysis:    Recent Labs      03/24/19 0230   SPECGRAVITY  1.035   GLUCOSEUR  Negative   KETONES  Trace*   NITRITE  Negative   LEUKESTERAS  Trace*   WBCURINE  0-2   RBCURINE  0-2   BACTERIA  Negative   EPITHELCELL  Few        Imaging:  US-RUQ   Final Result      Unremarkable findings, status post cholecystectomy.      CT-HEAD W/O   Final Result      Stable findings as noted above. No new intracranial abnormality.               INTERPRETING LOCATION:  [address] [facility] [place], [address]      DX-CHEST-PORTABLE (1 VIEW)   Final Result      Probable small left pleural effusion. Unremarkable chest  findings otherwise.      DX-CHEST-PORTABLE (1 VIEW)    (Results Pending)         Assessment/Plan:  I anticipate this patient will require at least two midnights for appropriate medical management, necessitating inpatient admission.    Acute respiratory failure with hypoxia (HCC)- (present on admission)   Assessment & Plan    Status post intubation with mechanical ventilation.  Continue to monitor.  Appreciate intensive care for ventilator management     Blindness- (present on admission)   Assessment & Plan    Since childhood due to his hydrocephalus.  Did have a shunt placed, apparently is working, as appears stable from prior imaging.     Acute kidney injury (HCC)   Assessment & Plan    Versus acute on chronic kidney injury.  Question if this is due to decreased volume status.  Will monitor, obtain urine studies.  Avoid nephrotoxins.     Hepatitis   Assessment & Plan    Unclear etiology her baseline status.  Unfortunately patient cannot provide a history at this time.  Question if there is some toxic ingestion, that also caused her acute encephalopathy.     Acute encephalopathy   Assessment & Plan    Unclear etiology, apparently happened suddenly while driving the car with a friend.  No preevent symptoms reported apparently.         VTE prophylaxis: SCD, lovenox   Primary Defect Length In Cm (Final Defect Size - Required For Flaps/Grafts): 3.7

## 2020-09-11 NOTE — DISCHARGE PLANNING
Agency/Facility Name: [facility]  Spoke To: Mir  Outcome: Advised MD unable to put in order due to pt being discharged for more than two hours. Mir will fax this [facility] a form for MD to fill out. Will fax to Mir once completed.

## 2020-09-11 NOTE — DISCHARGE INSTRUCTIONS
Discharge Instructions    Discharged to home by car with relative. Discharged via wheelchair, hospital escort: Yes.  Special equipment needed: Not Applicable    Be sure to schedule a follow-up appointment with your primary care doctor or any specialists as instructed.     Discharge Plan:   Diet Plan: Discussed  Activity Level: Discussed  Confirmed Follow up Appointment: Patient to Call and Schedule Appointment  Confirmed Symptoms Management: Discussed  Medication Reconciliation Updated: Yes  Influenza Vaccine Indication: Indicated: Not available from distributor/    I understand that a diet low in cholesterol, fat, and sodium is recommended for good health. Unless I have been given specific instructions below for another diet, I accept this instruction as my diet prescription.   Other diet:     Special Instructions: None    · Is patient discharged on Warfarin / Coumadin?   No     Depression / Suicide Risk    As you are discharged from this [facility] facility, it is important to learn how to keep safe from harming yourself.    Recognize the warning signs:  · Abrupt changes in personality, positive or negative- including increase in energy   · Giving away possessions  · Change in eating patterns- significant weight changes-  positive or negative  · Change in sleeping patterns- unable to sleep or sleeping all the time   · Unwillingness or inability to communicate  · Depression  · Unusual sadness, discouragement and loneliness  · Talk of wanting to die  · Neglect of personal appearance   · Rebelliousness- reckless behavior  · Withdrawal from people/activities they love  · Confusion- inability to concentrate     If you or a loved one observes any of these behaviors or has concerns about self-harm, here's what you can do:  · Talk about it- your feelings and reasons for harming yourself  · Remove any means that you might use to hurt yourself (examples: pills, rope, extension cords, firearm)  · Get  professional help from the community (Mental Health, Substance Abuse, psychological counseling)  · Do not be alone:Call your Safe Contact- someone whom you trust who will be there for you.  · Call your local CRISIS HOTLINE 622-6772 or 293-370-1053  · Call your local Children's Mobile Crisis Response Team [place] (489) 338-3916 or www.STAR FESTIVAL  · Call the toll free National Suicide Prevention Hotlines   · National Suicide Prevention Lifeline 736-927-FXFS (6134)  · National Hope Line Network 800-SUICIDE (391-1077)

## 2020-09-11 NOTE — CARE PLAN
Problem: Safety  Goal: Will remain free from injury  Outcome: PROGRESSING AS EXPECTED   PT calls for assistance to bathroom.  One assist, tolerates well

## 2020-09-11 NOTE — DISCHARGE SUMMARY
Discharge Summary    CHIEF COMPLAINT ON ADMISSION  Chief Complaint   Patient presents with   • Tachycardia     Tachycardia today since 0900; pt has had multiple episodes of the same over the past year with multiple tests done but no official diagnosis, per pt (last episode was 2-3 days ago)   • Shortness of Breath     since 0900 with tachycardia       Reason for Admission  other     Admission Date  9/9/2020    CODE STATUS  Full Code    HPI & HOSPITAL COURSE  This is a 49 y.o. female here with shortness of breath and palpitations she was diagnosed with uncontrolled hypertension tachycardia and pneumonia.  The patient was hospitalized patient was given hydration patient was given a beta-blocker and antibiotics.  On the day of discharge we checked the patient's orthostatic she was not orthostatic and she was no longer tachycardic.  Her blood pressure was better and she felt back at her baseline she was discharged with no medications       Therefore, she is discharged in good and stable condition to home with close outpatient follow-up.    The patient recovered much more quickly than anticipated on admission.    Discharge Date  9/11    FOLLOW UP ITEMS POST DISCHARGE      DISCHARGE DIAGNOSES  Active Problems:    Chronic narcotic dependence (HCC) POA: Yes    Seizure disorder (HCC) POA: Yes    Acquired circulating anticoagulants (HCC) POA: Yes  Resolved Problems:    Sinus tachycardia POA: No    CAP (community acquired pneumonia) POA: Yes      FOLLOW UP  Future Appointments   Date Time Provider Department Center   9/11/2020  2:00 PM Nicolas Alvarez R.N. [facility] None   9/15/2020 To Be Determined [facility] TEAM [place] [facility] None   9/15/2020 12:15 PM Darby Ayers, PT [facility] None   9/16/2020  9:30 AM Deb Najera, ABDULKADIR [facility] None   9/22/2020 To Be Determined Darby Ayers PT [facility] None   9/22/2020 To Be Determined Claudia Jerez R.N. [facility] None   9/23/2020 To Be Determined Deb Najera, ABDULKADIR [facility] None   9/29/2020 To Be  Determined Darby Ayers, PT [facility] None   9/29/2020 To Be Determined Claudia Jerez R.N. [facility] None   10/6/2020 To Be Determined Claudia Jerez R.N. [facility] None   12/18/2020  1:00 PM [facility] EXAM 4 [facility] None     Tabitha Bautista M.D.  [address] [facility] [address]  [phone]            MEDICATIONS ON DISCHARGE     Medication List      START taking these medications      Instructions   amoxicillin-clavulanate 875-125 MG Tabs  Commonly known as: AUGMENTIN   Take 1 Tablet by mouth 2 times a day.  Dose: 1 Tab     azithromycin 250 MG Tabs  Commonly known as: ZITHROMAX   Take 1 Tablet by mouth every day.  Dose: 250 mg     metoprolol 25 MG Tabs  Commonly known as: LOPRESSOR   Take 0.5 Tablet by mouth 2 Times a Day.  Dose: 12.5 mg        CONTINUE taking these medications      Instructions   alendronate 70 MG Tabs  Commonly known as: FOSAMAX   Take 70 mg by mouth every 7 days.  Dose: 70 mg     amitriptyline 75 MG Tabs  Commonly known as: ELAVIL   Take 75 mg by mouth every evening.  Dose: 75 mg     anastrozole 1 MG Tabs  Commonly known as: ARIMIDEX   Take 1 mg by mouth every evening.  Dose: 1 mg     Eliquis 5mg Tabs  Generic drug: apixaban   Take 5 mg by mouth 2 Times a Day.  Dose: 5 mg     esomeprazole 40 MG delayed-release capsule  Commonly known as: NEXIUM   Take 40 mg by mouth every day. Before breakfast  Dose: 40 mg     HYDROcodone/acetaminophen  MG Tabs  Commonly known as: NORCO   Take 1-2 Tabs by mouth every four hours as needed.  Dose: 1-2 Tab     levETIRAcetam 500 MG Tabs  Commonly known as: KEPPRA   Take 500 mg by mouth 2 times a day.  Dose: 500 mg     lisinopril 20 MG Tabs  Commonly known as: PRINIVIL   Take 20 mg by mouth every day.  Dose: 20 mg     LORazepam 1 MG Tabs  Commonly known as: ATIVAN   Take 1 mg by mouth every bedtime.  Dose: 1 mg     Xtampza ER 9 MG C12a  Generic drug: oxyCODONE ER   Take 9 mg by mouth every 12 hours.  Dose: 9 mg            Allergies  Allergies   Allergen Reactions   • Latex  Rash     Rash   • Tape Rash     RXN= ongoing  Adhesive Medical tape. Per patient, paper tape ok.       DIET  Orders Placed This Encounter   Procedures   • Diet Order Regular     Standing Status:   Standing     Number of Occurrences:   1     Order Specific Question:   Diet:     Answer:   Regular [1]       ACTIVITY  As tolerated.  Weight bearing as tolerated    CONSULTATIONS      PROCEDURES      LABORATORY  Lab Results   Component Value Date    SODIUM 141 09/11/2020    POTASSIUM 4.0 09/11/2020    CHLORIDE 106 09/11/2020    CO2 24 09/11/2020    GLUCOSE 81 09/11/2020    BUN 13 09/11/2020    CREATININE 0.44 (L) 09/11/2020    CREATININE 0.6 08/12/2008        Lab Results   Component Value Date    WBC 4.6 (L) 09/11/2020    HEMOGLOBIN 11.5 (L) 09/11/2020    HEMATOCRIT 34.6 (L) 09/11/2020    PLATELETCT 259 09/11/2020        Total time of the discharge process exceeds 38 minutes.

## 2020-09-11 NOTE — PROGRESS NOTES
PRN visit on 9/9 for medication management, elevated HR and BP.   SLP advised this RN that pt was trying to fill her own medication planner last night however unsuccessful due to blindness. SLP and this nurse filled medication planner for pt. Pt is out of lisinopril and Ativan. This nurse called and spoke with [facility] pharmacist at [address] and was advised that Ativan will be ready tomorrow (9/10) and pt needs new prescription for  lisinopril since insurance will only pay for 90 tabs at a time. Contacted and LVM with Dr. Bautista's MA regarding lisinopril prescription. Reviewed use of medication planner with pt. Pt was able to get meds from the correct box during this visit. Pt's BP was 142/88 and apical /min. Pt denied any chest pain, SOB or palpitation, reported feeling slightly anxious. This nurse provided emotional support. Pt was in stable condition and [facility] PT arrived at the end of this visit.   Follow up: This RN called and spoke with pt at 1630 later today. Pt checked BP and HR with her own monitor and reported HR at 170/min. Pt also reported palpitation and SOB. This nurse called 911 immediately and pt was transported to [facility] for further evaluation. SN supervisor Samantha advised regarding elevated HR in person.

## 2020-09-11 NOTE — DISCHARGE PLANNING
ATTN: Case Management  RE: Referral for Home Health    As of 09/11/2020, we have accepted the Home Health referral for the patient listed above.    A [facility] clinician will be out to see the patient within 48 hours. If you have any questions or concerns regarding the patient's transition to Home Health, please do not hesitate to contact us at [phone].      We look forward to collaborating with you,  [facility] Team

## 2020-09-11 NOTE — DISCHARGE PLANNING
Received Choice form at 6182  Agency/Facility Name: [facility]  Referral sent per Choice form @ 8138

## 2020-09-11 NOTE — DISCHARGE PLANNING
Meds-to-Beds: Discharge prescription orders listed below delivered to patient's bedside. RN notified. Patient counseled.       Rasheeda Manzano   Home Medication Instructions JESSICA:[id number]    Printed on:09/11/20 1028   Medication Information                      amoxicillin-clavulanate (AUGMENTIN) 875-125 MG Tab  Take 1 Tablet by mouth 2 times a day.             azithromycin (ZITHROMAX) 250 MG Tab  Take 1 Tablet by mouth every day.             metoprolol (LOPRESSOR) 25 MG Tab  Take 0.5 Tablet by mouth 2 Times a Day.               Lydia Maloney, PharmD

## 2020-09-11 NOTE — FACE TO FACE
Face to Face Supporting Documentation - Home Health    The encounter with this patient was in whole or in part the primary reason for home health admission.    Date of encounter:   Patient:                    MRN:                       YOB: 2019  Rasheeda Manzano  [id number]  1970     Home health to see patient for:  Skilled Nursing care for assessment, interventions & education    Skilled need for:  Exacerbation of Chronic Disease State blindness    Skilled nursing interventions to include:  Comment: medications    Homebound status evidenced by:  Need the aid of supportive devices such as crutches, canes, wheelchairs or walkers. Leaving home requires a considerable and taxing effort. There is a normal inability to leave the home.    Community Physician to provide follow up care: Tabitha Bautista M.D.     Optional Interventions? No      I certify the face to face encounter for this home health care referral meets the CMS requirements and the encounter/clinical assessment with the patient was, in whole, or in part, for the medical condition(s) listed above, which is the primary reason for home health care. Based on my clinical findings: the service(s) are medically necessary, support the need for home health care, and the homebound criteria are met.  I certify that this patient has had a face to face encounter by myself.  Hector Haney M.D. - NPI: [id number]

## 2020-09-11 NOTE — PROGRESS NOTES
Pt in bed a&ox4,c/o  Mild pain ,pt not in distress,v/s wnl, maintained, poc explained,on iv abx,call light at bedside.

## 2020-09-12 ENCOUNTER — HOME CARE VISIT (OUTPATIENT)
Dept: HOME HEALTH SERVICES | Facility: HOME HEALTHCARE | Age: 50
End: 2020-09-12
Payer: MEDICARE

## 2020-09-12 VITALS
RESPIRATION RATE: 18 BRPM | HEART RATE: 95 BPM | OXYGEN SATURATION: 97 % | DIASTOLIC BLOOD PRESSURE: 82 MMHG | SYSTOLIC BLOOD PRESSURE: 130 MMHG | TEMPERATURE: 98.5 F

## 2020-09-12 PROCEDURE — 665998 HH PPS REVENUE CREDIT

## 2020-09-12 PROCEDURE — 665999 HH PPS REVENUE DEBIT

## 2020-09-12 ASSESSMENT — ENCOUNTER SYMPTOMS
VOMITING: DENIES
NAUSEA: DENIES

## 2020-09-12 NOTE — PROGRESS NOTES
SNV 9/11  Pt is discharged from [facility] today, reports she came home around 12pm. Pt was diagnosed with pneumonia and antibiotic therapy started at hospital. Two oral ABX and metoprolol have been provided to pt upon hospital DC. Pt picked up some refills from [facility] at [facility] location and tells this nurse that she will ask neighbor to give her a ride to [facility] on [address] to  her lisinopril and Ativan today or tomorrow. Filled pt's med pl lizzy according to new DC med list. Reviewed use of med planner. Pt is able to find the right boxes for Friday evening and Sat morning, states no other questions at this time. Pt's apical HR is 95/min, regular. She denies any chest pain or SOB, states feeling better since treated at hospital and heart rate more stable. Educated pt on s/sx of sepsis, use of ABX and metoprolol. Written instructions on DC med list to hold metoprolol if HR  lower than 60 or SBP lower than 100 mmHg. Pt voiced understanding. MAR updated. Pt is currently in stable condition.

## 2020-09-13 PROCEDURE — 665998 HH PPS REVENUE CREDIT

## 2020-09-13 PROCEDURE — 665999 HH PPS REVENUE DEBIT

## 2020-09-14 PROCEDURE — 665998 HH PPS REVENUE CREDIT

## 2020-09-14 PROCEDURE — 665999 HH PPS REVENUE DEBIT

## 2020-09-15 ENCOUNTER — HOME CARE VISIT (OUTPATIENT)
Dept: HOME HEALTH SERVICES | Facility: HOME HEALTHCARE | Age: 50
End: 2020-09-15
Payer: MEDICARE

## 2020-09-15 ENCOUNTER — ANTICOAGULATION MONITORING (OUTPATIENT)
Dept: VASCULAR LAB | Facility: MEDICAL CENTER | Age: 50
End: 2020-09-15

## 2020-09-15 VITALS
HEART RATE: 84 BPM | RESPIRATION RATE: 16 BRPM | OXYGEN SATURATION: 100 % | TEMPERATURE: 97.9 F | BODY MASS INDEX: 23.9 KG/M2 | SYSTOLIC BLOOD PRESSURE: 104 MMHG | DIASTOLIC BLOOD PRESSURE: 74 MMHG | HEIGHT: 64 IN | WEIGHT: 140 LBS

## 2020-09-15 PROCEDURE — 665999 HH PPS REVENUE DEBIT

## 2020-09-15 PROCEDURE — 665998 HH PPS REVENUE CREDIT

## 2020-09-15 PROCEDURE — 665001 SOC-HOME HEALTH

## 2020-09-15 PROCEDURE — G0493 RN CARE EA 15 MIN HH/HOSPICE: HCPCS

## 2020-09-15 ASSESSMENT — PATIENT HEALTH QUESTIONNAIRE - PHQ9
5. POOR APPETITE OR OVEREATING: 1 - SEVERAL DAYS
1. LITTLE INTEREST OR PLEASURE IN DOING THINGS: 01
CLINICAL INTERPRETATION OF PHQ2 SCORE: 2
2. FEELING DOWN, DEPRESSED, IRRITABLE, OR HOPELESS: 01
SUM OF ALL RESPONSES TO PHQ QUESTIONS 1-9: 8

## 2020-09-15 ASSESSMENT — ENCOUNTER SYMPTOMS
DEPRESSED MOOD: 1
SHORTNESS OF BREATH: T
NAUSEA: DENIES
POOR JUDGMENT: 1
VOMITING: DENIES
DEBILITATING PAIN: 1

## 2020-09-15 ASSESSMENT — FIBROSIS 4 INDEX: FIB4 SCORE: 0.64

## 2020-09-15 NOTE — PROGRESS NOTES
"Karen please send to Tabitha Bautista MD    Primary dx/Skilled need:Pneumonia, Depression, HTN.  Education regarding disease process and education falls into medication reconciliation and instruction for the blind, use of box dispenser and coordination with [place] [facility].  Assess and Instruct regarding management of depression and safety within the home environment.   SN frequency  2wk4   1-2 prn visit for post fall assessment/inter vention  Zip code.[id number]  Disciplines ordered.PT  Insurance and authorization/Medicaid  Certification period.08/13/2020 to 10/11/2020  Special considerations. has no available primary CGR. to fill medication box dispenser, neighbor is \"out of the picture\"  "

## 2020-09-15 NOTE — PROGRESS NOTES
Received referral from [facility]. Medications reviewed. No clinically significant interactions noted.     The only medication on her med list that was not on her discharge summary is Sucralfate.     Too Nicholas, PharmD, MS, BCACP, [facility]  Phone [phone] fax [phone]    This note was created using voice recognition software (Dragon). The accuracy of the dictation is limited by the abilities of the software. I have reviewed the note prior to signing, however some errors in grammar and context are still possible. If you have any questions related to this note please do not hesitate to contact our office.

## 2020-09-16 ENCOUNTER — HOME CARE VISIT (OUTPATIENT)
Dept: HOME HEALTH SERVICES | Facility: HOME HEALTHCARE | Age: 50
End: 2020-09-16
Payer: MEDICARE

## 2020-09-16 PROCEDURE — 665999 HH PPS REVENUE DEBIT

## 2020-09-16 PROCEDURE — 665998 HH PPS REVENUE CREDIT

## 2020-09-17 ENCOUNTER — HOME CARE VISIT (OUTPATIENT)
Dept: HOME HEALTH SERVICES | Facility: HOME HEALTHCARE | Age: 50
End: 2020-09-17
Payer: MEDICARE

## 2020-09-17 VITALS
OXYGEN SATURATION: 98 % | HEART RATE: 73 BPM | DIASTOLIC BLOOD PRESSURE: 72 MMHG | SYSTOLIC BLOOD PRESSURE: 116 MMHG | TEMPERATURE: 98.3 F | RESPIRATION RATE: 16 BRPM

## 2020-09-17 VITALS
TEMPERATURE: 98.3 F | OXYGEN SATURATION: 98 % | RESPIRATION RATE: 16 BRPM | HEART RATE: 74 BPM | DIASTOLIC BLOOD PRESSURE: 72 MMHG | SYSTOLIC BLOOD PRESSURE: 116 MMHG

## 2020-09-17 PROCEDURE — 665999 HH PPS REVENUE DEBIT

## 2020-09-17 PROCEDURE — 665998 HH PPS REVENUE CREDIT

## 2020-09-17 PROCEDURE — G0299 HHS/HOSPICE OF RN EA 15 MIN: HCPCS

## 2020-09-17 PROCEDURE — G0153 HHCP-SVS OF S/L PATH,EA 15MN: HCPCS

## 2020-09-17 ASSESSMENT — ENCOUNTER SYMPTOMS
MUSCLE WEAKNESS: 1
ANGER WITHIN DEFINED LIMITS: 1
AGGRESSION WITHIN DEFINED LIMITS: 1

## 2020-09-17 ASSESSMENT — ACTIVITIES OF DAILY LIVING (ADL): TRANSPORTATION COMMENTS: LEGALLY BLIND

## 2020-09-18 ENCOUNTER — HOME CARE VISIT (OUTPATIENT)
Dept: HOME HEALTH SERVICES | Facility: HOME HEALTHCARE | Age: 50
End: 2020-09-18
Payer: MEDICARE

## 2020-09-18 PROCEDURE — G0151 HHCP-SERV OF PT,EA 15 MIN: HCPCS

## 2020-09-18 PROCEDURE — G0155 HHCP-SVS OF CSW,EA 15 MIN: HCPCS

## 2020-09-18 PROCEDURE — 665999 HH PPS REVENUE DEBIT

## 2020-09-18 PROCEDURE — 665998 HH PPS REVENUE CREDIT

## 2020-09-19 VITALS
OXYGEN SATURATION: 98 % | HEART RATE: 68 BPM | TEMPERATURE: 97.5 F | DIASTOLIC BLOOD PRESSURE: 68 MMHG | SYSTOLIC BLOOD PRESSURE: 110 MMHG | RESPIRATION RATE: 16 BRPM

## 2020-09-19 PROCEDURE — 665999 HH PPS REVENUE DEBIT

## 2020-09-19 PROCEDURE — 665998 HH PPS REVENUE CREDIT

## 2020-09-19 SDOH — ECONOMIC STABILITY: HOUSING INSECURITY
HOME SAFETY: PT LIVES ALONE AND HAS A NEIGHBOR THAT HELPS HER TO STORE AND SO THAT IS THERE ON AND OFF THAT HELPS WITH MEDS AND SOME OTHER THINGS

## 2020-09-19 ASSESSMENT — GAIT ASSESSMENTS
GAIT SCORE: 8
WALKING STANCE: 1 - HEELS ALMOST TOUCHING WHILE WALKING
TRUNK SCORE: 1
TRUNK: 1 - NO SWAY BUT FLEXION OF KNEES OR BACK OR SPREADS ARMS WHILE WALKING
PATH: 1 - MILD/MODERATE DEVIATION OR USES WALKING AID
PATH SCORE: 1
INITIATION OF GAIT IMMEDIATELY AFTER GO: 0 - ANY HESITANCY OR MULTIPLE ATTEMPTS TO START
BALANCE AND GAIT SCORE: 21
STEP SYMMETRY: 1 - RIGHT AND LEFT STEP LENGTH APPEAR EQUAL
STEP CONTINUITY: 0 - STOPPING OR DISCONTINUITY BETWEEN STEPS

## 2020-09-19 ASSESSMENT — ACTIVITIES OF DAILY LIVING (ADL)
BATHING_COMMENTS: SEE OT EVAL FOR MORE DETAILS ON ADLS
AMBULATION ASSISTANCE ON FLAT SURFACES: 1
AMBULATION ASSISTANCE: SUPERVISION
GROOMING_COMMENTS: SEE OT EVAL FOR MORE DETAILS ON ADLS
AMBULATION ASSISTANCE: STAND BY ASSIST
AMBULATION ASSISTANCE: 1
ADLS_COMMENTS: SEE OT EVAL FOR MORE DETAILS ON ADLS
FEEDING_COMMENTS: SEE OT EVAL FOR MORE DETAILS ON ADLS

## 2020-09-19 ASSESSMENT — BALANCE ASSESSMENTS
SITTING DOWN: 1 - USES ARMS OR NOT SMOOTH MOTION
TURNING 360 DEGREES STEPS: 0 - DISCONTINUOUS STEPS
SITTING BALANCE: 1 - STEADY, SAFE
ARISES: 1 - ABLE, USES ARMS TO HELP
ARISING SCORE: 1
IMMEDIATE STANDING BALANCE FIRST 5 SECONDS: 2 - STEADY WITHOUT WALKER OR OTHER SUPPORT
NUDGED: 2 - STEADY
NUDGED SCORE: 2
STANDING BALANCE: 2 - NARROW STANCE WITHOUT SUPPORT
ATTEMPTS TO ARISE: 2 - ABLE TO RISE, ONE ATTEMPT
BALANCE SCORE: 13
EYES CLOSED AT MAXIMUM POSITION NUDGED: 1 - STEADY

## 2020-09-20 PROCEDURE — 665998 HH PPS REVENUE CREDIT

## 2020-09-20 PROCEDURE — 665999 HH PPS REVENUE DEBIT

## 2020-09-21 PROCEDURE — 665998 HH PPS REVENUE CREDIT

## 2020-09-21 PROCEDURE — 665999 HH PPS REVENUE DEBIT

## 2020-09-22 ENCOUNTER — HOME CARE VISIT (OUTPATIENT)
Dept: HOME HEALTH SERVICES | Facility: HOME HEALTHCARE | Age: 50
End: 2020-09-22
Payer: MEDICARE

## 2020-09-22 VITALS
HEART RATE: 79 BPM | RESPIRATION RATE: 16 BRPM | TEMPERATURE: 99.1 F | SYSTOLIC BLOOD PRESSURE: 118 MMHG | OXYGEN SATURATION: 98 % | DIASTOLIC BLOOD PRESSURE: 72 MMHG

## 2020-09-22 VITALS
RESPIRATION RATE: 16 BRPM | SYSTOLIC BLOOD PRESSURE: 118 MMHG | DIASTOLIC BLOOD PRESSURE: 72 MMHG | HEART RATE: 80 BPM | TEMPERATURE: 99.1 F | OXYGEN SATURATION: 98 %

## 2020-09-22 PROCEDURE — G0299 HHS/HOSPICE OF RN EA 15 MIN: HCPCS

## 2020-09-22 PROCEDURE — 665999 HH PPS REVENUE DEBIT

## 2020-09-22 PROCEDURE — 665998 HH PPS REVENUE CREDIT

## 2020-09-22 PROCEDURE — G0153 HHCP-SVS OF S/L PATH,EA 15MN: HCPCS

## 2020-09-22 ASSESSMENT — ACTIVITIES OF DAILY LIVING (ADL): TRANSPORTATION COMMENTS: LEGALLY BLIND

## 2020-09-23 ENCOUNTER — HOME CARE VISIT (OUTPATIENT)
Dept: HOME HEALTH SERVICES | Facility: HOME HEALTHCARE | Age: 50
End: 2020-09-23
Payer: MEDICARE

## 2020-09-23 VITALS
TEMPERATURE: 97.6 F | OXYGEN SATURATION: 97 % | DIASTOLIC BLOOD PRESSURE: 70 MMHG | HEART RATE: 87 BPM | SYSTOLIC BLOOD PRESSURE: 108 MMHG | RESPIRATION RATE: 16 BRPM

## 2020-09-23 PROCEDURE — 665999 HH PPS REVENUE DEBIT

## 2020-09-23 PROCEDURE — G0152 HHCP-SERV OF OT,EA 15 MIN: HCPCS

## 2020-09-23 PROCEDURE — 665998 HH PPS REVENUE CREDIT

## 2020-09-23 ASSESSMENT — ACTIVITIES OF DAILY LIVING (ADL)
TOILETING: 1
LAUNDRY ASSESSED: 1
AMBULATION ASSISTANCE: 1
BATHING ASSESSED: 1
OASIS_M1830: 03
PHYSICAL TRANSFERS ASSESSED: 1
HOUSEKEEPING ASSESSED: 1
SHOPPING ASSESSED: 1
FEEDING ASSESSED: 1
GROOMING ASSESSED: 1

## 2020-09-24 ENCOUNTER — HOME CARE VISIT (OUTPATIENT)
Dept: HOME HEALTH SERVICES | Facility: HOME HEALTHCARE | Age: 50
End: 2020-09-24
Payer: MEDICARE

## 2020-09-24 VITALS
TEMPERATURE: 98.3 F | RESPIRATION RATE: 16 BRPM | HEART RATE: 88 BPM | SYSTOLIC BLOOD PRESSURE: 114 MMHG | DIASTOLIC BLOOD PRESSURE: 72 MMHG | OXYGEN SATURATION: 99 %

## 2020-09-24 PROCEDURE — G0493 RN CARE EA 15 MIN HH/HOSPICE: HCPCS

## 2020-09-24 PROCEDURE — 665999 HH PPS REVENUE DEBIT

## 2020-09-24 PROCEDURE — 665998 HH PPS REVENUE CREDIT

## 2020-09-24 PROCEDURE — G0153 HHCP-SVS OF S/L PATH,EA 15MN: HCPCS

## 2020-09-24 ASSESSMENT — ACTIVITIES OF DAILY LIVING (ADL): TRANSPORTATION COMMENTS: LEGALLY BLIND

## 2020-09-25 ENCOUNTER — HOME CARE VISIT (OUTPATIENT)
Dept: HOME HEALTH SERVICES | Facility: HOME HEALTHCARE | Age: 50
End: 2020-09-25
Payer: MEDICARE

## 2020-09-25 VITALS
OXYGEN SATURATION: 99 % | RESPIRATION RATE: 16 BRPM | TEMPERATURE: 98.3 F | DIASTOLIC BLOOD PRESSURE: 72 MMHG | HEART RATE: 88 BPM | SYSTOLIC BLOOD PRESSURE: 114 MMHG

## 2020-09-25 PROCEDURE — 665999 HH PPS REVENUE DEBIT

## 2020-09-25 PROCEDURE — 665998 HH PPS REVENUE CREDIT

## 2020-09-25 SDOH — ECONOMIC STABILITY: HOUSING INSECURITY
HOME SAFETY: INSTRUCTED PT TO MAINTAIN CLEAR PATHWAYS, REMOVE THROW RUGS IN BR FOR SAFETY. PT VOICE RECORDED HH MAIN NUMBER FOR USE 24/7 AS NEEDED FOR ASSIST/QUESTIONS.

## 2020-09-25 ASSESSMENT — ACTIVITIES OF DAILY LIVING (ADL)
FEEDING: INDEPENDENT
LAUNDRY: INDEPENDENT
LIGHT HOUSEKEEPING: NEEDS ASSISTANCE
PREPARING MEALS: INDEPENDENT
BATHING_CURRENT_FUNCTION: INDEPENDENT
DRESSING_UB_CURRENT_FUNCTION: INDEPENDENT
CURRENT_FUNCTION: INDEPENDENT
SHOPPING: NEEDS ASSISTANCE
DRESSING_LB_CURRENT_FUNCTION: INDEPENDENT
AMBULATION ASSISTANCE: INDEPENDENT
GROOMING_CURRENT_FUNCTION: INDEPENDENT
TOILETING: INDEPENDENT

## 2020-09-25 ASSESSMENT — ENCOUNTER SYMPTOMS: DEPRESSED MOOD: 1

## 2020-09-26 PROCEDURE — 665999 HH PPS REVENUE DEBIT

## 2020-09-26 PROCEDURE — 665998 HH PPS REVENUE CREDIT

## 2020-09-27 ENCOUNTER — APPOINTMENT (OUTPATIENT)
Dept: RADIOLOGY | Facility: MEDICAL CENTER | Age: 50
End: 2020-09-27
Attending: EMERGENCY MEDICINE
Payer: MEDICARE

## 2020-09-27 ENCOUNTER — HOSPITAL ENCOUNTER (EMERGENCY)
Facility: MEDICAL CENTER | Age: 50
End: 2020-09-27
Attending: EMERGENCY MEDICINE
Payer: MEDICARE

## 2020-09-27 VITALS
BODY MASS INDEX: 21.51 KG/M2 | HEART RATE: 109 BPM | OXYGEN SATURATION: 95 % | RESPIRATION RATE: 20 BRPM | DIASTOLIC BLOOD PRESSURE: 91 MMHG | TEMPERATURE: 98.4 F | SYSTOLIC BLOOD PRESSURE: 134 MMHG | HEIGHT: 64 IN | WEIGHT: 126 LBS

## 2020-09-27 DIAGNOSIS — R51.9 ACUTE NONINTRACTABLE HEADACHE, UNSPECIFIED HEADACHE TYPE: ICD-10-CM

## 2020-09-27 DIAGNOSIS — I10 ESSENTIAL HYPERTENSION: ICD-10-CM

## 2020-09-27 LAB
ALBUMIN SERPL BCP-MCNC: 4 G/DL (ref 3.2–4.9)
ALBUMIN/GLOB SERPL: 1.4 G/DL
ALP SERPL-CCNC: 108 U/L (ref 30–99)
ALT SERPL-CCNC: 14 U/L (ref 2–50)
ANION GAP SERPL CALC-SCNC: 13 MMOL/L (ref 7–16)
AST SERPL-CCNC: 14 U/L (ref 12–45)
BASOPHILS # BLD AUTO: 0.8 % (ref 0–1.8)
BASOPHILS # BLD: 0.05 K/UL (ref 0–0.12)
BILIRUB SERPL-MCNC: 0.6 MG/DL (ref 0.1–1.5)
BUN SERPL-MCNC: 9 MG/DL (ref 8–22)
CALCIUM SERPL-MCNC: 9.2 MG/DL (ref 8.5–10.5)
CHLORIDE SERPL-SCNC: 105 MMOL/L (ref 96–112)
CO2 SERPL-SCNC: 22 MMOL/L (ref 20–33)
CREAT SERPL-MCNC: 0.42 MG/DL (ref 0.5–1.4)
EOSINOPHIL # BLD AUTO: 0.11 K/UL (ref 0–0.51)
EOSINOPHIL NFR BLD: 1.7 % (ref 0–6.9)
ERYTHROCYTE [DISTWIDTH] IN BLOOD BY AUTOMATED COUNT: 49.1 FL (ref 35.9–50)
GLOBULIN SER CALC-MCNC: 2.8 G/DL (ref 1.9–3.5)
GLUCOSE SERPL-MCNC: 89 MG/DL (ref 65–99)
HCT VFR BLD AUTO: 38 % (ref 37–47)
HGB BLD-MCNC: 12.5 G/DL (ref 12–16)
IMM GRANULOCYTES # BLD AUTO: 0.02 K/UL (ref 0–0.11)
IMM GRANULOCYTES NFR BLD AUTO: 0.3 % (ref 0–0.9)
LYMPHOCYTES # BLD AUTO: 0.82 K/UL (ref 1–4.8)
LYMPHOCYTES NFR BLD: 12.9 % (ref 22–41)
MCH RBC QN AUTO: 30 PG (ref 27–33)
MCHC RBC AUTO-ENTMCNC: 32.9 G/DL (ref 33.6–35)
MCV RBC AUTO: 91.1 FL (ref 81.4–97.8)
MONOCYTES # BLD AUTO: 0.62 K/UL (ref 0–0.85)
MONOCYTES NFR BLD AUTO: 9.8 % (ref 0–13.4)
NEUTROPHILS # BLD AUTO: 4.73 K/UL (ref 2–7.15)
NEUTROPHILS NFR BLD: 74.5 % (ref 44–72)
NRBC # BLD AUTO: 0 K/UL
NRBC BLD-RTO: 0 /100 WBC
PLATELET # BLD AUTO: 268 K/UL (ref 164–446)
PMV BLD AUTO: 10.2 FL (ref 9–12.9)
POTASSIUM SERPL-SCNC: 3.6 MMOL/L (ref 3.6–5.5)
PROT SERPL-MCNC: 6.8 G/DL (ref 6–8.2)
RBC # BLD AUTO: 4.17 M/UL (ref 4.2–5.4)
SODIUM SERPL-SCNC: 140 MMOL/L (ref 135–145)
WBC # BLD AUTO: 6.4 K/UL (ref 4.8–10.8)

## 2020-09-27 PROCEDURE — 96375 TX/PRO/DX INJ NEW DRUG ADDON: CPT

## 2020-09-27 PROCEDURE — 700111 HCHG RX REV CODE 636 W/ 250 OVERRIDE (IP): Performed by: EMERGENCY MEDICINE

## 2020-09-27 PROCEDURE — 96376 TX/PRO/DX INJ SAME DRUG ADON: CPT

## 2020-09-27 PROCEDURE — 70450 CT HEAD/BRAIN W/O DYE: CPT

## 2020-09-27 PROCEDURE — 99285 EMERGENCY DEPT VISIT HI MDM: CPT

## 2020-09-27 PROCEDURE — 36415 COLL VENOUS BLD VENIPUNCTURE: CPT

## 2020-09-27 PROCEDURE — 96374 THER/PROPH/DIAG INJ IV PUSH: CPT

## 2020-09-27 PROCEDURE — 665998 HH PPS REVENUE CREDIT

## 2020-09-27 PROCEDURE — 80053 COMPREHEN METABOLIC PANEL: CPT

## 2020-09-27 PROCEDURE — 700105 HCHG RX REV CODE 258: Performed by: EMERGENCY MEDICINE

## 2020-09-27 PROCEDURE — 700101 HCHG RX REV CODE 250: Performed by: EMERGENCY MEDICINE

## 2020-09-27 PROCEDURE — 665999 HH PPS REVENUE DEBIT

## 2020-09-27 PROCEDURE — 85025 COMPLETE CBC W/AUTO DIFF WBC: CPT

## 2020-09-27 RX ORDER — SODIUM CHLORIDE, SODIUM LACTATE, POTASSIUM CHLORIDE, CALCIUM CHLORIDE 600; 310; 30; 20 MG/100ML; MG/100ML; MG/100ML; MG/100ML
1000 INJECTION, SOLUTION INTRAVENOUS ONCE
Status: COMPLETED | OUTPATIENT
Start: 2020-09-27 | End: 2020-09-27

## 2020-09-27 RX ORDER — MORPHINE SULFATE 4 MG/ML
4 INJECTION, SOLUTION INTRAMUSCULAR; INTRAVENOUS ONCE
Status: COMPLETED | OUTPATIENT
Start: 2020-09-27 | End: 2020-09-27

## 2020-09-27 RX ORDER — LORAZEPAM 2 MG/ML
1 INJECTION INTRAMUSCULAR ONCE
Status: COMPLETED | OUTPATIENT
Start: 2020-09-27 | End: 2020-09-27

## 2020-09-27 RX ORDER — HYDROMORPHONE HYDROCHLORIDE 1 MG/ML
1 INJECTION, SOLUTION INTRAMUSCULAR; INTRAVENOUS; SUBCUTANEOUS ONCE
Status: COMPLETED | OUTPATIENT
Start: 2020-09-27 | End: 2020-09-27

## 2020-09-27 RX ORDER — ONDANSETRON 2 MG/ML
4 INJECTION INTRAMUSCULAR; INTRAVENOUS ONCE
Status: COMPLETED | OUTPATIENT
Start: 2020-09-27 | End: 2020-09-27

## 2020-09-27 RX ORDER — LABETALOL HYDROCHLORIDE 5 MG/ML
10 INJECTION, SOLUTION INTRAVENOUS ONCE
Status: COMPLETED | OUTPATIENT
Start: 2020-09-27 | End: 2020-09-27

## 2020-09-27 RX ADMIN — HYDROMORPHONE HYDROCHLORIDE 1 MG: 1 INJECTION, SOLUTION INTRAMUSCULAR; INTRAVENOUS; SUBCUTANEOUS at 15:24

## 2020-09-27 RX ADMIN — LABETALOL HYDROCHLORIDE 10 MG: 5 INJECTION, SOLUTION INTRAVENOUS at 16:38

## 2020-09-27 RX ADMIN — MORPHINE SULFATE 4 MG: 4 INJECTION INTRAVENOUS at 12:16

## 2020-09-27 RX ADMIN — ONDANSETRON 4 MG: 2 INJECTION INTRAMUSCULAR; INTRAVENOUS at 12:16

## 2020-09-27 RX ADMIN — SODIUM CHLORIDE, POTASSIUM CHLORIDE, SODIUM LACTATE AND CALCIUM CHLORIDE 1000 ML: 600; 310; 30; 20 INJECTION, SOLUTION INTRAVENOUS at 10:32

## 2020-09-27 RX ADMIN — LORAZEPAM 1 MG: 2 INJECTION INTRAMUSCULAR; INTRAVENOUS at 10:32

## 2020-09-27 RX ADMIN — ONDANSETRON 4 MG: 2 INJECTION INTRAMUSCULAR; INTRAVENOUS at 15:24

## 2020-09-27 RX ADMIN — ONDANSETRON 4 MG: 2 INJECTION INTRAMUSCULAR; INTRAVENOUS at 10:32

## 2020-09-27 ASSESSMENT — ENCOUNTER SYMPTOMS
VOMITING: DENIES
NAUSEA: DENIES

## 2020-09-27 ASSESSMENT — FIBROSIS 4 INDEX: FIB4 SCORE: 0.64

## 2020-09-27 NOTE — ED PROVIDER NOTES
"ED Provider Note    CHIEF COMPLAINT  Chief Complaint   Patient presents with   • Headache     pt bib remsa from home c/o head ache since last night. states has hx of migraine and normally ativan IV helps w/her head ache. took norco w/o relief. denies any numbness/tingling. no focal deficits. pt is blind.       HPI  Rasheeda Manzano is a 49 y.o. female with a history of hydrocephalus, status post shunt placement, chronic headaches, blindness, depression, migraine headaches, seizures, benzodiazepine and opiate dependence, pulmonary embolism, on chronic anticoagulation with Eliquis, hypertension, hypothyroidism who presents complaining of a severe headache since yesterday.  The patient has been seen multiple times in emergency department with complaints of headaches.  She says this one is different and that it is behind her forehead and eyes.  She has had nausea, but no vomiting.  Denies any numbness or tingling to extremities or any focal weakness.  The patient has had multiple CAT scans of the head, and I discussed the need for a repeat CAT scan if she has had similar headaches.  She insists that this headache is quite different from the others.  She denies fever, chills, sore throat, cough, congestion, any difficulty breathing.  She has had no vomiting or diarrhea.    REVIEW OF SYSTEMS  See HPI for further details. All other systems are negative.     PAST MEDICAL HISTORY  Past Medical History:   Diagnosis Date   • Acute nasopharyngitis     H/O Cold 12-8-19   • Blind     age 11   • C. difficile colitis     H/O x3   • C. 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   • Esophageal bleeding 12/18/2019    H/O, \"three times with last one a year ago.\"   • Fall    • GERD (gastroesophageal reflux disease)    • H/O total " "hysterectomy    • Heart burn 12/18/2019   • Hydrocephalus (HCC)    • Hydrocephalus (HCC)     shunt drains into pleural place of L lung   • Indigestion 12/18/2019   • Jaundice     at birth   • Legally blind    • Migraine without aura, without mention of intractable migraine without mention of status migrainosus    • Other specified symptom associated with female genital organs     excessive bleeding   • Pain 12/18/2019    \"Pain In Head From Hydrocephalus.\".   • Pain    • Psychiatric problem     depression   • PTSD (post-traumatic stress disorder)    • Seizure (HCC) 12/18/2019    \"Last seizure one year ago.\"   • Snoring 12/18/2019    Has not had a sleep study.       FAMILY HISTORY  Family History   Problem Relation Age of Onset   • Hypertension Mother    • Hypertension Father    • Non-contributory Neg Hx         Migraine       SOCIAL HISTORY  Social History     Tobacco Use   • Smoking status: Former Smoker     Packs/day: 1.00     Years: 10.00     Pack years: 10.00     Types: Cigars     Start date: 5/1/2004     Quit date: 8/9/2017     Years since quitting: 3.1   • Smokeless tobacco: Never Used   • Tobacco comment:  CIGAR/day    Substance Use Topics   • Alcohol use: Yes     Frequency: Monthly or less     Drinks per session: 1 or 2   • Drug use: No      Social History     Substance and Sexual Activity   Drug Use No       SURGICAL HISTORY  Past Surgical History:   Procedure Laterality Date   • MASTECTOMY Right 12/19/2019    Procedure: MASTECTOMY-PARTIAL;  Surgeon: Brice Johnson M.D.;  Location: SURGERY SAME DAY [facility];  Service: General   • NODE BIOPSY SENTINEL Right 12/19/2019    Procedure: BIOPSY, LYMPH NODE, SENTINEL-AXILLARY;  Surgeon: Brice Johnson M.D.;  Location: SURGERY SAME DAY [facility];  Service: General   • GASTROSCOPY N/A 1/2/2019    Procedure: GASTROSCOPY;  Surgeon: Matias Fernando M.D.;  Location: SURGERY [facility];  Service: Gastroenterology   • GASTROSCOPY-ENDO N/A 8/24/2017    " "Procedure: GASTROSCOPY-ENDO;  Surgeon: Matias Fernando M.D.;  Location: ENDOSCOPY [facility]  Service:    • AYALA BY LAPAROSCOPY N/A 8/13/2015    Procedure: AYALA BY LAPAROSCOPY;  Surgeon: Brice Johnson M.D.;  Location: SURGERY SAME DAY [facility];  Service:    • CHOLECYSTECTOMY N/A 8/13/2015    Procedure: CHOLECYSTECTOMY;  Surgeon: Brice Johnson M.D.;  Location: SURGERY SAME DAY [facility];  Service:    • LYSIS ADHESIONS GENERAL  12/10/2013    Performed by Arie Bass M.D. at SURGERY [facility]   • CYSTOSCOPY  12/10/2013    Performed by Joana Yeager M.D. at [facility]   • ABDOMINAL HYSTERECTOMY TOTAL  12/10/2013    Performed by Joana Yeager M.D. at [facility]   • EXPLORATORY LAPAROTOMY  12/10/2013    Performed by Arie Bass M.D. at [facility]   • APPENDECTOMY  12/10/2013    Performed by Arie Bass M.D. at SURGERY [facility]   • LAPAROSCOPY  8/8/08    Performed by FERNANDO HENDERSON at [facility]   • NISSEN FUNDOPLICATION LAPAROSCOPIC     • OTHER      PLEURAL SHUNT, numerous revisions       CURRENT MEDICATIONS  Home Medications    **Home medications have not yet been reviewed for this encounter**         ALLERGIES  Allergies   Allergen Reactions   • Latex Rash     Rash   • Tape Rash     RXN= ongoing  Adhesive Medical tape. Per patient, paper tape ok.       PHYSICAL EXAM0  VITAL SIGNS: Blood Pressure 147/96   Pulse (Abnormal) 102   Temperature 36.9 °C (98.4 °F) (Temporal)   Respiration (Abnormal) 22   Height 1.626 m (5' 4\")   Weight 57.2 kg (126 lb)   Last Menstrual Period 11/27/2013   Oxygen Saturation 96%   Body Mass Index 21.63 kg/m²   Constitutional: Awake, alert, in no acute distress, Non-toxic appearance.  Laying in a darkened room.  HENT: Atraumatic. Bilateral external ears normal, mucous membranes mildly dry, throat nonerythematous without exudates, nose is normal.  Eyes: Pupils are 3 mm, minimally reactive, " there is some left eye deviation laterally, disconjugate eye movements, no photophobia,  patient is legally blind, and says that she can see light and dark, conjunctiva noninjected.   Neck: Nontender, Normal range of motion, No nuchal rigidity, No stridor.   Lymphatic: No lymphadenopathy noted.   Cardiovascular: The rhythm, tachycardic, rate in the 110s, no murmurs, rubs, gallops.  Thorax & Lungs:  Good breath sounds bilaterally, no wheezes, rales, or retractions.  No chest tenderness.  Abdomen: Bowel sounds normal, Soft, nontender, nondistended, no rebound, guarding, masses.  Back: No CVA or spinal tenderness.  Extremities: Intact distal pulses, No edema, No tenderness.   Skin: Warm, Dry, No rashes.   Musculoskeletal: No joint swelling or tenderness.  Neurologic: Alert & oriented x 3, cranial nerves II through XII are notable for significant loss of vision,  no facial asymmetry, speech is fluent, sensory intact light touch, and motor function shows 5/5 strength to the upper and lower extremities, no focal deficits.   Psychiatric: Affect normal, Judgment normal, Mood normal.       LABS  Labs Reviewed   CBC WITH DIFFERENTIAL - Abnormal; Notable for the following components:       Result Value    RBC 4.17 (*)     MCHC 32.9 (*)     Neutrophils-Polys 74.50 (*)     Lymphocytes 12.90 (*)     Lymphs (Absolute) 0.82 (*)     All other components within normal limits   COMP METABOLIC PANEL - Abnormal; Notable for the following components:    Creatinine 0.42 (*)     Alkaline Phosphatase 108 (*)     All other components within normal limits   ESTIMATED GFR       All labs reviewed by me.      RADIOLOGY/PROCEDURES  CT-HEAD W/O   Final Result      No acute intracranial abnormality          The radiologist's interpretations of all radiological studies have been reviewed by me.        COURSE & MEDICAL DECISION MAKING  Pertinent Labs & Imaging studies reviewed. (See chart for details)  The patient presents with the above complaints.   Clinically she appears dehydrated with dry mucous membranes and tachycardia.  She does not have any new focal neurologic deficits.  IV was placed, she was given a liter bolus of lactated Ringer's, and Ativan 1 mg IV which she says is helped her headaches in the past.  I rechecked the patient reported no improvement of her headache.  She was given morphine 4 mg IV, and Zofran 4 mg IV.  Review of her old chart shows multiple admissions for complaints of headache, dizziness, tachycardia.    The patient has had multiple CT scans, closely 8 of the head alone this year.  However she is at risk as she has a history of a  shunt, and is on chronic anticoagulation.  I discussed the type of headache she was having, and she says it was different from her previous headaches and more severe than usual.  He was not improving with the medications, CT scan of the head without contrast was obtained which shows no acute findings.  The patient was noted to be resting comfortably.  The patient subsequently told nursing that her headache was worse than ever.  She was given a dose of Dilaudid and Zofran.  She was noted to be hypertensive and given a dose of labetalol 10 mg IV.    On recheck, patient was feeling much improved, said the headache was resolved.  Blood pressure was improved.  She is able to tolerate oral fluids, and says she feels good enough to go home.  The patient be discharged instructed to follow-up with her PCP and neurologist.  She is to return to the ER for any fever, recurrent vomiting, unable to tolerate food or fluids, or any other problems.    FINAL IMPRESSION  1. Acute nonintractable headache, unspecified headache type    2. Essential hypertension          Electronically signed by: Pedrito Ocasio M.D., 9/27/2020 10:05 AM

## 2020-09-27 NOTE — ED NOTES
Chief Complaint   Patient presents with   • Headache     pt bib remsa from home c/o head ache since last night. states has hx of migraine and normally ativan IV helps w/her head ache. took norco w/o relief. denies any numbness/tingling. no focal deficits. pt is blind.

## 2020-09-28 ENCOUNTER — HOSPITAL ENCOUNTER (EMERGENCY)
Facility: MEDICAL CENTER | Age: 50
End: 2020-09-28
Attending: EMERGENCY MEDICINE
Payer: MEDICARE

## 2020-09-28 ENCOUNTER — HOME CARE VISIT (OUTPATIENT)
Dept: HOME HEALTH SERVICES | Facility: HOME HEALTHCARE | Age: 50
End: 2020-09-28
Payer: MEDICARE

## 2020-09-28 VITALS
SYSTOLIC BLOOD PRESSURE: 108 MMHG | HEART RATE: 99 BPM | RESPIRATION RATE: 16 BRPM | BODY MASS INDEX: 21.68 KG/M2 | DIASTOLIC BLOOD PRESSURE: 68 MMHG | HEIGHT: 64 IN | OXYGEN SATURATION: 91 % | WEIGHT: 127 LBS | TEMPERATURE: 96.7 F

## 2020-09-28 DIAGNOSIS — R51.9 ACUTE NONINTRACTABLE HEADACHE, UNSPECIFIED HEADACHE TYPE: ICD-10-CM

## 2020-09-28 PROCEDURE — 700111 HCHG RX REV CODE 636 W/ 250 OVERRIDE (IP): Performed by: EMERGENCY MEDICINE

## 2020-09-28 PROCEDURE — 96372 THER/PROPH/DIAG INJ SC/IM: CPT

## 2020-09-28 PROCEDURE — 665999 HH PPS REVENUE DEBIT

## 2020-09-28 PROCEDURE — 99283 EMERGENCY DEPT VISIT LOW MDM: CPT

## 2020-09-28 PROCEDURE — 665998 HH PPS REVENUE CREDIT

## 2020-09-28 RX ORDER — LORAZEPAM 2 MG/ML
1 INJECTION INTRAMUSCULAR ONCE
Status: COMPLETED | OUTPATIENT
Start: 2020-09-28 | End: 2020-09-28

## 2020-09-28 RX ORDER — HALOPERIDOL 5 MG/ML
5 INJECTION INTRAMUSCULAR ONCE
Status: COMPLETED | OUTPATIENT
Start: 2020-09-28 | End: 2020-09-28

## 2020-09-28 RX ORDER — HYDROMORPHONE HYDROCHLORIDE 1 MG/ML
1 INJECTION, SOLUTION INTRAMUSCULAR; INTRAVENOUS; SUBCUTANEOUS ONCE
Status: COMPLETED | OUTPATIENT
Start: 2020-09-28 | End: 2020-09-28

## 2020-09-28 RX ADMIN — LORAZEPAM 1 MG: 2 INJECTION INTRAMUSCULAR; INTRAVENOUS at 03:39

## 2020-09-28 RX ADMIN — HYDROMORPHONE HYDROCHLORIDE 1 MG: 1 INJECTION, SOLUTION INTRAMUSCULAR; INTRAVENOUS; SUBCUTANEOUS at 04:33

## 2020-09-28 RX ADMIN — HALOPERIDOL LACTATE 5 MG: 5 INJECTION, SOLUTION INTRAMUSCULAR at 03:39

## 2020-09-28 ASSESSMENT — FIBROSIS 4 INDEX: FIB4 SCORE: 0.67

## 2020-09-28 NOTE — ED NOTES
Pt discharged home. Verbally explained discharge instructions to pt d/t blindness. Questions and comments addressed. Pt verbalized understanding of instructions. Pt advised to follow-up with PCP in 1 day or return to ED for any new or worsening of symptoms. Pt is ambulating well and steady on feet. VS stable. Pt w/c to ED lobby to meet friend for transport home.

## 2020-09-28 NOTE — ED PROVIDER NOTES
"ED Provider Note    CHIEF COMPLAINT  Chief Complaint   Patient presents with   • Headache     x2 hours 8/10, throbbing right side radiating to left eye.         HPI    Primary care provider: Tabitha Bautista M.D.   History obtained from: Patient  History limited by: None     Rasheeda Manzano is a 49 y.o. female who presents to the ED by EMS complaining of headache again.  Patient reports history of recurrent headaches and this particular episode started around 6 or 7 this morning and she was seen in this ED and felt better and was discharged home.  The headache returned about 2 hours ago described as throbbing mostly on the right side but also radiating to the left side of her frontal region.  She reports that she usually treats herself by using a warm washcloth or going into a dark quiet room when she has headaches but this is not helping.  She takes Norco at home without improvement.  She has tried Imitrex in the past without improvement.  She is requesting Ativan as well as pain medicine for her headache.  She denies any fever/new weakness or sensory change.  Patient has chronic blindness which is unchanged.  She denies shortness of breath/difficulty breathing.  She reports one episode of nausea and vomiting.  She denies diarrhea or dysuria.  She denies possibility of pregnancy with history of hysterectomy.    REVIEW OF SYSTEMS  Please see HPI for pertinent positives/negatives.  All other systems reviewed and are negative.     PAST MEDICAL HISTORY  Past Medical History:   Diagnosis Date   • Esophageal bleeding 12/18/2019    H/O, \"three times with last one a year ago.\"   • Indigestion 12/18/2019   • Seizure (HCC) 12/18/2019    \"Last seizure one year ago.\"   • Heart burn 12/18/2019   • Pain 12/18/2019    \"Pain In Head From Hydrocephalus.\".   • Snoring 12/18/2019    Has not had a sleep study.   • C. 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   • Acute " nasopharyngitis     H/O Cold 12-8-19   • Blind     age 11   • C. difficile colitis     H/O x3   • Cancer (HCC)     Right Breast Cancer DX 2-2019/Moved to right lymph nodes (received radiation on 3/8/2020)   • Chronic daily headache    • Depression    • Fall    • GERD (gastroesophageal reflux disease)    • H/O total hysterectomy    • Hydrocephalus (HCC)    • Hydrocephalus (HCC)     shunt drains into pleural place of L lung   • Jaundice     at birth   • Legally blind    • Migraine without aura, without mention of intractable migraine without mention of status migrainosus    • Other specified symptom associated with female genital organs     excessive bleeding   • Pain    • Psychiatric problem     depression   • PTSD (post-traumatic stress disorder)         SURGICAL HISTORY  Past Surgical History:   Procedure Laterality Date   • MASTECTOMY Right 12/19/2019    Procedure: MASTECTOMY-PARTIAL;  Surgeon: Brice Johnson M.D.;  Location: SURGERY SAME DAY [facility];  Service: General   • NODE BIOPSY SENTINEL Right 12/19/2019    Procedure: BIOPSY, LYMPH NODE, SENTINEL-AXILLARY;  Surgeon: Brice Johnson M.D.;  Location: SURGERY SAME DAY [facility];  Service: General   • GASTROSCOPY N/A 1/2/2019    Procedure: GASTROSCOPY;  Surgeon: Matias Fernando M.D.;  Location: SURGERY [facility];  Service: Gastroenterology   • GASTROSCOPY-ENDO N/A 8/24/2017    Procedure: GASTROSCOPY-ENDO;  Surgeon: Matias Fernando M.D.;  Location: ENDOSCOPY [facility];  Service:    • AYALA BY LAPAROSCOPY N/A 8/13/2015    Procedure: AYALA BY LAPAROSCOPY;  Surgeon: Brice Johnson M.D.;  Location: SURGERY SAME DAY [facility];  Service:    • CHOLECYSTECTOMY N/A 8/13/2015    Procedure: CHOLECYSTECTOMY;  Surgeon: Brice Johnson M.D.;  Location: SURGERY SAME DAY [facility];  Service:    • LYSIS ADHESIONS GENERAL  12/10/2013    Performed by Arie Bass M.D. at SURGERY [facility]   • CYSTOSCOPY  12/10/2013    Performed by Joana NEWTON  "CLAUDIA Yeager at SURGERY [facility]   • ABDOMINAL HYSTERECTOMY TOTAL  12/10/2013    Performed by Joana Yeager M.D. at SURGERY [facility]   • EXPLORATORY LAPAROTOMY  12/10/2013    Performed by Arie Bass M.D. at SURGERY [facility]   • APPENDECTOMY  12/10/2013    Performed by Arie Bass M.D. at SURGERY [facility]   • LAPAROSCOPY  8/8/08    Performed by FERNANDO HENDERSON at SURGERY [facility]   • NISSEN FUNDOPLICATION LAPAROSCOPIC     • OTHER      PLEURAL SHUNT, numerous revisions        SOCIAL HISTORY  Social History     Tobacco Use   • Smoking status: Former Smoker     Packs/day: 1.00     Years: 10.00     Pack years: 10.00     Types: Cigars     Start date: 5/1/2004     Quit date: 8/9/2017     Years since quitting: 3.1   • Smokeless tobacco: Never Used   • Tobacco comment:  CIGAR/day    Substance and Sexual Activity   • Alcohol use: Yes     Frequency: Monthly or less     Drinks per session: 1 or 2   • Drug use: No   • Sexual activity: Yes     Partners: Male        FAMILY HISTORY  Family History   Problem Relation Age of Onset   • Hypertension Mother    • Hypertension Father    • Non-contributory Neg Hx         Migraine        CURRENT MEDICATIONS  Home Medications    **Home medications have not yet been reviewed for this encounter**          ALLERGIES  Allergies   Allergen Reactions   • Latex Rash     Rash   • Tape Rash     RXN= ongoing  Adhesive Medical tape. Per patient, paper tape ok.        PHYSICAL EXAM  VITAL SIGNS: /68   Pulse 99   Temp 35.9 °C (96.7 °F) (Temporal)   Resp 16   Ht 1.626 m (5' 4\")   Wt 57.6 kg (127 lb)   LMP 11/27/2013   SpO2 91%   BMI 21.80 kg/m²  @DAREN[[id number]::@     Pulse ox interpretation: 99% I interpret this pulse ox as normal     Constitutional: Well developed, well nourished, alert in no apparent distress, nontoxic appearance    HENT: No external signs of trauma, normocephalic, mask on due to COVID-19 pandemic  Eyes: PERRL, chronic blindness, " conjunctiva without erythema, no discharge, no icterus    Neck: Soft and supple, trachea midline, no stridor, no tenderness, no LAD, no JVD, good ROM without apparent restrictions or discomfort  Cardiovascular: Tachycardia, no murmurs/rubs/gallops, strong distal pulses and good perfusion    Thorax & Lungs: No respiratory distress, CTAB   Abdomen: Soft, nontender, nondistended, no guarding, no rebound, normal BS    Back: No CVAT    Extremities: No cyanosis, no edema, no gross deformity, good ROM, no tenderness, intact distal pulses with brisk cap refill    Skin: Warm, dry, no pallor/cyanosis, no rash noted    Lymphatic: No lymphadenopathy noted    Neuro: Alert and oriented to person, place, and time.  GCS 15.  CN II-XII grossly intact except for blindness.  Normal speech.  Equal strength bilateral UE/LE.  Sensation intact to touch.   Psychiatric: Cooperative, slightly anxious, normal judgment      DIAGNOSTIC STUDIES / PROCEDURES        LABS  All labs reviewed by me.     Results for orders placed or performed during the hospital encounter of 09/27/20   CBC WITH DIFFERENTIAL   Result Value Ref Range    WBC 6.4 4.8 - 10.8 K/uL    RBC 4.17 (L) 4.20 - 5.40 M/uL    Hemoglobin 12.5 12.0 - 16.0 g/dL    Hematocrit 38.0 37.0 - 47.0 %    MCV 91.1 81.4 - 97.8 fL    MCH 30.0 27.0 - 33.0 pg    MCHC 32.9 (L) 33.6 - 35.0 g/dL    RDW 49.1 35.9 - 50.0 fL    Platelet Count 268 164 - 446 K/uL    MPV 10.2 9.0 - 12.9 fL    Neutrophils-Polys 74.50 (H) 44.00 - 72.00 %    Lymphocytes 12.90 (L) 22.00 - 41.00 %    Monocytes 9.80 0.00 - 13.40 %    Eosinophils 1.70 0.00 - 6.90 %    Basophils 0.80 0.00 - 1.80 %    Immature Granulocytes 0.30 0.00 - 0.90 %    Nucleated RBC 0.00 /100 WBC    Neutrophils (Absolute) 4.73 2.00 - 7.15 K/uL    Lymphs (Absolute) 0.82 (L) 1.00 - 4.80 K/uL    Monos (Absolute) 0.62 0.00 - 0.85 K/uL    Eos (Absolute) 0.11 0.00 - 0.51 K/uL    Baso (Absolute) 0.05 0.00 - 0.12 K/uL    Immature Granulocytes (abs) 0.02 0.00 - 0.11  K/uL    NRBC (Absolute) 0.00 K/uL   COMP METABOLIC PANEL   Result Value Ref Range    Sodium 140 135 - 145 mmol/L    Potassium 3.6 3.6 - 5.5 mmol/L    Chloride 105 96 - 112 mmol/L    Co2 22 20 - 33 mmol/L    Anion Gap 13.0 7.0 - 16.0    Glucose 89 65 - 99 mg/dL    Bun 9 8 - 22 mg/dL    Creatinine 0.42 (L) 0.50 - 1.40 mg/dL    Calcium 9.2 8.5 - 10.5 mg/dL    AST(SGOT) 14 12 - 45 U/L    ALT(SGPT) 14 2 - 50 U/L    Alkaline Phosphatase 108 (H) 30 - 99 U/L    Total Bilirubin 0.6 0.1 - 1.5 mg/dL    Albumin 4.0 3.2 - 4.9 g/dL    Total Protein 6.8 6.0 - 8.2 g/dL    Globulin 2.8 1.9 - 3.5 g/dL    A-G Ratio 1.4 g/dL   ESTIMATED GFR   Result Value Ref Range    GFR If African American >60 >60 mL/min/1.73 m 2    GFR If Non African American >60 >60 mL/min/1.73 m 2        RADIOLOGY  The radiologist's interpretation of all radiological studies have been reviewed by me.     No orders to display          COURSE & MEDICAL DECISION MAKING  Nursing notes, VS, PMSFHx reviewed in chart.     Review of past medical records shows the patient was seen in this ED yesterday for headache.  Labs were unremarkable and CT head without evidence for acute abnormality.  Patient has had multiple CT head including August 28, 2020, August 7, 2020, June 17, 2020, June 9, 2020, April 11, 2020, March 18, 2020 and January 6, 2020 just counting to the beginning of this year.  Patient also had brain MRI on December 11, 2019 with the following findings:    1.  Right parietal prosper hole. Presumed former ventriculoperitoneal shunt tube entry site.  2.  Left parietal ventriculoperitoneal shunt tube traversing the posterior body left lateral ventricle with catheter tip in the right frontal deep white matter at the level of the centrum semiovale. No change from recent CT.  3.  Shunted ventricular system with diminutive caliber of the lateral ventricles and third ventricle and fourth ventricle. Stable since recent CT scan.  4.  Plaque-like collections over both frontal  and parietal convexities with corresponding calcific density on CT. These may represent chronic sequela of subdural hematomas. No change from prior CTs dating back to 2008.  5.  Diffuse dural meningeal thickening and enhancement in the supratentorial compartment and posterior fossa. This is of indeterminate age and could represent chronic sequela of ventriculoperitoneal shunting with or without chronic subdural hematomas.   These findings could also be acute or subacute and could occur in the setting of intracranial hypotension related to over shunting or CSF dynamics in the spine creating a sump effect. This finding is thought unlikely to represent dural meningeal   metastatic disease. Meningeal carcinomatosis usually manifests as leptomeningeal enhancement which is not present.  6.  Dysgenesis of the corpus callosum.  7.  White matter volume loss particularly in the bilateral parietal and occipital lobes, left greater than right.  8.  No evidence of acute infarction or acute hemorrhage.      Differential diagnoses considered include but are not limited to: Tension/migraine/cluster HA, intracranial hemorrhage/SAH, intracranial HTN       History and physical exam as above.  Patient with similar headaches in the past and has had multiple CT scans including yesterday when she was seen in this ED.  She was initially treated with Haldol and Ativan without significant improvement.  She was then given a dose of Dilaudid and improved to the point that patient feels like she can go home.  She is noted to be in no acute distress and nontoxic in appearance without any new focal neurological findings.  Low clinical suspicion at this time for acute serious pathology for her headache or need for emergent imaging or further testing.  She was advised on outpatient follow-up and given return to ED precautions.  Patient also with readings of elevated blood pressure in the ED likely situational and can follow-up on outpatient basis  for further management.  She verbalized understanding and agreed with plan of care with no further questions or concerns.      The patient is referred to a primary physician for blood pressure management, diabetic screening, and for all other preventative health concerns.       FINAL IMPRESSION  1. Acute nonintractable headache, unspecified headache type Acute          DISPOSITION  Patient will be discharged home in stable condition.       FOLLOW UP  Tabitha Bautista M.D.  [address] [facility] [address]  [phone]    Call today      [facility], Emergency Dept  [address] [facility] [address] [phone]    If symptoms worsen         OUTPATIENT MEDICATIONS  Discharge Medication List as of 9/28/2020  5:58 AM             Electronically signed by: Teja Remy D.O., 9/28/2020 3:08 AM      Portions of this record were made with voice recognition software.  Despite my review, spelling/grammar/context errors may still remain.  Interpretation of this chart should be taken in this context.

## 2020-09-28 NOTE — ED TRIAGE NOTES
"Chief Complaint   Patient presents with   • Headache     x2 hours 8/10, throbbing right side radiating to left eye.          /83   Pulse (!) 103   Temp 35.9 °C (96.7 °F) (Temporal)   Resp 16   Ht 1.626 m (5' 4\")   Wt 57.6 kg (127 lb)   LMP 11/27/2013   BMI 21.80 kg/m²       Patient BIB EMS. With complaints listed above. Per EMS patient was seen here earlier today for the same thing. Chart marked for ERP. Patient provided with call light, and educated to call for assistance.     "

## 2020-09-28 NOTE — ED NOTES
Discharge summary completed with patient.  Patient education completed regarding follow up care. Patient wheeled out to lobby where friend Gonzalo will be picking her up. All belongings sent home with patient.

## 2020-09-29 ENCOUNTER — HOME CARE VISIT (OUTPATIENT)
Dept: HOME HEALTH SERVICES | Facility: HOME HEALTHCARE | Age: 50
End: 2020-09-29
Payer: MEDICARE

## 2020-09-29 VITALS
TEMPERATURE: 98.1 F | HEART RATE: 84 BPM | OXYGEN SATURATION: 96 % | RESPIRATION RATE: 16 BRPM | SYSTOLIC BLOOD PRESSURE: 124 MMHG | DIASTOLIC BLOOD PRESSURE: 90 MMHG

## 2020-09-29 PROCEDURE — 665998 HH PPS REVENUE CREDIT

## 2020-09-29 PROCEDURE — G0299 HHS/HOSPICE OF RN EA 15 MIN: HCPCS

## 2020-09-29 PROCEDURE — 665999 HH PPS REVENUE DEBIT

## 2020-09-29 ASSESSMENT — ENCOUNTER SYMPTOMS: MUSCLE WEAKNESS: 1

## 2020-09-30 ENCOUNTER — HOME CARE VISIT (OUTPATIENT)
Dept: HOME HEALTH SERVICES | Facility: HOME HEALTHCARE | Age: 50
End: 2020-09-30
Payer: MEDICARE

## 2020-09-30 PROCEDURE — 665999 HH PPS REVENUE DEBIT

## 2020-09-30 PROCEDURE — 665998 HH PPS REVENUE CREDIT

## 2020-09-30 PROCEDURE — G0151 HHCP-SERV OF PT,EA 15 MIN: HCPCS

## 2020-10-01 ENCOUNTER — HOME CARE VISIT (OUTPATIENT)
Dept: HOME HEALTH SERVICES | Facility: HOME HEALTHCARE | Age: 50
End: 2020-10-01
Payer: MEDICARE

## 2020-10-01 VITALS
DIASTOLIC BLOOD PRESSURE: 74 MMHG | HEART RATE: 90 BPM | RESPIRATION RATE: 16 BRPM | TEMPERATURE: 98.1 F | OXYGEN SATURATION: 97 % | SYSTOLIC BLOOD PRESSURE: 110 MMHG

## 2020-10-01 PROCEDURE — 665998 HH PPS REVENUE CREDIT

## 2020-10-01 PROCEDURE — 665999 HH PPS REVENUE DEBIT

## 2020-10-01 PROCEDURE — G0299 HHS/HOSPICE OF RN EA 15 MIN: HCPCS

## 2020-10-01 NOTE — ED NOTES
DIANE ambulatory encounter  FAMILY PRACTICE OFFICE VISIT    CHIEF COMPLAINT:    Chief Complaint   Patient presents with   • Telephonic Visit       SUBJECTIVE:  Mayda Sagastume is a 63 year old female who presented requesting evaluation for follow up of medication  She was last seen 5 months ago.  She is having more cramps in legs, feels restless at times.  This does affect her ability to fall asleep.  She continue to provide full care for her , who is terminal, and bedridden.  She needs refills for pain meds.  Helps control pain, whole body, from [facility]  Patient is tolerating medications without side effects, has adequate pain relief, and is more functional on current drug regimen.    Review of systems:   Constitutional: Negative for fever and chills.   Skin: Negative for rash.   HEENT: Negative for eye drainage, ear pain, sore throat.  Respiratory: Negative for cough or shortness of breath.    Cardiovascular: Negative for chest pain or chest pressure.   Extremities: as above  Endocrine: Negative for heat or cold intolerance.  Psychiatric: Negative for change in mood or mentation.     OBJECTIVE:  PROBLEM LIST:   Patient Active Problem List   Diagnosis   • Fibromyalgia   • Other B-complex deficiencies   • Unspecified vitamin D deficiency   • Radiculopathy   • Chest pain, unspecified   • Dyspareunia   • Atrophic vaginitis   • Auditory hallucinations   • Chronic insomnia   • Depression   • Anxiety   • Nocturnal leg cramps   • Marijuana abuse   • Pain medication agreement broken   • At risk for abuse of opiates   • Benign paroxysmal positional vertigo of left ear   • Seasonal allergic rhinitis due to pollen   • Adjustment disorder with anxiety   • Acquired hypothyroidism   • Left arm pain   • RLS (restless legs syndrome)       PAST HISTORIES:   I have reviewed the past medical history, family history, social history, medications and allergies listed in the medical record as obtained by my nursing staff and  MD at bedside.   Spouse at bedside.  Medicated per MAR.   support staff and agree with their documentation.  Current Outpatient Medications   Medication Sig Dispense Refill   • morphine SR (MS CONTIN) 15 MG 12 hr tablet Take 1 tablet by mouth 2 times daily. 60 tablet 0   • rOPINIRole (REQUIP) 0.5 MG tablet Take 1 tablet by mouth nightly. 30 tablet 5   • levothyroxine 100 MCG tablet TAKE 1 TABLET BY MOUTH DAILY 90 tablet 1   • DULoxetine (CYMBALTA) 60 MG capsule TAKE ONE CAPSULE BY MOUTH TWICE DAILY 180 capsule 3   • pantoprazole (PROTONIX) 40 MG tablet TAKE 1 TABLET BY MOUTH DAILY 90 tablet 1   • suvorexant 10 MG tablet Take 1 tablet by mouth nightly as needed (insomnia). 30 tablet 3   • busPIRone (BUSPAR) 10 MG tablet Take 1 tablet by mouth 2 times daily. 60 tablet 5   • meclizine HCl (ANTIVERT) 25 MG tablet TAKE 1 TABLET BY MOUTH THREE TIMES DAILY AS NEEDED FOR NAUSEA 30 tablet 0   • cholecalciferol (VITAMIN D3) 1000 UNITS tablet Take 1,000 Units by mouth daily. 2 tablets in the Morning and 2 tablets at night     • Melatonin 2.5 MG Cap Take by mouth nightly as needed. Indications: Trouble Sleeping       No current facility-administered medications for this visit.        PHYSICAL EXAM:   Vital Signs:    Visit Vitals  LMP 09/05/2006       LAB RESULTS:   Lab Results   Component Value Date    SODIUM 141 11/21/2019    POTASSIUM 4.4 11/21/2019    CHLORIDE 108 (H) 11/21/2019    CO2 29 11/21/2019    BUN 12 11/21/2019    CREATININE 0.93 11/21/2019    GLUCOSE 86 11/21/2019     Lab Results   Component Value Date    WBC 9.2 11/21/2019    HCT 44.8 11/21/2019    HGB 14.1 11/21/2019     11/21/2019     TSH (mcUnits/mL)   Date Value   11/21/2019 3.710       ASSESSMENT:   1. Fibromyalgia    2. Pain    3. RLS (restless legs syndrome)    4. Chronic insomnia    5. Adjustment disorder with anxiety        PLAN:   Orders Placed This Encounter   • morphine SR (MS CONTIN) 15 MG 12 hr tablet   • rOPINIRole (REQUIP) 0.5 MG tablet     Refill medications.  Start Requip, educated.  15 minutes  spent with patient    No follow-ups on file.    Instructions provided as documented in the after visit summary.    The patient indicated understanding of the diagnosis and agreed with the plan of care.

## 2020-10-02 ENCOUNTER — HOME CARE VISIT (OUTPATIENT)
Dept: HOME HEALTH SERVICES | Facility: HOME HEALTHCARE | Age: 50
End: 2020-10-02
Payer: MEDICARE

## 2020-10-02 VITALS
HEART RATE: 113 BPM | DIASTOLIC BLOOD PRESSURE: 88 MMHG | TEMPERATURE: 98.1 F | RESPIRATION RATE: 16 BRPM | OXYGEN SATURATION: 98 % | SYSTOLIC BLOOD PRESSURE: 132 MMHG

## 2020-10-02 PROCEDURE — G0155 HHCP-SVS OF CSW,EA 15 MIN: HCPCS

## 2020-10-02 PROCEDURE — G0151 HHCP-SERV OF PT,EA 15 MIN: HCPCS

## 2020-10-02 PROCEDURE — 665998 HH PPS REVENUE CREDIT

## 2020-10-02 PROCEDURE — 665999 HH PPS REVENUE DEBIT

## 2020-10-03 PROCEDURE — 665999 HH PPS REVENUE DEBIT

## 2020-10-03 PROCEDURE — 665998 HH PPS REVENUE CREDIT

## 2020-10-04 VITALS
TEMPERATURE: 98.8 F | HEART RATE: 91 BPM | DIASTOLIC BLOOD PRESSURE: 74 MMHG | RESPIRATION RATE: 16 BRPM | SYSTOLIC BLOOD PRESSURE: 110 MMHG | OXYGEN SATURATION: 98 %

## 2020-10-04 PROCEDURE — 665998 HH PPS REVENUE CREDIT

## 2020-10-04 PROCEDURE — 665999 HH PPS REVENUE DEBIT

## 2020-10-04 ASSESSMENT — GAIT ASSESSMENTS
WALKING STANCE: 1 - HEELS ALMOST TOUCHING WHILE WALKING
PATH SCORE: 1
GAIT SCORE: 9
STEP CONTINUITY: 0 - STOPPING OR DISCONTINUITY BETWEEN STEPS
TRUNK SCORE: 1
TRUNK: 1 - NO SWAY BUT FLEXION OF KNEES OR BACK OR SPREADS ARMS WHILE WALKING
BALANCE AND GAIT SCORE: 22
STEP SYMMETRY: 1 - RIGHT AND LEFT STEP LENGTH APPEAR EQUAL
INITIATION OF GAIT IMMEDIATELY AFTER GO: 1 - NO HESITANCY
PATH: 1 - MILD/MODERATE DEVIATION OR USES WALKING AID

## 2020-10-04 ASSESSMENT — BALANCE ASSESSMENTS
ARISES: 1 - ABLE, USES ARMS TO HELP
SITTING BALANCE: 1 - STEADY, SAFE
BALANCE SCORE: 13
IMMEDIATE STANDING BALANCE FIRST 5 SECONDS: 2 - STEADY WITHOUT WALKER OR OTHER SUPPORT
NUDGED: 2 - STEADY
STANDING BALANCE: 2 - NARROW STANCE WITHOUT SUPPORT
TURNING 360 DEGREES STEPS: 0 - DISCONTINUOUS STEPS
ATTEMPTS TO ARISE: 2 - ABLE TO RISE, ONE ATTEMPT
EYES CLOSED AT MAXIMUM POSITION NUDGED: 1 - STEADY
NUDGED SCORE: 2
ARISING SCORE: 1
SITTING DOWN: 1 - USES ARMS OR NOT SMOOTH MOTION

## 2020-10-04 ASSESSMENT — ENCOUNTER SYMPTOMS: DIFFICULTY THINKING: 1

## 2020-10-05 PROCEDURE — 665999 HH PPS REVENUE DEBIT

## 2020-10-05 PROCEDURE — 665998 HH PPS REVENUE CREDIT

## 2020-10-06 ENCOUNTER — HOME CARE VISIT (OUTPATIENT)
Dept: HOME HEALTH SERVICES | Facility: HOME HEALTHCARE | Age: 50
End: 2020-10-06
Payer: MEDICARE

## 2020-10-06 VITALS
HEART RATE: 76 BPM | SYSTOLIC BLOOD PRESSURE: 124 MMHG | RESPIRATION RATE: 16 BRPM | DIASTOLIC BLOOD PRESSURE: 84 MMHG | OXYGEN SATURATION: 98 % | TEMPERATURE: 97.8 F

## 2020-10-06 PROCEDURE — 665999 HH PPS REVENUE DEBIT

## 2020-10-06 PROCEDURE — 665998 HH PPS REVENUE CREDIT

## 2020-10-06 PROCEDURE — G0299 HHS/HOSPICE OF RN EA 15 MIN: HCPCS

## 2020-10-07 ENCOUNTER — HOSPITAL ENCOUNTER (EMERGENCY)
Facility: MEDICAL CENTER | Age: 50
End: 2020-10-07
Attending: EMERGENCY MEDICINE | Admitting: EMERGENCY MEDICINE
Payer: MEDICARE

## 2020-10-07 VITALS
RESPIRATION RATE: 16 BRPM | HEART RATE: 95 BPM | DIASTOLIC BLOOD PRESSURE: 71 MMHG | HEIGHT: 64 IN | OXYGEN SATURATION: 93 % | TEMPERATURE: 96.8 F | BODY MASS INDEX: 21.68 KG/M2 | SYSTOLIC BLOOD PRESSURE: 113 MMHG | WEIGHT: 127 LBS

## 2020-10-07 DIAGNOSIS — R51.9 NONINTRACTABLE HEADACHE, UNSPECIFIED CHRONICITY PATTERN, UNSPECIFIED HEADACHE TYPE: ICD-10-CM

## 2020-10-07 PROCEDURE — 700102 HCHG RX REV CODE 250 W/ 637 OVERRIDE(OP): Performed by: EMERGENCY MEDICINE

## 2020-10-07 PROCEDURE — 99284 EMERGENCY DEPT VISIT MOD MDM: CPT

## 2020-10-07 PROCEDURE — 665998 HH PPS REVENUE CREDIT

## 2020-10-07 PROCEDURE — 700111 HCHG RX REV CODE 636 W/ 250 OVERRIDE (IP): Performed by: EMERGENCY MEDICINE

## 2020-10-07 PROCEDURE — 665999 HH PPS REVENUE DEBIT

## 2020-10-07 PROCEDURE — A9270 NON-COVERED ITEM OR SERVICE: HCPCS | Performed by: EMERGENCY MEDICINE

## 2020-10-07 PROCEDURE — 96372 THER/PROPH/DIAG INJ SC/IM: CPT

## 2020-10-07 RX ORDER — DEXAMETHASONE SODIUM PHOSPHATE 4 MG/ML
10 INJECTION, SOLUTION INTRA-ARTICULAR; INTRALESIONAL; INTRAMUSCULAR; INTRAVENOUS; SOFT TISSUE ONCE
Status: COMPLETED | OUTPATIENT
Start: 2020-10-07 | End: 2020-10-07

## 2020-10-07 RX ORDER — DIPHENHYDRAMINE HCL 25 MG
25 TABLET ORAL ONCE
Status: COMPLETED | OUTPATIENT
Start: 2020-10-07 | End: 2020-10-07

## 2020-10-07 RX ORDER — PROCHLORPERAZINE MALEATE 10 MG
10 TABLET ORAL ONCE
Status: COMPLETED | OUTPATIENT
Start: 2020-10-07 | End: 2020-10-07

## 2020-10-07 RX ORDER — HYDROMORPHONE HYDROCHLORIDE 1 MG/ML
1 INJECTION, SOLUTION INTRAMUSCULAR; INTRAVENOUS; SUBCUTANEOUS ONCE
Status: COMPLETED | OUTPATIENT
Start: 2020-10-07 | End: 2020-10-07

## 2020-10-07 RX ADMIN — PROCHLORPERAZINE MALEATE 10 MG: 10 TABLET ORAL at 07:38

## 2020-10-07 RX ADMIN — HYDROMORPHONE HYDROCHLORIDE 1 MG: 1 INJECTION, SOLUTION INTRAMUSCULAR; INTRAVENOUS; SUBCUTANEOUS at 06:41

## 2020-10-07 RX ADMIN — DIPHENHYDRAMINE HYDROCHLORIDE 25 MG: 25 TABLET ORAL at 06:40

## 2020-10-07 RX ADMIN — DEXAMETHASONE SODIUM PHOSPHATE 10 MG: 4 INJECTION, SOLUTION INTRA-ARTICULAR; INTRALESIONAL; INTRAMUSCULAR; INTRAVENOUS; SOFT TISSUE at 06:41

## 2020-10-07 ASSESSMENT — FIBROSIS 4 INDEX: FIB4 SCORE: 0.67

## 2020-10-07 NOTE — ED TRIAGE NOTES
"Rasheeda Manzano  49 y.o. female  Chief Complaint   Patient presents with   • Headache     9/10 sharp HA x1 day. Pain starts in right eye and radiates to the back of her head.    • N/V     2 episodes of vomiting     Pt BIB EMS for above complaint.      Pt reports hx of hydrocephalus, is on Elaquis for PE hx. Denies trauma to head. Neuro intact, A/Ox4 GCS 15.     Pt received 4mg ODT Zofran in route from EMS.      Blood Pressure: 125/81, Pulse: 88, Respiration: 16, Temperature: 35.9 °C (96.7 °F), Height: 162.6 cm (5' 4\"), Weight: 57.6 kg (127 lb), BMI (Calculated): 21.8, BSA (Calculated): 1.6, Pulse Oximetry: 100 %, O2 (LPM): 0, O2 Delivery Device: None - Room Air  "

## 2020-10-07 NOTE — ED PROVIDER NOTES
"ED Provider Note    CHIEF COMPLAINT  Chief Complaint   Patient presents with   • Headache     9/10 sharp HA x1 day. Pain starts in right eye and radiates to the back of her head.    • N/V     2 episodes of vomiting       HPI  Rasheeda Manzano is a 49 y.o. female who presents with headache. Headache started yesterday. It is located right side of the head starts in the front near the eye and radiates to the back of the head. Gradual onset. Not exertional. Headache is severe.  It does not radiate. Constant without modifying factors. Has not had associated fever, chills or neck stiffness. No double vision or vision changes. Denies trauma. No other member in the household have. No anticoagulant use. Denies focal neurologic problems. This headache feels the same as all headaches has had in the past.     Medical record is reviewed.  Patient has multiple medical problems.  She has also undergone multiple CT scans.  It appears she has had 35 encounters within the last 2 years during which she has undergone CT scan.  Most recent CT scan of the head on 9/27 without acute findings    REVIEW OF SYSTEMS  As per HPI  All other systems are reviewed and negative    PAST MEDICAL HISTORY  Past Medical History:   Diagnosis Date   • Acute nasopharyngitis     H/O Cold 12-8-19   • Blind     age 11   • C. difficile colitis     H/O x3   • C. 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   • Esophageal bleeding 12/18/2019    H/O, \"three times with last one a year ago.\"   • Fall    • GERD (gastroesophageal reflux disease)    • H/O total hysterectomy    • Heart burn 12/18/2019   • Hydrocephalus (HCC)    • Hydrocephalus (HCC)     shunt drains into pleural place of L lung   • Indigestion 12/18/2019   • Jaundice     at birth   • Legally blind  " "  • Migraine without aura, without mention of intractable migraine without mention of status migrainosus    • Other specified symptom associated with female genital organs     excessive bleeding   • Pain 2019    \"Pain In Head From Hydrocephalus.\".   • Pain    • Psychiatric problem     depression   • PTSD (post-traumatic stress disorder)    • Seizure (HCC) 2019    \"Last seizure one year ago.\"   • Snoring 2019    Has not had a sleep study.       FAMILY HISTORY  Family History   Problem Relation Age of Onset   • Hypertension Mother    • Hypertension Father    • Non-contributory Neg Hx         Migraine       SOCIAL HISTORY  Social History     Tobacco Use   • Smoking status: Former Smoker     Packs/day: 1.00     Years: 10.00     Pack years: 10.00     Types: Cigars     Start date: 2004     Quit date: 2020     Years since quittin.7   • Smokeless tobacco: Never Used   • Tobacco comment:  CIGAR/day    Substance Use Topics   • Alcohol use: Not Currently     Frequency: Monthly or less     Drinks per session: 1 or 2   • Drug use: No       SURGICAL HISTORY  Past Surgical History:   Procedure Laterality Date   • MASTECTOMY Right 2019    Procedure: MASTECTOMY-PARTIAL;  Surgeon: Brice Johnson M.D.;  Location: SURGERY SAME DAY [facility];  Service: General   • NODE BIOPSY SENTINEL Right 2019    Procedure: BIOPSY, LYMPH NODE, SENTINEL-AXILLARY;  Surgeon: Brice Johnson M.D.;  Location: SURGERY SAME DAY [facility];  Service: General   • GASTROSCOPY N/A 2019    Procedure: GASTROSCOPY;  Surgeon: Matias Feranndo M.D.;  Location: SURGERY [facility];  Service: Gastroenterology   • GASTROSCOPY-ENDO N/A 2017    Procedure: GASTROSCOPY-ENDO;  Surgeon: Matias Fernando M.D.;  Location: ENDOSCOPY [facility];  Service:    • AYALA BY LAPAROSCOPY N/A 2015    Procedure: AYALA BY LAPAROSCOPY;  Surgeon: Brice Johnson M.D.;  Location: SURGERY SAME DAY [facility];  Service:    • " "CHOLECYSTECTOMY N/A 8/13/2015    Procedure: CHOLECYSTECTOMY;  Surgeon: Brice Jonhson M.D.;  Location: SURGERY SAME DAY [facility];  Service:    • LYSIS ADHESIONS GENERAL  12/10/2013    Performed by Arie Bass M.D. at SURGERY [facility]   • CYSTOSCOPY  12/10/2013    Performed by Joana Yeager M.D. at SURGERY [facility]   • ABDOMINAL HYSTERECTOMY TOTAL  12/10/2013    Performed by Joana Yeager M.D. at SURGERY [facility]   • EXPLORATORY LAPAROTOMY  12/10/2013    Performed by Arie Bass M.D. at SURGERY [facility]   • APPENDECTOMY  12/10/2013    Performed by Arie Bass M.D. at SURGERY [facility]   • LAPAROSCOPY  8/8/08    Performed by FERNANDO HENDERSON at SURGERY [facility]   • NISSEN FUNDOPLICATION LAPAROSCOPIC     • OTHER      PLEURAL SHUNT, numerous revisions       CURRENT MEDICATIONS  Home Medications     Reviewed by Nikos Santoro R.N. (Registered Nurse) on 10/07/20 at 0615  Med List Status: Partial   Medication Last Dose Status   alendronate (FOSAMAX) 70 MG Tab  Active   amitriptyline (ELAVIL) 75 MG Tab  Active   anastrozole (ARIMIDEX) 1 MG Tab  Active   apixaban (ELIQUIS) 5mg Tab  Active   esomeprazole (NEXIUM) 40 MG delayed-release capsule  Active   HYDROcodone/acetaminophen (NORCO)  MG Tab  Active   hydrOXYzine HCl (ATARAX) 50 MG Tab  Active   levETIRAcetam (KEPPRA) 500 MG Tab  Active   lisinopril (PRINIVIL) 20 MG Tab  Active   LORazepam (ATIVAN) 1 MG Tab  Active   metoprolol (LOPRESSOR) 25 MG Tab  Active   oxyCODONE ER (XTAMPZA ER) 9 MG Capsule Extended Release 12 hour Abuse-Deterrent  Active   sucralfate (CARAFATE) 1 GM Tab  Active                ALLERGIES  Allergies   Allergen Reactions   • Latex Rash     Rash   • Tape Rash     RXN= ongoing  Adhesive Medical tape. Per patient, paper tape ok.       PHYSICAL EXAM  VITAL SIGNS: /81   Pulse 88   Temp 35.9 °C (96.7 °F) (Temporal)   Resp 16   Ht 1.626 m (5' 4\")   Wt 57.6 kg (127 lb)   LMP " 11/27/2013   SpO2 100%   BMI 21.80 kg/m²   Constitutional: Nontoxic and anxious  HENT: Head atraumatic, TMs are clear, pharynx normal  Eyes: She is blind  Neck: Normal range of motion, No tenderness, Supple, No stridor.   Lymphatic: No lymphadenopathy noted.   Cardiovascular: Normal heart rate, Normal rhythm, No murmurs, No rubs, No gallops.   Thorax & Lungs: Normal breath sounds, No respiratory distress, No wheezing, No chest tenderness.   Skin: Warm, Dry, No erythema, No rash.   Extremities: Intact distal pulses, No edema, No tenderness, No cyanosis, No clubbing.   Neurologic: Awake alert oriented.  Moves all extremities symmetrically.  Normal sensory.  Clear speech.  Psychiatric: Affect normal, Judgment normal, Mood normal.         COURSE & MEDICAL DECISION MAKING  Patient presents with chronic recurrent headache.  She describes this as being the exact same as her previous episodes.  No alarming findings by history or physical.  She is treated symptomatically with Decadron and Compazine.  Additionally given 1 mg of Dilaudid.  We discussed problematic recurrent headache.  She will need to follow-up with her doctor as soon as possible.  She was precautioned to return to the ER for any acute symptoms.    FINAL IMPRESSION  1.  Acute recurrent cephalgia    This dictation was created using voice recognition software. The accuracy of the dictation is limited to the abilities of the software. I expect there may be some errors of grammar and possibly content. The nursing notes were reviewed and certain aspects of this information were incorporated into this note.      Electronically signed by: Richmond Cowan M.D., 10/7/2020 6:38 AM

## 2020-10-07 NOTE — ED NOTES
.Patient discharged in stable condition per orders. Patient verbalized understanding of all discharge instructions. All belongings accounted for.

## 2020-10-08 ENCOUNTER — HOME CARE VISIT (OUTPATIENT)
Dept: HOME HEALTH SERVICES | Facility: HOME HEALTHCARE | Age: 50
End: 2020-10-08
Payer: MEDICARE

## 2020-10-08 VITALS
SYSTOLIC BLOOD PRESSURE: 110 MMHG | TEMPERATURE: 97.9 F | HEART RATE: 88 BPM | RESPIRATION RATE: 16 BRPM | OXYGEN SATURATION: 97 % | DIASTOLIC BLOOD PRESSURE: 76 MMHG

## 2020-10-08 PROCEDURE — 665997 HH PPS REVENUE ADJ

## 2020-10-08 PROCEDURE — 665998 HH PPS REVENUE CREDIT

## 2020-10-08 PROCEDURE — 665999 HH PPS REVENUE DEBIT

## 2020-10-08 PROCEDURE — G0493 RN CARE EA 15 MIN HH/HOSPICE: HCPCS

## 2020-10-08 ASSESSMENT — ACTIVITIES OF DAILY LIVING (ADL)
HOME_HEALTH_OASIS: 00
OASIS_M1830: 00

## 2020-10-08 ASSESSMENT — PATIENT HEALTH QUESTIONNAIRE - PHQ9: CLINICAL INTERPRETATION OF PHQ2 SCORE: 0

## 2020-10-09 PROCEDURE — 665998 HH PPS REVENUE CREDIT

## 2020-10-09 PROCEDURE — 665999 HH PPS REVENUE DEBIT

## 2020-10-10 PROCEDURE — 665998 HH PPS REVENUE CREDIT

## 2020-10-10 PROCEDURE — 665999 HH PPS REVENUE DEBIT

## 2020-10-11 PROCEDURE — 665997 HH PPS REVENUE ADJ

## 2020-10-11 PROCEDURE — 665999 HH PPS REVENUE DEBIT

## 2020-10-11 PROCEDURE — 665998 HH PPS REVENUE CREDIT

## 2020-10-13 ENCOUNTER — HOME CARE VISIT (OUTPATIENT)
Dept: HOME HEALTH SERVICES | Facility: HOME HEALTHCARE | Age: 50
End: 2020-10-13

## 2020-11-01 ENCOUNTER — APPOINTMENT (OUTPATIENT)
Dept: RADIOLOGY | Facility: MEDICAL CENTER | Age: 50
End: 2020-11-01
Attending: EMERGENCY MEDICINE
Payer: MEDICARE

## 2020-11-01 ENCOUNTER — HOSPITAL ENCOUNTER (EMERGENCY)
Facility: MEDICAL CENTER | Age: 50
End: 2020-11-02
Attending: EMERGENCY MEDICINE
Payer: MEDICARE

## 2020-11-01 DIAGNOSIS — J18.9 PNEUMONIA OF LEFT LUNG DUE TO INFECTIOUS ORGANISM, UNSPECIFIED PART OF LUNG: ICD-10-CM

## 2020-11-01 DIAGNOSIS — E87.6 HYPOKALEMIA: ICD-10-CM

## 2020-11-01 DIAGNOSIS — R51.9 BAD HEADACHE: ICD-10-CM

## 2020-11-01 LAB
ALBUMIN SERPL BCP-MCNC: 4.3 G/DL (ref 3.2–4.9)
ALBUMIN/GLOB SERPL: 1.4 G/DL
ALP SERPL-CCNC: 114 U/L (ref 30–99)
ALT SERPL-CCNC: 22 U/L (ref 2–50)
ANION GAP SERPL CALC-SCNC: 15 MMOL/L (ref 7–16)
AST SERPL-CCNC: 10 U/L (ref 12–45)
BASOPHILS # BLD AUTO: 0.6 % (ref 0–1.8)
BASOPHILS # BLD: 0.05 K/UL (ref 0–0.12)
BILIRUB SERPL-MCNC: 0.9 MG/DL (ref 0.1–1.5)
BUN SERPL-MCNC: 5 MG/DL (ref 8–22)
CALCIUM SERPL-MCNC: 9.7 MG/DL (ref 8.5–10.5)
CHLORIDE SERPL-SCNC: 104 MMOL/L (ref 96–112)
CO2 SERPL-SCNC: 22 MMOL/L (ref 20–33)
COVID ORDER STATUS COVID19: NORMAL
CREAT SERPL-MCNC: 0.61 MG/DL (ref 0.5–1.4)
EKG IMPRESSION: NORMAL
EOSINOPHIL # BLD AUTO: 0.06 K/UL (ref 0–0.51)
EOSINOPHIL NFR BLD: 0.7 % (ref 0–6.9)
ERYTHROCYTE [DISTWIDTH] IN BLOOD BY AUTOMATED COUNT: 45.8 FL (ref 35.9–50)
GLOBULIN SER CALC-MCNC: 3.1 G/DL (ref 1.9–3.5)
GLUCOSE SERPL-MCNC: 112 MG/DL (ref 65–99)
HCT VFR BLD AUTO: 40.2 % (ref 37–47)
HGB BLD-MCNC: 13.3 G/DL (ref 12–16)
IMM GRANULOCYTES # BLD AUTO: 0.03 K/UL (ref 0–0.11)
IMM GRANULOCYTES NFR BLD AUTO: 0.4 % (ref 0–0.9)
LYMPHOCYTES # BLD AUTO: 1.12 K/UL (ref 1–4.8)
LYMPHOCYTES NFR BLD: 13.2 % (ref 22–41)
MCH RBC QN AUTO: 29.2 PG (ref 27–33)
MCHC RBC AUTO-ENTMCNC: 33.1 G/DL (ref 33.6–35)
MCV RBC AUTO: 88.2 FL (ref 81.4–97.8)
MONOCYTES # BLD AUTO: 0.55 K/UL (ref 0–0.85)
MONOCYTES NFR BLD AUTO: 6.5 % (ref 0–13.4)
NEUTROPHILS # BLD AUTO: 6.65 K/UL (ref 2–7.15)
NEUTROPHILS NFR BLD: 78.6 % (ref 44–72)
NRBC # BLD AUTO: 0 K/UL
NRBC BLD-RTO: 0 /100 WBC
PLATELET # BLD AUTO: 415 K/UL (ref 164–446)
PMV BLD AUTO: 10.3 FL (ref 9–12.9)
POTASSIUM SERPL-SCNC: 2.9 MMOL/L (ref 3.6–5.5)
PROT SERPL-MCNC: 7.4 G/DL (ref 6–8.2)
RBC # BLD AUTO: 4.56 M/UL (ref 4.2–5.4)
SODIUM SERPL-SCNC: 141 MMOL/L (ref 135–145)
TROPONIN T SERPL-MCNC: <6 NG/L (ref 6–19)
WBC # BLD AUTO: 8.5 K/UL (ref 4.8–10.8)

## 2020-11-01 PROCEDURE — 36415 COLL VENOUS BLD VENIPUNCTURE: CPT

## 2020-11-01 PROCEDURE — 93005 ELECTROCARDIOGRAM TRACING: CPT | Performed by: EMERGENCY MEDICINE

## 2020-11-01 PROCEDURE — 80053 COMPREHEN METABOLIC PANEL: CPT

## 2020-11-01 PROCEDURE — 99285 EMERGENCY DEPT VISIT HI MDM: CPT

## 2020-11-01 PROCEDURE — 700101 HCHG RX REV CODE 250: Performed by: EMERGENCY MEDICINE

## 2020-11-01 PROCEDURE — 71045 X-RAY EXAM CHEST 1 VIEW: CPT

## 2020-11-01 PROCEDURE — 700111 HCHG RX REV CODE 636 W/ 250 OVERRIDE (IP): Performed by: EMERGENCY MEDICINE

## 2020-11-01 PROCEDURE — 70450 CT HEAD/BRAIN W/O DYE: CPT

## 2020-11-01 PROCEDURE — 84484 ASSAY OF TROPONIN QUANT: CPT

## 2020-11-01 PROCEDURE — C9803 HOPD COVID-19 SPEC COLLECT: HCPCS | Performed by: EMERGENCY MEDICINE

## 2020-11-01 PROCEDURE — U0003 INFECTIOUS AGENT DETECTION BY NUCLEIC ACID (DNA OR RNA); SEVERE ACUTE RESPIRATORY SYNDROME CORONAVIRUS 2 (SARS-COV-2) (CORONAVIRUS DISEASE [COVID-19]), AMPLIFIED PROBE TECHNIQUE, MAKING USE OF HIGH THROUGHPUT TECHNOLOGIES AS DESCRIBED BY CMS-2020-01-R: HCPCS

## 2020-11-01 PROCEDURE — 85025 COMPLETE CBC W/AUTO DIFF WBC: CPT

## 2020-11-01 PROCEDURE — 700105 HCHG RX REV CODE 258: Performed by: EMERGENCY MEDICINE

## 2020-11-01 PROCEDURE — 96375 TX/PRO/DX INJ NEW DRUG ADDON: CPT

## 2020-11-01 PROCEDURE — 96374 THER/PROPH/DIAG INJ IV PUSH: CPT

## 2020-11-01 RX ORDER — HYDROMORPHONE HYDROCHLORIDE 1 MG/ML
0.5 INJECTION, SOLUTION INTRAMUSCULAR; INTRAVENOUS; SUBCUTANEOUS ONCE
Status: COMPLETED | OUTPATIENT
Start: 2020-11-01 | End: 2020-11-01

## 2020-11-01 RX ORDER — SODIUM CHLORIDE AND POTASSIUM CHLORIDE 150; 900 MG/100ML; MG/100ML
INJECTION, SOLUTION INTRAVENOUS ONCE
Status: COMPLETED | OUTPATIENT
Start: 2020-11-01 | End: 2020-11-02

## 2020-11-01 RX ORDER — SODIUM CHLORIDE 9 MG/ML
1000 INJECTION, SOLUTION INTRAVENOUS ONCE
Status: COMPLETED | OUTPATIENT
Start: 2020-11-01 | End: 2020-11-01

## 2020-11-01 RX ORDER — DIPHENHYDRAMINE HYDROCHLORIDE 50 MG/ML
25 INJECTION INTRAMUSCULAR; INTRAVENOUS ONCE
Status: COMPLETED | OUTPATIENT
Start: 2020-11-01 | End: 2020-11-01

## 2020-11-01 RX ORDER — METOCLOPRAMIDE HYDROCHLORIDE 5 MG/ML
10 INJECTION INTRAMUSCULAR; INTRAVENOUS ONCE
Status: COMPLETED | OUTPATIENT
Start: 2020-11-01 | End: 2020-11-01

## 2020-11-01 RX ADMIN — HYDROMORPHONE HYDROCHLORIDE 0.5 MG: 1 INJECTION, SOLUTION INTRAMUSCULAR; INTRAVENOUS; SUBCUTANEOUS at 21:24

## 2020-11-01 RX ADMIN — POTASSIUM CHLORIDE AND SODIUM CHLORIDE: 900; 150 INJECTION, SOLUTION INTRAVENOUS at 22:39

## 2020-11-01 RX ADMIN — METOCLOPRAMIDE 10 MG: 5 INJECTION, SOLUTION INTRAMUSCULAR; INTRAVENOUS at 21:24

## 2020-11-01 RX ADMIN — SODIUM CHLORIDE 1000 ML: 9 INJECTION, SOLUTION INTRAVENOUS at 21:24

## 2020-11-01 RX ADMIN — DIPHENHYDRAMINE HYDROCHLORIDE 25 MG: 50 INJECTION INTRAMUSCULAR; INTRAVENOUS at 21:24

## 2020-11-01 ASSESSMENT — FIBROSIS 4 INDEX: FIB4 SCORE: 0.67

## 2020-11-02 ENCOUNTER — HOSPITAL ENCOUNTER (EMERGENCY)
Facility: MEDICAL CENTER | Age: 50
End: 2020-11-02
Payer: MEDICARE

## 2020-11-02 VITALS
HEART RATE: 80 BPM | WEIGHT: 127 LBS | RESPIRATION RATE: 16 BRPM | BODY MASS INDEX: 21.68 KG/M2 | SYSTOLIC BLOOD PRESSURE: 146 MMHG | DIASTOLIC BLOOD PRESSURE: 96 MMHG | TEMPERATURE: 97.4 F | OXYGEN SATURATION: 99 % | HEIGHT: 64 IN

## 2020-11-02 VITALS
OXYGEN SATURATION: 100 % | HEIGHT: 64 IN | HEART RATE: 86 BPM | DIASTOLIC BLOOD PRESSURE: 93 MMHG | TEMPERATURE: 97 F | SYSTOLIC BLOOD PRESSURE: 135 MMHG | BODY MASS INDEX: 21.8 KG/M2 | RESPIRATION RATE: 18 BRPM

## 2020-11-02 LAB
APPEARANCE UR: CLEAR
BILIRUB UR QL STRIP.AUTO: NEGATIVE
COLOR UR: YELLOW
GLUCOSE UR STRIP.AUTO-MCNC: NEGATIVE MG/DL
KETONES UR STRIP.AUTO-MCNC: NEGATIVE MG/DL
LEUKOCYTE ESTERASE UR QL STRIP.AUTO: NEGATIVE
MICRO URNS: NORMAL
NITRITE UR QL STRIP.AUTO: NEGATIVE
PH UR STRIP.AUTO: 8 [PH] (ref 5–8)
POTASSIUM SERPL-SCNC: 3.5 MMOL/L (ref 3.6–5.5)
PROT UR QL STRIP: NEGATIVE MG/DL
RBC UR QL AUTO: NEGATIVE
SARS-COV-2 RNA RESP QL NAA+PROBE: NOTDETECTED
SP GR UR STRIP.AUTO: 1.01
SPECIMEN SOURCE: NORMAL

## 2020-11-02 PROCEDURE — 302449 STATCHG TRIAGE ONLY (STATISTIC)

## 2020-11-02 PROCEDURE — 84132 ASSAY OF SERUM POTASSIUM: CPT

## 2020-11-02 PROCEDURE — 81003 URINALYSIS AUTO W/O SCOPE: CPT

## 2020-11-02 RX ORDER — AZITHROMYCIN 250 MG/1
TABLET, FILM COATED ORAL
Qty: 6 TAB | Refills: 0 | Status: SHIPPED | OUTPATIENT
Start: 2020-11-02

## 2020-11-02 RX ORDER — AMOXICILLIN 500 MG/1
1000 CAPSULE ORAL 3 TIMES DAILY
Qty: 30 CAP | Refills: 0 | Status: SHIPPED | OUTPATIENT
Start: 2020-11-02 | End: 2020-11-07

## 2020-11-02 RX ORDER — POTASSIUM CHLORIDE 20 MEQ/1
20 TABLET, EXTENDED RELEASE ORAL 2 TIMES DAILY
Qty: 10 TAB | Refills: 0 | Status: SHIPPED | OUTPATIENT
Start: 2020-11-02 | End: 2020-11-07

## 2020-11-02 RX ORDER — ONDANSETRON 4 MG/1
4 TABLET, ORALLY DISINTEGRATING ORAL EVERY 8 HOURS PRN
Qty: 10 TAB | Refills: 0 | Status: SHIPPED | OUTPATIENT
Start: 2020-11-02

## 2020-11-02 NOTE — ED PROVIDER NOTES
ED PROVIDER NOTE    Scribed for Elvira Liz M.D. by Hannah Ferrer. 11/2/2020, 12:05 AM.    This is an addendum to the note on Rasheeda Manzano. For further details and full chart entry, see the previously signed ED Provider Note written by Dr. Gamez (ERP).      12:05 AM - I discussed the patient's case with Dr. Gamez (ERP) who will transfer care of the patient to me at this time.        2:40 AM - Patient was reevaluated at bedside. Discussed lab and radiology results with the patient.  Repeat potassium is improving, patient is advised on how to manage low potassium at home with oral potassium and magnesium supplementation.  Discussed discharge instructions and return precautions with the patient and they were cleared for discharge. Patient was given the opportunity to ask any further questions.  Patient is comfortable with discharge at this time.      The patient will return for new or worsening symptoms and is stable at the time of discharge.    The patient is referred to a primary physician for blood pressure management, diabetic screening, and for all other preventative health concerns.    DISPOSITION:  Patient will be discharged home in stable condition.    FOLLOW UP:  Tabitha Bautista M.D.  [address] [place] [id number]  [phone]    Schedule an appointment as soon as possible for a visit in 2 days        OUTPATIENT MEDICATIONS:  New Prescriptions    AMOXICILLIN (AMOXIL) 500 MG CAP    Take 2 Caps by mouth 3 times a day for 5 days.    ONDANSETRON (ZOFRAN ODT) 4 MG TABLET DISPERSIBLE    Take 1 Tab by mouth every 8 hours as needed for Nausea.    POTASSIUM CHLORIDE SA (KDUR) 20 MEQ TAB CR    Take 1 Tab by mouth 2 times a day for 5 days.       FINAL IMPRESSION   1. Hypokalemia    2. Bad headache    3. Pneumonia of left lung due to infectious organism, unspecified part of lung         I, Hannah Ferrer (Scribe), am scribing for, and in the presence of, Elvira Liz M.D..    Electronically signed by:  Hannah Ferrer (Scribe), 11/2/2020    IElvira M.D. personally performed the services described in this documentation, as scribed by Hannah Ferrer in my presence, and it is both accurate and complete.    The note accurately reflects work and decisions made by me.  Elvira Liz M.D.  11/2/2020  2:43 AM

## 2020-11-02 NOTE — ED PROVIDER NOTES
ED Provider Note    Scribed for Jaciel Gamez M.D. by Dewey Wayne. 11/1/2020, 8:08 PM.    Primary care provider: Tabitha Bautista M.D.  Means of arrival: EMS  History obtained from: Patient  History limited by: None    CHIEF COMPLAINT  Chief Complaint   Patient presents with   • Headache   • N/V       HPI  Rasheeda Manzano is a 49 y.o. female with a history of migraines and hydrocephalus who presents to the Emergency Department for evaluation of a headache. Patient states that earlier today she had a sudden onset of a  headache which has been constant in nature and feels significantly worse than her normal headaches. Patient also reports having associated nausea and vomiting with her headache, and has also had diarrhea over the last two days, however notes that it has been improving. Patient has been unable to control or tolerate her headache, and thus has come here for evaluation and treatment. She is anticoagulated on Eliquis secondary to her history of pulmonary emboli, and states that she has been compliant with taking this. She also has had a shunt in place since she was a child secondary to her hydrocephalus which she was born with. She otherwise denies any abdominal pain, fevers, chills, cough, chest pain or shortness of breath    REVIEW OF SYSTEMS  Pertinent positives include headache, nausea, vomiting, diarrhea. Pertinent negatives include no abdominal pain, fevers, chills, cough, chest pain, shortness of breath.  All other systems reviewed and negative.    PAST MEDICAL HISTORY   has a past medical history of Acute nasopharyngitis, Blind, C. difficile colitis, C. difficile diarrhea (2013), Cancer (HCC), Chronic daily headache, Depression, Esophageal atresia (1971), Esophageal bleeding (12/18/2019), Fall, GERD (gastroesophageal reflux disease), H/O total hysterectomy, Heart burn (12/18/2019), Hydrocephalus (HCC), Hydrocephalus (HCC), Indigestion (12/18/2019), Jaundice, Legally blind, Migraine  without aura, without mention of intractable migraine without mention of status migrainosus, Other specified symptom associated with female genital organs, Pain (2019), Pain, Psychiatric problem, PTSD (post-traumatic stress disorder), Seizure (HCC) (2019), and Snoring (2019).    SURGICAL HISTORY   has a past surgical history that includes laparoscopy (08); lysis adhesions general (12/10/2013); cystoscopy (12/10/2013); aakash by laparoscopy (N/A, 2015); gastroscopy-endo (N/A, 2017); nissen fundoplication laparoscopic; abdominal hysterectomy total (12/10/2013); other; exploratory laparotomy (12/10/2013); appendectomy (12/10/2013); cholecystectomy (N/A, 2015); gastroscopy (N/A, 2019); mastectomy (Right, 2019); and node biopsy sentinel (Right, 2019).    SOCIAL HISTORY  Social History     Tobacco Use   • Smoking status: Former Smoker     Packs/day: 1.00     Years: 10.00     Pack years: 10.00     Types: Cigars     Start date: 2004     Quit date: 2020     Years since quittin.8   • Smokeless tobacco: Never Used   • Tobacco comment:  CIGAR/day    Substance Use Topics   • Alcohol use: Not Currently     Frequency: Monthly or less     Drinks per session: 1 or 2   • Drug use: No      Social History     Substance and Sexual Activity   Drug Use No       FAMILY HISTORY  Family History   Problem Relation Age of Onset   • Hypertension Mother    • Hypertension Father    • Non-contributory Neg Hx         Migraine       CURRENT MEDICATIONS  No current facility-administered medications on file prior to encounter.      Current Outpatient Medications on File Prior to Encounter   Medication Sig Dispense Refill   • sucralfate (CARAFATE) 1 GM Tab Take 1 g by mouth every day at 6 PM.     • hydrOXYzine HCl (ATARAX) 50 MG Tab Take 50 mg by mouth 1 time daily as needed for Anxiety.     • metoprolol (LOPRESSOR) 25 MG Tab Take 0.5 Tablet by mouth 2 Times a Day. (Patient taking  "differently: Take 0.5 Tabs by mouth 2 Times a Day. Per DC instructions:  Hold this medication if pulse lower than 60/min and/or SBP lower than 100 mmHg. ) 60 Tab 2   • HYDROcodone/acetaminophen (NORCO)  MG Tab Take 1-2 Tabs by mouth every four hours as needed.     • esomeprazole (NEXIUM) 40 MG delayed-release capsule Take 40 mg by mouth every day. Before breakfast     • oxyCODONE ER (XTAMPZA ER) 9 MG Capsule Extended Release 12 hour Abuse-Deterrent Take 9 mg by mouth every 12 hours.     • amitriptyline (ELAVIL) 75 MG Tab Take 75 mg by mouth every evening.     • apixaban (ELIQUIS) 5mg Tab Take 5 mg by mouth 2 Times a Day.     • alendronate (FOSAMAX) 70 MG Tab Take 70 mg by mouth every 7 days.     • lisinopril (PRINIVIL) 20 MG Tab Take 20 mg by mouth every day.     • LORazepam (ATIVAN) 1 MG Tab Take 1 mg by mouth every bedtime.     • anastrozole (ARIMIDEX) 1 MG Tab Take 1 mg by mouth every evening.     • levETIRAcetam (KEPPRA) 500 MG Tab Take 500 mg by mouth 2 times a day.           ALLERGIES  Allergies   Allergen Reactions   • Latex Rash     Rash   • Tape Rash     RXN= ongoing  Adhesive Medical tape. Per patient, paper tape ok.       PHYSICAL EXAM  VITAL SIGNS: /94   Pulse 75   Temp 36.3 °C (97.4 °F) (Temporal)   Resp 16   Ht 1.626 m (5' 4\")   Wt 57.6 kg (127 lb)   LMP 11/27/2013   SpO2 98%   BMI 21.80 kg/m²     Constitutional: Well developed, Well nourished, No acute distress, Non-toxic appearance.   HENT: Normocephalic, Atraumatic, mucous membranes moist, no erythema, exudates, swelling, or masses, nares patent  Eyes: nonicteric, blind, EOMI  Neck: Supple, no meningismus  Lymphatic: No lymphadenopathy noted.   Cardiovascular: Regular rate and rhythm, no gallops rubs or murmurs  Lungs: Clear bilaterally   Abdomen: Bowel sounds normal, Soft, No tenderness  Skin: Warm, Dry, no rash  Genitalia: Deferred  Rectal: Deferred  Extremities: No edema  Neurologic: Alert, appropriate, follows commands, " moving all extremities, normal speech, no drift, intact sensation, blind and thus cannot participate in visual field testing, CN's intact,  Psychiatric: Affect normal    DIAGNOSTIC STUDIES / PROCEDURES    LABS  Results for orders placed or performed during the hospital encounter of 11/01/20   CBC WITH DIFFERENTIAL   Result Value Ref Range    WBC 8.5 4.8 - 10.8 K/uL    RBC 4.56 4.20 - 5.40 M/uL    Hemoglobin 13.3 12.0 - 16.0 g/dL    Hematocrit 40.2 37.0 - 47.0 %    MCV 88.2 81.4 - 97.8 fL    MCH 29.2 27.0 - 33.0 pg    MCHC 33.1 (L) 33.6 - 35.0 g/dL    RDW 45.8 35.9 - 50.0 fL    Platelet Count 415 164 - 446 K/uL    MPV 10.3 9.0 - 12.9 fL    Neutrophils-Polys 78.60 (H) 44.00 - 72.00 %    Lymphocytes 13.20 (L) 22.00 - 41.00 %    Monocytes 6.50 0.00 - 13.40 %    Eosinophils 0.70 0.00 - 6.90 %    Basophils 0.60 0.00 - 1.80 %    Immature Granulocytes 0.40 0.00 - 0.90 %    Nucleated RBC 0.00 /100 WBC    Neutrophils (Absolute) 6.65 2.00 - 7.15 K/uL    Lymphs (Absolute) 1.12 1.00 - 4.80 K/uL    Monos (Absolute) 0.55 0.00 - 0.85 K/uL    Eos (Absolute) 0.06 0.00 - 0.51 K/uL    Baso (Absolute) 0.05 0.00 - 0.12 K/uL    Immature Granulocytes (abs) 0.03 0.00 - 0.11 K/uL    NRBC (Absolute) 0.00 K/uL   COMP METABOLIC PANEL   Result Value Ref Range    Sodium 141 135 - 145 mmol/L    Potassium 2.9 (L) 3.6 - 5.5 mmol/L    Chloride 104 96 - 112 mmol/L    Co2 22 20 - 33 mmol/L    Anion Gap 15.0 7.0 - 16.0    Glucose 112 (H) 65 - 99 mg/dL    Bun 5 (L) 8 - 22 mg/dL    Creatinine 0.61 0.50 - 1.40 mg/dL    Calcium 9.7 8.5 - 10.5 mg/dL    AST(SGOT) 10 (L) 12 - 45 U/L    ALT(SGPT) 22 2 - 50 U/L    Alkaline Phosphatase 114 (H) 30 - 99 U/L    Total Bilirubin 0.9 0.1 - 1.5 mg/dL    Albumin 4.3 3.2 - 4.9 g/dL    Total Protein 7.4 6.0 - 8.2 g/dL    Globulin 3.1 1.9 - 3.5 g/dL    A-G Ratio 1.4 g/dL   TROPONIN   Result Value Ref Range    Troponin T <6 6 - 19 ng/L   ESTIMATED GFR   Result Value Ref Range    GFR If African American >60 >60 mL/min/1.73 m  2    GFR If Non African American >60 >60 mL/min/1.73 m 2   COVID/SARS CoV-2 PCR    Specimen: Nasopharyngeal; Respirate   Result Value Ref Range    COVID Order Status Received    EKG (NOW)   Result Value Ref Range    Report       [facility] Emergency Dept.    Test Date:  2020  Pt Name:    NILAY MANN               Department: ER  MRN:        [id number]                      Room:       [facility]  Gender:     Female                       Technician: [id number]  :        1970                   Requested By:SHAHANA WHITE  Order #:    [id number]                    Reading MD:    Measurements  Intervals                                Axis  Rate:       76                           P:          35  UT:         136                          QRS:        60  QRSD:       88                           T:          66  QT:         448  QTc:        504    Interpretive Statements  SINUS RHYTHM  BORDERLINE PROLONGED QT INTERVAL  Compared to ECG 2020 17:37:07  Sinus tachycardia no longer present       All labs reviewed by me.    EKG  Obtained at 9:59 PM  Normal Sinus rhythm   Rate 76  Axis normal   Slightly prolonged QTC interval   No ST segment elevation or depression.      RADIOLOGY  CT-HEAD W/O   Final Result         1.  Encephalomalacia changes in the left parietal lobe, exam overall stable since prior study.   2.  Atherosclerosis.      DX-CHEST-PORTABLE (1 VIEW)   Final Result         1.  Left lower lobe atelectasis and/or infiltrate with small left pleural effusion, increased since prior.        The radiologist's interpretation of all radiological studies have been reviewed by me.    COURSE & MEDICAL DECISION MAKING  Nursing notes, VS, PMSFHx reviewed in chart.     8:08 PM Patient seen and examined at bedside. Ordered for DX-Chest 1 view, CT-Head w/o (ordered given patient is anticoagulated with a shunt in place), CBC with differential, CMP, Troponin, EKG to evaluate. Patient was treated with NS 1 Liter,  Reglan 10 mg, Benadryl 25 mg, dilaudid 0.5 mg for her symptoms. Discussed with the patient that given her current symptoms and past medical history I am ordering for labs, imaging and an EKG to further assess.     10:06 PM - Review of patients labs shows hypokalemia, patient will be treated with 0.9% NaCl with KCl 20 mEq.    12:05 AM- Upon reassessment, patient is resting comfortably with normal vital signs. No new complaints at this time. Discussed results with patient and/or family as well as the importance of primary care follow up. Patient understands plan of care, strict return precautions for new or changing symptoms and agrees to discharge.    HYDRATION: Based on the patient's presentation of Other headache the patient was given IV fluids. IV Hydration was used because oral hydration was not adequate alone. Upon recheck following hydration, the patient was feeling improved.      Decision Making:  This is a 49 y.o. year old female who presents with headache.  She does have a history of DVT/PE and is on blood thinners-she also has a shunt in place.  There is no evidence of significant hydrocephalus today or intracranial hemorrhage.  Patient did have a potassium of 2.9-she is being supplemented with IV potassium here and will have a recheck performed.  The patient also has some atelectasis versus able to rate in the left lower lobe and will be covered for pneumonia.  COVID-19 testing is currently pending the patient will self quarantine until this is available.    The patient will return for new or worsening symptoms and is stable at the time of discharge.    The patient is referred to a primary physician for blood pressure management, diabetic screening, and for all other preventative health concerns.    DISPOSITION:  Patient will be discharged home in stable condition.    FOLLOW UP:  Tabitha Bautista M.D.  [address] [place] [id number]  [phone]    Schedule an appointment as soon as possible for a visit  in 2 days        OUTPATIENT MEDICATIONS:  New Prescriptions    AMOXICILLIN (AMOXIL) 500 MG CAP    Take 2 Caps by mouth 3 times a day for 5 days.    ONDANSETRON (ZOFRAN ODT) 4 MG TABLET DISPERSIBLE    Take 1 Tab by mouth every 8 hours as needed for Nausea.    POTASSIUM CHLORIDE SA (KDUR) 20 MEQ TAB CR    Take 1 Tab by mouth 2 times a day for 5 days.       FINAL IMPRESSION  1. Hypokalemia    2. Bad headache          IDewey (Scribe), am scribing for, and in the presence of, Jaciel Gamez M.D..    Electronically signed by: Dewey Wayne (Scribe), 11/1/2020    IJaciel M.D. personally performed the services described in this documentation, as scribed by Dewey Wayne in my presence, and it is both accurate and complete. C.    The note accurately reflects work and decisions made by me.  Jaciel Gamez M.D.  11/2/2020  12:11 AM

## 2020-11-02 NOTE — ED NOTES
Discharge summary completed with patient. PIV removed. Patient education completed regarding follow up care, and new medications. Patient ambulated with the help of RN out to lobby. All belongings sent home with patient. Patient stated her friend will be her ride home.

## 2020-11-02 NOTE — DISCHARGE INSTRUCTIONS
Your COVID-19 test is currently pending.  There is some evidence of possible early pneumonia on your x-ray and you will be covered with antibiotics.  Additionally you will be on potassium pills.  Return for worsening headache persistent vomiting or other concerns.

## 2020-11-02 NOTE — ED NOTES
Patient wheeled back to room. RN agrees with triage note. Chart marked for ERP. Patient placed in hospital gown and put on monitor. RN provided patient with call light.

## 2020-11-02 NOTE — ED TRIAGE NOTES
"Chief Complaint   Patient presents with   • Headache   • N/V     Pt BIB EMS to triage for above complaint. Pt has significant history of headaches. Pt reports history of hydrocephalus. Pt denies any trauma or falls. Pt received 4 ODT zofran prior to arrival by EMS.     /94   Pulse 75   Temp 36.3 °C (97.4 °F) (Temporal)   Resp 16   Ht 1.626 m (5' 4\")   Wt 57.6 kg (127 lb)   LMP 11/27/2013   SpO2 98%   BMI 21.80 kg/m²     "

## 2020-11-03 NOTE — ED TRIAGE NOTES
"Bib ems for above complaints.     Chief Complaint   Patient presents with   • Migraine     hx of migraines and has had one since 093, endorses nausea and diarrhea, denies vomiting. hx of hydrocephalus      /93   Pulse 86   Temp 36.1 °C (97 °F) (Temporal)   Resp 18   Ht 1.626 m (5' 4\")   LMP 11/27/2013   SpO2 100%   Breastfeeding No   BMI 21.80 kg/m²     Pt denies respiratory symptoms and known covid contacts.   "

## 2020-11-29 ENCOUNTER — APPOINTMENT (OUTPATIENT)
Dept: RADIOLOGY | Facility: MEDICAL CENTER | Age: 50
End: 2020-11-29
Attending: EMERGENCY MEDICINE
Payer: MEDICARE

## 2020-11-29 ENCOUNTER — HOSPITAL ENCOUNTER (EMERGENCY)
Facility: MEDICAL CENTER | Age: 50
End: 2020-11-29
Attending: EMERGENCY MEDICINE
Payer: MEDICARE

## 2020-11-29 VITALS
TEMPERATURE: 96.9 F | DIASTOLIC BLOOD PRESSURE: 66 MMHG | WEIGHT: 130.07 LBS | RESPIRATION RATE: 18 BRPM | BODY MASS INDEX: 22.21 KG/M2 | SYSTOLIC BLOOD PRESSURE: 137 MMHG | OXYGEN SATURATION: 94 % | HEART RATE: 70 BPM | HEIGHT: 64 IN

## 2020-11-29 DIAGNOSIS — S92.515A CLOSED NONDISPLACED FRACTURE OF PROXIMAL PHALANX OF LESSER TOE OF LEFT FOOT, INITIAL ENCOUNTER: ICD-10-CM

## 2020-11-29 PROCEDURE — A9270 NON-COVERED ITEM OR SERVICE: HCPCS | Performed by: EMERGENCY MEDICINE

## 2020-11-29 PROCEDURE — 700102 HCHG RX REV CODE 250 W/ 637 OVERRIDE(OP): Performed by: EMERGENCY MEDICINE

## 2020-11-29 PROCEDURE — 99284 EMERGENCY DEPT VISIT MOD MDM: CPT

## 2020-11-29 PROCEDURE — 73630 X-RAY EXAM OF FOOT: CPT | Mod: LT

## 2020-11-29 RX ORDER — ACETAMINOPHEN 325 MG/1
650 TABLET ORAL ONCE
Status: COMPLETED | OUTPATIENT
Start: 2020-11-29 | End: 2020-11-29

## 2020-11-29 RX ORDER — IBUPROFEN 600 MG/1
600 TABLET ORAL ONCE
Status: COMPLETED | OUTPATIENT
Start: 2020-11-29 | End: 2020-11-29

## 2020-11-29 RX ADMIN — IBUPROFEN 600 MG: 600 TABLET, FILM COATED ORAL at 13:25

## 2020-11-29 RX ADMIN — ACETAMINOPHEN 650 MG: 325 TABLET, FILM COATED ORAL at 13:25

## 2020-11-29 ASSESSMENT — FIBROSIS 4 INDEX: FIB4 SCORE: 0.25

## 2020-11-29 NOTE — ED NOTES
Pt discharge home. Pt given discharge instructions , post op shoe placed. Pt verbalized understanding, all questions answered ,vss upon d/c. Pt steady on feet upon discharge, will be calling friend for ride home

## 2020-11-29 NOTE — ED PROVIDER NOTES
ED Provider Note    Scribed for Cinthya Macias M.D. by Shawn Flowers. 11/29/2020  12:57 PM    Primary care provider: Tabitha Bautista M.D.  Means of arrival: Walk in  History obtained from: Patient  History limited by: None    CHIEF COMPLAINT  Chief Complaint   Patient presents with   • T-5000 GLF     6 days ago, ambulatory with white cane, accidental trip and fall.     • Foot Pain     L foot worsening pain, pedal pulse 2+, cap refill <2 seconds.       HPI  Rasheeda Manzano is a 50 y.o. female who presents to the Emergency Department for evaluation of left foot pain onset a couple of days ago. Patient states she accidentally tripped and fell 5 or 6 days ago. She did not start having the left foot pain until a couple of days ago. States the pain worsened last night, and her foot was throbbing. The pain is primarily to the sole of her foot. She was having difficulty walking on the foot due to the pain. The pain increases to a 9/10 in severity when she is walking. It is currently a 7/10 while laying on the bed. She has not taken anything for the pain yet. She is still able to walk, with or without a cane. She denies any ankle pain or hip pain. States she did not hit her head. She notes being on Eliquis.    PPE Note: I personally donned full PPE for all patient encounters during this visit, including with an N95 respirator mask, gloves, and eye protection.     Scribe remained outside the patient's room and did not have any contact with the patient for the duration of patient encounter.      REVIEW OF SYSTEMS  HEENT:  No head pain  CARDIAC: no chest pain, no palpitations    PULMONARY: no dyspnea, cough, or congestion   GI: no vomiting, diarrhea, or abdominal pain   Neuro: no weakness, numbness, aphasia, or headache  Musculoskeletal: Left foot pain. No ankle pain or hip pain  Endocrine: no fevers, sweating, or weight loss   SKIN: no rash, erythema, or contusions   See history of present illness.     PAST  MEDICAL HISTORY   has a past medical history of Acute nasopharyngitis, Blind, C. difficile colitis, C. difficile diarrhea (), Cancer (HCC), Chronic daily headache, Depression, Esophageal atresia (1971), Esophageal bleeding (2019), Fall, GERD (gastroesophageal reflux disease), H/O total hysterectomy, Heart burn (2019), Hydrocephalus (), Hydrocephalus (HCC), Indigestion (2019), Jaundice, Legally blind, Migraine without aura, without mention of intractable migraine without mention of status migrainosus, Other specified symptom associated with female genital organs, Pain (2019), Pain, Psychiatric problem, PTSD (post-traumatic stress disorder), Seizure (HCC) (2019), and Snoring (2019).    SURGICAL HISTORY   has a past surgical history that includes laparoscopy (08); lysis adhesions general (12/10/2013); cystoscopy (12/10/2013); aakash by laparoscopy (N/A, 2015); gastroscopy-endo (N/A, 2017); nissen fundoplication laparoscopic; abdominal hysterectomy total (12/10/2013); other; exploratory laparotomy (12/10/2013); appendectomy (12/10/2013); cholecystectomy (N/A, 2015); gastroscopy (N/A, 2019); mastectomy (Right, 2019); and node biopsy sentinel (Right, 2019).    SOCIAL HISTORY  Social History     Tobacco Use   • Smoking status: Former Smoker     Packs/day: 1.00     Years: 10.00     Pack years: 10.00     Types: Cigars     Start date: 2004     Quit date: 2020     Years since quittin.9   • Smokeless tobacco: Never Used   • Tobacco comment:  CIGAR/day    Substance Use Topics   • Alcohol use: Not Currently     Frequency: Monthly or less     Drinks per session: 1 or 2   • Drug use: No      Social History     Substance and Sexual Activity   Drug Use No       FAMILY HISTORY  Family History   Problem Relation Age of Onset   • Hypertension Mother    • Hypertension Father    • Non-contributory Neg Hx         Migraine       CURRENT  MEDICATIONS  No current facility-administered medications on file prior to encounter.      Current Outpatient Medications on File Prior to Encounter   Medication Sig Dispense Refill   • azithromycin (ZITHROMAX) 250 MG Tab Take two tabs by mouth on day one, then one tab by mouth daily on days 2-5. 6 Tab 0   • ondansetron (ZOFRAN ODT) 4 MG TABLET DISPERSIBLE Take 1 Tab by mouth every 8 hours as needed for Nausea. 10 Tab 0   • sucralfate (CARAFATE) 1 GM Tab Take 1 g by mouth every day at 6 PM.     • hydrOXYzine HCl (ATARAX) 50 MG Tab Take 50 mg by mouth 1 time daily as needed for Anxiety.     • metoprolol (LOPRESSOR) 25 MG Tab Take 0.5 Tablet by mouth 2 Times a Day. (Patient taking differently: Take 0.5 Tabs by mouth 2 Times a Day. Per DC instructions:  Hold this medication if pulse lower than 60/min and/or SBP lower than 100 mmHg. ) 60 Tab 2   • HYDROcodone/acetaminophen (NORCO)  MG Tab Take 1-2 Tabs by mouth every four hours as needed.     • esomeprazole (NEXIUM) 40 MG delayed-release capsule Take 40 mg by mouth every day. Before breakfast     • oxyCODONE ER (XTAMPZA ER) 9 MG Capsule Extended Release 12 hour Abuse-Deterrent Take 9 mg by mouth every 12 hours.     • amitriptyline (ELAVIL) 75 MG Tab Take 75 mg by mouth every evening.     • apixaban (ELIQUIS) 5mg Tab Take 5 mg by mouth 2 Times a Day.     • alendronate (FOSAMAX) 70 MG Tab Take 70 mg by mouth every 7 days.     • lisinopril (PRINIVIL) 20 MG Tab Take 20 mg by mouth every day.     • LORazepam (ATIVAN) 1 MG Tab Take 1 mg by mouth every bedtime.     • anastrozole (ARIMIDEX) 1 MG Tab Take 1 mg by mouth every evening.     • levETIRAcetam (KEPPRA) 500 MG Tab Take 500 mg by mouth 2 times a day.          ALLERGIES  Allergies   Allergen Reactions   • Latex Rash     Rash   • Tape Rash     RXN= ongoing  Adhesive Medical tape. Per patient, paper tape ok.       PHYSICAL EXAM  VITAL SIGNS: /76   Pulse 77   Temp 37.1 °C (98.7 °F) (Temporal)   Resp 17   Ht  "1.626 m (5' 4\")   Wt 59 kg (130 lb 1.1 oz)   LMP 11/27/2013   SpO2 93%   BMI 22.33 kg/m²   Constitutional: Well developed, Well nourished, No acute distress, Non-toxic appearance.   HEENT: Normocephalic, Atraumatic,  external ears normal, pharynx pink,  Mucous  Membranes moist, No rhinorrhea or mucosal edema  Eyes: PERRL, EOMI, Conjunctiva normal, No discharge.   Neck: Normal range of motion, No tenderness, Supple, No stridor.   Skin: Warm, Dry, No rash, Bruising over the left foot 2nd, 3rd, and 4th toes  Extremities: Equal, intact distal pulses, No cyanosis  Musculoskeletal: Good range of motion in all major joints. Left foot/toes with no significant swelling, neurovascularly intact, no ankle pain. See Skin section.  Neurologic: Alert & awake, no focal deficits  Psychiatric: Affect normal    DIAGNOSTIC STUDIES / PROCEDURES    RADIOLOGY  DX-FOOT-COMPLETE 3+ LEFT   Final Result      Acute fracture of proximal phalanx of fifth digit is identified.        The radiologist's interpretation of all radiological studies have been reviewed by me.    COURSE & MEDICAL DECISION MAKING  Nursing notes, VS, PMSFHx reviewed in chart.     12:57 PM - Patient seen and examined at bedside. Patient presents with left foot pain after a ground level fall. Patient will be treated with motrin 600 mg, tylenol 650 mg. Ordered DX foot left to evaluate her symptoms.     2:15 PM Patient reevaluated at bedside. Discussed imaging results as seen above which shows proximal phalanx of 5th toe fracture. The patient will be discharged with a post op shoe and  instructions regarding supportive care. Instructions were given for follow-up. Discussed indications for seeking immediate medical attention. Patient was given the opportunity for questions. The patient understands and agrees.        The patient will return for new or worsening symptoms and is stable at the time of discharge.    The patient is referred to a primary physician for blood " pressure management, diabetic screening, and for all other preventative health concerns.    DISPOSITION:  Patient will be discharged home in stable condition.    FOLLOW UP:  Tabitha Bautista M.D.  [address] [facility] [id number] [phone]    In 3 days  As needed, If symptoms worsen      OUTPATIENT MEDICATIONS:  Discharge Medication List as of 11/29/2020  2:19 PM           FINAL IMPRESSION  1. Closed nondisplaced fracture of proximal phalanx of lesser toe of left foot, initial encounter          Shawn SHARMA (Scribe), am scribing for, and in the presence of, Cinthya Macias M.D..    Electronically signed by: Shawn Flowers (Scribe), 11/29/2020    ICinthya M.D. personally performed the services described in this documentation, as scribed by Shawn Flowers in my presence, and it is both accurate and complete. E    The note accurately reflects work and decisions made by me.  Cinthya Macias M.D.  11/29/2020  3:23 PM

## 2021-01-21 NOTE — ED NOTES
Chief Complaint   Patient presents with   • Anxiety     past few days and unable to sleep.    • Shunt Problem     HA 10/10 at times, feeling faint with episodes of dizzyness, last few days hot flashes with chills        Pupils equal, round, Extra-ocular movement intact, eyes are clear b/l

## 2021-06-03 NOTE — DIETARY
Nutrition Services: Pt seen for recent unplanned wt loss per nutrition admit screen.    Pt is a 49 yo F admitted for UTI, sepsis and on day 1 of admit on a regular diet.    Ht: 162.6  Wt: 65 kg   BMI: 24.6    Visited pt at bedside, pt admit to wt loss however states that it was r/t being ill with pneumonia. States the wt loss has not been ongoing since pneumonia has resolved. Pt states that her appetite and PO intake have been adequate, and she has no concerns regarding nutritional status. No further nutritional needs at this time.     RD available prn     electronic

## 2021-11-25 NOTE — PROGRESS NOTES
Sharp catheter removed.   89 years old Male [place]ian speaking with history of BPH presents to the hospital with sore throat for last 3 days, Spiking fever today 101. Patient was tested positive for COVID. Patient is saturating on 3L NC. Will admit the Patient for acute hypoxic respiratory failure secondary to COVID

## 2022-03-06 NOTE — PROGRESS NOTES
Handoff report received. Assumed patient care. Patient resting in bed.  7/10 pain. Patient not in distress, AAOX 4. Bed alarm is on. Safety precautions in place. Call light and personal belongings within reach. Educated to call for assistance if needed.    154

## 2022-06-02 NOTE — ED PROVIDER NOTES
----- Message from Abbe Sacks, DO sent at 6/2/2022 11:39 AM CDT -----  Please notify the patient of normal results. Follow-up as planned. CHIEF COMPLAINT  Chief Complaint   Patient presents with   • Headache     since last Monday       HPI  Rasheeda Manzano is a 46 y.o. female with a history of ventricular shunt for hydrocephalus that was diagnosed when she was a child who presents  With headache similar to prior headaches in the past. One episode of vomiting earlier today. Denies any neck pain or fevers. Denies any numbness or weakness. No history of trauma. Has been to the hospital several times for similar complaints. Multiple CTs of the head which were unremarkable. She has had this headache for several weeks now. Has been intermittent in nature. Not thunderclap. She reports that it is behind the right eye and that this is typical for her prior headaches. She was recently prescribed Dilaudid here as an outpatient and she is requesting more pain medications. Has been taking her chronic pain medications with little improvement. Has spoken with a physician a few days ago as an outpatient. Has a primary care physician with him she can follow-up with.    REVIEW OF SYSTEMS  See HPI for further details. All other systems are negative.     PAST MEDICAL HISTORY   has a past medical history of Hydrocephalus; Migraine without aura, without mention of intractable migraine without mention of status migrainosus; Blind; Chronic daily headache; Depression; Psychiatric problem; Hydrocephalus; C. difficile diarrhea (2013); Jaundice; Pain; Other specified symptom associated with female genital organs; and Fall.    SOCIAL HISTORY  Social History     Social History Main Topics   • Smoking status: Current Some Day Smoker -- 1.00 packs/day for 10 years     Types: Cigars     Start date: 05/01/2004   • Smokeless tobacco: Never Used      Comment:  [employer]/day    • Alcohol Use: No      Comment: socially   • Drug Use: No   • Sexual Activity:     Partners: Male       SURGICAL HISTORY   has past surgical history that includes laparoscopy (8/8/08); lysis adhesions general  "(12/10/2013); cystoscopy (12/10/2013); exploratory laparotomy (12/10/2013); appendectomy (12/10/2013); abdominal hysterectomy total (12/10/2013); aakash by laparoscopy (N/A, 8/13/2015); cholecystectomy (N/A, 8/13/2015); and other.    CURRENT MEDICATIONS  Home Medications     **Home medications have not yet been reviewed for this encounter**          ALLERGIES  Allergies   Allergen Reactions   • Tape Rash     Medical tape. Per patient, paper tape ok.       PHYSICAL EXAM  VITAL SIGNS: /70 mmHg  Pulse 80  Temp(Src) 36.5 °C (97.7 °F)  Resp 16  Ht 1.626 m (5' 4.02\")  Wt 70.2 kg (154 lb 12.2 oz)  BMI 26.55 kg/m2  SpO2 97%  LMP 11/27/2013  Pulse ox interpretation: I interpret this pulse ox as normal.  Constitutional: Alert in no apparent distress.  HENT: No signs of trauma, Bilateral external ears normal, Nose normal.   Eyes: Pupils are equal and reactive, Conjunctiva normal, Non-icteric.   Neck: Normal range of motion, No tenderness, Supple, No stridor.   Cardiovascular: Regular rate and rhythm, no murmurs.   Thorax & Lungs: Normal breath sounds, No respiratory distress, No wheezing, No chest tenderness.   Abdomen: Bowel sounds normal, Soft, No tenderness, No masses, No pulsatile masses. No peritoneal signs.  Skin: Warm, Dry, No erythema, No rash.   Back: No bony tenderness, No CVA tenderness.   Extremities: Intact distal pulses, No edema, No tenderness, No cyanosis  Neurologic: Alert , Normal motor function and gait, Normal sensory function, No focal deficits noted.  Cranial nerves II through XII grossly intact. Normal gait.  Psychiatric: Affect normal, Judgment normal, Mood normal.       DIAGNOSTIC STUDIES / PROCEDURES    LABS  Labs Reviewed - No data to display    RADIOLOGY  No orders to display       COURSE & MEDICAL DECISION MAKING  Pertinent Labs & Imaging studies reviewed. (See chart for details)  46 y.o.  Female with a history of chronic headaches presenting with on and off headaches over the past 2 " "weeks or so. Reports poor ability to control her headaches at home. Denies any new focal neurologic deficits. Has a long-standing history of chronic headaches for which she has been evaluated here in the department several times. Prior medical record was reviewed by me. The patient has no focal neurologic deficits on exam. She was resting comfortably and fully ambulatory.    Patient was given symptomatic treatment with Toradol, Compazine, Benadryl, IV fluids. Upon reevaluation, the patient does report feeling improved. No episodes of vomiting here in the hospital. No nuchal rigidity or fevers. Unlikely subarachnoid hemorrhage. Less likely to be shunt malfunction given the chronic nature of the patient's symptoms. She was instructed to follow up with her primary care physician regarding further pain management of her chronic condition.  She reports understanding.    The patient will return for worsening symptoms or failure of improvement and is stable at the time of discharge. The patient verbalizes understanding in their own words.    /64 mmHg  Pulse 81  Temp(Src) 36.5 °C (97.7 °F)  Resp 16  Ht 1.626 m (5' 4.02\")  Wt 70.2 kg (154 lb 12.2 oz)  BMI 26.55 kg/m2  SpO2 98%  LMP 11/27/2013    REYMUNDO AdamsNSachin  [address] [place] [id number]  [phone]    In 2 days      [facility], Emergency Dept  [address] [facility] [address] [phone]    As needed, If symptoms worsen        FINAL IMPRESSION  1. Chronic nonintractable headache, unspecified headache type            Electronically signed by: Tone Cuello, 2/4/2017 1:13 AM      "

## 2023-10-29 NOTE — CARE PLAN
Problem: Nutritional:  Goal: Achieve adequate nutritional intake  Patient will consume 50% of meals and supplements  Outcome: NOT MET  document intake of all meals and supplements as % taken in ADL's to provide interdisciplinary communication across all shifts          throat pain

## 2023-11-17 NOTE — IP AVS SNAPSHOT
Lockstream Access Code: [email or web address]  Expires: 4/12/2017  9:50 AM    Lockstream  A secure, online tool to manage your health information     [employer]’s Lockstream® is a secure, online tool that connects you to your personalized health information from the privacy of your home -- day or night - making it very easy for you to manage your healthcare. Once the activation process is completed, you can even access your medical information using the Lockstream deysi, which is available for free in the Apple Deysi store or Google Play store.     Lockstream provides the following levels of access (as shown below):   My Chart Features   [facility] Primary Care Doctor [facility]  Specialists [facility]  Urgent  Care Non-[facility]  Primary Care  Doctor   Email your healthcare team securely and privately 24/7 X X X X   Manage appointments: schedule your next appointment; view details of past/upcoming appointments X      Request prescription refills. X      View recent personal medical records, including lab and immunizations X X X X   View health record, including health history, allergies, medications X X X X   Read reports about your outpatient visits, procedures, consult and ER notes X X X X   See your discharge summary, which is a recap of your hospital and/or ER visit that includes your diagnosis, lab results, and care plan. X X       How to register for Lockstream:  1. Go to  [email or web address].  2. Click on the Sign Up Now box, which takes you to the New Member Sign Up page. You will need to provide the following information:  a. Enter your Lockstream Access Code exactly as it appears at the top of this page. (You will not need to use this code after you’ve completed the sign-up process. If you do not sign up before the expiration date, you must request a new code.)   b. Enter your date of birth.   c. Enter your home email address.   d. Click Submit, and follow the next screen’s instructions.  3. Create a Lockstream ID. This will be your Lockstream  "SUBJECTIVE:   Summer is a 67 year old, presenting for the following:  Wellness Visit and Derm Problem (Spot on the back. Itchy.)        11/17/2023     7:49 AM   Additional Questions   Roomed by Irma SONG   Accompanied by self       Are you in the first 12 months of your Medicare coverage?  Patient has up coming cataract surgery.    Has 1st cataract surgery today  Feels well  Has a spot on back that is \"itchy\" (review of spot reveals seborrheic keratoses which has been scratched at)    Healthy Habits:     In general, how would you rate your overall health?  Good    Frequency of exercise:  4-5 days/week    Duration of exercise:  45-60 minutes    Do you usually eat at least 4 servings of fruit and vegetables a day, include whole grains    & fiber and avoid regularly eating high fat or \"junk\" foods?  Yes    Taking medications regularly:  Yes    Ability to successfully perform activities of daily living:  No assistance needed    Home Safety:  No safety concerns identified    Hearing Impairment:  No hearing concerns    In the past 6 months, have you been bothered by leaking of urine?  No    In general, how would you rate your overall mental or emotional health?  Excellent    Additional concerns today:  No      Today's PHQ-2 Score:       11/17/2023     7:38 AM   PHQ-2 ( 1999 [employer])   Q1: Little interest or pleasure in doing things 0   Q2: Feeling down, depressed or hopeless 0   PHQ-2 Score 0   Q1: Little interest or pleasure in doing things Not at all   Q2: Feeling down, depressed or hopeless Not at all   PHQ-2 Score 0           Have you ever done Advance Care Planning? (For example, a Health Directive, POLST, or a discussion with a medical provider or your loved ones about your wishes): No, advance care planning information given to patient to review.  Patient declined advance care planning discussion at this time.       Fall risk  Fallen 2 or more times in the past year?: No  Any fall with injury in the past year?: " No    Cognitive Screening   1) Repeat 3 items (Leader, Season, Table)    2) Clock draw: NORMAL  3) 3 item recall: Recalls 3 objects  Results: 3 items recalled: COGNITIVE IMPAIRMENT LESS LIKELY    Mini-CogTM Copyright S Dwight. Licensed by the author for use in [facility]; reprinted with permission (jeremy@[facility]). All rights reserved.          Reviewed and updated as needed this visit by clinical staff   Tobacco  Allergies  Meds              Reviewed and updated as needed this visit by Provider                 Social History     Tobacco Use    Smoking status: Never    Smokeless tobacco: Never   Substance Use Topics    Alcohol use: Yes     Alcohol/week: 1.0 standard drink of alcohol     Comment: Alcoholic Drinks/day: Ocasional             11/10/2023     6:28 PM   Alcohol Use   Prescreen: >3 drinks/day or >7 drinks/week? No     Do you have a current opioid prescription? No  Do you use any other controlled substances or medications that are not prescribed by a provider? None      Current providers sharing in care for this patient include:   Patient Care Team:  Lorie Ladd MD as PCP - General (Family Practice)  Lorie Ladd MD as Assigned PCP    The following health maintenance items are reviewed in Epic and correct as of today:  Health Maintenance   Topic Date Due    RSV VACCINE (Pregnancy & 60+) (1 - 1-dose 60+ series) Never done    COLORECTAL CANCER SCREENING  02/11/2018    COVID-19 Vaccine (6 - 2023-24 season) 09/01/2023    MEDICARE ANNUAL WELLNESS VISIT  11/16/2023    ANNUAL REVIEW OF HM ORDERS  10/02/2024    FALL RISK ASSESSMENT  11/17/2024    MAMMO SCREENING  11/17/2024    LIPID  11/17/2028    ADVANCE CARE PLANNING  11/17/2028    DTAP/TDAP/TD IMMUNIZATION (3 - Td or Tdap) 11/16/2032    DEXA  11/23/2037    HEPATITIS C SCREENING  Completed    PHQ-2 (once per calendar year)  Completed    INFLUENZA VACCINE  Completed    Pneumococcal Vaccine: 65+ Years  Completed     login ID and cannot be changed, so think of one that is secure and easy to remember.  4. Create a [employer] password. You can change your password at any time.  5. Enter your Password Reset Question and Answer. This can be used at a later time if you forget your password.   6. Enter your e-mail address. This allows you to receive e-mail notifications when new information is available in [employer].  7. Click Sign Up. You can now view your health information.    For assistance activating your [employer] account, call [phone]         ZOSTER IMMUNIZATION  Completed    IPV IMMUNIZATION  Aged Out    HPV IMMUNIZATION  Aged Out    MENINGITIS IMMUNIZATION  Aged Out    RSV MONOCLONAL ANTIBODY  Aged Out    PAP  Discontinued     BP Readings from Last 3 Encounters:   11/17/23 124/83   10/02/23 112/73   11/16/22 130/72    Wt Readings from Last 3 Encounters:   11/17/23 69.4 kg (153 lb)   10/02/23 69.4 kg (153 lb)   11/16/22 67.1 kg (148 lb)                  Patient Active Problem List   Diagnosis    Allergies    Carpal Tunnel Syndrome    Right Ventricular Enlargement    Palpitations    Atrial Premature Complex    Atrial Septal Defect    Migraine Headache    Benign Adenomatous Polyp Of The Large Intestine    Atrial Fibrillation    Tachycardia    Post-menopausal bleeding    Blood in urine    Atrial septal defect    Right ventricular enlargement     Past Surgical History:   Procedure Laterality Date    ZZC REPR ASD W BYPASS      Description: Repair Of Atrial Secundum Septal Defect;  Recorded: 06/03/2014;       Social History     Tobacco Use    Smoking status: Never    Smokeless tobacco: Never   Substance Use Topics    Alcohol use: Yes     Alcohol/week: 1.0 standard drink of alcohol     Comment: Alcoholic Drinks/day: Ocasional     Family History   Problem Relation Age of Onset    Breast Cancer Mother 55.00         Current Outpatient Medications   Medication Sig Dispense Refill    atenolol (TENORMIN) 25 MG tablet Take 0.5 tablets (12.5 mg) by mouth daily 45 tablet 4    cholecalciferol (VITAMIN D3) 10 mcg (400 units) TABS tablet       b complex vitamins tablet [B COMPLEX VITAMINS TABLET] Take 1 tablet by mouth daily. (Patient not taking: Reported on 11/17/2023)      magnesium 250 MG tablet  (Patient not taking: Reported on 11/17/2023)       Allergies   Allergen Reactions    Nickel Unknown    Penicillins Rash     Mammogram Screening: Mammogram Screening: Recommended mammography every 1-2 years with patient discussion and risk factor consideration    FHS-7:        11/16/2022    12:59 PM 11/17/2022     2:18 PM 9/25/2023     9:30 AM 11/10/2023     6:30 PM   Breast CA Risk Assessment (FHS-7)   Did any of your first-degree relatives have breast or ovarian cancer? Yes Yes Yes Yes   Did any of your relatives have bilateral breast cancer? No No No No   Did any man in your family have breast cancer? No No No No   Did any woman in your family have breast and ovarian cancer? No No No No   Did any woman in your family have breast cancer before age 50 y? No No No No   Do you have 2 or more relatives with breast and/or ovarian cancer? No No No No   Do you have 2 or more relatives with breast and/or bowel cancer? No No No No       Mammogram Screening: Recommended mammography every 1-2 years with patient discussion and risk factor consideration  Pertinent mammograms are reviewed under the imaging tab.    Review of Systems   Constitutional:  Negative for chills and fever.   HENT:  Negative for congestion, ear pain, hearing loss and sore throat.    Eyes:  Negative for pain and visual disturbance.   Respiratory:  Negative for cough and shortness of breath.    Cardiovascular:  Negative for chest pain, palpitations and peripheral edema.   Gastrointestinal:  Negative for abdominal pain, constipation, diarrhea, heartburn, hematochezia and nausea.   Breasts:  Negative for tenderness, breast mass and discharge.   Genitourinary:  Negative for dysuria, frequency, genital sores, hematuria, pelvic pain, urgency, vaginal bleeding and vaginal discharge.   Musculoskeletal:  Negative for arthralgias, joint swelling and myalgias.   Skin:  Negative for rash.   Neurological:  Negative for dizziness, weakness, headaches and paresthesias.   Psychiatric/Behavioral:  Negative for mood changes. The patient is not nervous/anxious.          OBJECTIVE:   /83 (BP Location: Right arm, Patient Position: Sitting, Cuff Size: Adult Regular)   Pulse 64   Temp 97.4  F (36.3  C) (Temporal)   Resp 16   Ht 1.711 m (5'  "7.36\")   Wt 69.4 kg (153 lb)   LMP  (LMP Unknown)   SpO2 98%   BMI 23.71 kg/m   Estimated body mass index is 23.71 kg/m  as calculated from the following:    Height as of this encounter: 1.711 m (5' 7.36\").    Weight as of this encounter: 69.4 kg (153 lb).  Physical Exam  Vitals reviewed.   Constitutional:       General: She is not in acute distress.     Appearance: Normal appearance.   HENT:      Head: Normocephalic.      Right Ear: Tympanic membrane, ear canal and external ear normal.      Left Ear: Tympanic membrane, ear canal and external ear normal.      Nose: Nose normal.      Mouth/Throat:      Mouth: Mucous membranes are moist.      Pharynx: No posterior oropharyngeal erythema.   Eyes:      Extraocular Movements: Extraocular movements intact.      Conjunctiva/sclera: Conjunctivae normal.      Pupils: Pupils are equal, round, and reactive to light.   Cardiovascular:      Rate and Rhythm: Normal rate and regular rhythm.      Pulses: Normal pulses.      Heart sounds: Normal heart sounds. No murmur heard.  Pulmonary:      Effort: Pulmonary effort is normal.      Breath sounds: Normal breath sounds.   Abdominal:      Palpations: Abdomen is soft. There is no mass.      Tenderness: There is no abdominal tenderness. There is no guarding or rebound.   Musculoskeletal:         General: No deformity. Normal range of motion.      Cervical back: Normal range of motion and neck supple.   Lymphadenopathy:      Cervical: No cervical adenopathy.   Skin:     General: Skin is warm and dry.   Neurological:      General: No focal deficit present.      Mental Status: She is alert.   Psychiatric:         Mood and Affect: Mood normal.         Behavior: Behavior normal.           Diagnostic Test Results:  Labs reviewed in Epic  Results for orders placed or performed in visit on 11/17/23   Lipid Profile (Chol, Trig, HDL, LDL calc)     Status: Abnormal   Result Value Ref Range    Cholesterol 204 (H) <200 mg/dL    Triglycerides 121 " <150 mg/dL    Direct Measure HDL 69 >=50 mg/dL    LDL Cholesterol Calculated 111 (H) <=100 mg/dL    Non HDL Cholesterol 135 (H) <130 mg/dL    Narrative    Cholesterol  Desirable:  <200 mg/dL    Triglycerides  Normal:  Less than 150 mg/dL  Borderline High:  150-199 mg/dL  High:  200-499 mg/dL  Very High:  Greater than or equal to 500 mg/dL    Direct Measure HDL  Female:  Greater than or equal to 50 mg/dL   Male:  Greater than or equal to 40 mg/dL    LDL Cholesterol  Desirable:  <100mg/dL  Above Desirable:  100-129 mg/dL   Borderline High:  130-159 mg/dL   High:  160-189 mg/dL   Very High:  >= 190 mg/dL    Non HDL Cholesterol  Desirable:  130 mg/dL  Above Desirable:  130-159 mg/dL  Borderline High:  160-189 mg/dL  High:  190-219 mg/dL  Very High:  Greater than or equal to 220 mg/dL   Comprehensive metabolic panel (BMP + Alb, Alk Phos, ALT, AST, Total. Bili, TP)     Status: Abnormal   Result Value Ref Range    Sodium 140 135 - 145 mmol/L    Potassium 4.7 3.4 - 5.3 mmol/L    Carbon Dioxide (CO2) 25 22 - 29 mmol/L    Anion Gap 12 7 - 15 mmol/L    Urea Nitrogen 19.3 8.0 - 23.0 mg/dL    Creatinine 1.01 (H) 0.51 - 0.95 mg/dL    GFR Estimate 61 >60 mL/min/1.73m2    Calcium 10.0 8.8 - 10.2 mg/dL    Chloride 103 98 - 107 mmol/L    Glucose 89 70 - 99 mg/dL    Alkaline Phosphatase 78 40 - 150 U/L    AST 24 0 - 45 U/L    ALT 15 0 - 50 U/L    Protein Total 7.3 6.4 - 8.3 g/dL    Albumin 4.5 3.5 - 5.2 g/dL    Bilirubin Total 0.5 <=1.2 mg/dL   TSH     Status: Normal   Result Value Ref Range    TSH 3.08 0.30 - 4.20 uIU/mL   CBC with platelets and differential     Status: None   Result Value Ref Range    WBC Count 4.7 4.0 - 11.0 10e3/uL    RBC Count 5.19 3.80 - 5.20 10e6/uL    Hemoglobin 14.6 11.7 - 15.7 g/dL    Hematocrit 45.6 35.0 - 47.0 %    MCV 88 78 - 100 fL    MCH 28.1 26.5 - 33.0 pg    MCHC 32.0 31.5 - 36.5 g/dL    RDW 12.6 10.0 - 15.0 %    Platelet Count 272 150 - 450 10e3/uL    % Neutrophils 55 %    % Lymphocytes 27 %    %  Monocytes 9 %    % Eosinophils 7 %    % Basophils 1 %    % Immature Granulocytes 0 %    Absolute Neutrophils 2.6 1.6 - 8.3 10e3/uL    Absolute Lymphocytes 1.3 0.8 - 5.3 10e3/uL    Absolute Monocytes 0.4 0.0 - 1.3 10e3/uL    Absolute Eosinophils 0.3 0.0 - 0.7 10e3/uL    Absolute Basophils 0.1 0.0 - 0.2 10e3/uL    Absolute Immature Granulocytes 0.0 <=0.4 10e3/uL   CBC with Platelets & Differential     Status: None    Narrative    The following orders were created for panel order CBC with Platelets & Differential.  Procedure                               Abnormality         Status                     ---------                               -----------         ------                     CBC with platelets and d...[[id number]]                      Final result                 Please view results for these tests on the individual orders.       ASSESSMENT / PLAN:   1. Encounter for Medicare annual wellness exam  This is a 66 yo female here for AWV   - Lipid Profile (Chol, Trig, HDL, LDL calc); Future  - Comprehensive metabolic panel (BMP + Alb, Alk Phos, ALT, AST, Total. Bili, TP); Future  - CBC with Platelets & Differential; Future  - Lipid Profile (Chol, Trig, HDL, LDL calc)  - Comprehensive metabolic panel (BMP + Alb, Alk Phos, ALT, AST, Total. Bili, TP)  - CBC with Platelets & Differential    2. Atrial fibrillation, unspecified type (H)  H/o atrial fibrillation - currently stable -   - TSH; Future  - TSH    3. Atrial Septal Defect  S/p surgical intervention for ASD - doing well - no ongoing concerns     4. Age-related cataract of both eyes, unspecified age-related cataract type  Patient is scheduled for surgery on cataracts - first eye to be done this afternoon    5. Screen for colon cancer  Due for colon cancer screening - has this scheduled.        Patient has been advised of split billing requirements and indicates understanding: Yes      COUNSELING:  Reviewed preventive health counseling, as reflected in patient  instructions       Regular exercise       Healthy diet/nutrition        She reports that she has never smoked. She has never used smokeless tobacco.      Appropriate preventive services were discussed with this patient, including applicable screening as appropriate for fall prevention, nutrition, physical activity, Tobacco-use cessation, weight loss and cognition.  Checklist reviewing preventive services available has been given to the patient.    Reviewed patients plan of care and provided an AVS. The Basic Care Plan (routine screening as documented in Health Maintenance) for Summer meets the Care Plan requirement. This Care Plan has been established and reviewed with the Patient.        RICKY ZUNIGA MD  [facility]      Prior to immunization administration, verified patients identity using patient s name and date of birth. Please see Immunization Activity for additional information.     Screening Questionnaire for Adult Immunization    Are you sick today?   No   Do you have allergies to medications, food, a vaccine component or latex?   No   Have you ever had a serious reaction after receiving a vaccination?   No   Do you have a long-term health problem with heart, lung, kidney, or metabolic disease (e.g., diabetes), asthma, a blood disorder, no spleen, complement component deficiency, a cochlear implant, or a spinal fluid leak?  Are you on long-term aspirin therapy?   No   Do you have cancer, leukemia, HIV/AIDS, or any other immune system problem?   No   Do you have a parent, brother, or sister with an immune system problem?   No   In the past 3 months, have you taken medications that affect  your immune system, such as prednisone, other steroids, or anticancer drugs; drugs for the treatment of rheumatoid arthritis, Crohn s disease, or psoriasis; or have you had radiation treatments?   No   Have you had a seizure, or a brain or other nervous system problem?   No   During  the past year, have you received a transfusion of blood or blood    products, or been given immune (gamma) globulin or antiviral drug?   No   For women: Are you pregnant or is there a chance you could become       pregnant during the next month?   No   Have you received any vaccinations in the past 4 weeks?   No     Immunization questionnaire answers were all negative.      Patient instructed to remain in clinic for 15 minutes afterwards, and to report any adverse reactions.     Screening performed by Irma Angulo MA on 11/17/2023 at 8:00 AM.        Identified Health Risks:  I have reviewed Opioid Use Disorder and Substance Use Disorder risk factors and made any needed referrals.

## 2023-11-21 NOTE — ED TRIAGE NOTES
Chief Complaint   Patient presents with   • T-5000 GLF     6 days ago, ambulatory with white cane, accidental trip and fall.     • Foot Pain     L foot worsening pain, pedal pulse 2+, cap refill <2 seconds.     Patient to triage via wheelchair, ambulatory to scale with a ataxic gait, patient A&O x4.  Appropriate precautions in place.     Explained wait time and triage process to pt. Pt placed back in lobby, told to notify ED tech or triage RN of any changes, verbalized understanding.     Abdomen soft, non-tender, no rebound, no guarding.

## 2024-03-04 NOTE — DISCHARGE INSTRUCTIONS
"Chief Complaint  Med Refill (Pt is here with mom for a medicine refill )    Subjective          History of Present Illness  Ozzy Lam is here today with his mother who helped provide detailed history of chief complaint.  He is here today for concerns of a follow-up on Adderall XR 5 mg.  He states he is doing okay in school and is making B's, C's and D's in school.  He states he is having some difficulty with focus and attention and would like to increase his dose.  He states he is eating well and sleeping well.  No side effects of Adderall.  Mom states over the next few days they will be moving to [place].    Objective   Vital Signs:   BP (!) 90/56   Pulse 70   Ht 157.5 cm (62\")   Wt 45.4 kg (100 lb)   SpO2 99%   BMI 18.29 kg/m²     Body mass index is 18.29 kg/m².      Review of Systems   Constitutional:  Negative for chills and fever.   HENT:  Negative for ear pain, rhinorrhea and sneezing.    Eyes:  Negative for discharge and redness.   Respiratory:  Negative for cough.    Gastrointestinal:  Negative for diarrhea and vomiting.   Skin:  Negative for rash.         Current Outpatient Medications:     amphetamine-dextroamphetamine XR (Adderall XR) 10 MG 24 hr capsule, Take 1 capsule by mouth Every Morning, Disp: 30 capsule, Rfl: 0    Allergies: Patient has no known allergies.    Physical Exam  Constitutional:       Appearance: Normal appearance.   Cardiovascular:      Rate and Rhythm: Normal rate and regular rhythm.      Heart sounds: Normal heart sounds.   Pulmonary:      Effort: Pulmonary effort is normal.      Breath sounds: Normal breath sounds.   Abdominal:      General: Abdomen is flat.      Palpations: Abdomen is soft.   Neurological:      Mental Status: He is alert.          Result Review :                   Assessment and Plan    Diagnoses and all orders for this visit:    1. High risk medication use (Primary)  Assessment & Plan:  Discussed with mom we will increase Adderall XR to 10 mg.  We " There is no evidence of new or worsening infection of the kidneys there is no evidence of bowel infection you have a small effusion on the left side from your  shunt which is chronic however this could be part of the irritation the left flank could be diaphragmatic irritation.  Please use warm compresses and take your at home pain management as well as finish antibiotics you were prescribed last week   discussed how to take and all side effects.  We discussed making sure he is monitoring for headaches, changes in appetite and sleep habits.  Mom states they will be moving in the next few days.  Discussed with mom can find follow-up in [place] or may follow-up with me in 1 month as a telehealth visit.      2. ADHD (attention deficit hyperactivity disorder), combined type  Assessment & Plan:  We discussed continuing with healthy lifestyle including eating well, sleeping well and daily exercise.  Will follow-up in 1 month.    Orders:  -     amphetamine-dextroamphetamine XR (Adderall XR) 10 MG 24 hr capsule; Take 1 capsule by mouth Every Morning  Dispense: 30 capsule; Refill: 0        Follow Up   Return in about 1 month (around 4/4/2024) for Video visit.  Patient was given instructions and counseling regarding his condition or for health maintenance advice. Please see specific information pulled into the AVS if appropriate.     Napoleon Joy MD  03/04/2024

## 2024-09-09 NOTE — ED AVS SNAPSHOT
Home Care Instructions                                                                                                                Rasheeda Manzano   MRN: [id number]    Department:  [facility], Emergency Dept   Date of Visit:  2/8/2017            [facility], Emergency Dept    [address]    [place] [address]    Phone:  [phone]      You were seen by     Tony Gates M.D.      Your Diagnosis Was     Migraine without aura and without status migrainosus, not intractable     G43.009       These are the medications you received during your hospitalization from 02/08/2017 0023 to 02/08/2017 0416     Date/Time Order Dose Route Action    02/08/2017 0250 ketorolac (TORADOL) injection 30 mg 30 mg Intravenous Given    02/08/2017 0250 NS infusion 1,000 mL 1,000 mL Intravenous New Bag    02/08/2017 0250 metoclopramide (REGLAN) injection 10 mg 10 mg Intravenous Given    02/08/2017 0250 diphenhydrAMINE (BENADRYL) injection 25 mg 25 mg Intravenous Given    02/08/2017 0344 HYDROmorphone (DILAUDID) injection 1 mg 1 mg Intravenous Given      Follow-up Information     1. Follow up with CHANDLER Adams In 3 days.    Specialty:  Family Medicine    Contact information    [address] [address] [phone]        Medication Information     Review all of your home medications and newly ordered medications with your primary doctor and/or pharmacist as soon as possible. Follow medication instructions as directed by your doctor and/or pharmacist.     Please keep your complete medication list with you and share with your physician. Update the information when medications are discontinued, doses are changed, or new medications (including over-the-counter products) are added; and carry medication information at all times in the event of emergency situations.               Medication List      ASK your doctor about these medications        Instructions    Please call the [facility] at  [phone] if any of your medications change.  This means: if a med is discontinued, a new med is started, or a dose is changed.  These meds may interact with your warfarin and we may need to adjust your dose.  This is especially important if you start any type of ANTIBIOTIC.    Also, please call if you have any admissions to the hospital, visits to an emergency room, procedures planned, or if any other medical provider has instructed you to hold your warfarin.      butalbital-acetaminophen-caffeine-codeine -30-30 MG per capsule   Commonly known as:  FIORICET W/CODEINE    Take 1 Cap by mouth every four hours as needed for Headache.   Dose:  1 Cap       * HYDROmorphone 2 MG Tabs   Commonly known as:  DILAUDID    Take 1-2 Tabs by mouth every four hours as needed (FOR PAIN) for up to 11 doses.   Dose:  2-4 mg       * HYDROmorphone 2 MG Tabs   Commonly known as:  DILAUDID    Take 1-2 Tabs by mouth every 6 hours as needed for Severe Pain.   Dose:  2-4 mg       ibuprofen 400 MG Tabs   Commonly known as:  MOTRIN    Take 1 Tab by mouth every 8 hours as needed for Moderate Pain.   Dose:  400 mg       LUNESTA PO    Take  by mouth.       ondansetron 4 MG Tbdp   Commonly known as:  ZOFRAN ODT    Take 1 Tab by mouth every 8 hours as needed for Nausea/Vomiting.   Dose:  4 mg       SUMAtriptan 25 MG Tabs tablet   Commonly known as:  IMITREX    Take 1 Tab by mouth Once PRN for Migraine.   Dose:  25 mg       * Notice:  This list has 2 medication(s) that are the same as other medications prescribed for you. Read the directions carefully, and ask your doctor or other care provider to review them with you.              Discharge Instructions       Migraine Headache  A migraine headache is very bad, throbbing pain on one or both sides of your head. Talk to your doctor about what things may bring on (trigger) your migraine headaches.  HOME CARE  · Only take medicines as told by your doctor.  · Lie down in a dark, quiet room when you have a migraine.  · Keep a journal to find out if certain things bring on migraine headaches. For example, write down:  ¨ What you eat and drink.  ¨ How much sleep you get.  ¨ Any change to your diet or medicines.  · Lessen how much alcohol you drink.  · Quit smoking if you smoke.  · Get enough sleep.  · Lessen any stress in your life.  · Keep lights dim if bright lights bother you or make your migraines worse.  GET HELP RIGHT AWAY IF:   · Your migraine becomes  really bad.  · You have a fever.  · You have a stiff neck.  · You have trouble seeing.  · Your muscles are weak, or you lose muscle control.  · You lose your balance or have trouble walking.  · You feel like you will pass out (faint), or you pass out.  · You have really bad symptoms that are different than your first symptoms.  MAKE SURE YOU:   · Understand these instructions.  · Will watch your condition.  · Will get help right away if you are not doing well or get worse.     This information is not intended to replace advice given to you by your health care provider. Make sure you discuss any questions you have with your health care provider.     Document Released: 09/26/2009 Document Revised: 03/11/2013 Document Reviewed: 08/25/2014  [employer] Patient Education ©2016 [employer]            Patient Information     Patient Information    Following emergency treatment: all patient requiring follow-up care must return either to a private physician or a clinic if your condition worsens before you are able to obtain further medical attention, please return to the emergency room.     Billing Information    At [facility], we work to make the billing process streamlined for our patients.  Our Representatives are here to answer any questions you may have regarding your hospital bill.  If you have insurance coverage and have supplied your insurance information to us, we will submit a claim to your insurer on your behalf.  Should you have any questions regarding your bill, we can be reached online or by phone as follows:  Online: You are able pay your bills online or live chat with our representatives about any billing questions you may have. We are here to help Monday - Friday from 8:00am to 7:30pm and 9:00am - 12:00pm on Saturdays.  Please visit [email or web address]  for more information.   Phone:  [phone] or [phone]    Please note that your emergency physician,  surgeon, pathologist, radiologist, anesthesiologist, and other specialists are not employed by [facility] and will therefore bill separately for their services.  Please contact them directly for any questions concerning their bills at the numbers below:     Emergency Physician Services:  [phone]  [facility]:  [phone]  [facility]:  [phone]  [facility]:  [phone]    1. Your final bill may vary from the amount quoted upon discharge if all procedures are not complete at that time, or if your doctor has additional procedures of which we are not aware. You will receive an additional bill if you return to the Emergency Department at [facility] for suture removal regardless of the facility of which the sutures were placed.     2. Please arrange for settlement of this account at the emergency registration.    3. All self-pay accounts are due in full at the time of treatment.  If you are unable to meet this obligation then payment is expected within 4-5 days.     4. If you have had radiology studies (CT, X-ray, Ultrasound, MRI), you have received a preliminary result during your emergency department visit. Please contact the radiology department [phone] to receive a copy of your final result. Please discuss the Final result with your primary physician or with the follow up physician provided.     Crisis Hotline:  [place] Crisis Hotline:  0-393-BLZZYCS or 1-270.759.5835  [place] Crisis Hotline:    1-408.566.1752 or 553-485-0915         ED Discharge Follow Up Questions    1. In order to provide you with very good care, we would like to follow up with a phone call in the next few days.  May we have your permission to contact you?     YES /  NO    2. What is the best phone number to call you? (       )_____-__________    3. What is the best time to call you?      Morning  /  Afternoon  /  Evening                   Patient Signature:   ____________________________________________________________    Date:  ____________________________________________________________

## 2025-03-07 NOTE — PROGRESS NOTES
12 hour chart check.    Monitor Summary:    SR 72-93  .16/.08/.36     Quality 130: Documentation Of Current Medications In The Medical Record: Current Medications Documented Detail Level: Detailed Quality 431: Preventive Care And Screening: Unhealthy Alcohol Use - Screening: Patient not identified as an unhealthy alcohol user when screened for unhealthy alcohol use using a systematic screening method Quality 226: Preventive Care And Screening: Tobacco Use: Screening And Cessation Intervention: Patient screened for tobacco use and is an ex/non-smoker

## (undated) DEVICE — SUTURE 3-0 VICRYL PLUS - 12 X 18 INCH (12/BX)

## (undated) DEVICE — HEAD HOLDER JUNIOR/ADULT

## (undated) DEVICE — BANDAGE ELASTIC STERILE VELCRO 6 X 5 YDS (25EA/CA)

## (undated) DEVICE — DRESSING TRANSPARENT FILM TEGADERM 4 X 4.75" (50EA/BX)"

## (undated) DEVICE — BITE BLOCK ADULT 60FR (100EA/CA)

## (undated) DEVICE — SET LEADWIRE 5 LEAD BEDSIDE DISPOSABLE ECG (1SET OF 5/EA)

## (undated) DEVICE — SUTURE 3-0 ETHILON FS-1 - (36/BX) 30 INCH

## (undated) DEVICE — LACTATED RINGERS INJ 1000 ML - (14EA/CA 60CA/PF)

## (undated) DEVICE — DRAPE LARGE 3 QUARTER - (20/CA)

## (undated) DEVICE — TUBE CONNECTING SUCTION - CLEAR PLASTIC STERILE 72 IN (50EA/CA)

## (undated) DEVICE — SENSOR SPO2 NEO LNCS ADHESIVE (20/BX) SEE USER NOTES

## (undated) DEVICE — BOVIE BLADE COATED &INSULATED - 25/PK

## (undated) DEVICE — GLOVE BIOGEL SZ 7 SURGICAL PF LTX - (50PR/BX 4BX/CA)

## (undated) DEVICE — GLOVE BIOGEL INDICATOR SZ 7.5 SURGICAL PF LTX - (50PR/BX 4BX/CA)

## (undated) DEVICE — CATHETER IV 20 GA X 1-1/4 ---SURG.& SDS ONLY--- (50EA/BX)

## (undated) DEVICE — ELECTRODE 850 FOAM ADHESIVE - HYDROGEL RADIOTRNSPRNT (50/PK)

## (undated) DEVICE — SYRINGE 6 CC 20 GA X 1 1/2 - NDL SAFETY  (50/BX)

## (undated) DEVICE — SYRINGE 3 CC 22 GA X 1-1/2 - NDL SAFETY (50/BX 8BX/CA)

## (undated) DEVICE — GLOVE BIOGEL SZ 8 SURGICAL PF LTX - (50PR/BX 4BX/CA)

## (undated) DEVICE — GLOVE SZ 8 BIOGEL PI MICRO - PF LF (50PR/BX)

## (undated) DEVICE — STERI STRIP COMPOUND BENZOIN - TINCTURE 0.6ML WITH APPLICATOR (40EA/BX)

## (undated) DEVICE — CON SEDATION/>5 YR 1ST 15 MIN

## (undated) DEVICE — SPONGE GAUZE NON-STERILE 4X4 - (2000/CA 10PK/CA)

## (undated) DEVICE — KIT  I.V. START (100EA/CA)

## (undated) DEVICE — CONTAINER, SPECIMEN, STERILE

## (undated) DEVICE — CLOSURE SKIN STRIP 1/2 X 4 IN - (STERI STRIP) (50/BX 4BX/CA)

## (undated) DEVICE — SUTURE 3-0 VICRYL PLUS SH - 8X 18 INCH (12/BX)

## (undated) DEVICE — BASIN EMESIS DISP. - (250/CA)

## (undated) DEVICE — CANISTER SUCTION RIGID RED 1500CC (40EA/CA)

## (undated) DEVICE — MANIFOLD NEPTUNE 1 PORT (20/PK)

## (undated) DEVICE — SPONGE GAUZESTER 4 X 4 4PLY - (128PK/CA)

## (undated) DEVICE — CHLORAPREP 26 ML APPLICATOR - ORANGE TINT(25/CA)

## (undated) DEVICE — SOD. CHL 10CC SYRINGE PREFILL - W/10 CC (30/BX)

## (undated) DEVICE — SODIUM CHL IRRIGATION 0.9% 1000ML (12EA/CA)

## (undated) DEVICE — PAD LAP STERILE 18 X 18 - (5/PK 40PK/CA)

## (undated) DEVICE — ARMREST CRADLE FOAM - (2PR/PK 12PR/CA)

## (undated) DEVICE — SUTURE 4-0 MONOCRYL PLUS PS-2 - 27 INCH (36/BX)

## (undated) DEVICE — GOWN SURGEONS X-LARGE - DISP. (30/CA)

## (undated) DEVICE — KIT ANESTHESIA W/CIRCUIT & 3/LT BAG W/FILTER (20EA/CA)

## (undated) DEVICE — BITEBLOCK ENDOSCOPIC PEDI. - (25/BX)

## (undated) DEVICE — BANDAGE ELASTIC 6 IN X 5 YDS - LATEX FREE (10/BX)

## (undated) DEVICE — BANDAGE ELASTIC STERILE MATRIX 6 X 10 (20EA/CA)

## (undated) DEVICE — GLOVE BIOGEL PI INDICATOR SZ 7.5 SURGICAL PF LF -(50/BX 4BX/CA)

## (undated) DEVICE — SLEEVE, VASO, THIGH, MED

## (undated) DEVICE — DRAPE IOBAN II INCISE 23X17 - (10EA/BX 4BX/CA)

## (undated) DEVICE — GAUZE FLUFF STERILE 2-PLY 36 X 36 (100EA/CA)

## (undated) DEVICE — FILM CASSETTE ENDO

## (undated) DEVICE — Device

## (undated) DEVICE — ELECTRODE DUAL RETURN W/ CORD - (50/PK)

## (undated) DEVICE — CANISTER SUCTION 3000ML MECHANICAL FILTER AUTO SHUTOFF MEDI-VAC NONSTERILE LF DISP  (40EA/CA)

## (undated) DEVICE — SUCTION INSTRUMENT YANKAUER BULBOUS TIP W/O VENT (50EA/CA)

## (undated) DEVICE — MASK ANESTHESIA ADULT  - (100/CA)

## (undated) DEVICE — PACK MINOR BASIN - (2EA/CA)

## (undated) DEVICE — GLOVE BIOGEL PI INDICATOR SZ 7.0 SURGICAL PF LF - (50/BX 4BX/CA)

## (undated) DEVICE — FORCEP RADIAL JAW 4 STANDARD CAPACITY W/NEEDLE 240CM (40EA/BX)

## (undated) DEVICE — KIT CUSTOM PROCEDURE SINGLE FOR ENDO  (15/CA)

## (undated) DEVICE — STAPLER SKIN DISP - (6/BX 10BX/CA) VISISTAT

## (undated) DEVICE — GLOVE SZ 7 BIOGEL PI MICRO - PF LF (50PR/BX 4BX/CA)

## (undated) DEVICE — TUBING CLEARLINK DUO-VENT - C-FLO (48EA/CA)

## (undated) DEVICE — DRAIN J-VAC 19FR. ROUND - (10/CS)

## (undated) DEVICE — SYRINGE DISP. 50CC LS - (40/BX)

## (undated) DEVICE — GOWN WARMING STANDARD FLEX - (30/CA)

## (undated) DEVICE — RESERVOIR SUCTION 100 CC - SILICONE (20EA/CA)

## (undated) DEVICE — SUTURE GENERAL

## (undated) DEVICE — SPONGE GAUZESTER. 2X2 4-PL - (2/PK 50PK/BX 30BX/CS)

## (undated) DEVICE — GLOVE SURGICAL PROTEXIS 8 1/2 - (50PR/BX)

## (undated) DEVICE — SHEET TRANSVERSE LAP - (12EA/CA)